# Patient Record
Sex: MALE | Race: WHITE | NOT HISPANIC OR LATINO | Employment: OTHER | URBAN - METROPOLITAN AREA
[De-identification: names, ages, dates, MRNs, and addresses within clinical notes are randomized per-mention and may not be internally consistent; named-entity substitution may affect disease eponyms.]

---

## 2017-01-01 ENCOUNTER — PATIENT OUTREACH (OUTPATIENT)
Dept: ADMINISTRATIVE | Facility: CLINIC | Age: 47
End: 2017-01-01

## 2017-01-01 ENCOUNTER — HOSPITAL ENCOUNTER (INPATIENT)
Facility: HOSPITAL | Age: 47
LOS: 7 days | Discharge: HOME | DRG: 253 | End: 2017-10-03
Attending: EMERGENCY MEDICINE | Admitting: INTERNAL MEDICINE
Payer: MEDICARE

## 2017-01-01 VITALS
BODY MASS INDEX: 22.78 KG/M2 | SYSTOLIC BLOOD PRESSURE: 104 MMHG | OXYGEN SATURATION: 98 % | HEART RATE: 77 BPM | TEMPERATURE: 98 F | DIASTOLIC BLOOD PRESSURE: 65 MMHG | HEIGHT: 66 IN | WEIGHT: 141.75 LBS | RESPIRATION RATE: 14 BRPM

## 2017-01-01 DIAGNOSIS — I50.22 CHRONIC SYSTOLIC HEART FAILURE: ICD-10-CM

## 2017-01-01 DIAGNOSIS — I73.9 PAD (PERIPHERAL ARTERY DISEASE): ICD-10-CM

## 2017-01-01 DIAGNOSIS — I74.3 EMBOLISM AND THROMBOSIS OF ARTERY OF LOWER EXTREMITY: ICD-10-CM

## 2017-01-01 DIAGNOSIS — F10.10 ALCOHOL ABUSE: ICD-10-CM

## 2017-01-01 DIAGNOSIS — Z91.148 NON COMPLIANCE W MEDICATION REGIMEN: ICD-10-CM

## 2017-01-01 DIAGNOSIS — I70.209 ARTERIAL OCCLUSION, LOWER EXTREMITY: Primary | ICD-10-CM

## 2017-01-01 DIAGNOSIS — M79.672 ACUTE PAIN OF LEFT FOOT: ICD-10-CM

## 2017-01-01 DIAGNOSIS — Z72.0 TOBACCO USE: ICD-10-CM

## 2017-01-01 DIAGNOSIS — R07.9 CHEST PAIN: ICD-10-CM

## 2017-01-01 DIAGNOSIS — Z72.0 TOBACCO ABUSE: ICD-10-CM

## 2017-01-01 LAB
ABO + RH BLD: NORMAL
ALBUMIN SERPL BCP-MCNC: 2.7 G/DL
ALBUMIN SERPL BCP-MCNC: 2.8 G/DL
ALBUMIN SERPL BCP-MCNC: 2.9 G/DL
ALBUMIN SERPL BCP-MCNC: 3 G/DL
ALBUMIN SERPL BCP-MCNC: 3.1 G/DL
ALP SERPL-CCNC: 70 U/L
ALP SERPL-CCNC: 72 U/L
ALP SERPL-CCNC: 73 U/L
ALP SERPL-CCNC: 74 U/L
ALP SERPL-CCNC: 83 U/L
ALT SERPL W/O P-5'-P-CCNC: 15 U/L
ALT SERPL W/O P-5'-P-CCNC: 15 U/L
ALT SERPL W/O P-5'-P-CCNC: 16 U/L
ALT SERPL W/O P-5'-P-CCNC: 21 U/L
ALT SERPL W/O P-5'-P-CCNC: 23 U/L
ANION GAP SERPL CALC-SCNC: 10 MMOL/L
ANION GAP SERPL CALC-SCNC: 6 MMOL/L
ANION GAP SERPL CALC-SCNC: 7 MMOL/L
ANION GAP SERPL CALC-SCNC: 8 MMOL/L
ANION GAP SERPL CALC-SCNC: 9 MMOL/L
ANION GAP SERPL CALC-SCNC: 9 MMOL/L
ANISOCYTOSIS BLD QL SMEAR: SLIGHT
APC INTERPRETATION: NORMAL
APCR PPP: 2.8 {RATIO}
APTT BLDCRRT: 22.7 SEC
APTT PROTEIN C ACTIVATOR+FV DP/APTT PPP: 114 %
AST SERPL-CCNC: 16 U/L
AST SERPL-CCNC: 18 U/L
AST SERPL-CCNC: 18 U/L
AST SERPL-CCNC: 21 U/L
AST SERPL-CCNC: 25 U/L
AT III ACT/NOR PPP CHRO: 128 %
B2 GLYCOPROT1 IGA SER QL: <9 SAU
B2 GLYCOPROT1 IGG SER QL: <9 SGU
B2 GLYCOPROT1 IGM SER QL: <9 SMU
BASOPHILS # BLD AUTO: 0.01 K/UL
BASOPHILS # BLD AUTO: 0.02 K/UL
BASOPHILS # BLD AUTO: 0.04 K/UL
BASOPHILS # BLD AUTO: 0.05 K/UL
BASOPHILS # BLD AUTO: ABNORMAL K/UL
BASOPHILS NFR BLD: 0 %
BASOPHILS NFR BLD: 0.1 %
BASOPHILS NFR BLD: 0.2 %
BASOPHILS NFR BLD: 0.3 %
BASOPHILS NFR BLD: 0.4 %
BILIRUB SERPL-MCNC: 0.6 MG/DL
BILIRUB SERPL-MCNC: 0.6 MG/DL
BILIRUB SERPL-MCNC: 0.8 MG/DL
BILIRUB SERPL-MCNC: 0.9 MG/DL
BILIRUB SERPL-MCNC: 1 MG/DL
BLD GP AB SCN CELLS X3 SERPL QL: NORMAL
BUN SERPL-MCNC: 10 MG/DL
BUN SERPL-MCNC: 11 MG/DL
BUN SERPL-MCNC: 12 MG/DL
BUN SERPL-MCNC: 15 MG/DL
BUN SERPL-MCNC: 17 MG/DL
BUN SERPL-MCNC: 19 MG/DL
BUN SERPL-MCNC: 8 MG/DL
BUN SERPL-MCNC: 9 MG/DL
CALCIUM SERPL-MCNC: 8.5 MG/DL
CALCIUM SERPL-MCNC: 8.6 MG/DL
CALCIUM SERPL-MCNC: 8.9 MG/DL
CALCIUM SERPL-MCNC: 9.2 MG/DL
CALCIUM SERPL-MCNC: 9.3 MG/DL
CALCIUM SERPL-MCNC: 9.3 MG/DL
CALCIUM SERPL-MCNC: 9.5 MG/DL
CALCIUM SERPL-MCNC: 9.6 MG/DL
CARDIOLIPIN IGG SER IA-ACNC: <9.4 GPL
CARDIOLIPIN IGM SER IA-ACNC: <9.4 MPL
CHLORIDE SERPL-SCNC: 100 MMOL/L
CHLORIDE SERPL-SCNC: 101 MMOL/L
CHLORIDE SERPL-SCNC: 103 MMOL/L
CHLORIDE SERPL-SCNC: 103 MMOL/L
CHLORIDE SERPL-SCNC: 104 MMOL/L
CHLORIDE SERPL-SCNC: 105 MMOL/L
CHLORIDE SERPL-SCNC: 105 MMOL/L
CHLORIDE SERPL-SCNC: 98 MMOL/L
CHOLEST SERPL-MCNC: 189 MG/DL
CHOLEST/HDLC SERPL: 3 {RATIO}
CO2 SERPL-SCNC: 24 MMOL/L
CO2 SERPL-SCNC: 25 MMOL/L
CO2 SERPL-SCNC: 27 MMOL/L
CO2 SERPL-SCNC: 28 MMOL/L
CO2 SERPL-SCNC: 28 MMOL/L
CO2 SERPL-SCNC: 29 MMOL/L
CO2 SERPL-SCNC: 29 MMOL/L
CO2 SERPL-SCNC: 30 MMOL/L
CREAT SERPL-MCNC: 0.7 MG/DL
CREAT SERPL-MCNC: 0.7 MG/DL
CREAT SERPL-MCNC: 0.8 MG/DL
CRP SERPL-MCNC: 17.2 MG/L
DIFFERENTIAL METHOD: ABNORMAL
EOSINOPHIL # BLD AUTO: 0.2 K/UL
EOSINOPHIL # BLD AUTO: 0.3 K/UL
EOSINOPHIL # BLD AUTO: 0.3 K/UL
EOSINOPHIL # BLD AUTO: 0.4 K/UL
EOSINOPHIL # BLD AUTO: ABNORMAL K/UL
EOSINOPHIL NFR BLD: 1.4 %
EOSINOPHIL NFR BLD: 1.6 %
EOSINOPHIL NFR BLD: 1.6 %
EOSINOPHIL NFR BLD: 1.9 %
EOSINOPHIL NFR BLD: 3.3 %
EOSINOPHIL NFR BLD: 4.5 %
EOSINOPHIL NFR BLD: 5 %
ERYTHROCYTE [DISTWIDTH] IN BLOOD BY AUTOMATED COUNT: 16.5 %
ERYTHROCYTE [DISTWIDTH] IN BLOOD BY AUTOMATED COUNT: 16.9 %
ERYTHROCYTE [DISTWIDTH] IN BLOOD BY AUTOMATED COUNT: 16.9 %
ERYTHROCYTE [DISTWIDTH] IN BLOOD BY AUTOMATED COUNT: 17.1 %
ERYTHROCYTE [DISTWIDTH] IN BLOOD BY AUTOMATED COUNT: 17.1 %
ERYTHROCYTE [DISTWIDTH] IN BLOOD BY AUTOMATED COUNT: 17.2 %
ERYTHROCYTE [DISTWIDTH] IN BLOOD BY AUTOMATED COUNT: 17.3 %
ERYTHROCYTE [SEDIMENTATION RATE] IN BLOOD BY WESTERGREN METHOD: 11 MM/HR
EST. GFR  (AFRICAN AMERICAN): >60 ML/MIN/1.73 M^2
EST. GFR  (NON AFRICAN AMERICAN): >60 ML/MIN/1.73 M^2
ESTIMATED AVG GLUCOSE: 108 MG/DL
F2 GENE MUT ANL BLD/T: NORMAL
F2 GENE MUT ANL BLD/T: NORMAL
F5 P.R506Q BLD/T QL: NORMAL
FACT X PPP CHRO-ACNC: 0.16 IU/ML
FACT X PPP CHRO-ACNC: 0.23 IU/ML
FACT X PPP CHRO-ACNC: 0.97 IU/ML
FACT X PPP CHRO-ACNC: <0.1 IU/ML
FIBRINOGEN PPP-MCNC: 248 MG/DL
FIBRINOGEN PPP-MCNC: 303 MG/DL
FIBRINOGEN PPP-MCNC: 347 MG/DL
FIBRINOGEN PPP-MCNC: 353 MG/DL
FIBRINOGEN PPP-MCNC: 382 MG/DL
FIBRINOGEN PPP-MCNC: 401 MG/DL
GLUCOSE SERPL-MCNC: 103 MG/DL
GLUCOSE SERPL-MCNC: 104 MG/DL
GLUCOSE SERPL-MCNC: 120 MG/DL
GLUCOSE SERPL-MCNC: 128 MG/DL
GLUCOSE SERPL-MCNC: 137 MG/DL
GLUCOSE SERPL-MCNC: 138 MG/DL
GLUCOSE SERPL-MCNC: 86 MG/DL
GLUCOSE SERPL-MCNC: 91 MG/DL
HBA1C MFR BLD HPLC: 5.4 %
HCT VFR BLD AUTO: 33.2 %
HCT VFR BLD AUTO: 34.2 %
HCT VFR BLD AUTO: 34.5 %
HCT VFR BLD AUTO: 34.9 %
HCT VFR BLD AUTO: 37.2 %
HCT VFR BLD AUTO: 39.8 %
HCT VFR BLD AUTO: 40.3 %
HDLC SERPL-MCNC: 62 MG/DL
HDLC SERPL: 32.8 %
HGB BLD-MCNC: 11.2 G/DL
HGB BLD-MCNC: 11.7 G/DL
HGB BLD-MCNC: 11.8 G/DL
HGB BLD-MCNC: 12.2 G/DL
HGB BLD-MCNC: 12.6 G/DL
HGB BLD-MCNC: 13.8 G/DL
HGB BLD-MCNC: 14 G/DL
INR PPP: 0.9
LA PPP-IMP: NEGATIVE
LDLC SERPL CALC-MCNC: 102 MG/DL
LYMPHOCYTES # BLD AUTO: 2.1 K/UL
LYMPHOCYTES # BLD AUTO: 2.2 K/UL
LYMPHOCYTES # BLD AUTO: 2.3 K/UL
LYMPHOCYTES # BLD AUTO: 2.6 K/UL
LYMPHOCYTES # BLD AUTO: 3.2 K/UL
LYMPHOCYTES # BLD AUTO: 3.4 K/UL
LYMPHOCYTES # BLD AUTO: ABNORMAL K/UL
LYMPHOCYTES NFR BLD: 17.7 %
LYMPHOCYTES NFR BLD: 17.7 %
LYMPHOCYTES NFR BLD: 20.2 %
LYMPHOCYTES NFR BLD: 23.2 %
LYMPHOCYTES NFR BLD: 26.8 %
LYMPHOCYTES NFR BLD: 27.7 %
LYMPHOCYTES NFR BLD: 31 %
MAGNESIUM SERPL-MCNC: 1.8 MG/DL
MAGNESIUM SERPL-MCNC: 1.8 MG/DL
MAGNESIUM SERPL-MCNC: 1.9 MG/DL
MAGNESIUM SERPL-MCNC: 2 MG/DL
MAGNESIUM SERPL-MCNC: 2.1 MG/DL
MCH RBC QN AUTO: 32.9 PG
MCH RBC QN AUTO: 33 PG
MCH RBC QN AUTO: 33 PG
MCH RBC QN AUTO: 33.2 PG
MCH RBC QN AUTO: 33.2 PG
MCH RBC QN AUTO: 33.5 PG
MCH RBC QN AUTO: 33.5 PG
MCHC RBC AUTO-ENTMCNC: 33.5 G/DL
MCHC RBC AUTO-ENTMCNC: 33.7 G/DL
MCHC RBC AUTO-ENTMCNC: 33.9 G/DL
MCHC RBC AUTO-ENTMCNC: 34.5 G/DL
MCHC RBC AUTO-ENTMCNC: 34.7 G/DL
MCHC RBC AUTO-ENTMCNC: 34.7 G/DL
MCHC RBC AUTO-ENTMCNC: 35.4 G/DL
MCV RBC AUTO: 94 FL
MCV RBC AUTO: 96 FL
MCV RBC AUTO: 96 FL
MCV RBC AUTO: 97 FL
MCV RBC AUTO: 97 FL
MCV RBC AUTO: 98 FL
MCV RBC AUTO: 98 FL
MONOCYTES # BLD AUTO: 0.8 K/UL
MONOCYTES # BLD AUTO: 1 K/UL
MONOCYTES # BLD AUTO: 1.1 K/UL
MONOCYTES # BLD AUTO: 1.3 K/UL
MONOCYTES # BLD AUTO: ABNORMAL K/UL
MONOCYTES NFR BLD: 10.2 %
MONOCYTES NFR BLD: 10.7 %
MONOCYTES NFR BLD: 7 %
MONOCYTES NFR BLD: 7 %
MONOCYTES NFR BLD: 7.9 %
MONOCYTES NFR BLD: 8.4 %
MONOCYTES NFR BLD: 8.8 %
NEUTROPHILS # BLD AUTO: 5.9 K/UL
NEUTROPHILS # BLD AUTO: 6.8 K/UL
NEUTROPHILS # BLD AUTO: 7 K/UL
NEUTROPHILS # BLD AUTO: 9 K/UL
NEUTROPHILS # BLD AUTO: 9 K/UL
NEUTROPHILS # BLD AUTO: 9.1 K/UL
NEUTROPHILS NFR BLD: 55 %
NEUTROPHILS NFR BLD: 57.6 %
NEUTROPHILS NFR BLD: 59.7 %
NEUTROPHILS NFR BLD: 65.2 %
NEUTROPHILS NFR BLD: 66 %
NEUTROPHILS NFR BLD: 71 %
NEUTROPHILS NFR BLD: 71.3 %
NEUTS BAND NFR BLD MANUAL: 2 %
NONHDLC SERPL-MCNC: 127 MG/DL
PERIPHERAL STENOSIS: ABNORMAL
PERIPHERAL STENOSIS: ABNORMAL
PERIPHERAL STENT: YES
PLATELET # BLD AUTO: 293 K/UL
PLATELET # BLD AUTO: 312 K/UL
PLATELET # BLD AUTO: 320 K/UL
PLATELET # BLD AUTO: 336 K/UL
PLATELET # BLD AUTO: 346 K/UL
PLATELET # BLD AUTO: 375 K/UL
PLATELET # BLD AUTO: 398 K/UL
PLATELET BLD QL SMEAR: ABNORMAL
PMV BLD AUTO: 10 FL
PMV BLD AUTO: 10.2 FL
PMV BLD AUTO: 10.3 FL
PMV BLD AUTO: 10.3 FL
PMV BLD AUTO: 10.4 FL
PMV BLD AUTO: 10.7 FL
PMV BLD AUTO: 10.8 FL
POC ACTIVATED CLOTTING TIME K: 114 SEC (ref 74–137)
POC ACTIVATED CLOTTING TIME K: 246 SEC (ref 74–137)
POTASSIUM SERPL-SCNC: 3.6 MMOL/L
POTASSIUM SERPL-SCNC: 3.6 MMOL/L
POTASSIUM SERPL-SCNC: 3.8 MMOL/L
POTASSIUM SERPL-SCNC: 4 MMOL/L
POTASSIUM SERPL-SCNC: 4.1 MMOL/L
POTASSIUM SERPL-SCNC: 4.2 MMOL/L
POTASSIUM SERPL-SCNC: 4.6 MMOL/L
POTASSIUM SERPL-SCNC: 4.6 MMOL/L
PROT S ACT/NOR PPP: 105 %
PROT SERPL-MCNC: 6 G/DL
PROT SERPL-MCNC: 6.1 G/DL
PROT SERPL-MCNC: 6.2 G/DL
PROT SERPL-MCNC: 6.3 G/DL
PROT SERPL-MCNC: 6.9 G/DL
PROTHROMBIN TIME: 9.8 SEC
RBC # BLD AUTO: 3.4 M/UL
RBC # BLD AUTO: 3.52 M/UL
RBC # BLD AUTO: 3.55 M/UL
RBC # BLD AUTO: 3.67 M/UL
RBC # BLD AUTO: 3.82 M/UL
RBC # BLD AUTO: 4.16 M/UL
RBC # BLD AUTO: 4.18 M/UL
SAMPLE: ABNORMAL
SAMPLE: NORMAL
SODIUM SERPL-SCNC: 133 MMOL/L
SODIUM SERPL-SCNC: 137 MMOL/L
SODIUM SERPL-SCNC: 139 MMOL/L
SODIUM SERPL-SCNC: 140 MMOL/L
SODIUM SERPL-SCNC: 140 MMOL/L
TRIGL SERPL-MCNC: 125 MG/DL
WBC # BLD AUTO: 10.35 K/UL
WBC # BLD AUTO: 12.16 K/UL
WBC # BLD AUTO: 12.59 K/UL
WBC # BLD AUTO: 12.79 K/UL
WBC # BLD AUTO: 13.64 K/UL
WBC # BLD AUTO: 9.16 K/UL
WBC # BLD AUTO: 9.85 K/UL

## 2017-01-01 PROCEDURE — 37226 CATH LAB PROCEDURE: CPT | Mod: LT,,, | Performed by: INTERNAL MEDICINE

## 2017-01-01 PROCEDURE — 86850 RBC ANTIBODY SCREEN: CPT

## 2017-01-01 PROCEDURE — 85384 FIBRINOGEN ACTIVITY: CPT | Mod: 91

## 2017-01-01 PROCEDURE — 85520 HEPARIN ASSAY: CPT

## 2017-01-01 PROCEDURE — 80053 COMPREHEN METABOLIC PANEL: CPT

## 2017-01-01 PROCEDURE — 63600175 PHARM REV CODE 636 W HCPCS: Performed by: INTERNAL MEDICINE

## 2017-01-01 PROCEDURE — 86147 CARDIOLIPIN ANTIBODY EA IG: CPT | Mod: 59

## 2017-01-01 PROCEDURE — 25000003 PHARM REV CODE 250: Performed by: INTERNAL MEDICINE

## 2017-01-01 PROCEDURE — 63600175 PHARM REV CODE 636 W HCPCS

## 2017-01-01 PROCEDURE — 25000003 PHARM REV CODE 250: Performed by: NURSE PRACTITIONER

## 2017-01-01 PROCEDURE — 83735 ASSAY OF MAGNESIUM: CPT

## 2017-01-01 PROCEDURE — 20600001 HC STEP DOWN PRIVATE ROOM

## 2017-01-01 PROCEDURE — 85384 FIBRINOGEN ACTIVITY: CPT

## 2017-01-01 PROCEDURE — 37211 THROMBOLYTIC ART THERAPY: CPT | Mod: ,,, | Performed by: INTERNAL MEDICINE

## 2017-01-01 PROCEDURE — 25000003 PHARM REV CODE 250: Performed by: STUDENT IN AN ORGANIZED HEALTH CARE EDUCATION/TRAINING PROGRAM

## 2017-01-01 PROCEDURE — 63600175 PHARM REV CODE 636 W HCPCS: Performed by: NURSE PRACTITIONER

## 2017-01-01 PROCEDURE — 20000000 HC ICU ROOM

## 2017-01-01 PROCEDURE — 25000003 PHARM REV CODE 250: Performed by: HOSPITALIST

## 2017-01-01 PROCEDURE — 85027 COMPLETE CBC AUTOMATED: CPT

## 2017-01-01 PROCEDURE — 99232 SBSQ HOSP IP/OBS MODERATE 35: CPT | Mod: ,,, | Performed by: INTERNAL MEDICINE

## 2017-01-01 PROCEDURE — 99291 CRITICAL CARE FIRST HOUR: CPT | Mod: 25

## 2017-01-01 PROCEDURE — 86140 C-REACTIVE PROTEIN: CPT

## 2017-01-01 PROCEDURE — 36140 INTRO NDL ICATH UPR/LXTR ART: CPT | Mod: ,,, | Performed by: INTERNAL MEDICINE

## 2017-01-01 PROCEDURE — 85007 BL SMEAR W/DIFF WBC COUNT: CPT

## 2017-01-01 PROCEDURE — 047N3ZZ DILATION OF LEFT POPLITEAL ARTERY, PERCUTANEOUS APPROACH: ICD-10-PCS | Performed by: INTERNAL MEDICINE

## 2017-01-01 PROCEDURE — 99239 HOSP IP/OBS DSCHRG MGMT >30: CPT | Mod: ,,, | Performed by: NURSE PRACTITIONER

## 2017-01-01 PROCEDURE — 12000002 HC ACUTE/MED SURGE SEMI-PRIVATE ROOM

## 2017-01-01 PROCEDURE — 99232 SBSQ HOSP IP/OBS MODERATE 35: CPT | Mod: GC,,, | Performed by: INTERNAL MEDICINE

## 2017-01-01 PROCEDURE — 36415 COLL VENOUS BLD VENIPUNCTURE: CPT

## 2017-01-01 PROCEDURE — G8988 SELF CARE GOAL STATUS: HCPCS | Mod: CI

## 2017-01-01 PROCEDURE — 75710 ARTERY X-RAYS ARM/LEG: CPT | Mod: 26,59,, | Performed by: INTERNAL MEDICINE

## 2017-01-01 PROCEDURE — 99233 SBSQ HOSP IP/OBS HIGH 50: CPT | Mod: GC,,, | Performed by: INTERNAL MEDICINE

## 2017-01-01 PROCEDURE — 85025 COMPLETE CBC W/AUTO DIFF WBC: CPT

## 2017-01-01 PROCEDURE — 99152 MOD SED SAME PHYS/QHP 5/>YRS: CPT | Mod: ,,, | Performed by: INTERNAL MEDICINE

## 2017-01-01 PROCEDURE — A4216 STERILE WATER/SALINE, 10 ML: HCPCS | Performed by: NURSE PRACTITIONER

## 2017-01-01 PROCEDURE — 99233 SBSQ HOSP IP/OBS HIGH 50: CPT | Mod: ,,, | Performed by: NURSE PRACTITIONER

## 2017-01-01 PROCEDURE — 97161 PT EVAL LOW COMPLEX 20 MIN: CPT

## 2017-01-01 PROCEDURE — 83036 HEMOGLOBIN GLYCOSYLATED A1C: CPT

## 2017-01-01 PROCEDURE — 85300 ANTITHROMBIN III ACTIVITY: CPT

## 2017-01-01 PROCEDURE — 85610 PROTHROMBIN TIME: CPT

## 2017-01-01 PROCEDURE — 99291 CRITICAL CARE FIRST HOUR: CPT | Mod: ,,, | Performed by: EMERGENCY MEDICINE

## 2017-01-01 PROCEDURE — 99223 1ST HOSP IP/OBS HIGH 75: CPT | Mod: ,,, | Performed by: INTERNAL MEDICINE

## 2017-01-01 PROCEDURE — 047U3ZZ DILATION OF LEFT PERONEAL ARTERY, PERCUTANEOUS APPROACH: ICD-10-PCS | Performed by: INTERNAL MEDICINE

## 2017-01-01 PROCEDURE — S4991 NICOTINE PATCH NONLEGEND: HCPCS | Performed by: NURSE PRACTITIONER

## 2017-01-01 PROCEDURE — 85305 CLOT INHIBIT PROT S TOTAL: CPT

## 2017-01-01 PROCEDURE — 25000003 PHARM REV CODE 250

## 2017-01-01 PROCEDURE — 85730 THROMBOPLASTIN TIME PARTIAL: CPT

## 2017-01-01 PROCEDURE — 99222 1ST HOSP IP/OBS MODERATE 55: CPT | Mod: GC,,, | Performed by: INTERNAL MEDICINE

## 2017-01-01 PROCEDURE — 99231 SBSQ HOSP IP/OBS SF/LOW 25: CPT | Mod: ,,, | Performed by: INTERNAL MEDICINE

## 2017-01-01 PROCEDURE — 80048 BASIC METABOLIC PNL TOTAL CA: CPT

## 2017-01-01 PROCEDURE — 99223 1ST HOSP IP/OBS HIGH 75: CPT | Mod: ,,, | Performed by: NURSE PRACTITIONER

## 2017-01-01 PROCEDURE — 81241 F5 GENE: CPT

## 2017-01-01 PROCEDURE — 85307 ASSAY ACTIVATED PROTEIN C: CPT

## 2017-01-01 PROCEDURE — G8989 SELF CARE D/C STATUS: HCPCS | Mod: CI

## 2017-01-01 PROCEDURE — 85651 RBC SED RATE NONAUTOMATED: CPT

## 2017-01-01 PROCEDURE — 96365 THER/PROPH/DIAG IV INF INIT: CPT

## 2017-01-01 PROCEDURE — 85613 RUSSELL VIPER VENOM DILUTED: CPT

## 2017-01-01 PROCEDURE — 047L3DZ DILATION OF LEFT FEMORAL ARTERY WITH INTRALUMINAL DEVICE, PERCUTANEOUS APPROACH: ICD-10-PCS | Performed by: INTERNAL MEDICINE

## 2017-01-01 PROCEDURE — 04HK33Z INSERTION OF INFUSION DEVICE INTO RIGHT FEMORAL ARTERY, PERCUTANEOUS APPROACH: ICD-10-PCS | Performed by: INTERNAL MEDICINE

## 2017-01-01 PROCEDURE — 97165 OT EVAL LOW COMPLEX 30 MIN: CPT

## 2017-01-01 PROCEDURE — 81240 F2 GENE: CPT

## 2017-01-01 PROCEDURE — 80061 LIPID PANEL: CPT

## 2017-01-01 PROCEDURE — 96366 THER/PROPH/DIAG IV INF ADDON: CPT

## 2017-01-01 PROCEDURE — 85303 CLOT INHIBIT PROT C ACTIVITY: CPT

## 2017-01-01 PROCEDURE — 86900 BLOOD TYPING SEROLOGIC ABO: CPT

## 2017-01-01 PROCEDURE — G8987 SELF CARE CURRENT STATUS: HCPCS | Mod: CI

## 2017-01-01 PROCEDURE — 3E05317 INTRODUCTION OF OTHER THROMBOLYTIC INTO PERIPHERAL ARTERY, PERCUTANEOUS APPROACH: ICD-10-PCS | Performed by: INTERNAL MEDICINE

## 2017-01-01 PROCEDURE — 86146 BETA-2 GLYCOPROTEIN ANTIBODY: CPT

## 2017-01-01 PROCEDURE — 85520 HEPARIN ASSAY: CPT | Mod: 91

## 2017-01-01 PROCEDURE — 99223 1ST HOSP IP/OBS HIGH 75: CPT | Mod: ,,, | Performed by: PSYCHIATRY & NEUROLOGY

## 2017-01-01 RX ORDER — ATORVASTATIN CALCIUM 20 MG/1
40 TABLET, FILM COATED ORAL NIGHTLY
Status: DISCONTINUED | OUTPATIENT
Start: 2017-01-01 | End: 2017-01-01 | Stop reason: HOSPADM

## 2017-01-01 RX ORDER — DIPHENHYDRAMINE HCL 50 MG
CAPSULE ORAL
Status: DISPENSED
Start: 2017-01-01 | End: 2017-01-01

## 2017-01-01 RX ORDER — MUPIROCIN 20 MG/G
OINTMENT TOPICAL DAILY
Status: DISCONTINUED | OUTPATIENT
Start: 2017-01-01 | End: 2017-01-01 | Stop reason: HOSPADM

## 2017-01-01 RX ORDER — POTASSIUM CHLORIDE 20 MEQ/1
40 TABLET, EXTENDED RELEASE ORAL ONCE
Status: DISCONTINUED | OUTPATIENT
Start: 2017-01-01 | End: 2017-01-01

## 2017-01-01 RX ORDER — POLYETHYLENE GLYCOL 3350 17 G/17G
17 POWDER, FOR SOLUTION ORAL DAILY
Status: DISCONTINUED | OUTPATIENT
Start: 2017-01-01 | End: 2017-01-01 | Stop reason: HOSPADM

## 2017-01-01 RX ORDER — POLYETHYLENE GLYCOL 3350 17 G/17G
17 POWDER, FOR SOLUTION ORAL 2 TIMES DAILY PRN
Status: DISCONTINUED | OUTPATIENT
Start: 2017-01-01 | End: 2017-01-01 | Stop reason: HOSPADM

## 2017-01-01 RX ORDER — POTASSIUM CHLORIDE 750 MG/1
20 CAPSULE, EXTENDED RELEASE ORAL ONCE
Status: COMPLETED | OUTPATIENT
Start: 2017-01-01 | End: 2017-01-01

## 2017-01-01 RX ORDER — LISINOPRIL 10 MG/1
10 TABLET ORAL DAILY
Status: DISCONTINUED | OUTPATIENT
Start: 2017-01-01 | End: 2017-01-01

## 2017-01-01 RX ORDER — MAGNESIUM SULFATE HEPTAHYDRATE 40 MG/ML
2 INJECTION, SOLUTION INTRAVENOUS ONCE
Status: COMPLETED | OUTPATIENT
Start: 2017-01-01 | End: 2017-01-01

## 2017-01-01 RX ORDER — RANOLAZINE 1000 MG/1
1000 TABLET, EXTENDED RELEASE ORAL 2 TIMES DAILY
Status: DISCONTINUED | OUTPATIENT
Start: 2017-01-01 | End: 2017-01-01

## 2017-01-01 RX ORDER — LISINOPRIL 5 MG/1
5 TABLET ORAL DAILY
Qty: 90 TABLET | Refills: 3 | Status: ON HOLD | OUTPATIENT
Start: 2017-01-01 | End: 2018-01-01 | Stop reason: HOSPADM

## 2017-01-01 RX ORDER — LORAZEPAM 2 MG/ML
2 INJECTION INTRAMUSCULAR EVERY 4 HOURS PRN
Status: DISCONTINUED | OUTPATIENT
Start: 2017-01-01 | End: 2017-01-01

## 2017-01-01 RX ORDER — HYDROMORPHONE HYDROCHLORIDE 1 MG/ML
0.5 INJECTION, SOLUTION INTRAMUSCULAR; INTRAVENOUS; SUBCUTANEOUS EVERY 4 HOURS PRN
Status: DISCONTINUED | OUTPATIENT
Start: 2017-01-01 | End: 2017-01-01

## 2017-01-01 RX ORDER — OXYCODONE HYDROCHLORIDE 5 MG/1
10 TABLET ORAL EVERY 4 HOURS PRN
Status: DISCONTINUED | OUTPATIENT
Start: 2017-01-01 | End: 2017-01-01 | Stop reason: HOSPADM

## 2017-01-01 RX ORDER — SODIUM CHLORIDE 0.9 % (FLUSH) 0.9 %
3 SYRINGE (ML) INJECTION EVERY 8 HOURS
Status: DISCONTINUED | OUTPATIENT
Start: 2017-01-01 | End: 2017-01-01 | Stop reason: HOSPADM

## 2017-01-01 RX ORDER — NITROGLYCERIN 0.4 MG/1
0.4 TABLET SUBLINGUAL EVERY 5 MIN PRN
Status: DISCONTINUED | OUTPATIENT
Start: 2017-01-01 | End: 2017-01-01 | Stop reason: HOSPADM

## 2017-01-01 RX ORDER — PREGABALIN 100 MG/1
100 CAPSULE ORAL 3 TIMES DAILY
Status: DISCONTINUED | OUTPATIENT
Start: 2017-01-01 | End: 2017-01-01 | Stop reason: HOSPADM

## 2017-01-01 RX ORDER — OXYCODONE HYDROCHLORIDE 5 MG/1
5 TABLET ORAL EVERY 4 HOURS PRN
Status: DISCONTINUED | OUTPATIENT
Start: 2017-01-01 | End: 2017-01-01 | Stop reason: HOSPADM

## 2017-01-01 RX ORDER — METOPROLOL TARTRATE 25 MG/1
12.5 TABLET ORAL 2 TIMES DAILY
Status: DISCONTINUED | OUTPATIENT
Start: 2017-01-01 | End: 2017-01-01 | Stop reason: HOSPADM

## 2017-01-01 RX ORDER — AMITRIPTYLINE HYDROCHLORIDE 25 MG/1
25 TABLET, FILM COATED ORAL NIGHTLY
Qty: 30 TABLET | Refills: 11 | Status: ON HOLD | OUTPATIENT
Start: 2017-01-01 | End: 2018-01-01

## 2017-01-01 RX ORDER — ONDANSETRON 2 MG/ML
4 INJECTION INTRAMUSCULAR; INTRAVENOUS EVERY 12 HOURS PRN
Status: DISCONTINUED | OUTPATIENT
Start: 2017-01-01 | End: 2017-01-01 | Stop reason: HOSPADM

## 2017-01-01 RX ORDER — HYDROMORPHONE HYDROCHLORIDE 2 MG/ML
INJECTION, SOLUTION INTRAMUSCULAR; INTRAVENOUS; SUBCUTANEOUS
Status: COMPLETED
Start: 2017-01-01 | End: 2017-01-01

## 2017-01-01 RX ORDER — ACETAMINOPHEN 325 MG/1
650 TABLET ORAL EVERY 4 HOURS PRN
Status: DISCONTINUED | OUTPATIENT
Start: 2017-01-01 | End: 2017-01-01 | Stop reason: HOSPADM

## 2017-01-01 RX ORDER — RAMELTEON 8 MG/1
8 TABLET ORAL NIGHTLY PRN
Status: DISCONTINUED | OUTPATIENT
Start: 2017-01-01 | End: 2017-01-01 | Stop reason: HOSPADM

## 2017-01-01 RX ORDER — ASPIRIN 81 MG/1
81 TABLET ORAL DAILY
Status: DISCONTINUED | OUTPATIENT
Start: 2017-01-01 | End: 2017-01-01 | Stop reason: HOSPADM

## 2017-01-01 RX ORDER — BISACODYL 10 MG
10 SUPPOSITORY, RECTAL RECTAL DAILY PRN
Status: DISCONTINUED | OUTPATIENT
Start: 2017-01-01 | End: 2017-01-01 | Stop reason: HOSPADM

## 2017-01-01 RX ORDER — SODIUM CHLORIDE 9 MG/ML
125 INJECTION, SOLUTION INTRAVENOUS CONTINUOUS
Status: ACTIVE | OUTPATIENT
Start: 2017-01-01 | End: 2017-01-01

## 2017-01-01 RX ORDER — HEPARIN SODIUM,PORCINE/D5W 25000/250
17 INTRAVENOUS SOLUTION INTRAVENOUS CONTINUOUS
Status: DISCONTINUED | OUTPATIENT
Start: 2017-01-01 | End: 2017-01-01 | Stop reason: SDUPTHER

## 2017-01-01 RX ORDER — LISINOPRIL 5 MG/1
5 TABLET ORAL DAILY
Status: DISCONTINUED | OUTPATIENT
Start: 2017-01-01 | End: 2017-01-01 | Stop reason: HOSPADM

## 2017-01-01 RX ORDER — ONDANSETRON 8 MG/1
8 TABLET, ORALLY DISINTEGRATING ORAL EVERY 8 HOURS PRN
Status: DISCONTINUED | OUTPATIENT
Start: 2017-01-01 | End: 2017-01-01 | Stop reason: HOSPADM

## 2017-01-01 RX ORDER — SODIUM CHLORIDE 9 MG/ML
3 INJECTION, SOLUTION INTRAVENOUS CONTINUOUS
Status: DISCONTINUED | OUTPATIENT
Start: 2017-01-01 | End: 2017-01-01

## 2017-01-01 RX ORDER — CILOSTAZOL 100 MG/1
100 TABLET ORAL 2 TIMES DAILY
Status: DISCONTINUED | OUTPATIENT
Start: 2017-01-01 | End: 2017-01-01

## 2017-01-01 RX ORDER — MUPIROCIN 20 MG/G
OINTMENT TOPICAL DAILY
Qty: 22 G | Refills: 0 | Status: SHIPPED | OUTPATIENT
Start: 2017-01-01 | End: 2018-01-01

## 2017-01-01 RX ORDER — IBUPROFEN 200 MG
1 TABLET ORAL DAILY
Status: DISCONTINUED | OUTPATIENT
Start: 2017-01-01 | End: 2017-01-01 | Stop reason: HOSPADM

## 2017-01-01 RX ORDER — RANOLAZINE 500 MG/1
1000 TABLET, EXTENDED RELEASE ORAL 2 TIMES DAILY
Status: DISCONTINUED | OUTPATIENT
Start: 2017-01-01 | End: 2017-01-01 | Stop reason: HOSPADM

## 2017-01-01 RX ORDER — POTASSIUM CHLORIDE 20 MEQ/1
40 TABLET, EXTENDED RELEASE ORAL ONCE
Status: COMPLETED | OUTPATIENT
Start: 2017-01-01 | End: 2017-01-01

## 2017-01-01 RX ORDER — HEPARIN SODIUM 10000 [USP'U]/100ML
500 INJECTION, SOLUTION INTRAVENOUS CONTINUOUS
Status: DISCONTINUED | OUTPATIENT
Start: 2017-01-01 | End: 2017-01-01

## 2017-01-01 RX ORDER — DIPHENHYDRAMINE HCL 50 MG
50 CAPSULE ORAL ONCE
Status: COMPLETED | OUTPATIENT
Start: 2017-01-01 | End: 2017-01-01

## 2017-01-01 RX ORDER — SODIUM CHLORIDE 9 MG/ML
1.5 INJECTION, SOLUTION INTRAVENOUS CONTINUOUS
Status: ACTIVE | OUTPATIENT
Start: 2017-01-01 | End: 2017-01-01

## 2017-01-01 RX ORDER — SODIUM CHLORIDE 9 MG/ML
INJECTION, SOLUTION INTRAVENOUS CONTINUOUS
Status: DISCONTINUED | OUTPATIENT
Start: 2017-01-01 | End: 2017-01-01

## 2017-01-01 RX ORDER — HYDROMORPHONE HYDROCHLORIDE 1 MG/ML
1 INJECTION, SOLUTION INTRAMUSCULAR; INTRAVENOUS; SUBCUTANEOUS EVERY 4 HOURS PRN
Status: DISCONTINUED | OUTPATIENT
Start: 2017-01-01 | End: 2017-01-01

## 2017-01-01 RX ORDER — AMITRIPTYLINE HYDROCHLORIDE 25 MG/1
25 TABLET, FILM COATED ORAL NIGHTLY
Status: DISCONTINUED | OUTPATIENT
Start: 2017-01-01 | End: 2017-01-01 | Stop reason: HOSPADM

## 2017-01-01 RX ADMIN — OXYCODONE HYDROCHLORIDE 10 MG: 5 TABLET ORAL at 05:10

## 2017-01-01 RX ADMIN — PREGABALIN 100 MG: 100 CAPSULE ORAL at 06:09

## 2017-01-01 RX ADMIN — CILOSTAZOL 100 MG: 100 TABLET ORAL at 08:09

## 2017-01-01 RX ADMIN — Medication 3 ML: at 01:09

## 2017-01-01 RX ADMIN — NICOTINE 1 PATCH: 21 PATCH, EXTENDED RELEASE TRANSDERMAL at 08:09

## 2017-01-01 RX ADMIN — POLYETHYLENE GLYCOL 3350 17 G: 17 POWDER, FOR SOLUTION ORAL at 08:10

## 2017-01-01 RX ADMIN — OXYCODONE HYDROCHLORIDE 10 MG: 5 TABLET ORAL at 07:09

## 2017-01-01 RX ADMIN — ASPIRIN 81 MG: 81 TABLET, COATED ORAL at 08:10

## 2017-01-01 RX ADMIN — TICAGRELOR 90 MG: 90 TABLET ORAL at 09:09

## 2017-01-01 RX ADMIN — NICOTINE 1 PATCH: 21 PATCH, EXTENDED RELEASE TRANSDERMAL at 09:10

## 2017-01-01 RX ADMIN — NICOTINE 1 PATCH: 21 PATCH, EXTENDED RELEASE TRANSDERMAL at 08:10

## 2017-01-01 RX ADMIN — Medication 3 ML: at 09:10

## 2017-01-01 RX ADMIN — OXYCODONE HYDROCHLORIDE 10 MG: 5 TABLET ORAL at 08:10

## 2017-01-01 RX ADMIN — TICAGRELOR 90 MG: 90 TABLET ORAL at 09:10

## 2017-01-01 RX ADMIN — HYDROMORPHONE HYDROCHLORIDE 0.5 MG: 1 INJECTION, SOLUTION INTRAMUSCULAR; INTRAVENOUS; SUBCUTANEOUS at 05:10

## 2017-01-01 RX ADMIN — METOPROLOL TARTRATE 12.5 MG: 25 TABLET ORAL at 08:09

## 2017-01-01 RX ADMIN — HEPARIN SODIUM AND DEXTROSE 17 UNITS/KG/HR: 10000; 5 INJECTION INTRAVENOUS at 12:09

## 2017-01-01 RX ADMIN — HYDROMORPHONE HYDROCHLORIDE 0.5 MG: 1 INJECTION, SOLUTION INTRAMUSCULAR; INTRAVENOUS; SUBCUTANEOUS at 08:09

## 2017-01-01 RX ADMIN — TICAGRELOR 90 MG: 90 TABLET ORAL at 10:09

## 2017-01-01 RX ADMIN — Medication 3 ML: at 01:10

## 2017-01-01 RX ADMIN — LISINOPRIL 10 MG: 10 TABLET ORAL at 08:10

## 2017-01-01 RX ADMIN — Medication 3 ML: at 02:10

## 2017-01-01 RX ADMIN — RANOLAZINE 1000 MG: 500 TABLET, FILM COATED, EXTENDED RELEASE ORAL at 08:09

## 2017-01-01 RX ADMIN — ASPIRIN 81 MG: 81 TABLET, COATED ORAL at 01:09

## 2017-01-01 RX ADMIN — TICAGRELOR 90 MG: 90 TABLET ORAL at 08:10

## 2017-01-01 RX ADMIN — MAGNESIUM SULFATE IN WATER 2 G: 40 INJECTION, SOLUTION INTRAVENOUS at 11:09

## 2017-01-01 RX ADMIN — TICAGRELOR 90 MG: 90 TABLET ORAL at 08:09

## 2017-01-01 RX ADMIN — PREGABALIN 100 MG: 100 CAPSULE ORAL at 10:09

## 2017-01-01 RX ADMIN — OXYCODONE HYDROCHLORIDE 5 MG: 5 TABLET ORAL at 11:09

## 2017-01-01 RX ADMIN — RANOLAZINE 1000 MG: 500 TABLET, FILM COATED, EXTENDED RELEASE ORAL at 09:10

## 2017-01-01 RX ADMIN — LISINOPRIL 10 MG: 10 TABLET ORAL at 08:09

## 2017-01-01 RX ADMIN — RANOLAZINE 1000 MG: 500 TABLET, FILM COATED, EXTENDED RELEASE ORAL at 09:09

## 2017-01-01 RX ADMIN — SODIUM CHLORIDE 125 ML/HR: 0.9 INJECTION, SOLUTION INTRAVENOUS at 11:09

## 2017-01-01 RX ADMIN — OXYCODONE HYDROCHLORIDE 10 MG: 5 TABLET ORAL at 09:10

## 2017-01-01 RX ADMIN — DOCUSATE SODIUM 50 MG: 50 CAPSULE, LIQUID FILLED ORAL at 08:10

## 2017-01-01 RX ADMIN — HYDROMORPHONE HYDROCHLORIDE 0.5 MG: 1 INJECTION, SOLUTION INTRAMUSCULAR; INTRAVENOUS; SUBCUTANEOUS at 04:10

## 2017-01-01 RX ADMIN — PREGABALIN 100 MG: 100 CAPSULE ORAL at 02:09

## 2017-01-01 RX ADMIN — RANOLAZINE 1000 MG: 500 TABLET, FILM COATED, EXTENDED RELEASE ORAL at 03:09

## 2017-01-01 RX ADMIN — BIVALIRUDIN 0.25 MG/KG/HR: 250 INJECTION, POWDER, LYOPHILIZED, FOR SOLUTION INTRAVENOUS at 01:09

## 2017-01-01 RX ADMIN — OXYCODONE HYDROCHLORIDE 10 MG: 5 TABLET ORAL at 09:09

## 2017-01-01 RX ADMIN — ATORVASTATIN CALCIUM 40 MG: 20 TABLET, FILM COATED ORAL at 10:09

## 2017-01-01 RX ADMIN — PREGABALIN 100 MG: 100 CAPSULE ORAL at 09:09

## 2017-01-01 RX ADMIN — Medication 3 ML: at 05:10

## 2017-01-01 RX ADMIN — METOPROLOL TARTRATE 12.5 MG: 25 TABLET ORAL at 09:10

## 2017-01-01 RX ADMIN — OXYCODONE HYDROCHLORIDE 10 MG: 5 TABLET ORAL at 04:10

## 2017-01-01 RX ADMIN — HEPARIN SODIUM AND DEXTROSE 17 UNITS/KG/HR: 10000; 5 INJECTION INTRAVENOUS at 04:09

## 2017-01-01 RX ADMIN — POLYETHYLENE GLYCOL 3350 17 G: 17 POWDER, FOR SOLUTION ORAL at 09:10

## 2017-01-01 RX ADMIN — PREGABALIN 100 MG: 100 CAPSULE ORAL at 01:09

## 2017-01-01 RX ADMIN — OXYCODONE HYDROCHLORIDE 10 MG: 5 TABLET ORAL at 11:09

## 2017-01-01 RX ADMIN — OXYCODONE HYDROCHLORIDE 10 MG: 5 TABLET ORAL at 02:09

## 2017-01-01 RX ADMIN — HYDROMORPHONE HYDROCHLORIDE 0.5 MG: 1 INJECTION, SOLUTION INTRAMUSCULAR; INTRAVENOUS; SUBCUTANEOUS at 12:10

## 2017-01-01 RX ADMIN — ALTEPLASE 0.75 MG/HR: KIT at 01:09

## 2017-01-01 RX ADMIN — Medication 3 ML: at 09:09

## 2017-01-01 RX ADMIN — PREGABALIN 100 MG: 100 CAPSULE ORAL at 09:10

## 2017-01-01 RX ADMIN — SODIUM CHLORIDE 125 ML/HR: 0.9 INJECTION, SOLUTION INTRAVENOUS at 01:09

## 2017-01-01 RX ADMIN — DIPHENHYDRAMINE HYDROCHLORIDE 50 MG: 50 CAPSULE ORAL at 08:09

## 2017-01-01 RX ADMIN — OXYCODONE HYDROCHLORIDE 10 MG: 5 TABLET ORAL at 12:09

## 2017-01-01 RX ADMIN — HYDROMORPHONE HYDROCHLORIDE 0.5 MG: 1 INJECTION, SOLUTION INTRAMUSCULAR; INTRAVENOUS; SUBCUTANEOUS at 05:09

## 2017-01-01 RX ADMIN — PREGABALIN 100 MG: 100 CAPSULE ORAL at 02:10

## 2017-01-01 RX ADMIN — HYDROMORPHONE HYDROCHLORIDE 0.5 MG: 1 INJECTION, SOLUTION INTRAMUSCULAR; INTRAVENOUS; SUBCUTANEOUS at 04:09

## 2017-01-01 RX ADMIN — METOPROLOL TARTRATE 12.5 MG: 25 TABLET ORAL at 08:10

## 2017-01-01 RX ADMIN — BIVALIRUDIN 0.25 MG/KG/HR: 250 INJECTION, POWDER, LYOPHILIZED, FOR SOLUTION INTRAVENOUS at 05:09

## 2017-01-01 RX ADMIN — HYDROMORPHONE HYDROCHLORIDE 0.5 MG: 1 INJECTION, SOLUTION INTRAMUSCULAR; INTRAVENOUS; SUBCUTANEOUS at 11:09

## 2017-01-01 RX ADMIN — METOPROLOL TARTRATE 12.5 MG: 25 TABLET ORAL at 09:09

## 2017-01-01 RX ADMIN — HYDROMORPHONE HYDROCHLORIDE 0.5 MG: 1 INJECTION, SOLUTION INTRAMUSCULAR; INTRAVENOUS; SUBCUTANEOUS at 07:09

## 2017-01-01 RX ADMIN — RANOLAZINE 1000 MG: 500 TABLET, FILM COATED, EXTENDED RELEASE ORAL at 08:10

## 2017-01-01 RX ADMIN — DOCUSATE SODIUM 50 MG: 50 CAPSULE, LIQUID FILLED ORAL at 09:10

## 2017-01-01 RX ADMIN — HEPARIN SODIUM AND DEXTROSE 24 UNITS/KG/HR: 10000; 5 INJECTION INTRAVENOUS at 01:09

## 2017-01-01 RX ADMIN — HYDROMORPHONE HYDROCHLORIDE 1 MG: 1 INJECTION, SOLUTION INTRAMUSCULAR; INTRAVENOUS; SUBCUTANEOUS at 08:09

## 2017-01-01 RX ADMIN — PREGABALIN 100 MG: 100 CAPSULE ORAL at 05:10

## 2017-01-01 RX ADMIN — PREGABALIN 100 MG: 100 CAPSULE ORAL at 05:09

## 2017-01-01 RX ADMIN — POTASSIUM CHLORIDE 40 MEQ: 1500 TABLET, EXTENDED RELEASE ORAL at 01:09

## 2017-01-01 RX ADMIN — HYDROMORPHONE HYDROCHLORIDE 0.5 MG: 1 INJECTION, SOLUTION INTRAMUSCULAR; INTRAVENOUS; SUBCUTANEOUS at 10:09

## 2017-01-01 RX ADMIN — OXYCODONE HYDROCHLORIDE 10 MG: 5 TABLET ORAL at 06:10

## 2017-01-01 RX ADMIN — Medication 3 ML: at 10:09

## 2017-01-01 RX ADMIN — HYDROMORPHONE HYDROCHLORIDE 0.5 MG: 1 INJECTION, SOLUTION INTRAMUSCULAR; INTRAVENOUS; SUBCUTANEOUS at 01:09

## 2017-01-01 RX ADMIN — Medication 3 ML: at 02:09

## 2017-01-01 RX ADMIN — CILOSTAZOL 100 MG: 100 TABLET ORAL at 02:09

## 2017-01-01 RX ADMIN — HYDROMORPHONE HYDROCHLORIDE 0.5 MG: 1 INJECTION, SOLUTION INTRAMUSCULAR; INTRAVENOUS; SUBCUTANEOUS at 02:10

## 2017-01-01 RX ADMIN — RAMELTEON 8 MG: 8 TABLET, FILM COATED ORAL at 01:09

## 2017-01-01 RX ADMIN — HYDROMORPHONE HYDROCHLORIDE 0.5 MG: 1 INJECTION, SOLUTION INTRAMUSCULAR; INTRAVENOUS; SUBCUTANEOUS at 01:10

## 2017-01-01 RX ADMIN — LISINOPRIL 5 MG: 5 TABLET ORAL at 09:10

## 2017-01-01 RX ADMIN — Medication 3 ML: at 06:09

## 2017-01-01 RX ADMIN — PREGABALIN 100 MG: 100 CAPSULE ORAL at 01:10

## 2017-01-01 RX ADMIN — HYDROMORPHONE HYDROCHLORIDE 2 MG: 2 INJECTION INTRAMUSCULAR; INTRAVENOUS; SUBCUTANEOUS at 02:09

## 2017-01-01 RX ADMIN — HYDROMORPHONE HYDROCHLORIDE 0.5 MG: 1 INJECTION, SOLUTION INTRAMUSCULAR; INTRAVENOUS; SUBCUTANEOUS at 12:09

## 2017-01-01 RX ADMIN — ATORVASTATIN CALCIUM 40 MG: 20 TABLET, FILM COATED ORAL at 09:10

## 2017-01-01 RX ADMIN — POTASSIUM CHLORIDE 20 MEQ: 750 CAPSULE, EXTENDED RELEASE ORAL at 06:09

## 2017-01-01 RX ADMIN — OXYCODONE HYDROCHLORIDE 10 MG: 5 TABLET ORAL at 08:09

## 2017-01-01 RX ADMIN — HYDROMORPHONE HYDROCHLORIDE 1 MG: 1 INJECTION, SOLUTION INTRAMUSCULAR; INTRAVENOUS; SUBCUTANEOUS at 03:09

## 2017-01-01 RX ADMIN — OXYCODONE HYDROCHLORIDE 10 MG: 5 TABLET ORAL at 03:09

## 2017-01-01 RX ADMIN — AMITRIPTYLINE HYDROCHLORIDE 25 MG: 25 TABLET, FILM COATED ORAL at 09:10

## 2017-01-01 RX ADMIN — ASPIRIN 81 MG: 81 TABLET, COATED ORAL at 08:09

## 2017-01-01 RX ADMIN — ACETAMINOPHEN 650 MG: 325 TABLET ORAL at 10:09

## 2017-01-01 RX ADMIN — ATORVASTATIN CALCIUM 40 MG: 20 TABLET, FILM COATED ORAL at 08:09

## 2017-01-01 RX ADMIN — SODIUM CHLORIDE 1.5 ML/KG/HR: 0.9 INJECTION, SOLUTION INTRAVENOUS at 06:09

## 2017-01-01 RX ADMIN — SODIUM CHLORIDE 3 ML/KG/HR: 0.9 INJECTION, SOLUTION INTRAVENOUS at 08:09

## 2017-01-01 RX ADMIN — NICOTINE 1 PATCH: 21 PATCH, EXTENDED RELEASE TRANSDERMAL at 01:09

## 2017-01-01 RX ADMIN — OXYCODONE HYDROCHLORIDE 5 MG: 5 TABLET ORAL at 03:09

## 2017-01-01 RX ADMIN — ALTEPLASE 0.75 MG/HR: KIT at 07:09

## 2017-01-01 RX ADMIN — HYDROMORPHONE HYDROCHLORIDE 0.5 MG: 1 INJECTION, SOLUTION INTRAMUSCULAR; INTRAVENOUS; SUBCUTANEOUS at 08:10

## 2017-01-01 RX ADMIN — HYDROMORPHONE HYDROCHLORIDE 0.5 MG: 1 INJECTION, SOLUTION INTRAMUSCULAR; INTRAVENOUS; SUBCUTANEOUS at 09:09

## 2017-01-01 RX ADMIN — LISINOPRIL 10 MG: 10 TABLET ORAL at 01:09

## 2017-01-01 RX ADMIN — OXYCODONE HYDROCHLORIDE 10 MG: 5 TABLET ORAL at 01:10

## 2017-01-01 RX ADMIN — ASPIRIN 81 MG: 81 TABLET, COATED ORAL at 09:09

## 2017-01-01 RX ADMIN — RANOLAZINE 1000 MG: 500 TABLET, FILM COATED, EXTENDED RELEASE ORAL at 10:09

## 2017-01-01 RX ADMIN — METOPROLOL TARTRATE 12.5 MG: 25 TABLET ORAL at 10:09

## 2017-01-01 RX ADMIN — CILOSTAZOL 100 MG: 100 TABLET ORAL at 11:09

## 2017-01-01 RX ADMIN — HYDROMORPHONE HYDROCHLORIDE 0.5 MG: 1 INJECTION, SOLUTION INTRAMUSCULAR; INTRAVENOUS; SUBCUTANEOUS at 09:10

## 2017-01-01 RX ADMIN — MAGNESIUM SULFATE HEPTAHYDRATE 1 G: 500 INJECTION, SOLUTION INTRAMUSCULAR; INTRAVENOUS at 09:09

## 2017-01-01 RX ADMIN — Medication 3 ML: at 06:10

## 2017-01-01 RX ADMIN — OXYCODONE HYDROCHLORIDE 5 MG: 5 TABLET ORAL at 04:09

## 2017-01-01 RX ADMIN — ACETAMINOPHEN 650 MG: 325 TABLET ORAL at 02:09

## 2017-01-01 RX ADMIN — OXYCODONE HYDROCHLORIDE 10 MG: 5 TABLET ORAL at 01:09

## 2017-01-01 RX ADMIN — OXYCODONE HYDROCHLORIDE 10 MG: 5 TABLET ORAL at 10:09

## 2017-01-01 RX ADMIN — ATORVASTATIN CALCIUM 40 MG: 20 TABLET, FILM COATED ORAL at 09:09

## 2017-01-01 RX ADMIN — ASPIRIN 81 MG: 81 TABLET, COATED ORAL at 09:10

## 2017-01-01 RX ADMIN — HYDROMORPHONE HYDROCHLORIDE 0.5 MG: 1 INJECTION, SOLUTION INTRAMUSCULAR; INTRAVENOUS; SUBCUTANEOUS at 10:10

## 2017-01-01 RX ADMIN — LISINOPRIL 10 MG: 10 TABLET ORAL at 09:09

## 2017-01-01 RX ADMIN — OXYCODONE HYDROCHLORIDE 10 MG: 5 TABLET ORAL at 06:09

## 2017-01-01 RX ADMIN — METOPROLOL TARTRATE 12.5 MG: 25 TABLET ORAL at 01:09

## 2017-01-01 RX ADMIN — OXYCODONE HYDROCHLORIDE 10 MG: 5 TABLET ORAL at 11:10

## 2017-01-01 RX ADMIN — MAGNESIUM SULFATE HEPTAHYDRATE 1 G: 500 INJECTION, SOLUTION INTRAMUSCULAR; INTRAVENOUS at 05:10

## 2017-01-01 RX ADMIN — TICAGRELOR 90 MG: 90 TABLET ORAL at 02:09

## 2017-01-01 RX ADMIN — HYDROMORPHONE HYDROCHLORIDE 0.5 MG: 1 INJECTION, SOLUTION INTRAMUSCULAR; INTRAVENOUS; SUBCUTANEOUS at 03:09

## 2017-01-01 RX ADMIN — MAGNESIUM SULFATE HEPTAHYDRATE 1 G: 500 INJECTION, SOLUTION INTRAMUSCULAR; INTRAVENOUS at 09:10

## 2017-01-01 RX ADMIN — PREGABALIN 100 MG: 100 CAPSULE ORAL at 08:09

## 2017-01-01 RX ADMIN — Medication 3 ML: at 05:09

## 2017-01-01 RX ADMIN — NICOTINE 1 PATCH: 21 PATCH, EXTENDED RELEASE TRANSDERMAL at 09:09

## 2017-02-27 PROBLEM — I70.90 ARTERIAL OCCLUSION: Status: ACTIVE | Noted: 2017-02-27

## 2017-02-27 PROBLEM — Z91.148 NON COMPLIANCE W MEDICATION REGIMEN: Status: ACTIVE | Noted: 2017-02-27

## 2017-02-27 PROBLEM — Z72.0 TOBACCO ABUSE: Status: ACTIVE | Noted: 2017-02-27

## 2017-03-01 PROBLEM — I70.209 ARTERIAL OCCLUSION, LOWER EXTREMITY: Status: ACTIVE | Noted: 2017-02-27

## 2017-08-02 PROBLEM — I73.9 PAD (PERIPHERAL ARTERY DISEASE): Status: ACTIVE | Noted: 2017-01-01

## 2017-08-04 PROBLEM — E87.6 HYPOKALEMIA: Status: ACTIVE | Noted: 2017-01-01

## 2017-09-19 PROBLEM — I50.22 CHRONIC SYSTOLIC HEART FAILURE: Status: ACTIVE | Noted: 2017-01-01

## 2017-09-19 PROBLEM — D62 ACUTE BLOOD LOSS ANEMIA: Status: ACTIVE | Noted: 2017-01-01

## 2017-09-26 NOTE — ED PROVIDER NOTES
Encounter Date: 9/25/2017    SCRIBE #1 NOTE: I, Guerline Funes, am scribing for, and in the presence of,  Amelie Rosa PA-C. I have scribed the entire note.   SCRIBE #2 NOTE: I, Palmira Shore, am scribing for, and in the presence of,  Dr. Byrd. I have scribed the following portions of the note - the APC attestation. Other sections scribed: Attending note.     History     Chief Complaint   Patient presents with    Leg Pain     Pt reports chronic bilateral leg pain.     Time patient was seen by the provider: 12:15 AM      The patient is a 46 y.o. male with hx of: coronary artery disease, CHF, HTN and surgical history of right great toe amputation, coronary stents, and vascular surgery that presents to the ED with a complaint of left foot and left leg pain, which started two weeks ago.  Pt was recently released from [facility] six days ago with no improvement to pain.  He returns to the ED today for no improvement of pain.  Pt notes toes will change color based on whether he is ambulatory or uses crutches.        The history is provided by the patient and medical records.     Review of patient's allergies indicates:  No Known Allergies  Past Medical History:   Diagnosis Date    CHF (congestive heart failure)     Coronary artery disease     Hypertension      Past Surgical History:   Procedure Laterality Date    AMPUTATION Right     great toe    coronary stents      KNEE ARTHROPLASTY Left     VASCULAR SURGERY      fem-pop bypass     No family history on file.  Social History   Substance Use Topics    Smoking status: Current Every Day Smoker     Packs/day: 0.50    Smokeless tobacco: Former User     Types: Chew     Quit date: 8/2/1980    Alcohol use Yes      Comment: on weekends     Review of Systems   Constitutional: Negative for fever.   HENT: Negative for sore throat.    Respiratory: Negative for shortness of breath.    Cardiovascular: Negative for chest pain.   Gastrointestinal: Negative  for nausea.   Genitourinary: Negative for dysuria.   Musculoskeletal: Negative for back pain.        + left leg pain  + left foot pain   Skin: Negative for rash.   Neurological: Negative for weakness.   Hematological: Does not bruise/bleed easily.       Physical Exam     Initial Vitals [09/25/17 2050]   BP Pulse Resp Temp SpO2   (!) 147/74 (!) 113 18 97.8 °F (36.6 °C) 99 %      MAP       98.33         Physical Exam    Nursing note and vitals reviewed.  Constitutional: He appears well-developed and well-nourished.  Non-toxic appearance. He does not appear ill. No distress.   HENT:   Head: Normocephalic and atraumatic.   Neck: Normal range of motion. Neck supple.   Cardiovascular: Normal rate and regular rhythm. Exam reveals no gallop, no distant heart sounds and no friction rub.    No murmur heard.  No palpable distal pulses  No doppler signal present to DP and PT  Normal doppler signal present in left popliteal artery   Pulmonary/Chest: Effort normal and breath sounds normal. No accessory muscle usage. No tachypnea. No respiratory distress. He has no decreased breath sounds. He has no wheezes. He has no rhonchi. He has no rales.   Abdominal: Normal appearance. He exhibits no distension.   Musculoskeletal: Normal range of motion.   Left foot and lower leg are cool to touch and there is rubor noted to the foot and duskiness to the distal great toe   Neurological: He is alert and oriented to person, place, and time.   Skin: Skin is dry. No rash noted. No pallor.   Psychiatric: He has a normal mood and affect. His behavior is normal. Judgment and thought content normal.         ED Course   Procedures  Labs Reviewed   CBC W/ AUTO DIFFERENTIAL - Abnormal; Notable for the following:        Result Value    RBC 4.16 (*)     Hemoglobin 13.8 (*)     Hematocrit 39.8 (*)     MCH 33.2 (*)     RDW 16.5 (*)     Platelets 398 (*)     Mono # 1.3 (*)     All other components within normal limits   BASIC METABOLIC PANEL   PROTIME-INR    ANTI-XA HEPARIN MONITORING             Medical Decision Making:   History:   Old Medical Records: I decided to obtain old medical records.  Differential Diagnosis:   My differential diagnosis includes but is not limited to: arterial occlusion, ischemic limb, infarct, DVT, and cellulitis.  Clinical Tests:   Lab Tests: Ordered and Reviewed  Radiological Study: Ordered and Reviewed  Medical Tests: Ordered and Reviewed  Other:   I have discussed this case with another health care provider.       <> Summary of the Discussion: Cardiology  Internal medicine  Radiology       APC / Resident Notes:   46 y.o. male with severe PAD s/p multiple interventions who presents with left leg pain for the past two weeks.  Pt's previous care was at [facility].  Pt wants a second opinion as he was told he may require amputation.    On exam, there were no palpable distal pulses, no doppler signal present to DP and PT.  There was normal doppler signal present in left popliteal artery.  Left foot and lower leg are cool to touch and there is rubor noted to the foot and duskiness to the distal great toe.  Arterial US ordered.  Cardiology consulted.       Scribe Attestation:   Scribe #1: I performed the above scribed service and the documentation accurately describes the services I performed. I attest to the accuracy of the note.  Scribe #2: I performed the above scribed service and the documentation accurately describes the services I performed. I attest to the accuracy of the note.    Attending Attestation:     Physician Attestation Statement for NP/PA:   I have conducted a face to face encounter with this patient in addition to the NP/PA, due to Medical Complexity    Other NP/PA Attestation Additions:    History of Present Illness: Pt presents with leg pain.    Physical Exam: He has a cool left leg.    Medical Decision Making: Able to doppler dorsalis pedis pulses. Discussed with cardiology. Ultrasound ordered.      Attending  Critical Care:   Critical Care Times:   Direct Patient Care (initial evaluation, reassessments, and time considering the case)................................................................14 minutes.   Additional History from reviewing old medical records or taking additional history from the family, EMS, PCP, etc.......................4 minutes.   Ordering, Reviewing, and Interpreting Diagnostic Studies...............................................................................................................4 minutes.   Documentation..................................................................................................................................................................................4 minutes.   Consultation with other Physicians. .................................................................................................................................................6 minutes.   ==============================================================  · Total Critical Care Time - exclusive of procedural time: 32 minutes.  ==============================================================    Physician Attestation for Scribe:  Physician Attestation Statement for Scribe #1: I, Amelie Rosa PA-C, reviewed documentation, as scribed by Guerline Funes in my presence, and it is both accurate and complete.   Physician Attestation Statement for Scribe #2: I, Dr. Byrd, reviewed documentation, as scribed by Palmira Shore in my presence, and it is both accurate and complete. I also acknowledge and confirm the content of the note done by Saadibe #1.      Attending ED Notes:   3:09 AM   Discussed findings with radiology, he has minimal flow to left leg below the knee. Discussed case with cardiology, they will place on heparin drip and asks me to admit the pt to internal medicine. Will discuss with internal medicine for admission.           ED Course      Clinical Impression:   The primary encounter  diagnosis was Arterial occlusion, lower extremity. A diagnosis of PAD (peripheral artery disease) was also pertinent to this visit.    Disposition:   Disposition: Admitted  Condition: Stable                        Yousif Byrd MD  09/26/17 0351

## 2017-09-26 NOTE — CONSULTS
Interventional Cardiology Consult Note  Attending Physician: Sussy Chavez MD  Chief Complaint: Leg Pain (Pt reports chronic bilateral leg pain.)     HPI:   46 y.o. gentleman with PMH of ICM (LVEF 30-35%) s/p ICD, CAD s/p PCI, HTN, HLD, current smoker, PAD (s/p aortobifemoral bypass) who presents for critical limb ischemia. He has been having LE pain that has progressively gotten worse and he has been unable to walk as well because of it. That has progressed to the point where he has pain in the top and bottom of the foot at rest. He is vague about the time sequence of events. He eventually presented to [facility] where they found occlusion of the left mid/distal SFA, popliteal, posterior tibial and dorsalis pedis arteries. He then underwent a LE angiogram with thrombolytics and angiojet with the development of a left thigh hematoma. No perforation was noted on repeat angiogram. His symptoms have not changed since the procedure. He was told that he would need another intervention with possible amputation and thus presented for second opinion. He denies chest pains and SOB.    ROS:    Constitution: Negative for fever, chills, weight loss or gain.   HENT: Negative for sore throat, rhinorrhea, or headache.  Eyes: Negative for blurred or double vision.   Cardiovascular: See above  Pulmonary: Negative for SOB   Gastrointestinal: Negative for abdominal pain, nausea, vomiting, or diarrhea.   : Negative for dysuria.   Neurological: Negative for focal weakness or sensory changes.  PMH:       (Not in a hospital admission)   Review of patient's allergies indicates:  No Known Allergies    Past Medical History:   Diagnosis Date    CHF (congestive heart failure)     Coronary artery disease     Hypertension        Past Surgical History:   Procedure Laterality Date    AMPUTATION Right     great toe    coronary stents      KNEE ARTHROPLASTY Left     VASCULAR SURGERY      fem-pop bypass      No family history  on file.    Social History   Substance Use Topics    Smoking status: Current Every Day Smoker     Packs/day: 0.50    Smokeless tobacco: Former User     Types: Chew     Quit date: 8/2/1980    Alcohol use Yes      Comment: on weekends        Allergies:   Review of patient's allergies indicates:  No Known Allergies  Medications:     No current facility-administered medications on file prior to encounter.      Current Outpatient Prescriptions on File Prior to Encounter   Medication Sig Dispense Refill    aspirin (ECOTRIN) 81 MG EC tablet Take 81 mg by mouth once daily.      atorvastatin (LIPITOR) 40 MG tablet Take 40 mg by mouth every evening.      cilostazol (PLETAL) 50 MG Tab Take 2 tablets (100 mg total) by mouth 2 (two) times daily.      lisinopril 10 MG tablet Take 10 mg by mouth once daily.      metoprolol tartrate (LOPRESSOR) 25 MG tablet Take 12.5 mg by mouth 2 (two) times daily.      oxycodone-acetaminophen (LYNOX) 7.5-300 mg per tablet Take 1 tablet by mouth every 4 (four) hours as needed for Pain.      pregabalin (LYRICA) 100 MG capsule Take 1 capsule (100 mg total) by mouth 3 (three) times daily. 90 capsule 6    ranolazine (RANEXA) 500 MG Tb12 Take 1,000 mg by mouth 2 (two) times daily.      ticagrelor (BRILINTA) 90 mg tablet Take 90 mg by mouth 2 (two) times daily.      vorapaxar 2.08 mg Tab Take 2.08 mg by mouth once daily.      nicotine (NICODERM CQ) 21 mg/24 hr Place 1 patch onto the skin once daily. Down titrating as directed  0    nitroGLYCERIN (NITROSTAT) 0.4 MG SL tablet Place 0.4 mg under the tongue every 5 (five) minutes as needed for Chest pain.         Inpatient Medications   Continuous Infusions:   heparin (porcine) in D5W 17 Units/kg/hr (09/26/17 9700)     Scheduled Meds:   PRN Meds:     Social History:     Social History   Substance Use Topics    Smoking status: Current Every Day Smoker     Packs/day: 0.50    Smokeless tobacco: Former User     Types: Chew     Quit date:  8/2/1980    Alcohol use Yes      Comment: on weekends     Family History:   No family history on file.  Physical Exam:     Vitals:  Temp:  [97.3 °F (36.3 °C)-97.8 °F (36.6 °C)]   Pulse:  []   Resp:  [18-20]   BP: (130-147)/(60-74)   SpO2:  [95 %-100 %]  on RA I/O's:  No intake or output data in the 24 hours ending 09/26/17 0907     Constitutional: NAD, conversant  HEENT: Sclera anicteric, PERRLA, EOMI  Neck: No JVD, no carotid bruits  CV: RRR, no murmur, normal S1/S2  Pulm: CTAB, no wheezes, rales, or ronchi  GI: Abdomen soft, NTND, +BS  Extremities: No LE edema, cool  Skin: No ecchymosis, erythema, or ulcers  Vascular: left medial thigh echymoses. Small 0oob2zs sores on the left anterolateral lower leg. Hyperesthesia of dorsal left foot. Mottling of left 1st metatarsal with cap refill of 2 seconds. Motor function intact. Unable to doppler pulses in LLE.  Pulses 2+ radial, femoral bilaterally. Allens test normal. 1+DP/IA in RLE.    Labs:     CBC: CMP:      Recent Labs  Lab 09/26/17 0129   WBC 12.16   HGB 13.8*   HCT 39.8*   *   MCV 96   RDW 16.5*      Recent Labs  Lab 09/26/17 0129      K 3.6      CO2 25   BUN 12   CREATININE 0.7   CALCIUM 9.5        Cholesterol: Coagulation   No results found for: CHOL, HDL, LDLCALC, TRIG   Recent Labs  Lab 09/26/17 0129   INR 0.9        Misc:   No results for input(s): CPK, CPKMB, TROPONINI, MB in the last 168 hours.   No results found for: HGBA1C     Micro:   Microbiology Results (last 7 days)     ** No results found for the last 168 hours. **           Imaging:     EKG:   NSR with lateral TWI. Possible anterior infarct.     Peripiheral Angiogram (09/14/2017):   Superficial Femoral, Left:       Lesion on Superficial Femoral, Left:    The lesion was previously treated on 08/04/2017 with the following techniques: a balloon, drug eluting stent and rotablator. The previously  treated and stented lesion is being treated for in-stent  restenosis.       Treatment results:Interventional treatment was successful.       Comments:A ASAHI GLADIUS STRAIGHT 300CM wire was used to cross the LSFA    lesion. MECH ATHERECTOMY WAS PERFORMED. PRE POSSIS, DIFFUSE FILLING DEFECTS. POST POSSIS, WIDELY PATENT.       Devices used       - CATH ANGIOJET SOLENT OMNI 6FR X 120CM. Number of passes: 1.Total    duration: 89 sec.       - BALLOON DORADO 6 X 200 135CM. to a max pressure of: 16 gustavo.Total    duration: 406 sec.     Posterior Tibial, Left:       Lesion on Distal: 100% stenosis reduced to 70%.The guidewire cross was    successful.The lesion was diffuse.       Treatment results:Interventional treatment was successful.       Comments:A ASAHI GLADIUS STRAIGHT 300CM wire was used to cross the LSFA    lesion.    MECH ATHERECTOMY WAS PERFORMED. PRE POSSIS, DIFFUSE FILLING DEFECTS. POST POSSIS, WIDELY PATENT.     Assessment:     46 y.o. gentleman with PMH of ICM (LVEF 30-35%) s/p ICD, CAD s/p PCI, HTN, HLD, current smoker, PAD (s/p aortobifemoral bypass) who presents for critical limb ischemia.    Plan:     #CLI  --pain control  --prepare for peripheral angiogram  --patient is a KARMEN candidate  --advised to quit smoking  --continue medical management  --obtain films  --continue DAPT with anticoagulation    Staff addendum to follow    Signed:  Rachel Jacinto MD  Cardiology Fellow, PGY5  9/26/2017 9:07 AM

## 2017-09-26 NOTE — MEDICAL/APP STUDENT
Subjective:    Leonardo Byrd is a 47 y/o male with a PMHx of PAD, s/p aortobifemoral bypass, s/p multiple interventions, ICM EF 30-35%, s/p ICD, CAD s/p PCI, HTN, HLD, smoker. He reports this morning that his left foot is in significant pain localized to the top and bottom of his foot. He states this pain has been present for a long time and did not improve with his recent vascular surgeries. He denies SOB, Chest pain, Cough. He endorses ulcers present on his lower leg that began around the time he had his previous vascular surgery. He also endorses a decrease in ability to walk. He states that the pain in his foot has increased to such a point that ambulation is no longer possible. He also reports that his left foot has been significant colder than his right.    Objective:    Vs:   Temp: 93.7-97.8  Pulse   Pulse ox: 95-99%  Resp: 18-20  Systolic BP: 130-147  Diastolic BP: 60-74    Physical Exam:  HEENT: PEERL, external nose no signs of trauma, oropharynx clear of exudate.  Neck: supple, no anterior lymphadenopathy  Respiratory: Clear to Auscultation bilaterally. No use of accessory muscles.  Cardiovascular: Regular rate and rhythm. No murmurs. Distal pulses present in both upper extremities. Posterior tibial and dorsalis pedis pulses 2+ in right leg. Dorsalis pedis pulse absent in left leg. Posterior Tibial and popliteal pulses +1 in left leg.  Abdomen: Soft, non-tender, bowel sounds present in all 4 quadrants.   Neurological: Alert and oriented to person time and place. Cranial nerves gross intact.   Skin: Large hematoma to left thigh. Two ulcers on left lateral border of lower leg.   Musculoskeletal: Left foot and left lower leg cool to the touch up to knee. Right great toe amputation. Sensation grossly intact. Able to move toes.  Psychiatric: He has a normal mood and affect. Judgement and thought content appropriate for situation.     CBC:  12.16 WBC  4.16 RBC  13.8 Hgb  39.8 Hematocrit  398  Platelets    CMP  139 Na  3.6 K  105 Chloride  25 Co2  12 Bun  0.7 Creatinine  104 glucose    9.8 PT  0.9 INR    Us: occlusion in left superficial femoral/popliteal. Occluded anterior tibial artery.    A/P: 45 y/o M PMHx PAD, s/p aortobifemoral bypass, s/p multiple interventions, ICM EF 30-35%, s/p ICD, CAD s/p PCI, HTN, HLD, smoker.    Arterial occlusion of left lower limb.   - NPO  - Consult vascular surgery  - Peripheral angiogram to determine level of occlusion.  - Continue Aspirin and Brilinta    - Smoking cessation-provide nicotine patch    HLD:  - Continue Atorvastatin    HTN:   - Continue Metprolol

## 2017-09-26 NOTE — PROGRESS NOTES
Pt admitted to floor from ED on RA with heparin gtt infusing to 20 g SL IV to LFA. Pt states he is in 9/10 pain to Bilat feet; gave 1mg IVP dilaudid. Pt states he only has lyrica at the bedside; RN counted with pt. 38 lyrica (100mg capsules) noted. Pt states he has a family member coming tomorrow to take them home, but verbalizes understanding that RN must lock up until someone can do so. Pt tolerating plan of care WCTM.

## 2017-09-26 NOTE — CONSULTS
Cardiology Initial Consult Note    9/26/2017    Reason for Consult: Left leg pain    SUBJECTIVE  Leonardo Byrd is a 46 y.o. male with a history significant for PAD, s/p aortobifemoral bypass, s/p multiple interventions, ICM EF 30-35%, s/p ICD, CAD s/p PCI, HTN, HLD, smoker.  He was recently admitted to [facility] for ischemic rest pain of his left lower limb.  Arterial ultrasound showed occlusion of left mid/distal SFA, popliteal, posterior tibial, and dorsalis pedis arteries. He subsequently underwent angiogram by Dr. Rosas, with thrombolytics and angiojet to L SFA, popliteal, and PT arteries.  Post-procedure he developed a left thigh hematoma, for which a repeat angiogram was performed and no perforation was noted.  He reports that he still has pain on his left foot, particularly over the dorsum of his foot, with hyperesthesia.  This pain is ongoing for several weeks now, even after intervention he says that he immediately had recurrence as soon as he woke up.  He spoke to his interventionalist, and was told they could repeat an angiogram but he may need amputation.  He decided to present to [facility] to get a 2nd opinion.    ?  Review of Systems   As per HPI.     OBJECTIVE  No current facility-administered medications for this encounter.      Current Outpatient Prescriptions   Medication Sig Dispense Refill    aspirin (ECOTRIN) 81 MG EC tablet Take 81 mg by mouth once daily.      atorvastatin (LIPITOR) 40 MG tablet Take 40 mg by mouth every evening.      cilostazol (PLETAL) 50 MG Tab Take 2 tablets (100 mg total) by mouth 2 (two) times daily.      lisinopril 10 MG tablet Take 10 mg by mouth once daily.      metoprolol tartrate (LOPRESSOR) 25 MG tablet Take 12.5 mg by mouth 2 (two) times daily.      oxycodone-acetaminophen (LYNOX) 7.5-300 mg per tablet Take 1 tablet by mouth every 4 (four) hours as needed for Pain.      pregabalin (LYRICA) 100 MG capsule Take 1 capsule (100 mg total) by mouth 3 (three)  times daily. 90 capsule 6    ranolazine (RANEXA) 500 MG Tb12 Take 1,000 mg by mouth 2 (two) times daily.      ticagrelor (BRILINTA) 90 mg tablet Take 90 mg by mouth 2 (two) times daily.      vorapaxar 2.08 mg Tab Take 2.08 mg by mouth once daily.      nicotine (NICODERM CQ) 21 mg/24 hr Place 1 patch onto the skin once daily. Down titrating as directed  0    nitroGLYCERIN (NITROSTAT) 0.4 MG SL tablet Place 0.4 mg under the tongue every 5 (five) minutes as needed for Chest pain.         Past Medical History:   Diagnosis Date    CHF (congestive heart failure)     Coronary artery disease     Hypertension        Past Surgical History:   Procedure Laterality Date    AMPUTATION Right     great toe    coronary stents      KNEE ARTHROPLASTY Left     VASCULAR SURGERY      fem-pop bypass       Review of patient's allergies indicates:  No Known Allergies    No family history on file.    Social History     Social History    Marital status: Single     Spouse name: N/A    Number of children: N/A    Years of education: N/A     Social History Main Topics    Smoking status: Current Every Day Smoker     Packs/day: 0.50    Smokeless tobacco: Former User     Types: Chew     Quit date: 8/2/1980    Alcohol use Yes      Comment: on weekends    Drug use: No    Sexual activity: Yes     Partners: Female     Other Topics Concern    None     Social History Narrative    None       Physical Exam  Vitals: BP (!) 147/74 (BP Location: Right arm, Patient Position: Sitting)   Pulse (!) 113   Temp 97.8 °F (36.6 °C) (Oral)   Resp 18   Wt 65.3 kg (144 lb)   SpO2 99%   BMI 22.55 kg/m²    Gen: NAD, appears older than stated age  Head/Eyes/Ears/Nose: NCAT, MMM   Neck: No JVD  Lung: CTAB, eupneic  Heart: Regular rate and rhythm  Abdomen: Soft, NT/ND, NABS  Extremities: s/p rijght great toe amputation. Left left thigh ecchymosis.  Unable to doppler DP/PT pulses on the left.  Small non-healing sores on anterior and lateral lower  leg. Motor function and sensation to left foot intact, hyperesthesia over dorsum of left foot. Slight discoloration to left great toe, cap refill <3 seconds.    Neuro: AAOx3      Recent Labs  Lab 09/19/17  0618      K 4.1   CO2 26      BUN 12   CREATININE 0.70*   GLU 89   CALCIUM 9.4   ALT 38   AST 33   ALKPHOS 72   BILITOT 1.6*   PROT 7.2   ALBUMIN 4.0         Recent Labs  Lab 09/19/17  0758 09/26/17  0129   WBC 10.70 12.16   HGB 12.2* 13.8*   HCT 35.6* 39.8*    398*   MCV 97 96       Recent Labs  Lab 09/26/17  0129   INR 0.9     No results for input(s): CPK, CPKMB, TROPONINI, MB in the last 168 hours.   ?  Arterial Ultrasound  Occlusion of the left superficial femoral-popliteal stent with minimal arterial flow to the distal left lower extremity and occlusion of the anterior tibial artery.      Partially visualized aorta bifemoral bypass with patency of the left common femoral component.    ASSESSMENT AND PLAN  history significant for PAD, s/p multiple interventions, ICM EF 30-35%, s/p ICD, CAD s/p PCI, HTN, HLD, smoker.  He was recently admitted to [facility] for ischemic rest pain of his left lower limb.  Arterial ultrasound showed occlusion of left mid/distal SFA, popliteal, posterior tibial, and dorsalis pedis arteries, complicated by left thigh hematoma post procedure.  He presents with rest ischemic pain of critical limb ischemia.  He reports that he was told that he may need an amputation and came to [facility] for a 2nd opinion.  Ultrasound shows occlusion of SFA-pop stent, minimal flow distally.  He appears to have viability of his foot, with intact sensation and motor function.     Recommendations:   - Admit to medicine, team C/J preferred  - Continue aspirin and ticagrelor  - Start heparin gtt  - Check lipids and A1c  - Smoking cessation  - Continue statin  - NPO for now  - Pain control  - Try to obtain angiogram discs if possible from [facility]  - Interventional consult in  AM    Discussed the above with Dr. Pedroza with interventional cardiology.     Kraig Ferguson MD  Cardiology Fellow

## 2017-09-26 NOTE — ED NOTES
Recollected blue top tube using 25g butterfly needle.  Collected successfully, retracted needle intact.  Dressing applied.     RN at bedside.

## 2017-09-26 NOTE — ED NOTES
Patient lying in bed, no signs of distress is noted. RR even and unlabored. IV medication infusing well at 17U/kg/hr. No signs of infiltration is noted. Patient is awake, alert and oriented. Bruising noted to the left lower extremity and right upper extremity. Patient denies any pain at this time. Call light within reach. Bed in low position, side rails up x's 2. Will continue to monitor the patient.

## 2017-09-26 NOTE — PROGRESS NOTES
Obtained films from OSH and have reviewed them. Will proceed with LE angiogram via L CFA access tomorrow.    #CLI  --NPO at MN  --access: L CFA anterograde access  --sheath: 6Fr sheath  --catheters: 4Fr MP catheter  --3cc/kg/hr prehydration  --benadryl 50 PO prior to cath     The risks, benefits, and alternatives of peripheral angiography and possible intervention were discussed with the patient. All questions were answered and informed consent was obtained. I had a detailed discussion with the patient regarding risk of stroke, MI, bleeding access site complications, renal failure, emergent need for heart surgery, acute limb complications including ischemia and loss, contrast allergy and death. Patient understands the risks and benefits of the procedure and wishes to proceed. If stents are needed and there is preference for KARMEN, patient understands that would necessitate aspirin for life with plavix or other anti-platelet agent for at least 1 year. Additionally, patient is aware that non-compliance is likely to result in stent clotting with heart attack, heart failure, and/or death.    Staff addendum to follow    Rachel Jacinto MD  Cardiology PGY5

## 2017-09-26 NOTE — PLAN OF CARE
09/26/17 1130   Discharge Assessment   Assessment Type Discharge Planning Assessment   Confirmed/corrected address and phone number on facesheet? Yes   Assessment information obtained from? Patient;Medical Record   Expected Length of Stay (days) 3   Communicated expected length of stay with patient/caregiver yes   Prior to hospitilization cognitive status: Alert/Oriented   Prior to hospitalization functional status: Independent   Current cognitive status: Alert/Oriented   Current Functional Status: Independent   Lives With sibling(s);parent(s)   Able to Return to Prior Arrangements yes   Is patient able to care for self after discharge? Unable to determine at this time (comments)   Patient's perception of discharge disposition home or selfcare   Readmission Within The Last 30 Days no previous admission in last 30 days   Patient currently being followed by outpatient case management? No   Patient currently receives any other outside agency services? No   Equipment Currently Used at Home none   Do you have any problems affording any of your prescribed medications? No   Is the patient taking medications as prescribed? yes   Does the patient have transportation home? Yes   Transportation Available family or friend will provide   Does the patient receive services at the [facility]? No   Discharge Plan A Home with family   Discharge Plan B Home with family;Home Health   Patient/Family In Agreement With Plan yes   Admitted with PAD. Was recently at [facility] and they recommended BL le amputation. Pt here for 2nd opinion. Lives with father and brother. Was previously independent in his ADLs. DC needs pending treatment and response to that treatment.   CD of last Angio requested from [facility]. Baltazar called to .

## 2017-09-26 NOTE — ED TRIAGE NOTES
Leonardo Byrd, a 46 y.o. male presents to the ED c/o bilateral leg pain. Pt reports that he was seen recently at [place] and was told due to decreased circulation he needed to have both legs amputated. Pt reports wanting a second opinion and had procedure done there to help last week but is unsure what procedure was.       Chief Complaint   Patient presents with    Leg Pain     Pt reports chronic bilateral leg pain.     Review of patient's allergies indicates:  No Known Allergies  Past Medical History:   Diagnosis Date    CHF (congestive heart failure)     Coronary artery disease     Hypertension

## 2017-09-27 PROBLEM — F10.10 ALCOHOL ABUSE: Status: ACTIVE | Noted: 2017-01-01

## 2017-09-27 NOTE — HPI
Mr. Byrd is a 45 y/o male with past medical history of PAD, s/p aortobifemoral bypass and multiple interventions, ICM EF 30-35%, s/p ICD, CAD s/p PCI, HTN, HLD, every day tobacco smoker. He was recently admitted to [facility] for ischemic rest pain of his left lower limb. Arterial ultrasound showed occlusion of left mid/distal SFA, popliteal, posterior tibial, and dorsalis pedis arteries. He subsequently underwent angiogram by Dr. Rosas, with thrombolytics and angiojet to L SFA, popliteal, and PT arteries.  Post-procedure he developed a left thigh hematoma, for which a repeat angiogram was performed and no perforation was noted. His left leg pain is ongoing now for several weeks, even after intervention he says that he immediately had recurrence as soon as he woke up.  He spoke to his interventionalist, and was told they could repeat an angiogram but he may need amputation. He decided to [facility] to [facility] to get a 2nd opinion. He reports this morning that his left foot is in significant pain localized to the top and bottom of his foot. He denies SOB, chest pain, or cough. He endorses ulcers present on his lower leg that began around the time he had his previous vascular surgery. He also endorses a decrease in ability to walk. He states that the pain in his foot has increased to such a point that ambulation is no longer possible. He also reports that his left foot has been significant colder than his right.     The patient was admitted to the Hospital Medicine Service for further evaluation and management. After admission to hospital medicine patient had a peripheral angiogram that showed 100% occlusion of the L SFA, popliteal artery, & anterior tibial artery.  The occlusions were recanalized with the catheter-assisted thrombolysis (EKOS) treatment system that will be left in overnight and he will be going for a repeat angiogram tomorrow.  Patient was transferred to the CCU in the interim prior for close  observation.

## 2017-09-27 NOTE — PROGRESS NOTES
Dr. Jacinto notified R groin dsg saturated, pressure applied, /52, HR 80s. Came to bedside to assess. Pressure dsg applied. Will continue to monitor.

## 2017-09-27 NOTE — H&P
[facility]  Cardiology  History and Physical     Patient Name: Leonardo Byrd  MRN: [id number]  Admission Date: 9/25/2017  Code Status: Full Code   Attending Provider: Antoni Yang MD   Primary Care Physician: Indio Aquino MD  Principal Problem:PAD (peripheral artery disease)    Patient information was obtained from patient and past medical records.     Subjective:     Chief Complaint:  LLE pain     HPI:  Mr. Byrd is a 47 y/o male with past medical history of PAD, s/p aortobifemoral bypass and multiple interventions, ICM EF 30-35%, s/p ICD, CAD s/p PCI, HTN, HLD, every day tobacco smoker. He was recently admitted to [facility] for ischemic rest pain of his left lower limb. Arterial ultrasound showed occlusion of left mid/distal SFA, popliteal, posterior tibial, and dorsalis pedis arteries. He subsequently underwent angiogram by Dr. Rosas, with thrombolytics and angiojet to L SFA, popliteal, and PT arteries.  Post-procedure he developed a left thigh hematoma, for which a repeat angiogram was performed and no perforation was noted. His left leg pain is ongoing now for several weeks, even after intervention he says that he immediately had recurrence as soon as he woke up.  He spoke to his interventionalist, and was told they could repeat an angiogram but he may need amputation. He decided to present to [facility] to get a 2nd opinion. He reports this morning that his left foot is in significant pain localized to the top and bottom of his foot. He denies SOB, chest pain, or cough. He endorses ulcers present on his lower leg that began around the time he had his previous vascular surgery. He also endorses a decrease in ability to walk. He states that the pain in his foot has increased to such a point that ambulation is no longer possible. He also reports that his left foot has been significant colder than his right.     The patient was admitted to the Hospital Medicine Service for further  evaluation and management. After admission to hospital medicine patient had a peripheral angiogram that showed 100% occlusion of the L SFA, popliteal artery, & anterior tibial artery.  The occlusions were recanalized with the catheter-assisted thrombolysis (EKOS) treatment system that will be left in overnight and he will be going for a repeat angiogram tomorrow.  Patient was transferred to the CCU in the interim prior for close observation.    Past Medical History:   Diagnosis Date    CHF (congestive heart failure)     Coronary artery disease     Hypertension        Past Surgical History:   Procedure Laterality Date    AMPUTATION Right     great toe    coronary stents      KNEE ARTHROPLASTY Left     VASCULAR SURGERY      fem-pop bypass       Review of patient's allergies indicates:  No Known Allergies    No current facility-administered medications on file prior to encounter.      Current Outpatient Prescriptions on File Prior to Encounter   Medication Sig    aspirin (ECOTRIN) 81 MG EC tablet Take 81 mg by mouth once daily.    atorvastatin (LIPITOR) 40 MG tablet Take 40 mg by mouth every evening.    cilostazol (PLETAL) 50 MG Tab Take 2 tablets (100 mg total) by mouth 2 (two) times daily.    lisinopril 10 MG tablet Take 10 mg by mouth once daily.    metoprolol tartrate (LOPRESSOR) 25 MG tablet Take 12.5 mg by mouth 2 (two) times daily.    oxycodone-acetaminophen (LYNOX) 7.5-300 mg per tablet Take 1 tablet by mouth every 4 (four) hours as needed for Pain.    pregabalin (LYRICA) 100 MG capsule Take 1 capsule (100 mg total) by mouth 3 (three) times daily.    ranolazine (RANEXA) 500 MG Tb12 Take 1,000 mg by mouth 2 (two) times daily.    ticagrelor (BRILINTA) 90 mg tablet Take 90 mg by mouth 2 (two) times daily.    vorapaxar 2.08 mg Tab Take 2.08 mg by mouth once daily.    nicotine (NICODERM CQ) 21 mg/24 hr Place 1 patch onto the skin once daily. Down titrating as directed    nitroGLYCERIN (NITROSTAT)  0.4 MG SL tablet Place 0.4 mg under the tongue every 5 (five) minutes as needed for Chest pain.     Family History     None        Social History Main Topics    Smoking status: Current Every Day Smoker     Packs/day: 0.50    Smokeless tobacco: Former User     Types: Chew     Quit date: 8/2/1980    Alcohol use Yes      Comment: on weekends    Drug use: No    Sexual activity: Yes     Partners: Female     Review of Systems   Constitution: Positive for weight loss. Negative for chills and fever.   HENT: Negative for congestion, ear discharge, ear pain, hearing loss and sore throat.    Eyes: Negative for blurred vision and pain.   Cardiovascular: Positive for claudication, cyanosis (LLE) and leg swelling (LLE ). Negative for chest pain, dyspnea on exertion, orthopnea, palpitations and paroxysmal nocturnal dyspnea.        LLE resting pain that has improved from 10/10 to 7/10   Respiratory: Negative for cough and shortness of breath.    Skin: Negative for flushing and rash.   Musculoskeletal: Positive for muscle weakness. Negative for joint swelling.   Gastrointestinal: Negative for abdominal pain, constipation, diarrhea, nausea and vomiting.   Genitourinary: Negative for dysuria and hematuria.   Neurological: Negative for light-headedness and vertigo.     Objective:     Vital Signs (Most Recent):  Temp: 96.1 °F (35.6 °C) (09/27/17 1215)  Pulse: 72 (09/27/17 1300)  Resp: 11 (09/27/17 1300)  BP: (!) 97/54 (09/27/17 1300)  SpO2: 98 % (09/27/17 1300) Vital Signs (24h Range):  Temp:  [95 °F (35 °C)-98.7 °F (37.1 °C)] 96.1 °F (35.6 °C)  Pulse:  [68-98] 72  Resp:  [11-26] 11  SpO2:  [97 %-100 %] 98 %  BP: ()/(46-76) 97/54     Weight: 64.1 kg (141 lb 5 oz)  Body mass index is 22.81 kg/m².    SpO2: 98 %  O2 Device (Oxygen Therapy): room air      Intake/Output Summary (Last 24 hours) at 09/27/17 1412  Last data filed at 09/27/17 0400   Gross per 24 hour   Intake           966.88 ml   Output              625 ml   Net            341.88 ml       Lines/Drains/Airways     Peripheral Intravenous Line                 Peripheral IV - Single Lumen 09/26/17 0129 Left Forearm 1 day                Physical Exam   Constitutional: He is oriented to person, place, and time. He appears well-developed and well-nourished. He has a sickly appearance. He appears distressed.   Middle aged WM laying on hospital bed in mild distress   HENT:   Head: Normocephalic and atraumatic.   Eyes: EOM are normal. Pupils are equal, round, and reactive to light.   Neck: Normal range of motion. Neck supple. No JVD present.   Cardiovascular: Normal rate and regular rhythm.   Occasional extrasystoles are present. Exam reveals gallop, S4 and decreased pulses (0 - 1+ palpable distal LLE pulses). Exam reveals no friction rub.    No murmur heard.  Pulses:       Radial pulses are 2+ on the right side, and 2+ on the left side.        Dorsalis pedis pulses are 1+ on the right side, and 0 on the left side.   Pulmonary/Chest: No tachypnea. No respiratory distress. He has no decreased breath sounds. He has no wheezes. He has no rhonchi. He has no rales.   Abdominal: Soft. Bowel sounds are normal. He exhibits no distension. There is no tenderness.   Musculoskeletal:        Legs:  Distal LLE TTP & cool to the touch w/ very faint palpable pulses   Neurological: He is alert and oriented to person, place, and time.       Significant Labs:   ABG: No results for input(s): PH, PCO2, HCO3, POCSATURATED, BE in the last 48 hours., CMP   Recent Labs  Lab 09/26/17  0129 09/27/17  0649    137   K 3.6 3.6    103   CO2 25 28    128*   BUN 12 17   CREATININE 0.7 0.8   CALCIUM 9.5 8.9   PROT  --  6.2   ALBUMIN  --  3.0*   BILITOT  --  1.0   ALKPHOS  --  73   AST  --  18   ALT  --  21   ANIONGAP 9 6*   ESTGFRAFRICA >60.0 >60.0   EGFRNONAA >60.0 >60.0   , CBC   Recent Labs  Lab 09/26/17  0129 09/27/17  0649   WBC 12.16 13.64*   HGB 13.8* 12.2*   HCT 39.8* 34.5*   * 375*   ,  INR   Recent Labs  Lab 09/26/17  0129   INR 0.9   , Lipid Panel   Recent Labs  Lab 09/26/17  0129   CHOL 189   HDL 62   LDLCALC 102.0   TRIG 125   CHOLHDL 32.8     Significant Imaging: LLE angio:        - Left Common Iliac Artery:             The left COLLIN has luminal irregularities. Aorta Bifem.     - Right Common Iliac Artery:             The right COLLIN has luminal irregularities. Aorta Bifem.     - Left Superficial Femoral Artery:             The left SFA is occluded (100). There is PRIYANKA 0 flow.     - Left Profunda Femoral Artery:             The left PFA has luminal irregularities.     - Left Popliteal Artery:             The left POP is occluded (100). There is PRIYANKA 0 flow.     - Left Tibioperoneal Trunk:             The left TIBTRUNK is normal.     - Left Anterior Tibial Artery:             The left AT is occluded (100). There is PRIYANKA 0 flow.     - Left Peroneal Artery:             The left peroneal has luminal irregularities.     - Left Posterior Tibial Artery:             The left PT has luminal irregularities.    Assessment and Plan:     * PAD (peripheral artery disease)    S/p catheter-assisted thrombolytic therapy in cath lab.  Patient will have repeat angio tomorrow to reassess patency  -Vorapaxar 2.08 mg po qd  -Cilostazol 100 mg po BID  -Pregabalin 100 mg po TID  -Ticagrelor 90 mg po BID  -Aspirin 81 mg   -Atorvastatin 40 mg po qhs        Arterial occlusion, lower extremity    As above        Chronic systolic heart failure    Class II, stage C  Currently on lopressor 12.5 mg po BID  -Continue lisinopril 10 mg po qd  -Change lopressor to Toprol prior to discharge        Tobacco abuse    -Nicotine 21 mg/24 h patch        Alcohol abuse    Patient drinks anywhere from 6-12 beers qd  -CIWA score q4 PRN; call MD for CIWA > 8  -Ativan 2 mg PRN for CIWA > 8, SBP > 160, HR > 110            VTE Risk Mitigation         Ordered     Medium Risk of VTE  Once      09/26/17 1030          Keenan Sinclair MD  Cardiology    [facility]

## 2017-09-27 NOTE — HOSPITAL COURSE
After admission to hospital medicine patient had a peripheral angiogram that showed 100% occlusion of the L SFA, popliteal artery, & anterior tibial artery.  The occlusions were recanalized with the catheter-assisted thrombolysis (EKOS) treatment system that will be left in overnight and he went for a repeat angiogram the following day during which he had a L SFA stent placed with improved perfusion noted on POD# 1.  On step down to the floor, there was concern for complex regional pain syndrome vs infectious etiology causing a persistent dorsal aspect L foot pain that he described as a burning pain separate from his resolved claudication pain.

## 2017-09-27 NOTE — NURSING
Pt's BP 86/64 manual in L arm. Pt asymptomatic, laying in bed. Pt scheduled to receive PO 12.5 mg Lopressor. Dr. Santamaria notified, stated to hold Lopressor tonight. Stated to hold future narcotics, including IV dilaudid and PO oxycodone, until SBP > 100. Will implement. Will continue to monitor.

## 2017-09-27 NOTE — SUBJECTIVE & OBJECTIVE
Past Medical History:   Diagnosis Date    CHF (congestive heart failure)     Coronary artery disease     Hypertension        Past Surgical History:   Procedure Laterality Date    AMPUTATION Right     great toe    coronary stents      KNEE ARTHROPLASTY Left     VASCULAR SURGERY      fem-pop bypass       Review of patient's allergies indicates:  No Known Allergies    No current facility-administered medications on file prior to encounter.      Current Outpatient Prescriptions on File Prior to Encounter   Medication Sig    aspirin (ECOTRIN) 81 MG EC tablet Take 81 mg by mouth once daily.    atorvastatin (LIPITOR) 40 MG tablet Take 40 mg by mouth every evening.    cilostazol (PLETAL) 50 MG Tab Take 2 tablets (100 mg total) by mouth 2 (two) times daily.    lisinopril 10 MG tablet Take 10 mg by mouth once daily.    metoprolol tartrate (LOPRESSOR) 25 MG tablet Take 12.5 mg by mouth 2 (two) times daily.    oxycodone-acetaminophen (LYNOX) 7.5-300 mg per tablet Take 1 tablet by mouth every 4 (four) hours as needed for Pain.    pregabalin (LYRICA) 100 MG capsule Take 1 capsule (100 mg total) by mouth 3 (three) times daily.    ranolazine (RANEXA) 500 MG Tb12 Take 1,000 mg by mouth 2 (two) times daily.    ticagrelor (BRILINTA) 90 mg tablet Take 90 mg by mouth 2 (two) times daily.    vorapaxar 2.08 mg Tab Take 2.08 mg by mouth once daily.    nicotine (NICODERM CQ) 21 mg/24 hr Place 1 patch onto the skin once daily. Down titrating as directed    nitroGLYCERIN (NITROSTAT) 0.4 MG SL tablet Place 0.4 mg under the tongue every 5 (five) minutes as needed for Chest pain.     Family History     None        Social History Main Topics    Smoking status: Current Every Day Smoker     Packs/day: 0.50    Smokeless tobacco: Former User     Types: Chew     Quit date: 8/2/1980    Alcohol use Yes      Comment: on weekends    Drug use: No    Sexual activity: Yes     Partners: Female     Review of Systems   Constitution:  Positive for weight loss. Negative for chills and fever.   HENT: Negative for congestion, ear discharge, ear pain, hearing loss and sore throat.    Eyes: Negative for blurred vision and pain.   Cardiovascular: Positive for claudication, cyanosis (LLE) and leg swelling (LLE ). Negative for chest pain, dyspnea on exertion, orthopnea, palpitations and paroxysmal nocturnal dyspnea.        LLE resting pain that has improved from 10/10 to 7/10   Respiratory: Negative for cough and shortness of breath.    Skin: Negative for flushing and rash.   Musculoskeletal: Positive for muscle weakness. Negative for joint swelling.   Gastrointestinal: Negative for abdominal pain, constipation, diarrhea, nausea and vomiting.   Genitourinary: Negative for dysuria and hematuria.   Neurological: Negative for light-headedness and vertigo.     Objective:     Vital Signs (Most Recent):  Temp: 96.1 °F (35.6 °C) (09/27/17 1215)  Pulse: 72 (09/27/17 1300)  Resp: 11 (09/27/17 1300)  BP: (!) 97/54 (09/27/17 1300)  SpO2: 98 % (09/27/17 1300) Vital Signs (24h Range):  Temp:  [95 °F (35 °C)-98.7 °F (37.1 °C)] 96.1 °F (35.6 °C)  Pulse:  [68-98] 72  Resp:  [11-26] 11  SpO2:  [97 %-100 %] 98 %  BP: ()/(46-76) 97/54     Weight: 64.1 kg (141 lb 5 oz)  Body mass index is 22.81 kg/m².    SpO2: 98 %  O2 Device (Oxygen Therapy): room air      Intake/Output Summary (Last 24 hours) at 09/27/17 1412  Last data filed at 09/27/17 0400   Gross per 24 hour   Intake           966.88 ml   Output              625 ml   Net           341.88 ml       Lines/Drains/Airways     Peripheral Intravenous Line                 Peripheral IV - Single Lumen 09/26/17 0129 Left Forearm 1 day                Physical Exam   Constitutional: He is oriented to person, place, and time. He appears well-developed and well-nourished. He has a sickly appearance. He appears distressed.   Middle aged WM laying on hospital bed in mild distress   HENT:   Head: Normocephalic and atraumatic.    Eyes: EOM are normal. Pupils are equal, round, and reactive to light.   Neck: Normal range of motion. Neck supple. No JVD present.   Cardiovascular: Normal rate and regular rhythm.   Occasional extrasystoles are present. Exam reveals gallop, S4 and decreased pulses (0 - 1+ palpable distal LLE pulses). Exam reveals no friction rub.    No murmur heard.  Pulses:       Radial pulses are 2+ on the right side, and 2+ on the left side.        Dorsalis pedis pulses are 1+ on the right side, and 0 on the left side.   Pulmonary/Chest: No tachypnea. No respiratory distress. He has no decreased breath sounds. He has no wheezes. He has no rhonchi. He has no rales.   Abdominal: Soft. Bowel sounds are normal. He exhibits no distension. There is no tenderness.   Musculoskeletal:        Legs:  Distal LLE TTP & cool to the touch w/ very faint palpable pulses   Neurological: He is alert and oriented to person, place, and time.       Significant Labs:   ABG: No results for input(s): PH, PCO2, HCO3, POCSATURATED, BE in the last 48 hours., CMP   Recent Labs  Lab 09/26/17  0129 09/27/17  0649    137   K 3.6 3.6    103   CO2 25 28    128*   BUN 12 17   CREATININE 0.7 0.8   CALCIUM 9.5 8.9   PROT  --  6.2   ALBUMIN  --  3.0*   BILITOT  --  1.0   ALKPHOS  --  73   AST  --  18   ALT  --  21   ANIONGAP 9 6*   ESTGFRAFRICA >60.0 >60.0   EGFRNONAA >60.0 >60.0   , CBC   Recent Labs  Lab 09/26/17  0129 09/27/17  0649   WBC 12.16 13.64*   HGB 13.8* 12.2*   HCT 39.8* 34.5*   * 375*   , INR   Recent Labs  Lab 09/26/17  0129   INR 0.9   , Lipid Panel   Recent Labs  Lab 09/26/17  0129   CHOL 189   HDL 62   LDLCALC 102.0   TRIG 125   CHOLHDL 32.8     Significant Imaging: LLE angio:        - Left Common Iliac Artery:             The left COLLIN has luminal irregularities. Aorta Bifem.     - Right Common Iliac Artery:             The right COLLIN has luminal irregularities. Aorta Bifem.     - Left Superficial Femoral Artery:              The left SFA is occluded (100). There is PRIYANKA 0 flow.     - Left Profunda Femoral Artery:             The left PFA has luminal irregularities.     - Left Popliteal Artery:             The left POP is occluded (100). There is PRIYANKA 0 flow.     - Left Tibioperoneal Trunk:             The left TIBTRUNK is normal.     - Left Anterior Tibial Artery:             The left AT is occluded (100). There is PRIYANKA 0 flow.     - Left Peroneal Artery:             The left peroneal has luminal irregularities.     - Left Posterior Tibial Artery:             The left PT has luminal irregularities.

## 2017-09-27 NOTE — H&P
[facility] Hospital Medicine  History & Physical    Patient Name: Leonardo Byrd  MRN: [id number]  Admission Date: 9/25/2017  Attending Physician: Sussy Chavez MD   Primary Care Provider: Indio Aquino MD    [place] [facility] Medicine Team: [facility] ADRIANNA MED MARKO Sebastian NP     Patient information was obtained from patient, past medical records and ER records.     Subjective:     Principal Problem:PAD (peripheral artery disease)    Chief Complaint:   Chief Complaint   Patient presents with    Leg Pain     Pt reports chronic bilateral leg pain.        HPI: Mr. Byrd is a 47 y/o male with past medical history of PAD, s/p aortobifemoral bypass and multiple interventions, ICM EF 30-35%, s/p ICD, CAD s/p PCI, HTN, HLD, every day tobacco smoker. He was recently admitted to [facility] for ischemic rest pain of his left lower limb. Arterial ultrasound showed occlusion of left mid/distal SFA, popliteal, posterior tibial, and dorsalis pedis arteries. He subsequently underwent angiogram by Dr. Rosas, with thrombolytics and angiojet to L SFA, popliteal, and PT arteries.  Post-procedure he developed a left thigh hematoma, for which a repeat angiogram was performed and no perforation was noted. His left leg pain is ongoing now for several weeks, even after intervention he says that he immediately had recurrence as soon as he woke up.  He spoke to his interventionalist, and was told they could repeat an angiogram but he may need amputation. He decided to present to [facility] to get a 2nd opinion. He reports this morning that his left foot is in significant pain localized to the top and bottom of his foot. He denies SOB, chest pain, or cough. He endorses ulcers present on his lower leg that began around the time he had his previous vascular surgery. He also endorses a decrease in ability to walk. He states that the pain in his foot has increased to such a point that ambulation is no longer possible. He also  reports that his left foot has been significant colder than his right.    The patient was admitted to the Hospital Medicine Service for further evaluation and management.       Review of Systems   Constitutional: Positive for activity change. Negative for appetite change, chills, diaphoresis, fatigue, fever and unexpected weight change.   HENT: Negative for congestion, rhinorrhea, sinus pain, sinus pressure, sneezing and sore throat.    Eyes: Negative.    Respiratory: Negative for cough, chest tightness and wheezing.    Cardiovascular: Negative for chest pain, palpitations and leg swelling.   Gastrointestinal: Negative for abdominal distention, abdominal pain, constipation, diarrhea, nausea and vomiting.   Endocrine: Negative.    Genitourinary: Negative for decreased urine volume, difficulty urinating, flank pain, frequency, hematuria and urgency.   Musculoskeletal: Positive for myalgias (left leg, primarly foot). Negative for arthralgias.   Skin: Positive for wound (left leg ulcers).   Allergic/Immunologic: Negative.    Neurological: Negative for dizziness, syncope, light-headedness and headaches.   Psychiatric/Behavioral: Negative for agitation and confusion. The patient is not nervous/anxious.      Objective:     Vital Signs (Most Recent):  Temp: 97.7 °F (36.5 °C) (09/26/17 1520)  Pulse: 78 (09/26/17 2011)  Resp: 14 (09/26/17 2011)  BP: (!) 96/54 (09/26/17 2011)  SpO2: 97 % (09/26/17 2011) Vital Signs (24h Range):  Temp:  [97.3 °F (36.3 °C)-97.7 °F (36.5 °C)] 97.7 °F (36.5 °C)  Pulse:  [68-98] 78  Resp:  [14-20] 14  SpO2:  [95 %-100 %] 97 %  BP: ()/(54-81) 96/54     Weight: 64.1 kg (141 lb 5 oz)  Body mass index is 22.81 kg/m².    Intake/Output Summary (Last 24 hours) at 09/26/17 2137  Last data filed at 09/26/17 1520   Gross per 24 hour   Intake           126.88 ml   Output                0 ml   Net           126.88 ml      Physical Exam   Constitutional: He is oriented to person, place, and time. He  appears well-developed and well-nourished. No distress.   HENT:   Head: Normocephalic and atraumatic.   Mouth/Throat: Mucous membranes are normal. Normal dentition.   Eyes: Conjunctivae and lids are normal. Pupils are equal, round, and reactive to light.   Neck: Normal range of motion. Neck supple. No JVD present.   Cardiovascular: Normal rate, regular rhythm and normal heart sounds.  Exam reveals no gallop and no friction rub.    No murmur heard.  Pulses:       Radial pulses are 3+ on the right side, and 3+ on the left side.        Femoral pulses are 3+ on the right side, and 3+ on the left side.       Popliteal pulses are 1+ on the right side, and 0 on the left side.        Dorsalis pedis pulses are 2+ on the right side, and 1+ on the left side.        Posterior tibial pulses are 2+ on the right side, and 0 on the left side.   Pulmonary/Chest: Effort normal and breath sounds normal. He exhibits no tenderness.   Abdominal: Soft. Bowel sounds are normal. He exhibits no distension. There is no tenderness.   Musculoskeletal: Normal range of motion. He exhibits tenderness. He exhibits no edema or deformity.        Left ankle: He exhibits ecchymosis.   s/p rijght great toe amputation. Left thigh ecchymosis. Small non-healing sores on anterior and lateral lower leg. Motor function and sensation to left foot intact, hyperesthesia over dorsum of left foot. Slight discoloration to left great toe, cap refill <3 seconds.     Neurological: He is alert and oriented to person, place, and time. No cranial nerve deficit.   Skin: Skin is warm and dry. Capillary refill takes more than 3 seconds. Lesion noted. No rash noted. He is not diaphoretic.   Left leg, multiple small ulcers in various stages   Psychiatric: He has a normal mood and affect. Judgment and thought content normal.   Nursing note and vitals reviewed.    Significant Labs:   A1C:   Recent Labs  Lab 09/26/17 0129   HGBA1C 5.4     CBC:   Recent Labs  Lab 09/26/17 0129    WBC 12.16   HGB 13.8*   HCT 39.8*   *     CMP:   Recent Labs  Lab 09/26/17  0129      K 3.6      CO2 25      BUN 12   CREATININE 0.7   CALCIUM 9.5   ANIONGAP 9   EGFRNONAA >60.0     Lipid Panel:   Recent Labs  Lab 09/26/17  0129   CHOL 189   HDL 62   LDLCALC 102.0   TRIG 125   CHOLHDL 32.8     Magnesium:   Recent Labs  Lab 09/26/17  0129   MG 2.0     All pertinent labs within the past 24 hours have been reviewed.    Significant Imaging: I have reviewed all pertinent imaging results/findings within the past 24 hours.    Assessment/Plan:     * PAD (peripheral artery disease)      -s/p aortobifemoral bypass, s/p multiple interventions, recently admitted to [facility] for ischemic rest pain of his left lower limb, Arterial US w/ occlusion of left mid/distal SFA, popliteal, posterior tibial, and dorsalis pedis arteries, subsequently underwent angiogram with thrombolytics and angiojet to L SFA, popliteal, and PT arteries, post-procedure he developed a left thigh hematoma, for which a repeat angiogram was performed and no perforation was noted  -now presents with increased pain   -LE us 09/26 with occlusion of the left superficial femoral-popliteal stent with minimal arterial flow to the distal left lower extremity and occlusion of the anterior tibial artery  -CBC, chemistry, lipid panel, and HgA1c unremarkable  -Interventional Cardiology consulted, plans for angiogram in AM  -continue heparin gtt  -continue home ASA, statin, pletal, ranexa, vorapazar, and brilinita  -continue home lyrica and PRN medications for pain         Arterial occlusion, lower extremity    -see above        Chronic systolic heart failure    -no echo noted in [facility] system   -appears euvolemic on exam   -continue home metoprolol and lisinopril   -monitor of hospital course        Tobacco abuse    -tobacco cessation provided  -nicotine patch in house          VTE Risk Mitigation         Ordered     Medium Risk of VTE   Once      09/26/17 1030        Teena Sebastian NP  Department of [facility] Medicine   [facility]  Pager [phone]  [facility] [id number]

## 2017-09-27 NOTE — ASSESSMENT & PLAN NOTE
S/p catheter-assisted thrombolytic therapy in cath lab.  Patient will have repeat angio tomorrow to reassess patency  -Vorapaxar 2.08 mg po qd  -Cilostazol 100 mg po BID  -Pregabalin 100 mg po TID  -Ticagrelor 90 mg po BID  -Aspirin 81 mg   -Atorvastatin 40 mg po qhs

## 2017-09-27 NOTE — PROGRESS NOTES
Pt not coming back to room and being transferred to [id number] straight from cath lab. Spoke with JORGE Quevedo and she received report from cath lab. Will bring pt belongings to cath lab to transfer with him.

## 2017-09-27 NOTE — PROGRESS NOTES
Pt arrived to [id number] per cath lab RN, on RA, connected to bedside tele monitor. All VSS. Minimal bleeding noted to R groin dsg, area marked, no hematoma noted. Pt aware he needs to remain flat. All new orders received and carried out. See flowsheet for full assessment. Will continue to monitor.

## 2017-09-27 NOTE — ASSESSMENT & PLAN NOTE
-LE us 09/26 with occlusion of the left superficial femoral-popliteal stent with minimal arterial flow to the distal left lower extremity and occlusion of the anterior tibial artery  -09/27 the left SFA and Poplitea recanalized w/ catheter-assisted thrombolysis (EKOS) treatment system  -repeat angiogram 09/28 the revealed successful therapy  -concern now for complex regional pain syndrome vs infectious etiology as pain continues and is described differently than claudication pain    -Neurology following  -Cardiology following  -bone scan pending to r/o infective process  -CBC, chemistry, lipid panel, and HgA1c unremarkable  -continue home ASA, statin, pletal, ranexa, and brilinita  -continue home lyrica and PRN medications for pain   -prior to admission s/p aortobifemoral bypass and multiple interventions, recently admitted to [facility] for ischemic rest pain of his left lower limb, Arterial US w/ occlusion of left mid/distal SFA, popliteal, posterior tibial, and dorsalis pedis arteries, subsequently underwent angiogram with thrombolytics and angiojet to L SFA, popliteal, and PT arteries, post-procedure he developed a left thigh hematoma, for which a repeat angiogram was performed and no perforation was noted

## 2017-09-27 NOTE — ASSESSMENT & PLAN NOTE
-no echo noted in [facility] system   -appears euvolemic on exam   -continue home metoprolol and lisinopril   -monitor of hospital course

## 2017-09-27 NOTE — PROGRESS NOTES
Attempted to consult patient for smoking cessation program but patient was away for procedure and will got to SICU following.

## 2017-09-27 NOTE — HOSPITAL COURSE
Mr. Byrd was admitted 09/25 due to PAD s/p recent angiogram with intervention with increased pain. CBC, chemistry, lipid panel, and HgA1C unremarkable. LE US finds occlusion of the left superficial femoral-popliteal stent with minimal arterial flow to the distal left lower extremity and occlusion of the anterior tibial artery. Interventional Cardiology consulted, films from [place] obtained, 09/27 the left SFA and Popliteal were recanalized with the catheter-assisted thrombolysis (EKOS) treatment system overnight while in the CCU, he then underwent repeat angiogram 09/28 the revealed successful therapy. Once limb stabilized he was deemed medically ready for step down to telemetry, care assumed again by Hospital Medicine team 10/01. He is still with signficant pain on dorsum of left foot, Neurology consulted and felt there was concern for complex regional pain syndrome vs infectious etiology causing this persistent pain that he describes as a burning pain/ numbness separate from his resolved claudication pain, bone scan completed and indicated CRPS more likely than cellulitis or osteomyelitis, Cardiology discussed findings with Neurology and initiated amitriptyline 25mg qhs.  Interventional cardiology only needs him to be on an antiplatelet (plavix or brilinta) for a month, the vorapaxar is a nonformulary antiplatelet which needs to be taken with another antiplatelet (he was on brilinta at home as well) since he was on both of these from home, we will resume these medications and he can f/u with his primary cardiologist if this needs to be continued past the one month recommendation.  His hyper-coaguable work up is still underway.     Disposition plans: his pain control is going to take some time and will need specialized assistance with pain medicine (appt. Made with Dr. Garces for Monday), I ordered him a walking boot to help him get around with his peripheral neuropathy that podiatry curbsided recommended  and he has refused home health.

## 2017-09-27 NOTE — NURSING TRANSFER
Nursing Transfer Note      9/27/2017     Transfer To: cath lab    Transfer via stretcher    Transfer with cardiac monitoring    Transported by [facility] sent: NS    Chart send with patient: Yes    Will monitor for pt return.

## 2017-09-27 NOTE — SUBJECTIVE & OBJECTIVE
Review of Systems   Constitutional: Positive for activity change. Negative for appetite change, chills, diaphoresis, fatigue, fever and unexpected weight change.   HENT: Negative for congestion, rhinorrhea, sinus pain, sinus pressure, sneezing and sore throat.    Eyes: Negative.    Respiratory: Negative for cough, chest tightness and wheezing.    Cardiovascular: Negative for chest pain, palpitations and leg swelling.   Gastrointestinal: Negative for abdominal distention, abdominal pain, constipation, diarrhea, nausea and vomiting.   Endocrine: Negative.    Genitourinary: Negative for decreased urine volume, difficulty urinating, flank pain, frequency, hematuria and urgency.   Musculoskeletal: Positive for myalgias (left leg, primarly foot). Negative for arthralgias.   Skin: Positive for wound (left leg ulcers).   Allergic/Immunologic: Negative.    Neurological: Negative for dizziness, syncope, light-headedness and headaches.   Psychiatric/Behavioral: Negative for agitation and confusion. The patient is not nervous/anxious.      Objective:     Vital Signs (Most Recent):  Temp: 97.7 °F (36.5 °C) (09/26/17 1520)  Pulse: 78 (09/26/17 2011)  Resp: 14 (09/26/17 2011)  BP: (!) 96/54 (09/26/17 2011)  SpO2: 97 % (09/26/17 2011) Vital Signs (24h Range):  Temp:  [97.3 °F (36.3 °C)-97.7 °F (36.5 °C)] 97.7 °F (36.5 °C)  Pulse:  [68-98] 78  Resp:  [14-20] 14  SpO2:  [95 %-100 %] 97 %  BP: ()/(54-81) 96/54     Weight: 64.1 kg (141 lb 5 oz)  Body mass index is 22.81 kg/m².    Intake/Output Summary (Last 24 hours) at 09/26/17 2137  Last data filed at 09/26/17 1520   Gross per 24 hour   Intake           126.88 ml   Output                0 ml   Net           126.88 ml      Physical Exam   Constitutional: He is oriented to person, place, and time. He appears well-developed and well-nourished. No distress.   HENT:   Head: Normocephalic and atraumatic.   Mouth/Throat: Mucous membranes are normal. Normal dentition.   Eyes:  Conjunctivae and lids are normal. Pupils are equal, round, and reactive to light.   Neck: Normal range of motion. Neck supple. No JVD present.   Cardiovascular: Normal rate, regular rhythm and normal heart sounds.  Exam reveals no gallop and no friction rub.    No murmur heard.  Pulses:       Radial pulses are 3+ on the right side, and 3+ on the left side.        Femoral pulses are 3+ on the right side, and 3+ on the left side.       Popliteal pulses are 1+ on the right side, and 0 on the left side.        Dorsalis pedis pulses are 2+ on the right side, and 1+ on the left side.        Posterior tibial pulses are 2+ on the right side, and 0 on the left side.   Pulmonary/Chest: Effort normal and breath sounds normal. He exhibits no tenderness.   Abdominal: Soft. Bowel sounds are normal. He exhibits no distension. There is no tenderness.   Musculoskeletal: Normal range of motion. He exhibits tenderness. He exhibits no edema or deformity.        Left ankle: He exhibits ecchymosis.   s/p rijght great toe amputation. Left thigh ecchymosis. Small non-healing sores on anterior and lateral lower leg. Motor function and sensation to left foot intact, hyperesthesia over dorsum of left foot. Slight discoloration to left great toe, cap refill <3 seconds.     Neurological: He is alert and oriented to person, place, and time. No cranial nerve deficit.   Skin: Skin is warm and dry. Capillary refill takes more than 3 seconds. Lesion noted. No rash noted. He is not diaphoretic.   Left leg, multiple small ulcers in various stages   Psychiatric: He has a normal mood and affect. Judgment and thought content normal.   Nursing note and vitals reviewed.    Significant Labs:   A1C:   Recent Labs  Lab 09/26/17 0129   HGBA1C 5.4     CBC:   Recent Labs  Lab 09/26/17 0129   WBC 12.16   HGB 13.8*   HCT 39.8*   *     CMP:   Recent Labs  Lab 09/26/17 0129      K 3.6      CO2 25      BUN 12   CREATININE 0.7   CALCIUM  9.5   ANIONGAP 9   EGFRNONAA >60.0     Lipid Panel:   Recent Labs  Lab 09/26/17  0129   CHOL 189   HDL 62   LDLCALC 102.0   TRIG 125   CHOLHDL 32.8     Magnesium:   Recent Labs  Lab 09/26/17  0129   MG 2.0     All pertinent labs within the past 24 hours have been reviewed.    Significant Imaging: I have reviewed all pertinent imaging results/findings within the past 24 hours.

## 2017-09-27 NOTE — PLAN OF CARE
Problem: Patient Care Overview  Goal: Plan of Care Review  Outcome: Ongoing (interventions implemented as appropriate)  Pt free of falls, injury this shift. POC reviewed with pt at bedside, verbalized understanding. NPO for lower extremity angio today. Holding narcotics and BP medications due to low blood pressures, MD aware. Heparin infusing per MD order, see MAR; following nomogram, titrating based on Xa labs. BPs low throughout shift, MD aware, continuing to monitor. All other vitals stable, NAD noted. Will continue to monitor.

## 2017-09-27 NOTE — ASSESSMENT & PLAN NOTE
Class II, stage C  Currently on lopressor 12.5 mg po BID  -Continue lisinopril 10 mg po qd  -Change lopressor to Toprol prior to discharge

## 2017-09-27 NOTE — ASSESSMENT & PLAN NOTE
Patient drinks anywhere from 6-12 beers qd  -CIWA score q4 PRN; call MD for CIWA > 8  -Ativan 2 mg PRN for CIWA > 8, SBP > 160, HR > 110

## 2017-09-27 NOTE — PLAN OF CARE
09/27/17 0813   Readmission Questionnaire   At the time of your discharge, did someone talk to you about what your health problems were? Yes   At the time of discharge, did someone talk to you about what to watch out for regarding worsening of your health problem? Yes   At the time of discharge, did someone talk to you about what to do if you experienced worsening of your health problem? Yes   At the time of discharge, did someone talk to you about which medication to take when you left the hospital and which ones to stop taking? Yes   At the time of discharge, did someone talk to you about when and where to follow up with a doctor after you left the hospital? Yes   How often do you need to have someone help you when you read instructions, pamphlets, or other written material from your doctor or pharmacy? Sometimes   Do you have problems taking your medications as prescribed? No   Do you have any problems affording any of  your prescribed medications? No   Do you have problems obtaining/receiving your medications? No   Does the patient have transportation to healthcare appointments? Yes   Lives With sibling(s);parent(s)   Living Arrangements house   Does the patient have family/friends to help with healtcare needs after discharge? yes   Does your caregiver provide all the help you need? Yes   Are you currently feeling confused? No   Are you currently having problems thinking? No   Are you currently having memory problems? No   In the last 7 days, my sleep quality was: poor

## 2017-09-27 NOTE — PLAN OF CARE
Problem: Patient Care Overview  Goal: Plan of Care Review  Outcome: Ongoing (interventions implemented as appropriate)  Pt free of falls and injury during the shift. Pain controlled with PO and IV analgesics. NPO @ 0001 for BLE angiogram in am. Pt tolerating plan of care.

## 2017-09-27 NOTE — ASSESSMENT & PLAN NOTE
-s/p aortobifemoral bypass, s/p multiple interventions, recently admitted to [facility] for ischemic rest pain of his left lower limb, Arterial US w/ occlusion of left mid/distal SFA, popliteal, posterior tibial, and dorsalis pedis arteries, subsequently underwent angiogram with thrombolytics and angiojet to L SFA, popliteal, and PT arteries, post-procedure he developed a left thigh hematoma, for which a repeat angiogram was performed and no perforation was noted  -now presents with increased pain   -LE us 09/26 with occlusion of the left superficial femoral-popliteal stent with minimal arterial flow to the distal left lower extremity and occlusion of the anterior tibial artery  -CBC, chemistry, lipid panel, and HgA1c unremarkable  -Interventional Cardiology consulted, plans for angiogram in AM  -continue home ASA, statin, pletal, ranexa, vorapazar, and brilinita  -continue home lyrica and PRN medications for pain

## 2017-09-28 NOTE — PROGRESS NOTES
"            Interventional Cardiology   Post Cath Note      Referring Physician: Antoni Yang MD  Procedure: L PTAS  Indication: CLI    SUBJECTIVE:     Patient tolerated the procedure well, no complications    Cath Results:     Access:  L Antegrade 6F Sheath    See full cath report for further details    Intervention:     Prox L SFA: [id number] Lifestent Stent    Prox and Mid L Pop: Cath Ultraverse 035 [id number]    Closure device used: No    Post Cath Exam:   BP (!) 89/58 (BP Location: Right arm, Patient Position: Lying)   Pulse 69   Temp 98 °F (36.7 °C) (Oral)   Resp 14   Ht 5' 6" (1.676 m)   Wt 69.4 kg (153 lb)   SpO2 97%   BMI 24.69 kg/m²     No unusual pain, hematoma, thrill or bruit at vascular access site.  Distal pulse present without signs of ischemia.    Assessment / Plan:     45 y/o M w/ PMH of ICM (LVEF 30-35%) s/p ICD, CAD s/p PCI, HTN, HLD, current smoker, PAD (s/p aortobifemoral bypass) who p/w critical limb ischemia; s/p L SFA tPA infusion via EKOS catheter. Intervention performed today: Prox L SFA [id number] Lifestent Stent and Prox and Mid L Pop PTA: Cath Ultraverse 035 [id number].    - Routine post cath protocol  - IVFs for ANAT prevention  - EC ASA 81mg po qday  - Cont Ticagrelor 90 mg BID at least 1 month   - D/c tPA and Bivalirudin infusions  - Maximize medical secondary prevention of PAD  - Further care per primary team    Please page/call if any questions or concerns.    Lionel Alcaraz M.D.  Interventional Cardiology Fellow  Pager: [phone]      "

## 2017-09-28 NOTE — PROGRESS NOTES
[facility]  Cardiology  Progress Note    Patient Name: Leonardo Byrd  MRN: [id number]  Admission Date: 9/25/2017  Hospital Length of Stay: 2 days  Code Status: Full Code   Attending Physician: Antoni Yang MD   Primary Care Physician: Indio Aquino MD  Expected Discharge Date: 9/29/2017  Principal Problem:PAD (peripheral artery disease)    Subjective:     Hospital Course:   After admission to hospital medicine patient had a peripheral angiogram that showed 100% occlusion of the L SFA, popliteal artery, & anterior tibial artery.  The occlusions were recanalized with the catheter-assisted thrombolysis (EKOS) treatment system that will be left in overnight and he will be going for a repeat angiogram tomorrow.  Patient was transferred to the CCU in the interim prior for close observation.    Interval History:  No acute events overnight.  LLE looks better than yesterday, not quite so blue.  Patient reports his pain has improved.  Patient going back to cath lab today.    Review of Systems   Constitution: Negative for chills and fever.   Cardiovascular: Positive for claudication, cyanosis (LLE) and leg swelling (LLE ). Negative for chest pain, dyspnea on exertion, irregular heartbeat, orthopnea, palpitations and paroxysmal nocturnal dyspnea.        LLE resting pain improved again today   Respiratory: Negative for cough and shortness of breath.    Musculoskeletal: Positive for muscle weakness.   Gastrointestinal: Negative for nausea and vomiting.     Objective:     Vital Signs (Most Recent):  Temp: 97.7 °F (36.5 °C) (09/28/17 1245)  Pulse: 78 (09/28/17 1300)  Resp: (!) 22 (09/28/17 1300)  BP: 127/70 (09/28/17 1300)  SpO2: (!) 94 % (09/28/17 1300) Vital Signs (24h Range):  Temp:  [97.7 °F (36.5 °C)-98 °F (36.7 °C)] 97.7 °F (36.5 °C)  Pulse:  [] 78  Resp:  [6-34] 22  SpO2:  [93 %-99 %] 94 %  BP: ()/(50-82) 127/70     Weight: 69.4 kg (153 lb)  Body mass index is 24.69 kg/m².     SpO2:  (!) 94 %  O2 Device (Oxygen Therapy): room air      Intake/Output Summary (Last 24 hours) at 09/28/17 1423  Last data filed at 09/28/17 1400   Gross per 24 hour   Intake             2863 ml   Output             4325 ml   Net            -1462 ml       Lines/Drains/Airways     Peripheral Intravenous Line                 Peripheral IV - Single Lumen 09/26/17 0129 Left Forearm 2 days                Physical Exam   Constitutional: He is oriented to person, place, and time. He appears well-developed and well-nourished. No distress.   Middle aged WM laying on hospital bed in mild distress   Neck: No JVD present.   Cardiovascular: Normal rate and regular rhythm.   Occasional extrasystoles are present. Exam reveals gallop, S4 and decreased pulses (0 - 1+ palpable distal LLE pulses). Exam reveals no friction rub.    No murmur heard.  Pulses:       Radial pulses are 2+ on the right side, and 2+ on the left side.        Dorsalis pedis pulses are 1+ on the right side, and 0 on the left side.   Pulmonary/Chest: No tachypnea. No respiratory distress. He has no decreased breath sounds. He has no wheezes. He has no rhonchi. He has no rales.   Abdominal: Soft. Bowel sounds are normal. He exhibits no distension. There is no tenderness.   Musculoskeletal:        Legs:  Distal LLE TTP & cool to the touch but improved from yesteday   Neurological: He is alert and oriented to person, place, and time.       Significant Labs:   ABG: No results for input(s): PH, PCO2, HCO3, POCSATURATED, BE in the last 48 hours., Blood Culture: No results for input(s): LABBLOO in the last 48 hours., CMP   Recent Labs  Lab 09/27/17  0649      K 3.6      CO2 28   *   BUN 17   CREATININE 0.8   CALCIUM 8.9   PROT 6.2   ALBUMIN 3.0*   BILITOT 1.0   ALKPHOS 73   AST 18   ALT 21   ANIONGAP 6*   ESTGFRAFRICA >60.0   EGFRNONAA >60.0   , CBC   Recent Labs  Lab 09/27/17  0649 09/28/17  0303   WBC 13.64* 12.59   HGB 12.2* 11.2*   HCT 34.5* 33.2*   PLT  375* 346     Assessment and Plan:     Brief HPI: 45 yo M w/ crhonic limb ischemia    * PAD (peripheral artery disease)    Currently in cath lab now  -Vorapaxar 2.08 mg po qd  -DC Cilostazol 100 mg po BID 2/2 to depressed EF  -Pregabalin 100 mg po TID  -Ticagrelor 90 mg po BID  -Aspirin 81 mg po qd  -Atorvastatin 40 mg po qhs        Arterial occlusion, lower extremity    As above        Chronic systolic heart failure    Class II, stage C  -Continue Lopressor 12.5 mg po BID  -Continue lisinopril 10 mg po qd  -Change lopressor to Toprol prior to discharge        Tobacco abuse    -Nicotine 21 mg/24 h patch        Alcohol abuse    Patient drinks anywhere from 6-12 beers qd  -CIWA score q4 PRN; call MD for CIWA > 8  -Ativan 2 mg PRN for CIWA > 8, SBP > 160, HR > 110            VTE Risk Mitigation         Ordered     Medium Risk of VTE  Once      09/26/17 1030          Keenan Sinclair MD  Cardiology  [facility]

## 2017-09-28 NOTE — PROGRESS NOTES
Interventional Cardiology Progress Note  Attending Physician: Antoni Yang MD  Hospital Day: 4    Subjective:   Interval History: patient did well overnight. Still has pain in the left leg, but he feels better. Right groin began oozing intermittently throughout the night, but stopped after replacing the pressure bandage. Left groin initially was oozing, but did stop during the night.    Medications:   Continuous Infusions:   sodium chloride 0.9% 125 mL/hr (09/28/17 0600)    sodium chloride 0.9%      alteplase 12.5 mg in 0.9% NaCl 250 mL (CAR/VAS use only) 0.75 mg/hr (09/28/17 0600)    bivulirudin (ANGIOMAX) infusion 0.25 mg/kg/hr (09/28/17 0600)       Scheduled Meds:   aspirin  81 mg Oral Daily    atorvastatin  40 mg Oral QHS    cilostazol  100 mg Oral BID    lisinopril  10 mg Oral Daily    metoprolol tartrate  12.5 mg Oral BID    nicotine  1 patch Transdermal Daily    potassium chloride  40 mEq Oral Once    pregabalin  100 mg Oral TID    ranolazine  1,000 mg Oral BID    sodium chloride 0.9%  3 mL Intravenous Q8H    ticagrelor  90 mg Oral BID    vorapaxar   Oral Daily     PRN Meds:acetaminophen, bisacodyl, heparin (PORCINE), heparin (PORCINE), HYDROmorphone, lorazepam, nitroGLYCERIN, ondansetron, ondansetron, oxycodone, oxycodone, polyethylene glycol, ramelteon  Objective:     Vitals:  Temp:  [95 °F (35 °C)-98 °F (36.7 °C)]   Pulse:  []   Resp:  [6-34]   BP: ()/(50-82)   SpO2:  [93 %-100 %]  on RA I/O's:    Intake/Output Summary (Last 24 hours) at 09/28/17 0742  Last data filed at 09/28/17 0653   Gross per 24 hour   Intake             3063 ml   Output             3325 ml   Net             -262 ml        Constitutional: NAD, conversant  HEENT: Sclera anicteric, PERRLA, EOMI  Neck: No JVD, no carotid bruits  CV: RRR, no murmur, normal S1/S2  Pulm: CTAB, no wheezes, rales, or ronchi  GI: Abdomen soft, NTND, +BS  Extremities: No LE edema, cool  Skin: No ecchymosis, erythema, or  ulcers  Vascular: left medial thigh echymoses. Small [id number] sores on the left anterolateral lower leg. Hyperesthesia of dorsal left foot. Mottling of left 1st metatarsal with cap refill of 2 seconds. Motor function intact. Palpable pulses in DP.  Pulses 2+ radial, femoral bilaterally. Allens test normal.    Labs:       CBC: CMP:      Recent Labs  Lab 09/26/17 0129 09/27/17 0649 09/28/17  0303   WBC 12.16 13.64* 12.59   HGB 13.8* 12.2* 11.2*   HCT 39.8* 34.5* 33.2*   * 375* 346   MCV 96 94 98   RDW 16.5* 17.1* 17.3*      Recent Labs  Lab 09/26/17 0129 09/27/17 0649 09/28/17  0303    137  --    K 3.6 3.6  --     103  --    CO2 25 28  --    BUN 12 17  --    CREATININE 0.7 0.8  --    CALCIUM 9.5 8.9  --    PROT  --  6.2  --    BILITOT  --  1.0  --    ALKPHOS  --  73  --    ALT  --  21  --    AST  --  18  --    MG 2.0 1.8 2.5        Cholesterol: Coagulation   Lab Results   Component Value Date    CHOL 189 09/26/2017    HDL 62 09/26/2017    LDLCALC 102.0 09/26/2017    TRIG 125 09/26/2017      Recent Labs  Lab 09/26/17 0129   INR 0.9        Misc:   No results for input(s): CPK, CPKMB, TROPONINI, MB in the last 168 hours.   Lab Results   Component Value Date    HGBA1C 5.4 09/26/2017        Microbiology   Microbiology Results (last 7 days)     ** No results found for the last 168 hours. **           Imaging:     Cath (09/27/2017):   Diagnostic:        - Left Common Iliac Artery:             The left COLLIN has luminal irregularities. Aorta Bifem.     - Right Common Iliac Artery:             The right COLLIN has luminal irregularities. Aorta Bifem.     - Left Superficial Femoral Artery:             The left SFA is occluded (100). There is PRIYANKA 0 flow.     - Left Profunda Femoral Artery:             The left PFA has luminal irregularities.     - Left Popliteal Artery:             The left POP is occluded (100). There is PRIYANKA 0 flow.     - Left Tibioperoneal Trunk:             The left TIBTRUNK is normal.      - Left Anterior Tibial Artery:             The left AT is occluded (100). There is PRIYANKA 0 flow.     - Left Peroneal Artery:             The left peroneal has luminal irregularities.     - Left Posterior Tibial Artery:             The left PT has luminal irregularities.      Intervention          Left SFA:              The following items were used: Cath Ekos 40cm X 135cm.       Left POP:              The following items were used: Cath Ekos 40cm X 135cm.       Left TIBTRUNK:              The following items were used: Cath Ekos 40cm X 135cm.     Assessment:      46 y.o. gentleman with PMH of ICM (LVEF 30-35%) s/p ICD, CAD s/p PCI, HTN, HLD, current smoker, PAD (s/p aortobifemoral bypass) who presents for critical limb ischemia.    Plan:     #CLI  --s/p EKOS catheter with improving pulses  --please call if any further bleeding issues.  --NPO today for possible relook    Staff addendum to follow    Signed:  Rachel Jacinto MD  Cardiology Fellow, PGY-5  9/28/2017 7:42 AM

## 2017-09-28 NOTE — MEDICAL/APP STUDENT
Progress Note  Cardiology - Team A    Time: 0859 HRS  Date: 9/29/2017  Attending: Dr. Yang    Patient Name: Leonardo Byrd  YOB: 1970    Admit Date: 9/25/2017                     LOS: 2    Post-operative Day:      SUBJECTIVE:     Reason for Admission:  PAD (peripheral artery disease)  Mr. Byrd is a 45 y/o male with past medical history of PAD, s/p aortobifemoral bypass and multiple interventions, ICM EF 30-35%, s/p ICD, CAD s/p PCI, HTN, HLD, every day tobacco smoker. He was recently admitted to [facility] for ischemic rest pain of his left lower limb. Arterial ultrasound showed occlusion of left mid/distal SFA, popliteal, posterior tibial, and dorsalis pedis arteries. He subsequently underwent angiogram by Dr. Rosas, with thrombolytics and angiojet to L SFA, popliteal, and PT arteries.  Post-procedure he developed a left thigh hematoma, for which a repeat angiogram was performed and no perforation was noted. His left leg pain is ongoing now for several weeks, even after intervention he says that he immediately had recurrence as soon as he woke up.  He spoke to his interventionalist, and was told they could repeat an angiogram but he may need amputation. He decided to present to [facility] to get a 2nd opinion. He reports this morning that his left foot is in significant pain localized to the top and bottom of his foot. He denies SOB, chest pain, or cough. He endorses ulcers present on his lower leg that began around the time he had his previous vascular surgery. He also endorses a decrease in ability to walk. He states that the pain in his foot has increased to such a point that ambulation is no longer possible. He also reports that his left foot has been significant colder than his right.     The patient was admitted to the Hospital Medicine Service for further evaluation and management. After admission to hospital medicine patient had a peripheral angiogram that showed 100% occlusion of the  L SFA, popliteal artery, & anterior tibial artery.  The occlusions were recanalized with the catheter-assisted thrombolysis (EKOS) treatment system that will be left in overnight and he will be going for a repeat angiogram tomorrow.  Patient was transferred to the CCU in the interim prior for close observation.    Interval history: Upon examination this morning, patient endorsed hyperesthesia of dorsum of L-foot. Patient states that it was hurting even with the blanket touching it. RN reports that pulses were checked Q1H o/n, and that the seepage from the R-groin had ceased after pressure bandage was applied. On doppler this morning, RN was unable to obtain pulses in DP & PT. Medical student and PGY 3 SICU surgery resident unable to obtain pulses on prolonged doppler examination of L-foot. Otherwise, patient reported no issues and endorsed hunger, stating that he would like to eat ASAP. Patient denied CP, palpitations, SOB, tremors, hallucinations.       OBJECTIVE:     Vital Signs Range (Last 24H):  Temp:  [95 °F (35 °C)-98 °F (36.7 °C)]   Pulse:  []   Resp:  [6-34]   BP: ()/(50-82)   SpO2:  [93 %-100 %]   Body mass index is 24.69 kg/m².  Wt Readings from Last 1 Encounters:   09/28/17 0400 69.4 kg (153 lb)   09/26/17 1520 64.1 kg (141 lb 5 oz)   09/25/17 2050 65.3 kg (144 lb)         I & O (Last 24H):    Intake/Output Summary (Last 24 hours) at 09/28/17 0859  Last data filed at 09/28/17 0800   Gross per 24 hour   Intake             3063 ml   Output             3800 ml   Net             -737 ml         Lines/Drains:       Peripheral IV - Single Lumen 09/26/17 0129 Left Forearm (Active)   Site Assessment Clean;Dry;Intact;No redness;No swelling 9/28/2017  3:09 AM   Line Status Infusing 9/28/2017  3:09 AM   Dressing Status Clean;Dry;Intact 9/28/2017  3:09 AM   Dressing Intervention Dressing reinforced 9/26/2017  8:11 PM   Dressing Change Due 09/30/17 9/27/2017  8:30 AM   Site Change Due 09/30/17 9/27/2017   8:30 AM   Reason Not Rotated Not due 9/28/2017  3:09 AM   Number of days: 2           Physical Exam:  Physical Exam   Constitutional: He appears well-developed and well-nourished. No distress.   HENT:   Head: Normocephalic and atraumatic.   Eyes: EOM are normal. Pupils are equal, round, and reactive to light.   Neck: Neck supple. No JVD present. No tracheal deviation present.   Cardiovascular: Normal rate, regular rhythm and normal heart sounds.  PMI is not displaced.  Exam reveals decreased pulses (attempted doppler or R-DP&PT, unattained). Exam reveals no gallop and no friction rub.    No murmur heard.  Pulses:       Carotid pulses are 1+ on the right side, and 1+ on the left side.       Radial pulses are 1+ on the right side, and 1+ on the left side.        Femoral pulses are 1+ on the right side, and 1+ on the left side.       Popliteal pulses are 0 on the right side, and 0 on the left side.        Dorsalis pedis pulses are 1+ on the right side, and 0 on the left side.        Posterior tibial pulses are 1+ on the right side, and 0 on the left side.   Pulmonary/Chest: Effort normal and breath sounds normal. No stridor. No respiratory distress. He has no wheezes. He has no rales. He exhibits no tenderness.   Abdominal: Soft. Bowel sounds are normal. He exhibits no distension and no mass. There is no tenderness. There is no guarding.   Musculoskeletal:        Legs:  Skin: Skin is warm and dry. Capillary refill takes 2 to 3 seconds. He is not diaphoretic.   Psychiatric: He has a normal mood and affect. His behavior is normal.       Diagnostic Results:  Lab Results   Component Value Date    WBC 12.59 09/28/2017    HGB 11.2 (L) 09/28/2017    HCT 33.2 (L) 09/28/2017    MCV 98 09/28/2017     09/28/2017       Recent Labs  Lab 09/28/17  0303   MG 2.5     Lab Results   Component Value Date    INR 0.9 09/26/2017    INR 0.9 09/14/2017    INR 0.9 09/14/2017     Lab Results   Component Value Date    HGBA1C 5.4  09/26/2017     No results for input(s): POCTGLUCOSE in the last 72 hours.  No results for input(s): CPK, CPKMB, MB, TROPONINI in the last 168 hours.      Component Value Date/Time    MCV 98 09/28/2017 0303    RDW 17.3 (H) 09/28/2017 0303     No results for input(s): TSH, FREET4 in the last 168 hours.    Vent Settings:        No results for input(s): PH, PO2, PCO2, HCO3, BE in the last 168 hours.  Resp  Min: 6  Max: 34      SpO2  Min: 93 %  Max: 100 %      Scheduled Meds:   aspirin  81 mg Oral Daily    atorvastatin  40 mg Oral QHS    cilostazol  100 mg Oral BID    lisinopril  10 mg Oral Daily    metoprolol tartrate  12.5 mg Oral BID    nicotine  1 patch Transdermal Daily    potassium chloride  40 mEq Oral Once    pregabalin  100 mg Oral TID    ranolazine  1,000 mg Oral BID    sodium chloride 0.9%  3 mL Intravenous Q8H    ticagrelor  90 mg Oral BID    vorapaxar   Oral Daily     Continuous Infusions:   sodium chloride 0.9% 125 mL/hr (09/28/17 0800)    sodium chloride 0.9%      alteplase 12.5 mg in 0.9% NaCl 250 mL (CAR/VAS use only) 0.75 mg/hr (09/28/17 0800)    bivulirudin (ANGIOMAX) infusion 0.25 mg/kg/hr (09/28/17 0800)     PRN Meds:acetaminophen, bisacodyl, heparin (PORCINE), heparin (PORCINE), HYDROmorphone, lorazepam, nitroGLYCERIN, ondansetron, ondansetron, oxycodone, oxycodone, polyethylene glycol, ramelteon      ASSESSMENT     46 y.o M w/ PAD & 100% stenosis of L-SFa, L-Pa, L-Bre s/p recannalization and EKOS thrombolysis w/ Angiomax & tPA POD#1    PLAN:     1) PAD   - s/p cat directed thrombolytic Tx. Repeat angio today to assess patency   - decreased pulsation in LLE   -Pregabalin 100 mg po TID for pain   -Ticagrelor 90 mg po BID   -Aspirin 81 mg    -Atorvastatin 40 mg po qhs    2) Acute occlusion of LLE   - Plan per PAD above    3) CHF w/ dEF (30-35%)    - Lopresor 12.5mg PO BID   - Lisinopril 10mg PO    4) Tobacco Abuse   - Nicotine patch    5) Alcoholism   - Outside of window for DTs   - No  indication for DT PPx    Signing Medical Student:  Angel Trotter, MS4

## 2017-09-28 NOTE — ASSESSMENT & PLAN NOTE
Class II, stage C  -Continue Lopressor 12.5 mg po BID  -Continue lisinopril 10 mg po qd  -Change lopressor to Toprol prior to discharge

## 2017-09-28 NOTE — NURSING
Spoke with MAGDIEL Jacinto MD regarding gauze dressing to right groin with new small amount of saturation after pt bent knee rapidly when waking up. Site remains soft with pressure dressing intact; no new orders, will continue to monitor and call if drainage increases or dressing becomes displaced.

## 2017-09-28 NOTE — NURSING
"Unable to locate DP or PT pulses on LLE.  Dr. Deras at bedside, also unable to locate pulses via doppler. Cardiology fellow Dr. Cardona paged to bedside, states he is able to palpate "thready" Left PT. Instructed to monitor closely. Patient currently awaiting interventional procedure to LLE this am.  No further interventions at this time.    "

## 2017-09-28 NOTE — SUBJECTIVE & OBJECTIVE
Interval History:  No acute events overnight.  LLE looks better than yesterday, not quite so blue.  Patient reports his pain has improved.  Patient going back to cath lab today.    Review of Systems   Constitution: Negative for chills and fever.   Cardiovascular: Positive for claudication, cyanosis (LLE) and leg swelling (LLE ). Negative for chest pain, dyspnea on exertion, irregular heartbeat, orthopnea, palpitations and paroxysmal nocturnal dyspnea.        LLE resting pain improved again today   Respiratory: Negative for cough and shortness of breath.    Musculoskeletal: Positive for muscle weakness.   Gastrointestinal: Negative for nausea and vomiting.     Objective:     Vital Signs (Most Recent):  Temp: 97.7 °F (36.5 °C) (09/28/17 1245)  Pulse: 78 (09/28/17 1300)  Resp: (!) 22 (09/28/17 1300)  BP: 127/70 (09/28/17 1300)  SpO2: (!) 94 % (09/28/17 1300) Vital Signs (24h Range):  Temp:  [97.7 °F (36.5 °C)-98 °F (36.7 °C)] 97.7 °F (36.5 °C)  Pulse:  [] 78  Resp:  [6-34] 22  SpO2:  [93 %-99 %] 94 %  BP: ()/(50-82) 127/70     Weight: 69.4 kg (153 lb)  Body mass index is 24.69 kg/m².     SpO2: (!) 94 %  O2 Device (Oxygen Therapy): room air      Intake/Output Summary (Last 24 hours) at 09/28/17 1423  Last data filed at 09/28/17 1400   Gross per 24 hour   Intake             2863 ml   Output             4325 ml   Net            -1462 ml       Lines/Drains/Airways     Peripheral Intravenous Line                 Peripheral IV - Single Lumen 09/26/17 0129 Left Forearm 2 days                Physical Exam   Constitutional: He is oriented to person, place, and time. He appears well-developed and well-nourished. No distress.   Middle aged WM laying on hospital bed in mild distress   Neck: No JVD present.   Cardiovascular: Normal rate and regular rhythm.   Occasional extrasystoles are present. Exam reveals gallop, S4 and decreased pulses (0 - 1+ palpable distal LLE pulses). Exam reveals no friction rub.    No murmur  heard.  Pulses:       Radial pulses are 2+ on the right side, and 2+ on the left side.        Dorsalis pedis pulses are 1+ on the right side, and 0 on the left side.   Pulmonary/Chest: No tachypnea. No respiratory distress. He has no decreased breath sounds. He has no wheezes. He has no rhonchi. He has no rales.   Abdominal: Soft. Bowel sounds are normal. He exhibits no distension. There is no tenderness.   Musculoskeletal:        Legs:  Distal LLE TTP & cool to the touch but improved from yesteday   Neurological: He is alert and oriented to person, place, and time.       Significant Labs:   ABG: No results for input(s): PH, PCO2, HCO3, POCSATURATED, BE in the last 48 hours., Blood Culture: No results for input(s): LABBLOO in the last 48 hours., CMP   Recent Labs  Lab 09/27/17  0649      K 3.6      CO2 28   *   BUN 17   CREATININE 0.8   CALCIUM 8.9   PROT 6.2   ALBUMIN 3.0*   BILITOT 1.0   ALKPHOS 73   AST 18   ALT 21   ANIONGAP 6*   ESTGFRAFRICA >60.0   EGFRNONAA >60.0   , CBC   Recent Labs  Lab 09/27/17  0649 09/28/17  0303   WBC 13.64* 12.59   HGB 12.2* 11.2*   HCT 34.5* 33.2*   * 346

## 2017-09-28 NOTE — INTERVAL H&P NOTE
The patient has been examined and the H&P has been reviewed:    I concur with the findings and no changes have occurred since H&P was written.    Anesthesia/Surgery risks, benefits and alternative options discussed and understood by patient/family.          Active Hospital Problems    Diagnosis  POA    *PAD (peripheral artery disease) [I73.9]  Yes    Alcohol abuse [F10.10]  Yes    Chronic systolic heart failure [I50.22]  Yes    Tobacco abuse [Z72.0]  Yes    Arterial occlusion, lower extremity [I74.3]  Yes      Resolved Hospital Problems    Diagnosis Date Resolved POA   No resolved problems to display.

## 2017-09-28 NOTE — ASSESSMENT & PLAN NOTE
Currently in cath lab now  -Vorapaxar 2.08 mg po qd  -DC Cilostazol 100 mg po BID 2/2 to depressed EF  -Pregabalin 100 mg po TID  -Ticagrelor 90 mg po BID  -Aspirin 81 mg po qd  -Atorvastatin 40 mg po qhs

## 2017-09-28 NOTE — H&P (VIEW-ONLY)
Interventional Cardiology Consult Note  Attending Physician: Sussy Chavez MD  Chief Complaint: Leg Pain (Pt reports chronic bilateral leg pain.)     HPI:   46 y.o. gentleman with PMH of ICM (LVEF 30-35%) s/p ICD, CAD s/p PCI, HTN, HLD, current smoker, PAD (s/p aortobifemoral bypass) who presents for critical limb ischemia. He has been having LE pain that has progressively gotten worse and he has been unable to walk as well because of it. That has progressed to the point where he has pain in the top and bottom of the foot at rest. He is vague about the time sequence of events. He eventually presented to [facility] where they found occlusion of the left mid/distal SFA, popliteal, posterior tibial and dorsalis pedis arteries. He then underwent a LE angiogram with thrombolytics and angiojet with the development of a left thigh hematoma. No perforation was noted on repeat angiogram. His symptoms have not changed since the procedure. He was told that he would need another intervention with possible amputation and thus presented for second opinion. He denies chest pains and SOB.    ROS:    Constitution: Negative for fever, chills, weight loss or gain.   HENT: Negative for sore throat, rhinorrhea, or headache.  Eyes: Negative for blurred or double vision.   Cardiovascular: See above  Pulmonary: Negative for SOB   Gastrointestinal: Negative for abdominal pain, nausea, vomiting, or diarrhea.   : Negative for dysuria.   Neurological: Negative for focal weakness or sensory changes.  PMH:       (Not in a hospital admission)   Review of patient's allergies indicates:  No Known Allergies    Past Medical History:   Diagnosis Date    CHF (congestive heart failure)     Coronary artery disease     Hypertension        Past Surgical History:   Procedure Laterality Date    AMPUTATION Right     great toe    coronary stents      KNEE ARTHROPLASTY Left     VASCULAR SURGERY      fem-pop bypass      No family history  on file.    Social History   Substance Use Topics    Smoking status: Current Every Day Smoker     Packs/day: 0.50    Smokeless tobacco: Former User     Types: Chew     Quit date: 8/2/1980    Alcohol use Yes      Comment: on weekends        Allergies:   Review of patient's allergies indicates:  No Known Allergies  Medications:     No current facility-administered medications on file prior to encounter.      Current Outpatient Prescriptions on File Prior to Encounter   Medication Sig Dispense Refill    aspirin (ECOTRIN) 81 MG EC tablet Take 81 mg by mouth once daily.      atorvastatin (LIPITOR) 40 MG tablet Take 40 mg by mouth every evening.      cilostazol (PLETAL) 50 MG Tab Take 2 tablets (100 mg total) by mouth 2 (two) times daily.      lisinopril 10 MG tablet Take 10 mg by mouth once daily.      metoprolol tartrate (LOPRESSOR) 25 MG tablet Take 12.5 mg by mouth 2 (two) times daily.      oxycodone-acetaminophen (LYNOX) 7.5-300 mg per tablet Take 1 tablet by mouth every 4 (four) hours as needed for Pain.      pregabalin (LYRICA) 100 MG capsule Take 1 capsule (100 mg total) by mouth 3 (three) times daily. 90 capsule 6    ranolazine (RANEXA) 500 MG Tb12 Take 1,000 mg by mouth 2 (two) times daily.      ticagrelor (BRILINTA) 90 mg tablet Take 90 mg by mouth 2 (two) times daily.      vorapaxar 2.08 mg Tab Take 2.08 mg by mouth once daily.      nicotine (NICODERM CQ) 21 mg/24 hr Place 1 patch onto the skin once daily. Down titrating as directed  0    nitroGLYCERIN (NITROSTAT) 0.4 MG SL tablet Place 0.4 mg under the tongue every 5 (five) minutes as needed for Chest pain.         Inpatient Medications   Continuous Infusions:   heparin (porcine) in D5W 17 Units/kg/hr (09/26/17 7922)     Scheduled Meds:   PRN Meds:     Social History:     Social History   Substance Use Topics    Smoking status: Current Every Day Smoker     Packs/day: 0.50    Smokeless tobacco: Former User     Types: Chew     Quit date:  8/2/1980    Alcohol use Yes      Comment: on weekends     Family History:   No family history on file.  Physical Exam:     Vitals:  Temp:  [97.3 °F (36.3 °C)-97.8 °F (36.6 °C)]   Pulse:  []   Resp:  [18-20]   BP: (130-147)/(60-74)   SpO2:  [95 %-100 %]  on RA I/O's:  No intake or output data in the 24 hours ending 09/26/17 0907     Constitutional: NAD, conversant  HEENT: Sclera anicteric, PERRLA, EOMI  Neck: No JVD, no carotid bruits  CV: RRR, no murmur, normal S1/S2  Pulm: CTAB, no wheezes, rales, or ronchi  GI: Abdomen soft, NTND, +BS  Extremities: No LE edema, cool  Skin: No ecchymosis, erythema, or ulcers  Vascular: left medial thigh echymoses. Small [id number] sores on the left anterolateral lower leg. Hyperesthesia of dorsal left foot. Mottling of left 1st metatarsal with cap refill of 2 seconds. Motor function intact. Unable to doppler pulses in LLE.  Pulses 2+ radial, femoral bilaterally. Allens test normal. 1+DP/AK in RLE.    Labs:     CBC: CMP:      Recent Labs  Lab 09/26/17 0129   WBC 12.16   HGB 13.8*   HCT 39.8*   *   MCV 96   RDW 16.5*      Recent Labs  Lab 09/26/17 0129      K 3.6      CO2 25   BUN 12   CREATININE 0.7   CALCIUM 9.5        Cholesterol: Coagulation   No results found for: CHOL, HDL, LDLCALC, TRIG   Recent Labs  Lab 09/26/17 0129   INR 0.9        Misc:   No results for input(s): CPK, CPKMB, TROPONINI, MB in the last 168 hours.   No results found for: HGBA1C     Micro:   Microbiology Results (last 7 days)     ** No results found for the last 168 hours. **           Imaging:     EKG:   NSR with lateral TWI. Possible anterior infarct.     Peripiheral Angiogram (09/14/2017):   Superficial Femoral, Left:       Lesion on Superficial Femoral, Left:    The lesion was previously treated on 08/04/2017 with the following techniques: a balloon, drug eluting stent and rotablator. The previously  treated and stented lesion is being treated for in-stent  restenosis.       Treatment results:Interventional treatment was successful.       Comments:A ASAHI GLADIUS STRAIGHT 300CM wire was used to cross the LSFA    lesion. MECH ATHERECTOMY WAS PERFORMED. PRE POSSIS, DIFFUSE FILLING DEFECTS. POST POSSIS, WIDELY PATENT.       Devices used       - CATH ANGIOJET SOLENT OMNI 6FR X 120CM. Number of passes: 1.Total    duration: 89 sec.       - BALLOON DORADO 6 X 200 135CM. to a max pressure of: 16 gustavo.Total    duration: 406 sec.     Posterior Tibial, Left:       Lesion on Distal: 100% stenosis reduced to 70%.The guidewire cross was    successful.The lesion was diffuse.       Treatment results:Interventional treatment was successful.       Comments:A ASAHI GLADIUS STRAIGHT 300CM wire was used to cross the LSFA    lesion.    MECH ATHERECTOMY WAS PERFORMED. PRE POSSIS, DIFFUSE FILLING DEFECTS. POST POSSIS, WIDELY PATENT.     Assessment:     46 y.o. gentleman with PMH of ICM (LVEF 30-35%) s/p ICD, CAD s/p PCI, HTN, HLD, current smoker, PAD (s/p aortobifemoral bypass) who presents for critical limb ischemia.    Plan:     #CLI  --pain control  --prepare for peripheral angiogram  --patient is a KARMEN candidate  --advised to quit smoking  --continue medical management  --obtain films  --continue DAPT with anticoagulation    Staff addendum to follow    Signed:  Rachel Jacinto MD  Cardiology Fellow, PGY5  9/26/2017 9:07 AM

## 2017-09-29 PROBLEM — G90.522 COMPLEX REGIONAL PAIN SYNDROME OF LEFT LOWER EXTREMITY: Status: ACTIVE | Noted: 2017-01-01

## 2017-09-29 NOTE — MEDICAL/APP STUDENT
Progress Note  Cardiology - Team A    Time: 0730 HRS  Date: 9/28/2017  Attending: Dr. Mejia    Patient Name: Leonardo Byrd  YOB: 1970    Admit Date: 9/25/2017                     LOS: 3    Post-operative Day:      SUBJECTIVE:     Reason for Admission:  PAD (peripheral artery disease)  PAD (peripheral artery disease)  Mr. Byrd is a 45 y/o male with past medical history of PAD, s/p aortobifemoral bypass and multiple interventions, ICM EF 30-35%, s/p ICD, CAD s/p PCI, HTN, HLD, every day tobacco smoker. He was recently admitted to [facility] for ischemic rest pain of his left lower limb. Arterial ultrasound showed occlusion of left mid/distal SFA, popliteal, posterior tibial, and dorsalis pedis arteries. He subsequently underwent angiogram by Dr. Rosas, with thrombolytics and angiojet to L SFA, popliteal, and PT arteries.  Post-procedure he developed a left thigh hematoma, for which a repeat angiogram was performed and no perforation was noted. His left leg pain is ongoing now for several weeks, even after intervention he says that he immediately had recurrence as soon as he woke up.  He spoke to his interventionalist, and was told they could repeat an angiogram but he may need amputation. He decided to present to [facility] to get a 2nd opinion. He reports this morning that his left foot is in significant pain localized to the top and bottom of his foot. He denies SOB, chest pain, or cough. He endorses ulcers present on his lower leg that began around the time he had his previous vascular surgery. He also endorses a decrease in ability to walk. He states that the pain in his foot has increased to such a point that ambulation is no longer possible. He also reports that his left foot has been significant colder than his right.     The patient was admitted to the Hospital Medicine Service for further evaluation and management. After admission to hospital medicine patient had a peripheral angiogram that  "showed 100% occlusion of the L SFA, popliteal artery, & anterior tibial artery.  The occlusions were recanalized with the catheter-assisted thrombolysis (EKOS) treatment system that will be left in overnight and he will be going for a repeat angiogram tomorrow.  Patient was transferred to the CCU in the interim prior for close observation.     Interval history: NAEON. Patient underwent angio yesterday for evaluation of LLE vascular patency, and a [id number] Lifestent Stent was deployed into the proximal left SFA. Patient endorses significant pain on the dorsum of his L-foot, with radiation into the first two digits. Patient endorses numbness along the medial plantar aspect of his foot, and in digits 3,4,&5. Patient states that he would like a CT scan of his foot done as he believes "something is wrong, like an infection or something. I don't know, but I can't leave here with so much pain in my foot because I know I can't walk on it". Patient states that the pain is refractory to his Lyrica TID, stating that he has been on Lyrica for 3 years and it does not alleviate his pains. Per RN, no acute issues.       OBJECTIVE:     Vital Signs Range (Last 24H):  Temp:  [97.7 °F (36.5 °C)-97.9 °F (36.6 °C)]   Pulse:  [65-97]   Resp:  [10-43]   BP: ()/(44-70)   SpO2:  [91 %-100 %]   Body mass index is 25.87 kg/m².  Wt Readings from Last 1 Encounters:   09/29/17 0532 72.7 kg (160 lb 4.4 oz)   09/28/17 0400 69.4 kg (153 lb)   09/26/17 1520 64.1 kg (141 lb 5 oz)   09/25/17 2050 65.3 kg (144 lb)         I & O (Last 24H):    Intake/Output Summary (Last 24 hours) at 09/29/17 0738  Last data filed at 09/29/17 0600   Gross per 24 hour   Intake             1163 ml   Output             3250 ml   Net            -2087 ml         Lines/Drains:       Peripheral IV - Single Lumen 09/26/17 0129 Left Forearm (Active)   Site Assessment Clean;Dry;Intact;No redness;No swelling 9/29/2017  3:00 AM   Line Status Infusing 9/29/2017  3:00 AM "   Dressing Status Clean;Dry;Intact 9/29/2017  3:00 AM   Dressing Intervention Dressing reinforced 9/26/2017  8:11 PM   Dressing Change Due 09/30/17 9/27/2017  8:30 AM   Site Change Due 09/30/17 9/27/2017  8:30 AM   Reason Not Rotated Not due 9/29/2017  3:00 AM   Number of days: 3            Peripheral IV - Single Lumen 09/28/17 1545 Right Upper Arm (Active)   Site Assessment Clean;Intact;Dry;No redness;No swelling 9/29/2017  3:00 AM   Line Status Flushed;Saline locked 9/29/2017  3:00 AM   Dressing Status Clean;Dry;Intact 9/29/2017  3:00 AM   Number of days: 0         Physical Exam:  Physical Exam   Constitutional: He is oriented to person, place, and time. He appears well-developed and well-nourished. No distress.   HENT:   Head: Normocephalic and atraumatic.   Eyes: EOM are normal. Pupils are equal, round, and reactive to light.   Neck: Neck supple. No JVD present. No tracheal deviation present.   Cardiovascular: Normal rate, regular rhythm and normal heart sounds.  Exam reveals decreased pulses (LLE pulses detected by doppler US. Nonpalpable. ). Exam reveals no gallop and no friction rub.    No murmur heard.  Pulses:       Carotid pulses are 1+ on the right side, and 1+ on the left side.       Radial pulses are 1+ on the right side, and 1+ on the left side.        Femoral pulses are 1+ on the right side, and 1+ on the left side.       Dorsalis pedis pulses are 1+ on the right side, and 0 on the left side.        Posterior tibial pulses are 1+ on the right side, and 1+ on the left side.   Pulmonary/Chest: Effort normal and breath sounds normal. No stridor. No respiratory distress. He has no wheezes. He has no rales. He exhibits no tenderness.   Abdominal: Soft. Bowel sounds are normal. He exhibits no distension. There is no tenderness. There is no guarding.   Neurological: He is alert and oriented to person, place, and time.   Skin: He is not diaphoretic.       Diagnostic Results:  Lab Results   Component Value  Date    WBC 12.79 (H) 09/29/2017    HGB 11.7 (L) 09/29/2017    HCT 34.9 (L) 09/29/2017    MCV 98 09/29/2017     09/29/2017       Recent Labs  Lab 09/29/17  0356   *      K 4.2      CO2 27   BUN 10   CREATININE 0.7   CALCIUM 8.6*   MG 1.9     Lab Results   Component Value Date    INR 0.9 09/26/2017    INR 0.9 09/14/2017    INR 0.9 09/14/2017     Lab Results   Component Value Date    HGBA1C 5.4 09/26/2017     No results for input(s): POCTGLUCOSE in the last 72 hours.  No results for input(s): CPK, CPKMB, MB, TROPONINI in the last 168 hours.      Component Value Date/Time    MCV 98 09/29/2017 0356    RDW 17.2 (H) 09/29/2017 0356     No results for input(s): TSH, FREET4 in the last 168 hours.    Vent Settings:        No results for input(s): PH, PO2, PCO2, HCO3, BE in the last 168 hours.  Resp  Min: 6  Max: 43      SpO2  Min: 91 %  Max: 100 %      Scheduled Meds:   aspirin  81 mg Oral Daily    atorvastatin  40 mg Oral QHS    lisinopril  10 mg Oral Daily    metoprolol tartrate  12.5 mg Oral BID    nicotine  1 patch Transdermal Daily    pregabalin  100 mg Oral TID    ranolazine  1,000 mg Oral BID    sodium chloride 0.9%  3 mL Intravenous Q8H    ticagrelor  90 mg Oral BID     Continuous Infusions:   sodium chloride 0.9%      alteplase 12.5 mg in 0.9% NaCl 250 mL (CAR/VAS use only) Stopped (09/28/17 1300)    bivulirudin (ANGIOMAX) infusion Stopped (09/28/17 1300)     PRN Meds:acetaminophen, bisacodyl, heparin (PORCINE), heparin (PORCINE), HYDROmorphone, lorazepam, nitroGLYCERIN, ondansetron, ondansetron, oxycodone, oxycodone, polyethylene glycol, ramelteon      Cath (09/27/2017):   Diagnostic:        - Left Common Iliac Artery:             The left COLLIN has luminal irregularities. Aorta Bifem.     - Right Common Iliac Artery:             The right COLLIN has luminal irregularities. Aorta Bifem.     - Left Superficial Femoral Artery:             The left SFA is occluded (100). There is PRIYANKA  0 flow.     - Left Profunda Femoral Artery:             The left PFA has luminal irregularities.     - Left Popliteal Artery:             The left POP is occluded (100). There is PRIYANKA 0 flow.     - Left Tibioperoneal Trunk:             The left TIBTRUNK is normal.     - Left Anterior Tibial Artery:             The left AT is occluded (100). There is PRIYANKA 0 flow.     - Left Peroneal Artery:             The left peroneal has luminal irregularities.     - Left Posterior Tibial Artery:             The left PT has luminal irregularities.      Intervention          Left SFA:              The following items were used: Cath Ekos 40cm X 135cm.       Left POP:              The following items were used: Cath Ekos 40cm X 135cm.       Left TIBTRUNK:              The following items were used: Cath Ekos 40cm X 135cm.     Angiographic Results     Diagnostic:        - Left Superficial Femoral Artery:             The proximal left SFA has a 70% stenosis.                     Lesion Details:   The length is 4 cm. The minimum luminal diameter is 1 millimeters. The reference vessel diameter is 7 millimeters.             The mid left SFA has luminal irregularities is patent within the stent.             The distal left SFA is patent within the stent.     - Left Popliteal Artery:             The proximal left POP has a 70% stenosis.             The mid left POP has a 60% stenosis.                     Lesion Details:   The length is 2 cm. The minimum luminal diameter is 2 millimeters. The reference vessel diameter is 5 millimeters.     - Left Tibioperoneal Trunk:             The left TIBTRUNK has luminal irregularities.     - Left Anterior Tibial Artery:             The proximal left AT is occluded (100). There is PRIYANKA 0 flow.     - Left Peroneal Artery:             The left peroneal has luminal irregularities.     - Left Posterior Tibial Artery:             The left PT has luminal irregularities.      Intervention          Proximal Left  SFA:              The lesion was successfully intervened. Post-stenosis of 0%, post-PRIYANKA 3 flow and TMP grade 3. The vessel was accessed natively.  The following items were used: Cath Ultraverse 035 [id number] and [id number] Lifestent Stent.       Proximal Left POP:              The lesion was successfully intervened. Post-stenosis of 0% and post-PRIYANKA 3 flow. The vessel was accessed natively.  The following items were used: Cath Ultraverse 035 [id number].       Mid Left POP:              The lesion was successfully intervened. Post-stenosis of 0%, post-PRIYANKA 3 flow and TMP grade 3. The vessel was accessed natively.  The following items were used: Cath Ultraverse 035 [id number].    F. Details of Procedure     PROCEDURES PERFORMED: Lower Ext Uni Angio, Fluoroscopy and SFA Stent    ANESTHESIA: Conscious sedation was achieved with 125 mcg of FENTANYL and 2 mg of MIDAZOLAM (VERSED) 1MG/ML. Local anesthesia was achieved with 20 ml of LIDOCAINE 2% 20ML. Moderate conscious sedation was performed and cardiorespiratory functions were   monitored the entire procedure by Paul Rubi RN. Sedation began at 11:12 AM and concluded at 12:12 PM, totalling 60.6 minutes.     PRIMARY SURGEON: Chris Farah MD  FELLOW: Lionel Alcaraz MD    Left  CFA    A Guidewire [id number] Stiff Angled Laureate was inserted into the left CFA. The Guidewire 035x180 Stiff Angled Laureate was removed. Angiography performed through 6 fr. sheath of left leg.     Left  PERONEAL    A Guidewire [id number] Stiff Angled Laureate was inserted into the distal left peroneal.     Left  POP    A Cath Ultraverse 035 [id number] was inserted into the left POP. The Guidewire 035x180 Stiff Angled Laureate was removed.     Left  PERONEAL    A 145cm Amplatz Short Taper Guidewire was inserted into the distal left peroneal. Cath Ultraverse 035 [id number] was inflated with indeflator at 12.0 gustavo. for 6.0 secs. and was inflated with indeflator at 18.0 gustavo. for 6.0 secs. The Cath Ultraverse 035   [id number]  was removed.     Left  SFA\ POP    A Cath Ultraverse 035 [id number] was inserted into the distal left SFA and was inflated with indeflator at 12.0 gustavo. for 8.0 secs. and was inflated with indeflator at 12.0 gustavo. for 8.0 secs. The Cath Ultraverse 035 [id number] was removed. The Cath Ultraverse   035 [id number] was reinserted into the proximal left SFA and was inflated with indeflator at 8.0 gustavo. for 8.0 secs. The Cath Ultraverse 035 [id number] was removed. Post angiography performed of left leg through 6 fr. sheath. A 4.1FR Multi-Purpose Cath was   inserted into the left POP. The 145cm Amplatz Short Taper Guidewire was removed. A .[id number] Stiff Guidewire was inserted into the left POP. The 4.1FR Multi-Purpose Cath was removed. A [id number] Lifestent Stent was inserted into the proximal left SFA. A   [id number] Lifestent Stent was deployed into the proximal left SFA. Delivery system removed. A Cath Ultraverse 035 [id number] was inserted into the proximal left SFA and was inflated with indeflator at 12.0 gustavo. for 8.0 secs. The Cath Ultraverse 035 [id number]   was removed. A Cath Ultraverse 035 [id number] was inserted into the proximal left SFA and was inflated with indeflator at 12.0 gustavo. for 8.0 secs. The Cath Ultraverse 035 [id number] was removed. A Cath Ultraverse 035 [id number] was inserted into the left POP and   was inflated with indeflator at 12.0 gustavo. for 8.0 secs. The Cath Ultraverse 035 [id number] was removed.     The .035X260 Stiff Guidewire was removed. Post angiography of the left leg performed through the 6 fr. sheath. Prexisting 6 fr. sheath was removed. Total Visipaque injected was 75.0 ml. Total Visipaque used was 200.0 ml.     ASSESSMENT     ***    PLAN:        1) PAD              - s/p cat directed thrombolytic Tx & s/p stent placement in L-SFA              - decreased pulsation in LLE              -Pregabalin 100 mg po TID for pain              -Ticagrelor 90 mg po BID              -Aspirin 81 mg               -Atorvastatin 40 mg po  qhs     2) Acute occlusion of LLE              - Plan per PAD above     3) CHF w/ dEF (30-35%)                - Lopressor 12.5mg PO BID              - Lisinopril 10mg PO     4) Tobacco Abuse              - Nicotine patch     5) Alcoholism   - Does not believe he has issue    Signing Medical Student:  Angel Trotter, MS4

## 2017-09-29 NOTE — SUBJECTIVE & OBJECTIVE
Interval History:   Patient had stent placed in SFA yesterday.  On exam today LLE shows evidence of reperfusion.  On top part of foot, burning pain with erythema concerning for complex regional pain syndrome.    Review of Systems   Constitution: Negative for chills and fever.   Cardiovascular: Positive for claudication (signifigantly improved), cyanosis (LLE) and leg swelling (LLE ). Negative for chest pain, dyspnea on exertion, irregular heartbeat, orthopnea, palpitations and paroxysmal nocturnal dyspnea.        LLE resting pain   Respiratory: Negative for cough and shortness of breath.    Gastrointestinal: Negative for nausea and vomiting.     Objective:     Vital Signs (Most Recent):  Temp: 98.3 °F (36.8 °C) (09/29/17 1500)  Pulse: 90 (09/29/17 1800)  Resp: (!) 36 (09/29/17 1800)  BP: (!) 104/56 (09/29/17 1800)  SpO2: 100 % (09/29/17 1800) Vital Signs (24h Range):  Temp:  [97.8 °F (36.6 °C)-98.7 °F (37.1 °C)] 98.3 °F (36.8 °C)  Pulse:  [73-97] 90  Resp:  [10-43] 36  SpO2:  [92 %-100 %] 100 %  BP: ()/(44-80) 104/56     Weight: 72.7 kg (160 lb 4.4 oz)  Body mass index is 25.87 kg/m².     SpO2: 100 %  O2 Device (Oxygen Therapy): room air      Intake/Output Summary (Last 24 hours) at 09/29/17 1811  Last data filed at 09/29/17 1700   Gross per 24 hour   Intake              480 ml   Output             2500 ml   Net            -2020 ml       Lines/Drains/Airways     Peripheral Intravenous Line                 Peripheral IV - Single Lumen 09/26/17 0129 Left Forearm 3 days         Peripheral IV - Single Lumen 09/28/17 1545 Right Upper Arm 1 day                Physical Exam   Constitutional: He is oriented to person, place, and time. He appears well-developed and well-nourished. No distress.   Middle aged WM laying on hospital bed in mild distress   Neck: No JVD present.   Cardiovascular: Normal rate and regular rhythm.   Occasional extrasystoles are present. Exam reveals gallop, S4 and decreased pulses. Exam reveals  no friction rub.    No murmur heard.  Pulses:       Radial pulses are 2+ on the right side, and 2+ on the left side.        Dorsalis pedis pulses are 1+ on the right side, and 1+ on the left side.   Pulmonary/Chest: No tachypnea. No respiratory distress. He has no decreased breath sounds. He has no wheezes. He has no rhonchi. He has no rales.   Abdominal: Soft. Bowel sounds are normal. He exhibits no distension. There is no tenderness.   Musculoskeletal:   Distal LLE TTP and erythematous on dorsal aspect of L foot   Neurological: He is alert and oriented to person, place, and time.       Significant Labs:   ABG: No results for input(s): PH, PCO2, HCO3, POCSATURATED, BE in the last 48 hours., CMP   Recent Labs  Lab 09/28/17  1534 09/29/17  0356    137   K 3.8 4.2    103   CO2 29 27   * 137*   BUN 8 10   CREATININE 0.8 0.7   CALCIUM 8.5* 8.6*   PROT 6.0 6.1   ALBUMIN 2.8* 2.7*   BILITOT 0.8 0.6   ALKPHOS 70 72   AST 16 21   ALT 15 15   ANIONGAP 6* 7*   ESTGFRAFRICA >60.0 >60.0   EGFRNONAA >60.0 >60.0    and CBC   Recent Labs  Lab 09/28/17  0303 09/29/17  0356   WBC 12.59 12.79*   HGB 11.2* 11.7*   HCT 33.2* 34.9*    336       Significant Imaging:   Cath:  -Left Superficial Femoral Artery:  The proximal left SFA has a 70% stenosis.  Lesion Details:   The length is 4 cm. The minimum luminal diameter is 1 millimeters. The reference vessel diameter is 7 millimeters.  The mid left SFA has luminal irregularities is patent within the stent.  The distal left SFA is patent within the stent.    -Left Popliteal Artery:  The proximal left POP has a 70% stenosis.  The mid left POP has a 60% stenosis.  Lesion Details:   The length is 2 cm. The minimum luminal diameter is 2 millimeters. The reference vessel diameter is 5 millimeters.       -Left Tibioperoneal Trunk:  The left TIBTRUNK has luminal irregularities.    -Left Anterior Tibial Artery:  The proximal left AT is occluded (100). There is PRIYANKA 0  flow.    -Left Peroneal Artery:  The left peroneal has luminal irregularities.    -Left Posterior Tibial Artery:  The left PT has luminal irregularities.

## 2017-09-29 NOTE — ASSESSMENT & PLAN NOTE
Patient meets the [place] criteria for CRPS  -Neurology consulted for additional evaluation and recommendations

## 2017-09-29 NOTE — PLAN OF CARE
Problem: Patient Care Overview  Goal: Plan of Care Review  Outcome: Ongoing (interventions implemented as appropriate)  Patient updated on current plan of care and current condition. Remains stable on room air, all vital signs within normal limits, dopplerable pulses in DPs, pain controlled w po oxy and iv dilaudid, tolerates cardiac diet.

## 2017-09-29 NOTE — PLAN OF CARE
09/29/17 1322   Discharge Assessment   Assessment Type Discharge Planning Reassessment   Discharge Plan A Home with family   Discharge Plan B Home with family;Home Health   Patient/Family In Agreement With Plan yes     Pt LLE cynosis improvement noted per MD progress notes. Patient reports his pain has improved.  Patient going back to cath lab today. Will continue to monitor for discharge needs.

## 2017-09-29 NOTE — ASSESSMENT & PLAN NOTE
S/p L CFA stenting w/ successful reperfusion  -DC Vorapaxar 2.08 mg po qd  -DC Cilostazol 100 mg po BID 2/2 to depressed EF  -Continue Pregabalin 100 mg po TID  -Continue ranolazine 1000 mg po BID  -Continue Ticagrelor 90 mg po BID  -Continue Aspirin 81 mg po qd  -Continue Atorvastatin 40 mg po qhs

## 2017-09-29 NOTE — PROGRESS NOTES
[facility]  Cardiology  Progress Note    Patient Name: Leonardo Byrd  MRN: [id number]  Admission Date: 9/25/2017  Hospital Length of Stay: 3 days  Code Status: Full Code   Attending Physician: Jeison Aguilar MD  Primary Care Physician: Indio Aquino MD  Expected Discharge Date: 9/29/2017  Principal Problem:PAD (peripheral artery disease)    Subjective:     Interval History:   Patient had stent placed in SFA yesterday.  On exam today LLE shows evidence of reperfusion.  On top part of foot, burning pain with erythema concerning for complex regional pain syndrome.    Review of Systems   Constitution: Negative for chills and fever.   Cardiovascular: Positive for claudication (signifigantly improved), cyanosis (LLE) and leg swelling (LLE ). Negative for chest pain, dyspnea on exertion, irregular heartbeat, orthopnea, palpitations and paroxysmal nocturnal dyspnea.        LLE resting pain   Respiratory: Negative for cough and shortness of breath.    Gastrointestinal: Negative for nausea and vomiting.     Objective:     Vital Signs (Most Recent):  Temp: 98.3 °F (36.8 °C) (09/29/17 1500)  Pulse: 90 (09/29/17 1800)  Resp: (!) 36 (09/29/17 1800)  BP: (!) 104/56 (09/29/17 1800)  SpO2: 100 % (09/29/17 1800) Vital Signs (24h Range):  Temp:  [97.8 °F (36.6 °C)-98.7 °F (37.1 °C)] 98.3 °F (36.8 °C)  Pulse:  [73-97] 90  Resp:  [10-43] 36  SpO2:  [92 %-100 %] 100 %  BP: ()/(44-80) 104/56     Weight: 72.7 kg (160 lb 4.4 oz)  Body mass index is 25.87 kg/m².     SpO2: 100 %  O2 Device (Oxygen Therapy): room air      Intake/Output Summary (Last 24 hours) at 09/29/17 1811  Last data filed at 09/29/17 1700   Gross per 24 hour   Intake              480 ml   Output             2500 ml   Net            -2020 ml       Lines/Drains/Airways     Peripheral Intravenous Line                 Peripheral IV - Single Lumen 09/26/17 0129 Left Forearm 3 days         Peripheral IV - Single Lumen 09/28/17 1545 Right Upper Arm 1  day                Physical Exam   Constitutional: He is oriented to person, place, and time. He appears well-developed and well-nourished. No distress.   Middle aged WM laying on hospital bed in mild distress   Neck: No JVD present.   Cardiovascular: Normal rate and regular rhythm.   Occasional extrasystoles are present. Exam reveals gallop, S4 and decreased pulses. Exam reveals no friction rub.    No murmur heard.  Pulses:       Radial pulses are 2+ on the right side, and 2+ on the left side.        Dorsalis pedis pulses are 1+ on the right side, and 1+ on the left side.   Pulmonary/Chest: No tachypnea. No respiratory distress. He has no decreased breath sounds. He has no wheezes. He has no rhonchi. He has no rales.   Abdominal: Soft. Bowel sounds are normal. He exhibits no distension. There is no tenderness.   Musculoskeletal:   Distal LLE TTP and erythematous on dorsal aspect of L foot   Neurological: He is alert and oriented to person, place, and time.       Significant Labs:   ABG: No results for input(s): PH, PCO2, HCO3, POCSATURATED, BE in the last 48 hours., CMP   Recent Labs  Lab 09/28/17  1534 09/29/17  0356    137   K 3.8 4.2    103   CO2 29 27   * 137*   BUN 8 10   CREATININE 0.8 0.7   CALCIUM 8.5* 8.6*   PROT 6.0 6.1   ALBUMIN 2.8* 2.7*   BILITOT 0.8 0.6   ALKPHOS 70 72   AST 16 21   ALT 15 15   ANIONGAP 6* 7*   ESTGFRAFRICA >60.0 >60.0   EGFRNONAA >60.0 >60.0    and CBC   Recent Labs  Lab 09/28/17  0303 09/29/17  0356   WBC 12.59 12.79*   HGB 11.2* 11.7*   HCT 33.2* 34.9*    336       Significant Imaging:   Cath:  -Left Superficial Femoral Artery:  The proximal left SFA has a 70% stenosis.  Lesion Details:   The length is 4 cm. The minimum luminal diameter is 1 millimeters. The reference vessel diameter is 7 millimeters.  The mid left SFA has luminal irregularities is patent within the stent.  The distal left SFA is patent within the stent.    -Left Popliteal Artery:  The  proximal left POP has a 70% stenosis.  The mid left POP has a 60% stenosis.  Lesion Details:   The length is 2 cm. The minimum luminal diameter is 2 millimeters. The reference vessel diameter is 5 millimeters.       -Left Tibioperoneal Trunk:  The left TIBTRUNK has luminal irregularities.    -Left Anterior Tibial Artery:  The proximal left AT is occluded (100). There is PRIYANKA 0 flow.    -Left Peroneal Artery:  The left peroneal has luminal irregularities.    -Left Posterior Tibial Artery:  The left PT has luminal irregularities.    Assessment and Plan:     Brief HPI: 47 yo M w/ severe PAD s/o CFA stenting    * PAD (peripheral artery disease)    S/p L CFA stenting w/ successful reperfusion  -DC Vorapaxar 2.08 mg po qd  -DC Cilostazol 100 mg po BID 2/2 to depressed EF  -Continue Pregabalin 100 mg po TID  -Continue ranolazine 1000 mg po BID  -Continue Ticagrelor 90 mg po BID  -Continue Aspirin 81 mg po qd  -Continue Atorvastatin 40 mg po qhs        Complex regional pain syndrome of left lower extremity    Patient meets the [place] criteria for CRPS  -Neurology consulted for additional evaluation and recommendations        Arterial occlusion, lower extremity    As above        Chronic systolic heart failure    Class II, stage C  -Continue Lopressor 12.5 mg po BID  -Continue Lisinopril 10 mg po qd  -Change lopressor to Toprol prior to discharge        Tobacco abuse    -Nicotine 21 mg/24 h patch        Alcohol abuse    Patient drinks anywhere from 6-12 beers qd  -CIWA score q4 PRN; call MD for CIWA > 8  -Ativan 2 mg PRN for CIWA > 8, SBP > 160, HR > 110            VTE Risk Mitigation         Ordered     Medium Risk of VTE  Once      09/26/17 1030          Keenan Sinclair MD  Cardiology  [facility]

## 2017-09-30 PROBLEM — M79.672 ACUTE PAIN OF LEFT FOOT: Status: ACTIVE | Noted: 2017-01-01

## 2017-09-30 NOTE — PROGRESS NOTES
[facility]  Cardiology  Progress Note    Patient Name: Leonardo Byrd  MRN: [id number]  Admission Date: 9/25/2017  Hospital Length of Stay: 4 days  Code Status: Full Code   Attending Physician: Jeison Aguilar MD  Primary Care Physician: Indio Aquino MD  Expected Discharge Date: 9/29/2017  Principal Problem:PAD (peripheral artery disease)    Subjective:     Interval History:  No acute events overnight.  LLE with improved perfusion today but continuing pain.  Neuro consulted w/ rec's appreciated; bone scan pending.  Stepping down patient today    Review of Systems   Constitution: Negative for chills and fever.   Cardiovascular: Negative for chest pain, claudication (reports absent), cyanosis, dyspnea on exertion, irregular heartbeat, leg swelling, orthopnea, palpitations and paroxysmal nocturnal dyspnea.   Respiratory: Negative for cough and shortness of breath.    Gastrointestinal: Negative for nausea and vomiting.     Objective:     Vital Signs (Most Recent):  Temp: 97.8 °F (36.6 °C) (09/30/17 1100)  Pulse: 79 (09/30/17 1400)  Resp: (!) 9 (09/30/17 1400)  BP: 108/65 (09/30/17 1300)  SpO2: 100 % (09/30/17 1400) Vital Signs (24h Range):  Temp:  [97.7 °F (36.5 °C)-98.3 °F (36.8 °C)] 97.8 °F (36.6 °C)  Pulse:  [71-91] 79  Resp:  [9-55] 9  SpO2:  [94 %-100 %] 100 %  BP: ()/(51-76) 108/65     Weight: 72.7 kg (160 lb 4.4 oz)  Body mass index is 25.87 kg/m².     SpO2: 100 %  O2 Device (Oxygen Therapy): room air      Intake/Output Summary (Last 24 hours) at 09/30/17 1550  Last data filed at 09/30/17 1300   Gross per 24 hour   Intake              720 ml   Output             4550 ml   Net            -3830 ml       Lines/Drains/Airways     Peripheral Intravenous Line                 Peripheral IV - Single Lumen 09/29/17 2300 Left Forearm less than 1 day         Peripheral IV - Single Lumen 09/29/17 2300 Right Forearm less than 1 day                Physical Exam   Constitutional: He is oriented to  person, place, and time. He appears well-developed and well-nourished. No distress.   Middle aged WM laying on hospital bed in mild distress   Neck: No JVD present.   Cardiovascular: Normal rate and regular rhythm.   No extrasystoles (Not noticed today) are present. Exam reveals gallop, S4 and decreased pulses (Improved today). Exam reveals no friction rub.    No murmur heard.  Pulmonary/Chest: No tachypnea. No respiratory distress. He has no decreased breath sounds. He has no wheezes. He has no rhonchi. He has no rales.   Abdominal: Soft. Bowel sounds are normal. He exhibits no distension. There is no tenderness.   Musculoskeletal:   Distal LLE TTP and erythematous on dorsal aspect of L foot.  Decreased cyanosis as compared to yesterday but with persistent allodynia noted on exam   Neurological: He is alert and oriented to person, place, and time.       Significant Labs:   CMP   Recent Labs  Lab 09/29/17  0356 09/30/17  0300    140   K 4.2 4.1    104   CO2 27 30*   * 103   BUN 10 9   CREATININE 0.7 0.8   CALCIUM 8.6* 9.2   PROT 6.1 6.3   ALBUMIN 2.7* 2.9*   BILITOT 0.6 0.6   ALKPHOS 72 74   AST 21 18   ALT 15 16   ANIONGAP 7* 6*   ESTGFRAFRICA >60.0 >60.0   EGFRNONAA >60.0 >60.0    and CBC   Recent Labs  Lab 09/29/17  0356 09/30/17  0300   WBC 12.79* 10.35   HGB 11.7* 11.8*   HCT 34.9* 34.2*    320        9/30/2017 11:08   CRP 17.2 (H)       Significant Imaging: None    Assessment and Plan:     Brief HPI: 47 yo M w/ severe PAD s/p CFA stenting    * PAD (peripheral artery disease)    S/p L CFA stenting w/ successful reperfusion  -Holding home Vorapaxar 2.08 mg po qd  -Continue Pregabalin 100 mg po TID  -Continue ranolazine 1000 mg po BID  -Continue Ticagrelor 90 mg po BID  -Continue Aspirin 81 mg po qd  -Continue Atorvastatin 40 mg po qhs  -Aggressive PT/OT        Acute pain of left foot    Neuro consulted; assistance appreciated.  Per their rec's r/o infective process w/ bone scan which is  pending.  Further evaluation pending results from bone scan, in mean time control pain with lyrica  -Bone scan pending        Arterial occlusion, lower extremity    As above        Chronic systolic heart failure    Class II, Stage C  -Continue Lopressor 12.5 mg po BID  -Continue Lisinopril 10 mg po qd  -Change lopressor to Toprol prior to discharge  -Continue Atorvastatin 40 mg po qhs        Tobacco abuse    -Nicotine 21 mg/24 h patch        Alcohol abuse    Well outside the window for DT; will DC PRN CIWA & Ativan            VTE Risk Mitigation         Ordered     Medium Risk of VTE  Once      09/26/17 1030          Keenan Sinclair MD  Cardiology  [facility]

## 2017-09-30 NOTE — ASSESSMENT & PLAN NOTE
Class II, Stage C  -Continue Lopressor 12.5 mg po BID  -Continue Lisinopril 10 mg po qd  -Change lopressor to Toprol prior to discharge  -Continue Atorvastatin 40 mg po qhs

## 2017-09-30 NOTE — PLAN OF CARE
Problem: Patient Care Overview  Goal: Plan of Care Review  Outcome: Ongoing (interventions implemented as appropriate)  Remains stable. Transfer orders received today. Patient awaiting bed on stepdown unit and bone scan.

## 2017-09-30 NOTE — PLAN OF CARE
No acute events overnight. VSS. Pt remains free from falls and injury. Pain relieved by PRN pain medications. Labs trended. Neurovascular checks performed. All pulses able to be dopplered. UO excellent overnight. Plan of care reviewed with patient. All questions and concerns addressed. Will continue to monitor.

## 2017-09-30 NOTE — SUBJECTIVE & OBJECTIVE
Interval History:  No acute events overnight.  LLE with improved perfusion today but continuing pain.  Neuro consulted w/ rec's appreciated; bone scan pending.  Stepping down patient today    Review of Systems   Constitution: Negative for chills and fever.   Cardiovascular: Negative for chest pain, claudication (reports absent), cyanosis, dyspnea on exertion, irregular heartbeat, leg swelling, orthopnea, palpitations and paroxysmal nocturnal dyspnea.   Respiratory: Negative for cough and shortness of breath.    Gastrointestinal: Negative for nausea and vomiting.     Objective:     Vital Signs (Most Recent):  Temp: 97.8 °F (36.6 °C) (09/30/17 1100)  Pulse: 79 (09/30/17 1400)  Resp: (!) 9 (09/30/17 1400)  BP: 108/65 (09/30/17 1300)  SpO2: 100 % (09/30/17 1400) Vital Signs (24h Range):  Temp:  [97.7 °F (36.5 °C)-98.3 °F (36.8 °C)] 97.8 °F (36.6 °C)  Pulse:  [71-91] 79  Resp:  [9-55] 9  SpO2:  [94 %-100 %] 100 %  BP: ()/(51-76) 108/65     Weight: 72.7 kg (160 lb 4.4 oz)  Body mass index is 25.87 kg/m².     SpO2: 100 %  O2 Device (Oxygen Therapy): room air      Intake/Output Summary (Last 24 hours) at 09/30/17 1550  Last data filed at 09/30/17 1300   Gross per 24 hour   Intake              720 ml   Output             4550 ml   Net            -3830 ml       Lines/Drains/Airways     Peripheral Intravenous Line                 Peripheral IV - Single Lumen 09/29/17 2300 Left Forearm less than 1 day         Peripheral IV - Single Lumen 09/29/17 2300 Right Forearm less than 1 day                Physical Exam   Constitutional: He is oriented to person, place, and time. He appears well-developed and well-nourished. No distress.   Middle aged WM laying on hospital bed in mild distress   Neck: No JVD present.   Cardiovascular: Normal rate and regular rhythm.   No extrasystoles (Not noticed today) are present. Exam reveals gallop, S4 and decreased pulses (Improved today). Exam reveals no friction rub.    No murmur  heard.  Pulmonary/Chest: No tachypnea. No respiratory distress. He has no decreased breath sounds. He has no wheezes. He has no rhonchi. He has no rales.   Abdominal: Soft. Bowel sounds are normal. He exhibits no distension. There is no tenderness.   Musculoskeletal:   Distal LLE TTP and erythematous on dorsal aspect of L foot.  Decreased cyanosis as compared to yesterday but with persistent allodynia noted on exam   Neurological: He is alert and oriented to person, place, and time.       Significant Labs:   CMP   Recent Labs  Lab 09/29/17  0356 09/30/17  0300    140   K 4.2 4.1    104   CO2 27 30*   * 103   BUN 10 9   CREATININE 0.7 0.8   CALCIUM 8.6* 9.2   PROT 6.1 6.3   ALBUMIN 2.7* 2.9*   BILITOT 0.6 0.6   ALKPHOS 72 74   AST 21 18   ALT 15 16   ANIONGAP 7* 6*   ESTGFRAFRICA >60.0 >60.0   EGFRNONAA >60.0 >60.0    and CBC   Recent Labs  Lab 09/29/17  0356 09/30/17  0300   WBC 12.79* 10.35   HGB 11.7* 11.8*   HCT 34.9* 34.2*    320        9/30/2017 11:08   CRP 17.2 (H)       Significant Imaging: None

## 2017-09-30 NOTE — RESIDENT HANDOFF
Handoff     Primary Team: Networked reference to record PCT  Room Number: 6075/6075 A     Patient Name: Leonardo Byrd MRN: [id number]     Date of Birth: [id number] Allergies: Review of patient's allergies indicates no known allergies.     Age: 46 y.o. Admit Date: 9/25/2017     Sex: male  BMI: Body mass index is 25.87 kg/m².     Code Status: Full Code        Illness Level (current clinical status): Watcher - No    Reason for Admission: PAD (peripheral artery disease)    Brief HPI (pertinent PMH and diagnosis or differential diagnosis):   47 yo WM admitted w/ critical limb ischemia now POD# 1 s/p L SFA stenting with improved blood flow noted    Procedure Date:   9/27/2017 & 9/28/2017    Hospital Course (updated, brief assessment by system or problem, significant events):   After admission to hospital medicine patient had a peripheral angiogram that showed 100% occlusion of the L SFA, popliteal artery, & anterior tibial artery.  The occlusions were recanalized with the catheter-assisted thrombolysis (EKOS) treatment system that will be left in overnight and he went for a repeat angiogram the following day during which he had a L SFA stent placed with improved perfusion noted on POD# 1.  On step down to the floor, there was concern for complex regional pain syndrome vs infectious etiology causing a persistent dorsal aspect L foot pain that he described as a burning pain separate from his resolved claudication pain.    Tasks (specific, using if-then statements):   1. Persistent LLE foot pain  His persistant foot pain and its current w/u is the primary thing keeping patient here now w/ his PAD improving s/p stenting  -Bone scan pending  -Neuro following along    Discharge Disposition: Home-Health Care [facility]

## 2017-09-30 NOTE — CONSULTS
"[facility]  Neurology  Consult Note    Patient Name: Leonardo Byrd  MRN: [id number]  Admission Date: 9/25/2017  Hospital Length of Stay: 4 days  Code Status: Full Code   Attending Provider: Antoni Yang MD   Consulting Provider: Nancy Rose MD  Primary Care Physician: Indio Aquino MD  Principal Problem:PAD (peripheral artery disease)    Consults   Subjective:     Chief Complaint:  "CRPS"     HPI:   46 y.o. M seen in consultation for "complex regional pain syndrome".  Patient gives history.  He has severe PAD and has undergone 13 vascular surgeries on left leg, 9 on right leg.  About 2 months ago, he was having his typical claudication in left leg, so went in for procedure.  Immediately coming out of procedure, he complained of burning pain in foot, so was taken back for angiogram.  This was at outside hospital.    He says since the procedure, his foot remains very painful and not at all like his claudication symptoms.  He says it feels swollen, that the distal half of his foot is "numb," but also painful to touch.  He has a patch of severe hypersensitivity on dorsum of foot that is red and warm.  If he puts any pressure on the foot, he experiences "really bad pain" inside the foot.    From primary team's HPI 9/26/17:  Mr. Byrd is a 47 y/o male with past medical history of PAD, s/p aortobifemoral bypass and multiple interventions, ICM EF 30-35%, s/p ICD, CAD s/p PCI, HTN, HLD, every day tobacco smoker. He was recently admitted to [facility] for ischemic rest pain of his left lower limb. Arterial ultrasound showed occlusion of left mid/distal SFA, popliteal, posterior tibial, and dorsalis pedis arteries. He subsequently underwent angiogram by Dr. Rosas, with thrombolytics and angiojet to L SFA, popliteal, and PT arteries.  Post-procedure he developed a left thigh hematoma, for which a repeat angiogram was performed and no perforation was noted. His left leg pain is ongoing " now for several weeks, even after intervention he says that he immediately had recurrence as soon as he woke up.  He spoke to his interventionalist, and was told they could repeat an angiogram but he may need amputation. He decided to present to [facility] to get a 2nd opinion. He reports this morning that his left foot is in significant pain localized to the top and bottom of his foot. He denies SOB, chest pain, or cough. He endorses ulcers present on his lower leg that began around the time he had his previous vascular surgery. He also endorses a decrease in ability to walk. He states that the pain in his foot has increased to such a point that ambulation is no longer possible. He also reports that his left foot has been significant colder than his right.     Past Medical History:   Diagnosis Date    CHF (congestive heart failure)     Coronary artery disease     Hypertension        Past Surgical History:   Procedure Laterality Date    AMPUTATION Right     great toe    coronary stents      KNEE ARTHROPLASTY Left     VASCULAR SURGERY      fem-pop bypass       Review of patient's allergies indicates:  No Known Allergies      No current facility-administered medications on file prior to encounter.      Current Outpatient Prescriptions on File Prior to Encounter   Medication Sig    aspirin (ECOTRIN) 81 MG EC tablet Take 81 mg by mouth once daily.    atorvastatin (LIPITOR) 40 MG tablet Take 40 mg by mouth every evening.    cilostazol (PLETAL) 50 MG Tab Take 2 tablets (100 mg total) by mouth 2 (two) times daily.    lisinopril 10 MG tablet Take 10 mg by mouth once daily.    metoprolol tartrate (LOPRESSOR) 25 MG tablet Take 12.5 mg by mouth 2 (two) times daily.    oxycodone-acetaminophen (LYNOX) 7.5-300 mg per tablet Take 1 tablet by mouth every 4 (four) hours as needed for Pain.    pregabalin (LYRICA) 100 MG capsule Take 1 capsule (100 mg total) by mouth 3 (three) times daily.    ranolazine (RANEXA) 500 MG Tb12  Take 1,000 mg by mouth 2 (two) times daily.    ticagrelor (BRILINTA) 90 mg tablet Take 90 mg by mouth 2 (two) times daily.    vorapaxar 2.08 mg Tab Take 2.08 mg by mouth once daily.    nicotine (NICODERM CQ) 21 mg/24 hr Place 1 patch onto the skin once daily. Down titrating as directed    nitroGLYCERIN (NITROSTAT) 0.4 MG SL tablet Place 0.4 mg under the tongue every 5 (five) minutes as needed for Chest pain.     Family History     None        Social History Main Topics    Smoking status: Current Every Day Smoker     Packs/day: 0.50    Smokeless tobacco: Former User     Types: Chew     Quit date: 8/2/1980    Alcohol use Yes      Comment: on weekends    Drug use: No    Sexual activity: Yes     Partners: Female     Review of Systems  Objective:     Vital Signs (Most Recent):  Temp: 97.7 °F (36.5 °C) (09/30/17 0700)  Pulse: 86 (09/30/17 0800)  Resp: 14 (09/30/17 0700)  BP: 130/75 (09/30/17 0800)  SpO2: 97 % (09/30/17 0800) Vital Signs (24h Range):  Temp:  [97.7 °F (36.5 °C)-98.3 °F (36.8 °C)] 97.7 °F (36.5 °C)  Pulse:  [71-91] 86  Resp:  [13-55] 14  SpO2:  [92 %-100 %] 97 %  BP: ()/(51-80) 130/75     Weight: 72.7 kg (160 lb 4.4 oz)  Body mass index is 25.87 kg/m².    Physical Exam      General appearance: Well nourished, well developed, no acute distress.         Cardiovascular:  heart regular rate and rhythym, no carotid bruits.   +edema in left foot, mild; toes and foot are definitively warmer and redder than right foot (despite right being under the covers and with a sock.        -------------------------------------------------------------  Facial Expression: normal       Affect: full       Orientation to time & place:  Oriented to time, place, person and situation       Attention & concentration:  Normal attention span and concentration       Memory:  Recent and remote memory intact  Language: Spontaneous, fluent; able to repeat and name objects        Fund of knowledge:  Aware of current events         Speech:  normal (not dysarthric)  -------------------------------------------------------  Cranial nerves: normal visual acuity, visual fields full, optic discs not well visualized due small pupils, pupils equal round and reactive, extraocular movements intact,       facial sensation intact, face symmetrical, hearing intact to whisper, palate raises midline, shoulder shrug strength normal, tongue protrudes midline.        -------------------------------------------------------  Musculoskeletal  Muscle tone: all 4 extremities normal        Muscle Bulk: all 4 extremities normal        Muscle strength:  5/5 in all 4 extremities        No pronator drift  Sensation: reduced sensation to touch in the distal, medial left foot        Deep tendon Reflexes: 1 bilateral biceps, triceps, patella        --------------------------------------------------------------  Cerebellar and Coordination  Gait:  Antalgic, very cautious, favors left       Finger-nose: no dysmetria       Rapid Alternating Movements (pronation/supination):  R normal; L normal  --------------------------------------------------------------  MOVEMENT DISORDERS FOCUSED EXAM  Abnormality of movement (bradykinesia, hyperkinesia) present? No    Tremor present?   No   Posture:  normal  Postural stability:  no Rhomberg        Significant Labs:   BMP:   Recent Labs  Lab 09/28/17  1534 09/29/17  0356 09/30/17  0300   * 137* 103    137 140   K 3.8 4.2 4.1    103 104   CO2 29 27 30*   BUN 8 10 9   CREATININE 0.8 0.7 0.8   CALCIUM 8.5* 8.6* 9.2   MG  --  1.9 1.9     CBC:   Recent Labs  Lab 09/29/17  0356 09/30/17  0300   WBC 12.79* 10.35   HGB 11.7* 11.8*   HCT 34.9* 34.2*    320       Significant Imaging: I have reviewed and interpreted all pertinent imaging results/findings within the past 24 hours.     CT left leg 9/14/17:  Impression      Diffuse subcutaneous, interfascial and muscular edema and/or hemorrhage within the anterior medial left  thigh         Assessment and Plan:     Acute pain of left foot    Acute onset of burning pain in left foot after angiogram mid September.      On my exam, the foot is swollen, hot and his sensory loss is not in a distribution of a peripheral nerve.  While this could conceivably be CRPS, that is a diagnosis of exclusion and local cellulitis should be aggressively/maximally treated before concluding this.   -> recommend treating for cellulitis   -> consider bone scan                 Thank you for your consult. I will sign off. Please contact us if you have any additional questions.    Nancy Rose MD  Neurology  [facility]

## 2017-09-30 NOTE — ASSESSMENT & PLAN NOTE
Neuro consulted; assistance appreciated.  Per their rec's r/o infective process w/ bone scan which is pending.  Further evaluation pending results from bone scan, in mean time control pain with lyrica  -Bone scan pending

## 2017-09-30 NOTE — SUBJECTIVE & OBJECTIVE
Past Medical History:   Diagnosis Date    CHF (congestive heart failure)     Coronary artery disease     Hypertension        Past Surgical History:   Procedure Laterality Date    AMPUTATION Right     great toe    coronary stents      KNEE ARTHROPLASTY Left     VASCULAR SURGERY      fem-pop bypass       Review of patient's allergies indicates:  No Known Allergies      No current facility-administered medications on file prior to encounter.      Current Outpatient Prescriptions on File Prior to Encounter   Medication Sig    aspirin (ECOTRIN) 81 MG EC tablet Take 81 mg by mouth once daily.    atorvastatin (LIPITOR) 40 MG tablet Take 40 mg by mouth every evening.    cilostazol (PLETAL) 50 MG Tab Take 2 tablets (100 mg total) by mouth 2 (two) times daily.    lisinopril 10 MG tablet Take 10 mg by mouth once daily.    metoprolol tartrate (LOPRESSOR) 25 MG tablet Take 12.5 mg by mouth 2 (two) times daily.    oxycodone-acetaminophen (LYNOX) 7.5-300 mg per tablet Take 1 tablet by mouth every 4 (four) hours as needed for Pain.    pregabalin (LYRICA) 100 MG capsule Take 1 capsule (100 mg total) by mouth 3 (three) times daily.    ranolazine (RANEXA) 500 MG Tb12 Take 1,000 mg by mouth 2 (two) times daily.    ticagrelor (BRILINTA) 90 mg tablet Take 90 mg by mouth 2 (two) times daily.    vorapaxar 2.08 mg Tab Take 2.08 mg by mouth once daily.    nicotine (NICODERM CQ) 21 mg/24 hr Place 1 patch onto the skin once daily. Down titrating as directed    nitroGLYCERIN (NITROSTAT) 0.4 MG SL tablet Place 0.4 mg under the tongue every 5 (five) minutes as needed for Chest pain.     Family History     None        Social History Main Topics    Smoking status: Current Every Day Smoker     Packs/day: 0.50    Smokeless tobacco: Former User     Types: Chew     Quit date: 8/2/1980    Alcohol use Yes      Comment: on weekends    Drug use: No    Sexual activity: Yes     Partners: Female     Review of Systems  Objective:      Vital Signs (Most Recent):  Temp: 97.7 °F (36.5 °C) (09/30/17 0700)  Pulse: 86 (09/30/17 0800)  Resp: 14 (09/30/17 0700)  BP: 130/75 (09/30/17 0800)  SpO2: 97 % (09/30/17 0800) Vital Signs (24h Range):  Temp:  [97.7 °F (36.5 °C)-98.3 °F (36.8 °C)] 97.7 °F (36.5 °C)  Pulse:  [71-91] 86  Resp:  [13-55] 14  SpO2:  [92 %-100 %] 97 %  BP: ()/(51-80) 130/75     Weight: 72.7 kg (160 lb 4.4 oz)  Body mass index is 25.87 kg/m².    Physical Exam      General appearance: Well nourished, well developed, no acute distress.         Cardiovascular:  heart regular rate and rhythym, no carotid bruits.   +edema in left foot, mild; toes and foot are definitively warmer and redder than right foot (despite right being under the covers and with a sock.        -------------------------------------------------------------  Facial Expression: normal       Affect: full       Orientation to time & place:  Oriented to time, place, person and situation       Attention & concentration:  Normal attention span and concentration       Memory:  Recent and remote memory intact  Language: Spontaneous, fluent; able to repeat and name objects        Fund of knowledge:  Aware of current events        Speech:  normal (not dysarthric)  -------------------------------------------------------  Cranial nerves: normal visual acuity, visual fields full, optic discs not well visualized due small pupils, pupils equal round and reactive, extraocular movements intact,       facial sensation intact, face symmetrical, hearing intact to whisper, palate raises midline, shoulder shrug strength normal, tongue protrudes midline.        -------------------------------------------------------  Musculoskeletal  Muscle tone: all 4 extremities normal        Muscle Bulk: all 4 extremities normal        Muscle strength:  5/5 in all 4 extremities        No pronator drift  Sensation: reduced sensation to touch in the distal, medial left foot        Deep tendon Reflexes:  1 bilateral biceps, triceps, patella        --------------------------------------------------------------  Cerebellar and Coordination  Gait:  Antalgic, very cautious, favors left       Finger-nose: no dysmetria       Rapid Alternating Movements (pronation/supination):  R normal; L normal  --------------------------------------------------------------  MOVEMENT DISORDERS FOCUSED EXAM  Abnormality of movement (bradykinesia, hyperkinesia) present? No    Tremor present?   No   Posture:  normal  Postural stability:  no Rhomberg        Significant Labs:   BMP:   Recent Labs  Lab 09/28/17  1534 09/29/17  0356 09/30/17  0300   * 137* 103    137 140   K 3.8 4.2 4.1    103 104   CO2 29 27 30*   BUN 8 10 9   CREATININE 0.8 0.7 0.8   CALCIUM 8.5* 8.6* 9.2   MG  --  1.9 1.9     CBC:   Recent Labs  Lab 09/29/17  0356 09/30/17  0300   WBC 12.79* 10.35   HGB 11.7* 11.8*   HCT 34.9* 34.2*    320       Significant Imaging: I have reviewed and interpreted all pertinent imaging results/findings within the past 24 hours.     CT left leg 9/14/17:  Impression      Diffuse subcutaneous, interfascial and muscular edema and/or hemorrhage within the anterior medial left thigh

## 2017-09-30 NOTE — ASSESSMENT & PLAN NOTE
On my exam, the foot is swollen, hot and his sensory loss is not in a distribution of a peripheral nerve.  While this could conceivably be CRPS, that is a diagnosis of exclusion and local cellulitis should be aggressively/maximally treated before concluding this.   -> recommend treating for cellulitis   -> consider bone scan

## 2017-09-30 NOTE — HPI
"46 y.o. M seen in consultation for "complex regional pain syndrome".  Patient gives history.  He has severe PAD and has undergone 13 vascular surgeries on left leg, 9 on right leg.  About 2 months ago, he was having his typical claudication in left leg, so went in for procedure.  Immediately coming out of procedure, he complained of burning pain in foot, so was taken back for angiogram.  This was at outside hospital.    He says since the procedure, his foot remains very painful and not at all like his claudication symptoms.  He says it feels swollen, that the distal half of his foot is "numb," but also painful to touch.  He has a patch of severe hypersensitivity on dorsum of foot that is red and warm.  If he puts any pressure on the foot, he experiences "really bad pain" inside the foot.    From primary team's HPI 9/26/17:  Mr. Byrd is a 47 y/o male with past medical history of PAD, s/p aortobifemoral bypass and multiple interventions, ICM EF 30-35%, s/p ICD, CAD s/p PCI, HTN, HLD, every day tobacco smoker. He was recently admitted to [facility] for ischemic rest pain of his left lower limb. Arterial ultrasound showed occlusion of left mid/distal SFA, popliteal, posterior tibial, and dorsalis pedis arteries. He subsequently underwent angiogram by Dr. Rosas, with thrombolytics and angiojet to L SFA, popliteal, and PT arteries.  Post-procedure he developed a left thigh hematoma, for which a repeat angiogram was performed and no perforation was noted. His left leg pain is ongoing now for several weeks, even after intervention he says that he immediately had recurrence as soon as he woke up.  He spoke to his interventionalist, and was told they could repeat an angiogram but he may need amputation. He decided to [place] [facility] [place] 2nd opinion. He reports this morning that his left foot is in significant pain localized to the top and bottom of his foot. He denies SOB, chest pain, or cough. He endorses " ulcers present on his lower leg that began around the time he had his previous vascular surgery. He also endorses a decrease in ability to walk. He states that the pain in his foot has increased to such a point that ambulation is no longer possible. He also reports that his left foot has been significant colder than his right.

## 2017-09-30 NOTE — ASSESSMENT & PLAN NOTE
S/p L CFA stenting w/ successful reperfusion  -Holding home Vorapaxar 2.08 mg po qd  -Continue Pregabalin 100 mg po TID  -Continue ranolazine 1000 mg po BID  -Continue Ticagrelor 90 mg po BID  -Continue Aspirin 81 mg po qd  -Continue Atorvastatin 40 mg po qhs  -Aggressive PT/OT

## 2017-10-01 NOTE — PLAN OF CARE
Problem: Patient Care Overview  Goal: Plan of Care Review  Outcome: Ongoing (interventions implemented as appropriate)   Patient is free of fall/trauma/injury. Denies CP, SOB. Pain medication administered for complaints of pain to lower legs. Pt verbalizes satisfaction with pain relief measures. Plan of care discussed with pt. Pt verbalizes understanding. All questions addressed. Will continue to monitor

## 2017-10-01 NOTE — PROGRESS NOTES
Patient alert and oriented to room from [facility]. Cardiac monitoring maintained throughout transfer. Patient connected to telemetry and VISI, assessed, denies any pain, oriented to room, and instructed to call for assistance or any needs. Call bell in reach. Reviewed goals for today. Will continue to monitor

## 2017-10-01 NOTE — SUBJECTIVE & OBJECTIVE
Interval History: PENDING    Review of Systems   Constitutional: Positive for activity change (pain related). Negative for appetite change, chills, diaphoresis, fatigue, fever and unexpected weight change.   HENT: Negative for congestion, rhinorrhea, sinus pain, sinus pressure, sneezing and sore throat.    Respiratory: Negative for cough, chest tightness and wheezing.    Cardiovascular: Negative for chest pain, palpitations and leg swelling.   Gastrointestinal: Negative for abdominal distention, abdominal pain, constipation, diarrhea, nausea and vomiting.   Genitourinary: Negative for decreased urine volume, difficulty urinating, flank pain, frequency, hematuria and urgency.   Musculoskeletal: Positive for myalgias (dorsum left foot). Negative for arthralgias.   Skin: Positive for wound (left leg ulcers).   Neurological: Negative for dizziness, syncope, light-headedness and headaches.   Psychiatric/Behavioral: Negative for agitation and confusion. The patient is not nervous/anxious.      Objective:     Vital Signs (Most Recent):  Temp: 98 °F (36.7 °C) (10/01/17 1143)  Pulse: 72 (10/01/17 1143)  Resp: 12 (10/01/17 1143)  BP: 113/73 (10/01/17 1143)  SpO2: 99 % (10/01/17 1143) Vital Signs (24h Range):  Temp:  [97.6 °F (36.4 °C)-98.1 °F (36.7 °C)] 98 °F (36.7 °C)  Pulse:  [64-86] 72  Resp:  [7-34] 12  SpO2:  [91 %-100 %] 99 %  BP: ()/(55-79) 113/73     Weight: 70.8 kg (156 lb 1.4 oz)  Body mass index is 25.19 kg/m².    Intake/Output Summary (Last 24 hours) at 10/01/17 1357  Last data filed at 10/01/17 0600   Gross per 24 hour   Intake             1200 ml   Output             2500 ml   Net            -1300 ml      Physical Exam   Constitutional: He is oriented to person, place, and time. He appears well-developed and well-nourished. No distress.   HENT:   Head: Normocephalic and atraumatic.   Mouth/Throat: Mucous membranes are normal. Normal dentition.   Eyes: Conjunctivae and lids are normal. Pupils are equal,  round, and reactive to light.   Neck: Normal range of motion. Neck supple. No JVD present.   Cardiovascular: Normal rate, regular rhythm and normal heart sounds.  Exam reveals no gallop and no friction rub.    No murmur heard.  Pulses:       Radial pulses are 3+ on the right side, and 3+ on the left side.        Femoral pulses are 3+ on the right side, and 3+ on the left side.       Popliteal pulses are 1+ on the right side, and 0 on the left side.        Dorsalis pedis pulses are 2+ on the right side, and 1+ on the left side.        Posterior tibial pulses are 2+ on the right side, and 0 on the left side.   Pulmonary/Chest: Effort normal and breath sounds normal. He exhibits no tenderness.   Abdominal: Soft. Bowel sounds are normal. He exhibits no distension. There is no tenderness.   Musculoskeletal: Normal range of motion. He exhibits tenderness. He exhibits no edema or deformity.        Left ankle: He exhibits ecchymosis.   s/p rijght great toe amputation. Left thigh ecchymosis. Small non-healing sores on anterior and lateral lower leg. Motor function and sensation to left foot intact, hyperesthesia over dorsum of left foot. Slight discoloration to left great toe, cap refill <3 seconds.     Neurological: He is alert and oriented to person, place, and time. No cranial nerve deficit.   Skin: Skin is warm and dry. Capillary refill takes more than 3 seconds. Lesion noted. No rash noted. He is not diaphoretic.   Left leg, multiple small ulcers in various stages   Psychiatric: He has a normal mood and affect. Judgment and thought content normal.   Nursing note and vitals reviewed.    Significant Labs:   CBC:   Recent Labs  Lab 09/30/17  0300 10/01/17  0300   WBC 10.35 9.85   HGB 11.8* 12.6*   HCT 34.2* 37.2*    312     CMP:   Recent Labs  Lab 09/30/17  0300 10/01/17  0300    137   K 4.1 4.0    101   CO2 30* 28    120*   BUN 9 11   CREATININE 0.8 0.8   CALCIUM 9.2 9.3   PROT 6.3 6.9   ALBUMIN  2.9* 3.1*   BILITOT 0.6 0.9   ALKPHOS 74 83   AST 18 25   ALT 16 23   ANIONGAP 6* 8   EGFRNONAA >60.0 >60.0     Pathology Results  (Last 10 years)    None        All pertinent labs within the past 24 hours have been reviewed.    Significant Imaging: I have reviewed all pertinent imaging results/findings within the past 24 hours.

## 2017-10-01 NOTE — PROGRESS NOTES
[facility]  Cardiology  Progress Note    Patient Name: Leonardo Byrd  MRN: [id number]  Admission Date: 9/25/2017  Hospital Length of Stay: 5 days  Code Status: Full Code   Attending Physician: Antoni Yang MD   Primary Care Physician: Indio Aquino MD  Expected Discharge Date: 9/29/2017  Principal Problem:PAD (peripheral artery disease)    Subjective:     Hospital Course:   After admission to hospital medicine patient had a peripheral angiogram that showed 100% occlusion of the L SFA, popliteal artery, & anterior tibial artery.  The occlusions were recanalized with the catheter-assisted thrombolysis (EKOS) treatment system that will be left in overnight and he went for a repeat angiogram the following day during which he had a L SFA stent placed with improved perfusion noted on POD# 1.  On step down to the floor, there was concern for complex regional pain syndrome vs infectious etiology causing a persistent dorsal aspect L foot pain that he described as a burning pain separate from his resolved claudication pain.    Interval History: NAEON. Reports pain in his left foot is stable from previous days.    Review of Systems   Constitution: Negative for chills and fever.   Cardiovascular: Negative for chest pain, claudication (reports absent), cyanosis, dyspnea on exertion, irregular heartbeat, leg swelling, orthopnea, palpitations and paroxysmal nocturnal dyspnea.   Respiratory: Negative for cough and shortness of breath.    Gastrointestinal: Negative for nausea and vomiting.     Objective:     Vital Signs (Most Recent):  Temp: 98 °F (36.7 °C) (10/01/17 1143)  Pulse: 72 (10/01/17 1143)  Resp: 12 (10/01/17 1143)  BP: 113/73 (10/01/17 1143)  SpO2: 99 % (10/01/17 1143) Vital Signs (24h Range):  Temp:  [97.6 °F (36.4 °C)-98.1 °F (36.7 °C)] 98 °F (36.7 °C)  Pulse:  [64-86] 72  Resp:  [7-34] 12  SpO2:  [91 %-100 %] 99 %  BP: ()/(55-79) 113/73     Weight: 70.8 kg (156 lb 1.4 oz)  Body mass  index is 25.19 kg/m².     SpO2: 99 %  O2 Device (Oxygen Therapy): room air      Intake/Output Summary (Last 24 hours) at 10/01/17 1235  Last data filed at 10/01/17 0600   Gross per 24 hour   Intake             1680 ml   Output             3300 ml   Net            -1620 ml       Lines/Drains/Airways     Peripheral Intravenous Line                 Peripheral IV - Single Lumen 09/29/17 2300 Left Forearm 1 day         Peripheral IV - Single Lumen 09/29/17 2300 Right Forearm 1 day                Physical Exam   Constitutional: He is oriented to person, place, and time. He appears well-developed and well-nourished. No distress.   Middle aged WM laying on hospital bed in mild distress   Neck: No JVD present.   Cardiovascular: Normal rate and regular rhythm.   No extrasystoles (Not noticed today) are present. Exam reveals decreased pulses (Improved today).    No murmur heard.  Pulmonary/Chest: No tachypnea. No respiratory distress. He has no decreased breath sounds. He has no wheezes. He has no rhonchi. He has no rales.   Abdominal: Soft. Bowel sounds are normal. He exhibits no distension. There is no tenderness.   Musculoskeletal:   Distal LLE continues to have TTP and erythematous on dorsal aspect of L foot.       Persistent allodynia noted on exam.     Decreased sensation throughout the palmar aspect of left foot.    Neurological: He is alert and oriented to person, place, and time.       Significant Labs:   BMP:   Recent Labs  Lab 09/30/17  0300 10/01/17  0300    120*    137   K 4.1 4.0    101   CO2 30* 28   BUN 9 11   CREATININE 0.8 0.8   CALCIUM 9.2 9.3   MG 1.9 1.8   , CBC   Recent Labs  Lab 09/30/17  0300 10/01/17  0300   WBC 10.35 9.85   HGB 11.8* 12.6*   HCT 34.2* 37.2*    312    and INR No results for input(s): INR, PROTIME in the last 48 hours.    Significant Imaging: x    Assessment and Plan:     Brief HPI: Mr. Byrd is a 47 yo male with severe PAD but improved perfusion of lower  extremity after CFA stenting, and now has persistent left foot pain that is neurogenic vs. Infectious, but otherwise hemodynamically stable.     * PAD (peripheral artery disease)    S/p L CFA stenting w/ successful reperfusion  -Holding home Vorapaxar 2.08 mg po qd  -Continue Pregabalin 100 mg po TID  -Continue ranolazine 1000 mg po BID  -Continue Ticagrelor 90 mg po BID  -Continue Aspirin 81 mg po qd  -Continue Atorvastatin 40 mg po qhs  -Aggressive PT/OT        Acute pain of left foot    Neuro consulted; assistance appreciated.  Per their rec's r/o infective process w/ bone scan which is pending.  Further evaluation pending results from bone scan, in mean time control pain with lyrica  -Bone scan pending        Alcohol abuse    Well outside the window for DT; will DC PRN CIWA & Ativan        Chronic systolic heart failure    Class II, Stage C  -Continue Lopressor 12.5 mg po BID  -Continue Lisinopril 10 mg po qd  -Change lopressor to Toprol prior to discharge  -Continue Atorvastatin 40 mg po qhs        Tobacco abuse    -Nicotine 21 mg/24 h patch        Arterial occlusion, lower extremity    As above            VTE Risk Mitigation         Ordered     Medium Risk of VTE  Once      09/26/17 1030          Neo Gore MD   PGY1 [phone]  Cardiology  [facility]

## 2017-10-01 NOTE — ASSESSMENT & PLAN NOTE
-LE us 09/26 with occlusion of the left superficial femoral-popliteal stent with minimal arterial flow to the distal left lower extremity and occlusion of the anterior tibial artery  -09/27 the left SFA and Poplitea recanalized w/ catheter-assisted thrombolysis (EKOS) treatment system  -repeat angiogram 09/28 the revealed successful therapy  -concern now for complex regional pain syndrome vs infectious etiology as pain continues and is described differently than claudication pain    -bone scan completed and felt more likely CRPS vs infectious etiology, cardiology discused with Neurology, initiated on amitriptyline 25 mg qHS and will need outpt psych follow up   -Neurology evaluated  -Cardiology following  -labs pending to evaluate for hypercoagulable states  -CBC, chemistry, lipid panel, and HgA1c unremarkable  -continue home ASA, statin, pletal, ranexa, and brilinita  -continue home lyrica and PRN medications for pain   -prior to admission s/p aortobifemoral bypass and multiple interventions, recently admitted to [facility] for ischemic rest pain of his left lower limb, Arterial US w/ occlusion of left mid/distal SFA, popliteal, posterior tibial, and dorsalis pedis arteries, subsequently underwent angiogram with thrombolytics and angiojet to L SFA, popliteal, and PT arteries, post-procedure he developed a left thigh hematoma, for which a repeat angiogram was performed and no perforation was noted

## 2017-10-01 NOTE — PT/OT/SLP EVAL
"Occupational Therapy  Evaluation/Discharge    Leonardo Byrd   MRN: [id number]   Admitting Diagnosis: PAD (peripheral artery disease)    OT Date of Treatment: 10/01/17   OT Start Time: 1043  OT Stop Time: 1101  OT Total Time (min): 18 min    Billable Minutes:  Evaluation 18 minutes    Diagnosis: PAD (peripheral artery disease)   S/p L LE stent    Past Medical History:   Diagnosis Date    CHF (congestive heart failure)     Coronary artery disease     Hypertension       Past Surgical History:   Procedure Laterality Date    AMPUTATION Right     great toe    coronary stents      KNEE ARTHROPLASTY Left     VASCULAR SURGERY      fem-pop bypass     Referring physician: Dr. Sinclair  Date referred to OT: 9/30/17    General Precautions: Standard, fall  Orthopedic Precautions: N/A  Braces: N/A    Do you have any cultural, spiritual, Restorationism conflicts, given your current situation?: None     Patient History:  Living Environment  Lives With: parent(s), sibling(s)  Living Arrangements: house  Home Accessibility: stairs to enter home  Number of Stairs to Enter Home: 3  Stair Railings at Home: outside, present on left side  Living Environment Comment: Pt lives with father and brother (who is in and out) in 1-story house with 3 MANI. Pt has been using crutches ~2 months due to L LE pain but otherwise is (I). Does not have assist at home as father is not in good physical condition per pt.  Equipment Currently Used at Home: crutches, axillary    Prior level of function:   Bed Mobility/Transfers: needs device  Grooming: independent  Bathing: independent  Upper Body Dressing: independent  Lower Body Dressing: independent  Toileting: independent  Home Management Skills: independent  Occupation: On disability     Subjective:  Communicated with RN prior to session.  "I'm getting an x-ray tomorrow so they can figure out what's going on."  Chief Complaint: L foot pain  Patient/Family stated goals: Medical treatment for foot/go " home    Pain/Comfort  Pain Rating 1: 10/10  Location - Side 1: Left  Location 1: foot  Pain Addressed 1: Pre-medicate for activity, Reposition, Distraction  Pain Rating Post-Intervention 1: 10/10    Objective:  Patient found with: pulse ox (continuous), telemetry    Cognitive Exam:  Oriented to: Person, Place, Time and Situation  Follows Commands/attention: Easily distracted and Follows multistep commands  Communication: clear/fluent  Memory:  No Deficits noted  Safety awareness/insight to disability: impaired  Coping skills/emotional control: Appropriate to situation    Visual/perceptual:  Intact    Physical Exam:  Postural examination/scapula alignment: No postural abnormalities identified  Skin integrity: Bruising of L inner thigh, foot  Edema: None noted     Sensation:   Impaired L LE    Upper Extremity Range of Motion:  Right Upper Extremity: WNL  Left Upper Extremity: WNL    Upper Extremity Strength:  Right Upper Extremity: WNL  Left Upper Extremity: WNL   Strength: WNL    Fine motor coordination:   Intact    Functional Mobility:  Bed Mobility:  Supine to Sit: Independent    Transfers:  Sit <> Stand Assistance: Supervision from EOB  Sit <> Stand Assistive Device: No Assistive Device; used crutches once standing    Functional Ambulation: Within room household distance with supervision using crutches    Activities of Daily Living:  UE Dressing Level of Assistance: Independent to don gown around back  LE Dressing Level of Assistance: Independent to doff L foot bandage and don socks    Balance:   Static Sit: NORMAL: No deviations seen in posture held statically  Dynamic Sit: NORMAL: No deviations seen in posture held dynamically  Static Stand: GOOD: Takes MODERATE challenges from all directions  Dynamic stand: GOOD-: Needs SUPERVISION only during gait and able to self right with moderate     Therapeutic Activities and Exercises:  OT/PT eval; educated on OT role and POC including no further need for acute OT;  "performed functional mobility in room with crutches and supervision for safety as pt is impulsive and disconnecting lines himself    AM-PAC 6 CLICK ADL  How much help from another person does this patient currently need?  1 = Unable, Total/Dependent Assistance  2 = A lot, Maximum/Moderate Assistance  3 = A little, Minimum/Contact Guard/Supervision  4 = None, Modified [place]/Independent    Putting on and taking off regular lower body clothing? : 4  Bathing (including washing, rinsing, drying)?: 3  Toileting, which includes using toilet, bedpan, or urinal? : 4  Putting on and taking off regular upper body clothing?: 4  Taking care of personal grooming such as brushing teeth?: 4  Eating meals?: 4  Total Score: 23    AM-PAC Raw Score CMS "G-Code Modifier Level of Impairment Assistance   6 % Total / Unable   7 - 9 CM 80 - 100% Maximal Assist   10-14 CL 60 - 80% Moderate Assist   15 - 19 CK 40 - 60% Moderate Assist   20 - 22 CJ 20 - 40% Minimal Assist   23 CI 1-20% SBA / CGA   24 CH 0% Independent/ Mod I       Patient left up in W/C (awaiting transport from ICU to stepdown) with all lines intact, call button in reach and RN present    Assessment:  Leonardo Byrd is a 46 y.o. male with a medical diagnosis of PAD (peripheral artery disease). Pt currently limited by L LE pain only but able to perform ADLs and functional mobility with use of crutches and no physical assistance. Pt does not have acute OT goals at this time and will be discharged.    Pt evaluation falls under low complexity for evaluation coding due to performance deficits noted in 1-3 areas as stated above and 0 co-morbities affecting current functional status. Data obtained from problem focused assessments. No modifications or assistance was required for completion of evaluation. Only brief occupational profile and history review completed.     Rehab identified problem list/impairments: Rehab identified problem list/impairments: decreased lower " extremity function, pain    Activity tolerance: Good    Discharge recommendations: Discharge Facility/Level Of Care Needs: home     Barriers to discharge: Barriers to Discharge: Inaccessible home environment, Decreased caregiver support (3 MANI; no assist at home)    Equipment recommendations: none     GOALS:    Occupational Therapy Goals     Not on file          Multidisciplinary Problems (Resolved)        Problem: Occupational Therapy Goal    Goal Priority Disciplines Outcome Interventions   Occupational Therapy Goal   (Resolved)     OT, PT/OT Outcome(s) achieved                    PLAN: D/C OT   Plan of Care reviewed with: patient    OT G-codes  Functional Assessment Tool Used: AMPAC  Score: 23  Functional Limitation: Self care  Self Care Current Status (): CI  Self Care Goal Status (): CI  Self Care Discharge Status ():     ALINA Chambers  10/01/2017

## 2017-10-01 NOTE — SUBJECTIVE & OBJECTIVE
Interval History: NAEON. Reports pain in his left foot is stable from previous days.    Review of Systems   Constitution: Negative for chills and fever.   Cardiovascular: Negative for chest pain, claudication (reports absent), cyanosis, dyspnea on exertion, irregular heartbeat, leg swelling, orthopnea, palpitations and paroxysmal nocturnal dyspnea.   Respiratory: Negative for cough and shortness of breath.    Gastrointestinal: Negative for nausea and vomiting.     Objective:     Vital Signs (Most Recent):  Temp: 98 °F (36.7 °C) (10/01/17 1143)  Pulse: 72 (10/01/17 1143)  Resp: 12 (10/01/17 1143)  BP: 113/73 (10/01/17 1143)  SpO2: 99 % (10/01/17 1143) Vital Signs (24h Range):  Temp:  [97.6 °F (36.4 °C)-98.1 °F (36.7 °C)] 98 °F (36.7 °C)  Pulse:  [64-86] 72  Resp:  [7-34] 12  SpO2:  [91 %-100 %] 99 %  BP: ()/(55-79) 113/73     Weight: 70.8 kg (156 lb 1.4 oz)  Body mass index is 25.19 kg/m².     SpO2: 99 %  O2 Device (Oxygen Therapy): room air      Intake/Output Summary (Last 24 hours) at 10/01/17 1235  Last data filed at 10/01/17 0600   Gross per 24 hour   Intake             1680 ml   Output             3300 ml   Net            -1620 ml       Lines/Drains/Airways     Peripheral Intravenous Line                 Peripheral IV - Single Lumen 09/29/17 2300 Left Forearm 1 day         Peripheral IV - Single Lumen 09/29/17 2300 Right Forearm 1 day                Physical Exam   Constitutional: He is oriented to person, place, and time. He appears well-developed and well-nourished. No distress.   Middle aged WM laying on hospital bed in mild distress   Neck: No JVD present.   Cardiovascular: Normal rate and regular rhythm.   No extrasystoles (Not noticed today) are present. Exam reveals decreased pulses (Improved today).    No murmur heard.  Pulmonary/Chest: No tachypnea. No respiratory distress. He has no decreased breath sounds. He has no wheezes. He has no rhonchi. He has no rales.   Abdominal: Soft. Bowel sounds are  normal. He exhibits no distension. There is no tenderness.   Musculoskeletal:   Distal LLE continues to have TTP and erythematous on dorsal aspect of L foot.       Persistent allodynia noted on exam.     Decreased sensation throughout the palmar aspect of left foot.    Neurological: He is alert and oriented to person, place, and time.       Significant Labs:   BMP:   Recent Labs  Lab 09/30/17  0300 10/01/17  0300    120*    137   K 4.1 4.0    101   CO2 30* 28   BUN 9 11   CREATININE 0.8 0.8   CALCIUM 9.2 9.3   MG 1.9 1.8   , CBC   Recent Labs  Lab 09/30/17  0300 10/01/17  0300   WBC 10.35 9.85   HGB 11.8* 12.6*   HCT 34.2* 37.2*    312    and INR No results for input(s): INR, PROTIME in the last 48 hours.    Significant Imaging: x

## 2017-10-01 NOTE — PROGRESS NOTES
[facility] Hospital Medicine  Progress Note    Patient Name: Leonardo Byrd  MRN: [id number]  Patient Class: IP- Inpatient   Admission Date: 9/25/2017  Length of Stay: 5 days  Attending Physician: Antoni Yang MD  Primary Care Provider: Indio Aquino MD    Hospital Medicine Team: Networked reference to record PCT  Teena Sebastian NP    Subjective:     Principal Problem:PAD (peripheral artery disease)    HPI:  Mr. Byrd is a 45 y/o male with past medical history of PAD, s/p aortobifemoral bypass and multiple interventions, ICM EF 30-35%, s/p ICD, CAD s/p PCI, HTN, HLD, every day tobacco smoker. He was recently admitted to [facility] for ischemic rest pain of his left lower limb. Arterial ultrasound showed occlusion of left mid/distal SFA, popliteal, posterior tibial, and dorsalis pedis arteries. He subsequently underwent angiogram by Dr. Rosas, with thrombolytics and angiojet to L SFA, popliteal, and PT arteries.  Post-procedure he developed a left thigh hematoma, for which a repeat angiogram was performed and no perforation was noted. His left leg pain is ongoing now for several weeks, even after intervention he says that he immediately had recurrence as soon as he woke up.  He spoke to his interventionalist, and was told they could repeat an angiogram but he may need amputation. He decided to present to [facility] to get a 2nd opinion. He reports this morning that his left foot is in significant pain localized to the top and bottom of his foot. He denies SOB, chest pain, or cough. He endorses ulcers present on his lower leg that began around the time he had his previous vascular surgery. He also endorses a decrease in ability to walk. He states that the pain in his foot has increased to such a point that ambulation is no longer possible. He also reports that his left foot has been significant colder than his right.    The patient was admitted to the Hospital Medicine Service for  further evaluation and management.     Hospital Course:  Mr. Byrd was admitted 09/25 due to PAD s/p recent angiogram with intervention with increased pain. CBC, chemistry, lipid panel, and HgA1C unremarkable. LE US finds occlusion of the left superficial femoral-popliteal stent with minimal arterial flow to the distal left lower extremity and occlusion of the anterior tibial artery. Interventional Cardiology consulted, films from [place] obtained, 09/27 the left SFA and Popliteal were recanalized with the catheter-assisted thrombolysis (EKOS) treatment system overnight while in the CCU, he then underwent repeat angiogram 09/28 the revealed successful therapy. Once limb stabilized he was deemed medically ready for step down to telemetry, care assumed again by Hospital Medicine team 10/01. He is still with signficant pain on dorsum of left foot, Neurology following as there is concern for complex regional pain syndrome vs infectious etiology causing this persistent pain that he describes as a burning pain separate from his resolved claudication pain, bone scan pending to r/o infective process.     Interval History: Resting in bed, deep sleep and snoring, woke to tactile stimulation and then had complaints of pain. Otherwise denied any complaints or need, denies chest pain, palpitations, or shortness of breath. Denies any acute events or distress overnight.     Review of Systems   Constitutional: Positive for activity change (pain related). Negative for appetite change, chills, diaphoresis, fatigue, fever and unexpected weight change.   HENT: Negative for congestion, rhinorrhea, sinus pain, sinus pressure, sneezing and sore throat.    Respiratory: Negative for cough, chest tightness and wheezing.    Cardiovascular: Negative for chest pain, palpitations and leg swelling.   Gastrointestinal: Negative for abdominal distention, abdominal pain, constipation, diarrhea, nausea and vomiting.   Genitourinary: Negative for  decreased urine volume, difficulty urinating, flank pain, frequency, hematuria and urgency.   Musculoskeletal: Positive for myalgias (dorsum left foot). Negative for arthralgias.   Skin: Positive for wound (left leg ulcers).   Neurological: Negative for dizziness, syncope, light-headedness and headaches.   Psychiatric/Behavioral: Negative for agitation and confusion. The patient is not nervous/anxious.      Objective:     Vital Signs (Most Recent):  Temp: 98 °F (36.7 °C) (10/01/17 1143)  Pulse: 72 (10/01/17 1143)  Resp: 12 (10/01/17 1143)  BP: 113/73 (10/01/17 1143)  SpO2: 99 % (10/01/17 1143) Vital Signs (24h Range):  Temp:  [97.6 °F (36.4 °C)-98.1 °F (36.7 °C)] 98 °F (36.7 °C)  Pulse:  [64-86] 72  Resp:  [7-34] 12  SpO2:  [91 %-100 %] 99 %  BP: ()/(55-79) 113/73     Weight: 70.8 kg (156 lb 1.4 oz)  Body mass index is 25.19 kg/m².    Intake/Output Summary (Last 24 hours) at 10/01/17 1357  Last data filed at 10/01/17 0600   Gross per 24 hour   Intake             1200 ml   Output             2500 ml   Net            -1300 ml      Physical Exam   Constitutional: He is oriented to person, place, and time. He appears well-developed and well-nourished. No distress.   HENT:   Head: Normocephalic and atraumatic.   Mouth/Throat: Mucous membranes are normal. Normal dentition.   Eyes: Conjunctivae and lids are normal. Pupils are equal, round, and reactive to light.   Neck: Normal range of motion. Neck supple. No JVD present.   Cardiovascular: Normal rate, regular rhythm and normal heart sounds.  Exam reveals no gallop and no friction rub.    No murmur heard.  Pulses:       Radial pulses are 3+ on the right side, and 3+ on the left side.        Femoral pulses are 3+ on the right side, and 3+ on the left side.       Popliteal pulses are 1+ on the right side, faintly and intermittently palpable on the left side.        Dorsalis pedis pulses are 2+ on the right side, and 1+ on the left side.        Posterior tibial pulses  are 2+ on the right side, unable to assess on left due to pain  Pulmonary/Chest: Effort normal and breath sounds normal. He exhibits no tenderness.   Abdominal: Soft. Bowel sounds are normal. He exhibits no distension. There is no tenderness.   Musculoskeletal: Normal range of motion. He exhibits tenderness. He exhibits no edema.  s/p right great toe amputation. Left thigh ecchymosis from procedure prior to admission. Small non-healing sores on anterior and lateral lower leg. Motor function and sensation to left foot intact, hyperesthesia and errythemia over dorsum of left foot. Slight discoloration to left great toe, cap refill <3 seconds.     Neurological: He is alert and oriented to person, place, and time. No cranial nerve deficit.   Skin: Skin is warm and dry. Capillary refill takes more than 3 seconds. Lesion noted. No rash noted. He is not diaphoretic.   Left leg, multiple small ulcers in various stages   Psychiatric: He has a normal mood and affect. Judgment and thought content normal.   Nursing note and vitals reviewed.    Significant Labs:   CBC:   Recent Labs  Lab 09/30/17  0300 10/01/17  0300   WBC 10.35 9.85   HGB 11.8* 12.6*   HCT 34.2* 37.2*    312     CMP:   Recent Labs  Lab 09/30/17  0300 10/01/17  0300    137   K 4.1 4.0    101   CO2 30* 28    120*   BUN 9 11   CREATININE 0.8 0.8   CALCIUM 9.2 9.3   PROT 6.3 6.9   ALBUMIN 2.9* 3.1*   BILITOT 0.6 0.9   ALKPHOS 74 83   AST 18 25   ALT 16 23   ANIONGAP 6* 8   EGFRNONAA >60.0 >60.0     Pathology Results  (Last 10 years)    None        All pertinent labs within the past 24 hours have been reviewed.    Significant Imaging: I have reviewed all pertinent imaging results/findings within the past 24 hours.    Assessment/Plan:      * PAD (peripheral artery disease)    -[facility] 09/26 with occlusion of the left superficial femoral-popliteal stent with minimal arterial flow to the distal left lower extremity and occlusion of the anterior  tibial artery  -09/27 the left SFA and Poplitea recanalized w/ catheter-assisted thrombolysis (EKOS) treatment system  -repeat angiogram 09/28 the revealed successful therapy  -concern now for complex regional pain syndrome vs infectious etiology as pain continues and is described differently than claudication pain    -Neurology following  -Cardiology following  -bone scan pending to r/o infective process  -will also send labs to evaluate for hypercoagulable states  -CBC, chemistry, lipid panel, and HgA1c unremarkable  -continue home ASA, statin, pletal, ranexa, and brilinita  -continue home lyrica and PRN medications for pain   -prior to admission s/p aortobifemoral bypass and multiple interventions, recently admitted to [facility] for ischemic rest pain of his left lower limb, Arterial US w/ occlusion of left mid/distal SFA, popliteal, posterior tibial, and dorsalis pedis arteries, subsequently underwent angiogram with thrombolytics and angiojet to L SFA, popliteal, and PT arteries, post-procedure he developed a left thigh hematoma, for which a repeat angiogram was performed and no perforation was noted        Acute pain of left foot    -see above        Arterial occlusion, lower extremity    -see above        Chronic systolic heart failure    -no echo noted in [facility] system   -appears euvolemic on exam   -continue home metoprolol and lisinopril   -monitor of hospital course        Tobacco abuse    -tobacco cessation provided  -nicotine patch in house        Alcohol abuse    -no s/s of DTs, did not require treatment in house          VTE Risk Mitigation         Ordered     Medium Risk of VTE  Once      09/26/17 1030        Teena Sebastian NP  Department of Hospital Medicine   [facility]  Pager [phone]  [place] [id number]

## 2017-10-01 NOTE — PLAN OF CARE
Problem: Occupational Therapy Goal  Goal: Occupational Therapy Goal  Outcome: Outcome(s) achieved Date Met: 10/01/17  Aubree and D/C OT 10/1/17

## 2017-10-02 PROBLEM — I70.209 ARTERIAL OCCLUSION, LOWER EXTREMITY: Status: RESOLVED | Noted: 2017-02-27 | Resolved: 2017-01-01

## 2017-10-02 NOTE — PLAN OF CARE
Problem: Patient Care Overview  Goal: Plan of Care Review  Outcome: Ongoing (interventions implemented as appropriate)  VSS. O2 sats stable on RA. Pt denies CP, SOB, palpitations, lightheadedness and dizziness. Fall precautions maintained this shift, pt remains free from falls, trauma and injury. Pt c/o pain to L leg & foot; Pt's pain being managed via PRN Dilaudid and Oxycodone. Plan for Bone Scan in AM. POC reviewed with pt, verbalized understanding. NADN. Will continue to monitor.

## 2017-10-02 NOTE — SUBJECTIVE & OBJECTIVE
Interval History: Resting in bed, knees bent and both feet planted on mattress, has complaints of continued pain in dorsum of left foot. He denies chest pain, palpitations, or shortness of breath. Denies any acute events or distress overnight.     Review of Systems   Constitutional: Positive for activity change (pain related). Negative for appetite change, chills, diaphoresis, fatigue, fever and unexpected weight change.   HENT: Negative for congestion, rhinorrhea, sinus pain, sinus pressure, sneezing and sore throat.    Respiratory: Negative for cough, chest tightness and wheezing.    Cardiovascular: Negative for chest pain, palpitations and leg swelling.   Gastrointestinal: Negative for abdominal distention, abdominal pain, constipation, diarrhea, nausea and vomiting.   Genitourinary: Negative for decreased urine volume, difficulty urinating, flank pain, frequency, hematuria and urgency.   Musculoskeletal: Positive for myalgias (dorsum left foot). Negative for arthralgias.   Skin: Positive for wound (left leg ulcers).   Neurological: Negative for dizziness, syncope, light-headedness and headaches.   Psychiatric/Behavioral: Negative for agitation and confusion. The patient is not nervous/anxious.      Objective:     Vital Signs (Most Recent):  Temp: 97.6 °F (36.4 °C) (10/02/17 0900)  Pulse: 78 (10/02/17 1500)  Resp: 10 (10/02/17 0900)  BP: (!) 98/56 (10/02/17 0900)  SpO2: 99 % (10/02/17 0900) Vital Signs (24h Range):  Temp:  [97.6 °F (36.4 °C)-97.8 °F (36.6 °C)] 97.6 °F (36.4 °C)  Pulse:  [60-82] 78  Resp:  [9-18] 10  SpO2:  [96 %-99 %] 99 %  BP: ()/(56-84) 98/56     Weight: 65.3 kg (143 lb 15.4 oz)  Body mass index is 23.24 kg/m².    Intake/Output Summary (Last 24 hours) at 10/02/17 1609  Last data filed at 10/02/17 1400   Gross per 24 hour   Intake             1410 ml   Output             1100 ml   Net              310 ml      Physical Exam   Constitutional: He is oriented to person, place, and time. He  appears well-developed and well-nourished. No distress.   HENT:   Head: Normocephalic and atraumatic.   Mouth/Throat: Mucous membranes are normal. Normal dentition.   Eyes: Conjunctivae and lids are normal. Pupils are equal, round, and reactive to light.   Neck: Normal range of motion. Neck supple. No JVD present.   Cardiovascular: Normal rate, regular rhythm and normal heart sounds.  Exam reveals no gallop and no friction rub.    No murmur heard.  Pulses:       Radial pulses are 3+ on the right side, and 3+ on the left side.        Femoral pulses are 3+ on the right side, and 3+ on the left side.       Popliteal pulses are 1+ on the right side, and 1+ on the left side.        Dorsalis pedis pulses are 2+ on the right side, and 1+ on the left side.        Posterior tibial pulses are 2+ on the right side. Left posterior tibial pulse not accessible.   Unable to assess left PT pulse due to pain and wound   Pulmonary/Chest: Effort normal and breath sounds normal. He exhibits no tenderness.   Abdominal: Soft. Bowel sounds are normal. He exhibits no distension. There is no tenderness.   Musculoskeletal: Normal range of motion. He exhibits tenderness. He exhibits no edema or deformity.        Left ankle: He exhibits ecchymosis.   s/p right great toe amputation. Left thigh ecchymosis from procedure prior to admission. Small non-healing sores on anterior and lateral lower leg. Motor function and sensation to left foot intact, hyperesthesia and errythemia over dorsum of left foot. Slight discoloration to left great toe, cap refill <3 seconds.      Neurological: He is alert and oriented to person, place, and time. No cranial nerve deficit.   Skin: Skin is warm and dry. Capillary refill takes more than 3 seconds. Lesion noted. No rash noted. He is not diaphoretic.   Left leg, multiple small ulcers in various stages   Psychiatric: He has a normal mood and affect. Judgment and thought content normal.   Nursing note and vitals  reviewed.    Significant Labs:   CBC:   Recent Labs  Lab 10/01/17  0300   WBC 9.85   HGB 12.6*   HCT 37.2*        CMP:   Recent Labs  Lab 10/01/17  0300 10/02/17  0652    137   K 4.0 4.6    98   CO2 28 29   * 86   BUN 11 15   CREATININE 0.8 0.8   CALCIUM 9.3 9.6   PROT 6.9  --    ALBUMIN 3.1*  --    BILITOT 0.9  --    ALKPHOS 83  --    AST 25  --    ALT 23  --    ANIONGAP 8 10   EGFRNONAA >60.0 >60.0     Magnesium:   Recent Labs  Lab 10/01/17  0300 10/02/17  0652   MG 1.8 2.1     All pertinent labs within the past 24 hours have been reviewed.    Significant Imaging: I have reviewed all pertinent imaging results/findings within the past 24 hours.

## 2017-10-02 NOTE — PT/OT/SLP EVAL
"Physical Therapy  Evaluation    Leonardo Byrd   MRN: [id number]   Admitting Diagnosis: PAD (peripheral artery disease)    PT Received On: 10/01/17  PT Start Time: 1049     PT Stop Time: 1101    PT Total Time (min): 12 min       Billable Minutes:  Evaluation 12 mins    Diagnosis: PAD (peripheral artery disease)  S/p LLE stent    Past Medical History:   Diagnosis Date    CHF (congestive heart failure)     Coronary artery disease     Hypertension       Past Surgical History:   Procedure Laterality Date    AMPUTATION Right     great toe    coronary stents      KNEE ARTHROPLASTY Left     VASCULAR SURGERY      fem-pop bypass     General Precautions: Standard, fall  Orthopedic Precautions: N/A   Braces: N/A       Do you have any cultural, spiritual, Jain conflicts, given your current situation?: none noted     Patient History:  Lives With: parent(s), sibling(s)  Living Arrangements: house  Living Environment Comment: Pt lives with father and brother (who is in and out) in 1-story house with 3 MANI. Pt has been using crutches ~2 months due to L LE pain but otherwise is (I). Does not have assist at home as father is not in good physical condition per pt.  Equipment Currently Used at Home: crutches, axillary    Previous Level of Function:  Ambulation Skills: needs device (crutches)  Transfer Skills: independent  ADL Skills: independent  Work/Leisure Activity: independent    Subjective:  Communicated with RN prior to session.  "I can walk but it hurts after"  Chief Complaint: L foot pain  Patient goals: to go home    Pain/Comfort  Pain Rating 1: 10/10  Location - Side 1: Left  Location 1: foot  Pain Addressed 1: Pre-medicate for activity, Reposition, Distraction  Pain Rating Post-Intervention 1: 10/10      Objective:   Patient found with: pulse ox (continuous), telemetry     Cognitive Exam:  Oriented to: Person, Place, Time and Situation    Follows Commands/attention: Easily distracted and Follows multistep  " commands  Communication: clear/fluent  Safety awareness/insight to disability: impaired    Physical Exam:  Postural examination/scapula alignment: No postural abnormalities identified    Skin integrity: Bruising of L inner thigh and foot  Edema: None noted     Sensation:   Pt reports plantar aspect L foot is numb and dorsal aspect of L foot is painful    Upper Extremity Range of Motion:  Right Upper Extremity: WNL  Left Upper Extremity: WNL    Upper Extremity Strength:  Right Upper Extremity: WNL  Left Upper Extremity: WNL    Lower Extremity Range of Motion:  Right Lower Extremity: WNL  Left Lower Extremity: WNL    Lower Extremity Strength:  Right Lower Extremity: WFL  Left Lower Extremity: difficult to assess, grossly 3/5 through observation      Fine motor coordination:  Intact    Gross motor coordination: WFL    Functional Mobility:  Bed Mobility:  Supine to Sit: Independent    Transfers:  Sit <> Stand Assistance: Supervision  Sit <> Stand Assistive Device: No Assistive Device    Gait:   Gait Distance: ~15 feet  Assistance 1: Supervision  Gait Assistive Device: Axillary crutches  Gait Deviation(s): decreased henri, increased time in double stance, decreased velocity of limb motion, decreased step length, decreased weight-shifting ability    Balance:   Static Sit: NORMAL: No deviations seen in posture held statically  Dynamic Sit: NORMAL: No deviations seen in posture held dynamically  Static Stand: GOOD: Takes MODERATE challenges from all directions  Dynamic stand: GOOD-: Needs SUPERVISION only during gait and able to self right with moderate     Therapeutic Activities and Exercises:  Performed functional mobility/ambulation with crutches and supervision for safety as pt is impulsive and disconnecting lines himself. Pt educated on role of PT, POC, and safety with mobility.    AM-PAC 6 CLICK MOBILITY  How much help from another person does this patient currently need?   1 = Unable, Total/Dependent Assistance  2  = A lot, Maximum/Moderate Assistance  3 = A little, Minimum/Contact Guard/Supervision  4 = None, Modified [place]/Independent    Turning over in bed (including adjusting bedclothes, sheets and blankets)?: 4  Sitting down on and standing up from a chair with arms (e.g., wheelchair, bedside commode, etc.): 3  Moving from lying on back to sitting on the side of the bed?: 4  Moving to and from a bed to a chair (including a wheelchair)?: 3  Need to walk in hospital room?: 3  Climbing 3-5 steps with a railing?: 3  Total Score: 20     AM-PAC Raw Score CMS G-Code Modifier Level of Impairment Assistance   6 % Total / Unable   7 - 9 CM 80 - 100% Maximal Assist   10 - 14 CL 60 - 80% Moderate Assist   15 - 19 CK 40 - 60% Moderate Assist   20 - 22 CJ 20 - 40% Minimal Assist   23 CI 1-20% SBA / CGA   24 CH 0% Independent/ Mod I     Patient left up in W/C (awaiting transport from ICU to stepdown) with all lines intact, call button in reach and RN present    Assessment:   Leonardo Byrd is a 46 y.o. male with a medical diagnosis of PAD (peripheral artery disease) and presents with deficits listed below. Pt will need skilled PT to address deficits and increase functional mobility as able. Anticipate pt will only need 1-2 additional sessions to ensure safety with mobility.    Level of Complexity:   Low Complexity  · No personal factors or comorbidities that impact the plan of care  · Examination addressing 1-2 body structures and functions, activity limitations, and/or participation restrictions  · Clinical presentation with stable or uncomplicated characteristics    Rehab identified problem list/impairments: Rehab identified problem list/impairments: gait instability, impaired balance, decreased lower extremity function, pain, decreased safety awareness    Rehab potential is good.    Activity tolerance: Good    Discharge recommendations: Discharge Facility/Level Of Care Needs: home health PT     Barriers to discharge:  Barriers to Discharge: Inaccessible home environment, Decreased caregiver support (3 MANI, no assist at home)    Equipment recommendations: Equipment Needed After Discharge: none     GOALS:    Physical Therapy Goals        Problem: Physical Therapy Goal    Goal Priority Disciplines Outcome Goal Variances Interventions   Physical Therapy Goal     PT/OT, PT Ongoing (interventions implemented as appropriate)     Description:  Goals to be met by: 10/7/17     Patient will increase functional independence with mobility by performin. Gait  x 200 feet with Modified [place] using Crutches.   2. Ascend/descend 3 stair with left Handrails Supervision using Crutches.                         PLAN:    Patient to be seen 2 x/week to address the above listed problems via gait training, therapeutic activities, therapeutic exercises  Plan of Care expires: 10/31/17  Plan of Care reviewed with: patient          Marta Shaver, PT  10/01/2017

## 2017-10-02 NOTE — ASSESSMENT & PLAN NOTE
Class II, Stage C  - Continue Lopressor 12.5 mg po BID  - Continue lisinopril 10 mg po qd  - Change lopressor to Toprol prior to discharge  - Continue atorvastatin 40 mg po qhs

## 2017-10-02 NOTE — SUBJECTIVE & OBJECTIVE
Interval History: NAEON. Reports pain in his left foot is stable from previous days.    Review of Systems   Constitution: Negative for chills and fever.   Cardiovascular: Negative for chest pain, claudication, cyanosis, dyspnea on exertion, irregular heartbeat, leg swelling, orthopnea, palpitations and paroxysmal nocturnal dyspnea.   Respiratory: Negative for cough and shortness of breath.    Skin: Positive for color change and rash.   Gastrointestinal: Negative for nausea and vomiting.   Neurological: Negative for dizziness and headaches.     Objective:     Vital Signs (Most Recent):  Temp: 97.6 °F (36.4 °C) (10/02/17 0900)  Pulse: 71 (10/02/17 1100)  Resp: 10 (10/02/17 0900)  BP: (!) 98/56 (10/02/17 0900)  SpO2: 99 % (10/02/17 0900) Vital Signs (24h Range):  Temp:  [97.6 °F (36.4 °C)-97.8 °F (36.6 °C)] 97.6 °F (36.4 °C)  Pulse:  [60-82] 71  Resp:  [9-18] 10  SpO2:  [96 %-99 %] 99 %  BP: ()/(56-84) 98/56     Weight: 65.3 kg (143 lb 15.4 oz)  Body mass index is 23.24 kg/m².     SpO2: 99 %  O2 Device (Oxygen Therapy): room air      Intake/Output Summary (Last 24 hours) at 10/02/17 1327  Last data filed at 10/02/17 1000   Gross per 24 hour   Intake             1410 ml   Output             1100 ml   Net              310 ml       Lines/Drains/Airways     Peripheral Intravenous Line                 Peripheral IV - Single Lumen 09/29/17 2300 Left Forearm 2 days         Peripheral IV - Single Lumen 09/29/17 2300 Right Forearm 2 days                Physical Exam   Constitutional: He is oriented to person, place, and time. He appears well-developed and well-nourished. No distress.   Middle aged WM laying on hospital bed in mild distress   Neck: No JVD present.   Cardiovascular: Normal rate and regular rhythm.   No extrasystoles (Not noticed today) are present. Exam reveals decreased pulses (Improved today).    No murmur heard.  Pulmonary/Chest: No tachypnea. No respiratory distress. He has no decreased breath sounds.  He has no wheezes. He has no rhonchi. He has no rales.   Abdominal: Soft. Bowel sounds are normal. He exhibits no distension. There is no tenderness.   Musculoskeletal:   Distal LLE continues to have TTP and erythematous on dorsal aspect of L foot.       Persistent allodynia noted on exam.     Decreased sensation throughout the palmar aspect of left foot.    Neurological: He is alert and oriented to person, place, and time.       Significant Labs:     BMP  Recent Labs  Lab 10/01/17  0300 10/02/17  0652   * 86    137   K 4.0 4.6    98   CO2 28 29   BUN 11 15   CREATININE 0.8 0.8   CALCIUM 9.3 9.6   MG 1.8 2.1     CBC   Recent Labs  Lab 10/01/17  0300   WBC 9.85   HGB 12.6*   HCT 37.2*        Significant Imaging: Awaiting NM Bone scan today.

## 2017-10-02 NOTE — PLAN OF CARE
Problem: Patient Care Overview  Goal: Plan of Care Review  Outcome: Ongoing (interventions implemented as appropriate)  Patient is free of fall/trauma/injury. Denies CP, SOB. Pain medication administered for complaints of pain to L lower extremity. Pt verbalizes moderate satisfaction with pain relief measures. Plan of care discussed with pt. Pt verbalizes understanding. All questions addressed. Will continue to monitor

## 2017-10-02 NOTE — PLAN OF CARE
10/02/17 0848   Discharge Reassessment   Assessment Type Discharge Planning Reassessment   Do you have any problems affording any of your prescribed medications? No   Discharge Plan A Home with family   Discharge Plan B Home with family;Home Health   Can the patient answer the patient profile reliably? Yes, cognitively intact   How does the patient rate their overall health at the present time? Fair   Describe the patient's ability to walk at the present time. Minor restrictions or changes   How often would a person be available to care for the patient? Often   Number of comorbid conditions (as recorded on the chart) Two   During the past month, has the patient often been bothered by feeling down, depressed or hopeless? No   During the past month, has the patient often been bothered by little interest or pleasure in doing things? No

## 2017-10-02 NOTE — PLAN OF CARE
Problem: Physical Therapy Goal  Goal: Physical Therapy Goal  Goals to be met by: 10/7/17     Patient will increase functional independence with mobility by performin. Gait  x 200 feet with Modified [place] using Crutches.   2. Ascend/descend 3 stair with left Handrails Supervision using Crutches.       Outcome: Ongoing (interventions implemented as appropriate)  Goals established this date

## 2017-10-02 NOTE — PLAN OF CARE
[facility]    HOME HEALTH ORDERS  FACE TO FACE ENCOUNTER    Patient Name: Leonardo Byrd  YOB: 1970    PCP: Indio Aquino MD   PCP Address: [address] [facility] [address] / [place] [address]  PCP Phone Number: [phone]  PCP Fax: [phone]    Encounter Date: 10/02/2017    Admit to Home Health    Diagnoses:  Active Hospital Problems    Diagnosis  POA    *PAD (peripheral artery disease) [I73.9]  Yes     Priority: 1 - High    Acute pain of left foot [M79.672]  Yes     Priority: 2      Acute onset of burning pain in left foot after angiogram mid September.      Arterial occlusion, lower extremity [I74.3]  Yes     Priority: 2     Chronic systolic heart failure [I50.22]  Yes     Priority: 3     Tobacco abuse [Z72.0]  Yes     Priority: 4     Alcohol abuse [F10.10]  Yes      Resolved Hospital Problems    Diagnosis Date Resolved POA   No resolved problems to display.       No future appointments.  Follow-up Information     [facility] [place], [place] INTERVENTIONAL CARDIOLOGY. Schedule an appointment as soon as possible for a visit in 1 week.    Specialty:  Interventional Cardiology  Why:  post hospital follow up   Contact information:  [address] [facility] [address] [phone]           [facility] - Psychiatry. Schedule an appointment as soon as possible for a visit in 1 week.    Specialty:  Psychiatry  Why:  post hospital follow up for complex angelica pain syndrome   Contact information:  [address] [facility] [address] [phone]  Additional information:  [facility] - 4th Floor               I have seen and examined this patient face to face today. My clinical findings that support the need for the home health skilled services and home bound status are the following:  Weakness/numbness causing balance and gait disturbance due to Weakness/Debility making it taxing to leave home.  Patient with medication mismanagement issues requiring home bound status as  evidenced by  Poor understanding of medication regimen/dosage and Poor adherence to medication regimen/dosage.  Medical restrictions requiring assistance of another human to leave home due to  Unstable ambulation.    Allergies:Review of patient's allergies indicates:  No Known Allergies    Diet: cardiac diet and 2 gram sodium diet    Activities: activity as tolerated    Nursing:   SN to complete comprehensive assessment including routine vital signs. Instruct on disease process and s/s of complications to report to MD. Review/verify medication list sent home with the patient at time of discharge  and instruct patient/caregiver as needed. Frequency may be adjusted depending on start of care date.    Notify MD if SBP > 160 or < 90; DBP > 90 or < 50; HR > 120 or < 50; Temp > 101    CONSULTS:    Physical Therapy to evaluate and treat. Evaluate for home safety and equipment needs; Establish/upgrade home exercise program. Perform / instruct on therapeutic exercises, gait training, transfer training, and Range of Motion.    MISCELLANEOUS CARE:  N/A    WOUND CARE ORDERS  n/a    Medications: Review discharge medications with patient and family and provide education.      Current Discharge Medication List      START taking these medications    Details   amitriptyline (ELAVIL) 25 MG tablet Take 1 tablet (25 mg total) by mouth every evening.  Qty: 30 tablet, Refills: 11         CONTINUE these medications which have NOT CHANGED    Details   aspirin (ECOTRIN) 81 MG EC tablet Take 81 mg by mouth once daily.      atorvastatin (LIPITOR) 40 MG tablet Take 40 mg by mouth every evening.      cilostazol (PLETAL) 50 MG Tab Take 2 tablets (100 mg total) by mouth 2 (two) times daily.      lisinopril 10 MG tablet Take 10 mg by mouth once daily.      metoprolol tartrate (LOPRESSOR) 25 MG tablet Take 12.5 mg by mouth 2 (two) times daily.      oxycodone-acetaminophen (LYNOX) 7.5-300 mg per tablet Take 1 tablet by mouth every 4 (four) hours as  needed for Pain.      pregabalin (LYRICA) 100 MG capsule Take 1 capsule (100 mg total) by mouth 3 (three) times daily.  Qty: 90 capsule, Refills: 6      ranolazine (RANEXA) 500 MG Tb12 Take 1,000 mg by mouth 2 (two) times daily.      ticagrelor (BRILINTA) 90 mg tablet Take 90 mg by mouth 2 (two) times daily.      nicotine (NICODERM CQ) 21 mg/24 hr Place 1 patch onto the skin once daily. Down titrating as directed  Refills: 0      nitroGLYCERIN (NITROSTAT) 0.4 MG SL tablet Place 0.4 mg under the tongue every 5 (five) minutes as needed for Chest pain.         STOP taking these medications       vorapaxar 2.08 mg Tab Comments:   Reason for Stopping:             I certify that this patient is confined to his home and needs intermittent skilled nursing care and physical therapy.      Teena Sebastian NP  Department of Hospital Medicine   [facility]

## 2017-10-02 NOTE — ASSESSMENT & PLAN NOTE
S/p L CFA stenting w/ successful reperfusion  - Holding home Vorapaxar 2.08 mg po qd  - Continue pregabalin 100 mg po TID  - Continue ranolazine 1000 mg po BID  - Continue ticagrelor 90 mg po BID  - Continue aspirin 81 mg po qd  - Continue atorvastatin 40 mg po qhs  - Aggressive PT/OT

## 2017-10-02 NOTE — PROGRESS NOTES
[facility]  Cardiology  Progress Note    Patient Name: Leonardo Byrd  MRN: [id number]  Admission Date: 9/25/2017  Hospital Length of Stay: 6 days  Code Status: Full Code   Attending Physician: Sussy Chavez MD   Primary Care Physician: Indio Aquino MD  Expected Discharge Date: 10/4/2017  Principal Problem:PAD (peripheral artery disease)    Subjective:     Hospital Course:   After admission to hospital medicine patient had a peripheral angiogram that showed 100% occlusion of the L SFA, popliteal artery, & anterior tibial artery.  The occlusions were recanalized with the catheter-assisted thrombolysis (EKOS) treatment system that will be left in overnight and he went for a repeat angiogram the following day during which he had a L SFA stent placed with improved perfusion noted on POD# 1.  On step down to the floor, there was concern for complex regional pain syndrome vs infectious etiology causing a persistent dorsal aspect L foot pain that he described as a burning pain separate from his resolved claudication pain.    Interval History: NAEON. Reports pain in his left foot is stable from previous days.    Review of Systems   Constitution: Negative for chills and fever.   Cardiovascular: Negative for chest pain, claudication, cyanosis, dyspnea on exertion, irregular heartbeat, leg swelling, orthopnea, palpitations and paroxysmal nocturnal dyspnea.   Respiratory: Negative for cough and shortness of breath.    Skin: Positive for color change and rash.   Gastrointestinal: Negative for nausea and vomiting.   Neurological: Negative for dizziness and headaches.     Objective:     Vital Signs (Most Recent):  Temp: 97.6 °F (36.4 °C) (10/02/17 0900)  Pulse: 71 (10/02/17 1100)  Resp: 10 (10/02/17 0900)  BP: (!) 98/56 (10/02/17 0900)  SpO2: 99 % (10/02/17 0900) Vital Signs (24h Range):  Temp:  [97.6 °F (36.4 °C)-97.8 °F (36.6 °C)] 97.6 °F (36.4 °C)  Pulse:  [60-82] 71  Resp:  [9-18] 10  SpO2:  [96 %-99  %] 99 %  BP: ()/(56-84) 98/56     Weight: 65.3 kg (143 lb 15.4 oz)  Body mass index is 23.24 kg/m².     SpO2: 99 %  O2 Device (Oxygen Therapy): room air      Intake/Output Summary (Last 24 hours) at 10/02/17 1327  Last data filed at 10/02/17 1000   Gross per 24 hour   Intake             1410 ml   Output             1100 ml   Net              310 ml       Lines/Drains/Airways     Peripheral Intravenous Line                 Peripheral IV - Single Lumen 09/29/17 2300 Left Forearm 2 days         Peripheral IV - Single Lumen 09/29/17 2300 Right Forearm 2 days                Physical Exam   Constitutional: He is oriented to person, place, and time. He appears well-developed and well-nourished. No distress.   Middle aged WM laying on hospital bed in mild distress   Neck: No JVD present.   Cardiovascular: Normal rate and regular rhythm.   No extrasystoles (Not noticed today) are present. Exam reveals decreased pulses (Improved today).    No murmur heard.  Pulmonary/Chest: No tachypnea. No respiratory distress. He has no decreased breath sounds. He has no wheezes. He has no rhonchi. He has no rales.   Abdominal: Soft. Bowel sounds are normal. He exhibits no distension. There is no tenderness.   Musculoskeletal:   Distal LLE continues to have TTP and erythematous on dorsal aspect of L foot.       Persistent allodynia noted on exam.     Decreased sensation throughout the palmar aspect of left foot.    Neurological: He is alert and oriented to person, place, and time.       Significant Labs:     BMP  Recent Labs  Lab 10/01/17  0300 10/02/17  0652   * 86    137   K 4.0 4.6    98   CO2 28 29   BUN 11 15   CREATININE 0.8 0.8   CALCIUM 9.3 9.6   MG 1.8 2.1     CBC   Recent Labs  Lab 10/01/17  0300   WBC 9.85   HGB 12.6*   HCT 37.2*        Significant Imaging: Awaiting NM Bone scan today.    Assessment and Plan:     * PAD (peripheral artery disease)    S/p L CFA stenting w/ successful reperfusion  -  Holding home Vorapaxar 2.08 mg po qd  - Continue pregabalin 100 mg po TID  - Continue ranolazine 1000 mg po BID  - Continue ticagrelor 90 mg po BID  - Continue aspirin 81 mg po qd  - Continue atorvastatin 40 mg po qhs  - Aggressive PT/OT        Acute pain of left foot    Neuro consulted; assistance appreciated.  Per their rec's r/o infective process w/ bone scan which is pending.  Further evaluation pending results from bone scan, in mean time control pain with pregabalin.  - Bone scan pending        Alcohol abuse    Well outside the window for DT; will DC PRN CIWA & Ativan        Chronic systolic heart failure    Class II, Stage C  - Continue Lopressor 12.5 mg po BID  - Continue lisinopril 10 mg po qd  - Change lopressor to Toprol prior to discharge  - Continue atorvastatin 40 mg po qhs        Tobacco abuse    - Nicotine 21 mg/24 h patch  - Cessation encouraged        Arterial occlusion, lower extremity    As above            VTE Risk Mitigation         Ordered     Medium Risk of VTE  Once      09/26/17 1030          Neha May MD  Cardiology  [facility]

## 2017-10-02 NOTE — PROGRESS NOTES
[facility] Hospital Medicine  Progress Note    Patient Name: Leonardo Byrd  MRN: [id number]  Patient Class: IP- Inpatient   Admission Date: 9/25/2017  Length of Stay: 6 days  Attending Physician: Sussy Chavez MD  Primary Care Provider: Indio Aquino MD    Hospital Medicine Team: [facility] ADRIANNA MED MARKO Sebastian NP    Subjective:     Principal Problem:PAD (peripheral artery disease)    HPI:  Mr. Byrd is a 45 y/o male with past medical history of PAD, s/p aortobifemoral bypass and multiple interventions, ICM EF 30-35%, s/p ICD, CAD s/p PCI, HTN, HLD, every day tobacco smoker. He was recently admitted to [facility] for ischemic rest pain of his left lower limb. Arterial ultrasound showed occlusion of left mid/distal SFA, popliteal, posterior tibial, and dorsalis pedis arteries. He subsequently underwent angiogram by Dr. Rosas, with thrombolytics and angiojet to L SFA, popliteal, and PT arteries.  Post-procedure he developed a left thigh hematoma, for which a repeat angiogram was performed and no perforation was noted. His left leg pain is ongoing now for several weeks, even after intervention he says that he immediately had recurrence as soon as he woke up.  He spoke to his interventionalist, and was told they could repeat an angiogram but he may need amputation. He decided to present to [facility] to get a 2nd opinion. He reports this morning that his left foot is in significant pain localized to the top and bottom of his foot. He denies SOB, chest pain, or cough. He endorses ulcers present on his lower leg that began around the time he had his previous vascular surgery. He also endorses a decrease in ability to walk. He states that the pain in his foot has increased to such a point that ambulation is no longer possible. He also reports that his left foot has been significant colder than his right.    The patient was admitted to the Hospital Medicine Service for further evaluation and  management.     Hospital Course:  Mr. Byrd was admitted 09/25 due to PAD s/p recent angiogram with intervention with increased pain. CBC, chemistry, lipid panel, and HgA1C unremarkable. LE US finds occlusion of the left superficial femoral-popliteal stent with minimal arterial flow to the distal left lower extremity and occlusion of the anterior tibial artery. Interventional Cardiology consulted, films from [place] obtained, 09/27 the left SFA and Popliteal were recanalized with the catheter-assisted thrombolysis (EKOS) treatment system overnight while in the CCU, he then underwent repeat angiogram 09/28 the revealed successful therapy. Once limb stabilized he was deemed medically ready for step down to telemetry, care assumed again by Hospital Medicine team 10/01. He is still with signficant pain on dorsum of left foot, Neurology consulted and felt there wass concern for complex regional pain syndrome vs infectious etiology causing this persistent pain that he describes as a burning pain separate from his resolved claudication pain, bone scan completed and indicated CRPS more likely than cellulitis or osteomyelitis, Cardiology discussed findings with Neurology and initiated amitriptyline 25mg qhs.    Disposition plans: likely home in the AM if pain controlled with PO meds and addition of amitriptyline 25mg qhs, will need home health, orders completed.     Interval History: Resting in bed, knees bent and both feet planted on mattress, has complaints of continued pain in dorsum of left foot. He denies chest pain, palpitations, or shortness of breath. Denies any acute events or distress overnight.     Review of Systems   Constitutional: Positive for activity change (pain related). Negative for appetite change, chills, diaphoresis, fatigue, fever and unexpected weight change.   HENT: Negative for congestion, rhinorrhea, sinus pain, sinus pressure, sneezing and sore throat.    Respiratory: Negative for cough,  chest tightness and wheezing.    Cardiovascular: Negative for chest pain, palpitations and leg swelling.   Gastrointestinal: Negative for abdominal distention, abdominal pain, constipation, diarrhea, nausea and vomiting.   Genitourinary: Negative for decreased urine volume, difficulty urinating, flank pain, frequency, hematuria and urgency.   Musculoskeletal: Positive for myalgias (dorsum left foot). Negative for arthralgias.   Skin: Positive for wound (left leg ulcers).   Neurological: Negative for dizziness, syncope, light-headedness and headaches.   Psychiatric/Behavioral: Negative for agitation and confusion. The patient is not nervous/anxious.      Objective:     Vital Signs (Most Recent):  Temp: 97.6 °F (36.4 °C) (10/02/17 0900)  Pulse: 78 (10/02/17 1500)  Resp: 10 (10/02/17 0900)  BP: (!) 98/56 (10/02/17 0900)  SpO2: 99 % (10/02/17 0900) Vital Signs (24h Range):  Temp:  [97.6 °F (36.4 °C)-97.8 °F (36.6 °C)] 97.6 °F (36.4 °C)  Pulse:  [60-82] 78  Resp:  [9-18] 10  SpO2:  [96 %-99 %] 99 %  BP: ()/(56-84) 98/56     Weight: 65.3 kg (143 lb 15.4 oz)  Body mass index is 23.24 kg/m².    Intake/Output Summary (Last 24 hours) at 10/02/17 1609  Last data filed at 10/02/17 1400   Gross per 24 hour   Intake             1410 ml   Output             1100 ml   Net              310 ml      Physical Exam   Constitutional: He is oriented to person, place, and time. He appears well-developed and well-nourished. No distress.   HENT:   Head: Normocephalic and atraumatic.   Mouth/Throat: Mucous membranes are normal. Normal dentition.   Eyes: Conjunctivae and lids are normal. Pupils are equal, round, and reactive to light.   Neck: Normal range of motion. Neck supple. No JVD present.   Cardiovascular: Normal rate, regular rhythm and normal heart sounds.  Exam reveals no gallop and no friction rub.    No murmur heard.  Pulses:       Radial pulses are 3+ on the right side, and 3+ on the left side.        Femoral pulses are 3+ on  the right side, and 3+ on the left side.       Popliteal pulses are 1+ on the right side, and 1+ on the left side.        Dorsalis pedis pulses are 2+ on the right side, and 1+ on the left side.        Posterior tibial pulses are 2+ on the right side. Left posterior tibial pulse not accessible.   Unable to assess left PT pulse due to pain and wound   Pulmonary/Chest: Effort normal and breath sounds normal. He exhibits no tenderness.   Abdominal: Soft. Bowel sounds are normal. He exhibits no distension. There is no tenderness.   Musculoskeletal: Normal range of motion. He exhibits tenderness. He exhibits no edema or deformity.        Left ankle: He exhibits ecchymosis.   s/p right great toe amputation. Left thigh ecchymosis from procedure prior to admission. Small non-healing sores on anterior and lateral lower leg. Motor function and sensation to left foot intact, hyperesthesia and errythemia over dorsum of left foot. Slight discoloration to left great toe, cap refill <3 seconds.      Neurological: He is alert and oriented to person, place, and time. No cranial nerve deficit.   Skin: Skin is warm and dry. Capillary refill takes more than 3 seconds. Lesion noted. No rash noted. He is not diaphoretic.   Left leg, multiple small ulcers in various stages   Psychiatric: He has a normal mood and affect. Judgment and thought content normal.   Nursing note and vitals reviewed.    Significant Labs:   CBC:   Recent Labs  Lab 10/01/17  0300   WBC 9.85   HGB 12.6*   HCT 37.2*        CMP:   Recent Labs  Lab 10/01/17  0300 10/02/17  0652    137   K 4.0 4.6    98   CO2 28 29   * 86   BUN 11 15   CREATININE 0.8 0.8   CALCIUM 9.3 9.6   PROT 6.9  --    ALBUMIN 3.1*  --    BILITOT 0.9  --    ALKPHOS 83  --    AST 25  --    ALT 23  --    ANIONGAP 8 10   EGFRNONAA >60.0 >60.0     Magnesium:   Recent Labs  Lab 10/01/17  0300 10/02/17  0652   MG 1.8 2.1     All pertinent labs within the past 24 hours have been  reviewed.    Significant Imaging: I have reviewed all pertinent imaging results/findings within the past 24 hours.    Assessment/Plan:      * PAD (peripheral artery disease)    -LE us 09/26 with occlusion of the left superficial femoral-popliteal stent with minimal arterial flow to the distal left lower extremity and occlusion of the anterior tibial artery  -09/27 the left SFA and Poplitea recanalized w/ catheter-assisted thrombolysis (EKOS) treatment system  -repeat angiogram 09/28 the revealed successful therapy  -concern now for complex regional pain syndrome vs infectious etiology as pain continues and is described differently than claudication pain    -bone scan completed and felt more likely CRPS vs infectious etiology, cardiology discused with Neurology, initiated on amitriptyline 25 mg qHS and will need outpt psych follow up   -Neurology evaluated  -Cardiology following  -labs pending to evaluate for hypercoagulable states  -CBC, chemistry, lipid panel, and HgA1c unremarkable  -continue home ASA, statin, pletal, ranexa, and brilinita  -continue home lyrica and PRN medications for pain   -prior to admission s/p aortobifemoral bypass and multiple interventions, recently admitted to [facility] for ischemic rest pain of his left lower limb, Arterial US w/ occlusion of left mid/distal SFA, popliteal, posterior tibial, and dorsalis pedis arteries, subsequently underwent angiogram with thrombolytics and angiojet to L SFA, popliteal, and PT arteries, post-procedure he developed a left thigh hematoma, for which a repeat angiogram was performed and no perforation was noted        Acute pain of left foot    -see above        Arterial occlusion, lower extremity    -see above        Chronic systolic heart failure    -no echo noted in [facility] system   -appears euvolemic on exam   -continue home metoprolol and lisinopril   -monitor of hospital course        Tobacco abuse    -tobacco cessation provided  -nicotine patch  in house        Alcohol abuse    -no s/s of DTs, did not require treatment in house          VTE Risk Mitigation         Ordered     Medium Risk of VTE  Once      09/26/17 1030        Teena Sebastian NP  Department of Hospital Medicine   [facility]  Pager [phone]  [facility] [id number]

## 2017-10-02 NOTE — ASSESSMENT & PLAN NOTE
Neuro consulted; assistance appreciated.  Per their rec's r/o infective process w/ bone scan which is pending.  Further evaluation pending results from bone scan, in mean time control pain with pregabalin.  - Bone scan pending

## 2017-10-02 NOTE — PROGRESS NOTES
NM Bone Scan from today suggestive of CRPS >> cellulitis/ osteomyelitis. Discussed results of bone scan by phone with neurology- recommended starting amitriptyline at 25mg qhs and refer to psychiatry as outpatient (evidence suggests CRPS patients benefit from coping mechanisms and psychotherapy).     P: start amitriptyline 25mg qhs (orders placed)

## 2017-10-03 PROBLEM — G63 NEUROPATHY DUE TO PERIPHERAL VASCULAR DISEASE: Status: ACTIVE | Noted: 2017-01-01

## 2017-10-03 PROBLEM — I73.9 NEUROPATHY DUE TO PERIPHERAL VASCULAR DISEASE: Status: ACTIVE | Noted: 2017-01-01

## 2017-10-03 NOTE — DISCHARGE SUMMARY
[facility] Hospital Medicine  Discharge Summary      Patient Name: Leonardo Byrd  MRN: [id number]  Admission Date: 9/25/2017  Hospital Length of Stay: 7 days  Discharge Date and Time:  10/03/2017 3:31 PM  Attending Physician: Antoni Trujillo MD   Discharging Provider: Trixie Martins NP  Primary Care Provider: Indio Aquino MD  [facility] Medicine Team: [facility]  Trixie Martins NP    HPI:   Mr. Byrd is a 47 y/o male with past medical history of PAD, s/p aortobifemoral bypass and multiple interventions, ICM EF 30-35%, s/p ICD, CAD s/p PCI, HTN, HLD, every day tobacco smoker (stopped 3 weeks ago). He was recently admitted to [facility] for ischemic rest pain of his left lower limb. Arterial ultrasound showed occlusion of left mid/distal SFA, popliteal, posterior tibial, and dorsalis pedis arteries. He subsequently underwent angiogram by Dr. Rosas, with thrombolytics and angiojet to L SFA, popliteal, and PT arteries.  Post-procedure he developed a left thigh hematoma, for which a repeat angiogram was performed and no perforation was noted. His left leg pain is ongoing now for several weeks, even after intervention he says that he immediately had recurrence as soon as he woke up.  He spoke to his interventionalist, and was told they could repeat an angiogram but he may need amputation. He decided to present to [facility] to get a 2nd opinion. He reports this morning that his left foot is in significant pain localized to the top and bottom of his foot. He denies SOB, chest pain, or cough. He endorses ulcers present on his lower leg that began around the time he had his previous vascular surgery. He also endorses a decrease in ability to walk. He states that the pain in his foot has increased to such a point that ambulation is no longer possible. He also reports that his left foot has been significant colder than his right.    The patient was admitted to the [facility] Medicine  Service for further evaluation and management.     Procedure(s) (LRB):  PTA-PERIPHERAL (Left)      Indwelling Lines/Drains at time of discharge:   Lines/Drains/Airways          No matching active lines, drains, or airways        Hospital Course:   Mr. Byrd was admitted 09/25 due to PAD s/p recent angiogram with intervention with increased pain. CBC, chemistry, lipid panel, and HgA1C unremarkable. LE US finds occlusion of the left superficial femoral-popliteal stent with minimal arterial flow to the distal left lower extremity and occlusion of the anterior tibial artery. Interventional Cardiology consulted, films from [place] obtained, 09/27 the left SFA and Popliteal were recanalized with the catheter-assisted thrombolysis (EKOS) treatment system overnight while in the CCU, he then underwent repeat angiogram 09/28 the revealed successful therapy. Once limb stabilized he was deemed medically ready for step down to telemetry, care assumed again by Hospital Medicine team 10/01. He is still with signficant pain on dorsum of left foot, Neurology consulted and felt there was concern for complex regional pain syndrome vs infectious etiology causing this persistent pain that he describes as a burning pain/ numbness separate from his resolved claudication pain, bone scan completed and indicated CRPS more likely than cellulitis or osteomyelitis, Cardiology discussed findings with Neurology and initiated amitriptyline 25mg qhs.  Interventional cardiology only needs him to be on an antiplatelet (plavix or brilinta) for a month, the vorapaxar is a nonformulary antiplatelet which needs to be taken with another antiplatelet (he was on brilinta at home as well) since he was on both of these from home, we will resume these medications and he can f/u with his primary cardiologist if this needs to be continued past the one month recommendation.  His hyper-coaguable work up is still underway.     Disposition plans: his pain  control is going to take some time and will need specialized assistance with pain medicine (appt. Made with Dr. Garces for Monday), I ordered him a walking boot to help him get around with his peripheral neuropathy that podiatry curbsided recommended and he has refused home health.      Consults:   Consults         Status Ordering Provider     Inpatient consult to Cardiology  Once     Provider:  (Not yet assigned)    Completed QUENTIN WARD     Inpatient consult to Neurology  Once     Provider:  (Not yet assigned)    Completed CRIS THRASHER         Review of Systems   Constitutional: Positive for activity change (pain related). Negative for appetite change, chills, diaphoresis, fatigue, fever and unexpected weight change.   HENT: Negative for congestion, rhinorrhea, sinus pain, sinus pressure, sneezing and sore throat.    Respiratory: Negative for cough, chest tightness and wheezing.    Cardiovascular: Negative for chest pain, palpitations and leg swelling.   Gastrointestinal: Negative for abdominal distention, abdominal pain, constipation, diarrhea, nausea and vomiting.   Genitourinary: Negative for decreased urine volume, difficulty urinating, flank pain, frequency, hematuria and urgency.   Musculoskeletal: Positive for myalgias (dorsum left foot). Negative for arthralgias.   Skin: Positive for wound (left leg ulcers).   Neurological: L foot numb and painful (chronic per patient unchanged from prior to admit)    - Negative for dizziness, syncope, light-headedness and headaches.   Psychiatric/Behavioral: Negative for agitation and confusion. The patient is not nervous/anxious.      Physical Exam   Constitutional: He is oriented to person, place, and time. He appears well-developed and well-nourished. No distress.   HENT:   Head: Normocephalic and atraumatic.   Mouth/Throat: Mucous membranes are normal. Normal dentition.   Eyes: Conjunctivae and lids are normal. Pupils are equal, round, and reactive to  light.   Neck: Normal range of motion. Neck supple. No JVD present.   Cardiovascular: Normal rate, regular rhythm and normal heart sounds.  Exam reveals no gallop and no friction rub.    No murmur heard.  Pulses:       Radial pulses are 3+ on the right side, and 3+ on the left side.        Femoral pulses are 3+ on the right side, and 3+ on the left side.       Popliteal pulses are 1+ on the right side, and 1+ on the left side.        Dorsalis pedis pulses are 2+ on the right side, and 1+ on the left side.        Posterior tibial pulses are 2+ on the right side. Left posterior tibial pulse not accessible.  Unable to assess left PT pulse due to pain and wound   Pulmonary/Chest: Effort normal and breath sounds normal. He exhibits no tenderness.   Abdominal: Soft. Bowel sounds are normal. He exhibits no distension. There is no tenderness.   Musculoskeletal: Normal range of motion. He exhibits tenderness. He exhibits no edema or deformity.        Left ankle: He exhibits ecchymosis.   s/p right great toe amputation. Left thigh ecchymosis from procedure prior to admission. Small non-healing sores on anterior and lateral lower leg. Motor function and sensation to left foot intact, hyperesthesia and erythemia over dorsum of left foot. Slight discoloration to left great toe, cap refill <3 seconds.      Neurological: He is alert and oriented to person, place, and time. No cranial nerve deficit.   Skin: Skin is warm and dry. Capillary refill takes more than 3 seconds. Lesion noted. No rash noted. He is not diaphoretic.   Left leg, multiple small ulcers in various stages   Psychiatric: He has a normal mood and affect. Judgment and thought content normal.   Nursing note and vitals reviewed.       Significant Diagnostic Studies: Labs:   BMP:   Recent Labs  Lab 10/02/17  0652 10/03/17  0650   GLU 86 91    133*   K 4.6 4.6   CL 98 100   CO2 29 24   BUN 15 19   CREATININE 0.8 0.8   CALCIUM 9.6 9.3   MG 2.1 1.9   , CMP   Recent  Labs  Lab 10/02/17  0652 10/03/17  0650    133*   K 4.6 4.6   CL 98 100   CO2 29 24   GLU 86 91   BUN 15 19   CREATININE 0.8 0.8   CALCIUM 9.6 9.3   ANIONGAP 10 9   ESTGFRAFRICA >60.0 >60.0   EGFRNONAA >60.0 >60.0   , CBC   Recent Labs  Lab 10/03/17  0650   WBC 9.16   HGB 14.0   HCT 40.3      , INR   Lab Results   Component Value Date    INR 0.9 09/26/2017    INR 0.9 09/14/2017    INR 0.9 09/14/2017   , Lipid Panel   Lab Results   Component Value Date    CHOL 189 09/26/2017    HDL 62 09/26/2017    LDLCALC 102.0 09/26/2017    TRIG 125 09/26/2017    CHOLHDL 32.8 09/26/2017    and Troponin No results for input(s): TROPONINI in the last 168 hours.    Angiography:   LLE PTA:      - Left Superficial Femoral Artery:             The proximal left SFA has a 70% stenosis.                     Lesion Details:   The length is 4 cm. The minimum luminal diameter is 1 millimeters. The reference vessel diameter is 7 millimeters.             The mid left SFA has luminal irregularities is patent within the stent.             The distal left SFA is patent within the stent.     - Left Popliteal Artery:             The proximal left POP has a 70% stenosis.             The mid left POP has a 60% stenosis.                     Lesion Details:   The length is 2 cm. The minimum luminal diameter is 2 millimeters. The reference vessel diameter is 5 millimeters.     - Left Tibioperoneal Trunk:             The left TIBTRUNK has luminal irregularities.     - Left Anterior Tibial Artery:             The proximal left AT is occluded (100). There is PRIYANKA 0 flow.     - Left Peroneal Artery:             The left peroneal has luminal irregularities.     - Left Posterior Tibial Artery:             The left PT has luminal irregularities.    1. Routine post PCI care.  2. Maximize medical management.  3. ASA 81mg.  4. Tobacco cessation counseling.  5. Statin therapy.  6. Plavix for one month minimum.  Pending Diagnostic Studies:     Procedure  Component Value Units Date/Time    APTT [[id number]] Collected:  09/27/17 1029    Order Status:  Sent Lab Status:  In process Updated:  09/27/17 1030    Specimen:  Blood from Blood     APTT [[id number]] Collected:  09/27/17 1023    Order Status:  Sent Lab Status:  In process Updated:  09/27/17 1023    Specimen:  Blood from Blood     CBC auto differential [[id number]] Collected:  09/27/17 1029    Order Status:  Sent Lab Status:  In process Updated:  09/27/17 1030    Specimen:  Blood from Blood     Cardiolipin antibody [[id number]] Collected:  10/02/17 0652    Order Status:  Sent Lab Status:  In process Updated:  10/02/17 0702    Specimen:  Blood from Blood     Factor 5 leiden [[id number]] Collected:  10/02/17 0652    Order Status:  Sent Lab Status:  In process Updated:  10/02/17 0702    Specimen:  Blood from Blood     Fibrinogen [[id number]] Collected:  09/29/17 2044    Order Status:  Sent Lab Status:  In process Updated:  09/29/17 2045    Specimen:  Blood from Blood     Fibrinogen [[id number]] Collected:  09/27/17 1029    Order Status:  Sent Lab Status:  In process Updated:  09/27/17 1030    Specimen:  Blood from Blood     Protein C activity [[id number]] Collected:  10/02/17 0652    Order Status:  Sent Lab Status:  In process Updated:  10/02/17 0702    Specimen:  Blood from Blood     Protein S activity [[id number]] Collected:  10/02/17 0652    Order Status:  Sent Lab Status:  In process Updated:  10/02/17 0702    Specimen:  Blood from Blood     Prothrombin gene mutation [[id number]] Collected:  10/02/17 0652    Order Status:  Sent Lab Status:  In process Updated:  10/02/17 0702    Specimen:  Blood from Blood     Prothrombin gene mutation [[id number]] Collected:  10/02/17 0652    Order Status:  Sent Lab Status:  In process Updated:  10/02/17 0702    Specimen:  Blood from Blood     Protime-INR [[id number]] Collected:  09/27/17 1029    Order Status:  Sent Lab Status:  In process Updated:  09/27/17 1030    Specimen:  Blood from  Blood         Final Active Diagnoses:    Diagnosis Date Noted POA    PRINCIPAL PROBLEM:  PAD (peripheral artery disease) [I73.9] 08/02/2017 Yes    Arterial occlusion, lower extremity [I74.3] 02/27/2017 Yes    Acute pain of left foot [M79.672] 09/29/2017 Yes    Chronic systolic heart failure [I50.22] 09/19/2017 Yes    Tobacco abuse [Z72.0] 02/27/2017 Yes    Alcohol abuse [F10.10] 09/27/2017 Yes    Neuropathy due to peripheral vascular disease [I73.9, G63] 10/03/2017 Yes      Problems Resolved During this Admission:    Diagnosis Date Noted Date Resolved POA      * PAD (peripheral artery disease)    - LLE us 09/26 with occlusion of the left superficial femoral-popliteal stent with minimal arterial flow to the distal left lower extremity and occlusion of the anterior tibial artery  -09/27 the left SFA and Poplitea recanalized w/ catheter-assisted thrombolysis (EKOS) treatment system  -repeat angiogram 09/28 the revealed successful therapy  -now with complex regional pain syndrome    - infectious etiology ruled out for cause of pain    - no longer c/o claudication pain, + numbness and hot burning pain. S/p bone scan completed and felt more likely CRPS.  Does not have appearance of an infectious etiology  - cardiology discused with Neurology, initiated on amitriptyline 25 mg qHS and will need outpt psych follow up   -Neurology evaluated  -Cardiology following  -labs pending to evaluate for hypercoagulable states  -CBC, chemistry, lipid panel, and HgA1c unremarkable  -continue home ASA, statin, pletal, ranexa, and brilinita, resume his vorapaxar   -continue home lyrica and PRN medications for pain   -prior to admission s/p aortobifemoral bypass and multiple interventions, recently admitted to [facility] for ischemic rest pain of his left lower limb, Arterial US w/ occlusion of left mid/distal SFA, popliteal, posterior tibial, and dorsalis pedis arteries, subsequently underwent angiogram with thrombolytics  and angiojet to L SFA, popliteal, and PT arteries, post-procedure he developed a left thigh hematoma, for which a repeat angiogram was performed and no perforation was noted        Arterial occlusion, lower extremity    -see above        Acute pain of left foot    -see above        Chronic systolic heart failure    -no echo noted in [facility] system   -appears euvolemic on exam   -continue home metoprolol and lisinopril   -monitor of hospital course        Tobacco abuse    -tobacco cessation provided  -nicotine patch in house        Alcohol abuse    -no s/s of DTs, did not require treatment in house        Neuropathy due to peripheral vascular disease    - lyrica  - f/u with pain specialist Dr. Garces Monday coming up.   - podiatry curbsided recommended a walker boot to assist with walking, refusing home health   - added elavil to regimen for chronic regional pain syndrome   - patient aware these meds will take awhile to kick in and patient asked about medical marijuana, explained he needed to speak with his pain specialist regarding that             Discharged Condition: good    Disposition: Home or Self Care    Follow Up:  Follow-up Information     [facility] INTERVENTIONAL CARDIOLOGY. Schedule an appointment as soon as possible for a visit in 1 week.    Specialty:  Interventional Cardiology  Why:  post hospital follow up   Contact information:  [address]  [facility] [address] [phone]           [facility] - Psychiatry. Schedule an appointment as soon as possible for a visit in 1 week.    Specialty:  Psychiatry  Why:  post hospital follow up for complex angelica pain syndrome   Contact information:  [address]  [facility] [address] [phone]  Additional information:  [facility] - 4th Floor           Alvin Rosas MD On 10/6/2017.    Specialty:  Cardiology  Why:  1:00 appointment  Contact information:  [address]  [facility]  [place] [address]  [phone]             Adan COON Jr  MD [place] On 10/9/2017.    Specialty:  Pain Medicine  Why:  9:45 appointment  Contact information:  [facility]  [place] [address]  [phone]                 Patient Instructions:     Ambulatory Referral to Interventional Cardiology   Referral Priority: Routine Referral Type: Consultation   Referral Reason: Specialty Services Required    Requested Specialty: INTERVENTIONAL CARDIOLOGY    Number of Visits Requested: 1      Ambulatory consult to Psychiatry   Referral Priority: Routine Referral Type: Psychiatric   Referral Reason: Specialty Services Required    Requested Specialty: Psychiatry    Number of Visits Requested: 1      Diet general     Diet general   Order Specific Question Answer Comments   Na restriction, if any: 2gNa    Additional restrictions: Cardiac (Low Na/Chol)      Activity as tolerated     Call MD for:  temperature >100.4     Call MD for:  persistent nausea and vomiting or diarrhea     Call MD for:  severe uncontrolled pain     Call MD for:  redness, tenderness, or signs of infection (pain, swelling, redness, odor or green/yellow discharge around incision site)     Call MD for:  difficulty breathing or increased cough     Call MD for:  severe persistent headache     Call MD for:  worsening rash     Call MD for:  persistent dizziness, light-headedness, or visual disturbances     Call MD for:  increased confusion or weakness       Medications:  Reconciled Home Medications:   Current Discharge Medication List      START taking these medications    Details   amitriptyline (ELAVIL) 25 MG tablet Take 1 tablet (25 mg total) by mouth every evening.  Qty: 30 tablet, Refills: 11      mupirocin (BACTROBAN) 2 % ointment Apply topically once daily.  Qty: 22 g, Refills: 0         CONTINUE these medications which have CHANGED    Details   lisinopril (PRINIVIL,ZESTRIL) 5 MG tablet Take 1 tablet (5 mg total) by mouth once daily.  Qty: 90 tablet, Refills: 3      vorapaxar 2.08 mg Tab Take 1 tablet (2.08  mg total) by mouth once daily.  Qty: 30 tablet, Refills: 1         CONTINUE these medications which have NOT CHANGED    Details   aspirin (ECOTRIN) 81 MG EC tablet Take 81 mg by mouth once daily.      atorvastatin (LIPITOR) 40 MG tablet Take 40 mg by mouth every evening.      cilostazol (PLETAL) 50 MG Tab Take 2 tablets (100 mg total) by mouth 2 (two) times daily.      metoprolol tartrate (LOPRESSOR) 25 MG tablet Take 12.5 mg by mouth 2 (two) times daily.      oxycodone-acetaminophen (LYNOX) 7.5-300 mg per tablet Take 1 tablet by mouth every 4 (four) hours as needed for Pain.      pregabalin (LYRICA) 100 MG capsule Take 1 capsule (100 mg total) by mouth 3 (three) times daily.  Qty: 90 capsule, Refills: 6      ranolazine (RANEXA) 500 MG Tb12 Take 1,000 mg by mouth 2 (two) times daily.      ticagrelor (BRILINTA) 90 mg tablet Take 90 mg by mouth 2 (two) times daily.      nicotine (NICODERM CQ) 21 mg/24 hr Place 1 patch onto the skin once daily. Down titrating as directed  Refills: 0      nitroGLYCERIN (NITROSTAT) 0.4 MG SL tablet Place 0.4 mg under the tongue every 5 (five) minutes as needed for Chest pain.           Time spent on the discharge of patient: 55 minutes      Trixie Martins NP  Department of Hospital Medicine  [facility]  Spectra:  [phone]  Pager: [phone]

## 2017-10-03 NOTE — ASSESSMENT & PLAN NOTE
- LLE us 09/26 with occlusion of the left superficial femoral-popliteal stent with minimal arterial flow to the distal left lower extremity and occlusion of the anterior tibial artery  -09/27 the left SFA and Poplitea recanalized w/ catheter-assisted thrombolysis (EKOS) treatment system  -repeat angiogram 09/28 the revealed successful therapy  -now with complex regional pain syndrome    - infectious etiology ruled out for cause of pain    - no longer c/o claudication pain, + numbness and hot burning pain. S/p bone scan completed and felt more likely CRPS.  Does not have appearance of an infectious etiology  - cardiology discused with Neurology, initiated on amitriptyline 25 mg qHS and will need outpt psych follow up   -Neurology evaluated  -Cardiology following  -labs pending to evaluate for hypercoagulable states  -CBC, chemistry, lipid panel, and HgA1c unremarkable  -continue home ASA, statin, pletal, ranexa, and brilinita, resume his vorapaxar   -continue home lyrica and PRN medications for pain   -prior to admission s/p aortobifemoral bypass and multiple interventions, recently admitted to [facility] for ischemic rest pain of his left lower limb, Arterial US w/ occlusion of left mid/distal SFA, popliteal, posterior tibial, and dorsalis pedis arteries, subsequently underwent angiogram with thrombolytics and angiojet to L SFA, popliteal, and PT arteries, post-procedure he developed a left thigh hematoma, for which a repeat angiogram was performed and no perforation was noted

## 2017-10-03 NOTE — PLAN OF CARE
Problem: Patient Care Overview  Goal: Plan of Care Review  Outcome: Ongoing (interventions implemented as appropriate)  VSS. O2 sats stable on RA. Pt denies CP, SOB, palpitations, lightheadedness and dizziness. Fall precautions maintained this shift, pt remains free from falls, trauma and injury. Pt c/o pain to L leg & foot; Pt's pain being managed via PRN Oxycodone. POC reviewed with pt, verbalized understanding. NADN. Will continue to monitor.

## 2017-10-03 NOTE — PLAN OF CARE
received home health orders today.  Met with pt at the bedside to provide him with a list of hh agencies.  Pt refuse home health.  He will return home with family.

## 2017-10-03 NOTE — ASSESSMENT & PLAN NOTE
- lyrica  - f/u with pain specialist Dr. Garces Monday coming up.   - podiatry curbsided recommended a walker boot to assist with walking, refusing home health   - added elavil to regimen for chronic regional pain syndrome   - patient aware these meds will take awhile to kick in and patient asked about medical marijuana, explained he needed to speak with his pain specialist regarding that

## 2017-10-04 NOTE — PLAN OF CARE
10/04/17 0710   Final Note   Assessment Type Final Discharge Note   Discharge Disposition Home  (refused [facility])   Hospital Follow Up  Appt(s) scheduled? Yes

## 2017-10-04 NOTE — PROGRESS NOTES
C3 nurse attempted to contact patient. No answer. The following message was left for the patient to return the call:  Good Afternoon, I am a nurse calling on behalf of [facility] from the Care Coordination Center.  This is a Transitional Care Call for Leonardo Byrd . When you have a moment please contact us at [phone] or [phone] Monday through Friday, between the hours of 8 am to 4 pm. We look forward to speaking with you. On behalf of [facility] have a nice day.    The patient does not have a scheduled HOSFU appointment within 7-14 days post hospital discharge date 10/2/17. Message not sent to Physician staff to assist with HOSFU appointment scheduling.

## 2017-10-04 NOTE — PATIENT INSTRUCTIONS
Peripheral Angioplasty  Peripheral angioplasty is a procedure that helps open blockages in peripheral arteries. These vessels carry blood to your lower body, legs, and arms.  Talk with your healthcare provider about the risks and complications of angioplasty.   Before the procedure  Recommendations of what to do include:   · Tell your healthcare provider about all medicines you take including over-the-counter medicines, herbal supplements, and any allergies you may have, especially to iodine.   · Follow any directions youre given for not eating or drinking before the procedure.  · Arrange for a family member or friend to drive you home.  During the procedure    · You may get medicine through an IV (intravenous) line to relax you. An injection will numb the site your healthcare provider will use to perform the procedure. The site used is generally an artery in the groin although the wrist or arm may be used. The healthcare provider makes a tiny skin cut (incision) near an artery in your groin.  · Your healthcare provider puts a thin, flexible tube (catheter) through the incision. He or she then threads the catheter into the affected artery while using X-ray monitoring.  · Contrast dye is injected into the catheter. X-rays (angiography) are taken.  · A tiny balloon is pushed through the catheter to the blockage. Your healthcare provider inflates and deflates the balloon a few times. This compresses the plaque. A small metal or mesh tube (stent) may be put in the artery to help keep it open. The balloon and catheter are then taken out.  After the procedure  Youll be taken to a recovery area. Pressure is put on the insertion site for about 30 to 45 minutes. Your healthcare provider will tell you how long to lie down and keep the insertion site still. You will go home that day or spend the night in the facility. You will be given aftercare instructions for when you go home.   Call your healthcare provider  Call  your healthcare provider right away or get immediate medical attention if:  · You notice a lump or bleeding at the site where the catheter was inserted  · You feel increasing pain at the insertion site  · You become lightheaded or dizzy  · You have leg pain or numbness  · You have a leg that turns blue or feels cold  · You develop a fever greater than 101.5°F (38.6°C).  · You develop a skin rash  · You cannot urinate after the procedure  · You have chest pain or shortness of breath   Date Last Reviewed: 5/1/2016  © 6663-4205 [employer]. [address] [place] [id number]. All rights reserved. This information is not intended as a substitute for professional medical care. Always follow your healthcare professional's instructions.

## 2018-01-01 ENCOUNTER — SURGERY (OUTPATIENT)
Age: 48
End: 2018-01-01

## 2018-01-01 ENCOUNTER — HOSPITAL ENCOUNTER (INPATIENT)
Facility: HOSPITAL | Age: 48
LOS: 3 days | Discharge: ANOTHER HOSPITAL | DRG: 291 | End: 2018-06-16
Attending: SURGERY | Admitting: INTERNAL MEDICINE
Payer: MEDICARE

## 2018-01-01 ENCOUNTER — TELEPHONE (OUTPATIENT)
Dept: PODIATRY | Facility: CLINIC | Age: 48
End: 2018-01-01

## 2018-01-01 ENCOUNTER — ANESTHESIA (OUTPATIENT)
Dept: MEDSURG UNIT | Facility: HOSPITAL | Age: 48
End: 2018-01-01

## 2018-01-01 ENCOUNTER — ANESTHESIA EVENT (OUTPATIENT)
Dept: INTENSIVE CARE | Facility: HOSPITAL | Age: 48
DRG: 291 | End: 2018-01-01
Payer: MEDICARE

## 2018-01-01 ENCOUNTER — ANESTHESIA (OUTPATIENT)
Dept: SURGERY | Facility: HOSPITAL | Age: 48
DRG: 239 | End: 2018-01-01
Payer: MEDICARE

## 2018-01-01 ENCOUNTER — TELEPHONE (OUTPATIENT)
Dept: ADMINISTRATIVE | Facility: CLINIC | Age: 48
End: 2018-01-01

## 2018-01-01 ENCOUNTER — ANESTHESIA (OUTPATIENT)
Dept: INTENSIVE CARE | Facility: HOSPITAL | Age: 48
DRG: 271 | End: 2018-01-01
Payer: MEDICARE

## 2018-01-01 ENCOUNTER — HOSPITAL ENCOUNTER (EMERGENCY)
Facility: HOSPITAL | Age: 48
Discharge: HOME | End: 2018-07-12
Attending: SURGERY
Payer: MEDICARE

## 2018-01-01 ENCOUNTER — PATIENT OUTREACH (OUTPATIENT)
Dept: ADMINISTRATIVE | Facility: CLINIC | Age: 48
End: 2018-01-01

## 2018-01-01 ENCOUNTER — HOSPITAL ENCOUNTER (OUTPATIENT)
Dept: VASCULAR SURGERY | Facility: CLINIC | Age: 48
Discharge: HOME | End: 2018-05-03
Attending: STUDENT IN AN ORGANIZED HEALTH CARE EDUCATION/TRAINING PROGRAM
Payer: MEDICARE

## 2018-01-01 ENCOUNTER — ANESTHESIA EVENT (OUTPATIENT)
Dept: MEDSURG UNIT | Facility: HOSPITAL | Age: 48
End: 2018-01-01

## 2018-01-01 ENCOUNTER — ANESTHESIA (OUTPATIENT)
Dept: MEDSURG UNIT | Facility: HOSPITAL | Age: 48
DRG: 271 | End: 2018-01-01
Payer: MEDICARE

## 2018-01-01 ENCOUNTER — OFFICE VISIT (OUTPATIENT)
Dept: VASCULAR SURGERY | Facility: CLINIC | Age: 48
End: 2018-01-01
Payer: MEDICARE

## 2018-01-01 ENCOUNTER — ANESTHESIA EVENT (OUTPATIENT)
Dept: MEDSURG UNIT | Facility: HOSPITAL | Age: 48
DRG: 271 | End: 2018-01-01
Payer: MEDICARE

## 2018-01-01 ENCOUNTER — ANESTHESIA (OUTPATIENT)
Dept: INTENSIVE CARE | Facility: HOSPITAL | Age: 48
DRG: 291 | End: 2018-01-01
Payer: MEDICARE

## 2018-01-01 ENCOUNTER — HOSPITAL ENCOUNTER (OUTPATIENT)
Dept: WOUND CARE | Facility: HOSPITAL | Age: 48
Discharge: HOME | End: 2018-05-18
Attending: EMERGENCY MEDICINE
Payer: MEDICARE

## 2018-01-01 ENCOUNTER — HOSPITAL ENCOUNTER (OUTPATIENT)
Dept: WOUND CARE | Facility: HOSPITAL | Age: 48
Discharge: HOME | End: 2018-05-11
Attending: SURGERY
Payer: MEDICARE

## 2018-01-01 ENCOUNTER — ANESTHESIA EVENT (OUTPATIENT)
Dept: SURGERY | Facility: HOSPITAL | Age: 48
DRG: 271 | End: 2018-01-01
Payer: MEDICARE

## 2018-01-01 ENCOUNTER — HOSPITAL ENCOUNTER (OUTPATIENT)
Dept: WOUND CARE | Facility: HOSPITAL | Age: 48
Discharge: HOME | End: 2018-05-28
Attending: SURGERY
Payer: MEDICARE

## 2018-01-01 ENCOUNTER — HOSPITAL ENCOUNTER (OUTPATIENT)
Dept: WOUND CARE | Facility: HOSPITAL | Age: 48
Discharge: HOME | End: 2018-05-21
Attending: EMERGENCY MEDICINE
Payer: MEDICARE

## 2018-01-01 ENCOUNTER — ANESTHESIA EVENT (OUTPATIENT)
Dept: SURGERY | Facility: HOSPITAL | Age: 48
DRG: 239 | End: 2018-01-01
Payer: MEDICARE

## 2018-01-01 ENCOUNTER — HOSPITAL ENCOUNTER (INPATIENT)
Facility: HOSPITAL | Age: 48
LOS: 17 days | Discharge: HOME HEALTH | DRG: 239 | End: 2018-07-03
Attending: INTERNAL MEDICINE | Admitting: INTERNAL MEDICINE
Payer: MEDICARE

## 2018-01-01 ENCOUNTER — ANESTHESIA (OUTPATIENT)
Dept: SURGERY | Facility: HOSPITAL | Age: 48
DRG: 271 | End: 2018-01-01
Payer: MEDICARE

## 2018-01-01 ENCOUNTER — HOSPITAL ENCOUNTER (OUTPATIENT)
Dept: WOUND CARE | Facility: HOSPITAL | Age: 48
Discharge: HOME | End: 2018-06-08
Attending: EMERGENCY MEDICINE
Payer: MEDICARE

## 2018-01-01 ENCOUNTER — HOSPITAL ENCOUNTER (INPATIENT)
Facility: HOSPITAL | Age: 48
LOS: 8 days | DRG: 291 | End: 2018-07-22
Attending: EMERGENCY MEDICINE | Admitting: HOSPITALIST
Payer: MEDICARE

## 2018-01-01 ENCOUNTER — ANESTHESIA EVENT (OUTPATIENT)
Dept: INTENSIVE CARE | Facility: HOSPITAL | Age: 48
DRG: 271 | End: 2018-01-01
Payer: MEDICARE

## 2018-01-01 ENCOUNTER — TELEPHONE (OUTPATIENT)
Dept: CARDIOTHORACIC SURGERY | Facility: CLINIC | Age: 48
End: 2018-01-01

## 2018-01-01 ENCOUNTER — HOSPITAL ENCOUNTER (INPATIENT)
Facility: HOSPITAL | Age: 48
LOS: 24 days | Discharge: NURSING, REHAB OR LONG-TERM CARE | DRG: 271 | End: 2018-04-11
Attending: FAMILY MEDICINE | Admitting: INTERNAL MEDICINE
Payer: MEDICARE

## 2018-01-01 VITALS — TEMPERATURE: 98 F | SYSTOLIC BLOOD PRESSURE: 104 MMHG | DIASTOLIC BLOOD PRESSURE: 70 MMHG | HEART RATE: 124 BPM

## 2018-01-01 VITALS
HEART RATE: 115 BPM | DIASTOLIC BLOOD PRESSURE: 65 MMHG | BODY MASS INDEX: 22.76 KG/M2 | WEIGHT: 145 LBS | TEMPERATURE: 99 F | SYSTOLIC BLOOD PRESSURE: 108 MMHG | HEIGHT: 67 IN

## 2018-01-01 VITALS
HEART RATE: 122 BPM | DIASTOLIC BLOOD PRESSURE: 51 MMHG | SYSTOLIC BLOOD PRESSURE: 98 MMHG | TEMPERATURE: 98 F | WEIGHT: 145 LBS | SYSTOLIC BLOOD PRESSURE: 113 MMHG | HEART RATE: 108 BPM | TEMPERATURE: 98 F | HEIGHT: 67 IN | BODY MASS INDEX: 22.76 KG/M2 | DIASTOLIC BLOOD PRESSURE: 70 MMHG

## 2018-01-01 VITALS
WEIGHT: 125 LBS | HEIGHT: 67 IN | SYSTOLIC BLOOD PRESSURE: 100 MMHG | HEIGHT: 66 IN | BODY MASS INDEX: 20.09 KG/M2 | WEIGHT: 116.88 LBS | RESPIRATION RATE: 26 BRPM | RESPIRATION RATE: 20 BRPM | SYSTOLIC BLOOD PRESSURE: 119 MMHG | OXYGEN SATURATION: 100 % | HEART RATE: 96 BPM | TEMPERATURE: 97 F | HEART RATE: 93 BPM | DIASTOLIC BLOOD PRESSURE: 65 MMHG | OXYGEN SATURATION: 78 % | BODY MASS INDEX: 18.35 KG/M2 | TEMPERATURE: 102 F | DIASTOLIC BLOOD PRESSURE: 83 MMHG

## 2018-01-01 VITALS
OXYGEN SATURATION: 100 % | TEMPERATURE: 97 F | HEART RATE: 75 BPM | WEIGHT: 132.94 LBS | RESPIRATION RATE: 21 BRPM | SYSTOLIC BLOOD PRESSURE: 75 MMHG | BODY MASS INDEX: 20.87 KG/M2 | HEIGHT: 67 IN | DIASTOLIC BLOOD PRESSURE: 49 MMHG

## 2018-01-01 VITALS
HEART RATE: 94 BPM | OXYGEN SATURATION: 97 % | TEMPERATURE: 97 F | RESPIRATION RATE: 18 BRPM | SYSTOLIC BLOOD PRESSURE: 100 MMHG | DIASTOLIC BLOOD PRESSURE: 80 MMHG

## 2018-01-01 VITALS
WEIGHT: 145 LBS | DIASTOLIC BLOOD PRESSURE: 58 MMHG | HEIGHT: 67 IN | SYSTOLIC BLOOD PRESSURE: 128 MMHG | HEART RATE: 124 BPM | BODY MASS INDEX: 22.76 KG/M2 | TEMPERATURE: 98 F

## 2018-01-01 VITALS
HEIGHT: 67 IN | DIASTOLIC BLOOD PRESSURE: 55 MMHG | BODY MASS INDEX: 22.91 KG/M2 | TEMPERATURE: 98 F | RESPIRATION RATE: 16 BRPM | OXYGEN SATURATION: 95 % | HEART RATE: 107 BPM | SYSTOLIC BLOOD PRESSURE: 90 MMHG | WEIGHT: 145.94 LBS

## 2018-01-01 DIAGNOSIS — R06.02 SHORTNESS OF BREATH: ICD-10-CM

## 2018-01-01 DIAGNOSIS — G63 NEUROPATHY DUE TO PERIPHERAL VASCULAR DISEASE: ICD-10-CM

## 2018-01-01 DIAGNOSIS — I73.9 PERIPHERAL VASCULAR DISEASE OF LOWER EXTREMITY: Primary | ICD-10-CM

## 2018-01-01 DIAGNOSIS — I73.9 PAD (PERIPHERAL ARTERY DISEASE): Primary | ICD-10-CM

## 2018-01-01 DIAGNOSIS — I50.9 ACUTE ON CHRONIC HEART FAILURE: ICD-10-CM

## 2018-01-01 DIAGNOSIS — J96.01 ACUTE RESPIRATORY FAILURE WITH HYPOXIA: ICD-10-CM

## 2018-01-01 DIAGNOSIS — I50.9 CONGESTIVE HEART FAILURE, UNSPECIFIED HF CHRONICITY, UNSPECIFIED HEART FAILURE TYPE: ICD-10-CM

## 2018-01-01 DIAGNOSIS — E87.20 LACTIC ACID ACIDOSIS: ICD-10-CM

## 2018-01-01 DIAGNOSIS — K72.00 SHOCK LIVER: ICD-10-CM

## 2018-01-01 DIAGNOSIS — I73.9 PERIPHERAL VASCULAR DISEASE OF LOWER EXTREMITY: ICD-10-CM

## 2018-01-01 DIAGNOSIS — I50.9 HEART FAILURE: ICD-10-CM

## 2018-01-01 DIAGNOSIS — I25.10 CAD (CORONARY ARTERY DISEASE): ICD-10-CM

## 2018-01-01 DIAGNOSIS — R57.0 CARDIOGENIC SHOCK: ICD-10-CM

## 2018-01-01 DIAGNOSIS — J44.0 COPD WITH ACUTE BRONCHITIS: ICD-10-CM

## 2018-01-01 DIAGNOSIS — I50.43 ACUTE ON CHRONIC COMBINED SYSTOLIC AND DIASTOLIC HEART FAILURE: ICD-10-CM

## 2018-01-01 DIAGNOSIS — F10.10 ALCOHOL ABUSE: ICD-10-CM

## 2018-01-01 DIAGNOSIS — R94.31 PROLONGED QT INTERVAL: ICD-10-CM

## 2018-01-01 DIAGNOSIS — I50.22 CHRONIC SYSTOLIC HEART FAILURE: ICD-10-CM

## 2018-01-01 DIAGNOSIS — N17.9 AKI (ACUTE KIDNEY INJURY): ICD-10-CM

## 2018-01-01 DIAGNOSIS — A41.9 SEPSIS, DUE TO UNSPECIFIED ORGANISM: Primary | ICD-10-CM

## 2018-01-01 DIAGNOSIS — R52 PAIN: ICD-10-CM

## 2018-01-01 DIAGNOSIS — I73.9 NEUROPATHY DUE TO PERIPHERAL VASCULAR DISEASE: ICD-10-CM

## 2018-01-01 DIAGNOSIS — I99.8 LIMB ISCHEMIA: ICD-10-CM

## 2018-01-01 DIAGNOSIS — Z72.0 TOBACCO ABUSE: ICD-10-CM

## 2018-01-01 DIAGNOSIS — I73.9 PVD (PERIPHERAL VASCULAR DISEASE): ICD-10-CM

## 2018-01-01 DIAGNOSIS — E87.5 HYPERKALEMIA: ICD-10-CM

## 2018-01-01 DIAGNOSIS — R00.0 TACHYCARDIA: ICD-10-CM

## 2018-01-01 DIAGNOSIS — R41.0 DELIRIUM: ICD-10-CM

## 2018-01-01 DIAGNOSIS — M86.472 CHRONIC OSTEOMYELITIS OF LEFT FOOT WITH DRAINING SINUS: ICD-10-CM

## 2018-01-01 DIAGNOSIS — F05 DELIRIUM DUE TO MULTIPLE ETIOLOGIES, ACUTE, HYPERACTIVE: ICD-10-CM

## 2018-01-01 DIAGNOSIS — J20.9 COPD WITH ACUTE BRONCHITIS: ICD-10-CM

## 2018-01-01 DIAGNOSIS — N17.0 ACUTE RENAL FAILURE WITH TUBULAR NECROSIS: ICD-10-CM

## 2018-01-01 DIAGNOSIS — I73.9 PAD (PERIPHERAL ARTERY DISEASE): ICD-10-CM

## 2018-01-01 DIAGNOSIS — R57.0 CARDIOGENIC SHOCK: Primary | ICD-10-CM

## 2018-01-01 DIAGNOSIS — I25.5 ISCHEMIC CARDIOMYOPATHY: ICD-10-CM

## 2018-01-01 DIAGNOSIS — R94.31 QT PROLONGATION: ICD-10-CM

## 2018-01-01 DIAGNOSIS — R01.1 NEWLY RECOGNIZED HEART MURMUR: ICD-10-CM

## 2018-01-01 DIAGNOSIS — D72.829 LEUKOCYTOSIS, UNSPECIFIED TYPE: ICD-10-CM

## 2018-01-01 DIAGNOSIS — L03.116 CELLULITIS OF LEFT LOWER EXTREMITY: ICD-10-CM

## 2018-01-01 DIAGNOSIS — R07.9 CHEST PAIN: ICD-10-CM

## 2018-01-01 DIAGNOSIS — I96 GANGRENE: ICD-10-CM

## 2018-01-01 DIAGNOSIS — I70.209 ATHEROSCLEROSIS OF NATIVE ARTERY OF EXTREMITY: ICD-10-CM

## 2018-01-01 DIAGNOSIS — I25.10 CARDIOVASCULAR DISEASE: ICD-10-CM

## 2018-01-01 DIAGNOSIS — M86.372 CHRONIC MULTIFOCAL OSTEOMYELITIS OF LEFT FOOT: ICD-10-CM

## 2018-01-01 DIAGNOSIS — M79.672 ACUTE PAIN OF LEFT FOOT: ICD-10-CM

## 2018-01-01 DIAGNOSIS — R00.0 SINUS TACHYCARDIA: ICD-10-CM

## 2018-01-01 DIAGNOSIS — I50.43 ACUTE ON CHRONIC COMBINED SYSTOLIC AND DIASTOLIC CONGESTIVE HEART FAILURE: Primary | ICD-10-CM

## 2018-01-01 DIAGNOSIS — I70.229 CRITICAL LOWER LIMB ISCHEMIA: Chronic | ICD-10-CM

## 2018-01-01 DIAGNOSIS — I48.3 TYPICAL ATRIAL FLUTTER: ICD-10-CM

## 2018-01-01 DIAGNOSIS — G89.29 CHRONIC PAIN: ICD-10-CM

## 2018-01-01 DIAGNOSIS — E87.20 LACTIC ACIDOSIS: ICD-10-CM

## 2018-01-01 DIAGNOSIS — Z99.11 DEPENDENCE ON VENTILATOR, STATUS: ICD-10-CM

## 2018-01-01 DIAGNOSIS — R06.02 SOB (SHORTNESS OF BREATH): ICD-10-CM

## 2018-01-01 DIAGNOSIS — D72.823 LEUKEMOID REACTION: ICD-10-CM

## 2018-01-01 DIAGNOSIS — R79.89 ELEVATED TROPONIN: ICD-10-CM

## 2018-01-01 DIAGNOSIS — M86.272 SUBACUTE OSTEOMYELITIS OF LEFT FOOT: ICD-10-CM

## 2018-01-01 LAB
ABO + RH BLD: NORMAL
ACID FAST MOD KINY STN SPEC: NORMAL
ALBUMIN SERPL BCP-MCNC: 1.7 G/DL
ALBUMIN SERPL BCP-MCNC: 1.7 G/DL
ALBUMIN SERPL BCP-MCNC: 1.8 G/DL
ALBUMIN SERPL BCP-MCNC: 1.9 G/DL
ALBUMIN SERPL BCP-MCNC: 2 G/DL
ALBUMIN SERPL BCP-MCNC: 2.1 G/DL
ALBUMIN SERPL BCP-MCNC: 2.2 G/DL
ALBUMIN SERPL BCP-MCNC: 2.3 G/DL
ALBUMIN SERPL BCP-MCNC: 2.4 G/DL
ALBUMIN SERPL BCP-MCNC: 2.5 G/DL
ALBUMIN SERPL BCP-MCNC: 2.6 G/DL
ALBUMIN SERPL BCP-MCNC: 2.7 G/DL
ALBUMIN SERPL BCP-MCNC: 2.8 G/DL
ALBUMIN SERPL BCP-MCNC: 2.9 G/DL
ALBUMIN SERPL BCP-MCNC: 3 G/DL
ALBUMIN SERPL BCP-MCNC: 3.3 G/DL
ALBUMIN SERPL BCP-MCNC: 3.4 G/DL
ALLENS TEST: ABNORMAL
ALP SERPL-CCNC: 108 U/L
ALP SERPL-CCNC: 110 U/L
ALP SERPL-CCNC: 111 U/L
ALP SERPL-CCNC: 114 U/L
ALP SERPL-CCNC: 118 U/L
ALP SERPL-CCNC: 118 U/L
ALP SERPL-CCNC: 124 U/L
ALP SERPL-CCNC: 136 U/L
ALP SERPL-CCNC: 137 U/L
ALP SERPL-CCNC: 143 U/L
ALP SERPL-CCNC: 147 U/L
ALP SERPL-CCNC: 148 U/L
ALP SERPL-CCNC: 150 U/L
ALP SERPL-CCNC: 150 U/L
ALP SERPL-CCNC: 151 U/L
ALP SERPL-CCNC: 152 U/L
ALP SERPL-CCNC: 154 U/L
ALP SERPL-CCNC: 155 U/L
ALP SERPL-CCNC: 158 U/L
ALP SERPL-CCNC: 161 U/L
ALP SERPL-CCNC: 164 U/L
ALP SERPL-CCNC: 172 U/L
ALP SERPL-CCNC: 174 U/L
ALP SERPL-CCNC: 175 U/L
ALP SERPL-CCNC: 181 U/L
ALP SERPL-CCNC: 186 U/L
ALP SERPL-CCNC: 189 U/L
ALP SERPL-CCNC: 193 U/L
ALP SERPL-CCNC: 197 U/L
ALP SERPL-CCNC: 198 U/L
ALP SERPL-CCNC: 201 U/L
ALP SERPL-CCNC: 203 U/L
ALP SERPL-CCNC: 212 U/L
ALP SERPL-CCNC: 216 U/L
ALP SERPL-CCNC: 217 U/L
ALP SERPL-CCNC: 217 U/L
ALP SERPL-CCNC: 218 U/L
ALP SERPL-CCNC: 223 U/L
ALP SERPL-CCNC: 229 U/L
ALP SERPL-CCNC: 239 U/L
ALP SERPL-CCNC: 243 U/L
ALP SERPL-CCNC: 252 U/L
ALP SERPL-CCNC: 266 U/L
ALP SERPL-CCNC: 270 U/L
ALP SERPL-CCNC: 274 U/L
ALP SERPL-CCNC: 275 U/L
ALP SERPL-CCNC: 275 U/L
ALP SERPL-CCNC: 278 U/L
ALP SERPL-CCNC: 283 U/L
ALP SERPL-CCNC: 286 U/L
ALP SERPL-CCNC: 288 U/L
ALP SERPL-CCNC: 87 U/L
ALP SERPL-CCNC: 90 U/L
ALT SERPL W/O P-5'-P-CCNC: 100 U/L
ALT SERPL W/O P-5'-P-CCNC: 102 U/L
ALT SERPL W/O P-5'-P-CCNC: 1067 U/L
ALT SERPL W/O P-5'-P-CCNC: 109 U/L
ALT SERPL W/O P-5'-P-CCNC: 115 U/L
ALT SERPL W/O P-5'-P-CCNC: 12 U/L
ALT SERPL W/O P-5'-P-CCNC: 122 U/L
ALT SERPL W/O P-5'-P-CCNC: 123 U/L
ALT SERPL W/O P-5'-P-CCNC: 133 U/L
ALT SERPL W/O P-5'-P-CCNC: 1461 U/L
ALT SERPL W/O P-5'-P-CCNC: 16 U/L
ALT SERPL W/O P-5'-P-CCNC: 18 U/L
ALT SERPL W/O P-5'-P-CCNC: 1847 U/L
ALT SERPL W/O P-5'-P-CCNC: 234 U/L
ALT SERPL W/O P-5'-P-CCNC: 24 U/L
ALT SERPL W/O P-5'-P-CCNC: 2438 U/L
ALT SERPL W/O P-5'-P-CCNC: 29 U/L
ALT SERPL W/O P-5'-P-CCNC: 3033 U/L
ALT SERPL W/O P-5'-P-CCNC: 307 U/L
ALT SERPL W/O P-5'-P-CCNC: 3207 U/L
ALT SERPL W/O P-5'-P-CCNC: 35 U/L
ALT SERPL W/O P-5'-P-CCNC: 355 U/L
ALT SERPL W/O P-5'-P-CCNC: 373 U/L
ALT SERPL W/O P-5'-P-CCNC: 3882 U/L
ALT SERPL W/O P-5'-P-CCNC: 39 U/L
ALT SERPL W/O P-5'-P-CCNC: 42 U/L
ALT SERPL W/O P-5'-P-CCNC: 45 U/L
ALT SERPL W/O P-5'-P-CCNC: 461 U/L
ALT SERPL W/O P-5'-P-CCNC: 49 U/L
ALT SERPL W/O P-5'-P-CCNC: 51 U/L
ALT SERPL W/O P-5'-P-CCNC: 5180 U/L
ALT SERPL W/O P-5'-P-CCNC: 52 U/L
ALT SERPL W/O P-5'-P-CCNC: 53 U/L
ALT SERPL W/O P-5'-P-CCNC: 538 U/L
ALT SERPL W/O P-5'-P-CCNC: 56 U/L
ALT SERPL W/O P-5'-P-CCNC: 57 U/L
ALT SERPL W/O P-5'-P-CCNC: 59 U/L
ALT SERPL W/O P-5'-P-CCNC: 595 U/L
ALT SERPL W/O P-5'-P-CCNC: 65 U/L
ALT SERPL W/O P-5'-P-CCNC: 70 U/L
ALT SERPL W/O P-5'-P-CCNC: 72 U/L
ALT SERPL W/O P-5'-P-CCNC: 731 U/L
ALT SERPL W/O P-5'-P-CCNC: 78 U/L
ALT SERPL W/O P-5'-P-CCNC: 81 U/L
ALT SERPL W/O P-5'-P-CCNC: 83 U/L
ALT SERPL W/O P-5'-P-CCNC: 88 U/L
ALT SERPL W/O P-5'-P-CCNC: 92 U/L
ALT SERPL W/O P-5'-P-CCNC: 97 U/L
ALT SERPL W/O P-5'-P-CCNC: 99 U/L
AMMONIA PLAS-SCNC: 51 UMOL/L
AMMONIA PLAS-SCNC: 73 UMOL/L
AMORPH CRY UR QL COMP ASSIST: ABNORMAL
AMORPH CRY UR QL COMP ASSIST: ABNORMAL
AMORPH CRY UR QL COMP ASSIST: NORMAL
AMPHET+METHAMPHET UR QL: NEGATIVE
AMYLASE SERPL-CCNC: 64 U/L
ANION GAP SERPL CALC-SCNC: 10 MMOL/L
ANION GAP SERPL CALC-SCNC: 11 MMOL/L
ANION GAP SERPL CALC-SCNC: 12 MMOL/L
ANION GAP SERPL CALC-SCNC: 13 MMOL/L
ANION GAP SERPL CALC-SCNC: 14 MMOL/L
ANION GAP SERPL CALC-SCNC: 15 MMOL/L
ANION GAP SERPL CALC-SCNC: 17 MMOL/L
ANION GAP SERPL CALC-SCNC: 17 MMOL/L
ANION GAP SERPL CALC-SCNC: 19 MMOL/L
ANION GAP SERPL CALC-SCNC: 20 MMOL/L
ANION GAP SERPL CALC-SCNC: 22 MMOL/L
ANION GAP SERPL CALC-SCNC: 22 MMOL/L
ANION GAP SERPL CALC-SCNC: 23 MMOL/L
ANION GAP SERPL CALC-SCNC: 24 MMOL/L
ANION GAP SERPL CALC-SCNC: 28 MMOL/L
ANION GAP SERPL CALC-SCNC: 4 MMOL/L
ANION GAP SERPL CALC-SCNC: 6 MMOL/L
ANION GAP SERPL CALC-SCNC: 7 MMOL/L
ANION GAP SERPL CALC-SCNC: 7 MMOL/L
ANION GAP SERPL CALC-SCNC: 8 MMOL/L
ANION GAP SERPL CALC-SCNC: 9 MMOL/L
ANISOCYTOSIS BLD QL SMEAR: ABNORMAL
ANISOCYTOSIS BLD QL SMEAR: ABNORMAL
ANISOCYTOSIS BLD QL SMEAR: SLIGHT
APTT BLDCRRT: 28.8 SEC
APTT BLDCRRT: 29.7 SEC
APTT BLDCRRT: 31.5 SEC
APTT BLDCRRT: 34.6 SEC
APTT BLDCRRT: 63.1 SEC
APTT BLDCRRT: >150 SEC
AST SERPL-CCNC: 101 U/L
AST SERPL-CCNC: 106 U/L
AST SERPL-CCNC: 115 U/L
AST SERPL-CCNC: 117 U/L
AST SERPL-CCNC: 118 U/L
AST SERPL-CCNC: 128 U/L
AST SERPL-CCNC: 128 U/L
AST SERPL-CCNC: 129 U/L
AST SERPL-CCNC: 1349 U/L
AST SERPL-CCNC: 137 U/L
AST SERPL-CCNC: 160 U/L
AST SERPL-CCNC: 170 U/L
AST SERPL-CCNC: 177 U/L
AST SERPL-CCNC: 181 U/L
AST SERPL-CCNC: 206 U/L
AST SERPL-CCNC: 206 U/L
AST SERPL-CCNC: 217 U/L
AST SERPL-CCNC: 231 U/L
AST SERPL-CCNC: 2367 U/L
AST SERPL-CCNC: 241 U/L
AST SERPL-CCNC: 245 U/L
AST SERPL-CCNC: 252 U/L
AST SERPL-CCNC: 257 U/L
AST SERPL-CCNC: 260 U/L
AST SERPL-CCNC: 269 U/L
AST SERPL-CCNC: 299 U/L
AST SERPL-CCNC: 31 U/L
AST SERPL-CCNC: 312 U/L
AST SERPL-CCNC: 34 U/L
AST SERPL-CCNC: 35 U/L
AST SERPL-CCNC: 3638 U/L
AST SERPL-CCNC: 37 U/L
AST SERPL-CCNC: 372 U/L
AST SERPL-CCNC: 42 U/L
AST SERPL-CCNC: 458 U/L
AST SERPL-CCNC: 461 U/L
AST SERPL-CCNC: 510 U/L
AST SERPL-CCNC: 53 U/L
AST SERPL-CCNC: 55 U/L
AST SERPL-CCNC: 60 U/L
AST SERPL-CCNC: 60 U/L
AST SERPL-CCNC: 61 U/L
AST SERPL-CCNC: 6263 U/L
AST SERPL-CCNC: 63 U/L
AST SERPL-CCNC: 6374 U/L
AST SERPL-CCNC: 65 U/L
AST SERPL-CCNC: 66 U/L
AST SERPL-CCNC: 70 U/L
AST SERPL-CCNC: 70 U/L
AST SERPL-CCNC: 78 U/L
AST SERPL-CCNC: 78 U/L
AST SERPL-CCNC: 797 U/L
AST SERPL-CCNC: 88 U/L
AST SERPL-CCNC: 97 U/L
AST SERPL-CCNC: ABNORMAL U/L
B-OH-BUTYR BLD STRIP-SCNC: 0.3 MMOL/L
BACTERIA #/AREA URNS AUTO: ABNORMAL /HPF
BACTERIA #/AREA URNS AUTO: NORMAL /HPF
BACTERIA #/AREA URNS AUTO: NORMAL /HPF
BACTERIA BLD CULT: NORMAL
BACTERIA SPEC AEROBE CULT: NO GROWTH
BACTERIA SPEC AEROBE CULT: NORMAL
BACTERIA SPEC ANAEROBE CULT: NORMAL
BACTERIA UR CULT: NO GROWTH
BARBITURATES UR QL SCN>200 NG/ML: NEGATIVE
BASOPHILS # BLD AUTO: 0.01 K/UL
BASOPHILS # BLD AUTO: 0.02 K/UL
BASOPHILS # BLD AUTO: 0.03 K/UL
BASOPHILS # BLD AUTO: 0.04 K/UL
BASOPHILS # BLD AUTO: 0.05 K/UL
BASOPHILS # BLD AUTO: 0.06 K/UL
BASOPHILS # BLD AUTO: 0.07 K/UL
BASOPHILS # BLD AUTO: 0.08 K/UL
BASOPHILS # BLD AUTO: 0.09 K/UL
BASOPHILS # BLD AUTO: ABNORMAL K/UL
BASOPHILS NFR BLD: 0 %
BASOPHILS NFR BLD: 0.1 %
BASOPHILS NFR BLD: 0.2 %
BASOPHILS NFR BLD: 0.3 %
BASOPHILS NFR BLD: 0.4 %
BASOPHILS NFR BLD: 0.5 %
BASOPHILS NFR BLD: 0.6 %
BASOPHILS NFR BLD: 0.7 %
BASOPHILS NFR BLD: 0.7 %
BASOPHILS NFR BLD: 0.8 %
BASOPHILS NFR BLD: 1 %
BASOPHILS NFR BLD: 1 %
BASOPHILS NFR BLD: 1.1 %
BENZODIAZ UR QL SCN>200 NG/ML: NEGATIVE
BILIRUB DIRECT SERPL-MCNC: 1.2 MG/DL
BILIRUB SERPL-MCNC: 0.4 MG/DL
BILIRUB SERPL-MCNC: 0.5 MG/DL
BILIRUB SERPL-MCNC: 0.6 MG/DL
BILIRUB SERPL-MCNC: 0.7 MG/DL
BILIRUB SERPL-MCNC: 0.8 MG/DL
BILIRUB SERPL-MCNC: 0.9 MG/DL
BILIRUB SERPL-MCNC: 1.2 MG/DL
BILIRUB SERPL-MCNC: 1.2 MG/DL
BILIRUB SERPL-MCNC: 1.3 MG/DL
BILIRUB SERPL-MCNC: 1.6 MG/DL
BILIRUB SERPL-MCNC: 1.6 MG/DL
BILIRUB SERPL-MCNC: 1.7 MG/DL
BILIRUB SERPL-MCNC: 1.7 MG/DL
BILIRUB SERPL-MCNC: 1.8 MG/DL
BILIRUB SERPL-MCNC: 2 MG/DL
BILIRUB SERPL-MCNC: 2 MG/DL
BILIRUB SERPL-MCNC: 2.1 MG/DL
BILIRUB SERPL-MCNC: 2.2 MG/DL
BILIRUB SERPL-MCNC: 2.3 MG/DL
BILIRUB SERPL-MCNC: 2.4 MG/DL
BILIRUB SERPL-MCNC: 2.4 MG/DL
BILIRUB SERPL-MCNC: 2.6 MG/DL
BILIRUB SERPL-MCNC: 2.7 MG/DL
BILIRUB SERPL-MCNC: 2.7 MG/DL
BILIRUB SERPL-MCNC: 3.1 MG/DL
BILIRUB SERPL-MCNC: 3.1 MG/DL
BILIRUB SERPL-MCNC: 3.2 MG/DL
BILIRUB SERPL-MCNC: 3.5 MG/DL
BILIRUB SERPL-MCNC: 3.9 MG/DL
BILIRUB UR QL STRIP: ABNORMAL
BILIRUB UR QL STRIP: NEGATIVE
BLD GP AB SCN CELLS X3 SERPL QL: NORMAL
BLD PROD TYP BPU: NORMAL
BLOOD UNIT EXPIRATION DATE: NORMAL
BLOOD UNIT TYPE CODE: 600
BLOOD UNIT TYPE: NORMAL
BNP SERPL-MCNC: 1811 PG/ML
BNP SERPL-MCNC: 2452 PG/ML
BNP SERPL-MCNC: 2495 PG/ML
BNP SERPL-MCNC: 2621 PG/ML
BNP SERPL-MCNC: 3926 PG/ML
BNP SERPL-MCNC: 421 PG/ML
BNP SERPL-MCNC: 4587 PG/ML
BUN SERPL-MCNC: 10 MG/DL
BUN SERPL-MCNC: 11 MG/DL
BUN SERPL-MCNC: 12 MG/DL
BUN SERPL-MCNC: 13 MG/DL
BUN SERPL-MCNC: 13 MG/DL
BUN SERPL-MCNC: 14 MG/DL
BUN SERPL-MCNC: 14 MG/DL
BUN SERPL-MCNC: 15 MG/DL
BUN SERPL-MCNC: 16 MG/DL
BUN SERPL-MCNC: 17 MG/DL
BUN SERPL-MCNC: 18 MG/DL
BUN SERPL-MCNC: 20 MG/DL
BUN SERPL-MCNC: 20 MG/DL
BUN SERPL-MCNC: 22 MG/DL
BUN SERPL-MCNC: 23 MG/DL
BUN SERPL-MCNC: 23 MG/DL
BUN SERPL-MCNC: 24 MG/DL
BUN SERPL-MCNC: 24 MG/DL
BUN SERPL-MCNC: 25 MG/DL
BUN SERPL-MCNC: 25 MG/DL
BUN SERPL-MCNC: 29 MG/DL
BUN SERPL-MCNC: 3 MG/DL
BUN SERPL-MCNC: 30 MG/DL
BUN SERPL-MCNC: 32 MG/DL
BUN SERPL-MCNC: 34 MG/DL
BUN SERPL-MCNC: 34 MG/DL
BUN SERPL-MCNC: 36 MG/DL
BUN SERPL-MCNC: 36 MG/DL
BUN SERPL-MCNC: 39 MG/DL
BUN SERPL-MCNC: 39 MG/DL
BUN SERPL-MCNC: 4 MG/DL
BUN SERPL-MCNC: 4 MG/DL
BUN SERPL-MCNC: 5 MG/DL
BUN SERPL-MCNC: 6 MG/DL
BUN SERPL-MCNC: 7 MG/DL
BUN SERPL-MCNC: 7 MG/DL (ref 6–30)
BUN SERPL-MCNC: 8 MG/DL
BUN SERPL-MCNC: 9 MG/DL
BURR CELLS BLD QL SMEAR: ABNORMAL
BZE UR QL SCN: NEGATIVE
CALCIUM SERPL-MCNC: 10 MG/DL
CALCIUM SERPL-MCNC: 7.5 MG/DL
CALCIUM SERPL-MCNC: 7.6 MG/DL
CALCIUM SERPL-MCNC: 7.7 MG/DL
CALCIUM SERPL-MCNC: 7.8 MG/DL
CALCIUM SERPL-MCNC: 7.8 MG/DL
CALCIUM SERPL-MCNC: 7.9 MG/DL
CALCIUM SERPL-MCNC: 8 MG/DL
CALCIUM SERPL-MCNC: 8.1 MG/DL
CALCIUM SERPL-MCNC: 8.2 MG/DL
CALCIUM SERPL-MCNC: 8.2 MG/DL
CALCIUM SERPL-MCNC: 8.3 MG/DL
CALCIUM SERPL-MCNC: 8.4 MG/DL
CALCIUM SERPL-MCNC: 8.5 MG/DL
CALCIUM SERPL-MCNC: 8.6 MG/DL
CALCIUM SERPL-MCNC: 8.6 MG/DL
CALCIUM SERPL-MCNC: 8.7 MG/DL
CALCIUM SERPL-MCNC: 8.8 MG/DL
CALCIUM SERPL-MCNC: 8.9 MG/DL
CALCIUM SERPL-MCNC: 8.9 MG/DL
CALCIUM SERPL-MCNC: 9 MG/DL
CALCIUM SERPL-MCNC: 9.1 MG/DL
CALCIUM SERPL-MCNC: 9.2 MG/DL
CALCIUM SERPL-MCNC: 9.2 MG/DL
CALCIUM SERPL-MCNC: 9.3 MG/DL
CALCIUM SERPL-MCNC: 9.4 MG/DL
CALCIUM SERPL-MCNC: 9.6 MG/DL
CANNABINOIDS UR QL SCN: NEGATIVE
CHLORIDE SERPL-SCNC: 100 MMOL/L
CHLORIDE SERPL-SCNC: 101 MMOL/L
CHLORIDE SERPL-SCNC: 102 MMOL/L
CHLORIDE SERPL-SCNC: 103 MMOL/L
CHLORIDE SERPL-SCNC: 104 MMOL/L
CHLORIDE SERPL-SCNC: 105 MMOL/L
CHLORIDE SERPL-SCNC: 106 MMOL/L
CHLORIDE SERPL-SCNC: 107 MMOL/L
CHLORIDE SERPL-SCNC: 107 MMOL/L
CHLORIDE SERPL-SCNC: 110 MMOL/L
CHLORIDE SERPL-SCNC: 94 MMOL/L
CHLORIDE SERPL-SCNC: 94 MMOL/L (ref 95–110)
CHLORIDE SERPL-SCNC: 95 MMOL/L
CHLORIDE SERPL-SCNC: 96 MMOL/L
CHLORIDE SERPL-SCNC: 97 MMOL/L
CHLORIDE SERPL-SCNC: 98 MMOL/L
CHLORIDE SERPL-SCNC: 99 MMOL/L
CK MB SERPL-MCNC: 1.6 NG/ML
CK MB SERPL-MCNC: 2 NG/ML
CK MB SERPL-MCNC: 2.2 NG/ML
CK MB SERPL-RTO: 2.1 %
CK MB SERPL-RTO: 2.1 %
CK MB SERPL-RTO: 2.3 %
CK SERPL-CCNC: 105 U/L
CK SERPL-CCNC: 106 U/L
CK SERPL-CCNC: 106 U/L
CK SERPL-CCNC: 2572 U/L
CK SERPL-CCNC: 3779 U/L
CK SERPL-CCNC: 4926 U/L
CK SERPL-CCNC: 69 U/L
CK SERPL-CCNC: 69 U/L
CK SERPL-CCNC: 96 U/L
CK SERPL-CCNC: 96 U/L
CLARITY UR REFRACT.AUTO: ABNORMAL
CLARITY UR REFRACT.AUTO: CLEAR
CLARITY UR: CLEAR
CO2 SERPL-SCNC: 11 MMOL/L
CO2 SERPL-SCNC: 12 MMOL/L
CO2 SERPL-SCNC: 12 MMOL/L
CO2 SERPL-SCNC: 13 MMOL/L
CO2 SERPL-SCNC: 14 MMOL/L
CO2 SERPL-SCNC: 15 MMOL/L
CO2 SERPL-SCNC: 15 MMOL/L
CO2 SERPL-SCNC: 16 MMOL/L
CO2 SERPL-SCNC: 18 MMOL/L
CO2 SERPL-SCNC: 19 MMOL/L
CO2 SERPL-SCNC: 20 MMOL/L
CO2 SERPL-SCNC: 21 MMOL/L
CO2 SERPL-SCNC: 22 MMOL/L
CO2 SERPL-SCNC: 23 MMOL/L
CO2 SERPL-SCNC: 24 MMOL/L
CO2 SERPL-SCNC: 25 MMOL/L
CO2 SERPL-SCNC: 26 MMOL/L
CO2 SERPL-SCNC: 27 MMOL/L
CO2 SERPL-SCNC: 28 MMOL/L
CO2 SERPL-SCNC: 29 MMOL/L
CO2 SERPL-SCNC: 32 MMOL/L
CODING SYSTEM: NORMAL
COLOR UR AUTO: ABNORMAL
COLOR UR AUTO: YELLOW
COLOR UR: YELLOW
CREAT SERPL-MCNC: 0.6 MG/DL
CREAT SERPL-MCNC: 0.7 MG/DL
CREAT SERPL-MCNC: 0.8 MG/DL
CREAT SERPL-MCNC: 0.8 MG/DL (ref 0.5–1.4)
CREAT SERPL-MCNC: 0.9 MG/DL
CREAT SERPL-MCNC: 1 MG/DL
CREAT SERPL-MCNC: 1.1 MG/DL
CREAT SERPL-MCNC: 1.2 MG/DL
CREAT SERPL-MCNC: 1.2 MG/DL
CREAT SERPL-MCNC: 1.3 MG/DL
CREAT SERPL-MCNC: 1.3 MG/DL
CREAT SERPL-MCNC: 1.4 MG/DL
CREAT SERPL-MCNC: 1.5 MG/DL
CREAT SERPL-MCNC: 1.6 MG/DL
CREAT SERPL-MCNC: 1.7 MG/DL
CREAT SERPL-MCNC: 1.8 MG/DL
CREAT SERPL-MCNC: 1.9 MG/DL
CREAT SERPL-MCNC: 2 MG/DL
CREAT SERPL-MCNC: 2 MG/DL
CREAT SERPL-MCNC: 2.1 MG/DL
CREAT SERPL-MCNC: 2.2 MG/DL
CREAT SERPL-MCNC: 2.2 MG/DL
CREAT UR-MCNC: 20.6 MG/DL
CREAT UR-MCNC: 48 MG/DL
CREAT UR-MCNC: 66 MG/DL
CREAT UR-MCNC: 74 MG/DL
CREAT UR-MCNC: 81 MG/DL
CREAT UR-MCNC: 97 MG/DL
CREAT UR-MCNC: 97 MG/DL
D DIMER PPP IA.FEU-MCNC: 10.78 MG/L FEU
DACRYOCYTES BLD QL SMEAR: ABNORMAL
DACRYOCYTES BLD QL SMEAR: ABNORMAL
DELSYS: ABNORMAL
DIASTOLIC DYSFUNCTION: YES
DIFFERENTIAL METHOD: ABNORMAL
DISPENSE STATUS: NORMAL
EOSINOPHIL # BLD AUTO: 0 K/UL
EOSINOPHIL # BLD AUTO: 0.1 K/UL
EOSINOPHIL # BLD AUTO: 0.2 K/UL
EOSINOPHIL # BLD AUTO: 0.3 K/UL
EOSINOPHIL # BLD AUTO: 0.4 K/UL
EOSINOPHIL # BLD AUTO: 0.5 K/UL
EOSINOPHIL # BLD AUTO: 0.6 K/UL
EOSINOPHIL # BLD AUTO: 0.7 K/UL
EOSINOPHIL # BLD AUTO: 0.8 K/UL
EOSINOPHIL # BLD AUTO: 0.9 K/UL
EOSINOPHIL # BLD AUTO: 0.9 K/UL
EOSINOPHIL # BLD AUTO: ABNORMAL K/UL
EOSINOPHIL NFR BLD: 0 %
EOSINOPHIL NFR BLD: 0.1 %
EOSINOPHIL NFR BLD: 0.1 %
EOSINOPHIL NFR BLD: 0.2 %
EOSINOPHIL NFR BLD: 0.6 %
EOSINOPHIL NFR BLD: 0.6 %
EOSINOPHIL NFR BLD: 0.8 %
EOSINOPHIL NFR BLD: 0.8 %
EOSINOPHIL NFR BLD: 1 %
EOSINOPHIL NFR BLD: 1.1 %
EOSINOPHIL NFR BLD: 1.2 %
EOSINOPHIL NFR BLD: 1.3 %
EOSINOPHIL NFR BLD: 1.3 %
EOSINOPHIL NFR BLD: 1.5 %
EOSINOPHIL NFR BLD: 1.5 %
EOSINOPHIL NFR BLD: 1.6 %
EOSINOPHIL NFR BLD: 1.6 %
EOSINOPHIL NFR BLD: 1.7 %
EOSINOPHIL NFR BLD: 1.9 %
EOSINOPHIL NFR BLD: 2 %
EOSINOPHIL NFR BLD: 2.1 %
EOSINOPHIL NFR BLD: 2.2 %
EOSINOPHIL NFR BLD: 2.2 %
EOSINOPHIL NFR BLD: 2.3 %
EOSINOPHIL NFR BLD: 2.4 %
EOSINOPHIL NFR BLD: 2.4 %
EOSINOPHIL NFR BLD: 2.5 %
EOSINOPHIL NFR BLD: 2.6 %
EOSINOPHIL NFR BLD: 2.7 %
EOSINOPHIL NFR BLD: 2.8 %
EOSINOPHIL NFR BLD: 3 %
EOSINOPHIL NFR BLD: 3.1 %
EOSINOPHIL NFR BLD: 3.3 %
EOSINOPHIL NFR BLD: 3.3 %
EOSINOPHIL NFR BLD: 3.4 %
EOSINOPHIL NFR BLD: 3.4 %
EOSINOPHIL NFR BLD: 3.5 %
EOSINOPHIL NFR BLD: 3.5 %
EOSINOPHIL NFR BLD: 3.7 %
EOSINOPHIL NFR BLD: 3.8 %
EOSINOPHIL NFR BLD: 3.9 %
EOSINOPHIL NFR BLD: 3.9 %
EOSINOPHIL NFR BLD: 4 %
EOSINOPHIL NFR BLD: 4.1 %
EOSINOPHIL NFR BLD: 4.2 %
EOSINOPHIL NFR BLD: 4.3 %
EOSINOPHIL NFR BLD: 4.4 %
EOSINOPHIL NFR BLD: 4.5 %
EOSINOPHIL NFR BLD: 4.5 %
EOSINOPHIL NFR BLD: 4.7 %
EOSINOPHIL NFR BLD: 4.7 %
EOSINOPHIL NFR BLD: 5 %
EOSINOPHIL NFR BLD: 5.1 %
EOSINOPHIL NFR BLD: 6.1 %
EOSINOPHIL NFR BLD: 6.3 %
EOSINOPHIL NFR BLD: 6.8 %
EOSINOPHIL NFR BLD: 6.9 %
EOSINOPHIL NFR BLD: 8.2 %
EP: 6
ERYTHROCYTE [DISTWIDTH] IN BLOOD BY AUTOMATED COUNT: 13.3 %
ERYTHROCYTE [DISTWIDTH] IN BLOOD BY AUTOMATED COUNT: 13.5 %
ERYTHROCYTE [DISTWIDTH] IN BLOOD BY AUTOMATED COUNT: 13.7 %
ERYTHROCYTE [DISTWIDTH] IN BLOOD BY AUTOMATED COUNT: 13.8 %
ERYTHROCYTE [DISTWIDTH] IN BLOOD BY AUTOMATED COUNT: 13.9 %
ERYTHROCYTE [DISTWIDTH] IN BLOOD BY AUTOMATED COUNT: 14 %
ERYTHROCYTE [DISTWIDTH] IN BLOOD BY AUTOMATED COUNT: 14.1 %
ERYTHROCYTE [DISTWIDTH] IN BLOOD BY AUTOMATED COUNT: 14.3 %
ERYTHROCYTE [DISTWIDTH] IN BLOOD BY AUTOMATED COUNT: 14.4 %
ERYTHROCYTE [DISTWIDTH] IN BLOOD BY AUTOMATED COUNT: 14.5 %
ERYTHROCYTE [DISTWIDTH] IN BLOOD BY AUTOMATED COUNT: 14.6 %
ERYTHROCYTE [DISTWIDTH] IN BLOOD BY AUTOMATED COUNT: 14.6 %
ERYTHROCYTE [DISTWIDTH] IN BLOOD BY AUTOMATED COUNT: 14.7 %
ERYTHROCYTE [DISTWIDTH] IN BLOOD BY AUTOMATED COUNT: 15 %
ERYTHROCYTE [DISTWIDTH] IN BLOOD BY AUTOMATED COUNT: 15.3 %
ERYTHROCYTE [DISTWIDTH] IN BLOOD BY AUTOMATED COUNT: 15.6 %
ERYTHROCYTE [DISTWIDTH] IN BLOOD BY AUTOMATED COUNT: 15.6 %
ERYTHROCYTE [DISTWIDTH] IN BLOOD BY AUTOMATED COUNT: 15.7 %
ERYTHROCYTE [DISTWIDTH] IN BLOOD BY AUTOMATED COUNT: 15.8 %
ERYTHROCYTE [DISTWIDTH] IN BLOOD BY AUTOMATED COUNT: 15.9 %
ERYTHROCYTE [DISTWIDTH] IN BLOOD BY AUTOMATED COUNT: 16 %
ERYTHROCYTE [DISTWIDTH] IN BLOOD BY AUTOMATED COUNT: 16.1 %
ERYTHROCYTE [DISTWIDTH] IN BLOOD BY AUTOMATED COUNT: 16.2 %
ERYTHROCYTE [DISTWIDTH] IN BLOOD BY AUTOMATED COUNT: 16.3 %
ERYTHROCYTE [DISTWIDTH] IN BLOOD BY AUTOMATED COUNT: 16.5 %
ERYTHROCYTE [DISTWIDTH] IN BLOOD BY AUTOMATED COUNT: 16.7 %
ERYTHROCYTE [DISTWIDTH] IN BLOOD BY AUTOMATED COUNT: 18.9 %
ERYTHROCYTE [DISTWIDTH] IN BLOOD BY AUTOMATED COUNT: 19.3 %
ERYTHROCYTE [DISTWIDTH] IN BLOOD BY AUTOMATED COUNT: 19.4 %
ERYTHROCYTE [DISTWIDTH] IN BLOOD BY AUTOMATED COUNT: 19.6 %
ERYTHROCYTE [DISTWIDTH] IN BLOOD BY AUTOMATED COUNT: 19.7 %
ERYTHROCYTE [DISTWIDTH] IN BLOOD BY AUTOMATED COUNT: 19.8 %
ERYTHROCYTE [DISTWIDTH] IN BLOOD BY AUTOMATED COUNT: 19.9 %
ERYTHROCYTE [DISTWIDTH] IN BLOOD BY AUTOMATED COUNT: 20.1 %
ERYTHROCYTE [DISTWIDTH] IN BLOOD BY AUTOMATED COUNT: 20.2 %
ERYTHROCYTE [DISTWIDTH] IN BLOOD BY AUTOMATED COUNT: 20.2 %
ERYTHROCYTE [DISTWIDTH] IN BLOOD BY AUTOMATED COUNT: 20.6 %
ERYTHROCYTE [DISTWIDTH] IN BLOOD BY AUTOMATED COUNT: 20.9 %
ERYTHROCYTE [DISTWIDTH] IN BLOOD BY AUTOMATED COUNT: 21 %
ERYTHROCYTE [DISTWIDTH] IN BLOOD BY AUTOMATED COUNT: 21.2 %
ERYTHROCYTE [DISTWIDTH] IN BLOOD BY AUTOMATED COUNT: 21.3 %
ERYTHROCYTE [DISTWIDTH] IN BLOOD BY AUTOMATED COUNT: 21.4 %
ERYTHROCYTE [DISTWIDTH] IN BLOOD BY AUTOMATED COUNT: 21.5 %
ERYTHROCYTE [DISTWIDTH] IN BLOOD BY AUTOMATED COUNT: 21.7 %
ERYTHROCYTE [DISTWIDTH] IN BLOOD BY AUTOMATED COUNT: 21.8 %
ERYTHROCYTE [SEDIMENTATION RATE] IN BLOOD BY WESTERGREN METHOD: 14 MM/H
ERYTHROCYTE [SEDIMENTATION RATE] IN BLOOD BY WESTERGREN METHOD: 16 MM/H
ERYTHROCYTE [SEDIMENTATION RATE] IN BLOOD BY WESTERGREN METHOD: 25 MM/H
ERYTHROCYTE [SEDIMENTATION RATE] IN BLOOD BY WESTERGREN METHOD: 25 MM/H
ERYTHROCYTE [SEDIMENTATION RATE] IN BLOOD BY WESTERGREN METHOD: 26 MM/H
EST. GFR  (AFRICAN AMERICAN): 39.2 ML/MIN/1.73 M^2
EST. GFR  (AFRICAN AMERICAN): 39.2 ML/MIN/1.73 M^2
EST. GFR  (AFRICAN AMERICAN): 41.5 ML/MIN/1.73 M^2
EST. GFR  (AFRICAN AMERICAN): 44 ML/MIN/1.73 M^2
EST. GFR  (AFRICAN AMERICAN): 44 ML/MIN/1.73 M^2
EST. GFR  (AFRICAN AMERICAN): 46.8 ML/MIN/1.73 M^2
EST. GFR  (AFRICAN AMERICAN): 50 ML/MIN/1.73 M^2
EST. GFR  (AFRICAN AMERICAN): 53.6 ML/MIN/1.73 M^2
EST. GFR  (AFRICAN AMERICAN): 57.6 ML/MIN/1.73 M^2
EST. GFR  (AFRICAN AMERICAN): 58 ML/MIN/1.73 M^2
EST. GFR  (AFRICAN AMERICAN): >60 ML/MIN/1.73 M^2
EST. GFR  (NON AFRICAN AMERICAN): 33.9 ML/MIN/1.73 M^2
EST. GFR  (NON AFRICAN AMERICAN): 33.9 ML/MIN/1.73 M^2
EST. GFR  (NON AFRICAN AMERICAN): 35.9 ML/MIN/1.73 M^2
EST. GFR  (NON AFRICAN AMERICAN): 38.1 ML/MIN/1.73 M^2
EST. GFR  (NON AFRICAN AMERICAN): 38.1 ML/MIN/1.73 M^2
EST. GFR  (NON AFRICAN AMERICAN): 40.5 ML/MIN/1.73 M^2
EST. GFR  (NON AFRICAN AMERICAN): 43.2 ML/MIN/1.73 M^2
EST. GFR  (NON AFRICAN AMERICAN): 46.3 ML/MIN/1.73 M^2
EST. GFR  (NON AFRICAN AMERICAN): 49.8 ML/MIN/1.73 M^2
EST. GFR  (NON AFRICAN AMERICAN): 50 ML/MIN/1.73 M^2
EST. GFR  (NON AFRICAN AMERICAN): 53.9 ML/MIN/1.73 M^2
EST. GFR  (NON AFRICAN AMERICAN): 58.6 ML/MIN/1.73 M^2
EST. GFR  (NON AFRICAN AMERICAN): >60 ML/MIN/1.73 M^2
ESTIMATED PA SYSTOLIC PRESSURE: 45.25
ESTIMATED PA SYSTOLIC PRESSURE: 57.51
FACT X PPP CHRO-ACNC: 0.1 IU/ML
FACT X PPP CHRO-ACNC: 0.15 IU/ML
FACT X PPP CHRO-ACNC: 0.22 IU/ML
FACT X PPP CHRO-ACNC: 0.23 IU/ML
FACT X PPP CHRO-ACNC: 0.25 IU/ML
FACT X PPP CHRO-ACNC: 0.27 IU/ML
FACT X PPP CHRO-ACNC: 0.3 IU/ML
FACT X PPP CHRO-ACNC: 0.33 IU/ML
FACT X PPP CHRO-ACNC: 0.34 IU/ML
FACT X PPP CHRO-ACNC: 0.35 IU/ML
FACT X PPP CHRO-ACNC: 0.37 IU/ML
FACT X PPP CHRO-ACNC: 0.39 IU/ML
FACT X PPP CHRO-ACNC: 0.41 IU/ML
FACT X PPP CHRO-ACNC: 0.43 IU/ML
FACT X PPP CHRO-ACNC: 0.46 IU/ML
FACT X PPP CHRO-ACNC: 0.58 IU/ML
FACT X PPP CHRO-ACNC: 0.63 IU/ML
FACT X PPP CHRO-ACNC: 0.8 IU/ML
FACT X PPP CHRO-ACNC: 0.9 IU/ML
FACT X PPP CHRO-ACNC: <0.1 IU/ML
FIBRINOGEN PPP-MCNC: 113 MG/DL
FIBRINOGEN PPP-MCNC: 158 MG/DL
FIBRINOGEN PPP-MCNC: 230 MG/DL
FIBRINOGEN PPP-MCNC: 252 MG/DL
FIBRINOGEN PPP-MCNC: 277 MG/DL
FIBRINOGEN PPP-MCNC: 303 MG/DL
FIBRINOGEN PPP-MCNC: 336 MG/DL
FIBRINOGEN PPP-MCNC: 376 MG/DL
FIBRINOGEN PPP-MCNC: 394 MG/DL
FIBRINOGEN PPP-MCNC: 399 MG/DL
FIBRINOGEN PPP-MCNC: 473 MG/DL
FIBRINOGEN PPP-MCNC: 484 MG/DL
FIBRINOGEN PPP-MCNC: 547 MG/DL
FIBRINOGEN PPP-MCNC: 580 MG/DL
FIBRINOGEN PPP-MCNC: 582 MG/DL
FIBRINOGEN PPP-MCNC: 604 MG/DL
FIBRINOGEN PPP-MCNC: 629 MG/DL
FIO2: 100
FIO2: 30
FIO2: 32
FIO2: 40
FIO2: 50
FIO2: 60
FLOW: 3
FLOW: 4
FLOW: 4
FUNGUS SPEC CULT: NORMAL
GGT SERPL-CCNC: 167 U/L
GIANT PLATELETS BLD QL SMEAR: PRESENT
GIANT PLATELETS BLD QL SMEAR: PRESENT
GLUCOSE SERPL-MCNC: 100 MG/DL
GLUCOSE SERPL-MCNC: 100 MG/DL
GLUCOSE SERPL-MCNC: 102 MG/DL
GLUCOSE SERPL-MCNC: 102 MG/DL
GLUCOSE SERPL-MCNC: 103 MG/DL
GLUCOSE SERPL-MCNC: 104 MG/DL
GLUCOSE SERPL-MCNC: 105 MG/DL
GLUCOSE SERPL-MCNC: 107 MG/DL
GLUCOSE SERPL-MCNC: 107 MG/DL
GLUCOSE SERPL-MCNC: 109 MG/DL
GLUCOSE SERPL-MCNC: 110 MG/DL
GLUCOSE SERPL-MCNC: 111 MG/DL (ref 70–110)
GLUCOSE SERPL-MCNC: 112 MG/DL
GLUCOSE SERPL-MCNC: 114 MG/DL
GLUCOSE SERPL-MCNC: 115 MG/DL
GLUCOSE SERPL-MCNC: 117 MG/DL
GLUCOSE SERPL-MCNC: 117 MG/DL
GLUCOSE SERPL-MCNC: 118 MG/DL
GLUCOSE SERPL-MCNC: 121 MG/DL
GLUCOSE SERPL-MCNC: 121 MG/DL
GLUCOSE SERPL-MCNC: 124 MG/DL
GLUCOSE SERPL-MCNC: 127 MG/DL
GLUCOSE SERPL-MCNC: 129 MG/DL
GLUCOSE SERPL-MCNC: 133 MG/DL
GLUCOSE SERPL-MCNC: 136 MG/DL
GLUCOSE SERPL-MCNC: 137 MG/DL
GLUCOSE SERPL-MCNC: 141 MG/DL
GLUCOSE SERPL-MCNC: 144 MG/DL
GLUCOSE SERPL-MCNC: 160 MG/DL
GLUCOSE SERPL-MCNC: 166 MG/DL
GLUCOSE SERPL-MCNC: 226 MG/DL
GLUCOSE SERPL-MCNC: 42 MG/DL
GLUCOSE SERPL-MCNC: 69 MG/DL
GLUCOSE SERPL-MCNC: 70 MG/DL
GLUCOSE SERPL-MCNC: 74 MG/DL
GLUCOSE SERPL-MCNC: 74 MG/DL
GLUCOSE SERPL-MCNC: 77 MG/DL
GLUCOSE SERPL-MCNC: 79 MG/DL
GLUCOSE SERPL-MCNC: 81 MG/DL
GLUCOSE SERPL-MCNC: 81 MG/DL
GLUCOSE SERPL-MCNC: 83 MG/DL
GLUCOSE SERPL-MCNC: 84 MG/DL
GLUCOSE SERPL-MCNC: 84 MG/DL
GLUCOSE SERPL-MCNC: 85 MG/DL
GLUCOSE SERPL-MCNC: 86 MG/DL
GLUCOSE SERPL-MCNC: 87 MG/DL
GLUCOSE SERPL-MCNC: 88 MG/DL
GLUCOSE SERPL-MCNC: 89 MG/DL
GLUCOSE SERPL-MCNC: 90 MG/DL
GLUCOSE SERPL-MCNC: 90 MG/DL
GLUCOSE SERPL-MCNC: 93 MG/DL
GLUCOSE SERPL-MCNC: 94 MG/DL
GLUCOSE SERPL-MCNC: 94 MG/DL
GLUCOSE SERPL-MCNC: 95 MG/DL
GLUCOSE SERPL-MCNC: 95 MG/DL
GLUCOSE SERPL-MCNC: 97 MG/DL
GLUCOSE SERPL-MCNC: 97 MG/DL
GLUCOSE SERPL-MCNC: 99 MG/DL
GLUCOSE SERPL-MCNC: 99 MG/DL
GLUCOSE UR QL STRIP: NEGATIVE
GRAM STN SPEC: NORMAL
GRAN CASTS UR QL COMP ASSIST: 9 /LPF
HCO3 UR-SCNC: 12.4 MMOL/L (ref 22–26)
HCO3 UR-SCNC: 13.3 MMOL/L (ref 24–28)
HCO3 UR-SCNC: 13.3 MMOL/L (ref 24–28)
HCO3 UR-SCNC: 14.8 MMOL/L (ref 24–28)
HCO3 UR-SCNC: 15.2 MMOL/L (ref 24–28)
HCO3 UR-SCNC: 15.7 MMOL/L (ref 24–28)
HCO3 UR-SCNC: 16.2 MMOL/L (ref 24–28)
HCO3 UR-SCNC: 16.7 MMOL/L (ref 24–28)
HCO3 UR-SCNC: 17.5 MMOL/L (ref 24–28)
HCO3 UR-SCNC: 19.4 MMOL/L (ref 24–28)
HCO3 UR-SCNC: 20.1 MMOL/L (ref 22–26)
HCO3 UR-SCNC: 21 MMOL/L (ref 24–28)
HCO3 UR-SCNC: 21.3 MMOL/L (ref 24–28)
HCO3 UR-SCNC: 21.4 MMOL/L (ref 24–28)
HCO3 UR-SCNC: 22.5 MMOL/L (ref 24–28)
HCO3 UR-SCNC: 23.8 MMOL/L (ref 24–28)
HCO3 UR-SCNC: 24.5 MMOL/L (ref 24–28)
HCO3 UR-SCNC: 24.7 MMOL/L (ref 24–28)
HCO3 UR-SCNC: 24.9 MMOL/L (ref 24–28)
HCO3 UR-SCNC: 25.5 MMOL/L (ref 24–28)
HCO3 UR-SCNC: 25.9 MMOL/L (ref 24–28)
HCO3 UR-SCNC: 25.9 MMOL/L (ref 24–28)
HCO3 UR-SCNC: 26.1 MMOL/L (ref 24–28)
HCO3 UR-SCNC: 26.5 MMOL/L (ref 24–28)
HCO3 UR-SCNC: 28.1 MMOL/L (ref 24–28)
HCO3 UR-SCNC: 28.2 MMOL/L (ref 24–28)
HCO3 UR-SCNC: 28.5 MMOL/L (ref 24–28)
HCO3 UR-SCNC: 29 MMOL/L (ref 24–28)
HCO3 UR-SCNC: 30.5 MMOL/L (ref 24–28)
HCO3 UR-SCNC: 31.2 MMOL/L (ref 24–28)
HCO3 UR-SCNC: 31.9 MMOL/L (ref 24–28)
HCO3 UR-SCNC: 31.9 MMOL/L (ref 24–28)
HCO3 UR-SCNC: 33.2 MMOL/L (ref 24–28)
HCO3 UR-SCNC: 33.8 MMOL/L (ref 24–28)
HCO3 UR-SCNC: 35.5 MMOL/L (ref 24–28)
HCO3 UR-SCNC: 7.6 MMOL/L (ref 24–28)
HCO3 UR-SCNC: 8.3 MMOL/L (ref 24–28)
HCO3 UR-SCNC: 9 MMOL/L (ref 24–28)
HCO3 UR-SCNC: ABNORMAL MMOL/L (ref 22–26)
HCT VFR BLD AUTO: 19.9 %
HCT VFR BLD AUTO: 20.6 %
HCT VFR BLD AUTO: 20.8 %
HCT VFR BLD AUTO: 20.9 %
HCT VFR BLD AUTO: 21.1 %
HCT VFR BLD AUTO: 22.1 %
HCT VFR BLD AUTO: 22.2 %
HCT VFR BLD AUTO: 22.3 %
HCT VFR BLD AUTO: 22.4 %
HCT VFR BLD AUTO: 23.3 %
HCT VFR BLD AUTO: 23.4 %
HCT VFR BLD AUTO: 23.5 %
HCT VFR BLD AUTO: 23.6 %
HCT VFR BLD AUTO: 23.9 %
HCT VFR BLD AUTO: 24.1 %
HCT VFR BLD AUTO: 24.3 %
HCT VFR BLD AUTO: 24.4 %
HCT VFR BLD AUTO: 24.6 %
HCT VFR BLD AUTO: 24.7 %
HCT VFR BLD AUTO: 24.7 %
HCT VFR BLD AUTO: 24.8 %
HCT VFR BLD AUTO: 24.9 %
HCT VFR BLD AUTO: 25.1 %
HCT VFR BLD AUTO: 25.1 %
HCT VFR BLD AUTO: 25.2 %
HCT VFR BLD AUTO: 25.3 %
HCT VFR BLD AUTO: 25.4 %
HCT VFR BLD AUTO: 25.5 %
HCT VFR BLD AUTO: 25.6 %
HCT VFR BLD AUTO: 25.7 %
HCT VFR BLD AUTO: 25.8 %
HCT VFR BLD AUTO: 26 %
HCT VFR BLD AUTO: 26.3 %
HCT VFR BLD AUTO: 26.4 %
HCT VFR BLD AUTO: 26.8 %
HCT VFR BLD AUTO: 27.1 %
HCT VFR BLD AUTO: 27.4 %
HCT VFR BLD AUTO: 27.5 %
HCT VFR BLD AUTO: 27.5 %
HCT VFR BLD AUTO: 28 %
HCT VFR BLD AUTO: 28.1 %
HCT VFR BLD AUTO: 28.1 %
HCT VFR BLD AUTO: 28.2 %
HCT VFR BLD AUTO: 28.2 %
HCT VFR BLD AUTO: 28.3 %
HCT VFR BLD AUTO: 28.8 %
HCT VFR BLD AUTO: 29.4 %
HCT VFR BLD AUTO: 29.6 %
HCT VFR BLD AUTO: 29.7 %
HCT VFR BLD AUTO: 29.8 %
HCT VFR BLD AUTO: 29.9 %
HCT VFR BLD AUTO: 30 %
HCT VFR BLD AUTO: 30.1 %
HCT VFR BLD AUTO: 30.4 %
HCT VFR BLD AUTO: 30.5 %
HCT VFR BLD AUTO: 30.6 %
HCT VFR BLD AUTO: 31.2 %
HCT VFR BLD AUTO: 31.5 %
HCT VFR BLD AUTO: 31.6 %
HCT VFR BLD AUTO: 31.7 %
HCT VFR BLD AUTO: 31.8 %
HCT VFR BLD AUTO: 32.1 %
HCT VFR BLD AUTO: 32.2 %
HCT VFR BLD AUTO: 32.2 %
HCT VFR BLD AUTO: 32.8 %
HCT VFR BLD AUTO: 32.9 %
HCT VFR BLD AUTO: 32.9 %
HCT VFR BLD AUTO: 33 %
HCT VFR BLD AUTO: 33.2 %
HCT VFR BLD AUTO: 33.8 %
HCT VFR BLD AUTO: 33.8 %
HCT VFR BLD AUTO: 33.9 %
HCT VFR BLD AUTO: 34.3 %
HCT VFR BLD AUTO: 34.3 %
HCT VFR BLD AUTO: 34.9 %
HCT VFR BLD AUTO: 35.1 %
HCT VFR BLD AUTO: 35.4 %
HCT VFR BLD AUTO: 36 %
HCT VFR BLD AUTO: 36.3 %
HCT VFR BLD AUTO: 36.7 %
HCT VFR BLD AUTO: 37.2 %
HCT VFR BLD AUTO: 37.9 %
HCT VFR BLD CALC: 35 %PCV (ref 36–54)
HCT VFR BLD CALC: 42 %PCV (ref 36–54)
HGB BLD-MCNC: 10 G/DL
HGB BLD-MCNC: 10 G/DL
HGB BLD-MCNC: 10.2 G/DL
HGB BLD-MCNC: 10.3 G/DL
HGB BLD-MCNC: 10.4 G/DL
HGB BLD-MCNC: 10.4 G/DL
HGB BLD-MCNC: 10.5 G/DL
HGB BLD-MCNC: 10.5 G/DL
HGB BLD-MCNC: 10.7 G/DL
HGB BLD-MCNC: 10.7 G/DL
HGB BLD-MCNC: 10.8 G/DL
HGB BLD-MCNC: 10.9 G/DL
HGB BLD-MCNC: 10.9 G/DL
HGB BLD-MCNC: 11 G/DL
HGB BLD-MCNC: 11.1 G/DL
HGB BLD-MCNC: 11.2 G/DL
HGB BLD-MCNC: 11.2 G/DL
HGB BLD-MCNC: 11.4 G/DL
HGB BLD-MCNC: 11.4 G/DL
HGB BLD-MCNC: 11.6 G/DL
HGB BLD-MCNC: 11.7 G/DL
HGB BLD-MCNC: 11.7 G/DL
HGB BLD-MCNC: 12 G/DL
HGB BLD-MCNC: 12 G/DL
HGB BLD-MCNC: 12.1 G/DL
HGB BLD-MCNC: 12.1 G/DL
HGB BLD-MCNC: 12.4 G/DL
HGB BLD-MCNC: 12.8 G/DL
HGB BLD-MCNC: 13 G/DL
HGB BLD-MCNC: 6.8 G/DL
HGB BLD-MCNC: 6.9 G/DL
HGB BLD-MCNC: 7 G/DL
HGB BLD-MCNC: 7 G/DL
HGB BLD-MCNC: 7.1 G/DL
HGB BLD-MCNC: 7.4 G/DL
HGB BLD-MCNC: 7.4 G/DL
HGB BLD-MCNC: 7.5 G/DL
HGB BLD-MCNC: 7.6 G/DL
HGB BLD-MCNC: 7.8 G/DL
HGB BLD-MCNC: 7.9 G/DL
HGB BLD-MCNC: 7.9 G/DL
HGB BLD-MCNC: 8 G/DL
HGB BLD-MCNC: 8.1 G/DL
HGB BLD-MCNC: 8.2 G/DL
HGB BLD-MCNC: 8.2 G/DL
HGB BLD-MCNC: 8.3 G/DL
HGB BLD-MCNC: 8.4 G/DL
HGB BLD-MCNC: 8.5 G/DL
HGB BLD-MCNC: 8.6 G/DL
HGB BLD-MCNC: 8.7 G/DL
HGB BLD-MCNC: 8.8 G/DL
HGB BLD-MCNC: 8.8 G/DL
HGB BLD-MCNC: 8.9 G/DL
HGB BLD-MCNC: 8.9 G/DL
HGB BLD-MCNC: 9 G/DL
HGB BLD-MCNC: 9 G/DL
HGB BLD-MCNC: 9.1 G/DL
HGB BLD-MCNC: 9.1 G/DL
HGB BLD-MCNC: 9.3 G/DL
HGB BLD-MCNC: 9.4 G/DL
HGB BLD-MCNC: 9.4 G/DL
HGB BLD-MCNC: 9.5 G/DL
HGB BLD-MCNC: 9.6 G/DL
HGB BLD-MCNC: 9.7 G/DL
HGB BLD-MCNC: 9.9 G/DL
HGB BLD-MCNC: 9.9 G/DL
HGB UR QL STRIP: ABNORMAL
HGB UR QL STRIP: NEGATIVE
HYALINE CASTS UR QL AUTO: 0 /LPF
HYALINE CASTS UR QL AUTO: 2 /LPF
HYALINE CASTS UR QL AUTO: 3 /LPF
HYALINE CASTS UR QL AUTO: 41 /LPF
HYALINE CASTS UR QL AUTO: 5 /LPF
HYPOCHROMIA BLD QL SMEAR: ABNORMAL
IMM GRANULOCYTES # BLD AUTO: 0.03 K/UL
IMM GRANULOCYTES # BLD AUTO: 0.03 K/UL
IMM GRANULOCYTES # BLD AUTO: 0.04 K/UL
IMM GRANULOCYTES # BLD AUTO: 0.05 K/UL
IMM GRANULOCYTES # BLD AUTO: 0.06 K/UL
IMM GRANULOCYTES # BLD AUTO: 0.07 K/UL
IMM GRANULOCYTES # BLD AUTO: 0.08 K/UL
IMM GRANULOCYTES # BLD AUTO: 0.09 K/UL
IMM GRANULOCYTES # BLD AUTO: 0.1 K/UL
IMM GRANULOCYTES # BLD AUTO: 0.1 K/UL
IMM GRANULOCYTES # BLD AUTO: 0.11 K/UL
IMM GRANULOCYTES # BLD AUTO: 0.12 K/UL
IMM GRANULOCYTES # BLD AUTO: 0.13 K/UL
IMM GRANULOCYTES # BLD AUTO: 0.13 K/UL
IMM GRANULOCYTES # BLD AUTO: 0.14 K/UL
IMM GRANULOCYTES # BLD AUTO: 0.15 K/UL
IMM GRANULOCYTES # BLD AUTO: 0.16 K/UL
IMM GRANULOCYTES # BLD AUTO: 0.17 K/UL
IMM GRANULOCYTES # BLD AUTO: 0.17 K/UL
IMM GRANULOCYTES # BLD AUTO: 0.18 K/UL
IMM GRANULOCYTES # BLD AUTO: 0.19 K/UL
IMM GRANULOCYTES # BLD AUTO: 0.2 K/UL
IMM GRANULOCYTES # BLD AUTO: 0.21 K/UL
IMM GRANULOCYTES # BLD AUTO: 0.22 K/UL
IMM GRANULOCYTES # BLD AUTO: 0.23 K/UL
IMM GRANULOCYTES # BLD AUTO: 0.23 K/UL
IMM GRANULOCYTES # BLD AUTO: 0.24 K/UL
IMM GRANULOCYTES # BLD AUTO: 0.25 K/UL
IMM GRANULOCYTES # BLD AUTO: 0.26 K/UL
IMM GRANULOCYTES # BLD AUTO: 0.26 K/UL
IMM GRANULOCYTES # BLD AUTO: 0.27 K/UL
IMM GRANULOCYTES # BLD AUTO: 0.28 K/UL
IMM GRANULOCYTES # BLD AUTO: 0.28 K/UL
IMM GRANULOCYTES # BLD AUTO: 0.3 K/UL
IMM GRANULOCYTES # BLD AUTO: 0.31 K/UL
IMM GRANULOCYTES # BLD AUTO: 0.32 K/UL
IMM GRANULOCYTES # BLD AUTO: 0.35 K/UL
IMM GRANULOCYTES # BLD AUTO: 0.43 K/UL
IMM GRANULOCYTES # BLD AUTO: ABNORMAL K/UL
IMM GRANULOCYTES NFR BLD AUTO: 0.3 %
IMM GRANULOCYTES NFR BLD AUTO: 0.3 %
IMM GRANULOCYTES NFR BLD AUTO: 0.4 %
IMM GRANULOCYTES NFR BLD AUTO: 0.5 %
IMM GRANULOCYTES NFR BLD AUTO: 0.6 %
IMM GRANULOCYTES NFR BLD AUTO: 0.7 %
IMM GRANULOCYTES NFR BLD AUTO: 0.8 %
IMM GRANULOCYTES NFR BLD AUTO: 0.9 %
IMM GRANULOCYTES NFR BLD AUTO: 1 %
IMM GRANULOCYTES NFR BLD AUTO: 1.1 %
IMM GRANULOCYTES NFR BLD AUTO: 1.2 %
IMM GRANULOCYTES NFR BLD AUTO: 1.3 %
IMM GRANULOCYTES NFR BLD AUTO: 1.4 %
IMM GRANULOCYTES NFR BLD AUTO: 1.5 %
IMM GRANULOCYTES NFR BLD AUTO: 1.6 %
IMM GRANULOCYTES NFR BLD AUTO: 1.6 %
IMM GRANULOCYTES NFR BLD AUTO: 1.7 %
IMM GRANULOCYTES NFR BLD AUTO: 1.8 %
IMM GRANULOCYTES NFR BLD AUTO: 1.9 %
IMM GRANULOCYTES NFR BLD AUTO: 2.2 %
IMM GRANULOCYTES NFR BLD AUTO: ABNORMAL %
INR PPP: 1
INR PPP: 1.1
INR PPP: 1.2
INR PPP: 1.2
INR PPP: 1.3
INR PPP: 2.2
INR PPP: 2.3
IP: 15
KETONES UR QL STRIP: ABNORMAL
KETONES UR QL STRIP: NEGATIVE
LACTATE SERPL-SCNC: 0.7 MMOL/L
LACTATE SERPL-SCNC: 0.7 MMOL/L
LACTATE SERPL-SCNC: 1.1 MMOL/L
LACTATE SERPL-SCNC: 1.2 MMOL/L
LACTATE SERPL-SCNC: 1.3 MMOL/L
LACTATE SERPL-SCNC: 1.5 MMOL/L
LACTATE SERPL-SCNC: 1.5 MMOL/L
LACTATE SERPL-SCNC: 10 MMOL/L
LACTATE SERPL-SCNC: 10 MMOL/L
LACTATE SERPL-SCNC: 11.2 MMOL/L
LACTATE SERPL-SCNC: 11.6 MMOL/L
LACTATE SERPL-SCNC: 2.1 MMOL/L
LACTATE SERPL-SCNC: 2.3 MMOL/L
LACTATE SERPL-SCNC: 2.8 MMOL/L
LACTATE SERPL-SCNC: 3.2 MMOL/L
LACTATE SERPL-SCNC: 3.3 MMOL/L
LACTATE SERPL-SCNC: 3.3 MMOL/L
LACTATE SERPL-SCNC: 3.8 MMOL/L
LACTATE SERPL-SCNC: 3.8 MMOL/L
LACTATE SERPL-SCNC: 4.1 MMOL/L
LACTATE SERPL-SCNC: 4.9 MMOL/L
LACTATE SERPL-SCNC: 5.9 MMOL/L
LACTATE SERPL-SCNC: 9.9 MMOL/L
LACTATE SERPL-SCNC: >12 MMOL/L
LACTATE SERPL-SCNC: >12 MMOL/L
LDH SERPL L TO P-CCNC: 15.33 MMOL/L (ref 0.36–1.25)
LDH SERPL L TO P-CCNC: 17.72 MMOL/L (ref 0.36–1.25)
LDH SERPL L TO P-CCNC: 19.17 MMOL/L (ref 0.5–2.2)
LDH SERPL L TO P-CCNC: 2.57 MMOL/L (ref 0.5–2.2)
LEUKOCYTE ESTERASE UR QL STRIP: ABNORMAL
LEUKOCYTE ESTERASE UR QL STRIP: NEGATIVE
LIPASE SERPL-CCNC: 112 U/L
LIPASE SERPL-CCNC: 74 U/L
LYMPHOCYTES # BLD AUTO: 1.1 K/UL
LYMPHOCYTES # BLD AUTO: 1.2 K/UL
LYMPHOCYTES # BLD AUTO: 1.3 K/UL
LYMPHOCYTES # BLD AUTO: 1.3 K/UL
LYMPHOCYTES # BLD AUTO: 1.4 K/UL
LYMPHOCYTES # BLD AUTO: 1.4 K/UL
LYMPHOCYTES # BLD AUTO: 1.5 K/UL
LYMPHOCYTES # BLD AUTO: 1.6 K/UL
LYMPHOCYTES # BLD AUTO: 1.7 K/UL
LYMPHOCYTES # BLD AUTO: 1.7 K/UL
LYMPHOCYTES # BLD AUTO: 1.8 K/UL
LYMPHOCYTES # BLD AUTO: 1.8 K/UL
LYMPHOCYTES # BLD AUTO: 1.9 K/UL
LYMPHOCYTES # BLD AUTO: 2 K/UL
LYMPHOCYTES # BLD AUTO: 2.1 K/UL
LYMPHOCYTES # BLD AUTO: 2.2 K/UL
LYMPHOCYTES # BLD AUTO: 2.3 K/UL
LYMPHOCYTES # BLD AUTO: 2.4 K/UL
LYMPHOCYTES # BLD AUTO: 2.5 K/UL
LYMPHOCYTES # BLD AUTO: 2.6 K/UL
LYMPHOCYTES # BLD AUTO: 2.7 K/UL
LYMPHOCYTES # BLD AUTO: 2.8 K/UL
LYMPHOCYTES # BLD AUTO: 2.9 K/UL
LYMPHOCYTES # BLD AUTO: 3 K/UL
LYMPHOCYTES # BLD AUTO: 3 K/UL
LYMPHOCYTES # BLD AUTO: 3.1 K/UL
LYMPHOCYTES # BLD AUTO: 3.2 K/UL
LYMPHOCYTES # BLD AUTO: 3.3 K/UL
LYMPHOCYTES # BLD AUTO: 3.3 K/UL
LYMPHOCYTES # BLD AUTO: 3.4 K/UL
LYMPHOCYTES # BLD AUTO: 3.4 K/UL
LYMPHOCYTES # BLD AUTO: 3.5 K/UL
LYMPHOCYTES # BLD AUTO: 3.5 K/UL
LYMPHOCYTES # BLD AUTO: 3.6 K/UL
LYMPHOCYTES # BLD AUTO: 3.6 K/UL
LYMPHOCYTES # BLD AUTO: 3.7 K/UL
LYMPHOCYTES # BLD AUTO: 3.7 K/UL
LYMPHOCYTES # BLD AUTO: 3.9 K/UL
LYMPHOCYTES # BLD AUTO: ABNORMAL K/UL
LYMPHOCYTES NFR BLD: 10.3 %
LYMPHOCYTES NFR BLD: 10.6 %
LYMPHOCYTES NFR BLD: 10.8 %
LYMPHOCYTES NFR BLD: 11 %
LYMPHOCYTES NFR BLD: 11 %
LYMPHOCYTES NFR BLD: 11.1 %
LYMPHOCYTES NFR BLD: 11.3 %
LYMPHOCYTES NFR BLD: 12.1 %
LYMPHOCYTES NFR BLD: 12.2 %
LYMPHOCYTES NFR BLD: 12.4 %
LYMPHOCYTES NFR BLD: 12.5 %
LYMPHOCYTES NFR BLD: 12.6 %
LYMPHOCYTES NFR BLD: 12.8 %
LYMPHOCYTES NFR BLD: 13 %
LYMPHOCYTES NFR BLD: 13.2 %
LYMPHOCYTES NFR BLD: 13.4 %
LYMPHOCYTES NFR BLD: 13.4 %
LYMPHOCYTES NFR BLD: 13.7 %
LYMPHOCYTES NFR BLD: 13.8 %
LYMPHOCYTES NFR BLD: 14.1 %
LYMPHOCYTES NFR BLD: 14.1 %
LYMPHOCYTES NFR BLD: 14.2 %
LYMPHOCYTES NFR BLD: 14.5 %
LYMPHOCYTES NFR BLD: 14.6 %
LYMPHOCYTES NFR BLD: 14.7 %
LYMPHOCYTES NFR BLD: 14.7 %
LYMPHOCYTES NFR BLD: 14.9 %
LYMPHOCYTES NFR BLD: 15 %
LYMPHOCYTES NFR BLD: 15.2 %
LYMPHOCYTES NFR BLD: 15.3 %
LYMPHOCYTES NFR BLD: 15.3 %
LYMPHOCYTES NFR BLD: 15.5 %
LYMPHOCYTES NFR BLD: 15.6 %
LYMPHOCYTES NFR BLD: 15.8 %
LYMPHOCYTES NFR BLD: 15.9 %
LYMPHOCYTES NFR BLD: 16 %
LYMPHOCYTES NFR BLD: 16.1 %
LYMPHOCYTES NFR BLD: 16.2 %
LYMPHOCYTES NFR BLD: 16.9 %
LYMPHOCYTES NFR BLD: 16.9 %
LYMPHOCYTES NFR BLD: 17.2 %
LYMPHOCYTES NFR BLD: 17.3 %
LYMPHOCYTES NFR BLD: 17.4 %
LYMPHOCYTES NFR BLD: 17.7 %
LYMPHOCYTES NFR BLD: 18 %
LYMPHOCYTES NFR BLD: 18.1 %
LYMPHOCYTES NFR BLD: 18.3 %
LYMPHOCYTES NFR BLD: 18.6 %
LYMPHOCYTES NFR BLD: 19.1 %
LYMPHOCYTES NFR BLD: 19.2 %
LYMPHOCYTES NFR BLD: 19.4 %
LYMPHOCYTES NFR BLD: 19.5 %
LYMPHOCYTES NFR BLD: 19.5 %
LYMPHOCYTES NFR BLD: 19.8 %
LYMPHOCYTES NFR BLD: 19.8 %
LYMPHOCYTES NFR BLD: 20 %
LYMPHOCYTES NFR BLD: 20.7 %
LYMPHOCYTES NFR BLD: 20.8 %
LYMPHOCYTES NFR BLD: 21.3 %
LYMPHOCYTES NFR BLD: 21.3 %
LYMPHOCYTES NFR BLD: 21.4 %
LYMPHOCYTES NFR BLD: 21.4 %
LYMPHOCYTES NFR BLD: 21.8 %
LYMPHOCYTES NFR BLD: 22.1 %
LYMPHOCYTES NFR BLD: 22.1 %
LYMPHOCYTES NFR BLD: 22.6 %
LYMPHOCYTES NFR BLD: 22.8 %
LYMPHOCYTES NFR BLD: 22.8 %
LYMPHOCYTES NFR BLD: 23.3 %
LYMPHOCYTES NFR BLD: 23.6 %
LYMPHOCYTES NFR BLD: 24.9 %
LYMPHOCYTES NFR BLD: 25.4 %
LYMPHOCYTES NFR BLD: 25.4 %
LYMPHOCYTES NFR BLD: 26.3 %
LYMPHOCYTES NFR BLD: 27.3 %
LYMPHOCYTES NFR BLD: 28.8 %
LYMPHOCYTES NFR BLD: 29.5 %
LYMPHOCYTES NFR BLD: 30 %
LYMPHOCYTES NFR BLD: 30 %
LYMPHOCYTES NFR BLD: 30.1 %
LYMPHOCYTES NFR BLD: 30.1 %
LYMPHOCYTES NFR BLD: 30.5 %
LYMPHOCYTES NFR BLD: 31.2 %
LYMPHOCYTES NFR BLD: 31.6 %
LYMPHOCYTES NFR BLD: 32 %
LYMPHOCYTES NFR BLD: 34 %
LYMPHOCYTES NFR BLD: 9 %
LYMPHOCYTES NFR BLD: 9.3 %
MAGNESIUM SERPL-MCNC: 1.2 MG/DL
MAGNESIUM SERPL-MCNC: 1.5 MG/DL
MAGNESIUM SERPL-MCNC: 1.6 MG/DL
MAGNESIUM SERPL-MCNC: 1.7 MG/DL
MAGNESIUM SERPL-MCNC: 1.8 MG/DL
MAGNESIUM SERPL-MCNC: 1.9 MG/DL
MAGNESIUM SERPL-MCNC: 2 MG/DL
MAGNESIUM SERPL-MCNC: 2.1 MG/DL
MAGNESIUM SERPL-MCNC: 2.2 MG/DL
MAGNESIUM SERPL-MCNC: 2.3 MG/DL
MAGNESIUM SERPL-MCNC: 2.4 MG/DL
MAGNESIUM SERPL-MCNC: 2.6 MG/DL
MAGNESIUM SERPL-MCNC: 2.7 MG/DL
MCH RBC QN AUTO: 29.7 PG
MCH RBC QN AUTO: 29.9 PG
MCH RBC QN AUTO: 29.9 PG
MCH RBC QN AUTO: 30 PG
MCH RBC QN AUTO: 30.2 PG
MCH RBC QN AUTO: 30.2 PG
MCH RBC QN AUTO: 30.3 PG
MCH RBC QN AUTO: 30.4 PG
MCH RBC QN AUTO: 30.5 PG
MCH RBC QN AUTO: 30.6 PG
MCH RBC QN AUTO: 30.6 PG
MCH RBC QN AUTO: 30.7 PG
MCH RBC QN AUTO: 30.8 PG
MCH RBC QN AUTO: 30.9 PG
MCH RBC QN AUTO: 31 PG
MCH RBC QN AUTO: 31.1 PG
MCH RBC QN AUTO: 31.2 PG
MCH RBC QN AUTO: 31.2 PG
MCH RBC QN AUTO: 31.3 PG
MCH RBC QN AUTO: 31.4 PG
MCH RBC QN AUTO: 31.4 PG
MCH RBC QN AUTO: 31.5 PG
MCH RBC QN AUTO: 31.7 PG
MCH RBC QN AUTO: 31.8 PG
MCH RBC QN AUTO: 31.9 PG
MCH RBC QN AUTO: 32 PG
MCH RBC QN AUTO: 32.1 PG
MCH RBC QN AUTO: 32.4 PG
MCH RBC QN AUTO: 32.5 PG
MCH RBC QN AUTO: 32.5 PG
MCH RBC QN AUTO: 32.6 PG
MCH RBC QN AUTO: 32.7 PG
MCH RBC QN AUTO: 32.8 PG
MCH RBC QN AUTO: 32.9 PG
MCH RBC QN AUTO: 33 PG
MCH RBC QN AUTO: 33.1 PG
MCH RBC QN AUTO: 33.1 PG
MCH RBC QN AUTO: 33.2 PG
MCH RBC QN AUTO: 33.3 PG
MCH RBC QN AUTO: 33.4 PG
MCH RBC QN AUTO: 33.6 PG
MCH RBC QN AUTO: 33.6 PG
MCH RBC QN AUTO: 33.8 PG
MCH RBC QN AUTO: 33.8 PG
MCHC RBC AUTO-ENTMCNC: 31 G/DL
MCHC RBC AUTO-ENTMCNC: 31.4 G/DL
MCHC RBC AUTO-ENTMCNC: 31.4 G/DL
MCHC RBC AUTO-ENTMCNC: 31.7 G/DL
MCHC RBC AUTO-ENTMCNC: 31.8 G/DL
MCHC RBC AUTO-ENTMCNC: 31.8 G/DL
MCHC RBC AUTO-ENTMCNC: 31.9 G/DL
MCHC RBC AUTO-ENTMCNC: 31.9 G/DL
MCHC RBC AUTO-ENTMCNC: 32 G/DL
MCHC RBC AUTO-ENTMCNC: 32.1 G/DL
MCHC RBC AUTO-ENTMCNC: 32.1 G/DL
MCHC RBC AUTO-ENTMCNC: 32.3 G/DL
MCHC RBC AUTO-ENTMCNC: 32.3 G/DL
MCHC RBC AUTO-ENTMCNC: 32.4 G/DL
MCHC RBC AUTO-ENTMCNC: 32.5 G/DL
MCHC RBC AUTO-ENTMCNC: 32.5 G/DL
MCHC RBC AUTO-ENTMCNC: 32.9 G/DL
MCHC RBC AUTO-ENTMCNC: 33 G/DL
MCHC RBC AUTO-ENTMCNC: 33 G/DL
MCHC RBC AUTO-ENTMCNC: 33.1 G/DL
MCHC RBC AUTO-ENTMCNC: 33.1 G/DL
MCHC RBC AUTO-ENTMCNC: 33.2 G/DL
MCHC RBC AUTO-ENTMCNC: 33.3 G/DL
MCHC RBC AUTO-ENTMCNC: 33.5 G/DL
MCHC RBC AUTO-ENTMCNC: 33.6 G/DL
MCHC RBC AUTO-ENTMCNC: 33.7 G/DL
MCHC RBC AUTO-ENTMCNC: 33.8 G/DL
MCHC RBC AUTO-ENTMCNC: 33.9 G/DL
MCHC RBC AUTO-ENTMCNC: 34 G/DL
MCHC RBC AUTO-ENTMCNC: 34.1 G/DL
MCHC RBC AUTO-ENTMCNC: 34.2 G/DL
MCHC RBC AUTO-ENTMCNC: 34.3 G/DL
MCHC RBC AUTO-ENTMCNC: 34.4 G/DL
MCHC RBC AUTO-ENTMCNC: 34.5 G/DL
MCHC RBC AUTO-ENTMCNC: 34.6 G/DL
MCHC RBC AUTO-ENTMCNC: 34.6 G/DL
MCHC RBC AUTO-ENTMCNC: 34.7 G/DL
MCHC RBC AUTO-ENTMCNC: 34.7 G/DL
MCHC RBC AUTO-ENTMCNC: 34.9 G/DL
MCHC RBC AUTO-ENTMCNC: 34.9 G/DL
MCHC RBC AUTO-ENTMCNC: 35 G/DL
MCHC RBC AUTO-ENTMCNC: 35.1 G/DL
MCHC RBC AUTO-ENTMCNC: 35.1 G/DL
MCHC RBC AUTO-ENTMCNC: 35.3 G/DL
MCHC RBC AUTO-ENTMCNC: 35.4 G/DL
MCHC RBC AUTO-ENTMCNC: 35.5 G/DL
MCHC RBC AUTO-ENTMCNC: 35.6 G/DL
MCHC RBC AUTO-ENTMCNC: 35.9 G/DL
MCV RBC AUTO: 100 FL
MCV RBC AUTO: 100 FL
MCV RBC AUTO: 101 FL
MCV RBC AUTO: 88 FL
MCV RBC AUTO: 89 FL
MCV RBC AUTO: 90 FL
MCV RBC AUTO: 91 FL
MCV RBC AUTO: 91 FL
MCV RBC AUTO: 92 FL
MCV RBC AUTO: 93 FL
MCV RBC AUTO: 94 FL
MCV RBC AUTO: 95 FL
MCV RBC AUTO: 96 FL
MCV RBC AUTO: 97 FL
MCV RBC AUTO: 98 FL
MCV RBC AUTO: 99 FL
MCV RBC AUTO: 99 FL
METHADONE UR QL SCN>300 NG/ML: NEGATIVE
MICROSCOPIC COMMENT: ABNORMAL
MICROSCOPIC COMMENT: NORMAL
MICROSCOPIC COMMENT: NORMAL
MIN VOL: 10.8
MIN VOL: 11.4
MIN VOL: 11.8
MIN VOL: 12.7
MIN VOL: 6.31
MIN VOL: 6.47
MIN VOL: 6.5
MIN VOL: 7.07
MIN VOL: 7.52
MIN VOL: 7.8
MIN VOL: 8.96
MITRAL VALVE MOBILITY: NORMAL
MITRAL VALVE MOBILITY: NORMAL
MITRAL VALVE REGURGITATION: ABNORMAL
MODE: ABNORMAL
MONOCYTES # BLD AUTO: 0.3 K/UL
MONOCYTES # BLD AUTO: 0.4 K/UL
MONOCYTES # BLD AUTO: 0.5 K/UL
MONOCYTES # BLD AUTO: 0.6 K/UL
MONOCYTES # BLD AUTO: 0.7 K/UL
MONOCYTES # BLD AUTO: 0.8 K/UL
MONOCYTES # BLD AUTO: 0.9 K/UL
MONOCYTES # BLD AUTO: 1 K/UL
MONOCYTES # BLD AUTO: 1.1 K/UL
MONOCYTES # BLD AUTO: 1.2 K/UL
MONOCYTES # BLD AUTO: 1.3 K/UL
MONOCYTES # BLD AUTO: 1.4 K/UL
MONOCYTES # BLD AUTO: 1.5 K/UL
MONOCYTES # BLD AUTO: 1.7 K/UL
MONOCYTES # BLD AUTO: 1.8 K/UL
MONOCYTES # BLD AUTO: 1.9 K/UL
MONOCYTES # BLD AUTO: 1.9 K/UL
MONOCYTES # BLD AUTO: 2 K/UL
MONOCYTES # BLD AUTO: 2 K/UL
MONOCYTES # BLD AUTO: 2.1 K/UL
MONOCYTES # BLD AUTO: 2.3 K/UL
MONOCYTES # BLD AUTO: 2.4 K/UL
MONOCYTES # BLD AUTO: ABNORMAL K/UL
MONOCYTES NFR BLD: 10 %
MONOCYTES NFR BLD: 10.1 %
MONOCYTES NFR BLD: 10.2 %
MONOCYTES NFR BLD: 10.3 %
MONOCYTES NFR BLD: 10.4 %
MONOCYTES NFR BLD: 10.4 %
MONOCYTES NFR BLD: 10.6 %
MONOCYTES NFR BLD: 10.9 %
MONOCYTES NFR BLD: 11.5 %
MONOCYTES NFR BLD: 11.5 %
MONOCYTES NFR BLD: 11.7 %
MONOCYTES NFR BLD: 12.5 %
MONOCYTES NFR BLD: 12.8 %
MONOCYTES NFR BLD: 13 %
MONOCYTES NFR BLD: 13 %
MONOCYTES NFR BLD: 13.5 %
MONOCYTES NFR BLD: 13.5 %
MONOCYTES NFR BLD: 13.6 %
MONOCYTES NFR BLD: 13.7 %
MONOCYTES NFR BLD: 14.4 %
MONOCYTES NFR BLD: 14.5 %
MONOCYTES NFR BLD: 14.8 %
MONOCYTES NFR BLD: 14.9 %
MONOCYTES NFR BLD: 15.1 %
MONOCYTES NFR BLD: 15.3 %
MONOCYTES NFR BLD: 15.7 %
MONOCYTES NFR BLD: 15.7 %
MONOCYTES NFR BLD: 16.4 %
MONOCYTES NFR BLD: 3 %
MONOCYTES NFR BLD: 3.6 %
MONOCYTES NFR BLD: 4.3 %
MONOCYTES NFR BLD: 4.3 %
MONOCYTES NFR BLD: 5 %
MONOCYTES NFR BLD: 5 %
MONOCYTES NFR BLD: 5.3 %
MONOCYTES NFR BLD: 5.7 %
MONOCYTES NFR BLD: 5.7 %
MONOCYTES NFR BLD: 5.8 %
MONOCYTES NFR BLD: 5.9 %
MONOCYTES NFR BLD: 6 %
MONOCYTES NFR BLD: 6 %
MONOCYTES NFR BLD: 6.1 %
MONOCYTES NFR BLD: 6.2 %
MONOCYTES NFR BLD: 6.3 %
MONOCYTES NFR BLD: 6.3 %
MONOCYTES NFR BLD: 6.4 %
MONOCYTES NFR BLD: 6.5 %
MONOCYTES NFR BLD: 6.6 %
MONOCYTES NFR BLD: 6.7 %
MONOCYTES NFR BLD: 6.8 %
MONOCYTES NFR BLD: 7 %
MONOCYTES NFR BLD: 7.1 %
MONOCYTES NFR BLD: 7.2 %
MONOCYTES NFR BLD: 7.3 %
MONOCYTES NFR BLD: 7.5 %
MONOCYTES NFR BLD: 7.7 %
MONOCYTES NFR BLD: 7.8 %
MONOCYTES NFR BLD: 7.8 %
MONOCYTES NFR BLD: 7.9 %
MONOCYTES NFR BLD: 8 %
MONOCYTES NFR BLD: 8.1 %
MONOCYTES NFR BLD: 8.1 %
MONOCYTES NFR BLD: 8.3 %
MONOCYTES NFR BLD: 8.4 %
MONOCYTES NFR BLD: 8.4 %
MONOCYTES NFR BLD: 8.6 %
MONOCYTES NFR BLD: 8.6 %
MONOCYTES NFR BLD: 8.7 %
MONOCYTES NFR BLD: 8.8 %
MONOCYTES NFR BLD: 8.9 %
MONOCYTES NFR BLD: 8.9 %
MONOCYTES NFR BLD: 9 %
MONOCYTES NFR BLD: 9.1 %
MONOCYTES NFR BLD: 9.2 %
MONOCYTES NFR BLD: 9.3 %
MONOCYTES NFR BLD: 9.3 %
MONOCYTES NFR BLD: 9.4 %
MONOCYTES NFR BLD: 9.5 %
MYCOBACTERIUM SPEC QL CULT: NORMAL
NEUTROPHILS # BLD AUTO: 10.1 K/UL
NEUTROPHILS # BLD AUTO: 10.2 K/UL
NEUTROPHILS # BLD AUTO: 10.3 K/UL
NEUTROPHILS # BLD AUTO: 10.3 K/UL
NEUTROPHILS # BLD AUTO: 10.5 K/UL
NEUTROPHILS # BLD AUTO: 10.6 K/UL
NEUTROPHILS # BLD AUTO: 10.8 K/UL
NEUTROPHILS # BLD AUTO: 11.2 K/UL
NEUTROPHILS # BLD AUTO: 11.4 K/UL
NEUTROPHILS # BLD AUTO: 11.6 K/UL
NEUTROPHILS # BLD AUTO: 11.6 K/UL
NEUTROPHILS # BLD AUTO: 11.8 K/UL
NEUTROPHILS # BLD AUTO: 12 K/UL
NEUTROPHILS # BLD AUTO: 12.2 K/UL
NEUTROPHILS # BLD AUTO: 12.3 K/UL
NEUTROPHILS # BLD AUTO: 12.3 K/UL
NEUTROPHILS # BLD AUTO: 12.6 K/UL
NEUTROPHILS # BLD AUTO: 12.6 K/UL
NEUTROPHILS # BLD AUTO: 13.4 K/UL
NEUTROPHILS # BLD AUTO: 13.6 K/UL
NEUTROPHILS # BLD AUTO: 13.7 K/UL
NEUTROPHILS # BLD AUTO: 14 K/UL
NEUTROPHILS # BLD AUTO: 14 K/UL
NEUTROPHILS # BLD AUTO: 14.1 K/UL
NEUTROPHILS # BLD AUTO: 14.5 K/UL
NEUTROPHILS # BLD AUTO: 14.6 K/UL
NEUTROPHILS # BLD AUTO: 14.6 K/UL
NEUTROPHILS # BLD AUTO: 15.6 K/UL
NEUTROPHILS # BLD AUTO: 16 K/UL
NEUTROPHILS # BLD AUTO: 16.1 K/UL
NEUTROPHILS # BLD AUTO: 16.2 K/UL
NEUTROPHILS # BLD AUTO: 16.9 K/UL
NEUTROPHILS # BLD AUTO: 17.2 K/UL
NEUTROPHILS # BLD AUTO: 18.7 K/UL
NEUTROPHILS # BLD AUTO: 3.8 K/UL
NEUTROPHILS # BLD AUTO: 4 K/UL
NEUTROPHILS # BLD AUTO: 4.1 K/UL
NEUTROPHILS # BLD AUTO: 4.2 K/UL
NEUTROPHILS # BLD AUTO: 4.4 K/UL
NEUTROPHILS # BLD AUTO: 4.6 K/UL
NEUTROPHILS # BLD AUTO: 5 K/UL
NEUTROPHILS # BLD AUTO: 5.1 K/UL
NEUTROPHILS # BLD AUTO: 5.2 K/UL
NEUTROPHILS # BLD AUTO: 5.2 K/UL
NEUTROPHILS # BLD AUTO: 5.3 K/UL
NEUTROPHILS # BLD AUTO: 5.3 K/UL
NEUTROPHILS # BLD AUTO: 5.5 K/UL
NEUTROPHILS # BLD AUTO: 5.6 K/UL
NEUTROPHILS # BLD AUTO: 6 K/UL
NEUTROPHILS # BLD AUTO: 6.1 K/UL
NEUTROPHILS # BLD AUTO: 6.1 K/UL
NEUTROPHILS # BLD AUTO: 6.4 K/UL
NEUTROPHILS # BLD AUTO: 6.4 K/UL
NEUTROPHILS # BLD AUTO: 6.5 K/UL
NEUTROPHILS # BLD AUTO: 6.6 K/UL
NEUTROPHILS # BLD AUTO: 6.9 K/UL
NEUTROPHILS # BLD AUTO: 7 K/UL
NEUTROPHILS # BLD AUTO: 7.1 K/UL
NEUTROPHILS # BLD AUTO: 7.3 K/UL
NEUTROPHILS # BLD AUTO: 7.4 K/UL
NEUTROPHILS # BLD AUTO: 7.4 K/UL
NEUTROPHILS # BLD AUTO: 7.5 K/UL
NEUTROPHILS # BLD AUTO: 7.6 K/UL
NEUTROPHILS # BLD AUTO: 7.9 K/UL
NEUTROPHILS # BLD AUTO: 8 K/UL
NEUTROPHILS # BLD AUTO: 8 K/UL
NEUTROPHILS # BLD AUTO: 8.2 K/UL
NEUTROPHILS # BLD AUTO: 8.4 K/UL
NEUTROPHILS # BLD AUTO: 9.2 K/UL
NEUTROPHILS # BLD AUTO: 9.3 K/UL
NEUTROPHILS # BLD AUTO: 9.4 K/UL
NEUTROPHILS # BLD AUTO: 9.4 K/UL
NEUTROPHILS # BLD AUTO: 9.5 K/UL
NEUTROPHILS # BLD AUTO: 9.8 K/UL
NEUTROPHILS # BLD AUTO: 9.9 K/UL
NEUTROPHILS # BLD AUTO: ABNORMAL K/UL
NEUTROPHILS NFR BLD: 45 %
NEUTROPHILS NFR BLD: 47.5 %
NEUTROPHILS NFR BLD: 48 %
NEUTROPHILS NFR BLD: 50.1 %
NEUTROPHILS NFR BLD: 51.2 %
NEUTROPHILS NFR BLD: 52 %
NEUTROPHILS NFR BLD: 53 %
NEUTROPHILS NFR BLD: 54.9 %
NEUTROPHILS NFR BLD: 55.9 %
NEUTROPHILS NFR BLD: 56 %
NEUTROPHILS NFR BLD: 56.4 %
NEUTROPHILS NFR BLD: 56.9 %
NEUTROPHILS NFR BLD: 57.6 %
NEUTROPHILS NFR BLD: 58.6 %
NEUTROPHILS NFR BLD: 58.7 %
NEUTROPHILS NFR BLD: 59 %
NEUTROPHILS NFR BLD: 60.1 %
NEUTROPHILS NFR BLD: 60.5 %
NEUTROPHILS NFR BLD: 61.4 %
NEUTROPHILS NFR BLD: 61.5 %
NEUTROPHILS NFR BLD: 62.4 %
NEUTROPHILS NFR BLD: 62.4 %
NEUTROPHILS NFR BLD: 62.6 %
NEUTROPHILS NFR BLD: 62.9 %
NEUTROPHILS NFR BLD: 63 %
NEUTROPHILS NFR BLD: 63.3 %
NEUTROPHILS NFR BLD: 63.7 %
NEUTROPHILS NFR BLD: 63.8 %
NEUTROPHILS NFR BLD: 63.9 %
NEUTROPHILS NFR BLD: 63.9 %
NEUTROPHILS NFR BLD: 64 %
NEUTROPHILS NFR BLD: 64.1 %
NEUTROPHILS NFR BLD: 64.5 %
NEUTROPHILS NFR BLD: 64.7 %
NEUTROPHILS NFR BLD: 65.2 %
NEUTROPHILS NFR BLD: 65.5 %
NEUTROPHILS NFR BLD: 65.7 %
NEUTROPHILS NFR BLD: 65.8 %
NEUTROPHILS NFR BLD: 65.9 %
NEUTROPHILS NFR BLD: 66.4 %
NEUTROPHILS NFR BLD: 66.4 %
NEUTROPHILS NFR BLD: 67.5 %
NEUTROPHILS NFR BLD: 68 %
NEUTROPHILS NFR BLD: 68 %
NEUTROPHILS NFR BLD: 68.1 %
NEUTROPHILS NFR BLD: 68.5 %
NEUTROPHILS NFR BLD: 68.6 %
NEUTROPHILS NFR BLD: 68.6 %
NEUTROPHILS NFR BLD: 69 %
NEUTROPHILS NFR BLD: 69.6 %
NEUTROPHILS NFR BLD: 69.7 %
NEUTROPHILS NFR BLD: 70.3 %
NEUTROPHILS NFR BLD: 70.5 %
NEUTROPHILS NFR BLD: 71 %
NEUTROPHILS NFR BLD: 71.9 %
NEUTROPHILS NFR BLD: 71.9 %
NEUTROPHILS NFR BLD: 72.2 %
NEUTROPHILS NFR BLD: 72.4 %
NEUTROPHILS NFR BLD: 72.8 %
NEUTROPHILS NFR BLD: 72.9 %
NEUTROPHILS NFR BLD: 73 %
NEUTROPHILS NFR BLD: 73.6 %
NEUTROPHILS NFR BLD: 73.7 %
NEUTROPHILS NFR BLD: 74 %
NEUTROPHILS NFR BLD: 74.2 %
NEUTROPHILS NFR BLD: 74.4 %
NEUTROPHILS NFR BLD: 74.6 %
NEUTROPHILS NFR BLD: 74.8 %
NEUTROPHILS NFR BLD: 75 %
NEUTROPHILS NFR BLD: 75.1 %
NEUTROPHILS NFR BLD: 76.1 %
NEUTROPHILS NFR BLD: 76.1 %
NEUTROPHILS NFR BLD: 76.3 %
NEUTROPHILS NFR BLD: 76.8 %
NEUTROPHILS NFR BLD: 76.8 %
NEUTROPHILS NFR BLD: 76.9 %
NEUTROPHILS NFR BLD: 76.9 %
NEUTROPHILS NFR BLD: 77 %
NEUTROPHILS NFR BLD: 77.3 %
NEUTROPHILS NFR BLD: 77.3 %
NEUTROPHILS NFR BLD: 77.4 %
NEUTROPHILS NFR BLD: 77.7 %
NEUTROPHILS NFR BLD: 78.1 %
NEUTROPHILS NFR BLD: 78.1 %
NEUTROPHILS NFR BLD: 78.2 %
NEUTROPHILS NFR BLD: 78.4 %
NEUTROPHILS NFR BLD: 78.5 %
NEUTROPHILS NFR BLD: 79.3 %
NEUTROPHILS NFR BLD: 79.5 %
NEUTROPHILS NFR BLD: 81 %
NEUTROPHILS NFR BLD: 81.5 %
NEUTROPHILS NFR BLD: 82.1 %
NEUTROPHILS NFR BLD: 82.9 %
NEUTROPHILS NFR BLD: 83 %
NEUTS BAND NFR BLD MANUAL: 2 %
NEUTS BAND NFR BLD MANUAL: 2 %
NITRITE UR QL STRIP: NEGATIVE
NRBC BLD-RTO: 0 /100 WBC
NRBC BLD-RTO: 1 /100 WBC
NRBC BLD-RTO: 2 /100 WBC
NRBC BLD-RTO: 4 /100 WBC
NRBC BLD-RTO: 4 /100 WBC
NRBC BLD-RTO: 5 /100 WBC
NRBC BLD-RTO: 5 /100 WBC
NRBC BLD-RTO: ABNORMAL /100 WBC
NRBC BLD-RTO: ABNORMAL /100 WBC
OPIATES UR QL SCN: NEGATIVE
OVALOCYTES BLD QL SMEAR: ABNORMAL
PATH REV BLD -IMP: NORMAL
PATH REV BLD -IMP: NORMAL
PCO2 BLDA: 18.6 MMHG (ref 35–45)
PCO2 BLDA: 23 MMHG (ref 35–45)
PCO2 BLDA: 24 MMHG (ref 35–45)
PCO2 BLDA: 25.9 MMHG (ref 35–45)
PCO2 BLDA: 26.2 MMHG (ref 35–45)
PCO2 BLDA: 26.7 MMHG (ref 35–45)
PCO2 BLDA: 27.5 MMHG (ref 35–45)
PCO2 BLDA: 27.9 MMHG (ref 35–45)
PCO2 BLDA: 28.4 MMHG (ref 35–45)
PCO2 BLDA: 29.5 MMHG (ref 35–45)
PCO2 BLDA: 30.4 MMHG (ref 35–45)
PCO2 BLDA: 30.6 MMHG (ref 35–45)
PCO2 BLDA: 30.7 MMHG (ref 35–45)
PCO2 BLDA: 31.3 MMHG (ref 35–45)
PCO2 BLDA: 31.6 MMHG (ref 35–45)
PCO2 BLDA: 32.4 MMHG (ref 35–45)
PCO2 BLDA: 32.7 MMHG (ref 35–45)
PCO2 BLDA: 32.9 MMHG (ref 35–45)
PCO2 BLDA: 34.1 MMHG (ref 35–45)
PCO2 BLDA: 34.7 MMHG (ref 35–45)
PCO2 BLDA: 34.8 MMHG (ref 35–45)
PCO2 BLDA: 34.8 MMHG (ref 35–45)
PCO2 BLDA: 35.1 MMHG (ref 35–45)
PCO2 BLDA: 36.2 MMHG (ref 35–45)
PCO2 BLDA: 37.5 MMHG (ref 35–45)
PCO2 BLDA: 37.5 MMHG (ref 35–45)
PCO2 BLDA: 37.7 MMHG (ref 35–45)
PCO2 BLDA: 39.3 MMHG (ref 35–45)
PCO2 BLDA: 40 MMHG (ref 35–45)
PCO2 BLDA: 40 MMHG (ref 35–45)
PCO2 BLDA: 42.2 MMHG (ref 35–45)
PCO2 BLDA: 42.9 MMHG (ref 35–45)
PCO2 BLDA: 43.6 MMHG (ref 35–45)
PCO2 BLDA: 45.2 MMHG (ref 35–45)
PCO2 BLDA: 46 MMHG (ref 35–45)
PCO2 BLDA: 46.5 MMHG (ref 35–45)
PCO2 BLDA: 47.7 MMHG (ref 35–45)
PCO2 BLDA: 53.8 MMHG (ref 35–45)
PCO2 BLDA: ABNORMAL MMHG (ref 35–45)
PCP UR QL SCN>25 NG/ML: NEGATIVE
PEEP: 5
PEEP: 8
PEEP: 8
PERIPHERAL PTA: YES
PERIPHERAL STENOSIS: ABNORMAL
PH SMN: 7.06 [PH] (ref 7.35–7.45)
PH SMN: 7.07 [PH] (ref 7.35–7.45)
PH SMN: 7.1 [PH] (ref 7.35–7.45)
PH SMN: 7.19 [PH] (ref 7.35–7.45)
PH SMN: 7.22 [PH] (ref 7.35–7.45)
PH SMN: 7.25 [PH] (ref 7.35–7.45)
PH SMN: 7.26 [PH] (ref 7.35–7.45)
PH SMN: 7.26 [PH] (ref 7.35–7.45)
PH SMN: 7.27 [PH] (ref 7.35–7.45)
PH SMN: 7.29 [PH] (ref 7.35–7.45)
PH SMN: 7.34 [PH] (ref 7.35–7.45)
PH SMN: 7.34 [PH] (ref 7.35–7.45)
PH SMN: 7.36 [PH] (ref 7.35–7.45)
PH SMN: 7.38 [PH] (ref 7.35–7.45)
PH SMN: 7.38 [PH] (ref 7.35–7.45)
PH SMN: 7.39 [PH] (ref 7.35–7.45)
PH SMN: 7.4 [PH] (ref 7.35–7.45)
PH SMN: 7.42 [PH] (ref 7.35–7.45)
PH SMN: 7.43 [PH] (ref 7.35–7.45)
PH SMN: 7.43 [PH] (ref 7.35–7.45)
PH SMN: 7.44 [PH] (ref 7.35–7.45)
PH SMN: 7.46 [PH] (ref 7.35–7.45)
PH SMN: 7.48 [PH] (ref 7.35–7.45)
PH SMN: 7.48 [PH] (ref 7.35–7.45)
PH SMN: 7.49 [PH] (ref 7.35–7.45)
PH SMN: 7.5 [PH] (ref 7.35–7.45)
PH SMN: 7.5 [PH] (ref 7.35–7.45)
PH SMN: 7.51 [PH] (ref 7.35–7.45)
PH SMN: 7.51 [PH] (ref 7.35–7.45)
PH SMN: 7.52 [PH] (ref 7.35–7.45)
PH SMN: 7.52 [PH] (ref 7.35–7.45)
PH SMN: 7.53 [PH] (ref 7.35–7.45)
PH SMN: 7.54 [PH] (ref 7.35–7.45)
PH SMN: 7.54 [PH] (ref 7.35–7.45)
PH SMN: 7.55 [PH] (ref 7.35–7.45)
PH SMN: 7.58 [PH] (ref 7.35–7.45)
PH SMN: 7.6 [PH] (ref 7.35–7.45)
PH UR STRIP: 5 [PH] (ref 5–8)
PH UR STRIP: 6 [PH] (ref 5–8)
PH UR STRIP: 7 [PH] (ref 5–8)
PH UR STRIP: 8 [PH] (ref 5–8)
PHOSPHATE SERPL-MCNC: 1.4 MG/DL
PHOSPHATE SERPL-MCNC: 2.6 MG/DL
PHOSPHATE SERPL-MCNC: 2.7 MG/DL
PHOSPHATE SERPL-MCNC: 2.9 MG/DL
PHOSPHATE SERPL-MCNC: 3 MG/DL
PHOSPHATE SERPL-MCNC: 3.2 MG/DL
PHOSPHATE SERPL-MCNC: 3.3 MG/DL
PHOSPHATE SERPL-MCNC: 3.4 MG/DL
PHOSPHATE SERPL-MCNC: 3.6 MG/DL
PHOSPHATE SERPL-MCNC: 3.7 MG/DL
PHOSPHATE SERPL-MCNC: 3.8 MG/DL
PHOSPHATE SERPL-MCNC: 3.8 MG/DL
PHOSPHATE SERPL-MCNC: 3.9 MG/DL
PHOSPHATE SERPL-MCNC: 4 MG/DL
PHOSPHATE SERPL-MCNC: 4.1 MG/DL
PHOSPHATE SERPL-MCNC: 4.3 MG/DL
PHOSPHATE SERPL-MCNC: 4.5 MG/DL
PHOSPHATE SERPL-MCNC: 4.6 MG/DL
PHOSPHATE SERPL-MCNC: 4.7 MG/DL
PHOSPHATE SERPL-MCNC: 4.9 MG/DL
PHOSPHATE SERPL-MCNC: 6.4 MG/DL
PHOSPHATE SERPL-MCNC: 7 MG/DL
PIP: 19
PIP: 19
PIP: 20
PIP: 21
PIP: 22
PIP: 22
PIP: 24
PIP: 25
PIP: 26
PIP: 33
PLATELET # BLD AUTO: 135 K/UL
PLATELET # BLD AUTO: 141 K/UL
PLATELET # BLD AUTO: 149 K/UL
PLATELET # BLD AUTO: 156 K/UL
PLATELET # BLD AUTO: 162 K/UL
PLATELET # BLD AUTO: 164 K/UL
PLATELET # BLD AUTO: 168 K/UL
PLATELET # BLD AUTO: 180 K/UL
PLATELET # BLD AUTO: 184 K/UL
PLATELET # BLD AUTO: 186 K/UL
PLATELET # BLD AUTO: 189 K/UL
PLATELET # BLD AUTO: 203 K/UL
PLATELET # BLD AUTO: 210 K/UL
PLATELET # BLD AUTO: 223 K/UL
PLATELET # BLD AUTO: 226 K/UL
PLATELET # BLD AUTO: 245 K/UL
PLATELET # BLD AUTO: 246 K/UL
PLATELET # BLD AUTO: 252 K/UL
PLATELET # BLD AUTO: 259 K/UL
PLATELET # BLD AUTO: 264 K/UL
PLATELET # BLD AUTO: 279 K/UL
PLATELET # BLD AUTO: 279 K/UL
PLATELET # BLD AUTO: 283 K/UL
PLATELET # BLD AUTO: 292 K/UL
PLATELET # BLD AUTO: 296 K/UL
PLATELET # BLD AUTO: 305 K/UL
PLATELET # BLD AUTO: 316 K/UL
PLATELET # BLD AUTO: 320 K/UL
PLATELET # BLD AUTO: 323 K/UL
PLATELET # BLD AUTO: 323 K/UL
PLATELET # BLD AUTO: 326 K/UL
PLATELET # BLD AUTO: 330 K/UL
PLATELET # BLD AUTO: 330 K/UL
PLATELET # BLD AUTO: 337 K/UL
PLATELET # BLD AUTO: 338 K/UL
PLATELET # BLD AUTO: 340 K/UL
PLATELET # BLD AUTO: 341 K/UL
PLATELET # BLD AUTO: 341 K/UL
PLATELET # BLD AUTO: 343 K/UL
PLATELET # BLD AUTO: 345 K/UL
PLATELET # BLD AUTO: 347 K/UL
PLATELET # BLD AUTO: 349 K/UL
PLATELET # BLD AUTO: 364 K/UL
PLATELET # BLD AUTO: 365 K/UL
PLATELET # BLD AUTO: 370 K/UL
PLATELET # BLD AUTO: 374 K/UL
PLATELET # BLD AUTO: 386 K/UL
PLATELET # BLD AUTO: 405 K/UL
PLATELET # BLD AUTO: 413 K/UL
PLATELET # BLD AUTO: 414 K/UL
PLATELET # BLD AUTO: 438 K/UL
PLATELET # BLD AUTO: 438 K/UL
PLATELET # BLD AUTO: 446 K/UL
PLATELET # BLD AUTO: 459 K/UL
PLATELET # BLD AUTO: 460 K/UL
PLATELET # BLD AUTO: 466 K/UL
PLATELET # BLD AUTO: 467 K/UL
PLATELET # BLD AUTO: 476 K/UL
PLATELET # BLD AUTO: 480 K/UL
PLATELET # BLD AUTO: 480 K/UL
PLATELET # BLD AUTO: 485 K/UL
PLATELET # BLD AUTO: 489 K/UL
PLATELET # BLD AUTO: 491 K/UL
PLATELET # BLD AUTO: 491 K/UL
PLATELET # BLD AUTO: 498 K/UL
PLATELET # BLD AUTO: 500 K/UL
PLATELET # BLD AUTO: 501 K/UL
PLATELET # BLD AUTO: 504 K/UL
PLATELET # BLD AUTO: 517 K/UL
PLATELET # BLD AUTO: 528 K/UL
PLATELET # BLD AUTO: 530 K/UL
PLATELET # BLD AUTO: 545 K/UL
PLATELET # BLD AUTO: 547 K/UL
PLATELET # BLD AUTO: 556 K/UL
PLATELET # BLD AUTO: 558 K/UL
PLATELET # BLD AUTO: 572 K/UL
PLATELET # BLD AUTO: 597 K/UL
PLATELET # BLD AUTO: 601 K/UL
PLATELET # BLD AUTO: 614 K/UL
PLATELET # BLD AUTO: 659 K/UL
PLATELET # BLD AUTO: 690 K/UL
PLATELET # BLD AUTO: 698 K/UL
PLATELET # BLD AUTO: 703 K/UL
PLATELET # BLD AUTO: 709 K/UL
PLATELET # BLD AUTO: 721 K/UL
PLATELET # BLD AUTO: 729 K/UL
PLATELET # BLD AUTO: 731 K/UL
PLATELET # BLD AUTO: 733 K/UL
PLATELET # BLD AUTO: 746 K/UL
PLATELET # BLD AUTO: 747 K/UL
PLATELET # BLD AUTO: 754 K/UL
PLATELET # BLD AUTO: 793 K/UL
PLATELET # BLD AUTO: 796 K/UL
PLATELET # BLD AUTO: 797 K/UL
PLATELET BLD QL SMEAR: ABNORMAL
PMV BLD AUTO: 10 FL
PMV BLD AUTO: 10.1 FL
PMV BLD AUTO: 10.2 FL
PMV BLD AUTO: 10.3 FL
PMV BLD AUTO: 10.4 FL
PMV BLD AUTO: 10.5 FL
PMV BLD AUTO: 10.6 FL
PMV BLD AUTO: 10.7 FL
PMV BLD AUTO: 10.8 FL
PMV BLD AUTO: 10.9 FL
PMV BLD AUTO: 11 FL
PMV BLD AUTO: 11 FL
PMV BLD AUTO: 11.1 FL
PMV BLD AUTO: 11.1 FL
PMV BLD AUTO: 11.2 FL
PMV BLD AUTO: 11.3 FL
PMV BLD AUTO: 11.4 FL
PMV BLD AUTO: 11.5 FL
PMV BLD AUTO: 11.5 FL
PMV BLD AUTO: 11.6 FL
PMV BLD AUTO: 11.7 FL
PMV BLD AUTO: 11.7 FL
PMV BLD AUTO: 11.9 FL
PMV BLD AUTO: 12.1 FL
PMV BLD AUTO: 12.3 FL
PMV BLD AUTO: 12.4 FL
PMV BLD AUTO: 12.4 FL
PMV BLD AUTO: 12.6 FL
PMV BLD AUTO: 12.7 FL
PMV BLD AUTO: 12.8 FL
PO2 BLDA: 11 MMHG (ref 40–60)
PO2 BLDA: 112 MMHG (ref 80–100)
PO2 BLDA: 114 MMHG (ref 80–100)
PO2 BLDA: 122 MMHG (ref 80–100)
PO2 BLDA: 122 MMHG (ref 80–100)
PO2 BLDA: 137 MMHG (ref 80–100)
PO2 BLDA: 137 MMHG (ref 80–100)
PO2 BLDA: 143 MMHG (ref 80–100)
PO2 BLDA: 156 MMHG (ref 75–100)
PO2 BLDA: 174 MMHG (ref 75–100)
PO2 BLDA: 186 MMHG (ref 80–100)
PO2 BLDA: 21 MMHG (ref 40–60)
PO2 BLDA: 218 MMHG (ref 80–100)
PO2 BLDA: 22 MMHG (ref 40–60)
PO2 BLDA: 227 MMHG (ref 80–100)
PO2 BLDA: 23 MMHG (ref 40–60)
PO2 BLDA: 27 MMHG (ref 40–60)
PO2 BLDA: 30 MMHG (ref 40–60)
PO2 BLDA: 30 MMHG (ref 40–60)
PO2 BLDA: 31 MMHG (ref 40–60)
PO2 BLDA: 31 MMHG (ref 40–60)
PO2 BLDA: 33 MMHG (ref 40–60)
PO2 BLDA: 33 MMHG (ref 40–60)
PO2 BLDA: 35 MMHG (ref 40–60)
PO2 BLDA: 398 MMHG (ref 80–100)
PO2 BLDA: 40 MMHG (ref 40–60)
PO2 BLDA: 42 MMHG (ref 40–60)
PO2 BLDA: 44 MMHG (ref 40–60)
PO2 BLDA: 44 MMHG (ref 40–60)
PO2 BLDA: 59 MMHG (ref 75–100)
PO2 BLDA: 73 MMHG (ref 80–100)
PO2 BLDA: 76 MMHG (ref 80–100)
PO2 BLDA: 83 MMHG (ref 40–60)
PO2 BLDA: 84 MMHG (ref 80–100)
PO2 BLDA: 89 MMHG (ref 80–100)
PO2 BLDA: 93 MMHG (ref 80–100)
PO2 BLDA: 96 MMHG (ref 80–100)
POC ACTIVATED CLOTTING TIME K: 131 SEC (ref 74–137)
POC ACTIVATED CLOTTING TIME K: 87 SEC (ref 74–137)
POC BE: -1.4 MMOL/L (ref -2–2)
POC BE: -10 MMOL/L
POC BE: -11 MMOL/L
POC BE: -11.5 MMOL/L (ref -2–2)
POC BE: -13 MMOL/L
POC BE: -14 MMOL/L
POC BE: -15 MMOL/L
POC BE: -15 MMOL/L
POC BE: -18 MMOL/L
POC BE: -2 MMOL/L
POC BE: -2 MMOL/L
POC BE: -21 MMOL/L
POC BE: -22 MMOL/L
POC BE: -3 MMOL/L
POC BE: -4 MMOL/L
POC BE: -7 MMOL/L
POC BE: -9 MMOL/L
POC BE: -9 MMOL/L
POC BE: 1 MMOL/L
POC BE: 1 MMOL/L
POC BE: 10 MMOL/L
POC BE: 11 MMOL/L
POC BE: 14 MMOL/L
POC BE: 2 MMOL/L
POC BE: 3 MMOL/L
POC BE: 4 MMOL/L
POC BE: 6 MMOL/L
POC BE: 6 MMOL/L
POC BE: 7 MMOL/L
POC BE: 9 MMOL/L
POC BE: 9 MMOL/L
POC BE: ABNORMAL MMOL/L (ref -2–2)
POC COHB: 1.6 % (ref 0–3)
POC COHB: 1.9 % (ref 0–3)
POC COHB: 3.7 % (ref 0–3)
POC IONIZED CALCIUM: 1.02 MMOL/L (ref 1.06–1.42)
POC IONIZED CALCIUM: 1.09 MMOL/L (ref 1.06–1.42)
POC METHB: 0.6 % (ref 0–1.5)
POC METHB: 1 % (ref 0–1.5)
POC METHB: 1 % (ref 0–1.5)
POC O2HB ARTERIAL: 91.6 % (ref 94–100)
POC O2HB ARTERIAL: 96.6 % (ref 94–100)
POC O2HB ARTERIAL: 96.8 % (ref 94–100)
POC PCO2 TEMP: 31.8 MMHG
POC PH TEMP: 7.49
POC PO2 TEMP: 80 MMHG
POC SATURATED O2: 100 % (ref 95–100)
POC SATURATED O2: 30 % (ref 95–100)
POC SATURATED O2: 33 % (ref 95–100)
POC SATURATED O2: 40 % (ref 95–100)
POC SATURATED O2: 56 % (ref 95–100)
POC SATURATED O2: 57 % (ref 95–100)
POC SATURATED O2: 59 % (ref 95–100)
POC SATURATED O2: 61 % (ref 95–100)
POC SATURATED O2: 62 % (ref 95–100)
POC SATURATED O2: 63 % (ref 95–100)
POC SATURATED O2: 64 % (ref 95–100)
POC SATURATED O2: 67 % (ref 95–100)
POC SATURATED O2: 69 % (ref 95–100)
POC SATURATED O2: 70 % (ref 95–100)
POC SATURATED O2: 73 % (ref 95–100)
POC SATURATED O2: 80 % (ref 95–100)
POC SATURATED O2: 84 % (ref 95–100)
POC SATURATED O2: 85 % (ref 95–100)
POC SATURATED O2: 9 % (ref 95–100)
POC SATURATED O2: 91 % (ref 95–100)
POC SATURATED O2: 95.6 % (ref 90–100)
POC SATURATED O2: 96 % (ref 95–100)
POC SATURATED O2: 96 % (ref 95–100)
POC SATURATED O2: 97 % (ref 95–100)
POC SATURATED O2: 98 % (ref 95–100)
POC SATURATED O2: 99 % (ref 95–100)
POC SATURATED O2: 99.5 % (ref 90–100)
POC SATURATED O2: 99.5 % (ref 90–100)
POC TCO2 (MEASURED): 23 MMOL/L (ref 23–29)
POC TCO2: 10 MMOL/L (ref 23–27)
POC TCO2: 13.1 MMOL/L
POC TCO2: 14 MMOL/L (ref 23–27)
POC TCO2: 14 MMOL/L (ref 24–29)
POC TCO2: 16 MMOL/L (ref 24–29)
POC TCO2: 17 MMOL/L (ref 23–27)
POC TCO2: 18 MMOL/L (ref 24–29)
POC TCO2: 19 MMOL/L (ref 24–29)
POC TCO2: 20.8 MMOL/L
POC TCO2: 21 MMOL/L (ref 24–29)
POC TCO2: 22 MMOL/L (ref 23–27)
POC TCO2: 24 MMOL/L (ref 24–29)
POC TCO2: 25 MMOL/L (ref 23–27)
POC TCO2: 25 MMOL/L (ref 23–27)
POC TCO2: 26 MMOL/L (ref 23–27)
POC TCO2: 26 MMOL/L (ref 23–27)
POC TCO2: 26 MMOL/L (ref 24–29)
POC TCO2: 27 MMOL/L (ref 23–27)
POC TCO2: 27 MMOL/L (ref 23–27)
POC TCO2: 27 MMOL/L (ref 24–29)
POC TCO2: 28 MMOL/L (ref 24–29)
POC TCO2: 29 MMOL/L (ref 24–29)
POC TCO2: 30 MMOL/L (ref 24–29)
POC TCO2: 32 MMOL/L (ref 24–29)
POC TCO2: 33 MMOL/L (ref 23–27)
POC TCO2: 33 MMOL/L (ref 24–29)
POC TCO2: 33 MMOL/L (ref 24–29)
POC TCO2: 35 MMOL/L (ref 24–29)
POC TCO2: 35 MMOL/L (ref 24–29)
POC TCO2: 37 MMOL/L (ref 23–27)
POC TCO2: 8 MMOL/L (ref 24–29)
POC TCO2: 9 MMOL/L (ref 23–27)
POC TCO2: ABNORMAL MMOL/L
POC TEMPERATURE: ABNORMAL
POC THB: 10.9 G/DL (ref 12–18)
POC THB: 11.1 G/DL (ref 12–18)
POC THB: 12.4 G/DL (ref 12–18)
POCT GLUCOSE: 102 MG/DL (ref 70–110)
POCT GLUCOSE: 104 MG/DL (ref 70–110)
POCT GLUCOSE: 109 MG/DL (ref 70–110)
POCT GLUCOSE: 113 MG/DL (ref 70–110)
POCT GLUCOSE: 129 MG/DL (ref 70–110)
POCT GLUCOSE: 130 MG/DL (ref 70–110)
POCT GLUCOSE: 152 MG/DL (ref 70–110)
POCT GLUCOSE: 24 MG/DL (ref 70–110)
POCT GLUCOSE: 85 MG/DL (ref 70–110)
POCT GLUCOSE: 89 MG/DL (ref 70–110)
POCT GLUCOSE: 98 MG/DL (ref 70–110)
POIKILOCYTOSIS BLD QL SMEAR: ABNORMAL
POIKILOCYTOSIS BLD QL SMEAR: SLIGHT
POLYCHROMASIA BLD QL SMEAR: ABNORMAL
POTASSIUM BLD-SCNC: 3.6 MMOL/L (ref 3.5–5.1)
POTASSIUM BLD-SCNC: 5.2 MMOL/L (ref 3.5–5.1)
POTASSIUM SERPL-SCNC: 3 MMOL/L
POTASSIUM SERPL-SCNC: 3.1 MMOL/L
POTASSIUM SERPL-SCNC: 3.2 MMOL/L
POTASSIUM SERPL-SCNC: 3.2 MMOL/L
POTASSIUM SERPL-SCNC: 3.3 MMOL/L
POTASSIUM SERPL-SCNC: 3.4 MMOL/L
POTASSIUM SERPL-SCNC: 3.5 MMOL/L
POTASSIUM SERPL-SCNC: 3.6 MMOL/L
POTASSIUM SERPL-SCNC: 3.7 MMOL/L
POTASSIUM SERPL-SCNC: 3.8 MMOL/L
POTASSIUM SERPL-SCNC: 3.9 MMOL/L
POTASSIUM SERPL-SCNC: 4 MMOL/L
POTASSIUM SERPL-SCNC: 4 MMOL/L
POTASSIUM SERPL-SCNC: 4.1 MMOL/L
POTASSIUM SERPL-SCNC: 4.2 MMOL/L
POTASSIUM SERPL-SCNC: 4.3 MMOL/L
POTASSIUM SERPL-SCNC: 4.5 MMOL/L
POTASSIUM SERPL-SCNC: 4.5 MMOL/L
POTASSIUM SERPL-SCNC: 4.8 MMOL/L
POTASSIUM SERPL-SCNC: 4.9 MMOL/L
POTASSIUM SERPL-SCNC: 5.1 MMOL/L
POTASSIUM SERPL-SCNC: 5.3 MMOL/L
POTASSIUM SERPL-SCNC: 5.7 MMOL/L
POTASSIUM SERPL-SCNC: 7.1 MMOL/L
PROCALCITONIN SERPL IA-MCNC: 0.43 NG/ML
PROCALCITONIN SERPL IA-MCNC: 0.61 NG/ML
PROT SERPL-MCNC: 5.2 G/DL
PROT SERPL-MCNC: 5.7 G/DL
PROT SERPL-MCNC: 5.7 G/DL
PROT SERPL-MCNC: 5.8 G/DL
PROT SERPL-MCNC: 5.9 G/DL
PROT SERPL-MCNC: 6 G/DL
PROT SERPL-MCNC: 6.1 G/DL
PROT SERPL-MCNC: 6.2 G/DL
PROT SERPL-MCNC: 6.2 G/DL
PROT SERPL-MCNC: 6.3 G/DL
PROT SERPL-MCNC: 6.4 G/DL
PROT SERPL-MCNC: 6.4 G/DL
PROT SERPL-MCNC: 6.5 G/DL
PROT SERPL-MCNC: 6.5 G/DL
PROT SERPL-MCNC: 6.6 G/DL
PROT SERPL-MCNC: 6.7 G/DL
PROT SERPL-MCNC: 6.8 G/DL
PROT SERPL-MCNC: 6.9 G/DL
PROT SERPL-MCNC: 7 G/DL
PROT SERPL-MCNC: 7.1 G/DL
PROT SERPL-MCNC: 7.2 G/DL
PROT SERPL-MCNC: 7.2 G/DL
PROT SERPL-MCNC: 7.3 G/DL
PROT SERPL-MCNC: 7.3 G/DL
PROT SERPL-MCNC: 7.4 G/DL
PROT SERPL-MCNC: 7.4 G/DL
PROT SERPL-MCNC: 7.5 G/DL
PROT SERPL-MCNC: 7.8 G/DL
PROT UR QL STRIP: ABNORMAL
PROT UR QL STRIP: NEGATIVE
PROT UR-MCNC: 172 MG/DL
PROT/CREAT RATIO, UR: 1.77
PROTHROMBIN TIME: 10.3 SEC
PROTHROMBIN TIME: 11.3 SEC
PROTHROMBIN TIME: 11.4 SEC
PROTHROMBIN TIME: 11.4 SEC
PROTHROMBIN TIME: 11.6 SEC
PROTHROMBIN TIME: 12.3 SEC
PROTHROMBIN TIME: 12.6 SEC
PROTHROMBIN TIME: 13 SEC
PROTHROMBIN TIME: 21 SEC
PROTHROMBIN TIME: 22.4 SEC
PROVIDER CREDENTIALS: ABNORMAL
PROVIDER NOTIFIED: ABNORMAL
PS: 5
RBC # BLD AUTO: 2.1 M/UL
RBC # BLD AUTO: 2.18 M/UL
RBC # BLD AUTO: 2.23 M/UL
RBC # BLD AUTO: 2.23 M/UL
RBC # BLD AUTO: 2.27 M/UL
RBC # BLD AUTO: 2.38 M/UL
RBC # BLD AUTO: 2.39 M/UL
RBC # BLD AUTO: 2.4 M/UL
RBC # BLD AUTO: 2.41 M/UL
RBC # BLD AUTO: 2.41 M/UL
RBC # BLD AUTO: 2.54 M/UL
RBC # BLD AUTO: 2.54 M/UL
RBC # BLD AUTO: 2.55 M/UL
RBC # BLD AUTO: 2.58 M/UL
RBC # BLD AUTO: 2.59 M/UL
RBC # BLD AUTO: 2.59 M/UL
RBC # BLD AUTO: 2.6 M/UL
RBC # BLD AUTO: 2.6 M/UL
RBC # BLD AUTO: 2.61 M/UL
RBC # BLD AUTO: 2.61 M/UL
RBC # BLD AUTO: 2.64 M/UL
RBC # BLD AUTO: 2.66 M/UL
RBC # BLD AUTO: 2.67 M/UL
RBC # BLD AUTO: 2.68 M/UL
RBC # BLD AUTO: 2.68 M/UL
RBC # BLD AUTO: 2.69 M/UL
RBC # BLD AUTO: 2.7 M/UL
RBC # BLD AUTO: 2.7 M/UL
RBC # BLD AUTO: 2.73 M/UL
RBC # BLD AUTO: 2.74 M/UL
RBC # BLD AUTO: 2.75 M/UL
RBC # BLD AUTO: 2.76 M/UL
RBC # BLD AUTO: 2.77 M/UL
RBC # BLD AUTO: 2.78 M/UL
RBC # BLD AUTO: 2.78 M/UL
RBC # BLD AUTO: 2.8 M/UL
RBC # BLD AUTO: 2.81 M/UL
RBC # BLD AUTO: 2.81 M/UL
RBC # BLD AUTO: 2.82 M/UL
RBC # BLD AUTO: 2.86 M/UL
RBC # BLD AUTO: 2.9 M/UL
RBC # BLD AUTO: 2.91 M/UL
RBC # BLD AUTO: 2.91 M/UL
RBC # BLD AUTO: 2.94 M/UL
RBC # BLD AUTO: 2.94 M/UL
RBC # BLD AUTO: 2.95 M/UL
RBC # BLD AUTO: 3 M/UL
RBC # BLD AUTO: 3.02 M/UL
RBC # BLD AUTO: 3.05 M/UL
RBC # BLD AUTO: 3.08 M/UL
RBC # BLD AUTO: 3.08 M/UL
RBC # BLD AUTO: 3.14 M/UL
RBC # BLD AUTO: 3.15 M/UL
RBC # BLD AUTO: 3.15 M/UL
RBC # BLD AUTO: 3.17 M/UL
RBC # BLD AUTO: 3.19 M/UL
RBC # BLD AUTO: 3.19 M/UL
RBC # BLD AUTO: 3.21 M/UL
RBC # BLD AUTO: 3.22 M/UL
RBC # BLD AUTO: 3.28 M/UL
RBC # BLD AUTO: 3.33 M/UL
RBC # BLD AUTO: 3.35 M/UL
RBC # BLD AUTO: 3.35 M/UL
RBC # BLD AUTO: 3.36 M/UL
RBC # BLD AUTO: 3.38 M/UL
RBC # BLD AUTO: 3.38 M/UL
RBC # BLD AUTO: 3.39 M/UL
RBC # BLD AUTO: 3.39 M/UL
RBC # BLD AUTO: 3.42 M/UL
RBC # BLD AUTO: 3.42 M/UL
RBC # BLD AUTO: 3.49 M/UL
RBC # BLD AUTO: 3.51 M/UL
RBC # BLD AUTO: 3.53 M/UL
RBC # BLD AUTO: 3.58 M/UL
RBC # BLD AUTO: 3.59 M/UL
RBC # BLD AUTO: 3.68 M/UL
RBC # BLD AUTO: 3.71 M/UL
RBC # BLD AUTO: 3.76 M/UL
RBC # BLD AUTO: 3.78 M/UL
RBC # BLD AUTO: 3.8 M/UL
RBC # BLD AUTO: 3.81 M/UL
RBC # BLD AUTO: 3.84 M/UL
RBC # BLD AUTO: 3.91 M/UL
RBC # BLD AUTO: 3.96 M/UL
RBC # BLD AUTO: 3.98 M/UL
RBC #/AREA URNS AUTO: 0 /HPF (ref 0–4)
RBC #/AREA URNS AUTO: 1 /HPF (ref 0–4)
RBC #/AREA URNS AUTO: 2 /HPF (ref 0–4)
RBC #/AREA URNS AUTO: 2 /HPF (ref 0–4)
RBC #/AREA URNS AUTO: 22 /HPF (ref 0–4)
RBC #/AREA URNS AUTO: 3 /HPF (ref 0–4)
RETIRED EF AND QEF - SEE NOTES: 10 (ref 55–65)
RETIRED EF AND QEF - SEE NOTES: 10 (ref 55–65)
RETIRED EF AND QEF - SEE NOTES: 13 (ref 55–65)
RETIRED EF AND QEF - SEE NOTES: 15 (ref 55–65)
SAMPLE: ABNORMAL
SAMPLE: NORMAL
SAMPLE: NORMAL
SCHISTOCYTES BLD QL SMEAR: PRESENT
SCHISTOCYTES BLD QL SMEAR: PRESENT
SITE: ABNORMAL
SODIUM BLD-SCNC: 127 MMOL/L (ref 136–145)
SODIUM BLD-SCNC: 130 MMOL/L (ref 136–145)
SODIUM SERPL-SCNC: 128 MMOL/L
SODIUM SERPL-SCNC: 128 MMOL/L
SODIUM SERPL-SCNC: 130 MMOL/L
SODIUM SERPL-SCNC: 131 MMOL/L
SODIUM SERPL-SCNC: 131 MMOL/L
SODIUM SERPL-SCNC: 132 MMOL/L
SODIUM SERPL-SCNC: 133 MMOL/L
SODIUM SERPL-SCNC: 134 MMOL/L
SODIUM SERPL-SCNC: 135 MMOL/L
SODIUM SERPL-SCNC: 136 MMOL/L
SODIUM SERPL-SCNC: 137 MMOL/L
SODIUM SERPL-SCNC: 138 MMOL/L
SODIUM SERPL-SCNC: 139 MMOL/L
SODIUM SERPL-SCNC: 140 MMOL/L
SODIUM SERPL-SCNC: 141 MMOL/L
SODIUM SERPL-SCNC: 142 MMOL/L
SODIUM SERPL-SCNC: 142 MMOL/L
SODIUM SERPL-SCNC: 143 MMOL/L
SODIUM SERPL-SCNC: 145 MMOL/L
SODIUM UR-SCNC: 54 MMOL/L
SODIUM UR-SCNC: 97 MMOL/L
SP GR UR STRIP: 1.01 (ref 1–1.03)
SP GR UR STRIP: 1.02 (ref 1–1.03)
SP GR UR STRIP: <=1.005 (ref 1–1.03)
SP02: 100
SP02: 96
SP02: 96
SP02: 99
SPHEROCYTES BLD QL SMEAR: ABNORMAL
SPHEROCYTES BLD QL SMEAR: ABNORMAL
SQUAMOUS #/AREA URNS AUTO: 1 /HPF
SQUAMOUS #/AREA URNS AUTO: 2 /HPF
STOMATOCYTES BLD QL SMEAR: ABNORMAL
T4 FREE SERPL-MCNC: 1.01 NG/DL
TARGETS BLD QL SMEAR: ABNORMAL
TIME NOTIFIED: 1328
TIME NOTIFIED: 1551
TIME NOTIFIED: 803
TOXICOLOGY INFORMATION: ABNORMAL
TRANS ERYTHROCYTES VOL PATIENT: NORMAL ML
TRICUSPID VALVE REGURGITATION: ABNORMAL
TRICUSPID VALVE REGURGITATION: ABNORMAL
TROPONIN I SERPL DL<=0.01 NG/ML-MCNC: 0.02 NG/ML
TROPONIN I SERPL DL<=0.01 NG/ML-MCNC: 0.03 NG/ML
TROPONIN I SERPL DL<=0.01 NG/ML-MCNC: 0.04 NG/ML
TROPONIN I SERPL DL<=0.01 NG/ML-MCNC: 0.05 NG/ML
TROPONIN I SERPL DL<=0.01 NG/ML-MCNC: 0.05 NG/ML
TROPONIN I SERPL DL<=0.01 NG/ML-MCNC: 2.94 NG/ML
TSH SERPL DL<=0.005 MIU/L-ACNC: 4.2 UIU/ML
URN SPEC COLLECT METH UR: ABNORMAL
UROBILINOGEN UR STRIP-ACNC: 2 EU/DL
UROBILINOGEN UR STRIP-ACNC: 4 EU/DL
UROBILINOGEN UR STRIP-ACNC: ABNORMAL EU/DL
UROBILINOGEN UR STRIP-ACNC: NEGATIVE EU/DL
UUN UR-MCNC: 159 MG/DL
UUN UR-MCNC: 214 MG/DL
UUN UR-MCNC: 283 MG/DL
UUN UR-MCNC: 311 MG/DL
VALPROATE SERPL-MCNC: 47.4 UG/ML
VANCOMYCIN SERPL-MCNC: 10.5 UG/ML
VANCOMYCIN SERPL-MCNC: 18.1 UG/ML
VANCOMYCIN SERPL-MCNC: 7.6 UG/ML
VANCOMYCIN TROUGH SERPL-MCNC: 1.6 UG/ML
VANCOMYCIN TROUGH SERPL-MCNC: 10 UG/ML
VANCOMYCIN TROUGH SERPL-MCNC: 10.6 UG/ML
VANCOMYCIN TROUGH SERPL-MCNC: 12.2 UG/ML
VANCOMYCIN TROUGH SERPL-MCNC: 12.8 UG/ML
VANCOMYCIN TROUGH SERPL-MCNC: 17.2 UG/ML
VANCOMYCIN TROUGH SERPL-MCNC: 17.2 UG/ML
VANCOMYCIN TROUGH SERPL-MCNC: 17.7 UG/ML
VANCOMYCIN TROUGH SERPL-MCNC: 19.7 UG/ML
VANCOMYCIN TROUGH SERPL-MCNC: 23 UG/ML
VANCOMYCIN TROUGH SERPL-MCNC: 24.8 UG/ML
VANCOMYCIN TROUGH SERPL-MCNC: 31.4 UG/ML
VANCOMYCIN TROUGH SERPL-MCNC: 8.2 UG/ML
VANCOMYCIN TROUGH SERPL-MCNC: 9.4 UG/ML
VERBAL RESULT READBACK PERFORMED: YES
VT: 450
VT: 550
WBC # BLD AUTO: 10.37 K/UL
WBC # BLD AUTO: 10.49 K/UL
WBC # BLD AUTO: 10.63 K/UL
WBC # BLD AUTO: 10.63 K/UL
WBC # BLD AUTO: 10.86 K/UL
WBC # BLD AUTO: 10.98 K/UL
WBC # BLD AUTO: 11.11 K/UL
WBC # BLD AUTO: 11.17 K/UL
WBC # BLD AUTO: 11.2 K/UL
WBC # BLD AUTO: 11.28 K/UL
WBC # BLD AUTO: 11.4 K/UL
WBC # BLD AUTO: 11.4 K/UL
WBC # BLD AUTO: 11.65 K/UL
WBC # BLD AUTO: 12.22 K/UL
WBC # BLD AUTO: 12.44 K/UL
WBC # BLD AUTO: 12.79 K/UL
WBC # BLD AUTO: 12.8 K/UL
WBC # BLD AUTO: 12.84 K/UL
WBC # BLD AUTO: 12.91 K/UL
WBC # BLD AUTO: 13.23 K/UL
WBC # BLD AUTO: 13.27 K/UL
WBC # BLD AUTO: 13.39 K/UL
WBC # BLD AUTO: 13.75 K/UL
WBC # BLD AUTO: 13.89 K/UL
WBC # BLD AUTO: 14.15 K/UL
WBC # BLD AUTO: 14.28 K/UL
WBC # BLD AUTO: 14.62 K/UL
WBC # BLD AUTO: 14.63 K/UL
WBC # BLD AUTO: 14.68 K/UL
WBC # BLD AUTO: 14.69 K/UL
WBC # BLD AUTO: 14.77 K/UL
WBC # BLD AUTO: 14.78 K/UL
WBC # BLD AUTO: 14.84 K/UL
WBC # BLD AUTO: 14.84 K/UL
WBC # BLD AUTO: 14.93 K/UL
WBC # BLD AUTO: 15.19 K/UL
WBC # BLD AUTO: 15.3 K/UL
WBC # BLD AUTO: 15.32 K/UL
WBC # BLD AUTO: 15.54 K/UL
WBC # BLD AUTO: 15.64 K/UL
WBC # BLD AUTO: 15.76 K/UL
WBC # BLD AUTO: 15.82 K/UL
WBC # BLD AUTO: 16.12 K/UL
WBC # BLD AUTO: 16.35 K/UL
WBC # BLD AUTO: 16.57 K/UL
WBC # BLD AUTO: 16.76 K/UL
WBC # BLD AUTO: 16.86 K/UL
WBC # BLD AUTO: 17.19 K/UL
WBC # BLD AUTO: 17.53 K/UL
WBC # BLD AUTO: 17.86 K/UL
WBC # BLD AUTO: 18.27 K/UL
WBC # BLD AUTO: 18.43 K/UL
WBC # BLD AUTO: 18.61 K/UL
WBC # BLD AUTO: 18.65 K/UL
WBC # BLD AUTO: 18.84 K/UL
WBC # BLD AUTO: 18.86 K/UL
WBC # BLD AUTO: 18.89 K/UL
WBC # BLD AUTO: 18.97 K/UL
WBC # BLD AUTO: 19.53 K/UL
WBC # BLD AUTO: 19.64 K/UL
WBC # BLD AUTO: 19.82 K/UL
WBC # BLD AUTO: 20.21 K/UL
WBC # BLD AUTO: 20.53 K/UL
WBC # BLD AUTO: 20.76 K/UL
WBC # BLD AUTO: 21.01 K/UL
WBC # BLD AUTO: 21.05 K/UL
WBC # BLD AUTO: 21.15 K/UL
WBC # BLD AUTO: 21.87 K/UL
WBC # BLD AUTO: 21.91 K/UL
WBC # BLD AUTO: 23.76 K/UL
WBC # BLD AUTO: 23.86 K/UL
WBC # BLD AUTO: 6.55 K/UL
WBC # BLD AUTO: 7.5 K/UL
WBC # BLD AUTO: 7.79 K/UL
WBC # BLD AUTO: 7.92 K/UL
WBC # BLD AUTO: 8.03 K/UL
WBC # BLD AUTO: 8.13 K/UL
WBC # BLD AUTO: 8.34 K/UL
WBC # BLD AUTO: 8.37 K/UL
WBC # BLD AUTO: 8.37 K/UL
WBC # BLD AUTO: 8.47 K/UL
WBC # BLD AUTO: 8.56 K/UL
WBC # BLD AUTO: 8.6 K/UL
WBC # BLD AUTO: 8.64 K/UL
WBC # BLD AUTO: 8.87 K/UL
WBC # BLD AUTO: 8.89 K/UL
WBC # BLD AUTO: 9.01 K/UL
WBC # BLD AUTO: 9.11 K/UL
WBC # BLD AUTO: 9.16 K/UL
WBC # BLD AUTO: 9.2 K/UL
WBC # BLD AUTO: 9.29 K/UL
WBC # BLD AUTO: 9.5 K/UL
WBC # BLD AUTO: 9.59 K/UL
WBC # BLD AUTO: 9.65 K/UL
WBC # BLD AUTO: 9.91 K/UL
WBC # BLD AUTO: 9.94 K/UL
WBC #/AREA URNS AUTO: 1 /HPF (ref 0–5)
WBC #/AREA URNS AUTO: 1 /HPF (ref 0–5)
WBC #/AREA URNS AUTO: 2 /HPF (ref 0–5)
WBC #/AREA URNS AUTO: 3 /HPF (ref 0–5)
WBC #/AREA URNS AUTO: 4 /HPF (ref 0–5)
WBC #/AREA URNS AUTO: 4 /HPF (ref 0–5)
WBC #/AREA URNS AUTO: 7 /HPF (ref 0–5)
WBC #/AREA URNS AUTO: 7 /HPF (ref 0–5)
WBC OTHER NFR BLD MANUAL: 1 %

## 2018-01-01 PROCEDURE — 25000003 PHARM REV CODE 250

## 2018-01-01 PROCEDURE — 63600175 PHARM REV CODE 636 W HCPCS: Performed by: PHYSICIAN ASSISTANT

## 2018-01-01 PROCEDURE — 85025 COMPLETE CBC W/AUTO DIFF WBC: CPT

## 2018-01-01 PROCEDURE — 99233 SBSQ HOSP IP/OBS HIGH 50: CPT | Mod: ,,, | Performed by: HOSPITALIST

## 2018-01-01 PROCEDURE — 87040 BLOOD CULTURE FOR BACTERIA: CPT | Mod: 59

## 2018-01-01 PROCEDURE — 83735 ASSAY OF MAGNESIUM: CPT

## 2018-01-01 PROCEDURE — 63600175 PHARM REV CODE 636 W HCPCS: Performed by: STUDENT IN AN ORGANIZED HEALTH CARE EDUCATION/TRAINING PROGRAM

## 2018-01-01 PROCEDURE — 93306 TTE W/DOPPLER COMPLETE: CPT | Mod: 26,,, | Performed by: INTERNAL MEDICINE

## 2018-01-01 PROCEDURE — 84100 ASSAY OF PHOSPHORUS: CPT

## 2018-01-01 PROCEDURE — 25000003 PHARM REV CODE 250: Performed by: INTERNAL MEDICINE

## 2018-01-01 PROCEDURE — 99285 EMERGENCY DEPT VISIT HI MDM: CPT | Mod: 25

## 2018-01-01 PROCEDURE — 87077 CULTURE AEROBIC IDENTIFY: CPT

## 2018-01-01 PROCEDURE — 94003 VENT MGMT INPAT SUBQ DAY: CPT

## 2018-01-01 PROCEDURE — 20000000 HC ICU ROOM

## 2018-01-01 PROCEDURE — 25000003 PHARM REV CODE 250: Performed by: HOSPITALIST

## 2018-01-01 PROCEDURE — 94761 N-INVAS EAR/PLS OXIMETRY MLT: CPT

## 2018-01-01 PROCEDURE — 63600175 PHARM REV CODE 636 W HCPCS: Performed by: INTERNAL MEDICINE

## 2018-01-01 PROCEDURE — 88307 TISSUE EXAM BY PATHOLOGIST: CPT | Performed by: PATHOLOGY

## 2018-01-01 PROCEDURE — 80053 COMPREHEN METABOLIC PANEL: CPT

## 2018-01-01 PROCEDURE — 99900035 HC TECH TIME PER 15 MIN (STAT)

## 2018-01-01 PROCEDURE — 27000221 HC OXYGEN, UP TO 24 HOURS

## 2018-01-01 PROCEDURE — 20600001 HC STEP DOWN PRIVATE ROOM

## 2018-01-01 PROCEDURE — 82553 CREATINE MB FRACTION: CPT

## 2018-01-01 PROCEDURE — 11000001 HC ACUTE MED/SURG PRIVATE ROOM

## 2018-01-01 PROCEDURE — 3E0T3BZ INTRODUCTION OF ANESTHETIC AGENT INTO PERIPHERAL NERVES AND PLEXI, PERCUTANEOUS APPROACH: ICD-10-PCS | Performed by: ANESTHESIOLOGY

## 2018-01-01 PROCEDURE — 86850 RBC ANTIBODY SCREEN: CPT

## 2018-01-01 PROCEDURE — 99233 SBSQ HOSP IP/OBS HIGH 50: CPT | Mod: ,,, | Performed by: INTERNAL MEDICINE

## 2018-01-01 PROCEDURE — 63600175 PHARM REV CODE 636 W HCPCS: Performed by: SURGERY

## 2018-01-01 PROCEDURE — 85610 PROTHROMBIN TIME: CPT

## 2018-01-01 PROCEDURE — A4216 STERILE WATER/SALINE, 10 ML: HCPCS | Performed by: HOSPITALIST

## 2018-01-01 PROCEDURE — P9021 RED BLOOD CELLS UNIT: HCPCS

## 2018-01-01 PROCEDURE — 25000003 PHARM REV CODE 250: Performed by: PHYSICIAN ASSISTANT

## 2018-01-01 PROCEDURE — 83605 ASSAY OF LACTIC ACID: CPT

## 2018-01-01 PROCEDURE — 82140 ASSAY OF AMMONIA: CPT

## 2018-01-01 PROCEDURE — 93005 ELECTROCARDIOGRAM TRACING: CPT

## 2018-01-01 PROCEDURE — 31500 INSERT EMERGENCY AIRWAY: CPT

## 2018-01-01 PROCEDURE — 31500 INSERT EMERGENCY AIRWAY: CPT | Performed by: NURSE ANESTHETIST, CERTIFIED REGISTERED

## 2018-01-01 PROCEDURE — 63600175 PHARM REV CODE 636 W HCPCS: Mod: JG

## 2018-01-01 PROCEDURE — 25000003 PHARM REV CODE 250: Performed by: STUDENT IN AN ORGANIZED HEALTH CARE EDUCATION/TRAINING PROGRAM

## 2018-01-01 PROCEDURE — 99233 SBSQ HOSP IP/OBS HIGH 50: CPT | Mod: GC,,, | Performed by: INTERNAL MEDICINE

## 2018-01-01 PROCEDURE — S4991 NICOTINE PATCH NONLEGEND: HCPCS | Performed by: INTERNAL MEDICINE

## 2018-01-01 PROCEDURE — 37799 UNLISTED PX VASCULAR SURGERY: CPT

## 2018-01-01 PROCEDURE — 63600175 PHARM REV CODE 636 W HCPCS: Performed by: NURSE ANESTHETIST, CERTIFIED REGISTERED

## 2018-01-01 PROCEDURE — 80202 ASSAY OF VANCOMYCIN: CPT

## 2018-01-01 PROCEDURE — G8996 SWALLOW CURRENT STATUS: HCPCS | Mod: CK

## 2018-01-01 PROCEDURE — 25000003 PHARM REV CODE 250: Performed by: PSYCHIATRY & NEUROLOGY

## 2018-01-01 PROCEDURE — C9113 INJ PANTOPRAZOLE SODIUM, VIA: HCPCS | Performed by: NURSE PRACTITIONER

## 2018-01-01 PROCEDURE — 82150 ASSAY OF AMYLASE: CPT

## 2018-01-01 PROCEDURE — 99231 SBSQ HOSP IP/OBS SF/LOW 25: CPT | Mod: GC,,, | Performed by: HOSPITALIST

## 2018-01-01 PROCEDURE — 37000008 HC ANESTHESIA 1ST 15 MINUTES: Performed by: SURGERY

## 2018-01-01 PROCEDURE — 99223 1ST HOSP IP/OBS HIGH 75: CPT | Mod: ,,, | Performed by: FAMILY MEDICINE

## 2018-01-01 PROCEDURE — 63600175 PHARM REV CODE 636 W HCPCS: Performed by: HOSPITALIST

## 2018-01-01 PROCEDURE — 25000003 PHARM REV CODE 250: Performed by: SURGERY

## 2018-01-01 PROCEDURE — G8982 BODY POS GOAL STATUS: HCPCS | Mod: CK

## 2018-01-01 PROCEDURE — 25000003 PHARM REV CODE 250: Performed by: NURSE PRACTITIONER

## 2018-01-01 PROCEDURE — 63600175 PHARM REV CODE 636 W HCPCS: Performed by: ANESTHESIOLOGY

## 2018-01-01 PROCEDURE — 25000242 PHARM REV CODE 250 ALT 637 W/ HCPCS: Performed by: SURGERY

## 2018-01-01 PROCEDURE — 87040 BLOOD CULTURE FOR BACTERIA: CPT

## 2018-01-01 PROCEDURE — 25000003 PHARM REV CODE 250: Performed by: FAMILY MEDICINE

## 2018-01-01 PROCEDURE — 047L3ZZ DILATION OF LEFT FEMORAL ARTERY, PERCUTANEOUS APPROACH: ICD-10-PCS | Performed by: INTERNAL MEDICINE

## 2018-01-01 PROCEDURE — C1729 CATH, DRAINAGE: HCPCS | Performed by: SURGERY

## 2018-01-01 PROCEDURE — 85520 HEPARIN ASSAY: CPT

## 2018-01-01 PROCEDURE — 82803 BLOOD GASES ANY COMBINATION: CPT

## 2018-01-01 PROCEDURE — 63600175 PHARM REV CODE 636 W HCPCS: Mod: JG | Performed by: STUDENT IN AN ORGANIZED HEALTH CARE EDUCATION/TRAINING PROGRAM

## 2018-01-01 PROCEDURE — 85025 COMPLETE CBC W/AUTO DIFF WBC: CPT | Mod: 91

## 2018-01-01 PROCEDURE — 94150 VITAL CAPACITY TEST: CPT

## 2018-01-01 PROCEDURE — 63600175 PHARM REV CODE 636 W HCPCS: Performed by: PSYCHIATRY & NEUROLOGY

## 2018-01-01 PROCEDURE — 93010 ELECTROCARDIOGRAM REPORT: CPT | Mod: ,,, | Performed by: INTERNAL MEDICINE

## 2018-01-01 PROCEDURE — 85014 HEMATOCRIT: CPT

## 2018-01-01 PROCEDURE — 0Y6N0ZC DETACHMENT AT LEFT FOOT, PARTIAL 3RD RAY, OPEN APPROACH: ICD-10-PCS | Performed by: PODIATRIST

## 2018-01-01 PROCEDURE — 37211 THROMBOLYTIC ART THERAPY: CPT | Mod: LT

## 2018-01-01 PROCEDURE — 36000710: Performed by: SURGERY

## 2018-01-01 PROCEDURE — 36600 WITHDRAWAL OF ARTERIAL BLOOD: CPT

## 2018-01-01 PROCEDURE — 87070 CULTURE OTHR SPECIMN AEROBIC: CPT

## 2018-01-01 PROCEDURE — 36415 COLL VENOUS BLD VENIPUNCTURE: CPT

## 2018-01-01 PROCEDURE — 85520 HEPARIN ASSAY: CPT | Mod: 91

## 2018-01-01 PROCEDURE — 96366 THER/PROPH/DIAG IV INF ADDON: CPT

## 2018-01-01 PROCEDURE — G8983 BODY POS D/C STATUS: HCPCS | Mod: CK

## 2018-01-01 PROCEDURE — 99900026 HC AIRWAY MAINTENANCE (STAT)

## 2018-01-01 PROCEDURE — 63600175 PHARM REV CODE 636 W HCPCS

## 2018-01-01 PROCEDURE — 99222 1ST HOSP IP/OBS MODERATE 55: CPT | Mod: GC,,, | Performed by: INTERNAL MEDICINE

## 2018-01-01 PROCEDURE — 81003 URINALYSIS AUTO W/O SCOPE: CPT

## 2018-01-01 PROCEDURE — 31500 INSERT EMERGENCY AIRWAY: CPT | Mod: ,,, | Performed by: ANESTHESIOLOGY

## 2018-01-01 PROCEDURE — 43752 NASAL/OROGASTRIC W/TUBE PLMT: CPT

## 2018-01-01 PROCEDURE — 84300 ASSAY OF URINE SODIUM: CPT

## 2018-01-01 PROCEDURE — 63600175 PHARM REV CODE 636 W HCPCS: Performed by: NURSE PRACTITIONER

## 2018-01-01 PROCEDURE — G8988 SELF CARE GOAL STATUS: HCPCS | Mod: CJ

## 2018-01-01 PROCEDURE — 84439 ASSAY OF FREE THYROXINE: CPT

## 2018-01-01 PROCEDURE — 99232 SBSQ HOSP IP/OBS MODERATE 35: CPT | Mod: GC,,, | Performed by: HOSPITALIST

## 2018-01-01 PROCEDURE — 27200966 HC CLOSED SUCTION SYSTEM

## 2018-01-01 PROCEDURE — 84484 ASSAY OF TROPONIN QUANT: CPT

## 2018-01-01 PROCEDURE — 81001 URINALYSIS AUTO W/SCOPE: CPT

## 2018-01-01 PROCEDURE — 80048 BASIC METABOLIC PNL TOTAL CA: CPT

## 2018-01-01 PROCEDURE — 85730 THROMBOPLASTIN TIME PARTIAL: CPT

## 2018-01-01 PROCEDURE — 99152 MOD SED SAME PHYS/QHP 5/>YRS: CPT | Mod: ,,, | Performed by: INTERNAL MEDICINE

## 2018-01-01 PROCEDURE — 99214 OFFICE O/P EST MOD 30 MIN: CPT

## 2018-01-01 PROCEDURE — 85027 COMPLETE CBC AUTOMATED: CPT

## 2018-01-01 PROCEDURE — 83735 ASSAY OF MAGNESIUM: CPT | Mod: 91

## 2018-01-01 PROCEDURE — 99232 SBSQ HOSP IP/OBS MODERATE 35: CPT | Mod: ,,, | Performed by: PHYSICIAN ASSISTANT

## 2018-01-01 PROCEDURE — A4216 STERILE WATER/SALINE, 10 ML: HCPCS | Performed by: INTERNAL MEDICINE

## 2018-01-01 PROCEDURE — 25000003 PHARM REV CODE 250: Performed by: ANESTHESIOLOGY

## 2018-01-01 PROCEDURE — 85384 FIBRINOGEN ACTIVITY: CPT

## 2018-01-01 PROCEDURE — 90792 PSYCH DIAG EVAL W/MED SRVCS: CPT | Mod: ,,, | Performed by: PSYCHIATRY & NEUROLOGY

## 2018-01-01 PROCEDURE — 87102 FUNGUS ISOLATION CULTURE: CPT

## 2018-01-01 PROCEDURE — 94640 AIRWAY INHALATION TREATMENT: CPT

## 2018-01-01 PROCEDURE — 84540 ASSAY OF URINE/UREA-N: CPT

## 2018-01-01 PROCEDURE — 97162 PT EVAL MOD COMPLEX 30 MIN: CPT

## 2018-01-01 PROCEDURE — 99152 MOD SED SAME PHYS/QHP 5/>YRS: CPT

## 2018-01-01 PROCEDURE — 99223 1ST HOSP IP/OBS HIGH 75: CPT | Mod: ,,, | Performed by: INTERNAL MEDICINE

## 2018-01-01 PROCEDURE — S0077 INJECTION, CLINDAMYCIN PHOSP: HCPCS | Performed by: INTERNAL MEDICINE

## 2018-01-01 PROCEDURE — 5A1935Z RESPIRATORY VENTILATION, LESS THAN 24 CONSECUTIVE HOURS: ICD-10-PCS | Performed by: FAMILY MEDICINE

## 2018-01-01 PROCEDURE — 04CN3ZZ EXTIRPATION OF MATTER FROM LEFT POPLITEAL ARTERY, PERCUTANEOUS APPROACH: ICD-10-PCS | Performed by: INTERNAL MEDICINE

## 2018-01-01 PROCEDURE — 87205 SMEAR GRAM STAIN: CPT

## 2018-01-01 PROCEDURE — 92950 HEART/LUNG RESUSCITATION CPR: CPT

## 2018-01-01 PROCEDURE — 87186 SC STD MICRODIL/AGAR DIL: CPT

## 2018-01-01 PROCEDURE — 28805 AMPUTATION THRU METATARSAL: CPT | Mod: LT,,, | Performed by: PODIATRIST

## 2018-01-01 PROCEDURE — 27201912 HC WOUND CARE DEBRIDEMENT SUPPLIES

## 2018-01-01 PROCEDURE — 99223 1ST HOSP IP/OBS HIGH 75: CPT | Mod: ,,, | Performed by: HOSPITALIST

## 2018-01-01 PROCEDURE — 88311 DECALCIFY TISSUE: CPT | Mod: 26,,, | Performed by: PATHOLOGY

## 2018-01-01 PROCEDURE — 99232 SBSQ HOSP IP/OBS MODERATE 35: CPT | Mod: GC,,, | Performed by: INTERNAL MEDICINE

## 2018-01-01 PROCEDURE — 93306 TTE W/DOPPLER COMPLETE: CPT

## 2018-01-01 PROCEDURE — 84484 ASSAY OF TROPONIN QUANT: CPT | Mod: 91

## 2018-01-01 PROCEDURE — S5010 5% DEXTROSE AND 0.45% SALINE: HCPCS | Performed by: INTERNAL MEDICINE

## 2018-01-01 PROCEDURE — 27100171 HC OXYGEN HIGH FLOW UP TO 24 HOURS

## 2018-01-01 PROCEDURE — 99024 POSTOP FOLLOW-UP VISIT: CPT | Mod: POP,,, | Performed by: PODIATRIST

## 2018-01-01 PROCEDURE — 82570 ASSAY OF URINE CREATININE: CPT

## 2018-01-01 PROCEDURE — 88305 TISSUE EXAM BY PATHOLOGIST: CPT | Performed by: PATHOLOGY

## 2018-01-01 PROCEDURE — 047U3ZZ DILATION OF LEFT PERONEAL ARTERY, PERCUTANEOUS APPROACH: ICD-10-PCS | Performed by: INTERNAL MEDICINE

## 2018-01-01 PROCEDURE — 87106 FUNGI IDENTIFICATION YEAST: CPT

## 2018-01-01 PROCEDURE — 64445 NJX AA&/STRD SCIATIC NRV IMG: CPT | Mod: 59,LT,, | Performed by: ANESTHESIOLOGY

## 2018-01-01 PROCEDURE — G8978 MOBILITY CURRENT STATUS: HCPCS | Mod: CH

## 2018-01-01 PROCEDURE — 5A1945Z RESPIRATORY VENTILATION, 24-96 CONSECUTIVE HOURS: ICD-10-PCS | Performed by: INTERNAL MEDICINE

## 2018-01-01 PROCEDURE — 97166 OT EVAL MOD COMPLEX 45 MIN: CPT

## 2018-01-01 PROCEDURE — 25000003 PHARM REV CODE 250: Performed by: EMERGENCY MEDICINE

## 2018-01-01 PROCEDURE — 83605 ASSAY OF LACTIC ACID: CPT | Mod: 91

## 2018-01-01 PROCEDURE — 90792 PSYCH DIAG EVAL W/MED SRVCS: CPT | Mod: GC,,, | Performed by: PSYCHIATRY & NEUROLOGY

## 2018-01-01 PROCEDURE — 27000190 HC CPAP FULL FACE MASK W/VALVE

## 2018-01-01 PROCEDURE — C1751 CATH, INF, PER/CENT/MIDLINE: HCPCS

## 2018-01-01 PROCEDURE — 0Y6N0ZD DETACHMENT AT LEFT FOOT, PARTIAL 4TH RAY, OPEN APPROACH: ICD-10-PCS | Performed by: PODIATRIST

## 2018-01-01 PROCEDURE — 36000708 HC OR TIME LEV III 1ST 15 MIN: Performed by: PODIATRIST

## 2018-01-01 PROCEDURE — 94002 VENT MGMT INPAT INIT DAY: CPT

## 2018-01-01 PROCEDURE — 85384 FIBRINOGEN ACTIVITY: CPT | Mod: 91

## 2018-01-01 PROCEDURE — 36430 TRANSFUSION BLD/BLD COMPNT: CPT

## 2018-01-01 PROCEDURE — 99231 SBSQ HOSP IP/OBS SF/LOW 25: CPT | Mod: ,,, | Performed by: ANESTHESIOLOGY

## 2018-01-01 PROCEDURE — 36569 INSJ PICC 5 YR+ W/O IMAGING: CPT

## 2018-01-01 PROCEDURE — 97542 WHEELCHAIR MNGMENT TRAINING: CPT

## 2018-01-01 PROCEDURE — 85060 BLOOD SMEAR INTERPRETATION: CPT | Mod: ,,, | Performed by: PATHOLOGY

## 2018-01-01 PROCEDURE — 80076 HEPATIC FUNCTION PANEL: CPT

## 2018-01-01 PROCEDURE — 96375 TX/PRO/DX INJ NEW DRUG ADDON: CPT

## 2018-01-01 PROCEDURE — C1752 CATH,HEMODIALYSIS,SHORT-TERM: HCPCS

## 2018-01-01 PROCEDURE — 27880 AMPUTATION OF LOWER LEG: CPT | Mod: LT,GC,, | Performed by: SURGERY

## 2018-01-01 PROCEDURE — 75710 ARTERY X-RAYS ARM/LEG: CPT | Mod: 26,59,, | Performed by: INTERNAL MEDICINE

## 2018-01-01 PROCEDURE — 83880 ASSAY OF NATRIURETIC PEPTIDE: CPT

## 2018-01-01 PROCEDURE — 82330 ASSAY OF CALCIUM: CPT

## 2018-01-01 PROCEDURE — 99215 OFFICE O/P EST HI 40 MIN: CPT | Mod: 25

## 2018-01-01 PROCEDURE — 86580 TB INTRADERMAL TEST: CPT | Performed by: HOSPITALIST

## 2018-01-01 PROCEDURE — 99999 PR PBB SHADOW E&M-EST. PATIENT-LVL III: CPT | Mod: PBBFAC,,, | Performed by: SURGERY

## 2018-01-01 PROCEDURE — 97530 THERAPEUTIC ACTIVITIES: CPT

## 2018-01-01 PROCEDURE — 80069 RENAL FUNCTION PANEL: CPT

## 2018-01-01 PROCEDURE — 84132 ASSAY OF SERUM POTASSIUM: CPT

## 2018-01-01 PROCEDURE — 93010 ELECTROCARDIOGRAM REPORT: CPT | Mod: 76,,, | Performed by: INTERNAL MEDICINE

## 2018-01-01 PROCEDURE — 99232 SBSQ HOSP IP/OBS MODERATE 35: CPT | Mod: ,,, | Performed by: INTERNAL MEDICINE

## 2018-01-01 PROCEDURE — 85007 BL SMEAR W/DIFF WBC COUNT: CPT

## 2018-01-01 PROCEDURE — 71000033 HC RECOVERY, INTIAL HOUR: Performed by: PODIATRIST

## 2018-01-01 PROCEDURE — 11042 DBRDMT SUBQ TIS 1ST 20SQCM/<: CPT

## 2018-01-01 PROCEDURE — 02HV33Z INSERTION OF INFUSION DEVICE INTO SUPERIOR VENA CAVA, PERCUTANEOUS APPROACH: ICD-10-PCS | Performed by: INTERNAL MEDICINE

## 2018-01-01 PROCEDURE — B41GYZZ FLUOROSCOPY OF LEFT LOWER EXTREMITY ARTERIES USING OTHER CONTRAST: ICD-10-PCS | Performed by: INTERNAL MEDICINE

## 2018-01-01 PROCEDURE — 25500020 PHARM REV CODE 255: Performed by: INTERNAL MEDICINE

## 2018-01-01 PROCEDURE — 87086 URINE CULTURE/COLONY COUNT: CPT

## 2018-01-01 PROCEDURE — S0166 INJ OLANZAPINE 2.5MG: HCPCS | Performed by: PSYCHIATRY & NEUROLOGY

## 2018-01-01 PROCEDURE — 11043 DBRDMT MUSC&/FSCA 1ST 20/<: CPT

## 2018-01-01 PROCEDURE — 80053 COMPREHEN METABOLIC PANEL: CPT | Mod: 91

## 2018-01-01 PROCEDURE — 99232 SBSQ HOSP IP/OBS MODERATE 35: CPT | Mod: ,,, | Performed by: NURSE PRACTITIONER

## 2018-01-01 PROCEDURE — 63600175 PHARM REV CODE 636 W HCPCS: Performed by: EMERGENCY MEDICINE

## 2018-01-01 PROCEDURE — 63600175 PHARM REV CODE 636 W HCPCS: Performed by: FAMILY MEDICINE

## 2018-01-01 PROCEDURE — 99233 SBSQ HOSP IP/OBS HIGH 50: CPT | Mod: ,,, | Performed by: PHYSICIAN ASSISTANT

## 2018-01-01 PROCEDURE — G8997 SWALLOW GOAL STATUS: HCPCS | Mod: CI

## 2018-01-01 PROCEDURE — 87075 CULTR BACTERIA EXCEPT BLOOD: CPT

## 2018-01-01 PROCEDURE — 86920 COMPATIBILITY TEST SPIN: CPT

## 2018-01-01 PROCEDURE — G8987 SELF CARE CURRENT STATUS: HCPCS | Mod: CK

## 2018-01-01 PROCEDURE — 96367 TX/PROPH/DG ADDL SEQ IV INF: CPT

## 2018-01-01 PROCEDURE — 99232 SBSQ HOSP IP/OBS MODERATE 35: CPT | Mod: 57,,, | Performed by: SURGERY

## 2018-01-01 PROCEDURE — 37000009 HC ANESTHESIA EA ADD 15 MINS: Performed by: SURGERY

## 2018-01-01 PROCEDURE — 27201037 HC PRESSURE MONITORING SET UP

## 2018-01-01 PROCEDURE — 92526 ORAL FUNCTION THERAPY: CPT

## 2018-01-01 PROCEDURE — 25000242 PHARM REV CODE 250 ALT 637 W/ HCPCS: Performed by: HOSPITALIST

## 2018-01-01 PROCEDURE — 37000009 HC ANESTHESIA EA ADD 15 MINS: Performed by: PODIATRIST

## 2018-01-01 PROCEDURE — 25000242 PHARM REV CODE 250 ALT 637 W/ HCPCS: Performed by: FAMILY MEDICINE

## 2018-01-01 PROCEDURE — 76942 ECHO GUIDE FOR BIOPSY: CPT | Mod: 26,,, | Performed by: ANESTHESIOLOGY

## 2018-01-01 PROCEDURE — 99024 POSTOP FOLLOW-UP VISIT: CPT | Mod: ,,, | Performed by: PODIATRIST

## 2018-01-01 PROCEDURE — 94760 N-INVAS EAR/PLS OXIMETRY 1: CPT

## 2018-01-01 PROCEDURE — D9220A PRA ANESTHESIA: Mod: CRNA,,, | Performed by: NURSE ANESTHETIST, CERTIFIED REGISTERED

## 2018-01-01 PROCEDURE — 25000003 PHARM REV CODE 250: Performed by: NURSE ANESTHETIST, CERTIFIED REGISTERED

## 2018-01-01 PROCEDURE — 94010 BREATHING CAPACITY TEST: CPT

## 2018-01-01 PROCEDURE — 94660 CPAP INITIATION&MGMT: CPT

## 2018-01-01 PROCEDURE — 97802 MEDICAL NUTRITION INDIV IN: CPT

## 2018-01-01 PROCEDURE — 80164 ASSAY DIPROPYLACETIC ACD TOT: CPT

## 2018-01-01 PROCEDURE — 3E0T3GC INTRODUCTION OF OTHER THERAPEUTIC SUBSTANCE INTO PERIPHERAL NERVES AND PLEXI, PERCUTANEOUS APPROACH: ICD-10-PCS | Performed by: ANESTHESIOLOGY

## 2018-01-01 PROCEDURE — 71000015 HC POSTOP RECOV 1ST HR: Performed by: PODIATRIST

## 2018-01-01 PROCEDURE — 0BH18EZ INSERTION OF ENDOTRACHEAL AIRWAY INTO TRACHEA, VIA NATURAL OR ARTIFICIAL OPENING ENDOSCOPIC: ICD-10-PCS | Performed by: INTERNAL MEDICINE

## 2018-01-01 PROCEDURE — 64446 NJX AA&/STRD SC NRV NFS IMG: CPT | Performed by: ANESTHESIOLOGY

## 2018-01-01 PROCEDURE — 36620 INSERTION CATHETER ARTERY: CPT

## 2018-01-01 PROCEDURE — 82550 ASSAY OF CK (CPK): CPT

## 2018-01-01 PROCEDURE — 99232 SBSQ HOSP IP/OBS MODERATE 35: CPT | Mod: ,,, | Performed by: HOSPITALIST

## 2018-01-01 PROCEDURE — B51NYZZ FLUOROSCOPY OF LEFT UPPER EXTREMITY VEINS USING OTHER CONTRAST: ICD-10-PCS | Performed by: INTERNAL MEDICINE

## 2018-01-01 PROCEDURE — 82803 BLOOD GASES ANY COMBINATION: CPT | Performed by: SURGERY

## 2018-01-01 PROCEDURE — 37224 CATH LAB PROCEDURE: CPT | Mod: 51,LT,, | Performed by: INTERNAL MEDICINE

## 2018-01-01 PROCEDURE — 36000711: Performed by: SURGERY

## 2018-01-01 PROCEDURE — 0BH17EZ INSERTION OF ENDOTRACHEAL AIRWAY INTO TRACHEA, VIA NATURAL OR ARTIFICIAL OPENING: ICD-10-PCS | Performed by: ANESTHESIOLOGY

## 2018-01-01 PROCEDURE — 99499 UNLISTED E&M SERVICE: CPT | Mod: ,,, | Performed by: PHYSICIAN ASSISTANT

## 2018-01-01 PROCEDURE — 99238 HOSP IP/OBS DSCHRG MGMT 30/<: CPT | Mod: GC,,, | Performed by: HOSPITALIST

## 2018-01-01 PROCEDURE — 96376 TX/PRO/DX INJ SAME DRUG ADON: CPT

## 2018-01-01 PROCEDURE — 99232 SBSQ HOSP IP/OBS MODERATE 35: CPT | Mod: GC,,, | Performed by: PSYCHIATRY & NEUROLOGY

## 2018-01-01 PROCEDURE — 0Y6C0Z3 DETACHMENT AT RIGHT UPPER LEG, LOW, OPEN APPROACH: ICD-10-PCS | Performed by: SURGERY

## 2018-01-01 PROCEDURE — G0378 HOSPITAL OBSERVATION PER HR: HCPCS

## 2018-01-01 PROCEDURE — 99223 1ST HOSP IP/OBS HIGH 75: CPT | Mod: ,,, | Performed by: SURGERY

## 2018-01-01 PROCEDURE — 97161 PT EVAL LOW COMPLEX 20 MIN: CPT

## 2018-01-01 PROCEDURE — C9113 INJ PANTOPRAZOLE SODIUM, VIA: HCPCS | Performed by: INTERNAL MEDICINE

## 2018-01-01 PROCEDURE — D9220A PRA ANESTHESIA: Mod: ,,, | Performed by: ANESTHESIOLOGY

## 2018-01-01 PROCEDURE — 51702 INSERT TEMP BLADDER CATH: CPT

## 2018-01-01 PROCEDURE — 82010 KETONE BODYS QUAN: CPT

## 2018-01-01 PROCEDURE — G8979 MOBILITY GOAL STATUS: HCPCS | Mod: CH

## 2018-01-01 PROCEDURE — 83690 ASSAY OF LIPASE: CPT

## 2018-01-01 PROCEDURE — 25500020 PHARM REV CODE 255: Performed by: SURGERY

## 2018-01-01 PROCEDURE — 99285 EMERGENCY DEPT VISIT HI MDM: CPT | Mod: ,,, | Performed by: PHYSICIAN ASSISTANT

## 2018-01-01 PROCEDURE — 36556 INSERT NON-TUNNEL CV CATH: CPT

## 2018-01-01 PROCEDURE — G8981 BODY POS CURRENT STATUS: HCPCS | Mod: CK

## 2018-01-01 PROCEDURE — 11045 DBRDMT SUBQ TISS EACH ADDL: CPT

## 2018-01-01 PROCEDURE — 75710 ARTERY X-RAYS ARM/LEG: CPT | Mod: 26,,, | Performed by: INTERNAL MEDICINE

## 2018-01-01 PROCEDURE — 90945 DIALYSIS ONE EVALUATION: CPT

## 2018-01-01 PROCEDURE — 99223 1ST HOSP IP/OBS HIGH 75: CPT | Mod: GC,,, | Performed by: PODIATRIST

## 2018-01-01 PROCEDURE — 99284 EMERGENCY DEPT VISIT MOD MDM: CPT | Mod: 25 | Performed by: SURGERY

## 2018-01-01 PROCEDURE — 85379 FIBRIN DEGRADATION QUANT: CPT

## 2018-01-01 PROCEDURE — 71000033 HC RECOVERY, INTIAL HOUR: Performed by: SURGERY

## 2018-01-01 PROCEDURE — S0166 INJ OLANZAPINE 2.5MG: HCPCS | Performed by: INTERNAL MEDICINE

## 2018-01-01 PROCEDURE — 93922 UPR/L XTREMITY ART 2 LEVELS: CPT

## 2018-01-01 PROCEDURE — S0030 INJECTION, METRONIDAZOLE: HCPCS | Performed by: STUDENT IN AN ORGANIZED HEALTH CARE EDUCATION/TRAINING PROGRAM

## 2018-01-01 PROCEDURE — 11046 DBRDMT MUSC&/FSCA EA ADDL: CPT

## 2018-01-01 PROCEDURE — 88305 TISSUE EXAM BY PATHOLOGIST: CPT | Mod: 26,,, | Performed by: PATHOLOGY

## 2018-01-01 PROCEDURE — 88307 TISSUE EXAM BY PATHOLOGIST: CPT | Mod: 26,,, | Performed by: PATHOLOGY

## 2018-01-01 PROCEDURE — 93926 LOWER EXTREMITY STUDY: CPT | Mod: PBBFAC | Performed by: SURGERY

## 2018-01-01 PROCEDURE — 27590 AMPUTATE LEG AT THIGH: CPT | Mod: RT,GC,, | Performed by: SURGERY

## 2018-01-01 PROCEDURE — 97597 DBRDMT OPN WND 1ST 20 CM/<: CPT

## 2018-01-01 PROCEDURE — 96365 THER/PROPH/DIAG IV INF INIT: CPT

## 2018-01-01 PROCEDURE — 87176 TISSUE HOMOGENIZATION CULTR: CPT

## 2018-01-01 PROCEDURE — 84145 PROCALCITONIN (PCT): CPT

## 2018-01-01 PROCEDURE — 37000008 HC ANESTHESIA 1ST 15 MINUTES: Performed by: INTERNAL MEDICINE

## 2018-01-01 PROCEDURE — 0BH17EZ INSERTION OF ENDOTRACHEAL AIRWAY INTO TRACHEA, VIA NATURAL OR ARTIFICIAL OPENING: ICD-10-PCS | Performed by: FAMILY MEDICINE

## 2018-01-01 PROCEDURE — 82977 ASSAY OF GGT: CPT

## 2018-01-01 PROCEDURE — 02HV33Z INSERTION OF INFUSION DEVICE INTO SUPERIOR VENA CAVA, PERCUTANEOUS APPROACH: ICD-10-PCS | Performed by: FAMILY MEDICINE

## 2018-01-01 PROCEDURE — 25000242 PHARM REV CODE 250 ALT 637 W/ HCPCS: Performed by: STUDENT IN AN ORGANIZED HEALTH CARE EDUCATION/TRAINING PROGRAM

## 2018-01-01 PROCEDURE — C1729 CATH, DRAINAGE: HCPCS | Performed by: PODIATRIST

## 2018-01-01 PROCEDURE — B41DYZZ FLUOROSCOPY OF AORTA AND BILATERAL LOWER EXTREMITY ARTERIES USING OTHER CONTRAST: ICD-10-PCS | Performed by: INTERNAL MEDICINE

## 2018-01-01 PROCEDURE — 99231 SBSQ HOSP IP/OBS SF/LOW 25: CPT | Mod: GC,,, | Performed by: ANESTHESIOLOGY

## 2018-01-01 PROCEDURE — 82550 ASSAY OF CK (CPK): CPT | Mod: 91

## 2018-01-01 PROCEDURE — 37000009 HC ANESTHESIA EA ADD 15 MINS: Performed by: INTERNAL MEDICINE

## 2018-01-01 PROCEDURE — 80307 DRUG TEST PRSMV CHEM ANLYZR: CPT

## 2018-01-01 PROCEDURE — 25000003 PHARM REV CODE 250: Performed by: PODIATRIST

## 2018-01-01 PROCEDURE — 99231 SBSQ HOSP IP/OBS SF/LOW 25: CPT | Mod: GC,,, | Performed by: INTERNAL MEDICINE

## 2018-01-01 PROCEDURE — D9220A PRA ANESTHESIA: Mod: ANES,,, | Performed by: ANESTHESIOLOGY

## 2018-01-01 PROCEDURE — 71000039 HC RECOVERY, EACH ADD'L HOUR: Performed by: SURGERY

## 2018-01-01 PROCEDURE — 11042 DBRDMT SUBQ TIS 1ST 20SQCM/<: CPT | Mod: 59,,, | Performed by: PODIATRIST

## 2018-01-01 PROCEDURE — 37211 THROMBOLYTIC ART THERAPY: CPT | Mod: LT,,, | Performed by: INTERNAL MEDICINE

## 2018-01-01 PROCEDURE — 37214 CESSJ THERAPY CATH REMOVAL: CPT | Mod: GC,,, | Performed by: INTERNAL MEDICINE

## 2018-01-01 PROCEDURE — 97164 PT RE-EVAL EST PLAN CARE: CPT

## 2018-01-01 PROCEDURE — 99239 HOSP IP/OBS DSCHRG MGMT >30: CPT | Mod: ,,, | Performed by: HOSPITALIST

## 2018-01-01 PROCEDURE — 93926 LOWER EXTREMITY STUDY: CPT

## 2018-01-01 PROCEDURE — C1885 CATH, TRANSLUMIN ANGIO LASER: HCPCS

## 2018-01-01 PROCEDURE — 87116 MYCOBACTERIA CULTURE: CPT

## 2018-01-01 PROCEDURE — G8980 MOBILITY D/C STATUS: HCPCS | Mod: CH

## 2018-01-01 PROCEDURE — 93923 UPR/LXTR ART STDY 3+ LVLS: CPT | Mod: CCAT

## 2018-01-01 PROCEDURE — 64447 NJX AA&/STRD FEMORAL NRV IMG: CPT | Performed by: ANESTHESIOLOGY

## 2018-01-01 PROCEDURE — 81003 URINALYSIS AUTO W/O SCOPE: CPT | Mod: 59

## 2018-01-01 PROCEDURE — 99285 EMERGENCY DEPT VISIT HI MDM: CPT | Mod: ,,, | Performed by: NURSE PRACTITIONER

## 2018-01-01 PROCEDURE — 82803 BLOOD GASES ANY COMBINATION: CPT | Performed by: FAMILY MEDICINE

## 2018-01-01 PROCEDURE — 99233 SBSQ HOSP IP/OBS HIGH 50: CPT | Mod: ,,, | Performed by: FAMILY MEDICINE

## 2018-01-01 PROCEDURE — 84295 ASSAY OF SERUM SODIUM: CPT

## 2018-01-01 PROCEDURE — 99223 1ST HOSP IP/OBS HIGH 75: CPT | Mod: AI,GC,, | Performed by: INTERNAL MEDICINE

## 2018-01-01 PROCEDURE — 93926 LOWER EXTREMITY STUDY: CPT | Mod: 26,S$PBB,, | Performed by: SURGERY

## 2018-01-01 PROCEDURE — 87077 CULTURE AEROBIC IDENTIFY: CPT | Mod: 59

## 2018-01-01 PROCEDURE — 37213 THROMBLYTIC ART/VEN THERAPY: CPT | Mod: ,,, | Performed by: INTERNAL MEDICINE

## 2018-01-01 PROCEDURE — 92610 EVALUATE SWALLOWING FUNCTION: CPT

## 2018-01-01 PROCEDURE — 97803 MED NUTRITION INDIV SUBSEQ: CPT

## 2018-01-01 PROCEDURE — 97110 THERAPEUTIC EXERCISES: CPT

## 2018-01-01 PROCEDURE — 80048 BASIC METABOLIC PNL TOTAL CA: CPT | Mod: 91

## 2018-01-01 PROCEDURE — 76937 US GUIDE VASCULAR ACCESS: CPT

## 2018-01-01 PROCEDURE — 82803 BLOOD GASES ANY COMBINATION: CPT | Performed by: INTERNAL MEDICINE

## 2018-01-01 PROCEDURE — 99222 1ST HOSP IP/OBS MODERATE 55: CPT | Mod: ,,, | Performed by: NURSE PRACTITIONER

## 2018-01-01 PROCEDURE — 86901 BLOOD TYPING SEROLOGIC RH(D): CPT

## 2018-01-01 PROCEDURE — 87206 SMEAR FLUORESCENT/ACID STAI: CPT

## 2018-01-01 PROCEDURE — 0Y6N0ZF DETACHMENT AT LEFT FOOT, PARTIAL 5TH RAY, OPEN APPROACH: ICD-10-PCS | Performed by: PODIATRIST

## 2018-01-01 PROCEDURE — 97165 OT EVAL LOW COMPLEX 30 MIN: CPT

## 2018-01-01 PROCEDURE — 80100008 HC CRRT DAILY MAINTENANCE

## 2018-01-01 PROCEDURE — 36000709 HC OR TIME LEV III EA ADD 15 MIN: Performed by: PODIATRIST

## 2018-01-01 PROCEDURE — 64447 NJX AA&/STRD FEMORAL NRV IMG: CPT | Mod: 59,RT,, | Performed by: ANESTHESIOLOGY

## 2018-01-01 PROCEDURE — 3E05317 INTRODUCTION OF OTHER THROMBOLYTIC INTO PERIPHERAL ARTERY, PERCUTANEOUS APPROACH: ICD-10-PCS | Performed by: INTERNAL MEDICINE

## 2018-01-01 PROCEDURE — 5A1955Z RESPIRATORY VENTILATION, GREATER THAN 96 CONSECUTIVE HOURS: ICD-10-PCS | Performed by: INTERNAL MEDICINE

## 2018-01-01 PROCEDURE — 27200188 HC TRANSDUCER, ART ADULT/PEDS

## 2018-01-01 PROCEDURE — 99213 OFFICE O/P EST LOW 20 MIN: CPT | Mod: PBBFAC | Performed by: SURGERY

## 2018-01-01 PROCEDURE — 84443 ASSAY THYROID STIM HORMONE: CPT

## 2018-01-01 PROCEDURE — 97597 DBRDMT OPN WND 1ST 20 CM/<: CPT | Mod: 59

## 2018-01-01 PROCEDURE — 99024 POSTOP FOLLOW-UP VISIT: CPT | Mod: POP,,, | Performed by: SURGERY

## 2018-01-01 PROCEDURE — 96372 THER/PROPH/DIAG INJ SC/IM: CPT | Performed by: SURGERY

## 2018-01-01 PROCEDURE — 96374 THER/PROPH/DIAG INJ IV PUSH: CPT

## 2018-01-01 PROCEDURE — 0Y6N0Z9 DETACHMENT AT LEFT FOOT, PARTIAL 1ST RAY, OPEN APPROACH: ICD-10-PCS | Performed by: PODIATRIST

## 2018-01-01 PROCEDURE — 37000008 HC ANESTHESIA 1ST 15 MINUTES: Performed by: PODIATRIST

## 2018-01-01 PROCEDURE — 97537 COMMUNITY/WORK REINTEGRATION: CPT

## 2018-01-01 PROCEDURE — 99233 SBSQ HOSP IP/OBS HIGH 50: CPT | Mod: ,,, | Performed by: PODIATRIST

## 2018-01-01 PROCEDURE — 37228 CATH LAB PROCEDURE: CPT | Mod: LT,,, | Performed by: INTERNAL MEDICINE

## 2018-01-01 PROCEDURE — 96361 HYDRATE IV INFUSION ADD-ON: CPT

## 2018-01-01 PROCEDURE — 85610 PROTHROMBIN TIME: CPT | Mod: 91

## 2018-01-01 PROCEDURE — 36246 INS CATH ABD/L-EXT ART 2ND: CPT | Mod: LT,,, | Performed by: INTERNAL MEDICINE

## 2018-01-01 PROCEDURE — 0Y6N0ZB DETACHMENT AT LEFT FOOT, PARTIAL 2ND RAY, OPEN APPROACH: ICD-10-PCS | Performed by: PODIATRIST

## 2018-01-01 PROCEDURE — 87076 CULTURE ANAEROBE IDENT EACH: CPT

## 2018-01-01 PROCEDURE — 99900017 HC EXTUBATION W/PARAMETERS (STAT)

## 2018-01-01 PROCEDURE — 93010 ELECTROCARDIOGRAM REPORT: CPT | Mod: 77,,, | Performed by: INTERNAL MEDICINE

## 2018-01-01 PROCEDURE — 0Y6J0Z1 DETACHMENT AT LEFT LOWER LEG, HIGH, OPEN APPROACH: ICD-10-PCS | Performed by: SURGERY

## 2018-01-01 RX ORDER — LIDOCAINE HYDROCHLORIDE 20 MG/ML
JELLY TOPICAL
Status: DISCONTINUED | OUTPATIENT
Start: 2018-01-01 | End: 2018-01-01 | Stop reason: HOSPADM

## 2018-01-01 RX ORDER — POTASSIUM CHLORIDE 29.8 MG/ML
40 INJECTION INTRAVENOUS ONCE
Status: COMPLETED | OUTPATIENT
Start: 2018-01-01 | End: 2018-01-01

## 2018-01-01 RX ORDER — LORAZEPAM 2 MG/ML
1 INJECTION INTRAMUSCULAR ONCE
Status: COMPLETED | OUTPATIENT
Start: 2018-01-01 | End: 2018-01-01

## 2018-01-01 RX ORDER — HYDROCODONE BITARTRATE AND ACETAMINOPHEN 5; 325 MG/1; MG/1
1 TABLET ORAL EVERY 4 HOURS PRN
Status: DISCONTINUED | OUTPATIENT
Start: 2018-01-01 | End: 2018-01-01 | Stop reason: HOSPADM

## 2018-01-01 RX ORDER — PHENYLEPHRINE HYDROCHLORIDE 10 MG/ML
INJECTION INTRAVENOUS
Status: DISCONTINUED | OUTPATIENT
Start: 2018-01-01 | End: 2018-01-01

## 2018-01-01 RX ORDER — PREGABALIN 75 MG/1
75 CAPSULE ORAL NIGHTLY
Status: DISCONTINUED | OUTPATIENT
Start: 2018-01-01 | End: 2018-01-01 | Stop reason: HOSPADM

## 2018-01-01 RX ORDER — SODIUM CHLORIDE 0.9 % (FLUSH) 0.9 %
3 SYRINGE (ML) INJECTION EVERY 8 HOURS
Status: DISCONTINUED | OUTPATIENT
Start: 2018-01-01 | End: 2018-01-01 | Stop reason: HOSPADM

## 2018-01-01 RX ORDER — METOPROLOL SUCCINATE 25 MG/1
25 TABLET, EXTENDED RELEASE ORAL 2 TIMES DAILY
Status: ON HOLD | COMMUNITY
End: 2018-01-01

## 2018-01-01 RX ORDER — MORPHINE SULFATE 4 MG/ML
4 INJECTION, SOLUTION INTRAMUSCULAR; INTRAVENOUS ONCE
Status: DISCONTINUED | OUTPATIENT
Start: 2018-01-01 | End: 2018-01-01

## 2018-01-01 RX ORDER — MIDAZOLAM HYDROCHLORIDE 1 MG/ML
INJECTION, SOLUTION INTRAMUSCULAR; INTRAVENOUS
Status: DISCONTINUED | OUTPATIENT
Start: 2018-01-01 | End: 2018-01-01

## 2018-01-01 RX ORDER — LORAZEPAM 2 MG/ML
0.5 INJECTION INTRAMUSCULAR
Status: COMPLETED | OUTPATIENT
Start: 2018-01-01 | End: 2018-01-01

## 2018-01-01 RX ORDER — FUROSEMIDE 10 MG/ML
40 INJECTION INTRAMUSCULAR; INTRAVENOUS ONCE
Status: DISCONTINUED | OUTPATIENT
Start: 2018-01-01 | End: 2018-01-01

## 2018-01-01 RX ORDER — DIAZEPAM 5 MG/1
5 TABLET ORAL EVERY 8 HOURS
Status: DISCONTINUED | OUTPATIENT
Start: 2018-01-01 | End: 2018-01-01

## 2018-01-01 RX ORDER — LORAZEPAM 2 MG/ML
2 INJECTION INTRAMUSCULAR EVERY 4 HOURS PRN
Status: DISCONTINUED | OUTPATIENT
Start: 2018-01-01 | End: 2018-01-01

## 2018-01-01 RX ORDER — LANOLIN ALCOHOL/MO/W.PET/CERES
800 CREAM (GRAM) TOPICAL
Status: DISCONTINUED | OUTPATIENT
Start: 2018-01-01 | End: 2018-01-01 | Stop reason: HOSPADM

## 2018-01-01 RX ORDER — POTASSIUM CHLORIDE 20 MEQ/1
40 TABLET, EXTENDED RELEASE ORAL ONCE
Status: COMPLETED | OUTPATIENT
Start: 2018-01-01 | End: 2018-01-01

## 2018-01-01 RX ORDER — DOBUTAMINE HYDROCHLORIDE 200 MG/100ML
INJECTION INTRAVENOUS
Status: DISPENSED
Start: 2018-01-01 | End: 2018-01-01

## 2018-01-01 RX ORDER — ZOLPIDEM TARTRATE 5 MG/1
5 TABLET ORAL NIGHTLY
Status: DISCONTINUED | OUTPATIENT
Start: 2018-01-01 | End: 2018-01-01 | Stop reason: HOSPADM

## 2018-01-01 RX ORDER — FUROSEMIDE 10 MG/ML
20 INJECTION INTRAMUSCULAR; INTRAVENOUS ONCE
Status: DISCONTINUED | OUTPATIENT
Start: 2018-01-01 | End: 2018-01-01

## 2018-01-01 RX ORDER — OLANZAPINE 10 MG/2ML
10 INJECTION, POWDER, FOR SOLUTION INTRAMUSCULAR EVERY 12 HOURS
Status: DISCONTINUED | OUTPATIENT
Start: 2018-01-01 | End: 2018-01-01

## 2018-01-01 RX ORDER — PREGABALIN 100 MG/1
100 CAPSULE ORAL 3 TIMES DAILY
Qty: 90 CAPSULE | Refills: 6 | Status: ON HOLD | OUTPATIENT
Start: 2018-01-01 | End: 2018-01-01 | Stop reason: HOSPADM

## 2018-01-01 RX ORDER — PREGABALIN 75 MG/1
75 CAPSULE ORAL 2 TIMES DAILY
Status: DISCONTINUED | OUTPATIENT
Start: 2018-01-01 | End: 2018-01-01

## 2018-01-01 RX ORDER — POTASSIUM CHLORIDE 14.9 MG/ML
20 INJECTION INTRAVENOUS ONCE
Status: COMPLETED | OUTPATIENT
Start: 2018-01-01 | End: 2018-01-01

## 2018-01-01 RX ORDER — BACITRACIN 50000 [IU]/1
INJECTION, POWDER, FOR SOLUTION INTRAMUSCULAR
Status: DISCONTINUED | OUTPATIENT
Start: 2018-01-01 | End: 2018-01-01 | Stop reason: HOSPADM

## 2018-01-01 RX ORDER — PREGABALIN 50 MG/1
100 CAPSULE ORAL 3 TIMES DAILY
Status: DISCONTINUED | OUTPATIENT
Start: 2018-01-01 | End: 2018-01-01 | Stop reason: HOSPADM

## 2018-01-01 RX ORDER — POTASSIUM CHLORIDE 20 MEQ/15ML
40 SOLUTION ORAL
Status: DISCONTINUED | OUTPATIENT
Start: 2018-01-01 | End: 2018-01-01 | Stop reason: HOSPADM

## 2018-01-01 RX ORDER — FENTANYL CITRATE 50 UG/ML
INJECTION, SOLUTION INTRAMUSCULAR; INTRAVENOUS
Status: DISCONTINUED | OUTPATIENT
Start: 2018-01-01 | End: 2018-01-01

## 2018-01-01 RX ORDER — CHLORHEXIDINE GLUCONATE ORAL RINSE 1.2 MG/ML
15 SOLUTION DENTAL 2 TIMES DAILY
Status: DISCONTINUED | OUTPATIENT
Start: 2018-01-01 | End: 2018-01-01

## 2018-01-01 RX ORDER — SODIUM CHLORIDE 9 MG/ML
INJECTION, SOLUTION INTRAVENOUS CONTINUOUS
Status: DISCONTINUED | OUTPATIENT
Start: 2018-01-01 | End: 2018-01-01

## 2018-01-01 RX ORDER — CHLORDIAZEPOXIDE HYDROCHLORIDE 5 MG/1
10 CAPSULE, GELATIN COATED ORAL EVERY 6 HOURS
Status: DISCONTINUED | OUTPATIENT
Start: 2018-01-01 | End: 2018-01-01

## 2018-01-01 RX ORDER — OXYCODONE AND ACETAMINOPHEN 10; 325 MG/1; MG/1
1 TABLET ORAL EVERY 4 HOURS PRN
Status: DISCONTINUED | OUTPATIENT
Start: 2018-01-01 | End: 2018-01-01

## 2018-01-01 RX ORDER — GABAPENTIN 400 MG/1
800 CAPSULE ORAL NIGHTLY
Status: DISCONTINUED | OUTPATIENT
Start: 2018-01-01 | End: 2018-01-01 | Stop reason: HOSPADM

## 2018-01-01 RX ORDER — CHLORDIAZEPOXIDE HYDROCHLORIDE 25 MG/1
25 CAPSULE, GELATIN COATED ORAL EVERY 8 HOURS
Status: DISCONTINUED | OUTPATIENT
Start: 2018-01-01 | End: 2018-01-01

## 2018-01-01 RX ORDER — SODIUM BICARBONATE 1 MEQ/ML
SYRINGE (ML) INTRAVENOUS CODE/TRAUMA/SEDATION MEDICATION
Status: COMPLETED | OUTPATIENT
Start: 2018-01-01 | End: 2018-01-01

## 2018-01-01 RX ORDER — CEFEPIME HYDROCHLORIDE 1 G/1
1 INJECTION, POWDER, FOR SOLUTION INTRAMUSCULAR; INTRAVENOUS
Status: DISCONTINUED | OUTPATIENT
Start: 2018-01-01 | End: 2018-01-01

## 2018-01-01 RX ORDER — ONDANSETRON 2 MG/ML
4 INJECTION INTRAMUSCULAR; INTRAVENOUS
Status: COMPLETED | OUTPATIENT
Start: 2018-01-01 | End: 2018-01-01

## 2018-01-01 RX ORDER — FUROSEMIDE 10 MG/ML
40 INJECTION INTRAMUSCULAR; INTRAVENOUS DAILY
Status: DISCONTINUED | OUTPATIENT
Start: 2018-01-01 | End: 2018-01-01

## 2018-01-01 RX ORDER — AMIODARONE HYDROCHLORIDE 150 MG/3ML
INJECTION, SOLUTION INTRAVENOUS CODE/TRAUMA/SEDATION MEDICATION
Status: COMPLETED | OUTPATIENT
Start: 2018-01-01 | End: 2018-01-01

## 2018-01-01 RX ORDER — PANTOPRAZOLE SODIUM 40 MG/10ML
40 INJECTION, POWDER, LYOPHILIZED, FOR SOLUTION INTRAVENOUS EVERY 24 HOURS
Status: DISCONTINUED | OUTPATIENT
Start: 2018-01-01 | End: 2018-01-01

## 2018-01-01 RX ORDER — FUROSEMIDE 10 MG/ML
80 INJECTION INTRAMUSCULAR; INTRAVENOUS ONCE
Status: COMPLETED | OUTPATIENT
Start: 2018-01-01 | End: 2018-01-01

## 2018-01-01 RX ORDER — SODIUM,POTASSIUM PHOSPHATES 280-250MG
2 POWDER IN PACKET (EA) ORAL
Status: DISCONTINUED | OUTPATIENT
Start: 2018-01-01 | End: 2018-01-01 | Stop reason: HOSPADM

## 2018-01-01 RX ORDER — SODIUM CHLORIDE 9 MG/ML
3 INJECTION, SOLUTION INTRAVENOUS CONTINUOUS
Status: DISCONTINUED | OUTPATIENT
Start: 2018-01-01 | End: 2018-01-01

## 2018-01-01 RX ORDER — SODIUM CHLORIDE 0.9 % (FLUSH) 0.9 %
10 SYRINGE (ML) INJECTION EVERY 8 HOURS
Status: DISCONTINUED | OUTPATIENT
Start: 2018-01-01 | End: 2018-01-01 | Stop reason: HOSPADM

## 2018-01-01 RX ORDER — FENTANYL CITRATE-0.9 % NACL/PF 10 MCG/ML
PLASTIC BAG, INJECTION (ML) INTRAVENOUS CONTINUOUS
Status: DISCONTINUED | OUTPATIENT
Start: 2018-01-01 | End: 2018-01-01

## 2018-01-01 RX ORDER — LANOLIN ALCOHOL/MO/W.PET/CERES
800 CREAM (GRAM) TOPICAL
Status: DISCONTINUED | OUTPATIENT
Start: 2018-01-01 | End: 2018-01-01

## 2018-01-01 RX ORDER — CHLORDIAZEPOXIDE HYDROCHLORIDE 25 MG/1
25 CAPSULE, GELATIN COATED ORAL EVERY 6 HOURS
Status: DISCONTINUED | OUTPATIENT
Start: 2018-01-01 | End: 2018-01-01

## 2018-01-01 RX ORDER — MAGNESIUM SULFATE HEPTAHYDRATE 40 MG/ML
2 INJECTION, SOLUTION INTRAVENOUS ONCE
Status: COMPLETED | OUTPATIENT
Start: 2018-01-01 | End: 2018-01-01

## 2018-01-01 RX ORDER — DOBUTAMINE HYDROCHLORIDE 400 MG/100ML
2.5 INJECTION, SOLUTION INTRAVENOUS CONTINUOUS
Status: DISCONTINUED | OUTPATIENT
Start: 2018-01-01 | End: 2018-01-01

## 2018-01-01 RX ORDER — MEPERIDINE HYDROCHLORIDE 50 MG/ML
12.5 INJECTION INTRAMUSCULAR; INTRAVENOUS; SUBCUTANEOUS ONCE AS NEEDED
Status: DISCONTINUED | OUTPATIENT
Start: 2018-01-01 | End: 2018-01-01 | Stop reason: HOSPADM

## 2018-01-01 RX ORDER — POLYETHYLENE GLYCOL 3350 17 G/17G
17 POWDER, FOR SOLUTION ORAL 2 TIMES DAILY PRN
Status: DISCONTINUED | OUTPATIENT
Start: 2018-01-01 | End: 2018-01-01

## 2018-01-01 RX ORDER — THIAMINE HCL 100 MG
100 TABLET ORAL DAILY
Status: DISCONTINUED | OUTPATIENT
Start: 2018-01-01 | End: 2018-01-01

## 2018-01-01 RX ORDER — ATORVASTATIN CALCIUM 20 MG/1
80 TABLET, FILM COATED ORAL DAILY
Status: DISCONTINUED | OUTPATIENT
Start: 2018-01-01 | End: 2018-01-01 | Stop reason: HOSPADM

## 2018-01-01 RX ORDER — DIPHENHYDRAMINE HCL 50 MG
50 CAPSULE ORAL ONCE
Status: COMPLETED | OUTPATIENT
Start: 2018-01-01 | End: 2018-01-01

## 2018-01-01 RX ORDER — ACETAMINOPHEN 500 MG
1000 TABLET ORAL EVERY 8 HOURS
Status: DISCONTINUED | OUTPATIENT
Start: 2018-01-01 | End: 2018-01-01

## 2018-01-01 RX ORDER — HALOPERIDOL 5 MG/ML
INJECTION INTRAMUSCULAR
Status: DISPENSED
Start: 2018-01-01 | End: 2018-01-01

## 2018-01-01 RX ORDER — SUCCINYLCHOLINE CHLORIDE 20 MG/ML
INJECTION INTRAMUSCULAR; INTRAVENOUS
Status: COMPLETED
Start: 2018-01-01 | End: 2018-01-01

## 2018-01-01 RX ORDER — METOPROLOL TARTRATE 1 MG/ML
5 INJECTION, SOLUTION INTRAVENOUS EVERY 4 HOURS PRN
Status: DISCONTINUED | OUTPATIENT
Start: 2018-01-01 | End: 2018-01-01 | Stop reason: HOSPADM

## 2018-01-01 RX ORDER — DEXMEDETOMIDINE HYDROCHLORIDE 4 UG/ML
0.2 INJECTION, SOLUTION INTRAVENOUS CONTINUOUS
Status: DISCONTINUED | OUTPATIENT
Start: 2018-01-01 | End: 2018-01-01 | Stop reason: ALTCHOICE

## 2018-01-01 RX ORDER — FENTANYL CITRATE 50 UG/ML
50 INJECTION, SOLUTION INTRAMUSCULAR; INTRAVENOUS
Status: COMPLETED | OUTPATIENT
Start: 2018-01-01 | End: 2018-01-01

## 2018-01-01 RX ORDER — SODIUM CHLORIDE 9 MG/ML
3 INJECTION, SOLUTION INTRAVENOUS CONTINUOUS
Status: ACTIVE | OUTPATIENT
Start: 2018-01-01 | End: 2018-01-01

## 2018-01-01 RX ORDER — PROPOFOL 10 MG/ML
INJECTION, EMULSION INTRAVENOUS
Status: DISCONTINUED
Start: 2018-01-01 | End: 2018-07-23 | Stop reason: HOSPADM

## 2018-01-01 RX ORDER — METOPROLOL SUCCINATE 25 MG/1
25 TABLET, EXTENDED RELEASE ORAL DAILY
Status: DISCONTINUED | OUTPATIENT
Start: 2018-01-01 | End: 2018-01-01

## 2018-01-01 RX ORDER — MAGNESIUM SULFATE/D5W 2 G/50 ML
2 INTRAVENOUS SOLUTION, PIGGYBACK (ML) INTRAVENOUS ONCE
Status: COMPLETED | OUTPATIENT
Start: 2018-01-01 | End: 2018-01-01

## 2018-01-01 RX ORDER — POTASSIUM CHLORIDE 20 MEQ/15ML
60 SOLUTION ORAL ONCE
Status: COMPLETED | OUTPATIENT
Start: 2018-01-01 | End: 2018-01-01

## 2018-01-01 RX ORDER — LIDOCAINE 50 MG/G
OINTMENT TOPICAL 4 TIMES DAILY
Status: DISCONTINUED | OUTPATIENT
Start: 2018-01-01 | End: 2018-01-01 | Stop reason: HOSPADM

## 2018-01-01 RX ORDER — VANCOMYCIN/0.9 % SOD CHLORIDE 1 G/100 ML
1000 PLASTIC BAG, INJECTION (ML) INTRAVENOUS
Status: DISCONTINUED | OUTPATIENT
Start: 2018-01-01 | End: 2018-01-01

## 2018-01-01 RX ORDER — FENTANYL CITRATE 50 UG/ML
INJECTION, SOLUTION INTRAMUSCULAR; INTRAVENOUS
Status: COMPLETED
Start: 2018-01-01 | End: 2018-01-01

## 2018-01-01 RX ORDER — CHLORPROMAZINE HYDROCHLORIDE 25 MG/ML
50 INJECTION INTRAMUSCULAR ONCE
Status: DISCONTINUED | OUTPATIENT
Start: 2018-01-01 | End: 2018-01-01

## 2018-01-01 RX ORDER — LIDOCAINE HYDROCHLORIDE 10 MG/ML
INJECTION INFILTRATION; PERINEURAL
Status: DISCONTINUED
Start: 2018-01-01 | End: 2018-01-01 | Stop reason: WASHOUT

## 2018-01-01 RX ORDER — POTASSIUM CHLORIDE 20 MEQ/15ML
30 SOLUTION ORAL ONCE
Status: COMPLETED | OUTPATIENT
Start: 2018-01-01 | End: 2018-01-01

## 2018-01-01 RX ORDER — HALOPERIDOL 5 MG/ML
2 INJECTION INTRAMUSCULAR EVERY 6 HOURS PRN
Status: DISCONTINUED | OUTPATIENT
Start: 2018-01-01 | End: 2018-01-01

## 2018-01-01 RX ORDER — METOPROLOL SUCCINATE 25 MG/1
25 TABLET, EXTENDED RELEASE ORAL 2 TIMES DAILY
Status: DISCONTINUED | OUTPATIENT
Start: 2018-01-01 | End: 2018-01-01

## 2018-01-01 RX ORDER — VANCOMYCIN HCL IN 5 % DEXTROSE 1G/250ML
15 PLASTIC BAG, INJECTION (ML) INTRAVENOUS
Status: DISCONTINUED | OUTPATIENT
Start: 2018-01-01 | End: 2018-01-01

## 2018-01-01 RX ORDER — AMOXICILLIN 250 MG
1 CAPSULE ORAL 2 TIMES DAILY
Status: DISCONTINUED | OUTPATIENT
Start: 2018-01-01 | End: 2018-07-23 | Stop reason: HOSPADM

## 2018-01-01 RX ORDER — DEXMEDETOMIDINE HYDROCHLORIDE 4 UG/ML
0.2 INJECTION, SOLUTION INTRAVENOUS CONTINUOUS
Status: DISCONTINUED | OUTPATIENT
Start: 2018-01-01 | End: 2018-01-01

## 2018-01-01 RX ORDER — PROPOFOL 10 MG/ML
INJECTION, EMULSION INTRAVENOUS CONTINUOUS PRN
Status: DISCONTINUED | OUTPATIENT
Start: 2018-01-01 | End: 2018-01-01

## 2018-01-01 RX ORDER — ACETAMINOPHEN 325 MG/1
650 TABLET ORAL EVERY 8 HOURS PRN
Status: DISCONTINUED | OUTPATIENT
Start: 2018-01-01 | End: 2018-01-01

## 2018-01-01 RX ORDER — ATORVASTATIN CALCIUM 80 MG/1
80 TABLET, FILM COATED ORAL NIGHTLY
Qty: 90 TABLET | Refills: 3 | Status: SHIPPED | OUTPATIENT
Start: 2018-01-01 | End: 2019-04-10

## 2018-01-01 RX ORDER — ONDANSETRON 2 MG/ML
4 INJECTION INTRAMUSCULAR; INTRAVENOUS EVERY 8 HOURS PRN
Status: DISCONTINUED | OUTPATIENT
Start: 2018-01-01 | End: 2018-01-01 | Stop reason: HOSPADM

## 2018-01-01 RX ORDER — SODIUM CHLORIDE 0.9 % (FLUSH) 0.9 %
3 SYRINGE (ML) INJECTION
Status: DISCONTINUED | OUTPATIENT
Start: 2018-01-01 | End: 2018-01-01 | Stop reason: HOSPADM

## 2018-01-01 RX ORDER — ATORVASTATIN CALCIUM 10 MG/1
40 TABLET, FILM COATED ORAL NIGHTLY
Status: DISCONTINUED | OUTPATIENT
Start: 2018-01-01 | End: 2018-01-01

## 2018-01-01 RX ORDER — KETAMINE HCL IN 0.9 % NACL 50 MG/5 ML
SYRINGE (ML) INTRAVENOUS
Status: DISCONTINUED | OUTPATIENT
Start: 2018-01-01 | End: 2018-01-01

## 2018-01-01 RX ORDER — HYDROCODONE BITARTRATE AND ACETAMINOPHEN 5; 325 MG/1; MG/1
1 TABLET ORAL EVERY 4 HOURS PRN
Status: DISCONTINUED | OUTPATIENT
Start: 2018-01-01 | End: 2018-01-01

## 2018-01-01 RX ORDER — HYDROMORPHONE HYDROCHLORIDE 1 MG/ML
1 INJECTION, SOLUTION INTRAMUSCULAR; INTRAVENOUS; SUBCUTANEOUS ONCE
Status: COMPLETED | OUTPATIENT
Start: 2018-01-01 | End: 2018-01-01

## 2018-01-01 RX ORDER — POTASSIUM CHLORIDE 750 MG/1
10 CAPSULE, EXTENDED RELEASE ORAL ONCE
Status: DISCONTINUED | OUTPATIENT
Start: 2018-01-01 | End: 2018-01-01

## 2018-01-01 RX ORDER — LORAZEPAM 2 MG/ML
2 INJECTION INTRAMUSCULAR
Status: DISCONTINUED | OUTPATIENT
Start: 2018-01-01 | End: 2018-01-01

## 2018-01-01 RX ORDER — HYDROCODONE BITARTRATE AND ACETAMINOPHEN 5; 325 MG/1; MG/1
1 TABLET ORAL EVERY 4 HOURS PRN
Qty: 10 TABLET | Refills: 0 | Status: SHIPPED | OUTPATIENT
Start: 2018-01-01 | End: 2018-01-01

## 2018-01-01 RX ORDER — CHLORDIAZEPOXIDE HYDROCHLORIDE 25 MG/1
50 CAPSULE, GELATIN COATED ORAL EVERY 6 HOURS
Status: DISCONTINUED | OUTPATIENT
Start: 2018-01-01 | End: 2018-01-01

## 2018-01-01 RX ORDER — CHLORDIAZEPOXIDE HYDROCHLORIDE 25 MG/1
25 CAPSULE, GELATIN COATED ORAL 2 TIMES DAILY
Status: COMPLETED | OUTPATIENT
Start: 2018-01-01 | End: 2018-01-01

## 2018-01-01 RX ORDER — LEVALBUTEROL 1.25 MG/.5ML
1.25 SOLUTION, CONCENTRATE RESPIRATORY (INHALATION) EVERY 6 HOURS PRN
Status: DISCONTINUED | OUTPATIENT
Start: 2018-01-01 | End: 2018-01-01 | Stop reason: HOSPADM

## 2018-01-01 RX ORDER — VANCOMYCIN HCL IN 5 % DEXTROSE 1G/250ML
1000 PLASTIC BAG, INJECTION (ML) INTRAVENOUS ONCE
Status: COMPLETED | OUTPATIENT
Start: 2018-01-01 | End: 2018-01-01

## 2018-01-01 RX ORDER — RANOLAZINE 500 MG/1
500 TABLET, EXTENDED RELEASE ORAL 2 TIMES DAILY
Status: DISCONTINUED | OUTPATIENT
Start: 2018-01-01 | End: 2018-01-01 | Stop reason: HOSPADM

## 2018-01-01 RX ORDER — POLYETHYLENE GLYCOL 3350 17 G/17G
17 POWDER, FOR SOLUTION ORAL 2 TIMES DAILY
Status: DISCONTINUED | OUTPATIENT
Start: 2018-01-01 | End: 2018-01-01

## 2018-01-01 RX ORDER — FENTANYL CITRATE 50 UG/ML
25 INJECTION, SOLUTION INTRAMUSCULAR; INTRAVENOUS EVERY 5 MIN PRN
Status: DISCONTINUED | OUTPATIENT
Start: 2018-01-01 | End: 2018-01-01

## 2018-01-01 RX ORDER — SODIUM CHLORIDE 0.9 % (FLUSH) 0.9 %
3 SYRINGE (ML) INJECTION EVERY 8 HOURS
Status: DISCONTINUED | OUTPATIENT
Start: 2018-01-01 | End: 2018-07-23 | Stop reason: HOSPADM

## 2018-01-01 RX ORDER — CHLORDIAZEPOXIDE HYDROCHLORIDE 5 MG/1
5 CAPSULE, GELATIN COATED ORAL EVERY 8 HOURS
Status: DISCONTINUED | OUTPATIENT
Start: 2018-01-01 | End: 2018-01-01

## 2018-01-01 RX ORDER — LORAZEPAM 1 MG/1
3 TABLET ORAL ONCE
Status: DISCONTINUED | OUTPATIENT
Start: 2018-01-01 | End: 2018-01-01

## 2018-01-01 RX ORDER — PHENYLEPHRINE HCL IN 0.9% NACL 1 MG/10 ML
SYRINGE (ML) INTRAVENOUS
Status: DISPENSED
Start: 2018-01-01 | End: 2018-01-01

## 2018-01-01 RX ORDER — ETOMIDATE 2 MG/ML
INJECTION INTRAVENOUS
Status: DISPENSED
Start: 2018-01-01 | End: 2018-01-01

## 2018-01-01 RX ORDER — ROPIVACAINE HYDROCHLORIDE 2 MG/ML
8 INJECTION, SOLUTION EPIDURAL; INFILTRATION; PERINEURAL CONTINUOUS
Status: DISCONTINUED | OUTPATIENT
Start: 2018-01-01 | End: 2018-01-01

## 2018-01-01 RX ORDER — CHLORDIAZEPOXIDE HYDROCHLORIDE 25 MG/1
25 CAPSULE, GELATIN COATED ORAL ONCE
Status: COMPLETED | OUTPATIENT
Start: 2018-01-01 | End: 2018-01-01

## 2018-01-01 RX ORDER — CEFTRIAXONE 1 G/1
1 INJECTION, POWDER, FOR SOLUTION INTRAMUSCULAR; INTRAVENOUS
Status: DISCONTINUED | OUTPATIENT
Start: 2018-01-01 | End: 2018-01-01

## 2018-01-01 RX ORDER — HEPARIN SODIUM,PORCINE/D5W 25000/250
17 INTRAVENOUS SOLUTION INTRAVENOUS CONTINUOUS
Status: DISCONTINUED | OUTPATIENT
Start: 2018-01-01 | End: 2018-07-23 | Stop reason: HOSPADM

## 2018-01-01 RX ORDER — NALOXONE HCL 0.4 MG/ML
0.4 VIAL (ML) INJECTION ONCE
Status: COMPLETED | OUTPATIENT
Start: 2018-01-01 | End: 2018-01-01

## 2018-01-01 RX ORDER — SODIUM CHLORIDE 9 MG/ML
INJECTION, SOLUTION INTRAVENOUS CONTINUOUS PRN
Status: DISCONTINUED | OUTPATIENT
Start: 2018-01-01 | End: 2018-01-01

## 2018-01-01 RX ORDER — NOREPINEPHRINE BITARTRATE/D5W 4MG/250ML
PLASTIC BAG, INJECTION (ML) INTRAVENOUS
Status: DISPENSED
Start: 2018-01-01 | End: 2018-01-01

## 2018-01-01 RX ORDER — THIAMINE HCL 100 MG
100 TABLET ORAL DAILY
Status: DISCONTINUED | OUTPATIENT
Start: 2018-01-01 | End: 2018-01-01 | Stop reason: HOSPADM

## 2018-01-01 RX ORDER — THIAMINE HCL 100 MG
100 TABLET ORAL DAILY
Status: DISCONTINUED | OUTPATIENT
Start: 2018-01-01 | End: 2018-07-23 | Stop reason: HOSPADM

## 2018-01-01 RX ORDER — MORPHINE SULFATE 2 MG/ML
2 INJECTION, SOLUTION INTRAMUSCULAR; INTRAVENOUS ONCE
Status: COMPLETED | OUTPATIENT
Start: 2018-01-01 | End: 2018-01-01

## 2018-01-01 RX ORDER — CHLORPROMAZINE HYDROCHLORIDE 25 MG/ML
25 INJECTION INTRAMUSCULAR EVERY 12 HOURS PRN
Status: DISCONTINUED | OUTPATIENT
Start: 2018-01-01 | End: 2018-01-01 | Stop reason: HOSPADM

## 2018-01-01 RX ORDER — MORPHINE SULFATE 2 MG/ML
4 INJECTION, SOLUTION INTRAMUSCULAR; INTRAVENOUS EVERY 4 HOURS PRN
Status: DISCONTINUED | OUTPATIENT
Start: 2018-01-01 | End: 2018-01-01

## 2018-01-01 RX ORDER — FUROSEMIDE 10 MG/ML
40 INJECTION INTRAMUSCULAR; INTRAVENOUS ONCE
Status: COMPLETED | OUTPATIENT
Start: 2018-01-01 | End: 2018-01-01

## 2018-01-01 RX ORDER — POTASSIUM CHLORIDE 20 MEQ/15ML
40 SOLUTION ORAL ONCE
Status: COMPLETED | OUTPATIENT
Start: 2018-01-01 | End: 2018-01-01

## 2018-01-01 RX ORDER — MEPERIDINE HYDROCHLORIDE 50 MG/ML
12.5 INJECTION INTRAMUSCULAR; INTRAVENOUS; SUBCUTANEOUS ONCE AS NEEDED
Status: ACTIVE | OUTPATIENT
Start: 2018-01-01 | End: 2018-01-01

## 2018-01-01 RX ORDER — DIPHENHYDRAMINE HCL 25 MG
50 CAPSULE ORAL ONCE
Status: DISCONTINUED | OUTPATIENT
Start: 2018-01-01 | End: 2018-01-01 | Stop reason: HOSPADM

## 2018-01-01 RX ORDER — MORPHINE SULFATE 2 MG/ML
2 INJECTION, SOLUTION INTRAMUSCULAR; INTRAVENOUS EVERY 4 HOURS PRN
Status: DISCONTINUED | OUTPATIENT
Start: 2018-01-01 | End: 2018-01-01

## 2018-01-01 RX ORDER — SODIUM CHLORIDE 9 MG/ML
INJECTION, SOLUTION INTRAVENOUS
Status: ACTIVE | OUTPATIENT
Start: 2018-01-01 | End: 2018-01-01

## 2018-01-01 RX ORDER — PROPOFOL 10 MG/ML
INJECTION, EMULSION INTRAVENOUS
Status: DISPENSED
Start: 2018-01-01 | End: 2018-01-01

## 2018-01-01 RX ORDER — FUROSEMIDE 10 MG/ML
40 INJECTION INTRAMUSCULAR; INTRAVENOUS 2 TIMES DAILY
Status: DISCONTINUED | OUTPATIENT
Start: 2018-01-01 | End: 2018-01-01 | Stop reason: HOSPADM

## 2018-01-01 RX ORDER — METOPROLOL TARTRATE 25 MG/1
12.5 TABLET ORAL 2 TIMES DAILY
Status: DISCONTINUED | OUTPATIENT
Start: 2018-01-01 | End: 2018-01-01 | Stop reason: HOSPADM

## 2018-01-01 RX ORDER — SODIUM BICARBONATE 1 MEQ/ML
50 SYRINGE (ML) INTRAVENOUS ONCE
Status: COMPLETED | OUTPATIENT
Start: 2018-01-01 | End: 2018-01-01

## 2018-01-01 RX ORDER — ONDANSETRON 2 MG/ML
4 INJECTION INTRAMUSCULAR; INTRAVENOUS EVERY 12 HOURS PRN
Status: DISCONTINUED | OUTPATIENT
Start: 2018-01-01 | End: 2018-01-01 | Stop reason: HOSPADM

## 2018-01-01 RX ORDER — CHLORDIAZEPOXIDE HYDROCHLORIDE 25 MG/1
25 CAPSULE, GELATIN COATED ORAL 3 TIMES DAILY
Status: DISCONTINUED | OUTPATIENT
Start: 2018-01-01 | End: 2018-01-01

## 2018-01-01 RX ORDER — ESMOLOL HYDROCHLORIDE 10 MG/ML
INJECTION INTRAVENOUS
Status: DISCONTINUED | OUTPATIENT
Start: 2018-01-01 | End: 2018-01-01

## 2018-01-01 RX ORDER — TRAMADOL HYDROCHLORIDE 50 MG/1
50-100 TABLET ORAL EVERY 12 HOURS PRN
Status: ON HOLD | COMMUNITY
End: 2018-01-01 | Stop reason: HOSPADM

## 2018-01-01 RX ORDER — MORPHINE SULFATE 2 MG/ML
2 INJECTION, SOLUTION INTRAMUSCULAR; INTRAVENOUS
Status: DISCONTINUED | OUTPATIENT
Start: 2018-01-01 | End: 2018-01-01 | Stop reason: HOSPADM

## 2018-01-01 RX ORDER — ETOMIDATE 2 MG/ML
INJECTION INTRAVENOUS
Status: COMPLETED
Start: 2018-01-01 | End: 2018-01-01

## 2018-01-01 RX ORDER — CHLORDIAZEPOXIDE HYDROCHLORIDE 25 MG/1
50 CAPSULE, GELATIN COATED ORAL EVERY 8 HOURS
Status: DISCONTINUED | OUTPATIENT
Start: 2018-01-01 | End: 2018-01-01

## 2018-01-01 RX ORDER — ENOXAPARIN SODIUM 100 MG/ML
40 INJECTION SUBCUTANEOUS EVERY 24 HOURS
Status: DISCONTINUED | OUTPATIENT
Start: 2018-01-01 | End: 2018-01-01 | Stop reason: HOSPADM

## 2018-01-01 RX ORDER — HALOPERIDOL 5 MG/1
5 TABLET ORAL 3 TIMES DAILY
Status: DISCONTINUED | OUTPATIENT
Start: 2018-01-01 | End: 2018-01-01 | Stop reason: HOSPADM

## 2018-01-01 RX ORDER — OXYCODONE AND ACETAMINOPHEN 10; 325 MG/1; MG/1
2 TABLET ORAL EVERY 6 HOURS PRN
Status: DISCONTINUED | OUTPATIENT
Start: 2018-01-01 | End: 2018-01-01

## 2018-01-01 RX ORDER — NITROGLYCERIN 0.4 MG/1
0.4 TABLET SUBLINGUAL EVERY 5 MIN PRN
Status: DISCONTINUED | OUTPATIENT
Start: 2018-01-01 | End: 2018-07-23 | Stop reason: HOSPADM

## 2018-01-01 RX ORDER — LISINOPRIL 2.5 MG/1
2.5 TABLET ORAL DAILY
Status: DISCONTINUED | OUTPATIENT
Start: 2018-01-01 | End: 2018-01-01

## 2018-01-01 RX ORDER — ACETAMINOPHEN 10 MG/ML
1000 INJECTION, SOLUTION INTRAVENOUS ONCE
Status: COMPLETED | OUTPATIENT
Start: 2018-01-01 | End: 2018-01-01

## 2018-01-01 RX ORDER — OXYCODONE HYDROCHLORIDE 5 MG/1
20 TABLET ORAL
Status: DISCONTINUED | OUTPATIENT
Start: 2018-01-01 | End: 2018-01-01

## 2018-01-01 RX ORDER — FUROSEMIDE 10 MG/ML
40 INJECTION INTRAMUSCULAR; INTRAVENOUS
Status: COMPLETED | OUTPATIENT
Start: 2018-01-01 | End: 2018-01-01

## 2018-01-01 RX ORDER — PROPOFOL 10 MG/ML
5 INJECTION, EMULSION INTRAVENOUS CONTINUOUS
Status: DISCONTINUED | OUTPATIENT
Start: 2018-01-01 | End: 2018-01-01

## 2018-01-01 RX ORDER — CILOSTAZOL 50 MG/1
100 TABLET ORAL 2 TIMES DAILY
Status: DISCONTINUED | OUTPATIENT
Start: 2018-01-01 | End: 2018-01-01

## 2018-01-01 RX ORDER — DIVALPROEX SODIUM 250 MG/1
TABLET, DELAYED RELEASE ORAL
Qty: 120 TABLET | Refills: 1 | Status: SHIPPED | OUTPATIENT
Start: 2018-01-01

## 2018-01-01 RX ORDER — CEFAZOLIN SODIUM 1 G/3ML
INJECTION, POWDER, FOR SOLUTION INTRAMUSCULAR; INTRAVENOUS
Status: DISCONTINUED | OUTPATIENT
Start: 2018-01-01 | End: 2018-01-01

## 2018-01-01 RX ORDER — ACETAMINOPHEN 500 MG
1000 TABLET ORAL EVERY 12 HOURS
Status: DISCONTINUED | OUTPATIENT
Start: 2018-01-01 | End: 2018-01-01 | Stop reason: HOSPADM

## 2018-01-01 RX ORDER — METOPROLOL TARTRATE 25 MG/1
12.5 TABLET, FILM COATED ORAL 2 TIMES DAILY
Qty: 30 TABLET | Refills: 1 | Status: SHIPPED | OUTPATIENT
Start: 2018-01-01 | End: 2019-07-03

## 2018-01-01 RX ORDER — PROPOFOL 10 MG/ML
INJECTION, EMULSION INTRAVENOUS
Status: DISCONTINUED
Start: 2018-01-01 | End: 2018-01-01 | Stop reason: HOSPADM

## 2018-01-01 RX ORDER — LANOLIN ALCOHOL/MO/W.PET/CERES
400 CREAM (GRAM) TOPICAL ONCE
Status: COMPLETED | OUTPATIENT
Start: 2018-01-01 | End: 2018-01-01

## 2018-01-01 RX ORDER — POTASSIUM CHLORIDE 7.45 MG/ML
10 INJECTION INTRAVENOUS
Status: COMPLETED | OUTPATIENT
Start: 2018-01-01 | End: 2018-01-01

## 2018-01-01 RX ORDER — POTASSIUM CHLORIDE 14.9 MG/ML
20 INJECTION INTRAVENOUS
Status: COMPLETED | OUTPATIENT
Start: 2018-01-01 | End: 2018-01-01

## 2018-01-01 RX ORDER — SUCCINYLCHOLINE CHLORIDE 20 MG/ML
100 INJECTION INTRAMUSCULAR; INTRAVENOUS ONCE
Status: COMPLETED | OUTPATIENT
Start: 2018-01-01 | End: 2018-01-01

## 2018-01-01 RX ORDER — HEPARIN SODIUM 5000 [USP'U]/ML
5000 INJECTION, SOLUTION INTRAVENOUS; SUBCUTANEOUS EVERY 12 HOURS
Status: DISCONTINUED | OUTPATIENT
Start: 2018-01-01 | End: 2018-01-01 | Stop reason: HOSPADM

## 2018-01-01 RX ORDER — MIDAZOLAM HYDROCHLORIDE 1 MG/ML
INJECTION INTRAMUSCULAR; INTRAVENOUS
Status: COMPLETED
Start: 2018-01-01 | End: 2018-01-01

## 2018-01-01 RX ORDER — ALBUTEROL SULFATE 2.5 MG/.5ML
10 SOLUTION RESPIRATORY (INHALATION) ONCE
Status: COMPLETED | OUTPATIENT
Start: 2018-01-01 | End: 2018-01-01

## 2018-01-01 RX ORDER — DEXMEDETOMIDINE HYDROCHLORIDE 4 UG/ML
0.2 INJECTION, SOLUTION INTRAVENOUS CONTINUOUS
Status: DISCONTINUED | OUTPATIENT
Start: 2018-01-01 | End: 2018-07-23 | Stop reason: HOSPADM

## 2018-01-01 RX ORDER — OXYCODONE HYDROCHLORIDE 10 MG/1
10 TABLET ORAL EVERY 4 HOURS PRN
Qty: 25 TABLET | Refills: 0 | Status: SHIPPED | OUTPATIENT
Start: 2018-01-01 | End: 2018-01-01

## 2018-01-01 RX ORDER — FUROSEMIDE 10 MG/ML
160 INJECTION INTRAMUSCULAR; INTRAVENOUS ONCE
Status: COMPLETED | OUTPATIENT
Start: 2018-01-01 | End: 2018-01-01

## 2018-01-01 RX ORDER — ZOLPIDEM TARTRATE 5 MG/1
5 TABLET ORAL NIGHTLY PRN
Qty: 30 TABLET | Refills: 3 | Status: ON HOLD | OUTPATIENT
Start: 2018-01-01 | End: 2018-01-01 | Stop reason: HOSPADM

## 2018-01-01 RX ORDER — PREGABALIN 75 MG/1
75 CAPSULE ORAL 2 TIMES DAILY
Status: DISCONTINUED | OUTPATIENT
Start: 2018-01-01 | End: 2018-01-01 | Stop reason: HOSPADM

## 2018-01-01 RX ORDER — ASPIRIN 81 MG/1
81 TABLET ORAL DAILY
Status: DISCONTINUED | OUTPATIENT
Start: 2018-01-01 | End: 2018-01-01 | Stop reason: HOSPADM

## 2018-01-01 RX ORDER — FENTANYL CITRATE 50 UG/ML
100 INJECTION, SOLUTION INTRAMUSCULAR; INTRAVENOUS ONCE
Status: COMPLETED | OUTPATIENT
Start: 2018-01-01 | End: 2018-01-01

## 2018-01-01 RX ORDER — OXYCODONE HYDROCHLORIDE 5 MG/1
5 TABLET ORAL
Status: DISCONTINUED | OUTPATIENT
Start: 2018-01-01 | End: 2018-01-01 | Stop reason: HOSPADM

## 2018-01-01 RX ORDER — FENTANYL CITRATE-0.9 % NACL/PF 10 MCG/ML
25 PLASTIC BAG, INJECTION (ML) INTRAVENOUS CONTINUOUS
Status: DISCONTINUED | OUTPATIENT
Start: 2018-01-01 | End: 2018-07-23 | Stop reason: HOSPADM

## 2018-01-01 RX ORDER — EPINEPHRINE 0.1 MG/ML
INJECTION INTRAVENOUS CODE/TRAUMA/SEDATION MEDICATION
Status: COMPLETED | OUTPATIENT
Start: 2018-01-01 | End: 2018-01-01

## 2018-01-01 RX ORDER — PHENYLEPHRINE HCL IN 0.9% NACL 1 MG/10 ML
SYRINGE (ML) INTRAVENOUS
Status: COMPLETED
Start: 2018-01-01 | End: 2018-01-01

## 2018-01-01 RX ORDER — CEFEPIME HYDROCHLORIDE 2 G/1
2 INJECTION, POWDER, FOR SOLUTION INTRAVENOUS
Status: DISCONTINUED | OUTPATIENT
Start: 2018-01-01 | End: 2018-01-01

## 2018-01-01 RX ORDER — DOBUTAMINE HYDROCHLORIDE 400 MG/100ML
5 INJECTION, SOLUTION INTRAVENOUS CONTINUOUS
Status: DISCONTINUED | OUTPATIENT
Start: 2018-01-01 | End: 2018-01-01

## 2018-01-01 RX ORDER — LORAZEPAM 2 MG/ML
1 INJECTION INTRAMUSCULAR ONCE AS NEEDED
Status: ACTIVE | OUTPATIENT
Start: 2018-01-01 | End: 2018-01-01

## 2018-01-01 RX ORDER — BUPIVACAINE HYDROCHLORIDE 2.5 MG/ML
INJECTION, SOLUTION EPIDURAL; INFILTRATION; INTRACAUDAL
Status: DISCONTINUED | OUTPATIENT
Start: 2018-01-01 | End: 2018-01-01 | Stop reason: HOSPADM

## 2018-01-01 RX ORDER — HEPARIN SODIUM,PORCINE/D5W 25000/250
17 INTRAVENOUS SOLUTION INTRAVENOUS CONTINUOUS
Status: DISCONTINUED | OUTPATIENT
Start: 2018-01-01 | End: 2018-01-01

## 2018-01-01 RX ORDER — SODIUM CHLORIDE 0.9 % (FLUSH) 0.9 %
10 SYRINGE (ML) INJECTION
Status: DISCONTINUED | OUTPATIENT
Start: 2018-01-01 | End: 2018-07-23 | Stop reason: HOSPADM

## 2018-01-01 RX ORDER — DOBUTAMINE HYDROCHLORIDE 400 MG/100ML
5 INJECTION, SOLUTION INTRAVENOUS CONTINUOUS
Status: DISCONTINUED | OUTPATIENT
Start: 2018-01-01 | End: 2018-07-23 | Stop reason: HOSPADM

## 2018-01-01 RX ORDER — MORPHINE SULFATE 4 MG/ML
4 INJECTION, SOLUTION INTRAMUSCULAR; INTRAVENOUS
Status: COMPLETED | OUTPATIENT
Start: 2018-01-01 | End: 2018-01-01

## 2018-01-01 RX ORDER — ETOMIDATE 2 MG/ML
INJECTION INTRAVENOUS
Status: DISCONTINUED | OUTPATIENT
Start: 2018-01-01 | End: 2018-01-01 | Stop reason: HOSPADM

## 2018-01-01 RX ORDER — SODIUM BICARBONATE 1 MEQ/ML
50 SYRINGE (ML) INTRAVENOUS ONCE
Status: DISCONTINUED | OUTPATIENT
Start: 2018-01-01 | End: 2018-01-01

## 2018-01-01 RX ORDER — NAPROXEN SODIUM 220 MG/1
81 TABLET, FILM COATED ORAL DAILY
Status: DISCONTINUED | OUTPATIENT
Start: 2018-01-01 | End: 2018-01-01

## 2018-01-01 RX ORDER — OXYCODONE HYDROCHLORIDE 5 MG/1
10 TABLET ORAL ONCE
Status: COMPLETED | OUTPATIENT
Start: 2018-01-01 | End: 2018-01-01

## 2018-01-01 RX ORDER — ATORVASTATIN CALCIUM 20 MG/1
80 TABLET, FILM COATED ORAL NIGHTLY
Status: DISCONTINUED | OUTPATIENT
Start: 2018-01-01 | End: 2018-07-23 | Stop reason: HOSPADM

## 2018-01-01 RX ORDER — CALCIUM CHLORIDE INJECTION 100 MG/ML
INJECTION, SOLUTION INTRAVENOUS CODE/TRAUMA/SEDATION MEDICATION
Status: COMPLETED | OUTPATIENT
Start: 2018-01-01 | End: 2018-01-01

## 2018-01-01 RX ORDER — PHENYLEPHRINE HCL IN 0.9% NACL 1 MG/10 ML
SYRINGE (ML) INTRAVENOUS
Status: DISCONTINUED
Start: 2018-01-01 | End: 2018-07-23 | Stop reason: HOSPADM

## 2018-01-01 RX ORDER — IBUPROFEN 200 MG
1 TABLET ORAL DAILY
Refills: 0 | COMMUNITY
Start: 2018-01-01

## 2018-01-01 RX ORDER — ETOMIDATE 2 MG/ML
8 INJECTION INTRAVENOUS ONCE
Status: COMPLETED | OUTPATIENT
Start: 2018-01-01 | End: 2018-01-01

## 2018-01-01 RX ORDER — HYDROCODONE BITARTRATE AND ACETAMINOPHEN 10; 325 MG/1; MG/1
1 TABLET ORAL
Status: COMPLETED | OUTPATIENT
Start: 2018-01-01 | End: 2018-01-01

## 2018-01-01 RX ORDER — EPINEPHRINE 0.1 MG/ML
INJECTION INTRAVENOUS
Status: COMPLETED
Start: 2018-01-01 | End: 2018-01-01

## 2018-01-01 RX ORDER — DIGOXIN 0.25 MG/ML
250 INJECTION INTRAMUSCULAR; INTRAVENOUS ONCE
Status: COMPLETED | OUTPATIENT
Start: 2018-01-01 | End: 2018-01-01

## 2018-01-01 RX ORDER — ONDANSETRON 2 MG/ML
4 INJECTION INTRAMUSCULAR; INTRAVENOUS EVERY 8 HOURS PRN
Status: DISCONTINUED | OUTPATIENT
Start: 2018-01-01 | End: 2018-01-01

## 2018-01-01 RX ORDER — FUROSEMIDE 10 MG/ML
INJECTION INTRAMUSCULAR; INTRAVENOUS
Status: COMPLETED
Start: 2018-01-01 | End: 2018-01-01

## 2018-01-01 RX ORDER — FUROSEMIDE 10 MG/ML
80 INJECTION INTRAMUSCULAR; INTRAVENOUS 2 TIMES DAILY
Status: DISCONTINUED | OUTPATIENT
Start: 2018-01-01 | End: 2018-01-01

## 2018-01-01 RX ORDER — TAMSULOSIN HYDROCHLORIDE 0.4 MG/1
0.4 CAPSULE ORAL DAILY
Status: DISCONTINUED | OUTPATIENT
Start: 2018-01-01 | End: 2018-01-01 | Stop reason: HOSPADM

## 2018-01-01 RX ORDER — ONDANSETRON 2 MG/ML
4 INJECTION INTRAMUSCULAR; INTRAVENOUS EVERY 12 HOURS PRN
Status: DISCONTINUED | OUTPATIENT
Start: 2018-01-01 | End: 2018-01-01

## 2018-01-01 RX ORDER — OXYCODONE AND ACETAMINOPHEN 5; 325 MG/1; MG/1
1 TABLET ORAL EVERY 4 HOURS PRN
Status: DISCONTINUED | OUTPATIENT
Start: 2018-01-01 | End: 2018-01-01 | Stop reason: HOSPADM

## 2018-01-01 RX ORDER — RANOLAZINE 500 MG/1
TABLET, EXTENDED RELEASE ORAL
Status: DISPENSED
Start: 2018-01-01 | End: 2018-01-01

## 2018-01-01 RX ORDER — LORAZEPAM 2 MG/ML
2 INJECTION INTRAMUSCULAR ONCE
Status: COMPLETED | OUTPATIENT
Start: 2018-01-01 | End: 2018-01-01

## 2018-01-01 RX ORDER — AMOXICILLIN 250 MG
1 CAPSULE ORAL 2 TIMES DAILY
Status: DISCONTINUED | OUTPATIENT
Start: 2018-01-01 | End: 2018-01-01

## 2018-01-01 RX ORDER — NOREPINEPHRINE BITARTRATE/D5W 4MG/250ML
0.02 PLASTIC BAG, INJECTION (ML) INTRAVENOUS CONTINUOUS
Status: DISCONTINUED | OUTPATIENT
Start: 2018-01-01 | End: 2018-01-01

## 2018-01-01 RX ORDER — KETAMINE HYDROCHLORIDE 10 MG/ML
INJECTION, SOLUTION INTRAMUSCULAR; INTRAVENOUS
Status: DISCONTINUED | OUTPATIENT
Start: 2018-01-01 | End: 2018-01-01

## 2018-01-01 RX ORDER — RANOLAZINE 500 MG/1
500 TABLET, EXTENDED RELEASE ORAL 2 TIMES DAILY
Status: DISCONTINUED | OUTPATIENT
Start: 2018-01-01 | End: 2018-01-01

## 2018-01-01 RX ORDER — OXYCODONE HYDROCHLORIDE 5 MG/1
10 TABLET ORAL
Status: DISCONTINUED | OUTPATIENT
Start: 2018-01-01 | End: 2018-01-01 | Stop reason: HOSPADM

## 2018-01-01 RX ORDER — POTASSIUM CHLORIDE 750 MG/1
10 TABLET, EXTENDED RELEASE ORAL DAILY
Status: DISCONTINUED | OUTPATIENT
Start: 2018-01-01 | End: 2018-01-01

## 2018-01-01 RX ORDER — RANOLAZINE 1000 MG/1
1000 TABLET, EXTENDED RELEASE ORAL 2 TIMES DAILY
Status: ON HOLD | COMMUNITY
End: 2018-01-01 | Stop reason: HOSPADM

## 2018-01-01 RX ORDER — CLINDAMYCIN HYDROCHLORIDE 150 MG/1
450 CAPSULE ORAL EVERY 8 HOURS
Status: COMPLETED | OUTPATIENT
Start: 2018-01-01 | End: 2018-01-01

## 2018-01-01 RX ORDER — POTASSIUM CHLORIDE 14.9 MG/ML
20 INJECTION INTRAVENOUS
Status: DISPENSED | OUTPATIENT
Start: 2018-01-01 | End: 2018-01-01

## 2018-01-01 RX ORDER — INDOMETHACIN 25 MG/1
50 CAPSULE ORAL ONCE
Status: COMPLETED | OUTPATIENT
Start: 2018-01-01 | End: 2018-01-01

## 2018-01-01 RX ORDER — DIPHENHYDRAMINE HCL 50 MG
50 CAPSULE ORAL EVERY 6 HOURS
Status: CANCELLED | OUTPATIENT
Start: 2018-01-01 | End: 2018-01-01

## 2018-01-01 RX ORDER — MORPHINE SULFATE 2 MG/ML
2 INJECTION, SOLUTION INTRAMUSCULAR; INTRAVENOUS
Status: COMPLETED | OUTPATIENT
Start: 2018-01-01 | End: 2018-01-01

## 2018-01-01 RX ORDER — ATORVASTATIN CALCIUM 20 MG/1
80 TABLET, FILM COATED ORAL DAILY
Status: DISCONTINUED | OUTPATIENT
Start: 2018-01-01 | End: 2018-01-01

## 2018-01-01 RX ORDER — PROPOFOL 10 MG/ML
10 INJECTION, EMULSION INTRAVENOUS CONTINUOUS
Status: DISCONTINUED | OUTPATIENT
Start: 2018-01-01 | End: 2018-01-01

## 2018-01-01 RX ORDER — PANTOPRAZOLE SODIUM 40 MG/1
40 TABLET, DELAYED RELEASE ORAL DAILY
Status: DISCONTINUED | OUTPATIENT
Start: 2018-01-01 | End: 2018-01-01

## 2018-01-01 RX ORDER — IBUPROFEN 200 MG
1 TABLET ORAL DAILY
Status: DISCONTINUED | OUTPATIENT
Start: 2018-01-01 | End: 2018-01-01 | Stop reason: HOSPADM

## 2018-01-01 RX ORDER — NOREPINEPHRINE BITARTRATE/D5W 4MG/250ML
PLASTIC BAG, INJECTION (ML) INTRAVENOUS
Status: COMPLETED
Start: 2018-01-01 | End: 2018-01-01

## 2018-01-01 RX ORDER — CHLORPROMAZINE HYDROCHLORIDE 25 MG/1
25 TABLET, FILM COATED ORAL NIGHTLY
Status: DISCONTINUED | OUTPATIENT
Start: 2018-01-01 | End: 2018-01-01 | Stop reason: HOSPADM

## 2018-01-01 RX ORDER — HEPARIN SODIUM,PORCINE/D5W 25000/250
17 INTRAVENOUS SOLUTION INTRAVENOUS CONTINUOUS
Status: DISPENSED | OUTPATIENT
Start: 2018-01-01 | End: 2018-01-01

## 2018-01-01 RX ORDER — ASPIRIN 81 MG/1
81 TABLET ORAL DAILY
Status: DISCONTINUED | OUTPATIENT
Start: 2018-01-01 | End: 2018-01-01

## 2018-01-01 RX ORDER — DIPHENHYDRAMINE HCL 25 MG
25 CAPSULE ORAL
Status: DISCONTINUED | OUTPATIENT
Start: 2018-01-01 | End: 2018-01-01 | Stop reason: HOSPADM

## 2018-01-01 RX ORDER — CHLORDIAZEPOXIDE HYDROCHLORIDE 5 MG/1
20 CAPSULE, GELATIN COATED ORAL ONCE
Status: COMPLETED | OUTPATIENT
Start: 2018-01-01 | End: 2018-01-01

## 2018-01-01 RX ORDER — NALOXONE HCL 0.4 MG/ML
VIAL (ML) INJECTION
Status: COMPLETED
Start: 2018-01-01 | End: 2018-01-01

## 2018-01-01 RX ORDER — DOBUTAMINE HYDROCHLORIDE 400 MG/100ML
2.5 INJECTION, SOLUTION INTRAVENOUS CONTINUOUS
Status: DISCONTINUED | OUTPATIENT
Start: 2018-01-01 | End: 2018-01-01 | Stop reason: HOSPADM

## 2018-01-01 RX ORDER — IPRATROPIUM BROMIDE AND ALBUTEROL SULFATE 2.5; .5 MG/3ML; MG/3ML
3 SOLUTION RESPIRATORY (INHALATION) EVERY 4 HOURS PRN
Status: DISCONTINUED | OUTPATIENT
Start: 2018-01-01 | End: 2018-01-01 | Stop reason: HOSPADM

## 2018-01-01 RX ORDER — LANOLIN ALCOHOL/MO/W.PET/CERES
100 CREAM (GRAM) TOPICAL DAILY
COMMUNITY
Start: 2018-01-01

## 2018-01-01 RX ORDER — SODIUM CHLORIDE 9 MG/ML
INJECTION, SOLUTION INTRAVENOUS CONTINUOUS
Status: ACTIVE | OUTPATIENT
Start: 2018-01-01 | End: 2018-01-01

## 2018-01-01 RX ORDER — METOPROLOL TARTRATE 1 MG/ML
INJECTION, SOLUTION INTRAVENOUS
Status: DISCONTINUED | OUTPATIENT
Start: 2018-01-01 | End: 2018-01-01

## 2018-01-01 RX ORDER — ETOMIDATE 2 MG/ML
INJECTION INTRAVENOUS
Status: DISCONTINUED | OUTPATIENT
Start: 2018-01-01 | End: 2018-01-01

## 2018-01-01 RX ORDER — POTASSIUM CHLORIDE 20 MEQ/15ML
60 SOLUTION ORAL
Status: DISCONTINUED | OUTPATIENT
Start: 2018-01-01 | End: 2018-01-01 | Stop reason: HOSPADM

## 2018-01-01 RX ORDER — FENTANYL CITRATE 50 UG/ML
50 INJECTION, SOLUTION INTRAMUSCULAR; INTRAVENOUS ONCE
Status: COMPLETED | OUTPATIENT
Start: 2018-01-01 | End: 2018-01-01

## 2018-01-01 RX ORDER — PROPOFOL 10 MG/ML
INJECTION, EMULSION INTRAVENOUS
Status: DISCONTINUED | OUTPATIENT
Start: 2018-01-01 | End: 2018-01-01

## 2018-01-01 RX ORDER — ETOMIDATE 2 MG/ML
12 INJECTION INTRAVENOUS ONCE
Status: COMPLETED | OUTPATIENT
Start: 2018-01-01 | End: 2018-01-01

## 2018-01-01 RX ORDER — CEFEPIME HYDROCHLORIDE 2 G/1
2 INJECTION, POWDER, FOR SOLUTION INTRAVENOUS ONCE
Status: COMPLETED | OUTPATIENT
Start: 2018-01-01 | End: 2018-01-01

## 2018-01-01 RX ORDER — BUPIVACAINE HYDROCHLORIDE 2.5 MG/ML
INJECTION, SOLUTION EPIDURAL; INFILTRATION; INTRACAUDAL
Status: DISPENSED
Start: 2018-01-01 | End: 2018-01-01

## 2018-01-01 RX ORDER — LORAZEPAM 2 MG/ML
1 INJECTION INTRAMUSCULAR
Status: DISCONTINUED | OUTPATIENT
Start: 2018-01-01 | End: 2018-01-01

## 2018-01-01 RX ORDER — SODIUM,POTASSIUM PHOSPHATES 280-250MG
2 POWDER IN PACKET (EA) ORAL
Status: DISCONTINUED | OUTPATIENT
Start: 2018-01-01 | End: 2018-01-01

## 2018-01-01 RX ORDER — MAGNESIUM SULFATE HEPTAHYDRATE 40 MG/ML
2 INJECTION, SOLUTION INTRAVENOUS
Status: DISCONTINUED | OUTPATIENT
Start: 2018-01-01 | End: 2018-01-01

## 2018-01-01 RX ORDER — DIPHENHYDRAMINE HCL 25 MG
50 CAPSULE ORAL ONCE
Status: COMPLETED | OUTPATIENT
Start: 2018-01-01 | End: 2018-01-01

## 2018-01-01 RX ORDER — CHLORPROMAZINE HYDROCHLORIDE 50 MG/1
50 TABLET, FILM COATED ORAL NIGHTLY
Status: DISCONTINUED | OUTPATIENT
Start: 2018-01-01 | End: 2018-01-01

## 2018-01-01 RX ORDER — NOREPINEPHRINE BITARTRATE/D5W 4MG/250ML
0.02 PLASTIC BAG, INJECTION (ML) INTRAVENOUS CONTINUOUS
Status: DISCONTINUED | OUTPATIENT
Start: 2018-01-01 | End: 2018-07-23 | Stop reason: HOSPADM

## 2018-01-01 RX ORDER — AMOXICILLIN 250 MG
1 CAPSULE ORAL 2 TIMES DAILY
COMMUNITY
Start: 2018-01-01

## 2018-01-01 RX ORDER — ZOLPIDEM TARTRATE 5 MG/1
5 TABLET ORAL NIGHTLY PRN
Status: DISCONTINUED | OUTPATIENT
Start: 2018-01-01 | End: 2018-01-01 | Stop reason: HOSPADM

## 2018-01-01 RX ORDER — VANCOMYCIN/0.9 % SOD CHLORIDE 1 G/100 ML
15 PLASTIC BAG, INJECTION (ML) INTRAVENOUS
Status: DISCONTINUED | OUTPATIENT
Start: 2018-01-01 | End: 2018-01-01

## 2018-01-01 RX ORDER — ASPIRIN 325 MG
325 TABLET ORAL
Status: COMPLETED | OUTPATIENT
Start: 2018-01-01 | End: 2018-01-01

## 2018-01-01 RX ORDER — OXYCODONE HYDROCHLORIDE 5 MG/1
5 TABLET ORAL ONCE
Status: COMPLETED | OUTPATIENT
Start: 2018-01-01 | End: 2018-01-01

## 2018-01-01 RX ORDER — POTASSIUM CHLORIDE 14.9 MG/ML
20 INJECTION INTRAVENOUS
Status: DISCONTINUED | OUTPATIENT
Start: 2018-01-01 | End: 2018-01-01

## 2018-01-01 RX ORDER — LISINOPRIL 10 MG/1
10 TABLET ORAL DAILY
Status: DISCONTINUED | OUTPATIENT
Start: 2018-01-01 | End: 2018-01-01

## 2018-01-01 RX ORDER — METOPROLOL TARTRATE 1 MG/ML
2.5 INJECTION, SOLUTION INTRAVENOUS EVERY 6 HOURS
Status: DISCONTINUED | OUTPATIENT
Start: 2018-01-01 | End: 2018-01-01

## 2018-01-01 RX ORDER — LISINOPRIL 5 MG/1
5 TABLET ORAL DAILY
Status: DISCONTINUED | OUTPATIENT
Start: 2018-01-01 | End: 2018-01-01

## 2018-01-01 RX ORDER — POTASSIUM CHLORIDE 20 MEQ/15ML
60 SOLUTION ORAL
Status: DISCONTINUED | OUTPATIENT
Start: 2018-01-01 | End: 2018-01-01

## 2018-01-01 RX ORDER — MIDAZOLAM HYDROCHLORIDE 1 MG/ML
0.5 INJECTION INTRAMUSCULAR; INTRAVENOUS
Status: DISCONTINUED | OUTPATIENT
Start: 2018-01-01 | End: 2018-01-01

## 2018-01-01 RX ORDER — ACETAMINOPHEN 500 MG
1000 TABLET ORAL EVERY 6 HOURS
Status: DISCONTINUED | OUTPATIENT
Start: 2018-01-01 | End: 2018-01-01

## 2018-01-01 RX ORDER — CEFEPIME HYDROCHLORIDE 2 G/50ML
2 INJECTION, SOLUTION INTRAVENOUS
Status: DISCONTINUED | OUTPATIENT
Start: 2018-01-01 | End: 2018-01-01

## 2018-01-01 RX ORDER — POLYETHYLENE GLYCOL 3350 17 G/17G
17 POWDER, FOR SOLUTION ORAL 2 TIMES DAILY PRN
Status: DISCONTINUED | OUTPATIENT
Start: 2018-01-01 | End: 2018-07-23 | Stop reason: HOSPADM

## 2018-01-01 RX ORDER — MIDAZOLAM HYDROCHLORIDE 2 MG/2ML
2 INJECTION, SOLUTION INTRAMUSCULAR; INTRAVENOUS ONCE
Status: COMPLETED | OUTPATIENT
Start: 2018-01-01 | End: 2018-01-01

## 2018-01-01 RX ORDER — CHLORHEXIDINE GLUCONATE ORAL RINSE 1.2 MG/ML
15 SOLUTION DENTAL 2 TIMES DAILY
Status: DISCONTINUED | OUTPATIENT
Start: 2018-01-01 | End: 2018-07-23 | Stop reason: HOSPADM

## 2018-01-01 RX ORDER — METOPROLOL TARTRATE 25 MG/1
25 TABLET, FILM COATED ORAL 2 TIMES DAILY
Status: DISCONTINUED | OUTPATIENT
Start: 2018-01-01 | End: 2018-01-01

## 2018-01-01 RX ORDER — GENTAMICIN SULFATE 1 MG/G
OINTMENT TOPICAL DAILY
Status: DISCONTINUED | OUTPATIENT
Start: 2018-01-01 | End: 2018-01-01 | Stop reason: HOSPADM

## 2018-01-01 RX ORDER — CHLORPROMAZINE HYDROCHLORIDE 25 MG/ML
25 INJECTION INTRAMUSCULAR ONCE
Status: DISCONTINUED | OUTPATIENT
Start: 2018-01-01 | End: 2018-07-23 | Stop reason: HOSPADM

## 2018-01-01 RX ORDER — OXYCODONE HYDROCHLORIDE 5 MG/1
15 TABLET ORAL EVERY 4 HOURS PRN
Status: DISCONTINUED | OUTPATIENT
Start: 2018-01-01 | End: 2018-01-01

## 2018-01-01 RX ORDER — PROMETHAZINE HYDROCHLORIDE 12.5 MG/1
25 TABLET ORAL EVERY 6 HOURS PRN
Status: DISCONTINUED | OUTPATIENT
Start: 2018-01-01 | End: 2018-07-23 | Stop reason: HOSPADM

## 2018-01-01 RX ORDER — ETOMIDATE 2 MG/ML
10 INJECTION INTRAVENOUS ONCE
Status: COMPLETED | OUTPATIENT
Start: 2018-01-01 | End: 2018-01-01

## 2018-01-01 RX ORDER — SUCCINYLCHOLINE CHLORIDE 20 MG/ML
120 INJECTION INTRAMUSCULAR; INTRAVENOUS ONCE
Status: DISCONTINUED | OUTPATIENT
Start: 2018-01-01 | End: 2018-01-01

## 2018-01-01 RX ORDER — FENTANYL CITRATE 50 UG/ML
25 INJECTION, SOLUTION INTRAMUSCULAR; INTRAVENOUS EVERY 5 MIN PRN
Status: DISCONTINUED | OUTPATIENT
Start: 2018-01-01 | End: 2018-01-01 | Stop reason: HOSPADM

## 2018-01-01 RX ORDER — FENTANYL CITRATE 50 UG/ML
100 INJECTION, SOLUTION INTRAMUSCULAR; INTRAVENOUS EVERY 5 MIN PRN
Status: DISCONTINUED | OUTPATIENT
Start: 2018-01-01 | End: 2018-01-01

## 2018-01-01 RX ORDER — HEPARIN SODIUM 10000 [USP'U]/100ML
1000 INJECTION, SOLUTION INTRAVENOUS CONTINUOUS
Status: DISCONTINUED | OUTPATIENT
Start: 2018-01-01 | End: 2018-01-01

## 2018-01-01 RX ORDER — NITROGLYCERIN 0.4 MG/1
0.4 TABLET SUBLINGUAL EVERY 5 MIN PRN
Status: DISCONTINUED | OUTPATIENT
Start: 2018-01-01 | End: 2018-01-01 | Stop reason: HOSPADM

## 2018-01-01 RX ORDER — METOPROLOL TARTRATE 25 MG/1
12.5 TABLET ORAL 2 TIMES DAILY
Status: DISCONTINUED | OUTPATIENT
Start: 2018-01-01 | End: 2018-01-01

## 2018-01-01 RX ORDER — ONDANSETRON 8 MG/1
8 TABLET, ORALLY DISINTEGRATING ORAL EVERY 8 HOURS PRN
Status: DISCONTINUED | OUTPATIENT
Start: 2018-01-01 | End: 2018-07-23 | Stop reason: HOSPADM

## 2018-01-01 RX ORDER — HYDROMORPHONE HYDROCHLORIDE 1 MG/ML
0.5 INJECTION, SOLUTION INTRAMUSCULAR; INTRAVENOUS; SUBCUTANEOUS EVERY 6 HOURS PRN
Status: DISCONTINUED | OUTPATIENT
Start: 2018-01-01 | End: 2018-07-23 | Stop reason: HOSPADM

## 2018-01-01 RX ORDER — POLYETHYLENE GLYCOL 3350 17 G/17G
17 POWDER, FOR SOLUTION ORAL 2 TIMES DAILY PRN
Refills: 0
Start: 2018-01-01

## 2018-01-01 RX ORDER — CLINDAMYCIN PHOSPHATE 600 MG/50ML
600 INJECTION, SOLUTION INTRAVENOUS
Status: DISCONTINUED | OUTPATIENT
Start: 2018-01-01 | End: 2018-01-01

## 2018-01-01 RX ORDER — METOPROLOL SUCCINATE 25 MG/1
12.5 TABLET, EXTENDED RELEASE ORAL DAILY
Status: ON HOLD
Start: 2018-01-01 | End: 2018-01-01 | Stop reason: HOSPADM

## 2018-01-01 RX ORDER — CHLORDIAZEPOXIDE HYDROCHLORIDE 5 MG/1
10 CAPSULE, GELATIN COATED ORAL 2 TIMES DAILY
Status: DISCONTINUED | OUTPATIENT
Start: 2018-01-01 | End: 2018-01-01

## 2018-01-01 RX ORDER — SODIUM BICARBONATE 1 MEQ/ML
SYRINGE (ML) INTRAVENOUS
Status: DISCONTINUED
Start: 2018-01-01 | End: 2018-07-23 | Stop reason: HOSPADM

## 2018-01-01 RX ORDER — PREGABALIN 75 MG/1
75 CAPSULE ORAL NIGHTLY
Qty: 30 CAPSULE | Refills: 1 | Status: SHIPPED | OUTPATIENT
Start: 2018-01-01 | End: 2019-01-01

## 2018-01-01 RX ORDER — CLONAZEPAM 0.5 MG/1
0.5 TABLET ORAL NIGHTLY
Status: DISCONTINUED | OUTPATIENT
Start: 2018-01-01 | End: 2018-01-01 | Stop reason: HOSPADM

## 2018-01-01 RX ORDER — OXYCODONE AND ACETAMINOPHEN 10; 325 MG/1; MG/1
2 TABLET ORAL EVERY 4 HOURS PRN
Status: DISCONTINUED | OUTPATIENT
Start: 2018-01-01 | End: 2018-01-01

## 2018-01-01 RX ORDER — HEPARIN SODIUM,PORCINE/D5W 25000/250
18 INTRAVENOUS SOLUTION INTRAVENOUS CONTINUOUS
Status: DISCONTINUED | OUTPATIENT
Start: 2018-01-01 | End: 2018-01-01

## 2018-01-01 RX ORDER — EPINEPHRINE 1 MG/ML
INJECTION, SOLUTION INTRACARDIAC; INTRAMUSCULAR; INTRAVENOUS; SUBCUTANEOUS
Status: COMPLETED
Start: 2018-01-01 | End: 2018-01-01

## 2018-01-01 RX ORDER — CEFEPIME HYDROCHLORIDE 1 G/50ML
2 INJECTION, SOLUTION INTRAVENOUS
Status: DISCONTINUED | OUTPATIENT
Start: 2018-01-01 | End: 2018-01-01

## 2018-01-01 RX ORDER — CEFEPIME HYDROCHLORIDE 1 G/1
1 INJECTION, POWDER, FOR SOLUTION INTRAMUSCULAR; INTRAVENOUS
Status: DISCONTINUED | OUTPATIENT
Start: 2018-01-01 | End: 2018-07-23 | Stop reason: HOSPADM

## 2018-01-01 RX ORDER — SODIUM CHLORIDE 0.9 % (FLUSH) 0.9 %
10 SYRINGE (ML) INJECTION EVERY 6 HOURS
Status: DISCONTINUED | OUTPATIENT
Start: 2018-01-01 | End: 2018-07-23 | Stop reason: HOSPADM

## 2018-01-01 RX ORDER — LIDOCAINE 50 MG/G
OINTMENT TOPICAL 4 TIMES DAILY
Start: 2018-01-01

## 2018-01-01 RX ORDER — POTASSIUM CHLORIDE 20 MEQ/15ML
40 SOLUTION ORAL
Status: DISCONTINUED | OUTPATIENT
Start: 2018-01-01 | End: 2018-01-01

## 2018-01-01 RX ORDER — AMITRIPTYLINE HYDROCHLORIDE 50 MG/1
50 TABLET, FILM COATED ORAL NIGHTLY
Status: DISCONTINUED | OUTPATIENT
Start: 2018-01-01 | End: 2018-01-01 | Stop reason: HOSPADM

## 2018-01-01 RX ORDER — HYDROMORPHONE HYDROCHLORIDE 2 MG/1
2 TABLET ORAL EVERY 4 HOURS PRN
Status: DISCONTINUED | OUTPATIENT
Start: 2018-01-01 | End: 2018-01-01

## 2018-01-01 RX ORDER — POTASSIUM CHLORIDE 750 MG/1
10 CAPSULE, EXTENDED RELEASE ORAL ONCE
Status: COMPLETED | OUTPATIENT
Start: 2018-01-01 | End: 2018-01-01

## 2018-01-01 RX ORDER — SODIUM BICARBONATE 42 MG/ML
50 INJECTION, SOLUTION INTRAVENOUS ONCE
Status: DISCONTINUED | OUTPATIENT
Start: 2018-01-01 | End: 2018-01-01

## 2018-01-01 RX ORDER — GENTAMICIN SULFATE 1 MG/G
OINTMENT TOPICAL DAILY
Status: DISCONTINUED | OUTPATIENT
Start: 2018-01-01 | End: 2018-01-01

## 2018-01-01 RX ORDER — DIAZEPAM 2 MG/1
2 TABLET ORAL EVERY 8 HOURS
Status: DISCONTINUED | OUTPATIENT
Start: 2018-01-01 | End: 2018-01-01

## 2018-01-01 RX ORDER — DIGOXIN 0.25 MG/ML
500 INJECTION INTRAMUSCULAR; INTRAVENOUS ONCE
Status: COMPLETED | OUTPATIENT
Start: 2018-01-01 | End: 2018-01-01

## 2018-01-01 RX ORDER — MORPHINE SULFATE 2 MG/ML
6 INJECTION, SOLUTION INTRAMUSCULAR; INTRAVENOUS EVERY 4 HOURS PRN
Status: DISCONTINUED | OUTPATIENT
Start: 2018-01-01 | End: 2018-01-01

## 2018-01-01 RX ORDER — HYDROCODONE BITARTRATE AND ACETAMINOPHEN 10; 325 MG/1; MG/1
1 TABLET ORAL EVERY 6 HOURS PRN
Status: DISCONTINUED | OUTPATIENT
Start: 2018-01-01 | End: 2018-07-23 | Stop reason: HOSPADM

## 2018-01-01 RX ORDER — KETAMINE HCL IN 0.9 % NACL 50 MG/5 ML
SYRINGE (ML) INTRAVENOUS
Status: DISPENSED
Start: 2018-01-01 | End: 2018-01-01

## 2018-01-01 RX ORDER — MAGNESIUM SULFATE HEPTAHYDRATE 40 MG/ML
2 INJECTION, SOLUTION INTRAVENOUS
Status: COMPLETED | OUTPATIENT
Start: 2018-01-01 | End: 2018-01-01

## 2018-01-01 RX ORDER — LACTULOSE 10 G/15ML
10 SOLUTION ORAL EVERY 6 HOURS
Status: COMPLETED | OUTPATIENT
Start: 2018-01-01 | End: 2018-01-01

## 2018-01-01 RX ORDER — SUCCINYLCHOLINE CHLORIDE 20 MG/ML
INJECTION INTRAMUSCULAR; INTRAVENOUS
Status: DISCONTINUED | OUTPATIENT
Start: 2018-01-01 | End: 2018-01-01 | Stop reason: HOSPADM

## 2018-01-01 RX ORDER — EPINEPHRINE 1 MG/ML
INJECTION INTRAMUSCULAR; INTRAVENOUS; SUBCUTANEOUS
Status: COMPLETED
Start: 2018-01-01 | End: 2018-01-01

## 2018-01-01 RX ORDER — LIDOCAINE HYDROCHLORIDE 20 MG/ML
JELLY TOPICAL
Status: DISCONTINUED | OUTPATIENT
Start: 2018-01-01 | End: 2018-07-23 | Stop reason: HOSPADM

## 2018-01-01 RX ORDER — OLANZAPINE 10 MG/2ML
5 INJECTION, POWDER, FOR SOLUTION INTRAMUSCULAR 2 TIMES DAILY PRN
Status: DISCONTINUED | OUTPATIENT
Start: 2018-01-01 | End: 2018-01-01

## 2018-01-01 RX ORDER — NITROGLYCERIN 0.4 MG/1
TABLET SUBLINGUAL
Status: COMPLETED
Start: 2018-01-01 | End: 2018-01-01

## 2018-01-01 RX ORDER — LORAZEPAM 2 MG/ML
1 INJECTION INTRAMUSCULAR
Status: DISPENSED | OUTPATIENT
Start: 2018-01-01 | End: 2018-01-01

## 2018-01-01 RX ORDER — OXYCODONE AND ACETAMINOPHEN 5; 325 MG/1; MG/1
2 TABLET ORAL EVERY 4 HOURS PRN
Status: DISCONTINUED | OUTPATIENT
Start: 2018-01-01 | End: 2018-01-01

## 2018-01-01 RX ORDER — ROCURONIUM BROMIDE 10 MG/ML
INJECTION, SOLUTION INTRAVENOUS
Status: COMPLETED
Start: 2018-01-01 | End: 2018-01-01

## 2018-01-01 RX ORDER — LORAZEPAM 2 MG/ML
INJECTION INTRAMUSCULAR
Status: COMPLETED
Start: 2018-01-01 | End: 2018-01-01

## 2018-01-01 RX ORDER — GENTAMICIN SULFATE 1 MG/G
OINTMENT TOPICAL 3 TIMES DAILY
Status: DISCONTINUED | OUTPATIENT
Start: 2018-01-01 | End: 2018-01-01

## 2018-01-01 RX ORDER — LIDOCAINE HYDROCHLORIDE ANHYDROUS AND DEXTROSE MONOHYDRATE .8; 5 G/100ML; G/100ML
1 INJECTION, SOLUTION INTRAVENOUS CONTINUOUS
Status: DISCONTINUED | OUTPATIENT
Start: 2018-01-01 | End: 2018-01-01

## 2018-01-01 RX ORDER — FUROSEMIDE 10 MG/ML
100 INJECTION INTRAMUSCULAR; INTRAVENOUS 2 TIMES DAILY
Status: DISCONTINUED | OUTPATIENT
Start: 2018-01-01 | End: 2018-01-01

## 2018-01-01 RX ORDER — ACETAMINOPHEN 10 MG/ML
1000 INJECTION, SOLUTION INTRAVENOUS EVERY 8 HOURS
Status: DISCONTINUED | OUTPATIENT
Start: 2018-01-01 | End: 2018-01-01

## 2018-01-01 RX ORDER — ASPIRIN 81 MG/1
81 TABLET ORAL DAILY
Status: DISCONTINUED | OUTPATIENT
Start: 2018-01-01 | End: 2018-07-23 | Stop reason: HOSPADM

## 2018-01-01 RX ORDER — OXYCODONE HYDROCHLORIDE 5 MG/1
15 TABLET ORAL
Status: DISCONTINUED | OUTPATIENT
Start: 2018-01-01 | End: 2018-01-01

## 2018-01-01 RX ORDER — HYDROCODONE BITARTRATE AND ACETAMINOPHEN 10; 325 MG/1; MG/1
1 TABLET ORAL EVERY 6 HOURS PRN
Status: ON HOLD | COMMUNITY
End: 2018-01-01 | Stop reason: HOSPADM

## 2018-01-01 RX ORDER — TAMSULOSIN HYDROCHLORIDE 0.4 MG/1
0.4 CAPSULE ORAL DAILY
Status: DISCONTINUED | OUTPATIENT
Start: 2018-01-01 | End: 2018-01-01

## 2018-01-01 RX ORDER — LIDOCAINE HCL/PF 100 MG/5ML
SYRINGE (ML) INTRAVENOUS
Status: DISCONTINUED | OUTPATIENT
Start: 2018-01-01 | End: 2018-01-01

## 2018-01-01 RX ORDER — IPRATROPIUM BROMIDE AND ALBUTEROL SULFATE 2.5; .5 MG/3ML; MG/3ML
3 SOLUTION RESPIRATORY (INHALATION)
Status: COMPLETED | OUTPATIENT
Start: 2018-01-01 | End: 2018-01-01

## 2018-01-01 RX ORDER — ACETAMINOPHEN 325 MG/1
650 TABLET ORAL EVERY 6 HOURS PRN
Status: DISCONTINUED | OUTPATIENT
Start: 2018-01-01 | End: 2018-07-23 | Stop reason: HOSPADM

## 2018-01-01 RX ORDER — ROCURONIUM BROMIDE 10 MG/ML
INJECTION, SOLUTION INTRAVENOUS
Status: DISCONTINUED
Start: 2018-01-01 | End: 2018-07-23 | Stop reason: HOSPADM

## 2018-01-01 RX ORDER — HYDROCODONE BITARTRATE AND ACETAMINOPHEN 500; 5 MG/1; MG/1
TABLET ORAL CONTINUOUS
Status: DISCONTINUED | OUTPATIENT
Start: 2018-01-01 | End: 2018-01-01

## 2018-01-01 RX ORDER — DIGOXIN 125 MCG
0.12 TABLET ORAL DAILY
Status: DISCONTINUED | OUTPATIENT
Start: 2018-01-01 | End: 2018-01-01

## 2018-01-01 RX ORDER — CYCLOBENZAPRINE HCL 10 MG
10 TABLET ORAL 3 TIMES DAILY PRN
Status: DISCONTINUED | OUTPATIENT
Start: 2018-01-01 | End: 2018-01-01 | Stop reason: HOSPADM

## 2018-01-01 RX ORDER — ATORVASTATIN CALCIUM 40 MG/1
80 TABLET, FILM COATED ORAL NIGHTLY
Start: 2018-01-01 | End: 2018-01-01 | Stop reason: HOSPADM

## 2018-01-01 RX ORDER — CHLORDIAZEPOXIDE HYDROCHLORIDE 5 MG/1
25 CAPSULE, GELATIN COATED ORAL EVERY 12 HOURS
Status: DISCONTINUED | OUTPATIENT
Start: 2018-01-01 | End: 2018-01-01

## 2018-01-01 RX ORDER — OXYCODONE HYDROCHLORIDE 10 MG/1
10 TABLET ORAL EVERY 4 HOURS PRN
Qty: 25 TABLET | Refills: 0 | Status: SHIPPED | OUTPATIENT
Start: 2018-01-01

## 2018-01-01 RX ORDER — LIDOCAINE HCL/PF 100 MG/5ML
100 SYRINGE (ML) INTRAVENOUS ONCE
Status: DISCONTINUED | OUTPATIENT
Start: 2018-01-01 | End: 2018-01-01

## 2018-01-01 RX ORDER — OXYCODONE HYDROCHLORIDE 5 MG/1
10 TABLET ORAL EVERY 4 HOURS PRN
Status: DISCONTINUED | OUTPATIENT
Start: 2018-01-01 | End: 2018-01-01 | Stop reason: HOSPADM

## 2018-01-01 RX ORDER — HYDROCODONE BITARTRATE AND ACETAMINOPHEN 500; 5 MG/1; MG/1
TABLET ORAL
Status: DISCONTINUED | OUTPATIENT
Start: 2018-01-01 | End: 2018-01-01

## 2018-01-01 RX ORDER — METRONIDAZOLE 500 MG/100ML
500 INJECTION, SOLUTION INTRAVENOUS
Status: DISCONTINUED | OUTPATIENT
Start: 2018-01-01 | End: 2018-07-23 | Stop reason: HOSPADM

## 2018-01-01 RX ORDER — AMOXICILLIN 250 MG
1 CAPSULE ORAL 2 TIMES DAILY
Status: DISCONTINUED | OUTPATIENT
Start: 2018-01-01 | End: 2018-01-01 | Stop reason: HOSPADM

## 2018-01-01 RX ORDER — POTASSIUM CHLORIDE 20 MEQ/15ML
40 SOLUTION ORAL EVERY 4 HOURS
Status: COMPLETED | OUTPATIENT
Start: 2018-01-01 | End: 2018-01-01

## 2018-01-01 RX ORDER — CARVEDILOL 3.12 MG/1
3.12 TABLET ORAL 2 TIMES DAILY
Status: DISCONTINUED | OUTPATIENT
Start: 2018-01-01 | End: 2018-01-01

## 2018-01-01 RX ORDER — CHLORPROMAZINE HYDROCHLORIDE 25 MG/1
25 TABLET, FILM COATED ORAL DAILY
Status: DISCONTINUED | OUTPATIENT
Start: 2018-01-01 | End: 2018-01-01

## 2018-01-01 RX ORDER — FUROSEMIDE 10 MG/ML
80 INJECTION INTRAMUSCULAR; INTRAVENOUS
Status: COMPLETED | OUTPATIENT
Start: 2018-01-01 | End: 2018-01-01

## 2018-01-01 RX ORDER — SODIUM CHLORIDE 450 MG/100ML
INJECTION, SOLUTION INTRAVENOUS CONTINUOUS
Status: ACTIVE | OUTPATIENT
Start: 2018-01-01 | End: 2018-01-01

## 2018-01-01 RX ORDER — LIDOCAINE HYDROCHLORIDE 10 MG/ML
2.1 INJECTION INFILTRATION; PERINEURAL ONCE
Status: COMPLETED | OUTPATIENT
Start: 2018-01-01 | End: 2018-01-01

## 2018-01-01 RX ORDER — MORPHINE SULFATE 4 MG/ML
2 INJECTION, SOLUTION INTRAMUSCULAR; INTRAVENOUS EVERY 4 HOURS PRN
Status: DISCONTINUED | OUTPATIENT
Start: 2018-01-01 | End: 2018-01-01 | Stop reason: HOSPADM

## 2018-01-01 RX ORDER — ATORVASTATIN CALCIUM 20 MG/1
80 TABLET, FILM COATED ORAL NIGHTLY
Status: DISCONTINUED | OUTPATIENT
Start: 2018-01-01 | End: 2018-01-01 | Stop reason: HOSPADM

## 2018-01-01 RX ORDER — AMITRIPTYLINE HYDROCHLORIDE 25 MG/1
25 TABLET, FILM COATED ORAL NIGHTLY
Status: DISCONTINUED | OUTPATIENT
Start: 2018-01-01 | End: 2018-01-01

## 2018-01-01 RX ORDER — CHLORPROMAZINE HYDROCHLORIDE 25 MG/ML
50 INJECTION INTRAMUSCULAR ONCE
Status: COMPLETED | OUTPATIENT
Start: 2018-01-01 | End: 2018-01-01

## 2018-01-01 RX ORDER — IPRATROPIUM BROMIDE AND ALBUTEROL SULFATE 2.5; .5 MG/3ML; MG/3ML
3 SOLUTION RESPIRATORY (INHALATION) EVERY 4 HOURS PRN
Status: DISCONTINUED | OUTPATIENT
Start: 2018-01-01 | End: 2018-07-23 | Stop reason: HOSPADM

## 2018-01-01 RX ORDER — LORAZEPAM 2 MG/ML
INJECTION INTRAMUSCULAR
Status: DISPENSED
Start: 2018-01-01 | End: 2018-01-01

## 2018-01-01 RX ORDER — PROPOFOL 10 MG/ML
10 INJECTION, EMULSION INTRAVENOUS CONTINUOUS
Status: DISCONTINUED | OUTPATIENT
Start: 2018-01-01 | End: 2018-01-01 | Stop reason: HOSPADM

## 2018-01-01 RX ORDER — DEXTROSE MONOHYDRATE AND SODIUM CHLORIDE 5; .45 G/100ML; G/100ML
INJECTION, SOLUTION INTRAVENOUS CONTINUOUS
Status: ACTIVE | OUTPATIENT
Start: 2018-01-01 | End: 2018-01-01

## 2018-01-01 RX ORDER — LEVALBUTEROL 1.25 MG/.5ML
1.25 SOLUTION, CONCENTRATE RESPIRATORY (INHALATION)
Status: COMPLETED | OUTPATIENT
Start: 2018-01-01 | End: 2018-01-01

## 2018-01-01 RX ORDER — FUROSEMIDE 10 MG/ML
40 INJECTION INTRAMUSCULAR; INTRAVENOUS
Status: DISCONTINUED | OUTPATIENT
Start: 2018-01-01 | End: 2018-01-01

## 2018-01-01 RX ORDER — VASOPRESSIN 20 [USP'U]/ML
INJECTION, SOLUTION INTRAMUSCULAR; SUBCUTANEOUS
Status: COMPLETED
Start: 2018-01-01 | End: 2018-01-01

## 2018-01-01 RX ORDER — AMIODARONE HYDROCHLORIDE 200 MG/1
400 TABLET ORAL 2 TIMES DAILY
Status: DISCONTINUED | OUTPATIENT
Start: 2018-01-01 | End: 2018-01-01

## 2018-01-01 RX ORDER — LORAZEPAM 2 MG/ML
2 INJECTION INTRAMUSCULAR ONCE AS NEEDED
Status: COMPLETED | OUTPATIENT
Start: 2018-01-01 | End: 2018-01-01

## 2018-01-01 RX ORDER — PROPOFOL 10 MG/ML
INJECTION, EMULSION INTRAVENOUS
Status: COMPLETED
Start: 2018-01-01 | End: 2018-01-01

## 2018-01-01 RX ORDER — POLYETHYLENE GLYCOL 3350 17 G/17G
17 POWDER, FOR SOLUTION ORAL 2 TIMES DAILY PRN
Status: DISCONTINUED | OUTPATIENT
Start: 2018-01-01 | End: 2018-01-01 | Stop reason: HOSPADM

## 2018-01-01 RX ORDER — FENTANYL CITRATE 50 UG/ML
25 INJECTION, SOLUTION INTRAMUSCULAR; INTRAVENOUS EVERY 5 MIN PRN
Status: COMPLETED | OUTPATIENT
Start: 2018-01-01 | End: 2018-01-01

## 2018-01-01 RX ORDER — HEPARIN SODIUM 5000 [USP'U]/ML
5000 INJECTION, SOLUTION INTRAVENOUS; SUBCUTANEOUS EVERY 8 HOURS
Status: DISCONTINUED | OUTPATIENT
Start: 2018-01-01 | End: 2018-01-01

## 2018-01-01 RX ORDER — SODIUM BICARBONATE 1 MEQ/ML
SYRINGE (ML) INTRAVENOUS
Status: COMPLETED
Start: 2018-01-01 | End: 2018-01-01

## 2018-01-01 RX ORDER — FUROSEMIDE 40 MG/1
40 TABLET ORAL DAILY
Status: DISCONTINUED | OUTPATIENT
Start: 2018-01-01 | End: 2018-01-01

## 2018-01-01 RX ORDER — HYDROMORPHONE HYDROCHLORIDE 1 MG/ML
1 INJECTION, SOLUTION INTRAMUSCULAR; INTRAVENOUS; SUBCUTANEOUS
Status: DISCONTINUED | OUTPATIENT
Start: 2018-01-01 | End: 2018-01-01

## 2018-01-01 RX ORDER — CALCIUM GLUCONATE 98 MG/ML
INJECTION, SOLUTION INTRAVENOUS
Status: COMPLETED
Start: 2018-01-01 | End: 2018-01-01

## 2018-01-01 RX ORDER — OXYCODONE HYDROCHLORIDE 30 MG/1
30 TABLET ORAL
Status: DISCONTINUED | OUTPATIENT
Start: 2018-01-01 | End: 2018-01-01

## 2018-01-01 RX ORDER — DEXTROSE 50 % IN WATER (D50W) INTRAVENOUS SYRINGE
Status: COMPLETED
Start: 2018-01-01 | End: 2018-01-01

## 2018-01-01 RX ORDER — PANTOPRAZOLE SODIUM 40 MG/10ML
40 INJECTION, POWDER, LYOPHILIZED, FOR SOLUTION INTRAVENOUS DAILY
Status: DISCONTINUED | OUTPATIENT
Start: 2018-01-01 | End: 2018-01-01 | Stop reason: HOSPADM

## 2018-01-01 RX ORDER — AMITRIPTYLINE HYDROCHLORIDE 50 MG/1
50 TABLET, FILM COATED ORAL NIGHTLY
Qty: 30 TABLET | Refills: 11 | Status: ON HOLD
Start: 2018-01-01 | End: 2018-01-01 | Stop reason: HOSPADM

## 2018-01-01 RX ORDER — POTASSIUM CHLORIDE 20 MEQ/1
20 TABLET, EXTENDED RELEASE ORAL ONCE
Status: COMPLETED | OUTPATIENT
Start: 2018-01-01 | End: 2018-01-01

## 2018-01-01 RX ORDER — MAGNESIUM SULFATE HEPTAHYDRATE 500 MG/ML
INJECTION, SOLUTION INTRAMUSCULAR; INTRAVENOUS CODE/TRAUMA/SEDATION MEDICATION
Status: COMPLETED | OUTPATIENT
Start: 2018-01-01 | End: 2018-01-01

## 2018-01-01 RX ORDER — BUPIVACAINE HYDROCHLORIDE 5 MG/ML
INJECTION, SOLUTION EPIDURAL; INTRACAUDAL
Status: DISPENSED
Start: 2018-01-01 | End: 2018-01-01

## 2018-01-01 RX ORDER — DOBUTAMINE HYDROCHLORIDE 200 MG/100ML
2.5 INJECTION INTRAVENOUS CONTINUOUS
Status: DISCONTINUED | OUTPATIENT
Start: 2018-01-01 | End: 2018-01-01

## 2018-01-01 RX ORDER — FENTANYL CITRATE 50 UG/ML
INJECTION, SOLUTION INTRAMUSCULAR; INTRAVENOUS
Status: DISCONTINUED
Start: 2018-01-01 | End: 2018-01-01 | Stop reason: WASHOUT

## 2018-01-01 RX ORDER — ISOSORBIDE DINITRATE AND HYDRALAZINE HYDROCHLORIDE 37.5; 2 MG/1; MG/1
1 TABLET ORAL 3 TIMES DAILY
Status: DISCONTINUED | OUTPATIENT
Start: 2018-01-01 | End: 2018-01-01

## 2018-01-01 RX ORDER — ACETAMINOPHEN 325 MG/1
650 TABLET ORAL EVERY 8 HOURS PRN
Status: DISCONTINUED | OUTPATIENT
Start: 2018-01-01 | End: 2018-01-01 | Stop reason: HOSPADM

## 2018-01-01 RX ORDER — FUROSEMIDE 10 MG/ML
40 INJECTION INTRAMUSCULAR; INTRAVENOUS 2 TIMES DAILY
Status: DISCONTINUED | OUTPATIENT
Start: 2018-01-01 | End: 2018-01-01

## 2018-01-01 RX ORDER — ROCURONIUM BROMIDE 10 MG/ML
INJECTION, SOLUTION INTRAVENOUS
Status: DISPENSED
Start: 2018-01-01 | End: 2018-01-01

## 2018-01-01 RX ORDER — HYDROMORPHONE HYDROCHLORIDE 1 MG/ML
1 INJECTION, SOLUTION INTRAMUSCULAR; INTRAVENOUS; SUBCUTANEOUS EVERY 4 HOURS PRN
Status: DISCONTINUED | OUTPATIENT
Start: 2018-01-01 | End: 2018-01-01

## 2018-01-01 RX ORDER — METHYLPREDNISOLONE SOD SUCC 125 MG
125 VIAL (EA) INJECTION
Status: COMPLETED | OUTPATIENT
Start: 2018-01-01 | End: 2018-01-01

## 2018-01-01 RX ORDER — METRONIDAZOLE 500 MG/1
500 TABLET ORAL
Status: DISCONTINUED | OUTPATIENT
Start: 2018-01-01 | End: 2018-01-01

## 2018-01-01 RX ORDER — NAPROXEN SODIUM 220 MG/1
81 TABLET, FILM COATED ORAL DAILY
Status: DISCONTINUED | OUTPATIENT
Start: 2018-01-01 | End: 2018-01-01 | Stop reason: HOSPADM

## 2018-01-01 RX ORDER — OXYCODONE HYDROCHLORIDE 5 MG/1
20 TABLET ORAL EVERY 4 HOURS PRN
Status: DISCONTINUED | OUTPATIENT
Start: 2018-01-01 | End: 2018-01-01 | Stop reason: HOSPADM

## 2018-01-01 RX ADMIN — LIDOCAINE: 50 OINTMENT TOPICAL at 09:04

## 2018-01-01 RX ADMIN — PIPERACILLIN AND TAZOBACTAM 4.5 G: 4; .5 INJECTION, POWDER, LYOPHILIZED, FOR SOLUTION INTRAVENOUS; PARENTERAL at 09:04

## 2018-01-01 RX ADMIN — DIGOXIN 500 MCG: 0.25 INJECTION INTRAMUSCULAR; INTRAVENOUS at 01:06

## 2018-01-01 RX ADMIN — SODIUM CHLORIDE 1000 ML: 0.9 INJECTION, SOLUTION INTRAVENOUS at 07:06

## 2018-01-01 RX ADMIN — CLINDAMYCIN HYDROCHLORIDE 450 MG: 150 CAPSULE ORAL at 02:03

## 2018-01-01 RX ADMIN — PREGABALIN 75 MG: 75 CAPSULE ORAL at 09:07

## 2018-01-01 RX ADMIN — DEXMEDETOMIDINE HYDROCHLORIDE 1 MCG/KG/HR: 100 INJECTION, SOLUTION INTRAVENOUS at 07:03

## 2018-01-01 RX ADMIN — POTASSIUM CHLORIDE 40 MEQ: 1500 TABLET, EXTENDED RELEASE ORAL at 08:03

## 2018-01-01 RX ADMIN — AMPICILLIN AND SULBACTAM 3 G: 2; 1 INJECTION, POWDER, FOR SOLUTION INTRAVENOUS at 09:03

## 2018-01-01 RX ADMIN — POTASSIUM CHLORIDE 10 MEQ: 149 INJECTION, SOLUTION, CONCENTRATE INTRAVENOUS at 06:03

## 2018-01-01 RX ADMIN — HEPARIN SODIUM 5000 UNITS: 5000 INJECTION, SOLUTION INTRAVENOUS; SUBCUTANEOUS at 02:06

## 2018-01-01 RX ADMIN — FUROSEMIDE 40 MG: 40 TABLET ORAL at 08:06

## 2018-01-01 RX ADMIN — HEPARIN SODIUM 5000 UNITS: 5000 INJECTION, SOLUTION INTRAVENOUS; SUBCUTANEOUS at 08:07

## 2018-01-01 RX ADMIN — OXYCODONE AND ACETAMINOPHEN 2 TABLET: 10; 325 TABLET ORAL at 07:03

## 2018-01-01 RX ADMIN — ATORVASTATIN CALCIUM 40 MG: 20 TABLET, FILM COATED ORAL at 09:03

## 2018-01-01 RX ADMIN — LORAZEPAM 1 MG: 2 INJECTION, SOLUTION INTRAMUSCULAR; INTRAVENOUS at 05:03

## 2018-01-01 RX ADMIN — POTASSIUM CHLORIDE 40 MEQ: 20 SOLUTION ORAL at 10:06

## 2018-01-01 RX ADMIN — CEFEPIME 1 G: 1 INJECTION, POWDER, FOR SOLUTION INTRAMUSCULAR; INTRAVENOUS at 06:06

## 2018-01-01 RX ADMIN — PREGABALIN 100 MG: 50 CAPSULE ORAL at 08:03

## 2018-01-01 RX ADMIN — MAGNESIUM OXIDE TAB 400 MG (241.3 MG ELEMENTAL MG) 400 MG: 400 (241.3 MG) TAB at 09:03

## 2018-01-01 RX ADMIN — SODIUM POLYSTYRENE SULFONATE 30 G: 15 SUSPENSION ORAL; RECTAL at 02:06

## 2018-01-01 RX ADMIN — ASPIRIN 81 MG: 81 TABLET, COATED ORAL at 09:06

## 2018-01-01 RX ADMIN — TICAGRELOR 90 MG: 90 TABLET ORAL at 08:03

## 2018-01-01 RX ADMIN — OXYCODONE HYDROCHLORIDE 5 MG: 5 TABLET ORAL at 10:06

## 2018-01-01 RX ADMIN — ENOXAPARIN SODIUM 40 MG: 100 INJECTION SUBCUTANEOUS at 09:04

## 2018-01-01 RX ADMIN — RANOLAZINE 500 MG: 500 TABLET, FILM COATED, EXTENDED RELEASE ORAL at 08:06

## 2018-01-01 RX ADMIN — HYDRALAZINE HYDROCHLORIDE AND ISOSORBIDE DINITRATE 1 TABLET: 37.5; 2 TABLET, FILM COATED ORAL at 09:06

## 2018-01-01 RX ADMIN — VALPROATE SODIUM 250 MG: 100 INJECTION, SOLUTION INTRAVENOUS at 01:06

## 2018-01-01 RX ADMIN — STANDARDIZED SENNA CONCENTRATE AND DOCUSATE SODIUM 1 TABLET: 8.6; 5 TABLET, FILM COATED ORAL at 09:04

## 2018-01-01 RX ADMIN — HYDRALAZINE HYDROCHLORIDE AND ISOSORBIDE DINITRATE 1 TABLET: 37.5; 2 TABLET, FILM COATED ORAL at 10:06

## 2018-01-01 RX ADMIN — MORPHINE SULFATE 2 MG: 4 INJECTION INTRAVENOUS at 08:06

## 2018-01-01 RX ADMIN — HALOPERIDOL 5 MG: 5 TABLET ORAL at 08:06

## 2018-01-01 RX ADMIN — ACETAMINOPHEN 1000 MG: 500 TABLET ORAL at 09:07

## 2018-01-01 RX ADMIN — LIDOCAINE: 50 OINTMENT TOPICAL at 04:03

## 2018-01-01 RX ADMIN — Medication 1 TABLET: at 09:04

## 2018-01-01 RX ADMIN — PROPOFOL 10 MCG/KG/MIN: 10 INJECTION, EMULSION INTRAVENOUS at 08:06

## 2018-01-01 RX ADMIN — Medication 1 MG: at 10:03

## 2018-01-01 RX ADMIN — AZITHROMYCIN MONOHYDRATE 500 MG: 500 INJECTION, POWDER, LYOPHILIZED, FOR SOLUTION INTRAVENOUS at 07:06

## 2018-01-01 RX ADMIN — OXYCODONE HYDROCHLORIDE AND ACETAMINOPHEN 1 TABLET: 10; 325 TABLET ORAL at 04:03

## 2018-01-01 RX ADMIN — FUROSEMIDE 40 MG: 10 INJECTION, SOLUTION INTRAMUSCULAR; INTRAVENOUS at 05:07

## 2018-01-01 RX ADMIN — POTASSIUM CHLORIDE 10 MEQ: 10 INJECTION, SOLUTION INTRAVENOUS at 04:04

## 2018-01-01 RX ADMIN — OXYCODONE HYDROCHLORIDE 20 MG: 5 TABLET ORAL at 08:04

## 2018-01-01 RX ADMIN — ASPIRIN 81 MG CHEWABLE TABLET 81 MG: 81 TABLET CHEWABLE at 09:07

## 2018-01-01 RX ADMIN — ACETAMINOPHEN 1000 MG: 500 TABLET ORAL at 05:03

## 2018-01-01 RX ADMIN — GABAPENTIN 800 MG: 400 CAPSULE ORAL at 08:04

## 2018-01-01 RX ADMIN — COLLAGENASE SANTYL: 250 OINTMENT TOPICAL at 09:06

## 2018-01-01 RX ADMIN — PIPERACILLIN AND TAZOBACTAM 4.5 G: 4; .5 INJECTION, POWDER, LYOPHILIZED, FOR SOLUTION INTRAVENOUS; PARENTERAL at 01:07

## 2018-01-01 RX ADMIN — SENNOSIDES AND DOCUSATE SODIUM 1 TABLET: 8.6; 5 TABLET ORAL at 09:06

## 2018-01-01 RX ADMIN — NICOTINE 1 PATCH: 14 PATCH, EXTENDED RELEASE TRANSDERMAL at 09:06

## 2018-01-01 RX ADMIN — VALPROATE SODIUM 250 MG: 100 INJECTION, SOLUTION INTRAVENOUS at 05:06

## 2018-01-01 RX ADMIN — VALPROATE SODIUM 250 MG: 100 INJECTION, SOLUTION INTRAVENOUS at 02:06

## 2018-01-01 RX ADMIN — NICOTINE 1 PATCH: 14 PATCH, EXTENDED RELEASE TRANSDERMAL at 08:06

## 2018-01-01 RX ADMIN — TICAGRELOR 90 MG: 90 TABLET ORAL at 09:04

## 2018-01-01 RX ADMIN — SENNOSIDES AND DOCUSATE SODIUM 1 TABLET: 8.6; 5 TABLET ORAL at 10:06

## 2018-01-01 RX ADMIN — LORAZEPAM 1 MG: 2 INJECTION INTRAMUSCULAR; INTRAVENOUS at 02:03

## 2018-01-01 RX ADMIN — STANDARDIZED SENNA CONCENTRATE AND DOCUSATE SODIUM 1 TABLET: 8.6; 5 TABLET, FILM COATED ORAL at 08:07

## 2018-01-01 RX ADMIN — HEPARIN SODIUM 5000 UNITS: 5000 INJECTION, SOLUTION INTRAVENOUS; SUBCUTANEOUS at 09:06

## 2018-01-01 RX ADMIN — Medication 6 MG: at 04:03

## 2018-01-01 RX ADMIN — HEPARIN SODIUM 1000 UNITS/HR: 10000 INJECTION, SOLUTION INTRAVENOUS at 06:03

## 2018-01-01 RX ADMIN — ASPIRIN 81 MG: 81 TABLET, COATED ORAL at 08:03

## 2018-01-01 RX ADMIN — ZOLPIDEM TARTRATE 5 MG: 5 TABLET ORAL at 09:06

## 2018-01-01 RX ADMIN — PANTOPRAZOLE SODIUM 40 MG: 40 TABLET, DELAYED RELEASE ORAL at 10:07

## 2018-01-01 RX ADMIN — DEXTROSE MONOHYDRATE 25 G: 25 INJECTION, SOLUTION INTRAVENOUS at 06:07

## 2018-01-01 RX ADMIN — SODIUM CHLORIDE: 0.9 INJECTION, SOLUTION INTRAVENOUS at 11:06

## 2018-01-01 RX ADMIN — FOLIC ACID: 5 INJECTION, SOLUTION INTRAMUSCULAR; INTRAVENOUS; SUBCUTANEOUS at 09:03

## 2018-01-01 RX ADMIN — METOPROLOL SUCCINATE 25 MG: 25 TABLET, EXTENDED RELEASE ORAL at 08:03

## 2018-01-01 RX ADMIN — PREGABALIN 100 MG: 50 CAPSULE ORAL at 04:03

## 2018-01-01 RX ADMIN — AMIODARONE HYDROCHLORIDE 400 MG: 200 TABLET ORAL at 08:07

## 2018-01-01 RX ADMIN — Medication 3 ML: at 09:06

## 2018-01-01 RX ADMIN — HYDROCODONE BITARTRATE AND ACETAMINOPHEN 1 TABLET: 10; 325 TABLET ORAL at 06:07

## 2018-01-01 RX ADMIN — NOREPINEPHRINE BITARTRATE 0.27 MCG/KG/MIN: 1 INJECTION, SOLUTION, CONCENTRATE INTRAVENOUS at 01:06

## 2018-01-01 RX ADMIN — HEPARIN SODIUM 5000 UNITS: 5000 INJECTION, SOLUTION INTRAVENOUS; SUBCUTANEOUS at 10:06

## 2018-01-01 RX ADMIN — DEXTROSE 40 MG: 50 INJECTION, SOLUTION INTRAVENOUS at 09:06

## 2018-01-01 RX ADMIN — SODIUM CHLORIDE 500 ML: 0.9 INJECTION, SOLUTION INTRAVENOUS at 04:03

## 2018-01-01 RX ADMIN — FUROSEMIDE 40 MG: 10 INJECTION, SOLUTION INTRAMUSCULAR; INTRAVENOUS at 12:03

## 2018-01-01 RX ADMIN — OXYCODONE HYDROCHLORIDE 20 MG: 5 TABLET ORAL at 09:04

## 2018-01-01 RX ADMIN — EPINEPHRINE 1 MG: 1 INJECTION, SOLUTION, CONCENTRATE INTRAVENOUS at 09:07

## 2018-01-01 RX ADMIN — HEPARIN SODIUM 5000 UNITS: 5000 INJECTION, SOLUTION INTRAVENOUS; SUBCUTANEOUS at 01:07

## 2018-01-01 RX ADMIN — VANCOMYCIN HYDROCHLORIDE 1 G: 10 INJECTION, POWDER, LYOPHILIZED, FOR SOLUTION INTRAVENOUS at 08:03

## 2018-01-01 RX ADMIN — GABAPENTIN 800 MG: 400 CAPSULE ORAL at 08:03

## 2018-01-01 RX ADMIN — ACETAMINOPHEN 650 MG: 325 TABLET, FILM COATED ORAL at 10:06

## 2018-01-01 RX ADMIN — Medication 3 ML: at 01:07

## 2018-01-01 RX ADMIN — Medication 100 MG: at 09:04

## 2018-01-01 RX ADMIN — METOPROLOL TARTRATE 1 MG: 5 INJECTION, SOLUTION INTRAVENOUS at 01:03

## 2018-01-01 RX ADMIN — ASPIRIN 81 MG CHEWABLE TABLET 81 MG: 81 TABLET CHEWABLE at 08:07

## 2018-01-01 RX ADMIN — HYDROCODONE BITARTRATE AND ACETAMINOPHEN 1 TABLET: 5; 325 TABLET ORAL at 12:07

## 2018-01-01 RX ADMIN — LIDOCAINE: 50 OINTMENT TOPICAL at 08:03

## 2018-01-01 RX ADMIN — LISINOPRIL 5 MG: 5 TABLET ORAL at 08:03

## 2018-01-01 RX ADMIN — LIDOCAINE: 50 OINTMENT TOPICAL at 08:04

## 2018-01-01 RX ADMIN — HYDRALAZINE HYDROCHLORIDE AND ISOSORBIDE DINITRATE 1 TABLET: 37.5; 2 TABLET, FILM COATED ORAL at 08:06

## 2018-01-01 RX ADMIN — CHLORHEXIDINE GLUCONATE 0.12% ORAL RINSE 15 ML: 1.2 LIQUID ORAL at 08:07

## 2018-01-01 RX ADMIN — Medication 1 TABLET: at 08:03

## 2018-01-01 RX ADMIN — OXYCODONE HYDROCHLORIDE 10 MG: 5 TABLET ORAL at 08:06

## 2018-01-01 RX ADMIN — Medication 6 MG: at 02:03

## 2018-01-01 RX ADMIN — MAGNESIUM SULFATE IN WATER 2 G: 40 INJECTION, SOLUTION INTRAVENOUS at 10:07

## 2018-01-01 RX ADMIN — POTASSIUM CHLORIDE 10 MEQ: 10 INJECTION, SOLUTION INTRAVENOUS at 01:04

## 2018-01-01 RX ADMIN — PREGABALIN 75 MG: 75 CAPSULE ORAL at 08:06

## 2018-01-01 RX ADMIN — METOPROLOL SUCCINATE 25 MG: 25 TABLET, EXTENDED RELEASE ORAL at 12:06

## 2018-01-01 RX ADMIN — HEPARIN SODIUM 5000 UNITS: 5000 INJECTION, SOLUTION INTRAVENOUS; SUBCUTANEOUS at 05:06

## 2018-01-01 RX ADMIN — VALPROATE SODIUM 250 MG: 100 INJECTION, SOLUTION INTRAVENOUS at 12:06

## 2018-01-01 RX ADMIN — LIDOCAINE: 50 OINTMENT TOPICAL at 06:03

## 2018-01-01 RX ADMIN — METRONIDAZOLE 500 MG: 500 TABLET ORAL at 02:06

## 2018-01-01 RX ADMIN — LORAZEPAM 2 MG: 2 INJECTION, SOLUTION INTRAMUSCULAR; INTRAVENOUS at 09:03

## 2018-01-01 RX ADMIN — EPINEPHRINE 1 MG: 0.1 INJECTION, SOLUTION ENDOTRACHEAL; INTRACARDIAC; INTRAVENOUS at 08:07

## 2018-01-01 RX ADMIN — Medication 1 TABLET: at 08:04

## 2018-01-01 RX ADMIN — SODIUM CHLORIDE: 0.9 INJECTION, SOLUTION INTRAVENOUS at 07:07

## 2018-01-01 RX ADMIN — OXYCODONE HYDROCHLORIDE 20 MG: 5 TABLET ORAL at 08:03

## 2018-01-01 RX ADMIN — NICOTINE 1 PATCH: 14 PATCH, EXTENDED RELEASE TRANSDERMAL at 08:07

## 2018-01-01 RX ADMIN — Medication 1 MG: at 12:03

## 2018-01-01 RX ADMIN — Medication 5 MG: at 12:04

## 2018-01-01 RX ADMIN — GABAPENTIN 800 MG: 400 CAPSULE ORAL at 09:03

## 2018-01-01 RX ADMIN — OXYCODONE HYDROCHLORIDE 20 MG: 5 TABLET ORAL at 04:04

## 2018-01-01 RX ADMIN — LIDOCAINE: 50 OINTMENT TOPICAL at 05:03

## 2018-01-01 RX ADMIN — PREGABALIN 100 MG: 50 CAPSULE ORAL at 10:04

## 2018-01-01 RX ADMIN — LIDOCAINE: 50 OINTMENT TOPICAL at 09:03

## 2018-01-01 RX ADMIN — FENTANYL CITRATE 50 MCG: 50 INJECTION, SOLUTION INTRAMUSCULAR; INTRAVENOUS at 01:03

## 2018-01-01 RX ADMIN — DOBUTAMINE IN DEXTROSE 5 MCG/KG/MIN: 200 INJECTION, SOLUTION INTRAVENOUS at 01:07

## 2018-01-01 RX ADMIN — TICAGRELOR 90 MG: 90 TABLET ORAL at 10:03

## 2018-01-01 RX ADMIN — Medication 1 MG: at 05:03

## 2018-01-01 RX ADMIN — Medication 2500 MCG: at 06:07

## 2018-01-01 RX ADMIN — CHLORDIAZEPOXIDE HYDROCHLORIDE 25 MG: 25 CAPSULE ORAL at 05:03

## 2018-01-01 RX ADMIN — HALOPERIDOL 5 MG: 5 TABLET ORAL at 02:06

## 2018-01-01 RX ADMIN — ROPIVACAINE HYDROCHLORIDE 8 ML/HR: 2 INJECTION, SOLUTION EPIDURAL; INFILTRATION at 11:06

## 2018-01-01 RX ADMIN — AMITRIPTYLINE HYDROCHLORIDE 50 MG: 50 TABLET, FILM COATED ORAL at 08:03

## 2018-01-01 RX ADMIN — Medication 3 ML: at 10:06

## 2018-01-01 RX ADMIN — Medication 3 ML: at 06:06

## 2018-01-01 RX ADMIN — Medication 3 ML: at 03:06

## 2018-01-01 RX ADMIN — PREGABALIN 100 MG: 50 CAPSULE ORAL at 09:04

## 2018-01-01 RX ADMIN — COLLAGENASE SANTYL: 250 OINTMENT TOPICAL at 08:07

## 2018-01-01 RX ADMIN — AMITRIPTYLINE HYDROCHLORIDE 50 MG: 50 TABLET, FILM COATED ORAL at 09:03

## 2018-01-01 RX ADMIN — CEFEPIME HYDROCHLORIDE 2 G: 2 INJECTION, POWDER, FOR SOLUTION INTRAVENOUS at 12:06

## 2018-01-01 RX ADMIN — TICAGRELOR 90 MG: 90 TABLET ORAL at 09:06

## 2018-01-01 RX ADMIN — LORAZEPAM 1 MG: 2 INJECTION INTRAMUSCULAR; INTRAVENOUS at 06:03

## 2018-01-01 RX ADMIN — THIAMINE HCL TAB 100 MG 100 MG: 100 TAB at 09:06

## 2018-01-01 RX ADMIN — POTASSIUM CHLORIDE 40 MEQ: 400 INJECTION, SOLUTION INTRAVENOUS at 05:04

## 2018-01-01 RX ADMIN — CHLORPROMAZINE HYDROCHLORIDE 50 MG: 50 TABLET, SUGAR COATED ORAL at 09:06

## 2018-01-01 RX ADMIN — DEXMEDETOMIDINE HYDROCHLORIDE 1.2 MCG/KG/HR: 100 INJECTION, SOLUTION, CONCENTRATE INTRAVENOUS at 08:07

## 2018-01-01 RX ADMIN — COLLAGENASE SANTYL: 250 OINTMENT TOPICAL at 09:07

## 2018-01-01 RX ADMIN — OXYCODONE AND ACETAMINOPHEN 2 TABLET: 10; 325 TABLET ORAL at 03:03

## 2018-01-01 RX ADMIN — HEPARIN SODIUM 17 UNITS/KG/HR: 10000 INJECTION, SOLUTION INTRAVENOUS at 11:03

## 2018-01-01 RX ADMIN — AMITRIPTYLINE HYDROCHLORIDE 50 MG: 50 TABLET, FILM COATED ORAL at 10:03

## 2018-01-01 RX ADMIN — HEPARIN SODIUM 21 UNITS/KG/HR: 10000 INJECTION, SOLUTION INTRAVENOUS at 03:04

## 2018-01-01 RX ADMIN — PIPERACILLIN AND TAZOBACTAM 4.5 G: 4; .5 INJECTION, POWDER, LYOPHILIZED, FOR SOLUTION INTRAVENOUS; PARENTERAL at 01:04

## 2018-01-01 RX ADMIN — Medication 1250 MG: at 09:03

## 2018-01-01 RX ADMIN — PREGABALIN 100 MG: 50 CAPSULE ORAL at 08:04

## 2018-01-01 RX ADMIN — METOPROLOL TARTRATE 2.5 MG: 5 INJECTION, SOLUTION INTRAVENOUS at 12:03

## 2018-01-01 RX ADMIN — Medication 3 ML: at 02:07

## 2018-01-01 RX ADMIN — POTASSIUM CHLORIDE 40 MEQ: 1500 TABLET, EXTENDED RELEASE ORAL at 04:07

## 2018-01-01 RX ADMIN — Medication 2 G: at 11:03

## 2018-01-01 RX ADMIN — Medication 50 MCG: at 08:07

## 2018-01-01 RX ADMIN — NICOTINE 1 PATCH: 21 PATCH, EXTENDED RELEASE TRANSDERMAL at 10:04

## 2018-01-01 RX ADMIN — DEXTROSE 250 MG: 50 INJECTION, SOLUTION INTRAVENOUS at 03:06

## 2018-01-01 RX ADMIN — VANCOMYCIN HYDROCHLORIDE 1000 MG: 1 INJECTION, POWDER, LYOPHILIZED, FOR SOLUTION INTRAVENOUS at 09:06

## 2018-01-01 RX ADMIN — METRONIDAZOLE 500 MG: 500 INJECTION, SOLUTION INTRAVENOUS at 12:07

## 2018-01-01 RX ADMIN — METOPROLOL TARTRATE 2.5 MG: 5 INJECTION, SOLUTION INTRAVENOUS at 05:03

## 2018-01-01 RX ADMIN — RANOLAZINE 500 MG: 500 TABLET, FILM COATED, EXTENDED RELEASE ORAL at 09:06

## 2018-01-01 RX ADMIN — LIDOCAINE HYDROCHLORIDE 50 MG: 20 INJECTION, SOLUTION INTRAVENOUS at 11:03

## 2018-01-01 RX ADMIN — TICAGRELOR 90 MG: 90 TABLET ORAL at 10:06

## 2018-01-01 RX ADMIN — HYDROCODONE BITARTRATE AND ACETAMINOPHEN 1 TABLET: 5; 325 TABLET ORAL at 02:06

## 2018-01-01 RX ADMIN — VALPROATE SODIUM 250 MG: 100 INJECTION, SOLUTION INTRAVENOUS at 06:07

## 2018-01-01 RX ADMIN — Medication 10 ML: at 05:06

## 2018-01-01 RX ADMIN — Medication 3 ML: at 09:07

## 2018-01-01 RX ADMIN — STANDARDIZED SENNA CONCENTRATE AND DOCUSATE SODIUM 1 TABLET: 8.6; 5 TABLET, FILM COATED ORAL at 10:04

## 2018-01-01 RX ADMIN — PREGABALIN 100 MG: 50 CAPSULE ORAL at 03:04

## 2018-01-01 RX ADMIN — CYCLOBENZAPRINE HYDROCHLORIDE 10 MG: 10 TABLET, FILM COATED ORAL at 03:03

## 2018-01-01 RX ADMIN — AMITRIPTYLINE HYDROCHLORIDE 50 MG: 50 TABLET, FILM COATED ORAL at 09:04

## 2018-01-01 RX ADMIN — TICAGRELOR 90 MG: 90 TABLET ORAL at 08:06

## 2018-01-01 RX ADMIN — CHLORPROMAZINE HYDROCHLORIDE 50 MG: 25 INJECTION INTRAMUSCULAR at 05:06

## 2018-01-01 RX ADMIN — VALPROATE SODIUM 250 MG: 100 INJECTION, SOLUTION INTRAVENOUS at 06:06

## 2018-01-01 RX ADMIN — FUROSEMIDE 40 MG: 10 INJECTION, SOLUTION INTRAMUSCULAR; INTRAVENOUS at 09:06

## 2018-01-01 RX ADMIN — OXYCODONE HYDROCHLORIDE 10 MG: 5 TABLET ORAL at 04:04

## 2018-01-01 RX ADMIN — METOPROLOL TARTRATE 12.5 MG: 25 TABLET, FILM COATED ORAL at 09:07

## 2018-01-01 RX ADMIN — PREGABALIN 100 MG: 50 CAPSULE ORAL at 10:03

## 2018-01-01 RX ADMIN — Medication 10 ML: at 09:06

## 2018-01-01 RX ADMIN — ASPIRIN 81 MG: 81 TABLET, COATED ORAL at 03:03

## 2018-01-01 RX ADMIN — PREGABALIN 100 MG: 50 CAPSULE ORAL at 04:04

## 2018-01-01 RX ADMIN — HEPARIN SODIUM 19 UNITS/KG/HR: 10000 INJECTION, SOLUTION INTRAVENOUS at 10:03

## 2018-01-01 RX ADMIN — Medication 1 G: at 06:04

## 2018-01-01 RX ADMIN — FOLIC ACID: 5 INJECTION, SOLUTION INTRAMUSCULAR; INTRAVENOUS; SUBCUTANEOUS at 10:03

## 2018-01-01 RX ADMIN — CLINDAMYCIN HYDROCHLORIDE 450 MG: 150 CAPSULE ORAL at 05:03

## 2018-01-01 RX ADMIN — FUROSEMIDE 100 MG: 10 INJECTION, SOLUTION INTRAVENOUS at 09:06

## 2018-01-01 RX ADMIN — MORPHINE SULFATE 2 MG: 4 INJECTION INTRAVENOUS at 04:06

## 2018-01-01 RX ADMIN — PANTOPRAZOLE SODIUM 40 MG: 40 TABLET, DELAYED RELEASE ORAL at 09:07

## 2018-01-01 RX ADMIN — Medication 1 TABLET: at 09:03

## 2018-01-01 RX ADMIN — FUROSEMIDE 40 MG: 10 INJECTION, SOLUTION INTRAMUSCULAR; INTRAVENOUS at 10:03

## 2018-01-01 RX ADMIN — GENTAMICIN SULFATE: 1 OINTMENT TOPICAL at 09:06

## 2018-01-01 RX ADMIN — SODIUM BICARBONATE 50 MEQ: 84 INJECTION INTRAVENOUS at 07:07

## 2018-01-01 RX ADMIN — OXYCODONE HYDROCHLORIDE 20 MG: 5 TABLET ORAL at 10:04

## 2018-01-01 RX ADMIN — AMPICILLIN AND SULBACTAM 3 G: 2; 1 INJECTION, POWDER, FOR SOLUTION INTRAVENOUS at 03:03

## 2018-01-01 RX ADMIN — HEPARIN SODIUM 18 UNITS/KG/HR: 10000 INJECTION, SOLUTION INTRAVENOUS at 10:03

## 2018-01-01 RX ADMIN — SENNOSIDES AND DOCUSATE SODIUM 1 TABLET: 8.6; 5 TABLET ORAL at 08:06

## 2018-01-01 RX ADMIN — GABAPENTIN 800 MG: 400 CAPSULE ORAL at 09:04

## 2018-01-01 RX ADMIN — ROPIVACAINE HYDROCHLORIDE 8 ML/HR: 2 INJECTION, SOLUTION EPIDURAL; INFILTRATION at 12:06

## 2018-01-01 RX ADMIN — PHENYLEPHRINE HYDROCHLORIDE 200 MCG: 10 INJECTION INTRAVENOUS at 12:03

## 2018-01-01 RX ADMIN — Medication 6 MG: at 08:03

## 2018-01-01 RX ADMIN — CEFEPIME 1 G: 1 INJECTION, POWDER, FOR SOLUTION INTRAMUSCULAR; INTRAVENOUS at 10:07

## 2018-01-01 RX ADMIN — Medication 6 MG: at 12:03

## 2018-01-01 RX ADMIN — CEFEPIME HYDROCHLORIDE 2 G: 2 INJECTION, POWDER, FOR SOLUTION INTRAVENOUS at 06:06

## 2018-01-01 RX ADMIN — HEPARIN SODIUM 18 UNITS/KG/HR: 10000 INJECTION, SOLUTION INTRAVENOUS at 06:03

## 2018-01-01 RX ADMIN — PREGABALIN 100 MG: 50 CAPSULE ORAL at 09:03

## 2018-01-01 RX ADMIN — FUROSEMIDE 80 MG: 10 INJECTION, SOLUTION INTRAMUSCULAR; INTRAVENOUS at 08:07

## 2018-01-01 RX ADMIN — Medication 3 ML: at 06:07

## 2018-01-01 RX ADMIN — POTASSIUM CHLORIDE 10 MEQ: 750 TABLET, FILM COATED, EXTENDED RELEASE ORAL at 09:06

## 2018-01-01 RX ADMIN — LIDOCAINE: 50 OINTMENT TOPICAL at 05:04

## 2018-01-01 RX ADMIN — POTASSIUM CHLORIDE 40 MEQ: 1500 TABLET, EXTENDED RELEASE ORAL at 03:06

## 2018-01-01 RX ADMIN — HYDROCODONE BITARTRATE AND ACETAMINOPHEN 1 TABLET: 5; 325 TABLET ORAL at 03:06

## 2018-01-01 RX ADMIN — FENTANYL CITRATE 25 MCG: 50 INJECTION, SOLUTION INTRAMUSCULAR; INTRAVENOUS at 01:03

## 2018-01-01 RX ADMIN — ATORVASTATIN CALCIUM 80 MG: 20 TABLET, FILM COATED ORAL at 08:06

## 2018-01-01 RX ADMIN — VANCOMYCIN HYDROCHLORIDE 1500 MG: 1 INJECTION, POWDER, LYOPHILIZED, FOR SOLUTION INTRAVENOUS at 09:03

## 2018-01-01 RX ADMIN — PIPERACILLIN AND TAZOBACTAM 4.5 G: 4; .5 INJECTION, POWDER, LYOPHILIZED, FOR SOLUTION INTRAVENOUS; PARENTERAL at 10:04

## 2018-01-01 RX ADMIN — DEXMEDETOMIDINE HYDROCHLORIDE 1 MCG/KG/HR: 100 INJECTION, SOLUTION, CONCENTRATE INTRAVENOUS at 07:03

## 2018-01-01 RX ADMIN — SODIUM CHLORIDE, PRESERVATIVE FREE 10 ML: 5 INJECTION INTRAVENOUS at 06:07

## 2018-01-01 RX ADMIN — Medication 4 MG: at 06:03

## 2018-01-01 RX ADMIN — POTASSIUM CHLORIDE 40 MEQ: 1500 TABLET, EXTENDED RELEASE ORAL at 09:04

## 2018-01-01 RX ADMIN — STANDARDIZED SENNA CONCENTRATE AND DOCUSATE SODIUM 1 TABLET: 8.6; 5 TABLET, FILM COATED ORAL at 10:03

## 2018-01-01 RX ADMIN — Medication 1 MG: at 08:03

## 2018-01-01 RX ADMIN — PIPERACILLIN AND TAZOBACTAM 4.5 G: 4; .5 INJECTION, POWDER, LYOPHILIZED, FOR SOLUTION INTRAVENOUS; PARENTERAL at 05:04

## 2018-01-01 RX ADMIN — COLLAGENASE SANTYL: 250 OINTMENT TOPICAL at 01:06

## 2018-01-01 RX ADMIN — MIDAZOLAM 2 MG: 1 INJECTION INTRAMUSCULAR; INTRAVENOUS at 12:03

## 2018-01-01 RX ADMIN — GENTAMICIN SULFATE: 1 OINTMENT TOPICAL at 08:06

## 2018-01-01 RX ADMIN — CLINDAMYCIN IN 5 PERCENT DEXTROSE 600 MG: 12 INJECTION, SOLUTION INTRAVENOUS at 06:03

## 2018-01-01 RX ADMIN — Medication 1 G: at 01:04

## 2018-01-01 RX ADMIN — Medication 1 TABLET: at 10:04

## 2018-01-01 RX ADMIN — Medication 1250 MG: at 12:03

## 2018-01-01 RX ADMIN — HEPARIN SODIUM 16 UNITS/KG/HR: 10000 INJECTION, SOLUTION INTRAVENOUS at 04:03

## 2018-01-01 RX ADMIN — SUCCINYLCHOLINE CHLORIDE 20 MG: 20 INJECTION, SOLUTION INTRAMUSCULAR; INTRAVENOUS at 09:03

## 2018-01-01 RX ADMIN — TICAGRELOR 90 MG: 90 TABLET ORAL at 09:03

## 2018-01-01 RX ADMIN — ATORVASTATIN CALCIUM 40 MG: 20 TABLET, FILM COATED ORAL at 10:04

## 2018-01-01 RX ADMIN — OLANZAPINE 10 MG: 10 INJECTION, POWDER, FOR SOLUTION INTRAMUSCULAR at 09:06

## 2018-01-01 RX ADMIN — OXYCODONE HYDROCHLORIDE 20 MG: 5 TABLET ORAL at 07:04

## 2018-01-01 RX ADMIN — ACETAMINOPHEN 650 MG: 325 TABLET, FILM COATED ORAL at 11:07

## 2018-01-01 RX ADMIN — ASPIRIN 81 MG CHEWABLE TABLET 81 MG: 81 TABLET CHEWABLE at 09:06

## 2018-01-01 RX ADMIN — Medication 6 MG: at 09:03

## 2018-01-01 RX ADMIN — POTASSIUM CHLORIDE 20 MEQ: 200 INJECTION, SOLUTION INTRAVENOUS at 02:07

## 2018-01-01 RX ADMIN — STANDARDIZED SENNA CONCENTRATE AND DOCUSATE SODIUM 1 TABLET: 8.6; 5 TABLET, FILM COATED ORAL at 08:03

## 2018-01-01 RX ADMIN — AMIODARONE HYDROCHLORIDE 0.5 MG/MIN: 1.8 INJECTION, SOLUTION INTRAVENOUS at 09:07

## 2018-01-01 RX ADMIN — ACETAMINOPHEN 1000 MG: 500 TABLET ORAL at 10:06

## 2018-01-01 RX ADMIN — TICAGRELOR 90 MG: 90 TABLET ORAL at 10:04

## 2018-01-01 RX ADMIN — LORAZEPAM 2 MG: 2 INJECTION, SOLUTION INTRAMUSCULAR; INTRAVENOUS at 11:03

## 2018-01-01 RX ADMIN — Medication 10 MG: at 12:04

## 2018-01-01 RX ADMIN — PREGABALIN 100 MG: 50 CAPSULE ORAL at 05:03

## 2018-01-01 RX ADMIN — Medication 10 ML: at 10:06

## 2018-01-01 RX ADMIN — METRONIDAZOLE 500 MG: 500 INJECTION, SOLUTION INTRAVENOUS at 05:07

## 2018-01-01 RX ADMIN — SODIUM CHLORIDE 250 ML: 900 INJECTION, SOLUTION INTRAVENOUS at 04:04

## 2018-01-01 RX ADMIN — ASPIRIN 81 MG: 81 TABLET, COATED ORAL at 08:04

## 2018-01-01 RX ADMIN — FUROSEMIDE 80 MG: 10 INJECTION, SOLUTION INTRAMUSCULAR; INTRAVENOUS at 11:06

## 2018-01-01 RX ADMIN — DEXMEDETOMIDINE HYDROCHLORIDE 1 MCG/KG/HR: 100 INJECTION, SOLUTION, CONCENTRATE INTRAVENOUS at 01:03

## 2018-01-01 RX ADMIN — DEXTROSE 250 MG: 50 INJECTION, SOLUTION INTRAVENOUS at 12:06

## 2018-01-01 RX ADMIN — METOPROLOL TARTRATE 12.5 MG: 25 TABLET, FILM COATED ORAL at 08:07

## 2018-01-01 RX ADMIN — FENTANYL CITRATE 25 MCG: 50 INJECTION, SOLUTION INTRAMUSCULAR; INTRAVENOUS at 12:03

## 2018-01-01 RX ADMIN — LORAZEPAM 2 MG: 2 INJECTION, SOLUTION INTRAMUSCULAR; INTRAVENOUS at 04:03

## 2018-01-01 RX ADMIN — MIDAZOLAM HYDROCHLORIDE 2 MG: 1 INJECTION, SOLUTION INTRAMUSCULAR; INTRAVENOUS at 11:03

## 2018-01-01 RX ADMIN — SODIUM BICARBONATE 50 MEQ: 84 INJECTION, SOLUTION INTRAVENOUS at 08:07

## 2018-01-01 RX ADMIN — SODIUM CHLORIDE 500 ML: 0.9 INJECTION, SOLUTION INTRAVENOUS at 12:03

## 2018-01-01 RX ADMIN — OXYCODONE HYDROCHLORIDE AND ACETAMINOPHEN 1 TABLET: 10; 325 TABLET ORAL at 05:04

## 2018-01-01 RX ADMIN — FENTANYL CITRATE 50 MCG: 50 INJECTION, SOLUTION INTRAMUSCULAR; INTRAVENOUS at 11:04

## 2018-01-01 RX ADMIN — LORAZEPAM 1 MG: 2 INJECTION, SOLUTION INTRAMUSCULAR; INTRAVENOUS at 03:03

## 2018-01-01 RX ADMIN — CHLORHEXIDINE GLUCONATE 0.12% ORAL RINSE 15 ML: 1.2 LIQUID ORAL at 08:06

## 2018-01-01 RX ADMIN — METOPROLOL SUCCINATE 25 MG: 25 TABLET, EXTENDED RELEASE ORAL at 10:04

## 2018-01-01 RX ADMIN — DEXMEDETOMIDINE HYDROCHLORIDE 1.3 MCG/KG/HR: 100 INJECTION, SOLUTION INTRAVENOUS at 04:03

## 2018-01-01 RX ADMIN — IPRATROPIUM BROMIDE AND ALBUTEROL SULFATE 3 ML: .5; 3 SOLUTION RESPIRATORY (INHALATION) at 05:06

## 2018-01-01 RX ADMIN — LEVALBUTEROL 1.25 MG: 1.25 SOLUTION, CONCENTRATE RESPIRATORY (INHALATION) at 02:06

## 2018-01-01 RX ADMIN — ETOMIDATE 40 MG: 2 INJECTION, SOLUTION INTRAVENOUS at 07:07

## 2018-01-01 RX ADMIN — CEFEPIME 1 G: 1 INJECTION, POWDER, FOR SOLUTION INTRAMUSCULAR; INTRAVENOUS at 08:07

## 2018-01-01 RX ADMIN — DIPHENHYDRAMINE HYDROCHLORIDE 25 MG: 25 CAPSULE ORAL at 01:06

## 2018-01-01 RX ADMIN — Medication 2 G: at 08:03

## 2018-01-01 RX ADMIN — FENTANYL CITRATE: 50 INJECTION, SOLUTION INTRAMUSCULAR; INTRAVENOUS at 05:07

## 2018-01-01 RX ADMIN — CHLORDIAZEPOXIDE HYDROCHLORIDE 20 MG: 5 CAPSULE ORAL at 12:03

## 2018-01-01 RX ADMIN — SODIUM CHLORIDE, PRESERVATIVE FREE 10 ML: 5 INJECTION INTRAVENOUS at 05:07

## 2018-01-01 RX ADMIN — Medication 1250 MG: at 01:03

## 2018-01-01 RX ADMIN — LACTULOSE 10 G: 20 SOLUTION ORAL at 11:06

## 2018-01-01 RX ADMIN — POTASSIUM CHLORIDE 20 MEQ: 200 INJECTION, SOLUTION INTRAVENOUS at 09:06

## 2018-01-01 RX ADMIN — METRONIDAZOLE 500 MG: 500 TABLET ORAL at 12:06

## 2018-01-01 RX ADMIN — VANCOMYCIN HYDROCHLORIDE 750 MG: 750 INJECTION, POWDER, LYOPHILIZED, FOR SOLUTION INTRAVENOUS at 01:06

## 2018-01-01 RX ADMIN — ALTEPLASE 1 MG/HR: 2.2 INJECTION, POWDER, LYOPHILIZED, FOR SOLUTION INTRAVENOUS at 06:03

## 2018-01-01 RX ADMIN — HYDROCODONE BITARTRATE AND ACETAMINOPHEN 1 TABLET: 5; 325 TABLET ORAL at 01:06

## 2018-01-01 RX ADMIN — PIPERACILLIN AND TAZOBACTAM 4.5 G: 4; .5 INJECTION, POWDER, LYOPHILIZED, FOR SOLUTION INTRAVENOUS; PARENTERAL at 01:03

## 2018-01-01 RX ADMIN — Medication 1 G: at 11:03

## 2018-01-01 RX ADMIN — Medication 100 MG: at 08:03

## 2018-01-01 RX ADMIN — VASOPRESSIN 100 UNITS: 20 INJECTION INTRAVENOUS at 07:07

## 2018-01-01 RX ADMIN — ATORVASTATIN CALCIUM 80 MG: 20 TABLET, FILM COATED ORAL at 10:06

## 2018-01-01 RX ADMIN — PREGABALIN 100 MG: 50 CAPSULE ORAL at 03:03

## 2018-01-01 RX ADMIN — HYDROMORPHONE HYDROCHLORIDE 2 MG: 2 TABLET ORAL at 08:03

## 2018-01-01 RX ADMIN — DOBUTAMINE IN DEXTROSE 2.5 MCG/KG/MIN: 200 INJECTION, SOLUTION INTRAVENOUS at 04:06

## 2018-01-01 RX ADMIN — MAGNESIUM SULFATE IN WATER 2 G: 40 INJECTION, SOLUTION INTRAVENOUS at 06:06

## 2018-01-01 RX ADMIN — BACITRACIN 50000 UNITS: 50000 INJECTION, POWDER, LYOPHILIZED, FOR SOLUTION INTRAMUSCULAR at 10:06

## 2018-01-01 RX ADMIN — DEXMEDETOMIDINE HYDROCHLORIDE 0.8 MCG/KG/HR: 4 INJECTION, SOLUTION INTRAVENOUS at 02:07

## 2018-01-01 RX ADMIN — DOBUTAMINE IN DEXTROSE 5 MCG/KG/MIN: 200 INJECTION, SOLUTION INTRAVENOUS at 09:07

## 2018-01-01 RX ADMIN — ONDANSETRON 8 MG: 8 TABLET, ORALLY DISINTEGRATING ORAL at 09:07

## 2018-01-01 RX ADMIN — MORPHINE SULFATE 2 MG: 2 INJECTION, SOLUTION INTRAMUSCULAR; INTRAVENOUS at 08:06

## 2018-01-01 RX ADMIN — MIDAZOLAM 1 MG/HR: 5 INJECTION INTRAMUSCULAR; INTRAVENOUS at 06:03

## 2018-01-01 RX ADMIN — SODIUM CHLORIDE: 0.9 INJECTION, SOLUTION INTRAVENOUS at 12:03

## 2018-01-01 RX ADMIN — AMITRIPTYLINE HYDROCHLORIDE 50 MG: 50 TABLET, FILM COATED ORAL at 08:04

## 2018-01-01 RX ADMIN — CEFAZOLIN 2 G: 330 INJECTION, POWDER, FOR SOLUTION INTRAMUSCULAR; INTRAVENOUS at 10:06

## 2018-01-01 RX ADMIN — PANTOPRAZOLE SODIUM 40 MG: 40 TABLET, DELAYED RELEASE ORAL at 08:07

## 2018-01-01 RX ADMIN — ATORVASTATIN CALCIUM 40 MG: 20 TABLET, FILM COATED ORAL at 08:04

## 2018-01-01 RX ADMIN — HEPARIN SODIUM 21 UNITS/KG/HR: 10000 INJECTION, SOLUTION INTRAVENOUS at 04:04

## 2018-01-01 RX ADMIN — PREGABALIN 100 MG: 50 CAPSULE ORAL at 02:03

## 2018-01-01 RX ADMIN — DOBUTAMINE IN DEXTROSE 5 MCG/KG/MIN: 200 INJECTION, SOLUTION INTRAVENOUS at 07:07

## 2018-01-01 RX ADMIN — HEPARIN SODIUM 5000 UNITS: 5000 INJECTION, SOLUTION INTRAVENOUS; SUBCUTANEOUS at 05:07

## 2018-01-01 RX ADMIN — POTASSIUM CHLORIDE 40 MEQ: 400 INJECTION, SOLUTION INTRAVENOUS at 07:06

## 2018-01-01 RX ADMIN — PREGABALIN 75 MG: 75 CAPSULE ORAL at 11:07

## 2018-01-01 RX ADMIN — OXYCODONE HYDROCHLORIDE AND ACETAMINOPHEN 1 TABLET: 5; 325 TABLET ORAL at 04:04

## 2018-01-01 RX ADMIN — Medication 1 G: at 03:04

## 2018-01-01 RX ADMIN — VALPROATE SODIUM 250 MG: 100 INJECTION, SOLUTION INTRAVENOUS at 11:06

## 2018-01-01 RX ADMIN — POTASSIUM & SODIUM PHOSPHATES POWDER PACK 280-160-250 MG 2 PACKET: 280-160-250 PACK at 05:06

## 2018-01-01 RX ADMIN — METOPROLOL TARTRATE 12.5 MG: 25 TABLET, FILM COATED ORAL at 01:07

## 2018-01-01 RX ADMIN — HYDROCODONE BITARTRATE AND ACETAMINOPHEN 1 TABLET: 5; 325 TABLET ORAL at 11:06

## 2018-01-01 RX ADMIN — COLLAGENASE SANTYL: 250 OINTMENT TOPICAL at 10:07

## 2018-01-01 RX ADMIN — LORAZEPAM 1 MG: 2 INJECTION INTRAMUSCULAR; INTRAVENOUS at 09:07

## 2018-01-01 RX ADMIN — Medication 4 MG: at 07:03

## 2018-01-01 RX ADMIN — METOPROLOL SUCCINATE 12.5 MG: 25 TABLET, EXTENDED RELEASE ORAL at 12:04

## 2018-01-01 RX ADMIN — Medication 6 MG: at 05:03

## 2018-01-01 RX ADMIN — MIDAZOLAM HYDROCHLORIDE 1 MG: 1 INJECTION, SOLUTION INTRAMUSCULAR; INTRAVENOUS at 11:06

## 2018-01-01 RX ADMIN — METHYLPREDNISOLONE SODIUM SUCCINATE 125 MG: 125 INJECTION, POWDER, FOR SOLUTION INTRAMUSCULAR; INTRAVENOUS at 07:07

## 2018-01-01 RX ADMIN — ALTEPLASE 1 MG/HR: 2.2 INJECTION, POWDER, LYOPHILIZED, FOR SOLUTION INTRAVENOUS at 02:03

## 2018-01-01 RX ADMIN — ASPIRIN 81 MG: 81 TABLET, COATED ORAL at 09:03

## 2018-01-01 RX ADMIN — PIPERACILLIN AND TAZOBACTAM 4.5 G: 4; .5 INJECTION, POWDER, LYOPHILIZED, FOR SOLUTION INTRAVENOUS; PARENTERAL at 04:04

## 2018-01-01 RX ADMIN — THIAMINE HCL TAB 100 MG 100 MG: 100 TAB at 09:07

## 2018-01-01 RX ADMIN — ATORVASTATIN CALCIUM 40 MG: 20 TABLET, FILM COATED ORAL at 09:04

## 2018-01-01 RX ADMIN — ASPIRIN 325 MG ORAL TABLET 325 MG: 325 PILL ORAL at 07:07

## 2018-01-01 RX ADMIN — SODIUM CHLORIDE, SODIUM LACTATE, POTASSIUM CHLORIDE, AND CALCIUM CHLORIDE 1000 ML: .6; .31; .03; .02 INJECTION, SOLUTION INTRAVENOUS at 11:07

## 2018-01-01 RX ADMIN — ALTEPLASE 1 MG/HR: 2.2 INJECTION, POWDER, LYOPHILIZED, FOR SOLUTION INTRAVENOUS at 04:03

## 2018-01-01 RX ADMIN — ALTEPLASE 1 MG/HR: 2.2 INJECTION, POWDER, LYOPHILIZED, FOR SOLUTION INTRAVENOUS at 01:03

## 2018-01-01 RX ADMIN — PIPERACILLIN AND TAZOBACTAM 4.5 G: 4; .5 INJECTION, POWDER, LYOPHILIZED, FOR SOLUTION INTRAVENOUS; PARENTERAL at 11:04

## 2018-01-01 RX ADMIN — COLLAGENASE SANTYL: 250 OINTMENT TOPICAL at 08:06

## 2018-01-01 RX ADMIN — PANTOPRAZOLE SODIUM 40 MG: 40 TABLET, DELAYED RELEASE ORAL at 09:06

## 2018-01-01 RX ADMIN — PREGABALIN 75 MG: 75 CAPSULE ORAL at 10:06

## 2018-01-01 RX ADMIN — LIDOCAINE: 50 OINTMENT TOPICAL at 12:03

## 2018-01-01 RX ADMIN — LIDOCAINE: 50 OINTMENT TOPICAL at 03:03

## 2018-01-01 RX ADMIN — Medication 100 MG: at 09:03

## 2018-01-01 RX ADMIN — IPRATROPIUM BROMIDE AND ALBUTEROL SULFATE 3 ML: .5; 3 SOLUTION RESPIRATORY (INHALATION) at 05:07

## 2018-01-01 RX ADMIN — DEXTROSE AND SODIUM CHLORIDE: 5; .45 INJECTION, SOLUTION INTRAVENOUS at 12:06

## 2018-01-01 RX ADMIN — PIPERACILLIN AND TAZOBACTAM 4.5 G: 4; .5 INJECTION, POWDER, LYOPHILIZED, FOR SOLUTION INTRAVENOUS; PARENTERAL at 12:04

## 2018-01-01 RX ADMIN — PROPOFOL 5 MCG/KG/MIN: 10 INJECTION, EMULSION INTRAVENOUS at 09:03

## 2018-01-01 RX ADMIN — SODIUM CHLORIDE 3 ML/KG/HR: 0.9 INJECTION, SOLUTION INTRAVENOUS at 09:03

## 2018-01-01 RX ADMIN — AMIODARONE HYDROCHLORIDE 1 MG/MIN: 1.8 INJECTION, SOLUTION INTRAVENOUS at 11:07

## 2018-01-01 RX ADMIN — ATORVASTATIN CALCIUM 80 MG: 20 TABLET, FILM COATED ORAL at 09:07

## 2018-01-01 RX ADMIN — NICOTINE 1 PATCH: 21 PATCH, EXTENDED RELEASE TRANSDERMAL at 08:04

## 2018-01-01 RX ADMIN — SODIUM CHLORIDE, PRESERVATIVE FREE 10 ML: 5 INJECTION INTRAVENOUS at 07:06

## 2018-01-01 RX ADMIN — LORAZEPAM 1 MG: 2 INJECTION INTRAMUSCULAR; INTRAVENOUS at 04:03

## 2018-01-01 RX ADMIN — NICOTINE 1 PATCH: 21 PATCH, EXTENDED RELEASE TRANSDERMAL at 09:03

## 2018-01-01 RX ADMIN — DEXMEDETOMIDINE HYDROCHLORIDE 1 MCG/KG/HR: 100 INJECTION, SOLUTION, CONCENTRATE INTRAVENOUS at 02:03

## 2018-01-01 RX ADMIN — MAGNESIUM SULFATE IN WATER 2 G: 40 INJECTION, SOLUTION INTRAVENOUS at 01:07

## 2018-01-01 RX ADMIN — OXYCODONE AND ACETAMINOPHEN 2 TABLET: 10; 325 TABLET ORAL at 10:03

## 2018-01-01 RX ADMIN — SODIUM CHLORIDE 5 ML/KG/HR: 0.9 INJECTION, SOLUTION INTRAVENOUS at 03:03

## 2018-01-01 RX ADMIN — Medication 0.5 MG: at 10:07

## 2018-01-01 RX ADMIN — ATORVASTATIN CALCIUM 40 MG: 20 TABLET, FILM COATED ORAL at 08:03

## 2018-01-01 RX ADMIN — THIAMINE HCL TAB 100 MG 100 MG: 100 TAB at 08:07

## 2018-01-01 RX ADMIN — MAGNESIUM OXIDE TAB 400 MG (241.3 MG ELEMENTAL MG) 800 MG: 400 (241.3 MG) TAB at 04:03

## 2018-01-01 RX ADMIN — ACETAMINOPHEN 1000 MG: 500 TABLET, COATED ORAL at 12:06

## 2018-01-01 RX ADMIN — SODIUM CHLORIDE: 0.9 INJECTION, SOLUTION INTRAVENOUS at 10:03

## 2018-01-01 RX ADMIN — OXYCODONE HYDROCHLORIDE AND ACETAMINOPHEN 2 TABLET: 5; 325 TABLET ORAL at 01:06

## 2018-01-01 RX ADMIN — TAMSULOSIN HYDROCHLORIDE 0.4 MG: 0.4 CAPSULE ORAL at 09:06

## 2018-01-01 RX ADMIN — SODIUM CHLORIDE 75 ML/HR: 0.45 INJECTION, SOLUTION INTRAVENOUS at 12:06

## 2018-01-01 RX ADMIN — VANCOMYCIN HYDROCHLORIDE 1000 MG: 1 INJECTION, POWDER, LYOPHILIZED, FOR SOLUTION INTRAVENOUS at 02:06

## 2018-01-01 RX ADMIN — CILOSTAZOL 100 MG: 100 TABLET ORAL at 08:03

## 2018-01-01 RX ADMIN — Medication 3 ML: at 02:06

## 2018-01-01 RX ADMIN — SODIUM CHLORIDE, PRESERVATIVE FREE 10 ML: 5 INJECTION INTRAVENOUS at 02:07

## 2018-01-01 RX ADMIN — SENNOSIDES AND DOCUSATE SODIUM 1 TABLET: 8.6; 5 TABLET ORAL at 08:07

## 2018-01-01 RX ADMIN — ATORVASTATIN CALCIUM 80 MG: 20 TABLET, FILM COATED ORAL at 08:07

## 2018-01-01 RX ADMIN — Medication 100 MG: at 08:07

## 2018-01-01 RX ADMIN — LIDOCAINE: 50 OINTMENT TOPICAL at 04:04

## 2018-01-01 RX ADMIN — Medication 3 ML: at 05:07

## 2018-01-01 RX ADMIN — MIDAZOLAM 5 MG/HR: 5 INJECTION INTRAMUSCULAR; INTRAVENOUS at 05:03

## 2018-01-01 RX ADMIN — Medication 3 MCG/KG/MIN: at 07:07

## 2018-01-01 RX ADMIN — OXYCODONE HYDROCHLORIDE 10 MG: 5 TABLET ORAL at 12:04

## 2018-01-01 RX ADMIN — OXYCODONE HYDROCHLORIDE 15 MG: 5 TABLET ORAL at 08:03

## 2018-01-01 RX ADMIN — HEPARIN SODIUM AND DEXTROSE 17 UNITS/KG/HR: 10000; 5 INJECTION INTRAVENOUS at 02:07

## 2018-01-01 RX ADMIN — LORAZEPAM 2 MG: 2 INJECTION, SOLUTION INTRAMUSCULAR; INTRAVENOUS at 08:03

## 2018-01-01 RX ADMIN — NOREPINEPHRINE BITARTRATE 0.02 MCG/KG/MIN: 1 INJECTION, SOLUTION, CONCENTRATE INTRAVENOUS at 09:06

## 2018-01-01 RX ADMIN — FUROSEMIDE 40 MG: 10 INJECTION INTRAMUSCULAR; INTRAVENOUS at 10:07

## 2018-01-01 RX ADMIN — CEFEPIME 1 G: 1 INJECTION, POWDER, FOR SOLUTION INTRAMUSCULAR; INTRAVENOUS at 10:06

## 2018-01-01 RX ADMIN — ONDANSETRON 4 MG: 2 INJECTION INTRAMUSCULAR; INTRAVENOUS at 12:07

## 2018-01-01 RX ADMIN — METOPROLOL SUCCINATE 12.5 MG: 25 TABLET, EXTENDED RELEASE ORAL at 10:04

## 2018-01-01 RX ADMIN — OXYCODONE AND ACETAMINOPHEN 2 TABLET: 10; 325 TABLET ORAL at 09:03

## 2018-01-01 RX ADMIN — DIGOXIN 0.12 MG: 125 TABLET ORAL at 09:06

## 2018-01-01 RX ADMIN — LIDOCAINE: 50 OINTMENT TOPICAL at 06:04

## 2018-01-01 RX ADMIN — Medication 0.2 MCG/KG/MIN: at 02:07

## 2018-01-01 RX ADMIN — ZOLPIDEM TARTRATE 5 MG: 5 TABLET, FILM COATED ORAL at 11:04

## 2018-01-01 RX ADMIN — POTASSIUM CHLORIDE 40 MEQ: 20 SOLUTION ORAL at 10:07

## 2018-01-01 RX ADMIN — LORAZEPAM 2 MG: 2 INJECTION, SOLUTION INTRAMUSCULAR; INTRAVENOUS at 01:03

## 2018-01-01 RX ADMIN — OXYCODONE HYDROCHLORIDE 15 MG: 5 TABLET ORAL at 01:03

## 2018-01-01 RX ADMIN — THIAMINE HCL TAB 100 MG 100 MG: 100 TAB at 10:06

## 2018-01-01 RX ADMIN — POTASSIUM CHLORIDE 20 MEQ: 200 INJECTION, SOLUTION INTRAVENOUS at 05:06

## 2018-01-01 RX ADMIN — SODIUM CHLORIDE, PRESERVATIVE FREE 10 ML: 5 INJECTION INTRAVENOUS at 07:07

## 2018-01-01 RX ADMIN — VANCOMYCIN HYDROCHLORIDE 1 G: 10 INJECTION, POWDER, LYOPHILIZED, FOR SOLUTION INTRAVENOUS at 02:04

## 2018-01-01 RX ADMIN — OXYCODONE HYDROCHLORIDE 20 MG: 5 TABLET ORAL at 12:04

## 2018-01-01 RX ADMIN — ACETAMINOPHEN 1000 MG: 500 TABLET ORAL at 12:03

## 2018-01-01 RX ADMIN — CILOSTAZOL 100 MG: 100 TABLET ORAL at 09:03

## 2018-01-01 RX ADMIN — VANCOMYCIN HYDROCHLORIDE 1 G: 10 INJECTION, POWDER, LYOPHILIZED, FOR SOLUTION INTRAVENOUS at 03:04

## 2018-01-01 RX ADMIN — NITROGLYCERIN 0.4 MG: 0.4 TABLET SUBLINGUAL at 11:07

## 2018-01-01 RX ADMIN — METOPROLOL SUCCINATE 12.5 MG: 25 TABLET, EXTENDED RELEASE ORAL at 08:04

## 2018-01-01 RX ADMIN — ATORVASTATIN CALCIUM 80 MG: 20 TABLET, FILM COATED ORAL at 09:06

## 2018-01-01 RX ADMIN — NALOXONE HYDROCHLORIDE 0.4 MG: 0.4 INJECTION, SOLUTION INTRAMUSCULAR; INTRAVENOUS; SUBCUTANEOUS at 09:07

## 2018-01-01 RX ADMIN — Medication 10 ML: at 09:07

## 2018-01-01 RX ADMIN — CLINDAMYCIN HYDROCHLORIDE 450 MG: 150 CAPSULE ORAL at 03:03

## 2018-01-01 RX ADMIN — CLONAZEPAM 0.5 MG: 0.5 TABLET ORAL at 07:06

## 2018-01-01 RX ADMIN — AMPICILLIN AND SULBACTAM 3 G: 2; 1 INJECTION, POWDER, FOR SOLUTION INTRAVENOUS at 08:03

## 2018-01-01 RX ADMIN — Medication 3 ML: at 10:07

## 2018-01-01 RX ADMIN — CYCLOBENZAPRINE HYDROCHLORIDE 10 MG: 10 TABLET, FILM COATED ORAL at 05:03

## 2018-01-01 RX ADMIN — TICAGRELOR 90 MG: 90 TABLET ORAL at 12:06

## 2018-01-01 RX ADMIN — OXYCODONE HYDROCHLORIDE 20 MG: 5 TABLET ORAL at 05:03

## 2018-01-01 RX ADMIN — VALPROATE SODIUM 250 MG: 100 INJECTION, SOLUTION INTRAVENOUS at 08:06

## 2018-01-01 RX ADMIN — EPINEPHRINE: 0.1 INJECTION INTRACARDIAC; INTRAVENOUS at 07:07

## 2018-01-01 RX ADMIN — CLINDAMYCIN HYDROCHLORIDE 450 MG: 150 CAPSULE ORAL at 09:03

## 2018-01-01 RX ADMIN — KETAMINE HYDROCHLORIDE 20 MG: 10 INJECTION, SOLUTION INTRAMUSCULAR; INTRAVENOUS at 10:06

## 2018-01-01 RX ADMIN — POTASSIUM CHLORIDE 20 MEQ: 200 INJECTION, SOLUTION INTRAVENOUS at 12:06

## 2018-01-01 RX ADMIN — PREGABALIN 100 MG: 50 CAPSULE ORAL at 02:04

## 2018-01-01 RX ADMIN — OXYCODONE AND ACETAMINOPHEN 2 TABLET: 10; 325 TABLET ORAL at 02:03

## 2018-01-01 RX ADMIN — DOBUTAMINE IN DEXTROSE 2.5 MCG/KG/MIN: 200 INJECTION, SOLUTION INTRAVENOUS at 12:06

## 2018-01-01 RX ADMIN — VANCOMYCIN HYDROCHLORIDE 1 G: 10 INJECTION, POWDER, LYOPHILIZED, FOR SOLUTION INTRAVENOUS at 10:03

## 2018-01-01 RX ADMIN — POTASSIUM CHLORIDE 40 MEQ: 20 SOLUTION ORAL at 11:06

## 2018-01-01 RX ADMIN — LISINOPRIL 2.5 MG: 2.5 TABLET ORAL at 09:06

## 2018-01-01 RX ADMIN — COLLAGENASE SANTYL: 250 OINTMENT TOPICAL at 03:06

## 2018-01-01 RX ADMIN — FENTANYL CITRATE 25 MCG: 50 INJECTION, SOLUTION INTRAMUSCULAR; INTRAVENOUS at 02:03

## 2018-01-01 RX ADMIN — LIDOCAINE: 50 OINTMENT TOPICAL at 10:04

## 2018-01-01 RX ADMIN — OXYCODONE HYDROCHLORIDE 20 MG: 5 TABLET ORAL at 05:04

## 2018-01-01 RX ADMIN — PANTOPRAZOLE SODIUM 40 MG: 40 TABLET, DELAYED RELEASE ORAL at 08:06

## 2018-01-01 RX ADMIN — SODIUM CHLORIDE: 0.9 INJECTION, SOLUTION INTRAVENOUS at 10:06

## 2018-01-01 RX ADMIN — LIDOCAINE: 50 OINTMENT TOPICAL at 01:04

## 2018-01-01 RX ADMIN — IPRATROPIUM BROMIDE AND ALBUTEROL SULFATE 3 ML: .5; 3 SOLUTION RESPIRATORY (INHALATION) at 07:07

## 2018-01-01 RX ADMIN — VANCOMYCIN HYDROCHLORIDE 1 G: 10 INJECTION, POWDER, LYOPHILIZED, FOR SOLUTION INTRAVENOUS at 01:04

## 2018-01-01 RX ADMIN — PREGABALIN 75 MG: 75 CAPSULE ORAL at 09:06

## 2018-01-01 RX ADMIN — POTASSIUM CHLORIDE 10 MEQ: 149 INJECTION, SOLUTION, CONCENTRATE INTRAVENOUS at 11:03

## 2018-01-01 RX ADMIN — HEPARIN SODIUM 5000 UNITS: 5000 INJECTION, SOLUTION INTRAVENOUS; SUBCUTANEOUS at 06:06

## 2018-01-01 RX ADMIN — HEPARIN SODIUM 5000 UNITS: 5000 INJECTION, SOLUTION INTRAVENOUS; SUBCUTANEOUS at 03:07

## 2018-01-01 RX ADMIN — HYDROMORPHONE HYDROCHLORIDE 2 MG: 2 TABLET ORAL at 06:03

## 2018-01-01 RX ADMIN — POTASSIUM CHLORIDE 40 MEQ: 400 INJECTION, SOLUTION INTRAVENOUS at 11:07

## 2018-01-01 RX ADMIN — DEXMEDETOMIDINE HYDROCHLORIDE 1.3 MCG/KG/HR: 100 INJECTION, SOLUTION INTRAVENOUS at 12:03

## 2018-01-01 RX ADMIN — Medication 4 MG: at 12:04

## 2018-01-01 RX ADMIN — CEFEPIME HYDROCHLORIDE 2 G: 2 INJECTION, POWDER, FOR SOLUTION INTRAVENOUS at 11:06

## 2018-01-01 RX ADMIN — CALCIUM CHLORIDE 1 G: 100 INJECTION, SOLUTION INTRAVENOUS at 08:07

## 2018-01-01 RX ADMIN — OLANZAPINE 10 MG: 10 INJECTION, POWDER, FOR SOLUTION INTRAMUSCULAR at 12:06

## 2018-01-01 RX ADMIN — OXYCODONE HYDROCHLORIDE 5 MG: 5 TABLET ORAL at 03:06

## 2018-01-01 RX ADMIN — LORAZEPAM 0.5 MG: 2 INJECTION, SOLUTION INTRAMUSCULAR; INTRAVENOUS at 07:06

## 2018-01-01 RX ADMIN — ETOMIDATE 8 MG: 2 INJECTION INTRAVENOUS at 09:03

## 2018-01-01 RX ADMIN — ESMOLOL HYDROCHLORIDE 20 MG: 10 INJECTION INTRAVENOUS at 01:03

## 2018-01-01 RX ADMIN — EPINEPHRINE 0.02 MCG/KG/MIN: 1 INJECTION INTRAMUSCULAR; INTRAVENOUS; SUBCUTANEOUS at 06:07

## 2018-01-01 RX ADMIN — POTASSIUM CHLORIDE 10 MEQ: 10 INJECTION, SOLUTION INTRAVENOUS at 12:03

## 2018-01-01 RX ADMIN — ASPIRIN 81 MG CHEWABLE TABLET 81 MG: 81 TABLET CHEWABLE at 08:06

## 2018-01-01 RX ADMIN — LORAZEPAM 2 MG: 2 INJECTION, SOLUTION INTRAMUSCULAR; INTRAVENOUS at 05:03

## 2018-01-01 RX ADMIN — AMPICILLIN AND SULBACTAM 3 G: 2; 1 INJECTION, POWDER, FOR SOLUTION INTRAVENOUS at 05:03

## 2018-01-01 RX ADMIN — HALOPERIDOL 5 MG: 5 TABLET ORAL at 09:06

## 2018-01-01 RX ADMIN — SODIUM CHLORIDE, PRESERVATIVE FREE 10 ML: 5 INJECTION INTRAVENOUS at 12:07

## 2018-01-01 RX ADMIN — ZOLPIDEM TARTRATE 5 MG: 5 TABLET, FILM COATED ORAL at 08:03

## 2018-01-01 RX ADMIN — ASPIRIN 81 MG: 81 TABLET, COATED ORAL at 08:07

## 2018-01-01 RX ADMIN — VANCOMYCIN HYDROCHLORIDE 1 G: 10 INJECTION, POWDER, LYOPHILIZED, FOR SOLUTION INTRAVENOUS at 12:04

## 2018-01-01 RX ADMIN — LORAZEPAM 2 MG: 2 INJECTION, SOLUTION INTRAMUSCULAR; INTRAVENOUS at 03:03

## 2018-01-01 RX ADMIN — Medication 100 MG: at 09:07

## 2018-01-01 RX ADMIN — SENNOSIDES AND DOCUSATE SODIUM 1 TABLET: 8.6; 5 TABLET ORAL at 11:07

## 2018-01-01 RX ADMIN — HYDRALAZINE HYDROCHLORIDE AND ISOSORBIDE DINITRATE 1 TABLET: 37.5; 2 TABLET, FILM COATED ORAL at 02:06

## 2018-01-01 RX ADMIN — LIDOCAINE: 50 OINTMENT TOPICAL at 01:03

## 2018-01-01 RX ADMIN — PIPERACILLIN AND TAZOBACTAM 4.5 G: 4; .5 INJECTION, POWDER, LYOPHILIZED, FOR SOLUTION INTRAVENOUS; PARENTERAL at 10:07

## 2018-01-01 RX ADMIN — THIAMINE HCL TAB 100 MG 100 MG: 100 TAB at 08:06

## 2018-01-01 RX ADMIN — POTASSIUM CHLORIDE 40 MEQ: 20 SOLUTION ORAL at 05:07

## 2018-01-01 RX ADMIN — DEXMEDETOMIDINE HYDROCHLORIDE 1.2 MCG/KG/HR: 100 INJECTION, SOLUTION, CONCENTRATE INTRAVENOUS at 01:07

## 2018-01-01 RX ADMIN — HEPARIN SODIUM 17 UNITS/KG/HR: 10000 INJECTION, SOLUTION INTRAVENOUS at 01:03

## 2018-01-01 RX ADMIN — FUROSEMIDE 10 MG/HR: 10 INJECTION, SOLUTION INTRAMUSCULAR; INTRAVENOUS at 08:07

## 2018-01-01 RX ADMIN — ACETAMINOPHEN 1000 MG: 500 TABLET ORAL at 01:03

## 2018-01-01 RX ADMIN — DOBUTAMINE IN DEXTROSE 5 MCG/KG/MIN: 200 INJECTION, SOLUTION INTRAVENOUS at 09:06

## 2018-01-01 RX ADMIN — Medication 50 MEQ: at 07:07

## 2018-01-01 RX ADMIN — ASPIRIN 81 MG: 81 TABLET, COATED ORAL at 09:04

## 2018-01-01 RX ADMIN — CLINDAMYCIN IN 5 PERCENT DEXTROSE 600 MG: 12 INJECTION, SOLUTION INTRAVENOUS at 09:03

## 2018-01-01 RX ADMIN — POTASSIUM CHLORIDE 10 MEQ: 10 INJECTION, SOLUTION INTRAVENOUS at 01:03

## 2018-01-01 RX ADMIN — ATORVASTATIN CALCIUM 80 MG: 20 TABLET, FILM COATED ORAL at 10:04

## 2018-01-01 RX ADMIN — OXYCODONE HYDROCHLORIDE 20 MG: 5 TABLET ORAL at 11:04

## 2018-01-01 RX ADMIN — MAGNESIUM SULFATE IN WATER 2 G: 40 INJECTION, SOLUTION INTRAVENOUS at 09:07

## 2018-01-01 RX ADMIN — ETOMIDATE 10 MG: 2 INJECTION, SOLUTION INTRAVENOUS at 01:07

## 2018-01-01 RX ADMIN — METRONIDAZOLE 500 MG: 500 TABLET ORAL at 05:06

## 2018-01-01 RX ADMIN — FENTANYL CITRATE 50 MCG: 50 INJECTION, SOLUTION INTRAMUSCULAR; INTRAVENOUS at 12:03

## 2018-01-01 RX ADMIN — PIPERACILLIN AND TAZOBACTAM 4.5 G: 4; .5 INJECTION, POWDER, LYOPHILIZED, FOR SOLUTION INTRAVENOUS; PARENTERAL at 09:07

## 2018-01-01 RX ADMIN — VANCOMYCIN HYDROCHLORIDE 750 MG: 750 INJECTION, POWDER, LYOPHILIZED, FOR SOLUTION INTRAVENOUS at 09:07

## 2018-01-01 RX ADMIN — CHLORPROMAZINE HYDROCHLORIDE 25 MG: 25 TABLET, SUGAR COATED ORAL at 09:07

## 2018-01-01 RX ADMIN — MORPHINE SULFATE 2 MG: 4 INJECTION INTRAVENOUS at 10:06

## 2018-01-01 RX ADMIN — ATORVASTATIN CALCIUM 40 MG: 20 TABLET, FILM COATED ORAL at 10:03

## 2018-01-01 RX ADMIN — DEXMEDETOMIDINE HYDROCHLORIDE 0.8 MCG/KG/HR: 100 INJECTION, SOLUTION, CONCENTRATE INTRAVENOUS at 07:07

## 2018-01-01 RX ADMIN — OXYCODONE HYDROCHLORIDE 5 MG: 5 TABLET ORAL at 06:06

## 2018-01-01 RX ADMIN — ASPIRIN 81 MG CHEWABLE TABLET 81 MG: 81 TABLET CHEWABLE at 10:06

## 2018-01-01 RX ADMIN — ETOMIDATE 12 MG: 2 INJECTION INTRAVENOUS at 09:03

## 2018-01-01 RX ADMIN — HYDROCODONE BITARTRATE AND ACETAMINOPHEN 1 TABLET: 5; 325 TABLET ORAL at 09:06

## 2018-01-01 RX ADMIN — FENTANYL CITRATE 50 MCG: 50 INJECTION INTRAMUSCULAR; INTRAVENOUS at 07:06

## 2018-01-01 RX ADMIN — Medication 10 ML: at 08:07

## 2018-01-01 RX ADMIN — HYDRALAZINE HYDROCHLORIDE AND ISOSORBIDE DINITRATE 1 TABLET: 37.5; 2 TABLET, FILM COATED ORAL at 03:06

## 2018-01-01 RX ADMIN — OXYCODONE HYDROCHLORIDE 5 MG: 5 TABLET ORAL at 09:04

## 2018-01-01 RX ADMIN — GENTAMICIN SULFATE: 1 OINTMENT TOPICAL at 01:06

## 2018-01-01 RX ADMIN — SODIUM CHLORIDE, PRESERVATIVE FREE 10 ML: 5 INJECTION INTRAVENOUS at 03:06

## 2018-01-01 RX ADMIN — HALOPERIDOL LACTATE 2 MG: 5 INJECTION, SOLUTION INTRAMUSCULAR at 05:06

## 2018-01-01 RX ADMIN — NICOTINE 1 PATCH: 14 PATCH, EXTENDED RELEASE TRANSDERMAL at 01:06

## 2018-01-01 RX ADMIN — Medication 2500 MCG: at 05:07

## 2018-01-01 RX ADMIN — PROPOFOL 20 MCG/KG/MIN: 10 INJECTION, EMULSION INTRAVENOUS at 05:06

## 2018-01-01 RX ADMIN — LACTULOSE 10 G: 20 SOLUTION ORAL at 12:06

## 2018-01-01 RX ADMIN — FUROSEMIDE 80 MG: 10 INJECTION, SOLUTION INTRAMUSCULAR; INTRAVENOUS at 01:07

## 2018-01-01 RX ADMIN — METRONIDAZOLE 500 MG: 500 TABLET ORAL at 06:06

## 2018-01-01 RX ADMIN — MAGNESIUM SULFATE IN WATER 2 G: 40 INJECTION, SOLUTION INTRAVENOUS at 07:07

## 2018-01-01 RX ADMIN — LIDOCAINE: 50 OINTMENT TOPICAL at 03:04

## 2018-01-01 RX ADMIN — METOPROLOL TARTRATE 12.5 MG: 25 TABLET, FILM COATED ORAL at 11:07

## 2018-01-01 RX ADMIN — PROPOFOL 30 MG: 10 INJECTION, EMULSION INTRAVENOUS at 12:03

## 2018-01-01 RX ADMIN — DEXMEDETOMIDINE HYDROCHLORIDE 0.4 MCG/KG/HR: 4 INJECTION, SOLUTION INTRAVENOUS at 10:06

## 2018-01-01 RX ADMIN — OXYCODONE AND ACETAMINOPHEN 1 TABLET: 10; 325 TABLET ORAL at 05:03

## 2018-01-01 RX ADMIN — VALPROATE SODIUM 250 MG: 100 INJECTION, SOLUTION INTRAVENOUS at 12:07

## 2018-01-01 RX ADMIN — SODIUM CHLORIDE 250 ML: 0.9 INJECTION, SOLUTION INTRAVENOUS at 06:06

## 2018-01-01 RX ADMIN — CYCLOBENZAPRINE HYDROCHLORIDE 10 MG: 10 TABLET, FILM COATED ORAL at 07:03

## 2018-01-01 RX ADMIN — DEXMEDETOMIDINE HYDROCHLORIDE 0.2 MCG/KG/HR: 4 INJECTION, SOLUTION INTRAVENOUS at 06:06

## 2018-01-01 RX ADMIN — STANDARDIZED SENNA CONCENTRATE AND DOCUSATE SODIUM 1 TABLET: 8.6; 5 TABLET, FILM COATED ORAL at 08:04

## 2018-01-01 RX ADMIN — NICOTINE 1 PATCH: 21 PATCH, EXTENDED RELEASE TRANSDERMAL at 08:03

## 2018-01-01 RX ADMIN — METOPROLOL TARTRATE 12.5 MG: 25 TABLET, FILM COATED ORAL at 10:06

## 2018-01-01 RX ADMIN — VALPROATE SODIUM 250 MG: 100 INJECTION, SOLUTION INTRAVENOUS at 01:07

## 2018-01-01 RX ADMIN — PHENYLEPHRINE HYDROCHLORIDE 100 MCG: 10 INJECTION INTRAVENOUS at 12:03

## 2018-01-01 RX ADMIN — DEXMEDETOMIDINE HYDROCHLORIDE 0.4 MCG/KG/HR: 100 INJECTION, SOLUTION, CONCENTRATE INTRAVENOUS at 09:07

## 2018-01-01 RX ADMIN — HEPARIN SODIUM 17 UNITS/KG/HR: 10000 INJECTION, SOLUTION INTRAVENOUS at 02:03

## 2018-01-01 RX ADMIN — NOREPINEPHRINE BITARTRATE 3 MCG/KG/MIN: 1 INJECTION, SOLUTION, CONCENTRATE INTRAVENOUS at 07:07

## 2018-01-01 RX ADMIN — LISINOPRIL 5 MG: 5 TABLET ORAL at 10:04

## 2018-01-01 RX ADMIN — LORAZEPAM 2 MG: 2 INJECTION, SOLUTION INTRAMUSCULAR; INTRAVENOUS at 10:03

## 2018-01-01 RX ADMIN — DEXMEDETOMIDINE HYDROCHLORIDE 1.4 MCG/KG/HR: 100 INJECTION, SOLUTION, CONCENTRATE INTRAVENOUS at 12:07

## 2018-01-01 RX ADMIN — Medication 225 MCG/HR: at 04:07

## 2018-01-01 RX ADMIN — METOPROLOL TARTRATE 2.5 MG: 5 INJECTION, SOLUTION INTRAVENOUS at 06:03

## 2018-01-01 RX ADMIN — PIPERACILLIN AND TAZOBACTAM 4.5 G: 4; .5 INJECTION, POWDER, LYOPHILIZED, FOR SOLUTION INTRAVENOUS; PARENTERAL at 03:07

## 2018-01-01 RX ADMIN — TICAGRELOR 90 MG: 90 TABLET ORAL at 08:04

## 2018-01-01 RX ADMIN — Medication 1 G: at 07:04

## 2018-01-01 RX ADMIN — CYCLOBENZAPRINE HYDROCHLORIDE 10 MG: 10 TABLET, FILM COATED ORAL at 08:03

## 2018-01-01 RX ADMIN — AMITRIPTYLINE HYDROCHLORIDE 50 MG: 50 TABLET, FILM COATED ORAL at 10:04

## 2018-01-01 RX ADMIN — LIDOCAINE: 50 OINTMENT TOPICAL at 12:04

## 2018-01-01 RX ADMIN — DIPHENHYDRAMINE HYDROCHLORIDE 50 MG: 25 CAPSULE ORAL at 08:03

## 2018-01-01 RX ADMIN — NICOTINE 1 PATCH: 21 PATCH, EXTENDED RELEASE TRANSDERMAL at 05:03

## 2018-01-01 RX ADMIN — LIDOCAINE HYDROCHLORIDE: 20 JELLY TOPICAL at 07:07

## 2018-01-01 RX ADMIN — LISINOPRIL 10 MG: 10 TABLET ORAL at 08:03

## 2018-01-01 RX ADMIN — NOREPINEPHRINE BITARTRATE 0.4 MCG/KG/MIN: 1 INJECTION, SOLUTION, CONCENTRATE INTRAVENOUS at 08:06

## 2018-01-01 RX ADMIN — POTASSIUM & SODIUM PHOSPHATES POWDER PACK 280-160-250 MG 2 PACKET: 280-160-250 PACK at 06:06

## 2018-01-01 RX ADMIN — ACETAMINOPHEN 1000 MG: 500 TABLET ORAL at 09:03

## 2018-01-01 RX ADMIN — POTASSIUM CHLORIDE 40 MEQ: 20 SOLUTION ORAL at 01:06

## 2018-01-01 RX ADMIN — SODIUM CHLORIDE, PRESERVATIVE FREE 10 ML: 5 INJECTION INTRAVENOUS at 09:06

## 2018-01-01 RX ADMIN — OXYCODONE HYDROCHLORIDE 20 MG: 5 TABLET ORAL at 01:04

## 2018-01-01 RX ADMIN — Medication 4 MG: at 04:03

## 2018-01-01 RX ADMIN — Medication 4 MG: at 12:03

## 2018-01-01 RX ADMIN — METOPROLOL TARTRATE 25 MG: 25 TABLET ORAL at 08:03

## 2018-01-01 RX ADMIN — Medication 4 MG: at 08:03

## 2018-01-01 RX ADMIN — HYDROMORPHONE HYDROCHLORIDE 2 MG: 2 TABLET ORAL at 11:03

## 2018-01-01 RX ADMIN — POTASSIUM CHLORIDE 40 MEQ: 29.8 INJECTION, SOLUTION INTRAVENOUS at 05:07

## 2018-01-01 RX ADMIN — NICOTINE 1 PATCH: 21 PATCH, EXTENDED RELEASE TRANSDERMAL at 09:04

## 2018-01-01 RX ADMIN — POTASSIUM CHLORIDE 40 MEQ: 20 SOLUTION ORAL at 06:06

## 2018-01-01 RX ADMIN — METOPROLOL SUCCINATE 12.5 MG: 25 TABLET, EXTENDED RELEASE ORAL at 09:04

## 2018-01-01 RX ADMIN — OXYCODONE HYDROCHLORIDE 20 MG: 5 TABLET ORAL at 11:03

## 2018-01-01 RX ADMIN — OXYCODONE AND ACETAMINOPHEN 2 TABLET: 10; 325 TABLET ORAL at 08:03

## 2018-01-01 RX ADMIN — MORPHINE SULFATE 2 MG: 4 INJECTION INTRAVENOUS at 12:06

## 2018-01-01 RX ADMIN — Medication 1 G: at 10:04

## 2018-01-01 RX ADMIN — DEXMEDETOMIDINE HYDROCHLORIDE 0.2 MCG/KG/HR: 4 INJECTION, SOLUTION INTRAVENOUS at 07:06

## 2018-01-01 RX ADMIN — POTASSIUM CHLORIDE 40 MEQ: 29.8 INJECTION, SOLUTION INTRAVENOUS at 09:07

## 2018-01-01 RX ADMIN — SODIUM BICARBONATE 50 MEQ: 84 INJECTION, SOLUTION INTRAVENOUS at 12:06

## 2018-01-01 RX ADMIN — AMPICILLIN AND SULBACTAM 3 G: 2; 1 INJECTION, POWDER, FOR SOLUTION INTRAVENOUS at 10:03

## 2018-01-01 RX ADMIN — NICOTINE 1 PATCH: 14 PATCH, EXTENDED RELEASE TRANSDERMAL at 09:07

## 2018-01-01 RX ADMIN — HYDROMORPHONE HYDROCHLORIDE 2 MG: 2 TABLET ORAL at 04:03

## 2018-01-01 RX ADMIN — Medication 6 MG: at 11:03

## 2018-01-01 RX ADMIN — POTASSIUM CHLORIDE 10 MEQ: 149 INJECTION, SOLUTION, CONCENTRATE INTRAVENOUS at 08:03

## 2018-01-01 RX ADMIN — CEFEPIME 2 G: 2 INJECTION, POWDER, FOR SOLUTION INTRAVENOUS at 07:06

## 2018-01-01 RX ADMIN — LORAZEPAM 1 MG: 2 INJECTION INTRAMUSCULAR; INTRAVENOUS at 12:03

## 2018-01-01 RX ADMIN — POTASSIUM CHLORIDE: 200 INJECTION, SOLUTION INTRAVENOUS at 12:06

## 2018-01-01 RX ADMIN — DEXTROSE 250 MG: 50 INJECTION, SOLUTION INTRAVENOUS at 05:06

## 2018-01-01 RX ADMIN — ACETAMINOPHEN 1000 MG: 500 TABLET, COATED ORAL at 06:06

## 2018-01-01 RX ADMIN — SODIUM CHLORIDE 250 ML: 0.9 INJECTION, SOLUTION INTRAVENOUS at 08:04

## 2018-01-01 RX ADMIN — MAGNESIUM SULFATE HEPTAHYDRATE 3 G: 500 INJECTION, SOLUTION INTRAMUSCULAR; INTRAVENOUS at 05:06

## 2018-01-01 RX ADMIN — Medication 3 ML: at 05:06

## 2018-01-01 RX ADMIN — VANCOMYCIN HYDROCHLORIDE 750 MG: 1 INJECTION, POWDER, LYOPHILIZED, FOR SOLUTION INTRAVENOUS at 02:06

## 2018-01-01 RX ADMIN — ZOLPIDEM TARTRATE 5 MG: 5 TABLET, FILM COATED ORAL at 09:04

## 2018-01-01 RX ADMIN — AMIODARONE HYDROCHLORIDE 0.5 MG/MIN: 1.8 INJECTION, SOLUTION INTRAVENOUS at 08:07

## 2018-01-01 RX ADMIN — EPINEPHRINE: 1 INJECTION INTRAMUSCULAR; INTRAVENOUS; SUBCUTANEOUS at 09:07

## 2018-01-01 RX ADMIN — MAGNESIUM OXIDE TAB 400 MG (241.3 MG ELEMENTAL MG) 400 MG: 400 (241.3 MG) TAB at 08:03

## 2018-01-01 RX ADMIN — HEPARIN SODIUM 21 UNITS/KG/HR: 10000 INJECTION, SOLUTION INTRAVENOUS at 05:04

## 2018-01-01 RX ADMIN — DEXMEDETOMIDINE HYDROCHLORIDE 0.2 MCG/KG/HR: 100 INJECTION, SOLUTION, CONCENTRATE INTRAVENOUS at 06:03

## 2018-01-01 RX ADMIN — FUROSEMIDE 40 MG: 10 INJECTION, SOLUTION INTRAMUSCULAR; INTRAVENOUS at 01:03

## 2018-01-01 RX ADMIN — POTASSIUM CHLORIDE 10 MEQ: 149 INJECTION, SOLUTION, CONCENTRATE INTRAVENOUS at 07:03

## 2018-01-01 RX ADMIN — CHLORPROMAZINE HYDROCHLORIDE 25 MG: 25 TABLET, SUGAR COATED ORAL at 08:07

## 2018-01-01 RX ADMIN — CLONAZEPAM 0.5 MG: 0.5 TABLET ORAL at 09:06

## 2018-01-01 RX ADMIN — OXYCODONE HYDROCHLORIDE 5 MG: 5 TABLET ORAL at 06:07

## 2018-01-01 RX ADMIN — Medication 5 UNITS: at 10:04

## 2018-01-01 RX ADMIN — HEPARIN SODIUM 800 UNITS/HR: 10000 INJECTION, SOLUTION INTRAVENOUS at 11:03

## 2018-01-01 RX ADMIN — DEXMEDETOMIDINE HYDROCHLORIDE 1.4 MCG/KG/HR: 4 INJECTION, SOLUTION INTRAVENOUS at 04:06

## 2018-01-01 RX ADMIN — FUROSEMIDE 40 MG: 10 INJECTION, SOLUTION INTRAMUSCULAR; INTRAVENOUS at 10:07

## 2018-01-01 RX ADMIN — Medication 3 ML: at 08:07

## 2018-01-01 RX ADMIN — LORAZEPAM 2 MG: 2 INJECTION, SOLUTION INTRAMUSCULAR; INTRAVENOUS at 07:03

## 2018-01-01 RX ADMIN — HEPARIN SODIUM 5000 UNITS: 5000 INJECTION, SOLUTION INTRAVENOUS; SUBCUTANEOUS at 08:06

## 2018-01-01 RX ADMIN — FUROSEMIDE 80 MG: 10 INJECTION, SOLUTION INTRAMUSCULAR; INTRAVENOUS at 07:07

## 2018-01-01 RX ADMIN — PHENYLEPHRINE HYDROCHLORIDE 45 MCG: 10 INJECTION INTRAVENOUS at 12:03

## 2018-01-01 RX ADMIN — AMIODARONE HYDROCHLORIDE 300 MG: 1.5 INJECTION, SOLUTION INTRAVENOUS at 06:07

## 2018-01-01 RX ADMIN — HYDROCODONE BITARTRATE AND ACETAMINOPHEN 1 TABLET: 5; 325 TABLET ORAL at 07:07

## 2018-01-01 RX ADMIN — GABAPENTIN 800 MG: 400 CAPSULE ORAL at 10:03

## 2018-01-01 RX ADMIN — OXYCODONE HYDROCHLORIDE 5 MG: 5 TABLET ORAL at 05:07

## 2018-01-01 RX ADMIN — DEXMEDETOMIDINE HYDROCHLORIDE 1.4 MCG/KG/HR: 100 INJECTION, SOLUTION, CONCENTRATE INTRAVENOUS at 03:03

## 2018-01-01 RX ADMIN — CYCLOBENZAPRINE HYDROCHLORIDE 10 MG: 10 TABLET, FILM COATED ORAL at 10:04

## 2018-01-01 RX ADMIN — Medication 2500 MCG: at 12:07

## 2018-01-01 RX ADMIN — PROPOFOL 5 MCG/KG/MIN: 10 INJECTION, EMULSION INTRAVENOUS at 06:03

## 2018-01-01 RX ADMIN — DIPHENHYDRAMINE HYDROCHLORIDE 50 MG: 50 CAPSULE ORAL at 10:03

## 2018-01-01 RX ADMIN — POTASSIUM CHLORIDE 60 MEQ: 20 SOLUTION ORAL at 08:03

## 2018-01-01 RX ADMIN — OXYCODONE HYDROCHLORIDE 20 MG: 5 TABLET ORAL at 06:04

## 2018-01-01 RX ADMIN — Medication 100 MG: at 08:04

## 2018-01-01 RX ADMIN — OXYCODONE HYDROCHLORIDE AND ACETAMINOPHEN 1 TABLET: 5; 325 TABLET ORAL at 02:04

## 2018-01-01 RX ADMIN — SUCCINYLCHOLINE CHLORIDE 100 MG: 20 INJECTION INTRAMUSCULAR; INTRAVENOUS at 01:07

## 2018-01-01 RX ADMIN — CHLORHEXIDINE GLUCONATE 0.12% ORAL RINSE 15 ML: 1.2 LIQUID ORAL at 09:06

## 2018-01-01 RX ADMIN — Medication 1 MG: at 03:03

## 2018-01-01 RX ADMIN — Medication 100 MG: at 10:04

## 2018-01-01 RX ADMIN — POTASSIUM CHLORIDE 40 MEQ: 20 SOLUTION ORAL at 10:04

## 2018-01-01 RX ADMIN — SUCCINYLCHOLINE CHLORIDE 100 MG: 20 INJECTION, SOLUTION INTRAMUSCULAR; INTRAVENOUS at 01:07

## 2018-01-01 RX ADMIN — HEPARIN SODIUM 5000 UNITS: 5000 INJECTION, SOLUTION INTRAVENOUS; SUBCUTANEOUS at 09:07

## 2018-01-01 RX ADMIN — FUROSEMIDE 40 MG: 10 INJECTION, SOLUTION INTRAMUSCULAR; INTRAVENOUS at 06:06

## 2018-01-01 RX ADMIN — METOPROLOL SUCCINATE 25 MG: 25 TABLET, EXTENDED RELEASE ORAL at 09:03

## 2018-01-01 RX ADMIN — MAGNESIUM SULFATE HEPTAHYDRATE 1 G: 500 INJECTION, SOLUTION INTRAMUSCULAR; INTRAVENOUS at 08:07

## 2018-01-01 RX ADMIN — SODIUM CHLORIDE 500 ML: 0.9 INJECTION, SOLUTION INTRAVENOUS at 09:03

## 2018-01-01 RX ADMIN — Medication 0.4 MG: at 09:07

## 2018-01-01 RX ADMIN — CHLORDIAZEPOXIDE HYDROCHLORIDE 25 MG: 25 CAPSULE ORAL at 11:03

## 2018-01-01 RX ADMIN — PROPOFOL 50 MCG/KG/MIN: 10 INJECTION, EMULSION INTRAVENOUS at 01:03

## 2018-01-01 RX ADMIN — MORPHINE SULFATE 4 MG: 4 INJECTION INTRAVENOUS at 12:07

## 2018-01-01 RX ADMIN — AMPICILLIN AND SULBACTAM 3 G: 2; 1 INJECTION, POWDER, FOR SOLUTION INTRAVENOUS at 04:03

## 2018-01-01 RX ADMIN — OXYCODONE HYDROCHLORIDE AND ACETAMINOPHEN 1 TABLET: 5; 325 TABLET ORAL at 05:04

## 2018-01-01 RX ADMIN — COLLAGENASE SANTYL: 250 OINTMENT TOPICAL at 10:06

## 2018-01-01 RX ADMIN — Medication 1 G: at 12:03

## 2018-01-01 RX ADMIN — DEXMEDETOMIDINE HYDROCHLORIDE 1.4 MCG/KG/HR: 100 INJECTION, SOLUTION, CONCENTRATE INTRAVENOUS at 07:03

## 2018-01-01 RX ADMIN — FENTANYL CITRATE 25 MCG: 50 INJECTION, SOLUTION INTRAMUSCULAR; INTRAVENOUS at 11:06

## 2018-01-01 RX ADMIN — ACETAMINOPHEN 650 MG: 325 TABLET, FILM COATED ORAL at 06:06

## 2018-01-01 RX ADMIN — TICAGRELOR 90 MG: 90 TABLET ORAL at 12:03

## 2018-01-01 RX ADMIN — PROPOFOL 30 MCG/KG/MIN: 10 INJECTION, EMULSION INTRAVENOUS at 07:03

## 2018-01-01 RX ADMIN — ASPIRIN 81 MG: 81 TABLET, COATED ORAL at 09:07

## 2018-01-01 RX ADMIN — SENNOSIDES AND DOCUSATE SODIUM 1 TABLET: 8.6; 5 TABLET ORAL at 09:07

## 2018-01-01 RX ADMIN — Medication 4 MG: at 01:03

## 2018-01-01 RX ADMIN — FENTANYL CITRATE 25 MCG: 50 INJECTION, SOLUTION INTRAMUSCULAR; INTRAVENOUS at 03:03

## 2018-01-01 RX ADMIN — HEPARIN SODIUM 19 UNITS/KG/HR: 10000 INJECTION, SOLUTION INTRAVENOUS at 05:03

## 2018-01-01 RX ADMIN — LACTULOSE 10 G: 20 SOLUTION ORAL at 05:06

## 2018-01-01 RX ADMIN — DEXMEDETOMIDINE HYDROCHLORIDE 0.8 MCG/KG/HR: 100 INJECTION, SOLUTION, CONCENTRATE INTRAVENOUS at 05:07

## 2018-01-01 RX ADMIN — FENTANYL CITRATE 50 MCG: 50 INJECTION, SOLUTION INTRAMUSCULAR; INTRAVENOUS at 07:06

## 2018-01-01 RX ADMIN — MORPHINE SULFATE 2 MG: 4 INJECTION INTRAVENOUS at 07:06

## 2018-01-01 RX ADMIN — CHLORHEXIDINE GLUCONATE 0.12% ORAL RINSE 15 ML: 1.2 LIQUID ORAL at 12:07

## 2018-01-01 RX ADMIN — CYCLOBENZAPRINE HYDROCHLORIDE 10 MG: 10 TABLET, FILM COATED ORAL at 11:03

## 2018-01-01 RX ADMIN — METRONIDAZOLE 500 MG: 500 TABLET ORAL at 10:06

## 2018-01-01 RX ADMIN — PROPOFOL 5 MCG/KG/MIN: 10 INJECTION, EMULSION INTRAVENOUS at 05:06

## 2018-01-01 RX ADMIN — FUROSEMIDE 80 MG: 10 INJECTION, SOLUTION INTRAMUSCULAR; INTRAVENOUS at 10:06

## 2018-01-01 RX ADMIN — POTASSIUM CHLORIDE 40 MEQ: 1500 TABLET, EXTENDED RELEASE ORAL at 01:07

## 2018-01-01 RX ADMIN — CALCIUM GLUCONATE 500 MG: 98 INJECTION, SOLUTION INTRAVENOUS at 04:06

## 2018-01-01 RX ADMIN — Medication 1250 MG: at 04:03

## 2018-01-01 RX ADMIN — CEFEPIME HYDROCHLORIDE 2 G: 2 INJECTION, POWDER, FOR SOLUTION INTRAVENOUS at 09:06

## 2018-01-01 RX ADMIN — PROPOFOL 50 MCG/KG/MIN: 10 INJECTION, EMULSION INTRAVENOUS at 12:03

## 2018-01-01 RX ADMIN — VASOPRESSIN 0.04 UNITS/MIN: 20 INJECTION INTRAVENOUS at 07:07

## 2018-01-01 RX ADMIN — SPIRONOLACTONE 12.5 MG: 25 TABLET, FILM COATED ORAL at 09:06

## 2018-01-01 RX ADMIN — Medication 1 MG: at 09:03

## 2018-01-01 RX ADMIN — ACETAMINOPHEN 1000 MG: 500 TABLET ORAL at 11:07

## 2018-01-01 RX ADMIN — LORAZEPAM 2 MG: 2 INJECTION INTRAMUSCULAR; INTRAVENOUS at 05:07

## 2018-01-01 RX ADMIN — POTASSIUM CHLORIDE 10 MEQ: 10 INJECTION, SOLUTION INTRAVENOUS at 10:03

## 2018-01-01 RX ADMIN — Medication 1 G: at 10:03

## 2018-01-01 RX ADMIN — SODIUM CHLORIDE, PRESERVATIVE FREE 3 ML: 5 INJECTION INTRAVENOUS at 09:03

## 2018-01-01 RX ADMIN — MIDAZOLAM HYDROCHLORIDE 1 MG: 1 INJECTION, SOLUTION INTRAMUSCULAR; INTRAVENOUS at 09:06

## 2018-01-01 RX ADMIN — HYDROMORPHONE HYDROCHLORIDE 2 MG: 2 TABLET ORAL at 07:03

## 2018-01-01 RX ADMIN — GABAPENTIN 800 MG: 400 CAPSULE ORAL at 10:04

## 2018-01-01 RX ADMIN — OXYCODONE AND ACETAMINOPHEN 1 TABLET: 10; 325 TABLET ORAL at 10:03

## 2018-01-01 RX ADMIN — ENOXAPARIN SODIUM 40 MG: 100 INJECTION SUBCUTANEOUS at 08:04

## 2018-01-01 RX ADMIN — IPRATROPIUM BROMIDE AND ALBUTEROL SULFATE 3 ML: .5; 3 SOLUTION RESPIRATORY (INHALATION) at 06:07

## 2018-01-01 RX ADMIN — POTASSIUM CHLORIDE 40 MEQ: 29.8 INJECTION, SOLUTION INTRAVENOUS at 06:07

## 2018-01-01 RX ADMIN — LIDOCAINE: 50 OINTMENT TOPICAL at 10:03

## 2018-01-01 RX ADMIN — NICOTINE 1 PATCH: 21 PATCH, EXTENDED RELEASE TRANSDERMAL at 10:03

## 2018-01-01 RX ADMIN — POTASSIUM CHLORIDE 10 MEQ: 149 INJECTION, SOLUTION, CONCENTRATE INTRAVENOUS at 10:03

## 2018-01-01 RX ADMIN — VANCOMYCIN HYDROCHLORIDE 750 MG: 750 INJECTION, POWDER, LYOPHILIZED, FOR SOLUTION INTRAVENOUS at 12:07

## 2018-01-01 RX ADMIN — ROCURONIUM BROMIDE 50 MG: 10 INJECTION, SOLUTION INTRAVENOUS at 07:07

## 2018-01-01 RX ADMIN — MORPHINE SULFATE 2 MG: 2 INJECTION, SOLUTION INTRAMUSCULAR; INTRAVENOUS at 11:04

## 2018-01-01 RX ADMIN — DEXMEDETOMIDINE HYDROCHLORIDE 1.4 MCG/KG/HR: 100 INJECTION, SOLUTION, CONCENTRATE INTRAVENOUS at 03:07

## 2018-01-01 RX ADMIN — FUROSEMIDE 40 MG: 10 INJECTION, SOLUTION INTRAMUSCULAR; INTRAVENOUS at 08:06

## 2018-01-01 RX ADMIN — LEVALBUTEROL 1.25 MG: 1.25 SOLUTION, CONCENTRATE RESPIRATORY (INHALATION) at 06:06

## 2018-01-01 RX ADMIN — FUROSEMIDE 40 MG: 40 TABLET ORAL at 01:06

## 2018-01-01 RX ADMIN — METRONIDAZOLE 500 MG: 500 INJECTION, SOLUTION INTRAVENOUS at 08:07

## 2018-01-01 RX ADMIN — CEFEPIME HYDROCHLORIDE 2 G: 2 INJECTION, POWDER, FOR SOLUTION INTRAVENOUS at 10:06

## 2018-01-01 RX ADMIN — DEXMEDETOMIDINE HYDROCHLORIDE 0.9 MCG/KG/HR: 100 INJECTION, SOLUTION INTRAVENOUS at 09:03

## 2018-01-01 RX ADMIN — DEXMEDETOMIDINE HYDROCHLORIDE 1.4 MCG/KG/HR: 4 INJECTION, SOLUTION INTRAVENOUS at 06:06

## 2018-01-01 RX ADMIN — FUROSEMIDE 160 MG: 10 INJECTION, SOLUTION INTRAMUSCULAR; INTRAVENOUS at 05:06

## 2018-01-01 RX ADMIN — CHLORDIAZEPOXIDE HYDROCHLORIDE 25 MG: 25 CAPSULE ORAL at 08:03

## 2018-01-01 RX ADMIN — ZOLPIDEM TARTRATE 5 MG: 5 TABLET, FILM COATED ORAL at 09:03

## 2018-01-01 RX ADMIN — PIPERACILLIN AND TAZOBACTAM 4.5 G: 4; .5 INJECTION, POWDER, LYOPHILIZED, FOR SOLUTION INTRAVENOUS; PARENTERAL at 03:04

## 2018-01-01 RX ADMIN — STANDARDIZED SENNA CONCENTRATE AND DOCUSATE SODIUM 1 TABLET: 8.6; 5 TABLET, FILM COATED ORAL at 09:07

## 2018-01-01 RX ADMIN — HYDROCODONE BITARTRATE AND ACETAMINOPHEN 1 TABLET: 10; 325 TABLET ORAL at 12:07

## 2018-01-01 RX ADMIN — NOREPINEPHRINE BITARTRATE 0.27 MCG/KG/MIN: 1 INJECTION, SOLUTION, CONCENTRATE INTRAVENOUS at 05:06

## 2018-01-01 RX ADMIN — STANDARDIZED SENNA CONCENTRATE AND DOCUSATE SODIUM 1 TABLET: 8.6; 5 TABLET, FILM COATED ORAL at 09:06

## 2018-01-01 RX ADMIN — SODIUM CHLORIDE: 0.9 INJECTION, SOLUTION INTRAVENOUS at 12:04

## 2018-01-01 RX ADMIN — AMIODARONE HYDROCHLORIDE 1 MG/MIN: 1.8 INJECTION, SOLUTION INTRAVENOUS at 07:07

## 2018-01-01 RX ADMIN — POTASSIUM CHLORIDE 10 MEQ: 149 INJECTION, SOLUTION, CONCENTRATE INTRAVENOUS at 12:03

## 2018-01-01 RX ADMIN — SODIUM PHOSPHATE, MONOBASIC, MONOHYDRATE 15 MMOL: 276; 142 INJECTION, SOLUTION INTRAVENOUS at 09:03

## 2018-01-01 RX ADMIN — MAGNESIUM SULFATE IN WATER 2 G: 40 INJECTION, SOLUTION INTRAVENOUS at 03:07

## 2018-01-01 RX ADMIN — Medication 225 MCG/HR: at 02:07

## 2018-01-01 RX ADMIN — HEPARIN SODIUM 18 UNITS/KG/HR: 10000 INJECTION, SOLUTION INTRAVENOUS at 08:03

## 2018-01-01 RX ADMIN — Medication 100 MG: at 10:07

## 2018-01-01 RX ADMIN — FUROSEMIDE 80 MG: 10 INJECTION, SOLUTION INTRAMUSCULAR; INTRAVENOUS at 05:07

## 2018-01-01 RX ADMIN — AMIODARONE HYDROCHLORIDE 300 MG: 50 INJECTION, SOLUTION INTRAVENOUS at 08:07

## 2018-01-01 RX ADMIN — VANCOMYCIN HYDROCHLORIDE 750 MG: 750 INJECTION, POWDER, LYOPHILIZED, FOR SOLUTION INTRAVENOUS at 02:07

## 2018-01-01 RX ADMIN — SODIUM CHLORIDE, PRESERVATIVE FREE 10 ML: 5 INJECTION INTRAVENOUS at 01:07

## 2018-01-01 RX ADMIN — POTASSIUM CHLORIDE 40 MEQ: 1500 TABLET, EXTENDED RELEASE ORAL at 08:07

## 2018-01-01 RX ADMIN — DIPHENHYDRAMINE HYDROCHLORIDE 25 MG: 25 CAPSULE ORAL at 02:06

## 2018-01-01 RX ADMIN — OXYCODONE HYDROCHLORIDE AND ACETAMINOPHEN 1 TABLET: 5; 325 TABLET ORAL at 07:04

## 2018-01-01 RX ADMIN — ACETAMINOPHEN 1000 MG: 10 INJECTION, SOLUTION INTRAVENOUS at 08:07

## 2018-01-01 RX ADMIN — LORAZEPAM 1 MG: 2 INJECTION INTRAMUSCULAR; INTRAVENOUS at 10:03

## 2018-01-01 RX ADMIN — LORAZEPAM 2 MG: 2 INJECTION INTRAMUSCULAR; INTRAVENOUS at 03:06

## 2018-01-01 RX ADMIN — POTASSIUM CHLORIDE 10 MEQ: 10 INJECTION, SOLUTION INTRAVENOUS at 09:03

## 2018-01-01 RX ADMIN — Medication 4 MG: at 05:04

## 2018-01-01 RX ADMIN — Medication 0.5 MG: at 07:07

## 2018-01-01 RX ADMIN — OXYCODONE HYDROCHLORIDE 10 MG: 5 TABLET ORAL at 11:04

## 2018-01-01 RX ADMIN — POTASSIUM & SODIUM PHOSPHATES POWDER PACK 280-160-250 MG 2 PACKET: 280-160-250 PACK at 01:06

## 2018-01-01 RX ADMIN — POTASSIUM CHLORIDE 40 MEQ: 1500 TABLET, EXTENDED RELEASE ORAL at 12:06

## 2018-01-01 RX ADMIN — BUPIVACAINE HYDROCHLORIDE 10 ML: 2.5 INJECTION, SOLUTION EPIDURAL; INFILTRATION; INTRACAUDAL; PERINEURAL at 01:04

## 2018-01-01 RX ADMIN — DEXMEDETOMIDINE HYDROCHLORIDE 1.2 MCG/KG/HR: 100 INJECTION, SOLUTION, CONCENTRATE INTRAVENOUS at 06:07

## 2018-01-01 RX ADMIN — VALPROATE SODIUM 250 MG: 100 INJECTION, SOLUTION INTRAVENOUS at 05:07

## 2018-01-01 RX ADMIN — MIDAZOLAM HYDROCHLORIDE 2 MG: 2 INJECTION, SOLUTION INTRAMUSCULAR; INTRAVENOUS at 11:04

## 2018-01-01 RX ADMIN — Medication 0.5 MCG/KG/MIN: at 05:07

## 2018-01-01 RX ADMIN — HEPARIN SODIUM 5000 UNITS: 5000 INJECTION, SOLUTION INTRAVENOUS; SUBCUTANEOUS at 01:06

## 2018-01-01 RX ADMIN — POTASSIUM CHLORIDE 10 MEQ: 10 INJECTION, SOLUTION INTRAVENOUS at 03:04

## 2018-01-01 RX ADMIN — LORAZEPAM 1 MG: 2 INJECTION, SOLUTION INTRAMUSCULAR; INTRAVENOUS at 06:03

## 2018-01-01 RX ADMIN — Medication 225 MCG/HR: at 03:07

## 2018-01-01 RX ADMIN — DEXTROSE 40 MG: 50 INJECTION, SOLUTION INTRAVENOUS at 02:07

## 2018-01-01 RX ADMIN — Medication 0.5 MG: at 01:07

## 2018-01-01 RX ADMIN — PIPERACILLIN AND TAZOBACTAM 4.5 G: 4; .5 INJECTION, POWDER, LYOPHILIZED, FOR SOLUTION INTRAVENOUS; PARENTERAL at 09:03

## 2018-01-01 RX ADMIN — PROPOFOL 30 MCG/KG/MIN: 10 INJECTION, EMULSION INTRAVENOUS at 02:03

## 2018-01-01 RX ADMIN — VANCOMYCIN HYDROCHLORIDE 750 MG: 750 INJECTION, POWDER, LYOPHILIZED, FOR SOLUTION INTRAVENOUS at 12:06

## 2018-01-01 RX ADMIN — ONDANSETRON 4 MG: 2 SOLUTION INTRAMUSCULAR; INTRAVENOUS at 08:06

## 2018-01-01 RX ADMIN — HYDROCODONE BITARTRATE AND ACETAMINOPHEN 1 TABLET: 5; 325 TABLET ORAL at 08:06

## 2018-01-01 RX ADMIN — Medication 4 MG: at 08:04

## 2018-01-01 RX ADMIN — OXYCODONE AND ACETAMINOPHEN 2 TABLET: 10; 325 TABLET ORAL at 06:03

## 2018-01-01 RX ADMIN — IOHEXOL 75 ML: 350 INJECTION, SOLUTION INTRAVENOUS at 05:03

## 2018-01-01 RX ADMIN — METOPROLOL TARTRATE 12.5 MG: 25 TABLET, FILM COATED ORAL at 09:06

## 2018-01-01 RX ADMIN — Medication 6 MG: at 07:03

## 2018-01-01 RX ADMIN — METOPROLOL SUCCINATE 25 MG: 25 TABLET, EXTENDED RELEASE ORAL at 08:06

## 2018-01-01 RX ADMIN — LIDOCAINE HYDROCHLORIDE 2.1 ML: 10 INJECTION, SOLUTION INFILTRATION; PERINEURAL at 12:03

## 2018-01-01 RX ADMIN — Medication 1250 MG: at 03:03

## 2018-01-01 RX ADMIN — ACETAMINOPHEN 1000 MG: 500 TABLET ORAL at 10:03

## 2018-01-01 RX ADMIN — HYDROCODONE BITARTRATE AND ACETAMINOPHEN 1 TABLET: 5; 325 TABLET ORAL at 05:06

## 2018-01-01 RX ADMIN — HEPARIN SODIUM 800 UNITS/HR: 10000 INJECTION, SOLUTION INTRAVENOUS at 03:03

## 2018-01-01 RX ADMIN — PIPERACILLIN AND TAZOBACTAM 4.5 G: 4; .5 INJECTION, POWDER, LYOPHILIZED, FOR SOLUTION INTRAVENOUS; PARENTERAL at 06:04

## 2018-01-01 RX ADMIN — Medication 10 ML: at 08:06

## 2018-01-01 RX ADMIN — OXYCODONE HYDROCHLORIDE 10 MG: 5 TABLET ORAL at 12:07

## 2018-01-01 RX ADMIN — METRONIDAZOLE 500 MG: 500 TABLET ORAL at 09:06

## 2018-01-01 RX ADMIN — IPRATROPIUM BROMIDE AND ALBUTEROL SULFATE 3 ML: .5; 3 SOLUTION RESPIRATORY (INHALATION) at 12:07

## 2018-01-01 RX ADMIN — SODIUM CHLORIDE 500 ML: 0.9 INJECTION, SOLUTION INTRAVENOUS at 08:07

## 2018-01-01 RX ADMIN — POLYETHYLENE GLYCOL 3350 17 G: 17 POWDER, FOR SOLUTION ORAL at 06:03

## 2018-01-01 RX ADMIN — CHLORHEXIDINE GLUCONATE 0.12% ORAL RINSE 15 ML: 1.2 LIQUID ORAL at 09:07

## 2018-01-01 RX ADMIN — HEPARIN SODIUM 17 UNITS/KG/HR: 10000 INJECTION, SOLUTION INTRAVENOUS at 08:03

## 2018-01-01 RX ADMIN — Medication 1 MG: at 02:03

## 2018-01-01 RX ADMIN — POTASSIUM CHLORIDE 40 MEQ: 29.8 INJECTION, SOLUTION INTRAVENOUS at 09:06

## 2018-01-01 RX ADMIN — ESMOLOL HYDROCHLORIDE 30 MG: 10 INJECTION INTRAVENOUS at 01:03

## 2018-01-01 RX ADMIN — SODIUM BICARBONATE 50 MEQ: 84 INJECTION, SOLUTION INTRAVENOUS at 09:07

## 2018-01-01 RX ADMIN — THIAMINE HYDROCHLORIDE 100 MG: 100 INJECTION, SOLUTION INTRAMUSCULAR; INTRAVENOUS at 12:03

## 2018-01-01 RX ADMIN — HEPARIN SODIUM 5000 UNITS: 5000 INJECTION, SOLUTION INTRAVENOUS; SUBCUTANEOUS at 11:07

## 2018-01-01 RX ADMIN — ASPIRIN 81 MG: 81 TABLET, COATED ORAL at 10:04

## 2018-01-01 RX ADMIN — SODIUM CHLORIDE, PRESERVATIVE FREE 10 ML: 5 INJECTION INTRAVENOUS at 05:06

## 2018-01-01 RX ADMIN — CHLORDIAZEPOXIDE HYDROCHLORIDE 25 MG: 25 CAPSULE ORAL at 09:03

## 2018-01-01 RX ADMIN — DOBUTAMINE IN DEXTROSE 2.5 MCG/KG/MIN: 200 INJECTION, SOLUTION INTRAVENOUS at 08:06

## 2018-01-01 RX ADMIN — CHLORDIAZEPOXIDE HYDROCHLORIDE 25 MG: 25 CAPSULE ORAL at 12:03

## 2018-01-01 RX ADMIN — PIPERACILLIN SODIUM AND TAZOBACTAM SODIUM 4.5 G: 4; .5 INJECTION, POWDER, FOR SOLUTION INTRAVENOUS at 04:06

## 2018-01-01 RX ADMIN — AMPICILLIN AND SULBACTAM 3 G: 2; 1 INJECTION, POWDER, FOR SOLUTION INTRAVENOUS at 12:03

## 2018-01-01 RX ADMIN — POTASSIUM CHLORIDE 20 MEQ: 1500 TABLET, EXTENDED RELEASE ORAL at 05:03

## 2018-01-01 RX ADMIN — METOPROLOL TARTRATE 2 MG: 5 INJECTION, SOLUTION INTRAVENOUS at 01:03

## 2018-01-01 RX ADMIN — HEPARIN SODIUM 21 UNITS/KG/HR: 10000 INJECTION, SOLUTION INTRAVENOUS at 11:04

## 2018-01-01 RX ADMIN — MAGNESIUM SULFATE IN WATER 2 G: 40 INJECTION, SOLUTION INTRAVENOUS at 10:06

## 2018-01-01 RX ADMIN — AMIODARONE HYDROCHLORIDE 0.5 MG/MIN: 1.8 INJECTION, SOLUTION INTRAVENOUS at 05:07

## 2018-01-01 RX ADMIN — POTASSIUM BICARBONATE 50 MEQ: 25 TABLET, EFFERVESCENT ORAL at 01:04

## 2018-01-01 RX ADMIN — HYDROCODONE BITARTRATE AND ACETAMINOPHEN 1 TABLET: 10; 325 TABLET ORAL at 07:07

## 2018-01-01 RX ADMIN — CEFEPIME 2 G: 2 INJECTION, POWDER, FOR SOLUTION INTRAMUSCULAR; INTRAVENOUS at 02:06

## 2018-01-01 RX ADMIN — IOHEXOL 75 ML: 350 INJECTION, SOLUTION INTRAVENOUS at 06:06

## 2018-01-01 RX ADMIN — FUROSEMIDE 20 MG/HR: 10 INJECTION, SOLUTION INTRAMUSCULAR; INTRAVENOUS at 09:07

## 2018-01-01 RX ADMIN — LORAZEPAM 1 MG: 2 INJECTION INTRAMUSCULAR at 09:07

## 2018-01-01 RX ADMIN — LORAZEPAM 1 MG: 2 INJECTION INTRAMUSCULAR; INTRAVENOUS at 08:03

## 2018-01-01 RX ADMIN — OXYCODONE AND ACETAMINOPHEN 1 TABLET: 10; 325 TABLET ORAL at 07:03

## 2018-01-01 RX ADMIN — CHLORDIAZEPOXIDE HYDROCHLORIDE 5 MG: 5 CAPSULE ORAL at 11:03

## 2018-01-01 RX ADMIN — METOPROLOL TARTRATE 25 MG: 25 TABLET ORAL at 12:03

## 2018-01-01 RX ADMIN — OXYCODONE HYDROCHLORIDE 20 MG: 5 TABLET ORAL at 10:03

## 2018-01-01 RX ADMIN — FENTANYL CITRATE 100 MCG: 50 INJECTION, SOLUTION INTRAMUSCULAR; INTRAVENOUS at 11:03

## 2018-01-01 RX ADMIN — ZOLPIDEM TARTRATE 5 MG: 5 TABLET ORAL at 08:06

## 2018-01-01 RX ADMIN — METOPROLOL SUCCINATE 25 MG: 25 TABLET, EXTENDED RELEASE ORAL at 11:03

## 2018-01-01 RX ADMIN — OXYCODONE AND ACETAMINOPHEN 2 TABLET: 10; 325 TABLET ORAL at 04:03

## 2018-01-01 RX ADMIN — LORAZEPAM 2 MG: 2 INJECTION INTRAMUSCULAR; INTRAVENOUS at 01:06

## 2018-01-01 RX ADMIN — HEPARIN SODIUM 19 UNITS/KG/HR: 10000 INJECTION, SOLUTION INTRAVENOUS at 09:03

## 2018-01-01 RX ADMIN — Medication 1 MG: at 07:07

## 2018-01-01 RX ADMIN — Medication 4 MG: at 05:03

## 2018-01-01 RX ADMIN — ETOMIDATE 20 MG: 2 INJECTION, SOLUTION INTRAVENOUS at 11:03

## 2018-01-01 RX ADMIN — PANTOPRAZOLE SODIUM 40 MG: 40 INJECTION, POWDER, LYOPHILIZED, FOR SOLUTION INTRAVENOUS at 12:06

## 2018-01-01 RX ADMIN — SUCCINYLCHOLINE CHLORIDE: 20 INJECTION, SOLUTION INTRAMUSCULAR; INTRAVENOUS at 08:06

## 2018-01-01 RX ADMIN — OXYCODONE AND ACETAMINOPHEN 2 TABLET: 10; 325 TABLET ORAL at 01:03

## 2018-01-01 RX ADMIN — Medication 1250 MG: at 05:03

## 2018-01-01 RX ADMIN — DEXMEDETOMIDINE HYDROCHLORIDE 1.3 MCG/KG/HR: 100 INJECTION, SOLUTION INTRAVENOUS at 11:03

## 2018-01-01 RX ADMIN — DOBUTAMINE IN DEXTROSE 5 MCG/KG/MIN: 200 INJECTION, SOLUTION INTRAVENOUS at 04:06

## 2018-01-01 RX ADMIN — ETOMIDATE 20 MG: 2 INJECTION INTRAVENOUS at 07:06

## 2018-01-01 RX ADMIN — ASPIRIN 81 MG: 81 TABLET, COATED ORAL at 10:07

## 2018-01-01 RX ADMIN — HEPARIN SODIUM 5000 UNITS: 5000 INJECTION, SOLUTION INTRAVENOUS; SUBCUTANEOUS at 03:06

## 2018-01-01 RX ADMIN — Medication 10 ML: at 12:06

## 2018-01-01 RX ADMIN — DEXTROSE 250 MG: 50 INJECTION, SOLUTION INTRAVENOUS at 06:07

## 2018-01-01 RX ADMIN — VANCOMYCIN HYDROCHLORIDE 750 MG: 1 INJECTION, POWDER, LYOPHILIZED, FOR SOLUTION INTRAVENOUS at 07:06

## 2018-01-01 RX ADMIN — Medication 6 MG: at 03:03

## 2018-01-01 RX ADMIN — HYDROMORPHONE HYDROCHLORIDE 1 MG: 1 INJECTION, SOLUTION INTRAMUSCULAR; INTRAVENOUS; SUBCUTANEOUS at 09:07

## 2018-01-01 RX ADMIN — VANCOMYCIN HYDROCHLORIDE 1250 MG: 10 INJECTION, POWDER, LYOPHILIZED, FOR SOLUTION INTRAVENOUS at 11:07

## 2018-01-01 RX ADMIN — Medication 0.5 MG: at 04:07

## 2018-01-01 RX ADMIN — Medication 1 MG: at 07:03

## 2018-01-01 RX ADMIN — OXYCODONE HYDROCHLORIDE 20 MG: 5 TABLET ORAL at 03:03

## 2018-01-01 RX ADMIN — Medication 1 G: at 03:03

## 2018-01-01 RX ADMIN — ZOLPIDEM TARTRATE 5 MG: 5 TABLET, FILM COATED ORAL at 10:04

## 2018-01-01 RX ADMIN — LORAZEPAM 1 MG: 2 INJECTION, SOLUTION INTRAMUSCULAR; INTRAVENOUS at 08:03

## 2018-01-01 RX ADMIN — CLINDAMYCIN HYDROCHLORIDE 450 MG: 150 CAPSULE ORAL at 10:03

## 2018-01-01 RX ADMIN — POTASSIUM CHLORIDE 30 MEQ: 20 SOLUTION ORAL at 05:06

## 2018-01-01 RX ADMIN — CYCLOBENZAPRINE HYDROCHLORIDE 10 MG: 10 TABLET, FILM COATED ORAL at 11:04

## 2018-01-01 RX ADMIN — ACETAMINOPHEN 1000 MG: 10 INJECTION, SOLUTION INTRAVENOUS at 10:06

## 2018-01-01 RX ADMIN — HEPARIN SODIUM 21 UNITS/KG/HR: 10000 INJECTION, SOLUTION INTRAVENOUS at 09:04

## 2018-01-01 RX ADMIN — SODIUM CHLORIDE 250 ML: 900 INJECTION, SOLUTION INTRAVENOUS at 07:07

## 2018-01-01 RX ADMIN — ALTEPLASE 1 MG/HR: 2.2 INJECTION, POWDER, LYOPHILIZED, FOR SOLUTION INTRAVENOUS at 12:03

## 2018-01-01 RX ADMIN — DEXTROSE AND SODIUM CHLORIDE: 5; .45 INJECTION, SOLUTION INTRAVENOUS at 10:06

## 2018-01-01 RX ADMIN — Medication 0.02 MCG/KG/MIN: at 01:07

## 2018-01-01 RX ADMIN — ACETAMINOPHEN 1000 MG: 500 TABLET ORAL at 08:07

## 2018-01-01 RX ADMIN — POTASSIUM CHLORIDE 10 MEQ: 750 CAPSULE, EXTENDED RELEASE ORAL at 10:07

## 2018-01-01 RX ADMIN — SUCCINYLCHOLINE CHLORIDE 100 MG: 20 INJECTION, SOLUTION INTRAMUSCULAR; INTRAVENOUS at 07:06

## 2018-01-01 RX ADMIN — OXYCODONE HYDROCHLORIDE 20 MG: 5 TABLET ORAL at 03:04

## 2018-01-01 RX ADMIN — POTASSIUM CHLORIDE 20 MEQ: 200 INJECTION, SOLUTION INTRAVENOUS at 01:07

## 2018-01-01 RX ADMIN — METRONIDAZOLE 500 MG: 500 INJECTION, SOLUTION INTRAVENOUS at 09:07

## 2018-01-01 RX ADMIN — POTASSIUM CHLORIDE 10 MEQ: 10 INJECTION, SOLUTION INTRAVENOUS at 02:04

## 2018-01-01 RX ADMIN — LORAZEPAM 2 MG: 2 INJECTION INTRAMUSCULAR; INTRAVENOUS at 07:06

## 2018-01-01 RX ADMIN — STANDARDIZED SENNA CONCENTRATE AND DOCUSATE SODIUM 1 TABLET: 8.6; 5 TABLET, FILM COATED ORAL at 09:03

## 2018-01-01 RX ADMIN — OXYCODONE HYDROCHLORIDE 20 MG: 5 TABLET ORAL at 09:03

## 2018-01-01 RX ADMIN — OXYCODONE HYDROCHLORIDE 10 MG: 5 TABLET ORAL at 10:07

## 2018-01-01 RX ADMIN — Medication 3 ML: at 07:07

## 2018-01-01 RX ADMIN — ALBUTEROL SULFATE 10 MG: 2.5 SOLUTION RESPIRATORY (INHALATION) at 02:06

## 2018-01-01 RX ADMIN — POTASSIUM CHLORIDE 60 MEQ: 20 SOLUTION ORAL at 04:03

## 2018-01-01 RX ADMIN — CLINDAMYCIN IN 5 PERCENT DEXTROSE 600 MG: 12 INJECTION, SOLUTION INTRAVENOUS at 01:03

## 2018-01-01 RX ADMIN — VANCOMYCIN HYDROCHLORIDE 750 MG: 750 INJECTION, POWDER, LYOPHILIZED, FOR SOLUTION INTRAVENOUS at 10:06

## 2018-01-01 RX ADMIN — ETOMIDATE 4 MG: 2 INJECTION, SOLUTION INTRAVENOUS at 11:06

## 2018-01-01 RX ADMIN — OLANZAPINE 5 MG: 10 INJECTION, POWDER, FOR SOLUTION INTRAMUSCULAR at 06:06

## 2018-01-01 RX ADMIN — CYCLOBENZAPRINE HYDROCHLORIDE 10 MG: 10 TABLET, FILM COATED ORAL at 10:03

## 2018-01-01 RX ADMIN — DEXMEDETOMIDINE HYDROCHLORIDE 1.4 MCG/KG/HR: 4 INJECTION, SOLUTION INTRAVENOUS at 11:06

## 2018-01-01 RX ADMIN — DEXTROSE 40 MG: 50 INJECTION, SOLUTION INTRAVENOUS at 08:06

## 2018-01-01 RX ADMIN — OLANZAPINE 10 MG: 10 INJECTION, POWDER, FOR SOLUTION INTRAMUSCULAR at 08:06

## 2018-01-01 RX ADMIN — DEXMEDETOMIDINE HYDROCHLORIDE 0.2 MCG/KG/HR: 100 INJECTION, SOLUTION, CONCENTRATE INTRAVENOUS at 09:03

## 2018-01-01 RX ADMIN — FENTANYL CITRATE 100 MCG: 50 INJECTION, SOLUTION INTRAMUSCULAR; INTRAVENOUS at 11:04

## 2018-01-01 RX ADMIN — OXYCODONE HYDROCHLORIDE 10 MG: 5 TABLET ORAL at 05:04

## 2018-01-01 RX ADMIN — Medication 1 G: at 11:04

## 2018-01-01 RX ADMIN — Medication 1250 MG: at 10:03

## 2018-01-01 RX ADMIN — PHENYLEPHRINE HYDROCHLORIDE 45 MCG/KG/MIN: 10 INJECTION INTRAVENOUS at 12:03

## 2018-01-01 RX ADMIN — CEFTRIAXONE SODIUM 1 G: 1 INJECTION, POWDER, FOR SOLUTION INTRAMUSCULAR; INTRAVENOUS at 12:03

## 2018-01-01 RX ADMIN — PIPERACILLIN AND TAZOBACTAM 4.5 G: 4; .5 INJECTION, POWDER, LYOPHILIZED, FOR SOLUTION INTRAVENOUS; PARENTERAL at 05:07

## 2018-01-01 RX ADMIN — MORPHINE SULFATE 2 MG: 2 INJECTION, SOLUTION INTRAMUSCULAR; INTRAVENOUS at 09:04

## 2018-01-01 RX ADMIN — OXYCODONE HYDROCHLORIDE 30 MG: 5 TABLET ORAL at 10:04

## 2018-01-01 RX ADMIN — DIGOXIN 250 MCG: 0.25 INJECTION INTRAMUSCULAR; INTRAVENOUS at 08:06

## 2018-01-01 RX ADMIN — DEXMEDETOMIDINE HYDROCHLORIDE 1.2 MCG/KG/HR: 100 INJECTION, SOLUTION, CONCENTRATE INTRAVENOUS at 12:03

## 2018-01-01 RX ADMIN — CLINDAMYCIN IN 5 PERCENT DEXTROSE 600 MG: 12 INJECTION, SOLUTION INTRAVENOUS at 10:03

## 2018-01-01 RX ADMIN — SODIUM CHLORIDE: 0.9 INJECTION, SOLUTION INTRAVENOUS at 02:03

## 2018-01-01 RX ADMIN — SODIUM CHLORIDE 30 ML: 0.9 INJECTION, SOLUTION INTRAVENOUS at 06:03

## 2018-01-01 RX ADMIN — MIDAZOLAM HYDROCHLORIDE 2 MG: 1 INJECTION, SOLUTION INTRAMUSCULAR; INTRAVENOUS at 11:04

## 2018-01-01 RX ADMIN — HYDRALAZINE HYDROCHLORIDE AND ISOSORBIDE DINITRATE 1 TABLET: 37.5; 2 TABLET, FILM COATED ORAL at 04:06

## 2018-01-01 RX ADMIN — FUROSEMIDE 80 MG: 10 INJECTION, SOLUTION INTRAMUSCULAR; INTRAVENOUS at 09:07

## 2018-01-01 RX ADMIN — OXYCODONE HYDROCHLORIDE 20 MG: 5 TABLET ORAL at 01:03

## 2018-01-01 RX ADMIN — FUROSEMIDE 100 MG: 10 INJECTION, SOLUTION INTRAVENOUS at 08:06

## 2018-01-01 RX ADMIN — ENOXAPARIN SODIUM 40 MG: 100 INJECTION SUBCUTANEOUS at 10:04

## 2018-01-01 RX ADMIN — OXYCODONE AND ACETAMINOPHEN 2 TABLET: 10; 325 TABLET ORAL at 05:03

## 2018-01-01 RX ADMIN — Medication 3 ML: at 01:06

## 2018-01-01 RX ADMIN — LORAZEPAM 1 MG: 2 INJECTION INTRAMUSCULAR; INTRAVENOUS at 07:03

## 2018-01-01 RX ADMIN — HEPARIN SODIUM 16 UNITS/KG/HR: 10000 INJECTION, SOLUTION INTRAVENOUS at 06:03

## 2018-01-01 RX ADMIN — TAMSULOSIN HYDROCHLORIDE 0.4 MG: 0.4 CAPSULE ORAL at 12:06

## 2018-03-08 PROBLEM — I73.9 PVD (PERIPHERAL VASCULAR DISEASE) WITH CLAUDICATION: Status: ACTIVE | Noted: 2018-01-01

## 2018-03-18 PROBLEM — I70.212 ATHEROSCLEROSIS OF NATIVE ARTERY OF LEFT LOWER EXTREMITY WITH INTERMITTENT CLAUDICATION: Status: ACTIVE | Noted: 2018-01-01

## 2018-03-18 PROBLEM — I70.229 CRITICAL LOWER LIMB ISCHEMIA: Chronic | Status: ACTIVE | Noted: 2018-01-01

## 2018-03-18 PROBLEM — L03.116 CELLULITIS OF LEFT LOWER EXTREMITY: Status: ACTIVE | Noted: 2018-01-01

## 2018-03-18 PROBLEM — I25.5 ISCHEMIC CARDIOMYOPATHY: Status: ACTIVE | Noted: 2018-01-01

## 2018-03-18 PROBLEM — I73.9 PVD (PERIPHERAL VASCULAR DISEASE): Status: ACTIVE | Noted: 2018-01-01

## 2018-03-18 NOTE — ED PROVIDER NOTES
Encounter Date: 3/18/2018       History     Chief Complaint   Patient presents with    Foot Pain     states he has a blockage in his left leg and his left foot is changing colors and painful. Onset months ago. Had stents placed in rt leg  3/8/2018 .States his left foot has been discolored  but it hurts more now.    Leg Pain     Patient is a 47-year-old male with past medical history of CHF, CAD, PVD, hypertension, CAD, neuropathy presenting to the ED with left leg pain and numbness.  Pt states that he had stents placed in his right leg 10 days ago but has been having worsening pain since last night.  Pt has a long history of vascular disease is had multiple stents and bypass surgery. Pt states he has not been able to ambulate due to burning shooting pain in the left foot and leg.  Unable to palpate pulse in left foot.  Pt foot cooler than left leg.  Pt states decreased ROM since last night. Pt states Dr. Bell advised pt to come to the ED if he needed to be seen prior to next scheduled appointment.            Review of patient's allergies indicates:  No Known Allergies  Past Medical History:   Diagnosis Date    AICD (automatic cardioverter/defibrillator) present     CHF (congestive heart failure)     Coronary artery disease     Encounter for blood transfusion     Hyperlipemia     Hypertension     MI (myocardial infarction)     Neuropathy     PAD (peripheral artery disease)     PVD (peripheral vascular disease)      Past Surgical History:   Procedure Laterality Date    AMPUTATION Right     great toe    BYBPASS GRAFT AORTOBIUFEMORAL      CARDIAC DEFIBRILLATOR PLACEMENT      coronary stents      EXPLORATION AND EVaCUATION OF HEMATOMA OF UPPER EXTREMITY Left     KNEE ARTHROPLASTY Left     VASCULAR SURGERY      fem-pop bypass     History reviewed. No pertinent family history.  Social History   Substance Use Topics    Smoking status: Former Smoker     Packs/day: 0.50     Quit date: 02/2018     Smokeless tobacco: Former User     Types: Chew     Quit date: 8/2/1980      Comment: Uses nicotine gum and nicotine patches    Alcohol use Yes      Comment: rarely     Review of Systems   Constitutional: Negative for activity change, appetite change, chills and fever.   HENT: Negative for congestion, ear discharge, sinus pain, sinus pressure, sneezing and trouble swallowing.    Eyes: Negative for photophobia and discharge.   Respiratory: Negative for apnea, cough, chest tightness, shortness of breath and wheezing.    Cardiovascular: Negative for chest pain, palpitations and leg swelling.   Gastrointestinal: Negative for abdominal distention, abdominal pain, constipation, diarrhea, nausea and vomiting.   Genitourinary: Negative for difficulty urinating, flank pain and urgency.   Musculoskeletal: Positive for gait problem. Negative for arthralgias, back pain, joint swelling, myalgias, neck pain and neck stiffness.        Left foot cool and painful since last night.       Skin: Negative for pallor and rash.   Allergic/Immunologic: Negative.    Neurological: Negative for dizziness, syncope, weakness, numbness and headaches.   Psychiatric/Behavioral: Negative.        Physical Exam     Initial Vitals [03/18/18 1100]   BP Pulse Resp Temp SpO2   107/67 (!) 127 18 98.4 °F (36.9 °C) 97 %      MAP       80.33         Physical Exam    Nursing note and vitals reviewed.  Constitutional: Vital signs are normal. He appears well-developed and well-nourished. He is not diaphoretic. He is cooperative. He does not have a sickly appearance. He does not appear ill. He appears distressed.   Distressed due to pain     HENT:   Head: Normocephalic and atraumatic.   Eyes: Pupils are equal, round, and reactive to light.   Neck: Normal range of motion. Neck supple.   Cardiovascular: Regular rhythm and normal heart sounds. Tachycardia present.    Pulses:       Femoral pulses are 2+ on the right side, and 2+ on the left side.       Popliteal  pulses are 2+ on the right side, and 1+ on the left side.        Dorsalis pedis pulses are 2+ on the right side, and 0 on the left side.        Posterior tibial pulses are 2+ on the right side, and 0 on the left side.   Pulmonary/Chest: Effort normal and breath sounds normal. He has no decreased breath sounds.   Abdominal: Soft. Normal appearance and bowel sounds are normal. There is no tenderness. There is no rigidity, no rebound and no guarding.   Musculoskeletal:        Left ankle: He exhibits decreased range of motion and abnormal pulse. He exhibits no swelling, no ecchymosis, no deformity and no laceration.        Left lower leg: He exhibits tenderness. He exhibits no bony tenderness, no swelling, no edema, no deformity and no laceration.        Left foot: There is decreased range of motion, tenderness, swelling and decreased capillary refill. There is no bony tenderness, no crepitus, no deformity and no laceration.   Neurological: He is alert and oriented to person, place, and time. He has normal strength. No cranial nerve deficit or sensory deficit. GCS eye subscore is 4. GCS verbal subscore is 5. GCS motor subscore is 6.   Skin: Skin is intact. Capillary refill takes more than 3 seconds. No bruising, no ecchymosis, no laceration and no rash noted. No cyanosis. Nails show no clubbing.        Psychiatric: He has a normal mood and affect. His speech is normal and behavior is normal. Judgment and thought content normal. Cognition and memory are normal.         ED Course   Procedures  Labs Reviewed - No data to display                APC / Resident Notes:   Emergent evaluation of a 48 yo male patient presenting to the ER with chief complaint of left foot pain, decreased sensation, pallor and inability to walk. Pt states he has had this foot pain x 7 months, stents placed in right leg for same issue 10 days ago.  Pt states that last night pain got increasingly worse and unable to feel pulse in left foot.  On exam,  pt left foot cool to touch, left foot blanching. No DP or PT noted with doppler.  I will consult cardiology, medicate and reassess.  Differential diagnoses include but are not limited to PVD with occlusion, Peripheral artery disease, Acute arterial ischemia. I discussed the care of this patient with my Supervising Physician.    1235- Cardiology consulted.  Will be down to see pt.     1430- Pt to be admitted to Medicine team for Heparin infusion and possible surgery.                   Clinical Impression:   The primary encounter diagnosis was PVD (peripheral vascular disease). A diagnosis of Peripheral vascular disease of lower extremity was also pertinent to this visit.    Disposition:   Disposition: Admitted  Condition: Stable                        Mariah Quintanilla NP  03/18/18 2713

## 2018-03-18 NOTE — ASSESSMENT & PLAN NOTE
Mr. Byrd is a 47 year old man with CAD s/p PCI, LVEF 30% s/p ICD, complicated and extensive PAD s/p aortofem bypass and multiple stents on DAPT who presents with what appears to be CLI of his left foot of unclear duration. This is not an acute presentation as these symtpoms have been ongoing for months and have worsened in the past 2 weeks. He has dopplerable L popliteral pulse but nothing distally, consistent with prior exams. He does have an indurated area on his forefoot that could be consistent with underlying cellulitis. We discussed options with patient, including keeping his appointment with Dr. Bell 4/10 vs. Being admitted and discussing elective angiography of his LLE. He has opted to be admitted. Would start on heparin, continue DAPT, give IV antibiotocs and make NPO after MN for possible angiogram    --admit to comanagement service  --heparin gtt  --give IV vancomycin for possible underlying cellulitis  --NPO after MN for possible angriogrpahy  --interventional cards consult (placed)  --continue DAPT + home meds    Patient seen and discussed with Dr. Hilario Chavez

## 2018-03-18 NOTE — CONSULTS
[facility] [address]  Cardiology  Consult Note    Patient Name: Leonardo Byrd  MRN: [id number]  Admission Date: 3/18/2018  Hospital Length of Stay: 0 days  Code Status: Prior   Attending Provider: Po Lutz MD   Consulting Provider: Luz Elena Feliciano MD  Primary Care Physician: Indio Aquino MD  Principal Problem:<principal problem not specified>    Patient information was obtained from patient.     Inpatient consult to Cardiology  Consult performed by: LUZ ELENA FELICIANO  Consult ordered by: DARNELL RICO        Subjective:     Chief Complaint:  L foot pain     HPI:   Mr. Byrd is a 47 year old with PMH of ICM (LVEF 30-35%) s/p ICD, CAD s/p PCI, HTN, HLD, current smoker, PAD (s/p aortobifemoral bypass, L SFA and pop PTAS in September and recent R SFA and R pop 3/8 by Dr. Carl Bell) who presents with critical limb ischemia. He has been having LLE pain that has progressively gotten worse and he has been unable to walk as well because of it. He is vague about the time sequence of events. From as best I can tell, he presented with LLE pain and numbness in September 2017 and had tPA started, was transferred to [facility] for further evaluation where he had PTAS to L SFA and L popliteal artery with good results. Since that time he has continued to have L foot pain and occasional discoloration which occasionally requires walking with crutches. In the interim, he developed R calf eileen nand claudication and Dr. Carl Bell at [place] performed PTAS to R SFA and R popliteal with great results. He is now pain free in his RLE. However, he presents today with 2 weeks of worsening rest pain in his left foot associated with sensory deficits and color change, all of which are not new per him. Only worsening. Says that he takes all of his meds.    Past Medical History:   Diagnosis Date    AICD (automatic cardioverter/defibrillator) present     CHF (congestive heart failure)     Coronary artery disease      Encounter for blood transfusion     Hyperlipemia     Hypertension     MI (myocardial infarction)     Neuropathy     PAD (peripheral artery disease)     PVD (peripheral vascular disease)        Past Surgical History:   Procedure Laterality Date    AMPUTATION Right     great toe    BYBPASS GRAFT AORTOBIUFEMORAL      CARDIAC DEFIBRILLATOR PLACEMENT      coronary stents      EXPLORATION AND EVaCUATION OF HEMATOMA OF UPPER EXTREMITY Left     KNEE ARTHROPLASTY Left     VASCULAR SURGERY      fem-pop bypass       Review of patient's allergies indicates:  No Known Allergies    No current facility-administered medications on file prior to encounter.      Current Outpatient Prescriptions on File Prior to Encounter   Medication Sig    amitriptyline (ELAVIL) 25 MG tablet Take 1 tablet (25 mg total) by mouth every evening.    aspirin (ECOTRIN) 81 MG EC tablet Take 81 mg by mouth once daily.    atorvastatin (LIPITOR) 40 MG tablet Take 40 mg by mouth every evening.    cilostazol (PLETAL) 50 MG Tab Take 2 tablets (100 mg total) by mouth 2 (two) times daily.    cyclobenzaprine (FLEXERIL) 10 MG tablet Take 10 mg by mouth 3 (three) times daily as needed for Muscle spasms.    gabapentin (NEURONTIN) 800 MG tablet Take 800 mg by mouth every evening.    hydrocodone-acetaminophen 7.5-325mg (NORCO) 7.5-325 mg per tablet Take 1 tablet by mouth every 6 (six) hours as needed.    lisinopril (PRINIVIL,ZESTRIL) 5 MG tablet Take 1 tablet (5 mg total) by mouth once daily.    metoprolol tartrate (LOPRESSOR) 25 MG tablet Take 12.5 mg by mouth 2 (two) times daily.    nicotine (NICODERM CQ) 21 mg/24 hr Place 1 patch onto the skin once daily. Down titrating as directed    pregabalin (LYRICA) 100 MG capsule Take 1 capsule (100 mg total) by mouth 3 (three) times daily.    ticagrelor (BRILINTA) 90 mg tablet Take 90 mg by mouth 2 (two) times daily.    zolpidem (AMBIEN) 5 MG Tab Take 5 mg by mouth nightly as needed for Insomnia.     nitroGLYCERIN (NITROSTAT) 0.4 MG SL tablet Place 0.4 mg under the tongue every 5 (five) minutes as needed for Chest pain.     Family History     None        Social History Main Topics    Smoking status: Former Smoker     Packs/day: 0.50     Quit date: 02/2018    Smokeless tobacco: Former User     Types: Chew     Quit date: 8/2/1980      Comment: Uses nicotine gum and nicotine patches    Alcohol use Yes      Comment: rarely    Drug use: No    Sexual activity: Yes     Partners: Female     Review of Systems   Constitution: Negative for chills, fever and malaise/fatigue.   Cardiovascular: Positive for claudication. Negative for chest pain, dyspnea on exertion and orthopnea.   Respiratory: Negative for cough, shortness of breath and wheezing.    Gastrointestinal: Negative for constipation, diarrhea, melena and nausea.   Genitourinary: Negative for dysuria and hematuria.     Objective:     Vital Signs (Most Recent):  Temp: 98.4 °F (36.9 °C) (03/18/18 1100)  Pulse: (!) 111 (03/18/18 1513)  Resp: 18 (03/18/18 1100)  BP: 102/66 (03/18/18 1513)  SpO2: 100 % (03/18/18 1513) Vital Signs (24h Range):  Temp:  [98.4 °F (36.9 °C)] 98.4 °F (36.9 °C)  Pulse:  [111-127] 111  Resp:  [18] 18  SpO2:  [97 %-100 %] 100 %  BP: (102-107)/(66-67) 102/66        There is no height or weight on file to calculate BMI.    SpO2: 100 %       No intake or output data in the 24 hours ending 03/18/18 1526    Lines/Drains/Airways     Peripheral Intravenous Line                 Peripheral IV - Single Lumen 03/18/18 1257 Left Hand less than 1 day                Physical Exam   Gen: no acute distress, alert & oriented x 3  Neck: no JVD, no carotid bruits  CV: regular rate and rhythm, normal S1/2, no murmurs, rubs, gallops; RLE with palpable CFA, popliteral arteries, dopplerable DP and AT; L CFA palpable, L pop dopplerable, not dopplerable in L AT/PT; does have sensory deficits in L digits but not in forefoot, able to move foot but minimally  Resp:  clear to auscultation bilaterally, normal effort  GI: soft, nontender nondistended, normal bowel sounds  Ext: warm well perfused, no edema, no clubbing or cyanosis      Significant Labs:   All pertinent lab results from the last 24 hours have been reviewed. and   Recent Lab Results       03/18/18  1325      POC BUN 7     POC Chloride 94(L)     POC Creatinine 0.8     POC Glucose 111(H)     POC Hematocrit 42     POC Ionized Calcium 1.09     POC Potassium 3.6     POC Sodium 130(L)     POC TCO2 (MEASURED) 23     Sample KAREN             Assessment and Plan:     Critical lower limb ischemia    Mr. Byrd is a 47 year old man with CAD s/p PCI, LVEF 30% s/p ICD, complicated and extensive PAD s/p aortofem bypass and multiple stents on DAPT who presents with what appears to be CLI of his left foot of unclear duration. This is not an acute presentation as these symtpoms have been ongoing for months and have worsened in the past 2 weeks. He has dopplerable L popliteral pulse but nothing distally, consistent with prior exams. He does have an indurated area on his forefoot that could be consistent with underlying cellulitis. We discussed options with patient, including keeping his appointment with Dr. Bell 4/10 vs. Being admitted and discussing elective angiography of his LLE. He has opted to be admitted. Would start on heparin, continue DAPT, give IV antibiotocs and make NPO after MN for possible angiogram    --admit to comanagement service  --heparin gtt  --give IV vancomycin for possible underlying cellulitis  --NPO after MN for possible angriogrpahy  --interventional cards consult (placed)  --continue DAPT + home meds    Patient seen and discussed with Dr. Hilario Chavez            VTE Risk Mitigation     None          Thank you for your consult.    Jakob Feliciano MD  Cardiology   [facility]

## 2018-03-18 NOTE — PLAN OF CARE
Problem: Patient Care Overview  Goal: Plan of Care Review  Outcome: Ongoing (interventions implemented as appropriate)  Pt is free from injury and falls during shift. Pt shows no signs of acute distress. Pt c/o left lower leg pain with mild relief from PRN meds. AAO. Vitals stable. Bed is in low position with breaks locked. Side rails are up x 2. Environment is clutter free. Call light is within reach of the patient. Will continue to monitor.

## 2018-03-18 NOTE — ED TRIAGE NOTES
Patient arrives to ED with CC of chronic constant burning left foot and left leg pain, onset 7 months ago. Patient reports he is unable to sleep due to pain. No other complaints at this time.

## 2018-03-18 NOTE — SUBJECTIVE & OBJECTIVE
Past Medical History:   Diagnosis Date    AICD (automatic cardioverter/defibrillator) present     CHF (congestive heart failure)     Coronary artery disease     Encounter for blood transfusion     Hyperlipemia     Hypertension     MI (myocardial infarction)     Neuropathy     PAD (peripheral artery disease)     PVD (peripheral vascular disease)        Past Surgical History:   Procedure Laterality Date    AMPUTATION Right     great toe    BYBPASS GRAFT AORTOBIUFEMORAL      CARDIAC DEFIBRILLATOR PLACEMENT      coronary stents      EXPLORATION AND EVaCUATION OF HEMATOMA OF UPPER EXTREMITY Left     KNEE ARTHROPLASTY Left     VASCULAR SURGERY      fem-pop bypass       Review of patient's allergies indicates:  No Known Allergies    No current facility-administered medications on file prior to encounter.      Current Outpatient Prescriptions on File Prior to Encounter   Medication Sig    amitriptyline (ELAVIL) 25 MG tablet Take 1 tablet (25 mg total) by mouth every evening.    aspirin (ECOTRIN) 81 MG EC tablet Take 81 mg by mouth once daily.    atorvastatin (LIPITOR) 40 MG tablet Take 40 mg by mouth every evening.    cilostazol (PLETAL) 50 MG Tab Take 2 tablets (100 mg total) by mouth 2 (two) times daily.    cyclobenzaprine (FLEXERIL) 10 MG tablet Take 10 mg by mouth 3 (three) times daily as needed for Muscle spasms.    gabapentin (NEURONTIN) 800 MG tablet Take 800 mg by mouth every evening.    hydrocodone-acetaminophen 7.5-325mg (NORCO) 7.5-325 mg per tablet Take 1 tablet by mouth every 6 (six) hours as needed.    lisinopril (PRINIVIL,ZESTRIL) 5 MG tablet Take 1 tablet (5 mg total) by mouth once daily.    metoprolol tartrate (LOPRESSOR) 25 MG tablet Take 12.5 mg by mouth 2 (two) times daily.    nicotine (NICODERM CQ) 21 mg/24 hr Place 1 patch onto the skin once daily. Down titrating as directed    pregabalin (LYRICA) 100 MG capsule Take 1 capsule (100 mg total) by mouth 3 (three) times daily.     ticagrelor (BRILINTA) 90 mg tablet Take 90 mg by mouth 2 (two) times daily.    zolpidem (AMBIEN) 5 MG Tab Take 5 mg by mouth nightly as needed for Insomnia.    nitroGLYCERIN (NITROSTAT) 0.4 MG SL tablet Place 0.4 mg under the tongue every 5 (five) minutes as needed for Chest pain.     Family History     None        Social History Main Topics    Smoking status: Former Smoker     Packs/day: 0.50     Quit date: 02/2018    Smokeless tobacco: Former User     Types: Chew     Quit date: 8/2/1980      Comment: Uses nicotine gum and nicotine patches    Alcohol use Yes      Comment: rarely    Drug use: No    Sexual activity: Yes     Partners: Female     Review of Systems   Constitution: Negative for chills, fever and malaise/fatigue.   Cardiovascular: Positive for claudication. Negative for chest pain, dyspnea on exertion and orthopnea.   Respiratory: Negative for cough, shortness of breath and wheezing.    Gastrointestinal: Negative for constipation, diarrhea, melena and nausea.   Genitourinary: Negative for dysuria and hematuria.     Objective:     Vital Signs (Most Recent):  Temp: 98.4 °F (36.9 °C) (03/18/18 1100)  Pulse: (!) 111 (03/18/18 1513)  Resp: 18 (03/18/18 1100)  BP: 102/66 (03/18/18 1513)  SpO2: 100 % (03/18/18 1513) Vital Signs (24h Range):  Temp:  [98.4 °F (36.9 °C)] 98.4 °F (36.9 °C)  Pulse:  [111-127] 111  Resp:  [18] 18  SpO2:  [97 %-100 %] 100 %  BP: (102-107)/(66-67) 102/66        There is no height or weight on file to calculate BMI.    SpO2: 100 %       No intake or output data in the 24 hours ending 03/18/18 1526    Lines/Drains/Airways     Peripheral Intravenous Line                 Peripheral IV - Single Lumen 03/18/18 1257 Left Hand less than 1 day                Physical Exam   Gen: no acute distress, alert & oriented x 3  Neck: no JVD, no carotid bruits  CV: regular rate and rhythm, normal S1/2, no murmurs, rubs, gallops; RLE with palpable CFA, popliteral arteries, dopplerable DP and AT;  L CFA palpable, L pop dopplerable, not dopplerable in L AT/PT; does have sensory deficits in L digits but not in forefoot, able to move foot but minimally  Resp: clear to auscultation bilaterally, normal effort  GI: soft, nontender nondistended, normal bowel sounds  Ext: warm well perfused, no edema, no clubbing or cyanosis      Significant Labs:   All pertinent lab results from the last 24 hours have been reviewed. and   Recent Lab Results       03/18/18  1325      POC BUN 7     POC Chloride 94(L)     POC Creatinine 0.8     POC Glucose 111(H)     POC Hematocrit 42     POC Ionized Calcium 1.09     POC Potassium 3.6     POC Sodium 130(L)     POC TCO2 (MEASURED) 23     Sample KAREN

## 2018-03-18 NOTE — ED NOTES
Attempt made to call report; receiving nurse unable to take report at this time; states her charge nurse has to look at report.

## 2018-03-18 NOTE — HPI
Mr. Byrd is a 47 year old with PMH of ICM (LVEF 30-35%) s/p ICD, CAD s/p PCI, HTN, HLD, current smoker, PAD (s/p aortobifemoral bypass, L SFA and pop PTAS in September and recent R SFA and R pop 3/8 by Dr. Carl Bell) who presented to [facility] with critical limb ischemia on 3/18. Evaluated by cards who rec abx for possible foot cellulitis and interventional cards consult. Underwent catheter directed tPA on 3/20 performed by interventional cardiology and was admitted to CCU for close monitoring.    Patient still sedated during evaluation, so history obtained from chart. Left foot pain  started in September 2017 and had PTAs to left LFA and left popliteal artery here. Since then he continued to have pain in his left foot and to a less degree, leg. The pain has been progressively worse since. Pain is a burning sharp pain that is elicited by slight touch. Pain is worsened with bearing weight and is improved with vibrating massager to the plantar aspect of the foot. There is associated numbness and coldness to his foot. He denies erythema or swelling of his left foot.

## 2018-03-18 NOTE — H&P
Hospital Medicine  History and Physical Exam    [place] [facility] Kayla Toth MD  Admit Date: 3/18/2018  [facility]   Principal Problem:  Left foot pain   Patient information was obtained from patient and ER records.   Primary care Physician: Indio Aquino MD  Code status: Full Code    HPI: 46 yo former smoker with history of ICM (LVEF 30-35%) s/p ICD, CAD s/p PCI, HTN, HLD, PAD (s/p aortobifemoral bypass, L SFA and pop PTAS in September and recent R SFA and R pop 3/8 by Dr. Carl Bell). Patient states left foot pain has started in September 2017 and had PTAs to left LFA and left popliteal artery here. Since then he continued to have pain in his left foot and to a less degree, leg. The pain has been progressively worse since. Pain is a burning sharp pain that is elicited by slight touch. Pain is worsened with bearing weight and is improved with vibrating massager to the plantar aspect of the foot. There is associated numbness and coldness to his foot. He denies erythema or swelling of his left foot.    Past Medical History: Patient has a past medical history of AICD (automatic cardioverter/defibrillator) present; CHF (congestive heart failure); Coronary artery disease; Encounter for blood transfusion; Hyperlipemia; Hypertension; MI (myocardial infarction); Neuropathy; PAD (peripheral artery disease); and PVD (peripheral vascular disease).    Past Surgical History: Patient has a past surgical history that includes Amputation (Right); Knee Arthroplasty (Left); Vascular surgery; coronary stents; Cardiac defibrillator placement; EXPLORATION AND EVaCUATION OF HEMATOMA OF UPPER EXTREMITY (Left); and BYBPASS GRAFT AORTOBIUFEMORAL.    Social History: Patient reports that he quit smoking about 6 weeks ago. He smoked 0.50 packs per day. He quit smokeless tobacco use about 37 years ago. His smokeless tobacco use included Chew. He reports that he drinks alcohol. He reports that he does not use drugs.    Family History:  family history is not on file.    Medications: ,  No current facility-administered medications on file prior to encounter.      Current Outpatient Prescriptions on File Prior to Encounter   Medication Sig    amitriptyline (ELAVIL) 25 MG tablet Take 1 tablet (25 mg total) by mouth every evening.    aspirin (ECOTRIN) 81 MG EC tablet Take 81 mg by mouth once daily.    atorvastatin (LIPITOR) 40 MG tablet Take 40 mg by mouth every evening.    cilostazol (PLETAL) 50 MG Tab Take 2 tablets (100 mg total) by mouth 2 (two) times daily.    cyclobenzaprine (FLEXERIL) 10 MG tablet Take 10 mg by mouth 3 (three) times daily as needed for Muscle spasms.    gabapentin (NEURONTIN) 800 MG tablet Take 800 mg by mouth every evening.    hydrocodone-acetaminophen 7.5-325mg (NORCO) 7.5-325 mg per tablet Take 1 tablet by mouth every 6 (six) hours as needed.    lisinopril (PRINIVIL,ZESTRIL) 5 MG tablet Take 1 tablet (5 mg total) by mouth once daily.    metoprolol tartrate (LOPRESSOR) 25 MG tablet Take 12.5 mg by mouth 2 (two) times daily.    nicotine (NICODERM CQ) 21 mg/24 hr Place 1 patch onto the skin once daily. Down titrating as directed    pregabalin (LYRICA) 100 MG capsule Take 1 capsule (100 mg total) by mouth 3 (three) times daily.    ticagrelor (BRILINTA) 90 mg tablet Take 90 mg by mouth 2 (two) times daily.    zolpidem (AMBIEN) 5 MG Tab Take 5 mg by mouth nightly as needed for Insomnia.    nitroGLYCERIN (NITROSTAT) 0.4 MG SL tablet Place 0.4 mg under the tongue every 5 (five) minutes as needed for Chest pain.     Allergies: Patient has No Known Allergies.    ROS  Constitutional: no fever or chills  Respiratory: no cough or shortness of breath  Cardiovascular: no chest pain or palpitations  Gastrointestinal: no nausea or vomiting, no abdominal pain or change in bowel habits  Genitourinary: no hematuria or dysuria  Integument/Breast: no rash or pruritis  Hematologic/Lymphatic: no easy bruising or  lymphadenopathy  Musculoskeletal: no arthralgias, see HPI  Neurological: no seizures or tremors  Behavioral/Psych: no depression or anxiety    PEx  Temp:  [97.7 °F (36.5 °C)-98.4 °F (36.9 °C)]   Pulse:  [111-127]   Resp:  [18-20]   BP: (102-120)/(66-75)   SpO2:  [97 %-100 %]   Body mass index is 23.41 kg/m².     General appearance: no distress,   Mental status: Alert and oriented x 3  HEENT:  conjunctivae/corneas clear, PERRL  Neck: supple, thyroid not enlarged  Pulm:   normal respiratory effort, CTA B, no c/w/r  Card: RRR, S1, S2 normal, no murmur, click, rub or gallop  Abd: soft, NT, ND, BS present; no masses, no organomegaly  Ext: no c/c/e, some hyperemia of forefoot  Pulses: Right pedal pulse palpable, no pulses felt on entire right leg except in femoral area,  Skin: color, texture, turgor normal. No rashes or lesions  Neuro: CN II-XII grossly intact, no focal numbness or weakness, normal strength and tone     Recent Labs  Lab 03/18/18  1325   HCT 42   Results for PRESTON GUTIERREZ (MRN [id number]) as of 3/18/2018 18:11   Ref. Range 3/18/2018 13:25   POC Glucose Latest Ref Range: 70 - 110 mg/dL 111 (H)   POC Creatinine Latest Ref Range: 0.5 - 1.4 mg/dL 0.8   POC BUN Latest Ref Range: 6 - 30 mg/dL 7   POC Chloride Latest Ref Range: 95 - 110 mmol/L 94 (L)   POC Sodium Latest Ref Range: 136 - 145 mmol/L 130 (L)   POC Potassium Latest Ref Range: 3.5 - 5.1 mmol/L 3.6   POC Ionized Calcium Latest Ref Range: 1.06 - 1.42 mmol/L 1.09   POC Hematocrit Latest Ref Range: 36 - 54 %PCV 42   POC TCO2 (MEASURE) Latest Ref Range: 23 - 29 mmol/L 23       Active Hospital Problems    Diagnosis  POA    *Cellulitis of left lower extremity [L03.116]  Yes     Priority: 1 - High    Critical lower limb ischemia [I99.8]  Yes     Priority: 2      Chronic    Atherosclerosis of native artery of left lower extremity with intermittent claudication [I70.212]  Yes     Priority: 2     PAD (peripheral artery disease) [I73.9]  Yes     Priority: 2      Neuropathy due to peripheral vascular disease [I73.9, G63]  Yes     Priority: 3     Ischemic cardiomyopathy [I25.5]  Yes     Priority: 4     Chronic systolic heart failure [I50.22]  Yes     Priority: 4     Tobacco abuse [Z72.0]  Yes     Priority: 5       Resolved Hospital Problems    Diagnosis Date Resolved POA   No resolved problems to display.     Overview 46 yo here for left foot pain that is likely ischemic rest pain.       Assessment and Plan for Problems addressed today:  Left foot cellulitis - vancomycin 15 mg/kg BID     Critical left leg ischemia  Peripheral artery disease  cardiology consulted in ER. Recommended urgent angiogram tomorrow. Continue ASA 81 mg daily, atorvastatin 40 mg daily, cilostazol 100 mg BID, ticagrelor 90 mg BID. Start heparin drip    Chronic left leg ischemic pain  Neuropathy due to peripheral vascular disease   increase amitriptyline to 50 mg qhs, start lidocaine gel to bilateral feet. Continue gabapentin 800 mg qhs and pregabalin 300 mg TID. Percocet 10/325 ii po q6h prn and hydromorphone 2 mg q4h prn pain    Ischemic cardiomayopathy s/p ICD, s/p PCI  Chronic systolic heart failure LVEF 30-35%  Continue atorvastatin 40 mg qhs, lisinopril 5 mg daily, metoprolol 25 mg BID, ASA 81 mg daily and ticagrelor 90 mg BID    Tobacco abuse - Has not smoked for few months. Continue nicotine patch 21 mg once daily    DVT PPx: heparin drip

## 2018-03-19 NOTE — ASSESSMENT & PLAN NOTE
LLE Angio +/- PTA via L CFA antegrade access with 6F sheath    The risks (including death, heart attack, limb loss, paralysis, emergency surgery, bleeding, renal failure, and stroke), the potential benefits of the procedure, and the alternatives to the procedure, were discussed in detail and accepted by the patient. All questions were answered. Patient agrees to proceed.

## 2018-03-19 NOTE — HPI
Mr. Byrd is a 47 year old with PMH of ICM (LVEF 30-35%) s/p ICD, CAD s/p PCI 2009, HTN, HLD, current smoker, PAD (s/p aortobifemoral bypass, L SFA and pop occlusion s/p TAP, angiojet, Ekos catheters and PTAS in September by Ab and recent R SFA and R pop 3/8 by Dr. Carl Bell) who presents with critical limb ischemia of LLE. He has been having LLE pain that has progressively gotten worse and he has been unable to walk as well because of it over last 6 months. However, he presents today with 2 weeks of worsening rest pain in his left foot associated with sensory deficits and color change, all of which are not new per him. Only worsening. Says that he takes all of his meds and stopped smoking 2 weeks ago

## 2018-03-19 NOTE — PLAN OF CARE
Problem: Fall Risk (Adult)  Goal: Absence of Falls  Patient will demonstrate the desired outcomes by discharge/transition of care.   Outcome: Ongoing (interventions implemented as appropriate)  Patient free from injury, no falls this shift. Pain controlled with Prn medications. Will continue to monitor patient.

## 2018-03-19 NOTE — CONSULTS
[facility]  Interventional Cardiology  Consult Note    Patient Name: Leonardo Byrd  MRN: [id number]  Admission Date: 3/18/2018  Hospital Length of Stay: 1 days  Code Status: Full Code   Attending Provider: Tawanna Pacheco MD  Consulting Provider: oSurav Mata MD  Primary Care Physician: Indio Aquino MD  Principal Problem:Critical lower limb ischemia    Patient information was obtained from patient and ER records.     Inpatient consult to Interventional Cardiology  Consult performed by: SOURAV MATA  Consult ordered by: TAWANNA PACHECO        Subjective:     Chief Complaint:  CLI     HPI:  Mr. Byrd is a 47 year old with PMH of ICM (LVEF 30-35%) s/p ICD, CAD s/p PCI 2009, HTN, HLD, current smoker, PAD (s/p aortobifemoral bypass, L SFA and pop occlusion s/p TAP, angiojet, Ekos catheters and PTAS in September by Ab and recent R SFA and R pop 3/8 by Dr. Carl Bell) who presents with critical limb ischemia of LLE. He has been having LLE pain that has progressively gotten worse and he has been unable to walk as well because of it over last 3 months. However, he presents today with 2 weeks of worsening rest pain in his left foot associated with sensory deficits and color change, all of which are not new per him. Only worsening. Says that he takes all of his meds and stopped smoking 2 weeks ago    Past Medical History:   Diagnosis Date    AICD (automatic cardioverter/defibrillator) present     CHF (congestive heart failure)     Coronary artery disease     Encounter for blood transfusion     Hyperlipemia     Hypertension     MI (myocardial infarction)     Neuropathy     PAD (peripheral artery disease)     PVD (peripheral vascular disease)        Past Surgical History:   Procedure Laterality Date    AMPUTATION Right     great toe    BYBPASS GRAFT AORTOBIUFEMORAL      CARDIAC DEFIBRILLATOR PLACEMENT      coronary stents      EXPLORATION AND EVaCUATION OF HEMATOMA  OF UPPER EXTREMITY Left     KNEE ARTHROPLASTY Left     VASCULAR SURGERY      fem-pop bypass       Review of patient's allergies indicates:  No Known Allergies    PTA Medications   Medication Sig    amitriptyline (ELAVIL) 25 MG tablet Take 1 tablet (25 mg total) by mouth every evening.    aspirin (ECOTRIN) 81 MG EC tablet Take 81 mg by mouth once daily.    atorvastatin (LIPITOR) 40 MG tablet Take 40 mg by mouth every evening.    cilostazol (PLETAL) 50 MG Tab Take 2 tablets (100 mg total) by mouth 2 (two) times daily.    cyclobenzaprine (FLEXERIL) 10 MG tablet Take 10 mg by mouth 3 (three) times daily as needed for Muscle spasms.    gabapentin (NEURONTIN) 800 MG tablet Take 800 mg by mouth every evening.    hydrocodone-acetaminophen 7.5-325mg (NORCO) 7.5-325 mg per tablet Take 1 tablet by mouth every 6 (six) hours as needed.    lisinopril (PRINIVIL,ZESTRIL) 5 MG tablet Take 1 tablet (5 mg total) by mouth once daily.    metoprolol tartrate (LOPRESSOR) 25 MG tablet Take 12.5 mg by mouth 2 (two) times daily.    nicotine (NICODERM CQ) 21 mg/24 hr Place 1 patch onto the skin once daily. Down titrating as directed    pregabalin (LYRICA) 100 MG capsule Take 1 capsule (100 mg total) by mouth 3 (three) times daily.    ticagrelor (BRILINTA) 90 mg tablet Take 90 mg by mouth 2 (two) times daily.    zolpidem (AMBIEN) 5 MG Tab Take 5 mg by mouth nightly as needed for Insomnia.    nitroGLYCERIN (NITROSTAT) 0.4 MG SL tablet Place 0.4 mg under the tongue every 5 (five) minutes as needed for Chest pain.     Family History     None        Social History Main Topics    Smoking status: Former Smoker     Packs/day: 0.50     Quit date: 02/2018    Smokeless tobacco: Former User     Types: Chew     Quit date: 8/2/1980      Comment: Uses nicotine gum and nicotine patches    Alcohol use Yes      Comment: rarely    Drug use: No    Sexual activity: Yes     Partners: Female     Review of Systems   Constitution: Negative for  chills and fever.   HENT: Negative for hoarse voice and sore throat.    Eyes: Negative for pain and redness.   Cardiovascular: Negative for chest pain, dyspnea on exertion, leg swelling and orthopnea.   Respiratory: Negative for cough and shortness of breath.    Endocrine: Negative for polydipsia and polyuria.   Hematologic/Lymphatic: Negative for bleeding problem. Does not bruise/bleed easily.   Skin: Negative for flushing and itching.   Musculoskeletal: Positive for myalgias. Negative for joint pain and joint swelling.   Gastrointestinal: Negative for hematemesis, hematochezia and melena.   Genitourinary: Negative for dysuria and hematuria.   Neurological: Positive for paresthesias. Negative for light-headedness and loss of balance.     Objective:     Vital Signs (Most Recent):  Temp: 98.1 °F (36.7 °C) (03/19/18 0321)  Pulse: 107 (03/19/18 0321)  Resp: 18 (03/19/18 0321)  BP: 94/65 (03/19/18 0321)  SpO2: (!) 94 % (03/19/18 0321) Vital Signs (24h Range):  Temp:  [97.7 °F (36.5 °C)-98.6 °F (37 °C)] 98.1 °F (36.7 °C)  Pulse:  [] 107  Resp:  [16-20] 18  SpO2:  [94 %-100 %] 94 %  BP: ()/(55-75) 94/65     Weight: 67.8 kg (149 lb 7.6 oz)  Body mass index is 23.41 kg/m².    SpO2: (!) 94 %  O2 Device (Oxygen Therapy): room air      Intake/Output Summary (Last 24 hours) at 03/19/18 0729  Last data filed at 03/19/18 0359   Gross per 24 hour   Intake              450 ml   Output              300 ml   Net              150 ml       Lines/Drains/Airways     Peripheral Intravenous Line                 Peripheral IV - Single Lumen 03/18/18 1257 Left Hand less than 1 day                Physical Exam   Constitutional: He is oriented to person, place, and time. He appears well-developed and well-nourished.   HENT:   Head: Normocephalic and atraumatic.   Mouth/Throat: No oropharyngeal exudate.   Eyes: EOM are normal. Pupils are equal, round, and reactive to light.   Neck: Normal range of motion. Neck supple. No JVD  present.   Cardiovascular: Normal rate and regular rhythm.  Exam reveals no friction rub.    No murmur heard.  Pulses:       Femoral pulses are 2+ on the right side, and 2+ on the left side.  Weakly biphasic Pop on R with monophasic PT on R. Monophasic Pop on L with monophasic Pt on L. No DP pulse by doppler in BL LE   Pulmonary/Chest: Effort normal and breath sounds normal. No respiratory distress.   Abdominal: Soft. Bowel sounds are normal. He exhibits no distension.   Musculoskeletal: Normal range of motion. He exhibits no edema.   Neurological: He is alert and oriented to person, place, and time.   Skin: No rash noted.       Significant Labs: All pertinent lab results from the last 24 hours have been reviewed.    Significant Imaging: EKG: none    Assessment and Plan:     * Critical lower limb ischemia     LLE Angio +/- PTA via R CFA with 5F sheath    The risks (including death, heart attack, limb loss, paralysis, emergency surgery, bleeding, renal failure, and stroke), the potential benefits of the procedure, and the alternatives to the procedure, were discussed in detail and accepted by the patient. All questions were answered. Patient agrees to proceed.              VTE Risk Mitigation         Ordered     heparin 25,000 units in dextrose 5% 250 mL (100 units/mL) infusion  Continuous     Route:  Intravenous        03/18/18 1852     IP VTE LOW RISK PATIENT  Once      03/18/18 1711          Thank you for your consult. I will follow-up with patient. Please contact us if you have any additional questions.    Sourav Mata MD  Interventional Cardiology   [facility]

## 2018-03-19 NOTE — ANESTHESIA PREPROCEDURE EVALUATION
[facility]  Anesthesia Pre-Operative Evaluation         Patient Name: Leonardo Byrd  YOB: 1970  MRN: [id number]    SUBJECTIVE:     Pre-operative evaluation for Procedure(s) (LRB):  Pta-Peripheral (Left)     03/19/2018    Leonardo Byrd is a 47 y.o. male w/ a significant PMHx of ICM (LVEF 30-35%) s/p ICD, CAD s/p PCI 2009, HTN, HLD, current smoker, PAD (s/p aortobifemoral bypass, L SFA and pop occlusion s/precent R SFA and R pop) presenting with critical limb ischemia of LLE. Procedure attempted today however patient was moving to much to continue safely.    Patient now presents for the above procedure(s).      LDA:   18 G PIV Lt hand    Prev airway: None documented.    Drips:    heparin (porcine) in D5W 18 Units/kg/hr (03/18/18 2250)       Patient Active Problem List   Diagnosis    Arterial occlusion, lower extremity    Tobacco abuse    Non compliance w medication regimen    PAD (peripheral artery disease)    Hypokalemia    Chronic systolic heart failure    Acute blood loss anemia    Alcohol abuse    Acute pain of left foot    Neuropathy due to peripheral vascular disease    PVD (peripheral vascular disease)    Critical lower limb ischemia    Cellulitis of left lower extremity    Atherosclerosis of native artery of left lower extremity with intermittent claudication    Ischemic cardiomyopathy       Review of patient's allergies indicates:  No Known Allergies    Current Inpatient Medications:   amitriptyline  50 mg Oral QHS    aspirin  81 mg Oral Daily    atorvastatin  40 mg Oral QHS    cilostazol  100 mg Oral BID    gabapentin  800 mg Oral QHS    lidocaine   Topical (Top) QID    lisinopril  5 mg Oral Daily    metoprolol tartrate  25 mg Oral BID    pregabalin  100 mg Oral TID    sodium chloride 0.9%  5 mL/kg/hr Intravenous Once    ticagrelor  90 mg Oral BID    vancomycin (VANCOCIN) IVPB  1,000 mg Intravenous Q12H       No current facility-administered  medications on file prior to encounter.      Current Outpatient Prescriptions on File Prior to Encounter   Medication Sig Dispense Refill    amitriptyline (ELAVIL) 25 MG tablet Take 1 tablet (25 mg total) by mouth every evening. 30 tablet 11    aspirin (ECOTRIN) 81 MG EC tablet Take 81 mg by mouth once daily.      atorvastatin (LIPITOR) 40 MG tablet Take 40 mg by mouth every evening.      cilostazol (PLETAL) 50 MG Tab Take 2 tablets (100 mg total) by mouth 2 (two) times daily.      cyclobenzaprine (FLEXERIL) 10 MG tablet Take 10 mg by mouth 3 (three) times daily as needed for Muscle spasms.      gabapentin (NEURONTIN) 800 MG tablet Take 800 mg by mouth every evening.      hydrocodone-acetaminophen 7.5-325mg (NORCO) 7.5-325 mg per tablet Take 1 tablet by mouth every 6 (six) hours as needed. 21 tablet 0    lisinopril (PRINIVIL,ZESTRIL) 5 MG tablet Take 1 tablet (5 mg total) by mouth once daily. 90 tablet 3    metoprolol tartrate (LOPRESSOR) 25 MG tablet Take 12.5 mg by mouth 2 (two) times daily.      nicotine (NICODERM CQ) 21 mg/24 hr Place 1 patch onto the skin once daily. Down titrating as directed  0    pregabalin (LYRICA) 100 MG capsule Take 1 capsule (100 mg total) by mouth 3 (three) times daily. 90 capsule 6    ticagrelor (BRILINTA) 90 mg tablet Take 90 mg by mouth 2 (two) times daily.      zolpidem (AMBIEN) 5 MG Tab Take 5 mg by mouth nightly as needed for Insomnia.      nitroGLYCERIN (NITROSTAT) 0.4 MG SL tablet Place 0.4 mg under the tongue every 5 (five) minutes as needed for Chest pain.         Past Surgical History:   Procedure Laterality Date    AMPUTATION Right     great toe    BYBPASS GRAFT AORTOBIUFEMORAL      CARDIAC DEFIBRILLATOR PLACEMENT      coronary stents      EXPLORATION AND EVaCUATION OF HEMATOMA OF UPPER EXTREMITY Left     KNEE ARTHROPLASTY Left     VASCULAR SURGERY      fem-pop bypass       Social History     Social History    Marital status: Single     Spouse name: N/A     Number of children: N/A    Years of education: N/A     Occupational History    Not on file.     Social History Main Topics    Smoking status: Former Smoker     Packs/day: 0.50     Quit date: 2018    Smokeless tobacco: Former User     Types: Chew     Quit date: 1980      Comment: Uses nicotine gum and nicotine patches    Alcohol use Yes      Comment: rarely    Drug use: No    Sexual activity: Yes     Partners: Female     Other Topics Concern    Not on file     Social History Narrative    No narrative on file       OBJECTIVE:     Vital Signs Range (Last 24H):  Temp:  [36.5 °C (97.7 °F)-37 °C (98.6 °F)]   Pulse:  []   Resp:  [16-20]   BP: ()/(55-75)   SpO2:  [94 %-100 %]       CBC:   Recent Labs      18   1325  18   0453   WBC   --   8.89   RBC   --   3.68*   HGB   --   12.1*   HCT  42  34.9*   PLT   --   374*   MCV   --   95   MCH   --   32.9*   MCHC   --   34.7       CMP:   Recent Labs      18   0453   NA  131*   K  4.3   CL  95   CO2  25   BUN  16   CREATININE  1.2   GLU  115*   MG  1.8   PHOS  4.5   CALCIUM  9.6   ALBUMIN  3.0*       INR:  No results for input(s): PT, INR, PROTIME, APTT in the last 72 hours.    Diagnostic Studies: No relevant studies.    EK17  Vent. Rate : 076 BPM     Atrial Rate : 076 BPM     P-R Int : 154 ms          QRS Dur : 080 ms      QT Int : 418 ms       P-R-T Axes : 021 -10 088 degrees     QTc Int : 470 ms    Normal sinus rhythm  Possible Left atrial enlargement  LVH  T wave abnormality, consider lateral ischemia  Abnormal ECG    2D ECHO:  No results found for this or any previous visit.      ASSESSMENT/PLAN:         Anesthesia Evaluation         Review of Systems  Anesthesia Hx:  History of prior surgery of interest to airway management or planning:   Social:  Smoker   Cardiovascular:   Hypertension Past MI CAD   CHF        Physical Exam  General:  Well nourished    Airway/Jaw/Neck:  Airway Findings: Mouth Opening: Normal Tongue:  Normal  Mallampati: II  Improves to II with phonation.        Eyes/Ears/Nose:  EYES/EARS/NOSE FINDINGS: Normal   Dental:  Dental Findings:    Chest/Lungs:  Chest/Lungs Findings: Clear to auscultation, Normal Respiratory Rate     Heart/Vascular:  Heart Findings: Rate: Normal  Rhythm: Regular Rhythm  Sounds: Normal     Abdomen:  Abdomen Findings: Normal    Musculoskeletal:  Musculoskeletal Findings: Normal   Skin:  Skin Findings: Normal    Mental Status:  Mental Status Findings: Normal        Anesthesia Plan  Type of Anesthesia, risks & benefits discussed:  Anesthesia Type:  general, MAC  Patient's Preference:   Intra-op Monitoring Plan: standard ASA monitors  Intra-op Monitoring Plan Comments:   Post Op Pain Control Plan: per primary service following discharge from PACU  Post Op Pain Control Plan Comments:   Induction:   IV  Beta Blocker:  Patient is on a Beta-Blocker and has received one dose within the past 24 hours (No further documentation required).       Informed Consent: Patient understands risks and agrees with Anesthesia plan.  Questions answered. Anesthesia consent signed with patient.  ASA Score: 4     Day of Surgery Review of History & Physical:    H&P update referred to the provider.         Ready For Surgery From Anesthesia Perspective.

## 2018-03-19 NOTE — PLAN OF CARE
CM attempted to see pt for assessment,t off floor.  Will return.    Rufina Holliday, RN  Case Management  [id number]

## 2018-03-19 NOTE — SIGNIFICANT EVENT
POST CATH NOTE    Procedure:  LLE  angio via R CFA with 5F sheath      Findings:  Occluded L aorta-fem bypass at distal anaostomosis    Intervention:  None    Patient tolerated the procedure well, no complications    Post Cath Exam:  Vitals:    03/19/18 0753   BP: 112/67   Pulse: 109   Resp: 18   Temp: 98.6 °F (37 °C)     No unusual pain, hematoma or thrill at vascular access site.  Distal pulse present without signs of ischemia.    Recommendation:  -Unable to cross occlusion from contralateral side and the pt was moving to much to continue safely and obtain adequate pictures  - Will try again kimberlee via antegrade approach while pt under general anesthesia  - Keep on hep gtt and NPO at midnight

## 2018-03-19 NOTE — SUBJECTIVE & OBJECTIVE
Past Medical History:   Diagnosis Date    AICD (automatic cardioverter/defibrillator) present     CHF (congestive heart failure)     Coronary artery disease     Encounter for blood transfusion     Hyperlipemia     Hypertension     MI (myocardial infarction)     Neuropathy     PAD (peripheral artery disease)     PVD (peripheral vascular disease)        Past Surgical History:   Procedure Laterality Date    AMPUTATION Right     great toe    BYBPASS GRAFT AORTOBIUFEMORAL      CARDIAC DEFIBRILLATOR PLACEMENT      coronary stents      EXPLORATION AND EVaCUATION OF HEMATOMA OF UPPER EXTREMITY Left     KNEE ARTHROPLASTY Left     VASCULAR SURGERY      fem-pop bypass       Review of patient's allergies indicates:  No Known Allergies    PTA Medications   Medication Sig    amitriptyline (ELAVIL) 25 MG tablet Take 1 tablet (25 mg total) by mouth every evening.    aspirin (ECOTRIN) 81 MG EC tablet Take 81 mg by mouth once daily.    atorvastatin (LIPITOR) 40 MG tablet Take 40 mg by mouth every evening.    cilostazol (PLETAL) 50 MG Tab Take 2 tablets (100 mg total) by mouth 2 (two) times daily.    cyclobenzaprine (FLEXERIL) 10 MG tablet Take 10 mg by mouth 3 (three) times daily as needed for Muscle spasms.    gabapentin (NEURONTIN) 800 MG tablet Take 800 mg by mouth every evening.    hydrocodone-acetaminophen 7.5-325mg (NORCO) 7.5-325 mg per tablet Take 1 tablet by mouth every 6 (six) hours as needed.    lisinopril (PRINIVIL,ZESTRIL) 5 MG tablet Take 1 tablet (5 mg total) by mouth once daily.    metoprolol tartrate (LOPRESSOR) 25 MG tablet Take 12.5 mg by mouth 2 (two) times daily.    nicotine (NICODERM CQ) 21 mg/24 hr Place 1 patch onto the skin once daily. Down titrating as directed    pregabalin (LYRICA) 100 MG capsule Take 1 capsule (100 mg total) by mouth 3 (three) times daily.    ticagrelor (BRILINTA) 90 mg tablet Take 90 mg by mouth 2 (two) times daily.    zolpidem (AMBIEN) 5 MG Tab Take 5 mg by  mouth nightly as needed for Insomnia.    nitroGLYCERIN (NITROSTAT) 0.4 MG SL tablet Place 0.4 mg under the tongue every 5 (five) minutes as needed for Chest pain.     Family History     None        Social History Main Topics    Smoking status: Former Smoker     Packs/day: 0.50     Quit date: 02/2018    Smokeless tobacco: Former User     Types: Chew     Quit date: 8/2/1980      Comment: Uses nicotine gum and nicotine patches    Alcohol use Yes      Comment: rarely    Drug use: No    Sexual activity: Yes     Partners: Female     Review of Systems   Constitution: Negative for chills and fever.   HENT: Negative for hoarse voice and sore throat.    Eyes: Negative for pain and redness.   Cardiovascular: Negative for chest pain, dyspnea on exertion, leg swelling and orthopnea.   Respiratory: Negative for cough and shortness of breath.    Endocrine: Negative for polydipsia and polyuria.   Hematologic/Lymphatic: Negative for bleeding problem. Does not bruise/bleed easily.   Skin: Negative for flushing and itching.   Musculoskeletal: Positive for myalgias. Negative for joint pain and joint swelling.   Gastrointestinal: Negative for hematemesis, hematochezia and melena.   Genitourinary: Negative for dysuria and hematuria.   Neurological: Positive for paresthesias. Negative for light-headedness and loss of balance.     Objective:     Vital Signs (Most Recent):  Temp: 98.1 °F (36.7 °C) (03/19/18 0321)  Pulse: 107 (03/19/18 0321)  Resp: 18 (03/19/18 0321)  BP: 94/65 (03/19/18 0321)  SpO2: (!) 94 % (03/19/18 0321) Vital Signs (24h Range):  Temp:  [97.7 °F (36.5 °C)-98.6 °F (37 °C)] 98.1 °F (36.7 °C)  Pulse:  [] 107  Resp:  [16-20] 18  SpO2:  [94 %-100 %] 94 %  BP: ()/(55-75) 94/65     Weight: 67.8 kg (149 lb 7.6 oz)  Body mass index is 23.41 kg/m².    SpO2: (!) 94 %  O2 Device (Oxygen Therapy): room air      Intake/Output Summary (Last 24 hours) at 03/19/18 0734  Last data filed at 03/19/18 0359   Gross per 24  hour   Intake              450 ml   Output              300 ml   Net              150 ml       Lines/Drains/Airways     Peripheral Intravenous Line                 Peripheral IV - Single Lumen 03/18/18 1257 Left Hand less than 1 day                Physical Exam   Constitutional: He is oriented to person, place, and time. He appears well-developed and well-nourished.   HENT:   Head: Normocephalic and atraumatic.   Mouth/Throat: No oropharyngeal exudate.   Eyes: EOM are normal. Pupils are equal, round, and reactive to light.   Neck: Normal range of motion. Neck supple. No JVD present.   Cardiovascular: Normal rate and regular rhythm.  Exam reveals no friction rub.    No murmur heard.  Pulses:       Femoral pulses are 2+ on the right side, and 2+ on the left side.  Weakly biphasic Pop on R with monophasic PT on R. Monophasic Pop on L with monophasic Pt on L. No DP pulse by doppler in BL LE   Pulmonary/Chest: Effort normal and breath sounds normal. No respiratory distress.   Abdominal: Soft. Bowel sounds are normal. He exhibits no distension.   Musculoskeletal: Normal range of motion. He exhibits no edema.   Neurological: He is alert and oriented to person, place, and time.   Skin: No rash noted.       Significant Labs: All pertinent lab results from the last 24 hours have been reviewed.    Significant Imaging: EKG: none

## 2018-03-20 NOTE — H&P
[facility]  Cardiology  History and Physical     Patient Name: Leonardo Byrd  MRN: [id number]  Admission Date: 3/18/2018  Code Status: Full Code   Attending Provider: Guillermo Archer MD   Primary Care Physician: Indio Aquino MD  Principal Problem:Critical lower limb ischemia    Patient information was obtained from past medical records and ER records.     Subjective:     Chief Complaint: limb ischemia     HPI:  Mr. Byrd is a 47 year old with PMH of ICM (LVEF 30-35%) s/p ICD, CAD s/p PCI, HTN, HLD, current smoker, PAD (s/p aortobifemoral bypass, L SFA and pop PTAS in September and recent R SFA and R pop 3/8 by Dr. Carl Bell) who presented to [facility] with critical limb ischemia on 3/18. Evaluated by cards who rec abx for possible foot cellulitis and interventional cards consult. Underwent catheter directed tPA on 3/20 performed by interventional cardiology and was admitted to CCU for close monitoring.    Patient still sedated during evaluation, so history obtained from chart. Left foot pain  started in September 2017 and had PTAs to left LFA and left popliteal artery here. Since then he continued to have pain in his left foot and to a less degree, leg. The pain has been progressively worse since. Pain is a burning sharp pain that is elicited by slight touch. Pain is worsened with bearing weight and is improved with vibrating massager to the plantar aspect of the foot. There is associated numbness and coldness to his foot. He denies erythema or swelling of his left foot.        Past Medical History:   Diagnosis Date    AICD (automatic cardioverter/defibrillator) present     CHF (congestive heart failure)     Coronary artery disease     Encounter for blood transfusion     Hyperlipemia     Hypertension     MI (myocardial infarction)     Neuropathy     PAD (peripheral artery disease)     PVD (peripheral vascular disease)        Past Surgical History:   Procedure Laterality Date     AMPUTATION Right     great toe    BYBPASS GRAFT AORTOBIUFEMORAL      CARDIAC DEFIBRILLATOR PLACEMENT      coronary stents      EXPLORATION AND EVaCUATION OF HEMATOMA OF UPPER EXTREMITY Left     KNEE ARTHROPLASTY Left     VASCULAR SURGERY      fem-pop bypass       Review of patient's allergies indicates:  No Known Allergies    No current facility-administered medications on file prior to encounter.      Current Outpatient Prescriptions on File Prior to Encounter   Medication Sig    amitriptyline (ELAVIL) 25 MG tablet Take 1 tablet (25 mg total) by mouth every evening.    aspirin (ECOTRIN) 81 MG EC tablet Take 81 mg by mouth once daily.    atorvastatin (LIPITOR) 40 MG tablet Take 40 mg by mouth every evening.    cilostazol (PLETAL) 50 MG Tab Take 2 tablets (100 mg total) by mouth 2 (two) times daily.    cyclobenzaprine (FLEXERIL) 10 MG tablet Take 10 mg by mouth 3 (three) times daily as needed for Muscle spasms.    gabapentin (NEURONTIN) 800 MG tablet Take 800 mg by mouth every evening.    hydrocodone-acetaminophen 7.5-325mg (NORCO) 7.5-325 mg per tablet Take 1 tablet by mouth every 6 (six) hours as needed.    lisinopril (PRINIVIL,ZESTRIL) 5 MG tablet Take 1 tablet (5 mg total) by mouth once daily.    metoprolol tartrate (LOPRESSOR) 25 MG tablet Take 12.5 mg by mouth 2 (two) times daily.    nicotine (NICODERM CQ) 21 mg/24 hr Place 1 patch onto the skin once daily. Down titrating as directed    pregabalin (LYRICA) 100 MG capsule Take 1 capsule (100 mg total) by mouth 3 (three) times daily.    ticagrelor (BRILINTA) 90 mg tablet Take 90 mg by mouth 2 (two) times daily.    zolpidem (AMBIEN) 5 MG Tab Take 5 mg by mouth nightly as needed for Insomnia.    nitroGLYCERIN (NITROSTAT) 0.4 MG SL tablet Place 0.4 mg under the tongue every 5 (five) minutes as needed for Chest pain.     Family History     None        Social History Main Topics    Smoking status: Former Smoker     Packs/day: 0.50     Quit date:  02/2018    Smokeless tobacco: Former User     Types: Chew     Quit date: 8/2/1980      Comment: Uses nicotine gum and nicotine patches    Alcohol use Yes      Comment: rarely    Drug use: No    Sexual activity: Yes     Partners: Female     Review of Systems   Unable to perform ROS: acuity of condition     Objective:     Vital Signs (Most Recent):  Temp: 98.1 °F (36.7 °C) (03/20/18 1700)  Pulse: 106 (03/20/18 1730)  Resp: 12 (03/20/18 1730)  BP: 122/60 (03/20/18 1730)  SpO2: 96 % (03/20/18 1730) Vital Signs (24h Range):  Temp:  [97.9 °F (36.6 °C)-98.7 °F (37.1 °C)] 98.1 °F (36.7 °C)  Pulse:  [] 106  Resp:  [11-18] 12  SpO2:  [93 %-98 %] 96 %  BP: ()/(56-89) 122/60     Weight: 67.8 kg (149 lb 7.6 oz)  Body mass index is 23.41 kg/m².    SpO2: 96 %  O2 Device (Oxygen Therapy): room air      Intake/Output Summary (Last 24 hours) at 03/20/18 1805  Last data filed at 03/19/18 2125   Gross per 24 hour   Intake                0 ml   Output              400 ml   Net             -400 ml       Lines/Drains/Airways     Peripheral Intravenous Line                 Peripheral IV - Single Lumen 03/20/18 1439 less than 1 day         Peripheral IV - Single Lumen 03/20/18 1622 Right Antecubital less than 1 day                Physical Exam   Constitutional: He appears well-developed and well-nourished. No distress.   HENT:   Head: Normocephalic and atraumatic.   Eyes: Conjunctivae are normal. Pupils are equal, round, and reactive to light.   Neck: Normal range of motion. Neck supple.   Cardiovascular: Normal rate and regular rhythm.    Pulmonary/Chest: Effort normal and breath sounds normal. No respiratory distress. He has no wheezes.   Abdominal: Soft. Bowel sounds are normal. He exhibits no distension.   Musculoskeletal:   Catheter site at left groin without hematoma or bleeding. Leg in brace. Lt foot dusky in color.   Neurological:   Drowsy, but arousable. Not coherent.   Skin: He is not diaphoretic.       Significant  Labs: All pertinent lab results from the last 24 hours have been reviewed.    Significant Imaging: reviewed    Assessment and Plan:     * Critical lower limb ischemia    Mr. Byrd is a 47 year old man with CAD s/p PCI, LVEF 30% s/p ICD, complicated and extensive PAD s/p aortofem bypass and multiple stents on DAPT who presents with what appears to be CLI of his left foot of unclear duration. This is not an acute presentation as these symtpoms have been ongoing for months and have worsened in the past 2 weeks. He has dopplerable L popliteral pulse but nothing distally, consistent with prior exams. He does have an indurated area on his forefoot that could be consistent with underlying cellulitis.     --Underwent angiogram 3/19 that showed occlusion of aorta-fem bypass at distal anastamosis. Unable to treat due to patient movement.   --s/p cath directed tPA to occluded Lt SFA under gen anesthesia  --continuing IV vancomycin for possible underlying cellulitis  --blood cx ordered  --checking INR, fibrinogen, CBC q 6h while thrombolysis is infusing  --continue DAPT , hep gtt  --f/up interventional cards recs            Cellulitis of left lower extremity    --on vanc  --f/up cx        Alcohol abuse    -unclear date of last drink  -will investiagate further when pt more awake  -CIWA -AR protocol for symptom targeted therapy          Tobacco abuse    --nicotine patch          Ischemic cardiomayopathy s/p ICD, s/p PCI  Chronic systolic heart failure LVEF 30-35%  --Continue atorvastatin 40 mg qhs, lisinopril 5 mg daily, metoprolol 25 mg BID, ASA 81 mg daily and ticagrelor 90 mg BID    VTE Risk Mitigation         Ordered     heparin 25,000 units in dextrose 5% 250 mL (100 units/mL) infusion (heparin infusion)  Continuous     Route:  Intravenous        03/20/18 1420     IP VTE LOW RISK PATIENT  Once      03/18/18 1711          Patricia Domingo MD  Cardiology   [facility]

## 2018-03-20 NOTE — PROGRESS NOTES
Hospital Medicine  Progress note    Team: [facility] D Kayla Toth MD  Admit Date: 3/18/2018  HAIDER 3/20/2018  Code status: Full Code    Principal Problem:  Critical lower limb ischemia    Interval hx:  Pain better to where he go to sleep at night    ROS   Respiratory: no cough or shortness of breath  Cardiovascular: no chest pain or palpitations  Gastrointestinal: no nausea or vomiting, no abdominal pain or change in bowel habits  Behavioral/Psych: no depression or anxiety    PEx  Temp:  [97.9 °F (36.6 °C)-98.6 °F (37 °C)]   Pulse:  []   Resp:  [16-18]   BP: ()/(56-73)   SpO2:  [94 %-98 %]     Intake/Output Summary (Last 24 hours) at 03/19/18 2331  Last data filed at 03/19/18 2125   Gross per 24 hour   Intake           1925.5 ml   Output             1400 ml   Net            525.5 ml     General Appearance: no acute distress   Heart: regular rate and rhythm,  Barely palpable right DP, Non-palpable pulse on LLE below femoral artery.  Respiratory: Normal respiratory effort, no crackles   Abdomen: Soft, non-tender; bowel sounds active  Skin: intact. IV sites ok  Neurologic:  No focal numbness or weakness  Mental status: Alert, oriented x 4, affect appropriate   Ext: Bilateral toes cool touch L>R. Left forefoot erythema resolved.      Recent Labs  Lab 03/18/18  1325 03/19/18  0453   WBC  --  8.89   HGB  --  12.1*   HCT 42 34.9*   PLT  --  374*       Recent Labs  Lab 03/19/18  0453   *   K 4.3   CL 95   CO2 25   BUN 16   CREATININE 1.2   *   CALCIUM 9.6   MG 1.8   PHOS 4.5       Recent Labs  Lab 03/19/18  0453   ALBUMIN 3.0*      No results for input(s): POCTGLUCOSE in the last 168 hours.  No results for input(s): CPK, CPKMB, MB, TROPONINI in the last 72 hours.    Scheduled Meds:   amitriptyline  50 mg Oral QHS    aspirin  81 mg Oral Daily    atorvastatin  40 mg Oral QHS    cilostazol  100 mg Oral BID    gabapentin  800 mg Oral QHS    lidocaine   Topical (Top) QID    lisinopril  5 mg  Oral Daily    metoprolol tartrate  25 mg Oral BID    pregabalin  100 mg Oral TID    ticagrelor  90 mg Oral BID    vancomycin (VANCOCIN) IVPB  1,000 mg Intravenous Q12H     Continuous Infusions:   heparin (porcine) in D5W 18 Units/kg/hr (03/19/18 2028)     Active Hospital Problems    Diagnosis  POA    *Critical lower limb ischemia [I99.8]  Yes     Priority: 2      Chronic    Cellulitis of left lower extremity [L03.116]  Yes     Priority: 1 - High    Atherosclerosis of native artery of left lower extremity with intermittent claudication [I70.212]  Yes     Priority: 2     PAD (peripheral artery disease) [I73.9]  Yes     Priority: 2     Neuropathy due to peripheral vascular disease [I73.9, G63]  Yes     Priority: 3     Ischemic cardiomyopathy [I25.5]  Yes     Priority: 4     Chronic systolic heart failure [I50.22]  Yes     Priority: 4     Tobacco abuse [Z72.0]  Yes     Priority: 5       Resolved Hospital Problems    Diagnosis Date Resolved POA   No resolved problems to display.       Overview 48 yo here for left leg critical limb ischmeia    Left foot cellulitis - vancomycin 15 mg/kg BID      Critical left leg ischemia  Peripheral artery disease  cardiology consulted in ER. Recommended urgent angiogram tomorrow. Continue ASA 81 mg daily, atorvastatin 40 mg daily, cilostazol 100 mg BID, ticagrelor 90 mg BID. Started heparin drip. Underwent angiogram 3/19 that showed occlusion of aorta-fem bypass at distal anastamosis. Unable to treat due to patient movement. Will undergo antegrade approach tomorrow under general anestehsia.     Chronic left leg ischemic pain  Neuropathy due to peripheral vascular disease   increase amitriptyline to 50 mg qhs, start lidocaine gel to bilateral feet. Continue gabapentin 800 mg qhs and pregabalin 300 mg TID. Percocet 10/325 ii po q6h prn and hydromorphone 2 mg q4h prn pain     Ischemic cardiomayopathy s/p ICD, s/p PCI  Chronic systolic heart failure LVEF 30-35%  Continue  atorvastatin 40 mg qhs, lisinopril 5 mg daily, metoprolol 25 mg BID, ASA 81 mg daily and ticagrelor 90 mg BID     Tobacco abuse - Has not smoked for few months. Continue nicotine patch 21 mg once daily     DVT PPx: heparin drip    Discharge plan and follow up  Home with no needs

## 2018-03-20 NOTE — PLAN OF CARE
03/20/18 0930   Discharge Assessment   Assessment Type Discharge Planning Assessment   Confirmed/corrected address and phone number on facesheet? Yes   Assessment information obtained from? Patient   Communicated expected length of stay with patient/caregiver yes   Prior to hospitilization cognitive status: Alert/Oriented   Prior to hospitalization functional status: Independent;Assistive Equipment   Current cognitive status: Alert/Oriented   Current Functional Status: Assistive Equipment;Independent   Lives With alone   Able to Return to Prior Arrangements yes   Is patient able to care for self after discharge? Yes   Readmission Within The Last 30 Days no previous admission in last 30 days   Patient currently being followed by outpatient case management? No   Patient currently receives any other outside agency services? No   Equipment Currently Used at Home crutches   Do you have any problems affording any of your prescribed medications? No   Is the patient taking medications as prescribed? yes   Does the patient have transportation home? Yes   Does the patient receive services at the [facility]? No   Discharge Plan A Home with family   Discharge Plan B Home;Home Health   Patient/Family In Agreement With Plan yes

## 2018-03-20 NOTE — PROGRESS NOTES
Hospital Medicine  Progress note    Team: [facility] MED D Bree Banegas MD  Admit Date: 3/18/2018  HAIDER 3/22/2018  Code status: Full Code    Principal Problem:  Critical lower limb ischemia    Interval hx:  Patient with no events overnight, no new complaints. Still having significant rest pain. Awaiting repeat angiogram.    ROS   Respiratory: no cough or shortness of breath  Cardiovascular: no chest pain or palpitations  Gastrointestinal: no nausea or vomiting, no abdominal pain  Behavioral/Psych: no anxiety    PEx  Temp:  [97.9 °F (36.6 °C)-98.7 °F (37.1 °C)]   Pulse:  []   Resp:  [11-18]   BP: ()/(56-89)   SpO2:  [93 %-98 %]     Intake/Output Summary (Last 24 hours) at 03/20/18 1654  Last data filed at 03/19/18 2125   Gross per 24 hour   Intake           1925.5 ml   Output             1100 ml   Net            825.5 ml     General Appearance: no acute distress   Heart: regular rate and rhythm,  Barely palpable right DP, Non-palpable pulse on LLE below femoral artery.  Respiratory: Normal respiratory effort, no crackles   Abdomen: Soft, non-tender; bowel sounds active  Skin: intact. Cyanosis of toes, left foot  Neurologic:  No focal weakness  Mental status: Alert, oriented x 4, affect appropriate   Ext: Bilateral toes cool touch L>R.       Recent Labs  Lab 03/19/18  0453 03/20/18  0311 03/20/18  1453   WBC 8.89 9.65 11.20   HGB 12.1* 11.7* 13.0*   HCT 34.9* 32.9* 37.9*   * 337 343       Recent Labs  Lab 03/19/18  0453 03/20/18  0311   * 133*   K 4.3 4.1   CL 95 100   CO2 25 26   BUN 16 11   CREATININE 1.2 0.7   * 102   CALCIUM 9.6 10.0   MG 1.8 1.7   PHOS 4.5 3.3       Recent Labs  Lab 03/19/18  0453 03/20/18  0311   ALBUMIN 3.0* 2.8*      Scheduled Meds:   amitriptyline  50 mg Oral QHS    aspirin  81 mg Oral Daily    atorvastatin  40 mg Oral QHS    cilostazol  100 mg Oral BID    gabapentin  800 mg Oral QHS    lidocaine   Topical (Top) QID    lisinopril  5 mg Oral Daily     metoprolol tartrate  25 mg Oral BID    nicotine  1 patch Transdermal Daily    pregabalin  100 mg Oral TID    Banana bag   Intravenous Once    sodium chloride 0.9%  250 mL Intravenous Once    ticagrelor  90 mg Oral BID    vancomycin (VANCOCIN) IVPB  1,000 mg Intravenous Q12H       Active Hospital Problems    Diagnosis  POA    *Critical lower limb ischemia [I99.8]  Yes     Chronic    Cellulitis of left lower extremity [L03.116]  Yes    Atherosclerosis of native artery of left lower extremity with intermittent claudication [I70.212]  Yes    Ischemic cardiomyopathy [I25.5]  Yes    Neuropathy due to peripheral vascular disease [I73.9, G63]  Yes    Chronic systolic heart failure [I50.22]  Yes    PAD (peripheral artery disease) [I73.9]  Yes    Tobacco abuse [Z72.0]  Yes      Resolved Hospital Problems    Diagnosis Date Resolved POA   No resolved problems to display.       Overview 48 yo male admitted for left leg critical limb ischemia.    Left foot cellulitis  Treated with vancomycin 15 mg/kg BID      Critical left leg ischemia  Peripheral artery disease  Cardiology consulted in ER. Recommended urgent angiogram. Continue ASA 81 mg daily, atorvastatin 40 mg daily, cilostazol 100 mg BID, ticagrelor 90 mg BID. Started heparin drip. Underwent angiogram 3/19 that showed occlusion of aorta-fem bypass at distal anastamosis. Unable to treat due to patient movement. Plan for antegrade approach 3/20/2018 under general anesthesia.     Chronic left leg ischemic pain  Neuropathy due to peripheral vascular disease   Increased amitriptyline to 50 mg qhs, started lidocaine gel to bilateral feet but patient did not tolerate. Continue gabapentin 800 mg qhs and pregabalin 300 mg TID. Percocet 10/325 ii po q6h prn and hydromorphone 2 mg q4h prn pain. Added IV Dilaudid.     Ischemic cardiomayopathy s/p ICD, s/p PCI  Chronic systolic heart failure LVEF 30-35%  Continue atorvastatin 40 mg qhs, lisinopril 5 mg daily, metoprolol 25  mg BID, ASA 81 mg daily and ticagrelor 90 mg BID     Tobacco abuse   Has not smoked for few months. Continue nicotine patch 21 mg once daily     DVT PPx: heparin drip    Discharge plan and follow up  Home with no needs      Bree Banegas MD

## 2018-03-20 NOTE — ASSESSMENT & PLAN NOTE
Mr. Byrd is a 47 year old man with CAD s/p PCI, LVEF 30% s/p ICD, complicated and extensive PAD s/p aortofem bypass and multiple stents on DAPT who presents with what appears to be CLI of his left foot of unclear duration. This is not an acute presentation as these symtpoms have been ongoing for months and have worsened in the past 2 weeks. He has dopplerable L popliteral pulse but nothing distally, consistent with prior exams. He does have an indurated area on his forefoot that could be consistent with underlying cellulitis.     --Underwent angiogram 3/19 that showed occlusion of aorta-fem bypass at distal anastamosis. Unable to treat due to patient movement.   --s/p cath directed tPA to occluded Lt SFA under gen anesthesia  --continuing IV vancomycin for possible underlying cellulitis  --blood cx ordered  --checking INR, fibrinogen, CBC q 6h while thrombolysis is infusing  --continue DAPT + home meds

## 2018-03-20 NOTE — ASSESSMENT & PLAN NOTE
-unclear date of last drink  -will investiagate further when pt more awake  -CIWA -AR protocol for symptom targeted therapy

## 2018-03-20 NOTE — NURSING TRANSFER
Nursing Transfer Note      3/20/2018     Transfer To: [id number]    Transfer via stretcher    Transfer with cardiac monitoring    Transported by RN    Medicines sent: IV fluids    Chart send with patient: Yes    Notified: father    Patient reassessed at: 3/20/18 see flowsheets

## 2018-03-20 NOTE — PROCEDURES
"    Post Cath Note  Referring Physician: Bree Banegas MD  Procedure: Pta-Peripheral (Left)       Access: Left CFA    See full report for further details    Intervention:   Left antegrade placement of Jose catheter 30cm into left SFA/Pop      Post Cath Exam:   /89   Pulse 105   Temp 97.9 °F (36.6 °C) (Temporal)   Resp 15   Ht 5' 7" (1.702 m)   Wt 67.8 kg (149 lb 7.6 oz)   SpO2 (!) 93%   BMI 23.41 kg/m²   No unusual pain, hematoma, thrill or bruit at vascular access site.      Recommendations:     Critical lower limb ischemia     Mr. Byrd is a 47 year old man with CAD s/p PCI, LVEF 30% s/p ICD, complicated and extensive PAD s/p aortofem bypass and multiple stents on DAPT who presents with what appears to be CLI of his left foot of unclear duration       - Routine post-cath care  - Continue tPA @ 1 mg/hr  - continue Hep gtt with protocol following  - In case of LLExt pain - assess for compartment syndrome -> vascular surgery consult  - Will reassess once distal pulses appear in the LLExt regarding repeat angio    Signed:  Maged Andrews MD  Cardiology Fellow, PGY-7  Pager: [phone]  3/20/2018 4:02 PM  "

## 2018-03-20 NOTE — SUBJECTIVE & OBJECTIVE
Past Medical History:   Diagnosis Date    AICD (automatic cardioverter/defibrillator) present     CHF (congestive heart failure)     Coronary artery disease     Encounter for blood transfusion     Hyperlipemia     Hypertension     MI (myocardial infarction)     Neuropathy     PAD (peripheral artery disease)     PVD (peripheral vascular disease)        Past Surgical History:   Procedure Laterality Date    AMPUTATION Right     great toe    BYBPASS GRAFT AORTOBIUFEMORAL      CARDIAC DEFIBRILLATOR PLACEMENT      coronary stents      EXPLORATION AND EVaCUATION OF HEMATOMA OF UPPER EXTREMITY Left     KNEE ARTHROPLASTY Left     VASCULAR SURGERY      fem-pop bypass       Review of patient's allergies indicates:  No Known Allergies    No current facility-administered medications on file prior to encounter.      Current Outpatient Prescriptions on File Prior to Encounter   Medication Sig    amitriptyline (ELAVIL) 25 MG tablet Take 1 tablet (25 mg total) by mouth every evening.    aspirin (ECOTRIN) 81 MG EC tablet Take 81 mg by mouth once daily.    atorvastatin (LIPITOR) 40 MG tablet Take 40 mg by mouth every evening.    cilostazol (PLETAL) 50 MG Tab Take 2 tablets (100 mg total) by mouth 2 (two) times daily.    cyclobenzaprine (FLEXERIL) 10 MG tablet Take 10 mg by mouth 3 (three) times daily as needed for Muscle spasms.    gabapentin (NEURONTIN) 800 MG tablet Take 800 mg by mouth every evening.    hydrocodone-acetaminophen 7.5-325mg (NORCO) 7.5-325 mg per tablet Take 1 tablet by mouth every 6 (six) hours as needed.    lisinopril (PRINIVIL,ZESTRIL) 5 MG tablet Take 1 tablet (5 mg total) by mouth once daily.    metoprolol tartrate (LOPRESSOR) 25 MG tablet Take 12.5 mg by mouth 2 (two) times daily.    nicotine (NICODERM CQ) 21 mg/24 hr Place 1 patch onto the skin once daily. Down titrating as directed    pregabalin (LYRICA) 100 MG capsule Take 1 capsule (100 mg total) by mouth 3 (three) times daily.     ticagrelor (BRILINTA) 90 mg tablet Take 90 mg by mouth 2 (two) times daily.    zolpidem (AMBIEN) 5 MG Tab Take 5 mg by mouth nightly as needed for Insomnia.    nitroGLYCERIN (NITROSTAT) 0.4 MG SL tablet Place 0.4 mg under the tongue every 5 (five) minutes as needed for Chest pain.     Family History     None        Social History Main Topics    Smoking status: Former Smoker     Packs/day: 0.50     Quit date: 02/2018    Smokeless tobacco: Former User     Types: Chew     Quit date: 8/2/1980      Comment: Uses nicotine gum and nicotine patches    Alcohol use Yes      Comment: rarely    Drug use: No    Sexual activity: Yes     Partners: Female     Review of Systems   Unable to perform ROS: acuity of condition     Objective:     Vital Signs (Most Recent):  Temp: 98.1 °F (36.7 °C) (03/20/18 1700)  Pulse: 106 (03/20/18 1730)  Resp: 12 (03/20/18 1730)  BP: 122/60 (03/20/18 1730)  SpO2: 96 % (03/20/18 1730) Vital Signs (24h Range):  Temp:  [97.9 °F (36.6 °C)-98.7 °F (37.1 °C)] 98.1 °F (36.7 °C)  Pulse:  [] 106  Resp:  [11-18] 12  SpO2:  [93 %-98 %] 96 %  BP: ()/(56-89) 122/60     Weight: 67.8 kg (149 lb 7.6 oz)  Body mass index is 23.41 kg/m².    SpO2: 96 %  O2 Device (Oxygen Therapy): room air      Intake/Output Summary (Last 24 hours) at 03/20/18 1805  Last data filed at 03/19/18 2125   Gross per 24 hour   Intake                0 ml   Output              400 ml   Net             -400 ml       Lines/Drains/Airways     Peripheral Intravenous Line                 Peripheral IV - Single Lumen 03/20/18 1439 less than 1 day         Peripheral IV - Single Lumen 03/20/18 1622 Right Antecubital less than 1 day                Physical Exam   Constitutional: He appears well-developed and well-nourished. No distress.   HENT:   Head: Normocephalic and atraumatic.   Eyes: Conjunctivae are normal. Pupils are equal, round, and reactive to light.   Neck: Normal range of motion. Neck supple.   Cardiovascular: Normal  rate and regular rhythm.    Pulmonary/Chest: Effort normal and breath sounds normal. No respiratory distress. He has no wheezes.   Abdominal: Soft. Bowel sounds are normal. He exhibits no distension.   Musculoskeletal:   Catheter site at left groin without hematoma or bleeding. Leg in brace. Lt foot dusky in color.   Neurological:   Drowsy, but arousable. Not coherent.   Skin: He is not diaphoretic.       Significant Labs: All pertinent lab results from the last 24 hours have been reviewed.    Significant Imaging: reviewed

## 2018-03-21 PROBLEM — F10.931 ALCOHOL WITHDRAWAL SYNDROME, WITH DELIRIUM: Status: ACTIVE | Noted: 2018-01-01

## 2018-03-21 NOTE — NURSING
Pt admitted from PACU post cath lab procedure. Pt noted to be admitted with a blue left foot, no pulse. Pt with sheath to left femoral with TPA infusing as ordered. Heparin infusing through PIV left FA. Pt noted disoriented with altered speech, otherwise no other neuro changes. Dr. Nance notified and came to bedside. MD stated that the patient is going through withdraws. CIWA scores as ordered. Care reported off.

## 2018-03-21 NOTE — ASSESSMENT & PLAN NOTE
-actively withdrawing, possible DT- tachycardic, AMS/ agitation  -s/p 1 banana bag will give another banana bag today, IV thiamine  -unclear date of last drink  -will investiagate further when pt more responsive  -[facility] -[facility] protocol for symptom targeted therapy for PRN lorazepam  -started on scheduled librium 25mg PO q8h, uptitrate as needed  -concern pt may pull at lines  -soft restraints as needed

## 2018-03-21 NOTE — NURSING
Cards fellow notified of pt's urinary retention. Bladder scan read >200. Pt says he needs to pee but can't. 1L banana bag almost done infusing. In and out cath ordered.

## 2018-03-21 NOTE — CARE UPDATE
Called patient's father in an attempt to get son's telephone number and update him in his father's care, however, he did not .

## 2018-03-21 NOTE — ASSESSMENT & PLAN NOTE
Mr. Byrd is a 47 year old man with CAD s/p PCI, LVEF 30% s/p ICD, complicated and extensive PAD s/p aortofem bypass and multiple stents on DAPT who presents with what appears to be CLI of his left foot of unclear duration. This is not an acute presentation as these symtpoms have been ongoing for months and have worsened in the past 2 weeks.     --Underwent angiogram 3/19 that showed occlusion of aorta-fem bypass at distal anastamosis. Unable to treat due to patient movement.   --s/p cath directed tPA to occluded Lt SFA under gen anesthesia  --continue thrombolysis infusion while pt remains w pulseless lt ext; f/up int cards recs  --continuing IV vancomycin for possible underlying cellulitis  --blood cx ordered  --checking INR, fibrinogen, CBC q 6h while thrombolysis is infusing  --continue DAPT, hep gtt   --stop cilostazol as is contraindicated in HFpEF

## 2018-03-21 NOTE — ASSESSMENT & PLAN NOTE
Critical lower limb ischemia     Mr. Byrd is a 47 year old man with CAD s/p PCI, LVEF 30% s/p ICD, complicated and extensive PAD s/p aortofem bypass and multiple stents on DAPT who presents with what appears to be CLI of his left foot of unclear duration         - Continue tPA @ 1 mg/hr. He has intermittent LLExt PT monophasic signal but absent DP. Will recommend for 24 hrs of tPA to see if his pulses can be more persistent.   - continue Hep gtt @ 700 units/hr  - In case of LLExt pain - assess for compartment syndrome -> vascular surgery consult  - Will schedule him for LLExt angiogram for tomorrow. Consents for procedure obtained from pt`s father considering his ETOH withdrawal and wax/wan mental status           LLE Angio +/- PTA via L CFA antegrade access    The risks (including death, heart attack, limb loss, paralysis, emergency surgery, bleeding, renal failure, and stroke), the potential benefits of the procedure, and the alternatives to the procedure, were discussed in detail and accepted by the patient. All questions were answered. Patient agrees to proceed.

## 2018-03-21 NOTE — SUBJECTIVE & OBJECTIVE
Interval History: Patient with altered mentation attributed to his ETOH withdrawal. Being managed with IV lorazepam q 2 hrs.     Objective:     Vital Signs (Most Recent):  Temp: 99.1 °F (37.3 °C) (03/21/18 1100)  Pulse: 96 (03/21/18 0930)  Resp: 16 (03/21/18 0930)  BP: 108/60 (03/21/18 0900)  SpO2: 98 % (03/21/18 0930) Vital Signs (24h Range):  Temp:  [97.9 °F (36.6 °C)-99.8 °F (37.7 °C)] 99.1 °F (37.3 °C)  Pulse:  [] 96  Resp:  [11-30] 16  SpO2:  [92 %-100 %] 98 %  BP: ()/(51-89) 108/60     Weight: 67.8 kg (149 lb 7.6 oz)  Body mass index is 23.41 kg/m².    SpO2: 98 %  O2 Device (Oxygen Therapy): room air      Intake/Output Summary (Last 24 hours) at 03/21/18 1140  Last data filed at 03/21/18 1129   Gross per 24 hour   Intake          1892.87 ml   Output             2350 ml   Net          -457.13 ml       Lines/Drains/Airways     Drain                 Urethral Catheter 03/21/18 1128 less than 1 day          Peripheral Intravenous Line                 Peripheral IV - Single Lumen 03/20/18 1439 less than 1 day         Peripheral IV - Single Lumen 03/20/18 1622 Right Antecubital less than 1 day                Physical Exam   Constitutional: He appears distressed.   Cardiovascular: Tachycardia present.    Neurological:   Drowsy but arousable

## 2018-03-21 NOTE — ANESTHESIA POSTPROCEDURE EVALUATION
"Anesthesia Post Evaluation    Patient: Leonardo Byrd    Procedure(s) Performed: Procedure(s) (LRB):  Pta-Peripheral (Left)    Final Anesthesia Type: MAC  Patient location during evaluation: PACU  Patient participation: Yes- Able to Participate  Level of consciousness: awake and alert  Post-procedure vital signs: reviewed and stable  Pain management: adequate  Airway patency: patent  PONV status at discharge: No PONV  Anesthetic complications: no      Cardiovascular status: blood pressure returned to baseline  Respiratory status: spontaneous ventilation and room air  Hydration status: euvolemic  Follow-up not needed.        Visit Vitals  BP (!) 94/58 (BP Location: Left arm, Patient Position: Lying)   Pulse 97   Temp 36.9 °C (98.5 °F) (Oral)   Resp 15   Ht 5' 7" (1.702 m)   Wt 67.8 kg (149 lb 7.6 oz)   SpO2 96%   BMI 23.41 kg/m²       Pain/Desiree Score: Pain Assessment Performed: Yes (3/21/2018  5:00 AM)  Presence of Pain: complains of pain/discomfort (3/21/2018  5:00 AM)  Pain Rating Prior to Med Admin: 9 (3/21/2018  4:13 AM)  Pain Rating Post Med Admin: 5 (3/20/2018  5:00 PM)  Desiree Score: 10 (3/20/2018  2:15 PM)      "

## 2018-03-21 NOTE — PROGRESS NOTES
[facility]  Cardiology  Progress Note    Patient Name: Leonardo Byrd  MRN: [id number]  Admission Date: 3/18/2018  Hospital Length of Stay: 3 days  Code Status: Full Code   Attending Physician: Guillermo Archer MD   Primary Care Physician: Indio Aquino MD  Expected Discharge Date: 3/22/2018  Principal Problem:Critical lower limb ischemia    Subjective:     Hospital Course:   Admitted to CCU 3/20 after cath directed tPA was performed by int cards under general anesthesia. Also with DTs requiring scheduled benzos. On day 2 Lt lower extremity remained pulseless, so thrombolysis still infusing.On day 4 of vanc for possible lt foot cellulitis. Transitioned to clinda IV (unable to take PO safely).    Review of Systems   Unable to perform ROS: acuity of condition     Objective:     Vital Signs (Most Recent):  Temp: 99.1 °F (37.3 °C) (03/21/18 1100)  Pulse: 96 (03/21/18 0930)  Resp: 16 (03/21/18 0930)  BP: 108/60 (03/21/18 0900)  SpO2: 98 % (03/21/18 0930) Vital Signs (24h Range):  Temp:  [97.9 °F (36.6 °C)-99.8 °F (37.7 °C)] 99.1 °F (37.3 °C)  Pulse:  [] 96  Resp:  [11-30] 16  SpO2:  [92 %-100 %] 98 %  BP: ()/(51-89) 108/60     Weight: 67.8 kg (149 lb 7.6 oz)  Body mass index is 23.41 kg/m².    SpO2: 98 %  O2 Device (Oxygen Therapy): room air      Intake/Output Summary (Last 24 hours) at 03/21/18 1132  Last data filed at 03/21/18 1129   Gross per 24 hour   Intake          1892.87 ml   Output             2350 ml   Net          -457.13 ml       Lines/Drains/Airways     Drain                 Urethral Catheter 03/21/18 1128 less than 1 day          Peripheral Intravenous Line                 Peripheral IV - Single Lumen 03/20/18 1439 less than 1 day         Peripheral IV - Single Lumen 03/20/18 1622 Right Antecubital less than 1 day                Physical Exam   Constitutional: He appears well-developed and well-nourished. No distress.   HENT:   Head: Normocephalic and atraumatic.    Eyes: Conjunctivae are normal. Pupils are equal, round, and reactive to light.   Neck: Normal range of motion. Neck supple.   Cardiovascular: Normal rate and regular rhythm.    Pulmonary/Chest: Effort normal and breath sounds normal. No respiratory distress. He has no wheezes.   Abdominal: Soft. Bowel sounds are normal. He exhibits no distension.   Musculoskeletal:   Catheter site at left groin without hematoma or bleeding. Lt leg eg in brace. Lt foot dusky in color and cool to touch.   Neurological:   Drowsy, but arousable. Not coherent. Intermittently agitated.    Skin: He is not diaphoretic.   Small lesion on dorsum of lt foot without erythema or secretions.   Psychiatric:   Labile affect, currently with active withdrawal.       Significant Labs: All pertinent lab results from the last 24 hours have been reviewed.    Significant Imaging: reviewed    Assessment and Plan:         * Critical lower limb ischemia  PAD (peripheral artery disease)    Mr. Byrd is a 47 year old man with CAD s/p PCI, LVEF 30% s/p ICD, complicated and extensive PAD s/p aortofem bypass and multiple stents on DAPT who presents with what appears to be CLI of his left foot of unclear duration. This is not an acute presentation as these symtpoms have been ongoing for months and have worsened in the past 2 weeks.     --Underwent angiogram 3/19 that showed occlusion of aorta-fem bypass at distal anastamosis. Unable to treat due to patient movement.   --s/p cath directed tPA to occluded Lt SFA under gen anesthesia  --continue thrombolysis infusion while pt remains w pulseless lt ext; f/up int cards recs  --continuing IV vancomycin for possible underlying cellulitis  --blood cx ordered  --checking INR, fibrinogen, CBC q 6h while thrombolysis is infusing  --continue DAPT, hep gtt   --stop cilostazol as is contraindicated in HFpEF            Cellulitis of left lower extremity    --on vanc, will switch to clinda IV (PO when able to tolerate  PO)  --f/up cx        Alcohol abuse  Alcohol withdrawal syndrome, with delirium    -actively withdrawing, possible DT- tachycardic, AMS/ agitation  -s/p 1 banana bag will give another banana bag today, IV thiamine  -unclear date of last drink  -will investiagate further when pt more responsive  -CIWA -AR protocol for symptom targeted therapy for PRN lorazepam  -started on scheduled librium 25mg PO q8h, uptitrate as needed  -concern pt may pull at lines  -soft restraints as needed          Chronic systolic heart failure  Ischemic cardiomyopathy    CAD s/p PCI  LVEF 30% s/p ICD  DAPT, statin, ACE, BB             Tobacco abuse    --nicotine patch            VTE Risk Mitigation         Ordered     heparin 25,000 units in dextrose 5% 250 mL (100 units/mL) infusion (heparin infusion)  Continuous     Route:  Intravenous        03/20/18 1420     IP VTE LOW RISK PATIENT  Once      03/18/18 1711          Patricia Domingo MD  Cardiology  [facility]

## 2018-03-21 NOTE — PROGRESS NOTES
[facility]  Interventional Cardiology  Progress Note    Patient Name: Leonardo Byrd  MRN: [id number]  Admission Date: 3/18/2018  Hospital Length of Stay: 3 days  Code Status: Full Code   Primary Care Physician: Indio Aquino MD  Principal Problem:Critical lower limb ischemia    Subjective:     Interval History: Patient with altered mentation attributed to his ETOH withdrawal. Being managed with IV lorazepam q 2 hrs.     Objective:     Vital Signs (Most Recent):  Temp: 99.1 °F (37.3 °C) (03/21/18 1100)  Pulse: 96 (03/21/18 0930)  Resp: 16 (03/21/18 0930)  BP: 108/60 (03/21/18 0900)  SpO2: 98 % (03/21/18 0930) Vital Signs (24h Range):  Temp:  [97.9 °F (36.6 °C)-99.8 °F (37.7 °C)] 99.1 °F (37.3 °C)  Pulse:  [] 96  Resp:  [11-30] 16  SpO2:  [92 %-100 %] 98 %  BP: ()/(51-89) 108/60     Weight: 67.8 kg (149 lb 7.6 oz)  Body mass index is 23.41 kg/m².    SpO2: 98 %  O2 Device (Oxygen Therapy): room air      Intake/Output Summary (Last 24 hours) at 03/21/18 1140  Last data filed at 03/21/18 1129   Gross per 24 hour   Intake          1892.87 ml   Output             2350 ml   Net          -457.13 ml       Lines/Drains/Airways     Drain                 Urethral Catheter 03/21/18 1128 less than 1 day          Peripheral Intravenous Line                 Peripheral IV - Single Lumen 03/20/18 1439 less than 1 day         Peripheral IV - Single Lumen 03/20/18 1622 Right Antecubital less than 1 day                Physical Exam   Constitutional: He appears distressed.   Cardiovascular: Tachycardia present.    Neurological:   Drowsy but arousable         Assessment and Plan:     Patient is a 47 y.o. male presenting with:  Patient Active Problem List   Diagnosis    Arterial occlusion, lower extremity    Tobacco abuse    Non compliance w medication regimen    PAD (peripheral artery disease)    Hypokalemia    Chronic systolic heart failure    Acute blood loss anemia    Alcohol abuse    Acute  pain of left foot    Neuropathy due to peripheral vascular disease    PVD (peripheral vascular disease)    Critical lower limb ischemia    Cellulitis of left lower extremity    Atherosclerosis of native artery of left lower extremity with intermittent claudication    Ischemic cardiomyopathy       * Critical lower limb ischemia       Critical lower limb ischemia     Mr. Byrd is a 47 year old man with CAD s/p PCI, LVEF 30% s/p ICD, complicated and extensive PAD s/p aortofem bypass and multiple stents on DAPT who presents with what appears to be CLI of his left foot of unclear duration         - Continue tPA @ 1 mg/hr. He has intermittent LLExt PT monophasic signal but absent DP. Will recommend for 24 hrs of tPA to see if his pulses can be more persistent.   - continue Hep gtt @ 700 units/hr  - In case of LLExt pain - assess for compartment syndrome -> vascular surgery consult  - Will schedule him for LLExt angiogram for tomorrow. Consents for procedure obtained from pt`s father considering his ETOH withdrawal and wax/wan mental status           LLE Angio +/- PTA via L CFA antegrade access    The risks (including death, heart attack, limb loss, paralysis, emergency surgery, bleeding, renal failure, and stroke), the potential benefits of the procedure, and the alternatives to the procedure, were discussed in detail and accepted by the patient. All questions were answered. Patient agrees to proceed.              VTE Risk Mitigation         Ordered     heparin 25,000 units in dextrose 5% 250 mL (100 units/mL) infusion (heparin infusion)  Continuous     Route:  Intravenous        03/20/18 1420     IP VTE LOW RISK PATIENT  Once      03/18/18 1711          Maged Andrews MD  Interventional Cardiology  [facility]

## 2018-03-21 NOTE — SUBJECTIVE & OBJECTIVE
Review of Systems   Unable to perform ROS: acuity of condition     Objective:     Vital Signs (Most Recent):  Temp: 99.1 °F (37.3 °C) (03/21/18 1100)  Pulse: 96 (03/21/18 0930)  Resp: 16 (03/21/18 0930)  BP: 108/60 (03/21/18 0900)  SpO2: 98 % (03/21/18 0930) Vital Signs (24h Range):  Temp:  [97.9 °F (36.6 °C)-99.8 °F (37.7 °C)] 99.1 °F (37.3 °C)  Pulse:  [] 96  Resp:  [11-30] 16  SpO2:  [92 %-100 %] 98 %  BP: ()/(51-89) 108/60     Weight: 67.8 kg (149 lb 7.6 oz)  Body mass index is 23.41 kg/m².    SpO2: 98 %  O2 Device (Oxygen Therapy): room air      Intake/Output Summary (Last 24 hours) at 03/21/18 1132  Last data filed at 03/21/18 1129   Gross per 24 hour   Intake          1892.87 ml   Output             2350 ml   Net          -457.13 ml       Lines/Drains/Airways     Drain                 Urethral Catheter 03/21/18 1128 less than 1 day          Peripheral Intravenous Line                 Peripheral IV - Single Lumen 03/20/18 1439 less than 1 day         Peripheral IV - Single Lumen 03/20/18 1622 Right Antecubital less than 1 day                Physical Exam   Constitutional: He appears well-developed and well-nourished. No distress.   HENT:   Head: Normocephalic and atraumatic.   Eyes: Conjunctivae are normal. Pupils are equal, round, and reactive to light.   Neck: Normal range of motion. Neck supple.   Cardiovascular: Normal rate and regular rhythm.    Pulmonary/Chest: Effort normal and breath sounds normal. No respiratory distress. He has no wheezes.   Abdominal: Soft. Bowel sounds are normal. He exhibits no distension.   Musculoskeletal:   Catheter site at left groin without hematoma or bleeding. Lt leg eg in brace. Lt foot dusky in color and cool to touch.   Neurological:   Drowsy, but arousable. Not coherent. Intermittently agitated.    Skin: He is not diaphoretic.   Small lesion on dorsum of lt foot without erythema or secretions.   Psychiatric:   Labile affect, currently with active withdrawal.        Significant Labs: All pertinent lab results from the last 24 hours have been reviewed.    Significant Imaging: reviewed

## 2018-03-21 NOTE — HOSPITAL COURSE
"Admitted to CCU 3/20 after cath directed tPA was performed by int cards under general anesthesia. Also with DTs requiring scheduled benzos. On day 2 Lt lower extremity remained pulseless, so thrombolysis still infusing.On day 4 of vanc for possible lt foot cellulitis. Transitioned to clinda IV (unable to take PO safely). Intubated overnight for airway protection in setting of increasing benzo requirements and worsening agitation and danger of pt pulling line. CXR this AM (3/22) with patchy bilateral infiltrates. Taken to cath lab for possible cath removal. Lt leg then reportedly duskier and with absent pulses. On 3/23 taken back to cath lab. Int cards Recc Podiatry consult for possible L trans-metatarsal amputation. On heparin gtt. 3/24 Wbc trending up, foot color getting darker,paitent extubated, continue heparin. 3/25: US lower ext showing occlusion of distal right superficial femoral, popliteal, anterior tibial, and posterior tibial arteries. No "dopplerable" pulses on rt foot. Interventional cards holding off on on acute intervention currently in setting of RYAN. Will continue to monitor. Cr 1.7 (baseline 0.6). Given 500cc Iv fluids. Started on scheduled librium for alcohol withdrawal, with plans for taper.    -3/26/18 NAEON. Pt. With elevated WBC. On vanc and ceftriaxone. ID consulted. Pain management for BLE ischemia   -3/27: NAEON. BLE pain improved after pain management adjustment. Vascular surgery evaluated pt and planning for Right AKA today.   - 3/28 febrile last night. Blood culture sent. WBC elevated then trending down. Complains of BLE pain controlled with pain meds. S/p Right AKA on 3/27/18  -3-29: NAEON. Pain controlled with medications. Vascular and ID on board. Pt. Is high risk of left amputation. May ultimately require amputation on the left as well. Continue on vanc and Unasyn for now   - 3/30: NAEON. Pt afebrile. BLE pain with controlled with pain meds. Today abdomen slightly distended. CT " abdomen ordered stat. If pt decompensate, will broaden abx from Unasyn to zosyn   - 4/1: NAEON. Pt afebrile. BLE pain controlled with pain meds. WBC trending down after switching Unasyn to Zosyn. Left toes gangrene continue to demarcates. Vascular following, likely will need amputation. received 1 PRBC for Hg 6.9

## 2018-03-21 NOTE — TRANSFER OF CARE
"Anesthesia Transfer of Care Note    Patient: Leonardo Byrd    Procedure(s) Performed: Procedure(s) (LRB):  Pta-Peripheral (Left)    Patient location: PACU    Anesthesia Type: MAC    Transport from OR: Transported from OR on room air with adequate spontaneous ventilation    Post pain: adequate analgesia    Post assessment: no apparent anesthetic complications    Post vital signs: stable    Level of consciousness: awake and alert    Nausea/Vomiting: no nausea/vomiting    Complications: none    Transfer of care protocol was followed      Last vitals:   Visit Vitals  BP (!) 108/58 (BP Location: Left arm, Patient Position: Lying)   Pulse 89   Temp 37 °C (98.6 °F) (Oral)   Resp 19   Ht 5' 7" (1.702 m)   Wt 67.8 kg (149 lb 7.6 oz)   SpO2 98%   BMI 23.41 kg/m²     "

## 2018-03-21 NOTE — ASSESSMENT & PLAN NOTE
"Problem: Discharge Planning  Goal: Discharge Planning (Adult, OB, Behavioral, Peds)  Outcome: No Change  PRIMARY DIAGNOSIS: Hyponatremia, Abd pain  OUTPATIENT/OBSERVATION GOALS TO BE MET BEFORE DISCHARGE:  1. Pain Status: Improved but still requiring IV narcotics.     2. Return to near baseline physical activity: No     3. Cleared for discharge by consultants (if involved): No     Pt A&Ox 4, BP elevated otherwise VSS. Pt states \"medium\" pain, received dilaudid w/ good relief, pt did have sm emesis x1 after receiving dilaudid declined antiemetics and denied nausea after emesis. Pt does not speak english, daughter and son and law in room and is able to translate,  requested for morning. Pt states numbness in extremities, CMS intact. Need urine and stool sample. Pt SBA d/t generalized weakness and pain. Pt NPO for GI consult in AM. Pt sodium was 121, provider aware,pt is receiving NS will recheck in AM. Will continue to monitor.     Discharge Planner Nurse   Safe discharge environment identified: Yes  Barriers to discharge: Yes, GI consult, labs       Entered by: Bryan Guzman 07/22/2017 2:20 AM     Please review provider order for any additional goals.   Nurse to notify provider when observation goals have been met and patient is ready for discharge.      " -

## 2018-03-21 NOTE — PLAN OF CARE
Problem: Patient Care Overview  Goal: Plan of Care Review  Outcome: Ongoing (interventions implemented as appropriate)  POC reviewed with patient. Pt not showing signs of understanding. Questions and concerns addressed.   Pt not cooperating/following directions. Hard to make patient lay flat.  Pt had slurred speech and disorientation all during the night. Mild hallucinations observed.  Mild bleeding at sheath site noted; no hematoma. Pulses still absent on L lower extremity.  No acute events overnight. Pt progressing toward goals. Will continue to monitor. See flow sheets for full assessment and VS info

## 2018-03-22 PROBLEM — J96.01 ACUTE RESPIRATORY FAILURE WITH HYPOXIA: Status: ACTIVE | Noted: 2018-01-01

## 2018-03-22 NOTE — PROGRESS NOTES
Interventional cardiology paged per  due to pt not having PT or DP pulses in left foot. popliteal pulse present.

## 2018-03-22 NOTE — PLAN OF CARE
Problem: Patient Care Overview  Goal: Plan of Care Review  Outcome: Ongoing (interventions implemented as appropriate)  Pt noted with increased CIWA scores. Pt medications and plan has been changed today in relation to patient increased agitation. Pt sheath this am noted with some bloody drainage at the site. Intervention and the Cards team are aware. There has been no increase in drainage during the shift. Pt noted to have better coloring in the left foot. It is not as blue as it was yesterday. Pt now has a doppler pulse in the post tib, but still no pulse in the DP. Otherwise, vitals as charted. Someone called today stating that they were his son. Pt emergency contact is listed as his father. Dr. Shay was made aware and asked to call his father for his sons number to update him. MD stated that she would call. Otherwise, no family seen nor password set up for information. Pt cont to be redirected and continues to be confused. Reported care off.

## 2018-03-22 NOTE — PROGRESS NOTES
[id number]  Connected to bedside monitor - cardiac monitoring and continuous pulse oximetry applied. Vent and all continuous medications infusing.  Call bell within reach, side rails raised x 2, bed locked and in lowest position. Primary service notified of patient arrival. Will continue to monitor.

## 2018-03-22 NOTE — ANESTHESIA PREPROCEDURE EVALUATION
[facility]  Anesthesia Pre-Operative Evaluation         Patient Name: Leonardo Byrd  YOB: 1970  MRN: [id number]    SUBJECTIVE:     Pre-operative evaluation for Procedure(s) (LRB):  Angiogram Extremity Unilateral (N/A Groin)     03/22/2018    Leonardo Byrd is a 47 y.o. male w/ a significant PMHx of ICM (LVEF 30-35%) s/p ICD, CAD s/p PCI 2009, HTN, HLD, current smoker, PAD (s/p aortobifemoral bypass, L SFA and pop occlusion s/precent R SFA and R pop) presenting with critical limb ischemia of LLE. S/P PTA and now is scheduled for the above procedure.    Patient developed respiratory failure and was intubated overnight. Vital signs stable    LDA:   18 G PIV Lt arm  20 G PIV R AC  ETT 8.0  OG tube    Prev airway:     Drips:   precedex  Heparin  TPA    Patient Active Problem List   Diagnosis    Arterial occlusion, lower extremity    Tobacco abuse    Non compliance w medication regimen    PAD (peripheral artery disease)    Hypokalemia    Chronic systolic heart failure    Acute blood loss anemia    Alcohol abuse    Acute pain of left foot    Neuropathy due to peripheral vascular disease    PVD (peripheral vascular disease)    Critical lower limb ischemia    Cellulitis of left lower extremity    Atherosclerosis of native artery of left lower extremity with intermittent claudication    Ischemic cardiomyopathy    Alcohol withdrawal syndrome, with delirium       Review of patient's allergies indicates:  No Known Allergies    Current Inpatient Medications:      Current Facility-Administered Medications on File Prior to Visit   Medication Dose Route Frequency Provider Last Rate Last Dose    0.9%  NaCl infusion   Intra-Catheter Continuous Neo Dhaliwal MD 30 mL/hr at 03/22/18 0800      0.9%  NaCl infusion   Intravenous Continuous Darin Vieira Jr., MD        alteplase (CATHFLO ACTIVASE) 12.5 mg in sodium chloride 0.9% 250 mL infusion  1 mg/hr Intra-Catheter Continuous eNo  MICHELLE Dhaliwal MD 20 mL/hr at 03/22/18 0800 1 mg/hr at 03/22/18 0800    amitriptyline tablet 50 mg  50 mg Oral QHS Kayla Toth MD   50 mg at 03/21/18 2253    aspirin EC tablet 81 mg  81 mg Oral Daily Kayla Toth MD   81 mg at 03/21/18 0856    atorvastatin tablet 40 mg  40 mg Oral QHS Kayla Toth MD   40 mg at 03/21/18 2252    clindamycin 600 MG/50 ML D5W 600 mg/50 mL IVPB 600 mg  600 mg Intravenous Q8H Patricia Domingo  mL/hr at 03/22/18 0143 600 mg at 03/22/18 0143    cyclobenzaprine tablet 10 mg  10 mg Oral TID PRN Kayla Toth MD   10 mg at 03/20/18 0728    dexmedetomidine (PRECEDEX) 400mcg/100mL 0.9% NaCL infusion  0.2 mcg/kg/hr (Adjusted) Intravenous Continuous Brian Oswald MD 23.4 mL/hr at 03/22/18 0800 1.401 mcg/kg/hr at 03/22/18 0800    etomidate (AMIDATE) 2 mg/mL injection             fentaNYL 2500 mcg in 0.9% sodium chloride 250 mL infusion premix (titrating)   Intravenous Continuous Brian Oswald MD        folic acid-vit B6-vit B12 2.5-25-2 mg tablet 1 tablet  1 tablet Per OG tube Daily Patricia Domingo MD        gabapentin capsule 800 mg  800 mg Oral QHS Kayla Toth MD   800 mg at 03/21/18 2252    heparin 25,000 units in dextrose 5% 250 mL (100 units/mL) infusion (heparin infusion)  800 Units/hr Intravenous Continuous Neo Dhaliwal MD 8 mL/hr at 03/22/18 0800 800 Units/hr at 03/22/18 0800    HYDROmorphone injection 1 mg  1 mg Intravenous Q4H PRN Jakob Banegas MD   1 mg at 03/20/18 1453    lidocaine 5 % ointment   Topical (Top) QID Kayla Toth MD        lisinopril tablet 5 mg  5 mg Oral Daily Kayla Toth MD   5 mg at 03/20/18 0801    lorazepam injection 1 mg  1 mg Intravenous Q15 Min PRN Guillermo Archer MD   1 mg at 03/21/18 2058    lorazepam injection 2 mg  2 mg Intravenous Q1H PRN Guillermo Archer MD   2 mg at 03/21/18 2121    magnesium oxide tablet 400 mg  400 mg Per OG tube Once Patricia Domingo MD         metoprolol injection 2.5 mg  2.5 mg Intravenous Q6H Patricia Domingo MD   2.5 mg at 03/21/18 1800    nicotine 21 mg/24 hr 1 patch  1 patch Transdermal Daily Patricia Domingo MD   1 patch at 03/21/18 0915    nitroGLYCERIN SL tablet 0.4 mg  0.4 mg Sublingual Q5 Min PRN Kayla Toth MD        oxyCODONE-acetaminophen  mg per tablet 1 tablet  1 tablet Oral Q4H PRN Patricia Domingo MD        oxyCODONE-acetaminophen  mg per tablet 2 tablet  2 tablet Oral Q4H PRN Patricia Domingo MD        phenylephrine HCl in 0.9% NaCl 1 mg/10 mL (100 mcg/mL) syringe             potassium chloride 10 mEq in 100 mL IVPB  10 mEq Intravenous Q1H Patricia Domingo MD        pregabalin capsule 100 mg  100 mg Oral TID Kayla Toth MD   100 mg at 03/21/18 2252    propofol (DIPRIVAN) 10 mg/mL infusion             propofol (DIPRIVAN) 10 mg/mL infusion             propofol (DIPRIVAN) 10 mg/mL infusion  5 mcg/kg/min Intravenous Continuous Brian Oswald MD 12.2 mL/hr at 03/22/18 0800 29.99 mcg/kg/min at 03/22/18 0800    rocuronium (ZEMURON) 10 mg/mL injection             sodium chloride 0.9% flush 3 mL  3 mL Intravenous PRN Patricia Domingo MD        succinylcholine injection 120 mg  120 mg Intravenous Once Nayla Newton MD        thiamine tablet 100 mg  100 mg Per OG tube Daily Patricia Domingo MD        ticagrelor tablet 90 mg  90 mg Oral BID Kayla Toth MD   90 mg at 03/21/18 2252    zolpidem tablet 5 mg  5 mg Oral Nightly PRN Kayla Toth MD   5 mg at 03/19/18 2127     Current Outpatient Prescriptions on File Prior to Visit   Medication Sig Dispense Refill    amitriptyline (ELAVIL) 25 MG tablet Take 1 tablet (25 mg total) by mouth every evening. 30 tablet 11    aspirin (ECOTRIN) 81 MG EC tablet Take 81 mg by mouth once daily.      atorvastatin (LIPITOR) 40 MG tablet Take 40 mg by mouth every evening.      cilostazol (PLETAL) 50 MG Tab Take 2  tablets (100 mg total) by mouth 2 (two) times daily.      cyclobenzaprine (FLEXERIL) 10 MG tablet Take 10 mg by mouth 3 (three) times daily as needed for Muscle spasms.      gabapentin (NEURONTIN) 800 MG tablet Take 800 mg by mouth every evening.      hydrocodone-acetaminophen 7.5-325mg (NORCO) 7.5-325 mg per tablet Take 1 tablet by mouth every 6 (six) hours as needed. 21 tablet 0    lisinopril (PRINIVIL,ZESTRIL) 5 MG tablet Take 1 tablet (5 mg total) by mouth once daily. 90 tablet 3    metoprolol tartrate (LOPRESSOR) 25 MG tablet Take 12.5 mg by mouth 2 (two) times daily.      nicotine (NICODERM CQ) 21 mg/24 hr Place 1 patch onto the skin once daily. Down titrating as directed  0    nitroGLYCERIN (NITROSTAT) 0.4 MG SL tablet Place 0.4 mg under the tongue every 5 (five) minutes as needed for Chest pain.      pregabalin (LYRICA) 100 MG capsule Take 1 capsule (100 mg total) by mouth 3 (three) times daily. 90 capsule 6    ticagrelor (BRILINTA) 90 mg tablet Take 90 mg by mouth 2 (two) times daily.      zolpidem (AMBIEN) 5 MG Tab Take 5 mg by mouth nightly as needed for Insomnia.         Past Surgical History:   Procedure Laterality Date    AMPUTATION Right     great toe    BYBPASS GRAFT AORTOBIUFEMORAL      CARDIAC DEFIBRILLATOR PLACEMENT      coronary stents      EXPLORATION AND EVaCUATION OF HEMATOMA OF UPPER EXTREMITY Left     KNEE ARTHROPLASTY Left     VASCULAR SURGERY      fem-pop bypass       Social History     Social History    Marital status: Single     Spouse name: N/A    Number of children: N/A    Years of education: N/A     Occupational History    Not on file.     Social History Main Topics    Smoking status: Former Smoker     Packs/day: 0.50     Quit date: 02/2018    Smokeless tobacco: Former User     Types: Chew     Quit date: 8/2/1980      Comment: Uses nicotine gum and nicotine patches    Alcohol use Yes      Comment: rarely    Drug use: No    Sexual activity: Yes     Partners:  Female     Other Topics Concern    Not on file     Social History Narrative    No narrative on file       OBJECTIVE:     Vital Signs Range (Last 24H):  Temp:  [36.9 °C (98.5 °F)-37.8 °C (100 °F)]   Pulse:  []   Resp:  [10-37]   BP: ()/()   SpO2:  [92 %-100 %]       CBC:   Recent Labs      18   0001  18   0609   WBC  14.15*  10.86   RBC  3.17*  3.28*   HGB  10.3*  10.8*   HCT  29.9*  30.1*   PLT  330  323   MCV  94  92   MCH  32.5*  32.9*   MCHC  34.4  35.9       CMP:   Recent Labs      18   0544  18   0609   NA  132*  135*   K  4.1  3.1*   CL  102  103   CO2  20*  22*   BUN  9  7   CREATININE  0.6  0.6   GLU  84  93   MG  1.5*  1.6   PHOS  3.0  3.4   CALCIUM  8.4*  8.0*   ALBUMIN  2.7*  2.4*   PROT  6.2  5.7*   ALKPHOS  87  90   ALT  16  12   AST  34  31   BILITOT  0.7  0.6       INR:  Recent Labs      18   1745  18   0015  18   0545  18   1238   INR  1.2  1.1  1.1  1.1   APTT  >150.0*  63.1*   --    --        Diagnostic Studies: No relevant studies.    EK17  Vent. Rate : 076 BPM     Atrial Rate : 076 BPM     P-R Int : 154 ms          QRS Dur : 080 ms      QT Int : 418 ms       P-R-T Axes : 021 -10 088 degrees     QTc Int : 470 ms    Normal sinus rhythm  Possible Left atrial enlargement  LVH  T wave abnormality, consider lateral ischemia  Abnormal ECG    2D ECHO:  No results found for this or any previous visit.      ASSESSMENT/PLAN:         Pre-op Assessment         Review of Systems  Anesthesia Hx:  History of prior surgery of interest to airway management or planning:   Social:  Smoker    Cardiovascular:   Hypertension Past MI CAD   CHF        Physical Exam  General:  Well nourished    Airway/Jaw/Neck:  Airway Findings: Mouth Opening: Normal Tongue: Normal  Pre-Existing Airway Tube(s): Oral Endotracheal tube  Size: 8.0  Mallampati: II  Improves to II with phonation.        Eyes/Ears/Nose:  EYES/EARS/NOSE FINDINGS: Normal   Dental:  Dental  Findings:    Chest/Lungs:  Chest/Lungs Findings: Clear to auscultation, Normal Respiratory Rate     Heart/Vascular:  Heart Findings: Rate: Normal  Rhythm: Regular Rhythm  Sounds: Normal     Abdomen:  Abdomen Findings: Normal    Musculoskeletal:  Musculoskeletal Findings: Normal   Skin:  Skin Findings: Normal    Mental Status:  Mental Status Findings: Normal        Anesthesia Plan  Type of Anesthesia, risks & benefits discussed:  Anesthesia Type:  general, MAC  Patient's Preference:   Intra-op Monitoring Plan: standard ASA monitors  Intra-op Monitoring Plan Comments:   Post Op Pain Control Plan: per primary service following discharge from PACU  Post Op Pain Control Plan Comments:   Induction:   IV  Beta Blocker:  Patient is on a Beta-Blocker and has received one dose within the past 24 hours (No further documentation required).       Informed Consent: Patient representative understands risks and agrees with Anesthesia plan.  Questions answered. Anesthesia consent signed with patient representative.  ASA Score: 4     Day of Surgery Review of History & Physical:    H&P update referred to the provider.         Ready For Surgery From Anesthesia Perspective.

## 2018-03-22 NOTE — ADDENDUM NOTE
Addendum  created 03/21/18 2201 by Nayla Newton MD    Anesthesia Intra Blocks edited, Sign clinical note

## 2018-03-22 NOTE — SUBJECTIVE & OBJECTIVE
Interval History: Intubated overnight. Stable on vent. VS now within normal limits. Thrombolysis still infusing at groin site.     Review of Systems   Unable to perform ROS: intubated     Objective:     Vital Signs (Most Recent):  Temp: 98.5 °F (36.9 °C) (03/22/18 0701)  Pulse: 96 (03/22/18 0918)  Resp: (!) 22 (03/22/18 0900)  BP: 99/64 (03/22/18 0900)  SpO2: 100 % (03/22/18 0918) Vital Signs (24h Range):  Temp:  [98.5 °F (36.9 °C)-100 °F (37.8 °C)] 98.5 °F (36.9 °C)  Pulse:  [] 96  Resp:  [10-37] 22  SpO2:  [92 %-100 %] 100 %  BP: ()/() 99/64     Weight: 67.8 kg (149 lb 7.6 oz)  Body mass index is 23.41 kg/m².     SpO2: 100 %  O2 Device (Oxygen Therapy): ventilator      Intake/Output Summary (Last 24 hours) at 03/22/18 1132  Last data filed at 03/22/18 0943   Gross per 24 hour   Intake          3739.56 ml   Output             2900 ml   Net           839.56 ml       Lines/Drains/Airways     Drain                 Urethral Catheter 03/21/18 1128 1 day         NG/OG Tube 03/21/18 2148 Left mouth less than 1 day          Airway                 Airway - Non-Surgical 03/21/18 2146 Endotracheal Tube less than 1 day          Peripheral Intravenous Line                 Peripheral IV - Single Lumen 03/20/18 1622 Right Antecubital 1 day         Peripheral IV - Single Lumen 03/21/18 2050 Left Upper Arm less than 1 day                Physical Exam   Constitutional: He appears well-developed and well-nourished. No distress.   HENT:   Head: Normocephalic and atraumatic.   Eyes: Conjunctivae are normal. Pupils are equal, round, and reactive to light.   Neck: Normal range of motion. Neck supple.   Cardiovascular: Normal rate, regular rhythm and normal heart sounds.    Pulmonary/Chest: Effort normal and breath sounds normal. No respiratory distress. He has no wheezes.   On mechanical vent   Abdominal: Soft. Bowel sounds are normal. He exhibits no distension.   Musculoskeletal: Normal range of motion. He exhibits no  edema.   Left foot warm and with improvement in dusky color. Pulses present.  Left groin with cath at [facility]- no hematoma or bleeding.   Neurological:   sedated   Skin: Skin is warm. He is not diaphoretic.       Significant Labs: All pertinent lab results from the last 24 hours have been reviewed.    Significant Imaging: reviewed

## 2018-03-22 NOTE — PROGRESS NOTES
[facility]  Cardiology  Progress Note    Patient Name: Leonardo Byrd  MRN: [id number]  Admission Date: 3/18/2018  Hospital Length of Stay: 4 days  Code Status: Full Code   Attending Physician: Guillermo Archer MD   Primary Care Physician: Indio Aquino MD  Expected Discharge Date: 3/26/2018  Principal Problem:Critical lower limb ischemia    Subjective:     Hospital Course:   Admitted to CCU 3/20 after cath directed tPA was performed by int cards under general anesthesia. Also with DTs requiring scheduled benzos. On day 2 Lt lower extremity remained pulseless, so thrombolysis still infusing.On day 4 of vanc for possible lt foot cellulitis. Transitioned to clinda IV (unable to take PO safely). Intubated overnight for airway protection in setting of increasing benzo requirements and worsening agitation and danger of pt pulling line. CXR this AM (3/22) with patchy bilateral infiltrates. Taken to cath lab for possible cath removal.     Interval History: Intubated overnight. Stable on vent. VS now within normal limits. Thrombolysis still infusing at groin site.     Review of Systems   Unable to perform ROS: intubated     Objective:     Vital Signs (Most Recent):  Temp: 98.5 °F (36.9 °C) (03/22/18 0701)  Pulse: 96 (03/22/18 0918)  Resp: (!) 22 (03/22/18 0900)  BP: 99/64 (03/22/18 0900)  SpO2: 100 % (03/22/18 0918) Vital Signs (24h Range):  Temp:  [98.5 °F (36.9 °C)-100 °F (37.8 °C)] 98.5 °F (36.9 °C)  Pulse:  [] 96  Resp:  [10-37] 22  SpO2:  [92 %-100 %] 100 %  BP: ()/() 99/64     Weight: 67.8 kg (149 lb 7.6 oz)  Body mass index is 23.41 kg/m².     SpO2: 100 %  O2 Device (Oxygen Therapy): ventilator      Intake/Output Summary (Last 24 hours) at 03/22/18 1132  Last data filed at 03/22/18 0943   Gross per 24 hour   Intake          3739.56 ml   Output             2900 ml   Net           839.56 ml       Lines/Drains/Airways     Drain                 Urethral Catheter 03/21/18 1128 1  day         NG/OG Tube 03/21/18 2148 Left mouth less than 1 day          Airway                 Airway - Non-Surgical 03/21/18 2146 Endotracheal Tube less than 1 day          Peripheral Intravenous Line                 Peripheral IV - Single Lumen 03/20/18 1622 Right Antecubital 1 day         Peripheral IV - Single Lumen 03/21/18 2050 Left Upper Arm less than 1 day                Physical Exam   Constitutional: He appears well-developed and well-nourished. No distress.   HENT:   Head: Normocephalic and atraumatic.   Eyes: Conjunctivae are normal. Pupils are equal, round, and reactive to light.   Neck: Normal range of motion. Neck supple.   Cardiovascular: Normal rate, regular rhythm and normal heart sounds.    Pulmonary/Chest: Effort normal and breath sounds normal. No respiratory distress. He has no wheezes.   On mechanical vent   Abdominal: Soft. Bowel sounds are normal. He exhibits no distension.   Musculoskeletal: Normal range of motion. He exhibits no edema.   Left foot warm and with improvement in dusky color. Pulses present.  Left groin with cath at [facility]- no hematoma or bleeding.   Neurological:   sedated   Skin: Skin is warm. He is not diaphoretic.       Significant Labs: All pertinent lab results from the last 24 hours have been reviewed.    Significant Imaging: reviewed    Assessment and Plan:         * Critical lower limb ischemia  PAD (peripheral artery disease)          Mr. Byrd is a 47 year old man with CAD s/p PCI, LVEF 30% s/p ICD, complicated and extensive PAD s/p aortofem bypass and multiple stents on DAPT who presents with what appears to be CLI of his left foot of unclear duration. This is not an acute presentation as these symtpoms have been ongoing for months and have worsened in the past 2 weeks.     --Underwent angiogram 3/19 that showed occlusion of aorta-fem bypass at distal anastamosis. Unable to treat due to patient movement.   --s/p cath directed tPA to occluded Lt SFA under gen  anesthesia  --continue thrombolysis infusion while pt remains w pulseless lt ext; f/up int cards recs- likely remove cath today  --day 5abx (on IV clinda) possible underlying cellulitis  --blood cx ordered- ngtd  --checking INR, fibrinogen, CBC q 6h while thrombolysis is infusing  --continue DAPT, hep gtt   --stop cilostazol as is contraindicated in HFpEF               Cellulitis of left lower extremity    --day 5 /7 abx -clinda IV (PO when able to tolerate PO)  --f/up cx        Alcohol abuse  Alcohol withdrawal syndrome, with delirium          -actively withdrawing, possible DT- tachycardic, AMS/ agitation  -s/p 1 banana bag will give another banana bag today, IV thiamine  -unclear date of last drink  -now intubated and sedated with normal vital signs on precedex and propofol  -continue to monitor  -will likely need benzo taper        Chronic systolic heart failure  Ischemic cardiomyopathy    LVEF 30% s/p ICD  Continue ACE, BB        Acute respiratory failure with hypoxia  -intubated mainly for airway protection  -now with bilateral infiltrates on CXR-possible pulm edema vs pneumonitis vs PNA ( less likely given  No leukocytosis, fever and is HD stable) vs ARDS  -will give lasix 80IV  And monitor output  -f/up BNP    -        Tobacco abuse    --nicotine patch            VTE Risk Mitigation         Ordered     heparin 25,000 units in dextrose 5% 250 mL (100 units/mL) infusion (heparin infusion)  Continuous     Route:  Intravenous        03/20/18 1420     IP VTE LOW RISK PATIENT  Once      03/18/18 1711          Patricia Domingo MD  Cardiology  [facility]

## 2018-03-22 NOTE — PLAN OF CARE
Problem: Patient Care Overview  Goal: Plan of Care Review  Outcome: Ongoing (interventions implemented as appropriate)  See vital signs and assessments in flowsheets. See below for updates on today's progress.     Pulmonary: vent and intubated rate 16/ 450/fio2 35    Cardiovascular: NSR. BP 's systolic    Neurological: withdraws to pain. PERRLA. Afebrile. Left foot does not have DP or Pt, MD aware.     Gastrointestinal: OG in place. NPO. Smear BM    Genitourinary: [id number]/ hr    Endocrine: normoglycemic     Integumentary/Other: sheath to left groin. K and mag replaced this AM    Infusions: precedex, prop, hep, TPA,     Patient progressing towards goals as tolerated, plan of care communicated and reviewed with patient and family. All concerns addressed. Will continue to monitor.

## 2018-03-22 NOTE — PROGRESS NOTES
Spoke with MD Cordell Feliciano. Ordered to wean down propofol instead of precedex. Will continue to monitor.

## 2018-03-22 NOTE — PROGRESS NOTES
interventional cardiology MD made aware the patient left foot is looking more blue and purple ; DP and PT pulses are still not present in the left foot. Ordered to continue to monitor.

## 2018-03-22 NOTE — PROGRESS NOTES
Report given to Manjula Borges. Patient being taken to cath lab.     Transported by MANJULA Borges and Rt, June.

## 2018-03-22 NOTE — EICU
Called into room for emergent intubation. Dr. Newton and Dr. Moses at bedside with RT and RN team.     2138 12 mg etomidate administered IV  2138 120 mg succinylcholine administered IV  2141 8 mg etomidate administered IV    Easy bag/mask ventilation. 8.0 ETT inserted by Dr. Newton,  23 cm at lip. +BBS, + color change etco2 device. Stat cxr ordered. Pt tolerated procedure well.

## 2018-03-22 NOTE — PLAN OF CARE
Problem: Patient Care Overview  Goal: Plan of Care Review  Outcome: Ongoing (interventions implemented as appropriate)  Upon assessment at 1900, patient was very agitated and pulled out an I.V. Currently going to Dts with a high CIWA. Precedex was maxed out and PRN ativan given per order, cardiology fellow notified of high RASS at 2000. Patient was trying to sit up and order was received for 4 point restraints. There was a concern about patient maintaining airway with altered mental status and was intubated without any complications besides brief tachycardia in the 160s and subsequent bradycardia in the high 40s. Vital signs stable. Propofol added and RASS slowly decreased over the next 2 hours to goal. No new bleeding noted at sheath site. Will continue to monitor.

## 2018-03-22 NOTE — PROCEDURES
"            Interventional Cardiology   Post Cath Note      Referring Physician: Guillermo Archer MD  Procedure: LLE Laser Atherectomy, PTA via L CFA antegrade access  Indication: CLI    SUBJECTIVE:     Patient tolerated the procedure well, no complications    Cath Results:   Access:  L CFA 6F Antegrade Sheath    See full cath report for further details    Intervention:     Laser Atherectomy    PTA 3 x100 mm balloon      Post Cath Exam:   BP 99/64 (BP Location: Right arm, Patient Position: Lying)   Pulse 96   Temp 98.5 °F (36.9 °C) (Axillary)   Resp (!) 22   Ht 5' 7" (1.702 m)   Wt 67.8 kg (149 lb 7.6 oz)   SpO2 100%   BMI 23.41 kg/m²     No unusual pain, hematoma, thrill or bruit at vascular access site.     Assessment / Plan:     - Routine post cath protocol  - IVFs for ANAT prevention  - Continue tPA @ 1 mg/hr  - Continue Hep gtt @ 800 units/hr  - In case of LLExt pain - assess for compartment syndrome -> vascular surgery consult  - Further care per primary team    Please page/call if any questions or concerns.    Lionel Alcaraz MD  Interventional Cardiology Fellow  Pager: [phone]      "

## 2018-03-22 NOTE — ADDENDUM NOTE
Addendum  created 03/21/18 2221 by Kiko Moses MD    Anesthesia Attestations filed, Anesthesia Intra Blocks edited, Sign clinical note

## 2018-03-22 NOTE — ASSESSMENT & PLAN NOTE
Mr. Byrd is a 47 year old man with CAD s/p PCI, LVEF 30% s/p ICD, complicated and extensive PAD s/p aortofem bypass and multiple stents on DAPT who presents with what appears to be CLI of his left foot of unclear duration. This is not an acute presentation as these symtpoms have been ongoing for months and have worsened in the past 2 weeks.     --Underwent angiogram 3/19 that showed occlusion of aorta-fem bypass at distal anastamosis. Unable to treat due to patient movement.   --s/p cath directed tPA to occluded Lt SFA under gen anesthesia  --continue thrombolysis infusion while pt remains w pulseless lt ext; f/up int cards recs- likely remove cath today  --day 5abx (on IV clinda) possible underlying cellulitis  --blood cx ordered- ngtd  --checking INR, fibrinogen, CBC q 6h while thrombolysis is infusing  --continue DAPT, hep gtt   --stop cilostazol as is contraindicated in HFpEF

## 2018-03-22 NOTE — ASSESSMENT & PLAN NOTE
-actively withdrawing, possible DT- tachycardic, AMS/ agitation  -s/p 1 banana bag will give another banana bag today, IV thiamine  -unclear date of last drink  -now intubated and sedated with normal vital signs on precedex and propofol  -continue to monitor  -will likely need benzo taper

## 2018-03-23 PROBLEM — I96 GANGRENE: Status: ACTIVE | Noted: 2018-01-01

## 2018-03-23 NOTE — ASSESSMENT & PLAN NOTE
-CM possibly with multifactorial etiology (hx of alcohol use, ischemia)  CAD s/p PCI  DAPT, statin, ACE, BB

## 2018-03-23 NOTE — ASSESSMENT & PLAN NOTE
Mr. Byrd is a 47 year old man with CAD s/p PCI, LVEF 30% s/p ICD, complicated and extensive PAD s/p aortofem bypass and multiple stents on DAPT who presents with what appears to be CLI of his left foot of unclear duration. This is not an acute presentation as these symtpoms have been ongoing for months and have worsened in the past 2 weeks.     --Underwent angiogram 3/19 that showed occlusion of aorta-fem bypass at distal anastamosis. Unable to treat due to patient movement.   --s/p cath directed tPA to occluded Lt SFA under gen anesthesia  --continue thrombolysis infusion while pt remains w pulseless lt ext; f/up int cards recs- likely remove cath today  --day 6/7abx (on IV clinda) possible underlying cellulitis  --blood cx ordered- ngtd  --thrombolysis gtt stooped  --continue DAPT, hep gtt   --stop cilostazol as is contraindicated in HFpEF  --Podiatry consulted for possible L trans-metatarsal amputation per interventional cards recs

## 2018-03-23 NOTE — SIGNIFICANT EVENT
Interventional Cardiology         Patient seen and examined at bedside. Intubated and sedated. Left foot toes discoloration with rubor noted. Sluggish cap refill but exam not significantly different than this evening. No DP/PT doppler signal still.    Jose catheter infusing without issues. On aspiration, BRB noted which is encouraging that its not clogged.    Recommend to continue tPA infusion and increase Hep gtt to 1000 units/Hr  Patient scheduled for relook in am  Calf soft - no concern of compartment syndrome now  Checking Lactate/cpk      Maged Andrews MD  PGY-7  Interventional Cardiolgy

## 2018-03-23 NOTE — SUBJECTIVE & OBJECTIVE
Interval History: HD stable overnight. Remains intubated sedated with propofol and precedex. On hep gtt for limb ischemia.     Review of Systems   Unable to perform ROS: intubated     Objective:     Vital Signs (Most Recent):  Temp: 97.6 °F (36.4 °C) (03/23/18 1100)  Pulse: 93 (03/23/18 1400)  Resp: 19 (03/23/18 1400)  BP: 112/64 (03/23/18 1400)  SpO2: 99 % (03/23/18 1400) Vital Signs (24h Range):  Temp:  [97.6 °F (36.4 °C)-98 °F (36.7 °C)] 97.6 °F (36.4 °C)  Pulse:  [66-93] 93  Resp:  [12-22] 19  SpO2:  [96 %-100 %] 99 %  BP: (100-139)/(63-86) 112/64     Weight: 67.8 kg (149 lb 7.6 oz)  Body mass index is 23.41 kg/m².     SpO2: 99 %  O2 Device (Oxygen Therapy): ventilator      Intake/Output Summary (Last 24 hours) at 03/23/18 1450  Last data filed at 03/23/18 1400   Gross per 24 hour   Intake          2933.86 ml   Output             2785 ml   Net           148.86 ml       Lines/Drains/Airways     Drain                 Urethral Catheter 03/21/18 1128 2 days         NG/OG Tube 03/21/18 2148 Left mouth 1 day          Airway                 Airway - Non-Surgical 03/21/18 2146 Endotracheal Tube 1 day          Peripheral Intravenous Line                 Peripheral IV - Single Lumen 03/20/18 1622 Right Antecubital 2 days                Physical Exam   Constitutional: He appears well-developed and well-nourished. No distress.   HENT:   Head: Normocephalic and atraumatic.   Eyes: Conjunctivae are normal. Pupils are equal, round, and reactive to light.   Neck: Normal range of motion. Neck supple.   Cardiovascular: Normal rate, regular rhythm and normal heart sounds.    Pulmonary/Chest: Effort normal and breath sounds normal.   Musculoskeletal: Normal range of motion. He exhibits no edema.   Neurological:   sedated   Skin: No rash noted.   Cool left extremity with dusky color. No pulses detected at foot        Significant Labs: All pertinent lab results from the last 24 hours have been reviewed.    Significant Imaging:  reviewed

## 2018-03-23 NOTE — PROCEDURES
"            Interventional Cardiology   Post Cath Note      Referring Physician: Guillermo Archer MD  Procedure: Peripheral Angiogram  Indication: CLI    SUBJECTIVE:     Patient tolerated the procedure well, no complications    Cath Results:   Access: L CFA 6F    PT and Peroneal w/ sluggish flow    AT no flow    Incomplete foot arch    See full cath report for further details      Post Cath Exam:   /65 (BP Location: Right arm, Patient Position: Lying)   Pulse 72   Temp 98 °F (36.7 °C) (Axillary)   Resp 16   Ht 5' 7" (1.702 m)   Wt 67.8 kg (149 lb 7.6 oz)   SpO2 98%   BMI 23.41 kg/m²     No unusual pain, hematoma, thrill or bruit at vascular access site.        Assessment / Plan:     - Routine post cath protocol  - IVFs for ANAT prevention  - Supportive care  - Restart Hep gtt in1 hour. D/c tPA gtt  - Vascular boot  - Recc Podiatry consult for possible L trans-metatarsal amputation  - Further care per primary team    Please page/call if any questions or concerns.    Lionel Alcaraz MD  Interventional Cardiology Fellow  Pager: [phone]      "

## 2018-03-23 NOTE — NURSING
Pt returned to 3076 from cath lab; L groin dressing without bleeding or hematoma, PT pulse per doppler; per interventional, resume heparin in 1 hr by primary team, if pt looses pulse again then need vascular sx

## 2018-03-23 NOTE — NURSING
Dr. Rubalcava made aware of decreasing UO 5-15 ml/hr. 500 ml NS bolus given as ordered. Will continue to momitor.

## 2018-03-23 NOTE — NURSING
Dr. Rubalcava made aware of worsening color purple on  left foot. Absent pulse on DP and PD, dopplerable on Left popliteal. MD came to bedside. Heparin drip increased to 1000 units/hr. Will continue to monitor.

## 2018-03-23 NOTE — PLAN OF CARE
Problem: Patient Care Overview  Goal: Plan of Care Review  Outcome: Ongoing (interventions implemented as appropriate)  No acute events during shift; L groin sheath removed in cath lab this AM; bedrest complete-L groin dressing without bleeding or hematoma, doppler left PT present, foot color increased purple; remains intubated with minimal settings, still with bloody ET secretions; propofol weaned off, sedation now with precedex and versed; heparin drip per nomogram; pt following commands and restless at times-bilateral soft wrist restraints for saftey; updated madie Carballo over phone

## 2018-03-23 NOTE — PROGRESS NOTES
[facility]  Cardiology  Progress Note    Patient Name: Leonardo Byrd  MRN: [id number]  Admission Date: 3/18/2018  Hospital Length of Stay: 5 days  Code Status: Full Code   Attending Physician: Sae Carr MD   Primary Care Physician: Indio Aquino MD  Expected Discharge Date: 3/26/2018  Principal Problem:Critical lower limb ischemia    Subjective:     Hospital Course:   Admitted to CCU 3/20 after cath directed tPA was performed by int cards under general anesthesia. Also with DTs requiring scheduled benzos. On day 2 Lt lower extremity remained pulseless, so thrombolysis still infusing.On day 4 of vanc for possible lt foot cellulitis. Transitioned to clinda IV (unable to take PO safely). Intubated overnight for airway protection in setting of increasing benzo requirements and worsening agitation and danger of pt pulling line. CXR this AM (3/22) with patchy bilateral infiltrates. Taken to cath lab for possible cath removal. Lt leg then reportedly duskier and with absent pulses. On 3/23 taken bacl to cath lab. Int cards Recc Podiatry consult for possible L trans-metatarsal amputation. On heparin gtt.    Interval History: HD stable overnight. Remains intubated sedated with propofol and precedex. On hep gtt for limb ischemia.     Review of Systems   Unable to perform ROS: intubated     Objective:     Vital Signs (Most Recent):  Temp: 97.6 °F (36.4 °C) (03/23/18 1100)  Pulse: 93 (03/23/18 1400)  Resp: 19 (03/23/18 1400)  BP: 112/64 (03/23/18 1400)  SpO2: 99 % (03/23/18 1400) Vital Signs (24h Range):  Temp:  [97.6 °F (36.4 °C)-98 °F (36.7 °C)] 97.6 °F (36.4 °C)  Pulse:  [66-93] 93  Resp:  [12-22] 19  SpO2:  [96 %-100 %] 99 %  BP: (100-139)/(63-86) 112/64     Weight: 67.8 kg (149 lb 7.6 oz)  Body mass index is 23.41 kg/m².     SpO2: 99 %  O2 Device (Oxygen Therapy): ventilator      Intake/Output Summary (Last 24 hours) at 03/23/18 1450  Last data filed at 03/23/18 1400   Gross per 24 hour    Intake          2933.86 ml   Output             2785 ml   Net           148.86 ml       Lines/Drains/Airways     Drain                 Urethral Catheter 03/21/18 1128 2 days         NG/OG Tube 03/21/18 2148 Left mouth 1 day          Airway                 Airway - Non-Surgical 03/21/18 2146 Endotracheal Tube 1 day          Peripheral Intravenous Line                 Peripheral IV - Single Lumen 03/20/18 1622 Right Antecubital 2 days                Physical Exam   Constitutional: He appears well-developed and well-nourished. No distress.   HENT:   Head: Normocephalic and atraumatic.   Eyes: Conjunctivae are normal. Pupils are equal, round, and reactive to light.   Neck: Normal range of motion. Neck supple.   Cardiovascular: Normal rate, regular rhythm and normal heart sounds.    Pulmonary/Chest: Effort normal and breath sounds normal.   Musculoskeletal: Normal range of motion. He exhibits no edema.   Neurological:   sedated   Skin: No rash noted.   Cool left extremity with dusky color. No pulses detected at foot        Significant Labs: All pertinent lab results from the last 24 hours have been reviewed.    Significant Imaging: reviewed    Assessment and Plan:       PAD (peripheral artery disease)  * Critical lower limb ischemia    Mr. Byrd is a 47 year old man with CAD s/p PCI, LVEF 30% s/p ICD, complicated and extensive PAD s/p aortofem bypass and multiple stents on DAPT who presents with what appears to be CLI of his left foot of unclear duration. This is not an acute presentation as these symtpoms have been ongoing for months and have worsened in the past 2 weeks.     --Underwent angiogram 3/19 that showed occlusion of aorta-fem bypass at distal anastamosis. Unable to treat due to patient movement.   --s/p cath directed tPA to occluded Lt SFA under gen anesthesia  --continue thrombolysis infusion while pt remains w pulseless lt ext; f/up int cards recs- likely remove cath today  --day 6/7abx (on IV clinda)  possible underlying cellulitis  --blood cx ordered- ngtd  --thrombolysis gtt stooped  --continue DAPT, hep gtt   --stop cilostazol as is contraindicated in HFpEF  --Podiatry consult ed for possible L trans-metatarsal amputation per interventional cards recs            Acute respiratory failure with hypoxia    On vent, mainly for pt safety and airway protection while going through etoh withdrawal                  Ischemic cardiomyopathy    -CM possibly with multifactorial etiology (hx of alcohol use, ischemia)  CAD s/p PCI  DAPT, statin, ACE, BB        Cellulitis of left lower extremity    --day 6/7 abx -clinda IV (PO when able to tolerate PO)  --blood cx NGTD  --podiatry consulted for eval for metatarsal amp        Alcohol abuse  Alcohol withdrawal syndrome, with delirium    -actively withdrawing, possible DT- tachycardic, AMS/ agitation  -no seizure like activity  -continue thiamine   -now intubated and sedated with normal vital signs on precedex and propofol   -psych unable to assist with DTs at this time, rec re-consulting when pt off propofol/precedex and extubated.  -continue to monitor  -will likely need benzo taper        Chronic systolic heart failure    LVEF 30% s/p ICD  Continue ACE, BB        Tobacco abuse    --nicotine patch            VTE Risk Mitigation         Ordered     heparin 25,000 units in dextrose 5% 250 mL (100 units/mL) infusion  Continuous     Route:  Intravenous        03/23/18 1129     IP VTE LOW RISK PATIENT  Once      03/18/18 1711          Patricia Domingo MD  Cardiology  [facility]

## 2018-03-23 NOTE — ASSESSMENT & PLAN NOTE
-actively withdrawing, possible DT- tachycardic, AMS/ agitation  -no seizure like activity  -continue thiamine   -now intubated and sedated with normal vital signs on precedex and propofol   -psych unable to assist with DTs at this time, rec re-consulting when pt off propofol/precedex and extubated.  -continue to monitor  -will likely need benzo taper

## 2018-03-23 NOTE — ASSESSMENT & PLAN NOTE
--day 6/7 abx -clinda IV (PO when able to tolerate PO)  --blood cx NGTD  --podiatry consulted for eval for metatarsal amp

## 2018-03-24 NOTE — ASSESSMENT & PLAN NOTE
Leonardo Byrd is a 47 y.o. male with ischemic changes to left forefoot.  S/p unsuccessful angio.  -Patient will likely need amputation.  Patient's foot is stable, no emergent amputation is indicated at this time.  Will wait for necrosis to demarcate before determining what level of amputation will be necessary.    -ordered x-ray  -Podiatry will follow patient at a distance.

## 2018-03-24 NOTE — PROGRESS NOTES
All sedation weaned off, SBT started with Sara RT at patients side.     1245: pt has been on SBT. Pt now following commands. Performing breathing exercises with Sara at bedside.     1315: Nurse informed MD Yi that PT pulse on L leg is difficult to get a doppler pulse on. Pt reporting pain in right foot and foot looks dusky. MD Yi assessed pt and found a monophasic pulse sound. MD Carr came to pt bedside to assess and consult to vascular was made.     1324: Pt now extubated and tolerating well on 2L. After extubated, pt wanted to write. He wrote Gun. When asked why he wrote Momma. Follow up questions revealed his mom recently , he does not have a plan and would not hurt himself. Said he was in pain and sad about his mom. Team aware.

## 2018-03-24 NOTE — CONSULTS
[facility]  Podiatry  Consult Note    Patient Name: Leonardo Byrd  MRN: [id number]  Admission Date: 3/18/2018  Hospital Length of Stay: 5 days  Attending Physician: Sae Carr MD  Primary Care Provider: Indio Aquino MD     Inpatient consult to Podiatry  Consult performed by: UZMA GEE  Consult ordered by: MIRNA GARCIA        Subjective:     History of Present Illness:  Leonardo Byrd is a 47 y.o. male who  has a past medical history of AICD (automatic cardioverter/defibrillator) present; CHF (congestive heart failure); Coronary artery disease; Encounter for blood transfusion; Hyperlipemia; Hypertension; MI (myocardial infarction); Neuropathy; PAD (peripheral artery disease); and PVD (peripheral vascular disease).    Patient Admited for Critical limb ischemia.  Consulted to podiatry by interventional cardiology for Left foot ischemic discoloration for surgical work up for possible need of TMA .  Patient complains s/p unsuccessful Angio today per Interventional cardiology.  Patient intubated, no family at bedside.      Scheduled Meds:   amitriptyline  50 mg Oral QHS    aspirin  81 mg Oral Daily    atorvastatin  40 mg Oral QHS    clindamycin  450 mg Oral Q8H    folic acid-vit B6-vit B12 2.5-25-2 mg  1 tablet Per OG tube Daily    gabapentin  800 mg Oral QHS    lidocaine   Topical (Top) QID    [START ON 3/24/2018] lisinopril  10 mg Oral Daily    metoprolol succinate  25 mg Oral Daily    nicotine  1 patch Transdermal Daily    pregabalin  100 mg Oral TID    succinylcholine  120 mg Intravenous Once    thiamine  100 mg Per OG tube Daily    ticagrelor  90 mg Oral BID     Continuous Infusions:   sodium chloride 0.9% 30 mL/hr at 03/23/18 0800    sodium chloride 0.9%      alteplase 12.5 mg in 0.9% NaCl 250 mL (CAR/VAS use only) Stopped (03/23/18 1017)    dexmedetomidine (PRECEDEX) infusion 1 mcg/kg/hr (03/23/18 1800)    heparin (porcine) in D5W 19 Units/kg/hr (03/23/18  1903)    midazolam (VERSED) infusion (titrating) 2 mg/hr (03/23/18 1830)    propofol Stopped (03/23/18 1800)     PRN Meds:cyclobenzaprine, HYDROmorphone, lorazepam, nitroGLYCERIN, oxyCODONE-acetaminophen, oxyCODONE-acetaminophen, sodium chloride 0.9%, zolpidem    Review of patient's allergies indicates:  No Known Allergies     Past Medical History:   Diagnosis Date    AICD (automatic cardioverter/defibrillator) present     CHF (congestive heart failure)     Coronary artery disease     Encounter for blood transfusion     Hyperlipemia     Hypertension     MI (myocardial infarction)     Neuropathy     PAD (peripheral artery disease)     PVD (peripheral vascular disease)      Past Surgical History:   Procedure Laterality Date    AMPUTATION Right     great toe    BYBPASS GRAFT AORTOBIUFEMORAL      CARDIAC DEFIBRILLATOR PLACEMENT      coronary stents      EXPLORATION AND EVaCUATION OF HEMATOMA OF UPPER EXTREMITY Left     KNEE ARTHROPLASTY Left     VASCULAR SURGERY      fem-pop bypass       Family History     None        Social History Main Topics    Smoking status: Former Smoker     Packs/day: 0.50     Quit date: 02/2018    Smokeless tobacco: Former User     Types: Chew     Quit date: 8/2/1980      Comment: Uses nicotine gum and nicotine patches    Alcohol use Yes      Comment: rarely    Drug use: No    Sexual activity: Yes     Partners: Female     Review of Systems   Unable to perform ROS: Intubated     Objective:     Vital Signs (Most Recent):  Temp: 98.7 °F (37.1 °C) (03/23/18 1500)  Pulse: 83 (03/23/18 1800)  Resp: 16 (03/23/18 1800)  BP: 110/64 (03/23/18 1800)  SpO2: 100 % (03/23/18 1800) Vital Signs (24h Range):  Temp:  [97.6 °F (36.4 °C)-98.7 °F (37.1 °C)] 98.7 °F (37.1 °C)  Pulse:  [66-98] 83  Resp:  [12-22] 16  SpO2:  [96 %-100 %] 100 %  BP: ()/(52-83) 110/64     Weight: 67.8 kg (149 lb 7.6 oz)  Body mass index is 23.41 kg/m².    Foot Exam    General  Orientation: alert and oriented  to person, place, and time       Right Foot/Ankle     Inspection and Palpation  Ecchymosis: none  Tenderness: none   Swelling: none   Arch: normal  Hammertoes: absent  Claw Toes: absent  Hallux valgus: no  Hallux limitus: no  Skin Exam: skin intact;     Neurovascular  Dorsalis pedis: absent  Posterior tibial: absent      Left Foot/Ankle      Inspection and Palpation  Ecchymosis: none  Tenderness: none   Swelling: none   Arch: normal  Hammertoes: absent  Claw toes: absent  Hallux valgus: no  Hallux limitus: no  Skin Exam: abnormal color;     Neurovascular  Dorsalis pedis: absent  Posterior tibial: absent            Laboratory:  A1C:   Recent Labs  Lab 09/26/17  0129   HGBA1C 5.4     CBC:   Recent Labs  Lab 03/23/18  1801   WBC 15.76*   RBC 3.21*   HGB 10.8*   HCT 31.2*   *   MCV 97   MCH 33.6*   MCHC 34.6     CMP:   Recent Labs  Lab 03/23/18  0540   GLU 94   CALCIUM 7.7*   ALBUMIN 2.0*   PROT 5.2*      K 3.3*   CO2 22*      BUN 6   CREATININE 0.6   ALKPHOS 108   ALT 18   AST 35   BILITOT 0.5     CRP: No results for input(s): CRP in the last 168 hours.  ESR: No results for input(s): SEDRATE in the last 168 hours.    Diagnostic Results:  X-ray: ordered    Clinical Findings:  Blue/purple/black discoloration of left forefoot.  Foot cold to touch with no palpable pulses.  No purulence, edema, or maloder                  Assessment/Plan:     * Critical lower limb ischemia    Interventional cardiology on board, appreciate recs        Gangrene    Leonardo Byrd is a 47 y.o. male with ischemic changes to left forefoot.  S/p angio per interventional cardiology  -Patient will likely need amputation.  Patient's foot is stable, no emergent amputation is indicated at this time.  Will wait for necrosis to demarcate before determining what level of amputation will be necessary.    -ordered x-ray  -Podiatry will follow patient at a distance.            PAD (peripheral artery disease)    Cards on board             Thank you for your consult. I will follow-up with patient. Please contact us if you have any additional questions.    Virgil Wright MD  Podiatry  [facility]

## 2018-03-24 NOTE — HPI
Leonardo Byrd is a 47 y.o. male who  has a past medical history of AICD (automatic cardioverter/defibrillator) present; CHF (congestive heart failure); Coronary artery disease; Encounter for blood transfusion; Hyperlipemia; Hypertension; MI (myocardial infarction); Neuropathy; PAD (peripheral artery disease); and PVD (peripheral vascular disease).    Patient Admited for Critical limb ischemia.  Consulted to podiatry by interventional cardiology for Left foot ischemic discoloration for surgical work up for possible need of TMA .  Patient complains s/p unsuccessful Angio today per Interventional cardiology.  Patient intubated, no family at bedside.

## 2018-03-24 NOTE — PROGRESS NOTES
[facility]  Cardiology  Progress Note    Patient Name: Leonardo Byrd  MRN: [id number]  Admission Date: 3/18/2018  Hospital Length of Stay: 6 days  Code Status: Full Code   Attending Physician: Sae Carr MD   Primary Care Physician: Indio Aquino MD  Expected Discharge Date: 3/26/2018  Principal Problem:Critical lower limb ischemia    Subjective:     Hospital Course:   Admitted to CCU 3/20 after cath directed tPA was performed by int mansi under general anesthesia. Also with DTs requiring scheduled benzos. On day 2 Lt lower extremity remained pulseless, so thrombolysis still infusing.On day 4 of vanc for possible lt foot cellulitis. Transitioned to clinda IV (unable to take PO safely). Intubated overnight for airway protection in setting of increasing benzo requirements and worsening agitation and danger of pt pulling line. CXR this AM (3/22) with patchy bilateral infiltrates. Taken to cath lab for possible cath removal. Lt leg then reportedly duskier and with absent pulses. On 3/23 taken bacl to cath lab. Int cards Recc Podiatry consult for possible L trans-metatarsal amputation. On heparin gtt.    Interval History:  In the morning, patient successfully extubated.     Review of Systems   Unable to perform ROS: intubated     Objective:     Vital Signs (Most Recent):  Temp: 97.6 °F (36.4 °C) (03/24/18 1100)  Pulse: 98 (03/24/18 1303)  Resp: (!) 28 (03/24/18 1303)  BP: 114/71 (03/24/18 1100)  SpO2: 100 % (03/24/18 1303) Vital Signs (24h Range):  Temp:  [96.9 °F (36.1 °C)-98.7 °F (37.1 °C)] 97.6 °F (36.4 °C)  Pulse:  [] 98  Resp:  [2-32] 28  SpO2:  [98 %-100 %] 100 %  BP: ()/(52-77) 114/71     Weight: 67.8 kg (149 lb 7.6 oz)  Body mass index is 23.41 kg/m².     SpO2: 100 %  O2 Device (Oxygen Therapy): nasal cannula      Intake/Output Summary (Last 24 hours) at 03/24/18 5412  Last data filed at 03/24/18 1200   Gross per 24 hour   Intake          1115.82 ml   Output              1155 ml   Net           -39.18 ml       Lines/Drains/Airways     Drain                 Urethral Catheter 03/21/18 1128 3 days         NG/OG Tube 03/21/18 2148 Left mouth 2 days          Peripheral Intravenous Line                 Peripheral IV - Single Lumen 03/20/18 1622 Right Antecubital 3 days         Peripheral IV - Single Lumen 03/23/18 1500 Left Wrist less than 1 day                Physical Exam   Constitutional: He appears well-developed and well-nourished. No distress.   HENT:   Head: Normocephalic and atraumatic.   Eyes: Conjunctivae are normal. Pupils are equal, round, and reactive to light.   Neck: Normal range of motion. Neck supple.   Cardiovascular: Normal rate, regular rhythm and normal heart sounds.  Exam reveals decreased pulses.    Pulses:       Dorsalis pedis pulses are 1+ on the right side, and 0 on the left side.        Posterior tibial pulses are 0 on the right side, and 0 on the left side.   Pulmonary/Chest: Effort normal and breath sounds normal.   Musculoskeletal: Normal range of motion. He exhibits no edema.   Neurological:   sedated   Skin: No rash noted.   Mottled L toes and right second toe       Assessment and Plan:       * Critical lower limb ischemia    Mr. Byrd is a 47 year old man with CAD s/p PCI, LVEF 30% s/p ICD, complicated and extensive PAD s/p aortofem bypass and multiple stents on DAPT who presents with what appears to be CLI of his left foot of unclear duration. have been ongoing for months and have worsened in the past 2 weeks.     --Underwent angiogram 3/19 that showed occlusion of aorta-fem bypass at distal anastamosis. Unable to treat due to patient movement.   --s/p cath directed tPA to occluded Lt SFA under gen anesthesia  --continue thrombolysis infusion while pt remains w pulseless lt ext; f/up int cards recs- likely remove cath today  --day 6/7abx (on IV clinda) possible underlying cellulitis  --blood cx ordered- ngtd  --thrombolysis gtt stooped  --continue  DAPT, hep gtt   --Podiatry consult ed for possible L trans-metatarsal amputation after demarcation of amputation.  --will likely discharge on NOAC will keep on heparin for now          Acute respiratory failure with hypoxia    On vent, mainly for pt safety and airway protection while going through etoh withdrawal        Alcohol withdrawal syndrome, with delirium    -continue with ativan prn        Ischemic cardiomyopathy    -CM possibly with multifactorial etiology (hx of alcohol use, ischemia)  CAD s/p PCI  DAPT, statin, ACE, BB        Cellulitis of left lower extremity    --day 6/7 abx -clinda IV (PO when able to tolerate PO)  --blood cx NGTD  --podiatry consulted for eval for metatarsal amp        Alcohol abuse    -actively withdrawing, possible DT- tachycardic, AMS/ agitation  -no seizure like activity  -continue thiamine   -now intubated and sedated with normal vital signs on precedex and propofol   -psych unable to assist with DTs at this time, rec re-consulting when pt off propofol/precedex and extubated.  -continue to monitor  -will likely need benzo taper        Chronic systolic heart failure    LVEF 30% s/p ICD  Continue ACE, BB        PAD (peripheral artery disease)    - see CLI  - loss of pulse in R post tibial, will get Arterial US, discussed with interventional cardiology.      Tobacco abuse    --nicotine patch            VTE Risk Mitigation         Ordered     heparin 25,000 units in dextrose 5% 250 mL (100 units/mL) infusion  Continuous     Route:  Intravenous        03/23/18 1129     IP VTE LOW RISK PATIENT  Once      03/18/18 1711          Kassidy Giraldo MD  Cardiology  [facility]

## 2018-03-24 NOTE — PLAN OF CARE
Problem: Patient Care Overview  Goal: Plan of Care Review  Outcome: Ongoing (interventions implemented as appropriate)  Pt remains afebrile and VSS. Pt tachycardic 1teens to 120s (high of 160 came down after a couple minutes), normotensive, sats upper 90s on 2L RA. Pt weaned off sedation and intubated on shift. Passed swallow test, cardiac diet ordered. Vascular consulted for R foot perfusion. Palpable pedal pulse on R foot, intermittent monophasic doppler pulse on PT pulse right foot. Dopplerable PT on right foot, no pulse found on PP on right foot. Hot left foot, cold right foot. Team discussed with patient and family possibility of amputation.  See flow sheet for full assessment. Pt remains on continuous tele and pulse ox monitor. 10/10 pain reported in feet, hydromorphone and percoset given PRN Q4hr for pain.  Pt remains on Heparin gtt. Evans catheter removed, urinalysis sent off. Has not voided since catheter removed, bladder scan showed 39mL. US of lower extremity arteries Right ordered.   POC reviewed w/Pt who verbalized understanding.

## 2018-03-24 NOTE — SUBJECTIVE & OBJECTIVE
Scheduled Meds:   amitriptyline  50 mg Oral QHS    aspirin  81 mg Oral Daily    atorvastatin  40 mg Oral QHS    clindamycin  450 mg Oral Q8H    folic acid-vit B6-vit B12 2.5-25-2 mg  1 tablet Per OG tube Daily    gabapentin  800 mg Oral QHS    lidocaine   Topical (Top) QID    [START ON 3/24/2018] lisinopril  10 mg Oral Daily    metoprolol succinate  25 mg Oral Daily    nicotine  1 patch Transdermal Daily    pregabalin  100 mg Oral TID    succinylcholine  120 mg Intravenous Once    thiamine  100 mg Per OG tube Daily    ticagrelor  90 mg Oral BID     Continuous Infusions:   sodium chloride 0.9% 30 mL/hr at 03/23/18 0800    sodium chloride 0.9%      alteplase 12.5 mg in 0.9% NaCl 250 mL (CAR/VAS use only) Stopped (03/23/18 1017)    dexmedetomidine (PRECEDEX) infusion 1 mcg/kg/hr (03/23/18 1800)    heparin (porcine) in D5W 19 Units/kg/hr (03/23/18 1903)    midazolam (VERSED) infusion (titrating) 2 mg/hr (03/23/18 1830)    propofol Stopped (03/23/18 1800)     PRN Meds:cyclobenzaprine, HYDROmorphone, lorazepam, nitroGLYCERIN, oxyCODONE-acetaminophen, oxyCODONE-acetaminophen, sodium chloride 0.9%, zolpidem    Review of patient's allergies indicates:  No Known Allergies     Past Medical History:   Diagnosis Date    AICD (automatic cardioverter/defibrillator) present     CHF (congestive heart failure)     Coronary artery disease     Encounter for blood transfusion     Hyperlipemia     Hypertension     MI (myocardial infarction)     Neuropathy     PAD (peripheral artery disease)     PVD (peripheral vascular disease)      Past Surgical History:   Procedure Laterality Date    AMPUTATION Right     great toe    BYBPASS GRAFT AORTOBIUFEMORAL      CARDIAC DEFIBRILLATOR PLACEMENT      coronary stents      EXPLORATION AND EVaCUATION OF HEMATOMA OF UPPER EXTREMITY Left     KNEE ARTHROPLASTY Left     VASCULAR SURGERY      fem-pop bypass       Family History     None        Social History Main  Topics    Smoking status: Former Smoker     Packs/day: 0.50     Quit date: 02/2018    Smokeless tobacco: Former User     Types: Chew     Quit date: 8/2/1980      Comment: Uses nicotine gum and nicotine patches    Alcohol use Yes      Comment: rarely    Drug use: No    Sexual activity: Yes     Partners: Female     Review of Systems   Unable to perform ROS: Intubated     Objective:     Vital Signs (Most Recent):  Temp: 98.7 °F (37.1 °C) (03/23/18 1500)  Pulse: 83 (03/23/18 1800)  Resp: 16 (03/23/18 1800)  BP: 110/64 (03/23/18 1800)  SpO2: 100 % (03/23/18 1800) Vital Signs (24h Range):  Temp:  [97.6 °F (36.4 °C)-98.7 °F (37.1 °C)] 98.7 °F (37.1 °C)  Pulse:  [66-98] 83  Resp:  [12-22] 16  SpO2:  [96 %-100 %] 100 %  BP: ()/(52-83) 110/64     Weight: 67.8 kg (149 lb 7.6 oz)  Body mass index is 23.41 kg/m².    Foot Exam    General  Orientation: alert and oriented to person, place, and time       Right Foot/Ankle     Inspection and Palpation  Ecchymosis: none  Tenderness: none   Swelling: none   Arch: normal  Hammertoes: absent  Claw Toes: absent  Hallux valgus: no  Hallux limitus: no  Skin Exam: skin intact;     Neurovascular  Dorsalis pedis: absent  Posterior tibial: absent      Left Foot/Ankle      Inspection and Palpation  Ecchymosis: none  Tenderness: none   Swelling: none   Arch: normal  Hammertoes: absent  Claw toes: absent  Hallux valgus: no  Hallux limitus: no  Skin Exam: abnormal color;     Neurovascular  Dorsalis pedis: absent  Posterior tibial: absent            Laboratory:  A1C:   Recent Labs  Lab 09/26/17  0129   HGBA1C 5.4     CBC:   Recent Labs  Lab 03/23/18  1801   WBC 15.76*   RBC 3.21*   HGB 10.8*   HCT 31.2*   *   MCV 97   MCH 33.6*   MCHC 34.6     CMP:   Recent Labs  Lab 03/23/18  0540   GLU 94   CALCIUM 7.7*   ALBUMIN 2.0*   PROT 5.2*      K 3.3*   CO2 22*      BUN 6   CREATININE 0.6   ALKPHOS 108   ALT 18   AST 35   BILITOT 0.5     CRP: No results for input(s): CRP in the  last 168 hours.  ESR: No results for input(s): SEDRATE in the last 168 hours.    Diagnostic Results:  X-ray: ordered    Clinical Findings:  Blue/purple/black discoloration of left forefoot.  Foot cold to touch with no palpable pulses.  No purulence, edema, or maloder

## 2018-03-24 NOTE — PROGRESS NOTES
Pt extubated per MD order.  Pt tolerated extubation well and placed on 2L NC.  Will continue to monitor.

## 2018-03-24 NOTE — PLAN OF CARE
Problem: Patient Care Overview  Goal: Plan of Care Review  Outcome: Ongoing (interventions implemented as appropriate)  No acute events noted within the shift.Pt remains sedated and intubated, on restraints.  ET at 24 lip line to [facility] vent on A/C -40%+5, PF-60. With OGT at 55 cm lipline, NPO except meds. UO 10-35/ml hr, Dr. Lin aware. Afebrile. Left groin site, no bruised and no hematoma. Left PT pulse dopplerable. POC reviewed with patient. Will continue to monitor.

## 2018-03-24 NOTE — PROGRESS NOTES
MD Giraldo rounded on pt. Update given that left PT pulse dopplerable but not DP pulse. DP pulse dopplerable in right foot but not right PT. Sedation (Precedex) being weaned for possible SBT. Family given update, and aware that pt may have an amputation in the future, no additional questions.  Will continue to monitor.

## 2018-03-24 NOTE — PLAN OF CARE
Interventional Cardiology Brief Note:    Patient seen and examined at bedside. Now extubated on pain control       PE  NAD, Verbal  CTA B/L no wrr  S1+S2 nl.    Vasc:  Bilateral Fem 2+  Pop bilateral 1+  Left PT 1+/DP monophasic doppler signal  Right PT biphasic persistent signal    Left 5 toes blue/dry  with concern of developing gangrene  Right foot warm but dusky. Intact sensation      A/P  LLExt Critical Limb ischemia with ETOH withdrawal  Blood supply to LLext restored through PT. He has single vessel runoff on left. Also Aortofem bypass patient on left with successful thrombolysis of the left SFA  Recommend to continue anticoagulation + DaPT + high intensity statin therapy  Appreciate podiatry consult and agree that he may need amputation if doesn't improve or develop.    Plan communicated with Primary Team and podiatry resident      Maged Andrews MD  PGY-[id number]  Interventional Cardiology Fellow

## 2018-03-24 NOTE — SUBJECTIVE & OBJECTIVE
Interval History:  In the morning, patient successfully extubated.     Review of Systems   Unable to perform ROS: intubated     Objective:     Vital Signs (Most Recent):  Temp: 97.6 °F (36.4 °C) (03/24/18 1100)  Pulse: 98 (03/24/18 1303)  Resp: (!) 28 (03/24/18 1303)  BP: 114/71 (03/24/18 1100)  SpO2: 100 % (03/24/18 1303) Vital Signs (24h Range):  Temp:  [96.9 °F (36.1 °C)-98.7 °F (37.1 °C)] 97.6 °F (36.4 °C)  Pulse:  [] 98  Resp:  [2-32] 28  SpO2:  [98 %-100 %] 100 %  BP: ()/(52-77) 114/71     Weight: 67.8 kg (149 lb 7.6 oz)  Body mass index is 23.41 kg/m².     SpO2: 100 %  O2 Device (Oxygen Therapy): nasal cannula      Intake/Output Summary (Last 24 hours) at 03/24/18 1451  Last data filed at 03/24/18 1200   Gross per 24 hour   Intake          1115.82 ml   Output             1155 ml   Net           -39.18 ml       Lines/Drains/Airways     Drain                 Urethral Catheter 03/21/18 1128 3 days         NG/OG Tube 03/21/18 2148 Left mouth 2 days          Peripheral Intravenous Line                 Peripheral IV - Single Lumen 03/20/18 1622 Right Antecubital 3 days         Peripheral IV - Single Lumen 03/23/18 1500 Left Wrist less than 1 day                Physical Exam   Constitutional: He appears well-developed and well-nourished. No distress.   HENT:   Head: Normocephalic and atraumatic.   Eyes: Conjunctivae are normal. Pupils are equal, round, and reactive to light.   Neck: Normal range of motion. Neck supple.   Cardiovascular: Normal rate, regular rhythm and normal heart sounds.  Exam reveals decreased pulses.    Pulses:       Dorsalis pedis pulses are 1+ on the right side, and 0 on the left side.        Posterior tibial pulses are 0 on the right side, and 0 on the left side.   Pulmonary/Chest: Effort normal and breath sounds normal.   Musculoskeletal: Normal range of motion. He exhibits no edema.   Neurological:   sedated   Skin: No rash noted.   Mottled L toes and right second toe

## 2018-03-25 PROBLEM — N17.0 ACUTE RENAL FAILURE WITH TUBULAR NECROSIS: Status: ACTIVE | Noted: 2018-01-01

## 2018-03-25 PROBLEM — D72.829 LEUKOCYTOSIS: Status: ACTIVE | Noted: 2018-01-01

## 2018-03-25 PROBLEM — N17.9 AKI (ACUTE KIDNEY INJURY): Status: ACTIVE | Noted: 2018-01-01

## 2018-03-25 NOTE — PLAN OF CARE
Problem: Patient Care Overview  Goal: Plan of Care Review  Outcome: Ongoing (interventions implemented as appropriate)  Pt remains afebrile and VSS. Pt tachycardic 1teens to 120s, MAPs running low 60s for a couple hours but since fluid bolus MAPS greater than 65, sats upper 90s on 2L RA. No pulse found on RLE. Dopplerable PT on LE, no pulse DP pulse on LE. Hot left foot, cold right foot. INT CARDS reviewed US, kidney function worsening so unable to go for angiogram today. See flow sheet for full assessment. Pt remains on continuous tele and pulse ox monitor. 10/10 pain reported in bilateral feet, hydromorphone and percoset given PRN Q4hr for pain.  Pt remains on Heparin gtt. Bladder scan performed x2, in and out cath of 100mL.  Has not voided since. Goal for kidney lab improvement so possible angiogram tomorrow.   POC reviewed w/Pt and family who verbalized understanding.

## 2018-03-25 NOTE — PLAN OF CARE
Problem: Patient Care Overview  Goal: Plan of Care Review  Outcome: Ongoing (interventions implemented as appropriate)  No acute events noted within the shift. Pt oriented x4, GCS 15. HR on -130's. VSS. Pain medication prn given.  Remains on NC 2L , no desat. Remains on Heparin drip. Team aware that left LE pulse dopplerable on PT, absent pulse on DP and foot dusky, all toes cyanotic but hot to touch, and right foot dusky and absent pulse on PT and DP, angelia reveals no blood flow and feet cold, will continue Heparin for now. Straight cath  Done as ordered by Dr. Pruitt as pt has no UO since FC removal, pt tried peeing but unable to urinate, bladder scan showed no volume(done by 2 RN).  UO per straight cath -300 ml concentrated yellow. Afebrile. POC reviewed with pt. All concerns and questions addressed. Will continue to monitor.

## 2018-03-25 NOTE — NURSING
Dr. Pruitt made aware of pt HR ST on 120-140.Pt had constant pain on both LE.  No new orders received. Will continue to monitor.

## 2018-03-25 NOTE — ASSESSMENT & PLAN NOTE
-CM possibly with multifactorial etiology (hx of alcohol use, ischemia)  CAD s/p PCI  DAPT, statin,  BB  Hold ACE in setting of RYAN

## 2018-03-25 NOTE — NURSING
Pt no  UO yet since removal of FC at 2pm, bladder scan done, but no volume noted (verified by 2 RN),  Informed Dr Pruitt via phone and in addition informed of  MAP 58-60 and SBP 70-80 form the past 30 mins. No new orders received.  Also asked for plan for pt right foot, feet are cold, dusky no pulse on DP and PT(awaiting plan).Will continue to monitor.

## 2018-03-25 NOTE — ASSESSMENT & PLAN NOTE
Mr. Byrd is a 47 year old man with CAD s/p PCI, LVEF 30% s/p ICD, complicated and extensive PAD s/p aortofem bypass and multiple stents on DAPT who presents with what appears to be CLI of his left foot of unclear duration. This is not an acute presentation as these symtpoms have been ongoing for months and have worsened in the past 2 weeks.     --Underwent angiogram 3/19 that showed occlusion of aorta-fem bypass at distal anastamosis. Unable to treat due to patient movement.   --s/p cath directed tPA to occluded Lt SFA under gen anesthesia  --continue thrombolysis infusion while pt remains w pulseless lt ext; f/up int cards recs- likely remove cath today  --s/p 7 day course abx vanc then clindamycin   --blood cx ordered- ngtd  --thrombolysis gtt stopped  --now with Rt lower extremity pain, pallor and poikilothermia. US revealed Occlusion of distal right superficial femoral, popliteal, anterior tibial, and posterior tibial arteries. Interventional cards made aware- hold off on cath in setting of RYAN  --continue DAPT and hep gtt   --stop cilostazol as is contraindicated in HFpEF  --Podiatry consulted for possible L trans-metatarsal amputation per interventional cards recs; f/up recs  --neuro check 1 4h  --pain control

## 2018-03-25 NOTE — ASSESSMENT & PLAN NOTE
-infection vs inflammatory response to recent cath procedures  -s/p 7 day course abx for possible lt lower extremity cellulitis  -blood cx ngtd  -afebrile and HD stable  -continue to monitor

## 2018-03-25 NOTE — NURSING
Inforrmed Dr. Pruitt no UO yet. Came to bedside and assessed pt.   Ordered do straight cath. Will continue to monitor.

## 2018-03-25 NOTE — PROGRESS NOTES
No pulses found either by palpation or doppler on R Lower extremity.   US report processed, Impression: Occlusion of distal right superficial femoral, popliteal, anterior tibial, and posterior tibial arteries.  CARDS notified and  INT CARDS & Vascular MD Pierce paged.   Pt reports pain and color discoloration noted in right foot. Rianna, Charge nurse aware.    1030: Page returned by INT CARDS. Team has been aware since last night, reviewing film and exploring options. Update will be given to pt soon.

## 2018-03-25 NOTE — ASSESSMENT & PLAN NOTE
On vent, mainly for pt safety and airway protection while going through etoh withdrawal.  -now extubated and stable

## 2018-03-25 NOTE — SUBJECTIVE & OBJECTIVE
Interval History: HD stable overnight. Pt tachycardic, but awake and alert. No chest pain , sob, lightheadedness. Slow to respond to some questions. Complaining of bilat lower extremity pain.    Review of Systems   Constitution: Negative for chills, decreased appetite and diaphoresis.   HENT: Negative for congestion and sore throat.    Eyes: Negative for blurred vision.   Cardiovascular: Negative for chest pain, leg swelling, orthopnea and palpitations.   Respiratory: Negative for cough and shortness of breath.    Endocrine: Negative for polyuria.   Musculoskeletal: Negative for arthritis and back pain.        Bilateral foot pain   Gastrointestinal: Negative for abdominal pain and diarrhea.   Genitourinary: Negative for dysuria and hematuria.   Neurological: Negative for headaches and light-headedness.   Psychiatric/Behavioral: Negative for altered mental status. The patient is not nervous/anxious.      Objective:     Vital Signs (Most Recent):  Temp: 98.2 °F (36.8 °C) (03/25/18 1500)  Pulse: 110 (03/25/18 1500)  Resp: 14 (03/25/18 1500)  BP: (!) 92/52 (03/25/18 1500)  SpO2: 99 % (03/25/18 1500) Vital Signs (24h Range):  Temp:  [97.6 °F (36.4 °C)-98.9 °F (37.2 °C)] 98.2 °F (36.8 °C)  Pulse:  [] 110  Resp:  [8-45] 14  SpO2:  [84 %-100 %] 99 %  BP: ()/(50-78) 92/52     Weight: 67.8 kg (149 lb 7.6 oz)  Body mass index is 23.41 kg/m².     SpO2: 99 %  O2 Device (Oxygen Therapy): nasal cannula      Intake/Output Summary (Last 24 hours) at 03/25/18 1522  Last data filed at 03/25/18 1500   Gross per 24 hour   Intake           1596.9 ml   Output              400 ml   Net           1196.9 ml       Lines/Drains/Airways     Peripheral Intravenous Line                 Peripheral IV - Single Lumen 03/20/18 1622 Right Antecubital 4 days         Peripheral IV - Single Lumen 03/23/18 1500 Left Wrist 2 days                Physical Exam   Constitutional: He appears well-developed and well-nourished. No distress.   HENT:    Head: Normocephalic and atraumatic.   Eyes: Conjunctivae are normal. Pupils are equal, round, and reactive to light.   Neck: Normal range of motion. Neck supple.   Cardiovascular: Normal rate, regular rhythm and normal heart sounds.    Pulmonary/Chest: Effort normal and breath sounds normal.   Musculoskeletal: Normal range of motion. He exhibits no edema.   Neurological:   sedated   Skin: No rash noted.   Cool left extremity with dusky color. No pulses detected at foot        Significant Labs: All pertinent lab results from the last 24 hours have been reviewed.    Significant Imaging: reviewed

## 2018-03-25 NOTE — PROGRESS NOTES
"[facility]  Cardiology  Progress Note    Patient Name: Leonardo Byrd  MRN: [id number]  Admission Date: 3/18/2018  Hospital Length of Stay: 7 days  Code Status: Full Code   Attending Physician: Sae Carr MD   Primary Care Physician: Indio Aquino MD  Expected Discharge Date: 3/26/2018  Principal Problem:Critical lower limb ischemia    Subjective:     Hospital Course:   Admitted to CCU 3/20 after cath directed tPA was performed by int cards under general anesthesia. Also with DTs requiring scheduled benzos. On day 2 Lt lower extremity remained pulseless, so thrombolysis still infusing.On day 4 of vanc for possible lt foot cellulitis. Transitioned to clinda IV (unable to take PO safely). Intubated overnight for airway protection in setting of increasing benzo requirements and worsening agitation and danger of pt pulling line. CXR this AM (3/22) with patchy bilateral infiltrates. Taken to cath lab for possible cath removal. Lt leg then reportedly duskier and with absent pulses. On 3/23 taken back to cath lab. Int cards Recc Podiatry consult for possible L trans-metatarsal amputation. On heparin gtt. 3/24 Wbc trending up, foot color getting darker,paitent extubated, continue heparin. 3/25: US lower ext showing occlusion of distal right superficial femoral, popliteal, anterior tibial, and posterior tibial arteries. No "dopplerable" pulses on rt foot. Interventional cards holding off on on acute intervention currently in setting of RYAN. Will continue to monitor. Cr 1.7 (baseline 0.6). Given 500cc Iv fluids. Started on scheduled librium for alcohol withdrawal, with plans for taper.    Interval History: HD stable overnight. Pt tachycardic, but awake and alert. No chest pain , sob, lightheadedness. Slow to respond to some questions. Complaining of bilat lower extremity pain.    Review of Systems   Constitution: Negative for chills, decreased appetite and diaphoresis.   HENT: Negative for " congestion and sore throat.    Eyes: Negative for blurred vision.   Cardiovascular: Negative for chest pain, leg swelling, orthopnea and palpitations.   Respiratory: Negative for cough and shortness of breath.    Endocrine: Negative for polyuria.   Musculoskeletal: Negative for arthritis and back pain.        Bilateral foot pain   Gastrointestinal: Negative for abdominal pain and diarrhea.   Genitourinary: Negative for dysuria and hematuria.   Neurological: Negative for headaches and light-headedness.   Psychiatric/Behavioral: Negative for altered mental status. The patient is not nervous/anxious.      Objective:     Vital Signs (Most Recent):  Temp: 98.2 °F (36.8 °C) (03/25/18 1500)  Pulse: 110 (03/25/18 1500)  Resp: 14 (03/25/18 1500)  BP: (!) 92/52 (03/25/18 1500)  SpO2: 99 % (03/25/18 1500) Vital Signs (24h Range):  Temp:  [97.6 °F (36.4 °C)-98.9 °F (37.2 °C)] 98.2 °F (36.8 °C)  Pulse:  [] 110  Resp:  [8-45] 14  SpO2:  [84 %-100 %] 99 %  BP: ()/(50-78) 92/52     Weight: 67.8 kg (149 lb 7.6 oz)  Body mass index is 23.41 kg/m².     SpO2: 99 %  O2 Device (Oxygen Therapy): nasal cannula      Intake/Output Summary (Last 24 hours) at 03/25/18 1522  Last data filed at 03/25/18 1500   Gross per 24 hour   Intake           1596.9 ml   Output              400 ml   Net           1196.9 ml       Lines/Drains/Airways     Peripheral Intravenous Line                 Peripheral IV - Single Lumen 03/20/18 1622 Right Antecubital 4 days         Peripheral IV - Single Lumen 03/23/18 1500 Left Wrist 2 days                Physical Exam   Constitutional: He appears well-developed and well-nourished. No distress.   HENT:   Head: Normocephalic and atraumatic.   Eyes: Conjunctivae are normal. Pupils are equal, round, and reactive to light.   Neck: Normal range of motion. Neck supple.   Cardiovascular: Normal rate, regular rhythm and normal heart sounds.    Pulmonary/Chest: Effort normal and breath sounds normal.    Musculoskeletal: Normal range of motion. He exhibits no edema.   Neurological:   sedated   Skin: No rash noted.   Cool left extremity with dusky color. No pulses detected at foot        Significant Labs: All pertinent lab results from the last 24 hours have been reviewed.    Significant Imaging: reviewed    Assessment and Plan:         * Critical lower limb ischemia  PAD (peripheral artery disease)    Mr. Byrd is a 47 year old man with CAD s/p PCI, LVEF 30% s/p ICD, complicated and extensive PAD s/p aortofem bypass and multiple stents on DAPT who presents with what appears to be CLI of his left foot of unclear duration. This is not an acute presentation as these symtpoms have been ongoing for months and have worsened in the past 2 weeks.     --Underwent angiogram 3/19 that showed occlusion of aorta-fem bypass at distal anastamosis. Unable to treat due to patient movement.   --s/p cath directed tPA to occluded Lt SFA under gen anesthesia  --continue thrombolysis infusion while pt remains w pulseless lt ext; f/up int cards recs- likely remove cath today  --s/p 7 day course abx vanc then clindamycin   --blood cx ordered- ngtd  --thrombolysis gtt stopped  --now with Rt lower extremity pain, pallor and poikilothermia. US revealed Occlusion of distal right superficial femoral, popliteal, anterior tibial, and posterior tibial arteries. Interventional cards made aware- hold off on cath in setting of RYAN  --continue DAPT and hep gtt   --stop cilostazol as is contraindicated in HFpEF  --Podiatry consulted for possible L trans-metatarsal amputation per interventional cards recs; f/up recs  --pain control            Leukocytosis    -infection vs inflammatory response to recent cath procedures  -s/p 7 day course abx for possible lt lower extremity cellulitis  -blood cx ngtd  -afebrile and HD stable  -continue to monitor        RYAN (acute kidney injury)    -suspecting pre-renal etiology in setting of decreased PO intake    -other differentials include postrenal (urinary retention) will do bladder scan and straight cath with possible flores placement vs intrinsic etiology  -will give 500cc IVFs now and recheck BMP  -hold ACE and diuresis  -avoid other nephrotoxic agents  -urine studies in AM        Acute respiratory failure with hypoxia    On vent, mainly for pt safety and airway protection while going through etoh withdrawal.  -now extubated and stable        Alcohol withdrawal syndrome, with delirium    -see etoh abuse above        Ischemic cardiomyopathy    -CM possibly with multifactorial etiology (hx of alcohol use, ischemia)  CAD s/p PCI  DAPT, statin,  BB  Hold ACE, lasix in setting of RYAN        Cellulitis of left lower extremity    --s/p 7 day course abx  --blood cx NGTD  --podiatry consulted for eval for metatarsal amp        Alcohol abuse    -actively withdrawing, possible DT- tachycardic, AMS/ agitation  -no seizure like activity  -continue thiamine   -extubated 3/24  -started scheduled librium + PRN lorazepam for CIWA AR =>8  -will need benzo taper  -continue to monitor          Chronic systolic heart failure    LVEF 30% s/p ICD  Continue  BB  Hold ACE in setting of RYAN                    Tobacco abuse    --nicotine patch            VTE Risk Mitigation         Ordered     heparin 25,000 units in dextrose 5% 250 mL (100 units/mL) infusion  Continuous     Route:  Intravenous        03/23/18 1129     IP VTE LOW RISK PATIENT  Once      03/18/18 1711          Patricia Domingo MD  Cardiology  [facility]

## 2018-03-25 NOTE — ASSESSMENT & PLAN NOTE
-actively withdrawing, possible DT- tachycardic, AMS/ agitation  -no seizure like activity  -continue thiamine   -extubated 3/24  -started scheduled librium + PRN lorazepam for CIWA AR =>8  -will need benzo taper  -continue to monitor

## 2018-03-25 NOTE — ASSESSMENT & PLAN NOTE
-suspecting pre-renal etiology in setting of decreased PO intake  -will give 500cc IVFs now and recheck BMP  -hold ACE and diuresis  -avoid other nephrotoxic agents

## 2018-03-26 NOTE — CONSULTS
[facility]  Infectious Disease  Consult Note    Patient Name: Leonardo Byrd  MRN: [id number]  Admission Date: 3/18/2018  Hospital Length of Stay: 8 days  Attending Physician: Sae Carr MD  Primary Care Provider: Indio Aquino MD     Isolation Status: No active isolations    Inpatient consult to Infectious Diseases  Consult performed by: JOHN BARAJAS  Consult ordered by: VJ GUZMAN  Reason for consult: lower extremity gangren. elevated WBC. culture sent and started on van and ceftriaxon. appreciate your assisstance            ID consult received. Chart being reviewed. Full note with recommendations to follow.    Thank you,  John Barajas PA-C  Pgr [phone]

## 2018-03-26 NOTE — HPI
"Mr. Byrd is a 47 year old with PMH of ICM s/p ICD, CAD s/p PCI, HTN, HLD, PAD (s/p aortobifemoral bypass, L SFA and pop PTAS in September and recent R SFA and R pop 3/8 by Dr. Carl Bell) who presented to [facility] with critical limb ischemia on 3/18. Evaluated by interventional cardiology and underwent catheter directed tPA on 3/20, admitted to CCU for close monitoring. Also with DTs requiring scheduled benzos. Pt was given vancomycin for left foot cellulitis. Pt was intubated for airway protection in setting of increasing benzo requirements and worsening agitation.  Pt now extubated.  Pt was noted to have more duskiness in appearance of left leg and absent pulses.US lower ext showing occlusion of distal right superficial femoral, popliteal, anterior tibial, and posterior tibial arteries. No "dopplerable" pulses on rt foot. Interventional cards holding off on on acute intervention currently in setting of RYAN. Pt afebrile, WBC 17K today. Complains of severe pain of lower extremities. Denies having any cough, n/v/d,dysuria.   "

## 2018-03-26 NOTE — CONSULTS
[facility]  Infectious Disease  Consult Note    Patient Name: Leonardo Byrd  MRN: [id number]  Admission Date: 3/18/2018  Hospital Length of Stay: 8 days  Attending Physician: Sae Carr MD  Primary Care Provider: Indio Aquino MD     Isolation Status: No active isolations    Consults  Assessment/Plan:     * Critical lower limb ischemia    Mr. Byrd is a 47 year old with PMH of ICM s/p ICD, CAD s/p PCI, HTN, HLD, PAD (s/p aortobifemoral bypass, L SFA and pop PTAS in September and recent R SFA and R pop 3/8 by Dr. Carl Bell) who presented to [facility] with critical limb ischemia on 3/18. Evaluated by interventional cardiology and underwent catheter directed tPA on 3/20. On vancomycin and ceftriaxone for LLE cellulitis. Pt now with RLE occlusion of distal right superficial femoral, popliteal, anterior tibial, and posterior tibial arteries. Podiatry following, possible L TMA. Blood cultures today NGTD.      Plan  1.Continue IV vancomycin. vanc trough goal 15-20. Discontinued ceftriaxone and started unasyn for anaerobic coverage.   2.Consider emoblic source? as now with occlusion of right SFA, popliteal and tibial arteries   3.Pain control   4.ID will follow             Thank you for your consult. I will follow-up with patient. Please contact us if you have any additional questions.    Hussein Mason PA-C  Infectious Disease  [facility] Pgr [phone]    Subjective:     Principal Problem: Critical lower limb ischemia    HPI: Mr. Byrd is a 47 year old with PMH of ICM s/p ICD, CAD s/p PCI, HTN, HLD, PAD (s/p aortobifemoral bypass, L SFA and pop PTAS in September and recent R SFA and R pop 3/8 by Dr. Carl Bell) who presented to [facility] with critical limb ischemia on 3/18. Evaluated by interventional cardiology and underwent catheter directed tPA on 3/20, admitted to CCU for close monitoring. Also with DTs requiring scheduled benzos. Pt was given vancomycin for left foot  "cellulitis. Pt was intubated for airway protection in setting of increasing benzo requirements and worsening agitation.  Pt now extubated.  Pt was noted to have more duskiness in appearance of left leg and absent pulses.US lower ext showing occlusion of distal right superficial femoral, popliteal, anterior tibial, and posterior tibial arteries. No "dopplerable" pulses on rt foot. Interventional cards holding off on on acute intervention currently in setting of RYAN. Pt afebrile, WBC 17K today. Complains of severe pain of lower extremities. Denies having any cough, n/v/d,dysuria.     Past Medical History:   Diagnosis Date    AICD (automatic cardioverter/defibrillator) present     RYAN (acute kidney injury) 3/25/2018    CHF (congestive heart failure)     Coronary artery disease     Encounter for blood transfusion     Hyperlipemia     Hypertension     MI (myocardial infarction)     Neuropathy     PAD (peripheral artery disease)     PVD (peripheral vascular disease)        Past Surgical History:   Procedure Laterality Date    AMPUTATION Right     great toe    BYBPASS GRAFT AORTOBIUFEMORAL      CARDIAC DEFIBRILLATOR PLACEMENT      coronary stents      EXPLORATION AND EVaCUATION OF HEMATOMA OF UPPER EXTREMITY Left     KNEE ARTHROPLASTY Left     VASCULAR SURGERY      fem-pop bypass       Review of patient's allergies indicates:  No Known Allergies    Medications:  Prescriptions Prior to Admission   Medication Sig    amitriptyline (ELAVIL) 25 MG tablet Take 1 tablet (25 mg total) by mouth every evening.    aspirin (ECOTRIN) 81 MG EC tablet Take 81 mg by mouth once daily.    atorvastatin (LIPITOR) 40 MG tablet Take 40 mg by mouth every evening.    cilostazol (PLETAL) 50 MG Tab Take 2 tablets (100 mg total) by mouth 2 (two) times daily.    cyclobenzaprine (FLEXERIL) 10 MG tablet Take 10 mg by mouth 3 (three) times daily as needed for Muscle spasms.    gabapentin (NEURONTIN) 800 MG tablet Take 800 mg by " mouth every evening.    hydrocodone-acetaminophen 7.5-325mg (NORCO) 7.5-325 mg per tablet Take 1 tablet by mouth every 6 (six) hours as needed.    lisinopril (PRINIVIL,ZESTRIL) 5 MG tablet Take 1 tablet (5 mg total) by mouth once daily.    metoprolol tartrate (LOPRESSOR) 25 MG tablet Take 12.5 mg by mouth 2 (two) times daily.    nicotine (NICODERM CQ) 21 mg/24 hr Place 1 patch onto the skin once daily. Down titrating as directed    pregabalin (LYRICA) 100 MG capsule Take 1 capsule (100 mg total) by mouth 3 (three) times daily.    ticagrelor (BRILINTA) 90 mg tablet Take 90 mg by mouth 2 (two) times daily.    zolpidem (AMBIEN) 5 MG Tab Take 5 mg by mouth nightly as needed for Insomnia.    nitroGLYCERIN (NITROSTAT) 0.4 MG SL tablet Place 0.4 mg under the tongue every 5 (five) minutes as needed for Chest pain.     Antibiotics     Start     Stop Route Frequency Ordered    03/26/18 1530  ampicillin-sulbactam 3 g in sodium chloride 0.9 % 100 mL IVPB (ready to mix system)      -- IV Every 6 hours (non-standard times) 03/26/18 1418    03/26/18 1154  vancomycin 1 gram/250 mL in sodium chloride 0.9% IVPB 1 g  (Vancomycin IVPB with levels panel)      -- IV Every 12 hours (non-standard times) 03/26/18 0938        Antifungals     None        Antivirals     None             There is no immunization history on file for this patient.    Family History     None        Social History     Social History    Marital status: Single     Spouse name: N/A    Number of children: N/A    Years of education: N/A     Social History Main Topics    Smoking status: Former Smoker     Packs/day: 0.50     Quit date: 02/2018    Smokeless tobacco: Former User     Types: Chew     Quit date: 8/2/1980      Comment: Uses nicotine gum and nicotine patches    Alcohol use Yes      Comment: rarely    Drug use: No    Sexual activity: Yes     Partners: Female     Other Topics Concern    None     Social History Narrative    None     Review of  Systems   Constitutional: Positive for activity change. Negative for chills, diaphoresis and fever.   Respiratory: Negative for cough and shortness of breath.    Gastrointestinal: Negative for abdominal pain, diarrhea, nausea and vomiting.   Musculoskeletal:        BLE pain   Skin: Positive for color change, pallor and wound.     Objective:     Vital Signs (Most Recent):  Temp: 99.4 °F (37.4 °C) (03/26/18 1500)  Pulse: (!) 113 (03/26/18 1500)  Resp: (!) 22 (03/26/18 1500)  BP: (!) 119/91 (03/26/18 1500)  SpO2: (!) 92 % (03/26/18 1500) Vital Signs (24h Range):  Temp:  [98.1 °F (36.7 °C)-99.6 °F (37.6 °C)] 99.4 °F (37.4 °C)  Pulse:  [102-120] 113  Resp:  [7-25] 22  SpO2:  [90 %-100 %] 92 %  BP: ()/(50-91) 119/91     Weight: 67.8 kg (149 lb 7.6 oz)  Body mass index is 23.41 kg/m².    Estimated Creatinine Clearance: 83.5 mL/min (based on SCr of 1 mg/dL).    Physical Exam   Constitutional: He appears well-developed and well-nourished. He appears distressed (2/2 pain).   Cardiovascular: Regular rhythm and normal heart sounds.  Exam reveals no friction rub.    No murmur heard.  tachycardic   Pulmonary/Chest: Effort normal. No respiratory distress. He has no wheezes. He has no rales.   Abdominal: Soft. He exhibits no distension. There is no tenderness. There is no guarding.   Musculoskeletal: He exhibits tenderness.   Neurological: He is alert.   Skin: Skin is warm. No rash noted. He is not diaphoretic. There is pallor.   Refer to media for LLE   RLE cool to touch. Absent pulses    Psychiatric: He has a normal mood and affect.   Nursing note and vitals reviewed.                  Significant Labs:   Blood Culture:   Recent Labs  Lab 03/20/18  2219 03/20/18  2233 03/26/18  0148   LABBLOO No growth after 5 days. No growth after 5 days. No Growth to date     CBC:   Recent Labs  Lab 03/26/18  0040 03/26/18  0748 03/26/18  1404   WBC 19.64* 18.65* 20.21*   HGB 8.3* 8.5* 8.8*   HCT 24.6* 24.9* 25.6*   * 504* 530*      CMP:   Recent Labs  Lab 03/25/18  0401 03/25/18  1658 03/26/18  0454    135* 136   K 3.7 4.5 4.0   CL 97 100 98   CO2 25 22* 25   * 117* 93   BUN 12 16 15   CREATININE 1.7* 1.5* 1.0   CALCIUM 8.8 8.4* 8.7   PROT 6.9  --  6.5   ALBUMIN 2.6*  --  2.4*   BILITOT 0.6  --  0.4   ALKPHOS 147*  --  154*   AST 55*  --  128*   ALT 29  --  42   ANIONGAP 14 13 13   EGFRNONAA 46.3* 53.9* >60.0     All pertinent labs within the past 24 hours have been reviewed.    Significant Imaging: I have reviewed all pertinent imaging results/findings within the past 24 hours.

## 2018-03-26 NOTE — CONSULTS
Inpatient consult to Pain Management  Consult performed by: CORA CORDERO  Consult ordered by: VJ GUZMAN  Reason for consult: Lower limb ischemia w/ poorly controlled pain      Please see the following note for full recommendations.

## 2018-03-26 NOTE — PROGRESS NOTES
notifed CARDS team that pt is still reporting 10/10 pain to bilateral legs even with PRN pain meds Q4hrs. Awaiting change in orders. New Dilaudid due for 5980

## 2018-03-26 NOTE — PROGRESS NOTES
MD Carr rounded on pt with ID. Ordered to give dilaudid now (before 4 hour time interval). MD Carr messaged Intervention CARDS to see if need to get CTA. Nurse told fentanyl PCA pump ordered.

## 2018-03-26 NOTE — SUBJECTIVE & OBJECTIVE
Interval History: -3/26/18 NAEON. Pt. With elevated WBC. On vanc and ceftriaxone. ID consulted. Pain management for BLE ischemia  No chest pain , sob, lightheadedness    Review of Systems   Constitution: Negative for chills, decreased appetite and diaphoresis.   HENT: Negative for congestion and sore throat.    Eyes: Negative for blurred vision.   Cardiovascular: Negative for chest pain, leg swelling, orthopnea and palpitations.   Respiratory: Negative for cough and shortness of breath.    Endocrine: Negative for polyuria.   Musculoskeletal: Negative for arthritis and back pain.        Bilateral foot pain   Gastrointestinal: Negative for abdominal pain and diarrhea.   Genitourinary: Negative for dysuria and hematuria.   Neurological: Negative for headaches and light-headedness.   Psychiatric/Behavioral: Negative for altered mental status. The patient is not nervous/anxious.      Objective:     Vital Signs (Most Recent):  Temp: 98.7 °F (37.1 °C) (03/26/18 1100)  Pulse: 104 (03/26/18 1300)  Resp: 12 (03/26/18 1300)  BP: (!) 109/55 (03/26/18 1300)  SpO2: (!) 94 % (03/26/18 1300) Vital Signs (24h Range):  Temp:  [98.1 °F (36.7 °C)-99.6 °F (37.6 °C)] 98.7 °F (37.1 °C)  Pulse:  [102-120] 104  Resp:  [7-25] 12  SpO2:  [90 %-100 %] 94 %  BP: ()/(50-84) 109/55     Weight: 67.8 kg (149 lb 7.6 oz)  Body mass index is 23.41 kg/m².     SpO2: (!) 94 %  O2 Device (Oxygen Therapy): room air      Intake/Output Summary (Last 24 hours) at 03/26/18 1408  Last data filed at 03/26/18 1300   Gross per 24 hour   Intake           1597.6 ml   Output             1280 ml   Net            317.6 ml       Lines/Drains/Airways     Peripheral Intravenous Line                 Peripheral IV - Single Lumen 03/20/18 1622 Right Antecubital 5 days         Peripheral IV - Single Lumen 03/26/18 0620 Left Forearm less than 1 day                Physical Exam   Constitutional: He is oriented to person, place, and time. He appears well-developed and  well-nourished. No distress.   HENT:   Head: Normocephalic and atraumatic.   Eyes: Conjunctivae are normal. Pupils are equal, round, and reactive to light.   Neck: Normal range of motion. Neck supple.   Cardiovascular: Normal rate, regular rhythm and normal heart sounds.    Pulmonary/Chest: Effort normal and breath sounds normal.   Musculoskeletal: Normal range of motion. He exhibits no edema.   Neurological: He is alert and oriented to person, place, and time.   Skin: No rash noted.   Cool left extremity with dusky color. No pulses detected at foot        Significant Labs: All pertinent lab results from the last 24 hours have been reviewed.    Significant Imaging: reviewed

## 2018-03-26 NOTE — PROGRESS NOTES
"[facility]  Cardiology  Progress Note    Patient Name: Leonardo Byrd  MRN: [id number]  Admission Date: 3/18/2018  Hospital Length of Stay: 8 days  Code Status: Full Code   Attending Physician: Sae Carr MD   Primary Care Physician: Indio Aquino MD  Expected Discharge Date: 4/2/2018  Principal Problem:Critical lower limb ischemia    Subjective:     Hospital Course:   Admitted to CCU 3/20 after cath directed tPA was performed by int cards under general anesthesia. Also with DTs requiring scheduled benzos. On day 2 Lt lower extremity remained pulseless, so thrombolysis still infusing.On day 4 of vanc for possible lt foot cellulitis. Transitioned to clinda IV (unable to take PO safely). Intubated overnight for airway protection in setting of increasing benzo requirements and worsening agitation and danger of pt pulling line. CXR this AM (3/22) with patchy bilateral infiltrates. Taken to cath lab for possible cath removal. Lt leg then reportedly duskier and with absent pulses. On 3/23 taken back to cath lab. Int cards Recc Podiatry consult for possible L trans-metatarsal amputation. On heparin gtt. 3/24 Wbc trending up, foot color getting darker,paitent extubated, continue heparin. 3/25: US lower ext showing occlusion of distal right superficial femoral, popliteal, anterior tibial, and posterior tibial arteries. No "dopplerable" pulses on rt foot. Interventional cards holding off on on acute intervention currently in setting of RYAN. Will continue to monitor. Cr 1.7 (baseline 0.6). Given 500cc Iv fluids. Started on scheduled librium for alcohol withdrawal, with plans for taper.    -3/26/18 NAEON. Pt. With elevated WBC. On vanc and ceftriaxone. ID consulted. Pain management for BLE ischemia     Interval History: -3/26/18 NAEON. Pt. With elevated WBC. On vanc and ceftriaxone. ID consulted. Pain management for BLE ischemia  No chest pain , sob, lightheadedness    Review of Systems "   Constitution: Negative for chills, decreased appetite and diaphoresis.   HENT: Negative for congestion and sore throat.    Eyes: Negative for blurred vision.   Cardiovascular: Negative for chest pain, leg swelling, orthopnea and palpitations.   Respiratory: Negative for cough and shortness of breath.    Endocrine: Negative for polyuria.   Musculoskeletal: Negative for arthritis and back pain.        Bilateral foot pain   Gastrointestinal: Negative for abdominal pain and diarrhea.   Genitourinary: Negative for dysuria and hematuria.   Neurological: Negative for headaches and light-headedness.   Psychiatric/Behavioral: Negative for altered mental status. The patient is not nervous/anxious.      Objective:     Vital Signs (Most Recent):  Temp: 98.7 °F (37.1 °C) (03/26/18 1100)  Pulse: 104 (03/26/18 1300)  Resp: 12 (03/26/18 1300)  BP: (!) 109/55 (03/26/18 1300)  SpO2: (!) 94 % (03/26/18 1300) Vital Signs (24h Range):  Temp:  [98.1 °F (36.7 °C)-99.6 °F (37.6 °C)] 98.7 °F (37.1 °C)  Pulse:  [102-120] 104  Resp:  [7-25] 12  SpO2:  [90 %-100 %] 94 %  BP: ()/(50-84) 109/55     Weight: 67.8 kg (149 lb 7.6 oz)  Body mass index is 23.41 kg/m².     SpO2: (!) 94 %  O2 Device (Oxygen Therapy): room air      Intake/Output Summary (Last 24 hours) at 03/26/18 1408  Last data filed at 03/26/18 1300   Gross per 24 hour   Intake           1597.6 ml   Output             1280 ml   Net            317.6 ml       Lines/Drains/Airways     Peripheral Intravenous Line                 Peripheral IV - Single Lumen 03/20/18 1622 Right Antecubital 5 days         Peripheral IV - Single Lumen 03/26/18 0620 Left Forearm less than 1 day                Physical Exam   Constitutional: He is oriented to person, place, and time. He appears well-developed and well-nourished. No distress.   HENT:   Head: Normocephalic and atraumatic.   Eyes: Conjunctivae are normal. Pupils are equal, round, and reactive to light.   Neck: Normal range of motion. Neck  supple.   Cardiovascular: Normal rate, regular rhythm and normal heart sounds.    Pulmonary/Chest: Effort normal and breath sounds normal.   Musculoskeletal: Normal range of motion. He exhibits no edema.   Neurological: He is alert and oriented to person, place, and time.   Skin: No rash noted.   Cool left extremity with dusky color. No pulses detected at foot        Significant Labs: All pertinent lab results from the last 24 hours have been reviewed.    Significant Imaging: reviewed    Assessment and Plan:       * Critical lower limb ischemia    Mr. Byrd is a 47 year old man with CAD s/p PCI, LVEF 30% s/p ICD, complicated and extensive PAD s/p aortofem bypass and multiple stents on DAPT who presents with what appears to be CLI of his left foot of unclear duration. This is not an acute presentation as these symtpoms have been ongoing for months and have worsened in the past 2 weeks.     --Underwent angiogram 3/19 that showed occlusion of aorta-fem bypass at distal anastamosis. Unable to treat due to patient movement.   --s/p cath directed tPA to occluded Lt SFA under gen anesthesia  --continue thrombolysis infusion while pt remains w pulseless lt ext; f/up int cards recs- likely remove cath today  --s/p 7 day course abx vanc then clindamycin   -- restarted on van and ceftriaxone (started 3/25/18) for elevated wbc   --blood cx ordered- ngtd  --thrombolysis gtt stopped  --now with Rt lower extremity pain, pallor and poikilothermia. US revealed Occlusion of distal right superficial femoral, popliteal, anterior tibial, and posterior tibial arteries. Interventional cards made aware- hold off on cath in setting of RYAN  --continue DAPT and hep gtt   --stop cilostazol as is contraindicated in HFpEF  --Podiatry consulted for possible L trans-metatarsal amputation (after gangrene demarcation)  --pain control            Leukocytosis    -infection vs inflammatory response to recent cath procedures  -s/p 7 day course abx for  possible lt lower extremity cellulitis  -blood cx ngtd  -afebrile and HD stable  - restarted on van and ceftriaxone (started 3/25/18) for elevated wbc   - ID consulted. Appreciate their help  - -continue to monitor        RYAN (acute kidney injury)    - resolved   - suspecting pre-renal etiology in setting of decreased PO intake  - s/p 500cc IVFs . Cr improved. Pt is urinating   -hold ACE and diuresis  -avoid other nephrotoxic agents        Acute respiratory failure with hypoxia    - stable.       Alcohol withdrawal syndrome, with delirium    -see etoh abuse above        Ischemic cardiomyopathy    -CM possibly with multifactorial etiology (hx of alcohol use, ischemia)  CAD s/p PCI  DAPT, statin,  BB  Hold ACE in setting of RYAN        Cellulitis of left lower extremity    --s/p 7 day course abx  --blood cx NGTD  --podiatry consulted for eval for metatarsal amp after gangrene demarcation   --restarted on van and ceftriaxone (started 3/25/18) for elevated wbc   - ID consulted. Appreciate their help        Alcohol abuse    -actively withdrawing, possible DT- tachycardic, AMS/ agitation (improving)  -no seizure like activity  -continue thiamine   -extubated 3/24  -started scheduled librium + PRN lorazepam for CIWA AR =>8  -will need benzo taper  -continue to monitor          Chronic systolic heart failure    LVEF 30% s/p ICD  Continue  BB  Hold ACE in setting of RYAN            VTE Risk Mitigation         Ordered     heparin 25,000 units in dextrose 5% 250 mL (100 units/mL) infusion  Continuous     Route:  Intravenous        03/23/18 1129     IP VTE LOW RISK PATIENT  Once      03/18/18 1711          Cm Giles MD  Cardiology  [facility]

## 2018-03-26 NOTE — ANESTHESIA POST-OP PAIN MANAGEMENT
Acute Pain Service Progress Note      Surgery:  * No procedures listed *    Leonardo Byrd is a 47 y.o. male w/ a significant PMHx of ICM (LVEF 30-35%) s/p ICD, CAD s/p PCI, HTN, HLD, current smoker, PAD (s/p aortobifemoral bypass) who presented to [facility] with critical limb ischemia on 3/18. Evaluated by Cardiology who recommended Abx for possible foot cellulitis and Interventional Cardiology consult. Underwent catheter directed tPA on 3/20 performed by interventional cardiology and was admitted to CCU for close monitoring. Acute Pain Service consulted for poorly controlled pain in the setting of lower limb ischemia.       Problem List:    Active Hospital Problems    Diagnosis  POA    *Critical lower limb ischemia [I99.8]  Yes     Chronic    RYAN (acute kidney injury) [N17.9]  No    Leukocytosis [D72.829]  Yes    Acute renal failure with tubular necrosis [N17.0]  No    Gangrene [I96]  Yes    Acute respiratory failure with hypoxia [J96.01]  No    Alcohol withdrawal syndrome, with delirium [F10.231]  Yes    Cellulitis of left lower extremity [L03.116]  Yes    Atherosclerosis of native artery of left lower extremity with intermittent claudication [I70.212]  Yes    Ischemic cardiomyopathy [I25.5]  Yes    Neuropathy due to peripheral vascular disease [I73.9, G63]  Yes    Alcohol abuse [F10.10]  Yes    Chronic systolic heart failure [I50.22]  Yes    PAD (peripheral artery disease) [I73.9]  Yes    Tobacco abuse [Z72.0]  Yes      Resolved Hospital Problems    Diagnosis Date Resolved POA   No resolved problems to display.       Subjective:  General appearance of alert, oriented, no complaints  Pain with rest: 8    Numbers  Pain with movement: 8    Numbers  Side Effects:   1. Pruritis: No   2. Nausea: No   3. Motor Blockade: No, 0=Ability to raise lower extremities off bed   4. Sedation: No, 1=awake and alert    Schedule Medications:    amitriptyline  50 mg Oral QHS    ampicillin-sulbactim (UNASYN) IVPB  3 g  Intravenous Q6H    aspirin  81 mg Oral Daily    atorvastatin  40 mg Oral QHS    chlordiazepoxide  25 mg Oral Q6H    folic acid-vit B6-vit B12 2.5-25-2 mg  1 tablet Per OG tube Daily    gabapentin  800 mg Oral QHS    lidocaine   Topical (Top) QID    metoprolol succinate  25 mg Oral Daily    nicotine  1 patch Transdermal Daily    pregabalin  100 mg Oral TID    succinylcholine  120 mg Intravenous Once    thiamine  100 mg Oral Daily    ticagrelor  90 mg Oral BID    vancomycin (VANCOCIN) IVPB  1,000 mg Intravenous Q12H        Continuous Infusions:   sodium chloride 0.9% 30 mL/hr at 03/23/18 0800    sodium chloride 0.9%      heparin (porcine) in D5W 15.929 Units/kg/hr (03/26/18 1502)        PRN Medications:  cyclobenzaprine, HYDROmorphone, lorazepam, nitroGLYCERIN, oxyCODONE-acetaminophen, oxyCODONE-acetaminophen, sodium chloride 0.9%, zolpidem       Antibiotics:  Antibiotics     Start     Stop Route Frequency Ordered    03/26/18 1530  ampicillin-sulbactam 3 g in sodium chloride 0.9 % 100 mL IVPB (ready to mix system)      -- IV Every 6 hours (non-standard times) 03/26/18 1418    03/26/18 1154  vancomycin 1 gram/250 mL in sodium chloride 0.9% IVPB 1 g  (Vancomycin IVPB with levels panel)      -- IV Every 12 hours (non-standard times) 03/26/18 0938           Objective:    Vital Signs (Most Recent):  Temp: 37.4 °C (99.4 °F) (03/26/18 1500)  Pulse: (!) 113 (03/26/18 1500)  Resp: (!) 22 (03/26/18 1500)  BP: (!) 119/91 (03/26/18 1500)  SpO2: (!) 92 % (03/26/18 1500) Vital Signs Range (Last 24H):  Temp:  [36.7 °C (98.1 °F)-37.6 °C (99.6 °F)]   Pulse:  [102-120]   Resp:  [7-25]   BP: ()/(50-91)   SpO2:  [90 %-100 %]      I & O (Last 24H):  Intake/Output Summary (Last 24 hours) at 03/26/18 1602  Last data filed at 03/26/18 1502   Gross per 24 hour   Intake           1697.6 ml   Output             1180 ml   Net            517.6 ml       Physical Exam:  GA: Alert, comfortable, no acute distress.   Pulmonary:  Clear to auscultation A/P/L. No wheezing, crackles, or rhonchi.  Cardiac: RRR S1 & S2 w/o rubs/murmurs/gallops.   Abdominal:Bowel sounds present. No tenderness to palpation or distension. No appreciable hepatosplenomegaly.   Skin: No jaundice, rashes, or visible lesions.    Laboratory:  CBC: Recent Labs      03/26/18   0748  03/26/18   1404   WBC  18.65*  20.21*   RBC  2.59*  2.67*   HGB  8.5*  8.8*   HCT  24.9*  25.6*   PLT  504*  530*   MCV  96  96   MCH  32.8*  33.0*   MCHC  34.1  34.4       BMP: Recent Labs      03/25/18   0401  03/25/18   1658  03/26/18   0454   NA  136  135*  136   K  3.7  4.5  4.0   CO2  25  22*  25   CL  97  100  98   BUN  12  16  15   CREATININE  1.7*  1.5*  1.0   GLU  127*  117*  93   MG  2.0   --   1.6   PHOS  7.0*   --   3.6   CALCIUM  8.8  8.4*  8.7       No results for input(s): PT, INR, PROTIME, APTT in the last 72 hours.      Assessment:    Pain control: adequate    Plan:  Modify present therapy to improve control of pain.    - Recommend switching from Norco to Oxycodone IR  - Recommend increasing Oxycodone IR to 15, 20, 30 mg PO Q3H PRN for mild, moderate, severe pain  - Can switch from Dilaudid to morphine 2, 4, and 6 mg IV for mild, moderate, severe pain if pain not relieved by PO regimen  - Recommend scheduling Tylenol 1,000 mg PO Q6H  - Patient w/ recent Hx of RYAN, now recovered, could consider NSAIDs  - Recommend picking either Lyrica or Neurontin for neuropathy type pain  - If pain continues to persist uncontrolled could consider PCA tomorrow        Basim Ulloa MD  CA-2 [phone]    I have reviewed and concur with the resident's history, physical, assessment, and plan.  I have personally interviewed and examined the patient at bedside.  See below addendum for my evaluation and additional findings.    See above for recommendations - Can titrate opioids depending on morphine requirement overnight - would recommend optimizing gabapentin first.

## 2018-03-26 NOTE — SUBJECTIVE & OBJECTIVE
Past Medical History:   Diagnosis Date    AICD (automatic cardioverter/defibrillator) present     RYAN (acute kidney injury) 3/25/2018    CHF (congestive heart failure)     Coronary artery disease     Encounter for blood transfusion     Hyperlipemia     Hypertension     MI (myocardial infarction)     Neuropathy     PAD (peripheral artery disease)     PVD (peripheral vascular disease)        Past Surgical History:   Procedure Laterality Date    AMPUTATION Right     great toe    BYBPASS GRAFT AORTOBIUFEMORAL      CARDIAC DEFIBRILLATOR PLACEMENT      coronary stents      EXPLORATION AND EVaCUATION OF HEMATOMA OF UPPER EXTREMITY Left     KNEE ARTHROPLASTY Left     VASCULAR SURGERY      fem-pop bypass       Review of patient's allergies indicates:  No Known Allergies    Medications:  Prescriptions Prior to Admission   Medication Sig    amitriptyline (ELAVIL) 25 MG tablet Take 1 tablet (25 mg total) by mouth every evening.    aspirin (ECOTRIN) 81 MG EC tablet Take 81 mg by mouth once daily.    atorvastatin (LIPITOR) 40 MG tablet Take 40 mg by mouth every evening.    cilostazol (PLETAL) 50 MG Tab Take 2 tablets (100 mg total) by mouth 2 (two) times daily.    cyclobenzaprine (FLEXERIL) 10 MG tablet Take 10 mg by mouth 3 (three) times daily as needed for Muscle spasms.    gabapentin (NEURONTIN) 800 MG tablet Take 800 mg by mouth every evening.    hydrocodone-acetaminophen 7.5-325mg (NORCO) 7.5-325 mg per tablet Take 1 tablet by mouth every 6 (six) hours as needed.    lisinopril (PRINIVIL,ZESTRIL) 5 MG tablet Take 1 tablet (5 mg total) by mouth once daily.    metoprolol tartrate (LOPRESSOR) 25 MG tablet Take 12.5 mg by mouth 2 (two) times daily.    nicotine (NICODERM CQ) 21 mg/24 hr Place 1 patch onto the skin once daily. Down titrating as directed    pregabalin (LYRICA) 100 MG capsule Take 1 capsule (100 mg total) by mouth 3 (three) times daily.    ticagrelor (BRILINTA) 90 mg tablet Take 90 mg by  mouth 2 (two) times daily.    zolpidem (AMBIEN) 5 MG Tab Take 5 mg by mouth nightly as needed for Insomnia.    nitroGLYCERIN (NITROSTAT) 0.4 MG SL tablet Place 0.4 mg under the tongue every 5 (five) minutes as needed for Chest pain.     Antibiotics     Start     Stop Route Frequency Ordered    03/26/18 1530  ampicillin-sulbactam 3 g in sodium chloride 0.9 % 100 mL IVPB (ready to mix system)      -- IV Every 6 hours (non-standard times) 03/26/18 1418    03/26/18 1154  vancomycin 1 gram/250 mL in sodium chloride 0.9% IVPB 1 g  (Vancomycin IVPB with levels panel)      -- IV Every 12 hours (non-standard times) 03/26/18 0938        Antifungals     None        Antivirals     None             There is no immunization history on file for this patient.    Family History     None        Social History     Social History    Marital status: Single     Spouse name: N/A    Number of children: N/A    Years of education: N/A     Social History Main Topics    Smoking status: Former Smoker     Packs/day: 0.50     Quit date: 02/2018    Smokeless tobacco: Former User     Types: Chew     Quit date: 8/2/1980      Comment: Uses nicotine gum and nicotine patches    Alcohol use Yes      Comment: rarely    Drug use: No    Sexual activity: Yes     Partners: Female     Other Topics Concern    None     Social History Narrative    None     Review of Systems   Constitutional: Positive for activity change. Negative for chills, diaphoresis and fever.   Respiratory: Negative for cough and shortness of breath.    Gastrointestinal: Negative for abdominal pain, diarrhea, nausea and vomiting.   Musculoskeletal:        BLE pain   Skin: Positive for color change, pallor and wound.     Objective:     Vital Signs (Most Recent):  Temp: 99.4 °F (37.4 °C) (03/26/18 1500)  Pulse: (!) 113 (03/26/18 1500)  Resp: (!) 22 (03/26/18 1500)  BP: (!) 119/91 (03/26/18 1500)  SpO2: (!) 92 % (03/26/18 1500) Vital Signs (24h Range):  Temp:  [98.1 °F (36.7  °C)-99.6 °F (37.6 °C)] 99.4 °F (37.4 °C)  Pulse:  [102-120] 113  Resp:  [7-25] 22  SpO2:  [90 %-100 %] 92 %  BP: ()/(50-91) 119/91     Weight: 67.8 kg (149 lb 7.6 oz)  Body mass index is 23.41 kg/m².    Estimated Creatinine Clearance: 83.5 mL/min (based on SCr of 1 mg/dL).    Physical Exam   Constitutional: He appears well-developed and well-nourished. He appears distressed (2/2 pain).   Cardiovascular: Regular rhythm and normal heart sounds.  Exam reveals no friction rub.    No murmur heard.  tachycardic   Pulmonary/Chest: Effort normal. No respiratory distress. He has no wheezes. He has no rales.   Abdominal: Soft. He exhibits no distension. There is no tenderness. There is no guarding.   Musculoskeletal: He exhibits tenderness.   Neurological: He is alert.   Skin: Skin is warm. No rash noted. He is not diaphoretic. There is pallor.   Refer to media for LLE   RLE cool to touch. Absent pulses    Psychiatric: He has a normal mood and affect.   Nursing note and vitals reviewed.                  Significant Labs:   Blood Culture:   Recent Labs  Lab 03/20/18  2219 03/20/18  2233 03/26/18  0148   LABBLOO No growth after 5 days. No growth after 5 days. No Growth to date     CBC:   Recent Labs  Lab 03/26/18  0040 03/26/18  0748 03/26/18  1404   WBC 19.64* 18.65* 20.21*   HGB 8.3* 8.5* 8.8*   HCT 24.6* 24.9* 25.6*   * 504* 530*     CMP:   Recent Labs  Lab 03/25/18  0401 03/25/18  1658 03/26/18  0454    135* 136   K 3.7 4.5 4.0   CL 97 100 98   CO2 25 22* 25   * 117* 93   BUN 12 16 15   CREATININE 1.7* 1.5* 1.0   CALCIUM 8.8 8.4* 8.7   PROT 6.9  --  6.5   ALBUMIN 2.6*  --  2.4*   BILITOT 0.6  --  0.4   ALKPHOS 147*  --  154*   AST 55*  --  128*   ALT 29  --  42   ANIONGAP 14 13 13   EGFRNONAA 46.3* 53.9* >60.0     All pertinent labs within the past 24 hours have been reviewed.    Significant Imaging: I have reviewed all pertinent imaging results/findings within the past 24 hours.

## 2018-03-26 NOTE — PLAN OF CARE
Problem: Patient Care Overview  Goal: Plan of Care Review  Outcome: Ongoing (interventions implemented as appropriate)  No acute events noted within the shift. Pt GCS 15, pain controlled with medication timing on prn meds. On RA, no desat. Still no pulse on DP and PT of right foot, no pulse on DP of left foot, and dopplerable pulse on left PT. Both feet remains purplish in color, and more worsed in left foot. Had episdoe of MAP below 65, and no UO since last straight done in dayshift. Bolus of 50 ml NSS given, followed by 40 mg of Lasix. Pt had 4x occurrence of urine with 920 total. WBC high.  Blood culture sent peripheral x1, Vanc and Ceftriaxone given. Lactate WNL. Urine crea, Na sent . Remains on Heparin drip at 16. Anti-Xa therapeutic. POC reviewed with pt. All concerns and questions addressed. Will continue to monitor.

## 2018-03-26 NOTE — ASSESSMENT & PLAN NOTE
Mr. Byrd is a 47 year old with PMH of ICM s/p ICD, CAD s/p PCI, HTN, HLD, PAD (s/p aortobifemoral bypass, L SFA and pop PTAS in September and recent R SFA and R pop 3/8 by Dr. Carl Bell) who presented to [facility] with critical limb ischemia on 3/18. Evaluated by interventional cardiology and underwent catheter directed tPA on 3/20. On vancomycin and ceftriaxone for LLE cellulitis. Pt now with RLE occlusion of distal right superficial femoral, popliteal, anterior tibial, and posterior tibial arteries. Podiatry following, possible L TMA. Blood cultures today NGTD.      Plan  1.Continue IV vancomycin. vanc trough goal 15-20. Discontinued ceftriaxone and started unasyn for anaerobic coverage.   2.Consider emoblic source? as now with occlusion of right SFA, popliteal and tibial arteries   3.Pain control   4.ID will follow

## 2018-03-27 PROBLEM — I99.8 LIMB ISCHEMIA: Status: ACTIVE | Noted: 2018-01-01

## 2018-03-27 NOTE — SUBJECTIVE & OBJECTIVE
Prescriptions Prior to Admission   Medication Sig Dispense Refill Last Dose    amitriptyline (ELAVIL) 25 MG tablet Take 1 tablet (25 mg total) by mouth every evening. 30 tablet 11 3/17/2018    aspirin (ECOTRIN) 81 MG EC tablet Take 81 mg by mouth once daily.   3/18/2018    atorvastatin (LIPITOR) 40 MG tablet Take 40 mg by mouth every evening.   3/17/2018    cilostazol (PLETAL) 50 MG Tab Take 2 tablets (100 mg total) by mouth 2 (two) times daily.   3/18/2018    cyclobenzaprine (FLEXERIL) 10 MG tablet Take 10 mg by mouth 3 (three) times daily as needed for Muscle spasms.   3/18/2018    gabapentin (NEURONTIN) 800 MG tablet Take 800 mg by mouth every evening.   3/17/2018    hydrocodone-acetaminophen 7.5-325mg (NORCO) 7.5-325 mg per tablet Take 1 tablet by mouth every 6 (six) hours as needed. 21 tablet 0 3/17/2018    lisinopril (PRINIVIL,ZESTRIL) 5 MG tablet Take 1 tablet (5 mg total) by mouth once daily. 90 tablet 3 3/18/2018    metoprolol tartrate (LOPRESSOR) 25 MG tablet Take 12.5 mg by mouth 2 (two) times daily.   3/18/2018    nicotine (NICODERM CQ) 21 mg/24 hr Place 1 patch onto the skin once daily. Down titrating as directed  0 3/17/2018    pregabalin (LYRICA) 100 MG capsule Take 1 capsule (100 mg total) by mouth 3 (three) times daily. 90 capsule 6 3/18/2018    ticagrelor (BRILINTA) 90 mg tablet Take 90 mg by mouth 2 (two) times daily.   3/18/2018    zolpidem (AMBIEN) 5 MG Tab Take 5 mg by mouth nightly as needed for Insomnia.   3/17/2018    nitroGLYCERIN (NITROSTAT) 0.4 MG SL tablet Place 0.4 mg under the tongue every 5 (five) minutes as needed for Chest pain.   More than a month       Review of patient's allergies indicates:  No Known Allergies    Past Medical History:   Diagnosis Date    AICD (automatic cardioverter/defibrillator) present     RYAN (acute kidney injury) 3/25/2018    CHF (congestive heart failure)     Coronary artery disease     Encounter for blood transfusion     Hyperlipemia      Hypertension     MI (myocardial infarction)     Neuropathy     PAD (peripheral artery disease)     PVD (peripheral vascular disease)      Past Surgical History:   Procedure Laterality Date    AMPUTATION Right     great toe    BYBPASS GRAFT AORTOBIUFEMORAL      CARDIAC DEFIBRILLATOR PLACEMENT      coronary stents      EXPLORATION AND EVaCUATION OF HEMATOMA OF UPPER EXTREMITY Left     KNEE ARTHROPLASTY Left     VASCULAR SURGERY      fem-pop bypass     Family History     None        Social History Main Topics    Smoking status: Former Smoker     Packs/day: 0.50     Quit date: 02/2018    Smokeless tobacco: Former User     Types: Chew     Quit date: 8/2/1980      Comment: Uses nicotine gum and nicotine patches    Alcohol use Yes      Comment: rarely    Drug use: No    Sexual activity: Yes     Partners: Female     Review of Systems   Constitutional: Positive for activity change.   HENT: Negative for congestion and dental problem.    Eyes: Negative for discharge.   Respiratory: Negative for apnea.    Cardiovascular: Negative for chest pain and leg swelling.   Gastrointestinal: Negative for abdominal distention.   Genitourinary: Negative for difficulty urinating.   Musculoskeletal: Positive for myalgias.   Skin: Positive for wound.   Neurological: Negative for dizziness and facial asymmetry.   Psychiatric/Behavioral: Negative for agitation and behavioral problems.   Patient difficult to ellicit complete   Objective:     Vital Signs (Most Recent):  Temp: 99.4 °F (37.4 °C) (03/26/18 2300)  Pulse: 106 (03/26/18 2300)  Resp: 16 (03/26/18 2300)  BP: 130/74 (03/26/18 2300)  SpO2: 96 % (03/26/18 2300) Vital Signs (24h Range):  Temp:  [98.2 °F (36.8 °C)-99.6 °F (37.6 °C)] 99.4 °F (37.4 °C)  Pulse:  [] 106  Resp:  [7-23] 16  SpO2:  [90 %-99 %] 96 %  BP: ()/(54-91) 130/74     Weight: 67.8 kg (149 lb 7.6 oz)  Body mass index is 23.41 kg/m².    Physical Exam   Constitutional: He appears well-developed  and well-nourished. He appears lethargic.   HENT:   Head: Normocephalic and atraumatic.   Eyes: Pupils are equal, round, and reactive to light.   Cardiovascular: Normal rate and regular rhythm.    Pulses:       Femoral pulses are 2+ on the right side, and 2+ on the left side.  Right: no popliteal signal, no signals distally  Left: strong biphasic PT   Pulmonary/Chest: Effort normal.   Abdominal: Soft. He exhibits no distension. There is no tenderness.   Musculoskeletal:   Right lower extremity: temperature cool starting at distal calf; ischemic changes to right forefoot; prior distal great toe amputation  Left lower extremity: dorsal ulcer, entire forefoot dusky edematous ischemia to mid forefoot   Neurological: He appears lethargic.   Right motor: no sensation to right foot; no motor right foot/ankle; no dorsi/plantarflexion, able to flex/extend knee  Left motor: minimal dorsi/plantar flexion left foot       Significant Labs:  Anti-XA:   Recent Labs  Lab 03/26/18  0454   LABHEPA 0.43     CBC:   Recent Labs  Lab 03/26/18  1404   WBC 20.21*   RBC 2.67*   HGB 8.8*   HCT 25.6*   *   MCV 96   MCH 33.0*   MCHC 34.4     CMP:   Recent Labs  Lab 03/26/18  0454   GLU 93   CALCIUM 8.7   ALBUMIN 2.4*   PROT 6.5      K 4.0   CO2 25   CL 98   BUN 15   CREATININE 1.0   ALKPHOS 154*   ALT 42   *   BILITOT 0.4     Coagulation: No results for input(s): LABPROT, INR, APTT in the last 48 hours.  Lactic Acid:   Recent Labs  Lab 03/25/18  2317   LACTATE 1.3       Significant Diagnostics:  EXAMINATION:  US LOWER EXTREMITY ARTERIES RIGHT    CLINICAL HISTORY:  lost pulse pt;   history of aorto bi fem bypass.    TECHNIQUE:  Grayscale, color flow and spectral analysis of the arteries of right leg were obtained    COMPARISON:  None.    FINDINGS:  The right common femoral and proximal superficial femoral arteries are occluded.  The graft is patent in the right groin and at the anastomosis to proximal right SFA.  The mid  right SFA is patent.  The distal right superficial femoral and popliteal arteries are occluded.  The right anterior and posterior tibial arteries are also occluded.    The short-segment portion of bypass graft visualized in the left groin is patent.   Impression       Occlusion of distal right superficial femoral, popliteal, anterior tibial, and posterior tibial arteries.    This report was flagged in Epic as abnormal.

## 2018-03-27 NOTE — OP NOTE
[facility]  Vascular Surgery  Operative Note    SUMMARY     Date of Procedure: 3/27/2018     Procedure: Right above knee amputation    Surgeon(s) and Role:     * Norris Trejo MD - Primary     * Yodit Pierce MD - Fellow     * Mathieu Shelton MD - Resident - Assisting    Pre-Operative Diagnosis: Critical lower limb ischemia [I99.8]    Post-Operative Diagnosis: Post-Op Diagnosis Codes:     * Critical lower limb ischemia [I99.8]    Anesthesia: General/MAC    Indication for operation:48 yo M with h/o HTN, HLD, HFrEF (10-15% 3/23/18) s/p ICD, CAD s/p PCI, EtOH abuse, PAD s/p ABF 1999 with multiple endovascular interventions presented to hospital on 3/18 with complaints of left foot rest pain s/p lysis now with biphasic left PT signal and L forefoot ischemia, had right lower extremity ischemia with no dopplerable signals and epifanio ischemia of right lower extremity, insensate with no motor function of the foot as well as tight and painful right calf.  Secondary to long standing ischemia presenting for R AKA.    Description of the Findings of the Procedure: healthy muscle flap, edematous leg      Complications: No    Estimated Blood Loss (EBL): 350 mL           Implants: * No implants in log *    Specimens:   Specimen (12h ago through future)    Start     Ordered    03/27/18 1456  Specimen to Pathology - Surgery  Once     Comments:  1. Right leg above the knee amputation, permanent, sent to pathology      03/27/18 1455    03/27/18 1444  Specimen to Pathology - Surgery  Once     Comments:  1. Right leg above the knee amputation; permanent      03/27/18 1444         Condition: Good    Disposition: PACU - hemodynamically stable.     Operation in detail: After appropriate informed consent obtained, the patient was taken to the operating room and placed in the supine position.The right lower extremity was prepped and draped in usual sterile fashion.  The anterior and posterior skin flaps were  outlined using a marking pen. A fish-mouth incision was made at the level of distal femur, just proximal to the patella, and a longer posterior flap was created. It was deepened through the subcutaneous tissues and anterior muscle groups were divided at the skin level using sharp dissection. Multiple large venous branches were identified and controlled with silk ties.  The periosteum of the femur was elevated proximally and circumferentially along with the femoral shaft.  The femur was divided with a bone saw.  The SFA, femoral vein were isolated controlled.  The posterior muscle compartment was divided sharply. The leg was then passed off of the field.  The vessels were suture ligated with 2-0 prolene suture. Prior to ligating the SFA, a SFA stent was removed.  The sciatic nerve was identified and divided with silk sutures. Hemostasis was obtained with electrocautery and suture ligating venous branches.  The muscle flaps were noted to be healthy with fasciculating muscle. The deep investing fascia of the anterior and posterior muscle flaps were approximated interrupted 2-0 Vicryl sutures.  The subcutaneous tissue was then approximated using interrupted 3-0 vicryl suture. The skin was approximated with 3-0 nylon horizontal mattress sutures.  At conclusion, regional anesthesia performed perineural block. The patient was transferred to the recovery room in stable condition.  Dr Trejo was present for ng portions of the case.

## 2018-03-27 NOTE — HPI
48 yo M with h/o HTN, HLD, HFrEF (10-15% 3/23/18) s/p ICD, CAD s/p PCI, EtOH abuse, PAD s/p ABF 1999 with multiple endovascular interventions presented to hospital on 3/18 with complaints of left foot rest pain. Reports that pain initially started in 9/2017; reports short distance claudication of both legs 20 feet.  Reports pain had gotten progressively worse to point of rest pain. Of note, history is difficult to ellicit secondary to patient dozing off during the conversation.  He was taken to cath lab on 3/19 with interventional cardiology via R CFA access with lysis performed.  Was noted to have L SFA occlusion, underwent lysis with tPA.  Catheter removed on 3/23/17 after PTA/laser arthrectomy with flow to the PT. Patient did have to be intubated during this time secondary to delirium tremens, was subsequently extubated. Was complaining of bilateral foot pain, per discussion with primary care team had palpable DP prior to procedure then on Saturday had loss of palpable pulses; US right lower extremity performed on 3/24 demonstrating no flow in SFA, popliteal, AT or PT arteries.  Per notes, decision made not to intervene initially secondary to RYAN, Creatinine 1.5.  Cr 1.0 today.  Patient with increasing leukocytosis 20 with ID consulted. Decision made to consult vascular surgery.   Patient quit smoking 6 weeks prior 0.5-1 ppd x37 years.  No h/o stroke. H/o MI.

## 2018-03-27 NOTE — PLAN OF CARE
Problem: Patient Care Overview  Goal: Plan of Care Review  Pt c/o leg pain throughout the day, vascular surgery to bedside in am, talked to pt and father, agreed to go to surgery, pt taken after 11am, right AKA performed, pt tolerated well, returned to unit @1500 on facemask sleeping, right leg site wrapped and intact, vss, pt woke up c/o pain, morphine prn given with relief, orders carried out, will continue to monitor

## 2018-03-27 NOTE — ASSESSMENT & PLAN NOTE
Mr. Byrd is a 47 year old man with CAD s/p PCI, LVEF 30% s/p ICD, complicated and extensive PAD s/p aortofem bypass and multiple stents on DAPT who presents with what appears to be CLI of his left foot of unclear duration. This is not an acute presentation as these symtpoms have been ongoing for months and have worsened in the past 2 weeks.     --Underwent angiogram 3/19 that showed occlusion of aorta-fem bypass at distal anastamosis. Unable to treat due to patient movement.   --s/p cath directed tPA to occluded Lt SFA under gen anesthesia  --continue thrombolysis infusion while pt remains w pulseless lt ext; f/up int cards recs- likely remove cath today  --s/p 7 day course abx vanc then clindamycin   --blood cx ordered- ngtd  --thrombolysis gtt stopped  --now with Rt lower extremity pain, pallor and poikilothermia. US revealed Occlusion of distal right superficial femoral, popliteal, anterior tibial, and posterior tibial arteries. Interventional cards made aware- hold off on cath in setting of RYAN  --continue DAPT and hep gtt   --stop cilostazol as is contraindicated in HFpEF  --Podiatry consulted for possible L trans-metatarsal amputation per interventional cards recs; f/up recs  --neuro check 1 4h  --pain control  -- Vascular surgery consulted. The are doing Right AKA 3/27/18

## 2018-03-27 NOTE — ANESTHESIA POST-OP PAIN MANAGEMENT
Acute Pain Service Progress Note      Surgery:  * No procedures listed *    Leonardo Byrd is a 47 y.o. male w/ a significant PMHx of  ICM (LVEF 30-35%) s/p ICD, CAD s/p PCI, HTN, HLD, current smoker, PAD (s/p aortobifemoral bypass) who presented to [facility] with critical limb ischemia on 3/18. Evaluated by Cardiology who recommended Abx for possible foot cellulitis and Interventional Cardiology consult. Underwent catheter directed tPA on 3/20 performed by interventional cardiology and was admitted to CCU for close monitoring. Acute Pain Service consulted for poorly controlled pain in the setting of lower limb ischemia.     Interval Hx: NAEON. Patient continues to endorse 10/10 pain this morning despite appearing to rest comfortably in bed counting money on assessment this AM. Patient has not been receiving maximal doses of oxycodone or morphine that have been written for.     Problem List:    Active Hospital Problems    Diagnosis  POA    *Critical lower limb ischemia [I99.8]  Yes     Chronic    Limb ischemia [I99.8]  Unknown    RYAN (acute kidney injury) [N17.9]  No    Leukocytosis [D72.829]  Yes    Acute renal failure with tubular necrosis [N17.0]  No    Gangrene [I96]  Yes    Acute respiratory failure with hypoxia [J96.01]  No    Alcohol withdrawal syndrome, with delirium [F10.231]  Yes    Cellulitis of left lower extremity [L03.116]  Yes    Atherosclerosis of native artery of left lower extremity with intermittent claudication [I70.212]  Yes    Ischemic cardiomyopathy [I25.5]  Yes    Neuropathy due to peripheral vascular disease [I73.9, G63]  Yes    Alcohol abuse [F10.10]  Yes    Chronic systolic heart failure [I50.22]  Yes    PAD (peripheral artery disease) [I73.9]  Yes    Tobacco abuse [Z72.0]  Yes      Resolved Hospital Problems    Diagnosis Date Resolved POA   No resolved problems to display.       Subjective:  General appearance of alert, oriented, no complaints  Pain with rest: 10    Numbers  Pain  with movement: 10    Numbers  Side Effects:   1. Pruritis: No   2. Nausea: No   3. Motor Blockade: No, 0=Ability to raise lower extremities off bed   4. Sedation: No, 1=awake and alert    Schedule Medications:    acetaminophen  1,000 mg Oral Q6H    amitriptyline  50 mg Oral QHS    ampicillin-sulbactim (UNASYN) IVPB  3 g Intravenous Q6H    aspirin  81 mg Oral Daily    atorvastatin  40 mg Oral QHS    chlordiazepoxide  25 mg Oral Q6H    folic acid-vit B6-vit B12 2.5-25-2 mg  1 tablet Per OG tube Daily    gabapentin  800 mg Oral QHS    lidocaine   Topical (Top) QID    metoprolol succinate  25 mg Oral Daily    nicotine  1 patch Transdermal Daily    pregabalin  100 mg Oral TID    sodium phosphate IVPB  15 mmol Intravenous Once    succinylcholine  120 mg Intravenous Once    thiamine  100 mg Oral Daily    ticagrelor  90 mg Oral BID    vancomycin (VANCOCIN) IVPB  1,000 mg Intravenous Q12H        Continuous Infusions:   sodium chloride 0.9% 30 mL/hr at 03/23/18 0800    sodium chloride 0.9%      heparin (porcine) in D5W 18 Units/kg/hr (03/27/18 0609)        PRN Medications:  cyclobenzaprine, lorazepam, morphine, morphine, morphine, nitroGLYCERIN, oxyCODONE, oxyCODONE, oxyCODONE, sodium chloride 0.9%, zolpidem       Antibiotics:  Antibiotics     Start     Stop Route Frequency Ordered    03/26/18 1530  ampicillin-sulbactam 3 g in sodium chloride 0.9 % 100 mL IVPB (ready to mix system)      -- IV Every 6 hours (non-standard times) 03/26/18 1418    03/26/18 1154  vancomycin 1 gram/250 mL in sodium chloride 0.9% IVPB 1 g  (Vancomycin IVPB with levels panel)      -- IV Every 12 hours (non-standard times) 03/26/18 0938           Objective:    Vital Signs (Most Recent):  Temp: 36.8 °C (98.3 °F) (03/27/18 0700)  Pulse: (!) 116 (03/27/18 0742)  Resp: 19 (03/27/18 0742)  BP: 108/68 (03/27/18 0700)  SpO2: (!) 94 % (03/27/18 0742) Vital Signs Range (Last 24H):  Temp:  [36.8 °C (98.3 °F)-37.4 °C (99.4 °F)]   Pulse:   []   Resp:  [7-25]   BP: (103-140)/(55-91)   SpO2:  [90 %-100 %]      I & O (Last 24H):  Intake/Output Summary (Last 24 hours) at 03/27/18 0751  Last data filed at 03/27/18 0600   Gross per 24 hour   Intake           1506.8 ml   Output             1910 ml   Net           -403.2 ml       Physical Exam:  GA: Alert, comfortable, no acute distress.   Pulmonary: Clear to auscultation A/P/L. No wheezing, crackles, or rhonchi.  Cardiac: RRR S1 & S2 w/o rubs/murmurs/gallops.   Abdominal:Bowel sounds present. No tenderness to palpation or distension. No appreciable hepatosplenomegaly.   Skin: No jaundice, bruising and lesions on left lower extremity    Laboratory:  CBC: Recent Labs      03/26/18   1404  03/27/18   0022   WBC  20.21*  18.84*   RBC  2.67*  2.66*   HGB  8.8*  9.0*   HCT  25.6*  26.8*   PLT  530*  545*   MCV  96  101*   MCH  33.0*  33.8*   MCHC  34.4  33.6       BMP: Recent Labs      03/26/18   0454  03/27/18   0535   NA  136  133*   K  4.0  3.7   CO2  25  26   CL  98  98   BUN  15  9   CREATININE  1.0  0.6   GLU  93  110   MG  1.6  1.8   PHOS  3.6  2.6*   CALCIUM  8.7  8.7       Recent Labs      03/27/18   0535   INR  1.0       Assessment:    Pain control: adequate    Plan:  Patient doing well, continue present treatment. Instructed nurse to provide patient w/ higher doses of morphine and oxycodone is patient continues to endorse 10/10 pain.    - Patient going for left AKA today 2/2 critical limb ischemia w/ absent distal pulses  - Thank you for consult, will sign off for now, will likely be following patient again if PNC placed      Basim Ulloa MD  [phone]    I have seen the patient, reviewed the Resident's assessment and plan. I have personally interviewed and examined the patient at bedside and: agree with the findings.

## 2018-03-27 NOTE — OR NURSING
After moving patient to OR bed, multiple pills in patient ICU bed on mart pad Dr. Trejo and anesthesia staff informed.  Notified ICU nurse Cliff as well.  Pills found: 2 small white oval tablets, 1 small green Grindstone tablet, 1 medium yellow Grindstone tablet and 1 white capsule with black writing (5 pills total). All pills disposed of in biohazard waste with soiled mart pad.

## 2018-03-27 NOTE — ANESTHESIA PREPROCEDURE EVALUATION
03/27/2018  Leonardo Byrd is a 47 y.o., male.    Pre-operative evaluation for Procedure(s) (LRB):  AMPUTATION-ABOVE KNEE (Right)    Leonardo Byrd is a 47 y.o. male w/ a significant PMHx of ICM (LVEF 10-15%) s/p ICD, CAD s/p PCI 2009, HTN, HLD, smoker (quit smoking 6 weeks prior 0.5-1 ppd x37 years), EtOH abuse, PAD (s/p aortobifemoral bypass, L SFA and pop occlusion s/precent R SFA and R pop) with critical limb ischemia of LLE S/P PTA and intervention w/ absent blood flow to the RLE going for the above procedure as a CLASS B.      Pt prev intubated for airway protection 2/2 DTs and EtOH W/D. Currently oxygenating well on RA. Tachycardic. Afebrile w/ elevated WBCs. LFTs increasing, unknown etiology.    At bedside, pt falling in and out of sleep. Consented w/ dad. Pt poorly cooperative.    LDA:   20g L forearm  20g R forearm    Prev airway: Diagnosis: Delirium tremens  Patient location during procedure: ICU  Procedure start time: 3/21/2018 9:41 AM  Timeout: 3/21/2018 9:40 AM  Procedure end time: 3/21/2018 9:45 AM  Intubation  Indication: airway protection  Pre-oxygenation. Induction: intravenous (12mg etomidate and 120mg succinylcholine given for induction, followed by 8mg etomidate for further sedation), mask ventilation: easy mask.  Intubation: postinduction (6mg etomidate and 120mg succinylcholine given for induction, followed by 4mg etomidate for further sedation), laryngoscopy glidescope, Daniela 3.  Endotracheal Tube: oral, 8.0 mm ID, cuffed (inflated to minimal occlusive pressure)  Attempts: 2, Grade I - full view of cords  Complicating Factors: none  Tube secured at 23 cm at the lips.  Findings post-intubation: bilateral breath sounds, positive ETCO2, atraumatic / condition of teeth unchanged  Position Confirmation: auscultation  Additional Notes  Initial direct laryngoscopic view obstructed by dried  blood and copious secretions, 2nd attempt with glidescope with grade 1 view    Drips:   Hep ggt    Patient Active Problem List   Diagnosis    Arterial occlusion, lower extremity    Tobacco abuse    Non compliance w medication regimen    PAD (peripheral artery disease)    Hypokalemia    Chronic systolic heart failure    Acute blood loss anemia    Alcohol abuse    Acute pain of left foot    Neuropathy due to peripheral vascular disease    PVD (peripheral vascular disease)    Critical lower limb ischemia    Cellulitis of left lower extremity    Atherosclerosis of native artery of left lower extremity with intermittent claudication    Ischemic cardiomyopathy    Alcohol withdrawal syndrome, with delirium    Acute respiratory failure with hypoxia    Gangrene    RYAN (acute kidney injury)    Leukocytosis    Acute renal failure with tubular necrosis    Limb ischemia       Review of patient's allergies indicates:  No Known Allergies     No current facility-administered medications on file prior to encounter.      Current Outpatient Prescriptions on File Prior to Encounter   Medication Sig Dispense Refill    amitriptyline (ELAVIL) 25 MG tablet Take 1 tablet (25 mg total) by mouth every evening. 30 tablet 11    aspirin (ECOTRIN) 81 MG EC tablet Take 81 mg by mouth once daily.      atorvastatin (LIPITOR) 40 MG tablet Take 40 mg by mouth every evening.      cilostazol (PLETAL) 50 MG Tab Take 2 tablets (100 mg total) by mouth 2 (two) times daily.      cyclobenzaprine (FLEXERIL) 10 MG tablet Take 10 mg by mouth 3 (three) times daily as needed for Muscle spasms.      gabapentin (NEURONTIN) 800 MG tablet Take 800 mg by mouth every evening.      hydrocodone-acetaminophen 7.5-325mg (NORCO) 7.5-325 mg per tablet Take 1 tablet by mouth every 6 (six) hours as needed. 21 tablet 0    lisinopril (PRINIVIL,ZESTRIL) 5 MG tablet Take 1 tablet (5 mg total) by mouth once daily. 90 tablet 3    metoprolol tartrate  (LOPRESSOR) 25 MG tablet Take 12.5 mg by mouth 2 (two) times daily.      nicotine (NICODERM CQ) 21 mg/24 hr Place 1 patch onto the skin once daily. Down titrating as directed  0    pregabalin (LYRICA) 100 MG capsule Take 1 capsule (100 mg total) by mouth 3 (three) times daily. 90 capsule 6    ticagrelor (BRILINTA) 90 mg tablet Take 90 mg by mouth 2 (two) times daily.      zolpidem (AMBIEN) 5 MG Tab Take 5 mg by mouth nightly as needed for Insomnia.      nitroGLYCERIN (NITROSTAT) 0.4 MG SL tablet Place 0.4 mg under the tongue every 5 (five) minutes as needed for Chest pain.         Past Surgical History:   Procedure Laterality Date    AMPUTATION Right     great toe    BYBPASS GRAFT AORTOBIUFEMORAL      CARDIAC DEFIBRILLATOR PLACEMENT      coronary stents      EXPLORATION AND EVaCUATION OF HEMATOMA OF UPPER EXTREMITY Left     KNEE ARTHROPLASTY Left     VASCULAR SURGERY      fem-pop bypass       Social History     Social History    Marital status: Single     Spouse name: N/A    Number of children: N/A    Years of education: N/A     Occupational History    Not on file.     Social History Main Topics    Smoking status: Former Smoker     Packs/day: 0.50     Quit date: 02/2018    Smokeless tobacco: Former User     Types: Chew     Quit date: 8/2/1980      Comment: Uses nicotine gum and nicotine patches    Alcohol use Yes      Comment: rarely    Drug use: No    Sexual activity: Yes     Partners: Female     Other Topics Concern    Not on file     Social History Narrative    No narrative on file         Vital Signs Range (Last 24H):  Temp:  [36.8 °C (98.3 °F)-37.4 °C (99.4 °F)]   Pulse:  []   Resp:  [8-25]   BP: (103-140)/(55-91)   SpO2:  [90 %-100 %]       CBC:   Recent Labs      03/26/18   1404  03/27/18   0022   WBC  20.21*  18.84*   RBC  2.67*  2.66*   HGB  8.8*  9.0*   HCT  25.6*  26.8*   PLT  530*  545*   MCV  96  101*   MCH  33.0*  33.8*   MCHC  34.4  33.6       CMP:   Recent Labs       03/26/18   0454  03/27/18   0535   NA  136  133*   K  4.0  3.7   CL  98  98   CO2  25  26   BUN  15  9   CREATININE  1.0  0.6   GLU  93  110   MG  1.6  1.8   PHOS  3.6  2.6*   CALCIUM  8.7  8.7   ALBUMIN  2.4*  2.2*   PROT  6.5  6.3   ALKPHOS  154*  150*   ALT  42  52*   AST  128*  206*   BILITOT  0.4  0.6       INR  Recent Labs      03/27/18   0535   INR  1.0           Diagnostic Studies:      EKG:  Vent. Rate : 115 BPM     Atrial Rate : 115 BPM     P-R Int : 144 ms          QRS Dur : 094 ms      QT Int : 358 ms       P-R-T Axes : 067 075 076 degrees     QTc Int : 495 ms    Sinus tachycardia  Possible Septal infarct (cited on or before 21-MAR-2018)  Nonspecific ST and/or T wave abnormalities  When compared with ECG of 02-AUG-2017 19:06,  Vent. rate has increased BY  39 BPM  The axis is not as leftward  ST-t wave changes Less prominent than previously Lateral leads  Confirmed by FLOWER NULL MD ([id number]) on 3/21/2018 4:03:24 PM    2D Echo:  CONCLUSIONS     1 - Moderate left ventricular enlargement with severe global hypokinesis and posterior akinesis.     2 - Severely depressed left ventricular systolic function (EF 10-15%).     3 - Normal right ventricular systolic function .     4 - Mild left atrial enlargement.     5 - Small, collapsing IVC in the setting of mechanical ventilation is suggestive of central hypovolemia.     This document has been electronically    SIGNED BY: Nghia Mejia MD On: 03/23/2018 12:00                Anesthesia Evaluation    I have reviewed the Patient Summary Reports.    I have reviewed the Nursing Notes.   I have reviewed the Medications.     Review of Systems  Anesthesia Hx:  History of prior surgery of interest to airway management or planning:  Denies Personal Hx of Anesthesia complications.   Social:  Smoker, Alcohol Use    Hematology/Oncology:     Oncology Normal    -- Anemia:   EENT/Dental:EENT/Dental Normal   Cardiovascular:   Exercise tolerance: poor Hypertension Past MI CAD   CHF PVD     Renal/:   Chronic Renal Disease, ARF    Hepatic/GI:  Hepatic/GI Normal    Neurological:   Peripheral Neuropathy    Endocrine:  Endocrine Normal    Psych:   Psychiatric History          Physical Exam  General:  Well nourished    Airway/Jaw/Neck:  Airway Findings: Mouth Opening: Normal Tongue: Normal  Pre-Existing Airway Tube(s): Oral Endotracheal tube  Size: 8.0  Mallampati: II  Improves to II with phonation.        Eyes/Ears/Nose:  EYES/EARS/NOSE FINDINGS: Normal   Dental:  Dental Findings:    Chest/Lungs:  Chest/Lungs Findings: Clear to auscultation, Normal Respiratory Rate     Heart/Vascular:  Heart Findings: Rate: Normal  Rhythm: Regular Rhythm  Sounds: Normal     Abdomen:  Abdomen Findings: Normal    Musculoskeletal:  Musculoskeletal Findings: Normal   Skin:  Skin Findings:     Mental Status:  Mental Status Findings:  Somnolent         Anesthesia Plan  Type of Anesthesia, risks & benefits discussed:  Anesthesia Type:  general  Patient's Preference:   Intra-op Monitoring Plan: standard ASA monitors  Intra-op Monitoring Plan Comments:   Post Op Pain Control Plan: multimodal analgesia and per primary service following discharge from PACU  Post Op Pain Control Plan Comments:   Induction:   IV  Beta Blocker:  Patient is on a Beta-Blocker and has received one dose within the past 24 hours (No further documentation required).       Informed Consent: Patient representative understands risks and agrees with Anesthesia plan.  Questions answered. Anesthesia consent signed with patient representative.  ASA Score: 4     Day of Surgery Review of History & Physical:    H&P update referred to the surgeon.         Ready For Surgery From Anesthesia Perspective.

## 2018-03-27 NOTE — ANESTHESIA POSTPROCEDURE EVALUATION
"Anesthesia Post Evaluation    Patient: Leonardo Byrd    Procedure(s) Performed: Procedure(s) (LRB):  AMPUTATION-ABOVE KNEE (Right)    Final Anesthesia Type: general  Patient location during evaluation: ICU  Patient participation: No - Unable to Participate, Sedation  Level of consciousness: sedated  Post-procedure vital signs: reviewed and stable  Pain management: adequate  Airway patency: patent  PONV status at discharge: No PONV  Anesthetic complications: no      Cardiovascular status: blood pressure returned to baseline  Respiratory status: spontaneous ventilation and face mask  Hydration status: euvolemic  Follow-up not needed.        Visit Vitals  /63   Pulse 98   Temp 36.2 °C (97.2 °F) (Axillary)   Resp 16   Ht 5' 7" (1.702 m)   Wt 67.8 kg (149 lb 7.6 oz)   SpO2 100%   BMI 23.41 kg/m²       Pain/Desiree Score: Pain Assessment Performed: Yes (3/27/2018  9:00 AM)  Presence of Pain: complains of pain/discomfort (3/27/2018  9:00 AM)  Pain Rating Prior to Med Admin: 10 (3/27/2018  8:43 AM)  Pain Rating Post Med Admin: 0 (3/27/2018  9:43 AM)      "

## 2018-03-27 NOTE — ASSESSMENT & PLAN NOTE
infection vs inflammatory response to recent cath procedures  -s/p 7 day course abx for possible lt lower extremity cellulitis  -blood cx ngtd  -afebrile and HD stable  - restarted on van and ceftriaxone (started 3/25/18) for elevated wbc   - ID consulted, switched Ceftriaxone to Unasyn

## 2018-03-27 NOTE — ASSESSMENT & PLAN NOTE
48 yo M with h/o HTN, HLD, HFrEF (10-15% 3/23/18) s/p ICD, CAD s/p PCI, EtOH abuse, PAD s/p ABF 1999 with multiple endovascular interventions presented to hospital on 3/18 with complaints of left foot rest pain s/p lysis now with biphasic left PT signal and L forefoot ischemia as well as RLE ischemia.    Patient with perfusion to his left foot; had biphasic PT signal, does have edematous ischemia to left forefoot; no purulence, no erythema  Right lower extremity with shaggy III acute limb ischemia; US on 3/24 demonstrating R SFA, pop, AT, PT occlusion, last known normal Friday with palpable pulses now with no signals.  Patient with no motor function to right foot; open revascularization at this time would not reverse his motor loss, would not offer at this time.   Patient will likely need R AKA; possible need L BKA in future  Vascular surgery to follow

## 2018-03-27 NOTE — NURSING TRANSFER
Nursing Transfer Note      3/27/2018     Transfer {TRANSFER TO/FROM:19852}: ***    Transfer via {TRANSFER VIA:05088}    Transfer with {TRANSFER WITH:75274}    Transported by ***    Medicines sent: ***    Chart send with patient: {YES (DEF)/NO:28452}    Notified: {NOTIFIED:13673}    Patient reassessed at: *** (date, time)    Upon arrival to floor: {IP NSG TRANSFER ARRIVAL OHS:30681}

## 2018-03-27 NOTE — PLAN OF CARE
Problem: Patient Care Overview  Goal: Plan of Care Review  Outcome: Ongoing (interventions implemented as appropriate)  Pt remains VSS, high temp of 99.6. Pt tachycardic 1teens to 120s, MAPs greater than 65, sats upper 90s on RA. No pulses found on RLE. Dopplerable PT on LE, no left DP. Hot left foot, cold right foot. INT CARDS, vascular, ID, and pain management on patients team. See flow sheet for full assessment. Pt remains on continuous tele and pulse ox monitor. 10/10 pain reported in bilateral feet, hydromorphone and percoset given PRN Q4hr for pain. Flexeril given also. Orders changed for morphine Q4hrs. Pt remains on Heparin gtt, normogram therapeutic. Voids by urinal. Kidney lab improvement, WBC up and antibx changed. Pt told of possibility of amputation, pt wants second opinion with Dr. Bell.  POC reviewed w/Pt and family who verbalized understanding.

## 2018-03-27 NOTE — ANESTHESIA PROCEDURE NOTES
Femoral Nerve Single Injection Block    Patient location during procedure: pre-op   Block not for primary anesthetic.  Reason for block: at surgeon's request and post-op pain management   Post-op Pain Location: right lower extremity pain  Start time: 3/27/2018 2:15 PM  Timeout: 3/27/2018 2:14 PM   End time: 3/27/2018 2:23 PM  Staffing  Anesthesiologist: KATHARINE OWEN  Other anesthesia staff: TRENTON BOWSER  Performed: other anesthesia staff   Preanesthetic Checklist  Completed: patient identified, site marked, surgical consent, pre-op evaluation, timeout performed, IV checked, risks and benefits discussed and monitors and equipment checked  Peripheral Block  Patient position: supine  Prep: ChloraPrep  Patient monitoring: heart rate, cardiac monitor, continuous pulse ox, continuous capnometry and frequent blood pressure checks  Block type: femoral  Laterality: right  Injection technique: single shot  Needle  Needle type: Stimuplex   Needle gauge: 22 G  Needle length: 2 in  Needle localization: anatomical landmarks and ultrasound guidance   -ultrasound image captured on disc.  Assessment  Injection assessment: negative aspiration, negative parasthesia and local visualized surrounding nerve  Paresthesia pain: none  Heart rate change: no  Slow fractionated injection: yes  Medications:  Bolus administered: 20 mL of 0.5 ropivacaine  Epinephrine added: 3.75 mcg/mL (1/300,000)  Additional Notes  VSS.  [facility] RN monitoring vitals throughout procedure.  Patient tolerated procedure well.

## 2018-03-27 NOTE — PLAN OF CARE
Problem: Patient Care Overview  Goal: Plan of Care Review  No acute events throughout night, remains on Heparin infusion. CVS evaluated pt and discussed amputation with pt. Pain controlled with Morphine. VS and assessment per flow sheet, patient progressing towards goals as tolerated, plan of care reviewed with Leonardo Credeur and family, all concerns addressed, will continue to monitor.

## 2018-03-27 NOTE — PLAN OF CARE
Patient planned for R AKA today with Vascular Surgery.  ID and Interventional Cardiology following.  APS consulted for pain control.  Patient disposition TBD.  CM will continue to follow.       03/27/18 1010   Right Care Assessment   Can the patient answer the patient profile reliably? Yes, cognitively intact   How often would a person be available to care for the patient? Whenever needed   Describe the patient's ability to walk at the present time. Major restrictions/daily assistance from another person   How does the patient rate their overall health at the present time? Good   Number of comorbid conditions (as recorded on the chart) Five or more   During the past month, has the patient often been bothered by feeling down, depressed or hopeless? No   During the past month, has the patient often been bothered by little interest or pleasure in doing things? No   Anahi Alex RN, BSN  Case Management  [facility]  Ext. [phone]

## 2018-03-27 NOTE — PROGRESS NOTES
[facility]  Infectious Disease  Progress Note    Patient Name: Leonardo Byrd  MRN: [id number]  Admission Date: 3/18/2018  Length of Stay: 9 days  Attending Physician: Sae Carr MD  Primary Care Provider: Indio Aquino MD    Isolation Status: No active isolations  Assessment/Plan:      * Critical lower limb ischemia    Mr. Byrd is a 47 year old with PMH of ICM s/p ICD, CAD s/p PCI, HTN, HLD, PAD (s/p aortobifemoral bypass, L SFA and pop PTAS in September and recent R SFA and R pop 3/8 by Dr. Carl Bell) who presented to [facility] with critical limb ischemia on 3/18. Evaluated by interventional cardiology and underwent catheter directed tPA on 3/20. On vancomycin and unasyn for LLE cellulitis. Pt now with RLE occlusion of distal right superficial femoral, popliteal, anterior tibial, and posterior tibial arteries. vascular surgery following, plans for right AKA today.  Podiatry following LLE, monitoring closely.  Blood cultures NGTD.      Plan  1.Continue IV vancomycin. vanc trough goal 15-20. Continue unasyn.   2.Consider emoblic source? as now with occlusion of right SFA, popliteal and tibial arteries   3.Plans for right AKA today.   4.LLE without any significant changes. Vascular and podiatry following closely. May need surgical intervention if without improvement  5.ID will follow.           Thank you for your consult. I will follow-up with patient. Please contact us if you have any additional questions.    Hussein Mason PA-C  Infectious Disease  [facility] Pgr [phone]    Subjective:     Principal Problem:Critical lower limb ischemia    HPI: Mr. Byrd is a 47 year old with PMH of ICM s/p ICD, CAD s/p PCI, HTN, HLD, PAD (s/p aortobifemoral bypass, L SFA and pop PTAS in September and recent R SFA and R pop 3/8 by Dr. Carl Bell) who presented to [facility] with critical limb ischemia on 3/18. Evaluated by interventional cardiology and underwent catheter directed  "tPA on 3/20, admitted to CCU for close monitoring. Also with DTs requiring scheduled benzos. Pt was given vancomycin for left foot cellulitis. Pt was intubated for airway protection in setting of increasing benzo requirements and worsening agitation.  Pt now extubated.  Pt was noted to have more duskiness in appearance of left leg and absent pulses.US lower ext showing occlusion of distal right superficial femoral, popliteal, anterior tibial, and posterior tibial arteries. No "dopplerable" pulses on rt foot. Interventional cards holding off on on acute intervention currently in setting of RYAN. Pt afebrile, WBC 17K today. Complains of severe pain of lower extremities. Denies having any cough, n/v/d,dysuria.   Interval History:   Pain more controlled today family at bedside. WBC 18K. Vascular surgery following, plans for right AKA today. Afebrile. On vancomycin and unasyn.     Review of Systems   Constitutional: Positive for activity change. Negative for chills, diaphoresis and fever.   Respiratory: Negative for cough and shortness of breath.    Gastrointestinal: Negative for abdominal pain, diarrhea, nausea and vomiting.   Musculoskeletal:        BLE pain   Skin: Positive for color change, pallor and wound.     Objective:     Vital Signs (Most Recent):  Temp: 98.3 °F (36.8 °C) (03/27/18 0700)  Pulse: 110 (03/27/18 1000)  Resp: 15 (03/27/18 1000)  BP: (!) 108/56 (03/27/18 1000)  SpO2: (!) 94 % (03/27/18 1000) Vital Signs (24h Range):  Temp:  [98.3 °F (36.8 °C)-99.4 °F (37.4 °C)] 98.3 °F (36.8 °C)  Pulse:  [] 110  Resp:  [9-25] 15  SpO2:  [92 %-100 %] 94 %  BP: ()/(55-91) 108/56     Weight: 67.8 kg (149 lb 7.6 oz)  Body mass index is 23.41 kg/m².    Estimated Creatinine Clearance: 139.2 mL/min (based on SCr of 0.6 mg/dL).    Physical Exam   Constitutional: He appears well-developed and well-nourished. No distress.   Cardiovascular: Regular rhythm and normal heart sounds.  Exam reveals no friction rub.    No " murmur heard.  tachycardic   Pulmonary/Chest: Effort normal. No respiratory distress. He has no wheezes. He has no rales.   Abdominal: Soft. He exhibits no distension. There is no tenderness. There is no guarding.   Musculoskeletal: He exhibits tenderness.   Neurological: He is alert.   Skin: Skin is warm. No rash noted. He is not diaphoretic. There is erythema. No pallor.   Refer to media for LLE   RLE cool to touch. Absent pulses    Psychiatric: He has a normal mood and affect.   Nursing note and vitals reviewed.                  Significant Labs:   Blood Culture:   Recent Labs  Lab 03/20/18  2219 03/20/18  2233 03/26/18  0148   LABBLOO No growth after 5 days. No growth after 5 days. No Growth to date  No Growth to date     CBC:   Recent Labs  Lab 03/26/18  1404 03/27/18  0022 03/27/18  0905   WBC 20.21* 18.84* 16.86*   HGB 8.8* 9.0* 8.5*   HCT 25.6* 26.8* 24.1*   * 545* 517*     CMP:   Recent Labs  Lab 03/25/18  1658 03/26/18  0454 03/27/18  0535   * 136 133*   K 4.5 4.0 3.7    98 98   CO2 22* 25 26   * 93 110   BUN 16 15 9   CREATININE 1.5* 1.0 0.6   CALCIUM 8.4* 8.7 8.7   PROT  --  6.5 6.3   ALBUMIN  --  2.4* 2.2*   BILITOT  --  0.4 0.6   ALKPHOS  --  154* 150*   AST  --  128* 206*   ALT  --  42 52*   ANIONGAP 13 13 9   EGFRNONAA 53.9* >60.0 >60.0     Wound Culture: No results for input(s): LABAERO in the last 4320 hours.  All pertinent labs within the past 24 hours have been reviewed.    Significant Imaging: I have reviewed all pertinent imaging results/findings within the past 24 hours.

## 2018-03-27 NOTE — PROGRESS NOTES
"[facility]  Cardiology  Progress Note    Patient Name: Leonardo Byrd  MRN: [id number]  Admission Date: 3/18/2018  Hospital Length of Stay: 9 days  Code Status: Full Code   Attending Physician: Sae Carr MD   Primary Care Physician: Indio Aquino MD  Expected Discharge Date: 4/2/2018  Principal Problem:Critical lower limb ischemia    Subjective:     Hospital Course:   Admitted to CCU 3/20 after cath directed tPA was performed by int cards under general anesthesia. Also with DTs requiring scheduled benzos. On day 2 Lt lower extremity remained pulseless, so thrombolysis still infusing.On day 4 of vanc for possible lt foot cellulitis. Transitioned to clinda IV (unable to take PO safely). Intubated overnight for airway protection in setting of increasing benzo requirements and worsening agitation and danger of pt pulling line. CXR this AM (3/22) with patchy bilateral infiltrates. Taken to cath lab for possible cath removal. Lt leg then reportedly duskier and with absent pulses. On 3/23 taken back to cath lab. Int cards Recc Podiatry consult for possible L trans-metatarsal amputation. On heparin gtt. 3/24 Wbc trending up, foot color getting darker,paitent extubated, continue heparin. 3/25: US lower ext showing occlusion of distal right superficial femoral, popliteal, anterior tibial, and posterior tibial arteries. No "dopplerable" pulses on rt foot. Interventional cards holding off on on acute intervention currently in setting of RYAN. Will continue to monitor. Cr 1.7 (baseline 0.6). Given 500cc Iv fluids. Started on scheduled librium for alcohol withdrawal, with plans for taper.    -3/26/18 NAEON. Pt. With elevated WBC. On vanc and ceftriaxone. ID consulted. Pain management for BLE ischemia   -3/27: NAEON. BLE pain improved after pain management adjustment. Vascular surgery evaluated pt and planning for Right AKA today.     Interval History: NAEON. BLE pain improved after pain management " adjustment. Vascular surgery evaluated pt and planning for Right AKA today. ID on board, continue on vanc and Unasyn     Review of Systems   Constitution: Negative for chills and fever.   Cardiovascular: Negative for chest pain, leg swelling, orthopnea and palpitations.   Respiratory: Negative for cough and shortness of breath.    Endocrine: Negative for polyuria.   Musculoskeletal:        Bilateral foot pain   Gastrointestinal: Negative for abdominal pain and diarrhea.   Genitourinary: Negative for dysuria and hematuria.   Neurological: Negative for headaches and light-headedness.   Psychiatric/Behavioral: Negative for altered mental status. The patient is not nervous/anxious.      Objective:     Vital Signs (Most Recent):  Temp: 98.3 °F (36.8 °C) (03/27/18 0700)  Pulse: 110 (03/27/18 1000)  Resp: 15 (03/27/18 1000)  BP: (!) 108/56 (03/27/18 1000)  SpO2: (!) 94 % (03/27/18 1000) Vital Signs (24h Range):  Temp:  [98.3 °F (36.8 °C)-99.4 °F (37.4 °C)] 98.3 °F (36.8 °C)  Pulse:  [] 110  Resp:  [9-25] 15  SpO2:  [92 %-100 %] 94 %  BP: ()/(55-91) 108/56     Weight: 67.8 kg (149 lb 7.6 oz)  Body mass index is 23.41 kg/m².     SpO2: (!) 94 %  O2 Device (Oxygen Therapy): room air      Intake/Output Summary (Last 24 hours) at 03/27/18 1140  Last data filed at 03/27/18 0955   Gross per 24 hour   Intake          1617.79 ml   Output             2300 ml   Net          -682.21 ml       Lines/Drains/Airways     Peripheral Intravenous Line                 Peripheral IV - Single Lumen 03/26/18 0620 Left Forearm 1 day         Peripheral IV - Single Lumen 03/27/18 1047 Right Upper Arm less than 1 day                Physical Exam   Constitutional: He is oriented to person, place, and time. He appears well-developed and well-nourished. No distress.   HENT:   Head: Normocephalic and atraumatic.   Eyes: Conjunctivae are normal. Pupils are equal, round, and reactive to light.   Neck: Normal range of motion. Neck supple.    Cardiovascular: Normal rate, regular rhythm and normal heart sounds.    Pulmonary/Chest: Effort normal and breath sounds normal.   Musculoskeletal: Normal range of motion. He exhibits no edema.   Neurological: He is alert and oriented to person, place, and time.   Skin: No rash noted.   Cool left extremity with dusky color. No pulses detected at foot        Significant Labs: All pertinent lab results from the last 24 hours have been reviewed.    Significant Imaging: reviewed    Assessment and Plan:       * Critical lower limb ischemia    Mr. Byrd is a 47 year old man with CAD s/p PCI, LVEF 30% s/p ICD, complicated and extensive PAD s/p aortofem bypass and multiple stents on DAPT who presents with what appears to be CLI of his left foot of unclear duration. This is not an acute presentation as these symtpoms have been ongoing for months and have worsened in the past 2 weeks.     --Underwent angiogram 3/19 that showed occlusion of aorta-fem bypass at distal anastamosis. Unable to treat due to patient movement.   --s/p cath directed tPA to occluded Lt SFA under gen anesthesia  --continue thrombolysis infusion while pt remains w pulseless lt ext; f/up int cards recs- likely remove cath today  --s/p 7 day course abx vanc then clindamycin   --blood cx ordered- ngtd  --thrombolysis gtt stopped  --now with Rt lower extremity pain, pallor and poikilothermia. US revealed Occlusion of distal right superficial femoral, popliteal, anterior tibial, and posterior tibial arteries. Interventional cards made aware- hold off on cath in setting of RYAN  --continue DAPT and hep gtt   --stop cilostazol as is contraindicated in HFpEF  --Podiatry consulted for possible L trans-metatarsal amputation per interventional cards recs; f/up recs  --neuro check 1 4h  --pain control  -- Vascular surgery consulted. The are doing Right AKA 3/27/18             Leukocytosis    infection vs inflammatory response to recent cath procedures  -s/p 7 day  course abx for possible lt lower extremity cellulitis  -blood cx ngtd  -afebrile and HD stable  - restarted on van and ceftriaxone (started 3/25/18) for elevated wbc   - ID consulted, switched Ceftriaxone to Unasyn         RYAN (acute kidney injury)    - resolved   - suspecting pre-renal etiology in setting of decreased PO intake  - s/p 500cc IVFs . Cr improved. Pt is urinating   -hold ACE and diuresis  -avoid other nephrotoxic agents        Acute respiratory failure with hypoxia    On vent, mainly for pt safety and airway protection while going through etoh withdrawal.  -now extubated and stable        Alcohol withdrawal syndrome, with delirium    -see etoh abuse above        Ischemic cardiomyopathy    -CM possibly with multifactorial etiology (hx of alcohol use, ischemia)  CAD s/p PCI  DAPT, statin,  BB  Hold ACE in setting of RYAN        Cellulitis of left lower extremity    s/p 7 day course abx  --blood cx NGTD  --restarted on van and ceftriaxone (started 3/25/18) for elevated wbc   - ID consulted. switched ceftriaxone to Unasy 3/26/18   - WBC improving         Alcohol abuse    -actively withdrawing, possible DT- tachycardic, AMS/ agitation  -no seizure like activity  -continue thiamine   -extubated 3/24  -started scheduled librium + PRN lorazepam for CIWA AR =>8  -will need benzo taper  -continue to monitor          Chronic systolic heart failure    LVEF 30% s/p ICD  Continue  BB  Hold ACE in setting of RYAN         VTE Risk Mitigation         Ordered     heparin 25,000 units in dextrose 5% 250 mL (100 units/mL) infusion  Continuous     Route:  Intravenous        03/23/18 1129     IP VTE LOW RISK PATIENT  Once      03/18/18 1711          Cm Giles MD  Cardiology  [facility]

## 2018-03-27 NOTE — ASSESSMENT & PLAN NOTE
Mr. Byrd is a 47 year old with PMH of ICM s/p ICD, CAD s/p PCI, HTN, HLD, PAD (s/p aortobifemoral bypass, L SFA and pop PTAS in September and recent R SFA and R pop 3/8 by Dr. Carl Bell) who [place] [facility] [place] ischemia on 3/18. Evaluated by interventional cardiology and underwent catheter directed tPA on 3/20. On vancomycin and unasyn for LLE cellulitis. Pt now with RLE occlusion of distal right superficial femoral, popliteal, anterior tibial, and posterior tibial arteries. vascular surgery following, plans for right AKA today.  Podiatry following LLE, monitoring closely.  Blood cultures NGTD.      Plan  1.Continue IV vancomycin. vanc trough goal 15-20. Continue unasyn.   2.Consider emoblic source? as now with occlusion of right SFA, popliteal and tibial arteries   3.Plans for right AKA today.   4.LLE without any significant changes. Vascular and podiatry following closely. May need surgical intervention if without improvement  5.ID will follow.

## 2018-03-27 NOTE — ASSESSMENT & PLAN NOTE
s/p 7 day course abx  --blood cx NGTD  --restarted on van and ceftriaxone (started 3/25/18) for elevated wbc   - ID consulted. switched ceftriaxone to Unasy 3/26/18   - WBC improving

## 2018-03-27 NOTE — ASSESSMENT & PLAN NOTE
- resolved   - suspecting pre-renal etiology in setting of decreased PO intake  - s/p 500cc IVFs . Cr improved. Pt is urinating   -hold ACE and diuresis  -avoid other nephrotoxic agents

## 2018-03-27 NOTE — SUBJECTIVE & OBJECTIVE
Interval History: NAEON. BLE pain improved after pain management adjustment. Vascular surgery evaluated pt and planning for Right AKA today. ID on board, continue on vanc and Unasyn     Review of Systems   Constitution: Negative for chills and fever.   Cardiovascular: Negative for chest pain, leg swelling, orthopnea and palpitations.   Respiratory: Negative for cough and shortness of breath.    Endocrine: Negative for polyuria.   Musculoskeletal:        Bilateral foot pain   Gastrointestinal: Negative for abdominal pain and diarrhea.   Genitourinary: Negative for dysuria and hematuria.   Neurological: Negative for headaches and light-headedness.   Psychiatric/Behavioral: Negative for altered mental status. The patient is not nervous/anxious.      Objective:     Vital Signs (Most Recent):  Temp: 98.3 °F (36.8 °C) (03/27/18 0700)  Pulse: 110 (03/27/18 1000)  Resp: 15 (03/27/18 1000)  BP: (!) 108/56 (03/27/18 1000)  SpO2: (!) 94 % (03/27/18 1000) Vital Signs (24h Range):  Temp:  [98.3 °F (36.8 °C)-99.4 °F (37.4 °C)] 98.3 °F (36.8 °C)  Pulse:  [] 110  Resp:  [9-25] 15  SpO2:  [92 %-100 %] 94 %  BP: ()/(55-91) 108/56     Weight: 67.8 kg (149 lb 7.6 oz)  Body mass index is 23.41 kg/m².     SpO2: (!) 94 %  O2 Device (Oxygen Therapy): room air      Intake/Output Summary (Last 24 hours) at 03/27/18 1140  Last data filed at 03/27/18 0955   Gross per 24 hour   Intake          1617.79 ml   Output             2300 ml   Net          -682.21 ml       Lines/Drains/Airways     Peripheral Intravenous Line                 Peripheral IV - Single Lumen 03/26/18 0620 Left Forearm 1 day         Peripheral IV - Single Lumen 03/27/18 1047 Right Upper Arm less than 1 day                Physical Exam   Constitutional: He is oriented to person, place, and time. He appears well-developed and well-nourished. No distress.   HENT:   Head: Normocephalic and atraumatic.   Eyes: Conjunctivae are normal. Pupils are equal, round, and  reactive to light.   Neck: Normal range of motion. Neck supple.   Cardiovascular: Normal rate, regular rhythm and normal heart sounds.    Pulmonary/Chest: Effort normal and breath sounds normal.   Musculoskeletal: Normal range of motion. He exhibits no edema.   Neurological: He is alert and oriented to person, place, and time.   Skin: No rash noted.   Cool left extremity with dusky color. No pulses detected at foot        Significant Labs: All pertinent lab results from the last 24 hours have been reviewed.    Significant Imaging: reviewed

## 2018-03-27 NOTE — CONSULTS
[facility]  Vascular Surgery  Consult Note    Consults  Subjective:     Chief Complaint/Reason for Admission: LLE rest pain    History of Present Illness: 48 yo M with h/o HTN, HLD, HFrEF (10-15% 3/23/18) s/p ICD, CAD s/p PCI, EtOH abuse, PAD s/p ABF 1999 with multiple endovascular interventions presented to hospital on 3/18 with complaints of left foot rest pain. Reports that pain initially started in 9/2017; reports short distance claudication of both legs 20 feet.  Reports pain had gotten progressively worse to point of rest pain. Of note, history is difficult to ellicit secondary to patient dozing off during the conversation.  He was taken to cath lab on 3/19 with interventional cardiology via R CFA access with lysis performed.  Was noted to have L SFA occlusion, underwent lysis with tPA.  Catheter removed on 3/23/17 after PTA/laser arthrectomy with flow to the PT. Patient did have to be intubated during this time secondary to delirium tremens, was subsequently extubated. Was complaining of bilateral foot pain, per discussion with primary care team had palpable DP prior to procedure then on Saturday had loss of palpable pulses; US right lower extremity performed on 3/24 demonstrating no flow in SFA, popliteal, AT or PT arteries.  Per notes, decision made not to intervene initially secondary to RYAN, Creatinine 1.5.  Cr 1.0 today.  Patient with increasing leukocytosis 20 with ID consulted. Decision made to consult vascular surgery.   Patient quit smoking 6 weeks prior 0.5-1 ppd x37 years.  No h/o stroke. H/o MI.      Prescriptions Prior to Admission   Medication Sig Dispense Refill Last Dose    amitriptyline (ELAVIL) 25 MG tablet Take 1 tablet (25 mg total) by mouth every evening. 30 tablet 11 3/17/2018    aspirin (ECOTRIN) 81 MG EC tablet Take 81 mg by mouth once daily.   3/18/2018    atorvastatin (LIPITOR) 40 MG tablet Take 40 mg by mouth every evening.   3/17/2018    cilostazol (PLETAL)  50 MG Tab Take 2 tablets (100 mg total) by mouth 2 (two) times daily.   3/18/2018    cyclobenzaprine (FLEXERIL) 10 MG tablet Take 10 mg by mouth 3 (three) times daily as needed for Muscle spasms.   3/18/2018    gabapentin (NEURONTIN) 800 MG tablet Take 800 mg by mouth every evening.   3/17/2018    hydrocodone-acetaminophen 7.5-325mg (NORCO) 7.5-325 mg per tablet Take 1 tablet by mouth every 6 (six) hours as needed. 21 tablet 0 3/17/2018    lisinopril (PRINIVIL,ZESTRIL) 5 MG tablet Take 1 tablet (5 mg total) by mouth once daily. 90 tablet 3 3/18/2018    metoprolol tartrate (LOPRESSOR) 25 MG tablet Take 12.5 mg by mouth 2 (two) times daily.   3/18/2018    nicotine (NICODERM CQ) 21 mg/24 hr Place 1 patch onto the skin once daily. Down titrating as directed  0 3/17/2018    pregabalin (LYRICA) 100 MG capsule Take 1 capsule (100 mg total) by mouth 3 (three) times daily. 90 capsule 6 3/18/2018    ticagrelor (BRILINTA) 90 mg tablet Take 90 mg by mouth 2 (two) times daily.   3/18/2018    zolpidem (AMBIEN) 5 MG Tab Take 5 mg by mouth nightly as needed for Insomnia.   3/17/2018    nitroGLYCERIN (NITROSTAT) 0.4 MG SL tablet Place 0.4 mg under the tongue every 5 (five) minutes as needed for Chest pain.   More than a month       Review of patient's allergies indicates:  No Known Allergies    Past Medical History:   Diagnosis Date    AICD (automatic cardioverter/defibrillator) present     RYAN (acute kidney injury) 3/25/2018    CHF (congestive heart failure)     Coronary artery disease     Encounter for blood transfusion     Hyperlipemia     Hypertension     MI (myocardial infarction)     Neuropathy     PAD (peripheral artery disease)     PVD (peripheral vascular disease)      Past Surgical History:   Procedure Laterality Date    AMPUTATION Right     great toe    BYBPASS GRAFT AORTOBIUFEMORAL      CARDIAC DEFIBRILLATOR PLACEMENT      coronary stents      EXPLORATION AND EVaCUATION OF HEMATOMA OF UPPER  EXTREMITY Left     KNEE ARTHROPLASTY Left     VASCULAR SURGERY      fem-pop bypass     Family History     None        Social History Main Topics    Smoking status: Former Smoker     Packs/day: 0.50     Quit date: 02/2018    Smokeless tobacco: Former User     Types: Chew     Quit date: 8/2/1980      Comment: Uses nicotine gum and nicotine patches    Alcohol use Yes      Comment: rarely    Drug use: No    Sexual activity: Yes     Partners: Female     Review of Systems   Constitutional: Positive for activity change.   HENT: Negative for congestion and dental problem.    Eyes: Negative for discharge.   Respiratory: Negative for apnea.    Cardiovascular: Negative for chest pain and leg swelling.   Gastrointestinal: Negative for abdominal distention.   Genitourinary: Negative for difficulty urinating.   Musculoskeletal: Positive for myalgias.   Skin: Positive for wound.   Neurological: Negative for dizziness and facial asymmetry.   Psychiatric/Behavioral: Negative for agitation and behavioral problems.   Patient difficult to ellicit complete   Objective:     Vital Signs (Most Recent):  Temp: 99.4 °F (37.4 °C) (03/26/18 2300)  Pulse: 106 (03/26/18 2300)  Resp: 16 (03/26/18 2300)  BP: 130/74 (03/26/18 2300)  SpO2: 96 % (03/26/18 2300) Vital Signs (24h Range):  Temp:  [98.2 °F (36.8 °C)-99.6 °F (37.6 °C)] 99.4 °F (37.4 °C)  Pulse:  [] 106  Resp:  [7-23] 16  SpO2:  [90 %-99 %] 96 %  BP: ()/(54-91) 130/74     Weight: 67.8 kg (149 lb 7.6 oz)  Body mass index is 23.41 kg/m².    Physical Exam   Constitutional: He appears well-developed and well-nourished. He appears lethargic.   HENT:   Head: Normocephalic and atraumatic.   Eyes: Pupils are equal, round, and reactive to light.   Cardiovascular: Normal rate and regular rhythm.    Pulses:       Femoral pulses are 2+ on the right side, and 2+ on the left side.  Right: no popliteal signal, no signals distally  Left: strong biphasic PT   Pulmonary/Chest: Effort  normal.   Abdominal: Soft. He exhibits no distension. There is no tenderness.   Musculoskeletal:   Right lower extremity: temperature cool starting at distal calf; ischemic changes to right forefoot; prior distal great toe amputation  Left lower extremity: dorsal ulcer, entire forefoot dusky edematous ischemia to mid forefoot   Neurological: He appears lethargic.   Right motor: no sensation to right foot; no motor right foot/ankle; no dorsi/plantarflexion, able to flex/extend knee  Left motor: minimal dorsi/plantar flexion left foot       Significant Labs:  Anti-XA:   Recent Labs  Lab 03/26/18  0454   LABHEPA 0.43     CBC:   Recent Labs  Lab 03/26/18  1404   WBC 20.21*   RBC 2.67*   HGB 8.8*   HCT 25.6*   *   MCV 96   MCH 33.0*   MCHC 34.4     CMP:   Recent Labs  Lab 03/26/18  0454   GLU 93   CALCIUM 8.7   ALBUMIN 2.4*   PROT 6.5      K 4.0   CO2 25   CL 98   BUN 15   CREATININE 1.0   ALKPHOS 154*   ALT 42   *   BILITOT 0.4     Coagulation: No results for input(s): LABPROT, INR, APTT in the last 48 hours.  Lactic Acid:   Recent Labs  Lab 03/25/18  2317   LACTATE 1.3       Significant Diagnostics:  EXAMINATION:  US LOWER EXTREMITY ARTERIES RIGHT    CLINICAL HISTORY:  lost pulse pt;   history of aorto bi fem bypass.    TECHNIQUE:  Grayscale, color flow and spectral analysis of the arteries of right leg were obtained    COMPARISON:  None.    FINDINGS:  The right common femoral and proximal superficial femoral arteries are occluded.  The graft is patent in the right groin and at the anastomosis to proximal right SFA.  The mid right SFA is patent.  The distal right superficial femoral and popliteal arteries are occluded.  The right anterior and posterior tibial arteries are also occluded.    The short-segment portion of bypass graft visualized in the left groin is patent.   Impression       Occlusion of distal right superficial femoral, popliteal, anterior tibial, and posterior tibial  arteries.    This report was flagged in Epic as abnormal.         Assessment/Plan:     Limb ischemia    48 yo M with h/o HTN, HLD, HFrEF (10-15% 3/23/18) s/p ICD, CAD s/p PCI, EtOH abuse, PAD s/p ABF 1999 with multiple endovascular interventions presented to hospital on 3/18 with complaints of left foot rest pain s/p lysis now with biphasic left PT signal and L forefoot ischemia as well as RLE ischemia.    Patient with perfusion to his left foot; had biphasic PT signal, does have edematous ischemia to left forefoot; no purulence, no erythema  Right lower extremity with shaggy III acute limb ischemia; US on 3/24 demonstrating R SFA, pop, AT, PT occlusion, last known normal Friday with palpable pulses now with no signals.  Patient with no motor function to right foot for unknown time, known occlusion 3 days prior, open revascularization at this time would not reverse his motor loss, would not offer at this time. He is also at high risk for right limb loss for secondary to no profunda on right and no SFA for outflow.  Patient will likely need R AKA; need to observe left forefoot closely  Vascular surgery to follow          Thank you for your consult.    Yodit Pierce MD  Vascular Surgery  [facility]    Vascular Attending  Agree with above  48 yo M with h/o systolic CHF (EF 10-15%), AICD, CAD s/p PCI, EtOH abuse, PAD s/p ABF 1999 with multiple endovascular interventions presented to hospital on 3/18 with complaints of left foot rest pain s/p lysis now with biphasic left PT signal and L forefoot ischemia as well as RLE ischemia.  Developed transient RYAN during respiratory failure.    Patient seen by our team 3/26/18 at ~10 PM and again early this AM ~ 7AM.  I reviewed the films from 3/19/18 thru 3/23/18.    On 3/19/18, access was attempted thru R groin thru R distal aorto-femoral limb. On angio, flow is noted in proximal R SFA but proximal R PFA is occluded and fills late.  No runoff of R leg  done as plan by Int. Cardiology was to intervene acutely on ischemic L leg, but this was begun on 3/20/18, likely due to difficulty in getting up/over thru aorto-femoral bypass graft.  He subsequently had successful thrombolysis of L leg via L antegrade approach.  Now with no motor function and ice cold R foot, that is insensate.  He has tense R calf anterior/lateral compartments that are tender to touch.  An u/s on 3/24/18 showed no flow in R SFA. He was intubated for several days during this time.  I suspect the R leg ischemia may have occurred after R groin access/manipulation.  Even if it occurred on Saturday as u/s was being done, by now his R foot and lower leg are frankly ischemic.    Only option is a R AKA now and if the R aorto-femoral limb occludes, as he has no intact R PFA, he may not heal the R AKA and may need future R hip disarticulation, which would be quite morbid in this young man with EF ~10-15%.  His L foot is also quite ischemic, partly thru reperfusion injury. May need attempt at L TMA or possible future L BKA. Will plan for R AKA today.    Norris Trejo MD FACS

## 2018-03-27 NOTE — SUBJECTIVE & OBJECTIVE
Interval History:   Pain more controlled today family at bedside. WBC 18K. Vascular surgery following, plans for right AKA today. Afebrile. On vancomycin and unasyn.     Review of Systems   Constitutional: Positive for activity change. Negative for chills, diaphoresis and fever.   Respiratory: Negative for cough and shortness of breath.    Gastrointestinal: Negative for abdominal pain, diarrhea, nausea and vomiting.   Musculoskeletal:        BLE pain   Skin: Positive for color change, pallor and wound.     Objective:     Vital Signs (Most Recent):  Temp: 98.3 °F (36.8 °C) (03/27/18 0700)  Pulse: 110 (03/27/18 1000)  Resp: 15 (03/27/18 1000)  BP: (!) 108/56 (03/27/18 1000)  SpO2: (!) 94 % (03/27/18 1000) Vital Signs (24h Range):  Temp:  [98.3 °F (36.8 °C)-99.4 °F (37.4 °C)] 98.3 °F (36.8 °C)  Pulse:  [] 110  Resp:  [9-25] 15  SpO2:  [92 %-100 %] 94 %  BP: ()/(55-91) 108/56     Weight: 67.8 kg (149 lb 7.6 oz)  Body mass index is 23.41 kg/m².    Estimated Creatinine Clearance: 139.2 mL/min (based on SCr of 0.6 mg/dL).    Physical Exam   Constitutional: He appears well-developed and well-nourished. No distress.   Cardiovascular: Regular rhythm and normal heart sounds.  Exam reveals no friction rub.    No murmur heard.  tachycardic   Pulmonary/Chest: Effort normal. No respiratory distress. He has no wheezes. He has no rales.   Abdominal: Soft. He exhibits no distension. There is no tenderness. There is no guarding.   Musculoskeletal: He exhibits tenderness.   Neurological: He is alert.   Skin: Skin is warm. No rash noted. He is not diaphoretic. There is erythema. No pallor.   Refer to media for LLE   RLE cool to touch. Absent pulses    Psychiatric: He has a normal mood and affect.   Nursing note and vitals reviewed.                  Significant Labs:   Blood Culture:   Recent Labs  Lab 03/20/18  2219 03/20/18  2233 03/26/18  0148   [facility] No growth after 5 days. No growth after 5 days. No Growth to date  No  Growth to date     CBC:   Recent Labs  Lab 03/26/18  1404 03/27/18  0022 03/27/18  0905   WBC 20.21* 18.84* 16.86*   HGB 8.8* 9.0* 8.5*   HCT 25.6* 26.8* 24.1*   * 545* 517*     CMP:   Recent Labs  Lab 03/25/18  1658 03/26/18  0454 03/27/18  0535   * 136 133*   K 4.5 4.0 3.7    98 98   CO2 22* 25 26   * 93 110   BUN 16 15 9   CREATININE 1.5* 1.0 0.6   CALCIUM 8.4* 8.7 8.7   PROT  --  6.5 6.3   ALBUMIN  --  2.4* 2.2*   BILITOT  --  0.4 0.6   ALKPHOS  --  154* 150*   AST  --  128* 206*   ALT  --  42 52*   ANIONGAP 13 13 9   EGFRNONAA 53.9* >60.0 >60.0     Wound Culture: No results for input(s): LABAERO in the last 4320 hours.  All pertinent labs within the past 24 hours have been reviewed.    Significant Imaging: I have reviewed all pertinent imaging results/findings within the past 24 hours.

## 2018-03-28 PROBLEM — N17.9 AKI (ACUTE KIDNEY INJURY): Status: RESOLVED | Noted: 2018-01-01 | Resolved: 2018-01-01

## 2018-03-28 PROBLEM — J96.01 ACUTE RESPIRATORY FAILURE WITH HYPOXIA: Status: RESOLVED | Noted: 2018-01-01 | Resolved: 2018-01-01

## 2018-03-28 NOTE — ASSESSMENT & PLAN NOTE
infection vs inflammatory response to recent amputation procedures  -s/p 7 day course abx for possible lt lower extremity cellulitis  -blood cx ngtd  -febrile last night, a new blood culture sent   - restarted on van and ceftriaxone (started 3/25/18) for elevated wbc   - ID consulted, switched Ceftriaxone to Unasyn  (started 3/25/18)

## 2018-03-28 NOTE — PLAN OF CARE
Problem: Patient Care Overview  Goal: Plan of Care Review  No acute events throughout night, Heparin infusion restarted. Difficult to control pain. Max temp 101.6 - Blood Cultures send. 1unit RBC infused for H/H 6.8/19.9. VS and assessment per flow sheet, patient progressing towards goals as tolerated, plan of care reviewed with Leonardo Credeur and family, all concerns addressed, will continue to monitor.

## 2018-03-28 NOTE — PROGRESS NOTES
"[facility]  Cardiology  Progress Note    Patient Name: Leonardo Byrd  MRN: [id number]  Admission Date: 3/18/2018  Hospital Length of Stay: 10 days  Code Status: Full Code   Attending Physician: Sae Carr MD   Primary Care Physician: Indio Aquino MD  Expected Discharge Date: 4/2/2018  Principal Problem:Critical lower limb ischemia    Subjective:     Hospital Course:   Admitted to CCU 3/20 after cath directed tPA was performed by int cards under general anesthesia. Also with DTs requiring scheduled benzos. On day 2 Lt lower extremity remained pulseless, so thrombolysis still infusing.On day 4 of vanc for possible lt foot cellulitis. Transitioned to clinda IV (unable to take PO safely). Intubated overnight for airway protection in setting of increasing benzo requirements and worsening agitation and danger of pt pulling line. CXR this AM (3/22) with patchy bilateral infiltrates. Taken to cath lab for possible cath removal. Lt leg then reportedly duskier and with absent pulses. On 3/23 taken back to cath lab. Int cards Recc Podiatry consult for possible L trans-metatarsal amputation. On heparin gtt. 3/24 Wbc trending up, foot color getting darker,paitent extubated, continue heparin. 3/25: US lower ext showing occlusion of distal right superficial femoral, popliteal, anterior tibial, and posterior tibial arteries. No "dopplerable" pulses on rt foot. Interventional cards holding off on on acute intervention currently in setting of RYAN. Will continue to monitor. Cr 1.7 (baseline 0.6). Given 500cc Iv fluids. Started on scheduled librium for alcohol withdrawal, with plans for taper.    -3/26/18 NAEON. Pt. With elevated WBC. On vanc and ceftriaxone. ID consulted. Pain management for BLE ischemia   -3/27: NAEON. BLE pain improved after pain management adjustment. Vascular surgery evaluated pt and planning for Right AKA today.   - 3/28 febrile last night. Blood culture sent. WBC elevated then " trending down. Complains of BLE pain controlled with pain meds. S/p Right AKA on 3/27/18    Interval History: - 3/28 febrile last night. Blood culture sent. WBC elevated then trending down. Complains of BLE pain controlled with pain meds. S/p Right AKA on 3/27/18. ID on board, continue on vanc and Unasyn     Review of Systems   Constitution: Positive for fever. Negative for chills.   Cardiovascular: Negative for chest pain, leg swelling, orthopnea and palpitations.   Respiratory: Negative for cough and shortness of breath.    Endocrine: Negative for polyuria.   Musculoskeletal:        BLE pain   Gastrointestinal: Negative for abdominal pain and diarrhea.   Genitourinary: Negative for dysuria and hematuria.   Neurological: Negative for headaches and light-headedness.   Psychiatric/Behavioral: Negative for altered mental status. The patient is not nervous/anxious.      Objective:     Vital Signs (Most Recent):  Temp: 98.2 °F (36.8 °C) (03/28/18 0700)  Pulse: (!) 124 (03/28/18 1000)  Resp: (!) 24 (03/28/18 1000)  BP: 135/80 (03/28/18 1000)  SpO2: 98 % (03/28/18 1000) Vital Signs (24h Range):  Temp:  [97.2 °F (36.2 °C)-101.5 °F (38.6 °C)] 98.2 °F (36.8 °C)  Pulse:  [] 124  Resp:  [12-32] 24  SpO2:  [92 %-100 %] 98 %  BP: ()/(49-86) 135/80     Weight: 67.8 kg (149 lb 7.6 oz)  Body mass index is 23.41 kg/m².     SpO2: 98 %  O2 Device (Oxygen Therapy): room air      Intake/Output Summary (Last 24 hours) at 03/28/18 1040  Last data filed at 03/28/18 1000   Gross per 24 hour   Intake          6865.89 ml   Output             1510 ml   Net          5355.89 ml       Lines/Drains/Airways     Epidural Line                 Perineural Analgesia/Anesthesia Assessment (using dermatomes) Epidural 03/27/18 1415 less than 1 day          Peripheral Intravenous Line                 Peripheral IV - Single Lumen 03/26/18 0620 Left Forearm 2 days         Peripheral IV - Single Lumen 03/27/18 1047 Right Upper Arm less than 1 day                 Physical Exam   Constitutional: He is oriented to person, place, and time. He appears well-developed and well-nourished. No distress.   HENT:   Head: Normocephalic and atraumatic.   Eyes: Conjunctivae are normal. Pupils are equal, round, and reactive to light.   Neck: Normal range of motion. Neck supple.   Cardiovascular: Normal rate, regular rhythm and normal heart sounds.    Pulmonary/Chest: Effort normal and breath sounds normal.   Musculoskeletal: Normal range of motion. He exhibits no edema.   Neurological: He is alert and oriented to person, place, and time.   Skin: No rash noted.   Cool left extremity with dusky color. No pulses detected at foot        Significant Labs: All pertinent lab results from the last 24 hours have been reviewed.    Significant Imaging: reviewed    Assessment and Plan:       * Critical lower limb ischemia    Mr. Byrd is a 47 year old man with CAD s/p PCI, LVEF 30% s/p ICD, complicated and extensive PAD s/p aortofem bypass and multiple stents on DAPT who presents with what appears to be CLI of his left foot of unclear duration. This is not an acute presentation as these symtpoms have been ongoing for months and have worsened in the past 2 weeks.     --Underwent angiogram 3/19 that showed occlusion of aorta-fem bypass at distal anastamosis. Unable to treat due to patient movement.   --s/p cath directed tPA to occluded Lt SFA under gen anesthesia  --continue thrombolysis infusion while pt remains w pulseless lt ext; f/up int cards recs- likely remove cath today  --s/p 7 day course abx vanc then clindamycin   --blood cx ordered- ngtd  --thrombolysis gtt stopped  --now with Rt lower extremity pain, pallor and poikilothermia. US revealed Occlusion of distal right superficial femoral, popliteal, anterior tibial, and posterior tibial arteries. Interventional cards made aware- hold off on cath in setting of RYAN  --continue DAPT and hep gtt   --stop cilostazol as is  contraindicated in HFpEF  --Podiatry consulted for possible L trans-metatarsal amputation per interventional cards recs; f/up recs  --neuro check 1 4h  --pain control  -- Vascular surgery consulted. The are doing Right AKA 3/27/18   - s/p Right AKA 3/27  - left foot ischemia and no pulse. There is a concern about he may need left BKA. Vascular following             Leukocytosis    infection vs inflammatory response to recent amputation procedures  -s/p 7 day course abx for possible lt lower extremity cellulitis  -blood cx ngtd  -febrile last night, a new blood culture sent   - restarted on van and ceftriaxone (started 3/25/18) for elevated wbc   - ID consulted, switched Ceftriaxone to Unasyn  (started 3/25/18)        Alcohol withdrawal syndrome, with delirium    -see etoh abuse above        Ischemic cardiomyopathy    -CM possibly with multifactorial etiology (hx of alcohol use, ischemia)  CAD s/p PCI  DAPT, statin,  BB  Hold ACE in setting of RYAN        Cellulitis of left lower extremity    s/p 7 day course abx  --blood cx NGTD  --restarted on van and ceftriaxone (started 3/25/18) for elevated wbc   - ID consulted. switched ceftriaxone to Unasy 3/26/18   - WBC elevated        Alcohol abuse    -actively withdrawing, possible DT- tachycardic, AMS/ agitation  -no seizure like activity  -continue thiamine   -extubated 3/24  -started scheduled librium + PRN lorazepam for CIWA AR =>8  -will need benzo taper  -continue to monitor          Chronic systolic heart failure    LVEF 30% s/p ICD  Continue  BB  Hold ACE in setting of RYAN        PAD (peripheral artery disease)    -        Tobacco abuse    --nicotine patch            VTE Risk Mitigation         Ordered     heparin 25,000 units in dextrose 5% 250 mL (100 units/mL) infusion  Continuous     Route:  Intravenous        03/27/18 1645     heparin 25,000 units in dextrose 5% 250 mL (100 units/mL) bolus from bag; ADDITIONAL PRN BOLUS  As needed (PRN)     Route:  Intravenous         03/27/18 1645     heparin 25,000 units in dextrose 5% 250 mL (100 units/mL) bolus from bag; ADDITIONAL PRN BOLUS  As needed (PRN)     Route:  Intravenous        03/27/18 1645     IP VTE LOW RISK PATIENT  Once      03/18/18 1711          Cm Giles MD  Cardiology  [facility]

## 2018-03-28 NOTE — PLAN OF CARE
Problem: Patient Care Overview  Goal: Plan of Care Review  Outcome: Ongoing (interventions implemented as appropriate)  Pt had a high temp of 99.2, sats high 90s on RA, tachycardic 1teens to 130s. No acute events, VSS. Neuro -1RAS still sedated and disoriented to place and time, CARDs team aware. See flow sheet for full assessment. Pt remains on continuous tele and pulse ox monitor. No falls. 6mg Morphine given Q4 and oxycodone 20mg given inbetween intervals as frequent as Q3hr. Pt remains on hep gtt 17 units/kg/min. Broad spectrum antibiotics, WBC trending down. 20meq given for K 3.8. Vascular rounded on pt to check on amputated right AKA , supplies ordered for vascular to rewrap site. Conversation had with family and patient of possibility of left foot amputation. POC reviewed w/Pt who was unable to verbalize understanding and with brother who verbalized understanding.

## 2018-03-28 NOTE — PROGRESS NOTES
[facility]  Infectious Disease  Progress Note    Patient Name: Leonardo Byrd  MRN: [id number]  Admission Date: 3/18/2018  Length of Stay: 10 days  Attending Physician: Sae Carr MD  Primary Care Provider: Indio Aquino MD    Isolation Status: No active isolations  Assessment/Plan:      * Critical lower limb ischemia    Mr. Byrd is a 47 year old with PMH of ICM s/p ICD, CAD s/p PCI, HTN, HLD, PAD (s/p aortobifemoral bypass, L SFA and pop PTAS in September and recent R SFA and R pop 3/8 by Dr. Carl Bell) who presented to [facility] with critical limb ischemia on 3/18. Evaluated by interventional cardiology and underwent catheter directed tPA on 3/20. On vancomycin and unasyn for LLE cellulitis. Pt now with RLE occlusion of distal right superficial femoral, popliteal, anterior tibial, and posterior tibial arteries. vascular surgery following, plans for right AKA today.  Podiatry following LLE, monitoring closely.  Blood cultures NGTD.    Stable, febrile 101 overnight.       Plan  1.Continue IV vancomycin. vanc trough goal 15-20. vanc trough subtherapeutic. Dose incrased [id number]. Continue unasyn.   3.S/p right AKA  4.LLE without any significant changes. Vascular and podiatry following closely. May need left TMA if without improvement  5.ID will follow.             Thank you for your consult. I will follow-up with patient. Please contact us if you have any additional questions.    Hussein Mason PA-C  Infectious Disease  [facility] Pgr [phone]    Subjective:     Principal Problem:Critical lower limb ischemia    HPI: Mr. Byrd is a 47 year old with PMH of ICM s/p ICD, CAD s/p PCI, HTN, HLD, PAD (s/p aortobifemoral bypass, L SFA and pop PTAS in September and recent R SFA and R pop 3/8 by Dr. Carl Bell) who presented to [facility] with critical limb ischemia on 3/18. Evaluated by interventional cardiology and underwent catheter directed tPA on 3/20, admitted to CCU for  "close monitoring. Also with DTs requiring scheduled benzos. Pt was given vancomycin for left foot cellulitis. Pt was intubated for airway protection in setting of increasing benzo requirements and worsening agitation.  Pt now extubated.  Pt was noted to have more duskiness in appearance of left leg and absent pulses.US lower ext showing occlusion of distal right superficial femoral, popliteal, anterior tibial, and posterior tibial arteries. No "dopplerable" pulses on rt foot. Interventional cards holding off on on acute intervention currently in setting of RYAN. Pt afebrile, WBC 17K today. Complains of severe pain of lower extremities. Denies having any cough, n/v/d,dysuria.   Interval History:   Sleeping on examination. Pain more controlled. Right AKA yesterday. Febrile overnight. No fevers today. On vancomycin and unasyn.     Review of Systems   Constitutional: Positive for fever. Negative for chills and diaphoresis.   Respiratory: Negative for cough.    Gastrointestinal: Negative for abdominal pain, diarrhea, nausea and vomiting.   Genitourinary: Negative for dysuria.   Musculoskeletal:        Right AKA     Skin:        Ischemic changes of left foot     Objective:     Vital Signs (Most Recent):  Temp: 99.2 °F (37.3 °C) (03/28/18 1100)  Pulse: (!) 128 (03/28/18 1300)  Resp: (!) 26 (03/28/18 1300)  BP: 115/64 (03/28/18 1300)  SpO2: (!) 86 % (03/28/18 1300) Vital Signs (24h Range):  Temp:  [97.2 °F (36.2 °C)-101.5 °F (38.6 °C)] 99.2 °F (37.3 °C)  Pulse:  [] 128  Resp:  [12-32] 26  SpO2:  [86 %-100 %] 86 %  BP: ()/(49-86) 115/64     Weight: 67.8 kg (149 lb 7.6 oz)  Body mass index is 23.41 kg/m².    Estimated Creatinine Clearance: 119.3 mL/min (based on SCr of 0.7 mg/dL).    Physical Exam   Constitutional: He appears well-developed and well-nourished. No distress.   Cardiovascular: Regular rhythm and normal heart sounds.  Exam reveals no friction rub.    No murmur heard.  tachycardic   Pulmonary/Chest: " Effort normal. No respiratory distress. He has no wheezes. He has no rales.   Abdominal: Soft. He exhibits no distension. There is no tenderness. There is no guarding.   Musculoskeletal: He exhibits tenderness.   Sleeping on examination.    Neurological: He is alert.   Skin: Skin is warm. No rash noted. He is not diaphoretic. There is erythema. No pallor.   Refer to media for LLE   RLE cool to touch. Absent pulses    Psychiatric: He has a normal mood and affect.   Nursing note and vitals reviewed.      Significant Labs:   Blood Culture:   Recent Labs  Lab 03/20/18  2219 03/20/18  2233 03/26/18  0148 03/27/18  2148 03/27/18  2212   [facility] No growth after 5 days. No growth after 5 days. No Growth to date  No Growth to date  No Growth to date No Growth to date No Growth to date     CBC:   Recent Labs  Lab 03/27/18  2359 03/28/18  0350 03/28/18  0753   WBC 14.93* 23.76* 18.89*   HGB 6.8* 9.9* 8.9*   HCT 19.9* 29.8* 26.0*   * 547* 459*     CMP:   Recent Labs  Lab 03/27/18  0535 03/28/18  0350   * 135*   K 3.7 3.6   CL 98 101   CO2 26 23    97   BUN 9 8   CREATININE 0.6 0.7   CALCIUM 8.7 8.5*   PROT 6.3 6.9   ALBUMIN 2.2* 2.5*   BILITOT 0.6 1.2*   ALKPHOS 150* 189*   * 181*   ALT 52* 56*   ANIONGAP 9 11   EGFRNONAA >60.0 >60.0     Wound Culture: No results for input(s): LABAERO in the last 4320 hours.  All pertinent labs within the past 24 hours have been reviewed.    Significant Imaging: I have reviewed all pertinent imaging results/findings within the past 24 hours.

## 2018-03-28 NOTE — ANESTHESIA POSTPROCEDURE EVALUATION
"Anesthesia Post Evaluation    Patient: Leonardo Byrd    Procedure(s) Performed: Procedure(s) (LRB):  AMPUTATION-ABOVE KNEE (Right)    Final Anesthesia Type: general  Patient location during evaluation: PACU  Patient participation: Yes- Able to Participate  Level of consciousness: awake and alert  Post-procedure vital signs: reviewed and stable  Pain management: adequate  Airway patency: patent  PONV status at discharge: No PONV  Anesthetic complications: no      Cardiovascular status: blood pressure returned to baseline  Respiratory status: spontaneous ventilation and room air  Hydration status: euvolemic  Follow-up not needed.        Visit Vitals  /69   Pulse (!) 126   Temp 36.8 °C (98.2 °F) (Oral)   Resp (!) 23   Ht 5' 7" (1.702 m)   Wt 67.8 kg (149 lb 7.6 oz)   SpO2 98%   BMI 23.41 kg/m²       Pain/Desiree Score: Pain Assessment Performed: Yes (3/28/2018  7:00 AM)  Presence of Pain: complains of pain/discomfort (3/28/2018  7:00 AM)  Pain Rating Prior to Med Admin: 10 (3/28/2018  7:49 AM)  Pain Rating Post Med Admin: 2 (3/28/2018  8:19 AM)  Desiree Score: 9 (3/27/2018  7:00 PM)      "

## 2018-03-28 NOTE — ASSESSMENT & PLAN NOTE
Mr. Byrd is a 47 year old man with CAD s/p PCI, LVEF 30% s/p ICD, complicated and extensive PAD s/p aortofem bypass and multiple stents on DAPT who presents with what appears to be CLI of his left foot of unclear duration. This is not an acute presentation as these symtpoms have been ongoing for months and have worsened in the past 2 weeks.     --Underwent angiogram 3/19 that showed occlusion of aorta-fem bypass at distal anastamosis. Unable to treat due to patient movement.   --s/p cath directed tPA to occluded Lt SFA under gen anesthesia  --continue thrombolysis infusion while pt remains w pulseless lt ext; f/up int cards recs- likely remove cath today  --s/p 7 day course abx vanc then clindamycin   --blood cx ordered- ngtd  --thrombolysis gtt stopped  --now with Rt lower extremity pain, pallor and poikilothermia. US revealed Occlusion of distal right superficial femoral, popliteal, anterior tibial, and posterior tibial arteries. Interventional cards made aware- hold off on cath in setting of RYAN  --continue DAPT and hep gtt   --stop cilostazol as is contraindicated in HFpEF  --Podiatry consulted for possible L trans-metatarsal amputation per interventional cards recs; f/up recs  --neuro check 1 4h  --pain control  -- Vascular surgery consulted. The are doing Right AKA 3/27/18   - s/p Right AKA 3/27  - left foot ischemia and no pulse. There is a concern about he may need left BKA. Vascular following

## 2018-03-28 NOTE — ASSESSMENT & PLAN NOTE
Nutrition Problem  Increased protein needs    Related to (etiology):   Wound healing    Signs and Symptoms (as evidenced by):   S/p R AKA with plans for L TMA     Interventions/Recommendations (treatment strategy):  See recs.    Nutrition Diagnosis Status:   New

## 2018-03-28 NOTE — SUBJECTIVE & OBJECTIVE
Interval History:   Sleeping on examination. Pain more controlled. Right AKA yesterday. Febrile overnight. No fevers today. On vancomycin and unasyn.     Review of Systems   Constitutional: Positive for fever. Negative for chills and diaphoresis.   Respiratory: Negative for cough.    Gastrointestinal: Negative for abdominal pain, diarrhea, nausea and vomiting.   Genitourinary: Negative for dysuria.   Musculoskeletal:        Right AKA     Skin:        Ischemic changes of left foot     Objective:     Vital Signs (Most Recent):  Temp: 99.2 °F (37.3 °C) (03/28/18 1100)  Pulse: (!) 128 (03/28/18 1300)  Resp: (!) 26 (03/28/18 1300)  BP: 115/64 (03/28/18 1300)  SpO2: (!) 86 % (03/28/18 1300) Vital Signs (24h Range):  Temp:  [97.2 °F (36.2 °C)-101.5 °F (38.6 °C)] 99.2 °F (37.3 °C)  Pulse:  [] 128  Resp:  [12-32] 26  SpO2:  [86 %-100 %] 86 %  BP: ()/(49-86) 115/64     Weight: 67.8 kg (149 lb 7.6 oz)  Body mass index is 23.41 kg/m².    Estimated Creatinine Clearance: 119.3 mL/min (based on SCr of 0.7 mg/dL).    Physical Exam   Constitutional: He appears well-developed and well-nourished. No distress.   Cardiovascular: Regular rhythm and normal heart sounds.  Exam reveals no friction rub.    No murmur heard.  tachycardic   Pulmonary/Chest: Effort normal. No respiratory distress. He has no wheezes. He has no rales.   Abdominal: Soft. He exhibits no distension. There is no tenderness. There is no guarding.   Musculoskeletal: He exhibits tenderness.   Sleeping on examination.    Neurological: He is alert.   Skin: Skin is warm. No rash noted. He is not diaphoretic. There is erythema. No pallor.   Refer to media for LLE   RLE cool to touch. Absent pulses    Psychiatric: He has a normal mood and affect.   Nursing note and vitals reviewed.      Significant Labs:   Blood Culture:   Recent Labs  Lab 03/20/18  2219 03/20/18  2233 03/26/18  0148 03/27/18  2148 03/27/18  2212   [facility] No growth after 5 days. No growth after 5  days. No Growth to date  No Growth to date  No Growth to date No Growth to date No Growth to date     CBC:   Recent Labs  Lab 03/27/18  2359 03/28/18  0350 03/28/18  0753   WBC 14.93* 23.76* 18.89*   HGB 6.8* 9.9* 8.9*   HCT 19.9* 29.8* 26.0*   * 547* 459*     CMP:   Recent Labs  Lab 03/27/18  0535 03/28/18  0350   * 135*   K 3.7 3.6   CL 98 101   CO2 26 23    97   BUN 9 8   CREATININE 0.6 0.7   CALCIUM 8.7 8.5*   PROT 6.3 6.9   ALBUMIN 2.2* 2.5*   BILITOT 0.6 1.2*   ALKPHOS 150* 189*   * 181*   ALT 52* 56*   ANIONGAP 9 11   EGFRNONAA >60.0 >60.0     Wound Culture: No results for input(s): LABAERO in the last 4320 hours.  All pertinent labs within the past 24 hours have been reviewed.    Significant Imaging: I have reviewed all pertinent imaging results/findings within the past 24 hours.

## 2018-03-28 NOTE — SUBJECTIVE & OBJECTIVE
Interval History: - 3/28 febrile last night. Blood culture sent. WBC elevated then trending down. Complains of BLE pain controlled with pain meds. S/p Right AKA on 3/27/18. ID on board, continue on vanc and Unasyn     Review of Systems   Constitution: Positive for fever. Negative for chills.   Cardiovascular: Negative for chest pain, leg swelling, orthopnea and palpitations.   Respiratory: Negative for cough and shortness of breath.    Endocrine: Negative for polyuria.   Musculoskeletal:        BLE pain   Gastrointestinal: Negative for abdominal pain and diarrhea.   Genitourinary: Negative for dysuria and hematuria.   Neurological: Negative for headaches and light-headedness.   Psychiatric/Behavioral: Negative for altered mental status. The patient is not nervous/anxious.      Objective:     Vital Signs (Most Recent):  Temp: 98.2 °F (36.8 °C) (03/28/18 0700)  Pulse: (!) 124 (03/28/18 1000)  Resp: (!) 24 (03/28/18 1000)  BP: 135/80 (03/28/18 1000)  SpO2: 98 % (03/28/18 1000) Vital Signs (24h Range):  Temp:  [97.2 °F (36.2 °C)-101.5 °F (38.6 °C)] 98.2 °F (36.8 °C)  Pulse:  [] 124  Resp:  [12-32] 24  SpO2:  [92 %-100 %] 98 %  BP: ()/(49-86) 135/80     Weight: 67.8 kg (149 lb 7.6 oz)  Body mass index is 23.41 kg/m².     SpO2: 98 %  O2 Device (Oxygen Therapy): room air      Intake/Output Summary (Last 24 hours) at 03/28/18 1040  Last data filed at 03/28/18 1000   Gross per 24 hour   Intake          6865.89 ml   Output             1510 ml   Net          5355.89 ml       Lines/Drains/Airways     Epidural Line                 Perineural Analgesia/Anesthesia Assessment (using dermatomes) Epidural 03/27/18 1415 less than 1 day          Peripheral Intravenous Line                 Peripheral IV - Single Lumen 03/26/18 0620 Left Forearm 2 days         Peripheral IV - Single Lumen 03/27/18 1047 Right Upper Arm less than 1 day                Physical Exam   Constitutional: He is oriented to person, place, and time. He  appears well-developed and well-nourished. No distress.   HENT:   Head: Normocephalic and atraumatic.   Eyes: Conjunctivae are normal. Pupils are equal, round, and reactive to light.   Neck: Normal range of motion. Neck supple.   Cardiovascular: Normal rate, regular rhythm and normal heart sounds.    Pulmonary/Chest: Effort normal and breath sounds normal.   Musculoskeletal: Normal range of motion. He exhibits no edema.   Neurological: He is alert and oriented to person, place, and time.   Skin: No rash noted.   Cool left extremity with dusky color. No pulses detected at foot        Significant Labs: All pertinent lab results from the last 24 hours have been reviewed.    Significant Imaging: reviewed

## 2018-03-28 NOTE — PROGRESS NOTES
"  [facility]  Adult Nutrition  Consult Note    SUMMARY     Recommendations    1. When medically feasible, resume cardiac diet.   2. Add Boost Plus ONS if PO intake consistently <50%.   3. RD to monitor & follow-up.    Goals: Meet % EEN, EPN  Nutrition Goal Status: new  Communication of RD Recs: reviewed with RN    Reason for Assessment    Reason for Assessment: length of stay  Diagnosis: other (see comments) (R AKA (3/27))  Relevant Medical History: CHF, HTN, HLD  Interdisciplinary Rounds: did not attend    General Information Comments: Pt currently NPO 2/2 surgery (R. AKA) last night. Prior to NPO status, pt with good appetite.  Nutrition Discharge Planning: Adequate PO intake    Nutrition/Diet History    Patient Reported Diet/Restrictions/Preferences: general  Do you have any cultural, spiritual, Hoahaoism conflicts, given your current situation?: none  Factors Affecting Nutritional Intake: NPO    Anthropometrics    Temp: 98.2 °F (36.8 °C)  Height: 5' 7" (170.2 cm)  Height (inches): 67 in  Weight Method: Bed Scale  Weight: 67.8 kg (149 lb 7.6 oz)  Weight (lb): 149.47 lb  Ideal Body Weight (IBW), Male: 148 lb  % Ideal Body Weight, Male (lb): 100.99 lb  BMI (Calculated): 23.5  BMI Grade: 18.5-24.9 - normal  Amputation %: 16  Total Amputation %: 16  Amputation Ideal Body Weight (IBW), Male (lb): 132 lb  Amputee BMI (kg/m2): 27.95 kg/m2    Lab/Procedures/Meds    Pertinent Labs Reviewed: reviewed  Pertinent Labs Comments: Stable  Pertinent Medications Reviewed: reviewed  Pertinent Medications Comments: Heparin    Physical Findings/Assessment    Overall Physical Appearance: amputee, nourished  Oral/Mouth Cavity: WDL  Skin: incision(s)    Estimated/Assessed Needs    Weight Used For Calorie Calculations: 67.8 kg (149 lb 7.6 oz)  Height: 5' 7" (170.2 cm)    Energy Calorie Requirements (kcal): 1877 kcal/d  Energy Need Method: [place]-St Jeor (1.25 PAL)    Protein Requirements: 82 g/d (1.2 " g/kg)  Weight Used For Protein Calculations: 67.8 kg (149 lb 7.6 oz)    Fluid Need Method: other (see comments) (Per MD or 1 mL/kcal)    Nutrition Prescription Ordered    Current Diet Order: NPO  Nutrition Order Comments: Was on cardiac w/ adequate PO intake.    Nutrition Risk    Level of Risk/Frequency of Follow-up: moderate     Assessment and Plan    * Critical lower limb ischemia      Nutrition Problem  Inadequate energy intake    Related to (etiology):   Inability to consume sufficient energy    Signs and Symptoms (as evidenced by):   NPO status, 2/2 surgery    Nutrition Diagnosis Status:   New         Monitor and Evaluation    Food and Nutrient Intake: energy intake, food and beverage intake  Food and Nutrient Adminstration: diet order  Physical Activity and Function: nutrition-related ADLs and IADLs  Anthropometric Measurements: weight, weight change  Biochemical Data, Medical Tests and Procedures: lipid profile, glucose/endocrine profile, inflammatory profile, gastrointestinal profile, electrolyte and renal panel  Nutrition-Focused Physical Findings: overall appearance     Nutrition Follow-Up    RD Follow-up?: Yes

## 2018-03-28 NOTE — CONSULTS
Please see consult note from earlier 3/27/18.     Yodit Pierce DO  PGY-7, Vascular fellow   [phone]

## 2018-03-28 NOTE — ASSESSMENT & PLAN NOTE
s/p 7 day course abx  --blood cx NGTD  --restarted on van and ceftriaxone (started 3/25/18) for elevated wbc   - ID consulted. switched ceftriaxone to Unasy 3/26/18   - WBC elevated

## 2018-03-28 NOTE — ASSESSMENT & PLAN NOTE
Mr. Byrd is a 47 year old with PMH of ICM s/p ICD, CAD s/p PCI, HTN, HLD, PAD (s/p aortobifemoral bypass, L SFA and pop PTAS in September and recent R SFA and R pop 3/8 by Dr. Carl Bell) who presented to [facility] with critical limb ischemia on 3/18. Evaluated by interventional cardiology and underwent catheter directed tPA on 3/20. On vancomycin and unasyn for LLE cellulitis. Pt now with RLE occlusion of distal right superficial femoral, popliteal, anterior tibial, and posterior tibial arteries. vascular surgery following, plans for right AKA today.  Podiatry following LLE, monitoring closely.  Blood cultures NGTD.    Stable, febrile 101 overnight.       Plan  1.Continue IV vancomycin. vanc trough goal 15-20. vanc trough subtherapeutic. Dose incrased [id number]. Continue unasyn.   3.S/p right AKA  4.LLE without any significant changes. Vascular and podiatry following closely. May need left TMA if without improvement  5.ID will follow.

## 2018-03-29 NOTE — SUBJECTIVE & OBJECTIVE
Interval History: -3-29: NAEON. Pain controlled with medications. Vascular and ID on board. Pt. Is high risk of left amputation. May ultimately require amputation on the left as well. Continue on vanc and Unasyn for now. S/p Right AKA on 3/27/18.     Review of Systems   Constitution: Negative for chills.   Cardiovascular: Negative for chest pain, leg swelling, orthopnea and palpitations.   Respiratory: Negative for cough and shortness of breath.    Endocrine: Negative for polyuria.   Musculoskeletal:        BLE pain   Gastrointestinal: Negative for abdominal pain and diarrhea.   Genitourinary: Negative for dysuria and hematuria.   Neurological: Negative for headaches and light-headedness.   Psychiatric/Behavioral: Negative for altered mental status. The patient is not nervous/anxious.      Objective:     Vital Signs (Most Recent):  Temp: 99.7 °F (37.6 °C) (03/29/18 1100)  Pulse: (!) 122 (03/29/18 1200)  Resp: (!) 27 (03/29/18 1200)  BP: 118/71 (03/29/18 1200)  SpO2: 99 % (03/29/18 1200) Vital Signs (24h Range):  Temp:  [98.5 °F (36.9 °C)-99.7 °F (37.6 °C)] 99.7 °F (37.6 °C)  Pulse:  [101-124] 122  Resp:  [8-30] 27  SpO2:  [80 %-100 %] 99 %  BP: ()/(52-81) 118/71     Weight: 67.8 kg (149 lb 7.6 oz)  Body mass index is 23.41 kg/m².     SpO2: 99 %  O2 Device (Oxygen Therapy): room air      Intake/Output Summary (Last 24 hours) at 03/29/18 1336  Last data filed at 03/29/18 1200   Gross per 24 hour   Intake             1183 ml   Output              720 ml   Net              463 ml       Lines/Drains/Airways     Epidural Line                 Perineural Analgesia/Anesthesia Assessment (using dermatomes) Epidural 03/27/18 1415 1 day          Peripheral Intravenous Line                 Peripheral IV - Single Lumen 03/26/18 0620 Left Forearm 3 days         Peripheral IV - Single Lumen 03/28/18 1800 Right Hand less than 1 day                Physical Exam   Constitutional: He is oriented to person, place, and time. He  appears well-developed and well-nourished. No distress.   Cardiovascular: Normal rate, regular rhythm and normal heart sounds.    Pulmonary/Chest: Effort normal and breath sounds normal.   Musculoskeletal: Normal range of motion. He exhibits no edema.   Neurological: He is alert and oriented to person, place, and time.   Skin: No rash noted.   Cool left extremity with dusky color. No pulses detected at foot        Significant Labs: All pertinent lab results from the last 24 hours have been reviewed.    Significant Imaging: reviewed

## 2018-03-29 NOTE — SUBJECTIVE & OBJECTIVE
Interval Hx: S/p R AKA per vascular surgery. Pt. Lethargic today, nurse present at bedside who states patient was recently given pain meds.     Scheduled Meds:   acetaminophen  1,000 mg Oral Q8H    amitriptyline  50 mg Oral QHS    ampicillin-sulbactim (UNASYN) IVPB  3 g Intravenous Q6H    aspirin  81 mg Oral Daily    atorvastatin  40 mg Oral QHS    folic acid-vit B6-vit B12 2.5-25-2 mg  1 tablet Per OG tube Daily    gabapentin  800 mg Oral QHS    lidocaine   Topical (Top) QID    metoprolol succinate  25 mg Oral Daily    nicotine  1 patch Transdermal Daily    pregabalin  100 mg Oral TID    senna-docusate 8.6-50 mg  1 tablet Oral BID    sodium chloride 0.9%  250 mL Intravenous Once    thiamine  100 mg Oral Daily    ticagrelor  90 mg Oral BID    vancomycin (VANCOCIN) IVPB  1,250 mg Intravenous Q8H     Continuous Infusions:   heparin (porcine) in D5W 17 Units/kg/hr (03/29/18 0500)     PRN Meds:sodium chloride, cyclobenzaprine, fentaNYL, heparin (PORCINE), heparin (PORCINE), lorazepam, morphine, morphine, morphine, nitroGLYCERIN, oxyCODONE, oxyCODONE, oxyCODONE, polyethylene glycol, sodium chloride 0.9%, zolpidem    Review of patient's allergies indicates:  No Known Allergies     Past Medical History:   Diagnosis Date    AICD (automatic cardioverter/defibrillator) present     RYAN (acute kidney injury) 3/25/2018    CHF (congestive heart failure)     Coronary artery disease     Encounter for blood transfusion     Hyperlipemia     Hypertension     MI (myocardial infarction)     Neuropathy     PAD (peripheral artery disease)     PVD (peripheral vascular disease)      Past Surgical History:   Procedure Laterality Date    AMPUTATION Right     great toe    BYBPASS GRAFT AORTOBIUFEMORAL      CARDIAC DEFIBRILLATOR PLACEMENT      coronary stents      EXPLORATION AND EVaCUATION OF HEMATOMA OF UPPER EXTREMITY Left     KNEE ARTHROPLASTY Left     VASCULAR SURGERY      fem-pop bypass       Family  History     None        Social History Main Topics    Smoking status: Former Smoker     Packs/day: 0.50     Quit date: 02/2018    Smokeless tobacco: Former User     Types: Chew     Quit date: 8/2/1980      Comment: Uses nicotine gum and nicotine patches    Alcohol use Yes      Comment: rarely    Drug use: No    Sexual activity: Yes     Partners: Female     Review of Systems   Unable to perform ROS: Acuity of condition     Objective:     Vital Signs (Most Recent):  Temp: 98.6 °F (37 °C) (03/29/18 0300)  Pulse: 101 (03/29/18 0953)  Resp: 17 (03/29/18 0953)  BP: 124/76 (03/29/18 0500)  SpO2: 97 % (03/29/18 0953) Vital Signs (24h Range):  Temp:  [98.6 °F (37 °C)-99.2 °F (37.3 °C)] 98.6 °F (37 °C)  Pulse:  [101-128] 101  Resp:  [11-30] 17  SpO2:  [80 %-100 %] 97 %  BP: ()/(52-81) 124/76     Weight: 67.8 kg (149 lb 7.6 oz)  Body mass index is 23.41 kg/m².    Foot Exam    General  Orientation: alert and oriented to person, place, and time       Right Foot/Ankle     Inspection and Palpation  Ecchymosis: none  Tenderness: none     Comments  R AKA    Left Foot/Ankle      Inspection and Palpation  Ecchymosis: none  Tenderness: none   Swelling: none   Arch: normal  Hammertoes: absent  Claw toes: absent  Hallux valgus: no  Hallux limitus: no  Skin Exam: abnormal color;     Neurovascular  Dorsalis pedis: absent  Posterior tibial: absent          Ischemic appearance to toes 1-5 left foot. Bullae formation noted to left hallux. Foot cold to touch with no palpable pulses.  No purulence, edema, or malodor.     3/29/18                    Laboratory:  A1C: No results for input(s): HGBA1C in the last 4320 hours.  CBC:     Recent Labs  Lab 03/29/18 0219   WBC 20.53*   RBC 2.60*   HGB 8.2*   HCT 24.3*   *   MCV 94   MCH 31.5*   MCHC 33.7     CMP:     Recent Labs  Lab 03/29/18 0219   GLU 97   CALCIUM 8.9   ALBUMIN 2.2*   PROT 6.8      K 4.1   CO2 27      BUN 12   CREATININE 0.8   ALKPHOS 172*   ALT 83*   AST  312*   BILITOT 0.8     CRP: No results for input(s): CRP in the last 168 hours.  ESR: No results for input(s): SEDRATE in the last 168 hours.    Diagnostic Results:  X-ray left foot: pending.     Clinical Findings:

## 2018-03-29 NOTE — ASSESSMENT & PLAN NOTE
48 yo M with h/o HTN, HLD, HFrEF (10-15% 3/23/18) s/p ICD, CAD s/p PCI, EtOH abuse, PAD s/p ABF 1999 with multiple endovascular interventions presented to hospital on 3/18 with complaints of left foot rest pain s/p lysis now with biphasic left PT signal and L forefoot ischemia as well as RLE ischemia s/p R AKA 1 Days Post-Op    - PT/OT  - Dressing to remain in place until tomorrow  - May ultimately require amputation on the left as well  - Continue neurovascular checks  - Remainder of care per primary team  - Will continue to follow

## 2018-03-29 NOTE — PROGRESS NOTES
[facility]  Podiatry  Progress Note    Patient Name: Leonardo Byrd  MRN: [id number]  Admission Date: 3/18/2018  Hospital Length of Stay: 11 days  Attending Physician: Sae Carr MD  Primary Care Provider: Indio Aquino MD   Interval Hx: S/p R AKA per vascular surgery. Pt. Lethargic today, nurse present at bedside who states patient was recently given pain meds.     Scheduled Meds:   acetaminophen  1,000 mg Oral Q8H    amitriptyline  50 mg Oral QHS    ampicillin-sulbactim (UNASYN) IVPB  3 g Intravenous Q6H    aspirin  81 mg Oral Daily    atorvastatin  40 mg Oral QHS    folic acid-vit B6-vit B12 2.5-25-2 mg  1 tablet Per OG tube Daily    gabapentin  800 mg Oral QHS    lidocaine   Topical (Top) QID    metoprolol succinate  25 mg Oral Daily    nicotine  1 patch Transdermal Daily    pregabalin  100 mg Oral TID    senna-docusate 8.6-50 mg  1 tablet Oral BID    sodium chloride 0.9%  250 mL Intravenous Once    thiamine  100 mg Oral Daily    ticagrelor  90 mg Oral BID    vancomycin (VANCOCIN) IVPB  1,250 mg Intravenous Q8H     Continuous Infusions:   heparin (porcine) in D5W 17 Units/kg/hr (03/29/18 0500)     PRN Meds:sodium chloride, cyclobenzaprine, fentaNYL, heparin (PORCINE), heparin (PORCINE), lorazepam, morphine, morphine, morphine, nitroGLYCERIN, oxyCODONE, oxyCODONE, oxyCODONE, polyethylene glycol, sodium chloride 0.9%, zolpidem    Review of patient's allergies indicates:  No Known Allergies     Past Medical History:   Diagnosis Date    AICD (automatic cardioverter/defibrillator) present     RYAN (acute kidney injury) 3/25/2018    CHF (congestive heart failure)     Coronary artery disease     Encounter for blood transfusion     Hyperlipemia     Hypertension     MI (myocardial infarction)     Neuropathy     PAD (peripheral artery disease)     PVD (peripheral vascular disease)      Past Surgical History:   Procedure Laterality Date    AMPUTATION Right      great toe    BYBPASS GRAFT AORTOBIUFEMORAL      CARDIAC DEFIBRILLATOR PLACEMENT      coronary stents      EXPLORATION AND EVaCUATION OF HEMATOMA OF UPPER EXTREMITY Left     KNEE ARTHROPLASTY Left     VASCULAR SURGERY      fem-pop bypass       Family History     None        Social History Main Topics    Smoking status: Former Smoker     Packs/day: 0.50     Quit date: 02/2018    Smokeless tobacco: Former User     Types: Chew     Quit date: 8/2/1980      Comment: Uses nicotine gum and nicotine patches    Alcohol use Yes      Comment: rarely    Drug use: No    Sexual activity: Yes     Partners: Female     Review of Systems   Unable to perform ROS: Acuity of condition     Objective:     Vital Signs (Most Recent):  Temp: 98.6 °F (37 °C) (03/29/18 0300)  Pulse: 101 (03/29/18 0953)  Resp: 17 (03/29/18 0953)  BP: 124/76 (03/29/18 0500)  SpO2: 97 % (03/29/18 0953) Vital Signs (24h Range):  Temp:  [98.6 °F (37 °C)-99.2 °F (37.3 °C)] 98.6 °F (37 °C)  Pulse:  [101-128] 101  Resp:  [11-30] 17  SpO2:  [80 %-100 %] 97 %  BP: ()/(52-81) 124/76     Weight: 67.8 kg (149 lb 7.6 oz)  Body mass index is 23.41 kg/m².    Foot Exam    General  Orientation: alert and oriented to person, place, and time       Right Foot/Ankle     Inspection and Palpation  Ecchymosis: none  Tenderness: none     Comments  R AKA    Left Foot/Ankle      Inspection and Palpation  Ecchymosis: none  Tenderness: none   Swelling: none   Arch: normal  Hammertoes: absent  Claw toes: absent  Hallux valgus: no  Hallux limitus: no  Skin Exam: abnormal color;     Neurovascular  Dorsalis pedis: absent  Posterior tibial: absent          Ischemic appearance to toes 1-5 left foot. Bullae formation noted to left hallux. Foot cold to touch with no palpable pulses.  No purulence, edema, or malodor.     3/29/18                    Laboratory:  A1C: No results for input(s): HGBA1C in the last 4320 hours.  CBC:     Recent Labs  Lab 03/29/18  0219   WBC 20.53*   RBC  2.60*   HGB 8.2*   HCT 24.3*   *   MCV 94   MCH 31.5*   MCHC 33.7     CMP:     Recent Labs  Lab 03/29/18  0219   GLU 97   CALCIUM 8.9   ALBUMIN 2.2*   PROT 6.8      K 4.1   CO2 27      BUN 12   CREATININE 0.8   ALKPHOS 172*   ALT 83*   *   BILITOT 0.8     CRP: No results for input(s): CRP in the last 168 hours.  ESR: No results for input(s): SEDRATE in the last 168 hours.    Diagnostic Results:  X-ray left foot: pending.     Clinical Findings:                    Assessment/Plan:     * Critical lower limb ischemia    Interventional cardiology on board, appreciate recs  S/p R AKA per vascular surgery.         Gangrene    Leonardo Byrd is a 47 y.o. male with ischemic changes to left forefoot.  S/p unsuccessful angio.  -Patient will likely need amputation.  Patient's foot is stable, no emergent amputation is indicated at this time.  Will wait for necrosis to demarcate before determining what level of amputation will be necessary.    - Serous drainage noted from bullae formation left hallux.   -ordered x-ray left foot   - Painted with betadine nursing orders in for daily betadine application.     Weightbearing Status: partial heel WB left  Offloading Device: DARCO shoe     Shalini Julian DPM PGY-3  Pager: [phone]              PAD (peripheral artery disease)    Cards on board            Shalini Julian MD  Podiatry  [facility]

## 2018-03-29 NOTE — PLAN OF CARE
Problem: Patient Care Overview  Goal: Plan of Care Review  Outcome: Ongoing (interventions implemented as appropriate)  The patient does not give an indication that he is learning or that he has an understanding of the plan of care.  He has had pain meds around the clock.. Bed bath and linens changed.  Will continue to assess.

## 2018-03-29 NOTE — SUBJECTIVE & OBJECTIVE
Medications:  Continuous Infusions:   heparin (porcine) in D5W 17 Units/kg/hr (03/29/18 0400)     Scheduled Meds:   acetaminophen  1,000 mg Oral Q8H    amitriptyline  50 mg Oral QHS    ampicillin-sulbactim (UNASYN) IVPB  3 g Intravenous Q6H    aspirin  81 mg Oral Daily    atorvastatin  40 mg Oral QHS    folic acid-vit B6-vit B12 2.5-25-2 mg  1 tablet Per OG tube Daily    gabapentin  800 mg Oral QHS    lidocaine   Topical (Top) QID    metoprolol succinate  25 mg Oral Daily    nicotine  1 patch Transdermal Daily    pregabalin  100 mg Oral TID    senna-docusate 8.6-50 mg  1 tablet Oral BID    sodium chloride 0.9%  250 mL Intravenous Once    thiamine  100 mg Oral Daily    ticagrelor  90 mg Oral BID    vancomycin (VANCOCIN) IVPB  1,250 mg Intravenous Q8H     PRN Meds:sodium chloride, cyclobenzaprine, fentaNYL, heparin (PORCINE), heparin (PORCINE), lorazepam, morphine, morphine, morphine, nitroGLYCERIN, oxyCODONE, oxyCODONE, oxyCODONE, polyethylene glycol, sodium chloride 0.9%, zolpidem     Objective:     Vital Signs (Most Recent):  Temp: 98.6 °F (37 °C) (03/29/18 0300)  Pulse: (!) 120 (03/29/18 0430)  Resp: 12 (03/29/18 0430)  BP: 115/66 (03/29/18 0430)  SpO2: 96 % (03/29/18 0430) Vital Signs (24h Range):  Temp:  [98.2 °F (36.8 °C)-99.2 °F (37.3 °C)] 98.6 °F (37 °C)  Pulse:  [110-129] 120  Resp:  [11-30] 12  SpO2:  [80 %-100 %] 96 %  BP: ()/(52-86) 115/66          Physical Exam   Constitutional: He appears well-nourished. He appears lethargic. No distress.   HENT:   Head: Normocephalic and atraumatic.   Eyes: Pupils are equal, round, and reactive to light.   Cardiovascular: Normal rate and regular rhythm.    Pulses:       Femoral pulses are 2+ on the right side, and 2+ on the left side.  Left: strong biphasic PT   Pulmonary/Chest: Effort normal.   Abdominal: Soft. He exhibits no distension. There is no tenderness.   Musculoskeletal:   R AKA stump dressed  Left lower extremity: dorsal ulcer,  entire forefoot dusky edematous ischemia to mid forefoot   Neurological: He appears lethargic.   Left motor: minimal dorsi/plantar flexion left foot   Skin: He is not diaphoretic.   Nursing note and vitals reviewed.      Significant Labs:  Recent Results (from the past 336 hour(s))   CBC auto differential    Collection Time: 03/28/18  7:53 AM   Result Value Ref Range    WBC 18.89 (H) 3.90 - 12.70 K/uL    Hemoglobin 8.9 (L) 14.0 - 18.0 g/dL    Hematocrit 26.0 (L) 40.0 - 54.0 %    Platelets 459 (H) 150 - 350 K/uL       Significant Diagnostics:  None

## 2018-03-29 NOTE — ASSESSMENT & PLAN NOTE
46 yo M with h/o HTN, HLD, HFrEF (10-15% 3/23/18) s/p ICD, CAD s/p PCI, EtOH abuse, PAD s/p ABF 1999 with multiple endovascular interventions presented to hospital on 3/18 with complaints of left foot rest pain s/p lysis now with biphasic left PT signal and L forefoot ischemia as well as RLE ischemia s/p R AKA 2 Days Post-Op    - PT/OT  - Dressing removed and replaced this morning - keep R AKA stump wrapped  - May ultimately require amputation on the left as well  - Continue neurovascular checks  - Remainder of care per primary team  - Will continue to follow

## 2018-03-29 NOTE — PROGRESS NOTES
"[facility]  Cardiology  Progress Note    Patient Name: Leonardo Byrd  MRN: [id number]  Admission Date: 3/18/2018  Hospital Length of Stay: 11 days  Code Status: Full Code   Attending Physician: Sae Carr MD   Primary Care Physician: Indio Aquino MD  Expected Discharge Date: 4/2/2018  Principal Problem:Critical lower limb ischemia    Subjective:     Hospital Course:   Admitted to CCU 3/20 after cath directed tPA was performed by int cards under general anesthesia. Also with DTs requiring scheduled benzos. On day 2 Lt lower extremity remained pulseless, so thrombolysis still infusing.On day 4 of vanc for possible lt foot cellulitis. Transitioned to clinda IV (unable to take PO safely). Intubated overnight for airway protection in setting of increasing benzo requirements and worsening agitation and danger of pt pulling line. CXR this AM (3/22) with patchy bilateral infiltrates. Taken to cath lab for possible cath removal. Lt leg then reportedly duskier and with absent pulses. On 3/23 taken back to cath lab. Int cards Recc Podiatry consult for possible L trans-metatarsal amputation. On heparin gtt. 3/24 Wbc trending up, foot color getting darker,paitent extubated, continue heparin. 3/25: US lower ext showing occlusion of distal right superficial femoral, popliteal, anterior tibial, and posterior tibial arteries. No "dopplerable" pulses on rt foot. Interventional cards holding off on on acute intervention currently in setting of RYAN. Will continue to monitor. Cr 1.7 (baseline 0.6). Given 500cc Iv fluids. Started on scheduled librium for alcohol withdrawal, with plans for taper.    -3/26/18 NAEON. Pt. With elevated WBC. On vanc and ceftriaxone. ID consulted. Pain management for BLE ischemia   -3/27: NAEON. BLE pain improved after pain management adjustment. Vascular surgery evaluated pt and planning for Right AKA today.   - 3/28 febrile last night. Blood culture sent. WBC elevated then " trending down. Complains of BLE pain controlled with pain meds. S/p Right AKA on 3/27/18  -3-29: NAEON. Pain controlled with medications. Vascular and ID on board. Pt. Is high risk of left amputation. May ultimately require amputation on the left as well. Continue on vanc and Unasyn for now     Interval History: -3-29: NAEON. Pain controlled with medications. Vascular and ID on board. Pt. Is high risk of left amputation. May ultimately require amputation on the left as well. Continue on vanc and Unasyn for now. S/p Right AKA on 3/27/18.     Review of Systems   Constitution: Negative for chills.   Cardiovascular: Negative for chest pain, leg swelling, orthopnea and palpitations.   Respiratory: Negative for cough and shortness of breath.    Endocrine: Negative for polyuria.   Musculoskeletal:        BLE pain   Gastrointestinal: Negative for abdominal pain and diarrhea.   Genitourinary: Negative for dysuria and hematuria.   Neurological: Negative for headaches and light-headedness.   Psychiatric/Behavioral: Negative for altered mental status. The patient is not nervous/anxious.      Objective:     Vital Signs (Most Recent):  Temp: 99.7 °F (37.6 °C) (03/29/18 1100)  Pulse: (!) 122 (03/29/18 1200)  Resp: (!) 27 (03/29/18 1200)  BP: 118/71 (03/29/18 1200)  SpO2: 99 % (03/29/18 1200) Vital Signs (24h Range):  Temp:  [98.5 °F (36.9 °C)-99.7 °F (37.6 °C)] 99.7 °F (37.6 °C)  Pulse:  [101-124] 122  Resp:  [8-30] 27  SpO2:  [80 %-100 %] 99 %  BP: ()/(52-81) 118/71     Weight: 67.8 kg (149 lb 7.6 oz)  Body mass index is 23.41 kg/m².     SpO2: 99 %  O2 Device (Oxygen Therapy): room air      Intake/Output Summary (Last 24 hours) at 03/29/18 1336  Last data filed at 03/29/18 1200   Gross per 24 hour   Intake             1183 ml   Output              720 ml   Net              463 ml       Lines/Drains/Airways     Epidural Line                 Perineural Analgesia/Anesthesia Assessment (using dermatomes) Epidural 03/27/18 2775  1 day          Peripheral Intravenous Line                 Peripheral IV - Single Lumen 03/26/18 0620 Left Forearm 3 days         Peripheral IV - Single Lumen 03/28/18 1800 Right Hand less than 1 day                Physical Exam   Constitutional: He is oriented to person, place, and time. He appears well-developed and well-nourished. No distress.   Cardiovascular: Normal rate, regular rhythm and normal heart sounds.    Pulmonary/Chest: Effort normal and breath sounds normal.   Musculoskeletal: Normal range of motion. He exhibits no edema.   Neurological: He is alert and oriented to person, place, and time.   Skin: No rash noted.   Cool left extremity with dusky color. No pulses detected at foot        Significant Labs: All pertinent lab results from the last 24 hours have been reviewed.    Significant Imaging: reviewed    Assessment and Plan:       * Critical lower limb ischemia    Mr. Byrd is a 47 year old man with CAD s/p PCI, LVEF 30% s/p ICD, complicated and extensive PAD s/p aortofem bypass and multiple stents on DAPT who presents with what appears to be CLI of his left foot of unclear duration. This is not an acute presentation as these symtpoms have been ongoing for months and have worsened in the past 2 weeks.     --Underwent angiogram 3/19 that showed occlusion of aorta-fem bypass at distal anastamosis. Unable to treat due to patient movement.   --s/p cath directed tPA to occluded Lt SFA under gen anesthesia  --continue thrombolysis infusion while pt remains w pulseless lt ext; f/up int cards recs- likely remove cath today  --s/p 7 day course abx vanc then clindamycin   --blood cx ordered- ngtd  --thrombolysis gtt stopped  --now with Rt lower extremity pain, pallor and poikilothermia. US revealed Occlusion of distal right superficial femoral, popliteal, anterior tibial, and posterior tibial arteries. Interventional cards made aware- hold off on cath in setting of RYAN  --continue DAPT and hep gtt   --stop  cilostazol as is contraindicated in HFpEF  --Podiatry consulted for possible L trans-metatarsal amputation per interventional cards recs; f/up recs  --neuro check 1 4h  --pain control  -- Vascular surgery consulted. The are doing Right AKA 3/27/18   - s/p Right AKA 3/27  - left foot ischemia and no pulse. There is a concern about he may need left BKA. Vascular following             Leukocytosis    infection vs inflammatory response to recent amputation procedures  -s/p 7 day course abx for possible lt lower extremity cellulitis  -blood cx ngtd  -febrile last night, a new blood culture sent   - restarted on van and ceftriaxone (started 3/25/18) for elevated wbc   - ID consulted, switched Ceftriaxone to Unasyn  (started 3/25/18)        Alcohol withdrawal syndrome, with delirium    -see etoh abuse above        Ischemic cardiomyopathy    -CM possibly with multifactorial etiology (hx of alcohol use, ischemia)  CAD s/p PCI  DAPT, statin,  BB          Cellulitis of left lower extremity    s/p 7 day course abx  --blood cx NGTD  --restarted on van and ceftriaxone (started 3/25/18) for elevated wbc   - ID consulted. switched ceftriaxone to Unasy 3/26/18   - WBC continues to rise      Chronic systolic heart failure    LVEF 30% s/p ICD  Continue  BB                 VTE Risk Mitigation         Ordered     heparin 25,000 units in dextrose 5% 250 mL (100 units/mL) infusion  Continuous     Route:  Intravenous        03/27/18 1645     heparin 25,000 units in dextrose 5% 250 mL (100 units/mL) bolus from bag; ADDITIONAL PRN BOLUS  As needed (PRN)     Route:  Intravenous        03/27/18 1645     heparin 25,000 units in dextrose 5% 250 mL (100 units/mL) bolus from bag; ADDITIONAL PRN BOLUS  As needed (PRN)     Route:  Intravenous        03/27/18 1645     IP VTE LOW RISK PATIENT  Once      03/18/18 3041          Cm Giles MD  Cardiology  [facility]

## 2018-03-29 NOTE — PROGRESS NOTES
[facility]  Vascular Surgery  Progress Note    Patient Name: Leonardo Byrd  MRN: [id number]  Admission Date: 3/18/2018  Primary Care Provider: Indio Aquino MD    Subjective:     Interval History:   No acute events overnight. Tachycardia slightly improved. In some discomfort this morning.      Post-Op Info:  Procedure(s) (LRB):  AMPUTATION-ABOVE KNEE (Right)   2 Days Post-Op       Medications:  Continuous Infusions:   heparin (porcine) in D5W 17 Units/kg/hr (03/29/18 0500)     Scheduled Meds:   acetaminophen  1,000 mg Oral Q8H    amitriptyline  50 mg Oral QHS    ampicillin-sulbactim (UNASYN) IVPB  3 g Intravenous Q6H    aspirin  81 mg Oral Daily    atorvastatin  40 mg Oral QHS    folic acid-vit B6-vit B12 2.5-25-2 mg  1 tablet Per OG tube Daily    gabapentin  800 mg Oral QHS    lidocaine   Topical (Top) QID    metoprolol succinate  25 mg Oral Daily    nicotine  1 patch Transdermal Daily    pregabalin  100 mg Oral TID    senna-docusate 8.6-50 mg  1 tablet Oral BID    sodium chloride 0.9%  250 mL Intravenous Once    thiamine  100 mg Oral Daily    ticagrelor  90 mg Oral BID    vancomycin (VANCOCIN) IVPB  1,250 mg Intravenous Q8H     PRN Meds:sodium chloride, cyclobenzaprine, fentaNYL, heparin (PORCINE), heparin (PORCINE), lorazepam, morphine, morphine, morphine, nitroGLYCERIN, oxyCODONE, oxyCODONE, oxyCODONE, polyethylene glycol, sodium chloride 0.9%, zolpidem     Objective:     Vital Signs (Most Recent):  Temp: 98.6 °F (37 °C) (03/29/18 0300)  Pulse: 110 (03/29/18 0645)  Resp: 20 (03/29/18 0645)  BP: 124/76 (03/29/18 0500)  SpO2: 97 % (03/29/18 0645) Vital Signs (24h Range):  Temp:  [98.6 °F (37 °C)-99.2 °F (37.3 °C)] 98.6 °F (37 °C)  Pulse:  [105-128] 110  Resp:  [11-30] 20  SpO2:  [80 %-100 %] 97 %  BP: ()/(52-81) 124/76       Date 03/29/18 0700 - 03/30/18 0659   Shift [id number] 0198-8238 5330-0659 24 Hour Total   I  N  T  A  K  E   Shift Total  (mL/kg)        O  U  T  P  U  T   Urine  (mL/kg/hr) 150   150    Shift Total  (mL/kg) 150  (2.2)   150  (2.2)   Weight (kg) 67.8 67.8 67.8 67.8       Physical Exam   Constitutional: He appears well-nourished. He appears lethargic. No distress.   HENT:   Head: Normocephalic and atraumatic.   Eyes: Pupils are equal, round, and reactive to light.   Cardiovascular: Normal rate and regular rhythm.    Pulses:       Femoral pulses are 2+ on the right side, and 2+ on the left side.  Left: strong biphasic PT   Pulmonary/Chest: Effort normal.   Abdominal: Soft. He exhibits no distension. There is no tenderness.   Musculoskeletal:   R AKA stump dressed - incisions well approximated with Nylon sutures in place  Left lower extremity: dorsal ulcer, entire forefoot dusky edematous ischemia to mid forefoot   Neurological: He appears lethargic.   Left motor: minimal dorsi/plantar flexion left foot   Skin: He is not diaphoretic.   Nursing note and vitals reviewed.      Significant Labs:  BMP:   Recent Labs  Lab 03/29/18  0219   GLU 97      K 4.1      CO2 27   BUN 12   CREATININE 0.8   CALCIUM 8.9   MG 2.4     CBC:   Recent Labs  Lab 03/29/18  0219   WBC 20.53*   RBC 2.60*   HGB 8.2*   HCT 24.3*   *   MCV 94   MCH 31.5*   MCHC 33.7       Significant Diagnostics:  None    Assessment/Plan:     Limb ischemia    46 yo M with h/o HTN, HLD, HFrEF (10-15% 3/23/18) s/p ICD, CAD s/p PCI, EtOH abuse, PAD s/p ABF 1999 with multiple endovascular interventions presented to hospital on 3/18 with complaints of left foot rest pain s/p lysis now with biphasic left PT signal and L forefoot ischemia as well as RLE ischemia s/p R AKA 2 Days Post-Op    - PT/OT  - Dressing removed and replaced this morning - keep R AKA stump wrapped  - May ultimately require amputation on the left as well  - Continue neurovascular checks  - Remainder of care per primary team  - Will continue to follow        Marin Ryan MD  Surgery Resident, PGY-III  Pager:  [phone]  3/29/2018 9:53 AM    Vascular Attending  Agree with above    Norris Trejo MD FACS

## 2018-03-29 NOTE — ASSESSMENT & PLAN NOTE
Leonardo Byrd is a 47 y.o. male with ischemic changes to left forefoot.  S/p unsuccessful angio.  -Patient will likely need amputation.  Patient's foot is stable, no emergent amputation is indicated at this time.  Will wait for necrosis to demarcate before determining what level of amputation will be necessary.    -ordered x-ray left foot   - Painted with betadine nursing orders in for daily betadine application.     Weightbearing Status: partial heel WB left  Offloading Device: DARCO shoe     Shalini Julian DPM PGY-3  Pager: [phone]

## 2018-03-29 NOTE — PROGRESS NOTES
[facility]  Vascular Surgery  Progress Note    Patient Name: Leonardo Byrd  MRN: [id number]  Admission Date: 3/18/2018  Primary Care Provider: Indio Aquino MD    Subjective:     Interval History:   No acute events overnight. Taken for R AKA yesterday. Tolerated well. Stump dressed. LLE with no significant interval changes.      Post-Op Info:  Procedure(s) (LRB):  AMPUTATION-ABOVE KNEE (Right)   2 Days Post-Op       Medications:  Continuous Infusions:   heparin (porcine) in D5W 17 Units/kg/hr (03/29/18 0400)     Scheduled Meds:   acetaminophen  1,000 mg Oral Q8H    amitriptyline  50 mg Oral QHS    ampicillin-sulbactim (UNASYN) IVPB  3 g Intravenous Q6H    aspirin  81 mg Oral Daily    atorvastatin  40 mg Oral QHS    folic acid-vit B6-vit B12 2.5-25-2 mg  1 tablet Per OG tube Daily    gabapentin  800 mg Oral QHS    lidocaine   Topical (Top) QID    metoprolol succinate  25 mg Oral Daily    nicotine  1 patch Transdermal Daily    pregabalin  100 mg Oral TID    senna-docusate 8.6-50 mg  1 tablet Oral BID    sodium chloride 0.9%  250 mL Intravenous Once    thiamine  100 mg Oral Daily    ticagrelor  90 mg Oral BID    vancomycin (VANCOCIN) IVPB  1,250 mg Intravenous Q8H     PRN Meds:sodium chloride, cyclobenzaprine, fentaNYL, heparin (PORCINE), heparin (PORCINE), lorazepam, morphine, morphine, morphine, nitroGLYCERIN, oxyCODONE, oxyCODONE, oxyCODONE, polyethylene glycol, sodium chloride 0.9%, zolpidem     Objective:     Vital Signs (Most Recent):  Temp: 98.6 °F (37 °C) (03/29/18 0300)  Pulse: (!) 120 (03/29/18 0430)  Resp: 12 (03/29/18 0430)  BP: 115/66 (03/29/18 0430)  SpO2: 96 % (03/29/18 0430) Vital Signs (24h Range):  Temp:  [98.2 °F (36.8 °C)-99.2 °F (37.3 °C)] 98.6 °F (37 °C)  Pulse:  [110-129] 120  Resp:  [11-30] 12  SpO2:  [80 %-100 %] 96 %  BP: ()/(52-86) 115/66          Physical Exam   Constitutional: He appears well-nourished. He appears lethargic. No distress.   HENT:    Head: Normocephalic and atraumatic.   Eyes: Pupils are equal, round, and reactive to light.   Cardiovascular: Normal rate and regular rhythm.    Pulses:       Femoral pulses are 2+ on the right side, and 2+ on the left side.  Left: strong biphasic PT   Pulmonary/Chest: Effort normal.   Abdominal: Soft. He exhibits no distension. There is no tenderness.   Musculoskeletal:   R AKA stump dressed  Left lower extremity: dorsal ulcer, entire forefoot dusky edematous ischemia to mid forefoot   Neurological: He appears lethargic.   Left motor: minimal dorsi/plantar flexion left foot   Skin: He is not diaphoretic.   Nursing note and vitals reviewed.      Significant Labs:  Recent Results (from the past 336 hour(s))   CBC auto differential    Collection Time: 03/28/18  7:53 AM   Result Value Ref Range    WBC 18.89 (H) 3.90 - 12.70 K/uL    Hemoglobin 8.9 (L) 14.0 - 18.0 g/dL    Hematocrit 26.0 (L) 40.0 - 54.0 %    Platelets 459 (H) 150 - 350 K/uL       Significant Diagnostics:  None    Assessment/Plan:     Limb ischemia    48 yo M with h/o HTN, HLD, HFrEF (10-15% 3/23/18) s/p ICD, CAD s/p PCI, EtOH abuse, PAD s/p ABF 1999 with multiple endovascular interventions presented to hospital on 3/18 with complaints of left foot rest pain s/p lysis now with biphasic left PT signal and L forefoot ischemia as well as RLE ischemia s/p R AKA 1 Days Post-Op    - PT/OT  - Dressing to remain in place until tomorrow  - May ultimately require amputation on the left as well  - Continue neurovascular checks  - Remainder of care per primary team  - Will continue to follow        Marin Ryan MD  Surgery Resident, PGY-III  Pager: [phone]  3/29/2018 2:27 PM

## 2018-03-30 PROBLEM — F10.931 ALCOHOL WITHDRAWAL SYNDROME, WITH DELIRIUM: Status: RESOLVED | Noted: 2018-01-01 | Resolved: 2018-01-01

## 2018-03-30 PROBLEM — N17.0 ACUTE RENAL FAILURE WITH TUBULAR NECROSIS: Status: RESOLVED | Noted: 2018-01-01 | Resolved: 2018-01-01

## 2018-03-30 NOTE — ASSESSMENT & PLAN NOTE
46 yo M with h/o HTN, HLD, HFrEF (10-15% 3/23/18) s/p ICD, CAD s/p PCI, EtOH abuse, PAD s/p ABF 1999 with multiple endovascular interventions presented to hospital on 3/18 with complaints of left foot rest pain s/p lysis now with biphasic left PT signal and L forefoot ischemia as well as RLE ischemia s/p R AKA 3 Days Post-Op    - PT/OT  - Dressing removed and replaced this morning - keep R AKA stump wrapped  - May ultimately require amputation on the left as well  - Continue neurovascular checks  - Remainder of care per primary team  - Will continue to follow

## 2018-03-30 NOTE — ASSESSMENT & PLAN NOTE
Mr. Byrd is a 47 year old with PMH of ICM s/p ICD, CAD s/p PCI, HTN, HLD, PAD (s/p aortobifemoral bypass, L SFA and pop PTAS in September and recent R SFA and R pop 3/8 by Dr. Carl Bell) who presented to [facility] with critical limb ischemia on 3/18. Evaluated by interventional cardiology and underwent catheter directed tPA on 3/20. On vancomycin and unasyn for LLE cellulitis. Pt now with RLE occlusion of distal right superficial femoral, popliteal, anterior tibial, and posterior tibial arteries. vascular surgery performed right AKA 3/27.  Podiatry following LLE, monitoring closely and awaiting demarcation of necrosis of LLE as possibly will have TMA or BKA.  Blood cultures NGTD.  Persistent leukocytosis - suspect secondary to stress response for AKA and ischemia    Stable,afebrile today      Plan  1.Continue IV vancomycin. vanc trough goal 15-20. vanc trough subtherapeutic. Dose incrased 1.49kl3tr. Continue unasyn.   3. FU vasc Sx and podiatry recs  4.LLE without any significant changes. Vascular and podiatry following closely. May need left TMA /bka if without improvement  5.ID will follow.

## 2018-03-30 NOTE — ASSESSMENT & PLAN NOTE
Mr. Byrd is a 47 year old with PMH of ICM s/p ICD, CAD s/p PCI, HTN, HLD, PAD (s/p aortobifemoral bypass, L SFA and pop PTAS in September and recent R SFA and R pop 3/8 by Dr. Carl Bell) who presented to [facility] with critical limb ischemia on 3/18. Evaluated by interventional cardiology and underwent catheter directed tPA on 3/20. On vancomycin and unasyn for LLE cellulitis. Pt now with RLE occlusion of distal right superficial femoral, popliteal, anterior tibial, and posterior tibial arteries. vascular surgery performed right AKA 3/27.  Podiatry following LLE, monitoring closely and awaiting demarcation of necrosis of LLE as possibly will have TMA or BKA.  Blood cultures NGTD.  Persistent leukocytosis - suspect secondary to stress response for AKA and ischemia.  ABD exam shows significant tenderness especially in lower quadrants and superpubic area - ? Cause and underlying pathology.  Left foot stable    Afebrile today  Lethargy - ? Lack of sleep.    Plan  1.Continue IV vancomycin. 1.25g q8hr.  vanc trough goal 15-20. Vanc trough today. . Continue unasyn. If decompensates then can change to Unasyn to Zosyn  3. FU vasc Sx and podiatry recs  4.LLE without any significant changes. Vascular and podiatry following closely. May need left TMA /bka if without improvement  5. Rec further evaluation of abd given exam findings - discussed with attending Dr. Archer  6. ID will follow.

## 2018-03-30 NOTE — SUBJECTIVE & OBJECTIVE
Medications:  Continuous Infusions:   heparin (porcine) in D5W 17 Units/kg/hr (03/29/18 1427)     Scheduled Meds:   acetaminophen  1,000 mg Oral Q8H    amitriptyline  50 mg Oral QHS    ampicillin-sulbactim (UNASYN) IVPB  3 g Intravenous Q6H    aspirin  81 mg Oral Daily    atorvastatin  40 mg Oral QHS    folic acid-vit B6-vit B12 2.5-25-2 mg  1 tablet Per OG tube Daily    gabapentin  800 mg Oral QHS    lidocaine   Topical (Top) QID    metoprolol succinate  25 mg Oral Daily    nicotine  1 patch Transdermal Daily    pregabalin  100 mg Oral TID    senna-docusate 8.6-50 mg  1 tablet Oral BID    sodium chloride 0.9%  250 mL Intravenous Once    thiamine  100 mg Oral Daily    ticagrelor  90 mg Oral BID    vancomycin (VANCOCIN) IVPB  1,250 mg Intravenous Q8H     PRN Meds:sodium chloride, cyclobenzaprine, fentaNYL, heparin (PORCINE), heparin (PORCINE), lorazepam, morphine, morphine, morphine, nitroGLYCERIN, oxyCODONE, oxyCODONE, oxyCODONE, polyethylene glycol, sodium chloride 0.9%, zolpidem     Objective:     Vital Signs (Most Recent):  Temp: 98.8 °F (37.1 °C) (03/30/18 0700)  Pulse: 110 (03/30/18 0700)  Resp: 19 (03/30/18 0700)  BP: 126/69 (03/30/18 0700)  SpO2: 99 % (03/30/18 0700) Vital Signs (24h Range):  Temp:  [98.8 °F (37.1 °C)-100.3 °F (37.9 °C)] 98.8 °F (37.1 °C)  Pulse:  [101-127] 110  Resp:  [9-27] 19  SpO2:  [75 %-100 %] 99 %  BP: ()/(51-79) 126/69       Date 03/30/18 0700 - 03/31/18 0659   Shift [id number] 2127-9861 0338-2557 24 Hour Total   I  N  T  A  K  E   I.V.  (mL/kg) 16.5  (0.2)   16.5  (0.2)    Shift Total  (mL/kg) 16.5  (0.2)   16.5  (0.2)   O  U  T  P  U  T   Shift Total  (mL/kg)       Weight (kg) 67.8 67.8 67.8 67.8       Physical Exam   Constitutional: He appears well-nourished. He appears lethargic. No distress.   HENT:   Head: Normocephalic and atraumatic.   Eyes: Pupils are equal, round, and reactive to light.   Cardiovascular: Normal rate and regular rhythm.    Pulses:        Femoral pulses are 2+ on the right side, and 2+ on the left side.  Left: strong biphasic PT   Pulmonary/Chest: Effort normal.   Abdominal: Soft. He exhibits no distension. There is no tenderness.   Musculoskeletal:   R AKA well approximated with Nylon sutures in place  Left lower extremity: dorsal ulcer, entire forefoot dusky edematous ischemia to mid forefoot   Neurological: He appears lethargic.   Left motor: minimal dorsi/plantar flexion left foot   Skin: He is not diaphoretic.   Nursing note and vitals reviewed.      Significant Labs:  CBC:   Recent Labs  Lab 03/30/18 0157   WBC 21.91*   RBC 2.40*   HGB 7.4*   HCT 22.4*   *   MCV 93   MCH 30.8   MCHC 33.0     CMP:   Recent Labs  Lab 03/30/18 0157   GLU 87   CALCIUM 8.3*   ALBUMIN 1.8*   PROT 6.0      K 3.5   CO2 24      BUN 9   CREATININE 0.7   ALKPHOS 151*   ALT 92*   *   BILITOT 0.7       Significant Diagnostics:  None

## 2018-03-30 NOTE — PT/OT/SLP EVAL
Speech Language Pathology Evaluation  Bedside Swallow    Patient Name:  Leonardo Byrd   MRN:  [id number]   [id number]/[id number] A    Admitting Diagnosis: Critical lower limb ischemia    Recommendations:                 General Recommendations:  Dysphagia therapy  Diet recommendations:  Puree, Thin, Initiate diet when awake/alert; pt with impaired ADELINA from medications at this time; unable to assess solids this date 2/2 impaired ADELINA from medications  Aspiration Precautions:   · 1 bite/sip at a time,   · Assistance with meals,   · Check for pocketing/oral residue,   · Eliminate distractions,   · FEED ONLY WHEN AWAKE AND ALERT  · Frequent oral care,   · HOB to 90 degrees,   · Meds whole 1 at a time or crushed in puree pending ADELINA  · Monitor for s/s of aspiration,   · Remain upright 30 minutes post meal   · Small bites/sips   · Continue to monitor for signs and symptoms of aspiration and discontinue oral feeding should you notice any of the following: watery eyes, reddened facial area, wet vocal quality, increased work of breathing, change in respiratory status, increased congestion, coughing, fever, etc.  General Precautions: Standard, aspiration, fall  Communication strategies:  yes/no questions only, provide increased time to answer and go to room if call light pushed    History:     Past Medical History:   Diagnosis Date    AICD (automatic cardioverter/defibrillator) present     RYAN (acute kidney injury) 3/25/2018    CHF (congestive heart failure)     Coronary artery disease     Encounter for blood transfusion     Hyperlipemia     Hypertension     MI (myocardial infarction)     Neuropathy     PAD (peripheral artery disease)     PVD (peripheral vascular disease)        Past Surgical History:   Procedure Laterality Date    AMPUTATION Right     great toe    BYBPASS GRAFT AORTOBIUFEMORAL      CARDIAC DEFIBRILLATOR PLACEMENT      coronary stents      EXPLORATION AND EVaCUATION OF HEMATOMA OF UPPER EXTREMITY Left      KNEE ARTHROPLASTY Left     VASCULAR SURGERY      fem-pop bypass       HPI:  Mr. Byrd is a 47 year old with PMH of ICM (LVEF 30-35%) s/p ICD, CAD s/p PCI, HTN, HLD, current smoker, PAD (s/p aortobifemoral bypass, L SFA and pop PTAS in September and recent R SFA and R pop 3/8 by Dr. Carl Bell) who presented to [facility] with critical limb ischemia on 3/18. Evaluated by cards who rec abx for possible foot cellulitis and interventional cards consult. Underwent catheter directed tPA on 3/20 performed by interventional cardiology and was admitted to CCU for close monitoring.  Patient still sedated during evaluation, so history obtained from chart. Left foot pain  started in September 2017 and had PTAs to left LFA and left popliteal artery here. Since then he continued to have pain in his left foot and to a less degree, leg. The pain has been progressively worse since. Pain is a burning sharp pain that is elicited by slight touch. Pain is worsened with bearing weight and is improved with vibrating massager to the plantar aspect of the foot. There is associated numbness and coldness to his foot. He denies erythema or swelling of his left foot.    Prior Intubation HX:  3/20-3/24    Chest X-Rays:   Results for orders placed or performed during the hospital encounter of 03/18/18   X-Ray Chest 1 View    Narrative    EXAMINATION:  XR CHEST 1 VIEW    CLINICAL HISTORY:  pulmonary congestion;    TECHNIQUE:  Single frontal view of the chest was performed.    COMPARISON:  March 23, 2018    FINDINGS:  Cardiac silhouette and mediastinal stable.  Cardiac device present.  Lungs clear.  Osseous structures intact.      Impression    No acute cardiopulmonary process.      Electronically signed by: Humberto Davila MD  Date:    03/25/2018  Time:    10:31       Prior diet: unable to assess      Subjective     Patient sleeping upon SLP entry with RN present in room. RN reports patient given Ativan and pain medications. Mr. Byrd required  "max auditory and tactile stimulation to rouse for po trials. ADELINA waxing and waning throughout assessment.   Patient goals: "I'm magaly get up.     Pain/Comfort:  · Pain Rating 1: 0/10    Objective:     Oral Musculature Evaluation  · Oral Musculature: unable to assess due to poor participation/comprehension  · Mucosal Quality: dry  · Volitional Cough: weak  · Volitional Swallow: did not follow command to elicit volitional swallow  · Voice Prior to PO Intake: clear  · Oral Musculature Comments: patient's speech <50% intelligible likely 2/2 medications    Bedside Swallow Eval:   Consistencies Assessed:  · Thin liquids tsp sip of water x2, cup sip x1, straw sips x2  · Puree tsp bites x2     Oral Phase:   · Decreased closure around utensil  · Slow oral transit time    Pharyngeal Phase:   · no overt clinical signs/symptoms of aspiration  · no overt clinical signs/symptoms of pharyngeal dysphagia    Compensatory Strategies  · None    Treatment: Minimal trials 2/2 patient lethargic from medications (Ativan and pain meds per RN). Unable to assess solids at this time.     Assessment:     Leonardo Byrd is a 47 y.o. male with an SLP diagnosis of Dysphagia.  He presents with impaired ADELINA 2/2 medications. ST will continue to follow for an ongoing assessment to determine the least restrictive and safest po diet.     Goals:    SLP Goals        Problem: SLP Goal    Goal Priority Disciplines Outcome   SLP Goal     SLP Ongoing (interventions implemented as appropriate)   Description:  Speech Therapy Short Term Goals  Goal expected to be met by 4/7  1. Pt will tolerate puree diet and thin liquids with no overt s/s of aspiration noted.   2. Patient will tolerate dental soft and solid trials x10 with adequate oral clearance and no overt s/s of aspiration.                       Plan:     · Patient to be seen:  4 x/week   · Plan of Care expires:  04/28/18  · Plan of Care reviewed with:  patient   · SLP Follow-Up:  Yes       Discharge " recommendations:   (tbd)       Time Tracking:     SLP Treatment Date:   03/30/18  Speech Start Time:  0722  Speech Stop Time:  0735     Speech Total Time (min):  13 min    Billable Minutes: Eval Swallow and Oral Function 13    KEVIN Fung, CCC-SLP   Pager: [phone]  03/30/2018

## 2018-03-30 NOTE — SUBJECTIVE & OBJECTIVE
Interval History: - 3/30: NAEON. Pt afebrile. Looks lethargic but responds to questions and follows command.  Today abdomen slightly distended. CT abdomen ordered stat. If pt decompensate, will broaden abx from Unasyn to zosyn  S/p Right AKA on 3/27/18.     Review of Systems   Unable to perform ROS: mental status change     Objective:     Vital Signs (Most Recent):  Temp: 98.8 °F (37.1 °C) (03/30/18 0700)  Pulse: (!) 114 (03/30/18 0900)  Resp: (!) 21 (03/30/18 0900)  BP: 119/82 (03/30/18 0900)  SpO2: 100 % (03/30/18 0900) Vital Signs (24h Range):  Temp:  [98.8 °F (37.1 °C)-100.3 °F (37.9 °C)] 98.8 °F (37.1 °C)  Pulse:  [105-127] 114  Resp:  [9-26] 21  SpO2:  [75 %-100 %] 100 %  BP: ()/(51-82) 119/82     Weight: 67.8 kg (149 lb 7.6 oz)  Body mass index is 23.41 kg/m².     SpO2: 100 %  O2 Device (Oxygen Therapy): room air      Intake/Output Summary (Last 24 hours) at 03/30/18 1421  Last data filed at 03/30/18 0900   Gross per 24 hour   Intake              892 ml   Output              350 ml   Net              542 ml       Lines/Drains/Airways     Epidural Line                 Perineural Analgesia/Anesthesia Assessment (using dermatomes) Epidural 03/27/18 1415 3 days          Peripheral Intravenous Line                 Peripheral IV - Single Lumen 03/26/18 0620 Left Forearm 4 days         Peripheral IV - Single Lumen 03/28/18 1800 Right Hand 1 day         Peripheral IV - Single Lumen 03/29/18 1700 Right Antecubital less than 1 day                Physical Exam   Constitutional: He is oriented to person, place, and time. He appears well-developed and well-nourished. No distress.   Cardiovascular: Normal rate, regular rhythm and normal heart sounds.    Pulmonary/Chest: Effort normal and breath sounds normal.   Abdominal: He exhibits distension. There is no rebound and no guarding.   Musculoskeletal: Normal range of motion. He exhibits no edema.   Neurological: He is alert and oriented to person, place, and time.    Skin: No rash noted.   Cool left extremity with dusky color. No pulses detected at foot        Significant Labs:     Recent Labs  Lab 03/30/18  0905   WBC 20.76*   RBC 2.18*   HGB 7.0*   HCT 20.9*   *   MCV 96   MCH 32.1*   MCHC 33.5       Recent Labs  Lab 03/28/18  0350 03/28/18  1551 03/29/18  0219 03/30/18  0157   * 135* 138 136   K 3.6 3.8 4.1 3.5    103 104 102   CO2 23 24 27 24   BUN 8 8 12 9   CREATININE 0.7 0.7 0.8 0.7   CALCIUM 8.5* 8.4* 8.9 8.3*   MG 1.8  --  2.4 1.9         Significant Imaging: pending CT abdomen

## 2018-03-30 NOTE — PROGRESS NOTES
[facility]  Infectious Disease  Progress Note    Patient Name: Leonardo Byrd  MRN: [id number]  Admission Date: 3/18/2018  Length of Stay: 11 days  Attending Physician: Sae Carr MD  Primary Care Provider: Indio Aquino MD    Isolation Status: No active isolations  Assessment/Plan:      * Critical lower limb ischemia    Mr. Byrd is a 47 year old with PMH of ICM s/p ICD, CAD s/p PCI, HTN, HLD, PAD (s/p aortobifemoral bypass, L SFA and pop PTAS in September and recent R SFA and R pop 3/8 by Dr. Carl Bell) who presented to [facility] [place] with critical limb ischemia on 3/18. Evaluated by interventional cardiology and underwent catheter directed tPA on 3/20. On vancomycin and unasyn for LLE cellulitis. Pt now with RLE occlusion of distal right superficial femoral, popliteal, anterior tibial, and posterior tibial arteries. vascular surgery performed right AKA 3/27.  Podiatry following LLE, monitoring closely and awaiting demarcation of necrosis of LLE as possibly will have TMA or BKA.  Blood cultures NGTD.  Persistent leukocytosis - suspect secondary to stress response for AKA and ischemia    Stable,afebrile today      Plan  1.Continue IV vancomycin. vanc trough goal 15-20. vanc trough subtherapeutic. Dose incrased [id number]. Continue unasyn.   3. FU vasc Sx and podiatry recs  4.LLE without any significant changes. Vascular and podiatry following closely. May need left TMA /bka if without improvement  5.ID will follow.             Anticipated Disposition: tbd    Thank you for your consult. I will follow-up with patient. Please contact us if you have any additional questions.    RITA Terry  Infectious Disease  [facility]    Subjective:     Principal Problem:Critical lower limb ischemia    HPI: Mr. Byrd is a 47 year old with PMH of ICM s/p ICD, CAD s/p PCI, HTN, HLD, PAD (s/p aortobifemoral bypass, L SFA and pop PTAS in September and recent R SFA and R pop 3/8  "by Dr. Carl Bell) who presented to [facility] [place] with critical limb ischemia on 3/18. Evaluated by interventional cardiology and underwent catheter directed tPA on 3/20, admitted to CCU for close monitoring. Also with DTs requiring scheduled benzos. Pt was given vancomycin for left foot cellulitis. Pt was intubated for airway protection in setting of increasing benzo requirements and worsening agitation.  Pt now extubated.  Pt was noted to have more duskiness in appearance of left leg and absent pulses.US lower ext showing occlusion of distal right superficial femoral, popliteal, anterior tibial, and posterior tibial arteries. No "dopplerable" pulses on rt foot. Interventional cards holding off on on acute intervention currently in setting of RYAN. Pt afebrile, WBC 17K today. Complains of severe pain of lower extremities. Denies having any cough, n/v/d,dysuria.   Interval History: No AEON.  Afebrile and WBCs stable in 19s.  Denies right AKA eileen, left foot pain to touch.  Reports sweats but denies fever, diarrhea, chills, or sputum    Review of Systems   Constitutional: Positive for diaphoresis. Negative for chills and fever.   Respiratory: Negative for cough.    Gastrointestinal: Negative for abdominal pain, diarrhea, nausea and vomiting.   Genitourinary: Negative for dysuria.   Musculoskeletal:        Right AKA     Skin:        Ischemic changes of left foot     Objective:     Vital Signs (Most Recent):  Temp: 100.3 °F (37.9 °C) (03/29/18 1500)  Pulse: (!) 111 (03/29/18 1955)  Resp: (!) 25 (03/29/18 1955)  BP: (!) 94/53 (03/29/18 1800)  SpO2: 100 % (03/29/18 1955) Vital Signs (24h Range):  Temp:  [98.5 °F (36.9 °C)-100.3 °F (37.9 °C)] 100.3 °F (37.9 °C)  Pulse:  [101-125] 111  Resp:  [8-29] 25  SpO2:  [83 %-100 %] 100 %  BP: ()/(52-81) 94/53     Weight: 67.8 kg (149 lb 7.6 oz)  Body mass index is 23.41 kg/m².    Estimated Creatinine Clearance: 104.4 mL/min (based on SCr of 0.8 mg/dL).    Physical Exam "   Constitutional: He appears well-developed and well-nourished. No distress.   Cardiovascular: Regular rhythm and normal heart sounds.  Exam reveals no friction rub.    No murmur heard.  tachycardic   Pulmonary/Chest: Effort normal. No respiratory distress. He has no wheezes. He has no rales.   Abdominal: Soft. He exhibits no distension. There is no tenderness. There is no guarding.   Musculoskeletal: He exhibits tenderness.   Neurological: He is alert.   Skin: Skin is warm. No rash noted. He is not diaphoretic. There is erythema. No pallor.     RLE cool to touch. Absent pulses    Psychiatric: He has a normal mood and affect.   Nursing note and vitals reviewed.                  Significant Labs:   Blood Culture:   Recent Labs  Lab 03/20/18  2219 03/20/18  2233 03/26/18  0148 03/27/18  2148 03/27/18  2212   LABBLOO No growth after 5 days. No growth after 5 days. No Growth to date  No Growth to date  No Growth to date  No Growth to date No Growth to date  No Growth to date No Growth to date  No Growth to date     CBC:   Recent Labs  Lab 03/29/18  0219 03/29/18  1020 03/29/18  1847   WBC 20.53* 19.82* 19.53*   HGB 8.2* 8.3* 7.1*   HCT 24.3* 24.7* 21.1*   * 476* 438*     CMP:   Recent Labs  Lab 03/28/18  0350 03/28/18  1551 03/29/18  0219   * 135* 138   K 3.6 3.8 4.1    103 104   CO2 23 24 27   GLU 97 115* 97   BUN 8 8 12   CREATININE 0.7 0.7 0.8   CALCIUM 8.5* 8.4* 8.9   PROT 6.9 6.1 6.8   ALBUMIN 2.5* 2.0* 2.2*   BILITOT 1.2* 0.8 0.8   ALKPHOS 189* 161* 172*   * 269* 312*   ALT 56* 70* 83*   ANIONGAP 11 8 7*   EGFRNONAA >60.0 >60.0 >60.0     Wound Culture: No results for input(s): LABAERO in the last 4320 hours.  All pertinent labs within the past 24 hours have been reviewed.    Significant Imaging: I have reviewed all pertinent imaging results/findings within the past 24 hours.   X-Ray Foot Complete Left [[id number]] Resulted: 03/29/18 1325   Order Status: Completed Updated: 03/29/18  1327   Narrative:     EXAMINATION:  XR FOOT COMPLETE 3 VIEW LEFT    CLINICAL HISTORY:  Ischemic changes to left foot;    FINDINGS:  No fracture dislocation bone destruction seen.   Impression:       See above      Electronically signed by: Moe Chris MD  Date: 03/29/2018  Time: 13:25   X-Ray Foot Complete Right [[id number]] (Abnormal) Resulted: 03/26/18 0750   Order Status: Completed Updated: 03/26/18 0753   Narrative:     EXAMINATION:  XR FOOT COMPLETE 3 VIEW RIGHT    CLINICAL HISTORY:  ischemic changes to forefoot, sucpisious for soft tissue gas;.    TECHNIQUE:  AP, lateral, and oblique views of the right foot were performed.    COMPARISON:  December 20, 2016.    FINDINGS:  Patient is status post resection of the distal aspect of the proximal phalanx and distal phalanx of the great toe.  No bony destruction seen.  There is a metallic wire density measuring about 8 cm projected over the medial soft tissues at the level of the ankle.  No bony destruction.  No convincing soft tissue gas.   Impression:       Linear metallic density projected over the medial posterior soft tissues at the level of the ankle.  Correlation for foreign body suggested.    Postop changes with resection of the distal great toe.  No convincing soft tissue gas.    This report was flagged in Epic as abnormal..      Electronically signed by: Nii Dai MD  Date: 03/26/2018  Time: 07:50   X-Ray Chest 1 View [[id number]] Resulted: 03/25/18 1031   Order Status: Completed Updated: 03/25/18 1034   Narrative:     EXAMINATION:  XR CHEST 1 VIEW    CLINICAL HISTORY:  pulmonary congestion;    TECHNIQUE:  Single frontal view of the chest was performed.    COMPARISON:  March 23, 2018    FINDINGS:  Cardiac silhouette and mediastinal stable.  Cardiac device present.  Lungs clear.  Osseous structures intact.   Impression:       No acute cardiopulmonary process.      Electronically signed by: Humberto Davila MD  Date: 03/25/2018  Time: 10:31   US Lower  Extremity Arteries Right [[id number]] (Abnormal) Resulted: 03/24/18 2016   Order Status: Completed Updated: 03/24/18 2019   Narrative:     EXAMINATION:  US LOWER EXTREMITY ARTERIES RIGHT    CLINICAL HISTORY:  lost pulse pt;   history of aorto bi fem bypass.    TECHNIQUE:  Grayscale, color flow and spectral analysis of the arteries of right leg were obtained    COMPARISON:  None.    FINDINGS:  The right common femoral and proximal superficial femoral arteries are occluded.  The graft is patent in the right groin and at the anastomosis to proximal right SFA.  The mid right SFA is patent.  The distal right superficial femoral and popliteal arteries are occluded.  The right anterior and posterior tibial arteries are also occluded.    The short-segment portion of bypass graft visualized in the left groin is patent.   Impression:       Occlusion of distal right superficial femoral, popliteal, anterior tibial, and posterior tibial arteries.    This report was flagged in Epic as abnormal.    Electronically signed by resident: Catrachito Crabtree  Date: 03/24/2018  Time: 19:53    Electronically signed by: Jakob Gil MD  Date: 03/24/2018  Time: 20:16   X-Ray Chest 1 View [[id number]] Resulted: 03/23/18 0946   Order Status: Completed Updated: 03/23/18 0949   Narrative:     EXAMINATION:  XR CHEST 1 VIEW    CLINICAL HISTORY:  hypoxic respi fx;    FINDINGS:  There is a pacer.  Tubes and lines are appropriate.  Heart size is upper normal.  There is aortic plaque, mild edema, and no change.   Impression:       See above      Electronically signed by: Moe Chris MD  Date: 03/23/2018  Time: 09:46

## 2018-03-30 NOTE — PROGRESS NOTES
[facility]  Infectious Disease  Progress Note    Patient Name: Leonardo Byrd  MRN: [id number]  Admission Date: 3/18/2018  Length of Stay: 12 days  Attending Physician: Guillermo Archer MD  Primary Care Provider: Indio Aquino MD    Isolation Status: No active isolations  Assessment/Plan:      * Critical lower limb ischemia    Mr. Byrd is a 47 year old with PMH of ICM s/p ICD, CAD s/p PCI, HTN, HLD, PAD (s/p aortobifemoral bypass, L SFA and pop PTAS in September and recent R SFA and R pop 3/8 by Dr. Carl Bell) who presented to [facility] with critical limb ischemia on 3/18. Evaluated by interventional cardiology and underwent catheter directed tPA on 3/20. On vancomycin and unasyn for LLE cellulitis. Pt now with RLE occlusion of distal right superficial femoral, popliteal, anterior tibial, and posterior tibial arteries. vascular surgery performed right AKA 3/27.  Podiatry following LLE, monitoring closely and awaiting demarcation of necrosis of LLE as possibly will have TMA or BKA.  Blood cultures NGTD.  Persistent leukocytosis - suspect secondary to stress response for AKA and ischemia.  ABD exam shows significant tenderness especially in lower quadrants and superpubic area - ? Cause and underlying pathology.  Left foot stable    Afebrile today  Lethargy - ? Lack of sleep.    Plan  1.Continue IV vancomycin. 1.25g q8hr.  vanc trough goal 15-20. Vanc trough today. . Continue unasyn. If decompensates then can change to Unasyn to Zosyn  3. FU vasc Sx and podiatry recs  4.LLE without any significant changes. Vascular and podiatry following closely. May need left TMA /bka if without improvement  5. Rec further evaluation of abd given exam findings - discussed with attending Dr. Archer  6. ID will follow.             Anticipated Disposition: tbd    Thank you for your consult. I will follow-up with patient. Please contact us if you have any additional questions.    RITA Terry  Infectious  "Disease  [facility]    Subjective:     Principal Problem:Critical lower limb ischemia    HPI: Mr. Byrd is a 47 year old with PMH of ICM s/p ICD, CAD s/p PCI, HTN, HLD, PAD (s/p aortobifemoral bypass, L SFA and pop PTAS in September and recent R SFA and R pop 3/8 by Dr. Carl Bell) who [place] [facility] [place] ischemia on 3/18. Evaluated by interventional cardiology and underwent catheter directed tPA on 3/20, admitted to CCU for close monitoring. Also with DTs requiring scheduled benzos. Pt was given vancomycin for left foot cellulitis. Pt was intubated for airway protection in setting of increasing benzo requirements and worsening agitation.  Pt now extubated.  Pt was noted to have more duskiness in appearance of left leg and absent pulses.US lower ext showing occlusion of distal right superficial femoral, popliteal, anterior tibial, and posterior tibial arteries. No "dopplerable" pulses on rt foot. Interventional cards holding off on on acute intervention currently in setting of RYAN. Pt afebrile, WBC 17K today. Complains of severe pain of lower extremities. Denies having any cough, n/v/d,dysuria.   Interval History: No AEON.  Tmax 100.3, Tcurrent 98.8.  WBC 21.91.  Lethargic this am per nurse.  Was agitated overnight and had prn pain meds throughout the night.  Awakens to deny fever, chills and sweats.    Review of Systems   Unable to perform ROS: Other (see interval Hx - unable to obtain secondary to lethargy)     Objective:     Vital Signs (Most Recent):  Temp: 98.8 °F (37.1 °C) (03/30/18 0700)  Pulse: 110 (03/30/18 0700)  Resp: 19 (03/30/18 0700)  BP: 126/69 (03/30/18 0700)  SpO2: 99 % (03/30/18 0700) Vital Signs (24h Range):  Temp:  [98.8 °F (37.1 °C)-100.3 °F (37.9 °C)] 98.8 °F (37.1 °C)  Pulse:  [101-127] 110  Resp:  [8-27] 19  SpO2:  [75 %-100 %] 99 %  BP: ()/(51-79) 126/69     Weight: 67.8 kg (149 lb 7.6 oz)  Body mass index is 23.41 kg/m².    Estimated Creatinine " Clearance: 119.3 mL/min (based on SCr of 0.7 mg/dL).    Physical Exam   Constitutional: He appears well-developed and well-nourished. No distress.   Cardiovascular: Regular rhythm.  Exam reveals gallop. Exam reveals no friction rub.    No murmur heard.  tachycardic   Pulmonary/Chest: Effort normal. No respiratory distress. He has no wheezes. He has no rales.   Abdominal: Soft. He exhibits distension. There is tenderness in the suprapubic area. There is rigidity. There is no guarding.       Musculoskeletal: He exhibits tenderness (left foot).   Neurological: He is alert.   Skin: Skin is warm. No rash noted. He is not diaphoretic. There is erythema. No pallor.     RLE cool to touch. Absent pulses    Psychiatric: He has a normal mood and affect.   Nursing note and vitals reviewed.          Significant Labs:   Blood Culture:   Recent Labs  Lab 03/20/18  2219 03/20/18  2233 03/26/18  0148 03/27/18  2148 03/27/18  2212   LABBLOO No growth after 5 days. No growth after 5 days. No Growth to date  No Growth to date  No Growth to date  No Growth to date  No Growth to date No Growth to date  No Growth to date  No Growth to date No Growth to date  No Growth to date  No Growth to date     CBC:   Recent Labs  Lab 03/29/18  1020 03/29/18  1847 03/30/18  0157   WBC 19.82* 19.53* 21.91*   HGB 8.3* 7.1* 7.4*   HCT 24.7* 21.1* 22.4*   * 438* 491*     CMP:   Recent Labs  Lab 03/28/18  1551 03/29/18  0219 03/30/18  0157   * 138 136   K 3.8 4.1 3.5    104 102   CO2 24 27 24   * 97 87   BUN 8 12 9   CREATININE 0.7 0.8 0.7   CALCIUM 8.4* 8.9 8.3*   PROT 6.1 6.8 6.0   ALBUMIN 2.0* 2.2* 1.8*   BILITOT 0.8 0.8 0.7   ALKPHOS 161* 172* 151*   * 312* 372*   ALT 70* 83* 92*   ANIONGAP 8 7* 10   EGFRNONAA >60.0 >60.0 >60.0     All pertinent labs within the past 24 hours have been reviewed.    Significant Imaging: I have reviewed all pertinent imaging results/findings within the past 24 hours.   X-Ray Foot  Complete Left [[id number]] Resulted: 03/29/18 1325   Order Status: Completed Updated: 03/29/18 1327   Narrative:     EXAMINATION:  XR FOOT COMPLETE 3 VIEW LEFT    CLINICAL HISTORY:  Ischemic changes to left foot;    FINDINGS:  No fracture dislocation bone destruction seen.   Impression:       See above      Electronically signed by: Moe Chris MD  Date: 03/29/2018  Time: 13:25   X-Ray Foot Complete Right [[id number]] (Abnormal) Resulted: 03/26/18 0750   Order Status: Completed Updated: 03/26/18 0753   Narrative:     EXAMINATION:  XR FOOT COMPLETE 3 VIEW RIGHT    CLINICAL HISTORY:  ischemic changes to forefoot, sucpisious for soft tissue gas;.    TECHNIQUE:  AP, lateral, and oblique views of the right foot were performed.    COMPARISON:  December 20, 2016.    FINDINGS:  Patient is status post resection of the distal aspect of the proximal phalanx and distal phalanx of the great toe.  No bony destruction seen.  There is a metallic wire density measuring about 8 cm projected over the medial soft tissues at the level of the ankle.  No bony destruction.  No convincing soft tissue gas.   Impression:       Linear metallic density projected over the medial posterior soft tissues at the level of the ankle.  Correlation for foreign body suggested.    Postop changes with resection of the distal great toe.  No convincing soft tissue gas.    This report was flagged in Epic as abnormal..      Electronically signed by: Nii Dai MD  Date: 03/26/2018  Time: 07:50   X-Ray Chest 1 View [[id number]] Resulted: 03/25/18 1031   Order Status: Completed Updated: 03/25/18 1034   Narrative:     EXAMINATION:  XR CHEST 1 VIEW    CLINICAL HISTORY:  pulmonary congestion;    TECHNIQUE:  Single frontal view of the chest was performed.    COMPARISON:  March 23, 2018    FINDINGS:  Cardiac silhouette and mediastinal stable.  Cardiac device present.  Lungs clear.  Osseous structures intact.   Impression:       No acute cardiopulmonary  process.      Electronically signed by: Humberto Davila MD  Date: 03/25/2018  Time: 10:31   US Lower Extremity Arteries Right [[id number]] (Abnormal) Resulted: 03/24/18 2016   Order Status: Completed Updated: 03/24/18 2019   Narrative:     EXAMINATION:  US LOWER EXTREMITY ARTERIES RIGHT    CLINICAL HISTORY:  lost pulse pt;   history of aorto bi fem bypass.    TECHNIQUE:  Grayscale, color flow and spectral analysis of the arteries of right leg were obtained    COMPARISON:  None.    FINDINGS:  The right common femoral and proximal superficial femoral arteries are occluded.  The graft is patent in the right groin and at the anastomosis to proximal right SFA.  The mid right SFA is patent.  The distal right superficial femoral and popliteal arteries are occluded.  The right anterior and posterior tibial arteries are also occluded.    The short-segment portion of bypass graft visualized in the left groin is patent.   Impression:       Occlusion of distal right superficial femoral, popliteal, anterior tibial, and posterior tibial arteries.    This report was flagged in Epic as abnormal.    Electronically signed by resident: Catrachito Crabtree  Date: 03/24/2018  Time: 19:53    Electronically signed by: Jakob Gil MD  Date: 03/24/2018  Time: 20:16   X-Ray Chest 1 View [[id number]] Resulted: 03/23/18 0946   Order Status: Completed Updated: 03/23/18 0949   Narrative:     EXAMINATION:  XR CHEST 1 VIEW    CLINICAL HISTORY:  hypoxic respi fx;    FINDINGS:  There is a pacer.  Tubes and lines are appropriate.  Heart size is upper normal.  There is aortic plaque, mild edema, and no change.   Impression:       See above      Electronically signed by: Moe Chris MD  Date: 03/23/2018  Time: 09:46

## 2018-03-30 NOTE — PROGRESS NOTES
"[facility]  Cardiology  Progress Note    Patient Name: Leonardo Byrd  MRN: [id number]  Admission Date: 3/18/2018  Hospital Length of Stay: 12 days  Code Status: Full Code   Attending Physician: Guillermo Archer MD   Primary Care Physician: Indio Aquino MD  Expected Discharge Date: 4/2/2018  Principal Problem:Critical lower limb ischemia    Subjective:     Hospital Course:   Admitted to CCU 3/20 after cath directed tPA was performed by int cards under general anesthesia. Also with DTs requiring scheduled benzos. On day 2 Lt lower extremity remained pulseless, so thrombolysis still infusing.On day 4 of vanc for possible lt foot cellulitis. Transitioned to clinda IV (unable to take PO safely). Intubated overnight for airway protection in setting of increasing benzo requirements and worsening agitation and danger of pt pulling line. CXR this AM (3/22) with patchy bilateral infiltrates. Taken to cath lab for possible cath removal. Lt leg then reportedly duskier and with absent pulses. On 3/23 taken back to cath lab. Int cards Recc Podiatry consult for possible L trans-metatarsal amputation. On heparin gtt. 3/24 Wbc trending up, foot color getting darker,paitent extubated, continue heparin. 3/25: US lower ext showing occlusion of distal right superficial femoral, popliteal, anterior tibial, and posterior tibial arteries. No "dopplerable" pulses on rt foot. Interventional cards holding off on on acute intervention currently in setting of RYAN. Will continue to monitor. Cr 1.7 (baseline 0.6). Given 500cc Iv fluids. Started on scheduled librium for alcohol withdrawal, with plans for taper.    -3/26/18 NAEON. Pt. With elevated WBC. On vanc and ceftriaxone. ID consulted. Pain management for BLE ischemia   -3/27: NAEON. BLE pain improved after pain management adjustment. Vascular surgery evaluated pt and planning for Right AKA today.   - 3/28 febrile last night. Blood culture sent. WBC elevated then " trending down. Complains of BLE pain controlled with pain meds. S/p Right AKA on 3/27/18  -3-29: NAEON. Pain controlled with medications. Vascular and ID on board. Pt. Is high risk of left amputation. May ultimately require amputation on the left as well. Continue on vanc and Unasyn for now   - 3/30: NAEON. Pt afebrile. BLE pain with controlled with pain meds. Today abdomen slightly distended. CT abdomen ordered stat. If pt decompensate, will broaden abx from Unasyn to zosyn     Interval History: - 3/30: NAEON. Pt afebrile. Looks lethargic but responds to questions and follows command.  Today abdomen slightly distended. CT abdomen ordered stat. If pt decompensate, will broaden abx from Unasyn to zosyn  S/p Right AKA on 3/27/18.     Review of Systems   Unable to perform ROS: mental status change     Objective:     Vital Signs (Most Recent):  Temp: 98.8 °F (37.1 °C) (03/30/18 0700)  Pulse: (!) 114 (03/30/18 0900)  Resp: (!) 21 (03/30/18 0900)  BP: 119/82 (03/30/18 0900)  SpO2: 100 % (03/30/18 0900) Vital Signs (24h Range):  Temp:  [98.8 °F (37.1 °C)-100.3 °F (37.9 °C)] 98.8 °F (37.1 °C)  Pulse:  [105-127] 114  Resp:  [9-26] 21  SpO2:  [75 %-100 %] 100 %  BP: ()/(51-82) 119/82     Weight: 67.8 kg (149 lb 7.6 oz)  Body mass index is 23.41 kg/m².     SpO2: 100 %  O2 Device (Oxygen Therapy): room air      Intake/Output Summary (Last 24 hours) at 03/30/18 1421  Last data filed at 03/30/18 0900   Gross per 24 hour   Intake              892 ml   Output              350 ml   Net              542 ml       Lines/Drains/Airways     Epidural Line                 Perineural Analgesia/Anesthesia Assessment (using dermatomes) Epidural 03/27/18 1415 3 days          Peripheral Intravenous Line                 Peripheral IV - Single Lumen 03/26/18 0620 Left Forearm 4 days         Peripheral IV - Single Lumen 03/28/18 1800 Right Hand 1 day         Peripheral IV - Single Lumen 03/29/18 1700 Right Antecubital less than 1 day                 Physical Exam   Constitutional: He is oriented to person, place, and time. He appears well-developed and well-nourished. No distress.   Cardiovascular: Normal rate, regular rhythm and normal heart sounds.    Pulmonary/Chest: Effort normal and breath sounds normal.   Abdominal: He exhibits distension. There is no rebound and no guarding.   Musculoskeletal: Normal range of motion. He exhibits no edema.   Neurological: He is alert and oriented to person, place, and time.   Skin: No rash noted.   Cool left extremity with dusky color. No pulses detected at foot        Significant Labs:     Recent Labs  Lab 03/30/18  0905   WBC 20.76*   RBC 2.18*   HGB 7.0*   HCT 20.9*   *   MCV 96   MCH 32.1*   MCHC 33.5       Recent Labs  Lab 03/28/18  0350 03/28/18  1551 03/29/18  0219 03/30/18  0157   * 135* 138 136   K 3.6 3.8 4.1 3.5    103 104 102   CO2 23 24 27 24   BUN 8 8 12 9   CREATININE 0.7 0.7 0.8 0.7   CALCIUM 8.5* 8.4* 8.9 8.3*   MG 1.8  --  2.4 1.9         Significant Imaging: pending CT abdomen     Assessment and Plan:       * Critical lower limb ischemia    Mr. Byrd is a 47 year old man with CAD s/p PCI, LVEF 30% s/p ICD, complicated and extensive PAD s/p aortofem bypass and multiple stents on DAPT who presents with what appears to be CLI of his left foot of unclear duration. This is not an acute presentation as these symtpoms have been ongoing for months and have worsened in the past 2 weeks.     --Underwent angiogram 3/19 that showed occlusion of aorta-fem bypass at distal anastamosis. Unable to treat due to patient movement.   --s/p cath directed tPA to occluded Lt SFA under gen anesthesia  --continue thrombolysis infusion while pt remains w pulseless lt ext; f/up int cards recs- likely remove cath today  --s/p 7 day course abx vanc then clindamycin   --blood cx ordered- ngtd  --thrombolysis gtt stopped  --now with Rt lower extremity pain, pallor and poikilothermia. US revealed  Occlusion of distal right superficial femoral, popliteal, anterior tibial, and posterior tibial arteries. Interventional cards made aware- hold off on cath in setting of RYAN  --continue DAPT and hep gtt   --stop cilostazol as is contraindicated in HFpEF  --Podiatry consulted for possible L trans-metatarsal amputation per interventional cards recs; f/up recs  --neuro check 1 4h  --pain control  -- Vascular surgery consulted. The are doing Right AKA 3/27/18   - s/p Right AKA 3/27  - left foot ischemia and no pulse. There is a concern about he may need left BKA. Vascular following         Leukocytosis    Infection (abdominal source?) vs inflammatory response to recent amputation procedures  -s/p 7 day course abx for possible lt lower extremity cellulitis  -blood cx ngtd  - restarted on van and ceftriaxone (started 3/25/18) for elevated wbc   - ID consulted, switched Ceftriaxone to Unasyn  (started 3/25/18)  - abdominal exam in concerning today, abdomen is distended which could be due to underlying infectious process vs opioid induced constipation. CT abdomen and Lactate ordered stat   - if not improvement, will switch from Unasyn to Zosyn         Ischemic cardiomyopathy    -CM possibly with multifactorial etiology (hx of alcohol use, ischemia)  CAD s/p PCI  DAPT, statin,  BB        Cellulitis of left lower extremity    s/p 7 day course abx  --blood cx NGTD  --restarted on van and ceftriaxone (started 3/25/18) for elevated wbc   - ID consulted. switched ceftriaxone to Unasy 3/26/18   - WBC continue to rise.  - if worsen, may consider switching Unasyn to Zosyn           Chronic systolic heart failure    LVEF 30% s/p ICD  Continue  BB          VTE Risk Mitigation         Ordered     heparin 25,000 units in dextrose 5% 250 mL (100 units/mL) infusion  Continuous     Route:  Intravenous        03/27/18 1645     heparin 25,000 units in dextrose 5% 250 mL (100 units/mL) bolus from bag; ADDITIONAL PRN BOLUS  As needed (PRN)      Route:  Intravenous        03/27/18 1645     heparin 25,000 units in dextrose 5% 250 mL (100 units/mL) bolus from bag; ADDITIONAL PRN BOLUS  As needed (PRN)     Route:  Intravenous        03/27/18 1645     IP VTE LOW RISK PATIENT  Once      03/18/18 1711          Cm Giles MD  Cardiology  [facility]

## 2018-03-30 NOTE — SUBJECTIVE & OBJECTIVE
Interval History: No AEON.  Afebrile and WBCs stable in 19s.  Denies right AKA eileen, left foot pain to touch.  Reports sweats but denies fever, diarrhea, chills, or sputum    Review of Systems   Constitutional: Positive for diaphoresis. Negative for chills and fever.   Respiratory: Negative for cough.    Gastrointestinal: Negative for abdominal pain, diarrhea, nausea and vomiting.   Genitourinary: Negative for dysuria.   Musculoskeletal:        Right AKA     Skin:        Ischemic changes of left foot     Objective:     Vital Signs (Most Recent):  Temp: 100.3 °F (37.9 °C) (03/29/18 1500)  Pulse: (!) 111 (03/29/18 1955)  Resp: (!) 25 (03/29/18 1955)  BP: (!) 94/53 (03/29/18 1800)  SpO2: 100 % (03/29/18 1955) Vital Signs (24h Range):  Temp:  [98.5 °F (36.9 °C)-100.3 °F (37.9 °C)] 100.3 °F (37.9 °C)  Pulse:  [101-125] 111  Resp:  [8-29] 25  SpO2:  [83 %-100 %] 100 %  BP: ()/(52-81) 94/53     Weight: 67.8 kg (149 lb 7.6 oz)  Body mass index is 23.41 kg/m².    Estimated Creatinine Clearance: 104.4 mL/min (based on SCr of 0.8 mg/dL).    Physical Exam   Constitutional: He appears well-developed and well-nourished. No distress.   Cardiovascular: Regular rhythm and normal heart sounds.  Exam reveals no friction rub.    No murmur heard.  tachycardic   Pulmonary/Chest: Effort normal. No respiratory distress. He has no wheezes. He has no rales.   Abdominal: Soft. He exhibits no distension. There is no tenderness. There is no guarding.   Musculoskeletal: He exhibits tenderness.   Neurological: He is alert.   Skin: Skin is warm. No rash noted. He is not diaphoretic. There is erythema. No pallor.     RLE cool to touch. Absent pulses    Psychiatric: He has a normal mood and affect.   Nursing note and vitals reviewed.                  Significant Labs:   Blood Culture:   Recent Labs  Lab 03/20/18 2219 03/20/18 2233 03/26/18  0148 03/27/18  2148 03/27/18  2212   [facility]OO No growth after 5 days. No growth after 5 days. No Growth  to date  No Growth to date  No Growth to date  No Growth to date No Growth to date  No Growth to date No Growth to date  No Growth to date     CBC:   Recent Labs  Lab 03/29/18  0219 03/29/18  1020 03/29/18  1847   WBC 20.53* 19.82* 19.53*   HGB 8.2* 8.3* 7.1*   HCT 24.3* 24.7* 21.1*   * 476* 438*     CMP:   Recent Labs  Lab 03/28/18  0350 03/28/18  1551 03/29/18  0219   * 135* 138   K 3.6 3.8 4.1    103 104   CO2 23 24 27   GLU 97 115* 97   BUN 8 8 12   CREATININE 0.7 0.7 0.8   CALCIUM 8.5* 8.4* 8.9   PROT 6.9 6.1 6.8   ALBUMIN 2.5* 2.0* 2.2*   BILITOT 1.2* 0.8 0.8   ALKPHOS 189* 161* 172*   * 269* 312*   ALT 56* 70* 83*   ANIONGAP 11 8 7*   EGFRNONAA >60.0 >60.0 >60.0     Wound Culture: No results for input(s): LABAERO in the last 4320 hours.  All pertinent labs within the past 24 hours have been reviewed.    Significant Imaging: I have reviewed all pertinent imaging results/findings within the past 24 hours.   X-Ray Foot Complete Left [[id number]] Resulted: 03/29/18 1325   Order Status: Completed Updated: 03/29/18 1327   Narrative:     EXAMINATION:  XR FOOT COMPLETE 3 VIEW LEFT    CLINICAL HISTORY:  Ischemic changes to left foot;    FINDINGS:  No fracture dislocation bone destruction seen.   Impression:       See above      Electronically signed by: Moe Chris MD  Date: 03/29/2018  Time: 13:25   X-Ray Foot Complete Right [[id number]] (Abnormal) Resulted: 03/26/18 0750   Order Status: Completed Updated: 03/26/18 0753   Narrative:     EXAMINATION:  XR FOOT COMPLETE 3 VIEW RIGHT    CLINICAL HISTORY:  ischemic changes to forefoot, sucpisious for soft tissue gas;.    TECHNIQUE:  AP, lateral, and oblique views of the right foot were performed.    COMPARISON:  December 20, 2016.    FINDINGS:  Patient is status post resection of the distal aspect of the proximal phalanx and distal phalanx of the great toe.  No bony destruction seen.  There is a metallic wire density measuring about 8 cm  projected over the medial soft tissues at the level of the ankle.  No bony destruction.  No convincing soft tissue gas.   Impression:       Linear metallic density projected over the medial posterior soft tissues at the level of the ankle.  Correlation for foreign body suggested.    Postop changes with resection of the distal great toe.  No convincing soft tissue gas.    This report was flagged in Epic as abnormal..      Electronically signed by: iNi Dai MD  Date: 03/26/2018  Time: 07:50   X-Ray Chest 1 View [[id number]] Resulted: 03/25/18 1031   Order Status: Completed Updated: 03/25/18 1034   Narrative:     EXAMINATION:  XR CHEST 1 VIEW    CLINICAL HISTORY:  pulmonary congestion;    TECHNIQUE:  Single frontal view of the chest was performed.    COMPARISON:  March 23, 2018    FINDINGS:  Cardiac silhouette and mediastinal stable.  Cardiac device present.  Lungs clear.  Osseous structures intact.   Impression:       No acute cardiopulmonary process.      Electronically signed by: Humberto Davila MD  Date: 03/25/2018  Time: 10:31   US Lower Extremity Arteries Right [[id number]] (Abnormal) Resulted: 03/24/18 2016   Order Status: Completed Updated: 03/24/18 2019   Narrative:     EXAMINATION:  US LOWER EXTREMITY ARTERIES RIGHT    CLINICAL HISTORY:  lost pulse pt;   history of aorto bi fem bypass.    TECHNIQUE:  Grayscale, color flow and spectral analysis of the arteries of right leg were obtained    COMPARISON:  None.    FINDINGS:  The right common femoral and proximal superficial femoral arteries are occluded.  The graft is patent in the right groin and at the anastomosis to proximal right SFA.  The mid right SFA is patent.  The distal right superficial femoral and popliteal arteries are occluded.  The right anterior and posterior tibial arteries are also occluded.    The short-segment portion of bypass graft visualized in the left groin is patent.   Impression:       Occlusion of distal right superficial femoral,  popliteal, anterior tibial, and posterior tibial arteries.    This report was flagged in Epic as abnormal.    Electronically signed by resident: Catrachito Crabtree  Date: 03/24/2018  Time: 19:53    Electronically signed by: Jakob Gil MD  Date: 03/24/2018  Time: 20:16   X-Ray Chest 1 View [[id number]] Resulted: 03/23/18 0946   Order Status: Completed Updated: 03/23/18 0949   Narrative:     EXAMINATION:  XR CHEST 1 VIEW    CLINICAL HISTORY:  hypoxic respi fx;    FINDINGS:  There is a pacer.  Tubes and lines are appropriate.  Heart size is upper normal.  There is aortic plaque, mild edema, and no change.   Impression:       See above      Electronically signed by: Moe Chris MD  Date: 03/23/2018  Time: 09:46

## 2018-03-30 NOTE — PLAN OF CARE
Problem: Patient Care Overview  Goal: Plan of Care Review  Outcome: Ongoing (interventions implemented as appropriate)  The patient has been extremely agitated and anxious tonight.  His pain level has been high and has received PRN meds around the clock.  Turned Q2H.  Bed bath and linens changed.  Call bell within reach.  New PIV started on left forearm 20g.

## 2018-03-30 NOTE — SUBJECTIVE & OBJECTIVE
Interval History: No AEON.  Tmax 100.3, Tcurrent 98.8.  WBC 21.91.  Lethargic this am per nurse.  Was agitated overnight and had prn pain meds throughout the night.  Awakens to deny fever, chills and sweats.    Review of Systems   Unable to perform ROS: Other (see interval Hx - unable to obtain secondary to lethargy)     Objective:     Vital Signs (Most Recent):  Temp: 98.8 °F (37.1 °C) (03/30/18 0700)  Pulse: 110 (03/30/18 0700)  Resp: 19 (03/30/18 0700)  BP: 126/69 (03/30/18 0700)  SpO2: 99 % (03/30/18 0700) Vital Signs (24h Range):  Temp:  [98.8 °F (37.1 °C)-100.3 °F (37.9 °C)] 98.8 °F (37.1 °C)  Pulse:  [101-127] 110  Resp:  [8-27] 19  SpO2:  [75 %-100 %] 99 %  BP: ()/(51-79) 126/69     Weight: 67.8 kg (149 lb 7.6 oz)  Body mass index is 23.41 kg/m².    Estimated Creatinine Clearance: 119.3 mL/min (based on SCr of 0.7 mg/dL).    Physical Exam   Constitutional: He appears well-developed and well-nourished. No distress.   Cardiovascular: Regular rhythm.  Exam reveals gallop. Exam reveals no friction rub.    No murmur heard.  tachycardic   Pulmonary/Chest: Effort normal. No respiratory distress. He has no wheezes. He has no rales.   Abdominal: Soft. He exhibits distension. There is tenderness in the suprapubic area. There is rigidity. There is no guarding.       Musculoskeletal: He exhibits tenderness (left foot).   Neurological: He is alert.   Skin: Skin is warm. No rash noted. He is not diaphoretic. There is erythema. No pallor.     RLE cool to touch. Absent pulses    Psychiatric: He has a normal mood and affect.   Nursing note and vitals reviewed.          Significant Labs:   Blood Culture:   Recent Labs  Lab 03/20/18  2219 03/20/18  2233 03/26/18  0148 03/27/18  2148 03/27/18  2212   [facility] No growth after 5 days. No growth after 5 days. No Growth to date  No Growth to date  No Growth to date  No Growth to date  No Growth to date No Growth to date  No Growth to date  No Growth to date No Growth to  date  No Growth to date  No Growth to date     CBC:   Recent Labs  Lab 03/29/18  1020 03/29/18  1847 03/30/18  0157   WBC 19.82* 19.53* 21.91*   HGB 8.3* 7.1* 7.4*   HCT 24.7* 21.1* 22.4*   * 438* 491*     CMP:   Recent Labs  Lab 03/28/18  1551 03/29/18  0219 03/30/18  0157   * 138 136   K 3.8 4.1 3.5    104 102   CO2 24 27 24   * 97 87   BUN 8 12 9   CREATININE 0.7 0.8 0.7   CALCIUM 8.4* 8.9 8.3*   PROT 6.1 6.8 6.0   ALBUMIN 2.0* 2.2* 1.8*   BILITOT 0.8 0.8 0.7   ALKPHOS 161* 172* 151*   * 312* 372*   ALT 70* 83* 92*   ANIONGAP 8 7* 10   EGFRNONAA >60.0 >60.0 >60.0     All pertinent labs within the past 24 hours have been reviewed.    Significant Imaging: I have reviewed all pertinent imaging results/findings within the past 24 hours.   X-Ray Foot Complete Left [[id number]] Resulted: 03/29/18 1325   Order Status: Completed Updated: 03/29/18 1327   Narrative:     EXAMINATION:  XR FOOT COMPLETE 3 VIEW LEFT    CLINICAL HISTORY:  Ischemic changes to left foot;    FINDINGS:  No fracture dislocation bone destruction seen.   Impression:       See above      Electronically signed by: Moe Chris MD  Date: 03/29/2018  Time: 13:25   X-Ray Foot Complete Right [[id number]] (Abnormal) Resulted: 03/26/18 0750   Order Status: Completed Updated: 03/26/18 0753   Narrative:     EXAMINATION:  XR FOOT COMPLETE 3 VIEW RIGHT    CLINICAL HISTORY:  ischemic changes to forefoot, sucpisious for soft tissue gas;.    TECHNIQUE:  AP, lateral, and oblique views of the right foot were performed.    COMPARISON:  December 20, 2016.    FINDINGS:  Patient is status post resection of the distal aspect of the proximal phalanx and distal phalanx of the great toe.  No bony destruction seen.  There is a metallic wire density measuring about 8 cm projected over the medial soft tissues at the level of the ankle.  No bony destruction.  No convincing soft tissue gas.   Impression:       Linear metallic density projected  over the medial posterior soft tissues at the level of the ankle.  Correlation for foreign body suggested.    Postop changes with resection of the distal great toe.  No convincing soft tissue gas.    This report was flagged in Epic as abnormal..      Electronically signed by: Nii Dai MD  Date: 03/26/2018  Time: 07:50   X-Ray Chest 1 View [[id number]] Resulted: 03/25/18 1031   Order Status: Completed Updated: 03/25/18 1034   Narrative:     EXAMINATION:  XR CHEST 1 VIEW    CLINICAL HISTORY:  pulmonary congestion;    TECHNIQUE:  Single frontal view of the chest was performed.    COMPARISON:  March 23, 2018    FINDINGS:  Cardiac silhouette and mediastinal stable.  Cardiac device present.  Lungs clear.  Osseous structures intact.   Impression:       No acute cardiopulmonary process.      Electronically signed by: Humberto Davila MD  Date: 03/25/2018  Time: 10:31   US Lower Extremity Arteries Right [[id number]] (Abnormal) Resulted: 03/24/18 2016   Order Status: Completed Updated: 03/24/18 2019   Narrative:     EXAMINATION:  US LOWER EXTREMITY ARTERIES RIGHT    CLINICAL HISTORY:  lost pulse pt;   history of aorto bi fem bypass.    TECHNIQUE:  Grayscale, color flow and spectral analysis of the arteries of right leg were obtained    COMPARISON:  None.    FINDINGS:  The right common femoral and proximal superficial femoral arteries are occluded.  The graft is patent in the right groin and at the anastomosis to proximal right SFA.  The mid right SFA is patent.  The distal right superficial femoral and popliteal arteries are occluded.  The right anterior and posterior tibial arteries are also occluded.    The short-segment portion of bypass graft visualized in the left groin is patent.   Impression:       Occlusion of distal right superficial femoral, popliteal, anterior tibial, and posterior tibial arteries.    This report was flagged in Epic as abnormal.    Electronically signed by resident: Catrachito Crabtree  Date:  03/24/2018  Time: 19:53    Electronically signed by: Jakob Gil MD  Date: 03/24/2018  Time: 20:16   X-Ray Chest 1 View [[id number]] Resulted: 03/23/18 0946   Order Status: Completed Updated: 03/23/18 0949   Narrative:     EXAMINATION:  XR CHEST 1 VIEW    CLINICAL HISTORY:  hypoxic respi fx;    FINDINGS:  There is a pacer.  Tubes and lines are appropriate.  Heart size is upper normal.  There is aortic plaque, mild edema, and no change.   Impression:       See above      Electronically signed by: Moe Chris MD  Date: 03/23/2018  Time: 09:46

## 2018-03-30 NOTE — PLAN OF CARE
Problem: SLP Goal  Goal: SLP Goal  Speech Therapy Short Term Goals  Goal expected to be met by 4/7  1. Pt will tolerate puree diet and thin liquids with no overt s/s of aspiration noted.   2. Patient will tolerate dental soft and solid trials x10 with adequate oral clearance and no overt s/s of aspiration.     Outcome: Ongoing (interventions implemented as appropriate)  BSS completed. Minimal trials 2/2 patient lethargic from medications (Ativan and pain meds per RN). Unable to assess solids at this time. Recommend: puree diet, thin liquids, aspiration precautions, feed only when awake and alert to accept po, medications whole 1 at a time or crushed as needed pending ADELINA. ST will continue to follow.   JOVAN Lafleur., CCC-SLP  Pager: [phone]  03/30/2018

## 2018-03-30 NOTE — PROGRESS NOTES
[facility]  Vascular Surgery  Progress Note    Patient Name: Leonardo Byrd  MRN: [id number]  Admission Date: 3/18/2018  Primary Care Provider: Indio Aquino MD    Subjective:     Interval History:   No acute events overnight. Podiatry still following for high-risk of LLE amputation. Remains on Abx for leukocytosis.      Post-Op Info:  Procedure(s) (LRB):  AMPUTATION-ABOVE KNEE (Right)   3 Days Post-Op       Medications:  Continuous Infusions:   heparin (porcine) in D5W 17 Units/kg/hr (03/29/18 1427)     Scheduled Meds:   acetaminophen  1,000 mg Oral Q8H    amitriptyline  50 mg Oral QHS    ampicillin-sulbactim (UNASYN) IVPB  3 g Intravenous Q6H    aspirin  81 mg Oral Daily    atorvastatin  40 mg Oral QHS    folic acid-vit B6-vit B12 2.5-25-2 mg  1 tablet Per OG tube Daily    gabapentin  800 mg Oral QHS    lidocaine   Topical (Top) QID    metoprolol succinate  25 mg Oral Daily    nicotine  1 patch Transdermal Daily    pregabalin  100 mg Oral TID    senna-docusate 8.6-50 mg  1 tablet Oral BID    sodium chloride 0.9%  250 mL Intravenous Once    thiamine  100 mg Oral Daily    ticagrelor  90 mg Oral BID    vancomycin (VANCOCIN) IVPB  1,250 mg Intravenous Q8H     PRN Meds:sodium chloride, cyclobenzaprine, fentaNYL, heparin (PORCINE), heparin (PORCINE), lorazepam, morphine, morphine, morphine, nitroGLYCERIN, oxyCODONE, oxyCODONE, oxyCODONE, polyethylene glycol, sodium chloride 0.9%, zolpidem     Objective:     Vital Signs (Most Recent):  Temp: 98.8 °F (37.1 °C) (03/30/18 0700)  Pulse: 110 (03/30/18 0700)  Resp: 19 (03/30/18 0700)  BP: 126/69 (03/30/18 0700)  SpO2: 99 % (03/30/18 0700) Vital Signs (24h Range):  Temp:  [98.8 °F (37.1 °C)-100.3 °F (37.9 °C)] 98.8 °F (37.1 °C)  Pulse:  [101-127] 110  Resp:  [9-27] 19  SpO2:  [75 %-100 %] 99 %  BP: ()/(51-79) 126/69       Date 03/30/18 0700 - 03/31/18 0659   Shift 4263-1715 5806-7455 6268-0355 24 Hour Total   I  N  T  A  K  E    I.V.  (mL/kg) 16.5  (0.2)   16.5  (0.2)    Shift Total  (mL/kg) 16.5  (0.2)   16.5  (0.2)   O  U  T  P  U  T   Shift Total  (mL/kg)       Weight (kg) 67.8 67.8 67.8 67.8       Physical Exam   Constitutional: He appears well-nourished. He appears lethargic. No distress.   HENT:   Head: Normocephalic and atraumatic.   Eyes: Pupils are equal, round, and reactive to light.   Cardiovascular: Normal rate and regular rhythm.    Pulses:       Femoral pulses are 2+ on the right side, and 2+ on the left side.  Left: strong biphasic PT   Pulmonary/Chest: Effort normal.   Abdominal: Soft. He exhibits no distension. There is no tenderness.   Musculoskeletal:   R AKA well approximated with Nylon sutures in place  Left lower extremity: dorsal ulcer, entire forefoot dusky edematous ischemia to mid forefoot   Neurological: He appears lethargic.   Left motor: minimal dorsi/plantar flexion left foot   Skin: He is not diaphoretic.   Nursing note and vitals reviewed.      Significant Labs:  CBC:   Recent Labs  Lab 03/30/18  0157   WBC 21.91*   RBC 2.40*   HGB 7.4*   HCT 22.4*   *   MCV 93   MCH 30.8   MCHC 33.0     CMP:   Recent Labs  Lab 03/30/18  0157   GLU 87   CALCIUM 8.3*   ALBUMIN 1.8*   PROT 6.0      K 3.5   CO2 24      BUN 9   CREATININE 0.7   ALKPHOS 151*   ALT 92*   *   BILITOT 0.7       Significant Diagnostics:  None    Assessment/Plan:     Limb ischemia    48 yo M with h/o HTN, HLD, HFrEF (10-15% 3/23/18) s/p ICD, CAD s/p PCI, EtOH abuse, PAD s/p ABF 1999 with multiple endovascular interventions presented to hospital on 3/18 with complaints of left foot rest pain s/p lysis now with biphasic left PT signal and L forefoot ischemia as well as RLE ischemia s/p R AKA 3 Days Post-Op    - PT/OT  - Dressing removed and replaced this morning - keep R AKA stump wrapped  - May ultimately require amputation on the left as well  - Continue neurovascular checks  - Remainder of care per primary team  - Will  continue to follow        Marin Ryan MD  Surgery Resident, PGY-III  Pager: [phone]  3/30/2018 9:21 AM

## 2018-03-31 PROBLEM — I73.9 PAD (PERIPHERAL ARTERY DISEASE): Status: RESOLVED | Noted: 2017-01-01 | Resolved: 2018-01-01

## 2018-03-31 PROBLEM — L03.116 CELLULITIS OF LEFT LOWER EXTREMITY: Status: RESOLVED | Noted: 2018-01-01 | Resolved: 2018-01-01

## 2018-03-31 NOTE — ASSESSMENT & PLAN NOTE
Mr. Byrd is a 47 year old man with CAD s/p PCI, LVEF 30% s/p ICD, complicated and extensive PAD s/p aortofem bypass and multiple stents on DAPT who presents with what appears to be CLI of his left foot of unclear duration.     --Underwent angiogram 3/19 that showed occlusion of aorta-fem bypass at distal anastamosis. Unable to treat due to patient movement   --s/p cath directed tPA to occluded L SFA  --blood cx ngtd  --subsequently with acute limb ischemia in RLE resulting in R AKA on 3/27  --continue ASA/brilinta/heparin gtt  --Podiatry and vascular following for necrotic L foot, awaiting demarcation of necrosis to guide amputation

## 2018-03-31 NOTE — CONSULTS
Double lumen PICC placed to (r)basicilic vein.   37cm in length, 0cm exposed, and 32cm arm circumference.  Lot # [id number]

## 2018-03-31 NOTE — ASSESSMENT & PLAN NOTE
46 yo M with h/o HTN, HLD, HFrEF (10-15% 3/23/18) s/p ICD, CAD s/p PCI, EtOH abuse, PAD s/p ABF 1999 with multiple endovascular interventions presented to hospital on 3/18 with complaints of left foot rest pain s/p lysis now with biphasic left PT signal and L forefoot ischemia as well as RLE ischemia s/p R AKA 4 Days Post-Op     Will monitor R AKA stump; continue elevating and wrapping  PT/OT  Left toes beginning to demarcate  Vascular surgery to follow

## 2018-03-31 NOTE — ASSESSMENT & PLAN NOTE
Mr. Byrd is a 47 year old with PMH of ICM s/p ICD, CAD s/p PCI, HTN, HLD, PAD (s/p aortobifemoral bypass, L SFA and pop PTAS in September and recent R SFA and R pop 3/8 by Dr. Carl Bell) who presented to [facility] with critical limb ischemia on 3/18. Evaluated by interventional cardiology and underwent catheter directed tPA on 3/20. On vancomycin and unasyn for LLE cellulitis. Pt now with RLE occlusion of distal right superficial femoral, popliteal, anterior tibial, and posterior tibial arteries. vascular surgery performed right AKA 3/27.  Podiatry following LLE, monitoring closely and awaiting demarcation of necrosis of LLE as possibly will have TMA or BKA.  Blood cultures NGTD.  Persistent leukocytosis - suspect secondary to stress response for AKA and ischemia.  ABD exam shows significant tenderness especially in lower quadrants and superpubic area - ? Cause and underlying pathology.  Left foot appears worse and may require further vascular intervention.  Suspect CT C/A/P thrombosis findings imply poor prognosis.     Afebrile today.  Lethargy waxing and waning.  PICC placed today.  Vanc trough high yesterday.    Plan  1.Continue IV vancomycin but decrease dose to 1g q8hr.  vanc trough goal 15-20. Vanc trough today. .   2. DC unasyn and change to change to Zosyn for pseudomonas coverage  3. FU vasc Sx and podiatry recs  4. L foot appears worse. Vascular and podiatry following closely. May need left TMA /bka if without improvement  5. Seen with ID staff- wll follow.

## 2018-03-31 NOTE — NURSING
0845 - Vascular surgery team at bedside, dressing changed to rle stump. Patient appears asleep through procedure. No s/s distress.     0900 - PICC team at bedside, spoke with patient's son, Haris Byrd, via telephone, to obtain consent for PICC line placement.     Alysha Tay RN

## 2018-03-31 NOTE — PROGRESS NOTES
[facility]  Vascular Surgery  Progress Note    Patient Name: Leonardo Byrd  MRN: [id number]  Admission Date: 3/18/2018  Primary Care Provider: Indio Aquino MD    Subjective:     Interval History: no acute changes overnight, leukocytosis continuing.    Post-Op Info:  Procedure(s) (LRB):  AMPUTATION-ABOVE KNEE (Right)   4 Days Post-Op       Medications:  Continuous Infusions:   heparin (porcine) in D5W 19 Units/kg/hr (03/31/18 0900)     Scheduled Meds:   amitriptyline  50 mg Oral QHS    ampicillin-sulbactim (UNASYN) IVPB  3 g Intravenous Q6H    aspirin  81 mg Oral Daily    atorvastatin  40 mg Oral QHS    folic acid-vit B6-vit B12 2.5-25-2 mg  1 tablet Per OG tube Daily    gabapentin  800 mg Oral QHS    lidocaine   Topical (Top) QID    metoprolol succinate  25 mg Oral Daily    nicotine  1 patch Transdermal Daily    polyethylene glycol  17 g Oral BID    potassium chloride  10 mEq Oral Once    pregabalin  100 mg Oral TID    senna-docusate 8.6-50 mg  1 tablet Oral BID    sodium chloride 0.9%  250 mL Intravenous Once    thiamine  100 mg Oral Daily    ticagrelor  90 mg Oral BID    vancomycin (VANCOCIN) IVPB  1,000 mg Intravenous Q8H     PRN Meds:sodium chloride, cyclobenzaprine, fentaNYL, heparin (PORCINE), heparin (PORCINE), lorazepam, magnesium oxide, magnesium oxide, morphine, morphine, morphine, nitroGLYCERIN, oxyCODONE, oxyCODONE, oxyCODONE, potassium chloride, potassium chloride 10%, potassium chloride 10%, potassium chloride 10%, potassium, sodium phosphates, potassium, sodium phosphates, potassium, sodium phosphates, sodium chloride 0.9%, zolpidem     Objective:     Vital Signs (Most Recent):  Temp: 98 °F (36.7 °C) (03/31/18 0701)  Pulse: 94 (03/31/18 0900)  Resp: (!) 22 (03/31/18 0900)  BP: 112/66 (03/31/18 0900)  SpO2: 100 % (03/31/18 0900) Vital Signs (24h Range):  Temp:  [98 °F (36.7 °C)-100.3 °F (37.9 °C)] 98 °F (36.7 °C)  Pulse:  [] 94  Resp:  [15-29] 22  SpO2:   [96 %-100 %] 100 %  BP: ()/(55-80) 112/66       Date 03/31/18 0700 - 04/01/18 0659   Shift 1772-6686 3101-8455 3544-6591 24 Hour Total   I  N  T  A  K  E   P.O. 0   0    I.V.  (mL/kg) 38.7  (0.6)   38.7  (0.6)    IV Piggyback 200   200    Shift Total  (mL/kg) 238.7  (3.5)   238.7  (3.5)   O  U  T  P  U  T   Shift Total  (mL/kg)       Weight (kg) 68.7 68.7 68.7 68.7       Physical Exam   Constitutional: He appears well-developed and well-nourished. He appears lethargic.   Cardiovascular: Normal rate and regular rhythm.    Left biphasic PT   Musculoskeletal:   Right AKA stump with mild ruby-incisional ischemia  Left foot toes beginning to demarcate   Neurological: He appears lethargic.       Significant Labs:  CBC:   Recent Labs  Lab 03/31/18  0016   WBC 18.97*   RBC 2.23*   HGB 7.0*   HCT 20.6*   *   MCV 92   MCH 31.4*   MCHC 34.0     CMP:   Recent Labs  Lab 03/31/18  0313   GLU 83   CALCIUM 8.2*   ALBUMIN 1.7*   PROT 5.9*   *   K 3.0*   CO2 24      BUN 7   CREATININE 0.6   ALKPHOS 148*   ALT 81*   *   BILITOT 0.7     Coagulation: No results for input(s): LABPROT, INR, APTT in the last 48 hours.    Significant Diagnostics:      Assessment/Plan:     Limb ischemia    46 yo M with h/o HTN, HLD, HFrEF (10-15% 3/23/18) s/p ICD, CAD s/p PCI, EtOH abuse, PAD s/p ABF 1999 with multiple endovascular interventions presented to hospital on 3/18 with complaints of left foot rest pain s/p lysis now with biphasic left PT signal and L forefoot ischemia as well as RLE ischemia s/p R AKA 4 Days Post-Op     Will monitor R AKA stump; continue elevating and wrapping  PT/OT  Left toes beginning to demarcate  Vascular surgery to follow            Yodit Pierce MD  Vascular Surgery  [facility]

## 2018-03-31 NOTE — CONSULTS
Double lumen PICC place to right basilic vein.  37 cm in length, 32 cm arm circumference and 0 cm exposed.   Lot # [id number].

## 2018-03-31 NOTE — PROGRESS NOTES
[facility]  Infectious Disease  Progress Note    Patient Name: Leonardo Byrd  MRN: [id number]  Admission Date: 3/18/2018  Length of Stay: 13 days  Attending Physician: Guillermo Archer MD  Primary Care Provider: Indio Aquino MD    Isolation Status: No active isolations  Assessment/Plan:      * Critical lower limb ischemia    Mr. Byrd is a 47 year old with PMH of ICM s/p ICD, CAD s/p PCI, HTN, HLD, PAD (s/p aortobifemoral bypass, L SFA and pop PTAS in September and recent R SFA and R pop 3/8 by Dr. Carl Bell) who presented to [facility] with critical limb ischemia on 3/18. Evaluated by interventional cardiology and underwent catheter directed tPA on 3/20. On vancomycin and unasyn for LLE cellulitis. Pt now with RLE occlusion of distal right superficial femoral, popliteal, anterior tibial, and posterior tibial arteries. vascular surgery performed right AKA 3/27.  Podiatry following LLE, monitoring closely and awaiting demarcation of necrosis of LLE as possibly will have TMA or BKA.  Blood cultures NGTD.  Persistent leukocytosis - suspect secondary to stress response for AKA and ischemia.  ABD exam shows significant tenderness especially in lower quadrants and superpubic area - ? Cause and underlying pathology.  Left foot appears worse and may require further vascular intervention.  Suspect CT C/A/P thrombosis findings imply poor prognosis.     Afebrile today.  Lethargy waxing and waning.  PICC placed today.  Vanc trough high yesterday.    Plan  1.Continue IV vancomycin but decrease dose to 1g q8hr.  vanc trough goal 15-20. Vanc trough today. .   2. DC unasyn and change to change to Zosyn for pseudomonas coverage  3. FU vasc Sx and podiatry recs  4. L foot appears worse. Vascular and podiatry following closely. May need left TMA /bka if without improvement  5. Seen with ID staff- wll follow.             Anticipated Disposition: tbd    Thank you for your consult. I will follow-up with patient.  "Please contact us if you have any additional questions.    RITA Terry  Infectious Disease  [facility]    Subjective:     Principal Problem:Critical lower limb ischemia    HPI: Mr. Byrd is a 47 year old with PMH of ICM s/p ICD, CAD s/p PCI, HTN, HLD, PAD (s/p aortobifemoral bypass, L SFA and pop PTAS in September and recent R SFA and R pop 3/8 by Dr. Carl Bell) who presented to [facility] with critical limb ischemia on 3/18. Evaluated by interventional cardiology and underwent catheter directed tPA on 3/20, admitted to CCU for close monitoring. Also with DTs requiring scheduled benzos. Pt was given vancomycin for left foot cellulitis. Pt was intubated for airway protection in setting of increasing benzo requirements and worsening agitation.  Pt now extubated.  Pt was noted to have more duskiness in appearance of left leg and absent pulses.US lower ext showing occlusion of distal right superficial femoral, popliteal, anterior tibial, and posterior tibial arteries. No "dopplerable" pulses on rt foot. Interventional cards holding off on on acute intervention currently in setting of RYAN. Pt afebrile, WBC 17K today. Complains of severe pain of lower extremities. Denies having any cough, n/v/d,dysuria.   Interval History: No AEON. Tmax 100.3, Tcurrent 98.7.  Lethargic again today.  Blood cultures NGTD.  Per nurse, pt was screaming this am. CT C/A/P shows thrombosis in aortofemoral bypass BL. The patient denies any recent fever, chills, or sweats after much stimulation.       Review of Systems   Unable to perform ROS: Other (lethargic)     Objective:     Vital Signs (Most Recent):  Temp: 98.7 °F (37.1 °C) (03/31/18 1100)  Pulse: 100 (03/31/18 1100)  Resp: 20 (03/31/18 1100)  BP: (!) 108/58 (03/31/18 1100)  SpO2: 100 % (03/31/18 1100) Vital Signs (24h Range):  Temp:  [98 °F (36.7 °C)-99.5 °F (37.5 °C)] 98.7 °F (37.1 °C)  Pulse:  [] 100  Resp:  [15-29] 20  SpO2:  [96 %-100 %] 100 %  BP: " ()/(55-80) 108/58     Weight: 68.7 kg (151 lb 7.3 oz)  Body mass index is 23.72 kg/m².    Estimated Creatinine Clearance: 139.2 mL/min (based on SCr of 0.6 mg/dL).    Physical Exam   Constitutional: He appears well-developed and well-nourished. No distress.   Cardiovascular: Regular rhythm.  Exam reveals gallop. Exam reveals no friction rub.    No murmur heard.  tachycardic   Pulmonary/Chest: Effort normal. No respiratory distress. He has no wheezes. He has no rales.   Abdominal: Soft. He exhibits distension. There is tenderness in the suprapubic area. There is no rigidity and no guarding.       Musculoskeletal: He exhibits tenderness (left foot).   Neurological:   Lethargic to minimally arousable to answer limited questions.    Skin: Skin is warm. No rash noted. He is not diaphoretic. There is erythema. No pallor.   LLE cool to touch. Absent pulses.  Foot looks more dusky    Psychiatric: He has a normal mood and affect.   Nursing note and vitals reviewed.                Significant Labs:   Blood Culture:   Recent Labs  Lab 03/20/18  2219 03/20/18  2233 03/26/18  0148 03/27/18  2148 03/27/18  2212   LABBLOO No growth after 5 days. No growth after 5 days. No growth after 5 days. No Growth to date  No Growth to date  No Growth to date  No Growth to date No Growth to date  No Growth to date  No Growth to date  No Growth to date     CBC:   Recent Labs  Lab 03/30/18  1632 03/31/18  0016 03/31/18  1121   WBC 21.15* 18.97* 18.27*   HGB 7.6* 7.0* 7.4*   HCT 22.3* 20.6* 22.1*   * 460* 556*     CMP:   Recent Labs  Lab 03/30/18  0157 03/31/18  0313    133*   K 3.5 3.0*    100   CO2 24 24   GLU 87 83   BUN 9 7   CREATININE 0.7 0.6   CALCIUM 8.3* 8.2*   PROT 6.0 5.9*   ALBUMIN 1.8* 1.7*   BILITOT 0.7 0.7   ALKPHOS 151* 148*   * 252*   ALT 92* 81*   ANIONGAP 10 9   EGFRNONAA >60.0 >60.0     Wound Culture: No results for input(s): LABAERO in the last 4320 hours.  All pertinent labs within the  past 24 hours have been reviewed.    Significant Imaging: I have reviewed all pertinent imaging results/findings within the past 24 hours.   X-Ray Chest 1 View for PICC_Central line [[id number]] Resulted: 03/31/18 1035   Order Status: Completed Updated: 03/31/18 1038   Narrative:     EXAMINATION:  XR CHEST 1 VIEW    CLINICAL HISTORY:  Evaluate PICC line placement;    TECHNIQUE:  Single frontal view of the chest was performed.    COMPARISON:  Chest radiograph from 03/25/2018    FINDINGS:  Interval placement of right-sided PICC line catheter with its tip in the inferior SVC.    Mild cardiomegaly.  Stable AICD.  Prominence of the pulmonary vasculature with increased interstitial lung markings suggestive of pulmonary edema, slightly worsened when compared to prior examination.  No pneumothorax.  Osseous structures are unremarkable.   Impression:       As above.      Electronically signed by: Po Armstrong MD  Date: 03/31/2018  Time: 10:35   CT Abdomen Pelvis With Contrast [[id number]] (Abnormal) Resulted: 03/30/18 2001   Order Status: Completed Updated: 03/30/18 2004   Narrative:     EXAMINATION:  CT ABDOMEN PELVIS WITH CONTRAST    CLINICAL HISTORY:  Abdominal pain, unspecified;Sepsis; Progressive distension, increasing leukocytosis, extensive PAD s/p R AKA and with necrotic L foot, other abd source?;    TECHNIQUE:  Low dose axial images, sagittal and coronal reformations were obtained from the lung bases to the pubic symphysis following the IV administration of 75 mL of Omnipaque 350    COMPARISON:  No dedicated priors    FINDINGS:  ABDOMEN:    - Lung bases: There is motion artifact which obscures the underlying pulmonary parenchyma.  Mild dependent atelectasis.  Suggestion of ground-glass pulmonary nodules is noted in the lungs with the largest possible nodule in the right lower lobe measuring 1.2 x 1.4 cm, as seen on series 2, image 7.  For a ground glass nodule 6 mm or larger, Fleischner Society 2017 guidelines  recommend follow up with non-contrast chest CT at 6-12 months after discovery. If this nodule persists at that time, additional follow up with non-contrast chest CT is recommended every 2 years until 5 years of stability have been documented.    -heart: No pericardial effusion.  There is partial visualization of cardiac pacing device leads.  Moderate coronary artery atherosclerosis.  Heart is upper limits of normal in size.    - Liver: No masses.  Enhancement appears unremarkable.    - Gallbladder: Prominence without significant thickening of the gallbladder wall.  No sludge or stones noted.    - Bile Ducts: No evidence of intra or extra hepatic biliary ductal dilation.    - Spleen: Spleen and adjacent splenules noted to have an unremarkable appearance.    - Kidneys: Prominent extrarenal pelves bilaterally.  The right ureter is somewhat more prominent than left but without evidence of obstructing ureterolith or overt hydronephrosis.    - Adrenals: Unremarkable.    - Pancreas: There is a small hypodensity about the head of the pancreas measuring 0.6 cm as seen on series 2, image 41.  This is nonspecific and may represent volume averaging, a side branch IPMN, or sequela of prior pancreatitis in this patient with a reported history of alcohol use disorder.  Otherwise pancreatic parenchyma and pancreatic duct appear unremarkable.    - Retroperitoneum:  Vascular calcifications in the kidneys bilaterally.    - Vascular: Changes of aortobifemoral bypass grafting are noted with significant mural thrombus in the aortic portion of the bypass graft.  The native aorta, common iliac, internal and external iliac arteries are entirely thrombosed and small sized.  The aortofemoral bypass graft on the right shows moderate thrombus or noncalcified atherosclerosis.    Possible severe stenosis at the right aortofemoral bypass junction, poorly evaluated secondary to exam technique.  Partially imaged stent at the proximal left femoral  artery.  Correlation with other films not available in the picture archive system is recommended.    - Abdominal wall:  Mild body wall edema.    PELVIS: Urinary bladder is moderately distended without wall thickening or adjacent inflammatory change.  Prostate and seminal vesicles are within normal limits.    No evidence of small-bowel obstruction, wall thickening, or local inflammation.  Colon is unremarkable with no evidence of inflammation, wall thickening, or diverticulosis.  Rectal fat planes are preserved.  No lymphadenopathy.    BONES:  Grossly stable grade 1 anterolisthesis of L5 on S1 secondary to remote bilateral L5 pars defects.  No acute osseous abnormality and no suspicious lytic or blastic lesion.   Impression:       Bilateral ground-glass pulmonary nodules largest measuring 1.2 x 1.4 cm.  These are nonspecific and may be infectious, inflammatory, or neoplastic.  For a ground glass nodule 6 mm or larger, Fleischner Society 2017 guidelines recommend follow up with non-contrast chest CT at 6-12 months after discovery. If this nodule persists at that time, additional follow up with non-contrast chest CT is recommended every 2 years until 5 years of stability have been documented.    Changes of aortobifemoral bypass grafting noted with significant mural thrombus in the bypass graft and concern for severe stenosis at the junction of the bypass and the femoral artery on the right.  Recommend correlation with other imaging not available in Radiology picture archive system.  Native aorta and also common iliacs and branch vessels are completely thrombosed and atrophic.    Bilateral extrarenal pelves with prominence of the right greater than left ureters, without evidence of ureterolith.    Moderately distended urinary bladder without adjacent inflammatory change or associated hydronephrosis, nonspecific.  Correlate clinically.    Small hypodense focus about the head of the pancreas measuring 0.6 cm as seen on  series 2, image 41.  This is nonspecific.  May represent a side branch IPMN or a pseudocyst.    Moderate coronary artery atherosclerosis, vascular calcifications in the kidney, splenules, and other incidental findings as above.    This report was flagged in Epic as abnormal.    Electronically signed by resident: Po Wong  Date: 03/30/2018  Time: 19:08    Electronically signed by: Leonardo Beasley MD  Date: 03/30/2018  Time: 20:01   X-Ray Foot Complete Left [[id number]] Resulted: 03/29/18 1325   Order Status: Completed Updated: 03/29/18 1327   Narrative:     EXAMINATION:  XR FOOT COMPLETE 3 VIEW LEFT    CLINICAL HISTORY:  Ischemic changes to left foot;    FINDINGS:  No fracture dislocation bone destruction seen.   Impression:       See above      Electronically signed by: Moe Chris MD  Date: 03/29/2018  Time: 13:25   X-Ray Foot Complete Right [[id number]] (Abnormal) Resulted: 03/26/18 0750   Order Status: Completed Updated: 03/26/18 0753   Narrative:     EXAMINATION:  XR FOOT COMPLETE 3 VIEW RIGHT    CLINICAL HISTORY:  ischemic changes to forefoot, sucpisious for soft tissue gas;.    TECHNIQUE:  AP, lateral, and oblique views of the right foot were performed.    COMPARISON:  December 20, 2016.    FINDINGS:  Patient is status post resection of the distal aspect of the proximal phalanx and distal phalanx of the great toe.  No bony destruction seen.  There is a metallic wire density measuring about 8 cm projected over the medial soft tissues at the level of the ankle.  No bony destruction.  No convincing soft tissue gas.   Impression:       Linear metallic density projected over the medial posterior soft tissues at the level of the ankle.  Correlation for foreign body suggested.    Postop changes with resection of the distal great toe.  No convincing soft tissue gas.    This report was flagged in Epic as abnormal..      Electronically signed by: Nii Dai MD  Date: 03/26/2018  Time: 07:50   X-Ray Chest 1 View  [[id number]] Resulted: 03/25/18 1031   Order Status: Completed Updated: 03/25/18 1034   Narrative:     EXAMINATION:  XR CHEST 1 VIEW    CLINICAL HISTORY:  pulmonary congestion;    TECHNIQUE:  Single frontal view of the chest was performed.    COMPARISON:  March 23, 2018    FINDINGS:  Cardiac silhouette and mediastinal stable.  Cardiac device present.  Lungs clear.  Osseous structures intact.   Impression:       No acute cardiopulmonary process.      Electronically signed by: Humberto Davila MD  Date: 03/25/2018  Time: 10:31   US Lower Extremity Arteries Right [[id number]] (Abnormal) Resulted: 03/24/18 2016   Order Status: Completed Updated: 03/24/18 2019   Narrative:     EXAMINATION:  US LOWER EXTREMITY ARTERIES RIGHT    CLINICAL HISTORY:  lost pulse pt;   history of aorto bi fem bypass.    TECHNIQUE:  Grayscale, color flow and spectral analysis of the arteries of right leg were obtained    COMPARISON:  None.    FINDINGS:  The right common femoral and proximal superficial femoral arteries are occluded.  The graft is patent in the right groin and at the anastomosis to proximal right SFA.  The mid right SFA is patent.  The distal right superficial femoral and popliteal arteries are occluded.  The right anterior and posterior tibial arteries are also occluded.    The short-segment portion of bypass graft visualized in the left groin is patent.   Impression:       Occlusion of distal right superficial femoral, popliteal, anterior tibial, and posterior tibial arteries.    This report was flagged in Epic as abnormal.    Electronically signed by resident: Catrachito Crabtree  Date: 03/24/2018  Time: 19:53    Electronically signed by: Jakob Gil MD  Date: 03/24/2018  Time: 20:16

## 2018-03-31 NOTE — ASSESSMENT & PLAN NOTE
-Elevating leukocytosis, downtrended to 18K overnight  -s/p 7 day course of abx for possible LE cellulitis  -CT abd without source of infection  -Inflammatory response related to necrosis/recent amputation vs cellulitis vs other infxs source  -Covering with broad spectrum antibiotics, will remain broad for now  - restarted on van and ceftriaxone (started 3/25/18) for elevated wbc   - ID consulted, switched Ceftriaxone to Unasyn  (started 3/25/18), switched to Zosyn for failure to improve 3/30

## 2018-03-31 NOTE — PROGRESS NOTES
"[facility]  Cardiology  Progress Note    Patient Name: Leonardo Byrd  MRN: [id number]  Admission Date: 3/18/2018  Hospital Length of Stay: 13 days  Code Status: Full Code   Attending Physician: Guillermo Archer MD   Primary Care Physician: Indio Aquino MD  Expected Discharge Date: 4/2/2018  Principal Problem:Critical lower limb ischemia    Subjective:     Hospital Course:   Admitted to CCU 3/20 after cath directed tPA was performed by int cards under general anesthesia. Also with DTs requiring scheduled benzos. On day 2 Lt lower extremity remained pulseless, so thrombolysis still infusing.On day 4 of vanc for possible lt foot cellulitis. Transitioned to clinda IV (unable to take PO safely). Intubated overnight for airway protection in setting of increasing benzo requirements and worsening agitation and danger of pt pulling line. CXR this AM (3/22) with patchy bilateral infiltrates. Taken to cath lab for possible cath removal. Lt leg then reportedly duskier and with absent pulses. On 3/23 taken back to cath lab. Int cards Recc Podiatry consult for possible L trans-metatarsal amputation. On heparin gtt. 3/24 Wbc trending up, foot color getting darker,paitent extubated, continue heparin. 3/25: US lower ext showing occlusion of distal right superficial femoral, popliteal, anterior tibial, and posterior tibial arteries. No "dopplerable" pulses on rt foot. Interventional cards holding off on on acute intervention currently in setting of RYAN. Will continue to monitor. Cr 1.7 (baseline 0.6). Given 500cc Iv fluids. Started on scheduled librium for alcohol withdrawal, with plans for taper.    -3/26/18 NAEON. Pt. With elevated WBC. On vanc and ceftriaxone. ID consulted. Pain management for BLE ischemia   -3/27: NAEON. BLE pain improved after pain management adjustment. Vascular surgery evaluated pt and planning for Right AKA today.   - 3/28 febrile last night. Blood culture sent. WBC elevated then " trending down. Complains of BLE pain controlled with pain meds. S/p Right AKA on 3/27/18  -3-29: NAEON. Pain controlled with medications. Vascular and ID on board. Pt. Is high risk of left amputation. May ultimately require amputation on the left as well. Continue on vanc and Unasyn for now   - 3/30: NAEON. Pt afebrile. BLE pain with controlled with pain meds. Today abdomen slightly distended. CT abdomen ordered stat. If pt decompensate, will broaden abx from Unasyn to zosyn     Interval History: - NAEON. Pt afebrile. Lethargic and combative. Difficult line placement, obtaining PICC. CT abdomen without other source of infection, remarkable for thrombus at level of aorta-bifemoral graft. Broadened antibiotics to zosyn, appreciate ID input.    Review of Systems   Unable to perform ROS: mental status change     Objective:     Vital Signs (Most Recent):  Temp: 98.7 °F (37.1 °C) (03/31/18 1100)  Pulse: 109 (03/31/18 1200)  Resp: (!) 23 (03/31/18 1200)  BP: 113/63 (03/31/18 1200)  SpO2: 100 % (03/31/18 1200) Vital Signs (24h Range):  Temp:  [98 °F (36.7 °C)-99.5 °F (37.5 °C)] 98.7 °F (37.1 °C)  Pulse:  [] 109  Resp:  [15-29] 23  SpO2:  [96 %-100 %] 100 %  BP: ()/(55-80) 113/63     Weight: 68.7 kg (151 lb 7.3 oz)  Body mass index is 23.72 kg/m².     SpO2: 100 %  O2 Device (Oxygen Therapy): room air      Intake/Output Summary (Last 24 hours) at 03/31/18 1258  Last data filed at 03/31/18 1100   Gross per 24 hour   Intake          1818.22 ml   Output              600 ml   Net          1218.22 ml       Lines/Drains/Airways     Peripherally Inserted Central Catheter Line                 PICC Double Lumen 03/31/18 0919 right basilic less than 1 day          Epidural Line                 Perineural Analgesia/Anesthesia Assessment (using dermatomes) Epidural 03/27/18 1415 3 days          Peripheral Intravenous Line                 Peripheral IV - Single Lumen 03/28/18 1800 Right Hand 2 days         Peripheral IV -  Single Lumen 03/29/18 1700 Right Antecubital 1 day         Peripheral IV - Single Lumen 03/30/18 2330 Left Hand less than 1 day                Physical Exam   Constitutional: He is oriented to person, place, and time. He appears well-developed and well-nourished. No distress.   Cardiovascular: Normal rate, regular rhythm and normal heart sounds.    Pulmonary/Chest: Effort normal and breath sounds normal.   Abdominal: He exhibits distension. There is no rebound and no guarding.   Musculoskeletal: Normal range of motion. He exhibits no edema.   Neurological: He is alert and oriented to person, place, and time.   Skin: No rash noted.   Cool left extremity with dusky color. No pulses detected at foot        Significant Labs:     Recent Labs  Lab 03/31/18  1121   WBC 18.27*   RBC 2.41*   HGB 7.4*   HCT 22.1*   *   MCV 92   MCH 30.7   MCHC 33.5       Recent Labs  Lab 03/29/18  0219 03/30/18  0157 03/31/18  0313    136 133*   K 4.1 3.5 3.0*    102 100   CO2 27 24 24   BUN 12 9 7   CREATININE 0.8 0.7 0.6   CALCIUM 8.9 8.3* 8.2*   MG 2.4 1.9 1.6         Significant Imaging: pending CT abdomen     Assessment and Plan:     * Critical lower limb ischemia    Mr. Byrd is a 47 year old man with CAD s/p PCI, LVEF 30% s/p ICD, complicated and extensive PAD s/p aortofem bypass and multiple stents on DAPT who presents with what appears to be CLI of his left foot of unclear duration.     --Underwent angiogram 3/19 that showed occlusion of aorta-fem bypass at distal anastamosis. Unable to treat due to patient movement   --s/p cath directed tPA to occluded L SFA  --blood cx ngtd  --subsequently with acute limb ischemia in RLE resulting in R AKA on 3/27  --continue ASA/brilinta/heparin gtt  --Podiatry and vascular following for necrotic L foot, awaiting demarcation of necrosis to guide amputation            Leukocytosis    -Elevating leukocytosis, downtrended to 18K overnight  -s/p 7 day course of abx for possible LE  cellulitis  -CT abd without source of infection  -Inflammatory response related to necrosis/recent amputation vs cellulitis vs other infxs source  -Covering with broad spectrum antibiotics, will remain broad for now  - restarted on van and ceftriaxone (started 3/25/18) for elevated wbc   - ID consulted, switched Ceftriaxone to Unasyn  (started 3/25/18), switched to Zosyn for failure to improve 3/30        Alcohol abuse    -s/p delirium tremens  -no seizure activity  -s/p BZD taper  -continue thiamine  -remains delirious, unclear etiology, difficult to assess due to combativeness          Chronic systolic heart failure    LVEF 30% s/p ICD  Continue BB  Held ACE in setting of RYAN, can re-introduce soon        Tobacco abuse    --nicotine patch        Ischemic cardiomyopathy    -CM possibly with multifactorial etiology (hx of alcohol use, ischemia)  CAD s/p PCI  DAPT, statin,  BB  Hold ACE in setting of RYAN            VTE Risk Mitigation         Ordered     heparin 25,000 units in dextrose 5% 250 mL (100 units/mL) infusion  Continuous     Route:  Intravenous        03/27/18 1645     heparin 25,000 units in dextrose 5% 250 mL (100 units/mL) bolus from bag; ADDITIONAL PRN BOLUS  As needed (PRN)     Route:  Intravenous        03/27/18 1645     heparin 25,000 units in dextrose 5% 250 mL (100 units/mL) bolus from bag; ADDITIONAL PRN BOLUS  As needed (PRN)     Route:  Intravenous        03/27/18 1645     IP VTE LOW RISK PATIENT  Once      03/18/18 1711          Kelvin Rubalcava MD  Cardiology  [facility]

## 2018-03-31 NOTE — NURSING
0815 - Dr Rubalcava, with cardiology, at bedside rounding. Inquired re: midline vs PICC line r/t probable long term need for antibiotics, and current lack of reliable IV access r/t poor veins and patient's noncompliance with maintaining lines/tubes.    0820 - Spoke with PICC team nurse, informed that midline order upgraded to PICC order and changed from routine to STAT. Made aware of rationale. States will be to bedside as soon as possible to place PICC.     Alysha Tay, RN

## 2018-03-31 NOTE — ASSESSMENT & PLAN NOTE
-s/p delirium tremens  -no seizure activity  -s/p BZD taper  -continue thiamine  -remains delirious, unclear etiology, difficult to assess due to combativeness

## 2018-03-31 NOTE — ASSESSMENT & PLAN NOTE
s/p 7 day course abx  --blood cx NGTD  --restarted on van and ceftriaxone (started 3/25/18) for elevated wbc   - ID consulted. switched ceftriaxone to Unasy 3/26/18   - WBC elevated  --s/p 7 day course abx vanc then clindamycin

## 2018-03-31 NOTE — SUBJECTIVE & OBJECTIVE
Interval History: No AEON. Tmax 100.3, Tcurrent 98.7.  Lethargic again today.  Blood cultures NGTD.  Per nurse, pt was screaming this am. CT C/A/P shows thrombosis in aortofemoral bypass BL. The patient denies any recent fever, chills, or sweats after much stimulation.       Review of Systems   Unable to perform ROS: Other (lethargic)     Objective:     Vital Signs (Most Recent):  Temp: 98.7 °F (37.1 °C) (03/31/18 1100)  Pulse: 100 (03/31/18 1100)  Resp: 20 (03/31/18 1100)  BP: (!) 108/58 (03/31/18 1100)  SpO2: 100 % (03/31/18 1100) Vital Signs (24h Range):  Temp:  [98 °F (36.7 °C)-99.5 °F (37.5 °C)] 98.7 °F (37.1 °C)  Pulse:  [] 100  Resp:  [15-29] 20  SpO2:  [96 %-100 %] 100 %  BP: ()/(55-80) 108/58     Weight: 68.7 kg (151 lb 7.3 oz)  Body mass index is 23.72 kg/m².    Estimated Creatinine Clearance: 139.2 mL/min (based on SCr of 0.6 mg/dL).    Physical Exam   Constitutional: He appears well-developed and well-nourished. No distress.   Cardiovascular: Regular rhythm.  Exam reveals gallop. Exam reveals no friction rub.    No murmur heard.  tachycardic   Pulmonary/Chest: Effort normal. No respiratory distress. He has no wheezes. He has no rales.   Abdominal: Soft. He exhibits distension. There is tenderness in the suprapubic area. There is no rigidity and no guarding.       Musculoskeletal: He exhibits tenderness (left foot).   Neurological:   Lethargic to minimally arousable to answer limited questions.    Skin: Skin is warm. No rash noted. He is not diaphoretic. There is erythema. No pallor.   LLE cool to touch. Absent pulses.  Foot looks more dusky    Psychiatric: He has a normal mood and affect.   Nursing note and vitals reviewed.                Significant Labs:   Blood Culture:   Recent Labs  Lab 03/20/18 2219 03/20/18 2233 03/26/18  0148 03/27/18  2148 03/27/18  2212   LABOO No growth after 5 days. No growth after 5 days. No growth after 5 days. No Growth to date  No Growth to date  No  Growth to date  No Growth to date No Growth to date  No Growth to date  No Growth to date  No Growth to date     CBC:   Recent Labs  Lab 03/30/18  1632 03/31/18  0016 03/31/18  1121   WBC 21.15* 18.97* 18.27*   HGB 7.6* 7.0* 7.4*   HCT 22.3* 20.6* 22.1*   * 460* 556*     CMP:   Recent Labs  Lab 03/30/18  0157 03/31/18  0313    133*   K 3.5 3.0*    100   CO2 24 24   GLU 87 83   BUN 9 7   CREATININE 0.7 0.6   CALCIUM 8.3* 8.2*   PROT 6.0 5.9*   ALBUMIN 1.8* 1.7*   BILITOT 0.7 0.7   ALKPHOS 151* 148*   * 252*   ALT 92* 81*   ANIONGAP 10 9   EGFRNONAA >60.0 >60.0     Wound Culture: No results for input(s): LABAERO in the last 4320 hours.  All pertinent labs within the past 24 hours have been reviewed.    Significant Imaging: I have reviewed all pertinent imaging results/findings within the past 24 hours.   X-Ray Chest 1 View for PICC_Central line [[id number]] Resulted: 03/31/18 1035   Order Status: Completed Updated: 03/31/18 1038   Narrative:     EXAMINATION:  XR CHEST 1 VIEW    CLINICAL HISTORY:  Evaluate PICC line placement;    TECHNIQUE:  Single frontal view of the chest was performed.    COMPARISON:  Chest radiograph from 03/25/2018    FINDINGS:  Interval placement of right-sided PICC line catheter with its tip in the inferior SVC.    Mild cardiomegaly.  Stable AICD.  Prominence of the pulmonary vasculature with increased interstitial lung markings suggestive of pulmonary edema, slightly worsened when compared to prior examination.  No pneumothorax.  Osseous structures are unremarkable.   Impression:       As above.      Electronically signed by: Po Armstrong MD  Date: 03/31/2018  Time: 10:35   CT Abdomen Pelvis With Contrast [[id number]] (Abnormal) Resulted: 03/30/18 2001   Order Status: Completed Updated: 03/30/18 2004   Narrative:     EXAMINATION:  CT ABDOMEN PELVIS WITH CONTRAST    CLINICAL HISTORY:  Abdominal pain, unspecified;Sepsis; Progressive distension, increasing  leukocytosis, extensive PAD s/p R AKA and with necrotic L foot, other abd source?;    TECHNIQUE:  Low dose axial images, sagittal and coronal reformations were obtained from the lung bases to the pubic symphysis following the IV administration of 75 mL of Omnipaque 350    COMPARISON:  No dedicated priors    FINDINGS:  ABDOMEN:    - Lung bases: There is motion artifact which obscures the underlying pulmonary parenchyma.  Mild dependent atelectasis.  Suggestion of ground-glass pulmonary nodules is noted in the lungs with the largest possible nodule in the right lower lobe measuring 1.2 x 1.4 cm, as seen on series 2, image 7.  For a ground glass nodule 6 mm or larger, Fleischner Society 2017 guidelines recommend follow up with non-contrast chest CT at 6-12 months after discovery. If this nodule persists at that time, additional follow up with non-contrast chest CT is recommended every 2 years until 5 years of stability have been documented.    -heart: No pericardial effusion.  There is partial visualization of cardiac pacing device leads.  Moderate coronary artery atherosclerosis.  Heart is upper limits of normal in size.    - Liver: No masses.  Enhancement appears unremarkable.    - Gallbladder: Prominence without significant thickening of the gallbladder wall.  No sludge or stones noted.    - Bile Ducts: No evidence of intra or extra hepatic biliary ductal dilation.    - Spleen: Spleen and adjacent splenules noted to have an unremarkable appearance.    - Kidneys: Prominent extrarenal pelves bilaterally.  The right ureter is somewhat more prominent than left but without evidence of obstructing ureterolith or overt hydronephrosis.    - Adrenals: Unremarkable.    - Pancreas: There is a small hypodensity about the head of the pancreas measuring 0.6 cm as seen on series 2, image 41.  This is nonspecific and may represent volume averaging, a side branch IPMN, or sequela of prior pancreatitis in this patient with a reported  history of alcohol use disorder.  Otherwise pancreatic parenchyma and pancreatic duct appear unremarkable.    - Retroperitoneum:  Vascular calcifications in the kidneys bilaterally.    - Vascular: Changes of aortobifemoral bypass grafting are noted with significant mural thrombus in the aortic portion of the bypass graft.  The native aorta, common iliac, internal and external iliac arteries are entirely thrombosed and small sized.  The aortofemoral bypass graft on the right shows moderate thrombus or noncalcified atherosclerosis.    Possible severe stenosis at the right aortofemoral bypass junction, poorly evaluated secondary to exam technique.  Partially imaged stent at the proximal left femoral artery.  Correlation with other films not available in the picture archive system is recommended.    - Abdominal wall:  Mild body wall edema.    PELVIS: Urinary bladder is moderately distended without wall thickening or adjacent inflammatory change.  Prostate and seminal vesicles are within normal limits.    No evidence of small-bowel obstruction, wall thickening, or local inflammation.  Colon is unremarkable with no evidence of inflammation, wall thickening, or diverticulosis.  Rectal fat planes are preserved.  No lymphadenopathy.    BONES:  Grossly stable grade 1 anterolisthesis of L5 on S1 secondary to remote bilateral L5 pars defects.  No acute osseous abnormality and no suspicious lytic or blastic lesion.   Impression:       Bilateral ground-glass pulmonary nodules largest measuring 1.2 x 1.4 cm.  These are nonspecific and may be infectious, inflammatory, or neoplastic.  For a ground glass nodule 6 mm or larger, Fleischner Society 2017 guidelines recommend follow up with non-contrast chest CT at 6-12 months after discovery. If this nodule persists at that time, additional follow up with non-contrast chest CT is recommended every 2 years until 5 years of stability have been documented.    Changes of aortobifemoral  bypass grafting noted with significant mural thrombus in the bypass graft and concern for severe stenosis at the junction of the bypass and the femoral artery on the right.  Recommend correlation with other imaging not available in Radiology picture archive system.  Native aorta and also common iliacs and branch vessels are completely thrombosed and atrophic.    Bilateral extrarenal pelves with prominence of the right greater than left ureters, without evidence of ureterolith.    Moderately distended urinary bladder without adjacent inflammatory change or associated hydronephrosis, nonspecific.  Correlate clinically.    Small hypodense focus about the head of the pancreas measuring 0.6 cm as seen on series 2, image 41.  This is nonspecific.  May represent a side branch IPMN or a pseudocyst.    Moderate coronary artery atherosclerosis, vascular calcifications in the kidney, splenules, and other incidental findings as above.    This report was flagged in Epic as abnormal.    Electronically signed by resident: Po Wong  Date: 03/30/2018  Time: 19:08    Electronically signed by: Leonardo Beasley MD  Date: 03/30/2018  Time: 20:01   X-Ray Foot Complete Left [[id number]] Resulted: 03/29/18 1325   Order Status: Completed Updated: 03/29/18 1327   Narrative:     EXAMINATION:  XR FOOT COMPLETE 3 VIEW LEFT    CLINICAL HISTORY:  Ischemic changes to left foot;    FINDINGS:  No fracture dislocation bone destruction seen.   Impression:       See above      Electronically signed by: Moe Chris MD  Date: 03/29/2018  Time: 13:25   X-Ray Foot Complete Right [[id number]] (Abnormal) Resulted: 03/26/18 0750   Order Status: Completed Updated: 03/26/18 0753   Narrative:     EXAMINATION:  XR FOOT COMPLETE 3 VIEW RIGHT    CLINICAL HISTORY:  ischemic changes to forefoot, sucpisious for soft tissue gas;.    TECHNIQUE:  AP, lateral, and oblique views of the right foot were performed.    COMPARISON:  December 20, 2016.    FINDINGS:  Patient is  status post resection of the distal aspect of the proximal phalanx and distal phalanx of the great toe.  No bony destruction seen.  There is a metallic wire density measuring about 8 cm projected over the medial soft tissues at the level of the ankle.  No bony destruction.  No convincing soft tissue gas.   Impression:       Linear metallic density projected over the medial posterior soft tissues at the level of the ankle.  Correlation for foreign body suggested.    Postop changes with resection of the distal great toe.  No convincing soft tissue gas.    This report was flagged in Epic as abnormal..      Electronically signed by: Nii Dai MD  Date: 03/26/2018  Time: 07:50   X-Ray Chest 1 View [[id number]] Resulted: 03/25/18 1031   Order Status: Completed Updated: 03/25/18 1034   Narrative:     EXAMINATION:  XR CHEST 1 VIEW    CLINICAL HISTORY:  pulmonary congestion;    TECHNIQUE:  Single frontal view of the chest was performed.    COMPARISON:  March 23, 2018    FINDINGS:  Cardiac silhouette and mediastinal stable.  Cardiac device present.  Lungs clear.  Osseous structures intact.   Impression:       No acute cardiopulmonary process.      Electronically signed by: Humberto Davila MD  Date: 03/25/2018  Time: 10:31   US Lower Extremity Arteries Right [[id number]] (Abnormal) Resulted: 03/24/18 2016   Order Status: Completed Updated: 03/24/18 2019   Narrative:     EXAMINATION:  US LOWER EXTREMITY ARTERIES RIGHT    CLINICAL HISTORY:  lost pulse pt;   history of aorto bi fem bypass.    TECHNIQUE:  Grayscale, color flow and spectral analysis of the arteries of right leg were obtained    COMPARISON:  None.    FINDINGS:  The right common femoral and proximal superficial femoral arteries are occluded.  The graft is patent in the right groin and at the anastomosis to proximal right SFA.  The mid right SFA is patent.  The distal right superficial femoral and popliteal arteries are occluded.  The right anterior and posterior  tibial arteries are also occluded.    The short-segment portion of bypass graft visualized in the left groin is patent.   Impression:       Occlusion of distal right superficial femoral, popliteal, anterior tibial, and posterior tibial arteries.    This report was flagged in Epic as abnormal.    Electronically signed by resident: Catrachito Crabtree  Date: 03/24/2018  Time: 19:53    Electronically signed by: Jakob Gil MD  Date: 03/24/2018  Time: 20:16

## 2018-03-31 NOTE — SUBJECTIVE & OBJECTIVE
Medications:  Continuous Infusions:   heparin (porcine) in D5W 19 Units/kg/hr (03/31/18 0900)     Scheduled Meds:   amitriptyline  50 mg Oral QHS    ampicillin-sulbactim (UNASYN) IVPB  3 g Intravenous Q6H    aspirin  81 mg Oral Daily    atorvastatin  40 mg Oral QHS    folic acid-vit B6-vit B12 2.5-25-2 mg  1 tablet Per OG tube Daily    gabapentin  800 mg Oral QHS    lidocaine   Topical (Top) QID    metoprolol succinate  25 mg Oral Daily    nicotine  1 patch Transdermal Daily    polyethylene glycol  17 g Oral BID    potassium chloride  10 mEq Oral Once    pregabalin  100 mg Oral TID    senna-docusate 8.6-50 mg  1 tablet Oral BID    sodium chloride 0.9%  250 mL Intravenous Once    thiamine  100 mg Oral Daily    ticagrelor  90 mg Oral BID    vancomycin (VANCOCIN) IVPB  1,000 mg Intravenous Q8H     PRN Meds:sodium chloride, cyclobenzaprine, fentaNYL, heparin (PORCINE), heparin (PORCINE), lorazepam, magnesium oxide, magnesium oxide, morphine, morphine, morphine, nitroGLYCERIN, oxyCODONE, oxyCODONE, oxyCODONE, potassium chloride, potassium chloride 10%, potassium chloride 10%, potassium chloride 10%, potassium, sodium phosphates, potassium, sodium phosphates, potassium, sodium phosphates, sodium chloride 0.9%, zolpidem     Objective:     Vital Signs (Most Recent):  Temp: 98 °F (36.7 °C) (03/31/18 0701)  Pulse: 94 (03/31/18 0900)  Resp: (!) 22 (03/31/18 0900)  BP: 112/66 (03/31/18 0900)  SpO2: 100 % (03/31/18 0900) Vital Signs (24h Range):  Temp:  [98 °F (36.7 °C)-100.3 °F (37.9 °C)] 98 °F (36.7 °C)  Pulse:  [] 94  Resp:  [15-29] 22  SpO2:  [96 %-100 %] 100 %  BP: ()/(55-80) 112/66       Date 03/31/18 0700 - 04/01/18 0659   Shift 5543-5516 2421-2991 5480-9679 24 Hour Total   I  N  T  A  K  E   P.O. 0   0    I.V.  (mL/kg) 38.7  (0.6)   38.7  (0.6)    IV Piggyback 200   200    Shift Total  (mL/kg) 238.7  (3.5)   238.7  (3.5)   O  U  T  P  U  T   Shift Total  (mL/kg)       Weight (kg) 68.7 68.7  68.7 68.7       Physical Exam   Constitutional: He appears well-developed and well-nourished. He appears lethargic.   Cardiovascular: Normal rate and regular rhythm.    Left biphasic PT   Musculoskeletal:   Right AKA stump with mild ruby-incisional ischemia  Left foot toes beginning to demarcate   Neurological: He appears lethargic.       Significant Labs:  CBC:   Recent Labs  Lab 03/31/18  0016   WBC 18.97*   RBC 2.23*   HGB 7.0*   HCT 20.6*   *   MCV 92   MCH 31.4*   MCHC 34.0     CMP:   Recent Labs  Lab 03/31/18  0313   GLU 83   CALCIUM 8.2*   ALBUMIN 1.7*   PROT 5.9*   *   K 3.0*   CO2 24      BUN 7   CREATININE 0.6   ALKPHOS 148*   ALT 81*   *   BILITOT 0.7     Coagulation: No results for input(s): LABPROT, INR, APTT in the last 48 hours.    Significant Diagnostics:

## 2018-03-31 NOTE — PROCEDURES
"Leonardo Byrd is a 47 y.o. male patient.    Temp: 98 °F (36.7 °C) (03/31/18 0701)  Pulse: 94 (03/31/18 0900)  Resp: (!) 22 (03/31/18 0900)  BP: 112/66 (03/31/18 0900)  SpO2: 100 % (03/31/18 0900)  Weight: 68.7 kg (151 lb 7.3 oz) (03/31/18 0701)  Height: 5' 7" (170.2 cm) (03/29/18 1955)    PICC  Date/Time: 3/31/2018 9:16 AM  Performed by: CHRISTELLE MATTHEW  Consent Done: Yes  Time out: Immediately prior to procedure a time out was called to verify the correct patient, procedure, equipment, support staff and site/side marked as required  Indications: med administration and vascular access  Local anesthetic: topical anesthetic and lidocaine 1% without epinephrine  Anesthetic Total (mL): 2  Preparation: skin prepped with ChloraPrep  Skin prep agent dried: skin prep agent completely dried prior to procedure  Sterile barriers: all five maximum sterile barriers used - cap, mask, sterile gown, sterile gloves, and large sterile sheet  Hand hygiene: hand hygiene performed prior to central venous catheter insertion  Location details: right basilic  Catheter type: double lumen  Catheter size: 5 Fr  Catheter Length: 37cm    Ultrasound guidance: yes  Vessel Caliber: medium and patent, compressibility normal  Vascular Doppler: not done  Needle advanced into vessel with real time Ultrasound guidance.  Guidewire confirmed in vessel.  Image recorded and saved.  Sterile sheath used.  no esophageal manometryNumber of attempts: 1  Post-procedure: blood return through all ports, chlorhexidine patch and sterile dressing applied    Assessment: placement verified by x-ray  Complications: none        Danni Thomas  3/31/2018  "

## 2018-03-31 NOTE — PLAN OF CARE
Problem: Patient Care Overview  Goal: Plan of Care Review  Outcome: Ongoing (interventions implemented as appropriate)  No acute events overnight. Pt remains lethargic- oriented to self only, uncooperative and aggressive, demonstrates no signs that pt comprehending education regarding POC, his treatment or diagnosis. Refuses PO medications. Cardiology aware that pt with only 1 peripheral IV access- unable to place second peripheral after 5 attempts, midline consult placed for Monday. Cardiology stated will discuss with the team. Unable to administer electrolyte replacements PO or IV, abx not able to be given this morning. VSS- pt voiding spontaneously, had 1 large IC bowel movement. DTI noted to scrotum- scrotum swollen- wound SHOLA, pink, moist. PRN pain medication and ativan given for pain to LLE and R stump- posterior tibial pulse doppler +1. POC reviewed with patient, no family present, will continue to monitor.

## 2018-03-31 NOTE — SUBJECTIVE & OBJECTIVE
Interval History: - NAEON. Pt afebrile. Lethargic and combative. Difficult line placement, obtaining PICC. CT abdomen without other source of infection, remarkable for thrombus at level of aorta-bifemoral graft. Broadened antibiotics to zosyn, appreciate ID input.    Review of Systems   Unable to perform ROS: mental status change     Objective:     Vital Signs (Most Recent):  Temp: 98.7 °F (37.1 °C) (03/31/18 1100)  Pulse: 109 (03/31/18 1200)  Resp: (!) 23 (03/31/18 1200)  BP: 113/63 (03/31/18 1200)  SpO2: 100 % (03/31/18 1200) Vital Signs (24h Range):  Temp:  [98 °F (36.7 °C)-99.5 °F (37.5 °C)] 98.7 °F (37.1 °C)  Pulse:  [] 109  Resp:  [15-29] 23  SpO2:  [96 %-100 %] 100 %  BP: ()/(55-80) 113/63     Weight: 68.7 kg (151 lb 7.3 oz)  Body mass index is 23.72 kg/m².     SpO2: 100 %  O2 Device (Oxygen Therapy): room air      Intake/Output Summary (Last 24 hours) at 03/31/18 1258  Last data filed at 03/31/18 1100   Gross per 24 hour   Intake          1818.22 ml   Output              600 ml   Net          1218.22 ml       Lines/Drains/Airways     Peripherally Inserted Central Catheter Line                 PICC Double Lumen 03/31/18 0919 right basilic less than 1 day          Epidural Line                 Perineural Analgesia/Anesthesia Assessment (using dermatomes) Epidural 03/27/18 1415 3 days          Peripheral Intravenous Line                 Peripheral IV - Single Lumen 03/28/18 1800 Right Hand 2 days         Peripheral IV - Single Lumen 03/29/18 1700 Right Antecubital 1 day         Peripheral IV - Single Lumen 03/30/18 2330 Left Hand less than 1 day                Physical Exam   Constitutional: He is oriented to person, place, and time. He appears well-developed and well-nourished. No distress.   Cardiovascular: Normal rate, regular rhythm and normal heart sounds.    Pulmonary/Chest: Effort normal and breath sounds normal.   Abdominal: He exhibits distension. There is no rebound and no guarding.    Musculoskeletal: Normal range of motion. He exhibits no edema.   Neurological: He is alert and oriented to person, place, and time.   Skin: No rash noted.   Cool left extremity with dusky color. No pulses detected at foot        Significant Labs:     Recent Labs  Lab 03/31/18  1121   WBC 18.27*   RBC 2.41*   HGB 7.4*   HCT 22.1*   *   MCV 92   MCH 30.7   MCHC 33.5       Recent Labs  Lab 03/29/18  0219 03/30/18  0157 03/31/18  0313    136 133*   K 4.1 3.5 3.0*    102 100   CO2 27 24 24   BUN 12 9 7   CREATININE 0.8 0.7 0.6   CALCIUM 8.9 8.3* 8.2*   MG 2.4 1.9 1.6         Significant Imaging: pending CT abdomen

## 2018-04-01 NOTE — ASSESSMENT & PLAN NOTE
- continue trending down   -s/p 7 day course of abx for possible LE cellulitis  -CT abd without source of infection  -Inflammatory response related to necrosis/recent amputation vs cellulitis vs other infxs source  -Covering with broad spectrum antibiotics, will remain broad for now  - restarted on van and ceftriaxone (started 3/25/18) for elevated wbc   - ID consulted, switched Ceftriaxone to Unasyn  (started 3/25/18), switched to Zosyn for failure to improve 3/30

## 2018-04-01 NOTE — PROGRESS NOTES
[facility]  Vascular Surgery  Progress Note    Patient Name: Leonardo Byrd  MRN: [id number]  Admission Date: 3/18/2018  Primary Care Provider: Indio Aquino MD    Subjective:     Interval History: 1 U PRBC transfusing for Hgb 6.9 (7.5); patient remains confused    Post-Op Info:  Procedure(s) (LRB):  AMPUTATION-ABOVE KNEE (Right)   5 Days Post-Op       Medications:  Continuous Infusions:   heparin (porcine) in D5W 21 Units/kg/hr (04/01/18 1000)     Scheduled Meds:   amitriptyline  50 mg Oral QHS    aspirin  81 mg Oral Daily    atorvastatin  40 mg Oral QHS    folic acid-vit B6-vit B12 2.5-25-2 mg  1 tablet Per OG tube Daily    gabapentin  800 mg Oral QHS    lidocaine   Topical (Top) QID    metoprolol succinate  25 mg Oral Daily    nicotine  1 patch Transdermal Daily    piperacillin-tazobactam (ZOSYN) IVPB  4.5 g Intravenous Q8H    polyethylene glycol  17 g Oral BID    pregabalin  100 mg Oral TID    senna-docusate 8.6-50 mg  1 tablet Oral BID    sodium chloride 0.9%  250 mL Intravenous Once    thiamine  100 mg Oral Daily    ticagrelor  90 mg Oral BID    vancomycin (VANCOCIN) IVPB  1,000 mg Intravenous Q8H     PRN Meds:sodium chloride, sodium chloride, cyclobenzaprine, fentaNYL, heparin (PORCINE), heparin (PORCINE), lorazepam, magnesium oxide, magnesium oxide, morphine, morphine, morphine, nitroGLYCERIN, oxyCODONE, oxyCODONE, oxyCODONE, potassium chloride 10%, potassium chloride 10%, potassium chloride 10%, potassium, sodium phosphates, potassium, sodium phosphates, potassium, sodium phosphates, sodium chloride 0.9%, zolpidem     Objective:     Vital Signs (Most Recent):  Temp: 98.1 °F (36.7 °C) (04/01/18 1015)  Pulse: 103 (04/01/18 1015)  Resp: (!) 22 (04/01/18 1015)  BP: 118/66 (04/01/18 1015)  SpO2: (!) 94 % (04/01/18 1015) Vital Signs (24h Range):  Temp:  [97.3 °F (36.3 °C)-99.2 °F (37.3 °C)] 98.1 °F (36.7 °C)  Pulse:  [100-128] 103  Resp:  [17-30] 22  SpO2:  [92 %-100 %] 94  %  BP: ()/(50-79) 118/66       Date 04/01/18 0700 - 04/02/18 0659   Shift 4305-3027 3877-6827 9503-0861 24 Hour Total   I  N  T  A  K  E   P.O. 0   0    I.V.  (mL/kg) 71.8  (1)   71.8  (1)    Blood 300   300    IV Piggyback 250   250    Shift Total  (mL/kg) 621.8  (9)   621.8  (9)   O  U  T  P  U  T   Urine  (mL/kg/hr) 750   750    Shift Total  (mL/kg) 750  (10.9)   750  (10.9)   Weight (kg) 69.1 69.1 69.1 69.1       Physical Exam   Constitutional: He appears well-developed. He appears lethargic.   Cardiovascular: Normal rate and regular rhythm.    Biphasic Left PT   Pulmonary/Chest: Effort normal. No respiratory distress.   Abdominal: Soft.   Musculoskeletal:   Mild ruby-incisional ischemia of R AKA medial incision; no hematoma R AKA stump  Left distal toes with dry gangrene; dorsal ischemia not full thickness   Neurological: He appears lethargic.   Skin: Skin is warm.       Significant Labs:  CBC:   Recent Labs  Lab 04/01/18  0200   WBC 14.78*   RBC 2.23*   HGB 6.9*   HCT 20.8*   *   MCV 93   MCH 30.9   MCHC 33.2     CMP:   Recent Labs  Lab 04/01/18  0315   GLU 85   CALCIUM 8.4*   ALBUMIN 1.7*   PROT 6.0   *   K 3.2*   CO2 23      BUN 6   CREATININE 0.7   ALKPHOS 155*   ALT 99*   *   BILITOT 0.6     Coagulation: No results for input(s): LABPROT, INR, APTT in the last 48 hours.    Significant Diagnostics:      Assessment/Plan:     Limb ischemia    46 yo M with h/o HTN, HLD, HFrEF (10-15% 3/23/18) s/p ICD, CAD s/p PCI, EtOH abuse, PAD s/p ABF 1999 with multiple endovascular interventions presented to hospital on 3/18 with complaints of left foot rest pain s/p lysis now with biphasic left PT signal and L forefoot ischemia as well as RLE ischemia s/p R AKA 5 Days Post-Op     Will monitor R AKA stump; continue elevating and wrapping  PT/OT; NWB RLE; heel touch only LLE  Left toes beginning to demarcate; recommend daily betadiene paint to left toes  On heparin gtt; receiving 1 U PRBC for  Hgb 6.9 (7.5)  Vascular surgery to follow            Yodit Pierce MD  Vascular Surgery  [facility]

## 2018-04-01 NOTE — SUBJECTIVE & OBJECTIVE
Medications:  Continuous Infusions:   heparin (porcine) in D5W 21 Units/kg/hr (04/01/18 1000)     Scheduled Meds:   amitriptyline  50 mg Oral QHS    aspirin  81 mg Oral Daily    atorvastatin  40 mg Oral QHS    folic acid-vit B6-vit B12 2.5-25-2 mg  1 tablet Per OG tube Daily    gabapentin  800 mg Oral QHS    lidocaine   Topical (Top) QID    metoprolol succinate  25 mg Oral Daily    nicotine  1 patch Transdermal Daily    piperacillin-tazobactam (ZOSYN) IVPB  4.5 g Intravenous Q8H    polyethylene glycol  17 g Oral BID    pregabalin  100 mg Oral TID    senna-docusate 8.6-50 mg  1 tablet Oral BID    sodium chloride 0.9%  250 mL Intravenous Once    thiamine  100 mg Oral Daily    ticagrelor  90 mg Oral BID    vancomycin (VANCOCIN) IVPB  1,000 mg Intravenous Q8H     PRN Meds:sodium chloride, sodium chloride, cyclobenzaprine, fentaNYL, heparin (PORCINE), heparin (PORCINE), lorazepam, magnesium oxide, magnesium oxide, morphine, morphine, morphine, nitroGLYCERIN, oxyCODONE, oxyCODONE, oxyCODONE, potassium chloride 10%, potassium chloride 10%, potassium chloride 10%, potassium, sodium phosphates, potassium, sodium phosphates, potassium, sodium phosphates, sodium chloride 0.9%, zolpidem     Objective:     Vital Signs (Most Recent):  Temp: 98.1 °F (36.7 °C) (04/01/18 1015)  Pulse: 103 (04/01/18 1015)  Resp: (!) 22 (04/01/18 1015)  BP: 118/66 (04/01/18 1015)  SpO2: (!) 94 % (04/01/18 1015) Vital Signs (24h Range):  Temp:  [97.3 °F (36.3 °C)-99.2 °F (37.3 °C)] 98.1 °F (36.7 °C)  Pulse:  [100-128] 103  Resp:  [17-30] 22  SpO2:  [92 %-100 %] 94 %  BP: ()/(50-79) 118/66       Date 04/01/18 0700 - 04/02/18 0659   Shift 0438-7372 0850-2528 2730-2179 24 Hour Total   I  N  T  A  K  E   P.O. 0   0    I.V.  (mL/kg) 71.8  (1)   71.8  (1)    Blood 300   300    IV Piggyback 250   250    Shift Total  (mL/kg) 621.8  (9)   621.8  (9)   O  U  T  P  U  T   Urine  (mL/kg/hr) 750   750    Shift Total  (mL/kg) 750  (10.9)    750  (10.9)   Weight (kg) 69.1 69.1 69.1 69.1       Physical Exam   Constitutional: He appears well-developed. He appears lethargic.   Cardiovascular: Normal rate and regular rhythm.    Biphasic Left PT   Pulmonary/Chest: Effort normal. No respiratory distress.   Abdominal: Soft.   Musculoskeletal:   Mild ruby-incisional ischemia of R AKA medial incision; no hematoma R AKA stump  Left distal toes with dry gangrene; dorsal ischemia not full thickness   Neurological: He appears lethargic.   Skin: Skin is warm.       Significant Labs:  CBC:   Recent Labs  Lab 04/01/18  0200   WBC 14.78*   RBC 2.23*   HGB 6.9*   HCT 20.8*   *   MCV 93   MCH 30.9   MCHC 33.2     CMP:   Recent Labs  Lab 04/01/18  0315   GLU 85   CALCIUM 8.4*   ALBUMIN 1.7*   PROT 6.0   *   K 3.2*   CO2 23      BUN 6   CREATININE 0.7   ALKPHOS 155*   ALT 99*   *   BILITOT 0.6     Coagulation: No results for input(s): LABPROT, INR, APTT in the last 48 hours.    Significant Diagnostics:

## 2018-04-01 NOTE — ASSESSMENT & PLAN NOTE
Mr. Byrd is a 47 year old with PMH of ICM s/p ICD, CAD s/p PCI, HTN, HLD, PAD (s/p aortobifemoral bypass, L SFA and pop PTAS in September and recent R SFA and R pop 3/8 by Dr. Carl Bell) who presented to [facility] with critical limb ischemia on 3/18. Evaluated by interventional cardiology and underwent catheter directed tPA on 3/20. On vancomycin and unasyn for LLE cellulitis. Pt now with RLE occlusion of distal right superficial femoral, popliteal, anterior tibial, and posterior tibial arteries. vascular surgery performed right AKA 3/27.  Podiatry following LLE, monitoring closely and awaiting demarcation of necrosis of LLE as possibly will have TMA or BKA.  Blood cultures NGTD.  Persistent leukocytosis - suspect secondary to stress response for AKA and ischemia.  ABD exam shows significant tenderness especially in lower quadrants and superpubic area - ? Cause and underlying pathology.  Left foot appears worse and may require further vascular intervention.  Suspect CT C/A/P thrombosis findings imply poor prognosis.     Afebrile today.  Lethargy waxing and waning but improved.  PICC placed 3/31.  Vanc trough low yesterday.  WBC improved prior to blood transfusion last night - suspect increased due to transfusion.    Plan  1.Continue IV vancomycin but decrease dose to 1g q8hr.  vanc trough goal 15-20. Vanc trough today. .   2. Continue Zosyn for pseudomonas coverage  3. FU vasc Sx and podiatry recs  4. L foot appears worse. Vascular and podiatry following closely. May need left TMA /bka if without improvement  5. Seen with ID staff- wll follow.

## 2018-04-01 NOTE — PROGRESS NOTES
[facility]  Infectious Disease  Progress Note    Patient Name: Leonardo Byrd  MRN: [id number]  Admission Date: 3/18/2018  Length of Stay: 14 days  Attending Physician: Guillermo Archer MD  Primary Care Provider: Indio Aquino MD    Isolation Status: No active isolations  Assessment/Plan:      * Critical lower limb ischemia    Mr. Byrd is a 47 year old with PMH of ICM s/p ICD, CAD s/p PCI, HTN, HLD, PAD (s/p aortobifemoral bypass, L SFA and pop PTAS in September and recent R SFA and R pop 3/8 by Dr. Carl Bell) [place] [facility] [place]. Evaluated by interventional cardiology and underwent catheter directed tPA on 3/20. On vancomycin and unasyn for LLE cellulitis. Pt now with RLE occlusion of distal right superficial femoral, popliteal, anterior tibial, and posterior tibial arteries. vascular surgery performed right AKA 3/27.  Podiatry following LLE, monitoring closely and awaiting demarcation of necrosis of LLE as possibly will have TMA or BKA.  Blood cultures NGTD.  Persistent leukocytosis - suspect secondary to stress response for AKA and ischemia.  ABD exam shows significant tenderness especially in lower quadrants and superpubic area - ? Cause and underlying pathology.  Left foot appears worse and may require further vascular intervention.  Suspect CT C/A/P thrombosis findings imply poor prognosis.     Afebrile today.  Lethargy waxing and waning but improved.  PICC placed 3/31.  Vanc trough low yesterday.  WBC improved prior to blood transfusion last night - suspect increased due to transfusion.    Plan  1.Continue IV vancomycin but decrease dose to 1g q8hr.  vanc trough goal 15-20. Vanc trough today. .   2. Continue Zosyn for pseudomonas coverage  3. FU vasc Sx and podiatry recs  4. L foot appears worse. Vascular and podiatry following closely. May need left TMA /bka if without improvement  5. Seen with ID staff- wll follow.             Anticipated  "Disposition: tbd    Thank you for your consult. I will follow-up with patient. Please contact us if you have any additional questions.    RITA Terry  Infectious Disease  [facility]    Subjective:     Principal Problem:Critical lower limb ischemia    HPI: Mr. Byrd is a 47 year old with PMH of ICM s/p ICD, CAD s/p PCI, HTN, HLD, PAD (s/p aortobifemoral bypass, L SFA and pop PTAS in September and recent R SFA and R pop 3/8 by Dr. Carl Bell) who [place] [facility] [place] with [place] ischemia on 3/18. Evaluated by interventional cardiology and underwent catheter directed tPA on 3/20, admitted to CCU for close monitoring. Also with DTs requiring scheduled benzos. Pt was given vancomycin for left foot cellulitis. Pt was intubated for airway protection in setting of increasing benzo requirements and worsening agitation.  Pt now extubated.  Pt was noted to have more duskiness in appearance of left leg and absent pulses.US lower ext showing occlusion of distal right superficial femoral, popliteal, anterior tibial, and posterior tibial arteries. No "dopplerable" pulses on rt foot. Interventional cards holding off on on acute intervention currently in setting of RYAN. Pt afebrile, WBC 17K today. Complains of severe pain of lower extremities. Denies having any cough, n/v/d,dysuria.   Interval History: No AEON.  Afebrile and WBC improved to 14.78 last night now 23.86. Lethargic but awakes more and more easily this am.  The patient denies any recent fever, chills, or sweats.      Review of Systems   Constitutional: Negative for chills, diaphoresis and fever.   Respiratory: Negative for shortness of breath.    Cardiovascular: Negative for chest pain.   Gastrointestinal: Negative for nausea and rectal pain.   Genitourinary: Negative for dysuria and hematuria.     Objective:     Vital Signs (Most Recent):  Temp: 98.4 °F (36.9 °C) (04/01/18 1100)  Pulse: (!) 116 (04/01/18 1200)  Resp: 16 (04/01/18 " 1200)  BP: (!) 108/56 (04/01/18 1200)  SpO2: 96 % (04/01/18 1200) Vital Signs (24h Range):  Temp:  [97.3 °F (36.3 °C)-99.2 °F (37.3 °C)] 98.4 °F (36.9 °C)  Pulse:  [103-128] 116  Resp:  [16-30] 16  SpO2:  [92 %-100 %] 96 %  BP: ()/(50-79) 108/56     Weight: 69.1 kg (152 lb 5.4 oz)  Body mass index is 23.86 kg/m².    Estimated Creatinine Clearance: 119.3 mL/min (based on SCr of 0.7 mg/dL).    Physical Exam   Constitutional: He appears well-developed and well-nourished. No distress.   Cardiovascular: Regular rhythm.  Exam reveals gallop. Exam reveals no friction rub.    No murmur heard.  tachycardic   Pulmonary/Chest: Effort normal. No respiratory distress. He has no wheezes. He has no rales.   Abdominal: Soft. He exhibits no distension. There is no tenderness. There is no rigidity and no guarding.       Musculoskeletal: He exhibits tenderness (left foot).   Neurological:   Lethargic to much more arousable to answering questions   Skin: Skin is warm. No rash noted. He is not diaphoretic. There is erythema. No pallor.   LLE cool to touch. Absent pulses.  Foot looks more dusky    Psychiatric: He has a normal mood and affect.   Nursing note and vitals reviewed.      Significant Labs:   Blood Culture:   Recent Labs  Lab 03/20/18  2219 03/20/18  2233 03/26/18  0148 03/27/18  2148 03/27/18  2212   LABBLOO No growth after 5 days. No growth after 5 days. No growth after 5 days. No Growth to date  No Growth to date  No Growth to date  No Growth to date  No Growth to date No Growth to date  No Growth to date  No Growth to date  No Growth to date  No Growth to date     CBC:   Recent Labs  Lab 03/31/18  1850 04/01/18  0200 04/01/18  1134   WBC 16.76* 14.78* 23.86*   HGB 7.5* 6.9* 8.7*   HCT 22.2* 20.8* 25.5*   * 572* 614*     CMP:   Recent Labs  Lab 03/31/18 0313 04/01/18 0315 04/01/18  1134   * 135*  --    K 3.0* 3.2* 4.2    102  --    CO2 24 23  --    GLU 83 85  --    BUN 7 6  --    CREATININE  0.6 0.7  --    CALCIUM 8.2* 8.4*  --    PROT 5.9* 6.0  --    ALBUMIN 1.7* 1.7*  --    BILITOT 0.7 0.6  --    ALKPHOS 148* 155*  --    * 257*  --    ALT 81* 99*  --    ANIONGAP 9 10  --    EGFRNONAA >60.0 >60.0  --      Wound Culture: No results for input(s): LABAERO in the last 4320 hours.  All pertinent labs within the past 24 hours have been reviewed.    Significant Imaging: I have reviewed all pertinent imaging results/findings within the past 24 hours.   X-Ray Chest 1 View for PICC_Central line [[id number]] Resulted: 03/31/18 1035   Order Status: Completed Updated: 03/31/18 1038   Narrative:     EXAMINATION:  XR CHEST 1 VIEW    CLINICAL HISTORY:  Evaluate PICC line placement;    TECHNIQUE:  Single frontal view of the chest was performed.    COMPARISON:  Chest radiograph from 03/25/2018    FINDINGS:  Interval placement of right-sided PICC line catheter with its tip in the inferior SVC.    Mild cardiomegaly.  Stable AICD.  Prominence of the pulmonary vasculature with increased interstitial lung markings suggestive of pulmonary edema, slightly worsened when compared to prior examination.  No pneumothorax.  Osseous structures are unremarkable.   Impression:       As above.      Electronically signed by: Po Armstrong MD  Date: 03/31/2018  Time: 10:35   CT Abdomen Pelvis With Contrast [[id number]] (Abnormal) Resulted: 03/30/18 2001   Order Status: Completed Updated: 03/30/18 2004   Narrative:     EXAMINATION:  CT ABDOMEN PELVIS WITH CONTRAST    CLINICAL HISTORY:  Abdominal pain, unspecified;Sepsis; Progressive distension, increasing leukocytosis, extensive PAD s/p R AKA and with necrotic L foot, other abd source?;    TECHNIQUE:  Low dose axial images, sagittal and coronal reformations were obtained from the lung bases to the pubic symphysis following the IV administration of 75 mL of Omnipaque 350    COMPARISON:  No dedicated priors    FINDINGS:  ABDOMEN:    - Lung bases: There is motion artifact which  obscures the underlying pulmonary parenchyma.  Mild dependent atelectasis.  Suggestion of ground-glass pulmonary nodules is noted in the lungs with the largest possible nodule in the right lower lobe measuring 1.2 x 1.4 cm, as seen on series 2, image 7.  For a ground glass nodule 6 mm or larger, Fleischner Society 2017 guidelines recommend follow up with non-contrast chest CT at 6-12 months after discovery. If this nodule persists at that time, additional follow up with non-contrast chest CT is recommended every 2 years until 5 years of stability have been documented.    -heart: No pericardial effusion.  There is partial visualization of cardiac pacing device leads.  Moderate coronary artery atherosclerosis.  Heart is upper limits of normal in size.    - Liver: No masses.  Enhancement appears unremarkable.    - Gallbladder: Prominence without significant thickening of the gallbladder wall.  No sludge or stones noted.    - Bile Ducts: No evidence of intra or extra hepatic biliary ductal dilation.    - Spleen: Spleen and adjacent splenules noted to have an unremarkable appearance.    - Kidneys: Prominent extrarenal pelves bilaterally.  The right ureter is somewhat more prominent than left but without evidence of obstructing ureterolith or overt hydronephrosis.    - Adrenals: Unremarkable.    - Pancreas: There is a small hypodensity about the head of the pancreas measuring 0.6 cm as seen on series 2, image 41.  This is nonspecific and may represent volume averaging, a side branch IPMN, or sequela of prior pancreatitis in this patient with a reported history of alcohol use disorder.  Otherwise pancreatic parenchyma and pancreatic duct appear unremarkable.    - Retroperitoneum:  Vascular calcifications in the kidneys bilaterally.    - Vascular: Changes of aortobifemoral bypass grafting are noted with significant mural thrombus in the aortic portion of the bypass graft.  The native aorta, common iliac, internal and  external iliac arteries are entirely thrombosed and small sized.  The aortofemoral bypass graft on the right shows moderate thrombus or noncalcified atherosclerosis.    Possible severe stenosis at the right aortofemoral bypass junction, poorly evaluated secondary to exam technique.  Partially imaged stent at the proximal left femoral artery.  Correlation with other films not available in the picture archive system is recommended.    - Abdominal wall:  Mild body wall edema.    PELVIS: Urinary bladder is moderately distended without wall thickening or adjacent inflammatory change.  Prostate and seminal vesicles are within normal limits.    No evidence of small-bowel obstruction, wall thickening, or local inflammation.  Colon is unremarkable with no evidence of inflammation, wall thickening, or diverticulosis.  Rectal fat planes are preserved.  No lymphadenopathy.    BONES:  Grossly stable grade 1 anterolisthesis of L5 on S1 secondary to remote bilateral L5 pars defects.  No acute osseous abnormality and no suspicious lytic or blastic lesion.   Impression:       Bilateral ground-glass pulmonary nodules largest measuring 1.2 x 1.4 cm.  These are nonspecific and may be infectious, inflammatory, or neoplastic.  For a ground glass nodule 6 mm or larger, Fleischner Society 2017 guidelines recommend follow up with non-contrast chest CT at 6-12 months after discovery. If this nodule persists at that time, additional follow up with non-contrast chest CT is recommended every 2 years until 5 years of stability have been documented.    Changes of aortobifemoral bypass grafting noted with significant mural thrombus in the bypass graft and concern for severe stenosis at the junction of the bypass and the femoral artery on the right.  Recommend correlation with other imaging not available in Radiology picture archive system.  Native aorta and also common iliacs and branch vessels are completely thrombosed and atrophic.    Bilateral  extrarenal pelves with prominence of the right greater than left ureters, without evidence of ureterolith.    Moderately distended urinary bladder without adjacent inflammatory change or associated hydronephrosis, nonspecific.  Correlate clinically.    Small hypodense focus about the head of the pancreas measuring 0.6 cm as seen on series 2, image 41.  This is nonspecific.  May represent a side branch IPMN or a pseudocyst.    Moderate coronary artery atherosclerosis, vascular calcifications in the kidney, splenules, and other incidental findings as above.    This report was flagged in Epic as abnormal.    Electronically signed by resident: Po Wong  Date: 03/30/2018  Time: 19:08    Electronically signed by: Leonardo Beasley MD  Date: 03/30/2018  Time: 20:01   X-Ray Foot Complete Left [[id number]] Resulted: 03/29/18 1325   Order Status: Completed Updated: 03/29/18 1327   Narrative:     EXAMINATION:  XR FOOT COMPLETE 3 VIEW LEFT    CLINICAL HISTORY:  Ischemic changes to left foot;    FINDINGS:  No fracture dislocation bone destruction seen.   Impression:       See above      Electronically signed by: Moe Chris MD  Date: 03/29/2018  Time: 13:25   X-Ray Foot Complete Right [[id number]] (Abnormal) Resulted: 03/26/18 0750   Order Status: Completed Updated: 03/26/18 0753   Narrative:     EXAMINATION:  XR FOOT COMPLETE 3 VIEW RIGHT    CLINICAL HISTORY:  ischemic changes to forefoot, sucpisious for soft tissue gas;.    TECHNIQUE:  AP, lateral, and oblique views of the right foot were performed.    COMPARISON:  December 20, 2016.    FINDINGS:  Patient is status post resection of the distal aspect of the proximal phalanx and distal phalanx of the great toe.  No bony destruction seen.  There is a metallic wire density measuring about 8 cm projected over the medial soft tissues at the level of the ankle.  No bony destruction.  No convincing soft tissue gas.   Impression:       Linear metallic density projected over the  medial posterior soft tissues at the level of the ankle.  Correlation for foreign body suggested.    Postop changes with resection of the distal great toe.  No convincing soft tissue gas.    This report was flagged in Epic as abnormal..      Electronically signed by: Nii Dai MD  Date: 03/26/2018  Time: 07:50

## 2018-04-01 NOTE — SUBJECTIVE & OBJECTIVE
Interval History: No AEON.  Afebrile and WBC improved to 14.78 last night now 23.86. Lethargic but awakes more and more easily this am.  The patient denies any recent fever, chills, or sweats.      Review of Systems   Constitutional: Negative for chills, diaphoresis and fever.   Respiratory: Negative for shortness of breath.    Cardiovascular: Negative for chest pain.   Gastrointestinal: Negative for nausea and rectal pain.   Genitourinary: Negative for dysuria and hematuria.     Objective:     Vital Signs (Most Recent):  Temp: 98.4 °F (36.9 °C) (04/01/18 1100)  Pulse: (!) 116 (04/01/18 1200)  Resp: 16 (04/01/18 1200)  BP: (!) 108/56 (04/01/18 1200)  SpO2: 96 % (04/01/18 1200) Vital Signs (24h Range):  Temp:  [97.3 °F (36.3 °C)-99.2 °F (37.3 °C)] 98.4 °F (36.9 °C)  Pulse:  [103-128] 116  Resp:  [16-30] 16  SpO2:  [92 %-100 %] 96 %  BP: ()/(50-79) 108/56     Weight: 69.1 kg (152 lb 5.4 oz)  Body mass index is 23.86 kg/m².    Estimated Creatinine Clearance: 119.3 mL/min (based on SCr of 0.7 mg/dL).    Physical Exam   Constitutional: He appears well-developed and well-nourished. No distress.   Cardiovascular: Regular rhythm.  Exam reveals gallop. Exam reveals no friction rub.    No murmur heard.  tachycardic   Pulmonary/Chest: Effort normal. No respiratory distress. He has no wheezes. He has no rales.   Abdominal: Soft. He exhibits no distension. There is no tenderness. There is no rigidity and no guarding.       Musculoskeletal: He exhibits tenderness (left foot).   Neurological:   Lethargic to much more arousable to answering questions   Skin: Skin is warm. No rash noted. He is not diaphoretic. There is erythema. No pallor.   LLE cool to touch. Absent pulses.  Foot looks more dusky    Psychiatric: He has a normal mood and affect.   Nursing note and vitals reviewed.      Significant Labs:   Blood Culture:   Recent Labs  Lab 03/20/18  2219 03/20/18  2233 03/26/18  0148 03/27/18  2148 03/27/18  2212   LABBLOO No  growth after 5 days. No growth after 5 days. No growth after 5 days. No Growth to date  No Growth to date  No Growth to date  No Growth to date  No Growth to date No Growth to date  No Growth to date  No Growth to date  No Growth to date  No Growth to date     CBC:   Recent Labs  Lab 03/31/18  1850 04/01/18  0200 04/01/18  1134   WBC 16.76* 14.78* 23.86*   HGB 7.5* 6.9* 8.7*   HCT 22.2* 20.8* 25.5*   * 572* 614*     CMP:   Recent Labs  Lab 03/31/18  0313 04/01/18  0315 04/01/18  1134   * 135*  --    K 3.0* 3.2* 4.2    102  --    CO2 24 23  --    GLU 83 85  --    BUN 7 6  --    CREATININE 0.6 0.7  --    CALCIUM 8.2* 8.4*  --    PROT 5.9* 6.0  --    ALBUMIN 1.7* 1.7*  --    BILITOT 0.7 0.6  --    ALKPHOS 148* 155*  --    * 257*  --    ALT 81* 99*  --    ANIONGAP 9 10  --    EGFRNONAA >60.0 >60.0  --      Wound Culture: No results for input(s): LABAERO in the last 4320 hours.  All pertinent labs within the past 24 hours have been reviewed.    Significant Imaging: I have reviewed all pertinent imaging results/findings within the past 24 hours.   X-Ray Chest 1 View for PICC_Central line [[id number]] Resulted: 03/31/18 1035   Order Status: Completed Updated: 03/31/18 1038   Narrative:     EXAMINATION:  XR CHEST 1 VIEW    CLINICAL HISTORY:  Evaluate PICC line placement;    TECHNIQUE:  Single frontal view of the chest was performed.    COMPARISON:  Chest radiograph from 03/25/2018    FINDINGS:  Interval placement of right-sided PICC line catheter with its tip in the inferior SVC.    Mild cardiomegaly.  Stable AICD.  Prominence of the pulmonary vasculature with increased interstitial lung markings suggestive of pulmonary edema, slightly worsened when compared to prior examination.  No pneumothorax.  Osseous structures are unremarkable.   Impression:       As above.      Electronically signed by: Po Armstrong MD  Date: 03/31/2018  Time: 10:35   CT Abdomen Pelvis With Contrast [[id number]]  (Abnormal) Resulted: 03/30/18 2001   Order Status: Completed Updated: 03/30/18 2004   Narrative:     EXAMINATION:  CT ABDOMEN PELVIS WITH CONTRAST    CLINICAL HISTORY:  Abdominal pain, unspecified;Sepsis; Progressive distension, increasing leukocytosis, extensive PAD s/p R AKA and with necrotic L foot, other abd source?;    TECHNIQUE:  Low dose axial images, sagittal and coronal reformations were obtained from the lung bases to the pubic symphysis following the IV administration of 75 mL of Omnipaque 350    COMPARISON:  No dedicated priors    FINDINGS:  ABDOMEN:    - Lung bases: There is motion artifact which obscures the underlying pulmonary parenchyma.  Mild dependent atelectasis.  Suggestion of ground-glass pulmonary nodules is noted in the lungs with the largest possible nodule in the right lower lobe measuring 1.2 x 1.4 cm, as seen on series 2, image 7.  For a ground glass nodule 6 mm or larger, Fleischner Society 2017 guidelines recommend follow up with non-contrast chest CT at 6-12 months after discovery. If this nodule persists at that time, additional follow up with non-contrast chest CT is recommended every 2 years until 5 years of stability have been documented.    -heart: No pericardial effusion.  There is partial visualization of cardiac pacing device leads.  Moderate coronary artery atherosclerosis.  Heart is upper limits of normal in size.    - Liver: No masses.  Enhancement appears unremarkable.    - Gallbladder: Prominence without significant thickening of the gallbladder wall.  No sludge or stones noted.    - Bile Ducts: No evidence of intra or extra hepatic biliary ductal dilation.    - Spleen: Spleen and adjacent splenules noted to have an unremarkable appearance.    - Kidneys: Prominent extrarenal pelves bilaterally.  The right ureter is somewhat more prominent than left but without evidence of obstructing ureterolith or overt hydronephrosis.    - Adrenals: Unremarkable.    - Pancreas: There is  a small hypodensity about the head of the pancreas measuring 0.6 cm as seen on series 2, image 41.  This is nonspecific and may represent volume averaging, a side branch IPMN, or sequela of prior pancreatitis in this patient with a reported history of alcohol use disorder.  Otherwise pancreatic parenchyma and pancreatic duct appear unremarkable.    - Retroperitoneum:  Vascular calcifications in the kidneys bilaterally.    - Vascular: Changes of aortobifemoral bypass grafting are noted with significant mural thrombus in the aortic portion of the bypass graft.  The native aorta, common iliac, internal and external iliac arteries are entirely thrombosed and small sized.  The aortofemoral bypass graft on the right shows moderate thrombus or noncalcified atherosclerosis.    Possible severe stenosis at the right aortofemoral bypass junction, poorly evaluated secondary to exam technique.  Partially imaged stent at the proximal left femoral artery.  Correlation with other films not available in the picture archive system is recommended.    - Abdominal wall:  Mild body wall edema.    PELVIS: Urinary bladder is moderately distended without wall thickening or adjacent inflammatory change.  Prostate and seminal vesicles are within normal limits.    No evidence of small-bowel obstruction, wall thickening, or local inflammation.  Colon is unremarkable with no evidence of inflammation, wall thickening, or diverticulosis.  Rectal fat planes are preserved.  No lymphadenopathy.    BONES:  Grossly stable grade 1 anterolisthesis of L5 on S1 secondary to remote bilateral L5 pars defects.  No acute osseous abnormality and no suspicious lytic or blastic lesion.   Impression:       Bilateral ground-glass pulmonary nodules largest measuring 1.2 x 1.4 cm.  These are nonspecific and may be infectious, inflammatory, or neoplastic.  For a ground glass nodule 6 mm or larger, Fleischner Society 2017 guidelines recommend follow up with  non-contrast chest CT at 6-12 months after discovery. If this nodule persists at that time, additional follow up with non-contrast chest CT is recommended every 2 years until 5 years of stability have been documented.    Changes of aortobifemoral bypass grafting noted with significant mural thrombus in the bypass graft and concern for severe stenosis at the junction of the bypass and the femoral artery on the right.  Recommend correlation with other imaging not available in Radiology picture archive system.  Native aorta and also common iliacs and branch vessels are completely thrombosed and atrophic.    Bilateral extrarenal pelves with prominence of the right greater than left ureters, without evidence of ureterolith.    Moderately distended urinary bladder without adjacent inflammatory change or associated hydronephrosis, nonspecific.  Correlate clinically.    Small hypodense focus about the head of the pancreas measuring 0.6 cm as seen on series 2, image 41.  This is nonspecific.  May represent a side branch IPMN or a pseudocyst.    Moderate coronary artery atherosclerosis, vascular calcifications in the kidney, splenules, and other incidental findings as above.    This report was flagged in Epic as abnormal.    Electronically signed by resident: Po Wong  Date: 03/30/2018  Time: 19:08    Electronically signed by: Leonardo Beasley MD  Date: 03/30/2018  Time: 20:01   X-Ray Foot Complete Left [[id number]] Resulted: 03/29/18 1325   Order Status: Completed Updated: 03/29/18 1327   Narrative:     EXAMINATION:  XR FOOT COMPLETE 3 VIEW LEFT    CLINICAL HISTORY:  Ischemic changes to left foot;    FINDINGS:  No fracture dislocation bone destruction seen.   Impression:       See above      Electronically signed by: Moe Chris MD  Date: 03/29/2018  Time: 13:25   X-Ray Foot Complete Right [[id number]] (Abnormal) Resulted: 03/26/18 4611   Order Status: Completed Updated: 03/26/18 1939   Narrative:     EXAMINATION:  XR FOOT  COMPLETE 3 VIEW RIGHT    CLINICAL HISTORY:  ischemic changes to forefoot, sucpisious for soft tissue gas;.    TECHNIQUE:  AP, lateral, and oblique views of the right foot were performed.    COMPARISON:  December 20, 2016.    FINDINGS:  Patient is status post resection of the distal aspect of the proximal phalanx and distal phalanx of the great toe.  No bony destruction seen.  There is a metallic wire density measuring about 8 cm projected over the medial soft tissues at the level of the ankle.  No bony destruction.  No convincing soft tissue gas.   Impression:       Linear metallic density projected over the medial posterior soft tissues at the level of the ankle.  Correlation for foreign body suggested.    Postop changes with resection of the distal great toe.  No convincing soft tissue gas.    This report was flagged in Epic as abnormal..      Electronically signed by: Nii Dai MD  Date: 03/26/2018  Time: 07:50

## 2018-04-01 NOTE — ASSESSMENT & PLAN NOTE
48 yo M with h/o HTN, HLD, HFrEF (10-15% 3/23/18) s/p ICD, CAD s/p PCI, EtOH abuse, PAD s/p ABF 1999 with multiple endovascular interventions presented to hospital on 3/18 with complaints of left foot rest pain s/p lysis now with biphasic left PT signal and L forefoot ischemia as well as RLE ischemia s/p R AKA 5 Days Post-Op     Will monitor R AKA stump; continue elevating and wrapping  PT/OT; NWB RLE; heel touch only LLE  Left toes beginning to demarcate; recommend daily betadiene paint to left toes  On heparin gtt; receiving 1 U PRBC for Hgb 6.9 (7.5)  Vascular surgery to follow

## 2018-04-01 NOTE — NURSING
0815 - PRBC transfusion initiated per orders. VS as documented. Family at bedside made aware of rationale.     0830 - No s/s transfusion reaction. VS as documented. Family remains at bedside.     1015 - Blood transfusion completed, volume total as documented. Patient with no s/s transfusion reaction. PICC line flushed post transfusion. Will recheck CBC, along with Anti-xa, Potassium, Magnesium 1 hr post transfusion.    Alysha Tay RN

## 2018-04-01 NOTE — SUBJECTIVE & OBJECTIVE
Interval History:- 4/1: NAEON. Pt afebrile. BLE pain controlled with pain meds. WBC trending down after switching Unasyn to Zosyn. Left toes gangrene continue to demarcates. Vascular following, likely will need amputation. received 1 PRBC for Hg 6.9     Review of Systems   Unable to perform ROS: mental status change     Objective:     Vital Signs (Most Recent):  Temp: 98.4 °F (36.9 °C) (04/01/18 1100)  Pulse: (!) 116 (04/01/18 1200)  Resp: 16 (04/01/18 1200)  BP: (!) 108/56 (04/01/18 1200)  SpO2: 96 % (04/01/18 1200) Vital Signs (24h Range):  Temp:  [97.3 °F (36.3 °C)-99.2 °F (37.3 °C)] 98.4 °F (36.9 °C)  Pulse:  [103-128] 116  Resp:  [16-30] 16  SpO2:  [92 %-100 %] 96 %  BP: ()/(50-79) 108/56     Weight: 69.1 kg (152 lb 5.4 oz)  Body mass index is 23.86 kg/m².     SpO2: 96 %  O2 Device (Oxygen Therapy): nasal cannula      Intake/Output Summary (Last 24 hours) at 04/01/18 1230  Last data filed at 04/01/18 1200   Gross per 24 hour   Intake          2008.95 ml   Output             2250 ml   Net          -241.05 ml       Lines/Drains/Airways     Peripherally Inserted Central Catheter Line                 PICC Double Lumen 03/31/18 0919 right basilic 1 day          Peripheral Intravenous Line                 Peripheral IV - Single Lumen 03/30/18 2330 Left Hand 1 day                Physical Exam   Constitutional: He is oriented to person, place, and time. He appears well-developed and well-nourished. No distress.   Cardiovascular: Normal rate, regular rhythm and normal heart sounds.    Pulmonary/Chest: Effort normal and breath sounds normal.   Abdominal: He exhibits distension. There is no rebound and no guarding.   Musculoskeletal: Normal range of motion. He exhibits no edema.   Neurological: He is alert and oriented to person, place, and time.   Skin: No rash noted.   Cool left extremity with dusky color. No pulses detected at foot        Significant Labs:     Recent Labs  Lab 04/01/18  1134   WBC 23.86*   RBC  2.80*   HGB 8.7*   HCT 25.5*   *   MCV 91   MCH 31.1*   MCHC 34.1       Recent Labs  Lab 03/30/18  0157 03/31/18 0313 04/01/18 0315 04/01/18  1134    133* 135*  --    K 3.5 3.0* 3.2* 4.2    100 102  --    CO2 24 24 23  --    BUN 9 7 6  --    CREATININE 0.7 0.6 0.7  --    CALCIUM 8.3* 8.2* 8.4*  --    MG 1.9 1.6 1.6 1.8         Significant Imaging: none

## 2018-04-01 NOTE — NURSING
Patient with 3 episodes of brief bradycardia. Nonsustaining. Noted to return to baseline HR around 100-110 spontaneously. No noted associated s/s distress. Respirations regular/nonlabored. Pt remains on nasal cannula to ensure SPO2 > 92% while sleeping. Cardiology resident, Chan LOVE made aware. No new orders obtained. To monitor.     Alysha Tay RN

## 2018-04-01 NOTE — PROGRESS NOTES
"[facility]  Cardiology  Progress Note    Patient Name: Leonardo Byrd  MRN: [id number]  Admission Date: 3/18/2018  Hospital Length of Stay: 14 days  Code Status: Full Code   Attending Physician: Guillermo Archer MD   Primary Care Physician: Indio Aquino MD  Expected Discharge Date: 4/2/2018  Principal Problem:Critical lower limb ischemia    Subjective:     Hospital Course:   Admitted to CCU 3/20 after cath directed tPA was performed by int cards under general anesthesia. Also with DTs requiring scheduled benzos. On day 2 Lt lower extremity remained pulseless, so thrombolysis still infusing.On day 4 of vanc for possible lt foot cellulitis. Transitioned to clinda IV (unable to take PO safely). Intubated overnight for airway protection in setting of increasing benzo requirements and worsening agitation and danger of pt pulling line. CXR this AM (3/22) with patchy bilateral infiltrates. Taken to cath lab for possible cath removal. Lt leg then reportedly duskier and with absent pulses. On 3/23 taken back to cath lab. Int cards Recc Podiatry consult for possible L trans-metatarsal amputation. On heparin gtt. 3/24 Wbc trending up, foot color getting darker,paitent extubated, continue heparin. 3/25: US lower ext showing occlusion of distal right superficial femoral, popliteal, anterior tibial, and posterior tibial arteries. No "dopplerable" pulses on rt foot. Interventional cards holding off on on acute intervention currently in setting of RYAN. Will continue to monitor. Cr 1.7 (baseline 0.6). Given 500cc Iv fluids. Started on scheduled librium for alcohol withdrawal, with plans for taper.    -3/26/18 NAEON. Pt. With elevated WBC. On vanc and ceftriaxone. ID consulted. Pain management for BLE ischemia   -3/27: NAEON. BLE pain improved after pain management adjustment. Vascular surgery evaluated pt and planning for Right AKA today.   - 3/28 febrile last night. Blood culture sent. WBC elevated then " trending down. Complains of BLE pain controlled with pain meds. S/p Right AKA on 3/27/18  -3-29: NAEON. Pain controlled with medications. Vascular and ID on board. Pt. Is high risk of left amputation. May ultimately require amputation on the left as well. Continue on vanc and Unasyn for now   - 3/30: NAEON. Pt afebrile. BLE pain with controlled with pain meds. Today abdomen slightly distended. CT abdomen ordered stat. If pt decompensate, will broaden abx from Unasyn to zosyn   - 4/1: NAEON. Pt afebrile. BLE pain controlled with pain meds. WBC trending down after switching Unasyn to Zosyn. Left toes gangrene continue to demarcates. Vascular following, likely will need amputation. received 1 PRBC for Hg 6.9       Interval History:- 4/1: NAEON. Pt afebrile. BLE pain controlled with pain meds. WBC trending down after switching Unasyn to Zosyn. Left toes gangrene continue to demarcates. Vascular following, likely will need amputation. received 1 PRBC for Hg 6.9     Review of Systems   Unable to perform ROS: mental status change     Objective:     Vital Signs (Most Recent):  Temp: 98.4 °F (36.9 °C) (04/01/18 1100)  Pulse: (!) 116 (04/01/18 1200)  Resp: 16 (04/01/18 1200)  BP: (!) 108/56 (04/01/18 1200)  SpO2: 96 % (04/01/18 1200) Vital Signs (24h Range):  Temp:  [97.3 °F (36.3 °C)-99.2 °F (37.3 °C)] 98.4 °F (36.9 °C)  Pulse:  [103-128] 116  Resp:  [16-30] 16  SpO2:  [92 %-100 %] 96 %  BP: ()/(50-79) 108/56     Weight: 69.1 kg (152 lb 5.4 oz)  Body mass index is 23.86 kg/m².     SpO2: 96 %  O2 Device (Oxygen Therapy): nasal cannula      Intake/Output Summary (Last 24 hours) at 04/01/18 1230  Last data filed at 04/01/18 1200   Gross per 24 hour   Intake          2008.95 ml   Output             2250 ml   Net          -241.05 ml       Lines/Drains/Airways     Peripherally Inserted Central Catheter Line                 PICC Double Lumen 03/31/18 0919 right basilic 1 day          Peripheral Intravenous Line                  Peripheral IV - Single Lumen 03/30/18 2330 Left Hand 1 day                Physical Exam   Constitutional: He is oriented to person, place, and time. He appears well-developed and well-nourished. No distress.   Cardiovascular: Normal rate, regular rhythm and normal heart sounds.    Pulmonary/Chest: Effort normal and breath sounds normal.   Abdominal: He exhibits distension. There is no rebound and no guarding.   Musculoskeletal: Normal range of motion. He exhibits no edema.   Neurological: He is alert and oriented to person, place, and time.   Skin: No rash noted.   Cool left extremity with dusky color. No pulses detected at foot        Significant Labs:     Recent Labs  Lab 04/01/18  1134   WBC 23.86*   RBC 2.80*   HGB 8.7*   HCT 25.5*   *   MCV 91   MCH 31.1*   MCHC 34.1       Recent Labs  Lab 03/30/18  0157 03/31/18  0313 04/01/18  0315 04/01/18  1134    133* 135*  --    K 3.5 3.0* 3.2* 4.2    100 102  --    CO2 24 24 23  --    BUN 9 7 6  --    CREATININE 0.7 0.6 0.7  --    CALCIUM 8.3* 8.2* 8.4*  --    MG 1.9 1.6 1.6 1.8         Significant Imaging: none     Assessment and Plan:       * Critical lower limb ischemia    Mr. Byrd is a 47 year old man with CAD s/p PCI, LVEF 30% s/p ICD, complicated and extensive PAD s/p aortofem bypass and multiple stents on DAPT who presents with what appears to be CLI of his left foot of unclear duration.     --Underwent angiogram 3/19 that showed occlusion of aorta-fem bypass at distal anastamosis. Unable to treat due to patient movement   --s/p cath directed tPA to occluded L SFA  --blood cx ngtd  --subsequently with acute limb ischemia in RLE resulting in R AKA on 3/27  --continue ASA/brilinta/heparin gtt  --Podiatry and vascular following for necrotic L foot, awaiting demarcation of necrosis to guide amputation            Leukocytosis    - continue trending down   -s/p 7 day course of abx for possible LE cellulitis  -CT abd without source of  infection  -Inflammatory response related to necrosis/recent amputation vs cellulitis vs other infxs source  -Covering with broad spectrum antibiotics, will remain broad for now  - restarted on van and ceftriaxone (started 3/25/18) for elevated wbc   - ID consulted, switched Ceftriaxone to Unasyn  (started 3/25/18), switched to Zosyn for failure to improve 3/30        Ischemic cardiomyopathy    -CM possibly with multifactorial etiology (hx of alcohol use, ischemia)  CAD s/p PCI  DAPT, statin,  BB  Hold ACE in setting of RYAN        Alcohol abuse    -s/p delirium tremens  -no seizure activity  -s/p BZD taper  -continue thiamine  -remains delirious, unclear etiology, difficult to assess due to combativeness          Chronic systolic heart failure    LVEF 30% s/p ICD  Continue BB  Held ACE in setting of RYAN, can re-introduce soon        Tobacco abuse    --nicotine patch            VTE Risk Mitigation         Ordered     heparin 25,000 units in dextrose 5% 250 mL (100 units/mL) infusion  Continuous     Route:  Intravenous        03/27/18 1645     heparin 25,000 units in dextrose 5% 250 mL (100 units/mL) bolus from bag; ADDITIONAL PRN BOLUS  As needed (PRN)     Route:  Intravenous        03/27/18 1645     heparin 25,000 units in dextrose 5% 250 mL (100 units/mL) bolus from bag; ADDITIONAL PRN BOLUS  As needed (PRN)     Route:  Intravenous        03/27/18 1645     IP VTE LOW RISK PATIENT  Once      03/18/18 1711          Cm Giles MD  Cardiology  [facility]

## 2018-04-01 NOTE — PLAN OF CARE
Problem: Patient Care Overview  Goal: Plan of Care Review  Outcome: Ongoing (interventions implemented as appropriate)  No acute events overnight. Pt disoriented to time and situation. VSS- -120s, max temp 99.2. Sats >92% on room air. Voids spontaneously, +BS, no bowel movement. Pt unable to take PO medications safely- Cardiology aware. IV Morphine and ativan given for anxiety and pain to R stump and LLEx2. Sutures intact to stump- LLE necrotic, posterior tibial pulses doppler. Cards aware of AM electrolytes- K 3.2, Mg 1.6, Na 135- stated will placement IV replacement orders. Heparin infusing at 21 units/kg/min d/t subtherapeutic Anti xa this morning. POC and safety measures reviewed with patient, no family present, will continue to monitor.

## 2018-04-02 NOTE — PLAN OF CARE
Problem: Patient Care Overview  Goal: Plan of Care Review  Outcome: Ongoing (interventions implemented as appropriate)  Discussed Plan of Care with patient. VS stable. Patient more AAO; disoriented to time, however improving as shift goes on. Bedfast due to pain to (L) foot and new AKA. Fall precautions in place. Bleeding precautions reviewed and maintained. Pain moderately controlled by PRN pain medications. PRN Morphine IVP as well as PO Oxy decreased. Heparin infusing per order. Pressure dressing applied to (R) stump at 1400 due to scant bleeding from the outermost aspect of (R) AKA incision, per Dr. Dixon with IM1. Edges of (R) stump incision approximated. MD orders to notify MD of persistent bleeding from stump, and will likely hold Heparin gtt for short time. Speech assessed oral inatke this afternoon and cleared patient for Pureed diet with thin liquids when patient AAO. Patient remained free of falls/ injury. All questions and concerns were addressed. Will continue to monitor patient.

## 2018-04-02 NOTE — HPI
48 yo former smoker with history of ICM (LVEF 30-35%) s/p ICD, CAD s/p PCI, HTN, HLD, PAD (s/p aortobifemoral bypass, L SFA and pop PTAS in September and recent R SFA and R pop 3/8 by Dr. Carl Bell). Patient states left foot pain has started in September 2017 and had PTAs to left LFA and left popliteal artery here. Since then he continued to have pain in his left foot and to a less degree, leg. The pain has been progressively worse since. Pain is a burning sharp pain that is elicited by slight touch. Pain is worsened with bearing weight and is improved with vibrating massager to the plantar aspect of the foot. There is associated numbness and coldness to his foot. He denies erythema or swelling of his left foot.

## 2018-04-02 NOTE — ASSESSMENT & PLAN NOTE
Mr. Byrd is a 47 year old with PMH of ICM s/p ICD, CAD s/p PCI, HTN, HLD, PAD (s/p aortobifemoral bypass, L SFA and pop PTAS in September and recent R SFA and R pop 3/8 by Dr. Carl Bell) who presented to [facility] with critical limb ischemia on 3/18. Evaluated by interventional cardiology and underwent catheter directed tPA on 3/20. On vancomycin and unasyn for LLE cellulitis. Pt now with RLE occlusion of distal right superficial femoral, popliteal, anterior tibial, and posterior tibial arteries. vascular surgery performed right AKA 3/27.  Podiatry following LLE, monitoring closely and awaiting demarcation of necrosis of LLE as possibly will have TMA or BKA.  Blood cultures NGTD.  Persistent leukocytosis - suspect secondary to stress response for AKA and ischemia.  ABD exam shows significant tenderness especially in lower quadrants and superpubic area - ? Cause and underlying pathology.  Left foot appears worse and may require further vascular intervention.  Suspect CT C/A/P thrombosis findings imply poor prognosis.     Afebrile today.  Lethargy waxing and waning but improved.  PICC placed 3/31.  Vanc trough low yesterday.  WBC improved prior to blood transfusion last night - suspect increased due to transfusion.    Plan  1. Continue IV vancomycin 1g q8hr.  vanc trough goal 15-20.   2. Continue Zosyn for pseudomonas coverage  3. Podiatry and vascular following for necrotic L foot.   4. Remained afebrile, WBC 14K today. ID wll follow.

## 2018-04-02 NOTE — NURSING TRANSFER
Nursing Transfer Note      4/2/2018     Transfer To: CSU room 300    Transfer via bed    Transfer with cardiac monitoring    Transported by DENISE Maldonado    Medicines sent: Lidocaine 1% ointment, IV Zosyn     Chart send with patient: Yes    Notified: pts father, unable to reach on telephone    Patient reassessed at: 04/02/18 @ 0540    Upon arrival to floor: cardiac monitor applied, patient oriented to room, call bell in reach and bed in lowest position. Pt transferred to CSU, pt alert and verbal, verbalized understanding of step down orders and POC. VSS- sats 100% on room air. Sutures intact to R stump. Tibial pulse doppler to L leg. All pts belongings sent with him. Report given to DENISE Harrington.

## 2018-04-02 NOTE — PT/OT/SLP PROGRESS
"Speech Language Pathology Treatment    Patient Name:  Leonardo Byrd   MRN:  [id number]  Admitting Diagnosis: Critical lower limb ischemia    Recommendations:                 General Recommendations:  Dysphagia therapy  Diet recommendations:  Puree, Liquid Diet Level: Thin   Aspiration Precautions: 1 bite/sip at a time, Assistance with meals, Check for pocketing/oral residue, Eliminate distractions, Feed only when awake/alert, HOB to 90 degrees, Meds crushed in puree, Small bites/sips and Strict aspiration precautions. Please continue to monitor for signs and symptoms of aspiration and discontinue oral feeding should you notice any of the following: watery eyes, reddened facial area, wet vocal quality, increased work of breathing, change in respiratory status, increased congestion, coughing, fever, and/or confusion.   General Precautions: Standard, aspiration, fall  Communication strategies:  provide increased time to answer    Subjective     SLP reviewed Pt with nurse, nurse reports Pt continues NPO due to decreased ADELINA and clears Pt for PO trials with ST  Pt presents lethargic  Pt explains, "I'll try some apple sauce "  He reports moderate L leg pain     Pain/Comfort:  · Pain Rating 1: 5/10  · Location - Side 1: Left  · Location - Orientation 1: generalized  · Location 1: leg  · Pain Addressed 1: Pre-medicate for activity, Reposition, Distraction, Cessation of Activity  · Pain Rating Post-Intervention 1: 5/10    Objective:     Has the patient been evaluated by SLP for swallowing?   Yes  Keep patient NPO? No   Current Respiratory Status: room air    CXR 3/31/18:   Mild cardiomegaly.  Stable AICD.  Prominence of the pulmonary vasculature with increased interstitial lung markings suggestive of pulmonary edema, slightly worsened when compared to prior examination.  No pneumothorax.  Osseous structures are unremarkable.    Pt seen for ongoing swallow assessment. Pt found asleep in bed, family at bedside. HOB elevated. Pt " requires moderate verbal and tactile cueing to wake and attend to SLP. Pt with clear vocal quality, low intensity. Pt with mildly delayed initiation for speech tasks. Pt with adequate throat clear and cough on command elicited. Pt seen with trials of puree  Fed by clinician (4 tsp bites) and thin liquids (straw sipx1, continuous straw sipx1.) Pt with delayed throat clear x1 following continuous straw sip. Pt with increased fatigue as session progresses and additional trials held due to waxing/waning mental status. Pt and family educated on SLP role, aspiration precautions and need for ongoing swallow assessment prior to advancement of diet. Pt with partial verbal understanding and would benefit from ongoing review. Nurse notified of findings/recommendations following session. NO further questions. Whiteboard updated.     Assessment:     Leonardo Byrd is a 47 y.o. male with an SLP diagnosis of Dysphagia.  He presents with risk of aspiration due to waxing/waning mental status.  Strict aspiration precautions advised. Please continue to monitor for signs and symptoms of aspiration and discontinue oral feeding should you notice any of the following: watery eyes, reddened facial area, wet vocal quality, increased work of breathing, change in respiratory status, increased congestion, coughing, fever, and/or confusion.       Goals:    SLP Goals        Problem: SLP Goal    Goal Priority Disciplines Outcome   SLP Goal     SLP Ongoing (interventions implemented as appropriate)   Description:  Speech Therapy Short Term Goals  Goal expected to be met by 4/7  1. Pt will tolerate puree diet and thin liquids with no overt s/s of aspiration noted.   2. Patient will tolerate dental soft and solid trials x10 with adequate oral clearance and no overt s/s of aspiration.                       Plan:     · Patient to be seen:  4 x/week   · Plan of Care expires:  04/28/18  · Plan of Care reviewed with:  patient, caregiver, daughter   · SLP  Follow-Up:  Yes       Discharge recommendations:  other (see comments) (pending progress )   Barriers to Discharge:  None    Time Tracking:     SLP Treatment Date:   04/02/18  Speech Start Time:  1120  Speech Stop Time:  1135     Speech Total Time (min):  15 min    Billable Minutes: Treatment Swallowing Dysfunction 15    RUTH Fields, [facility]-SLP  Speech-Language Pathology  Pager: [phone]      04/02/2018

## 2018-04-02 NOTE — HOSPITAL COURSE
"Admitted to CCU 3/20 after cath directed tPA was performed by int cards under general anesthesia. Also with DTs requiring scheduled benzos. On day 2 Lt lower extremity remained pulseless, so thrombolysis still infusing. On day 4 of vanc for possible lt foot cellulitis. Transitioned to clinda IV (unable to take PO safely). Intubated overnight for airway protection in setting of increasing benzo requirements and worsening agitation and danger of pt pulling line. CXR this AM (3/22) with patchy bilateral infiltrates. Taken to cath lab for possible cath removal. Lt leg then reportedly duskier and with absent pulses. On 3/23 taken back to cath lab. Int cards Recc Podiatry consult for possible L trans-metatarsal amputation. On heparin gtt. 3/24 Wbc trending up, foot color getting darker,paitent extubated, continue heparin. 3/25: US lower ext showing occlusion of distal right superficial femoral, popliteal, anterior tibial, and posterior tibial arteries. No "dopplerable" pulses on rt foot. Interventional cards holding off on on acute intervention currently in setting of RYAN. Will continue to monitor. Cr 1.7 (baseline 0.6). Given 500cc Iv fluids. Started on scheduled librium for alcohol withdrawal, with plans for taper.     -3/26/18 NAEON. Pt. With elevated WBC. On vanc and ceftriaxone. ID consulted. Pain management for BLE ischemia   -3/27: NAEON. BLE pain improved after pain management adjustment. Vascular surgery evaluated pt and planning for Right AKA today.   - 3/28 febrile last night. Blood culture sent. WBC elevated then trending down. Complains of BLE pain controlled with pain meds. S/p Right AKA on 3/27/18  -3-29: NAEON. Pain controlled with medications. Vascular and ID on board. Pt. Is high risk of left amputation. May ultimately require amputation on the left as well. Continue on vanc and Unasyn for now   - 3/30: NAEON. Pt afebrile. BLE pain with controlled with pain meds. Today abdomen slightly distended. CT " abdomen ordered stat. If pt decompensate, will broaden abx from Unasyn to zosyn   - 4/1: NAEON. Pt afebrile. BLE pain controlled with pain meds. WBC trending down after switching Unasyn to Zosyn. Left toes gangrene continue to demarcates. Vascular following, likely will need amputation. received 1 PRBC for Hg 6.9   - 4/2: Stepped down to hospital medicine. Modified pain regimen to PO with IV for breakthrough. Awaiting vascular recommendations for L foot  - 4/3: Blood pressures low, am meds held. Bolused 250cc NS for SBP ~80 and discontinued lisinopril 5 mg daily  - 4/4: Continuing IV abx, plan to OR tomorrow per podiatry and awaiting vascular recs  - 4/5: OR today for TMA  - 4/6: TTE for murmur evaluation. EF 15% with new mild-moderate mitral regurgitation without structural abnormalities  - 4/7-8: No significant events  4/9 Necrotic area near site of TMA with clear demarcation. Discussed with Interventional Cardiology who feel further intervention unlikely to achieve revascularization. Vascular Surgery and Podiatry who feel no further surgical intervention is indicated during this hospital admission.   04/10/2018 Patient accepted to [facility] in [place] per his preference. Discharged with follow up with Vascular Surgery and [facility] for work up for possible hyperbaric oxygen therapy.  04/11/2018 Patient did not leave facility due to rehab refusing to accept the patient later in the day because they would be unable to obtain his evening medications. Will attempt to arrange for transport and discharge again today.

## 2018-04-02 NOTE — ASSESSMENT & PLAN NOTE
-s/p delirium tremens  -no seizure activity  -s/p BZD taper  -continue thiamine  -remains confused, unclear etiology, difficult to assess

## 2018-04-02 NOTE — SUBJECTIVE & OBJECTIVE
Interval History:   More alert today. WBC 14K. On vancomycin and zosyn. Per father, afebrile overnight.     Review of Systems   Constitutional: Negative for chills, diaphoresis and fever.   Respiratory: Negative for shortness of breath.    Cardiovascular: Negative for chest pain.   Gastrointestinal: Negative for nausea and rectal pain.   Genitourinary: Negative for dysuria and hematuria.   Psychiatric/Behavioral: Positive for confusion.     Objective:     Vital Signs (Most Recent):  Temp: 98.6 °F (37 °C) (04/02/18 0741)  Pulse: 98 (04/02/18 1103)  Resp: 16 (04/02/18 0741)  BP: (!) 105/58 (04/02/18 0741)  SpO2: 100 % (04/02/18 0741) Vital Signs (24h Range):  Temp:  [97.9 °F (36.6 °C)-99.1 °F (37.3 °C)] 98.6 °F (37 °C)  Pulse:  [] 98  Resp:  [15-32] 16  SpO2:  [92 %-100 %] 100 %  BP: ()/(55-75) 105/58     Weight: 69.1 kg (152 lb 5.4 oz)  Body mass index is 23.86 kg/m².    Estimated Creatinine Clearance: 119.3 mL/min (based on SCr of 0.7 mg/dL).    Physical Exam   Constitutional: He appears well-developed and well-nourished. No distress.   Cardiovascular: Regular rhythm.  Exam reveals gallop. Exam reveals no friction rub.    No murmur heard.  tachycardic   Pulmonary/Chest: Effort normal. No respiratory distress. He has no wheezes. He has no rales.   Abdominal: Soft. He exhibits no distension. There is no tenderness. There is no rigidity and no guarding.       Musculoskeletal: He exhibits tenderness (left foot).   Neurological:   Lethargic to much more arousable to answering questions   Skin: Skin is warm. No rash noted. He is not diaphoretic. There is erythema. No pallor.   LLE cool to touch. Absent pulses.  Foot looks more dusky    Psychiatric: He has a normal mood and affect.   Nursing note and vitals reviewed.          Significant Labs:   Blood Culture:   Recent Labs  Lab 03/20/18 2219 03/20/18  2233 03/26/18  0148 03/27/18  2148 03/27/18  2212   [facility] No growth after 5 days. No growth after 5 days.  No growth after 5 days. No growth after 5 days. No growth after 5 days.     CBC:   Recent Labs  Lab 04/01/18  1134 04/02/18  0008 04/02/18  0959   WBC 23.86* 14.28* 14.69*   HGB 8.7* 8.1* 8.5*   HCT 25.5* 23.6* 25.4*   * 597* 698*     CMP:   Recent Labs  Lab 04/01/18  0315 04/01/18  1134 04/02/18  0305   *  --  136   K 3.2* 4.2 3.4*     --  102   CO2 23  --  24   GLU 85  --  105   BUN 6  --  3*   CREATININE 0.7  --  0.7   CALCIUM 8.4*  --  8.6*   PROT 6.0  --  6.3   ALBUMIN 1.7*  --  1.8*   BILITOT 0.6  --  0.8   ALKPHOS 155*  --  164*   *  --  241*   ALT 99*  --  115*   ANIONGAP 10  --  10   EGFRNONAA >60.0  --  >60.0     Wound Culture: No results for input(s): LABAERO in the last 4320 hours.  All pertinent labs within the past 24 hours have been reviewed.    Significant Imaging: I have reviewed all pertinent imaging results/findings within the past 24 hours.

## 2018-04-02 NOTE — PROGRESS NOTES
IM1 notified patient's BP 88/55, MAP 65, asymptomatic. MD to place orders for bolus. Will continue to monitor.

## 2018-04-02 NOTE — PLAN OF CARE
Problem: SLP Goal  Goal: SLP Goal  Speech Therapy Short Term Goals  Goal expected to be met by 4/7  1. Pt will tolerate puree diet and thin liquids with no overt s/s of aspiration noted.   2. Patient will tolerate dental soft and solid trials x10 with adequate oral clearance and no overt s/s of aspiration.      Outcome: Ongoing (interventions implemented as appropriate)  Pt ongoing with goals 2/2 waxing/waning mental status. REC: Puree diet with thin liquids, medications crushed in puree, only when awake and alert, upright at 90 degree angle, provided strict aspiration precautions. Please Continue to monitor for signs and symptoms of aspiration and discontinue oral feeding should you notice any of the following: watery eyes, reddened facial area, wet vocal quality, increased work of breathing, change in respiratory status, increased congestion, coughing, fever, and/or confusion. ST to continue to follow. Thank you.    JOVAN Fields., [facility]-SLP  Speech-Language Pathology  Pager: [phone]  4/2/2018

## 2018-04-02 NOTE — PLAN OF CARE
Needs TBD.       04/02/18 1520   Discharge Reassessment   Assessment Type Discharge Planning Reassessment   Provided patient/caregiver education on the expected discharge date and the discharge plan Yes   Do you have any problems affording any of your prescribed medications? No   Discharge Plan A Home with family;Home Health   Discharge Plan B Home with family   Can the patient answer the patient profile reliably? Yes, cognitively intact   How does the patient rate their overall health at the present time? Fair   Describe the patient's ability to walk at the present time. Major restrictions/daily assistance from another person   How often would a person be available to care for the patient? Whenever needed   During the past month, has the patient often been bothered by feeling down, depressed or hopeless? No   During the past month, has the patient often been bothered by little interest or pleasure in doing things? No

## 2018-04-02 NOTE — PROGRESS NOTES
[facility]  Infectious Disease  Progress Note    Patient Name: Leonardo Byrd  MRN: [id number]  Admission Date: 3/18/2018  Length of Stay: 15 days  Attending Physician: Alysha Cuevas MD  Primary Care Provider: Indio Aquino MD    Isolation Status: No active isolations  Assessment/Plan:      * Critical lower limb ischemia    Mr. Byrd is a 47 year old with PMH of ICM s/p ICD, CAD s/p PCI, HTN, HLD, PAD (s/p aortobifemoral bypass, L SFA and pop PTAS in September and recent R SFA and R pop 3/8 by Dr. Carl Bell) who presented to [facility] with critical limb ischemia on 3/18. Evaluated by interventional cardiology and underwent catheter directed tPA on 3/20. On vancomycin and unasyn for LLE cellulitis. Pt now with RLE occlusion of distal right superficial femoral, popliteal, anterior tibial, and posterior tibial arteries. vascular surgery performed right AKA 3/27.  Podiatry following LLE, monitoring closely and awaiting demarcation of necrosis of LLE as possibly will have TMA or BKA.  Blood cultures NGTD.  Persistent leukocytosis - suspect secondary to stress response for AKA and ischemia.  ABD exam shows significant tenderness especially in lower quadrants and superpubic area - ? Cause and underlying pathology.  Left foot appears worse and may require further vascular intervention.  Suspect CT C/A/P thrombosis findings imply poor prognosis.     Afebrile today.  Lethargy waxing and waning but improved.  PICC placed 3/31.  Vanc trough low yesterday.  WBC improved prior to blood transfusion last night - suspect increased due to transfusion.    Plan  1. Continue IV vancomycin 1g q8hr.  vanc trough goal 15-20.   2. Continue Zosyn for pseudomonas coverage  3. Podiatry and vascular following for necrotic L foot.   4. Remained afebrile, WBC 14K today. ID wll follow.               Thank you for your consult. I will follow-up with patient. Please contact us if you have any additional  "questions.    Hussein Mason PA-C  Infectious Disease  [facility]  [facility] [phone]    Subjective:     Principal Problem:Critical lower limb ischemia    HPI: Mr. Byrd is a 47 year old with PMH of ICM s/p ICD, CAD s/p PCI, HTN, HLD, PAD (s/p aortobifemoral bypass, L SFA and pop PTAS in September and recent R SFA and R pop 3/8 by Dr. Carl Bell) who presented to [facility] with critical limb ischemia on 3/18. Evaluated by interventional cardiology and underwent catheter directed tPA on 3/20, admitted to CCU for close monitoring. Also with DTs requiring scheduled benzos. Pt was given vancomycin for left foot cellulitis. Pt was intubated for airway protection in setting of increasing benzo requirements and worsening agitation.  Pt now extubated.  Pt was noted to have more duskiness in appearance of left leg and absent pulses.US lower ext showing occlusion of distal right superficial femoral, popliteal, anterior tibial, and posterior tibial arteries. No "dopplerable" pulses on rt foot. Interventional cards holding off on on acute intervention currently in setting of RYAN. Pt afebrile, WBC 17K today. Complains of severe pain of lower extremities. Denies having any cough, n/v/d,dysuria.   Interval History:   More alert today. WBC 14K. On vancomycin and zosyn. Per father, afebrile overnight.     Review of Systems   Constitutional: Negative for chills, diaphoresis and fever.   Respiratory: Negative for shortness of breath.    Cardiovascular: Negative for chest pain.   Gastrointestinal: Negative for nausea and rectal pain.   Genitourinary: Negative for dysuria and hematuria.   Psychiatric/Behavioral: Positive for confusion.     Objective:     Vital Signs (Most Recent):  Temp: 98.6 °F (37 °C) (04/02/18 0741)  Pulse: 98 (04/02/18 1103)  Resp: 16 (04/02/18 0741)  BP: (!) 105/58 (04/02/18 0741)  SpO2: 100 % (04/02/18 0741) Vital Signs (24h Range):  Temp:  [97.9 °F (36.6 °C)-99.1 °F (37.3 °C)] 98.6 °F (37 °C)  Pulse:  " [] 98  Resp:  [15-32] 16  SpO2:  [92 %-100 %] 100 %  BP: ()/(55-75) 105/58     Weight: 69.1 kg (152 lb 5.4 oz)  Body mass index is 23.86 kg/m².    Estimated Creatinine Clearance: 119.3 mL/min (based on SCr of 0.7 mg/dL).    Physical Exam   Constitutional: He appears well-developed and well-nourished. No distress.   Cardiovascular: Regular rhythm.  Exam reveals gallop. Exam reveals no friction rub.    No murmur heard.  tachycardic   Pulmonary/Chest: Effort normal. No respiratory distress. He has no wheezes. He has no rales.   Abdominal: Soft. He exhibits no distension. There is no tenderness. There is no rigidity and no guarding.       Musculoskeletal: He exhibits tenderness (left foot).   Neurological:   Lethargic to much more arousable to answering questions   Skin: Skin is warm. No rash noted. He is not diaphoretic. There is erythema. No pallor.   LLE cool to touch. Absent pulses.  Foot looks more dusky    Psychiatric: He has a normal mood and affect.   Nursing note and vitals reviewed.          Significant Labs:   Blood Culture:   Recent Labs  Lab 03/20/18  2219 03/20/18  2233 03/26/18  0148 03/27/18  2148 03/27/18  2212   [facility] No growth after 5 days. No growth after 5 days. No growth after 5 days. No growth after 5 days. No growth after 5 days.     CBC:   Recent Labs  Lab 04/01/18  1134 04/02/18  0008 04/02/18  0959   WBC 23.86* 14.28* 14.69*   HGB 8.7* 8.1* 8.5*   HCT 25.5* 23.6* 25.4*   * 597* 698*     CMP:   Recent Labs  Lab 04/01/18  0315 04/01/18  1134 04/02/18  0305   *  --  136   K 3.2* 4.2 3.4*     --  102   CO2 23  --  24   GLU 85  --  105   BUN 6  --  3*   CREATININE 0.7  --  0.7   CALCIUM 8.4*  --  8.6*   PROT 6.0  --  6.3   ALBUMIN 1.7*  --  1.8*   BILITOT 0.6  --  0.8   ALKPHOS 155*  --  164*   *  --  241*   ALT 99*  --  115*   ANIONGAP 10  --  10   EGFRNONAA >60.0  --  >60.0     Wound Culture: No results for input(s): LABAERO in the last 4320 hours.  All  pertinent labs within the past 24 hours have been reviewed.    Significant Imaging: I have reviewed all pertinent imaging results/findings within the past 24 hours.

## 2018-04-02 NOTE — SUBJECTIVE & OBJECTIVE
Interval History: No acute events overnight. Patient stepped down to hospital medicine. Complains of pain this AM. Will hopefully address source of pain with amputation as L foot appears severely necrotic.    Review of Systems   Constitutional: Negative for chills, diaphoresis and fever.   Respiratory: Negative for shortness of breath.    Cardiovascular: Negative for chest pain.   Gastrointestinal: Negative for nausea and rectal pain.   Genitourinary: Negative for dysuria and hematuria.   Psychiatric/Behavioral: Positive for confusion.     Objective:     Vital Signs (Most Recent):  Temp: 98.9 °F (37.2 °C) (04/02/18 1230)  Pulse: 102 (04/02/18 1230)  Resp: 18 (04/02/18 1230)  BP: (!) 93/54 (04/02/18 1230)  SpO2: 100 % (04/02/18 1230) Vital Signs (24h Range):  Temp:  [97.9 °F (36.6 °C)-99.1 °F (37.3 °C)] 98.9 °F (37.2 °C)  Pulse:  [] 102  Resp:  [15-32] 18  SpO2:  [92 %-100 %] 100 %  BP: ()/(54-75) 93/54     Weight: 69.1 kg (152 lb 5.4 oz)  Body mass index is 23.86 kg/m².    Intake/Output Summary (Last 24 hours) at 04/02/18 1406  Last data filed at 04/02/18 0600   Gross per 24 hour   Intake            927.2 ml   Output             1900 ml   Net           -972.8 ml      Physical Exam   Constitutional: He appears well-developed. He appears lethargic.   Cardiovascular: Normal rate, regular rhythm and normal heart sounds.    Pulmonary/Chest: Effort normal. No respiratory distress.   Abdominal: Soft. Bowel sounds are normal. There is no tenderness.   Musculoskeletal:   R BKA  Left distal toes with dry gangrene; dorsal ischemia not full thickness   Neurological: He appears lethargic.   Skin: Skin is warm.       Significant Labs:   BMP:   Recent Labs  Lab 04/02/18  0305         K 3.4*      CO2 24   BUN 3*   CREATININE 0.7   CALCIUM 8.6*   MG 1.8     CBC:   Recent Labs  Lab 04/01/18  1134 04/02/18  0008 04/02/18  0959   WBC 23.86* 14.28* 14.69*   HGB 8.7* 8.1* 8.5*   HCT 25.5* 23.6* 25.4*   PLT  614* 597* 698*       Significant Imaging: No new imaging

## 2018-04-02 NOTE — PROGRESS NOTES
[facility] Hospital Medicine  Progress Note    Patient Name: Leonardo Byrd  MRN: [id number]  Patient Class: IP- Inpatient   Admission Date: 3/18/2018  Length of Stay: 15 days  Attending Physician: Alysha Cuevas MD  Primary Care Provider: Indio Aquino MD    Hospital Medicine Team: [facility] Jimi Rowe MD    Subjective:     Principal Problem:Critical lower limb ischemia    HPI:  48 yo former smoker with history of ICM (LVEF 30-35%) s/p ICD, CAD s/p PCI, HTN, HLD, PAD (s/p aortobifemoral bypass, L SFA and pop PTAS in September and recent R SFA and R pop 3/8 by Dr. Carl Bell). Patient states left foot pain has started in September 2017 and had PTAs to left LFA and left popliteal artery here. Since then he continued to have pain in his left foot and to a less degree, leg. The pain has been progressively worse since. Pain is a burning sharp pain that is elicited by slight touch. Pain is worsened with bearing weight and is improved with vibrating massager to the plantar aspect of the foot. There is associated numbness and coldness to his foot. He denies erythema or swelling of his left foot.    Hospital Course:  Admitted to CCU 3/20 after cath directed tPA was performed by int mansi under general anesthesia. Also with DTs requiring scheduled benzos. On day 2 Lt lower extremity remained pulseless, so thrombolysis still infusing. On day 4 of vanc for possible lt foot cellulitis. Transitioned to clinda IV (unable to take PO safely). Intubated overnight for airway protection in setting of increasing benzo requirements and worsening agitation and danger of pt pulling line. CXR this AM (3/22) with patchy bilateral infiltrates. Taken to cath lab for possible cath removal. Lt leg then reportedly duskier and with absent pulses. On 3/23 taken back to cath lab. Int cards Recc Podiatry consult for possible L trans-metatarsal amputation. On heparin gtt. 3/24 Wbc trending up, foot color  "getting darker,paitent extubated, continue heparin. 3/25: US lower ext showing occlusion of distal right superficial femoral, popliteal, anterior tibial, and posterior tibial arteries. No "dopplerable" pulses on rt foot. Interventional cards holding off on on acute intervention currently in setting of RYAN. Will continue to monitor. Cr 1.7 (baseline 0.6). Given 500cc Iv fluids. Started on scheduled librium for alcohol withdrawal, with plans for taper.     -3/26/18 NAEON. Pt. With elevated WBC. On vanc and ceftriaxone. ID consulted. Pain management for BLE ischemia   -3/27: NAEON. BLE pain improved after pain management adjustment. Vascular surgery evaluated pt and planning for Right AKA today.   - 3/28 febrile last night. Blood culture sent. WBC elevated then trending down. Complains of BLE pain controlled with pain meds. S/p Right AKA on 3/27/18  -3-29: NAEON. Pain controlled with medications. Vascular and ID on board. Pt. Is high risk of left amputation. May ultimately require amputation on the left as well. Continue on vanc and Unasyn for now   - 3/30: NAEON. Pt afebrile. BLE pain with controlled with pain meds. Today abdomen slightly distended. CT abdomen ordered stat. If pt decompensate, will broaden abx from Unasyn to zosyn   - 4/1: NAEON. Pt afebrile. BLE pain controlled with pain meds. WBC trending down after switching Unasyn to Zosyn. Left toes gangrene continue to demarcates. Vascular following, likely will need amputation. received 1 PRBC for Hg 6.9   - 4/2: Stepped down to hospital medicine. Modified pain regimen to PO. Awaiting vascular recommendations for L foot    Interval History: No acute events overnight. Patient stepped down to hospital medicine. Complains of pain this AM. Will hopefully address source of pain with amputation as L foot appears severely necrotic.    Review of Systems   Constitutional: Negative for chills, diaphoresis and fever.   Respiratory: Negative for shortness of breath.  "   Cardiovascular: Negative for chest pain.   Gastrointestinal: Negative for nausea and rectal pain.   Genitourinary: Negative for dysuria and hematuria.   Psychiatric/Behavioral: Positive for confusion.     Objective:     Vital Signs (Most Recent):  Temp: 98.9 °F (37.2 °C) (04/02/18 1230)  Pulse: 102 (04/02/18 1230)  Resp: 18 (04/02/18 1230)  BP: (!) 93/54 (04/02/18 1230)  SpO2: 100 % (04/02/18 1230) Vital Signs (24h Range):  Temp:  [97.9 °F (36.6 °C)-99.1 °F (37.3 °C)] 98.9 °F (37.2 °C)  Pulse:  [] 102  Resp:  [15-32] 18  SpO2:  [92 %-100 %] 100 %  BP: ()/(54-75) 93/54     Weight: 69.1 kg (152 lb 5.4 oz)  Body mass index is 23.86 kg/m².    Intake/Output Summary (Last 24 hours) at 04/02/18 1406  Last data filed at 04/02/18 0600   Gross per 24 hour   Intake            927.2 ml   Output             1900 ml   Net           -972.8 ml      Physical Exam   Constitutional: He appears well-developed. He appears lethargic.   Cardiovascular: Normal rate, regular rhythm and normal heart sounds.    Pulmonary/Chest: Effort normal. No respiratory distress.   Abdominal: Soft. Bowel sounds are normal. There is no tenderness.   Musculoskeletal:   R BKA  Left distal toes with dry gangrene; dorsal ischemia not full thickness   Neurological: He appears lethargic.   Skin: Skin is warm.       Significant Labs:   BMP:   Recent Labs  Lab 04/02/18  0305         K 3.4*      CO2 24   BUN 3*   CREATININE 0.7   CALCIUM 8.6*   MG 1.8     CBC:   Recent Labs  Lab 04/01/18  1134 04/02/18  0008 04/02/18  0959   WBC 23.86* 14.28* 14.69*   HGB 8.7* 8.1* 8.5*   HCT 25.5* 23.6* 25.4*   * 597* 698*       Significant Imaging: No new imaging    Assessment/Plan:      * Critical lower limb ischemia    Mr. Bydr is a 47 year old man with CAD s/p PCI, LVEF 30% s/p ICD, complicated and extensive PAD s/p aortofem bypass and multiple stents on DAPT who presents with what appears to be CLI of his left foot of unclear  duration.      --Underwent angiogram 3/19 that showed occlusion of aorta-fem bypass at distal anastamosis. Unable to treat due to patient movement   --s/p cath directed tPA to occluded L SFA  --blood cx ngtd  --subsequently with acute limb ischemia in RLE resulting in R AKA on 3/27  --continue ASA/brilinta/heparin gtt  --Podiatry and vascular following for necrotic L foot, awaiting demarcation of necrosis to guide amputation        Leukocytosis    - continue trending down   -s/p 7 day course of abx for possible LE cellulitis  -CT abd without source of infection  -Inflammatory response related to necrosis/recent amputation vs cellulitis vs other infxs source  -Covering with broad spectrum antibiotics, will remain broad for now  - restarted on van and ceftriaxone (started 3/25/18) for elevated wbc   - ID consulted, switched Ceftriaxone to Unasyn  (started 3/25/18), switched to Zosyn for failure to improve 3/30        Ischemic cardiomyopathy    CM possibly with multifactorial etiology (hx of alcohol use, ischemia)  - CAD s/p PCI  - DAPT, statin, ACEi, B B        Alcohol abuse    -s/p delirium tremens  -no seizure activity  -s/p BZD taper  -continue thiamine  -remains confused, unclear etiology, difficult to assess        Chronic systolic heart failure    LVEF 10-15% s/p ICD  Continue BB  Restarted ACEi        Tobacco abuse    --nicotine patch            VTE Risk Mitigation         Ordered     heparin 25,000 units in dextrose 5% 250 mL (100 units/mL) infusion  Continuous     Route:  Intravenous        03/27/18 1645     heparin 25,000 units in dextrose 5% 250 mL (100 units/mL) bolus from bag; ADDITIONAL PRN BOLUS  As needed (PRN)     Route:  Intravenous        03/27/18 1645     heparin 25,000 units in dextrose 5% 250 mL (100 units/mL) bolus from bag; ADDITIONAL PRN BOLUS  As needed (PRN)     Route:  Intravenous        03/27/18 1645     IP VTE LOW RISK PATIENT  Once      03/18/18 1711              Jimi Rowe,  MD  Department of Hospital Medicine   [facility]

## 2018-04-02 NOTE — ASSESSMENT & PLAN NOTE
CM possibly with multifactorial etiology (hx of alcohol use, ischemia)  - CAD s/p PCI  - DAPT, statin, ACEi, BB

## 2018-04-03 NOTE — ASSESSMENT & PLAN NOTE
Mr. Byrd is a 47 year old with PMH of ICM s/p ICD, CAD s/p PCI, HTN, HLD, PAD (s/p aortobifemoral bypass, L SFA and pop PTAS in September and recent R SFA and R pop 3/8 by Dr. Carl Bell) who presented to [facility] with critical limb ischemia on 3/18. Evaluated by interventional cardiology and underwent catheter directed tPA on 3/20. On vancomycin and unasyn for LLE cellulitis. Pt now with RLE occlusion of distal right superficial femoral, popliteal, anterior tibial, and posterior tibial arteries. vascular surgery performed right AKA 3/27.  Podiatry following LLE, monitoring closely and awaiting demarcation of necrosis of LLE as possibly will have TMA or BKA.  Blood cultures NGTD.  Persistent leukocytosis - suspect secondary to stress response for AKA, ischemia, and blood administartion.  ABD exam shows significant tenderness especially in lower quadrants and superpubic area - ? Cause and underlying pathology.  Left foot appears worse and may require further vascular intervention.  Suspect CT C/A/P thrombosis findings imply poor prognosis.     Afebrile today.  Lethargy and MS improved.  PICC placed 3/31.  Vanc trough high today at 23.5 and primary team changed dose.  WBC essentially stable .    Plan  1. Continue IV vancomycin 1250g q12hr.  vanc trough goal 15-20.   2. Continue Zosyn for pseudomonas coverage  3. Podiatry and vascular following for necrotic L foot. Awaiting plan from podaitry  4. Remained afebrile, WBC 15sK today. ID wll follow.

## 2018-04-03 NOTE — SUBJECTIVE & OBJECTIVE
Interval History: No acute events overnight. Patient's mentation not significantly changed, per family member. Bolused 250cc NS for SBP ~80 and discontinued lisinopril 5 mg daily. Vascular and podiatory following for likely L foot aputation.    Review of Systems   Constitutional: Negative for chills, diaphoresis and fever.   Respiratory: Negative for shortness of breath.    Cardiovascular: Negative for chest pain.   Gastrointestinal: Negative for nausea and rectal pain.   Genitourinary: Negative for dysuria and hematuria.   Psychiatric/Behavioral: Positive for confusion.     Objective:     Vital Signs (Most Recent):  Temp: 98.1 °F (36.7 °C) (04/03/18 0841)  Pulse: 98 (04/03/18 0924)  Resp: 16 (04/03/18 0841)  BP: (!) 102/53 (04/03/18 0924)  SpO2: (!) 92 % (04/03/18 0841) Vital Signs (24h Range):  Temp:  [98.1 °F (36.7 °C)-99.1 °F (37.3 °C)] 98.1 °F (36.7 °C)  Pulse:  [] 98  Resp:  [16-20] 16  SpO2:  [92 %-100 %] 92 %  BP: ()/(52-59) 102/53     Weight: 70.1 kg (154 lb 8.7 oz) (bw)  Body mass index is 24.2 kg/m².    Intake/Output Summary (Last 24 hours) at 04/03/18 1013  Last data filed at 04/03/18 0501   Gross per 24 hour   Intake          2716.84 ml   Output              550 ml   Net          2166.84 ml      Physical Exam   Constitutional: He appears well-developed. He appears lethargic.   Cardiovascular: Normal rate, regular rhythm and normal heart sounds.    Pulmonary/Chest: Effort normal. No respiratory distress.   Abdominal: Soft. Bowel sounds are normal. There is no tenderness.   Musculoskeletal:   R BKA  Left distal toes with dry gangrene; dorsal ischemia not full thickness   Neurological: He appears lethargic.   Skin: Skin is warm.       Significant Labs:   BMP:     Recent Labs  Lab 04/03/18  0500   GLU 95      K 3.5      CO2 25   BUN 5*   CREATININE 0.7   CALCIUM 8.8   MG 1.8     CBC:     Recent Labs  Lab 04/02/18  0008 04/02/18  0959 04/03/18  0500   WBC 14.28* 14.69* 15.54*   HGB  8.1* 8.5* 7.9*   HCT 23.6* 25.4* 23.4*   * 698* 659*       Significant Imaging: No new imaging

## 2018-04-03 NOTE — SUBJECTIVE & OBJECTIVE
Interval History: No AEON.  Afebrile and WBC.  CO of left foot pain.  Vasc sx awaiting podiatry input.  The patient denies any recent fever, chills, or sweats.      Review of Systems   Constitutional: Negative for chills, diaphoresis and fever.   Respiratory: Negative for shortness of breath.    Cardiovascular: Negative for chest pain.   Gastrointestinal: Negative for nausea and rectal pain.   Genitourinary: Negative for dysuria and hematuria.   Musculoskeletal: Positive for myalgias (left foot pain).   Psychiatric/Behavioral: Positive for confusion.     Objective:     Vital Signs (Most Recent):  Temp: 98.5 °F (36.9 °C) (04/03/18 1522)  Pulse: 95 (04/03/18 1522)  Resp: 18 (04/03/18 1522)  BP: 108/67 (04/03/18 1522)  SpO2: 98 % (04/03/18 1150) Vital Signs (24h Range):  Temp:  [97.4 °F (36.3 °C)-98.8 °F (37.1 °C)] 98.5 °F (36.9 °C)  Pulse:  [] 95  Resp:  [14-20] 18  SpO2:  [92 %-98 %] 98 %  BP: ()/(52-67) 108/67     Weight: 70.1 kg (154 lb 8.7 oz) (bw)  Body mass index is 24.2 kg/m².    Estimated Creatinine Clearance: 119.3 mL/min (based on SCr of 0.7 mg/dL).    Physical Exam   Constitutional: He appears well-developed and well-nourished. No distress.   Cardiovascular: Regular rhythm.  Exam reveals gallop. Exam reveals no friction rub.    Murmur heard.  tachycardic   Pulmonary/Chest: Effort normal. No respiratory distress. He has no wheezes. He has no rales.   Abdominal: Soft. He exhibits no distension. There is no tenderness. There is no rigidity and no guarding.       Musculoskeletal: He exhibits tenderness (left foot).   Neurological:   Awake and answering questions much better   Skin: Skin is warm. No rash noted. He is not diaphoretic. There is erythema. No pallor.   LLE cool to touch. Absent pulses.  Foot looks more dusky    Psychiatric: He has a normal mood and affect.   Nursing note and vitals reviewed.      Significant Labs:   Blood Culture:   Recent Labs  Lab 03/20/18  2219 03/20/18  2230  03/26/18  0148 03/27/18  2148 03/27/18  2212   LABBLOO No growth after 5 days. No growth after 5 days. No growth after 5 days. No growth after 5 days. No growth after 5 days.     CBC:   Recent Labs  Lab 04/02/18  0008 04/02/18  0959 04/03/18  0500   WBC 14.28* 14.69* 15.54*   HGB 8.1* 8.5* 7.9*   HCT 23.6* 25.4* 23.4*   * 698* 659*     CMP:   Recent Labs  Lab 04/02/18  0305 04/03/18  0500    136   K 3.4* 3.5    102   CO2 24 25    95   BUN 3* 5*   CREATININE 0.7 0.7   CALCIUM 8.6* 8.8   PROT 6.3 6.4   ALBUMIN 1.8* 1.8*   BILITOT 0.8 0.7   ALKPHOS 164* 150*   * 170*   * 109*   ANIONGAP 10 9   EGFRNONAA >60.0 >60.0     Urine Culture:   Recent Labs  Lab 03/31/18  1500   LABURIN No growth     Urine Studies:   Recent Labs  Lab 03/24/18  0956 03/31/18  1500   COLORU Yellow Yellow   APPEARANCEUA Clear Clear   PHUR 6.0 6.0   SPECGRAV 1.010 1.020   PROTEINUA Negative Negative   GLUCUA Negative Negative   KETONESU 1+* Trace*   BILIRUBINUA Negative Negative   OCCULTUA Trace* Negative   NITRITE Negative Negative   UROBILINOGEN 4.0 2.0   LEUKOCYTESUR Negative Negative   RBCUA 2  --    WBCUA 1  --    BACTERIA Few*  --      Wound Culture: No results for input(s): LABAERO in the last 4320 hours.  All pertinent labs within the past 24 hours have been reviewed.    Significant Imaging: I have reviewed all pertinent imaging results/findings within the past 24 hours.   X-Ray Chest 1 View for PICC_Central line [[id number]] Resulted: 03/31/18 1035   Order Status: Completed Updated: 03/31/18 1038   Narrative:     EXAMINATION:  XR CHEST 1 VIEW    CLINICAL HISTORY:  Evaluate PICC line placement;    TECHNIQUE:  Single frontal view of the chest was performed.    COMPARISON:  Chest radiograph from 03/25/2018    FINDINGS:  Interval placement of right-sided PICC line catheter with its tip in the inferior SVC.    Mild cardiomegaly.  Stable AICD.  Prominence of the pulmonary vasculature with increased  interstitial lung markings suggestive of pulmonary edema, slightly worsened when compared to prior examination.  No pneumothorax.  Osseous structures are unremarkable.   Impression:       As above.      Electronically signed by: Po Armstrong MD  Date: 03/31/2018  Time: 10:35   CT Abdomen Pelvis With Contrast [[id number]] (Abnormal) Resulted: 03/30/18 2001   Order Status: Completed Updated: 03/30/18 2004   Narrative:     EXAMINATION:  CT ABDOMEN PELVIS WITH CONTRAST    CLINICAL HISTORY:  Abdominal pain, unspecified;Sepsis; Progressive distension, increasing leukocytosis, extensive PAD s/p R AKA and with necrotic L foot, other abd source?;    TECHNIQUE:  Low dose axial images, sagittal and coronal reformations were obtained from the lung bases to the pubic symphysis following the IV administration of 75 mL of Omnipaque 350    COMPARISON:  No dedicated priors    FINDINGS:  ABDOMEN:    - Lung bases: There is motion artifact which obscures the underlying pulmonary parenchyma.  Mild dependent atelectasis.  Suggestion of ground-glass pulmonary nodules is noted in the lungs with the largest possible nodule in the right lower lobe measuring 1.2 x 1.4 cm, as seen on series 2, image 7.  For a ground glass nodule 6 mm or larger, Fleischner Society 2017 guidelines recommend follow up with non-contrast chest CT at 6-12 months after discovery. If this nodule persists at that time, additional follow up with non-contrast chest CT is recommended every 2 years until 5 years of stability have been documented.    -heart: No pericardial effusion.  There is partial visualization of cardiac pacing device leads.  Moderate coronary artery atherosclerosis.  Heart is upper limits of normal in size.    - Liver: No masses.  Enhancement appears unremarkable.    - Gallbladder: Prominence without significant thickening of the gallbladder wall.  No sludge or stones noted.    - Bile Ducts: No evidence of intra or extra hepatic biliary ductal  dilation.    - Spleen: Spleen and adjacent splenules noted to have an unremarkable appearance.    - Kidneys: Prominent extrarenal pelves bilaterally.  The right ureter is somewhat more prominent than left but without evidence of obstructing ureterolith or overt hydronephrosis.    - Adrenals: Unremarkable.    - Pancreas: There is a small hypodensity about the head of the pancreas measuring 0.6 cm as seen on series 2, image 41.  This is nonspecific and may represent volume averaging, a side branch IPMN, or sequela of prior pancreatitis in this patient with a reported history of alcohol use disorder.  Otherwise pancreatic parenchyma and pancreatic duct appear unremarkable.    - Retroperitoneum:  Vascular calcifications in the kidneys bilaterally.    - Vascular: Changes of aortobifemoral bypass grafting are noted with significant mural thrombus in the aortic portion of the bypass graft.  The native aorta, common iliac, internal and external iliac arteries are entirely thrombosed and small sized.  The aortofemoral bypass graft on the right shows moderate thrombus or noncalcified atherosclerosis.    Possible severe stenosis at the right aortofemoral bypass junction, poorly evaluated secondary to exam technique.  Partially imaged stent at the proximal left femoral artery.  Correlation with other films not available in the picture archive system is recommended.    - Abdominal wall:  Mild body wall edema.    PELVIS: Urinary bladder is moderately distended without wall thickening or adjacent inflammatory change.  Prostate and seminal vesicles are within normal limits.    No evidence of small-bowel obstruction, wall thickening, or local inflammation.  Colon is unremarkable with no evidence of inflammation, wall thickening, or diverticulosis.  Rectal fat planes are preserved.  No lymphadenopathy.    BONES:  Grossly stable grade 1 anterolisthesis of L5 on S1 secondary to remote bilateral L5 pars defects.  No acute osseous  abnormality and no suspicious lytic or blastic lesion.   Impression:       Bilateral ground-glass pulmonary nodules largest measuring 1.2 x 1.4 cm.  These are nonspecific and may be infectious, inflammatory, or neoplastic.  For a ground glass nodule 6 mm or larger, Fleischner Society 2017 guidelines recommend follow up with non-contrast chest CT at 6-12 months after discovery. If this nodule persists at that time, additional follow up with non-contrast chest CT is recommended every 2 years until 5 years of stability have been documented.    Changes of aortobifemoral bypass grafting noted with significant mural thrombus in the bypass graft and concern for severe stenosis at the junction of the bypass and the femoral artery on the right.  Recommend correlation with other imaging not available in Radiology picture archive system.  Native aorta and also common iliacs and branch vessels are completely thrombosed and atrophic.    Bilateral extrarenal pelves with prominence of the right greater than left ureters, without evidence of ureterolith.    Moderately distended urinary bladder without adjacent inflammatory change or associated hydronephrosis, nonspecific.  Correlate clinically.    Small hypodense focus about the head of the pancreas measuring 0.6 cm as seen on series 2, image 41.  This is nonspecific.  May represent a side branch IPMN or a pseudocyst.    Moderate coronary artery atherosclerosis, vascular calcifications in the kidney, splenules, and other incidental findings as above.    This report was flagged in Epic as abnormal.    Electronically signed by resident: Po Wong  Date: 03/30/2018  Time: 19:08    Electronically signed by: Leonardo Beasley MD  Date: 03/30/2018  Time: 20:01   X-Ray Foot Complete Left [[id number]] Resulted: 03/29/18 1325   Order Status: Completed Updated: 03/29/18 1327   Narrative:     EXAMINATION:  XR FOOT COMPLETE 3 VIEW LEFT    CLINICAL HISTORY:  Ischemic changes to left  foot;    FINDINGS:  No fracture dislocation bone destruction seen.   Impression:       See above      Electronically signed by: Moe Chris MD  Date: 03/29/2018  Time: 13:25

## 2018-04-03 NOTE — PROGRESS NOTES
Pt complains of 10/10 left foot pain. Notified Dr. Echols. MD to place a one time order of oxy, will pass on to night shift.

## 2018-04-03 NOTE — PROGRESS NOTES
[facility]  Infectious Disease  Progress Note    Patient Name: Leonardo Byrd  MRN: [id number]  Admission Date: 3/18/2018  Length of Stay: 16 days  Attending Physician: Alysha Cuevas MD  Primary Care Provider: Indio Aquino MD    Isolation Status: No active isolations  Assessment/Plan:      * Critical lower limb ischemia    Mr. Byrd is a 47 year old with PMH of ICM s/p ICD, CAD s/p PCI, HTN, HLD, PAD (s/p aortobifemoral bypass, L SFA and pop PTAS in September and recent R SFA and R pop 3/8 by Dr. Carl Bell) [place] [facility] [place]. Evaluated by interventional cardiology and underwent catheter directed tPA on 3/20. On vancomycin and unasyn for LLE cellulitis. Pt now with RLE occlusion of distal right superficial femoral, popliteal, anterior tibial, and posterior tibial arteries. vascular surgery performed right AKA 3/27.  Podiatry following LLE, monitoring closely and awaiting demarcation of necrosis of LLE as possibly will have TMA or BKA.  Blood cultures NGTD.  Persistent leukocytosis - suspect secondary to stress response for AKA, ischemia, and blood administartion.  ABD exam shows significant tenderness especially in lower quadrants and superpubic area - ? Cause and underlying pathology.  Left foot appears worse and may require further vascular intervention.  Suspect CT C/A/P thrombosis findings imply poor prognosis.     Afebrile today.  Lethargy and MS improved.  PICC placed 3/31.  Vanc trough high today at 23.5 and primary team changed dose.  WBC essentially stable .    Plan  1. Continue IV vancomycin 1250g q12hr.  vanc trough goal 15-20.   2. Continue Zosyn for pseudomonas coverage  3. Podiatry and vascular following for necrotic L foot. Awaiting plan from podaitry  4. Remained afebrile, WBC 15sK today. ID wll follow.             Anticipated Disposition: tbd    Thank you for your consult. I will follow-up with patient. Please contact us if you  "have any additional questions.    RITA Terry  Infectious Disease  [facility]    Subjective:     Principal Problem:Critical lower limb ischemia    HPI: Mr. Byrd is a 47 year old with PMH of ICM s/p ICD, CAD s/p PCI, HTN, HLD, PAD (s/p aortobifemoral bypass, L SFA and pop PTAS in September and recent R SFA and R pop 3/8 by Dr. Carl Bell) who [place] [facility] [place] on 3/18. Evaluated by interventional cardiology and underwent catheter directed tPA on 3/20, admitted to CCU for close monitoring. Also with DTs requiring scheduled benzos. Pt was given vancomycin for left foot cellulitis. Pt was intubated for airway protection in setting of increasing benzo requirements and worsening agitation.  Pt now extubated.  Pt was noted to have more duskiness in appearance of left leg and absent pulses.US lower ext showing occlusion of distal right superficial femoral, popliteal, anterior tibial, and posterior tibial arteries. No "dopplerable" pulses on rt foot. Interventional cards holding off on on acute intervention currently in setting of RYAN. Pt afebrile, WBC 17K today. Complains of severe pain of lower extremities. Denies having any cough, n/v/d,dysuria.   Interval History: No AEON.  Afebrile and WBC.  CO of left foot pain.  Vasc sx awaiting podiatry input.  The patient denies any recent fever, chills, or sweats.      Review of Systems   Constitutional: Negative for chills, diaphoresis and fever.   Respiratory: Negative for shortness of breath.    Cardiovascular: Negative for chest pain.   Gastrointestinal: Negative for nausea and rectal pain.   Genitourinary: Negative for dysuria and hematuria.   Musculoskeletal: Positive for myalgias (left foot pain).   Psychiatric/Behavioral: Positive for confusion.     Objective:     Vital Signs (Most Recent):  Temp: 98.5 °F (36.9 °C) (04/03/18 1522)  Pulse: 95 (04/03/18 1522)  Resp: 18 (04/03/18 1522)  BP: 108/67 (04/03/18 " 1522)  SpO2: 98 % (04/03/18 1150) Vital Signs (24h Range):  Temp:  [97.4 °F (36.3 °C)-98.8 °F (37.1 °C)] 98.5 °F (36.9 °C)  Pulse:  [] 95  Resp:  [14-20] 18  SpO2:  [92 %-98 %] 98 %  BP: ()/(52-67) 108/67     Weight: 70.1 kg (154 lb 8.7 oz) (bw)  Body mass index is 24.2 kg/m².    Estimated Creatinine Clearance: 119.3 mL/min (based on SCr of 0.7 mg/dL).    Physical Exam   Constitutional: He appears well-developed and well-nourished. No distress.   Cardiovascular: Regular rhythm.  Exam reveals gallop. Exam reveals no friction rub.    Murmur heard.  tachycardic   Pulmonary/Chest: Effort normal. No respiratory distress. He has no wheezes. He has no rales.   Abdominal: Soft. He exhibits no distension. There is no tenderness. There is no rigidity and no guarding.       Musculoskeletal: He exhibits tenderness (left foot).   Neurological:   Awake and answering questions much better   Skin: Skin is warm. No rash noted. He is not diaphoretic. There is erythema. No pallor.   LLE cool to touch. Absent pulses.  Foot looks more dusky    Psychiatric: He has a normal mood and affect.   Nursing note and vitals reviewed.      Significant Labs:   Blood Culture:   Recent Labs  Lab 03/20/18  2219 03/20/18  2233 03/26/18  0148 03/27/18  2148 03/27/18  2212   LABBLOO No growth after 5 days. No growth after 5 days. No growth after 5 days. No growth after 5 days. No growth after 5 days.     CBC:   Recent Labs  Lab 04/02/18  0008 04/02/18  0959 04/03/18  0500   WBC 14.28* 14.69* 15.54*   HGB 8.1* 8.5* 7.9*   HCT 23.6* 25.4* 23.4*   * 698* 659*     CMP:   Recent Labs  Lab 04/02/18  0305 04/03/18  0500    136   K 3.4* 3.5    102   CO2 24 25    95   BUN 3* 5*   CREATININE 0.7 0.7   CALCIUM 8.6* 8.8   PROT 6.3 6.4   ALBUMIN 1.8* 1.8*   BILITOT 0.8 0.7   ALKPHOS 164* 150*   * 170*   * 109*   ANIONGAP 10 9   EGFRNONAA >60.0 >60.0     Urine Culture:   Recent Labs  Lab 03/31/18  1500   LABURIN No  growth     Urine Studies:   Recent Labs  Lab 03/24/18  0956 03/31/18  1500   COLORU Yellow Yellow   APPEARANCEUA Clear Clear   PHUR 6.0 6.0   SPECGRAV 1.010 1.020   PROTEINUA Negative Negative   GLUCUA Negative Negative   KETONESU 1+* Trace*   BILIRUBINUA Negative Negative   OCCULTUA Trace* Negative   NITRITE Negative Negative   UROBILINOGEN 4.0 2.0   LEUKOCYTESUR Negative Negative   RBCUA 2  --    WBCUA 1  --    BACTERIA Few*  --      Wound Culture: No results for input(s): LABAERO in the last 4320 hours.  All pertinent labs within the past 24 hours have been reviewed.    Significant Imaging: I have reviewed all pertinent imaging results/findings within the past 24 hours.   X-Ray Chest 1 View for PICC_Central line [[id number]] Resulted: 03/31/18 1035   Order Status: Completed Updated: 03/31/18 1038   Narrative:     EXAMINATION:  XR CHEST 1 VIEW    CLINICAL HISTORY:  Evaluate PICC line placement;    TECHNIQUE:  Single frontal view of the chest was performed.    COMPARISON:  Chest radiograph from 03/25/2018    FINDINGS:  Interval placement of right-sided PICC line catheter with its tip in the inferior SVC.    Mild cardiomegaly.  Stable AICD.  Prominence of the pulmonary vasculature with increased interstitial lung markings suggestive of pulmonary edema, slightly worsened when compared to prior examination.  No pneumothorax.  Osseous structures are unremarkable.   Impression:       As above.      Electronically signed by: Po Armstrong MD  Date: 03/31/2018  Time: 10:35   CT Abdomen Pelvis With Contrast [[id number]] (Abnormal) Resulted: 03/30/18 2001   Order Status: Completed Updated: 03/30/18 2004   Narrative:     EXAMINATION:  CT ABDOMEN PELVIS WITH CONTRAST    CLINICAL HISTORY:  Abdominal pain, unspecified;Sepsis; Progressive distension, increasing leukocytosis, extensive PAD s/p R AKA and with necrotic L foot, other abd source?;    TECHNIQUE:  Low dose axial images, sagittal and coronal reformations were obtained  from the lung bases to the pubic symphysis following the IV administration of 75 mL of Omnipaque 350    COMPARISON:  No dedicated priors    FINDINGS:  ABDOMEN:    - Lung bases: There is motion artifact which obscures the underlying pulmonary parenchyma.  Mild dependent atelectasis.  Suggestion of ground-glass pulmonary nodules is noted in the lungs with the largest possible nodule in the right lower lobe measuring 1.2 x 1.4 cm, as seen on series 2, image 7.  For a ground glass nodule 6 mm or larger, Fleischner Society 2017 guidelines recommend follow up with non-contrast chest CT at 6-12 months after discovery. If this nodule persists at that time, additional follow up with non-contrast chest CT is recommended every 2 years until 5 years of stability have been documented.    -heart: No pericardial effusion.  There is partial visualization of cardiac pacing device leads.  Moderate coronary artery atherosclerosis.  Heart is upper limits of normal in size.    - Liver: No masses.  Enhancement appears unremarkable.    - Gallbladder: Prominence without significant thickening of the gallbladder wall.  No sludge or stones noted.    - Bile Ducts: No evidence of intra or extra hepatic biliary ductal dilation.    - Spleen: Spleen and adjacent splenules noted to have an unremarkable appearance.    - Kidneys: Prominent extrarenal pelves bilaterally.  The right ureter is somewhat more prominent than left but without evidence of obstructing ureterolith or overt hydronephrosis.    - Adrenals: Unremarkable.    - Pancreas: There is a small hypodensity about the head of the pancreas measuring 0.6 cm as seen on series 2, image 41.  This is nonspecific and may represent volume averaging, a side branch IPMN, or sequela of prior pancreatitis in this patient with a reported history of alcohol use disorder.  Otherwise pancreatic parenchyma and pancreatic duct appear unremarkable.    - Retroperitoneum:  Vascular calcifications in the kidneys  bilaterally.    - Vascular: Changes of aortobifemoral bypass grafting are noted with significant mural thrombus in the aortic portion of the bypass graft.  The native aorta, common iliac, internal and external iliac arteries are entirely thrombosed and small sized.  The aortofemoral bypass graft on the right shows moderate thrombus or noncalcified atherosclerosis.    Possible severe stenosis at the right aortofemoral bypass junction, poorly evaluated secondary to exam technique.  Partially imaged stent at the proximal left femoral artery.  Correlation with other films not available in the picture archive system is recommended.    - Abdominal wall:  Mild body wall edema.    PELVIS: Urinary bladder is moderately distended without wall thickening or adjacent inflammatory change.  Prostate and seminal vesicles are within normal limits.    No evidence of small-bowel obstruction, wall thickening, or local inflammation.  Colon is unremarkable with no evidence of inflammation, wall thickening, or diverticulosis.  Rectal fat planes are preserved.  No lymphadenopathy.    BONES:  Grossly stable grade 1 anterolisthesis of L5 on S1 secondary to remote bilateral L5 pars defects.  No acute osseous abnormality and no suspicious lytic or blastic lesion.   Impression:       Bilateral ground-glass pulmonary nodules largest measuring 1.2 x 1.4 cm.  These are nonspecific and may be infectious, inflammatory, or neoplastic.  For a ground glass nodule 6 mm or larger, Fleischner Society 2017 guidelines recommend follow up with non-contrast chest CT at 6-12 months after discovery. If this nodule persists at that time, additional follow up with non-contrast chest CT is recommended every 2 years until 5 years of stability have been documented.    Changes of aortobifemoral bypass grafting noted with significant mural thrombus in the bypass graft and concern for severe stenosis at the junction of the bypass and the femoral artery on the right.   Recommend correlation with other imaging not available in Radiology picture archive system.  Native aorta and also common iliacs and branch vessels are completely thrombosed and atrophic.    Bilateral extrarenal pelves with prominence of the right greater than left ureters, without evidence of ureterolith.    Moderately distended urinary bladder without adjacent inflammatory change or associated hydronephrosis, nonspecific.  Correlate clinically.    Small hypodense focus about the head of the pancreas measuring 0.6 cm as seen on series 2, image 41.  This is nonspecific.  May represent a side branch IPMN or a pseudocyst.    Moderate coronary artery atherosclerosis, vascular calcifications in the kidney, splenules, and other incidental findings as above.    This report was flagged in Epic as abnormal.    Electronically signed by resident: Po Wong  Date: 03/30/2018  Time: 19:08    Electronically signed by: Leonardo Beasley MD  Date: 03/30/2018  Time: 20:01   X-Ray Foot Complete Left [[id number]] Resulted: 03/29/18 1325   Order Status: Completed Updated: 03/29/18 1327   Narrative:     EXAMINATION:  XR FOOT COMPLETE 3 VIEW LEFT    CLINICAL HISTORY:  Ischemic changes to left foot;    FINDINGS:  No fracture dislocation bone destruction seen.   Impression:       See above      Electronically signed by: Moe Chris MD  Date: 03/29/2018  Time: 13:25

## 2018-04-03 NOTE — PROGRESS NOTES
Notified Dr. Rowe pt's BP is 80/52 and pt is lethargic. MD ordered 250 bolus. Will continue to monitor closely.

## 2018-04-03 NOTE — ASSESSMENT & PLAN NOTE
CM possibly with multifactorial etiology (hx of alcohol use, ischemia)  - CAD s/p PCI  - DAPT, statin, BB  - Holding lisinopril for intermittent hypotension

## 2018-04-03 NOTE — SUBJECTIVE & OBJECTIVE
Interval Hx: S/p R AKA per vascular surgery. Pt. Stepped down to floor. Father present at bedside.     Scheduled Meds:   amitriptyline  50 mg Oral QHS    aspirin  81 mg Oral Daily    atorvastatin  40 mg Oral QHS    folic acid-vit B6-vit B12 2.5-25-2 mg  1 tablet Per OG tube Daily    gabapentin  800 mg Oral QHS    lidocaine   Topical (Top) QID    [START ON 4/4/2018] metoprolol succinate  12.5 mg Oral Daily    nicotine  1 patch Transdermal Daily    piperacillin-tazobactam (ZOSYN) IVPB  4.5 g Intravenous Q8H    polyethylene glycol  17 g Oral BID    pregabalin  100 mg Oral TID    senna-docusate 8.6-50 mg  1 tablet Oral BID    thiamine  100 mg Oral Daily    ticagrelor  90 mg Oral BID    [START ON 4/4/2018] vancomycin (VANCOCIN) IVPB  1,250 mg Intravenous Q12H     Continuous Infusions:   heparin (porcine) in D5W 21 Units/kg/hr (04/03/18 1612)     PRN Meds:sodium chloride, cyclobenzaprine, heparin (PORCINE), heparin (PORCINE), nitroGLYCERIN, oxyCODONE, oxyCODONE, oxyCODONE-acetaminophen, sodium chloride 0.9%, zolpidem    Review of patient's allergies indicates:  No Known Allergies     Past Medical History:   Diagnosis Date    AICD (automatic cardioverter/defibrillator) present     RYAN (acute kidney injury) 3/25/2018    CHF (congestive heart failure)     Coronary artery disease     Encounter for blood transfusion     Hyperlipemia     Hypertension     MI (myocardial infarction)     Neuropathy     PAD (peripheral artery disease)     PVD (peripheral vascular disease)      Past Surgical History:   Procedure Laterality Date    AMPUTATION Right     great toe    BYBPASS GRAFT AORTOBIUFEMORAL      CARDIAC DEFIBRILLATOR PLACEMENT      coronary stents      EXPLORATION AND EVaCUATION OF HEMATOMA OF UPPER EXTREMITY Left     KNEE ARTHROPLASTY Left     VASCULAR SURGERY      fem-pop bypass       Family History     None        Social History Main Topics    Smoking status: Former Smoker     Packs/day: 0.50      Quit date: 02/2018    Smokeless tobacco: Former User     Types: Chew     Quit date: 8/2/1980      Comment: Uses nicotine gum and nicotine patches    Alcohol use Yes      Comment: rarely    Drug use: No    Sexual activity: Yes     Partners: Female     Review of Systems   Constitutional: Negative.    Respiratory: Negative.    Cardiovascular: Negative.    Gastrointestinal: Negative.    Genitourinary: Negative.    Musculoskeletal: Positive for arthralgias.   Skin: Positive for wound.   Psychiatric/Behavioral: Negative.      Objective:     Vital Signs (Most Recent):  Temp: 98.5 °F (36.9 °C) (04/03/18 1522)  Pulse: 95 (04/03/18 1522)  Resp: 18 (04/03/18 1522)  BP: 108/67 (04/03/18 1522)  SpO2: 98 % (04/03/18 1150) Vital Signs (24h Range):  Temp:  [97.4 °F (36.3 °C)-98.8 °F (37.1 °C)] 98.5 °F (36.9 °C)  Pulse:  [] 95  Resp:  [14-20] 18  SpO2:  [92 %-98 %] 98 %  BP: ()/(52-67) 108/67     Weight: 70.1 kg (154 lb 8.7 oz) (bw)  Body mass index is 24.2 kg/m².    Foot Exam    General  Orientation: alert and oriented to person, place, and time       Right Foot/Ankle     Inspection and Palpation  Ecchymosis: none  Tenderness: none     Comments  R AKA    Left Foot/Ankle      Inspection and Palpation  Ecchymosis: none  Tenderness: none   Swelling: none   Arch: normal  Hammertoes: absent  Claw toes: absent  Hallux valgus: no  Hallux limitus: no  Skin Exam: abnormal color;     Neurovascular  Dorsalis pedis: absent  Posterior tibial: absent          Gangrene  to toes 1-5 left foot.Foot cool to touch with no palpable pulses.  Necrotic eschar noted to dorsum of left foot.     4/3/18                        Laboratory:  A1C: No results for input(s): HGBA1C in the last 4320 hours.  CBC:     Recent Labs  Lab 04/03/18  0500   WBC 15.54*   RBC 2.54*   HGB 7.9*   HCT 23.4*   *   MCV 92   MCH 31.1*   MCHC 33.8     CMP:     Recent Labs  Lab 04/03/18  0500   GLU 95   CALCIUM 8.8   ALBUMIN 1.8*   PROT 6.4      K 3.5    CO2 25      BUN 5*   CREATININE 0.7   ALKPHOS 150*   *   *   BILITOT 0.7     CRP: No results for input(s): CRP in the last 168 hours.  ESR: No results for input(s): SEDRATE in the last 168 hours.    Diagnostic Results:  Xray left foot: . No fracture dislocation bone destruction seen.    Clinical Findings:    Gangrene distal forefoot toes 1-5 left foot. Necrotic eschar noted to dorsum of left foot.

## 2018-04-03 NOTE — PROGRESS NOTES
[facility] Hospital Medicine  Progress Note    Patient Name: Leonardo Byrd  MRN: [id number]  Patient Class: IP- Inpatient   Admission Date: 3/18/2018  Length of Stay: 16 days  Attending Physician: Alysha Cuevas MD  Primary Care Provider: Indio Aquino MD    [facility] Medicine Team: [facility]OSP MED 1 Jimi Rowe MD    Subjective:     Principal Problem:Critical lower limb ischemia    HPI:  46 yo former smoker with history of ICM (LVEF 30-35%) s/p ICD, CAD s/p PCI, HTN, HLD, PAD (s/p aortobifemoral bypass, L SFA and pop PTAS in September and recent R SFA and R pop 3/8 by Dr. Carl Bell). Patient states left foot pain has started in September 2017 and had PTAs to left LFA and left popliteal artery here. Since then he continued to have pain in his left foot and to a less degree, leg. The pain has been progressively worse since. Pain is a burning sharp pain that is elicited by slight touch. Pain is worsened with bearing weight and is improved with vibrating massager to the plantar aspect of the foot. There is associated numbness and coldness to his foot. He denies erythema or swelling of his left foot.    Hospital Course:  Admitted to CCU 3/20 after cath directed tPA was performed by int cards under general anesthesia. Also with DTs requiring scheduled benzos. On day 2 Lt lower extremity remained pulseless, so thrombolysis still infusing. On day 4 of vanc for possible lt foot cellulitis. Transitioned to clinda IV (unable to take PO safely). Intubated overnight for airway protection in setting of increasing benzo requirements and worsening agitation and danger of pt pulling line. CXR this AM (3/22) with patchy bilateral infiltrates. Taken to cath lab for possible cath removal. Lt leg then reportedly duskier and with absent pulses. On 3/23 taken back to cath lab. Int cards Recc Podiatry consult for possible L trans-metatarsal amputation. On heparin gtt. 3/24 Wbc trending up, foot color  "getting darker,paitent extubated, continue heparin. 3/25: US lower ext showing occlusion of distal right superficial femoral, popliteal, anterior tibial, and posterior tibial arteries. No "dopplerable" pulses on rt foot. Interventional cards holding off on on acute intervention currently in setting of RYAN. Will continue to monitor. Cr 1.7 (baseline 0.6). Given 500cc Iv fluids. Started on scheduled librium for alcohol withdrawal, with plans for taper.     -3/26/18 NAEON. Pt. With elevated WBC. On vanc and ceftriaxone. ID consulted. Pain management for BLE ischemia   -3/27: NAEON. BLE pain improved after pain management adjustment. Vascular surgery evaluated pt and planning for Right AKA today.   - 3/28 febrile last night. Blood culture sent. WBC elevated then trending down. Complains of BLE pain controlled with pain meds. S/p Right AKA on 3/27/18  -3-29: NAEON. Pain controlled with medications. Vascular and ID on board. Pt. Is high risk of left amputation. May ultimately require amputation on the left as well. Continue on vanc and Unasyn for now   - 3/30: NAEON. Pt afebrile. BLE pain with controlled with pain meds. Today abdomen slightly distended. CT abdomen ordered stat. If pt decompensate, will broaden abx from Unasyn to zosyn   - 4/1: NAEON. Pt afebrile. BLE pain controlled with pain meds. WBC trending down after switching Unasyn to Zosyn. Left toes gangrene continue to demarcates. Vascular following, likely will need amputation. received 1 PRBC for Hg 6.9   - 4/2: Stepped down to hospital medicine. Modified pain regimen to PO with IV for breakthrough. Awaiting vascular recommendations for L foot  -4/3: Blood pressures low,     Interval History: No acute events overnight. Patient's mentation not significantly changed, per family member. Bolused 250cc NS for SBP ~80 and discontinued lisinopril 5 mg daily. Vascular and podiatory following for likely L foot aputation.    Review of Systems   Constitutional: Negative " for chills, diaphoresis and fever.   Respiratory: Negative for shortness of breath.    Cardiovascular: Negative for chest pain.   Gastrointestinal: Negative for nausea and rectal pain.   Genitourinary: Negative for dysuria and hematuria.   Psychiatric/Behavioral: Positive for confusion.     Objective:     Vital Signs (Most Recent):  Temp: 98.1 °F (36.7 °C) (04/03/18 0841)  Pulse: 98 (04/03/18 0924)  Resp: 16 (04/03/18 0841)  BP: (!) 102/53 (04/03/18 0924)  SpO2: (!) 92 % (04/03/18 0841) Vital Signs (24h Range):  Temp:  [98.1 °F (36.7 °C)-99.1 °F (37.3 °C)] 98.1 °F (36.7 °C)  Pulse:  [] 98  Resp:  [16-20] 16  SpO2:  [92 %-100 %] 92 %  BP: ()/(52-59) 102/53     Weight: 70.1 kg (154 lb 8.7 oz) (bw)  Body mass index is 24.2 kg/m².    Intake/Output Summary (Last 24 hours) at 04/03/18 1013  Last data filed at 04/03/18 0501   Gross per 24 hour   Intake          2716.84 ml   Output              550 ml   Net          2166.84 ml      Physical Exam   Constitutional: He appears well-developed. He appears lethargic.   Cardiovascular: Normal rate, regular rhythm and normal heart sounds.    Pulmonary/Chest: Effort normal. No respiratory distress.   Abdominal: Soft. Bowel sounds are normal. There is no tenderness.   Musculoskeletal:   R BKA  Left distal toes with dry gangrene; dorsal ischemia not full thickness   Neurological: He appears lethargic.   Skin: Skin is warm.       Significant Labs:   BMP:     Recent Labs  Lab 04/03/18  0500   GLU 95      K 3.5      CO2 25   BUN 5*   CREATININE 0.7   CALCIUM 8.8   MG 1.8     CBC:     Recent Labs  Lab 04/02/18  0008 04/02/18  0959 04/03/18  0500   WBC 14.28* 14.69* 15.54*   HGB 8.1* 8.5* 7.9*   HCT 23.6* 25.4* 23.4*   * 698* 659*       Significant Imaging: No new imaging    Assessment/Plan:      * Critical lower limb ischemia    Mr. Byrd is a 47 year old man with CAD s/p PCI, LVEF 30% s/p ICD, complicated and extensive PAD s/p aortofem bypass and multiple  stents on DAPT who presents with what appears to be CLI of his left foot of unclear duration.      --Underwent angiogram 3/19 that showed occlusion of aorta-fem bypass at distal anastamosis. Unable to treat due to patient movement   --s/p cath directed tPA to occluded L SFA  --blood cx ngtd  --subsequently with acute limb ischemia in RLE resulting in R AKA on 3/27  --continue ASA/brilinta/heparin gtt  --Podiatry and vascular following for necrotic L foot, awaiting demarcation of necrosis to guide amputation        Leukocytosis    - continue trending down   -s/p 7 day course of abx for possible LE cellulitis  -CT abd without source of infection  -Inflammatory response related to necrosis/recent amputation vs cellulitis vs other infxs source  -Covering with broad spectrum antibiotics, will remain broad for now  - restarted on van and ceftriaxone (started 3/25/18) for elevated wbc   - ID consulted, switched Ceftriaxone to Unasyn  (started 3/25/18), switched to Zosyn for failure to improve 3/30        Ischemic cardiomyopathy    CM possibly with multifactorial etiology (hx of alcohol use, ischemia)  - CAD s/p PCI  - DAPT, statin, BB  - Holding lisinopril for intermittent hypotension        Alcohol abuse    -s/p delirium tremens  -no seizure activity  -s/p BZD taper  -continue thiamine  -remains confused, unclear etiology, difficult to assess        Chronic systolic heart failure    LVEF 10-15% s/p ICD  Continue BB  Holding lisinopril for tenuous BPs        Tobacco abuse    --nicotine patch            VTE Risk Mitigation         Ordered     heparin 25,000 units in dextrose 5% 250 mL (100 units/mL) infusion  Continuous     Route:  Intravenous        03/27/18 1645     heparin 25,000 units in dextrose 5% 250 mL (100 units/mL) bolus from bag; ADDITIONAL PRN BOLUS  As needed (PRN)     Route:  Intravenous        03/27/18 1645     heparin 25,000 units in dextrose 5% 250 mL (100 units/mL) bolus from bag; ADDITIONAL PRN BOLUS  As  needed (PRN)     Route:  Intravenous        03/27/18 1645     IP VTE LOW RISK PATIENT  Once      03/18/18 1711              Jimi Rowe MD  Department of Hospital Medicine   [facility]

## 2018-04-03 NOTE — PROGRESS NOTES
[facility]  Vascular Surgery  Progress Note    Patient Name: Leonardo Byrd  MRN: [id number]  Admission Date: 3/18/2018  Primary Care Provider: Indio Aquino MD    Subjective:     Interval History: NAEON, pain well controlled, afebrile, HDS    Post-Op Info:  Procedure(s) (LRB):  AMPUTATION-ABOVE KNEE (Right)   7 Days Post-Op       Medications:  Continuous Infusions:   heparin (porcine) in D5W 21 Units/kg/hr (04/02/18 2151)     Scheduled Meds:   amitriptyline  50 mg Oral QHS    aspirin  81 mg Oral Daily    atorvastatin  40 mg Oral QHS    folic acid-vit B6-vit B12 2.5-25-2 mg  1 tablet Per OG tube Daily    gabapentin  800 mg Oral QHS    lidocaine   Topical (Top) QID    lisinopril  5 mg Oral Daily    metoprolol succinate  25 mg Oral Daily    nicotine  1 patch Transdermal Daily    piperacillin-tazobactam (ZOSYN) IVPB  4.5 g Intravenous Q8H    polyethylene glycol  17 g Oral BID    pregabalin  100 mg Oral TID    senna-docusate 8.6-50 mg  1 tablet Oral BID    thiamine  100 mg Oral Daily    ticagrelor  90 mg Oral BID    vancomycin (VANCOCIN) IVPB  1,000 mg Intravenous Q8H     PRN Meds:sodium chloride, cyclobenzaprine, heparin (PORCINE), heparin (PORCINE), morphine, nitroGLYCERIN, oxyCODONE, oxyCODONE, oxyCODONE-acetaminophen, sodium chloride 0.9%, zolpidem     Objective:     Vital Signs (Most Recent):  Temp: 98.3 °F (36.8 °C) (04/03/18 0540)  Pulse: 102 (04/03/18 0540)  Resp: 20 (04/03/18 0540)  BP: (!) 96/58 (04/03/18 0540)  SpO2: 97 % (04/03/18 0540) Vital Signs (24h Range):  Temp:  [98.1 °F (36.7 °C)-99.1 °F (37.3 °C)] 98.3 °F (36.8 °C)  Pulse:  [] 102  Resp:  [18-20] 20  SpO2:  [96 %-100 %] 97 %  BP: (88-96)/(52-59) 96/58          Physical Exam   Constitutional: He appears well-developed. He appears lethargic.   Cardiovascular: Normal rate and regular rhythm.    Biphasic Left PT   Pulmonary/Chest: Effort normal. No respiratory distress.   Abdominal: Soft.   Musculoskeletal:    Mild ruby-incisional ischemia of R AKA medial incision; no hematoma R AKA stump  Left distal toes with dry gangrene, beginning to demarcate clearly; dorsal ischemia not full thickness   Neurological: He appears lethargic.   Skin: Skin is warm.       Significant Labs:  CBC:   Recent Labs  Lab 04/03/18  0500   WBC 15.54*   RBC 2.54*   HGB 7.9*   HCT 23.4*   *   MCV 92   MCH 31.1*   MCHC 33.8     CMP:   Recent Labs  Lab 04/03/18  0500   GLU 95   CALCIUM 8.8   ALBUMIN 1.8*   PROT 6.4      K 3.5   CO2 25      BUN 5*   CREATININE 0.7   ALKPHOS 150*   *   *   BILITOT 0.7       Significant Diagnostics:  I have reviewed all pertinent imaging results/findings within the past 24 hours.    Assessment/Plan:     Limb ischemia    48 yo M with h/o HTN, HLD, HFrEF (10-15% 3/23/18) s/p ICD, CAD s/p PCI, EtOH abuse, PAD s/p ABF 1999 with multiple endovascular interventions presented to hospital on 3/18 with complaints of left foot rest pain s/p lysis now with biphasic left PT signal and L forefoot ischemia as well as RLE ischemia s/p R AKA 7 Days Post-Op     Will monitor R AKA stump; continue elevating and wrapping  PT/OT; NWB RLE; heel touch only LLE  Left toes beginning to demarcate; recommend daily betadiene paint to left toes  Recommend contacting podiatry to re-evaluate left foot  On heparin gtt  Vascular surgery to follow            Mathieu Shelton MD  Vascular Surgery  [facility]

## 2018-04-03 NOTE — SUBJECTIVE & OBJECTIVE
Medications:  Continuous Infusions:   heparin (porcine) in D5W 21 Units/kg/hr (04/02/18 2151)     Scheduled Meds:   amitriptyline  50 mg Oral QHS    aspirin  81 mg Oral Daily    atorvastatin  40 mg Oral QHS    folic acid-vit B6-vit B12 2.5-25-2 mg  1 tablet Per OG tube Daily    gabapentin  800 mg Oral QHS    lidocaine   Topical (Top) QID    lisinopril  5 mg Oral Daily    metoprolol succinate  25 mg Oral Daily    nicotine  1 patch Transdermal Daily    piperacillin-tazobactam (ZOSYN) IVPB  4.5 g Intravenous Q8H    polyethylene glycol  17 g Oral BID    pregabalin  100 mg Oral TID    senna-docusate 8.6-50 mg  1 tablet Oral BID    thiamine  100 mg Oral Daily    ticagrelor  90 mg Oral BID    vancomycin (VANCOCIN) IVPB  1,000 mg Intravenous Q8H     PRN Meds:sodium chloride, cyclobenzaprine, heparin (PORCINE), heparin (PORCINE), morphine, nitroGLYCERIN, oxyCODONE, oxyCODONE, oxyCODONE-acetaminophen, sodium chloride 0.9%, zolpidem     Objective:     Vital Signs (Most Recent):  Temp: 98.3 °F (36.8 °C) (04/03/18 0540)  Pulse: 102 (04/03/18 0540)  Resp: 20 (04/03/18 0540)  BP: (!) 96/58 (04/03/18 0540)  SpO2: 97 % (04/03/18 0540) Vital Signs (24h Range):  Temp:  [98.1 °F (36.7 °C)-99.1 °F (37.3 °C)] 98.3 °F (36.8 °C)  Pulse:  [] 102  Resp:  [18-20] 20  SpO2:  [96 %-100 %] 97 %  BP: (88-96)/(52-59) 96/58          Physical Exam   Constitutional: He appears well-developed. He appears lethargic.   Cardiovascular: Normal rate and regular rhythm.    Biphasic Left PT   Pulmonary/Chest: Effort normal. No respiratory distress.   Abdominal: Soft.   Musculoskeletal:   Mild ruby-incisional ischemia of R AKA medial incision; no hematoma R AKA stump  Left distal toes with dry gangrene, beginning to demarcate clearly; dorsal ischemia not full thickness   Neurological: He appears lethargic.   Skin: Skin is warm.       Significant Labs:  CBC:   Recent Labs  Lab 04/03/18  0500   WBC 15.54*   RBC 2.54*   HGB 7.9*   HCT  23.4*   *   MCV 92   MCH 31.1*   MCHC 33.8     CMP:   Recent Labs  Lab 04/03/18  0500   GLU 95   CALCIUM 8.8   ALBUMIN 1.8*   PROT 6.4      K 3.5   CO2 25      BUN 5*   CREATININE 0.7   ALKPHOS 150*   *   *   BILITOT 0.7       Significant Diagnostics:  I have reviewed all pertinent imaging results/findings within the past 24 hours.

## 2018-04-03 NOTE — PROGRESS NOTES
Critical care RN notified of pt's lethargic state and low BP this am of BP 80/52 with a map of 61. Critical RN coming to bedside asap.

## 2018-04-03 NOTE — CARE UPDATE
RN Proactive Rounding Note  Time of Visit: 1110    Admit Date: 3/18/2018  LOS: 16  Code Status: Full Code   Date of Visit: 2018  : 1970  Age: 47 y.o.  Sex: male  Bed: CTSU 300/CTSU 300 A:   MRN: [id number]  Was the patient discharged from an ICU this admission? Yes   Was the patient discharged from a PACU within last 24 hours? No  Did the patient receive conscious sedation/general anesthesia in last 24 hours? No  Was the patient in the ED within the past 24 hours? No  Was the patient started on NIPPV within the past 24 hours? No  Attending Physician: Alysha Cuevas MD  Primary Service: [facility] MED 1      ASSESSMENT:     Abnormal Vital Signs: Hypotension, Change in MS     INTERVENTIONS/ RECOMMENDATIONS:     Called to the Bs. Bedside RN concerned with hypotension this am. Pt received 250cc NS bolus earlier today and Bp now 87/58. Pt arouseable but lethargic, will answer questions appropriately with repeated stimulation. Blood glucose 130, Pain medication given this morning at 0531 per orders. Dr Gomez and Dr Rowe at the BS. Order for ABG noted and done. PH 7.482, PO2 84, PCO2 32.7, BE 1, HCO3 24.5, sO2 97% on 2L nasal cannula. . H&H with am labs 23.4/7.9. Slight decrease from yesterday. WBC 15.54 up from 14.69 from yesterday. Discussed potential order for blood cultures and lactic acid but no additional orders at this time. Will plan to follow up.    Discussed plan of care with DENISE Driver. Encouraged to call with concerns    PHYSICIAN ESCALATION:     Yes/No Yes    Orders received and case discussed with Dr Gomez and Dr Rowe    Disposition: Remain in CTSU room 300.     FOLLOW-UP/CONTINGENCY:       Call back the Rapid Response Nurse at x [phone]  for additional questions or concerns

## 2018-04-03 NOTE — PLAN OF CARE
Referrals sent to [facility] and [facility] via [facility].        Breanne Salcedo LMSW

## 2018-04-03 NOTE — ASSESSMENT & PLAN NOTE
46 yo M with h/o HTN, HLD, HFrEF (10-15% 3/23/18) s/p ICD, CAD s/p PCI, EtOH abuse, PAD s/p ABF 1999 with multiple endovascular interventions presented to hospital on 3/18 with complaints of left foot rest pain s/p lysis now with biphasic left PT signal and L forefoot ischemia as well as RLE ischemia s/p R AKA 7 Days Post-Op     Will monitor R AKA stump; continue elevating and wrapping  PT/OT; NWB RLE; heel touch only LLE  Left toes beginning to demarcate; recommend daily betadiene paint to left toes  Recommend contacting podiatry to re-evaluate left foot  On heparin gtt  Vascular surgery to follow

## 2018-04-03 NOTE — ASSESSMENT & PLAN NOTE
Gangrene noted to toes 1-5 left foot. Necrotic eschar noted to dorsum of left foot.   Discussed two options with patient: L BKA vs. TMA with 50/50 chance of healing. Necrotic eschar noted to dorsum of left foot , pt at high risk for limb loss at this time.  Pt. Undecided requests we speak to his father to assist with decision. Plan to contact father.   - Painted with betadine nursing orders in for daily betadine application.     Weightbearing Status: partial heel WB left  Offloading Device: DARCO shoe     Shalini Julian DPM PGY-3  Pager: [phone]

## 2018-04-03 NOTE — PHARMACY MED REC
"Admission Medication Reconciliation - Pharmacy Consult Note    The home medication history was taken by Ruth Choudhury, Pharmacy Technician.  Based on information gathered and subsequent review by the clinical pharmacist, the items below may need attention.     You may go to "Admission" then "Reconcile Home Medications" tabs to review and/or act upon these items.     Potentially problematic discrepancies with current MAR  o Patient IS taking the following which was not ordered upon admit  o ranolazine (RANEXA) 1,000 mg PO BID  o cilostazol (PLETAL)  100 mg PO BID    Please address this information as you see fit.  Feel free to contact us if you have any questions or require assistance.    Delisa Chacko, PharmD  [id number]     Patient's prior to admission medication regimen was as follows:  Medication Sig    amitriptyline (ELAVIL) 25 MG tablet Take 1 tablet (25 mg total) by mouth every evening.    aspirin (ECOTRIN) 81 MG EC tablet Take 81 mg by mouth once daily.    atorvastatin (LIPITOR) 40 MG tablet Take 40 mg by mouth every evening.    cilostazol (PLETAL) 50 MG Tab Take 2 tablets (100 mg total) by mouth 2 (two) times daily.    cyclobenzaprine (FLEXERIL) 10 MG tablet Take 10 mg by mouth 3 (three) times daily as needed for Muscle spasms.    gabapentin (NEURONTIN) 800 MG tablet Take 800 mg by mouth every evening.    hydrocodone-acetaminophen 10-325mg (NORCO)  mg Tab Take 1 tablet by mouth every 6 (six) hours as needed for Pain.    lisinopril (PRINIVIL,ZESTRIL) 5 MG tablet Take 1 tablet (5 mg total) by mouth once daily.    metoprolol succinate (TOPROL-XL) 25 MG 24 hr tablet Take 25 mg by mouth 2 (two) times daily.    pregabalin (LYRICA) 100 MG capsule Take 1 capsule (100 mg total) by mouth 3 (three) times daily.    ranolazine (RANEXA) 1,000 mg Tb12 Take 1,000 mg by mouth 2 (two) times daily.    ticagrelor (BRILINTA) 90 mg tablet Take 90 mg by mouth 2 (two) times daily.    traMADol (ULTRAM) 50 mg tablet " Take  mg by mouth every 12 (twelve) hours as needed for Pain.    zolpidem (AMBIEN) 5 MG Tab Take 5 mg by mouth nightly as needed for Insomnia.           .    .

## 2018-04-03 NOTE — PLAN OF CARE
Problem: Patient Care Overview  Goal: Plan of Care Review  Outcome: Ongoing (interventions implemented as appropriate)  Pt free of falls/traumas/injuries. Skin remains clean, dry, and intact.  Dr. Gomez and Dr. Rowe notified of pt's lethargic state and low BP 80/50. MDs ordered 250 bolus once and ABG, which was normal. MDs will hold off on lactic acid and blood cultures for now. Podiatry came and saw pt.  Pt re-educated on importance of measuring accurate intake and out put; pt verbalized and demonstrates understanding. Reviewed plan of care with pt; and pt verbalized understanding.  Pt VSS in no distress will continue to monitor.

## 2018-04-03 NOTE — PT/OT/SLP PROGRESS
"Speech Language Pathology      Leonardo Schwartzramiro  MRN: [id number]    SLP attempts to see Patient for ongoing swallow assessment this service day.  Patient declining SLP attempts to initiate therapy, citing pain and fatigue. He states, "I can't, I don't have an appetite and I'm in too much pain.  Patient not seen today secondary to Patient fatigue, Pain.  Nurse notified.  Will follow-up 4/4/18. Thank you.    JOVAN Fields., [facility]-SLP  Speech-Language Pathology  Pager: [phone]  4/3/2018    "

## 2018-04-03 NOTE — PROGRESS NOTES
[facility]  Podiatry  Progress Note    Patient Name: Leonardo Byrd  MRN: [id number]  Admission Date: 3/18/2018  Hospital Length of Stay: 16 days  Attending Physician: Alysha Cuevas MD  Primary Care Provider: Indio Aquino MD   Interval Hx: S/p R AKA per vascular surgery. Pt. Stepped down to floor. Father present at bedside.     Scheduled Meds:   amitriptyline  50 mg Oral QHS    aspirin  81 mg Oral Daily    atorvastatin  40 mg Oral QHS    folic acid-vit B6-vit B12 2.5-25-2 mg  1 tablet Per OG tube Daily    gabapentin  800 mg Oral QHS    lidocaine   Topical (Top) QID    [START ON 4/4/2018] metoprolol succinate  12.5 mg Oral Daily    nicotine  1 patch Transdermal Daily    piperacillin-tazobactam (ZOSYN) IVPB  4.5 g Intravenous Q8H    polyethylene glycol  17 g Oral BID    pregabalin  100 mg Oral TID    senna-docusate 8.6-50 mg  1 tablet Oral BID    thiamine  100 mg Oral Daily    ticagrelor  90 mg Oral BID    [START ON 4/4/2018] vancomycin (VANCOCIN) IVPB  1,250 mg Intravenous Q12H     Continuous Infusions:   heparin (porcine) in D5W 21 Units/kg/hr (04/03/18 1612)     PRN Meds:sodium chloride, cyclobenzaprine, heparin (PORCINE), heparin (PORCINE), nitroGLYCERIN, oxyCODONE, oxyCODONE, oxyCODONE-acetaminophen, sodium chloride 0.9%, zolpidem    Review of patient's allergies indicates:  No Known Allergies     Past Medical History:   Diagnosis Date    AICD (automatic cardioverter/defibrillator) present     RYAN (acute kidney injury) 3/25/2018    CHF (congestive heart failure)     Coronary artery disease     Encounter for blood transfusion     Hyperlipemia     Hypertension     MI (myocardial infarction)     Neuropathy     PAD (peripheral artery disease)     PVD (peripheral vascular disease)      Past Surgical History:   Procedure Laterality Date    AMPUTATION Right     great toe    BYBPASS GRAFT AORTOBIUFEMORAL      CARDIAC DEFIBRILLATOR PLACEMENT      coronary stents       EXPLORATION AND EVaCUATION OF HEMATOMA OF UPPER EXTREMITY Left     KNEE ARTHROPLASTY Left     VASCULAR SURGERY      fem-pop bypass       Family History     None        Social History Main Topics    Smoking status: Former Smoker     Packs/day: 0.50     Quit date: 02/2018    Smokeless tobacco: Former User     Types: Chew     Quit date: 8/2/1980      Comment: Uses nicotine gum and nicotine patches    Alcohol use Yes      Comment: rarely    Drug use: No    Sexual activity: Yes     Partners: Female     Review of Systems   Constitutional: Negative.    Respiratory: Negative.    Cardiovascular: Negative.    Gastrointestinal: Negative.    Genitourinary: Negative.    Musculoskeletal: Positive for arthralgias.   Skin: Positive for wound.   Psychiatric/Behavioral: Negative.      Objective:     Vital Signs (Most Recent):  Temp: 98.5 °F (36.9 °C) (04/03/18 1522)  Pulse: 95 (04/03/18 1522)  Resp: 18 (04/03/18 1522)  BP: 108/67 (04/03/18 1522)  SpO2: 98 % (04/03/18 1150) Vital Signs (24h Range):  Temp:  [97.4 °F (36.3 °C)-98.8 °F (37.1 °C)] 98.5 °F (36.9 °C)  Pulse:  [] 95  Resp:  [14-20] 18  SpO2:  [92 %-98 %] 98 %  BP: ()/(52-67) 108/67     Weight: 70.1 kg (154 lb 8.7 oz) (bw)  Body mass index is 24.2 kg/m².    Foot Exam    General  Orientation: alert and oriented to person, place, and time       Right Foot/Ankle     Inspection and Palpation  Ecchymosis: none  Tenderness: none     Comments  R AKA    Left Foot/Ankle      Inspection and Palpation  Ecchymosis: none  Tenderness: none   Swelling: none   Arch: normal  Hammertoes: absent  Claw toes: absent  Hallux valgus: no  Hallux limitus: no  Skin Exam: abnormal color;     Neurovascular  Dorsalis pedis: absent  Posterior tibial: absent          Gangrene  to toes 1-5 left foot.Foot cool to touch with no palpable pulses.  Necrotic eschar noted to dorsum of left foot.     4/3/18                        Laboratory:  A1C: No results for input(s): HGBA1C in the last  4320 hours.  CBC:     Recent Labs  Lab 04/03/18  0500   WBC 15.54*   RBC 2.54*   HGB 7.9*   HCT 23.4*   *   MCV 92   MCH 31.1*   MCHC 33.8     CMP:     Recent Labs  Lab 04/03/18  0500   GLU 95   CALCIUM 8.8   ALBUMIN 1.8*   PROT 6.4      K 3.5   CO2 25      BUN 5*   CREATININE 0.7   ALKPHOS 150*   *   *   BILITOT 0.7     CRP: No results for input(s): CRP in the last 168 hours.  ESR: No results for input(s): SEDRATE in the last 168 hours.    Diagnostic Results:  Xray left foot: . No fracture dislocation bone destruction seen.    Clinical Findings:    Gangrene distal forefoot toes 1-5 left foot. Necrotic eschar noted to dorsum of left foot.                 Assessment/Plan:     * Critical lower limb ischemia    Interventional cardiology on board, appreciate recs  S/p R AKA per vascular surgery.         Gangrene    Gangrene noted to toes 1-5 left foot. Necrotic eschar noted to dorsum of left foot.   Discussed two options with patient: L BKA vs. TMA with 50/50 chance of healing. Necrotic eschar noted to dorsum of left foot , pt at high risk for limb loss at this time.  Pt. Undecided requests we speak to his father to assist with decision. Discussed  case with patient's father would like to try and attempt L TMA. Both patient and father in agreement to proceed with L TMA. Stressed to both patient and father, that patient is at high risk for limb loss with 50/50 chance of healing L TMA.   Case request placed for 4/5/18.  Plan to obtain consent tomorrow.   - Painted with betadine nursing orders in for daily betadine application.     Weightbearing Status: partial heel WB left  Offloading Device: DARCO shoe     Shalini Julian DPM PGY-3  Pager: [phone]              PVD (peripheral vascular disease)    Followed by interventional cardiology.             Shalini Julian MD  Podiatry  [facility]

## 2018-04-04 NOTE — PROGRESS NOTES
[facility] Hospital Medicine  Progress Note    Patient Name: Leonardo Byrd  MRN: [id number]  Patient Class: IP- Inpatient   Admission Date: 3/18/2018  Length of Stay: 17 days  Attending Physician: Alysha Cuevas MD  Primary Care Provider: Indio Aquino MD    [facility] Medicine Team: [facility] MED 1 Phoenix Gomez MD    Subjective:     Principal Problem:Critical lower limb ischemia    HPI:  48 yo former smoker with history of ICM (LVEF 30-35%) s/p ICD, CAD s/p PCI, HTN, HLD, PAD (s/p aortobifemoral bypass, L SFA and pop PTAS in September and recent R SFA and R pop 3/8 by Dr. Carl Bell). Patient states left foot pain has started in September 2017 and had PTAs to left LFA and left popliteal artery here. Since then he continued to have pain in his left foot and to a less degree, leg. The pain has been progressively worse since. Pain is a burning sharp pain that is elicited by slight touch. Pain is worsened with bearing weight and is improved with vibrating massager to the plantar aspect of the foot. There is associated numbness and coldness to his foot. He denies erythema or swelling of his left foot.    Hospital Course:  Admitted to CCU 3/20 after cath directed tPA was performed by int cards under general anesthesia. Also with DTs requiring scheduled benzos. On day 2 Lt lower extremity remained pulseless, so thrombolysis still infusing. On day 4 of vanc for possible lt foot cellulitis. Transitioned to clinda IV (unable to take PO safely). Intubated overnight for airway protection in setting of increasing benzo requirements and worsening agitation and danger of pt pulling line. CXR this AM (3/22) with patchy bilateral infiltrates. Taken to cath lab for possible cath removal. Lt leg then reportedly duskier and with absent pulses. On 3/23 taken back to cath lab. Int mansi Recc Podiatry consult for possible L trans-metatarsal amputation. On heparin gtt. 3/24 Wbc trending up, foot color  "getting darker,paitent extubated, continue heparin. 3/25: US lower ext showing occlusion of distal right superficial femoral, popliteal, anterior tibial, and posterior tibial arteries. No "dopplerable" pulses on rt foot. Interventional cards holding off on on acute intervention currently in setting of RYAN. Will continue to monitor. Cr 1.7 (baseline 0.6). Given 500cc Iv fluids. Started on scheduled librium for alcohol withdrawal, with plans for taper.     -3/26/18 NAEON. Pt. With elevated WBC. On vanc and ceftriaxone. ID consulted. Pain management for BLE ischemia   -3/27: NAEON. BLE pain improved after pain management adjustment. Vascular surgery evaluated pt and planning for Right AKA today.   - 3/28 febrile last night. Blood culture sent. WBC elevated then trending down. Complains of BLE pain controlled with pain meds. S/p Right AKA on 3/27/18  -3-29: NAEON. Pain controlled with medications. Vascular and ID on board. Pt. Is high risk of left amputation. May ultimately require amputation on the left as well. Continue on vanc and Unasyn for now   - 3/30: NAEON. Pt afebrile. BLE pain with controlled with pain meds. Today abdomen slightly distended. CT abdomen ordered stat. If pt decompensate, will broaden abx from Unasyn to zosyn   - 4/1: NAEON. Pt afebrile. BLE pain controlled with pain meds. WBC trending down after switching Unasyn to Zosyn. Left toes gangrene continue to demarcates. Vascular following, likely will need amputation. received 1 PRBC for Hg 6.9   - 4/2: Stepped down to hospital medicine. Modified pain regimen to PO with IV for breakthrough. Awaiting vascular recommendations for L foot  - 4/3: Blood pressures low, am meds held. Bolused 250cc NS for SBP ~80 and discontinued lisinopril 5 mg daily  - 4/4: Continuing IV abx, plan to OR tomorrow per podiatry and awaiting vascular recs    Interval History: see hospital course    Review of Systems   Constitutional: Negative for chills, diaphoresis and fever. "   Respiratory: Negative for shortness of breath.    Cardiovascular: Negative for chest pain.   Gastrointestinal: Negative for nausea and rectal pain.   Genitourinary: Negative for dysuria and hematuria.   Musculoskeletal: Positive for myalgias (left foot pain).   Psychiatric/Behavioral: Positive for confusion.     Objective:     Vital Signs (Most Recent):  Temp: 98 °F (36.7 °C) (04/04/18 0800)  Pulse: 88 (04/04/18 1125)  Resp: 18 (04/04/18 0800)  BP: 115/64 (04/04/18 0800)  SpO2: (!) 92 % (04/04/18 0800) Vital Signs (24h Range):  Temp:  [98 °F (36.7 °C)-99.9 °F (37.7 °C)] 98 °F (36.7 °C)  Pulse:  [] 88  Resp:  [16-19] 18  SpO2:  [92 %-98 %] 92 %  BP: ()/(62-70) 115/64     Weight: 66 kg (145 lb 6.4 oz)  Body mass index is 22.77 kg/m².    Intake/Output Summary (Last 24 hours) at 04/04/18 1254  Last data filed at 04/04/18 1200   Gross per 24 hour   Intake          1504.76 ml   Output             4200 ml   Net         -2695.24 ml      Physical Exam   Constitutional: He appears well-developed. He appears lethargic.   Cardiovascular: Normal rate, regular rhythm and normal heart sounds.    Pulmonary/Chest: Effort normal. No respiratory distress.   Abdominal: Soft. Bowel sounds are normal. There is no tenderness.   Musculoskeletal:   R BKA  Left distal toes with dry gangrene; dorsal ischemia not full thickness   Neurological: He appears lethargic.   Skin: Skin is warm.       Significant Labs:   Blood Culture: No results for input(s): LABBLOO in the last 48 hours.  BMP:   Recent Labs  Lab 04/04/18  0644   *      K 3.1*      CO2 23   BUN 4*   CREATININE 0.9   CALCIUM 8.7   MG 1.7     CBC:   Recent Labs  Lab 04/03/18  0500 04/03/18  1700 04/04/18  0643   WBC 15.54* 18.86* 18.61*   HGB 7.9* 8.5* 8.2*   HCT 23.4* 25.8* 25.3*   * 754* 793*     Coagulation:   Recent Labs  Lab 04/04/18  0644   INR 1.1     POCT Glucose:   Recent Labs  Lab 04/03/18  1126   POCTGLUCOSE 130*       Significant  Imaging: I have reviewed all pertinent imaging results/findings within the past 24 hours.    Assessment/Plan:      * Critical lower limb ischemia    Mr. Byrd is a 47 year old man with CAD s/p PCI, LVEF 30% s/p ICD, complicated and extensive PAD s/p aortofem bypass and multiple stents on DAPT who presents with what appears to be CLI of his left foot of unclear duration.      --Underwent angiogram 3/19 that showed occlusion of aorta-fem bypass at distal anastamosis. Unable to treat due to patient movement   --s/p cath directed tPA to occluded L SFA  --blood cx ngtd  --subsequently with acute limb ischemia in RLE resulting in R AKA on 3/27  --continue ASA/brilinta/heparin gtt  --Podiatry and vascular following for necrotic L foot, awaiting demarcation of necrosis to guide amputation  --Per podiatry, case booked for 4/5 - will keep NPO at midnight and hold anticoagulation        Leukocytosis    - continue trending down   -s/p 7 day course of abx for possible LE cellulitis  -CT abd without source of infection  -Inflammatory response related to necrosis/recent amputation vs cellulitis vs other infxs source  -Covering with broad spectrum antibiotics, will remain broad for now  - restarted on van and ceftriaxone (started 3/25/18) for elevated wbc   - ID consulted, switched Ceftriaxone to Unasyn  (started 3/25/18), switched to Zosyn for failure to improve 3/30. Also on vancomycin        Ischemic cardiomyopathy    CM possibly with multifactorial etiology (hx of alcohol use, ischemia)  - CAD s/p PCI  - DAPT, statin, BB  - Holding lisinopril for intermittent hypotension        Alcohol abuse    -s/p delirium tremens  -no seizure activity  -s/p BZD taper  -continue thiamine  -remains confused, unclear etiology, difficult to assess        Chronic systolic heart failure    LVEF 10-15% s/p ICD  Continue BB  Holding lisinopril for tenuous BPs        Tobacco abuse    --nicotine patch            VTE Risk Mitigation         Ordered      heparin 25,000 units in dextrose 5% 250 mL (100 units/mL) infusion  Continuous     Route:  Intravenous        03/27/18 1645     heparin 25,000 units in dextrose 5% 250 mL (100 units/mL) bolus from bag; ADDITIONAL PRN BOLUS  As needed (PRN)     Route:  Intravenous        03/27/18 1645     heparin 25,000 units in dextrose 5% 250 mL (100 units/mL) bolus from bag; ADDITIONAL PRN BOLUS  As needed (PRN)     Route:  Intravenous        03/27/18 1645     IP VTE LOW RISK PATIENT  Once      03/18/18 1711              Phoenix Gomez MD  Department of Hospital Medicine   [facility]

## 2018-04-04 NOTE — SUBJECTIVE & OBJECTIVE
Interval Hx: S/p R AKA per vascular surgery. Pt. Stepped down to floor. Father present at bedside. Patient denies F/C/N/V.  Dressings clean, dry, and intact.      Scheduled Meds:   amitriptyline  50 mg Oral QHS    aspirin  81 mg Oral Daily    atorvastatin  40 mg Oral QHS    folic acid-vit B6-vit B12 2.5-25-2 mg  1 tablet Per OG tube Daily    gabapentin  800 mg Oral QHS    lidocaine   Topical (Top) QID    metoprolol succinate  12.5 mg Oral Daily    nicotine  1 patch Transdermal Daily    piperacillin-tazobactam (ZOSYN) IVPB  4.5 g Intravenous Q8H    potassium chloride  10 mEq Intravenous Q1H    pregabalin  100 mg Oral TID    senna-docusate 8.6-50 mg  1 tablet Oral BID    thiamine  100 mg Oral Daily    ticagrelor  90 mg Oral BID    vancomycin (VANCOCIN) IVPB  1,000 mg Intravenous Q12H     Continuous Infusions:   heparin (porcine) in D5W 21 Units/kg/hr (04/04/18 1103)     PRN Meds:sodium chloride, cyclobenzaprine, heparin (PORCINE), heparin (PORCINE), nitroGLYCERIN, oxyCODONE, oxyCODONE, oxyCODONE-acetaminophen, polyethylene glycol, sodium chloride 0.9%, zolpidem    Review of patient's allergies indicates:  No Known Allergies     Past Medical History:   Diagnosis Date    AICD (automatic cardioverter/defibrillator) present     RYAN (acute kidney injury) 3/25/2018    CHF (congestive heart failure)     Coronary artery disease     Encounter for blood transfusion     Hyperlipemia     Hypertension     MI (myocardial infarction)     Neuropathy     PAD (peripheral artery disease)     PVD (peripheral vascular disease)      Past Surgical History:   Procedure Laterality Date    AMPUTATION Right     great toe    BYBPASS GRAFT AORTOBIUFEMORAL      CARDIAC DEFIBRILLATOR PLACEMENT      coronary stents      EXPLORATION AND EVaCUATION OF HEMATOMA OF UPPER EXTREMITY Left     KNEE ARTHROPLASTY Left     VASCULAR SURGERY      fem-pop bypass       Family History     None        Social History Main Topics     Smoking status: Former Smoker     Packs/day: 0.50     Quit date: 02/2018    Smokeless tobacco: Former User     Types: Chew     Quit date: 8/2/1980      Comment: Uses nicotine gum and nicotine patches    Alcohol use Yes      Comment: rarely    Drug use: No    Sexual activity: Yes     Partners: Female     Review of Systems   Constitutional: Negative.    Respiratory: Negative.    Cardiovascular: Negative.    Gastrointestinal: Negative.    Genitourinary: Negative.    Musculoskeletal: Positive for arthralgias.   Skin: Positive for wound.   Psychiatric/Behavioral: Negative.      Objective:     Vital Signs (Most Recent):  Temp: 98.1 °F (36.7 °C) (04/04/18 1309)  Pulse: 94 (04/04/18 1309)  Resp: 18 (04/04/18 1309)  BP: (!) 106/59 (04/04/18 1309)  SpO2: (!) 94 % (04/04/18 1309) Vital Signs (24h Range):  Temp:  [98 °F (36.7 °C)-99.9 °F (37.7 °C)] 98.1 °F (36.7 °C)  Pulse:  [] 94  Resp:  [16-19] 18  SpO2:  [92 %-98 %] 94 %  BP: (106-121)/(59-70) 106/59     Weight: 66 kg (145 lb 6.4 oz)  Body mass index is 22.77 kg/m².    Foot Exam    General  Orientation: alert and oriented to person, place, and time       Right Foot/Ankle     Inspection and Palpation  Ecchymosis: none  Tenderness: none     Comments  R AKA    Left Foot/Ankle      Inspection and Palpation  Ecchymosis: none  Tenderness: none   Swelling: none   Arch: normal  Hammertoes: absent  Claw toes: absent  Hallux valgus: no  Hallux limitus: no  Skin Exam: abnormal color;     Neurovascular  Dorsalis pedis: absent  Posterior tibial: absent          Gangrene  to toes 1-5 left foot.Foot cool to touch with no palpable pulses.  Necrotic eschar noted to dorsum of left foot.     4/3/18                        Laboratory:  A1C: No results for input(s): HGBA1C in the last 4320 hours.  CBC:     Recent Labs  Lab 04/04/18  1332   WBC 21.01*   RBC 2.91*   HGB 9.3*   HCT 27.5*   *   MCV 95   MCH 32.0*   MCHC 33.8     CMP:     Recent Labs  Lab 04/04/18  0644   *    CALCIUM 8.7   ALBUMIN 1.8*   PROT 6.3      K 3.1*   CO2 23      BUN 4*   CREATININE 0.9   ALKPHOS 143*   ALT 88*   *   BILITOT 0.6     CRP: No results for input(s): CRP in the last 168 hours.  ESR: No results for input(s): SEDRATE in the last 168 hours.    Diagnostic Results:  Xray left foot: . No fracture dislocation bone destruction seen.    Clinical Findings:    Gangrene distal forefoot toes 1-5 left foot. Necrotic eschar noted to dorsum of left foot.

## 2018-04-04 NOTE — ASSESSMENT & PLAN NOTE
Gangrene noted to toes 1-5 left foot. Necrotic eschar noted to dorsum of left foot.   -plan for left TMA tomorrow at noon.  Patient to be NPO at midnight.  Consent signed by patient and by patient's father.  All questions were answered.  No garuntees that incision will heal, and there is a possibility he will need further amputation in the future.  Patient and family agreed.

## 2018-04-04 NOTE — SUBJECTIVE & OBJECTIVE
Interval History: see hospital course    Review of Systems   Constitutional: Negative for chills, diaphoresis and fever.   Respiratory: Negative for shortness of breath.    Cardiovascular: Negative for chest pain.   Gastrointestinal: Negative for nausea and rectal pain.   Genitourinary: Negative for dysuria and hematuria.   Musculoskeletal: Positive for myalgias (left foot pain).   Psychiatric/Behavioral: Positive for confusion.     Objective:     Vital Signs (Most Recent):  Temp: 98 °F (36.7 °C) (04/04/18 0800)  Pulse: 88 (04/04/18 1125)  Resp: 18 (04/04/18 0800)  BP: 115/64 (04/04/18 0800)  SpO2: (!) 92 % (04/04/18 0800) Vital Signs (24h Range):  Temp:  [98 °F (36.7 °C)-99.9 °F (37.7 °C)] 98 °F (36.7 °C)  Pulse:  [] 88  Resp:  [16-19] 18  SpO2:  [92 %-98 %] 92 %  BP: ()/(62-70) 115/64     Weight: 66 kg (145 lb 6.4 oz)  Body mass index is 22.77 kg/m².    Intake/Output Summary (Last 24 hours) at 04/04/18 1254  Last data filed at 04/04/18 1200   Gross per 24 hour   Intake          1504.76 ml   Output             4200 ml   Net         -2695.24 ml      Physical Exam   Constitutional: He appears well-developed. He appears lethargic.   Cardiovascular: Normal rate, regular rhythm and normal heart sounds.    Pulmonary/Chest: Effort normal. No respiratory distress.   Abdominal: Soft. Bowel sounds are normal. There is no tenderness.   Musculoskeletal:   R BKA  Left distal toes with dry gangrene; dorsal ischemia not full thickness   Neurological: He appears lethargic.   Skin: Skin is warm.       Significant Labs:   Blood Culture: No results for input(s): LABBLOO in the last 48 hours.  BMP:   Recent Labs  Lab 04/04/18  0644   *      K 3.1*      CO2 23   BUN 4*   CREATININE 0.9   CALCIUM 8.7   MG 1.7     CBC:   Recent Labs  Lab 04/03/18  0500 04/03/18  1700 04/04/18  0643   WBC 15.54* 18.86* 18.61*   HGB 7.9* 8.5* 8.2*   HCT 23.4* 25.8* 25.3*   * 754* 793*     Coagulation:   Recent  Labs  Lab 04/04/18  0644   INR 1.1     POCT Glucose:   Recent Labs  Lab 04/03/18  1126   POCTGLUCOSE 130*       Significant Imaging: I have reviewed all pertinent imaging results/findings within the past 24 hours.

## 2018-04-04 NOTE — PROGRESS NOTES
" [facility]  Adult Nutrition  Progress Note    SUMMARY       Recommendations    Recommendation/Intervention:   1. Continue current pureed diet per SLP recommendations.   2. Encourage PO intake, especially of high protein foods for wound healing.   3. If PO intake consistently <50% of meals, order Boost Plus TID.   4. RD following.    Goals: Meet % EEN, EPN  Nutrition Goal Status: progressing towards goal  Communication of RD Recs: reviewed with RN    Reason for Assessment    Reason for Assessment: RD follow-up  Diagnosis: other (see comments) (R AKA (3/27))  Relevant Medical History: CHF, HTN, HLD  Interdisciplinary Rounds: did not attend    General Information Comments: Pt s/p R AKA but now schedule for L TMA on 4/5. Pt sleeping at time of visit. Varied intake over past several days per RN documentation.    Nutrition Discharge Planning: Adequate PO intake    Nutrition Risk Screen    Nutrition Risk Screen: no indicators present    Nutrition/Diet History    Patient Reported Diet/Restrictions/Preferences: general  Do you have any cultural, spiritual, Faith conflicts, given your current situation?: none reported   Factors Affecting Nutritional Intake: difficulty/impaired swallowing, impaired cognitive status/motor control    Anthropometrics    Temp: 98.1 °F (36.7 °C)  Height Method: Stated  Height: 5' 7" (170.2 cm)  Height (inches): 67 in  Weight Method: Bed Scale  Weight: 66 kg (145 lb 6.4 oz)  Weight (lb): 145.4 lb  Ideal Body Weight (IBW), Male: 148 lb  % Ideal Body Weight, Male (lb): 98.24 lb  BMI (Calculated): 22.8  BMI Grade: 18.5-24.9 - normal  Amputation %: 16  Total Amputation %: 16  Amputation Ideal Body Weight (IBW), Male (lb): 132 lb  Amputee BMI (kg/m2): 27.18 kg/m2       Lab/Procedures/Meds    Pertinent Labs Reviewed: reviewed  Pertinent Labs Comments: K 3.1, Glu 141, Alb 1.8, Alk Phos 143, , ALT 88  Pertinent Medications Reviewed: reviewed  Pertinent Medications " Comments: statin, vit B complex, polyethylene glycol, senna docusate, thiamine, heparin    Physical Findings/Assessment    Overall Physical Appearance: amputee, nourished, lethargic  Oral/Mouth Cavity: WDL  Skin: incision(s)    Estimated/Assessed Needs    Weight Used For Calorie Calculations: 67.8 kg (149 lb 7.6 oz)  Energy Calorie Requirements (kcal): 1877 kcal/d  Energy Need Method: Humphreys-St Jeor (1.25 PAL)  Protein Requirements: 82 g/d (1.2 g/kg)  Weight Used For Protein Calculations: 67.8 kg (149 lb 7.6 oz)     Fluid Need Method: other (see comments) (Per MD or 1 mL/kcal)  RDA Method (mL): 1877       Nutrition Prescription Ordered    Current Diet Order: Pureed    Evaluation of Received Nutrient/Fluid Intake    I/O: -1.9L x 24 hrs (+10L since admit)  Comments: LBM 4/3 x 3  % Intake of Estimated Energy Needs: 50 - 75 %  % Meal Intake: 50 - 75 %    Nutrition Risk    Level of Risk/Frequency of Follow-up: moderate     Assessment and Plan    * Critical lower limb ischemia    Nutrition Problem  Increased protein needs    Related to (etiology):   Wound healing    Signs and Symptoms (as evidenced by):   S/p R AKA with plans for L TMA     Interventions/Recommendations (treatment strategy):  See recs.    Nutrition Diagnosis Status:   New             Monitor and Evaluation    Food and Nutrient Intake: energy intake, food and beverage intake  Food and Nutrient Adminstration: diet order  Physical Activity and Function: nutrition-related ADLs and IADLs  Anthropometric Measurements: weight, weight change  Biochemical Data, Medical Tests and Procedures: lipid profile, glucose/endocrine profile, inflammatory profile, gastrointestinal profile, electrolyte and renal panel  Nutrition-Focused Physical Findings: overall appearance     Nutrition Follow-Up    RD Follow-up?: Yes

## 2018-04-04 NOTE — ASSESSMENT & PLAN NOTE
- continue trending down   -s/p 7 day course of abx for possible LE cellulitis  -CT abd without source of infection  -Inflammatory response related to necrosis/recent amputation vs cellulitis vs other infxs source  -Covering with broad spectrum antibiotics, will remain broad for now  - restarted on van and ceftriaxone (started 3/25/18) for elevated wbc   - ID consulted, switched Ceftriaxone to Unasyn  (started 3/25/18), switched to Zosyn for failure to improve 3/30. Also on vancomycin

## 2018-04-04 NOTE — ANESTHESIA PREPROCEDURE EVALUATION
[facility]  Anesthesia Pre-Operative Evaluation         Patient Name: Leonardo Byrd  YOB: 1970  MRN: [id number]    SUBJECTIVE:     Pre-operative evaluation for Procedure(s) (LRB):  AMPUTATION-FOOT Transmetatarsal  Request popliteal saphenous nerve block per anesthesia (Left)     04/04/2018    Leonardo Byrd is a 47 y.o. male w/ a significant PMHx of ICM (LVEF 10-15%) s/p ICD, CAD s/p PCI 2009, HTN, HLD, smoker (quit smoking 6 weeks prior 0.5-1 ppd x37 years), EtOH abuse, PAD (s/p aortobifemoral bypass, L SFA and pop occlusion s/precent R SFA and R pop) with critical limb ischemia of LLE S/P PTA and intervention w/ absent blood flow to the RLE now s/p AKA of right leg. Patient now w/ gangrene of all digits of left foot.    Patient now presents for the above procedure(s).      LDA:        PICC Double Lumen 03/31/18 0919 right basilic (Active)   Site Assessment Clean;Dry;Intact;No redness;No swelling 4/4/2018  3:19 AM   Lumen 1 Status Infusing 4/4/2018  3:19 AM   Lumen 2 Status Infusing 4/4/2018  3:19 AM   Length celi (cm) 37 cm 3/31/2018  9:19 AM   Current Exposed Catheter (cm) 0 cm 3/31/2018  9:19 AM   Extremity Circumference (cm) 32 cm 3/31/2018  9:19 AM   Dressing Type Transparent 4/4/2018  3:19 AM   Dressing Status Biopatch in place;Clean;Dry;Intact 4/4/2018  3:19 AM   Dressing Intervention Dressing reinforced 4/1/2018  3:00 PM   Dressing Change Due 04/07/18 4/4/2018  3:19 AM   Daily Line Review Performed 4/4/2018  3:19 AM   Number of days: 4            Peripheral IV - Single Lumen 03/30/18 1640 Left Hand (Active)   Site Assessment Clean;Dry;Intact;No redness;No swelling 4/4/2018  3:19 AM   Line Status Infusing 4/4/2018  3:19 AM   Dressing Status Clean;Dry;Intact 4/4/2018  3:19 AM   Dressing Intervention Dressing reinforced 4/1/2018  3:00 PM   Dressing Change Due 04/04/18 4/4/2018  3:19 AM   Site Change Due 04/04/18 4/3/2018  8:00 PM   Reason Not Rotated Poor venous access 4/4/2018   3:19 AM   Number of days: 4       Prev airway:   ICU intubation  Endotracheal Tube: oral, 8.0 mm ID, cuffed (inflated to minimal occlusive pressure)  Attempts: 1, Grade I - full view of cords  Complicating Factors: none  Tube secured at 23 cm at the lips.  Findings post-intubation: bilateral breath sounds, positive ETCO2, atraumatic / condition of teeth unchanged  Position Confirmation: auscultation    Initial direct laryngoscopic view obstructed by dried blood and copious secretions, 2nd attempt with glidescope with grade 1 view    Drips:    heparin (porcine) in D5W 21 Units/kg/hr (04/04/18 1103)       Patient Active Problem List   Diagnosis    Arterial occlusion, lower extremity    Tobacco abuse    Non compliance w medication regimen    Hypokalemia    Chronic systolic heart failure    Acute blood loss anemia    Alcohol abuse    Acute pain of left foot    Neuropathy due to peripheral vascular disease    PVD (peripheral vascular disease)    Critical lower limb ischemia    Atherosclerosis of native artery of left lower extremity with intermittent claudication    Ischemic cardiomyopathy    Gangrene    Leukocytosis    Limb ischemia       Review of patient's allergies indicates:  No Known Allergies    Current Inpatient Medications:   amitriptyline  50 mg Oral QHS    aspirin  81 mg Oral Daily    atorvastatin  40 mg Oral QHS    folic acid-vit B6-vit B12 2.5-25-2 mg  1 tablet Per OG tube Daily    gabapentin  800 mg Oral QHS    lidocaine   Topical (Top) QID    metoprolol succinate  12.5 mg Oral Daily    nicotine  1 patch Transdermal Daily    piperacillin-tazobactam (ZOSYN) IVPB  4.5 g Intravenous Q8H    potassium bicarbonate  50 mEq Oral Once    potassium chloride  10 mEq Intravenous Q1H    pregabalin  100 mg Oral TID    senna-docusate 8.6-50 mg  1 tablet Oral BID    thiamine  100 mg Oral Daily    ticagrelor  90 mg Oral BID    vancomycin (VANCOCIN) IVPB  1,000 mg Intravenous Q12H       No  current facility-administered medications on file prior to encounter.      Current Outpatient Prescriptions on File Prior to Encounter   Medication Sig Dispense Refill    amitriptyline (ELAVIL) 25 MG tablet Take 1 tablet (25 mg total) by mouth every evening. 30 tablet 11    aspirin (ECOTRIN) 81 MG EC tablet Take 81 mg by mouth once daily.      atorvastatin (LIPITOR) 40 MG tablet Take 40 mg by mouth every evening.      cilostazol (PLETAL) 50 MG Tab Take 2 tablets (100 mg total) by mouth 2 (two) times daily.      cyclobenzaprine (FLEXERIL) 10 MG tablet Take 10 mg by mouth 3 (three) times daily as needed for Muscle spasms.      gabapentin (NEURONTIN) 800 MG tablet Take 800 mg by mouth every evening.      lisinopril (PRINIVIL,ZESTRIL) 5 MG tablet Take 1 tablet (5 mg total) by mouth once daily. 90 tablet 3    pregabalin (LYRICA) 100 MG capsule Take 1 capsule (100 mg total) by mouth 3 (three) times daily. 90 capsule 6    ticagrelor (BRILINTA) 90 mg tablet Take 90 mg by mouth 2 (two) times daily.      zolpidem (AMBIEN) 5 MG Tab Take 5 mg by mouth nightly as needed for Insomnia.         Past Surgical History:   Procedure Laterality Date    AMPUTATION Right     great toe    BYBPASS GRAFT AORTOBIUFEMORAL      CARDIAC DEFIBRILLATOR PLACEMENT      coronary stents      EXPLORATION AND EVaCUATION OF HEMATOMA OF UPPER EXTREMITY Left     KNEE ARTHROPLASTY Left     VASCULAR SURGERY      fem-pop bypass       Social History     Social History    Marital status: Single     Spouse name: N/A    Number of children: N/A    Years of education: N/A     Occupational History    Not on file.     Social History Main Topics    Smoking status: Former Smoker     Packs/day: 0.50     Quit date: 02/2018    Smokeless tobacco: Former User     Types: Chew     Quit date: 8/2/1980      Comment: Uses nicotine gum and nicotine patches    Alcohol use Yes      Comment: rarely    Drug use: No    Sexual activity: Yes     Partners:  Female     Other Topics Concern    Not on file     Social History Narrative    No narrative on file       OBJECTIVE:     Vital Signs Range (Last 24H):  Temp:  [36.7 °C (98 °F)-37.7 °C (99.9 °F)]   Pulse:  []   Resp:  [16-19]   BP: (106-121)/(64-70)   SpO2:  [92 %-98 %]       CBC:   Recent Labs      04/03/18   1700  04/04/18   0643   WBC  18.86*  18.61*   RBC  2.75*  2.68*   HGB  8.5*  8.2*   HCT  25.8*  25.3*   PLT  754*  793*   MCV  94  94   MCH  30.9  30.6   MCHC  32.9  32.4       CMP:   Recent Labs      04/03/18   0500  04/04/18   0644   NA  136  137   K  3.5  3.1*   CL  102  104   CO2  25  23   BUN  5*  4*   CREATININE  0.7  0.9   GLU  95  141*   MG  1.8  1.7   PHOS  4.0  3.4   CALCIUM  8.8  8.7   ALBUMIN  1.8*  1.8*   PROT  6.4  6.3   ALKPHOS  150*  143*   ALT  109*  88*   AST  170*  118*   BILITOT  0.7  0.6       INR:  Recent Labs      04/04/18   0644   INR  1.1       Diagnostic Studies: No relevant studies.    EKG (03/21/18):  Vent. Rate : 115 BPM     Atrial Rate : 115 BPM  P-R Int : 144 ms          QRS Dur : 094 ms  QT Int : 358 ms       P-R-T Axes : 067 075 076 degrees  QTc Int : 495 ms    Sinus tachycardia  Possible Septal infarct (cited on or before 21-MAR-2018)  Nonspecific ST and/or T wave abnormalities  When compared with ECG of 02-AUG-2017 19:06,  Vent. rate has increased BY  39 BPM  The axis is not as leftward  ST-t wave changes Less prominent than previously Lateral leads    2D ECHO:  Results for orders placed or performed during the hospital encounter of 03/18/18   2D echo with color flow doppler   Result Value Ref Range    EF 10 (A) 55 - 65    Mitral Valve Regurgitation TRIVIAL      CONCLUSIONS     1 - Moderate left ventricular enlargement with severe global hypokinesis and posterior akinesis.     2 - Severely depressed left ventricular systolic function (EF 10-15%).     3 - Normal right ventricular systolic function .     4 - Mild left atrial enlargement.     5 - Small, collapsing IVC in  the setting of mechanical ventilation is suggestive of central hypovolemia.     ASSESSMENT/PLAN:     Anesthesia Evaluation    I have reviewed the Patient Summary Reports.    I have reviewed the Nursing Notes.   I have reviewed the Medications.     Review of Systems  Anesthesia Hx:  History of prior surgery of interest to airway management or planning: Denies Family Hx of Anesthesia complications.   Denies Personal Hx of Anesthesia complications.   Social:  Smoker, Alcohol Use    Hematology/Oncology:     Oncology Normal    -- Anemia:   EENT/Dental:EENT/Dental Normal   Cardiovascular:   Exercise tolerance: poor Hypertension Past MI CAD   CHF PVD    Renal/:   Chronic Renal Disease, ARF    Hepatic/GI:  Hepatic/GI Normal    Neurological:   Peripheral Neuropathy    Endocrine:  Endocrine Normal    Psych:   Psychiatric History          Physical Exam  General:  Well nourished    Airway/Jaw/Neck:  Airway Findings: Mouth Opening: Normal Tongue: Normal  Pre-Existing Airway Tube(s): Oral Endotracheal tube  Size: 8.0  General Airway Assessment: Adult  Mallampati: II  Improves to II with phonation.  TM Distance: Normal, at least 6 cm        Eyes/Ears/Nose:  EYES/EARS/NOSE FINDINGS: Normal   Dental:  Dental Findings: In tact    Chest/Lungs:  Chest/Lungs Findings: Clear to auscultation, Normal Respiratory Rate     Heart/Vascular:  Heart Findings: Rate: Normal  Rhythm: Regular Rhythm  Sounds: Normal     Abdomen:  Abdomen Findings: Normal    Musculoskeletal:  Musculoskeletal Findings: Normal   Skin:  Skin Findings:     Mental Status:  Mental Status Findings:  Cooperative, Alert and Oriented         Anesthesia Plan  Type of Anesthesia, risks & benefits discussed:  Anesthesia Type:  general, MAC, regional  Patient's Preference:   Intra-op Monitoring Plan: standard ASA monitors and arterial line  Intra-op Monitoring Plan Comments:   Post Op Pain Control Plan: multimodal analgesia, per primary service following discharge from PACU and  IV/PO Opioids PRN  Post Op Pain Control Plan Comments:   Induction:   IV  Beta Blocker:  Patient is on a Beta-Blocker and has received one dose within the past 24 hours (No further documentation required).       Informed Consent: Patient representative understands risks and agrees with Anesthesia plan.  Questions answered. Anesthesia consent signed with patient representative.  ASA Score: 4     Day of Surgery Review of History & Physical:    H&P update referred to the surgeon.         Ready For Surgery From Anesthesia Perspective.

## 2018-04-04 NOTE — PROGRESS NOTES
[facility]  Infectious Disease  Progress Note    Patient Name: Leonardo Byrd  MRN: [id number]  Admission Date: 3/18/2018  Length of Stay: 17 days  Attending Physician: Alysha Cuevas MD  Primary Care Provider: Indio Aquino MD    Isolation Status: No active isolations  Assessment/Plan:      * Critical lower limb ischemia    Mr. Byrd is a 47 year old with PMH of ICM s/p ICD, CAD s/p PCI, HTN, HLD, PAD (s/p aortobifemoral bypass, L SFA and pop PTAS in September and recent R SFA and R pop 3/8 by Dr. Carl Bell) who presented to [facility] with critical limb ischemia on 3/18. Evaluated by interventional cardiology and underwent catheter directed tPA on 3/20. On vancomycin and unasyn for LLE cellulitis. Pt now with RLE occlusion of distal right superficial femoral, popliteal, anterior tibial, and posterior tibial arteries. vascular surgery performed right AKA 3/27.  Podiatry following LLE, monitoring closely and awaiting demarcation of necrosis of LLE as possibly will have TMA or BKA.  Blood cultures NGTD.  Persistent leukocytosis - suspect secondary to stress response for AKA, ischemia, and blood administartion.  ABD exam shows significant tenderness especially in lower quadrants and superpubic area - ? Cause and underlying pathology.  Left foot appears worse and may require further vascular intervention.  Suspect CT C/A/P thrombosis findings imply poor prognosis.     Afebrile today.  Lethargy and MS improved.  PICC placed 3/31.  Vanc trough high 4/3 at 23.5 and primary team changed dose.  WBC increasing again.  TMA planned for tomorrow.  The patient denies any recent fever, chills, or sweats.      Plan  1. Continue IV vancomycin 1250g q12hr.  vanc trough goal 15-20. Check trough before 4th dose.  2. Continue Zosyn for pseudomonas coverage  3. Podiatry and vascular following for necrotic L foot. TMA tomorrow  4. Remained afebrile, monitor leukocytosis. ID wll follow.             Anticipated  "Disposition: tbd    Thank you for your consult. I will follow-up with patient. Please contact us if you have any additional questions.    RITA Terry  Infectious Disease  [facility]    Subjective:     Principal Problem:Critical lower limb ischemia    HPI: Mr. Byrd is a 47 year old with PMH of ICM s/p ICD, CAD s/p PCI, HTN, HLD, PAD (s/p aortobifemoral bypass, L SFA and pop PTAS in September and recent R SFA and R pop 3/8 by Dr. Carl Bell) who presented to [facility] with critical limb ischemia on 3/18. Evaluated by interventional cardiology and underwent catheter directed tPA on 3/20, admitted to CCU for close monitoring. Also with DTs requiring scheduled benzos. Pt was given vancomycin for left foot cellulitis. Pt was intubated for airway protection in setting of increasing benzo requirements and worsening agitation.  Pt now extubated.  Pt was noted to have more duskiness in appearance of left leg and absent pulses.US lower ext showing occlusion of distal right superficial femoral, popliteal, anterior tibial, and posterior tibial arteries. No "dopplerable" pulses on rt foot. Interventional cards holding off on on acute intervention currently in setting of RYAN. Pt afebrile, WBC 17K today. Complains of severe pain of lower extremities. Denies having any cough, n/v/d,dysuria.   Interval History: No AEON.  Tmax 99.6.  Podiatry planning on TMA 4/5.  Complaining of left foot pain.  The patient denies any recent fever, chills, or sweats.      Review of Systems   Constitutional: Negative for chills, diaphoresis and fever.   Respiratory: Negative for shortness of breath.    Cardiovascular: Negative for chest pain.   Gastrointestinal: Negative for nausea and rectal pain.   Genitourinary: Negative for dysuria and hematuria.   Musculoskeletal: Positive for myalgias (left foot pain).   Psychiatric/Behavioral: Negative for confusion.     Objective:     Vital Signs (Most Recent):  Temp: 98.4 °F (36.9 " °C) (04/04/18 0442)  Pulse: 101 (04/04/18 0442)  Resp: 16 (04/04/18 0442)  BP: 121/70 (04/04/18 0442)  SpO2: 96 % (04/04/18 0442) Vital Signs (24h Range):  Temp:  [97.4 °F (36.3 °C)-99.9 °F (37.7 °C)] 98.4 °F (36.9 °C)  Pulse:  [] 101  Resp:  [14-19] 16  SpO2:  [92 %-98 %] 96 %  BP: ()/(52-70) 121/70     Weight: 70.1 kg (154 lb 8.7 oz) (bw)  Body mass index is 24.2 kg/m².    Estimated Creatinine Clearance: 119.3 mL/min (based on SCr of 0.7 mg/dL).    Physical Exam   Constitutional: He is oriented to person, place, and time. He appears well-developed and well-nourished. No distress.   Cardiovascular: Regular rhythm.  Exam reveals gallop. Exam reveals no friction rub.    Murmur heard.  tachycardic   Pulmonary/Chest: Effort normal. No respiratory distress. He has no wheezes. He has no rales.   Abdominal: Soft. He exhibits no distension. There is no tenderness. There is no rigidity and no guarding.       Musculoskeletal: He exhibits tenderness (left foot).   Neurological: He is oriented to person, place, and time.   Awake and answering questions much better   Skin: Skin is warm. No rash noted. He is not diaphoretic. There is erythema. No pallor.   See pic   Psychiatric: He has a normal mood and affect.   Nursing note and vitals reviewed.  4/3/18        Significant Labs:   Blood Culture:   Recent Labs  Lab 03/20/18  2219 03/20/18  2233 03/26/18  0148 03/27/18  2148 03/27/18  2212   LABBLOO No growth after 5 days. No growth after 5 days. No growth after 5 days. No growth after 5 days. No growth after 5 days.     CBC:   Recent Labs  Lab 04/02/18  0959 04/03/18  0500 04/03/18  1700   WBC 14.69* 15.54* 18.86*   HGB 8.5* 7.9* 8.5*   HCT 25.4* 23.4* 25.8*   * 659* 754*     CMP:   Recent Labs  Lab 04/03/18  0500      K 3.5      CO2 25   GLU 95   BUN 5*   CREATININE 0.7   CALCIUM 8.8   PROT 6.4   ALBUMIN 1.8*   BILITOT 0.7   ALKPHOS 150*   *   *   ANIONGAP 9   EGFRNONAA >60.0      Respiratory Culture:   Recent Labs  Lab 03/22/18  1557   GSRESP Rare WBC's  Few Gram negative rods   RESPIRATORYC ENTEROBACTER CLOACAEManyNormal respiratory kevin also present     Urine Culture:   Recent Labs  Lab 03/31/18  1500   LABURIN No growth     Urine Studies:   Recent Labs  Lab 03/24/18  0956 03/31/18  1500   COLORU Yellow Yellow   APPEARANCEUA Clear Clear   PHUR 6.0 6.0   SPECGRAV 1.010 1.020   PROTEINUA Negative Negative   GLUCUA Negative Negative   KETONESU 1+* Trace*   BILIRUBINUA Negative Negative   OCCULTUA Trace* Negative   NITRITE Negative Negative   UROBILINOGEN 4.0 2.0   LEUKOCYTESUR Negative Negative   RBCUA 2  --    WBCUA 1  --    BACTERIA Few*  --      Wound Culture: No results for input(s): LABAERO in the last 4320 hours.  All pertinent labs within the past 24 hours have been reviewed.    Significant Imaging: I have reviewed all pertinent imaging results/findings within the past 24 hours.   Procedure Component Value Units Date/Time   X-Ray Chest 1 View for PICC_Central line [[id number]] Resulted: 03/31/18 1035   Order Status: Completed Updated: 03/31/18 1038   Narrative:     EXAMINATION:  XR CHEST 1 VIEW    CLINICAL HISTORY:  Evaluate PICC line placement;    TECHNIQUE:  Single frontal view of the chest was performed.    COMPARISON:  Chest radiograph from 03/25/2018    FINDINGS:  Interval placement of right-sided PICC line catheter with its tip in the inferior SVC.    Mild cardiomegaly.  Stable AICD.  Prominence of the pulmonary vasculature with increased interstitial lung markings suggestive of pulmonary edema, slightly worsened when compared to prior examination.  No pneumothorax.  Osseous structures are unremarkable.   Impression:       As above.      Electronically signed by: Po Armstrong MD  Date: 03/31/2018  Time: 10:35   CT Abdomen Pelvis With Contrast [[id number]] (Abnormal) Resulted: 03/30/18 2001   Order Status: Completed Updated: 03/30/18 2004   Narrative:     EXAMINATION:  CT  ABDOMEN PELVIS WITH CONTRAST    CLINICAL HISTORY:  Abdominal pain, unspecified;Sepsis; Progressive distension, increasing leukocytosis, extensive PAD s/p R AKA and with necrotic L foot, other abd source?;    TECHNIQUE:  Low dose axial images, sagittal and coronal reformations were obtained from the lung bases to the pubic symphysis following the IV administration of 75 mL of Omnipaque 350    COMPARISON:  No dedicated priors    FINDINGS:  ABDOMEN:    - Lung bases: There is motion artifact which obscures the underlying pulmonary parenchyma.  Mild dependent atelectasis.  Suggestion of ground-glass pulmonary nodules is noted in the lungs with the largest possible nodule in the right lower lobe measuring 1.2 x 1.4 cm, as seen on series 2, image 7.  For a ground glass nodule 6 mm or larger, Fleischner Society 2017 guidelines recommend follow up with non-contrast chest CT at 6-12 months after discovery. If this nodule persists at that time, additional follow up with non-contrast chest CT is recommended every 2 years until 5 years of stability have been documented.    -heart: No pericardial effusion.  There is partial visualization of cardiac pacing device leads.  Moderate coronary artery atherosclerosis.  Heart is upper limits of normal in size.    - Liver: No masses.  Enhancement appears unremarkable.    - Gallbladder: Prominence without significant thickening of the gallbladder wall.  No sludge or stones noted.    - Bile Ducts: No evidence of intra or extra hepatic biliary ductal dilation.    - Spleen: Spleen and adjacent splenules noted to have an unremarkable appearance.    - Kidneys: Prominent extrarenal pelves bilaterally.  The right ureter is somewhat more prominent than left but without evidence of obstructing ureterolith or overt hydronephrosis.    - Adrenals: Unremarkable.    - Pancreas: There is a small hypodensity about the head of the pancreas measuring 0.6 cm as seen on series 2, image 41.  This is  nonspecific and may represent volume averaging, a side branch IPMN, or sequela of prior pancreatitis in this patient with a reported history of alcohol use disorder.  Otherwise pancreatic parenchyma and pancreatic duct appear unremarkable.    - Retroperitoneum:  Vascular calcifications in the kidneys bilaterally.    - Vascular: Changes of aortobifemoral bypass grafting are noted with significant mural thrombus in the aortic portion of the bypass graft.  The native aorta, common iliac, internal and external iliac arteries are entirely thrombosed and small sized.  The aortofemoral bypass graft on the right shows moderate thrombus or noncalcified atherosclerosis.    Possible severe stenosis at the right aortofemoral bypass junction, poorly evaluated secondary to exam technique.  Partially imaged stent at the proximal left femoral artery.  Correlation with other films not available in the picture archive system is recommended.    - Abdominal wall:  Mild body wall edema.    PELVIS: Urinary bladder is moderately distended without wall thickening or adjacent inflammatory change.  Prostate and seminal vesicles are within normal limits.    No evidence of small-bowel obstruction, wall thickening, or local inflammation.  Colon is unremarkable with no evidence of inflammation, wall thickening, or diverticulosis.  Rectal fat planes are preserved.  No lymphadenopathy.    BONES:  Grossly stable grade 1 anterolisthesis of L5 on S1 secondary to remote bilateral L5 pars defects.  No acute osseous abnormality and no suspicious lytic or blastic lesion.   Impression:       Bilateral ground-glass pulmonary nodules largest measuring 1.2 x 1.4 cm.  These are nonspecific and may be infectious, inflammatory, or neoplastic.  For a ground glass nodule 6 mm or larger, Fleischner Society 2017 guidelines recommend follow up with non-contrast chest CT at 6-12 months after discovery. If this nodule persists at that time, additional follow up with  non-contrast chest CT is recommended every 2 years until 5 years of stability have been documented.    Changes of aortobifemoral bypass grafting noted with significant mural thrombus in the bypass graft and concern for severe stenosis at the junction of the bypass and the femoral artery on the right.  Recommend correlation with other imaging not available in Radiology picture archive system.  Native aorta and also common iliacs and branch vessels are completely thrombosed and atrophic.    Bilateral extrarenal pelves with prominence of the right greater than left ureters, without evidence of ureterolith.    Moderately distended urinary bladder without adjacent inflammatory change or associated hydronephrosis, nonspecific.  Correlate clinically.    Small hypodense focus about the head of the pancreas measuring 0.6 cm as seen on series 2, image 41.  This is nonspecific.  May represent a side branch IPMN or a pseudocyst.    Moderate coronary artery atherosclerosis, vascular calcifications in the kidney, splenules, and other incidental findings as above.    This report was flagged in Epic as abnormal.    Electronically signed by resident: Po Wong  Date: 03/30/2018  Time: 19:08    Electronically signed by: Leonardo Beasley MD  Date: 03/30/2018  Time: 20:01   X-Ray Foot Complete Left [[id number]] Resulted: 03/29/18 1325   Order Status: Completed Updated: 03/29/18 1327   Narrative:     EXAMINATION:  XR FOOT COMPLETE 3 VIEW LEFT    CLINICAL HISTORY:  Ischemic changes to left foot;    FINDINGS:  No fracture dislocation bone destruction seen.   Impression:       See above      Electronically signed by: Moe Chris MD  Date: 03/29/2018  Time: 13:25

## 2018-04-04 NOTE — ASSESSMENT & PLAN NOTE
Mr. Byrd is a 47 year old man with CAD s/p PCI, LVEF 30% s/p ICD, complicated and extensive PAD s/p aortofem bypass and multiple stents on DAPT who presents with what appears to be CLI of his left foot of unclear duration.      --Underwent angiogram 3/19 that showed occlusion of aorta-fem bypass at distal anastamosis. Unable to treat due to patient movement   --s/p cath directed tPA to occluded L SFA  --blood cx ngtd  --subsequently with acute limb ischemia in RLE resulting in R AKA on 3/27  --continue ASA/brilinta/heparin gtt  --Podiatry and vascular following for necrotic L foot, awaiting demarcation of necrosis to guide amputation  --Per podiatry, case booked for 4/5 - will keep NPO at midnight and hold anticoagulation

## 2018-04-04 NOTE — SUBJECTIVE & OBJECTIVE
Interval History: No AEON.  Tmax 99.6.  Podiatry planning on TMA 4/5.  Complaining of left foot pain.  The patient denies any recent fever, chills, or sweats.      Review of Systems   Constitutional: Negative for chills, diaphoresis and fever.   Respiratory: Negative for shortness of breath.    Cardiovascular: Negative for chest pain.   Gastrointestinal: Negative for nausea and rectal pain.   Genitourinary: Negative for dysuria and hematuria.   Musculoskeletal: Positive for myalgias (left foot pain).   Psychiatric/Behavioral: Negative for confusion.     Objective:     Vital Signs (Most Recent):  Temp: 98.4 °F (36.9 °C) (04/04/18 0442)  Pulse: 101 (04/04/18 0442)  Resp: 16 (04/04/18 0442)  BP: 121/70 (04/04/18 0442)  SpO2: 96 % (04/04/18 0442) Vital Signs (24h Range):  Temp:  [97.4 °F (36.3 °C)-99.9 °F (37.7 °C)] 98.4 °F (36.9 °C)  Pulse:  [] 101  Resp:  [14-19] 16  SpO2:  [92 %-98 %] 96 %  BP: ()/(52-70) 121/70     Weight: 70.1 kg (154 lb 8.7 oz) (bw)  Body mass index is 24.2 kg/m².    Estimated Creatinine Clearance: 119.3 mL/min (based on SCr of 0.7 mg/dL).    Physical Exam   Constitutional: He is oriented to person, place, and time. He appears well-developed and well-nourished. No distress.   Cardiovascular: Regular rhythm.  Exam reveals gallop. Exam reveals no friction rub.    Murmur heard.  tachycardic   Pulmonary/Chest: Effort normal. No respiratory distress. He has no wheezes. He has no rales.   Abdominal: Soft. He exhibits no distension. There is no tenderness. There is no rigidity and no guarding.       Musculoskeletal: He exhibits tenderness (left foot).   Neurological: He is oriented to person, place, and time.   Awake and answering questions much better   Skin: Skin is warm. No rash noted. He is not diaphoretic. There is erythema. No pallor.   See pic   Psychiatric: He has a normal mood and affect.   Nursing note and vitals reviewed.  4/3/18        Significant Labs:   Blood Culture:   Recent  Labs  Lab 03/20/18  2219 03/20/18  2233 03/26/18  0148 03/27/18  2148 03/27/18  2212   LABBLOO No growth after 5 days. No growth after 5 days. No growth after 5 days. No growth after 5 days. No growth after 5 days.     CBC:   Recent Labs  Lab 04/02/18  0959 04/03/18  0500 04/03/18  1700   WBC 14.69* 15.54* 18.86*   HGB 8.5* 7.9* 8.5*   HCT 25.4* 23.4* 25.8*   * 659* 754*     CMP:   Recent Labs  Lab 04/03/18  0500      K 3.5      CO2 25   GLU 95   BUN 5*   CREATININE 0.7   CALCIUM 8.8   PROT 6.4   ALBUMIN 1.8*   BILITOT 0.7   ALKPHOS 150*   *   *   ANIONGAP 9   EGFRNONAA >60.0     Respiratory Culture:   Recent Labs  Lab 03/22/18  1557   GSRESP Rare WBC's  Few Gram negative rods   RESPIRATORYC ENTEROBACTER CLOACAEManyNormal respiratory kevin also present     Urine Culture:   Recent Labs  Lab 03/31/18  1500   LABURIN No growth     Urine Studies:   Recent Labs  Lab 03/24/18  0956 03/31/18  1500   COLORU Yellow Yellow   APPEARANCEUA Clear Clear   PHUR 6.0 6.0   SPECGRAV 1.010 1.020   PROTEINUA Negative Negative   GLUCUA Negative Negative   KETONESU 1+* Trace*   BILIRUBINUA Negative Negative   OCCULTUA Trace* Negative   NITRITE Negative Negative   UROBILINOGEN 4.0 2.0   LEUKOCYTESUR Negative Negative   RBCUA 2  --    WBCUA 1  --    BACTERIA Few*  --      Wound Culture: No results for input(s): LABAERO in the last 4320 hours.  All pertinent labs within the past 24 hours have been reviewed.    Significant Imaging: I have reviewed all pertinent imaging results/findings within the past 24 hours.   Procedure Component Value Units Date/Time   X-Ray Chest 1 View for PICC_Central line [[id number]] Resulted: 03/31/18 1035   Order Status: Completed Updated: 03/31/18 1038   Narrative:     EXAMINATION:  XR CHEST 1 VIEW    CLINICAL HISTORY:  Evaluate PICC line placement;    TECHNIQUE:  Single frontal view of the chest was performed.    COMPARISON:  Chest radiograph from  03/25/2018    FINDINGS:  Interval placement of right-sided PICC line catheter with its tip in the inferior SVC.    Mild cardiomegaly.  Stable AICD.  Prominence of the pulmonary vasculature with increased interstitial lung markings suggestive of pulmonary edema, slightly worsened when compared to prior examination.  No pneumothorax.  Osseous structures are unremarkable.   Impression:       As above.      Electronically signed by: Po Armstrong MD  Date: 03/31/2018  Time: 10:35   CT Abdomen Pelvis With Contrast [[id number]] (Abnormal) Resulted: 03/30/18 2001   Order Status: Completed Updated: 03/30/18 2004   Narrative:     EXAMINATION:  CT ABDOMEN PELVIS WITH CONTRAST    CLINICAL HISTORY:  Abdominal pain, unspecified;Sepsis; Progressive distension, increasing leukocytosis, extensive PAD s/p R AKA and with necrotic L foot, other abd source?;    TECHNIQUE:  Low dose axial images, sagittal and coronal reformations were obtained from the lung bases to the pubic symphysis following the IV administration of 75 mL of Omnipaque 350    COMPARISON:  No dedicated priors    FINDINGS:  ABDOMEN:    - Lung bases: There is motion artifact which obscures the underlying pulmonary parenchyma.  Mild dependent atelectasis.  Suggestion of ground-glass pulmonary nodules is noted in the lungs with the largest possible nodule in the right lower lobe measuring 1.2 x 1.4 cm, as seen on series 2, image 7.  For a ground glass nodule 6 mm or larger, Fleischner Society 2017 guidelines recommend follow up with non-contrast chest CT at 6-12 months after discovery. If this nodule persists at that time, additional follow up with non-contrast chest CT is recommended every 2 years until 5 years of stability have been documented.    -heart: No pericardial effusion.  There is partial visualization of cardiac pacing device leads.  Moderate coronary artery atherosclerosis.  Heart is upper limits of normal in size.    - Liver: No masses.  Enhancement  appears unremarkable.    - Gallbladder: Prominence without significant thickening of the gallbladder wall.  No sludge or stones noted.    - Bile Ducts: No evidence of intra or extra hepatic biliary ductal dilation.    - Spleen: Spleen and adjacent splenules noted to have an unremarkable appearance.    - Kidneys: Prominent extrarenal pelves bilaterally.  The right ureter is somewhat more prominent than left but without evidence of obstructing ureterolith or overt hydronephrosis.    - Adrenals: Unremarkable.    - Pancreas: There is a small hypodensity about the head of the pancreas measuring 0.6 cm as seen on series 2, image 41.  This is nonspecific and may represent volume averaging, a side branch IPMN, or sequela of prior pancreatitis in this patient with a reported history of alcohol use disorder.  Otherwise pancreatic parenchyma and pancreatic duct appear unremarkable.    - Retroperitoneum:  Vascular calcifications in the kidneys bilaterally.    - Vascular: Changes of aortobifemoral bypass grafting are noted with significant mural thrombus in the aortic portion of the bypass graft.  The native aorta, common iliac, internal and external iliac arteries are entirely thrombosed and small sized.  The aortofemoral bypass graft on the right shows moderate thrombus or noncalcified atherosclerosis.    Possible severe stenosis at the right aortofemoral bypass junction, poorly evaluated secondary to exam technique.  Partially imaged stent at the proximal left femoral artery.  Correlation with other films not available in the picture archive system is recommended.    - Abdominal wall:  Mild body wall edema.    PELVIS: Urinary bladder is moderately distended without wall thickening or adjacent inflammatory change.  Prostate and seminal vesicles are within normal limits.    No evidence of small-bowel obstruction, wall thickening, or local inflammation.  Colon is unremarkable with no evidence of inflammation, wall thickening, or  diverticulosis.  Rectal fat planes are preserved.  No lymphadenopathy.    BONES:  Grossly stable grade 1 anterolisthesis of L5 on S1 secondary to remote bilateral L5 pars defects.  No acute osseous abnormality and no suspicious lytic or blastic lesion.   Impression:       Bilateral ground-glass pulmonary nodules largest measuring 1.2 x 1.4 cm.  These are nonspecific and may be infectious, inflammatory, or neoplastic.  For a ground glass nodule 6 mm or larger, Fleischner Society 2017 guidelines recommend follow up with non-contrast chest CT at 6-12 months after discovery. If this nodule persists at that time, additional follow up with non-contrast chest CT is recommended every 2 years until 5 years of stability have been documented.    Changes of aortobifemoral bypass grafting noted with significant mural thrombus in the bypass graft and concern for severe stenosis at the junction of the bypass and the femoral artery on the right.  Recommend correlation with other imaging not available in Radiology picture archive system.  Native aorta and also common iliacs and branch vessels are completely thrombosed and atrophic.    Bilateral extrarenal pelves with prominence of the right greater than left ureters, without evidence of ureterolith.    Moderately distended urinary bladder without adjacent inflammatory change or associated hydronephrosis, nonspecific.  Correlate clinically.    Small hypodense focus about the head of the pancreas measuring 0.6 cm as seen on series 2, image 41.  This is nonspecific.  May represent a side branch IPMN or a pseudocyst.    Moderate coronary artery atherosclerosis, vascular calcifications in the kidney, splenules, and other incidental findings as above.    This report was flagged in Epic as abnormal.    Electronically signed by resident: Po Wong  Date: 03/30/2018  Time: 19:08    Electronically signed by: Leonardo Beasley MD  Date: 03/30/2018  Time: 20:01   X-Ray Foot Complete Left  [[id number]] Resulted: 03/29/18 1325   Order Status: Completed Updated: 03/29/18 1327   Narrative:     EXAMINATION:  XR FOOT COMPLETE 3 VIEW LEFT    CLINICAL HISTORY:  Ischemic changes to left foot;    FINDINGS:  No fracture dislocation bone destruction seen.   Impression:       See above      Electronically signed by: Moe Chris MD  Date: 03/29/2018  Time: 13:25

## 2018-04-04 NOTE — ASSESSMENT & PLAN NOTE
Mr. Byrd is a 47 year old with PMH of ICM s/p ICD, CAD s/p PCI, HTN, HLD, PAD (s/p aortobifemoral bypass, L SFA and pop PTAS in September and recent R SFA and R pop 3/8 by Dr. Carl Bell) who presented to [facility] with critical limb ischemia on 3/18. Evaluated by interventional cardiology and underwent catheter directed tPA on 3/20. On vancomycin and unasyn for LLE cellulitis. Pt now with RLE occlusion of distal right superficial femoral, popliteal, anterior tibial, and posterior tibial arteries. vascular surgery performed right AKA 3/27.  Podiatry following LLE, monitoring closely and awaiting demarcation of necrosis of LLE as possibly will have TMA or BKA.  Blood cultures NGTD.  Persistent leukocytosis - suspect secondary to stress response for AKA, ischemia, and blood administartion.  ABD exam shows significant tenderness especially in lower quadrants and superpubic area - ? Cause and underlying pathology.  Left foot appears worse and may require further vascular intervention.  Suspect CT C/A/P thrombosis findings imply poor prognosis.     Afebrile today.  Lethargy and MS improved.  PICC placed 3/31.  Vanc trough high 4/3 at 23.5 and primary team changed dose.  WBC increasing again.  TMA planned for tomorrow.  The patient denies any recent fever, chills, or sweats.      Plan  1. Continue IV vancomycin 1250g q12hr.  vanc trough goal 15-20. Check trough before 4th dose.  2. Continue Zosyn for pseudomonas coverage  3. Podiatry and vascular following for necrotic L foot. TMA tomorrow  4. Remained afebrile, monitor leukocytosis. ID wll follow.

## 2018-04-04 NOTE — PLAN OF CARE
Problem: Patient Care Overview  Goal: Plan of Care Review  Outcome: Ongoing (interventions implemented as appropriate)  Pt remains free from falls and injury. Plan of care reviewed with pt. Pt understands plan. Pt c/o LLE pain, VSS. Plans for sx on L foot tomorrow was discussed. NPO at midnight. Will continue to monitor.

## 2018-04-04 NOTE — PLAN OF CARE
Problem: SLP Goal  Goal: SLP Goal  Speech Therapy Short Term Goals  Goal expected to be met by 4/7  1. Pt will tolerate puree diet and thin liquids with no overt s/s of aspiration noted.   2. Patient will tolerate dental soft and solid trials x10 with adequate oral clearance and no overt s/s of aspiration.        Speech Therapy Short Term Goals  Goal expected to be met by 4/7  1. Pt will tolerate puree diet with thin liquids with no overt s/s of aspiration noted.  2. Pt will tolerate dental soft and solid trials x10 with adequate oral clearance and no overt s/s of aspiration.

## 2018-04-04 NOTE — PROGRESS NOTES
[facility]  Podiatry  Progress Note    Patient Name: Leonardo Byrd  MRN: [id number]  Admission Date: 3/18/2018  Hospital Length of Stay: 17 days  Attending Physician: Alysha Cuevas MD  Primary Care Provider: Indio Aquino MD   Interval Hx: S/p R AKA per vascular surgery. Pt. Stepped down to floor. Father present at bedside. Patient denies F/C/N/V.  Dressings clean, dry, and intact.      Scheduled Meds:   amitriptyline  50 mg Oral QHS    aspirin  81 mg Oral Daily    atorvastatin  40 mg Oral QHS    folic acid-vit B6-vit B12 2.5-25-2 mg  1 tablet Per OG tube Daily    gabapentin  800 mg Oral QHS    lidocaine   Topical (Top) QID    metoprolol succinate  12.5 mg Oral Daily    nicotine  1 patch Transdermal Daily    piperacillin-tazobactam (ZOSYN) IVPB  4.5 g Intravenous Q8H    potassium chloride  10 mEq Intravenous Q1H    pregabalin  100 mg Oral TID    senna-docusate 8.6-50 mg  1 tablet Oral BID    thiamine  100 mg Oral Daily    ticagrelor  90 mg Oral BID    vancomycin (VANCOCIN) IVPB  1,000 mg Intravenous Q12H     Continuous Infusions:   heparin (porcine) in D5W 21 Units/kg/hr (04/04/18 1103)     PRN Meds:sodium chloride, cyclobenzaprine, heparin (PORCINE), heparin (PORCINE), nitroGLYCERIN, oxyCODONE, oxyCODONE, oxyCODONE-acetaminophen, polyethylene glycol, sodium chloride 0.9%, zolpidem    Review of patient's allergies indicates:  No Known Allergies     Past Medical History:   Diagnosis Date    AICD (automatic cardioverter/defibrillator) present     RYAN (acute kidney injury) 3/25/2018    CHF (congestive heart failure)     Coronary artery disease     Encounter for blood transfusion     Hyperlipemia     Hypertension     MI (myocardial infarction)     Neuropathy     PAD (peripheral artery disease)     PVD (peripheral vascular disease)      Past Surgical History:   Procedure Laterality Date    AMPUTATION Right     great toe    BYBPASS GRAFT AORTOBIUFEMORAL       CARDIAC DEFIBRILLATOR PLACEMENT      coronary stents      EXPLORATION AND EVaCUATION OF HEMATOMA OF UPPER EXTREMITY Left     KNEE ARTHROPLASTY Left     VASCULAR SURGERY      fem-pop bypass       Family History     None        Social History Main Topics    Smoking status: Former Smoker     Packs/day: 0.50     Quit date: 02/2018    Smokeless tobacco: Former User     Types: Chew     Quit date: 8/2/1980      Comment: Uses nicotine gum and nicotine patches    Alcohol use Yes      Comment: rarely    Drug use: No    Sexual activity: Yes     Partners: Female     Review of Systems   Constitutional: Negative.    Respiratory: Negative.    Cardiovascular: Negative.    Gastrointestinal: Negative.    Genitourinary: Negative.    Musculoskeletal: Positive for arthralgias.   Skin: Positive for wound.   Psychiatric/Behavioral: Negative.      Objective:     Vital Signs (Most Recent):  Temp: 98.1 °F (36.7 °C) (04/04/18 1309)  Pulse: 94 (04/04/18 1309)  Resp: 18 (04/04/18 1309)  BP: (!) 106/59 (04/04/18 1309)  SpO2: (!) 94 % (04/04/18 1309) Vital Signs (24h Range):  Temp:  [98 °F (36.7 °C)-99.9 °F (37.7 °C)] 98.1 °F (36.7 °C)  Pulse:  [] 94  Resp:  [16-19] 18  SpO2:  [92 %-98 %] 94 %  BP: (106-121)/(59-70) 106/59     Weight: 66 kg (145 lb 6.4 oz)  Body mass index is 22.77 kg/m².    Foot Exam    General  Orientation: alert and oriented to person, place, and time       Right Foot/Ankle     Inspection and Palpation  Ecchymosis: none  Tenderness: none     Comments  R AKA    Left Foot/Ankle      Inspection and Palpation  Ecchymosis: none  Tenderness: none   Swelling: none   Arch: normal  Hammertoes: absent  Claw toes: absent  Hallux valgus: no  Hallux limitus: no  Skin Exam: abnormal color;     Neurovascular  Dorsalis pedis: absent  Posterior tibial: absent          Gangrene  to toes 1-5 left foot.Foot cool to touch with no palpable pulses.  Necrotic eschar noted to dorsum of left foot.      4/3/18                        Laboratory:  A1C: No results for input(s): HGBA1C in the last 4320 hours.  CBC:     Recent Labs  Lab 04/04/18  1332   WBC 21.01*   RBC 2.91*   HGB 9.3*   HCT 27.5*   *   MCV 95   MCH 32.0*   MCHC 33.8     CMP:     Recent Labs  Lab 04/04/18  0644   *   CALCIUM 8.7   ALBUMIN 1.8*   PROT 6.3      K 3.1*   CO2 23      BUN 4*   CREATININE 0.9   ALKPHOS 143*   ALT 88*   *   BILITOT 0.6     CRP: No results for input(s): CRP in the last 168 hours.  ESR: No results for input(s): SEDRATE in the last 168 hours.    Diagnostic Results:  Xray left foot: . No fracture dislocation bone destruction seen.    Clinical Findings:    Gangrene distal forefoot toes 1-5 left foot. Necrotic eschar noted to dorsum of left foot.                 Assessment/Plan:     * Critical lower limb ischemia    Interventional cardiology on board, appreciate recs  S/p R AKA per vascular surgery.         Gangrene    Gangrene noted to toes 1-5 left foot. Necrotic eschar noted to dorsum of left foot.   -plan for left TMA tomorrow at noon.  Patient to be NPO at midnight.  Consent signed by patient and by patient's father.  All questions were answered.  No garuntees that incision will heal, and there is a possibility he will need further amputation in the future.  Patient and family agreed.                  Virgil Wright MD  Podiatry  [facility]

## 2018-04-04 NOTE — PLAN OF CARE
Problem: Patient Care Overview  Goal: Plan of Care Review    Recommendations     Recommendation/Intervention:   1. Continue current pureed diet per SLP recommendations.   2. Encourage PO intake, especially of high protein foods for wound healing.   3. If PO intake consistently <50% of meals, order Boost Plus TID.   4. RD following.     Goals: Meet % EEN, EPN  Nutrition Goal Status: progressing towards goal

## 2018-04-05 NOTE — ASSESSMENT & PLAN NOTE
Mr. Byrd is a 47 year old with PMH of ICM s/p ICD, CAD s/p PCI, HTN, HLD, PAD (s/p aortobifemoral bypass, L SFA and pop PTAS in September and recent R SFA and R pop 3/8 by Dr. Carl Bell) who presented to [facility] with critical limb ischemia on 3/18. Evaluated by interventional cardiology and underwent catheter directed tPA on 3/20. On vancomycin and unasyn for LLE cellulitis. Pt now with RLE occlusion of distal right superficial femoral, popliteal, anterior tibial, and posterior tibial arteries. vascular surgery performed right AKA 3/27.  Podiatry following LLE, monitoring closely and awaiting demarcation of necrosis of LLE as possibly will have TMA or BKA.  Blood cultures NGTD.  Persistent leukocytosis - suspect secondary to ischemia and tissue necrosis..  ABD exam shows significant tenderness especially in lower quadrants and superpubic area - ? Cause and underlying pathology.  Left foot appears worse and may require further vascular intervention.  Suspect CT C/A/P thrombosis findings imply poor prognosis.     Afebrile today.  Lethargy and MS resolved.  PICC placed 3/31.  Vanc trough high 4/3 at 23.5 and primary team changed dose.  WBC improving again.  TMA planned for 4/5.  The patient denies any recent fever, chills, or sweats.      Plan  1. Continue IV vancomycin 1250g q12hr.  vanc trough goal 15-20. Check trough before 4th dose.  2. Continue Zosyn for pseudomonas coverage  3. Podiatry and vascular following for necrotic L foot. TMA 4/5 - Asked podiatry to obtain clean margin cultures.  4. Monitor leukocytosis  5. Remained afebrile. ID wll follow.

## 2018-04-05 NOTE — PROGRESS NOTES
[facility]  Infectious Disease  Progress Note    Patient Name: Leonardo Byrd  MRN: [id number]  Admission Date: 3/18/2018  Length of Stay: 18 days  Attending Physician: Alysha Cuevas MD  Primary Care Provider: Indio Aquino MD    Isolation Status: No active isolations  Assessment/Plan:      * Critical lower limb ischemia    Mr. Byrd is a 47 year old with PMH of ICM s/p ICD, CAD s/p PCI, HTN, HLD, PAD (s/p aortobifemoral bypass, L SFA and pop PTAS in September and recent R SFA and R pop 3/8 by Dr. Carl Bell) who presented to [facility] [place], [place] with critical limb ischemia on 3/18. Evaluated by interventional cardiology and underwent catheter directed tPA on 3/20. On vancomycin and unasyn for LLE cellulitis. Pt now with RLE occlusion of distal right superficial femoral, popliteal, anterior tibial, and posterior tibial arteries. vascular surgery performed right AKA 3/27.  Podiatry following LLE, monitoring closely and awaiting demarcation of necrosis of LLE as possibly will have TMA or BKA.  Blood cultures NGTD.  Persistent leukocytosis - suspect secondary to ischemia and tissue necrosis..  ABD exam shows significant tenderness especially in lower quadrants and superpubic area - ? Cause and underlying pathology.  Left foot appears worse and may require further vascular intervention.  Suspect CT C/A/P thrombosis findings imply poor prognosis.     Afebrile today.  Lethargy and MS resolved.  PICC placed 3/31.  Vanc trough high 4/3 at 23.5 and primary team changed dose.  WBC improving again.  TMA planned for 4/5.  The patient denies any recent fever, chills, or sweats.      Plan  1. Continue IV vancomycin 1250g q12hr.  vanc trough goal 15-20. Check trough before 4th dose.  2. Continue Zosyn for pseudomonas coverage  3. Podiatry and vascular following for necrotic L foot. TMA 4/5 - Asked podiatry to obtain clean margin cultures.  4. Monitor leukocytosis  5. Remained afebrile. ID wll follow.        "      Anticipated Disposition: [facility]    Thank you for your consult. I will follow-up with patient. Please contact us if you have any additional questions.    RITA Terry  Infectious Disease  [facility]    Subjective:     Principal Problem:Critical lower limb ischemia    HPI: Mr. Byrd is a 47 year old with PMH of ICM s/p ICD, CAD s/p PCI, HTN, HLD, PAD (s/p aortobifemoral bypass, L SFA and pop PTAS in September and recent R SFA and R pop 3/8 by Dr. Carl Bell) who presented to [facility] [place] with critical limb ischemia on 3/18. Evaluated by interventional cardiology and underwent catheter directed tPA on 3/20, admitted to CCU for close monitoring. Also with DTs requiring scheduled benzos. Pt was given vancomycin for left foot cellulitis. Pt was intubated for airway protection in setting of increasing benzo requirements and worsening agitation.  Pt now extubated.  Pt was noted to have more duskiness in appearance of left leg and absent pulses.US lower ext showing occlusion of distal right superficial femoral, popliteal, anterior tibial, and posterior tibial arteries. No "dopplerable" pulses on rt foot. Interventional cards holding off on on acute intervention currently in setting of RYAN. Pt afebrile, WBC 17K today. Complains of severe pain of lower extremities. Denies having any cough, n/v/d,dysuria.   Interval History: No AEON.  Tmax 99.9.  WBC 14.68.  Going to OR today for TMA.  No new complaints.  The patient denies any recent fever, chills, or sweats.      Review of Systems   Constitutional: Negative for chills, diaphoresis and fever.   Respiratory: Negative for shortness of breath.    Cardiovascular: Negative for chest pain.   Gastrointestinal: Negative for nausea and rectal pain.   Genitourinary: Negative for dysuria and hematuria.   Musculoskeletal: Positive for myalgias (left foot pain).   Psychiatric/Behavioral: Negative for confusion.     Objective:     Vital Signs (Most " Recent):  Temp: 97.8 °F (36.6 °C) (04/05/18 0426)  Pulse: (!) 111 (04/05/18 0525)  Resp: 20 (04/05/18 0426)  BP: (!) 109/57 (04/05/18 0426)  SpO2: (!) 88 % (04/05/18 0426) Vital Signs (24h Range):  Temp:  [97.8 °F (36.6 °C)-99.9 °F (37.7 °C)] 97.8 °F (36.6 °C)  Pulse:  [] 111  Resp:  [16-20] 20  SpO2:  [88 %-94 %] 88 %  BP: (100-115)/(56-64) 109/57     Weight: 66 kg (145 lb 6.4 oz)  Body mass index is 22.77 kg/m².    Estimated Creatinine Clearance: 92.7 mL/min (based on SCr of 0.9 mg/dL).    Physical Exam   Constitutional: He is oriented to person, place, and time. He appears well-developed and well-nourished. No distress.   Cardiovascular: Regular rhythm.  Exam reveals gallop. Exam reveals no friction rub.    Murmur heard.  tachycardic   Pulmonary/Chest: Effort normal. No respiratory distress. He has no wheezes. He has no rales.   Abdominal: Soft. He exhibits no distension. There is no tenderness. There is no rigidity and no guarding.       Musculoskeletal: He exhibits tenderness (left foot).   Neurological: He is oriented to person, place, and time.   Awake and answering questions apropriately   Skin: Skin is warm. No rash noted. He is not diaphoretic. There is erythema. No pallor.   See pic   Psychiatric: He has a normal mood and affect.   Nursing note and vitals reviewed.              Significant Labs:   Blood Culture:   Recent Labs  Lab 03/20/18  2219 03/20/18  2233 03/26/18  0148 03/27/18  2148 03/27/18  2212   LABBLOO No growth after 5 days. No growth after 5 days. No growth after 5 days. No growth after 5 days. No growth after 5 days.     CBC:   Recent Labs  Lab 04/04/18  0643 04/04/18  1332 04/04/18  2111   WBC 18.61* 21.01* 18.43*   HGB 8.2* 9.3* 8.7*   HCT 25.3* 27.5* 25.3*   * 690* 721*     CMP:   Recent Labs  Lab 04/04/18  0644      K 3.1*      CO2 23   *   BUN 4*   CREATININE 0.9   CALCIUM 8.7   PROT 6.3   ALBUMIN 1.8*   BILITOT 0.6   ALKPHOS 143*   *   ALT 88*    ANIONGAP 10   EGFRNONAA >60.0     Wound Culture: No results for input(s): LABAERO in the last 4320 hours.  All pertinent labs within the past 24 hours have been reviewed.    Significant Imaging: I have reviewed all pertinent imaging results/findings within the past 24 hours.

## 2018-04-05 NOTE — PROGRESS NOTES
Dr. Dixon will order a diet for pt.  Also, per Dr. Dixon, have Vanc trough collected now and wait to hang dose until it results and is reviewed. Patient was in surgery/recovery when trough should have been collected.     Vanc trough 19.7. Will inform MD of results    Per IM1, skip vanc dose that was due for 1315 and resume dose at 115 am

## 2018-04-05 NOTE — PROGRESS NOTES
[facility] Hospital Medicine  Progress Note    Patient Name: Leonardo Byrd  MRN: [id number]  Patient Class: IP- Inpatient   Admission Date: 3/18/2018  Length of Stay: 18 days  Attending Physician: Alysha Cuevas MD  Primary Care Provider: Indio Aquino MD    [facility] Medicine Team: [facility] 1 Jimi Rowe MD    Subjective:     Principal Problem:Critical lower limb ischemia    HPI:  46 yo former smoker with history of ICM (LVEF 30-35%) s/p ICD, CAD s/p PCI, HTN, HLD, PAD (s/p aortobifemoral bypass, L SFA and pop PTAS in September and recent R SFA and R pop 3/8 by Dr. Carl Bell). Patient states left foot pain has started in September 2017 and had PTAs to left LFA and left popliteal artery here. Since then he continued to have pain in his left foot and to a less degree, leg. The pain has been progressively worse since. Pain is a burning sharp pain that is elicited by slight touch. Pain is worsened with bearing weight and is improved with vibrating massager to the plantar aspect of the foot. There is associated numbness and coldness to his foot. He denies erythema or swelling of his left foot.    Hospital Course:  Admitted to CCU 3/20 after cath directed tPA was performed by int cards under general anesthesia. Also with DTs requiring scheduled benzos. On day 2 Lt lower extremity remained pulseless, so thrombolysis still infusing. On day 4 of vanc for possible lt foot cellulitis. Transitioned to clinda IV (unable to take PO safely). Intubated overnight for airway protection in setting of increasing benzo requirements and worsening agitation and danger of pt pulling line. CXR this AM (3/22) with patchy bilateral infiltrates. Taken to cath lab for possible cath removal. Lt leg then reportedly duskier and with absent pulses. On 3/23 taken back to cath lab. Int cards Recc Podiatry consult for possible L trans-metatarsal amputation. On heparin gtt. 3/24 Wbc trending up, foot color  "getting darker,paitent extubated, continue heparin. 3/25: US lower ext showing occlusion of distal right superficial femoral, popliteal, anterior tibial, and posterior tibial arteries. No "dopplerable" pulses on rt foot. Interventional cards holding off on on acute intervention currently in setting of RYAN. Will continue to monitor. Cr 1.7 (baseline 0.6). Given 500cc Iv fluids. Started on scheduled librium for alcohol withdrawal, with plans for taper.     -3/26/18 NAEON. Pt. With elevated WBC. On vanc and ceftriaxone. ID consulted. Pain management for BLE ischemia   -3/27: NAEON. BLE pain improved after pain management adjustment. Vascular surgery evaluated pt and planning for Right AKA today.   - 3/28 febrile last night. Blood culture sent. WBC elevated then trending down. Complains of BLE pain controlled with pain meds. S/p Right AKA on 3/27/18  -3-29: NAEON. Pain controlled with medications. Vascular and ID on board. Pt. Is high risk of left amputation. May ultimately require amputation on the left as well. Continue on vanc and Unasyn for now   - 3/30: NAEON. Pt afebrile. BLE pain with controlled with pain meds. Today abdomen slightly distended. CT abdomen ordered stat. If pt decompensate, will broaden abx from Unasyn to zosyn   - 4/1: NAEON. Pt afebrile. BLE pain controlled with pain meds. WBC trending down after switching Unasyn to Zosyn. Left toes gangrene continue to demarcates. Vascular following, likely will need amputation. received 1 PRBC for Hg 6.9   - 4/2: Stepped down to hospital medicine. Modified pain regimen to PO with IV for breakthrough. Awaiting vascular recommendations for L foot  - 4/3: Blood pressures low, am meds held. Bolused 250cc NS for SBP ~80 and discontinued lisinopril 5 mg daily  - 4/4: Continuing IV abx, plan to OR tomorrow per podiatry and awaiting vascular recs  - 4/5: OR today for TMA    Interval History: No acute events overnight. Patient to OR today for L TMA. No questions or " concerns this AM.    Review of Systems   Constitutional: Negative for chills, diaphoresis and fever.   Respiratory: Negative for shortness of breath.    Cardiovascular: Negative for chest pain.   Gastrointestinal: Negative for nausea and rectal pain.   Genitourinary: Negative for dysuria and hematuria.   Musculoskeletal: Positive for myalgias (left foot pain).   Psychiatric/Behavioral: Positive for confusion.     Objective:     Vital Signs (Most Recent):  Temp: 98.5 °F (36.9 °C) (04/05/18 0715)  Pulse: 105 (04/05/18 0806)  Resp: 18 (04/05/18 0715)  BP: (!) 99/58 (04/05/18 0715)  SpO2: (!) 94 % (04/05/18 0715) Vital Signs (24h Range):  Temp:  [97.8 °F (36.6 °C)-99.9 °F (37.7 °C)] 98.5 °F (36.9 °C)  Pulse:  [] 105  Resp:  [16-20] 18  SpO2:  [88 %-94 %] 94 %  BP: ()/(56-61) 99/58     Weight: 66 kg (145 lb 6.4 oz)  Body mass index is 22.77 kg/m².    Intake/Output Summary (Last 24 hours) at 04/05/18 0853  Last data filed at 04/05/18 0742   Gross per 24 hour   Intake              600 ml   Output             2100 ml   Net            -1500 ml      Physical Exam   Constitutional: He appears well-developed. He appears lethargic.   Cardiovascular: Normal rate, regular rhythm and normal heart sounds.    Pulmonary/Chest: Effort normal. No respiratory distress.   Abdominal: Soft. Bowel sounds are normal. There is no tenderness.   Musculoskeletal:   R BKA  Left distal toes with dry gangrene; dorsal ischemia not full thickness   Neurological: He appears lethargic.   Skin: Skin is warm.       Significant Labs:   BMP:   Recent Labs  Lab 04/05/18  0442   GLU 84      K 3.7      CO2 25   BUN 4*   CREATININE 0.9   CALCIUM 8.8   MG 1.7     CBC:   Recent Labs  Lab 04/04/18  1332 04/04/18  2111 04/05/18  0443   WBC 21.01* 18.43* 14.68*   HGB 9.3* 8.7* 8.5*   HCT 27.5* 25.3* 25.7*   * 721* 729*       Significant Imaging: No new imaging    Assessment/Plan:      * Critical lower limb ischemia    Mr. Byrd is a  47 year old man with CAD s/p PCI, LVEF 30% s/p ICD, complicated and extensive PAD s/p aortofem bypass and multiple stents on DAPT who presents with what appears to be CLI of his left foot of unclear duration.      --Underwent angiogram 3/19 that showed occlusion of aorta-fem bypass at distal anastamosis. Unable to treat due to patient movement   --s/p cath directed tPA to occluded L SFA  --blood cx ngtd  --subsequently with acute limb ischemia in RLE resulting in R AKA on 3/27  --continue ASA/brilinta/holding heparin gtt  --Podiatry and vascular following for necrotic L foot, well defined necrotic demarcation  --L TMA on 4/5. Restart heparin when appropriate        Leukocytosis    - continue trending down   -s/p 7 day course of abx for possible LE cellulitis  -CT abd without source of infection  -Inflammatory response related to necrosis/recent amputation vs cellulitis vs other infxs source  -Covering with broad spectrum antibiotics, will remain broad for now  - restarted on van and ceftriaxone (started 3/25/18) for elevated wbc   - ID consulted, switched Ceftriaxone to Unasyn  (started 3/25/18), switched to Zosyn for failure to improve 3/30. Also on vancomycin        Ischemic cardiomyopathy    CM possibly with multifactorial etiology (hx of alcohol use, ischemia)  - CAD s/p PCI  - DAPT, statin, BB  - Holding lisinopril for intermittent hypotension        Alcohol abuse    -s/p delirium tremens  -no seizure activity  -s/p BZD taper  -continue thiamine  -remains confused, unclear etiology, difficult to assess        Chronic systolic heart failure    LVEF 10-15% s/p ICD  Continue BB  Holding lisinopril for tenuous BPs        Tobacco abuse    --nicotine patch            VTE Risk Mitigation         Ordered     heparin 25,000 units in dextrose 5% 250 mL (100 units/mL) infusion  Continuous     Route:  Intravenous        03/27/18 7565     heparin 25,000 units in dextrose 5% 250 mL (100 units/mL) bolus from bag; ADDITIONAL  PRN BOLUS  As needed (PRN)     Route:  Intravenous        03/27/18 1645     heparin 25,000 units in dextrose 5% 250 mL (100 units/mL) bolus from bag; ADDITIONAL PRN BOLUS  As needed (PRN)     Route:  Intravenous        03/27/18 1645     IP VTE LOW RISK PATIENT  Once      03/18/18 1711              Jimi Rowe MD  Department of Hospital Medicine   [facility]

## 2018-04-05 NOTE — ANESTHESIA PROCEDURE NOTES
Adductor Canal Single Injection Block    Patient location during procedure: pre-op   Block not for primary anesthetic.  Reason for block: at surgeon's request and post-op pain management   Post-op Pain Location: L foot pain  Start time: 4/5/2018 11:22 AM  Timeout: 4/5/2018 11:15 AM   End time: 4/5/2018 11:27 AM  Staffing  Anesthesiologist: KATHARINE OWEN  Resident/CRNA: ÁNGEL CALLAHAN  Performed: resident/CRNA   Preanesthetic Checklist  Completed: patient identified, site marked, surgical consent, pre-op evaluation, timeout performed, IV checked, risks and benefits discussed and monitors and equipment checked  Peripheral Block  Patient position: supine  Prep: ChloraPrep  Patient monitoring: heart rate, cardiac monitor, continuous pulse ox, continuous capnometry and frequent blood pressure checks  Block type: adductor canal  Laterality: left  Injection technique: single shot  Needle  Needle type: Stimuplex   Needle gauge: 21 G  Needle length: 4 in  Needle localization: anatomical landmarks and ultrasound guidance   -ultrasound image captured on disc.  Assessment  Injection assessment: negative aspiration, negative parasthesia and local visualized surrounding nerve  Paresthesia pain: none  Heart rate change: no  Slow fractionated injection: yes  Medications:  Bolus administered: 10 mL of 0.5 ropivacaine  Epinephrine added: 3.75 mcg/mL (1/300,000)  Additional Notes  VSS.  [facility] RN monitoring vitals throughout procedure.  Patient tolerated procedure well.

## 2018-04-05 NOTE — PROGRESS NOTES
Report called to DENISE Rene in [facility]. Notified RN to please return tele box to CSU once connected to tele.

## 2018-04-05 NOTE — SUBJECTIVE & OBJECTIVE
Interval History: No acute events overnight. Patient to OR today for L TMA. No questions or concerns this AM.    Review of Systems   Constitutional: Negative for chills, diaphoresis and fever.   Respiratory: Negative for shortness of breath.    Cardiovascular: Negative for chest pain.   Gastrointestinal: Negative for nausea and rectal pain.   Genitourinary: Negative for dysuria and hematuria.   Musculoskeletal: Positive for myalgias (left foot pain).   Psychiatric/Behavioral: Positive for confusion.     Objective:     Vital Signs (Most Recent):  Temp: 98.5 °F (36.9 °C) (04/05/18 0715)  Pulse: 105 (04/05/18 0806)  Resp: 18 (04/05/18 0715)  BP: (!) 99/58 (04/05/18 0715)  SpO2: (!) 94 % (04/05/18 0715) Vital Signs (24h Range):  Temp:  [97.8 °F (36.6 °C)-99.9 °F (37.7 °C)] 98.5 °F (36.9 °C)  Pulse:  [] 105  Resp:  [16-20] 18  SpO2:  [88 %-94 %] 94 %  BP: ()/(56-61) 99/58     Weight: 66 kg (145 lb 6.4 oz)  Body mass index is 22.77 kg/m².    Intake/Output Summary (Last 24 hours) at 04/05/18 0853  Last data filed at 04/05/18 0742   Gross per 24 hour   Intake              600 ml   Output             2100 ml   Net            -1500 ml      Physical Exam   Constitutional: He appears well-developed. He appears lethargic.   Cardiovascular: Normal rate, regular rhythm and normal heart sounds.    Pulmonary/Chest: Effort normal. No respiratory distress.   Abdominal: Soft. Bowel sounds are normal. There is no tenderness.   Musculoskeletal:   R BKA  Left distal toes with dry gangrene; dorsal ischemia not full thickness   Neurological: He appears lethargic.   Skin: Skin is warm.       Significant Labs:   BMP:   Recent Labs  Lab 04/05/18  0442   GLU 84      K 3.7      CO2 25   BUN 4*   CREATININE 0.9   CALCIUM 8.8   MG 1.7     CBC:   Recent Labs  Lab 04/04/18  1332 04/04/18  2111 04/05/18  0443   WBC 21.01* 18.43* 14.68*   HGB 9.3* 8.7* 8.5*   HCT 27.5* 25.3* 25.7*   * 721* 729*       Significant Imaging:  No new imaging

## 2018-04-05 NOTE — ANESTHESIA PROCEDURE NOTES
Popliteal Sciatic Single Injection Block    Patient location during procedure: pre-op   Block not for primary anesthetic.  Reason for block: at surgeon's request and post-op pain management   Post-op Pain Location: L foot pain  Start time: 4/5/2018 11:15 AM  Timeout: 4/5/2018 11:15 AM   End time: 4/5/2018 11:22 AM  Staffing  Anesthesiologist: KATHARINE OWEN  Resident/CRNA: ÁNGEL CALLAHAN  Performed: resident/CRNA   Preanesthetic Checklist  Completed: patient identified, site marked, surgical consent, pre-op evaluation, timeout performed, IV checked, risks and benefits discussed and monitors and equipment checked  Peripheral Block  Patient position: supine  Prep: ChloraPrep  Patient monitoring: heart rate, cardiac monitor, continuous pulse ox, continuous capnometry and frequent blood pressure checks  Block type: popliteal  Laterality: left  Injection technique: single shot  Needle  Needle type: Stimuplex   Needle gauge: 21 G  Needle length: 4 in  Needle localization: anatomical landmarks and ultrasound guidance   -ultrasound image captured on disc.  Assessment  Injection assessment: negative aspiration, negative parasthesia and local visualized surrounding nerve  Paresthesia pain: none  Heart rate change: no  Slow fractionated injection: yes  Medications:  Bolus administered: 30 mL of 0.5 ropivacaine  Epinephrine added: 3.75 mcg/mL (1/300,000)  Additional Notes  VSS.  [facility] RN monitoring vitals throughout procedure.  Patient tolerated procedure well.

## 2018-04-05 NOTE — PT/OT/SLP PROGRESS
Speech Language Pathology      Leonardoellie Byrd  MRN: [id number]    Patient not seen today secondary to YOLY surgery (amputation of part of foot). Will follow-up 4/6.    KEVIN Holder, CCC-SLP     KEVIN Holder, CCC-SLP  Speech Language Pathologist  [phone]  4/5/2018

## 2018-04-05 NOTE — PT/OT/SLP PROGRESS
Occupational Therapy      Patient Name:  Leonardo Byrd   MRN:  [id number]    Patient not seen today secondary to pt will need new orders following surgery today. Will follow-up as appropriate.    ALINA Chambers  4/5/2018

## 2018-04-05 NOTE — NURSING TRANSFER
Nursing Transfer Note      4/5/2018     Transfer to 542B    Transfer via stretcher    Transfer with cardiac monitoring    Transported by [facility]    Medicines sent: none    Chart send with patient: Yes    Notified: father; central monitoring for telemetry monitoring    Patient reassessed at: upon arrival to floor  Upon arrival to floor: report given See WALKER    Pt left via stretcher in no signs of distress. Pt AAO. Reports pain tolerable.

## 2018-04-05 NOTE — OP NOTE
Operative Note       Surgery Date: 4/5/2018     Surgeon(s) and Role:     * Aisha Salvador DPM - Primary     * Virgil Cramer MD - Resident - Assisting    Pre-op Diagnosis:  PVD (peripheral vascular disease) [I73.9]  Peripheral vascular disease of lower extremity [I73.9]    Post-op Diagnosis: Post-Op Diagnosis Codes:     * PVD (peripheral vascular disease) [I73.9]     * Peripheral vascular disease of lower extremity [I73.9]    Procedure(s) (LRB):  AMPUTATION-FOOT Transmetatarsal  Request popliteal saphenous nerve block per anesthesia (Left)    Anesthesia: Local MAC    Procedure in Detail/Findings:  Patient received a popliteal saphenous block pre-operatively.The patient was brought to the operating room on a stretcher and placed on the operating table in a supine position. Following the successful induction of MAC anesthesia, a tourniquet was placed on the left ankle.  The  foot was then prepped, draped in a typical sterile manner.      Attention was directed to the left foot where necrotic digits were appreciated, and a necrotic dorsal eschar was appreciated.  A fish mouth incision was made around all the digits dorsal to plantar around the level of the metatarsophalangeal joints, ensuring to leave enough skin plantarly to flap dorsally and close the skin after the amputation.  10ml 0.25% Marcaine plain local block was given due to lack of anesthesia along medial incision. The incision was deepened through the subcutaneous tissue severing all neurovascular and tendon structures. The metatarsophalangeal joints were disarticulated and the entire distal foot including all five toes were removed and sent to pathology.      All soft tissues were resected away from the metatarsal bones to the level of the surgical necks and an oscillating saw was used to cut the metatarsals just proximal to the metatarsal necks, starting with the first and moving to the fifth, ensuring to keep the parabola of the forefoot intact. The flexor  and extensor tendons were grasped and cut  as proximally as possible with a hemostat and #15 blade. The metatarsal heads were sent for pathology.     At this time it was noted that there was scant bleeding medially (rays 1-2), but mild-moderate bleeding laterally.  No further necrotic tissue, with exception of the dorsal eschar, was noted.  The entire TMA site was flushed with saline thoroughly. A proximal sample of the first metatarsal was sent for clean margins for pathology and micro cultures.     A 15 blade was used to excisionally debride the dorsal eschar to the level of the subcuticular tissues..  Moderate bleeding was noted.  Necrotic eschar, fibrous slough, epidermis and dermis was excisionally debrided. Adequate bleeding was noted from this debridement site. Post debridement measurements were 5.5cm x 2.5cm x superficial.    At this time the area was profusely irrigated with sterile saline.  The plantar skin was flapped dorsally and the deep tissues were closed over the remaining metatarsal bones with 3-0 vicryl. The subcuticular tissues were reapproximated with 4-0 vicryl and the skin edges were then approximated with 4-0 nylon.       The incision sites were dressed with xeroform and covered with a sterile dressing consisting of betadine soaked xeroform, 4 X 4s and ABD, cast padding, and loosely wrapped ACE. The patient tolerated the procedure and anesthesia well. The patient was transported to recovery with vital signs stable and vascular status intact.       The patient tolerated the procedure and anesthesia well. He was transferred to the recovery room with vital signs stable, vascular status intact, and capillary refill time < 3 seconds to the distal aspect of the foot. Following a period of post op monitoring, the patient will be readmitted to the hospital with the following post op instructions:   1. Keep dressing dry and intact until Podiatry follow up.   2. Weight bearing status: Non weight  bearing to left foot.  3. All necessary prescriptions ordered and medical management will continue.   4. Contact Podiatry for all post op follow up care and if any problems arise.        Estimated Blood Loss: less than 15ml         Specimens     Start     Ordered    04/05/18 1317  Specimen to Pathology - Surgery  Once      04/05/18 1321    04/05/18 1257  Specimen to Pathology - Surgery  Once      04/05/18 1307    03/27/18 1456  Specimen to Pathology - Surgery  Once      03/27/18 1455    03/27/18 1444  Specimen to Pathology - Surgery  Once      03/27/18 1444        Implants: * No implants in log *           Disposition: PACU - hemodynamically stable.           Condition: Good    Attestation:  I was present and scrubbed for the entire procedure.

## 2018-04-05 NOTE — PLAN OF CARE
Problem: Patient Care Overview  Goal: Plan of Care Review  Outcome: Ongoing (interventions implemented as appropriate)  POC reviewed with patient and father with all questions answered. VSS and pain managed with PRN medication. Patient NPO at midnight for LLE TMA. Heparin gtt stopped at midnight per MD order. Patient continues on Zosyn and Vanc IV. Left foot cleansed with betadine and dressed with gauze wrap. Right residual limb incision cleansed with chlorhexidine and elevated. Fall bundle implemented and patient has remained free of falls and injuries. Patient in no apparent sign of distress, will continue to monitor.

## 2018-04-05 NOTE — PT/OT/SLP PROGRESS
Physical Therapy      Patient Name:  Leonardo Byrd   MRN:  [id number]    Patient not seen today secondary to off floor for (R) transmetatarsal amputation. Will need new orders following surgery  . Will follow-up once given new orders.    Jackie Trejo, PT   4/5/2018

## 2018-04-05 NOTE — SUBJECTIVE & OBJECTIVE
Interval History: No AEON.  Tmax 99.9.  WBC 14.68.  Going to OR today for TMA.  No new complaints.  The patient denies any recent fever, chills, or sweats.      Review of Systems   Constitutional: Negative for chills, diaphoresis and fever.   Respiratory: Negative for shortness of breath.    Cardiovascular: Negative for chest pain.   Gastrointestinal: Negative for nausea and rectal pain.   Genitourinary: Negative for dysuria and hematuria.   Musculoskeletal: Positive for myalgias (left foot pain).   Psychiatric/Behavioral: Negative for confusion.     Objective:     Vital Signs (Most Recent):  Temp: 97.8 °F (36.6 °C) (04/05/18 0426)  Pulse: (!) 111 (04/05/18 0525)  Resp: 20 (04/05/18 0426)  BP: (!) 109/57 (04/05/18 0426)  SpO2: (!) 88 % (04/05/18 0426) Vital Signs (24h Range):  Temp:  [97.8 °F (36.6 °C)-99.9 °F (37.7 °C)] 97.8 °F (36.6 °C)  Pulse:  [] 111  Resp:  [16-20] 20  SpO2:  [88 %-94 %] 88 %  BP: (100-115)/(56-64) 109/57     Weight: 66 kg (145 lb 6.4 oz)  Body mass index is 22.77 kg/m².    Estimated Creatinine Clearance: 92.7 mL/min (based on SCr of 0.9 mg/dL).    Physical Exam   Constitutional: He is oriented to person, place, and time. He appears well-developed and well-nourished. No distress.   Cardiovascular: Regular rhythm.  Exam reveals gallop. Exam reveals no friction rub.    Murmur heard.  tachycardic   Pulmonary/Chest: Effort normal. No respiratory distress. He has no wheezes. He has no rales.   Abdominal: Soft. He exhibits no distension. There is no tenderness. There is no rigidity and no guarding.       Musculoskeletal: He exhibits tenderness (left foot).   Neurological: He is oriented to person, place, and time.   Awake and answering questions apropriately   Skin: Skin is warm. No rash noted. He is not diaphoretic. There is erythema. No pallor.   See pic   Psychiatric: He has a normal mood and affect.   Nursing note and vitals reviewed.              Significant Labs:   Blood Culture:   Recent  Labs  Lab 03/20/18  2219 03/20/18  2233 03/26/18  0148 03/27/18  2148 03/27/18  2212   LABBLOO No growth after 5 days. No growth after 5 days. No growth after 5 days. No growth after 5 days. No growth after 5 days.     CBC:   Recent Labs  Lab 04/04/18  0643 04/04/18  1332 04/04/18  2111   WBC 18.61* 21.01* 18.43*   HGB 8.2* 9.3* 8.7*   HCT 25.3* 27.5* 25.3*   * 690* 721*     CMP:   Recent Labs  Lab 04/04/18  0644      K 3.1*      CO2 23   *   BUN 4*   CREATININE 0.9   CALCIUM 8.7   PROT 6.3   ALBUMIN 1.8*   BILITOT 0.6   ALKPHOS 143*   *   ALT 88*   ANIONGAP 10   EGFRNONAA >60.0     Wound Culture: No results for input(s): LABAERO in the last 4320 hours.  All pertinent labs within the past 24 hours have been reviewed.    Significant Imaging: I have reviewed all pertinent imaging results/findings within the past 24 hours.

## 2018-04-05 NOTE — ASSESSMENT & PLAN NOTE
Mr. Byrd is a 47 year old man with CAD s/p PCI, LVEF 30% s/p ICD, complicated and extensive PAD s/p aortofem bypass and multiple stents on DAPT who presents with what appears to be CLI of his left foot of unclear duration.      --Underwent angiogram 3/19 that showed occlusion of aorta-fem bypass at distal anastamosis. Unable to treat due to patient movement   --s/p cath directed tPA to occluded L SFA  --blood cx ngtd  --subsequently with acute limb ischemia in RLE resulting in R AKA on 3/27  --continue ASA/brilinta/holding heparin gtt  --Podiatry and vascular following for necrotic L foot, well defined necrotic demarcation  --L TMA on 4/5. Restart heparin when appropriate

## 2018-04-06 PROBLEM — I73.9 PERIPHERAL VASCULAR DISEASE OF LOWER EXTREMITY: Status: ACTIVE | Noted: 2017-02-27

## 2018-04-06 NOTE — ASSESSMENT & PLAN NOTE
Mr. Byrd is a 47 year old man with CAD s/p PCI, LVEF 30% s/p ICD, complicated and extensive PAD s/p aortofem bypass and multiple stents on DAPT who presents with what appears to be CLI of his left foot of unclear duration.      --Underwent angiogram 3/19 that showed occlusion of aorta-fem bypass at distal anastamosis. Unable to treat due to patient movement   --s/p cath directed tPA to occluded L SFA  --blood cx ngtd  --subsequently with acute limb ischemia in RLE resulting in R AKA on 3/27  --continue ASA/brilinta. No longer requires heparin gtt post-op. Started DVT ppx with lovenox.  --Podiatry and vascular following for necrotic L foot, well defined necrotic demarcation  --L TMA on 4/5.

## 2018-04-06 NOTE — PROGRESS NOTES
[facility]  Podiatry  Progress Note    Patient Name: Leonardo Byrd  MRN: [id number]  Admission Date: 3/18/2018  Hospital Length of Stay: 19 days  Attending Physician: Alysha Cuevas MD  Primary Care Provider: Indio Aquino MD   Interval Hx: s/p left TMA and wound debridement (DOS 4/5 per Dr. Salvador). S/p R AKA per vascular surgery.  Patient denies F/C/N/V.  Patient is tearful, complaining of pain in his foot.  Dressings clean, dry, and intact.    Scheduled Meds:   amitriptyline  50 mg Oral QHS    aspirin  81 mg Oral Daily    atorvastatin  40 mg Oral QHS    enoxaparin  40 mg Subcutaneous Daily    folic acid-vit B6-vit B12 2.5-25-2 mg  1 tablet Per OG tube Daily    gabapentin  800 mg Oral QHS    lidocaine   Topical (Top) QID    metoprolol succinate  12.5 mg Oral Daily    nicotine  1 patch Transdermal Daily    piperacillin-tazobactam (ZOSYN) IVPB  4.5 g Intravenous Q8H    pregabalin  100 mg Oral TID    senna-docusate 8.6-50 mg  1 tablet Oral BID    thiamine  100 mg Oral Daily    ticagrelor  90 mg Oral BID    vancomycin (VANCOCIN) IVPB  1,000 mg Intravenous Q12H     Continuous Infusions:    PRN Meds:sodium chloride, cyclobenzaprine, nitroGLYCERIN, oxyCODONE, oxyCODONE, oxyCODONE-acetaminophen, polyethylene glycol, sodium chloride 0.9%, zolpidem    Review of patient's allergies indicates:  No Known Allergies     Past Medical History:   Diagnosis Date    AICD (automatic cardioverter/defibrillator) present     RYAN (acute kidney injury) 3/25/2018    CHF (congestive heart failure)     Coronary artery disease     Encounter for blood transfusion     Hyperlipemia     Hypertension     MI (myocardial infarction)     Neuropathy     PAD (peripheral artery disease)     PVD (peripheral vascular disease)      Past Surgical History:   Procedure Laterality Date    AMPUTATION Right     great toe    BYBPASS GRAFT AORTOBIUFEMORAL      CARDIAC DEFIBRILLATOR PLACEMENT      coronary  stents      EXPLORATION AND EVaCUATION OF HEMATOMA OF UPPER EXTREMITY Left     KNEE ARTHROPLASTY Left     VASCULAR SURGERY      fem-pop bypass       Family History     None        Social History Main Topics    Smoking status: Former Smoker     Packs/day: 0.50     Quit date: 02/2018    Smokeless tobacco: Former User     Types: Chew     Quit date: 8/2/1980      Comment: Uses nicotine gum and nicotine patches    Alcohol use Yes      Comment: rarely    Drug use: No    Sexual activity: Yes     Partners: Female     Review of Systems   Constitutional: Negative.    Respiratory: Negative.    Cardiovascular: Negative.    Gastrointestinal: Negative.    Genitourinary: Negative.    Musculoskeletal: Positive for arthralgias.   Skin: Positive for wound.   Psychiatric/Behavioral: Negative.      Objective:     Vital Signs (Most Recent):  Temp: 99.8 °F (37.7 °C) (04/06/18 1153)  Pulse: (!) 115 (04/06/18 1153)  Resp: 18 (04/06/18 1153)  BP: 103/60 (04/06/18 1153)  SpO2: (!) 94 % (04/06/18 0716) Vital Signs (24h Range):  Temp:  [96.6 °F (35.9 °C)-99.8 °F (37.7 °C)] 99.8 °F (37.7 °C)  Pulse:  [112-121] 115  Resp:  [18-20] 18  SpO2:  [94 %-98 %] 94 %  BP: (103-130)/(54-80) 103/60     Weight: 65.8 kg (145 lb)  Body mass index is 22.71 kg/m².    Foot Exam    General  Orientation: alert and oriented to person, place, and time       Right Foot/Ankle     Comments  R AKA    Left Foot/Ankle      Inspection and Palpation  Ecchymosis: none  Tenderness: none   Swelling: none   Arch: normal  Hammertoes: absent  Claw toes: absent  Hallux valgus: no  Hallux limitus: no  Skin Exam: abnormal color;     Neurovascular  Dorsalis pedis: absent  Posterior tibial: absent          TMA site with sutures intact, with skin coapted, necrosis along incision site.      Wound on Dorsal foot measures 5.5 x 2.5 x superficial  With fibrous base and viable margins.  Negative purulence, erythema, edema, or  ericksonoder      4/6/18                            Laboratory:  A1C: No results for input(s): HGBA1C in the last 4320 hours.  CBC:     Recent Labs  Lab 04/06/18  1302   WBC 14.84*   RBC 2.95*   HGB 8.9*   HCT 28.3*   *   MCV 96   MCH 30.2   MCHC 31.4*     CMP:     Recent Labs  Lab 04/06/18  0532      CALCIUM 9.3   ALBUMIN 2.2*   PROT 7.4      K 3.4*   CO2 22*      BUN 6   CREATININE 1.0   ALKPHOS 137*   ALT 78*   AST 88*   BILITOT 0.6     CRP: No results for input(s): CRP in the last 168 hours.  ESR: No results for input(s): SEDRATE in the last 168 hours.    Diagnostic Results:  Xray left foot:  No fracture dislocation bone destruction seen.    Clinical Findings:    Gangrene distal forefoot toes 1-5 left foot. Necrotic eschar noted to dorsum of left foot.                 Assessment/Plan:     * Critical lower limb ischemia    Interventional cardiology on board, appreciate recs  S/p R AKA per vascular surgery.         Gangrene    Leonardo Byrd is a 47 y.o. male s/p left TMA and wound debridement (DOS 4/5 per Dr. Salvador).  Incision site with necrotic changes.  No signs of infection.  Complaining of pain.  -Dressed with lidocaine cream, xeroform, 4x4's, kerlix, cast padding, and loosely wrapped ace.  -Cultures pending, ID on board, appreciate recs.  -Interventional cardiology on board, appreciate recs,  -Podiatry will follow.    Weight bearing status: NWB.  Offloading device: heel protector boot          PVD (peripheral vascular disease)    Followed by interventional cardiology.             Virgil Wright MD  Podiatry  [facility]

## 2018-04-06 NOTE — ASSESSMENT & PLAN NOTE
LVEF 10-15% s/p ICD  Continue BB  Holding lisinopril for tenuous BPs  New holosystolic murmur likely mitral regurgitation. TTE pending

## 2018-04-06 NOTE — PROGRESS NOTES
Spoke with primary team.  Rec continue vanc and Zosyn x 48h and if no growth on clean margin cultures at that time then can DC abx.  Discussed with ID staff and primary team.  Will sign off    Jeison Haro PA-C  Pgaer [phone]

## 2018-04-06 NOTE — ANESTHESIA POSTPROCEDURE EVALUATION
"Anesthesia Post Evaluation    Patient: Leonardo Byrd    Procedure(s) Performed: Procedure(s) (LRB):  AMPUTATION-FOOT Transmetatarsal  Request popliteal saphenous nerve block per anesthesia (Left)    Final Anesthesia Type: regional  Patient location during evaluation: PACU  Patient participation: Yes- Able to Participate  Level of consciousness: awake and alert, oriented and awake  Post-procedure vital signs: reviewed and stable  Pain management: adequate  Airway patency: patent  PONV status at discharge: No PONV  Anesthetic complications: no      Cardiovascular status: blood pressure returned to baseline and hemodynamically stable  Respiratory status: unassisted, spontaneous ventilation and room air  Hydration status: euvolemic  Follow-up not needed.        Visit Vitals  /80 (BP Location: Left arm, Patient Position: Lying)   Pulse (!) 121   Temp 36.9 °C (98.4 °F) (Oral)   Resp 20   Ht 5' 7" (1.702 m)   Wt 65.8 kg (145 lb)   SpO2 (!) 94%   BMI 22.71 kg/m²       Pain/Desiree Score: Pain Assessment Performed: Yes (4/6/2018  6:00 AM)  Presence of Pain: complains of pain/discomfort (4/6/2018  6:00 AM)  Pain Rating Prior to Med Admin: 10 (4/6/2018  4:55 AM)  Desiree Score: 10 (4/5/2018  2:45 PM)      "

## 2018-04-06 NOTE — TRANSFER OF CARE
"Anesthesia Transfer of Care Note    Patient: Leonardo Byrd    Procedure(s) Performed: Procedure(s) (LRB):  AMPUTATION-FOOT Transmetatarsal  Request popliteal saphenous nerve block per anesthesia (Left)    Patient location: PACU    Anesthesia Type: MAC    Transport from OR: Transported from OR on room air with adequate spontaneous ventilation    Post pain: adequate analgesia    Post assessment: no apparent anesthetic complications and tolerated procedure well    Post vital signs: stable    Level of consciousness: awake and alert    Nausea/Vomiting: no nausea/vomiting    Complications: none    Transfer of care protocol was followed      Last vitals:   Visit Vitals  /80 (BP Location: Left arm, Patient Position: Lying)   Pulse (!) 121   Temp 36.9 °C (98.4 °F) (Oral)   Resp 20   Ht 5' 7" (1.702 m)   Wt 65.8 kg (145 lb)   SpO2 (!) 94%   BMI 22.71 kg/m²     "

## 2018-04-06 NOTE — PROGRESS NOTES
[facility] Hospital Medicine  Progress Note    Patient Name: Leonardo Byrd  MRN: [id number]  Patient Class: IP- Inpatient   Admission Date: 3/18/2018  Length of Stay: 19 days  Attending Physician: Alysha Cuevas MD  Primary Care Provider: Indio Aquino MD    [facility] Medicine Team: [facility] 1 Jimi Rowe MD    Subjective:     Principal Problem:Critical lower limb ischemia    HPI:  48 yo former smoker with history of ICM (LVEF 30-35%) s/p ICD, CAD s/p PCI, HTN, HLD, PAD (s/p aortobifemoral bypass, L SFA and pop PTAS in September and recent R SFA and R pop 3/8 by Dr. Carl Bell). Patient states left foot pain has started in September 2017 and had PTAs to left LFA and left popliteal artery here. Since then he continued to have pain in his left foot and to a less degree, leg. The pain has been progressively worse since. Pain is a burning sharp pain that is elicited by slight touch. Pain is worsened with bearing weight and is improved with vibrating massager to the plantar aspect of the foot. There is associated numbness and coldness to his foot. He denies erythema or swelling of his left foot.    Hospital Course:  Admitted to CCU 3/20 after cath directed tPA was performed by int cards under general anesthesia. Also with DTs requiring scheduled benzos. On day 2 Lt lower extremity remained pulseless, so thrombolysis still infusing. On day 4 of vanc for possible lt foot cellulitis. Transitioned to clinda IV (unable to take PO safely). Intubated overnight for airway protection in setting of increasing benzo requirements and worsening agitation and danger of pt pulling line. CXR this AM (3/22) with patchy bilateral infiltrates. Taken to cath lab for possible cath removal. Lt leg then reportedly duskier and with absent pulses. On 3/23 taken back to cath lab. Int cards Recc Podiatry consult for possible L trans-metatarsal amputation. On heparin gtt. 3/24 Wbc trending up, foot color  "getting darker,paitent extubated, continue heparin. 3/25: US lower ext showing occlusion of distal right superficial femoral, popliteal, anterior tibial, and posterior tibial arteries. No "dopplerable" pulses on rt foot. Interventional cards holding off on on acute intervention currently in setting of RYAN. Will continue to monitor. Cr 1.7 (baseline 0.6). Given 500cc Iv fluids. Started on scheduled librium for alcohol withdrawal, with plans for taper.     -3/26/18 NAEON. Pt. With elevated WBC. On vanc and ceftriaxone. ID consulted. Pain management for BLE ischemia   -3/27: NAEON. BLE pain improved after pain management adjustment. Vascular surgery evaluated pt and planning for Right AKA today.   - 3/28 febrile last night. Blood culture sent. WBC elevated then trending down. Complains of BLE pain controlled with pain meds. S/p Right AKA on 3/27/18  -3-29: NAEON. Pain controlled with medications. Vascular and ID on board. Pt. Is high risk of left amputation. May ultimately require amputation on the left as well. Continue on vanc and Unasyn for now   - 3/30: NAEON. Pt afebrile. BLE pain with controlled with pain meds. Today abdomen slightly distended. CT abdomen ordered stat. If pt decompensate, will broaden abx from Unasyn to zosyn   - 4/1: NAEON. Pt afebrile. BLE pain controlled with pain meds. WBC trending down after switching Unasyn to Zosyn. Left toes gangrene continue to demarcates. Vascular following, likely will need amputation. received 1 PRBC for Hg 6.9   - 4/2: Stepped down to hospital medicine. Modified pain regimen to PO with IV for breakthrough. Awaiting vascular recommendations for L foot  - 4/3: Blood pressures low, am meds held. Bolused 250cc NS for SBP ~80 and discontinued lisinopril 5 mg daily  - 4/4: Continuing IV abx, plan to OR tomorrow per podiatry and awaiting vascular recs  - 4/5: OR today for TMA  - 4/6: TTE for murmur evaluation    Interval History: No acute events overnight. Patient underwent " L TMA yesterday without complication. He reports increased pain this AM. No other complaints or concerns.    Review of Systems   Constitutional: Negative for chills, diaphoresis and fever.   Respiratory: Negative for shortness of breath.    Cardiovascular: Negative for chest pain.   Gastrointestinal: Negative for nausea and rectal pain.   Genitourinary: Negative for dysuria and hematuria.   Musculoskeletal: Positive for myalgias (left foot pain).   Psychiatric/Behavioral: Positive for confusion.     Objective:     Vital Signs (Most Recent):  Temp: 96.6 °F (35.9 °C) (04/06/18 0400)  Pulse: (!) 112 (04/06/18 0400)  Resp: 18 (04/06/18 0400)  BP: 116/77 (04/06/18 0400)  SpO2: (!) 94 % (04/06/18 0400) Vital Signs (24h Range):  Temp:  [96.6 °F (35.9 °C)-98.8 °F (37.1 °C)] 96.6 °F (35.9 °C)  Pulse:  [] 112  Resp:  [12-18] 18  SpO2:  [94 %-100 %] 94 %  BP: ()/(44-77) 116/77     Weight: 65.8 kg (145 lb)  Body mass index is 22.71 kg/m².    Intake/Output Summary (Last 24 hours) at 04/06/18 0647  Last data filed at 04/06/18 0457   Gross per 24 hour   Intake              550 ml   Output             2250 ml   Net            -1700 ml      Physical Exam   Constitutional: He appears well-developed. He appears lethargic.   Cardiovascular: Normal rate and regular rhythm.    Murmur heard.   Systolic (Holosystolic radiating to axilla) murmur is present   Pulmonary/Chest: Effort normal. No respiratory distress.   Abdominal: Soft. Bowel sounds are normal. There is no tenderness.   Musculoskeletal:   R BKA  L TMA, dressed.   Neurological: He appears lethargic.   Skin: Skin is warm.       Significant Labs:   BMP:   Recent Labs  Lab 04/05/18  0442   GLU 84      K 3.7      CO2 25   BUN 4*   CREATININE 0.9   CALCIUM 8.8   MG 1.7     CBC:   Recent Labs  Lab 04/05/18  0443 04/05/18  1520 04/05/18  2352   WBC 14.68* 13.39* 12.79*   HGB 8.5* 8.6* 8.3*   HCT 25.7* 26.8* 25.6*   * 733* 731*       Significant Imaging:  No new imaging    Assessment/Plan:      * Critical lower limb ischemia    Mr. Byrd is a 47 year old man with CAD s/p PCI, LVEF 30% s/p ICD, complicated and extensive PAD s/p aortofem bypass and multiple stents on DAPT who presents with what appears to be CLI of his left foot of unclear duration.      --Underwent angiogram 3/19 that showed occlusion of aorta-fem bypass at distal anastamosis. Unable to treat due to patient movement   --s/p cath directed tPA to occluded L SFA  --blood cx ngtd  --subsequently with acute limb ischemia in RLE resulting in R AKA on 3/27  --continue ASA/brilinta. No longer requires heparin gtt post-op. Started DVT ppx with lovenox.  --Podiatry and vascular following for necrotic L foot, well defined necrotic demarcation  --L TMA on 4/5.        Leukocytosis    - continue trending down   -s/p 7 day course of abx for possible LE cellulitis  -CT abd without source of infection  -Inflammatory response related to necrosis/recent amputation vs cellulitis vs other infxs source  -Covering with broad spectrum antibiotics, will remain broad for now  - restarted on van and ceftriaxone (started 3/25/18) for elevated wbc   - ID consulted, switched Ceftriaxone to Unasyn  (started 3/25/18), switched to Zosyn for failure to improve 3/30. Also on vancomycin        Ischemic cardiomyopathy    CM possibly with multifactorial etiology (hx of alcohol use, ischemia)  - CAD s/p PCI  - DAPT, statin, BB  - Holding lisinopril for intermittent hypotension        Alcohol abuse    -s/p delirium tremens  -no seizure activity  -s/p BZD taper  -continue thiamine  -remains confused, unclear etiology, difficult to assess        Chronic systolic heart failure    LVEF 10-15% s/p ICD  Continue BB  Holding lisinopril for tenuous BPs  New holosystolic murmur likely mitral regurgitation. TTE pending        Tobacco abuse    --nicotine patch            VTE Risk Mitigation         Ordered     enoxaparin injection 40 mg  Daily      Route:  Subcutaneous        04/06/18 0701     heparin 25,000 units in dextrose 5% 250 mL (100 units/mL) infusion  Continuous     Route:  Intravenous        03/27/18 1645     IP VTE LOW RISK PATIENT  Once      03/18/18 1711              Jimi Rowe MD  Department of Hospital Medicine   [facility]

## 2018-04-06 NOTE — PLAN OF CARE
Problem: Patient Care Overview  Goal: Plan of Care Review  Outcome: Ongoing (interventions implemented as appropriate)  Pt AAOx4 and VSS. Pt is progressing with plan of care. Free of skin breakdown as the pt positioned/repositioned well independently. Clean, dry, and intact dressing noted on L foot. No dressing on R knee. Incentive spirometer at bedside and pt instructed on its use. Pain controlled well with PRN meds. Frequent rounds made to assess pain and safety and no complaints at this time noted. Side rails up x 2. Bed locked. Call light within reach. No falls noted. Will continue to monitor.

## 2018-04-06 NOTE — ASSESSMENT & PLAN NOTE
Leonardo Byrd is a 47 y.o. male s/p left TMA and wound debridement (DOS 4/5 per Dr. Salvador).  Incision site with necrotic changes.  No signs of infection.  Complaining of pain.  -Dressed with lidocaine cream, xeroform, 4x4's, kerlix, cast padding, and loosely wrapped ace.  -Cultures pending, ID on board, appreciate recs.  -Interventional cardiology on board, appreciate recs,  -Podiatry will follow.    Weight bearing status: NWB.  Offloading device: heel protector boot

## 2018-04-06 NOTE — ASSESSMENT & PLAN NOTE
Mr. Byrd is a 47 year old with PMH of ICM s/p ICD, CAD s/p PCI, HTN, HLD, PAD (s/p aortobifemoral bypass, L SFA and pop PTAS in September and recent R SFA and R pop 3/8 by Dr. Carl Bell) who presented to [facility] with critical limb ischemia on 3/18. Evaluated by interventional cardiology and underwent catheter directed tPA on 3/20. On vancomycin and unasyn for LLE cellulitis. Pt now with RLE occlusion of distal right superficial femoral, popliteal, anterior tibial, and posterior tibial arteries. vascular surgery performed right AKA 3/27.  Podiatry following LLE, monitoring closely and awaiting demarcation of necrosis of LLE as possibly will have TMA or BKA.  Blood cultures NGTD.  Persistent leukocytosis - suspect secondary to ischemia and tissue necrosis..  ABD exam shows significant tenderness especially in lower quadrants and superpubic area - ? Cause and underlying pathology.  Left foot appears worse and may require further vascular intervention.  Suspect CT C/A/P thrombosis findings imply poor prognosis.     Afebrile today.  Lethargy and MS resolved.  PICC placed 3/31.  Vanc trough high 4/5 19.7.  WBC improved.  TMA done 4/5.  The patient denies any recent fever, chills, or sweats.  Poorly controlled.      Plan  1. Continue IV vancomycin 1250g q12hr.  vanc trough goal 15-20. Check trough before 4th dose.  2. Continue Zosyn for pseudomonas coverage  3. Podiatry and vascular following for necrotic L foot. TMA 4/5 - Clean margin cultures sent  4. Monitor leukocytosis  5. Rec APS consult for pain control  6. Will follow cultures

## 2018-04-06 NOTE — PROGRESS NOTES
[facility]  Infectious Disease  Progress Note    Patient Name: Leonardo Byrd  MRN: [id number]  Admission Date: 3/18/2018  Length of Stay: 19 days  Attending Physician: Alysha Cuevas MD  Primary Care Provider: Indio Aquino MD    Isolation Status: No active isolations  Assessment/Plan:      * Critical lower limb ischemia    Mr. Byrd is a 47 year old with PMH of ICM s/p ICD, CAD s/p PCI, HTN, HLD, PAD (s/p aortobifemoral bypass, L SFA and pop PTAS in September and recent R SFA and R pop 3/8 by Dr. Carl Bell) who presented to [facility] with critical limb ischemia on 3/18. Evaluated by interventional cardiology and underwent catheter directed tPA on 3/20. On vancomycin and unasyn for LLE cellulitis. Pt now with RLE occlusion of distal right superficial femoral, popliteal, anterior tibial, and posterior tibial arteries. vascular surgery performed right AKA 3/27.  Podiatry following LLE, monitoring closely and awaiting demarcation of necrosis of LLE as possibly will have TMA or BKA.  Blood cultures NGTD.  Persistent leukocytosis - suspect secondary to ischemia and tissue necrosis..  ABD exam shows significant tenderness especially in lower quadrants and superpubic area - ? Cause and underlying pathology.  Left foot appears worse and may require further vascular intervention.  Suspect CT C/A/P thrombosis findings imply poor prognosis.     Afebrile today.  Lethargy and MS resolved.  PICC placed 3/31.  Vanc trough high 4/5 19.7.  WBC improved.  TMA done 4/5.  The patient denies any recent fever, chills, or sweats.  Poorly controlled.      Plan  1. Continue IV vancomycin 1250g q12hr.  vanc trough goal 15-20. Check trough before 4th dose.  2. Continue Zosyn for pseudomonas coverage  3. Podiatry and vascular following for necrotic L foot. TMA 4/5 - Clean margin cultures sent  4. Monitor leukocytosis  5. Remained afebrile. ID wll follow.  6. Will follow cutlrues             Anticipated Disposition:  "tbd    Thank you for your consult. I will follow-up with patient. Please contact us if you have any additional questions.    RITA Terry  Infectious Disease  [facility]    Subjective:     Principal Problem:Critical lower limb ischemia    HPI: Mr. Byrd is a 47 year old with PMH of ICM s/p ICD, CAD s/p PCI, HTN, HLD, PAD (s/p aortobifemoral bypass, L SFA and pop PTAS in September and recent R SFA and R pop 3/8 by Dr. Carl Bell) who presented to [facility] with critical limb ischemia on 3/18. Evaluated by interventional cardiology and underwent catheter directed tPA on 3/20, admitted to CCU for close monitoring. Also with DTs requiring scheduled benzos. Pt was given vancomycin for left foot cellulitis. Pt was intubated for airway protection in setting of increasing benzo requirements and worsening agitation.  Pt now extubated.  Pt was noted to have more duskiness in appearance of left leg and absent pulses.US lower ext showing occlusion of distal right superficial femoral, popliteal, anterior tibial, and posterior tibial arteries. No "dopplerable" pulses on rt foot. Interventional cards holding off on on acute intervention currently in setting of RYAN. Pt afebrile, WBC 17K today. Complains of severe pain of lower extremities. Denies having any cough, n/v/d,dysuria.   Interval History: No AEON.  Afebrile and WBC 15.64.  TMA done yesterday with clean margins.  Bone sent for clean margin culture.  CO of poorly controlled L foot pain. The patient denies any recent fever, chills, or sweats.      Review of Systems   Constitutional: Negative for chills, diaphoresis and fever.   Respiratory: Negative for shortness of breath.    Cardiovascular: Negative for chest pain.   Gastrointestinal: Negative for abdominal pain, diarrhea, nausea and vomiting.   Genitourinary: Negative for dysuria and hematuria.     Objective:     Vital Signs (Most Recent):  Temp: 98.4 °F (36.9 °C) (04/06/18 0716)  Pulse: (!) " 121 (04/06/18 0716)  Resp: 20 (04/06/18 0716)  BP: 123/80 (04/06/18 0716)  SpO2: (!) 94 % (04/06/18 0716) Vital Signs (24h Range):  Temp:  [96.6 °F (35.9 °C)-98.8 °F (37.1 °C)] 98.4 °F (36.9 °C)  Pulse:  [] 121  Resp:  [12-20] 20  SpO2:  [94 %-100 %] 94 %  BP: ()/(44-80) 123/80     Weight: 65.8 kg (145 lb)  Body mass index is 22.71 kg/m².    Estimated Creatinine Clearance: 83.2 mL/min (based on SCr of 1 mg/dL).    Physical Exam   Constitutional: He is oriented to person, place, and time. He appears well-developed and well-nourished. No distress.       Cardiovascular: Regular rhythm.  Exam reveals no gallop and no friction rub.    No murmur heard.  tachycardic   Pulmonary/Chest: Effort normal. No respiratory distress. He has no wheezes. He has no rales.   Abdominal: Soft. He exhibits no distension. There is no tenderness. There is no rigidity and no guarding.       Musculoskeletal: He exhibits tenderness (left foot).   Neurological: He is oriented to person, place, and time.   Awake and answering questions apropriately   Skin: Skin is warm. No rash noted. He is not diaphoretic. No erythema. No pallor.   Psychiatric: He has a normal mood and affect.   Nursing note and vitals reviewed.      Significant Labs:   Blood Culture:   Recent Labs  Lab 03/20/18  2219 03/20/18  2233 03/26/18  0148 03/27/18  2148 03/27/18  2212   LABBLOO No growth after 5 days. No growth after 5 days. No growth after 5 days. No growth after 5 days. No growth after 5 days.     CBC:   Recent Labs  Lab 04/05/18  1520 04/05/18  2352 04/06/18  0532   WBC 13.39* 12.79* 15.64*   HGB 8.6* 8.3* 9.0*   HCT 26.8* 25.6* 28.1*   * 731* 797*     CMP:   Recent Labs  Lab 04/05/18  0442 04/06/18  0532    138   K 3.7 3.4*    102   CO2 25 22*   GLU 84 109   BUN 4* 6   CREATININE 0.9 1.0   CALCIUM 8.8 9.3   PROT 6.5 7.4   ALBUMIN 1.9* 2.2*   BILITOT 0.7 0.6   ALKPHOS 136* 137*   * 88*   ALT 78* 78*   ANIONGAP 9 14   EGFRNONAA  >60.0 >60.0     Wound Culture: No results for input(s): LABAERO in the last 4320 hours.  All pertinent labs within the past 24 hours have been reviewed.    Significant Imaging: I have reviewed all pertinent imaging results/findings within the past 24 hours.   X-Ray Foot Complete Left [[id number]] Resulted: 04/06/18 0807   Order Status: Completed Updated: 04/06/18 0810   Narrative:     EXAMINATION:  XR FOOT COMPLETE 3 VIEW LEFT    CLINICAL HISTORY:  post op TMA;    FINDINGS:  There is complete transmetatarsal neck amputations.  No fracture dislocation bone destruction or complication seen.   Impression:       See above      Electronically signed by: Moe Chris MD  Date: 04/06/2018  Time: 08:07   X-Ray Chest 1 View for PICC_Central line [[id number]] Resulted: 03/31/18 1035   Order Status: Completed Updated: 03/31/18 1038   Narrative:     EXAMINATION:  XR CHEST 1 VIEW    CLINICAL HISTORY:  Evaluate PICC line placement;    TECHNIQUE:  Single frontal view of the chest was performed.    COMPARISON:  Chest radiograph from 03/25/2018    FINDINGS:  Interval placement of right-sided PICC line catheter with its tip in the inferior SVC.    Mild cardiomegaly.  Stable AICD.  Prominence of the pulmonary vasculature with increased interstitial lung markings suggestive of pulmonary edema, slightly worsened when compared to prior examination.  No pneumothorax.  Osseous structures are unremarkable.   Impression:       As above.      Electronically signed by: Po Armstrong MD  Date: 03/31/2018  Time: 10:35   CT Abdomen Pelvis With Contrast [[id number]] (Abnormal) Resulted: 03/30/18 2001   Order Status: Completed Updated: 03/30/18 2004   Narrative:     EXAMINATION:  CT ABDOMEN PELVIS WITH CONTRAST    CLINICAL HISTORY:  Abdominal pain, unspecified;Sepsis; Progressive distension, increasing leukocytosis, extensive PAD s/p R AKA and with necrotic L foot, other abd source?;    TECHNIQUE:  Low dose axial images, sagittal and coronal  reformations were obtained from the lung bases to the pubic symphysis following the IV administration of 75 mL of Omnipaque 350    COMPARISON:  No dedicated priors    FINDINGS:  ABDOMEN:    - Lung bases: There is motion artifact which obscures the underlying pulmonary parenchyma.  Mild dependent atelectasis.  Suggestion of ground-glass pulmonary nodules is noted in the lungs with the largest possible nodule in the right lower lobe measuring 1.2 x 1.4 cm, as seen on series 2, image 7.  For a ground glass nodule 6 mm or larger, Fleischner Society 2017 guidelines recommend follow up with non-contrast chest CT at 6-12 months after discovery. If this nodule persists at that time, additional follow up with non-contrast chest CT is recommended every 2 years until 5 years of stability have been documented.    -heart: No pericardial effusion.  There is partial visualization of cardiac pacing device leads.  Moderate coronary artery atherosclerosis.  Heart is upper limits of normal in size.    - Liver: No masses.  Enhancement appears unremarkable.    - Gallbladder: Prominence without significant thickening of the gallbladder wall.  No sludge or stones noted.    - Bile Ducts: No evidence of intra or extra hepatic biliary ductal dilation.    - Spleen: Spleen and adjacent splenules noted to have an unremarkable appearance.    - Kidneys: Prominent extrarenal pelves bilaterally.  The right ureter is somewhat more prominent than left but without evidence of obstructing ureterolith or overt hydronephrosis.    - Adrenals: Unremarkable.    - Pancreas: There is a small hypodensity about the head of the pancreas measuring 0.6 cm as seen on series 2, image 41.  This is nonspecific and may represent volume averaging, a side branch IPMN, or sequela of prior pancreatitis in this patient with a reported history of alcohol use disorder.  Otherwise pancreatic parenchyma and pancreatic duct appear unremarkable.    - Retroperitoneum:  Vascular  calcifications in the kidneys bilaterally.    - Vascular: Changes of aortobifemoral bypass grafting are noted with significant mural thrombus in the aortic portion of the bypass graft.  The native aorta, common iliac, internal and external iliac arteries are entirely thrombosed and small sized.  The aortofemoral bypass graft on the right shows moderate thrombus or noncalcified atherosclerosis.    Possible severe stenosis at the right aortofemoral bypass junction, poorly evaluated secondary to exam technique.  Partially imaged stent at the proximal left femoral artery.  Correlation with other films not available in the picture archive system is recommended.    - Abdominal wall:  Mild body wall edema.    PELVIS: Urinary bladder is moderately distended without wall thickening or adjacent inflammatory change.  Prostate and seminal vesicles are within normal limits.    No evidence of small-bowel obstruction, wall thickening, or local inflammation.  Colon is unremarkable with no evidence of inflammation, wall thickening, or diverticulosis.  Rectal fat planes are preserved.  No lymphadenopathy.    BONES:  Grossly stable grade 1 anterolisthesis of L5 on S1 secondary to remote bilateral L5 pars defects.  No acute osseous abnormality and no suspicious lytic or blastic lesion.   Impression:       Bilateral ground-glass pulmonary nodules largest measuring 1.2 x 1.4 cm.  These are nonspecific and may be infectious, inflammatory, or neoplastic.  For a ground glass nodule 6 mm or larger, Fleischner Society 2017 guidelines recommend follow up with non-contrast chest CT at 6-12 months after discovery. If this nodule persists at that time, additional follow up with non-contrast chest CT is recommended every 2 years until 5 years of stability have been documented.    Changes of aortobifemoral bypass grafting noted with significant mural thrombus in the bypass graft and concern for severe stenosis at the junction of the bypass and the  femoral artery on the right.  Recommend correlation with other imaging not available in Radiology picture archive system.  Native aorta and also common iliacs and branch vessels are completely thrombosed and atrophic.    Bilateral extrarenal pelves with prominence of the right greater than left ureters, without evidence of ureterolith.    Moderately distended urinary bladder without adjacent inflammatory change or associated hydronephrosis, nonspecific.  Correlate clinically.    Small hypodense focus about the head of the pancreas measuring 0.6 cm as seen on series 2, image 41.  This is nonspecific.  May represent a side branch IPMN or a pseudocyst.    Moderate coronary artery atherosclerosis, vascular calcifications in the kidney, splenules, and other incidental findings as above.    This report was flagged in Epic as abnormal.    Electronically signed by resident: Po Wong  Date: 03/30/2018  Time: 19:08    Electronically signed by: Leonardo Beasley MD  Date: 03/30/2018  Time: 20:01

## 2018-04-06 NOTE — SUBJECTIVE & OBJECTIVE
Interval Hx: s/p left TMA and wound debridement (DOS 4/5 per Dr. Salvador). S/p R AKA per vascular surgery.  Patient denies F/C/N/V.  Patient is tearful, complaining of pain in his foot.  Dressings clean, dry, and intact.    Scheduled Meds:   amitriptyline  50 mg Oral QHS    aspirin  81 mg Oral Daily    atorvastatin  40 mg Oral QHS    enoxaparin  40 mg Subcutaneous Daily    folic acid-vit B6-vit B12 2.5-25-2 mg  1 tablet Per OG tube Daily    gabapentin  800 mg Oral QHS    lidocaine   Topical (Top) QID    metoprolol succinate  12.5 mg Oral Daily    nicotine  1 patch Transdermal Daily    piperacillin-tazobactam (ZOSYN) IVPB  4.5 g Intravenous Q8H    pregabalin  100 mg Oral TID    senna-docusate 8.6-50 mg  1 tablet Oral BID    thiamine  100 mg Oral Daily    ticagrelor  90 mg Oral BID    vancomycin (VANCOCIN) IVPB  1,000 mg Intravenous Q12H     Continuous Infusions:    PRN Meds:sodium chloride, cyclobenzaprine, nitroGLYCERIN, oxyCODONE, oxyCODONE, oxyCODONE-acetaminophen, polyethylene glycol, sodium chloride 0.9%, zolpidem    Review of patient's allergies indicates:  No Known Allergies     Past Medical History:   Diagnosis Date    AICD (automatic cardioverter/defibrillator) present     RYAN (acute kidney injury) 3/25/2018    CHF (congestive heart failure)     Coronary artery disease     Encounter for blood transfusion     Hyperlipemia     Hypertension     MI (myocardial infarction)     Neuropathy     PAD (peripheral artery disease)     PVD (peripheral vascular disease)      Past Surgical History:   Procedure Laterality Date    AMPUTATION Right     great toe    BYBPASS GRAFT AORTOBIUFEMORAL      CARDIAC DEFIBRILLATOR PLACEMENT      coronary stents      EXPLORATION AND EVaCUATION OF HEMATOMA OF UPPER EXTREMITY Left     KNEE ARTHROPLASTY Left     VASCULAR SURGERY      fem-pop bypass       Family History     None        Social History Main Topics    Smoking status: Former Smoker     Packs/day:  0.50     Quit date: 02/2018    Smokeless tobacco: Former User     Types: Chew     Quit date: 8/2/1980      Comment: Uses nicotine gum and nicotine patches    Alcohol use Yes      Comment: rarely    Drug use: No    Sexual activity: Yes     Partners: Female     Review of Systems   Constitutional: Negative.    Respiratory: Negative.    Cardiovascular: Negative.    Gastrointestinal: Negative.    Genitourinary: Negative.    Musculoskeletal: Positive for arthralgias.   Skin: Positive for wound.   Psychiatric/Behavioral: Negative.      Objective:     Vital Signs (Most Recent):  Temp: 99.8 °F (37.7 °C) (04/06/18 1153)  Pulse: (!) 115 (04/06/18 1153)  Resp: 18 (04/06/18 1153)  BP: 103/60 (04/06/18 1153)  SpO2: (!) 94 % (04/06/18 0716) Vital Signs (24h Range):  Temp:  [96.6 °F (35.9 °C)-99.8 °F (37.7 °C)] 99.8 °F (37.7 °C)  Pulse:  [112-121] 115  Resp:  [18-20] 18  SpO2:  [94 %-98 %] 94 %  BP: (103-130)/(54-80) 103/60     Weight: 65.8 kg (145 lb)  Body mass index is 22.71 kg/m².    Foot Exam    General  Orientation: alert and oriented to person, place, and time       Right Foot/Ankle     Comments  R AKA    Left Foot/Ankle      Inspection and Palpation  Ecchymosis: none  Tenderness: none   Swelling: none   Arch: normal  Hammertoes: absent  Claw toes: absent  Hallux valgus: no  Hallux limitus: no  Skin Exam: abnormal color;     Neurovascular  Dorsalis pedis: absent  Posterior tibial: absent          TMA site with sutures intact, with skin coapted, necrosis along incision site.      Wound on Dorsal foot measures 5.5 x 2.5 x superficial  With fibrous base and viable margins.  Negative purulence, erythema, edema, or maloder      4/6/18                            Laboratory:  A1C: No results for input(s): HGBA1C in the last 4320 hours.  CBC:     Recent Labs  Lab 04/06/18  1302   WBC 14.84*   RBC 2.95*   HGB 8.9*   HCT 28.3*   *   MCV 96   MCH 30.2   MCHC 31.4*     CMP:     Recent Labs  Lab 04/06/18  0532       CALCIUM 9.3   ALBUMIN 2.2*   PROT 7.4      K 3.4*   CO2 22*      BUN 6   CREATININE 1.0   ALKPHOS 137*   ALT 78*   AST 88*   BILITOT 0.6     CRP: No results for input(s): CRP in the last 168 hours.  ESR: No results for input(s): SEDRATE in the last 168 hours.    Diagnostic Results:  Xray left foot:  No fracture dislocation bone destruction seen.    Clinical Findings:    Gangrene distal forefoot toes 1-5 left foot. Necrotic eschar noted to dorsum of left foot.

## 2018-04-06 NOTE — SUBJECTIVE & OBJECTIVE
Interval History: No acute events overnight. Patient underwent L TMA yesterday without complication. He reports increased pain this AM. No other complaints or concerns.    Review of Systems   Constitutional: Negative for chills, diaphoresis and fever.   Respiratory: Negative for shortness of breath.    Cardiovascular: Negative for chest pain.   Gastrointestinal: Negative for nausea and rectal pain.   Genitourinary: Negative for dysuria and hematuria.   Musculoskeletal: Positive for myalgias (left foot pain).   Psychiatric/Behavioral: Positive for confusion.     Objective:     Vital Signs (Most Recent):  Temp: 96.6 °F (35.9 °C) (04/06/18 0400)  Pulse: (!) 112 (04/06/18 0400)  Resp: 18 (04/06/18 0400)  BP: 116/77 (04/06/18 0400)  SpO2: (!) 94 % (04/06/18 0400) Vital Signs (24h Range):  Temp:  [96.6 °F (35.9 °C)-98.8 °F (37.1 °C)] 96.6 °F (35.9 °C)  Pulse:  [] 112  Resp:  [12-18] 18  SpO2:  [94 %-100 %] 94 %  BP: ()/(44-77) 116/77     Weight: 65.8 kg (145 lb)  Body mass index is 22.71 kg/m².    Intake/Output Summary (Last 24 hours) at 04/06/18 0647  Last data filed at 04/06/18 0457   Gross per 24 hour   Intake              550 ml   Output             2250 ml   Net            -1700 ml      Physical Exam   Constitutional: He appears well-developed. He appears lethargic.   Cardiovascular: Normal rate and regular rhythm.    Murmur heard.   Systolic (Holosystolic radiating to axilla) murmur is present   Pulmonary/Chest: Effort normal. No respiratory distress.   Abdominal: Soft. Bowel sounds are normal. There is no tenderness.   Musculoskeletal:   R BKA  L TMA, dressed.   Neurological: He appears lethargic.   Skin: Skin is warm.       Significant Labs:   BMP:   Recent Labs  Lab 04/05/18  0442   GLU 84      K 3.7      CO2 25   BUN 4*   CREATININE 0.9   CALCIUM 8.8   MG 1.7     CBC:   Recent Labs  Lab 04/05/18  0443 04/05/18  1520 04/05/18  2352   WBC 14.68* 13.39* 12.79*   HGB 8.5* 8.6* 8.3*   HCT  25.7* 26.8* 25.6*   * 733* 731*       Significant Imaging: No new imaging

## 2018-04-06 NOTE — PLAN OF CARE
04/06/18 1006   Discharge Reassessment   Assessment Type Discharge Planning Reassessment   Provided patient/caregiver education on the expected discharge date and the discharge plan Yes   Do you have any problems affording any of your prescribed medications? No   Discharge Plan A Rehab   Discharge Plan B Skilled Nursing Facility   Can the patient answer the patient profile reliably? Yes, cognitively intact   How does the patient rate their overall health at the present time? Fair   Describe the patient's ability to walk at the present time. Major restrictions/daily assistance from another person   How often would a person be available to care for the patient? Whenever needed   Number of comorbid conditions (as recorded on the chart) Five or more   During the past month, has the patient often been bothered by feeling down, depressed or hopeless? No   During the past month, has the patient often been bothered by little interest or pleasure in doing things? No

## 2018-04-07 NOTE — PLAN OF CARE
Problem: Occupational Therapy Goal  Goal: Occupational Therapy Goal  Goals to be met by: 4/21/18     Patient will increase functional independence with ADLs by performing:    UE Dressing with Set-up Assistance.  LE Dressing with Set-up Assistance.  Grooming while EOB with Set-up Assistance.  Toileting from bedside commode with Supervision for hygiene and clothing management.   Stand pivot transfers with Minimal Assistance with use of RW.  Toilet transfer to bedside commode with Minimal Assistance using scoot pivot or stand pivot with RW.    Outcome: Ongoing (interventions implemented as appropriate)  Eval completed; goals established    Comments: Initiate OT MARY Hussein OT  4/7/2018

## 2018-04-07 NOTE — SUBJECTIVE & OBJECTIVE
Interval History: No acute issues overnight. Patient pulled PICC line, but will not require long-term venous access. Pain better controlled today/    Review of Systems   Constitutional: Negative for chills, diaphoresis and fever.   Respiratory: Negative for shortness of breath.    Cardiovascular: Negative for chest pain.   Gastrointestinal: Negative for nausea and rectal pain.   Genitourinary: Negative for dysuria and hematuria.   Musculoskeletal: Positive for myalgias (left foot pain).   Psychiatric/Behavioral: Positive for confusion.     Objective:     Vital Signs (Most Recent):  Temp: 98.3 °F (36.8 °C) (04/07/18 1145)  Pulse: (!) 119 (04/07/18 1145)  Resp: 16 (04/07/18 1145)  BP: (!) 96/53 (04/07/18 1145)  SpO2: 95 % (04/07/18 0757) Vital Signs (24h Range):  Temp:  [97.5 °F (36.4 °C)-98.7 °F (37.1 °C)] 98.3 °F (36.8 °C)  Pulse:  [104-119] 119  Resp:  [16-20] 16  SpO2:  [95 %] 95 %  BP: ()/(53-69) 96/53     Weight: 65.8 kg (145 lb)  Body mass index is 22.71 kg/m².    Intake/Output Summary (Last 24 hours) at 04/07/18 1217  Last data filed at 04/07/18 0300   Gross per 24 hour   Intake              300 ml   Output              350 ml   Net              -50 ml      Physical Exam   Constitutional: He appears well-developed.   Cardiovascular: Normal rate and regular rhythm.    Murmur heard.   Systolic (Holosystolic radiating to axilla) murmur is present   Pulmonary/Chest: Effort normal. No respiratory distress.   Abdominal: Soft. Bowel sounds are normal. There is no tenderness.   Musculoskeletal:   R BKA  L TMA, dressed.   Neurological: He is alert.   Speech mumbled   Skin: Skin is warm.       Significant Labs:   BMP:   Recent Labs  Lab 04/07/18  0415   GLU 95      K 3.9      CO2 21*   BUN 5*   CREATININE 0.9   CALCIUM 9.2   MG 2.3     CBC:   Recent Labs  Lab 04/06/18  0532 04/06/18  1302 04/07/18  0415   WBC 15.64* 14.84* 12.44   HGB 9.0* 8.9* 8.5*   HCT 28.1* 28.3* 27.1*   * 796* 601*        Significant Imaging: No new imaging

## 2018-04-07 NOTE — PT/OT/SLP EVAL
Occupational Therapy   Evaluation    Name: Leonardo Byrd  MRN: [id number]  Admitting Diagnosis:  Critical lower limb ischemia 2 Days Post-Op    Recommendations:     Discharge Recommendations: nursing facility, skilled (pending progress)  Discharge Equipment Recommendations:  bedside commode, bath bench  Barriers to discharge:  Inaccessible home environment    History:     Occupational Profile:  Living Environment: Pt lives with dad and brother in 1SH with 2STE and LHR. Home has tub/shower combo with shower chair  Previous level of function: PTA, pt reports independence with ADL, but pt's dad and brother completed IADL tasks. Pt does not drive. He was not using any AD for ambulation or ADLs. Pt states he spent most days inside watching TV or sitting outside to watch birds. Pt states his dad drives him to his appointments but he has a friend that drives a transportation van who he would like to contact due to his dad's worsening vision. Pt had R AKA on 3/27 and L transmetatarsal amputation 4/5. Pt states MD wants to see if condition of L foot improves, if not, pt will receive L foot amputation.  Roles and Routines: father, son, brother, enjoys travelling   Equipment Owned:  crutches  Assistance upon Discharge: Pt states brother and dad can assist as needed following d/c. Son and daughter both live in [place]    Past Medical History:   Diagnosis Date    AICD (automatic cardioverter/defibrillator) present     RYAN (acute kidney injury) 3/25/2018    CHF (congestive heart failure)     Coronary artery disease     Encounter for blood transfusion     Hyperlipemia     Hypertension     MI (myocardial infarction)     Neuropathy     PAD (peripheral artery disease)     PVD (peripheral vascular disease)        Past Surgical History:   Procedure Laterality Date    AMPUTATION Right     great toe    BYBPASS GRAFT AORTOBIUFEMORAL      CARDIAC DEFIBRILLATOR PLACEMENT      coronary stents      EXPLORATION AND EVaCUATION OF  "HEMATOMA OF UPPER EXTREMITY Left     KNEE ARTHROPLASTY Left     VASCULAR SURGERY      fem-pop bypass       Subjective     Chief Complaint: pain   Patient/Family stated goals: mobility with crutches  Communicated with: RN prior to session.  Pain/Comfort:  · Pain Rating 1: 10/10  · Location - Side 1: Left  · Location - Orientation 1: generalized  · Location 1: foot  · Pain Addressed 1: Pre-medicate for activity, Reposition, Distraction, Cessation of Activity  · Pain Rating Post-Intervention 1: 10/10    Patients cultural, spiritual, Sabianism conflicts given the current situation: none reported    Objective:     Patient found with: PICC line, PCA    General Precautions: Standard, fall, aspiration   Orthopedic Precautions: (R AKA, L transmetatarsal amputation)   Braces: N/A     Occupational Performance:    Bed Mobility:    · Patient completed Rolling/Turning to Left with  stand by assistance and with side rail  · Patient completed Scooting/Bridging with stand by assistance and with side rail  · Patient completed Supine to Sit with stand by assistance and with side rail  · Patient completed Sit to Supine with stand by assistance and with side rail    Functional Mobility/Transfers:  Pt decline 2/2 pain    Activities of Daily Living:  · Grooming: setup assistance to wash face while seated EOB  · UB Dressing: setup assistance to doff/don gown while seated EOB    Cognitive/Visual Perceptual:  Cognitive/Psychosocial Skills:     -       Oriented to: Person and Place   -       Follows Commands/attention:Easily distracted and Follows one-step commands  -       Communication: dysarthria and difficult to comprehend verbal communication  -       Memory: Pt demo ability to remember home situation PTA and information regarding family members, however states "why is my L leg hurting?"  -       Safety awareness/insight to disability: impaired   -       Mood/Affect/Coping skills/emotional control: Appropriate to " "situation  Visual/Perceptual:      -Intact    Physical Exam:  Balance:    -       good sitting balance; standing balance not assessed this date  Postural examination/scapula alignment:    -       No postural abnormalities identified  Skin integrity: Visible skin intact  Edema:  Mild BLE  Sensation:    -       Intact  Dominant hand:    -       R hand  Upper Extremity Range of Motion:     -       Right Upper Extremity: 4/5  -       Left Upper Extremity: 4/5  Upper Extremity Strength:    -       Right Upper Extremity: 4/5  -       Left Upper Extremity: 4/5   Strength:    -       Right Upper Extremity: 4/5  -       Left Upper Extremity: 4/5  Fine Motor Coordination:    -       Intact  Gross motor coordination:   UE is WFL; LE is WFL during bed mobility activities    Patient left HOB elevated with all lines intact and call button in reach    AMPA 6 Click:  [facility] Total Score: 17    Treatment & Education:  Pt educated on role of OT/POC and d/c plan  Pt oriented to date and situation  White board/communication board updated  Education:    Assessment:     Leonardo Byrd is a 47 y.o. male with a medical diagnosis of Critical lower limb ischemia.  He presents with pain and decreased cognition limiting participation in safe functional mobility and self care.  Performance deficits affecting function are weakness, impaired endurance, impaired sensation, impaired self care skills, impaired balance, gait instability, impaired functional mobilty, decreased safety awareness, impaired cognition, pain, orthopedic precautions, decreased lower extremity function.      Rehab Prognosis:  Good; patient would benefit from acute skilled OT services to address these deficits and reach maximum level of function.         Clinical Decision Makin.  OT Mod:  "Pt evaluation falls under moderate complexity for evaluation coding due to identification of 3-5 performance deficits noted as stated above. Eval required Min/Mod assistance to " "complete on this date and detailed assessment(s) were utilized. Moreover, an expanded review of history and occupational profile obtained with additional review of cognitive, physical and psychosocial hx."     Plan:     Patient to be seen 4 x/week to address the above listed problems via self-care/home management, therapeutic activities, therapeutic exercises  · Plan of Care Expires: 05/07/18  · Plan of Care Reviewed with: patient    This Plan of care has been discussed with the patient who was involved in its development and understands and is in agreement with the identified goals and treatment plan    GOALS:    Occupational Therapy Goals        Problem: Occupational Therapy Goal    Goal Priority Disciplines Outcome Interventions   Occupational Therapy Goal     OT, PT/OT Ongoing (interventions implemented as appropriate)    Description:  Goals to be met by: 4/21/18     Patient will increase functional independence with ADLs by performing:    UE Dressing with Set-up Assistance.  LE Dressing with Set-up Assistance.  Grooming while EOB with Set-up Assistance.  Toileting from bedside commode with Supervision for hygiene and clothing management.   Stand pivot transfers with Minimal Assistance with use of RW.  Toilet transfer to bedside commode with Minimal Assistance using scoot pivot or stand pivot with RW.                      Time Tracking:     OT Date of Treatment: 04/07/18  OT Start Time: 0809  OT Stop Time: 0836  OT Total Time (min): 27 min    Billable Minutes:Evaluation 17  Therapeutic Activity 10    Yas Hussein OT  4/7/2018    "

## 2018-04-07 NOTE — NURSING
Report received, care assumed. Pt lying supine in bed, respirations even and unlabored. NADN. Pt complains of left foot pain at this time. Denies any other concerns at this time. GILSON PICC line lying on the floor. No bleeding noted at insertion site. Safety precautions maintained. Siderails upx2, bed in lowest position and locked, call bell in reach. Paged on call MD to notify about PICC line. WCTM.

## 2018-04-07 NOTE — PLAN OF CARE
Problem: Patient Care Overview  Goal: Plan of Care Review  Outcome: Ongoing (interventions implemented as appropriate)  POC reviewed with pt. Pt verbalizes understanding of plan of care. Pt remained free of injury/trauma throughout the shift. Safety precautions maintained. Bedrails upx2, bed in lowest position and locked. Call bell in reach at all times. Pt evaluated by PT/OT this shift. Pt c/o pain and was medicated with PRN pain meds. Pt on bedrest, but able to reposition self in bed. Pt remains on IV abx throughout the shift. No acute events throughout the shift.

## 2018-04-07 NOTE — NURSING
Podiatry at bedside. Questioned the order of lidocaine to left foot Q4h. Stated it was ordered before surgery and no longer needed.

## 2018-04-07 NOTE — SUBJECTIVE & OBJECTIVE
Interval Hx: s/p left TMA and wound debridement (DOS 4/5 per Dr. Salvador). S/p R AKA per vascular surgery.  Patient denies F/C/N/V.  Patient is tearful, complaining of pain in his foot.  Dressings clean, dry, and intact.    Scheduled Meds:   amitriptyline  50 mg Oral QHS    aspirin  81 mg Oral Daily    atorvastatin  40 mg Oral QHS    enoxaparin  40 mg Subcutaneous Daily    folic acid-vit B6-vit B12 2.5-25-2 mg  1 tablet Per OG tube Daily    gabapentin  800 mg Oral QHS    lidocaine   Topical (Top) QID    metoprolol succinate  12.5 mg Oral Daily    nicotine  1 patch Transdermal Daily    piperacillin-tazobactam (ZOSYN) IVPB  4.5 g Intravenous Q8H    pregabalin  100 mg Oral TID    senna-docusate 8.6-50 mg  1 tablet Oral BID    thiamine  100 mg Oral Daily    ticagrelor  90 mg Oral BID    vancomycin (VANCOCIN) IVPB  1,000 mg Intravenous Q12H     Continuous Infusions:    PRN Meds:sodium chloride, cyclobenzaprine, nitroGLYCERIN, oxyCODONE, oxyCODONE, oxyCODONE-acetaminophen, polyethylene glycol, sodium chloride 0.9%, zolpidem    Review of patient's allergies indicates:  No Known Allergies     Past Medical History:   Diagnosis Date    AICD (automatic cardioverter/defibrillator) present     RYAN (acute kidney injury) 3/25/2018    CHF (congestive heart failure)     Coronary artery disease     Encounter for blood transfusion     Hyperlipemia     Hypertension     MI (myocardial infarction)     Neuropathy     PAD (peripheral artery disease)     PVD (peripheral vascular disease)      Past Surgical History:   Procedure Laterality Date    AMPUTATION Right     great toe    BYBPASS GRAFT AORTOBIUFEMORAL      CARDIAC DEFIBRILLATOR PLACEMENT      coronary stents      EXPLORATION AND EVaCUATION OF HEMATOMA OF UPPER EXTREMITY Left     KNEE ARTHROPLASTY Left     VASCULAR SURGERY      fem-pop bypass       Family History     None        Social History Main Topics    Smoking status: Former Smoker     Packs/day:  0.50     Quit date: 02/2018    Smokeless tobacco: Former User     Types: Chew     Quit date: 8/2/1980      Comment: Uses nicotine gum and nicotine patches    Alcohol use Yes      Comment: rarely    Drug use: No    Sexual activity: Yes     Partners: Female     Review of Systems   Constitutional: Negative.    Respiratory: Negative.    Cardiovascular: Negative.    Gastrointestinal: Negative.    Genitourinary: Negative.    Musculoskeletal: Positive for arthralgias.   Skin: Positive for wound.   Psychiatric/Behavioral: Negative.      Objective:     Vital Signs (Most Recent):  Temp: 98.8 °F (37.1 °C) (04/07/18 1604)  Pulse: (!) 119 (04/07/18 1604)  Resp: 15 (04/07/18 1604)  BP: (!) 98/56 (04/07/18 1604)  SpO2: 95 % (04/07/18 0757) Vital Signs (24h Range):  Temp:  [97.5 °F (36.4 °C)-98.8 °F (37.1 °C)] 98.8 °F (37.1 °C)  Pulse:  [104-119] 119  Resp:  [15-20] 15  SpO2:  [95 %] 95 %  BP: ()/(53-69) 98/56     Weight: 65.8 kg (145 lb)  Body mass index is 22.71 kg/m².    Foot Exam    General  Orientation: alert and oriented to person, place, and time       Right Foot/Ankle     Comments  R AKA    Left Foot/Ankle      Inspection and Palpation  Ecchymosis: none  Tenderness: none   Swelling: none   Arch: normal  Hammertoes: absent  Claw toes: absent  Hallux valgus: no  Hallux limitus: no  Skin Exam: abnormal color;     Neurovascular  Dorsalis pedis: absent  Posterior tibial: absent          TMA site with sutures intact, with skin coapted, necrosis along incision site.      Wound on Dorsal foot measures 5.5 x 2.5 x superficial  With fibrous base and viable margins.  Negative purulence, erythema, edema, or maloder      4/6/18 4/7:                      Laboratory:  A1C: No results for input(s): HGBA1C in the last 4320 hours.  CBC:     Recent Labs  Lab 04/07/18  0415   WBC 12.44   RBC 2.77*   HGB 8.5*   HCT 27.1*   *   MCV 98   MCH 30.7   MCHC 31.4*     CMP:     Recent Labs  Lab 04/07/18  0415   GLU 95    CALCIUM 9.2   ALBUMIN 2.0*   PROT 6.7      K 3.9   CO2 21*      BUN 5*   CREATININE 0.9   ALKPHOS 118   ALT 59*   AST 63*   BILITOT 0.7     CRP: No results for input(s): CRP in the last 168 hours.  ESR: No results for input(s): SEDRATE in the last 168 hours.    Diagnostic Results:  Xray left foot:  No fracture dislocation bone destruction seen.    Clinical Findings:    Gangrene distal forefoot toes 1-5 left foot. Necrotic eschar noted to dorsum of left foot.

## 2018-04-07 NOTE — PROGRESS NOTES
[facility] Hospital Medicine  Progress Note    Patient Name: Leonardo Byrd  MRN: [id number]  Patient Class: IP- Inpatient   Admission Date: 3/18/2018  Length of Stay: 20 days  Attending Physician: Alysha uCevas MD  Primary Care Provider: Indio Aquino MD    [facility] Medicine Team: [facility]OSP MED 1 Jimi Rowe MD    Subjective:     Principal Problem:Critical lower limb ischemia    HPI:  48 yo former smoker with history of ICM (LVEF 30-35%) s/p ICD, CAD s/p PCI, HTN, HLD, PAD (s/p aortobifemoral bypass, L SFA and pop PTAS in September and recent R SFA and R pop 3/8 by Dr. Carl Bell). Patient states left foot pain has started in September 2017 and had PTAs to left LFA and left popliteal artery here. Since then he continued to have pain in his left foot and to a less degree, leg. The pain has been progressively worse since. Pain is a burning sharp pain that is elicited by slight touch. Pain is worsened with bearing weight and is improved with vibrating massager to the plantar aspect of the foot. There is associated numbness and coldness to his foot. He denies erythema or swelling of his left foot.    Hospital Course:  Admitted to CCU 3/20 after cath directed tPA was performed by int cards under general anesthesia. Also with DTs requiring scheduled benzos. On day 2 Lt lower extremity remained pulseless, so thrombolysis still infusing. On day 4 of vanc for possible lt foot cellulitis. Transitioned to clinda IV (unable to take PO safely). Intubated overnight for airway protection in setting of increasing benzo requirements and worsening agitation and danger of pt pulling line. CXR this AM (3/22) with patchy bilateral infiltrates. Taken to cath lab for possible cath removal. Lt leg then reportedly duskier and with absent pulses. On 3/23 taken back to cath lab. Int cards Recc Podiatry consult for possible L trans-metatarsal amputation. On heparin gtt. 3/24 Wbc trending up, foot color  "getting darker,paitent extubated, continue heparin. 3/25: US lower ext showing occlusion of distal right superficial femoral, popliteal, anterior tibial, and posterior tibial arteries. No "dopplerable" pulses on rt foot. Interventional cards holding off on on acute intervention currently in setting of RYAN. Will continue to monitor. Cr 1.7 (baseline 0.6). Given 500cc Iv fluids. Started on scheduled librium for alcohol withdrawal, with plans for taper.     -3/26/18 NAEON. Pt. With elevated WBC. On vanc and ceftriaxone. ID consulted. Pain management for BLE ischemia   -3/27: NAEON. BLE pain improved after pain management adjustment. Vascular surgery evaluated pt and planning for Right AKA today.   - 3/28 febrile last night. Blood culture sent. WBC elevated then trending down. Complains of BLE pain controlled with pain meds. S/p Right AKA on 3/27/18  -3-29: NAEON. Pain controlled with medications. Vascular and ID on board. Pt. Is high risk of left amputation. May ultimately require amputation on the left as well. Continue on vanc and Unasyn for now   - 3/30: NAEON. Pt afebrile. BLE pain with controlled with pain meds. Today abdomen slightly distended. CT abdomen ordered stat. If pt decompensate, will broaden abx from Unasyn to zosyn   - 4/1: NAEON. Pt afebrile. BLE pain controlled with pain meds. WBC trending down after switching Unasyn to Zosyn. Left toes gangrene continue to demarcates. Vascular following, likely will need amputation. received 1 PRBC for Hg 6.9   - 4/2: Stepped down to hospital medicine. Modified pain regimen to PO with IV for breakthrough. Awaiting vascular recommendations for L foot  - 4/3: Blood pressures low, am meds held. Bolused 250cc NS for SBP ~80 and discontinued lisinopril 5 mg daily  - 4/4: Continuing IV abx, plan to OR tomorrow per podiatry and awaiting vascular recs  - 4/5: OR today for TMA  - 4/6: TTE for murmur evaluation. EF 15% with new mild-moderate mitral regurgitation without " structural abnormalities  - 4/7: No significant events    Interval History: No acute issues overnight. Patient pulled PICC line, but will not require long-term venous access. Pain better controlled today/    Review of Systems   Constitutional: Negative for chills, diaphoresis and fever.   Respiratory: Negative for shortness of breath.    Cardiovascular: Negative for chest pain.   Gastrointestinal: Negative for nausea and rectal pain.   Genitourinary: Negative for dysuria and hematuria.   Musculoskeletal: Positive for myalgias (left foot pain).   Psychiatric/Behavioral: Positive for confusion.     Objective:     Vital Signs (Most Recent):  Temp: 98.3 °F (36.8 °C) (04/07/18 1145)  Pulse: (!) 119 (04/07/18 1145)  Resp: 16 (04/07/18 1145)  BP: (!) 96/53 (04/07/18 1145)  SpO2: 95 % (04/07/18 0757) Vital Signs (24h Range):  Temp:  [97.5 °F (36.4 °C)-98.7 °F (37.1 °C)] 98.3 °F (36.8 °C)  Pulse:  [104-119] 119  Resp:  [16-20] 16  SpO2:  [95 %] 95 %  BP: ()/(53-69) 96/53     Weight: 65.8 kg (145 lb)  Body mass index is 22.71 kg/m².    Intake/Output Summary (Last 24 hours) at 04/07/18 1217  Last data filed at 04/07/18 0300   Gross per 24 hour   Intake              300 ml   Output              350 ml   Net              -50 ml      Physical Exam   Constitutional: He appears well-developed.   Cardiovascular: Normal rate and regular rhythm.    Murmur heard.   Systolic (Holosystolic radiating to axilla) murmur is present   Pulmonary/Chest: Effort normal. No respiratory distress.   Abdominal: Soft. Bowel sounds are normal. There is no tenderness.   Musculoskeletal:   R BKA  L TMA, dressed.   Neurological: He is alert.   Speech mumbled   Skin: Skin is warm.       Significant Labs:   BMP:   Recent Labs  Lab 04/07/18  0415   GLU 95      K 3.9      CO2 21*   BUN 5*   CREATININE 0.9   CALCIUM 9.2   MG 2.3     CBC:   Recent Labs  Lab 04/06/18  0532 04/06/18  1302 04/07/18  0415   WBC 15.64* 14.84* 12.44   HGB 9.0* 8.9*  8.5*   HCT 28.1* 28.3* 27.1*   * 796* 601*       Significant Imaging: No new imaging    Assessment/Plan:      * Critical lower limb ischemia    Mr. Byrd is a 47 year old man with CAD s/p PCI, LVEF 30% s/p ICD, complicated and extensive PAD s/p aortofem bypass and multiple stents on DAPT who presents with what appears to be CLI of his left foot of unclear duration.      --Underwent angiogram 3/19 that showed occlusion of aorta-fem bypass at distal anastamosis. Unable to treat due to patient movement   --s/p cath directed tPA to occluded L SFA  --blood cx ngtd  --subsequently with acute limb ischemia in RLE resulting in R AKA on 3/27  --continue ASA/brilinta. No longer requires heparin gtt post-op. Started DVT ppx with lovenox.  --Podiatry and vascular following for necrotic L foot, well defined necrotic demarcation  --L TMA on 4/5.        Leukocytosis    - continue trending down   -s/p 7 day course of abx for possible LE cellulitis  -CT abd without source of infection  - Inflammatory response related to necrosis/recent amputation vs cellulitis vs other infxs source  - Restarted on van and ceftriaxone (started 3/25/18) for elevated wbc   - ID consulted, switched Ceftriaxone to Unasyn  (started 3/25/18), switched to Zosyn for failure to improve 3/30. Also on vancomycin.  - Final ID recs: Vanc and Zosyn x48h from wound Cx and if no growth on clean margin cultures by that time then can DC abx. Tentative end date today.        Ischemic cardiomyopathy    CM possibly with multifactorial etiology (hx of alcohol use, ischemia)  - CAD s/p PCI  - DAPT, statin, BB  - Holding lisinopril for intermittent hypotension        Alcohol abuse    -s/p delirium tremens  -no seizure activity  -s/p BZD taper  -continue thiamine  -remains confused, unclear etiology, difficult to assess        Chronic systolic heart failure    LVEF 10-15% s/p ICD  - Continue BB  - Holding lisinopril for tenuous BPs  - TTE on 4/6 for new murmur: EF  15% and mild-moderate mitral regurgitation without vegetation or structural abnormality.        Tobacco abuse    --nicotine patch            VTE Risk Mitigation         Ordered     enoxaparin injection 40 mg  Daily     Route:  Subcutaneous        04/06/18 0701     heparin 25,000 units in dextrose 5% 250 mL (100 units/mL) infusion  Continuous     Route:  Intravenous        03/27/18 1645     IP VTE LOW RISK PATIENT  Once      03/18/18 1711              Jimi Rowe MD  Department of Hospital Medicine   [facility]

## 2018-04-07 NOTE — PROGRESS NOTES
[facility]  Podiatry  Progress Note    Patient Name: Leonardo Byrd  MRN: [id number]  Admission Date: 3/18/2018  Hospital Length of Stay: 20 days  Attending Physician: Alysha Cuevas MD  Primary Care Provider: Indio Aquino MD   Interval Hx: s/p left TMA and wound debridement (DOS 4/5 per Dr. Salvador). S/p R AKA per vascular surgery.  Patient denies F/C/N/V.  Patient is tearful, complaining of pain in his foot.  Dressings clean, dry, and intact.    Scheduled Meds:   amitriptyline  50 mg Oral QHS    aspirin  81 mg Oral Daily    atorvastatin  40 mg Oral QHS    enoxaparin  40 mg Subcutaneous Daily    folic acid-vit B6-vit B12 2.5-25-2 mg  1 tablet Per OG tube Daily    gabapentin  800 mg Oral QHS    lidocaine   Topical (Top) QID    metoprolol succinate  12.5 mg Oral Daily    nicotine  1 patch Transdermal Daily    piperacillin-tazobactam (ZOSYN) IVPB  4.5 g Intravenous Q8H    pregabalin  100 mg Oral TID    senna-docusate 8.6-50 mg  1 tablet Oral BID    thiamine  100 mg Oral Daily    ticagrelor  90 mg Oral BID    vancomycin (VANCOCIN) IVPB  1,000 mg Intravenous Q12H     Continuous Infusions:    PRN Meds:sodium chloride, cyclobenzaprine, nitroGLYCERIN, oxyCODONE, oxyCODONE, oxyCODONE-acetaminophen, polyethylene glycol, sodium chloride 0.9%, zolpidem    Review of patient's allergies indicates:  No Known Allergies     Past Medical History:   Diagnosis Date    AICD (automatic cardioverter/defibrillator) present     RYAN (acute kidney injury) 3/25/2018    CHF (congestive heart failure)     Coronary artery disease     Encounter for blood transfusion     Hyperlipemia     Hypertension     MI (myocardial infarction)     Neuropathy     PAD (peripheral artery disease)     PVD (peripheral vascular disease)      Past Surgical History:   Procedure Laterality Date    AMPUTATION Right     great toe    BYBPASS GRAFT AORTOBIUFEMORAL      CARDIAC DEFIBRILLATOR PLACEMENT      coronary  stents      EXPLORATION AND EVaCUATION OF HEMATOMA OF UPPER EXTREMITY Left     KNEE ARTHROPLASTY Left     VASCULAR SURGERY      fem-pop bypass       Family History     None        Social History Main Topics    Smoking status: Former Smoker     Packs/day: 0.50     Quit date: 02/2018    Smokeless tobacco: Former User     Types: Chew     Quit date: 8/2/1980      Comment: Uses nicotine gum and nicotine patches    Alcohol use Yes      Comment: rarely    Drug use: No    Sexual activity: Yes     Partners: Female     Review of Systems   Constitutional: Negative.    Respiratory: Negative.    Cardiovascular: Negative.    Gastrointestinal: Negative.    Genitourinary: Negative.    Musculoskeletal: Positive for arthralgias.   Skin: Positive for wound.   Psychiatric/Behavioral: Negative.      Objective:     Vital Signs (Most Recent):  Temp: 98.8 °F (37.1 °C) (04/07/18 1604)  Pulse: (!) 119 (04/07/18 1604)  Resp: 15 (04/07/18 1604)  BP: (!) 98/56 (04/07/18 1604)  SpO2: 95 % (04/07/18 0757) Vital Signs (24h Range):  Temp:  [97.5 °F (36.4 °C)-98.8 °F (37.1 °C)] 98.8 °F (37.1 °C)  Pulse:  [104-119] 119  Resp:  [15-20] 15  SpO2:  [95 %] 95 %  BP: ()/(53-69) 98/56     Weight: 65.8 kg (145 lb)  Body mass index is 22.71 kg/m².    Foot Exam    General  Orientation: alert and oriented to person, place, and time       Right Foot/Ankle     Comments  R AKA    Left Foot/Ankle      Inspection and Palpation  Ecchymosis: none  Tenderness: none   Swelling: none   Arch: normal  Hammertoes: absent  Claw toes: absent  Hallux valgus: no  Hallux limitus: no  Skin Exam: abnormal color;     Neurovascular  Dorsalis pedis: absent  Posterior tibial: absent          TMA site with sutures intact, with skin coapted, necrosis along incision site.      Wound on Dorsal foot measures 5.5 x 2.5 x superficial  With fibrous base and viable margins.  Negative purulence, erythema, edema, or  ericksonoder      4/6/18 4/7:                      Laboratory:  A1C: No results for input(s): HGBA1C in the last 4320 hours.  CBC:     Recent Labs  Lab 04/07/18  0415   WBC 12.44   RBC 2.77*   HGB 8.5*   HCT 27.1*   *   MCV 98   MCH 30.7   MCHC 31.4*     CMP:     Recent Labs  Lab 04/07/18  0415   GLU 95   CALCIUM 9.2   ALBUMIN 2.0*   PROT 6.7      K 3.9   CO2 21*      BUN 5*   CREATININE 0.9   ALKPHOS 118   ALT 59*   AST 63*   BILITOT 0.7     CRP: No results for input(s): CRP in the last 168 hours.  ESR: No results for input(s): SEDRATE in the last 168 hours.    Diagnostic Results:  Xray left foot:  No fracture dislocation bone destruction seen.    Clinical Findings:    Gangrene distal forefoot toes 1-5 left foot. Necrotic eschar noted to dorsum of left foot.                 Assessment/Plan:     * Critical lower limb ischemia     Interventional cardiology on board, appreciate recs  S/p R AKA per vascular surgery.           Gangrene     Leonardo Byrd is a 47 y.o. male s/p left TMA and wound debridement (DOS 4/5 per Dr. Salvador).  Incision site with gangrenous changes.    -Plan to contact interventional cardiology to reassess, may require more proximal amputation.   -Dressed with betadine, xeroform, 4x4's, kerlix, cast padding, and loosely wrapped ace, secured with tape.  -ID on board, appreciate recs.  -Podiatry will follow.     Weight bearing status: NWB.  Offloading device: heel protector boot             PVD (peripheral vascular disease)     Followed by interventional cardiology.               Lucia Eubanks MD  Podiatry  [facility]

## 2018-04-07 NOTE — PLAN OF CARE
Problem: Patient Care Overview  Goal: Plan of Care Review  Outcome: Ongoing (interventions implemented as appropriate)  Pt AAOx4 and VSS. Pt is progressing with plan of care. Free of skin breakdown as the pt positioned/repositioned well independently. Clean, dry, and intact dressing noted on L foot.  Incentive spirometer at bedside and pt instructed on its use. Poor pain controlled well with PRN meds. Frequent rounds made to assess pain and safety and no complaints at this time noted. Side rails up x 2. Bed locked. Call light within reach. No falls noted. Will continue to monitor.

## 2018-04-07 NOTE — ASSESSMENT & PLAN NOTE
- continue trending down   -s/p 7 day course of abx for possible LE cellulitis  -CT abd without source of infection  - Inflammatory response related to necrosis/recent amputation vs cellulitis vs other infxs source  - Restarted on van and ceftriaxone (started 3/25/18) for elevated wbc   - ID consulted, switched Ceftriaxone to Unasyn  (started 3/25/18), switched to Zosyn for failure to improve 3/30. Also on vancomycin.  - Final ID recs: Vanc and Zosyn x48h from wound Cx and if no growth on clean margin cultures by that time then can DC abx. Tentative end date today.

## 2018-04-07 NOTE — ASSESSMENT & PLAN NOTE
LVEF 10-15% s/p ICD  - Continue BB  - Holding lisinopril for tenuous BPs  - TTE on 4/6 for new murmur: EF 15% and mild-moderate mitral regurgitation without vegetation or structural abnormality.

## 2018-04-08 NOTE — ASSESSMENT & PLAN NOTE
-s/p delirium tremens  -no seizure activity  -s/p BZD taper  -continue thiamine  -mental status improved

## 2018-04-08 NOTE — PROGRESS NOTES
[facility] Hospital Medicine  Progress Note    Patient Name: Leonardo Byrd  MRN: [id number]  Patient Class: IP- Inpatient   Admission Date: 3/18/2018  Length of Stay: 21 days  Attending Physician: Alysha Cuevas MD  Primary Care Provider: Indio Aquino MD    [facility] Medicine Team: [facility]OSP MED 1 Jimi Rowe MD    Subjective:     Principal Problem:Critical lower limb ischemia    HPI:  48 yo former smoker with history of ICM (LVEF 30-35%) s/p ICD, CAD s/p PCI, HTN, HLD, PAD (s/p aortobifemoral bypass, L SFA and pop PTAS in September and recent R SFA and R pop 3/8 by Dr. Carl Bell). Patient states left foot pain has started in September 2017 and had PTAs to left LFA and left popliteal artery here. Since then he continued to have pain in his left foot and to a less degree, leg. The pain has been progressively worse since. Pain is a burning sharp pain that is elicited by slight touch. Pain is worsened with bearing weight and is improved with vibrating massager to the plantar aspect of the foot. There is associated numbness and coldness to his foot. He denies erythema or swelling of his left foot.    Hospital Course:  Admitted to CCU 3/20 after cath directed tPA was performed by int cards under general anesthesia. Also with DTs requiring scheduled benzos. On day 2 Lt lower extremity remained pulseless, so thrombolysis still infusing. On day 4 of vanc for possible lt foot cellulitis. Transitioned to clinda IV (unable to take PO safely). Intubated overnight for airway protection in setting of increasing benzo requirements and worsening agitation and danger of pt pulling line. CXR this AM (3/22) with patchy bilateral infiltrates. Taken to cath lab for possible cath removal. Lt leg then reportedly duskier and with absent pulses. On 3/23 taken back to cath lab. Int cards Recc Podiatry consult for possible L trans-metatarsal amputation. On heparin gtt. 3/24 Wbc trending up, foot color  "getting darker,paitent extubated, continue heparin. 3/25: US lower ext showing occlusion of distal right superficial femoral, popliteal, anterior tibial, and posterior tibial arteries. No "dopplerable" pulses on rt foot. Interventional cards holding off on on acute intervention currently in setting of RYAN. Will continue to monitor. Cr 1.7 (baseline 0.6). Given 500cc Iv fluids. Started on scheduled librium for alcohol withdrawal, with plans for taper.     -3/26/18 NAEON. Pt. With elevated WBC. On vanc and ceftriaxone. ID consulted. Pain management for BLE ischemia   -3/27: NAEON. BLE pain improved after pain management adjustment. Vascular surgery evaluated pt and planning for Right AKA today.   - 3/28 febrile last night. Blood culture sent. WBC elevated then trending down. Complains of BLE pain controlled with pain meds. S/p Right AKA on 3/27/18  -3-29: NAEON. Pain controlled with medications. Vascular and ID on board. Pt. Is high risk of left amputation. May ultimately require amputation on the left as well. Continue on vanc and Unasyn for now   - 3/30: NAEON. Pt afebrile. BLE pain with controlled with pain meds. Today abdomen slightly distended. CT abdomen ordered stat. If pt decompensate, will broaden abx from Unasyn to zosyn   - 4/1: NAEON. Pt afebrile. BLE pain controlled with pain meds. WBC trending down after switching Unasyn to Zosyn. Left toes gangrene continue to demarcates. Vascular following, likely will need amputation. received 1 PRBC for Hg 6.9   - 4/2: Stepped down to hospital medicine. Modified pain regimen to PO with IV for breakthrough. Awaiting vascular recommendations for L foot  - 4/3: Blood pressures low, am meds held. Bolused 250cc NS for SBP ~80 and discontinued lisinopril 5 mg daily  - 4/4: Continuing IV abx, plan to OR tomorrow per podiatry and awaiting vascular recs  - 4/5: OR today for TMA  - 4/6: TTE for murmur evaluation. EF 15% with new mild-moderate mitral regurgitation without " structural abnormalities  - 4/7-8: No significant events    Interval History: No acute events overnight. Patient still reports discomfort/pain that is managed with PO medication. L TMA appears gangrenous, podiatry recommending interventional cardiology assessment.    Review of Systems   Constitutional: Negative for chills, diaphoresis and fever.   Respiratory: Negative for shortness of breath.    Cardiovascular: Negative for chest pain.   Gastrointestinal: Negative for nausea and rectal pain.   Genitourinary: Negative for dysuria and hematuria.   Musculoskeletal: Positive for myalgias (left foot pain).   Psychiatric/Behavioral: Positive for confusion.     Objective:     Vital Signs (Most Recent):  Temp: 98.3 °F (36.8 °C) (04/08/18 1125)  Pulse: 107 (04/08/18 1125)  Resp: 18 (04/08/18 1125)  BP: (!) 101/57 (04/08/18 1125)  SpO2: 98 % (04/08/18 1125) Vital Signs (24h Range):  Temp:  [98 °F (36.7 °C)-99.7 °F (37.6 °C)] 98.3 °F (36.8 °C)  Pulse:  [102-119] 107  Resp:  [15-20] 18  SpO2:  [95 %-98 %] 98 %  BP: ()/(51-63) 101/57     Weight: 66.2 kg (146 lb)  Body mass index is 22.87 kg/m².    Intake/Output Summary (Last 24 hours) at 04/08/18 1500  Last data filed at 04/08/18 0000   Gross per 24 hour   Intake              600 ml   Output             1300 ml   Net             -700 ml      Physical Exam   Constitutional: He appears well-developed.   Cardiovascular: Normal rate and regular rhythm.    Murmur heard.   Systolic (Holosystolic radiating to axilla) murmur is present   Pulmonary/Chest: Effort normal. No respiratory distress.   Abdominal: Soft. Bowel sounds are normal. There is no tenderness.   Musculoskeletal:   R BKA  L TMA, dressed.   Neurological: He is alert.   Speech mumbled   Skin: Skin is warm.       Significant Labs:   BMP:   Recent Labs  Lab 04/08/18  0444      *   K 3.6      CO2 23   BUN 5*   CREATININE 0.8   CALCIUM 8.7   MG 1.9     CBC:   Recent Labs  Lab 04/07/18  0415  04/08/18  0444   WBC 12.44 10.49   HGB 8.5* 8.0*   HCT 27.1* 25.1*   * 747*       Significant Imaging: No acute issues    Assessment/Plan:      * Critical lower limb ischemia    Mr. Byrd is a 47 year old man with CAD s/p PCI, LVEF 30% s/p ICD, complicated and extensive PAD s/p aortofem bypass and multiple stents on DAPT who presents with what appears to be CLI of his left foot of unclear duration.      --Underwent angiogram 3/19 that showed occlusion of aorta-fem bypass at distal anastamosis. Unable to treat due to patient movement   --s/p cath directed tPA to occluded L SFA  --blood cx ngtd  --subsequently with acute limb ischemia in RLE resulting in R AKA on 3/27  --continue ASA/brilinta. No longer requires heparin gtt post-op. Started DVT ppx with lovenox.  --Podiatry and vascular following for necrotic L foot, well defined necrotic demarcation  --L TMA on 4/5. Foot still appears gangrenous. Podiatry requesting interventional cardiology consult for potential revascularization. Case discussed with IC, recs to follow        Leukocytosis    - continue trending down   -s/p 7 day course of abx for possible LE cellulitis  -CT abd without source of infection  - Inflammatory response related to necrosis/recent amputation vs cellulitis vs other infxs source  - Restarted on van and ceftriaxone (started 3/25/18) for elevated wbc   - ID consulted, switched Ceftriaxone to Unasyn  (started 3/25/18), switched to Zosyn for failure to improve 3/30. Also on vancomycin.  - Final ID recs: Vanc and Zosyn x48h from wound Cx and if no growth on clean margin cultures by that time then can DC abx. D/C'd 4/08        Ischemic cardiomyopathy    CM possibly with multifactorial etiology (hx of alcohol use, ischemia)  - CAD s/p PCI  - DAPT, statin, BB  - Holding lisinopril for intermittent hypotension        Alcohol abuse    -s/p delirium tremens  -no seizure activity  -s/p BZD taper  -continue thiamine  -mental status improved         Chronic systolic heart failure    LVEF 10-15% s/p ICD  - Continue BB  - Holding lisinopril for tenuous BPs  - TTE on 4/6 for new murmur: EF 15% and mild-moderate mitral regurgitation without vegetation or structural abnormality.        Tobacco abuse    --nicotine patch            VTE Risk Mitigation         Ordered     enoxaparin injection 40 mg  Daily     Route:  Subcutaneous        04/06/18 0701     heparin 25,000 units in dextrose 5% 250 mL (100 units/mL) infusion  Continuous     Route:  Intravenous        03/27/18 1645     IP VTE LOW RISK PATIENT  Once      03/18/18 1711              Jimi Rowe MD  Department of Hospital Medicine   [facility]

## 2018-04-08 NOTE — NURSING
IV site lost. 2 times stuck by primary nurse. 3 times stuck by ICU nurse. Failed. Paged on call. Waiting for response.

## 2018-04-08 NOTE — ASSESSMENT & PLAN NOTE
Mr. Byrd is a 47 year old man with CAD s/p PCI, LVEF 30% s/p ICD, complicated and extensive PAD s/p aortofem bypass and multiple stents on DAPT who presents with what appears to be CLI of his left foot of unclear duration.      --Underwent angiogram 3/19 that showed occlusion of aorta-fem bypass at distal anastamosis. Unable to treat due to patient movement   --s/p cath directed tPA to occluded L SFA  --blood cx ngtd  --subsequently with acute limb ischemia in RLE resulting in R AKA on 3/27  --continue ASA/brilinta. No longer requires heparin gtt post-op. Started DVT ppx with lovenox.  --Podiatry and vascular following for necrotic L foot, well defined necrotic demarcation  --L TMA on 4/5. Foot still appears gangrenous. Podiatry requesting interventional cardiology consult for potential revascularization. Case discussed with IC, recs to follow

## 2018-04-08 NOTE — PT/OT/SLP EVAL
Physical Therapy Evaluation    Patient Name:  Leonardo Byrd   MRN:  [id number]    Recommendations:     Discharge Recommendations:  nursing facility, skilled (possibly progress to rehab if patient can tolerate PT)  Discharge Equipment Recommendations: walker, rolling, bath bench, bedside commode   Barriers to discharge: Inaccessible home and Decreased caregiver support    Assessment:     Leonardo Byrd is a 47 y.o. male admitted with a medical diagnosis of Critical lower limb ischemia.  He presents with the following impairments/functional limitations:  weakness, impaired endurance, impaired sensation, impaired self care skills, impaired functional mobilty, edema, pain, impaired skin, decreased safety awareness, decreased lower extremity function, impaired balance, decreased upper extremity function, decreased ROM, gait instability. Patient demonstrated good participation despite pain and swelling. No gait training was performed during today's treatment session due to absent post-op boot for LLE residual limb. Level of assistance required SBA. Able to tolerate bed mobility, functional mobility testing, and limited therapeutic exercise education. Recommending SNF (possibly progress to rehab). Skilled physical therapy is medically necessary to address the above impairments in order to return the patient back to their previous level of function.     Rehab Prognosis:  good; patient would benefit from acute skilled PT services to address these deficits and reach maximum level of function.      Recent Surgery: Procedure(s) (LRB):  AMPUTATION-FOOT Transmetatarsal  Request popliteal saphenous nerve block per anesthesia (Left) 3 Days Post-Op    Plan:     During this hospitalization, patient to be seen 4 x/week to address the above listed problems via gait training, therapeutic activities, therapeutic exercises, neuromuscular re-education, wheelchair management/training  · Plan of Care Expires:  05/08/18   Plan of Care Reviewed  with: patient, friend    Subjective     Communicated with nurse Patterson prior to session.  Patient found with HOB elevated and friend present upon PT entry to room, agreeable to evaluation.      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 Patient does not drive. He reported that he enjoys watching TV and bird watching outside. His dad and friend drive him to his MD appointments.   Prior to admission, patients level of function was independent with ambulation.  Patient has the following equipment: crutches.  DME owned (not currently used): none.  Upon discharge, patient will have assistance from dad and brother.    Objective:     Patient found with: PICC line, PCA     General Precautions: Standard, aspiration, fall   Orthopedic Precautions:N/A   Braces:  (post-op boot for LLE)     Exams:  · Cognitive Exam:  Patient is oriented to Person, Place, Time and Situation and follows 100% of multistep commands   · Postural Exam:  Patient presented with the following abnormalities:    · -       No postural abnormalities identified  · Skin Integrity/Edema:      · -       Skin integrity: Wound BLE  · -       Edema: Severe RLE residual limb  · RUE ROM: WFL  · RUE Strength: WFL  · LUE ROM: WFL  · LUE Strength: WFL  · RLE ROM: Deficits: decreased ROM due to edema and pain  · RLE Strength: Deficits: NT due to pain  · LLE ROM: WFL  · LLE Strength: WFL  · Balance: good in sitting, standing NT due to post-op boot not present    Functional Mobility:  · Bed  Mobility:   Supine to Sit: Stand-by Assistance   Sit to Supine: Stand-by Assistance   Scooting to HOB: Stand-by Assistance   Scooting at EOB: Stand-by Assistance    · Sitting Balance at Edge of Bed:   Assistance Level Required: Supervision   Time: 10 minutes   Postural deviations noted:    -       No postural abnormalities identified    AM-PAC 6 CLICK MOBILITY  Total Score:13       Therapeutic Activities and Exercises:  PT arrived to patient's room to find patient resting quietly; agreeable to PT session. Patient performed mobility as above.  · Patient Education:   · PT POC and role in facility  · PT dc rec to SNF for further therapy  · Gait training with RW and use of post-op boot when present  · Positioning to prevent further increase in edema   · therapeutic exercise program for hip strengthening  · Infection prevention by encouraging patient to not touch incision site or area surrounding incision   · EOB activity:  · Functional mobility testing  Bedside table in front of patient and area set up for function, convenience, and safety. RN aware of patient's mobility needs and status. Questions/concerns addressed within PT scope of practice; patient and friend with no further questions.       Patient left HOB elevated with all lines intact, call button in reach, nurse Leonardo notified and friend present.    GOALS:    Physical Therapy Goals        Problem: Physical Therapy Goal    Goal Priority Disciplines Outcome Goal Variances Interventions   Physical Therapy Goal     PT/OT, PT Ongoing (interventions implemented as appropriate)     Description:  Goals to be met by: 18    Patient will increase functional independence with mobility by performin. Supine to sit with Modified [place]  2. Sit to supine with Modified [place]  3. Sit to stand transfer with Minimal Assistance  4. Bed to chair transfer with Minimal Assistance using Rolling Walker  5. Gait  x >10 feet with Minimal Assistance using Rolling  Walker and post-op boot in place on LLE residual limb.                       History:     Past Medical History:   Diagnosis Date    AICD (automatic cardioverter/defibrillator) present     RYAN (acute kidney injury) 3/25/2018    CHF (congestive heart failure)     Coronary artery disease     Encounter for blood transfusion     Hyperlipemia     Hypertension     MI (myocardial infarction)     Neuropathy     PAD (peripheral artery disease)     PVD (peripheral vascular disease)        Past Surgical History:   Procedure Laterality Date    AMPUTATION Right     great toe    BYBPASS GRAFT AORTOBIUFEMORAL      CARDIAC DEFIBRILLATOR PLACEMENT      coronary stents      EXPLORATION AND EVaCUATION OF HEMATOMA OF UPPER EXTREMITY Left     KNEE ARTHROPLASTY Left     VASCULAR SURGERY      fem-pop bypass       Clinical Decision Making:     History  Co-morbidities and personal factors that may impact the plan of care Examination  Body Structures and Functions, activity limitations and participation restrictions that may impact the plan of care Clinical Presentation   Decision Making/ Complexity Score   Co-morbidities:   [] Time since onset of injury / illness / exacerbation  [] Status of current condition  []Patient's cognitive status and safety concerns    [] Multiple Medical Problems (see med hx)  Personal Factors:   [] Patient's age  [] Prior Level of function   [] Patient's home situation (environment and family support)  [] Patient's level of motivation  [] Expected progression of patient      HISTORY:(criteria)    [] 73196 - no personal factors/history    [] 62735 - has 1-2 personal factor/comorbidity     [] 44512 - has >3 personal factor/comorbidity     Body Regions:  [] Objective examination findings  [] Head     []  Neck  [] Trunk   [] Upper Extremity  [] Lower Extremity    Body Systems:  [] For communication ability, affect, cognition, language, and learning style: the assessment of the ability to make needs  known, consciousness, orientation (person, place, and time), expected emotional /behavioral responses, and learning preferences (eg, learning barriers, education  needs)  [] For the neuromuscular system: a general assessment of gross coordinated movement (eg, balance, gait, locomotion, transfers, and transitions) and motor function  (motor control and motor learning)  [] For the musculoskeletal system: the assessment of gross symmetry, gross range of motion, gross strength, height, and weight  [] For the integumentary system: the assessment of pliability(texture), presence of scar formation, skin color, and skin integrity  [] For cardiovascular/pulmonary system: the assessment of heart rate, respiratory rate, blood pressure, and edema     Activity limitations:    [] Patient's cognitive status and saf ety concerns          [] Status of current condition      [] Weight bearing restriction  [] Cardiopulmunary Restriction    Participation Restrictions:   [] Goals and goal agreement with the patient     [] Rehab potential (prognosis) and probable outcome      Examination of Body System: (criteria)    [] 98836 - addressing 1-2 elements    [] 03822 - addressing a total of 3 or more elements     [] 23284 -  Addressing a total of 4 or more elements         Clinical Presentation: (criteria)  Choose one     On examination of body system using standardized tests and measures patient presents with (CHOOSE ONE) elements from any of the following: body structures and functions, activity limitations, and/or participation restrictions.  Leading to a clinical presentation that is considered (CHOOSE ONE)                              Clinical Decision Making  (Eval Complexity):  Choose One     Time Tracking:     PT Received On: 04/08/18  PT Start Time: 1050     PT Stop Time: 1110  PT Total Time (min): 20 min     Billable Minutes: Evaluation 20      Charity Stone, PT  04/08/2018

## 2018-04-08 NOTE — SUBJECTIVE & OBJECTIVE
Interval History: No acute events overnight. Patient still reports discomfort/pain that is managed with PO medication. L TMA appears gangrenous, podiatry recommending interventional cardiology assessment.    Review of Systems   Constitutional: Negative for chills, diaphoresis and fever.   Respiratory: Negative for shortness of breath.    Cardiovascular: Negative for chest pain.   Gastrointestinal: Negative for nausea and rectal pain.   Genitourinary: Negative for dysuria and hematuria.   Musculoskeletal: Positive for myalgias (left foot pain).   Psychiatric/Behavioral: Positive for confusion.     Objective:     Vital Signs (Most Recent):  Temp: 98.3 °F (36.8 °C) (04/08/18 1125)  Pulse: 107 (04/08/18 1125)  Resp: 18 (04/08/18 1125)  BP: (!) 101/57 (04/08/18 1125)  SpO2: 98 % (04/08/18 1125) Vital Signs (24h Range):  Temp:  [98 °F (36.7 °C)-99.7 °F (37.6 °C)] 98.3 °F (36.8 °C)  Pulse:  [102-119] 107  Resp:  [15-20] 18  SpO2:  [95 %-98 %] 98 %  BP: ()/(51-63) 101/57     Weight: 66.2 kg (146 lb)  Body mass index is 22.87 kg/m².    Intake/Output Summary (Last 24 hours) at 04/08/18 1500  Last data filed at 04/08/18 0000   Gross per 24 hour   Intake              600 ml   Output             1300 ml   Net             -700 ml      Physical Exam   Constitutional: He appears well-developed.   Cardiovascular: Normal rate and regular rhythm.    Murmur heard.   Systolic (Holosystolic radiating to axilla) murmur is present   Pulmonary/Chest: Effort normal. No respiratory distress.   Abdominal: Soft. Bowel sounds are normal. There is no tenderness.   Musculoskeletal:   R BKA  L TMA, dressed.   Neurological: He is alert.   Speech mumbled   Skin: Skin is warm.       Significant Labs:   BMP:   Recent Labs  Lab 04/08/18  0444      *   K 3.6      CO2 23   BUN 5*   CREATININE 0.8   CALCIUM 8.7   MG 1.9     CBC:   Recent Labs  Lab 04/07/18  0415 04/08/18  0444   WBC 12.44 10.49   HGB 8.5* 8.0*   HCT 27.1* 25.1*    * 747*       Significant Imaging: No acute issues

## 2018-04-08 NOTE — PLAN OF CARE
Problem: Patient Care Overview  Goal: Plan of Care Review  Outcome: Ongoing (interventions implemented as appropriate)  Pt AAOx4 and VSS. Pt is progressing with plan of care. Free of skin breakdown as the pt positioned/repositioned well independently. Clean, dry, and intact dressing noted on L foot. Incentive spirometer at bedside and pt instructed on its use. Pain controlled well with PRN meds. Frequent rounds made to assess pain and safety and no complaints at this time noted. Side rails up x 2. Bed locked. Call light within reach. No falls noted. Will continue to monitor.

## 2018-04-08 NOTE — PLAN OF CARE
Problem: Patient Care Overview  Goal: Plan of Care Review  Outcome: Ongoing (interventions implemented as appropriate)  Pt in bed resting. C/o pain relieved with PRN pain med. In no acute distress. Activity promoted.  No falls noted. Will continue to monitor. Call bell intact and in reach.

## 2018-04-08 NOTE — ASSESSMENT & PLAN NOTE
- continue trending down   -s/p 7 day course of abx for possible LE cellulitis  -CT abd without source of infection  - Inflammatory response related to necrosis/recent amputation vs cellulitis vs other infxs source  - Restarted on van and ceftriaxone (started 3/25/18) for elevated wbc   - ID consulted, switched Ceftriaxone to Unasyn  (started 3/25/18), switched to Zosyn for failure to improve 3/30. Also on vancomycin.  - Final ID recs: Vanc and Zosyn x48h from wound Cx and if no growth on clean margin cultures by that time then can DC abx. D/C'd 4/08

## 2018-04-08 NOTE — PLAN OF CARE
Problem: Physical Therapy Goal  Goal: Physical Therapy Goal  Goals to be met by: 18    Patient will increase functional independence with mobility by performin. Supine to sit with Modified [place]  2. Sit to supine with Modified [place]  3. Sit to stand transfer with Minimal Assistance  4. Bed to chair transfer with Minimal Assistance using Rolling Walker  5. Gait  x >10 feet with Minimal Assistance using Rolling Walker and post-op boot in place on LLE residual limb.     Outcome: Ongoing (interventions implemented as appropriate)  PT evaluation complete. Recommending SNF (rehab pending tolerance to PT treatment). Please see full PT evaluation note for details.     Charity Stone PT, DPT  2018  [phone]

## 2018-04-09 NOTE — CONSULTS
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  Patient presented to [facility] on 3/18 with L foot/leg pain worsened with WB with associated numbness and coldness to his foot.  He was found to have L forefoot ischemia and RLE ischemia with associated possible cellulitis.  LLE angiogram on 3/19 revealed occlusion of aorta-fem bypass at distal anastamosis. Unable to treat due to patient movement. He was started on a Heparin gtt and IV abx.  Hospital course was further complicated by acute respiratory failure, leukocytosis, RYAN, alcohol withdrawal syndrome, RLE pain (US revealed occlusion of distal right superficial femoral, popliteal, anterior tibial, and posterior tibial arteries, s/p R AKA on 3/27 with Vascular surgery), L foot gangrene (Podiatry consulted and underwent amputation L MTA on 4/5.  Per chart review, foot still appears gangrenous and Vascular service does not feel that BKA is warranted at this time.  Podiatry following wound.      Functional History: Patient lives in [place] with father and brother in a single story home with 2 steps to enter.  Father and brother drive patient to medical appointments.  Prior to admission, (I) with ambulation and ADLs.  DME: tami.    Hospital Course: 4/7-4/818: Evaluated by therapy.  Bed  mobility SBA.  Sat EOB ~10 mins SV. Declined functional mobility and transfers on 4/7 with OT.  UBD SetupA.  Grooming SetupA.     Past Medical History:   Diagnosis Date    AICD (automatic cardioverter/defibrillator) present     RYAN (acute kidney injury) 3/25/2018    CHF (congestive heart failure)     Coronary artery disease     Encounter for blood transfusion     Hyperlipemia     Hypertension     MI (myocardial infarction)     Neuropathy     PAD (peripheral artery disease)     PVD (peripheral vascular disease)      Past Surgical History:   Procedure Laterality Date    AMPUTATION Right     great toe    BYBPASS GRAFT AORTOBIUFEMORAL      CARDIAC DEFIBRILLATOR PLACEMENT      coronary stents      EXPLORATION AND EVaCUATION OF HEMATOMA OF UPPER EXTREMITY Left     KNEE ARTHROPLASTY Left     VASCULAR SURGERY      fem-pop bypass     Review of patient's allergies indicates:  No Known Allergies    Scheduled Medications:    amitriptyline  50 mg Oral QHS    aspirin  81 mg Oral Daily    atorvastatin  40 mg Oral QHS    enoxaparin  40 mg Subcutaneous Daily    folic acid-vit B6-vit B12 2.5-25-2 mg  1 tablet Per OG tube Daily    gabapentin  800 mg Oral QHS    lidocaine   Topical (Top) QID    metoprolol succinate  12.5 mg Oral Daily    nicotine  1 patch Transdermal Daily    pregabalin  100 mg Oral TID    senna-docusate 8.6-50 mg  1 tablet Oral BID    thiamine  100 mg Oral Daily    ticagrelor  90 mg Oral BID       PRN Medications: cyclobenzaprine, nitroGLYCERIN, oxyCODONE, oxyCODONE, oxyCODONE-acetaminophen, polyethylene glycol, sodium chloride 0.9%, zolpidem    Family History     None        Social History Main Topics    Smoking status: Former Smoker     Packs/day: 0.50     Quit date: 02/2018    Smokeless tobacco: Former User     Types: Chew     Quit date: 8/2/1980      Comment: Uses nicotine gum and nicotine patches    Alcohol use Yes      Comment: rarely    Drug use: No    Sexual activity: Yes      Partners: Female     Review of Systems   Constitutional: Negative for chills, fatigue and fever.   HENT: Negative for trouble swallowing and voice change.    Eyes: Negative for photophobia and visual disturbance.   Respiratory: Negative for cough, shortness of breath and wheezing.    Cardiovascular: Negative for chest pain and palpitations.   Gastrointestinal: Negative for abdominal distention, nausea and vomiting.   Genitourinary: Negative for difficulty urinating and flank pain.   Musculoskeletal: Positive for arthralgias, gait problem and myalgias.   Skin: Negative for color change and rash.   Neurological: Positive for weakness. Negative for speech difficulty, numbness and headaches.   Psychiatric/Behavioral: Negative for agitation and confusion.     Objective:     Vital Signs (Most Recent):  Temp: 98.5 °F (36.9 °C) (04/09/18 1206)  Pulse: 104 (04/09/18 1206)  Resp: 15 (04/09/18 1206)  BP: (!) 95/57 (04/09/18 1206)  SpO2: 98 % (04/09/18 0823)    Vital Signs (24h Range):  Temp:  [98.3 °F (36.8 °C)-100 °F (37.8 °C)] 98.5 °F (36.9 °C)  Pulse:  [] 104  Resp:  [15-19] 15  SpO2:  [95 %-98 %] 98 %  BP: ()/(54-69) 95/57     Body mass index is 22.85 kg/m².    Physical Exam   Constitutional: He is oriented to person, place, and time. He appears well-developed and well-nourished. He appears lethargic.   HENT:   Head: Normocephalic and atraumatic.   Eyes: EOM are normal. Pupils are equal, round, and reactive to light.   Neck: Normal range of motion.   Cardiovascular: Normal rate and regular rhythm.    Pulmonary/Chest: Effort normal. No respiratory distress.   Abdominal: Soft. There is no tenderness.   Musculoskeletal: He exhibits deformity.        Right hip: He exhibits decreased range of motion and decreased strength.        Left hip: Normal.        Right knee: Tenderness found.        Left knee: Normal.        Left ankle: Tenderness.   S/p L MTA, foot wrapped  S/P R AKA, incision dry    Neurological: He is  oriented to person, place, and time. He appears lethargic. He exhibits normal muscle tone.   RUE: 5/5.  LUE: 5/5.  RLE: 3+/5 (proximal).  LLE: 3+/5 (proximal).  Skin: Skin is warm and dry.   Psychiatric: He has a normal mood and affect. His behavior is normal.     Diagnostic Results:   Labs: Reviewed  ECG: Reviewed  X-Ray: Reviewed  US: Reviewed  CT: Reviewed    Assessment/Plan:     * Critical lower limb ischemia    -Cardiology, Podiatry, and Vascular Surgery consulted  -Underwent angiogram 3/19 that showed occlusion of aorta-fem bypass at distal anastamosis. Unable to treat due to patient movement   -s/p cath directed tPA to occluded L SFA  -subsequently with acute limb ischemia in RLE resulting in R AKA on 3/27  -L TMA on 4/5 that still appears gangrenous  -Discussed L foot necrosis with both Vascular Surgery and Podiatry. Vascular service does not feel that BKA is warranted at this time  -Podiatry following wound     Recommendations  -  Early mobility, hip AROM, and OOB in chair at least 3 hours per day  -  PT/OT evaluate and treat  -  Monitor for phantom limb pain and/or sensation  -  Pain management  -  Wound care and edema control  -  Monitor for and prevent skin breakdown and pressure ulcers  · Early mobility, repositioning/weight shifting every 20-30 minutes when sitting, turn patient every 2 hours, proper mattress/overlay and chair cushioning, pressure relief/heel protector boots  -  Anticontracture management  · Firm mattress, lying prone 15 minutes TID, and knee extension while resting  -  Reviewed discharge options (IP rehab, SNF, HH therapy, and OP therapy)    -  Follow up in PM&R clinic 2-4 weeks post-op for postamputation and preprosthetic care        Leukocytosis    -resolved        Alcohol abuse    -delirium tremors resolved  -on Thiamine        Partially participating with therapy. Declined functional mobility and transfers on 4/7 with OT 2/2 pain  Patient requests rehab in [place].  Will follow  progress and discuss with rehab team for rehab recommendation.      Thank you for your consult.     Vicki Busch NP  Department of Physical Medicine & Rehab  [facility]

## 2018-04-09 NOTE — PROGRESS NOTES
[facility]  Podiatry  Progress Note    Patient Name: Leonardo Byrd  MRN: [id number]  Admission Date: 3/18/2018  Hospital Length of Stay: 22 days  Attending Physician: Janis Santamaria MD  Primary Care Provider: Indio Aquino MD   Interval Hx: s/p left TMA and wound debridement (DOS 4/5 per Dr. Salvador). S/p R AKA per vascular surgery.  Patient denies F/C/N/V.  Bandage intact left foot.     Scheduled Meds:   amitriptyline  50 mg Oral QHS    aspirin  81 mg Oral Daily    atorvastatin  40 mg Oral QHS    enoxaparin  40 mg Subcutaneous Daily    folic acid-vit B6-vit B12 2.5-25-2 mg  1 tablet Per OG tube Daily    gabapentin  800 mg Oral QHS    lidocaine   Topical (Top) QID    metoprolol succinate  12.5 mg Oral Daily    nicotine  1 patch Transdermal Daily    pregabalin  100 mg Oral TID    senna-docusate 8.6-50 mg  1 tablet Oral BID    thiamine  100 mg Oral Daily    ticagrelor  90 mg Oral BID     Continuous Infusions:    PRN Meds:cyclobenzaprine, nitroGLYCERIN, oxyCODONE, oxyCODONE, oxyCODONE-acetaminophen, polyethylene glycol, sodium chloride 0.9%, zolpidem    Review of patient's allergies indicates:  No Known Allergies     Past Medical History:   Diagnosis Date    AICD (automatic cardioverter/defibrillator) present     RYAN (acute kidney injury) 3/25/2018    CHF (congestive heart failure)     Coronary artery disease     Encounter for blood transfusion     Hyperlipemia     Hypertension     MI (myocardial infarction)     Neuropathy     PAD (peripheral artery disease)     PVD (peripheral vascular disease)      Past Surgical History:   Procedure Laterality Date    AMPUTATION Right     great toe    BYBPASS GRAFT AORTOBIUFEMORAL      CARDIAC DEFIBRILLATOR PLACEMENT      coronary stents      EXPLORATION AND EVaCUATION OF HEMATOMA OF UPPER EXTREMITY Left     KNEE ARTHROPLASTY Left     VASCULAR SURGERY      fem-pop bypass       Family History     None        Social History Main Topics     Smoking status: Former Smoker     Packs/day: 0.50     Quit date: 02/2018    Smokeless tobacco: Former User     Types: Chew     Quit date: 8/2/1980      Comment: Uses nicotine gum and nicotine patches    Alcohol use Yes      Comment: rarely    Drug use: No    Sexual activity: Yes     Partners: Female     Review of Systems   Constitutional: Negative.    Respiratory: Negative.    Cardiovascular: Negative.    Gastrointestinal: Negative.    Genitourinary: Negative.    Musculoskeletal: Positive for arthralgias.   Skin: Positive for wound.   Psychiatric/Behavioral: Negative.      Objective:     Vital Signs (Most Recent):  Temp: 98.5 °F (36.9 °C) (04/09/18 1206)  Pulse: 104 (04/09/18 1206)  Resp: 15 (04/09/18 1206)  BP: (!) 95/57 (04/09/18 1206)  SpO2: 98 % (04/09/18 0823) Vital Signs (24h Range):  Temp:  [98.3 °F (36.8 °C)-100 °F (37.8 °C)] 98.5 °F (36.9 °C)  Pulse:  [] 104  Resp:  [15-19] 15  SpO2:  [95 %-98 %] 98 %  BP: ()/(54-69) 95/57     Weight: 66.2 kg (145 lb 15.1 oz)  Body mass index is 22.85 kg/m².    Foot Exam    General  Orientation: alert and oriented to person, place, and time       Right Foot/Ankle     Comments  R AKA    Left Foot/Ankle      Inspection and Palpation  Ecchymosis: none  Tenderness: none   Swelling: none   Arch: normal  Hammertoes: absent  Claw toes: absent  Hallux valgus: no  Hallux limitus: no  Skin Exam: abnormal color;     Neurovascular  Dorsalis pedis: absent  Posterior tibial: absent          TMA site with sutures intact, with skin coapted, necrosis along incision site.      Wound on Dorsal foot measures 5.5 x 2.5 x superficial  With fibrous base and viable margins.  Negative purulence, erythema, edema, or maloder                                        Laboratory:  A1C: No results for input(s): HGBA1C in the last 4320 hours.  CBC:     Recent Labs  Lab 04/09/18  0422   WBC 9.01   RBC 2.78*   HGB 8.3*   HCT 25.5*   *   MCV 92   MCH 29.9   MCHC 32.5     CMP:      Recent Labs  Lab 04/09/18  0422      CALCIUM 9.0   ALBUMIN 2.2*   PROT 7.1      K 3.5   CO2 24      BUN 6   CREATININE 1.0   ALKPHOS 110   ALT 51*   AST 61*   BILITOT 0.4     CRP: No results for input(s): CRP in the last 168 hours.  ESR: No results for input(s): SEDRATE in the last 168 hours.    Diagnostic Results:  Xray left foot:  No fracture dislocation bone destruction seen.    Clinical Findings:    Gangrene distal forefoot toes 1-5 left foot. Necrotic eschar noted to dorsum of left foot.                 Assessment/Plan:     * Critical lower limb ischemia    Interventional cardiology on board, appreciate recs  S/p R AKA per vascular surgery.         Gangrene    S/p left TMA and wound debridement (DOS 4/5 per Dr. Salvador).  Incision site with necrotic changes.  No signs of infection.    -Dressed with betadine, xeroform, 4x4's, kerlix, cast padding  - Discussed case with Primary team. IC states no further intervention. No further intervention per vascular surgery. Will continue to monitor surgical site.   - Recommend workup for Hyperbaric oxygen therapy HBOT. F/u at [place] wound [facility] [place] for HBOT eval and wound care.     -Podiatry will follow.    Weight bearing status: NWB.  Offloading device: heel protector boot          PVD (peripheral vascular disease)    Followed by interventional cardiology.             Shalini Julian MD  Podiatry  [facility]

## 2018-04-09 NOTE — ASSESSMENT & PLAN NOTE
S/p left TMA and wound debridement (DOS 4/5 per Dr. Salvador).  Incision site with necrotic changes.  No signs of infection.    -Dressed with betadine, xeroform, 4x4's, kerlix, cast padding  - Discussed case with Primary team. IC states no further intervention. No further intervention per vascular surgery. Will continue to monitor surgical site.   - Recommend workup for Hyperbaric oxygen therapy HBOT. F/u at [place] wound [facility] [place] for HBOT eval and wound care.     -Podiatry will follow.    Weight bearing status: NWB.  Offloading device: heel protector boot

## 2018-04-09 NOTE — ASSESSMENT & PLAN NOTE
-Cardiology, Podiatry, and Vascular Surgery consulted  -Underwent angiogram 3/19 that showed occlusion of aorta-fem bypass at distal anastamosis. Unable to treat due to patient movement   -s/p cath directed tPA to occluded L SFA  -subsequently with acute limb ischemia in RLE resulting in R AKA on 3/27  -L TMA on 4/5 that still appears gangrenous  -Discussed L foot necrosis with both Vascular Surgery and Podiatry. Vascular service does not feel that BKA is warranted at this time  -Podiatry following wound     Recommendations  -  Early mobility, hip AROM, and OOB in chair at least 3 hours per day  -  PT/OT evaluate and treat  -  Monitor for phantom limb pain and/or sensation  -  Pain management  -  Wound care and edema control  -  Monitor for and prevent skin breakdown and pressure ulcers  · Early mobility, repositioning/weight shifting every 20-30 minutes when sitting, turn patient every 2 hours, proper mattress/overlay and chair cushioning, pressure relief/heel protector boots  -  Anticontracture management  · Firm mattress, lying prone 15 minutes TID, and knee extension while resting  -  Reviewed discharge options (IP rehab, SNF, HH therapy, and OP therapy)    -  Follow up in PM&R clinic 2-4 weeks post-op for postamputation and preprosthetic care

## 2018-04-09 NOTE — ASSESSMENT & PLAN NOTE
Nutrition Problem  Increased protein needs    Related to (etiology):   Wound healing    Signs and Symptoms (as evidenced by):   S/p R AKA / L TMA     Interventions/Recommendations (treatment strategy):  See recs.    Nutrition Diagnosis Status:   Improving

## 2018-04-09 NOTE — HOSPITAL COURSE
4/7-4/818: Evaluated by therapy.  Bed mobility SBA.  Sat EOB ~10 mins SV. Declined functional mobility and transfers on 4/7 with OT 2/2 pain.  UBD SetupA.  Grooming SetupA.   4/9/18: OT Bed mobility Mod (I) for long sitting.  4/10/18; Participated with therapy.  Bed mobility Mod (i).  Bed to chair transfers with WC Mod (I).  Politely declined ADLs as he was already dressed.

## 2018-04-09 NOTE — SUBJECTIVE & OBJECTIVE
Past Medical History:   Diagnosis Date    AICD (automatic cardioverter/defibrillator) present     RYAN (acute kidney injury) 3/25/2018    CHF (congestive heart failure)     Coronary artery disease     Encounter for blood transfusion     Hyperlipemia     Hypertension     MI (myocardial infarction)     Neuropathy     PAD (peripheral artery disease)     PVD (peripheral vascular disease)      Past Surgical History:   Procedure Laterality Date    AMPUTATION Right     great toe    BYBPASS GRAFT AORTOBIUFEMORAL      CARDIAC DEFIBRILLATOR PLACEMENT      coronary stents      EXPLORATION AND EVaCUATION OF HEMATOMA OF UPPER EXTREMITY Left     KNEE ARTHROPLASTY Left     VASCULAR SURGERY      fem-pop bypass     Review of patient's allergies indicates:  No Known Allergies    Scheduled Medications:    amitriptyline  50 mg Oral QHS    aspirin  81 mg Oral Daily    atorvastatin  40 mg Oral QHS    enoxaparin  40 mg Subcutaneous Daily    folic acid-vit B6-vit B12 2.5-25-2 mg  1 tablet Per OG tube Daily    gabapentin  800 mg Oral QHS    lidocaine   Topical (Top) QID    metoprolol succinate  12.5 mg Oral Daily    nicotine  1 patch Transdermal Daily    pregabalin  100 mg Oral TID    senna-docusate 8.6-50 mg  1 tablet Oral BID    thiamine  100 mg Oral Daily    ticagrelor  90 mg Oral BID       PRN Medications: cyclobenzaprine, nitroGLYCERIN, oxyCODONE, oxyCODONE, oxyCODONE-acetaminophen, polyethylene glycol, sodium chloride 0.9%, zolpidem    Family History     None        Social History Main Topics    Smoking status: Former Smoker     Packs/day: 0.50     Quit date: 02/2018    Smokeless tobacco: Former User     Types: Chew     Quit date: 8/2/1980      Comment: Uses nicotine gum and nicotine patches    Alcohol use Yes      Comment: rarely    Drug use: No    Sexual activity: Yes     Partners: Female     Review of Systems   Constitutional: Negative for chills, fatigue and fever.   HENT: Negative for trouble  swallowing and voice change.    Eyes: Negative for photophobia and visual disturbance.   Respiratory: Negative for cough, shortness of breath and wheezing.    Cardiovascular: Negative for chest pain and palpitations.   Gastrointestinal: Negative for abdominal distention, nausea and vomiting.   Genitourinary: Negative for difficulty urinating and flank pain.   Musculoskeletal: Positive for arthralgias, gait problem and myalgias.   Skin: Negative for color change and rash.   Neurological: Positive for weakness. Negative for speech difficulty, numbness and headaches.   Psychiatric/Behavioral: Negative for agitation and confusion.     Objective:     Vital Signs (Most Recent):  Temp: 98.5 °F (36.9 °C) (04/09/18 1206)  Pulse: 104 (04/09/18 1206)  Resp: 15 (04/09/18 1206)  BP: (!) 95/57 (04/09/18 1206)  SpO2: 98 % (04/09/18 0823)    Vital Signs (24h Range):  Temp:  [98.3 °F (36.8 °C)-100 °F (37.8 °C)] 98.5 °F (36.9 °C)  Pulse:  [] 104  Resp:  [15-19] 15  SpO2:  [95 %-98 %] 98 %  BP: ()/(54-69) 95/57     Body mass index is 22.85 kg/m².    Physical Exam   Constitutional: He is oriented to person, place, and time. He appears well-developed and well-nourished. He appears lethargic.   HENT:   Head: Normocephalic and atraumatic.   Eyes: EOM are normal. Pupils are equal, round, and reactive to light.   Neck: Normal range of motion.   Cardiovascular: Normal rate and regular rhythm.    Pulmonary/Chest: Effort normal. No respiratory distress.   Abdominal: Soft. There is no tenderness.   Musculoskeletal: He exhibits deformity.        Right hip: He exhibits decreased range of motion and decreased strength.        Left hip: Normal.        Right knee: Tenderness found.        Left knee: Normal.        Left ankle: Tenderness.   S/p L MTA, foot wrapped  S/P R AKA, incision dry    Neurological: He is oriented to person, place, and time. He appears lethargic. He exhibits normal muscle tone.   RUE: 5/5.  LUE: 5/5.  RLE: 3+/5  (proximal).  LLE: 3+/5 (proximal).     Skin: Skin is warm and dry.   Psychiatric: He has a normal mood and affect. His behavior is normal.     NEUROLOGICAL EXAMINATION:     MENTAL STATUS   Oriented to person, place, and time.     CRANIAL NERVES     CN III, IV, VI   Pupils are equal, round, and reactive to light.  Extraocular motions are normal.       Diagnostic Results:   Labs: Reviewed  ECG: Reviewed  X-Ray: Reviewed  US: Reviewed  CT: Reviewed

## 2018-04-09 NOTE — ASSESSMENT & PLAN NOTE
- Per critical lower limb ischemia  - Per Interventional Cardiology, no further intervention likely to benefit patient

## 2018-04-09 NOTE — PLAN OF CARE
Problem: Patient Care Overview  Goal: Plan of Care Review  Outcome: Ongoing (interventions implemented as appropriate)  Patient resting in bed, L foot on ice.IV intact and infusing free of infection and irration,  fall precautions maintained no falls noted. Call light in reach bed locked and in lowest position. Non skid socks on while out of bed. Patient instructed to call for assistance. Skin integrity maintained as patient is independent with frequent positioning and assisted as well, C/o pain managed with PRN meds, No other complaints or concerns. Will continue to monitor and follow careplan of care.

## 2018-04-09 NOTE — PROGRESS NOTES
" [facility]  Adult Nutrition  Progress Note    SUMMARY       Recommendations    Recommendation/Intervention: Cont w/ cardiac  Goals: Meet % EEN, EPN  Nutrition Goal Status: progressing towards goal  Communication of RD Recs: reviewed with RN    Reason for Assessment    Reason for Assessment: RD follow-up  Diagnosis: other (see comments) (R AKA (3/27))  Relevant Medical History: CHF, HTN, HLD  Interdisciplinary Rounds: did not attend  General Information Comments: Pt s/p TMA on L. Foot. Po intake improving, currently  fair 25-50%. denies any NVD  Nutrition Discharge Planning: Adequate PO intake    Nutrition Risk Screen    Nutrition Risk Screen: no indicators present    Nutrition/Diet History    Patient Reported Diet/Restrictions/Preferences: general  Food Preferences: none  Do you have any cultural, spiritual, Scientologist conflicts, given your current situation?: none stated  Food Allergies: NKFA  Factors Affecting Nutritional Intake: difficulty/impaired swallowing, impaired cognitive status/motor control    Anthropometrics    Temp: 98.3 °F (36.8 °C)  Height Method: Stated  Height: 5' 7.01" (170.2 cm)  Height (inches): 67.01 in  Weight Method: Standard Scale  Weight: 66.2 kg (145 lb 15.1 oz)  Weight (lb): 145.95 lb  Ideal Body Weight (IBW), Male: 148.06 lb  % Ideal Body Weight, Male (lb): 98.57 lb  BMI (Calculated): 22.9  BMI Grade: 18.5-24.9 - normal  Usual Body Weight (UBW), k.18 kg  % Usual Body Weight: 97.3  % Weight Change From Usual Weight: -2.9 %  Weight Loss Since Admission: 5 lb  % Weight Change Since Admission: 3.43  Amputation %: 16, 1.5  Total Amputation %: 17.5  Amputation Ideal Body Weight (IBW), Male (lb): 130.56 lb  Amputee BMI (kg/m2): 27.78 kg/m2       Lab/Procedures/Meds    Pertinent Labs Reviewed: reviewed  Pertinent Labs Comments: Phos-4.7, AST-61, ALT-51, Alb-2.2  Pertinent Medications Reviewed: reviewed  Pertinent Medications Comments: statin, Folic acid, " polyethylene glycol, Thiamine    Physical Findings/Assessment    Overall Physical Appearance: amputee, nourished, lethargic  Oral/Mouth Cavity: WDL  Skin: incision(s)    Estimated/Assessed Needs    Weight Used For Calorie Calculations: 67.8 kg (149 lb 7.6 oz)  Energy Calorie Requirements (kcal): 1877 kcal/d  Energy Need Method: [place]-St Jeor (1.25 PAL)  Protein Requirements: 82 g/d (1.2 g/kg)  Weight Used For Protein Calculations: 67.8 kg (149 lb 7.6 oz)     Fluid Need Method: other (see comments) (Per MD or 1 mL/kcal)  RDA Method (mL): 1877         Nutrition Prescription Ordered    Current Diet Order: Cardiac  Nutrition Order Comments: 1500ml fl res    Evaluation of Received Nutrient/Fluid Intake    IV Fluid (mL):  (PRN)  I/O: +6.3l since admit  Energy Calories Required: not meeting needs  Protein Required: not meeting needs  Fluid Required: not meeting needs  Comments: LBM:4/8/18  % Intake of Estimated Energy Needs: 25 - 50 %  % Meal Intake: 25 - 50 %    Nutrition Risk    Level of Risk/Frequency of Follow-up: moderate     Assessment and Plan    * Critical lower limb ischemia    Nutrition Problem  Increased protein needs    Related to (etiology):   Wound healing    Signs and Symptoms (as evidenced by):   S/p R AKA / L TMA     Interventions/Recommendations (treatment strategy):  See recs.    Nutrition Diagnosis Status:   Improving             Monitor and Evaluation    Food and Nutrient Intake: energy intake, food and beverage intake  Food and Nutrient Administration: diet order  Physical Activity and Function: nutrition-related ADLs and IADLs  Anthropometric Measurements: weight, weight change  Biochemical Data, Medical Tests and Procedures: lipid profile, glucose/endocrine profile, inflammatory profile, gastrointestinal profile, electrolyte and renal panel  Nutrition-Focused Physical Findings: overall appearance     Nutrition Follow-Up    RD Follow-up?: Yes

## 2018-04-09 NOTE — ASSESSMENT & PLAN NOTE
Mr. Byrd is a 47 year old man with CAD s/p PCI, LVEF 30% s/p ICD, complicated and extensive PAD s/p aortofem bypass and multiple stents on DAPT who presents with what appears to be CLI of his left foot of unclear duration.      --Underwent angiogram 3/19 that showed occlusion of aorta-fem bypass at distal anastamosis. Unable to treat due to patient movement   --s/p cath directed tPA to occluded L SFA  --blood cx ngtd  --subsequently with acute limb ischemia in RLE resulting in R AKA on 3/27  --continue ASA/brilinta. No longer requires heparin gtt post-op. Started DVT ppx with lovenox.  --Podiatry and vascular following for necrotic L foot, well defined necrotic demarcation  --L TMA on 4/5. Foot still appears gangrenous.   - Podiatry requesting interventional cardiology consult for potential revascularization   - Per IC, intervention unlikely to achieve revascularization and therefore not warranted  - Discussed L foot necrosis with both Vascular Surgery and Podiatry. Vascular service does not feel that BKA is warranted at this time; therefore, Podiatry will also continue to observe wound.   - Podiatry and Vascular continuing to follow, appreciate recs

## 2018-04-09 NOTE — PLAN OF CARE
Problem: Occupational Therapy Goal  Goal: Occupational Therapy Goal  Goals to be met by: 4/21/18     Patient will increase functional independence with ADLs by performing:    UE Dressing with Set-up Assistance.  LE Dressing with Set-up Assistance.  Grooming while EOB with Set-up Assistance.  Toileting from bedside commode with Supervision for hygiene and clothing management.   Stand pivot transfers with Minimal Assistance with use of RW.  Toilet transfer to bedside commode with Minimal Assistance using scoot pivot or stand pivot with RW.     Outcome: Ongoing (interventions implemented as appropriate)  Progressing. ALINA Quiñones  4/9/2018

## 2018-04-09 NOTE — SUBJECTIVE & OBJECTIVE
Interval Hx: s/p left TMA and wound debridement (DOS 4/5 per Dr. Salvador). S/p R AKA per vascular surgery.  Patient denies F/C/N/V.  Bandage intact left foot.     Scheduled Meds:   amitriptyline  50 mg Oral QHS    aspirin  81 mg Oral Daily    atorvastatin  40 mg Oral QHS    enoxaparin  40 mg Subcutaneous Daily    folic acid-vit B6-vit B12 2.5-25-2 mg  1 tablet Per OG tube Daily    gabapentin  800 mg Oral QHS    lidocaine   Topical (Top) QID    metoprolol succinate  12.5 mg Oral Daily    nicotine  1 patch Transdermal Daily    pregabalin  100 mg Oral TID    senna-docusate 8.6-50 mg  1 tablet Oral BID    thiamine  100 mg Oral Daily    ticagrelor  90 mg Oral BID     Continuous Infusions:    PRN Meds:cyclobenzaprine, nitroGLYCERIN, oxyCODONE, oxyCODONE, oxyCODONE-acetaminophen, polyethylene glycol, sodium chloride 0.9%, zolpidem    Review of patient's allergies indicates:  No Known Allergies     Past Medical History:   Diagnosis Date    AICD (automatic cardioverter/defibrillator) present     RYAN (acute kidney injury) 3/25/2018    CHF (congestive heart failure)     Coronary artery disease     Encounter for blood transfusion     Hyperlipemia     Hypertension     MI (myocardial infarction)     Neuropathy     PAD (peripheral artery disease)     PVD (peripheral vascular disease)      Past Surgical History:   Procedure Laterality Date    AMPUTATION Right     great toe    BYBPASS GRAFT AORTOBIUFEMORAL      CARDIAC DEFIBRILLATOR PLACEMENT      coronary stents      EXPLORATION AND EVaCUATION OF HEMATOMA OF UPPER EXTREMITY Left     KNEE ARTHROPLASTY Left     VASCULAR SURGERY      fem-pop bypass       Family History     None        Social History Main Topics    Smoking status: Former Smoker     Packs/day: 0.50     Quit date: 02/2018    Smokeless tobacco: Former User     Types: Chew     Quit date: 8/2/1980      Comment: Uses nicotine gum and nicotine patches    Alcohol use Yes      Comment: rarely     Drug use: No    Sexual activity: Yes     Partners: Female     Review of Systems   Constitutional: Negative.    Respiratory: Negative.    Cardiovascular: Negative.    Gastrointestinal: Negative.    Genitourinary: Negative.    Musculoskeletal: Positive for arthralgias.   Skin: Positive for wound.   Psychiatric/Behavioral: Negative.      Objective:     Vital Signs (Most Recent):  Temp: 98.5 °F (36.9 °C) (04/09/18 1206)  Pulse: 104 (04/09/18 1206)  Resp: 15 (04/09/18 1206)  BP: (!) 95/57 (04/09/18 1206)  SpO2: 98 % (04/09/18 0823) Vital Signs (24h Range):  Temp:  [98.3 °F (36.8 °C)-100 °F (37.8 °C)] 98.5 °F (36.9 °C)  Pulse:  [] 104  Resp:  [15-19] 15  SpO2:  [95 %-98 %] 98 %  BP: ()/(54-69) 95/57     Weight: 66.2 kg (145 lb 15.1 oz)  Body mass index is 22.85 kg/m².    Foot Exam    General  Orientation: alert and oriented to person, place, and time       Right Foot/Ankle     Comments  R AKA    Left Foot/Ankle      Inspection and Palpation  Ecchymosis: none  Tenderness: none   Swelling: none   Arch: normal  Hammertoes: absent  Claw toes: absent  Hallux valgus: no  Hallux limitus: no  Skin Exam: abnormal color;     Neurovascular  Dorsalis pedis: absent  Posterior tibial: absent          TMA site with sutures intact, with skin coapted, necrosis along incision site.      Wound on Dorsal foot measures 5.5 x 2.5 x superficial  With fibrous base and viable margins.  Negative purulence, erythema, edema, or maloder                                        Laboratory:  A1C: No results for input(s): HGBA1C in the last 4320 hours.  CBC:     Recent Labs  Lab 04/09/18 0422   WBC 9.01   RBC 2.78*   HGB 8.3*   HCT 25.5*   *   MCV 92   MCH 29.9   MCHC 32.5     CMP:     Recent Labs  Lab 04/09/18 0422      CALCIUM 9.0   ALBUMIN 2.2*   PROT 7.1      K 3.5   CO2 24      BUN 6   CREATININE 1.0   ALKPHOS 110   ALT 51*   AST 61*   BILITOT 0.4     CRP: No results for input(s): CRP in the last 168  hours.  ESR: No results for input(s): SEDRATE in the last 168 hours.    Diagnostic Results:  Xray left foot:  No fracture dislocation bone destruction seen.    Clinical Findings:    Gangrene distal forefoot toes 1-5 left foot. Necrotic eschar noted to dorsum of left foot.

## 2018-04-09 NOTE — PLAN OF CARE
GABBY spoke with admissions at [facility] and was told that they have clinically accepted pt and would just need to verify benefits.  GABBY will call admissions on 4/10 to arrange plans for transport.  [phone].  GABBY was told that family has been contacted by facility and they are sending family member to fill out admissions paperwork.  GABBY will F/U.          Breanne Salcedo LMSW

## 2018-04-09 NOTE — PROGRESS NOTES
[facility] Hospital Medicine  Progress Note    Patient Name: Leonardo Byrd  MRN: [id number]  Patient Class: IP- Inpatient   Admission Date: 3/18/2018  Length of Stay: 22 days  Attending Physician: Janis Santamaria MD  Primary Care Provider: Indio Aquino MD    [facility] Medicine Team: [facility] HOSP MED 1 Zaid García MD    Subjective:     Principal Problem:Critical lower limb ischemia    HPI:  46 yo former smoker with history of ICM (LVEF 30-35%) s/p ICD, CAD s/p PCI, HTN, HLD, PAD (s/p aortobifemoral bypass, L SFA and pop PTAS in September and recent R SFA and R pop 3/8 by Dr. Carl Bell). Patient states left foot pain has started in September 2017 and had PTAs to left LFA and left popliteal artery here. Since then he continued to have pain in his left foot and to a less degree, leg. The pain has been progressively worse since. Pain is a burning sharp pain that is elicited by slight touch. Pain is worsened with bearing weight and is improved with vibrating massager to the plantar aspect of the foot. There is associated numbness and coldness to his foot. He denies erythema or swelling of his left foot.    Hospital Course:  Admitted to CCU 3/20 after cath directed tPA was performed by int cards under general anesthesia. Also with DTs requiring scheduled benzos. On day 2 Lt lower extremity remained pulseless, so thrombolysis still infusing. On day 4 of vanc for possible lt foot cellulitis. Transitioned to clinda IV (unable to take PO safely). Intubated overnight for airway protection in setting of increasing benzo requirements and worsening agitation and danger of pt pulling line. CXR this AM (3/22) with patchy bilateral infiltrates. Taken to cath lab for possible cath removal. Lt leg then reportedly duskier and with absent pulses. On 3/23 taken back to cath lab. Int cards Recc Podiatry consult for possible L trans-metatarsal amputation. On heparin gtt. 3/24 Wbc trending up, foot color getting  "darker,paitent extubated, continue heparin. 3/25: US lower ext showing occlusion of distal right superficial femoral, popliteal, anterior tibial, and posterior tibial arteries. No "dopplerable" pulses on rt foot. Interventional cards holding off on on acute intervention currently in setting of RYAN. Will continue to monitor. Cr 1.7 (baseline 0.6). Given 500cc Iv fluids. Started on scheduled librium for alcohol withdrawal, with plans for taper.     -3/26/18 NAEON. Pt. With elevated WBC. On vanc and ceftriaxone. ID consulted. Pain management for BLE ischemia   -3/27: NAEON. BLE pain improved after pain management adjustment. Vascular surgery evaluated pt and planning for Right AKA today.   - 3/28 febrile last night. Blood culture sent. WBC elevated then trending down. Complains of BLE pain controlled with pain meds. S/p Right AKA on 3/27/18  -3-29: NAEON. Pain controlled with medications. Vascular and ID on board. Pt. Is high risk of left amputation. May ultimately require amputation on the left as well. Continue on vanc and Unasyn for now   - 3/30: NAEON. Pt afebrile. BLE pain with controlled with pain meds. Today abdomen slightly distended. CT abdomen ordered stat. If pt decompensate, will broaden abx from Unasyn to zosyn   - 4/1: NAEON. Pt afebrile. BLE pain controlled with pain meds. WBC trending down after switching Unasyn to Zosyn. Left toes gangrene continue to demarcates. Vascular following, likely will need amputation. received 1 PRBC for Hg 6.9   - 4/2: Stepped down to hospital medicine. Modified pain regimen to PO with IV for breakthrough. Awaiting vascular recommendations for L foot  - 4/3: Blood pressures low, am meds held. Bolused 250cc NS for SBP ~80 and discontinued lisinopril 5 mg daily  - 4/4: Continuing IV abx, plan to OR tomorrow per podiatry and awaiting vascular recs  - 4/5: OR today for TMA  - 4/6: TTE for murmur evaluation. EF 15% with new mild-moderate mitral regurgitation without structural " abnormalities  - 4/7-8: No significant events    No new subjective & objective note has been filed under this hospital service since the last note was generated.    Assessment/Plan:      * Critical lower limb ischemia    Mr. Byrd is a 47 year old man with CAD s/p PCI, LVEF 30% s/p ICD, complicated and extensive PAD s/p aortofem bypass and multiple stents on DAPT who presents with what appears to be CLI of his left foot of unclear duration.      --Underwent angiogram 3/19 that showed occlusion of aorta-fem bypass at distal anastamosis. Unable to treat due to patient movement   --s/p cath directed tPA to occluded L SFA  --blood cx ngtd  --subsequently with acute limb ischemia in RLE resulting in R AKA on 3/27  --continue ASA/brilinta. No longer requires heparin gtt post-op. Started DVT ppx with lovenox.  --Podiatry and vascular following for necrotic L foot, well defined necrotic demarcation  --L TMA on 4/5. Foot still appears gangrenous.   - Podiatry requesting interventional cardiology consult for potential revascularization   - Per IC, intervention unlikely to achieve revascularization and therefore not warranted  - Discussed L foot necrosis with both Vascular Surgery and Podiatry. Vascular service does not feel that BKA is warranted at this time; therefore, Podiatry will also continue to observe wound.   - Podiatry and Vascular continuing to follow, appreciate recs        Leukocytosis    - continue trending down   -s/p 7 day course of abx for possible LE cellulitis  -CT abd without source of infection  - Inflammatory response related to necrosis/recent amputation vs cellulitis vs other infxs source  - Restarted on van and ceftriaxone (started 3/25/18) for elevated wbc   - ID consulted, switched Ceftriaxone to Unasyn  (started 3/25/18), switched to Zosyn for failure to improve 3/30. Also on vancomycin.  - Final ID recs: Vanc and Zosyn x48h from wound Cx and if no growth on clean margin cultures by that time then  can DC abx   - Cultures from wound margin with NGTD x 4 days   - Abx discontinued 4/8        Ischemic cardiomyopathy    CM possibly with multifactorial etiology (hx of alcohol use, ischemia)  - CAD s/p PCI  - DAPT, statin, BB  - Holding lisinopril for intermittent hypotension        PVD (peripheral vascular disease)    - Per critical lower limb ischemia  - Per Interventional Cardiology, no further intervention likely to benefit patient          Alcohol abuse    -s/p delirium tremens  -no seizure activity  -s/p BZD taper  -continue thiamine  -mental status improved        Chronic systolic heart failure    LVEF 10-15% s/p ICD  - Continue BB  - Holding lisinopril for tenuous BPs  - TTE on 4/6 for new murmur: EF 15% and mild-moderate mitral regurgitation without vegetation or structural abnormality.        Tobacco abuse    --nicotine patch            VTE Risk Mitigation         Ordered     enoxaparin injection 40 mg  Daily     Route:  Subcutaneous        04/06/18 0701     heparin 25,000 units in dextrose 5% 250 mL (100 units/mL) infusion  Continuous     Route:  Intravenous        03/27/18 1645     IP VTE LOW RISK PATIENT  Once      03/18/18 1711          Zaid García MD  Department of Hospital Medicine   [facility]

## 2018-04-09 NOTE — PT/OT/SLP PROGRESS
Occupational Therapy   Treatment    Name: Leonardo Byrd  MRN: [id number]  Admitting Diagnosis:  Critical lower limb ischemia  4 Days Post-Op    Recommendations:     Discharge Recommendations: nursing facility, skilled (MD note recommended hyperbarics)  Discharge Equipment Recommendations:  bath bench, wheelchair, bedside commode, slide board, walker, rolling  Barriers to discharge:  Inaccessible home environment    Subjective     Communicated with: pt prior to session.  Pain/Comfort:  · Pain Rating 1: 10/10  · Location - Side 1: Bilateral  · Location 1: leg  · Pain Addressed 1: Pre-medicate for activity, Reposition, Cessation of Activity  · Pain Rating Post-Intervention 1: 10/10    Patients cultural, spiritual, Uatsdin conflicts given the current situation: none reported    Objective:     Patient found with: PICC line (supine in bed)    General Precautions: Standard, aspiration, fall   Orthopedic Precautions:RLE non weight bearing, LLE non weight bearing   Braces:  (awaiting boot (?? WBing through heel with boot))     Occupational Performance:    Bed Mobility:    · Patient completed Supine to Sit with modified independence --long sitting in bed    Functional Mobility/Transfers:  NT     Patient left HOB elevated with all lines intact and call button in reach    [facility] 6 Click:  [facility] Total Score: 17    Treatment & Education:  Pt educated on prone position to increase hip extension and decrease flexion contracture for future prosthesis  Pt educated on WBing status and boot--need to clarify use of boot and WBing with boot  Educated re: DME needs  Education:    Assessment:     Leonardo Byrd is a 47 y.o. male with a medical diagnosis of Critical lower limb ischemia.  Pt with severe pain and WBing status limiting function. Pt is motivated and demonstrated good trunk control  Performance deficits affecting function are weakness, impaired endurance, impaired sensation, impaired self care skills, gait instability, impaired  functional mobilty, decreased lower extremity function, pain, impaired skin, edema, orthopedic precautions.      Rehab Prognosis:  Good ; patient would benefit from acute skilled OT services to address these deficits and reach maximum level of function.       Plan:     Patient to be seen 4 x/week to address the above listed problems via self-care/home management, therapeutic activities, therapeutic exercises  · Plan of Care Expires: 05/07/18  · Plan of Care Reviewed with: patient    This Plan of care has been discussed with the patient who was involved in its development and understands and is in agreement with the identified goals and treatment plan    GOALS:    Occupational Therapy Goals        Problem: Occupational Therapy Goal    Goal Priority Disciplines Outcome Interventions   Occupational Therapy Goal     OT, PT/OT Ongoing (interventions implemented as appropriate)    Description:  Goals to be met by: 4/21/18     Patient will increase functional independence with ADLs by performing:    UE Dressing with Set-up Assistance.  LE Dressing with Set-up Assistance.  Grooming while EOB with Set-up Assistance.  Toileting from bedside commode with Supervision for hygiene and clothing management.   Stand pivot transfers with Minimal Assistance with use of RW.  Toilet transfer to bedside commode with Minimal Assistance using scoot pivot or stand pivot with RW.                      Time Tracking:     OT Date of Treatment: 04/09/18  OT Start Time: 1602  OT Stop Time: 1616  OT Total Time (min): 14 min    Billable Minutes:Therapeutic Activity 14    ALINA Quiñones  4/9/2018

## 2018-04-09 NOTE — ASSESSMENT & PLAN NOTE
- continue trending down   -s/p 7 day course of abx for possible LE cellulitis  -CT abd without source of infection  - Inflammatory response related to necrosis/recent amputation vs cellulitis vs other infxs source  - Restarted on van and ceftriaxone (started 3/25/18) for elevated wbc   - ID consulted, switched Ceftriaxone to Unasyn  (started 3/25/18), switched to Zosyn for failure to improve 3/30. Also on vancomycin.  - Final ID recs: Vanc and Zosyn x48h from wound Cx and if no growth on clean margin cultures by that time then can DC abx   - Cultures from wound margin with NGTD x 4 days   - Abx discontinued 4/8

## 2018-04-09 NOTE — PT/OT/SLP PROGRESS
"Physical Therapy      Patient Name:  Leonardo Byrd   MRN:  [id number]    Patient not seen today secondary to pt reports I am might be going back to surgery today, so I don't think I can be getting up and moving."  . Will follow-up next scheduled treatment per PT POC    Chris Peterson, PTA  4/9/2018    "

## 2018-04-09 NOTE — CONSULTS
Inpatient consult to Physical Medicine Rehab  Consult performed by: REDD SHELDON  Consult ordered by: ALEJANDRO HERMOSILLO  Reason for consult: assess rehab needs        Reviewed patient history and current admission.  Rehab team following.  Full consult to follow.    FRANCK Daniel, FNP-C  Physical Medicine & Rehabilitation   04/09/2018  Spectralink: [id number]

## 2018-04-09 NOTE — HPI
"Calvin lerma is a 47-year-old male with PMHx of alcohol abuse, HTN, HLP, CAD, cardiomyopathy (EF 30-35%),  s/p AICD, PVD, PAD (s/p aortobifemoral bypass, L SFA and pop PTAS in September and recent R SFA and R pop 3/8/18), neuropathy, RYAN, & CHF.  Patient presented to [facility] on 3/18 with L foot/leg pain worsened with WB with associated numbness and coldness to his foot.  He was found to have L forefoot ischemia and RLE ischemia with associated possible cellulitis.  LLE angiogram on 3/19 revealed occlusion of aorta-fem bypass at distal anastamosis. Unable to treat due to patient movement. He was started on a Heparin gtt and IV abx.  Hospital course was further complicated by acute respiratory failure, leukocytosis, RYAN, alcohol withdrawal syndrome, RLE pain (US revealed occlusion of distal right superficial femoral, popliteal, anterior tibial, and posterior tibial arteries, s/p R AKA on 3/27 with Vascular surgery), L foot gangrene (Podiatry consulted and underwent amputation L MTA on 4/5.  Per podiatry, "Foot still appears gangrenous" and " Vascular service does not feel that BKA is warranted at this time".  Podiatry following.      Functional History: Patient lives in [place] with father and brother in a single story home with 2 steps to enter.  Father and brother drive patient to medical appointments.  Prior to admission, (I) with ambulation and ADLs.  DME: tami.    "

## 2018-04-09 NOTE — PLAN OF CARE
Problem: Patient Care Overview  Goal: Plan of Care Review  Outcome: Ongoing (interventions implemented as appropriate)  Patient is awake, alert and oriented. Patient remained free from complaints this shift. Patient is on bedrest. Remained free from injury and falls this shift. Bed is in the low and locked position with side rail up x 2. Call light is within reach. Informed to call if need assistance. Will continue to monitor.

## 2018-04-10 NOTE — PROGRESS NOTES
[facility]  Physical Medicine & Rehab  Progress Note    Patient Name: Leonardo Byrd  MRN: [id number]  Admission Date: 3/18/2018  Length of Stay: 23 days  Attending Physician: Janis Santamaria MD    Subjective:     Principal Problem:Critical lower limb ischemia    Hospital Course:   4/7-4/818: Evaluated by therapy.  Bed mobility SBA.  Sat EOB ~10 mins SV. Declined functional mobility and transfers on 4/7 with OT 2/2 pain.  UBD SetupA.  Grooming SetupA.   4/9/18: OT Bed mobility Mod (I) for long sitting.  4/10/18; Participated with therapy.  Bed mobility Mod (i).  Bed to chair transfers with WC Mod (I).  Politely declined ADLs as he was already dressed.    Interval History 4/10/2018:  Patient is seen for follow-up rehab evaluation and recommendations: Participating with therapy.     HPI, Past Medical, Family, and Social History remains the same as documented in the initial encounter.    Scheduled Medications:    amitriptyline  50 mg Oral QHS    aspirin  81 mg Oral Daily    atorvastatin  80 mg Oral QHS    enoxaparin  40 mg Subcutaneous Daily    folic acid-vit B6-vit B12 2.5-25-2 mg  1 tablet Per OG tube Daily    gabapentin  800 mg Oral QHS    lidocaine   Topical (Top) QID    metoprolol succinate  12.5 mg Oral Daily    nicotine  1 patch Transdermal Daily    pregabalin  100 mg Oral TID    senna-docusate 8.6-50 mg  1 tablet Oral BID    thiamine  100 mg Oral Daily    ticagrelor  90 mg Oral BID       Diagnostic Results: Labs: Reviewed    PRN Medications: cyclobenzaprine, nitroGLYCERIN, oxyCODONE, oxyCODONE, oxyCODONE-acetaminophen, polyethylene glycol, sodium chloride 0.9%, zolpidem    Review of Systems   Constitutional: Negative for chills, fatigue and fever.   HENT: Negative for trouble swallowing and voice change.    Eyes: Negative for photophobia and visual disturbance.   Respiratory: Negative for cough, shortness of breath and wheezing.    Cardiovascular: Negative for chest pain and  palpitations.   Gastrointestinal: Negative for abdominal distention, nausea and vomiting.   Genitourinary: Negative for difficulty urinating and flank pain.   Musculoskeletal: Positive for arthralgias, gait problem and myalgias.   Skin: Negative for color change and rash.   Neurological: Positive for weakness. Negative for speech difficulty, numbness and headaches.   Psychiatric/Behavioral: Negative for agitation and confusion.     Objective:     Vital Signs (Most Recent):  Temp: 97.4 °F (36.3 °C) (04/10/18 0758)  Pulse: 104 (04/10/18 0800)  Resp: 18 (04/10/18 0800)  BP: (!) 91/50 (04/10/18 0758)  SpO2: 96 % (04/10/18 0800)    Vital Signs (24h Range):  Temp:  [97.4 °F (36.3 °C)-99.6 °F (37.6 °C)] 97.4 °F (36.3 °C)  Pulse:  [104-122] 104  Resp:  [15-18] 18  SpO2:  [92 %-99 %] 96 %  BP: ()/(50-58) 91/50     Physical Exam   Constitutional: He is oriented to person, place, and time. He appears well-developed and well-nourished.   HENT:   Head: Normocephalic and atraumatic.   Eyes: EOM are normal. Pupils are equal, round, and reactive to light.   Neck: Normal range of motion.   Cardiovascular: Normal rate and regular rhythm.    Pulmonary/Chest: Effort normal. No respiratory distress.   Abdominal: Soft. There is no tenderness.   Musculoskeletal: He exhibits deformity.        Right hip: He exhibits decreased range of motion and decreased strength.        Left hip: Normal.        Right knee: Tenderness found.        Left knee: Normal.        Left ankle: Tenderness.   S/p L MTA, foot wrapped  S/P R AKA, incision dry    Neurological: He is alert and oriented to person, place, and time. He exhibits normal muscle tone.   RUE: 5/5.  LUE: 5/5.  RLE: 3+/5 (proximal).  LLE: 4/5 (proximal).  Skin: Skin is warm and dry.   Psychiatric: He has a normal mood and affect. His behavior is normal.     Assessment/Plan:      * Critical lower limb ischemia    -Cardiology, Podiatry, and Vascular Surgery consulted  -Underwent angiogram  3/19 that showed occlusion of aorta-fem bypass at distal anastamosis. Unable to treat due to patient movement   -s/p cath directed tPA to occluded L SFA  -subsequently with acute limb ischemia in RLE resulting in R AKA on 3/27  -L TMA on 4/5 that still appears gangrenous  -Discussed L foot necrosis with both Vascular Surgery and Podiatry. Vascular service does not feel that BKA is warranted at this time  -Podiatry following wound   -NWB RLE; heel touch only LLE    Recommendations  -  Early mobility, hip AROM, and OOB in chair at least 3 hours per day  -  PT/OT evaluate and treat  -  Monitor for phantom limb pain and/or sensation  -  Pain management  -  Wound care and edema control  -  Monitor for and prevent skin breakdown and pressure ulcers  · Early mobility, repositioning/weight shifting every 20-30 minutes when sitting, turn patient every 2 hours, proper mattress/overlay and chair cushioning, pressure relief/heel protector boots  -  Anticontracture management  · Firm mattress, lying prone 15 minutes TID, and knee extension while resting  -  Reviewed discharge options (IP rehab, SNF, HH therapy, and OP therapy)    -  Follow up in PM&R clinic 2-4 weeks post-op for postamputation and preprosthetic care        Leukocytosis    -resolved        Alcohol abuse    -delirium tremors resolved  -on Thiamine        Tobacco abuse    -risk factor for PVD/PAD        Patient with rehab goals.  Patient prefers rehab closer to [place].  Will sign off.  Please call with questions/concerns or re-consult if situation changes.      Vicki Busch NP  Department of Physical Medicine & Rehab   [facility]

## 2018-04-10 NOTE — ASSESSMENT & PLAN NOTE
Mr. Byrd is a 47 year old man with CAD s/p PCI, LVEF 30% s/p ICD, complicated and extensive PAD s/p aortofem bypass and multiple stents on DAPT who presents with what appears to be CLI of his left foot of unclear duration.      --Underwent angiogram 3/19 that showed occlusion of aorta-fem bypass at distal anastamosis. Unable to treat due to patient movement   --s/p cath directed tPA to occluded L SFA  --blood cx ngtd  --subsequently with acute limb ischemia in RLE resulting in R AKA on 3/27  --continue ASA/brilinta. No longer requires heparin gtt post-op. Started DVT ppx with lovenox.  --Podiatry and vascular following for necrotic L foot, well defined necrotic demarcation  --L TMA on 4/5. Foot still appears gangrenous.   - Podiatry requesting interventional cardiology consult for potential revascularization   - Per IC, intervention unlikely to achieve revascularization and therefore not warranted  - Discussed L foot necrosis with both Vascular Surgery and Podiatry. Vascular service does not feel that BKA is warranted at this time; therefore, Podiatry will also continue to observe wound.   - Discharged to [facility] with Vascular Surgery follow up as well as referral to Wound Care clinic for evaluation for hyperbaric oxygen therapy

## 2018-04-10 NOTE — PLAN OF CARE
Problem: Occupational Therapy Goal  Goal: Occupational Therapy Goal  Goals to be met by: 4/21/18     Patient will increase functional independence with ADLs by performing:    UE Dressing with Set-up Assistance.  LE Dressing with Set-up Assistance.  Grooming while EOB with Set-up Assistance.  Toileting from bedside commode with Supervision for hygiene and clothing management.   Stand pivot transfers with Minimal Assistance with use of RW            Update: if pt has L heel protector boot  Toilet transfer to bedside commode with Minimal Assistance using scoot pivot or stand pivot with RW  Wheelchair transfer w/ Supervision using scoot pivot -- Met (4/10)        Outcome: Ongoing (interventions implemented as appropriate)  Pt progressing well towards all functional outcomes     Comments: Cont OT POC

## 2018-04-10 NOTE — PT/OT/SLP PROGRESS
"Physical Therapy Treatment    Patient Name:  Leonardo Byrd   MRN:  [id number]    Recommendations:     Discharge Recommendations:  nursing facility, skilled   Discharge Equipment Recommendations: walker, rolling, bath bench, bedside commode   Barriers to discharge: Inaccessible home and Decreased caregiver support    Assessment:     Leonardo Byrd is a 47 y.o. male admitted with a medical diagnosis of Critical lower limb ischemia.  He presents with the following impairments/functional limitations:  decreased lower extremity function, gait instability, weakness, impaired endurance. Pt cooperative and agreeable to treatment. Pt tolerated treatment well with no c/o of pain or SOB. Pt still without unloading boot to perform WB activities. Pt performed bed mobility with supervision and scoot pivot from bed to wheelchair with SBA. Pt then performed wheelchair mobility ~250' with supervision. Pt performed scoot pivot back to bed with SBA prior to performing LE therex in bed. Recommending SNF upon discharge.    Rehab Prognosis:  Good; patient would benefit from acute skilled PT services to address these deficits and reach maximum level of function.      Recent Surgery: Procedure(s) (LRB):  AMPUTATION-FOOT Transmetatarsal  Request popliteal saphenous nerve block per anesthesia (Left) 5 Days Post-Op    Plan:     During this hospitalization, patient to be seen 4 x/week to address the above listed problems via therapeutic activities, therapeutic exercises, wheelchair management/training, gait training  · Plan of Care Expires:  05/08/18   Plan of Care Reviewed with: patient    Subjective     Communicated with nursing prior to session.  Patient found supine in bed upon PT entry to room, agreeable to treatment.      Chief Complaint: "this wheelchair is really big"  Patient comments/goals: "I can wheelchair down the howell and back" "I want to get back into the bed for my leg exercises"  Pain/Comfort:  Pain Rating 1: 0/10  Pain Rating " Post-Intervention 1: 0/10    Patients cultural, spiritual, Jew conflicts given the current situation: none    Objective:     Patient found with: peripheral IV     General Precautions: Standard, fall   Orthopedic Precautions: (LLE NWB (WBAT if boot present); RLE NWB)   Braces: N/A     Functional Mobility:  · Bed Mobility:     · Rolling Left:  supervision  · Supine to Sit: supervision  · Sit to Supine: supervision  · Transfers:     · Bed to Chair: stand by assistance with  no AD  using  Scoot Pivot  · Wheelchair Propulsion:  Pt propelled Standard wheelchair x 250 feet on Level tile with  Bilateral upper extremity with Supervision or Set-up Assistance.       AM-PAC 6 CLICK MOBILITY  Turning over in bed (including adjusting bedclothes, sheets and blankets)?: 4  Sitting down on and standing up from a chair with arms (e.g., wheelchair, bedside commode, etc.): 1  Moving from lying on back to sitting on the side of the bed?: 4  Moving to and from a bed to a chair (including a wheelchair)?: 4  Need to walk in hospital room?: 1  Climbing 3-5 steps with a railing?: 1  Total Score: 15       Therapeutic Activities and Exercises:   Pt performed bed mobility with supervision and scoot pivot from bed to wheelchair with SBA. Pt then performed wheelchair mobility ~250' with supervision. Pt performed scoot pivot back to bed with SBA prior to performing (B) LE therex in bed x 15 reps: AROM on L (heel slides, TKEs, ankle pumps, hip abd/add), AAROM on R (hip flexion/extension, hip abd/add).    Patient left supine with all lines intact and call button in reach..    GOALS:    Physical Therapy Goals        Problem: Physical Therapy Goal    Goal Priority Disciplines Outcome Goal Variances Interventions   Physical Therapy Goal     PT/OT, PT Ongoing (interventions implemented as appropriate)     Description:  Goals to be met by: 18    Patient will increase functional independence with mobility by performin. Supine to sit  with Modified [place]-not met  2. Sit to supine with Modified [place]-not met  3. Sit to stand transfer with Minimal Assistance-not met  4. Bed to chair transfer with Minimal Assistance using Rolling Walker-not met  5. Gait  x >10 feet with Minimal Assistance using Rolling Walker and post-op boot in place on LLE residual limb.-not met                        Time Tracking:     PT Received On: 04/10/18  PT Start Time: 1459     PT Stop Time: 1514  PT Total Time (min): 15 min     Billable Minutes: Train/Wheelchair Management 15 mins    Treatment Type: Treatment  PT/PTA: PT           Leslie Tracey, SPT  04/10/2018

## 2018-04-10 NOTE — SUBJECTIVE & OBJECTIVE
Medications:  Continuous Infusions:  Scheduled Meds:   amitriptyline  50 mg Oral QHS    aspirin  81 mg Oral Daily    atorvastatin  40 mg Oral QHS    enoxaparin  40 mg Subcutaneous Daily    folic acid-vit B6-vit B12 2.5-25-2 mg  1 tablet Per OG tube Daily    gabapentin  800 mg Oral QHS    lidocaine   Topical (Top) QID    metoprolol succinate  12.5 mg Oral Daily    nicotine  1 patch Transdermal Daily    pregabalin  100 mg Oral TID    senna-docusate 8.6-50 mg  1 tablet Oral BID    thiamine  100 mg Oral Daily    ticagrelor  90 mg Oral BID     PRN Meds:cyclobenzaprine, nitroGLYCERIN, oxyCODONE, oxyCODONE, oxyCODONE-acetaminophen, polyethylene glycol, sodium chloride 0.9%, zolpidem     Objective:     Vital Signs (Most Recent):  Temp: 98.5 °F (36.9 °C) (04/09/18 2330)  Pulse: (!) 115 (04/09/18 2330)  Resp: 18 (04/09/18 2330)  BP: (!) 110/58 (04/09/18 2330)  SpO2: 95 % (04/09/18 2330) Vital Signs (24h Range):  Temp:  [98.3 °F (36.8 °C)-99.6 °F (37.6 °C)] 98.5 °F (36.9 °C)  Pulse:  [] 115  Resp:  [15-18] 18  SpO2:  [92 %-99 %] 95 %  BP: ()/(52-60) 110/58          Physical Exam   Constitutional: He is oriented to person, place, and time. He appears well-developed.   Cardiovascular: Normal rate and regular rhythm.    Biphasic Left PT   Pulmonary/Chest: Effort normal. No respiratory distress.   Abdominal: Soft.   Musculoskeletal:   Mild ruby-incisional ischemia of R AKA medial incision; no hematoma R AKA stump, sutures intact  Left foot s/p TMA, some ulceration at incision   Neurological: He is alert and oriented to person, place, and time.   Skin: Skin is warm.       Significant Labs:   CBC:   Recent Labs  Lab 04/10/18  0405   WBC 11.40   RBC 2.67*   HGB 8.1*   HCT 25.5*   *   MCV 96   MCH 30.3   MCHC 31.8*     CMP:   Recent Labs  Lab 04/10/18  0405   GLU 90   CALCIUM 9.0   ALBUMIN 2.3*   PROT 7.1      K 3.9   CO2 25   CL 99   BUN 8   CREATININE 0.9   ALKPHOS 111   ALT 57*   AST 70*    BILITOT 0.4       Significant Diagnostics:  I have reviewed all pertinent imaging results/findings within the past 24 hours.

## 2018-04-10 NOTE — PLAN OF CARE
Problem: Physical Therapy Goal  Goal: Physical Therapy Goal  Goals to be met by: 18    Patient will increase functional independence with mobility by performin. Supine to sit with Modified [place]-not met  2. Sit to supine with Modified [place]-not met  3. Sit to stand transfer with Minimal Assistance-not met  4. Bed to chair transfer with Minimal Assistance using Rolling Walker-not met  5. Gait  x >10 feet with Minimal Assistance using Rolling Walker and post-op boot in place on LLE residual limb.-not met      Outcome: Ongoing (interventions implemented as appropriate)  No goals met today.

## 2018-04-10 NOTE — PLAN OF CARE
[facility]     Department of Hospital Medicine     [address] [place] [address]     [phone] after hours  [phone] fax       NURSING HOME ORDERS    04/10/2018    Admit to Nursing Home:  Skilled Bed                                                  Diagnoses:  Active Hospital Problems    Diagnosis  POA    *Critical lower limb ischemia [I99.8]  Yes     Priority: 1 - High     Chronic    Limb ischemia [I99.8]  Yes    Leukocytosis [D72.829]  Yes    Gangrene [I96]  Yes    Atherosclerosis of native artery of left lower extremity with intermittent claudication [I70.212]  Yes    Ischemic cardiomyopathy [I25.5]  Yes    PVD (peripheral vascular disease) [I73.9]  Yes    Neuropathy due to peripheral vascular disease [I73.9, G63]  Yes    Alcohol abuse [F10.10]  Yes    Chronic systolic heart failure [I50.22]  Yes    Tobacco abuse [Z72.0]  Yes      Resolved Hospital Problems    Diagnosis Date Resolved POA    RYAN (acute kidney injury) [N17.9] 03/28/2018 No    Acute renal failure with tubular necrosis [N17.0] 03/30/2018 No    Acute respiratory failure with hypoxia [J96.01] 03/28/2018 No    Alcohol withdrawal syndrome, with delirium [F10.231] 03/30/2018 Yes    Cellulitis of left lower extremity [L03.116] 03/31/2018 Yes    PAD (peripheral artery disease) [I73.9] 03/31/2018 Yes       Patient is homebound due to:  Critical lower limb ischemia    Allergies:Review of patient's allergies indicates:  No Known Allergies    Vitals:       Every shift (Skilled Nursing patients)    Diet: Cardiac, low sodium    Acitivities:     - Scheduled walks once each shift (every 8 hours)   - Weight bearing: NWB RLE; heel touch only LLE    LABS:  Per facility protocol   CMP, CBC weekly     Nursing Precautions:    - Fall precautions per nursing home protocol   - Seizure precaution per [facility] protocol   - Decubitus precautions:        -  for positioning   - Pressure  reducing foam mattress   - Turn patient every two hours. Use wedge pillows to anchor patient    CONSULTS:   Physical Therapy to evaluate and treat     Occupational Therapy to evaluate and treat     Nutrition to evaluate and recommend diet    MISCELLANEOUS CARE:       WOUND CARE:  Left TMA site dressed with betadine, xeroform, 4x4's, kerlix, cast padding                  Medications: Discontinue all previous medication orders, if any. See new list below.     Leonardo Byrd   Home Medication Instructions CAM:[id number]    Printed on:04/10/18 0903   Medication Information                      amitriptyline (ELAVIL) 50 MG tablet  Take 1 tablet (50 mg total) by mouth every evening.             aspirin (ECOTRIN) 81 MG EC tablet  Take 81 mg by mouth once daily.             atorvastatin (LIPITOR) 40 MG tablet  Take 40 mg by mouth every evening.             cilostazol (PLETAL) 50 MG Tab  Take 2 tablets (100 mg total) by mouth 2 (two) times daily.             cyclobenzaprine (FLEXERIL) 10 MG tablet  Take 10 mg by mouth 3 (three) times daily as needed for Muscle spasms.             folic acid-vit B6-vit B12 2.5-25-2 mg (FOLBIC OR EQUIV) 2.5-25-2 mg Tab  Take 1 tablet by mouth once daily.             gabapentin (NEURONTIN) 800 MG tablet  Take 800 mg by mouth every evening.             lidocaine (XYLOCAINE) 5 % Oint ointment  Apply topically 4 (four) times daily. Apply to entire left foot             metoprolol succinate (TOPROL-XL) 25 MG 24 hr tablet  Take 0.5 tablets (12.5 mg total) by mouth once daily.             nicotine (NICODERM CQ) 21 mg/24 hr  Place 1 patch onto the skin once daily.             oxyCODONE (ROXICODONE) 10 mg Tab immediate release tablet  Take 1 tablet (10 mg total) by mouth every 4 (four) hours as needed for Pain.             polyethylene glycol (GLYCOLAX) 17 gram PwPk  Take 17 g by mouth 2 (two) times daily as needed (constipation).             pregabalin (LYRICA) 100 MG capsule  Take 1 capsule (100 mg  total) by mouth 3 (three) times daily.             ranolazine (RANEXA) 1,000 mg Tb12  Take 1,000 mg by mouth 2 (two) times daily.             senna-docusate 8.6-50 mg (PERICOLACE) 8.6-50 mg per tablet  Take 1 tablet by mouth 2 (two) times daily.             thiamine 100 MG tablet  Take 1 tablet (100 mg total) by mouth once daily.             ticagrelor (BRILINTA) 90 mg tablet  Take 90 mg by mouth 2 (two) times daily.             zolpidem (AMBIEN) 5 MG Tab  Take 5 mg by mouth nightly as needed for Insomnia.                   Zaid García MD  04/10/2018

## 2018-04-10 NOTE — ASSESSMENT & PLAN NOTE
-Cardiology, Podiatry, and Vascular Surgery consulted  -Underwent angiogram 3/19 that showed occlusion of aorta-fem bypass at distal anastamosis. Unable to treat due to patient movement   -s/p cath directed tPA to occluded L SFA  -subsequently with acute limb ischemia in RLE resulting in R AKA on 3/27  -L TMA on 4/5 that still appears gangrenous  -Discussed L foot necrosis with both Vascular Surgery and Podiatry. Vascular service does not feel that BKA is warranted at this time  -Podiatry following wound   -NWB RLE; heel touch only LLE    Recommendations  -  Early mobility, hip AROM, and OOB in chair at least 3 hours per day  -  PT/OT evaluate and treat  -  Monitor for phantom limb pain and/or sensation  -  Pain management  -  Wound care and edema control  -  Monitor for and prevent skin breakdown and pressure ulcers  · Early mobility, repositioning/weight shifting every 20-30 minutes when sitting, turn patient every 2 hours, proper mattress/overlay and chair cushioning, pressure relief/heel protector boots  -  Anticontracture management  · Firm mattress, lying prone 15 minutes TID, and knee extension while resting  -  Reviewed discharge options (IP rehab, SNF, HH therapy, and OP therapy)    -  Follow up in PM&R clinic 2-4 weeks post-op for postamputation and preprosthetic care

## 2018-04-10 NOTE — ASSESSMENT & PLAN NOTE
46 yo M with h/o HTN, HLD, HFrEF (10-15% 3/23/18) s/p ICD, CAD s/p PCI, EtOH abuse, PAD s/p ABF 1999 with multiple endovascular interventions presented to hospital on 3/18 with complaints of left foot rest pain s/p lysis now with biphasic left PT signal and L forefoot ischemia as well as RLE ischemia s/p R AKA 3/27/18 and LLE TMA 4/5/18    Will monitor R AKA stump; continue elevating and wrapping  PT/OT; NWB RLE; heel touch only LLE  Keep sutures in right AKA stump, will remove in clinic    Please contact vascular surgery with any questions or concerns

## 2018-04-10 NOTE — NURSING
Report given to LILLIAM Georges in [facility], pt stable, no further complaints., pt awaiting transportation.

## 2018-04-10 NOTE — ASSESSMENT & PLAN NOTE
- continue trending down   -s/p 7 day course of abx for possible LE cellulitis  -CT abd without source of infection  - Inflammatory response related to necrosis/recent amputation vs cellulitis vs other infxs source  - Restarted on van and ceftriaxone (started 3/25/18) for elevated wbc   - ID consulted, switched Ceftriaxone to Unasyn  (started 3/25/18), switched to Zosyn for failure to improve 3/30. Also on vancomycin.  - Final ID recs: Vanc and Zosyn x48h from wound Cx and if no growth on clean margin cultures by that time then can DC abx   - Cultures from wound margin with NGTD x 5 days   - Abx discontinued 4/8

## 2018-04-10 NOTE — PLAN OF CARE
Pt accepted to [facility] as per Farzad in admissions.  SW spoke with [facility] [place] and gave her information to call report to Destini at [phone].  WC Van transport arranged through Cecy at \[facility]\"" ([phone]) for 530pm.  Transport packet printed and brought to 5th floor and Rx placed in chart.          Breanne Salcedo LMSW

## 2018-04-10 NOTE — PROGRESS NOTES
[facility]  Vascular Surgery  Progress Note    Patient Name: Leonardo Byrd  MRN: [id number]  Admission Date: 3/18/2018  Primary Care Provider: Indio Aquino MD    Subjective:     Interval History: NAEON, pain well controlled, afebrile, HDS    Post-Op Info:  Procedure(s) (LRB):  AMPUTATION-FOOT Transmetatarsal  Request popliteal saphenous nerve block per anesthesia (Left)   5 Days Post-Op       Medications:  Continuous Infusions:  Scheduled Meds:   amitriptyline  50 mg Oral QHS    aspirin  81 mg Oral Daily    atorvastatin  40 mg Oral QHS    enoxaparin  40 mg Subcutaneous Daily    folic acid-vit B6-vit B12 2.5-25-2 mg  1 tablet Per OG tube Daily    gabapentin  800 mg Oral QHS    lidocaine   Topical (Top) QID    metoprolol succinate  12.5 mg Oral Daily    nicotine  1 patch Transdermal Daily    pregabalin  100 mg Oral TID    senna-docusate 8.6-50 mg  1 tablet Oral BID    thiamine  100 mg Oral Daily    ticagrelor  90 mg Oral BID     PRN Meds:cyclobenzaprine, nitroGLYCERIN, oxyCODONE, oxyCODONE, oxyCODONE-acetaminophen, polyethylene glycol, sodium chloride 0.9%, zolpidem     Objective:     Vital Signs (Most Recent):  Temp: 98.5 °F (36.9 °C) (04/09/18 2330)  Pulse: (!) 115 (04/09/18 2330)  Resp: 18 (04/09/18 2330)  BP: (!) 110/58 (04/09/18 2330)  SpO2: 95 % (04/09/18 2330) Vital Signs (24h Range):  Temp:  [98.3 °F (36.8 °C)-99.6 °F (37.6 °C)] 98.5 °F (36.9 °C)  Pulse:  [] 115  Resp:  [15-18] 18  SpO2:  [92 %-99 %] 95 %  BP: ()/(52-60) 110/58          Physical Exam   Constitutional: He is oriented to person, place, and time. He appears well-developed.   Cardiovascular: Normal rate and regular rhythm.    Biphasic Left PT   Pulmonary/Chest: Effort normal. No respiratory distress.   Abdominal: Soft.   Musculoskeletal:   Mild ruby-incisional ischemia of R AKA medial incision; no hematoma R AKA stump, sutures intact  Left foot s/p TMA, some ulceration at incision   Neurological: He  is alert and oriented to person, place, and time.   Skin: Skin is warm.       Significant Labs:   CBC:   Recent Labs  Lab 04/10/18  0405   WBC 11.40   RBC 2.67*   HGB 8.1*   HCT 25.5*   *   MCV 96   MCH 30.3   MCHC 31.8*     CMP:   Recent Labs  Lab 04/10/18  0405   GLU 90   CALCIUM 9.0   ALBUMIN 2.3*   PROT 7.1      K 3.9   CO2 25   CL 99   BUN 8   CREATININE 0.9   ALKPHOS 111   ALT 57*   AST 70*   BILITOT 0.4       Significant Diagnostics:  I have reviewed all pertinent imaging results/findings within the past 24 hours.    Assessment/Plan:     Limb ischemia    48 yo M with h/o HTN, HLD, HFrEF (10-15% 3/23/18) s/p ICD, CAD s/p PCI, EtOH abuse, PAD s/p ABF 1999 with multiple endovascular interventions presented to hospital on 3/18 with complaints of left foot rest pain s/p lysis now with biphasic left PT signal and L forefoot ischemia as well as RLE ischemia s/p R AKA 3/27/18 and LLE TMA 4/5/18    Will monitor R AKA stump; continue elevating and wrapping  PT/OT; NWB RLE; heel touch only LLE  Keep sutures in right AKA stump, will remove in clinic    Please contact vascular surgery with any questions or concerns            Mathieu Shelton MD  Vascular Surgery  [facility]

## 2018-04-10 NOTE — DISCHARGE SUMMARY
[facility] Hospital Medicine  Discharge Summary      Patient Name: Leonardo Byrd  MRN: [id number]  Admission Date: 3/18/2018  Hospital Length of Stay: 23 days  Discharge Date and Time:  04/10/2018 10:27 AM  Attending Physician: Janis Santamaria MD   Discharging Provider: Zaid García MD  Primary Care Provider: Indio Aquino MD  [facility] Medicine Team: [facility] HOSP MED 1 Zaid García MD    HPI:   46 yo former smoker with history of ICM (LVEF 30-35%) s/p ICD, CAD s/p PCI, HTN, HLD, PAD (s/p aortobifemoral bypass, L SFA and pop PTAS in September and recent R SFA and R pop 3/8 by Dr. Carl Bell). Patient states left foot pain has started in September 2017 and had PTAs to left LFA and left popliteal artery here. Since then he continued to have pain in his left foot and to a less degree, leg. The pain has been progressively worse since. Pain is a burning sharp pain that is elicited by slight touch. Pain is worsened with bearing weight and is improved with vibrating massager to the plantar aspect of the foot. There is associated numbness and coldness to his foot. He denies erythema or swelling of his left foot.    Procedure(s) (LRB):  AMPUTATION-FOOT Transmetatarsal  Request popliteal saphenous nerve block per anesthesia (Left)      Hospital Course:   Admitted to CCU 3/20 after cath directed tPA was performed by int mansi under general anesthesia. Also with DTs requiring scheduled benzos. On day 2 Lt lower extremity remained pulseless, so thrombolysis still infusing. On day 4 of vanc for possible lt foot cellulitis. Transitioned to clinda IV (unable to take PO safely). Intubated overnight for airway protection in setting of increasing benzo requirements and worsening agitation and danger of pt pulling line. CXR this AM (3/22) with patchy bilateral infiltrates. Taken to cath lab for possible cath removal. Lt leg then reportedly duskier and with absent pulses. On 3/23 taken back to cath lab. Int  "cards Recc Podiatry consult for possible L trans-metatarsal amputation. On heparin gtt. 3/24 Wbc trending up, foot color getting darker,paitent extubated, continue heparin. 3/25: US lower ext showing occlusion of distal right superficial femoral, popliteal, anterior tibial, and posterior tibial arteries. No "dopplerable" pulses on rt foot. Interventional cards holding off on on acute intervention currently in setting of RYAN. Will continue to monitor. Cr 1.7 (baseline 0.6). Given 500cc Iv fluids. Started on scheduled librium for alcohol withdrawal, with plans for taper.     -3/26/18 NAEON. Pt. With elevated WBC. On vanc and ceftriaxone. ID consulted. Pain management for BLE ischemia   -3/27: NAEON. BLE pain improved after pain management adjustment. Vascular surgery evaluated pt and planning for Right AKA today.   - 3/28 febrile last night. Blood culture sent. WBC elevated then trending down. Complains of BLE pain controlled with pain meds. S/p Right AKA on 3/27/18  -3-29: NAEON. Pain controlled with medications. Vascular and ID on board. Pt. Is high risk of left amputation. May ultimately require amputation on the left as well. Continue on vanc and Unasyn for now   - 3/30: NAEON. Pt afebrile. BLE pain with controlled with pain meds. Today abdomen slightly distended. CT abdomen ordered stat. If pt decompensate, will broaden abx from Unasyn to zosyn   - 4/1: NAEON. Pt afebrile. BLE pain controlled with pain meds. WBC trending down after switching Unasyn to Zosyn. Left toes gangrene continue to demarcates. Vascular following, likely will need amputation. received 1 PRBC for Hg 6.9   - 4/2: Stepped down to hospital medicine. Modified pain regimen to PO with IV for breakthrough. Awaiting vascular recommendations for L foot  - 4/3: Blood pressures low, am meds held. Bolused 250cc NS for SBP ~80 and discontinued lisinopril 5 mg daily  - 4/4: Continuing IV abx, plan to OR tomorrow per podiatry and awaiting vascular recs  - " 4/5: OR today for TMA  - 4/6: TTE for murmur evaluation. EF 15% with new mild-moderate mitral regurgitation without structural abnormalities  - 4/7-8: No significant events  4/9 Necrotic area near site of TMA with clear demarcation. Discussed with Interventional Cardiology who feel further intervention unlikely to achieve revascularization. Vascular Surgery and Podiatry who feel no further surgical intervention is indicated during this hospital admission.   04/10/2018 Patient accepted to [facility] in [place] per his preference. Discharged with follow up with Vascular Surgery and [facility] for work up for possible hyperbaric oxygen therapy.     Physical Exam  Constitutional: He appears well-developed.   Cardiovascular: Normal rate and regular rhythm.    Murmur heard.   Systolic (Holosystolic radiating to axilla) murmur is present   Pulmonary/Chest: Effort normal. No respiratory distress.   Abdominal: Soft. Bowel sounds are normal. There is no tenderness.   Musculoskeletal:   R AKA  L TMA, dressing c/d/i   Neurological: Somnolent, arouses to voice.   Speech mumbled   Skin: Skin is warm.       Consults:   Consults         Status Ordering Provider     Inpatient consult to Cardiology  Once     Provider:  (Not yet assigned)    Completed DARNELL RICO     Inpatient consult to Infectious Diseases  Once     Provider:  (Not yet assigned)    Completed VJ GUZMAN     Inpatient consult to Interventional Cardiology  Once     Provider:  (Not yet assigned)    Completed LUZ ELENA WEN     Inpatient consult to Pain Management  Once     Provider:  (Not yet assigned)    Completed VJ GUZMAN     Inpatient consult to Physical Medicine Rehab  Once     Provider:  (Not yet assigned)    Completed ALEJANDRO HERMOSILLO     Inpatient consult to PICC team ([facility])  Once     Provider:  (Not yet assigned)    Completed AAKASH EDEN     Inpatient consult to Podiatry  Once     Provider:  (Not yet assigned)     Completed MIRNA GARCIA     Inpatient consult to Vascular Surgery  Once     Provider:  (Not yet assigned)    Completed CARRIE MALONEY          * Critical lower limb ischemia    Mr. Byrd is a 47 year old man with CAD s/p PCI, LVEF 30% s/p ICD, complicated and extensive PAD s/p aortofem bypass and multiple stents on DAPT who presents with what appears to be CLI of his left foot of unclear duration.      --Underwent angiogram 3/19 that showed occlusion of aorta-fem bypass at distal anastamosis. Unable to treat due to patient movement   --s/p cath directed tPA to occluded L SFA  --blood cx ngtd  --subsequently with acute limb ischemia in RLE resulting in R AKA on 3/27  --continue ASA/brilinta. No longer requires heparin gtt post-op. Started DVT ppx with lovenox.  --Podiatry and vascular following for necrotic L foot, well defined necrotic demarcation  --L TMA on 4/5. Foot still appears gangrenous.   - Podiatry requesting interventional cardiology consult for potential revascularization   - Per IC, intervention unlikely to achieve revascularization and therefore not warranted  - Discussed L foot necrosis with both Vascular Surgery and Podiatry. Vascular service does not feel that BKA is warranted at this time; therefore, Podiatry will also continue to observe wound.   - Discharged to [facility] with Vascular Surgery follow up as well as referral to Wound Care clinic for evaluation for hyperbaric oxygen therapy        Leukocytosis    - continue trending down   -s/p 7 day course of abx for possible LE cellulitis  -CT abd without source of infection  - Inflammatory response related to necrosis/recent amputation vs cellulitis vs other infxs source  - Restarted on van and ceftriaxone (started 3/25/18) for elevated wbc   - ID consulted, switched Ceftriaxone to Unasyn  (started 3/25/18), switched to Zosyn for failure to improve 3/30. Also on vancomycin.  - Final ID recs: Vanc and Zosyn x48h from wound Cx and if no  growth on clean margin cultures by that time then can DC abx   - Cultures from wound margin with NGTD x 5 days   - Abx discontinued 4/8        Ischemic cardiomyopathy    CM possibly with multifactorial etiology (hx of alcohol use, ischemia)  - CAD s/p PCI  - DAPT, statin, BB  - Holding lisinopril for intermittent hypotension, will resume on discharge        PVD (peripheral vascular disease)    - Per critical lower limb ischemia  - Per Interventional Cardiology, no further intervention likely to benefit patient          Alcohol abuse    -s/p delirium tremens  -no seizure activity  -s/p BZD taper  -continue thiamine  -mental status improved        Chronic systolic heart failure    LVEF 10-15% s/p ICD  - Continue BB  - Holding lisinopril for tenuous BPs  - TTE on 4/6 for new murmur: EF 15% and mild-moderate mitral regurgitation without vegetation or structural abnormality.        Tobacco abuse    -nicotine patch          Final Active Diagnoses:    Diagnosis Date Noted POA    PRINCIPAL PROBLEM:  Critical lower limb ischemia [I99.8] 03/18/2018 Yes     Chronic    Limb ischemia [I99.8] 03/27/2018 Yes    Leukocytosis [D72.829] 03/25/2018 Yes    Gangrene [I96] 03/23/2018 Yes    Atherosclerosis of native artery of left lower extremity with intermittent claudication [I70.212] 03/18/2018 Yes    Ischemic cardiomyopathy [I25.5] 03/18/2018 Yes    PVD (peripheral vascular disease) [I73.9] 03/18/2018 Yes    Neuropathy due to peripheral vascular disease [I73.9, G63] 10/03/2017 Yes    Alcohol abuse [F10.10] 09/27/2017 Yes    Chronic systolic heart failure [I50.22] 09/19/2017 Yes    Tobacco abuse [Z72.0] 02/27/2017 Yes      Problems Resolved During this Admission:    Diagnosis Date Noted Date Resolved POA    RYAN (acute kidney injury) [N17.9] 03/25/2018 03/28/2018 No    Acute renal failure with tubular necrosis [N17.0] 03/25/2018 03/30/2018 No    Acute respiratory failure with hypoxia [J96.01] 03/22/2018 03/28/2018 No     Alcohol withdrawal syndrome, with delirium [F10.231] 03/21/2018 03/30/2018 Yes    Cellulitis of left lower extremity [L03.116] 03/18/2018 03/31/2018 Yes    PAD (peripheral artery disease) [I73.9] 08/02/2017 03/31/2018 Yes       Discharged Condition: fair    Disposition: Skilled Nursing Facility    Follow Up:  Follow-up Information     Indio Aquino MD.    Specialty:  Cardiology  Contact information:  [address] [facility]  CATH LAB  [place] [address]  [phone]             Norris Trejo MD In 1 month.    Specialty:  Vascular Surgery  Why:  With LLE art U/S  Contact information:  [address]  [facility] [address]  [phone]                 Patient Instructions:     VAS US Arterial Leg Left   Standing Status: Future  Standing Exp. Date: 04/10/19     Ambulatory consult to Wound Clinic   Referral Priority: Routine Referral Type: Consultation   Referral Reason: Specialty Services Required    Requested Specialty: Wound Care    Number of Visits Requested: 1      Diet Cardiac     Weight bearing restrictions (specify)   Order Comments: NWB RLE; heel touch only LLE     Notify your health care provider if you experience any of the following:  temperature >100.4     Notify your health care provider if you experience any of the following:  persistent nausea and vomiting or diarrhea     Notify your health care provider if you experience any of the following:  severe uncontrolled pain     Notify your health care provider if you experience any of the following:  redness, tenderness, or signs of infection (pain, swelling, redness, odor or green/yellow discharge around incision site)     Notify your health care provider if you experience any of the following:  difficulty breathing or increased cough     Notify your health care provider if you experience any of the following:  severe persistent headache     Notify your health care provider if you experience any of the following:  persistent dizziness, light-headedness, or  visual disturbances     Notify your health care provider if you experience any of the following:  increased confusion or weakness         Significant Diagnostic Studies: Labs: All labs within the past 24 hours have been reviewed    Pending Diagnostic Studies:     Procedure Component Value Units Date/Time    Anti-Xa Heparin Monitoring [[id number]] Collected:  03/27/18 1530    Order Status:  Sent Lab Status:  In process Updated:  03/27/18 1530    Specimen:  Blood from Blood     CBC auto differential [[id number]] Collected:  03/27/18 1642    Order Status:  Sent Lab Status:  In process Updated:  03/27/18 1643    Specimen:  Blood from Blood          Medications:  Reconciled Home Medications:      Medication List      START taking these medications    folic acid-vit B6-vit B12 2.5-25-2 mg 2.5-25-2 mg Tab  Commonly known as:  FOLBIC or Equiv  Take 1 tablet by mouth once daily.     lidocaine 5 % Oint ointment  Commonly known as:  XYLOCAINE  Apply topically 4 (four) times daily. Apply to entire left foot     nicotine 21 mg/24 hr  Commonly known as:  NICODERM CQ  Place 1 patch onto the skin once daily.     oxyCODONE 10 mg Tab immediate release tablet  Commonly known as:  ROXICODONE  Take 1 tablet (10 mg total) by mouth every 4 (four) hours as needed for Pain.     polyethylene glycol 17 gram Pwpk  Commonly known as:  GLYCOLAX  Take 17 g by mouth 2 (two) times daily as needed (constipation).     senna-docusate 8.6-50 mg 8.6-50 mg per tablet  Commonly known as:  PERICOLACE  Take 1 tablet by mouth 2 (two) times daily.     thiamine 100 MG tablet  Take 1 tablet (100 mg total) by mouth once daily.        CHANGE how you take these medications    amitriptyline 50 MG tablet  Commonly known as:  ELAVIL  Take 1 tablet (50 mg total) by mouth every evening.  What changed:  · medication strength  · how much to take     * atorvastatin 80 MG tablet  Commonly known as:  LIPITOR  Take 1 tablet (80 mg total) by mouth every evening.  What  changed:  · medication strength  · how much to take     * atorvastatin 40 MG tablet  Commonly known as:  LIPITOR  Take 2 tablets (80 mg total) by mouth every evening.  What changed:  You were already taking a medication with the same name, and this prescription was added. Make sure you understand how and when to take each.     metoprolol succinate 25 MG 24 hr tablet  Commonly known as:  TOPROL-XL  Take 0.5 tablets (12.5 mg total) by mouth once daily.  What changed:  · how much to take  · when to take this        * This list has 2 medication(s) that are the same as other medications prescribed for you. Read the directions carefully, and ask your doctor or other care provider to review them with you.            CONTINUE taking these medications    aspirin 81 MG EC tablet  Commonly known as:  ECOTRIN     BRILINTA 90 mg tablet  Generic drug:  ticagrelor     cilostazol 50 MG Tab  Commonly known as:  PLETAL  Take 2 tablets (100 mg total) by mouth 2 (two) times daily.     cyclobenzaprine 10 MG tablet  Commonly known as:  FLEXERIL     gabapentin 800 MG tablet  Commonly known as:  NEURONTIN     pregabalin 100 MG capsule  Commonly known as:  LYRICA  Take 1 capsule (100 mg total) by mouth 3 (three) times daily.     RANEXA 1,000 mg Tb12  Generic drug:  ranolazine     zolpidem 5 MG Tab  Commonly known as:  AMBIEN        STOP taking these medications    hydrocodone-acetaminophen 10-325mg  mg Tab  Commonly known as:  NORCO     lisinopril 5 MG tablet  Commonly known as:  PRINIVIL,ZESTRIL     traMADol 50 mg tablet  Commonly known as:  ULTRAM           Where to Get Your Medications      These medications were sent to [facility]/pharmacy #[id number] - [place] - [address]  [address] [address]    Hours:  24-hours Phone:  [phone]   · atorvastatin 80 MG tablet     You can get these medications from any pharmacy    Bring a paper prescription for each of these medications  · oxyCODONE 10 mg Tab immediate release  tablet  You don't need a prescription for these medications  · nicotine 21 mg/24 hr  · senna-docusate 8.6-50 mg 8.6-50 mg per tablet  · thiamine 100 MG tablet     Information about where to get these medications is not yet available    Ask your nurse or doctor about these medications  · amitriptyline 50 MG tablet  · atorvastatin 40 MG tablet  · folic acid-vit B6-vit B12 2.5-25-2 mg 2.5-25-2 mg Tab  · lidocaine 5 % Oint ointment  · metoprolol succinate 25 MG 24 hr tablet  · polyethylene glycol 17 gram Pwpk         Indwelling Lines/Drains at time of discharge:   Lines/Drains/Airways     Epidural Line                 Perineural Analgesia/Anesthesia Assessment (using dermatomes) Epidural 04/05/18 1115 4 days                Time spent on the discharge of patient: 40 minutes  Patient was seen and examined on the date of discharge and determined to be suitable for discharge.         Zaid García MD  Department of Hospital Medicine  [facility]

## 2018-04-10 NOTE — SUBJECTIVE & OBJECTIVE
Interval History 4/10/2018:  Patient is seen for follow-up rehab evaluation and recommendations: Participating with therapy.     HPI, Past Medical, Family, and Social History remains the same as documented in the initial encounter.    Scheduled Medications:    amitriptyline  50 mg Oral QHS    aspirin  81 mg Oral Daily    atorvastatin  80 mg Oral QHS    enoxaparin  40 mg Subcutaneous Daily    folic acid-vit B6-vit B12 2.5-25-2 mg  1 tablet Per OG tube Daily    gabapentin  800 mg Oral QHS    lidocaine   Topical (Top) QID    metoprolol succinate  12.5 mg Oral Daily    nicotine  1 patch Transdermal Daily    pregabalin  100 mg Oral TID    senna-docusate 8.6-50 mg  1 tablet Oral BID    thiamine  100 mg Oral Daily    ticagrelor  90 mg Oral BID       Diagnostic Results: Labs: Reviewed    PRN Medications: cyclobenzaprine, nitroGLYCERIN, oxyCODONE, oxyCODONE, oxyCODONE-acetaminophen, polyethylene glycol, sodium chloride 0.9%, zolpidem    Review of Systems   Constitutional: Negative for chills, fatigue and fever.   HENT: Negative for trouble swallowing and voice change.    Eyes: Negative for photophobia and visual disturbance.   Respiratory: Negative for cough, shortness of breath and wheezing.    Cardiovascular: Negative for chest pain and palpitations.   Gastrointestinal: Negative for abdominal distention, nausea and vomiting.   Genitourinary: Negative for difficulty urinating and flank pain.   Musculoskeletal: Positive for arthralgias, gait problem and myalgias.   Skin: Negative for color change and rash.   Neurological: Positive for weakness. Negative for speech difficulty, numbness and headaches.   Psychiatric/Behavioral: Negative for agitation and confusion.     Objective:     Vital Signs (Most Recent):  Temp: 97.4 °F (36.3 °C) (04/10/18 0758)  Pulse: 104 (04/10/18 0800)  Resp: 18 (04/10/18 0800)  BP: (!) 91/50 (04/10/18 0758)  SpO2: 96 % (04/10/18 0800)    Vital Signs (24h Range):  Temp:  [97.4 °F (36.3  °C)-99.6 °F (37.6 °C)] 97.4 °F (36.3 °C)  Pulse:  [104-122] 104  Resp:  [15-18] 18  SpO2:  [92 %-99 %] 96 %  BP: ()/(50-58) 91/50     Physical Exam   Constitutional: He is oriented to person, place, and time. He appears well-developed and well-nourished.   HENT:   Head: Normocephalic and atraumatic.   Eyes: EOM are normal. Pupils are equal, round, and reactive to light.   Neck: Normal range of motion.   Cardiovascular: Normal rate and regular rhythm.    Pulmonary/Chest: Effort normal. No respiratory distress.   Abdominal: Soft. There is no tenderness.   Musculoskeletal: He exhibits deformity.        Right hip: He exhibits decreased range of motion and decreased strength.        Left hip: Normal.        Right knee: Tenderness found.        Left knee: Normal.        Left ankle: Tenderness.   S/p L MTA, foot wrapped  S/P R AKA, incision dry    Neurological: He is alert and oriented to person, place, and time. He exhibits normal muscle tone.   RUE: 5/5.  LUE: 5/5.  RLE: 3+/5 (proximal).  LLE: 4/5 (proximal).     Skin: Skin is warm and dry.   Psychiatric: He has a normal mood and affect. His behavior is normal.     NEUROLOGICAL EXAMINATION:     MENTAL STATUS   Oriented to person, place, and time.     CRANIAL NERVES     CN III, IV, VI   Pupils are equal, round, and reactive to light.  Extraocular motions are normal.

## 2018-04-10 NOTE — ASSESSMENT & PLAN NOTE
CM possibly with multifactorial etiology (hx of alcohol use, ischemia)  - CAD s/p PCI  - DAPT, statin, BB  - Holding lisinopril for intermittent hypotension, will resume on discharge

## 2018-04-10 NOTE — PLAN OF CARE
Problem: SLP Goal  Goal: SLP Goal  Speech Therapy Short Term Goals  Goal expected to be met by 4/7  1. Pt will tolerate puree diet and thin liquids with no overt s/s of aspiration noted.   2. Patient will tolerate dental soft and solid trials x10 with adequate oral clearance and no overt s/s of aspiration.      Outcome: Ongoing (interventions implemented as appropriate)  Pt tolerating thin liquids w/o overt S/S aspiration.  Pt declining trials of regular solids. See note for full details. Thank you.    JOVAN Fields., [facility]-SLP  Speech-Language Pathology  Pager: [phone]  4/10/2018

## 2018-04-10 NOTE — PT/OT/SLP PROGRESS
"Speech Language Pathology Treatment    Patient Name:  Leonardo Byrd   MRN:  [id number]  Admitting Diagnosis: Critical lower limb ischemia    Recommendations:                 General Recommendations:  Dysphagia therapy  Diet recommendations:  Puree, Liquid Diet Level: Thin   Aspiration Precautions: 1 bite/sip at a time, Alternating bites/sips, Avoid talking while eating, Eliminate distractions, Feed only when awake/alert, Meds crushed in puree, Monitor for s/s of aspiration and Strict aspiration precautions   General Precautions: Standard, aspiration, fall  Communication strategies:  none    Subjective     Pt presents awake and alert  He explains, "I'm on the phone can you come back?" upon first attempt  Upon second attempt, he explains "I am ready to get this [d/c] moving"  He denies pain  He denies difficulty with meals    Pain/Comfort:  · Pain Rating 1: 0/10  · Pain Rating Post-Intervention 1: 0/10    Objective:     Has the patient been evaluated by SLP for swallowing?   Yes  Keep patient NPO? No   Current Respiratory Status: room air      Pt seen for ongoing swallow assessment to determine safety of PO upgrade. Patient found awake in bed. Pt declining PO trials with ST. Pt with clear vocal quality, moderately reduced ntensity of speech and mild-moderate Dysarthria of speech.  Lingual/Labial strength/coordination  WFL upon review of the oral mechanism.  Patient reports fair appetite. Patient sit self up in bed and takes sips of thin liquids x3 (from bottle of dr. Posada at bedside) as session progresses. No overt S/S aspiration with self-fed single sips thin liquids. Patient declines trials of solid items. SLP educates Pt on SLP role, safe swallow precautions and S/S aspiration. Patient verbalizes understanding and explains he does not feel he has any difficulty with meals. No further questions. Whiteboard current.     Assessment:     Leonardo Byrd is a 47 y.o. male with an SLP diagnosis of Dysphagia.  He presents " with no overt S/S aspiration with thin liquids today.  Patient declining trials of solids today to assess safety/feasibiliity of PO upgrade.     Goals:    SLP Goals        Problem: SLP Goal    Goal Priority Disciplines Outcome   SLP Goal     SLP Ongoing (interventions implemented as appropriate)   Description:  Speech Therapy Short Term Goals  Goal expected to be met by 4/7  1. Pt will tolerate puree diet and thin liquids with no overt s/s of aspiration noted.   2. Patient will tolerate dental soft and solid trials x10 with adequate oral clearance and no overt s/s of aspiration.                       Plan:     · Patient to be seen:  4 x/week   · Plan of Care expires:  04/28/18  · Plan of Care reviewed with:  patient   · SLP Follow-Up:  Yes       Discharge recommendations:  nursing facility, skilled   Barriers to Discharge:  None    Time Tracking:     SLP Treatment Date:   04/10/18  Speech Start Time:  1600  Speech Stop Time:  1608     Speech Total Time (min):  8 min    Billable Minutes: Treatment Swallowing Dysfunction 8    JOVAN Fields., CCC-SLP  Speech-Language Pathology  Pager: [phone]      04/10/2018

## 2018-04-11 NOTE — ASSESSMENT & PLAN NOTE
S/p left TMA and wound debridement (DOS 4/5 per Dr. Salvador).  Incision site with necrotic changes, beginning to demarcate,  No signs of infection.    -Dressed with betadine, xeroform, 4x4's, kerlix, cast padding, and loosely wrapped ace  - Discussed case with Primary team. IC states no further intervention. No further intervention per vascular surgery. Will continue to monitor surgical site.   - Recommend workup for Hyperbaric oxygen therapy HBOT. F/u at [place] wound care center for HBOT eval and wound care.     -Podiatry will follow.    Upon Discharge patient to follow up at wound care center in [place] within one week of discharge.  Dressings to remain clean dry and intact till follow up.    Weight bearing status: NWB left foot, DARCO shoe   Offloading device: heel protector boot

## 2018-04-11 NOTE — SUBJECTIVE & OBJECTIVE
Interval History: NAEON.    Review of Systems  Objective:     Vital Signs (Most Recent):  Temp: 97.8 °F (36.6 °C) (04/11/18 0417)  Pulse: (!) 112 (04/11/18 0417)  Resp: 16 (04/11/18 0417)  BP: 96/65 (04/11/18 0417)  SpO2: (!) 92 % (04/11/18 0417) Vital Signs (24h Range):  Temp:  [97.4 °F (36.3 °C)-99.2 °F (37.3 °C)] 97.8 °F (36.6 °C)  Pulse:  [101-116] 112  Resp:  [15-18] 16  SpO2:  [92 %-99 %] 92 %  BP: ()/(50-65) 96/65     Weight: 66.2 kg (145 lb 15.1 oz)  Body mass index is 22.85 kg/m².    Intake/Output Summary (Last 24 hours) at 04/11/18 0739  Last data filed at 04/10/18 2200   Gross per 24 hour   Intake              950 ml   Output             1900 ml   Net             -950 ml      Physical Exam   Constitutional: He appears well-developed. No distress.   HENT:   Head: Normocephalic and atraumatic.   Eyes: Conjunctivae are normal. No scleral icterus.   Neck: Normal range of motion.   Cardiovascular: Normal rate and regular rhythm.    Murmur heard.   Systolic (Holosystolic radiating to axilla) murmur is present   Pulmonary/Chest: Effort normal. No respiratory distress.   Abdominal: Soft. Bowel sounds are normal. There is no tenderness.   Musculoskeletal:   R BKA  L TMA, dressing c/d/i   Neurological:   Somnolent   Skin: Skin is warm.       Significant Labs: All pertinent labs within the past 24 hours have been reviewed.    Significant Imaging: I have reviewed and interpreted all pertinent imaging results/findings within the past 24 hours.

## 2018-04-11 NOTE — PROGRESS NOTES
Destini Ogden called and said that [facility] can not except this patient because it is too late and they would not be able to get his medication.

## 2018-04-11 NOTE — PROGRESS NOTES
[facility]  Podiatry  Progress Note    Patient Name: Leonardo Byrd  MRN: [id number]  Admission Date: 3/18/2018  Hospital Length of Stay: 24 days  Attending Physician: Janis Santamaria MD  Primary Care Provider: Indio Aquino MD   Interval Hx: s/p left TMA and wound debridement (DOS 4/5 per Dr. Salvador). S/p R AKA per vascular surgery.  Patient denies F/C/N/V.  Bandage intact left foot.  Complains of pain to left foot     Scheduled Meds:   amitriptyline  50 mg Oral QHS    aspirin  81 mg Oral Daily    atorvastatin  80 mg Oral QHS    enoxaparin  40 mg Subcutaneous Daily    folic acid-vit B6-vit B12 2.5-25-2 mg  1 tablet Per OG tube Daily    gabapentin  800 mg Oral QHS    lidocaine   Topical (Top) QID    metoprolol succinate  12.5 mg Oral Daily    nicotine  1 patch Transdermal Daily    pregabalin  100 mg Oral TID    senna-docusate 8.6-50 mg  1 tablet Oral BID    thiamine  100 mg Oral Daily    ticagrelor  90 mg Oral BID     Continuous Infusions:    PRN Meds:cyclobenzaprine, nitroGLYCERIN, oxyCODONE, oxyCODONE, oxyCODONE-acetaminophen, polyethylene glycol, sodium chloride 0.9%, zolpidem    Review of patient's allergies indicates:  No Known Allergies     Past Medical History:   Diagnosis Date    AICD (automatic cardioverter/defibrillator) present     RYAN (acute kidney injury) 3/25/2018    CHF (congestive heart failure)     Coronary artery disease     Encounter for blood transfusion     Hyperlipemia     Hypertension     MI (myocardial infarction)     Neuropathy     PAD (peripheral artery disease)     PVD (peripheral vascular disease)      Past Surgical History:   Procedure Laterality Date    AMPUTATION Right     great toe    BYBPASS GRAFT AORTOBIUFEMORAL      CARDIAC DEFIBRILLATOR PLACEMENT      coronary stents      EXPLORATION AND EVaCUATION OF HEMATOMA OF UPPER EXTREMITY Left     KNEE ARTHROPLASTY Left     VASCULAR SURGERY      fem-pop bypass       Family History     None         Social History Main Topics    Smoking status: Former Smoker     Packs/day: 0.50     Quit date: 02/2018    Smokeless tobacco: Former User     Types: Chew     Quit date: 8/2/1980      Comment: Uses nicotine gum and nicotine patches    Alcohol use Yes      Comment: rarely    Drug use: No    Sexual activity: Yes     Partners: Female     Review of Systems   Constitutional: Negative.    Respiratory: Negative.    Cardiovascular: Negative.    Gastrointestinal: Negative.    Genitourinary: Negative.    Musculoskeletal: Positive for arthralgias.   Skin: Positive for wound.   Psychiatric/Behavioral: Negative.      Objective:     Vital Signs (Most Recent):  Temp: 98.1 °F (36.7 °C) (04/11/18 1205)  Pulse: 107 (04/11/18 1205)  Resp: 16 (04/11/18 1205)  BP: (!) 90/55 (04/11/18 1205)  SpO2: 95 % (04/11/18 1205) Vital Signs (24h Range):  Temp:  [97.1 °F (36.2 °C)-99.2 °F (37.3 °C)] 98.1 °F (36.7 °C)  Pulse:  [101-116] 107  Resp:  [15-18] 16  SpO2:  [92 %-99 %] 95 %  BP: ()/(53-72) 90/55     Weight: 66.2 kg (145 lb 15.1 oz)  Body mass index is 22.85 kg/m².    Foot Exam    General  Orientation: alert and oriented to person, place, and time       Right Foot/Ankle     Comments  R AKA    Left Foot/Ankle      Inspection and Palpation  Ecchymosis: none  Tenderness: none   Swelling: none   Arch: normal  Hammertoes: absent  Claw toes: absent  Hallux valgus: no  Hallux limitus: no  Skin Exam: abnormal color;     Neurovascular  Dorsalis pedis: absent  Posterior tibial: absent          TMA site with sutures intact, with skin coapted, necrosis along incision site, beginning to demarcate      Wound on Dorsal foot measures 5.5 x 2.5 x superficial  With fibrous base and viable margins.  Negative purulence, erythema, edema, or maloder  4/11/18                      Laboratory:  A1C: No results for input(s): HGBA1C in the last 4320 hours.  CBC:     Recent Labs  Lab 04/11/18  0621   WBC 10.98   RBC 2.69*   HGB 8.0*   HCT 25.2*   PLT  746*   MCV 94   MCH 29.7   MCHC 31.7*     CMP:     Recent Labs  Lab 04/11/18  0621      CALCIUM 9.2   ALBUMIN 2.3*   PROT 7.2   *   K 3.5   CO2 27   CL 99   BUN 10   CREATININE 0.9   ALKPHOS 118   ALT 49*   AST 53*   BILITOT 0.4     CRP: No results for input(s): CRP in the last 168 hours.  ESR: No results for input(s): SEDRATE in the last 168 hours.    Diagnostic Results:  Xray left foot:  No fracture dislocation bone destruction seen.    Clinical Findings:    Sutures intact, gangrene beginning to demarcate, stable.                Assessment/Plan:     * Critical lower limb ischemia    Interventional cardiology on board, appreciate recs  S/p R AKA per vascular surgery.         Gangrene    S/p left TMA and wound debridement (DOS 4/5 per Dr. Salvador).  Incision site with necrotic changes, beginning to demarcate,  No signs of infection.    -Dressed with betadine, xeroform, 4x4's, kerlix, cast padding, and loosely wrapped ace  - Discussed case with Primary team. IC states no further intervention. No further intervention per vascular surgery. Will continue to monitor surgical site.   - Recommend workup for Hyperbaric oxygen therapy HBOT. F/u at [place] wound care center for HBOT eval and wound care.     -Podiatry will follow.    Upon Discharge patient to follow up at wound care center in [place] within one week of discharge.  Dressings to remain clean dry and intact till follow up.    Weight bearing status: NWB left foot, DARCO shoe   Offloading device: heel protector boot          PVD (peripheral vascular disease)    Followed by interventional cardiology.             Shalini Julian MD  Podiatry  [facility]

## 2018-04-11 NOTE — PLAN OF CARE
GABBY notified by [facility] that [facility] will be able to  pt by 1130am.          Breanne Salcedo LMSW

## 2018-04-11 NOTE — PLAN OF CARE
GABBY spoke with Sheri C Rn, and gave her information to call report to Destini at [phone].  GABBY arranged transport through [place] [facility] the [facility] for 1030am.  Transport envelope was printed on 4/10 and brought to 5th floor nurses station desk.          Breanne Salcedo LMSW

## 2018-04-11 NOTE — ASSESSMENT & PLAN NOTE
S/p left TMA and wound debridement (DOS 4/5 per Dr. Salvador).  Incision site with necrotic changes, beginning to demarcate,  No signs of infection.    -Dressed with iodisorb, xeroform, 4x4's, kerlix, cast padding, and loosely wrapped ace  - Discussed case with Primary team. IC states no further intervention. No further intervention per vascular surgery. Will continue to monitor surgical site.   - Recommend workup for Hyperbaric oxygen therapy HBOT. F/u at [place] wound care center for HBOT eval and wound care.     -Podiatry will follow.    Upon Discharge patient to follow up at wound care center in [place] within one week of discharge.  Dressings to remain clean dry and intact till follow up.    Weight bearing status: NWB.  Offloading device: heel protector boot

## 2018-04-11 NOTE — ASSESSMENT & PLAN NOTE
Mr. Byrd is a 47 year old man with CAD s/p PCI, LVEF 30% s/p ICD, complicated and extensive PAD s/p aortofem bypass and multiple stents on DAPT who presents with what appears to be CLI of his left foot of unclear duration.      --Underwent angiogram 3/19 that showed occlusion of aorta-fem bypass at distal anastamosis. Unable to treat due to patient movement   --s/p cath directed tPA to occluded L SFA  --blood cx ngtd  --subsequently with acute limb ischemia in RLE resulting in R AKA on 3/27  --continue ASA/brilinta. No longer requires heparin gtt post-op. Started DVT ppx with lovenox.  --Podiatry and vascular following for necrotic L foot, well defined necrotic demarcation  --L TMA on 4/5. Foot still appears gangrenous.   - Podiatry requesting interventional cardiology consult for potential revascularization   - Per IC, intervention unlikely to achieve revascularization and therefore not warranted  - Discussed L foot necrosis with both Vascular Surgery and Podiatry. Vascular service does not feel that BKA is warranted at this time; therefore, Podiatry will also continue to observe wound.   - Discharged to [facility] with Vascular Surgery follow up as well as referral to Wound Care clinic for evaluation for hyperbaric oxygen therapy

## 2018-04-11 NOTE — NURSING
Spoke with supervisor at [facility], she stated that originally transportation was set up for 10:30 am this morning but that she called back and informed someone that they would be here to transport patient at 11:30. I informed her that is 1155 and transport is still not here, she stated that she is calling the  and will call back. Vicki Rayo RN

## 2018-04-11 NOTE — SUBJECTIVE & OBJECTIVE
Interval Hx: s/p left TMA and wound debridement (DOS 4/5 per Dr. Salvador). S/p R AKA per vascular surgery.  Patient denies F/C/N/V.  Bandage intact left foot.  Complains of pain    Scheduled Meds:   amitriptyline  50 mg Oral QHS    aspirin  81 mg Oral Daily    atorvastatin  80 mg Oral QHS    enoxaparin  40 mg Subcutaneous Daily    folic acid-vit B6-vit B12 2.5-25-2 mg  1 tablet Per OG tube Daily    gabapentin  800 mg Oral QHS    lidocaine   Topical (Top) QID    metoprolol succinate  12.5 mg Oral Daily    nicotine  1 patch Transdermal Daily    pregabalin  100 mg Oral TID    senna-docusate 8.6-50 mg  1 tablet Oral BID    thiamine  100 mg Oral Daily    ticagrelor  90 mg Oral BID     Continuous Infusions:    PRN Meds:cyclobenzaprine, nitroGLYCERIN, oxyCODONE, oxyCODONE, oxyCODONE-acetaminophen, polyethylene glycol, sodium chloride 0.9%, zolpidem    Review of patient's allergies indicates:  No Known Allergies     Past Medical History:   Diagnosis Date    AICD (automatic cardioverter/defibrillator) present     RYAN (acute kidney injury) 3/25/2018    CHF (congestive heart failure)     Coronary artery disease     Encounter for blood transfusion     Hyperlipemia     Hypertension     MI (myocardial infarction)     Neuropathy     PAD (peripheral artery disease)     PVD (peripheral vascular disease)      Past Surgical History:   Procedure Laterality Date    AMPUTATION Right     great toe    BYBPASS GRAFT AORTOBIUFEMORAL      CARDIAC DEFIBRILLATOR PLACEMENT      coronary stents      EXPLORATION AND EVaCUATION OF HEMATOMA OF UPPER EXTREMITY Left     KNEE ARTHROPLASTY Left     VASCULAR SURGERY      fem-pop bypass       Family History     None        Social History Main Topics    Smoking status: Former Smoker     Packs/day: 0.50     Quit date: 02/2018    Smokeless tobacco: Former User     Types: Chew     Quit date: 8/2/1980      Comment: Uses nicotine gum and nicotine patches    Alcohol use Yes       Comment: rarely    Drug use: No    Sexual activity: Yes     Partners: Female     Review of Systems   Constitutional: Negative.    Respiratory: Negative.    Cardiovascular: Negative.    Gastrointestinal: Negative.    Genitourinary: Negative.    Musculoskeletal: Positive for arthralgias.   Skin: Positive for wound.   Psychiatric/Behavioral: Negative.      Objective:     Vital Signs (Most Recent):  Temp: 97.8 °F (36.6 °C) (04/11/18 0417)  Pulse: (!) 112 (04/11/18 0417)  Resp: 16 (04/11/18 0417)  BP: 96/65 (04/11/18 0417)  SpO2: (!) 92 % (04/11/18 0417) Vital Signs (24h Range):  Temp:  [97.7 °F (36.5 °C)-99.2 °F (37.3 °C)] 97.8 °F (36.6 °C)  Pulse:  [101-116] 112  Resp:  [15-16] 16  SpO2:  [92 %-99 %] 92 %  BP: ()/(53-65) 96/65     Weight: 66.2 kg (145 lb 15.1 oz)  Body mass index is 22.85 kg/m².    Foot Exam    General  Orientation: alert and oriented to person, place, and time       Right Foot/Ankle     Comments  R AKA    Left Foot/Ankle      Inspection and Palpation  Ecchymosis: none  Tenderness: none   Swelling: none   Arch: normal  Hammertoes: absent  Claw toes: absent  Hallux valgus: no  Hallux limitus: no  Skin Exam: abnormal color;     Neurovascular  Dorsalis pedis: absent  Posterior tibial: absent          TMA site with sutures intact, with skin coapted, necrosis along incision site, beginning to demarcate      Wound on Dorsal foot measures 5.5 x 2.5 x superficial  With fibrous base and viable margins.  Negative purulence, erythema, edema, or maloder              Laboratory:  A1C: No results for input(s): HGBA1C in the last 4320 hours.  CBC:     Recent Labs  Lab 04/11/18  0621   WBC 10.98   RBC 2.69*   HGB 8.0*   HCT 25.2*   *   MCV 94   MCH 29.7   MCHC 31.7*     CMP:     Recent Labs  Lab 04/11/18  0621      CALCIUM 9.2   ALBUMIN 2.3*   PROT 7.2   *   K 3.5   CO2 27   CL 99   BUN 10   CREATININE 0.9   ALKPHOS 118   ALT 49*   AST 53*   BILITOT 0.4     CRP: No results for input(s): CRP  in the last 168 hours.  ESR: No results for input(s): SEDRATE in the last 168 hours.    Diagnostic Results:  Xray left foot:  No fracture dislocation bone destruction seen.    Clinical Findings:    Sutures intact, gangrene beginning to demarcate, stable.

## 2018-04-11 NOTE — ASSESSMENT & PLAN NOTE
Mr. Byrd is a 47 year old man with CAD s/p PCI, LVEF 30% s/p ICD, complicated and extensive PAD s/p aortofem bypass and multiple stents on DAPT who presents with what appears to be CLI of his left foot of unclear duration.      --Underwent angiogram 3/19 that showed occlusion of aorta-fem bypass at distal anastamosis. Unable to treat due to patient movement   --s/p cath directed tPA to occluded L SFA  --blood cx ngtd  --subsequently with acute limb ischemia in RLE resulting in R AKA on 3/27  --continue ASA/brilinta. No longer requires heparin gtt post-op. Started DVT ppx with lovenox.  --Podiatry and vascular following for necrotic L foot, well defined necrotic demarcation  --L TMA on 4/5. Foot still appears gangrenous.   - Podiatry requesting interventional cardiology consult for potential revascularization   - Per IC, intervention unlikely to achieve revascularization and therefore not warranted  - Discussed L foot necrosis with both Vascular Surgery and Podiatry. Vascular service does not feel that BKA is warranted at this time; therefore, Podiatry will also continue to observe wound.   - Discharged to [facility] with Vascular Surgery follow up as well as referral to Wound Care clinic for evaluation for hyperbaric oxygen therapy

## 2018-04-11 NOTE — DISCHARGE SUMMARY
[facility] Hospital Medicine  Discharge Summary      Patient Name: Leonardo Byrd  MRN: [id number]  Admission Date: 3/18/2018  Hospital Length of Stay: 24 days  Discharge Date and Time:  04/11/2018 11:08 AM  Attending Physician: Janis Santamaria MD   Discharging Provider: Zaid García MD  Primary Care Provider: Indio Aquino MD  [facility] Medicine Team: [facility] HOSP MED 1 Zaid García MD    HPI:   46 yo former smoker with history of ICM (LVEF 30-35%) s/p ICD, CAD s/p PCI, HTN, HLD, PAD (s/p aortobifemoral bypass, L SFA and pop PTAS in September and recent R SFA and R pop 3/8 by Dr. Carl Bell). Patient states left foot pain has started in September 2017 and had PTAs to left LFA and left popliteal artery here. Since then he continued to have pain in his left foot and to a less degree, leg. The pain has been progressively worse since. Pain is a burning sharp pain that is elicited by slight touch. Pain is worsened with bearing weight and is improved with vibrating massager to the plantar aspect of the foot. There is associated numbness and coldness to his foot. He denies erythema or swelling of his left foot.    Procedure(s) (LRB):  AMPUTATION-FOOT Transmetatarsal  Request popliteal saphenous nerve block per anesthesia (Left)      Hospital Course:   Admitted to CCU 3/20 after cath directed tPA was performed by int mansi under general anesthesia. Also with DTs requiring scheduled benzos. On day 2 Lt lower extremity remained pulseless, so thrombolysis still infusing. On day 4 of vanc for possible lt foot cellulitis. Transitioned to clinda IV (unable to take PO safely). Intubated overnight for airway protection in setting of increasing benzo requirements and worsening agitation and danger of pt pulling line. CXR this AM (3/22) with patchy bilateral infiltrates. Taken to cath lab for possible cath removal. Lt leg then reportedly duskier and with absent pulses. On 3/23 taken back to cath lab. Int  "cards Recc Podiatry consult for possible L trans-metatarsal amputation. On heparin gtt. 3/24 Wbc trending up, foot color getting darker,paitent extubated, continue heparin. 3/25: US lower ext showing occlusion of distal right superficial femoral, popliteal, anterior tibial, and posterior tibial arteries. No "dopplerable" pulses on rt foot. Interventional cards holding off on on acute intervention currently in setting of RYAN. Will continue to monitor. Cr 1.7 (baseline 0.6). Given 500cc Iv fluids. Started on scheduled librium for alcohol withdrawal, with plans for taper.     -3/26/18 NAEON. Pt. With elevated WBC. On vanc and ceftriaxone. ID consulted. Pain management for BLE ischemia   -3/27: NAEON. BLE pain improved after pain management adjustment. Vascular surgery evaluated pt and planning for Right AKA today.   - 3/28 febrile last night. Blood culture sent. WBC elevated then trending down. Complains of BLE pain controlled with pain meds. S/p Right AKA on 3/27/18  -3-29: NAEON. Pain controlled with medications. Vascular and ID on board. Pt. Is high risk of left amputation. May ultimately require amputation on the left as well. Continue on vanc and Unasyn for now   - 3/30: NAEON. Pt afebrile. BLE pain with controlled with pain meds. Today abdomen slightly distended. CT abdomen ordered stat. If pt decompensate, will broaden abx from Unasyn to zosyn   - 4/1: NAEON. Pt afebrile. BLE pain controlled with pain meds. WBC trending down after switching Unasyn to Zosyn. Left toes gangrene continue to demarcates. Vascular following, likely will need amputation. received 1 PRBC for Hg 6.9   - 4/2: Stepped down to hospital medicine. Modified pain regimen to PO with IV for breakthrough. Awaiting vascular recommendations for L foot  - 4/3: Blood pressures low, am meds held. Bolused 250cc NS for SBP ~80 and discontinued lisinopril 5 mg daily  - 4/4: Continuing IV abx, plan to OR tomorrow per podiatry and awaiting vascular recs  - " 4/5: OR today for TMA  - 4/6: TTE for murmur evaluation. EF 15% with new mild-moderate mitral regurgitation without structural abnormalities  - 4/7-8: No significant events  4/9 Necrotic area near site of TMA with clear demarcation. Discussed with Interventional Cardiology who feel further intervention unlikely to achieve revascularization. Vascular Surgery and Podiatry who feel no further surgical intervention is indicated during this hospital admission.   04/10/2018 Patient accepted to [facility] in [place] per his preference. Discharged with follow up with Vascular Surgery and [facility] for work up for possible hyperbaric oxygen therapy.  04/11/2018 Patient did not leave facility due to rehab refusing to accept the patient later in the day because they would be unable to obtain his evening medications. Will attempt to arrange for transport and discharge again today.     Consults:   Consults         Status Ordering Provider     Inpatient consult to Cardiology  Once     Provider:  (Not yet assigned)    Completed DARNELL RICO     Inpatient consult to Infectious Diseases  Once     Provider:  (Not yet assigned)    Completed VJ GUZMAN     Inpatient consult to Interventional Cardiology  Once     Provider:  (Not yet assigned)    Completed LUZ ELENA EWN     Inpatient consult to Pain Management  Once     Provider:  (Not yet assigned)    Completed VJ GUZMAN     Inpatient consult to Physical Medicine Rehab  Once     Provider:  (Not yet assigned)    Completed ALEJANDRO HERMOSILLO     Inpatient consult to PICC team ([facility])  Once     Provider:  (Not yet assigned)    Completed AAKASH EDEN     Inpatient consult to Podiatry  Once     Provider:  (Not yet assigned)    Completed MIRNA GARCIA     Inpatient consult to Vascular Surgery  Once     Provider:  (Not yet assigned)    Completed CARRIE MALONEY          * Critical lower limb ischemia    Mr. Byrd is a 47 year old man with  CAD s/p PCI, LVEF 30% s/p ICD, complicated and extensive PAD s/p aortofem bypass and multiple stents on DAPT who presents with what appears to be CLI of his left foot of unclear duration.      --Underwent angiogram 3/19 that showed occlusion of aorta-fem bypass at distal anastamosis. Unable to treat due to patient movement   --s/p cath directed tPA to occluded L SFA  --blood cx ngtd  --subsequently with acute limb ischemia in RLE resulting in R AKA on 3/27  --continue ASA/brilinta. No longer requires heparin gtt post-op. Started DVT ppx with lovenox.  --Podiatry and vascular following for necrotic L foot, well defined necrotic demarcation  --L TMA on 4/5. Foot still appears gangrenous.   - Podiatry requesting interventional cardiology consult for potential revascularization   - Per IC, intervention unlikely to achieve revascularization and therefore not warranted  - Discussed L foot necrosis with both Vascular Surgery and Podiatry. Vascular service does not feel that BKA is warranted at this time; therefore, Podiatry will also continue to observe wound.   - Discharged to [facility] with Vascular Surgery follow up as well as referral to Wound Care clinic for evaluation for hyperbaric oxygen therapy        Leukocytosis    - continue trending down   -s/p 7 day course of abx for possible LE cellulitis  -CT abd without source of infection  - Inflammatory response related to necrosis/recent amputation vs cellulitis vs other infxs source  - Restarted on van and ceftriaxone (started 3/25/18) for elevated wbc   - ID consulted, switched Ceftriaxone to Unasyn  (started 3/25/18), switched to Zosyn for failure to improve 3/30. Also on vancomycin.  - Final ID recs: Vanc and Zosyn x48h from wound Cx and if no growth on clean margin cultures by that time then can DC abx   - Cultures from wound margin with NGTD x 5 days   - Abx discontinued 4/8        Ischemic cardiomyopathy    CM possibly with multifactorial etiology (hx of  alcohol use, ischemia)  - CAD s/p PCI  - DAPT, statin, BB  - Holding lisinopril for intermittent hypotension, will resume on discharge        PVD (peripheral vascular disease)    - Per critical lower limb ischemia  - Per Interventional Cardiology, no further intervention likely to benefit patient          Alcohol abuse    -s/p delirium tremens  -no seizure activity  -s/p BZD taper  -continue thiamine  -mental status improved        Chronic systolic heart failure    LVEF 10-15% s/p ICD  - Continue BB  - Holding lisinopril for tenuous BPs  - TTE on 4/6 for new murmur: EF 15% and mild-moderate mitral regurgitation without vegetation or structural abnormality.        Tobacco abuse    - nicotine patch  - counseled on the importance of cessation for wound healing          Final Active Diagnoses:    Diagnosis Date Noted POA    PRINCIPAL PROBLEM:  Critical lower limb ischemia [I99.8] 03/18/2018 Yes     Chronic    Limb ischemia [I99.8] 03/27/2018 Yes    Leukocytosis [D72.829] 03/25/2018 Yes    Gangrene [I96] 03/23/2018 Yes    Atherosclerosis of native artery of left lower extremity with intermittent claudication [I70.212] 03/18/2018 Yes    Ischemic cardiomyopathy [I25.5] 03/18/2018 Yes    PVD (peripheral vascular disease) [I73.9] 03/18/2018 Yes    Neuropathy due to peripheral vascular disease [I73.9, G63] 10/03/2017 Yes    Alcohol abuse [F10.10] 09/27/2017 Yes    Chronic systolic heart failure [I50.22] 09/19/2017 Yes    Tobacco abuse [Z72.0] 02/27/2017 Yes      Problems Resolved During this Admission:    Diagnosis Date Noted Date Resolved POA    RYAN (acute kidney injury) [N17.9] 03/25/2018 03/28/2018 No    Acute renal failure with tubular necrosis [N17.0] 03/25/2018 03/30/2018 No    Acute respiratory failure with hypoxia [J96.01] 03/22/2018 03/28/2018 No    Alcohol withdrawal syndrome, with delirium [F10.231] 03/21/2018 03/30/2018 Yes    Cellulitis of left lower extremity [L03.116] 03/18/2018 03/31/2018 Yes     PAD (peripheral artery disease) [I73.9] 08/02/2017 03/31/2018 Yes       Discharged Condition: good    Disposition: Skilled Nursing Facility    Follow Up:  Follow-up Information     Indio Aquino MD.    Specialty:  Cardiology  Contact information:  [address] [facility]  CATH LAB  [place] [address]  [phone]             Norris Trejo MD In 1 month.    Specialty:  Vascular Surgery  Why:  With LLE art U/S  Contact information:  [address]  [facility] [address]  [phone]                 Patient Instructions:     VAS US Arterial Leg Left   Standing Status: Future  Standing Exp. Date: 04/10/19     Ambulatory consult to Wound Clinic   Referral Priority: Routine Referral Type: Consultation   Referral Reason: Specialty Services Required    Requested Specialty: Wound Care    Number of Visits Requested: 1      Ambulatory Referral to Podiatry   Referral Priority: Routine Referral Type: Consultation   Referral Reason: Specialty Services Required    Requested Specialty: Podiatry    Number of Visits Requested: 1      Ambulatory consult to Physical Medicine Rehab   Referral Priority: Routine Referral Type: Rehabilitation   Referral Reason: Specialty Services Required    Requested Specialty: Physical Medicine and Rehabilitation    Number of Visits Requested: 1      Diet Cardiac     Diet Cardiac     Weight bearing restrictions (specify)   Order Comments: NWB RLE; heel touch only LLE     Notify your health care provider if you experience any of the following:  temperature >100.4     Notify your health care provider if you experience any of the following:  persistent nausea and vomiting or diarrhea     Notify your health care provider if you experience any of the following:  severe uncontrolled pain     Notify your health care provider if you experience any of the following:  redness, tenderness, or signs of infection (pain, swelling, redness, odor or green/yellow discharge around incision site)     Notify your health  care provider if you experience any of the following:  difficulty breathing or increased cough     Notify your health care provider if you experience any of the following:  severe persistent headache     Notify your health care provider if you experience any of the following:  persistent dizziness, light-headedness, or visual disturbances     Notify your health care provider if you experience any of the following:  increased confusion or weakness     Activity as tolerated     Notify your health care provider if you experience any of the following:  temperature >100.4     Notify your health care provider if you experience any of the following:  persistent nausea and vomiting or diarrhea     Notify your health care provider if you experience any of the following:  severe uncontrolled pain     Notify your health care provider if you experience any of the following:  redness, tenderness, or signs of infection (pain, swelling, redness, odor or green/yellow discharge around incision site)     Notify your health care provider if you experience any of the following:  difficulty breathing or increased cough     Notify your health care provider if you experience any of the following:  severe persistent headache     Notify your health care provider if you experience any of the following:  persistent dizziness, light-headedness, or visual disturbances     Notify your health care provider if you experience any of the following:  increased confusion or weakness         Significant Diagnostic Studies: Labs: All labs within the past 24 hours have been reviewed    Pending Diagnostic Studies:     Procedure Component Value Units Date/Time    Anti-Xa Heparin Monitoring [[id number]] Collected:  03/27/18 1530    Order Status:  Sent Lab Status:  In process Updated:  03/27/18 1530    Specimen:  Blood from Blood     CBC auto differential [[id number]] Collected:  03/27/18 1642    Order Status:  Sent Lab Status:  In process Updated:  03/27/18  2889    Specimen:  Blood from Blood          Medications:  Reconciled Home Medications:      Medication List      START taking these medications    folic acid-vit B6-vit B12 2.5-25-2 mg 2.5-25-2 mg Tab  Commonly known as:  FOLBIC or Equiv  Take 1 tablet by mouth once daily.     lidocaine 5 % Oint ointment  Commonly known as:  XYLOCAINE  Apply topically 4 (four) times daily. Apply to entire left foot     nicotine 21 mg/24 hr  Commonly known as:  NICODERM CQ  Place 1 patch onto the skin once daily.     oxyCODONE 10 mg Tab immediate release tablet  Commonly known as:  ROXICODONE  Take 1 tablet (10 mg total) by mouth every 4 (four) hours as needed for Pain.     polyethylene glycol 17 gram Pwpk  Commonly known as:  GLYCOLAX  Take 17 g by mouth 2 (two) times daily as needed (constipation).     senna-docusate 8.6-50 mg 8.6-50 mg per tablet  Commonly known as:  PERICOLACE  Take 1 tablet by mouth 2 (two) times daily.     thiamine 100 MG tablet  Take 1 tablet (100 mg total) by mouth once daily.        CHANGE how you take these medications    amitriptyline 50 MG tablet  Commonly known as:  ELAVIL  Take 1 tablet (50 mg total) by mouth every evening.  What changed:  · medication strength  · how much to take     atorvastatin 80 MG tablet  Commonly known as:  LIPITOR  Take 1 tablet (80 mg total) by mouth every evening.  What changed:  · medication strength  · how much to take     metoprolol succinate 25 MG 24 hr tablet  Commonly known as:  TOPROL-XL  Take 0.5 tablets (12.5 mg total) by mouth once daily.  What changed:  · how much to take  · when to take this     zolpidem 5 MG Tab  Commonly known as:  AMBIEN  Take 1 tablet (5 mg total) by mouth nightly as needed.  What changed:  reasons to take this        CONTINUE taking these medications    aspirin 81 MG EC tablet  Commonly known as:  ECOTRIN  Take 81 mg by mouth once daily.     BRILINTA 90 mg tablet  Generic drug:  ticagrelor  Take 90 mg by mouth 2 (two) times daily.      cilostazol 50 MG Tab  Commonly known as:  PLETAL  Take 2 tablets (100 mg total) by mouth 2 (two) times daily.     cyclobenzaprine 10 MG tablet  Commonly known as:  FLEXERIL  Take 10 mg by mouth 3 (three) times daily as needed for Muscle spasms.     gabapentin 800 MG tablet  Commonly known as:  NEURONTIN  Take 800 mg by mouth every evening.     pregabalin 100 MG capsule  Commonly known as:  LYRICA  Take 1 capsule (100 mg total) by mouth 3 (three) times daily.     RANEXA 1,000 mg Tb12  Generic drug:  ranolazine  Take 1,000 mg by mouth 2 (two) times daily.        STOP taking these medications    hydrocodone-acetaminophen 10-325mg  mg Tab  Commonly known as:  NORCO     lisinopril 5 MG tablet  Commonly known as:  PRINIVIL,ZESTRIL     traMADol 50 mg tablet  Commonly known as:  ULTRAM            Indwelling Lines/Drains at time of discharge:   Lines/Drains/Airways     Epidural Line                 Perineural Analgesia/Anesthesia Assessment (using dermatomes) Epidural 04/05/18 1115 5 days                Time spent on the discharge of patient: 40 minutes  Patient was seen and examined on the date of discharge and determined to be suitable for discharge.         Zaid García MD  Department of Hospital Medicine  [facility]

## 2018-04-11 NOTE — SUBJECTIVE & OBJECTIVE
Interval Hx: s/p left TMA and wound debridement (DOS 4/5 per Dr. Salvador). S/p R AKA per vascular surgery.  Patient denies F/C/N/V.  Bandage intact left foot.  Complains of pain to left foot     Scheduled Meds:   amitriptyline  50 mg Oral QHS    aspirin  81 mg Oral Daily    atorvastatin  80 mg Oral QHS    enoxaparin  40 mg Subcutaneous Daily    folic acid-vit B6-vit B12 2.5-25-2 mg  1 tablet Per OG tube Daily    gabapentin  800 mg Oral QHS    lidocaine   Topical (Top) QID    metoprolol succinate  12.5 mg Oral Daily    nicotine  1 patch Transdermal Daily    pregabalin  100 mg Oral TID    senna-docusate 8.6-50 mg  1 tablet Oral BID    thiamine  100 mg Oral Daily    ticagrelor  90 mg Oral BID     Continuous Infusions:    PRN Meds:cyclobenzaprine, nitroGLYCERIN, oxyCODONE, oxyCODONE, oxyCODONE-acetaminophen, polyethylene glycol, sodium chloride 0.9%, zolpidem    Review of patient's allergies indicates:  No Known Allergies     Past Medical History:   Diagnosis Date    AICD (automatic cardioverter/defibrillator) present     RYAN (acute kidney injury) 3/25/2018    CHF (congestive heart failure)     Coronary artery disease     Encounter for blood transfusion     Hyperlipemia     Hypertension     MI (myocardial infarction)     Neuropathy     PAD (peripheral artery disease)     PVD (peripheral vascular disease)      Past Surgical History:   Procedure Laterality Date    AMPUTATION Right     great toe    BYBPASS GRAFT AORTOBIUFEMORAL      CARDIAC DEFIBRILLATOR PLACEMENT      coronary stents      EXPLORATION AND EVaCUATION OF HEMATOMA OF UPPER EXTREMITY Left     KNEE ARTHROPLASTY Left     VASCULAR SURGERY      fem-pop bypass       Family History     None        Social History Main Topics    Smoking status: Former Smoker     Packs/day: 0.50     Quit date: 02/2018    Smokeless tobacco: Former User     Types: Chew     Quit date: 8/2/1980      Comment: Uses nicotine gum and nicotine patches    Alcohol  use Yes      Comment: rarely    Drug use: No    Sexual activity: Yes     Partners: Female     Review of Systems   Constitutional: Negative.    Respiratory: Negative.    Cardiovascular: Negative.    Gastrointestinal: Negative.    Genitourinary: Negative.    Musculoskeletal: Positive for arthralgias.   Skin: Positive for wound.   Psychiatric/Behavioral: Negative.      Objective:     Vital Signs (Most Recent):  Temp: 98.1 °F (36.7 °C) (04/11/18 1205)  Pulse: 107 (04/11/18 1205)  Resp: 16 (04/11/18 1205)  BP: (!) 90/55 (04/11/18 1205)  SpO2: 95 % (04/11/18 1205) Vital Signs (24h Range):  Temp:  [97.1 °F (36.2 °C)-99.2 °F (37.3 °C)] 98.1 °F (36.7 °C)  Pulse:  [101-116] 107  Resp:  [15-18] 16  SpO2:  [92 %-99 %] 95 %  BP: ()/(53-72) 90/55     Weight: 66.2 kg (145 lb 15.1 oz)  Body mass index is 22.85 kg/m².    Foot Exam    General  Orientation: alert and oriented to person, place, and time       Right Foot/Ankle     Comments  R AKA    Left Foot/Ankle      Inspection and Palpation  Ecchymosis: none  Tenderness: none   Swelling: none   Arch: normal  Hammertoes: absent  Claw toes: absent  Hallux valgus: no  Hallux limitus: no  Skin Exam: abnormal color;     Neurovascular  Dorsalis pedis: absent  Posterior tibial: absent          TMA site with sutures intact, with skin coapted, necrosis along incision site, beginning to demarcate      Wound on Dorsal foot measures 5.5 x 2.5 x superficial  With fibrous base and viable margins.  Negative purulence, erythema, edema, or maloder  4/11/18                      Laboratory:  A1C: No results for input(s): HGBA1C in the last 4320 hours.  CBC:     Recent Labs  Lab 04/11/18  0621   WBC 10.98   RBC 2.69*   HGB 8.0*   HCT 25.2*   *   MCV 94   MCH 29.7   MCHC 31.7*     CMP:     Recent Labs  Lab 04/11/18  0621      CALCIUM 9.2   ALBUMIN 2.3*   PROT 7.2   *   K 3.5   CO2 27   CL 99   BUN 10   CREATININE 0.9   ALKPHOS 118   ALT 49*   AST 53*   BILITOT 0.4     CRP: No  results for input(s): CRP in the last 168 hours.  ESR: No results for input(s): SEDRATE in the last 168 hours.    Diagnostic Results:  Xray left foot:  No fracture dislocation bone destruction seen.    Clinical Findings:    Sutures intact, gangrene beginning to demarcate, stable.

## 2018-04-11 NOTE — PROGRESS NOTES
[facility] Hospital Medicine  Progress Note    Patient Name: Leonrado Byrd  MRN: [id number]  Patient Class: IP- Inpatient   Admission Date: 3/18/2018  Length of Stay: 24 days  Attending Physician: Janis Santamaria MD  Primary Care Provider: Indio Aquino MD    [facility] Medicine Team: [facility] 1 Zaid García MD    Subjective:     Principal Problem:Critical lower limb ischemia    HPI:  48 yo former smoker with history of ICM (LVEF 30-35%) s/p ICD, CAD s/p PCI, HTN, HLD, PAD (s/p aortobifemoral bypass, L SFA and pop PTAS in September and recent R SFA and R pop 3/8 by Dr. Carl Bell). Patient states left foot pain has started in September 2017 and had PTAs to left LFA and left popliteal artery here. Since then he continued to have pain in his left foot and to a less degree, leg. The pain has been progressively worse since. Pain is a burning sharp pain that is elicited by slight touch. Pain is worsened with bearing weight and is improved with vibrating massager to the plantar aspect of the foot. There is associated numbness and coldness to his foot. He denies erythema or swelling of his left foot.    Hospital Course:  Admitted to CCU 3/20 after cath directed tPA was performed by int cards under general anesthesia. Also with DTs requiring scheduled benzos. On day 2 Lt lower extremity remained pulseless, so thrombolysis still infusing. On day 4 of vanc for possible lt foot cellulitis. Transitioned to clinda IV (unable to take PO safely). Intubated overnight for airway protection in setting of increasing benzo requirements and worsening agitation and danger of pt pulling line. CXR this AM (3/22) with patchy bilateral infiltrates. Taken to cath lab for possible cath removal. Lt leg then reportedly duskier and with absent pulses. On 3/23 taken back to cath lab. Int cards Recc Podiatry consult for possible L trans-metatarsal amputation. On heparin gtt. 3/24 Wbc trending up, foot color getting  "darker,paitent extubated, continue heparin. 3/25: US lower ext showing occlusion of distal right superficial femoral, popliteal, anterior tibial, and posterior tibial arteries. No "dopplerable" pulses on rt foot. Interventional cards holding off on on acute intervention currently in setting of RYAN. Will continue to monitor. Cr 1.7 (baseline 0.6). Given 500cc Iv fluids. Started on scheduled librium for alcohol withdrawal, with plans for taper.     -3/26/18 NAEON. Pt. With elevated WBC. On vanc and ceftriaxone. ID consulted. Pain management for BLE ischemia   -3/27: NAEON. BLE pain improved after pain management adjustment. Vascular surgery evaluated pt and planning for Right AKA today.   - 3/28 febrile last night. Blood culture sent. WBC elevated then trending down. Complains of BLE pain controlled with pain meds. S/p Right AKA on 3/27/18  -3-29: NAEON. Pain controlled with medications. Vascular and ID on board. Pt. Is high risk of left amputation. May ultimately require amputation on the left as well. Continue on vanc and Unasyn for now   - 3/30: NAEON. Pt afebrile. BLE pain with controlled with pain meds. Today abdomen slightly distended. CT abdomen ordered stat. If pt decompensate, will broaden abx from Unasyn to zosyn   - 4/1: NAEON. Pt afebrile. BLE pain controlled with pain meds. WBC trending down after switching Unasyn to Zosyn. Left toes gangrene continue to demarcates. Vascular following, likely will need amputation. received 1 PRBC for Hg 6.9   - 4/2: Stepped down to hospital medicine. Modified pain regimen to PO with IV for breakthrough. Awaiting vascular recommendations for L foot  - 4/3: Blood pressures low, am meds held. Bolused 250cc NS for SBP ~80 and discontinued lisinopril 5 mg daily  - 4/4: Continuing IV abx, plan to OR tomorrow per podiatry and awaiting vascular recs  - 4/5: OR today for TMA  - 4/6: TTE for murmur evaluation. EF 15% with new mild-moderate mitral regurgitation without structural " abnormalities  - 4/7-8: No significant events  4/9 Necrotic area near site of TMA with clear demarcation. Discussed with Interventional Cardiology who feel further intervention unlikely to achieve revascularization. Vascular Surgery and Podiatry who feel no further surgical intervention is indicated during this hospital admission.   04/10/2018 Patient accepted to [facility] in [place] per his preference. Discharged with follow up with Vascular Surgery and [facility] for work up for possible hyperbaric oxygen therapy.      Interval History: NAEON.    Review of Systems  Objective:     Vital Signs (Most Recent):  Temp: 97.8 °F (36.6 °C) (04/11/18 0417)  Pulse: (!) 112 (04/11/18 0417)  Resp: 16 (04/11/18 0417)  BP: 96/65 (04/11/18 0417)  SpO2: (!) 92 % (04/11/18 0417) Vital Signs (24h Range):  Temp:  [97.4 °F (36.3 °C)-99.2 °F (37.3 °C)] 97.8 °F (36.6 °C)  Pulse:  [101-116] 112  Resp:  [15-18] 16  SpO2:  [92 %-99 %] 92 %  BP: ()/(50-65) 96/65     Weight: 66.2 kg (145 lb 15.1 oz)  Body mass index is 22.85 kg/m².    Intake/Output Summary (Last 24 hours) at 04/11/18 0739  Last data filed at 04/10/18 2200   Gross per 24 hour   Intake              950 ml   Output             1900 ml   Net             -950 ml      Physical Exam   Constitutional: He appears well-developed. No distress.   HENT:   Head: Normocephalic and atraumatic.   Eyes: Conjunctivae are normal. No scleral icterus.   Neck: Normal range of motion.   Cardiovascular: Normal rate and regular rhythm.    Murmur heard.   Systolic (Holosystolic radiating to axilla) murmur is present   Pulmonary/Chest: Effort normal. No respiratory distress.   Abdominal: Soft. Bowel sounds are normal. There is no tenderness.   Musculoskeletal:   R BKA  L TMA, dressing c/d/i   Neurological:   Somnolent   Skin: Skin is warm.       Significant Labs: All pertinent labs within the past 24 hours have been reviewed.    Significant Imaging: I have reviewed and interpreted all  pertinent imaging results/findings within the past 24 hours.    Assessment/Plan:      * Critical lower limb ischemia    Mr. Byrd is a 47 year old man with CAD s/p PCI, LVEF 30% s/p ICD, complicated and extensive PAD s/p aortofem bypass and multiple stents on DAPT who presents with what appears to be CLI of his left foot of unclear duration.      --Underwent angiogram 3/19 that showed occlusion of aorta-fem bypass at distal anastamosis. Unable to treat due to patient movement   --s/p cath directed tPA to occluded L SFA  --blood cx ngtd  --subsequently with acute limb ischemia in RLE resulting in R AKA on 3/27  --continue ASA/brilinta. No longer requires heparin gtt post-op. Started DVT ppx with lovenox.  --Podiatry and vascular following for necrotic L foot, well defined necrotic demarcation  --L TMA on 4/5. Foot still appears gangrenous.   - Podiatry requesting interventional cardiology consult for potential revascularization   - Per IC, intervention unlikely to achieve revascularization and therefore not warranted  - Discussed L foot necrosis with both Vascular Surgery and Podiatry. Vascular service does not feel that BKA is warranted at this time; therefore, Podiatry will also continue to observe wound.   - Discharged to [facility] with Vascular Surgery follow up as well as referral to Wound Care clinic for evaluation for hyperbaric oxygen therapy        Leukocytosis    - continue trending down   -s/p 7 day course of abx for possible LE cellulitis  -CT abd without source of infection  - Inflammatory response related to necrosis/recent amputation vs cellulitis vs other infxs source  - Restarted on van and ceftriaxone (started 3/25/18) for elevated wbc   - ID consulted, switched Ceftriaxone to Unasyn  (started 3/25/18), switched to Zosyn for failure to improve 3/30. Also on vancomycin.  - Final ID recs: Vanc and Zosyn x48h from wound Cx and if no growth on clean margin cultures by that time then can DC abx   -  Cultures from wound margin with NGTD x 5 days   - Abx discontinued 4/8        Ischemic cardiomyopathy    CM possibly with multifactorial etiology (hx of alcohol use, ischemia)  - CAD s/p PCI  - DAPT, statin, BB  - Holding lisinopril for intermittent hypotension, will resume on discharge        PVD (peripheral vascular disease)    - Per critical lower limb ischemia  - Per Interventional Cardiology, no further intervention likely to benefit patient          Alcohol abuse    -s/p delirium tremens  -no seizure activity  -s/p BZD taper  -continue thiamine  -mental status improved        Chronic systolic heart failure    LVEF 10-15% s/p ICD  - Continue BB  - Holding lisinopril for tenuous BPs  - TTE on 4/6 for new murmur: EF 15% and mild-moderate mitral regurgitation without vegetation or structural abnormality.        Tobacco abuse    - nicotine patch  - counseled on the importance of cessation for wound healing          VTE Risk Mitigation         Ordered     enoxaparin injection 40 mg  Daily     Route:  Subcutaneous        04/06/18 0701     heparin 25,000 units in dextrose 5% 250 mL (100 units/mL) infusion  Continuous     Route:  Intravenous        03/27/18 1645     IP VTE LOW RISK PATIENT  Once      03/18/18 1711              Zaid García MD  Department of Hospital Medicine   [facility]

## 2018-04-11 NOTE — PROGRESS NOTES
[facility]  Podiatry  Progress Note    Patient Name: Leonardo Byrd  MRN: [id number]  Admission Date: 3/18/2018  Hospital Length of Stay: 24 days  Attending Physician: Janis Santamaria MD  Primary Care Provider: Indio Aquino MD   Interval Hx: s/p left TMA and wound debridement (DOS 4/5 per Dr. Salvador). S/p R AKA per vascular surgery.  Patient denies F/C/N/V.  Bandage intact left foot.  Complains of pain    Scheduled Meds:   amitriptyline  50 mg Oral QHS    aspirin  81 mg Oral Daily    atorvastatin  80 mg Oral QHS    enoxaparin  40 mg Subcutaneous Daily    folic acid-vit B6-vit B12 2.5-25-2 mg  1 tablet Per OG tube Daily    gabapentin  800 mg Oral QHS    lidocaine   Topical (Top) QID    metoprolol succinate  12.5 mg Oral Daily    nicotine  1 patch Transdermal Daily    pregabalin  100 mg Oral TID    senna-docusate 8.6-50 mg  1 tablet Oral BID    thiamine  100 mg Oral Daily    ticagrelor  90 mg Oral BID     Continuous Infusions:    PRN Meds:cyclobenzaprine, nitroGLYCERIN, oxyCODONE, oxyCODONE, oxyCODONE-acetaminophen, polyethylene glycol, sodium chloride 0.9%, zolpidem    Review of patient's allergies indicates:  No Known Allergies     Past Medical History:   Diagnosis Date    AICD (automatic cardioverter/defibrillator) present     RYAN (acute kidney injury) 3/25/2018    CHF (congestive heart failure)     Coronary artery disease     Encounter for blood transfusion     Hyperlipemia     Hypertension     MI (myocardial infarction)     Neuropathy     PAD (peripheral artery disease)     PVD (peripheral vascular disease)      Past Surgical History:   Procedure Laterality Date    AMPUTATION Right     great toe    BYBPASS GRAFT AORTOBIUFEMORAL      CARDIAC DEFIBRILLATOR PLACEMENT      coronary stents      EXPLORATION AND EVaCUATION OF HEMATOMA OF UPPER EXTREMITY Left     KNEE ARTHROPLASTY Left     VASCULAR SURGERY      fem-pop bypass       Family History     None        Social  History Main Topics    Smoking status: Former Smoker     Packs/day: 0.50     Quit date: 02/2018    Smokeless tobacco: Former User     Types: Chew     Quit date: 8/2/1980      Comment: Uses nicotine gum and nicotine patches    Alcohol use Yes      Comment: rarely    Drug use: No    Sexual activity: Yes     Partners: Female     Review of Systems   Constitutional: Negative.    Respiratory: Negative.    Cardiovascular: Negative.    Gastrointestinal: Negative.    Genitourinary: Negative.    Musculoskeletal: Positive for arthralgias.   Skin: Positive for wound.   Psychiatric/Behavioral: Negative.      Objective:     Vital Signs (Most Recent):  Temp: 97.8 °F (36.6 °C) (04/11/18 0417)  Pulse: (!) 112 (04/11/18 0417)  Resp: 16 (04/11/18 0417)  BP: 96/65 (04/11/18 0417)  SpO2: (!) 92 % (04/11/18 0417) Vital Signs (24h Range):  Temp:  [97.7 °F (36.5 °C)-99.2 °F (37.3 °C)] 97.8 °F (36.6 °C)  Pulse:  [101-116] 112  Resp:  [15-16] 16  SpO2:  [92 %-99 %] 92 %  BP: ()/(53-65) 96/65     Weight: 66.2 kg (145 lb 15.1 oz)  Body mass index is 22.85 kg/m².    Foot Exam    General  Orientation: alert and oriented to person, place, and time       Right Foot/Ankle     Comments  R AKA    Left Foot/Ankle      Inspection and Palpation  Ecchymosis: none  Tenderness: none   Swelling: none   Arch: normal  Hammertoes: absent  Claw toes: absent  Hallux valgus: no  Hallux limitus: no  Skin Exam: abnormal color;     Neurovascular  Dorsalis pedis: absent  Posterior tibial: absent          TMA site with sutures intact, with skin coapted, necrosis along incision site, beginning to demarcate      Wound on Dorsal foot measures 5.5 x 2.5 x superficial  With fibrous base and viable margins.  Negative purulence, erythema, edema, or maloder              Laboratory:  A1C: No results for input(s): HGBA1C in the last 4320 hours.  CBC:     Recent Labs  Lab 04/11/18  0621   WBC 10.98   RBC 2.69*   HGB 8.0*   HCT 25.2*   *   MCV 94   MCH 29.7    MCHC 31.7*     CMP:     Recent Labs  Lab 04/11/18  0621      CALCIUM 9.2   ALBUMIN 2.3*   PROT 7.2   *   K 3.5   CO2 27   CL 99   BUN 10   CREATININE 0.9   ALKPHOS 118   ALT 49*   AST 53*   BILITOT 0.4     CRP: No results for input(s): CRP in the last 168 hours.  ESR: No results for input(s): SEDRATE in the last 168 hours.    Diagnostic Results:  Xray left foot:  No fracture dislocation bone destruction seen.    Clinical Findings:    Sutures intact, gangrene beginning to demarcate, stable.                Assessment/Plan:     * Critical lower limb ischemia    Interventional cardiology on board, appreciate recs  S/p R AKA per vascular surgery.         Gangrene    S/p left TMA and wound debridement (DOS 4/5 per Dr. Salvador).  Incision site with necrotic changes, beginning to demarcate,  No signs of infection.    -Dressed with iodisorb, xeroform, 4x4's, kerlix, cast padding, and loosely wrapped ace  - Discussed case with Primary team. IC states no further intervention. No further intervention per vascular surgery. Will continue to monitor surgical site.   - Recommend workup for Hyperbaric oxygen therapy HBOT. F/u at [place] wound [facility] center for HBOT eval and wound care.     -Podiatry will follow.    Upon Discharge patient to follow up at wound care center in [place] within one week of discharge.  Dressings to remain clean dry and intact till follow up.    Weight bearing status: NWB.  Offloading device: heel protector boot          PVD (peripheral vascular disease)    Followed by interventional cardiology.             Virgil Wright MD  Podiatry  [facility]

## 2018-04-11 NOTE — NURSING
Pt discharged to  via wheelchair. Pt denies pain at the present time. Condition is stable and patient follows commands. Pt given prescription and copy of discharge home care instructions. Pt verbalized understanding of activity limitation and s/s when to call the Dr. Pt also given information on follow up appointment. All questions answered. All belongings with the patient. Pt verbalized understanding of all discharge instructions.

## 2018-04-11 NOTE — PLAN OF CARE
"GABBY spoke with Farzad at [facility] regarding pt not leaving when scheduled on 4/10.  Facility expressed concern as to why this pt did not leave due to the fact that they had brought on extra staff at an overtime rate to admit pt.  GABBY called nursing staff and was told that [facility] did not  pt at all.  GABBY then called [facility] and spoke with Maria D, who became upset and angry, stating that the [facility] RN called and cancelled the transport.  Maria D first stated that she did not know "because I get off of work at 1pm, so I can't answer that."  GABBY asked if there was a supervisor that could provide an answer, as On license of [facility] supervisor would need a detailed explanation as to why DC did not occur.  Maria D looked into chart and stated that the call was dispatched and accepted at 541pm but pt was never picked up.  Maria D's records showed that the RN called to cancel at 716pm because it was too late and Luis Alfredo stated that the meds could not be adequately given because of the late hour.  GABBY asked Maria D what happened between 541 when dispatch accepted and 716 when RN cancelled, and she became very agitated but provided no answers.  GABBY will call [facility] to assist with future transports.          Breanne Salcedo LMSW   "

## 2018-04-12 NOTE — PT/OT/SLP DISCHARGE
Occupational Therapy Discharge Summary    Leonardo Byrd  MRN: [id number]   Principal Problem: Critical lower limb ischemia      Patient Discharged from acute Occupational Therapy on 4/11/2018.  Please refer to prior OT note dated 4/10/2018 for functional status.    Assessment:      Patient appropriate for care in another setting.    Objective:     GOALS:    Occupational Therapy Goals     Not on file          Multidisciplinary Problems (Resolved)        Problem: Occupational Therapy Goal    Goal Priority Disciplines Outcome Interventions   Occupational Therapy Goal   (Resolved)     OT, PT/OT Outcome(s) achieved    Description:  Goals to be met by: 4/21/18     Patient will increase functional independence with ADLs by performing:    UE Dressing with Set-up Assistance.  LE Dressing with Set-up Assistance.  Grooming while EOB with Set-up Assistance.  Toileting from bedside commode with Supervision for hygiene and clothing management.   Stand pivot transfers with Minimal Assistance with use of RW            Update: if pt has L heel protector boot  Toilet transfer to bedside commode with Minimal Assistance using scoot pivot or stand pivot with RW  Wheelchair transfer w/ Supervision using scoot pivot -- Met (4/10)                          Reasons for Discontinuation of Therapy Services  Transfer to alternate level of care.      Plan:     Patient Discharged to: Skilled Nursing Facility    Mariella Hanley, OT  4/12/2018

## 2018-04-12 NOTE — PT/OT/SLP DISCHARGE
Physical Therapy Discharge Summary    Name: Leonardo Byrd  MRN: [id number]   Principal Problem: Critical lower limb ischemia     Patient Discharged from acute Physical Therapy on 18.  Please refer to prior PT noted date on 4/10/18 for functional status.     Assessment:     Patient was discharged unexpectedly.  Information required to complete an accurate discharge summary is unknown.  Refer to therapy initial evaluation and last progress note for initial and most recent functional status and goal achievement.  Recommendations made may be found in medical record.    Objective:     GOALS:    Physical Therapy Goals     Not on file          Multidisciplinary Problems (Resolved)        Problem: Physical Therapy Goal    Goal Priority Disciplines Outcome Goal Variances Interventions   Physical Therapy Goal   (Resolved)     PT/OT, PT Outcome(s) achieved     Description:  Goals to be met by: 18    Patient will increase functional independence with mobility by performin. Supine to sit with Modified [place]-not met  2. Sit to supine with Modified [place]-not met  3. Sit to stand transfer with Minimal Assistance-not met  4. Bed to chair transfer with Minimal Assistance using Rolling Walker-not met  5. Gait  x >10 feet with Minimal Assistance using Rolling Walker and post-op boot in place on LLE residual limb.-not met                        Reasons for Discontinuation of Therapy Services  Transfer to alternate level of care.      Plan:     Patient Discharged to: Skilled Nursing Facility.    Charity Stone, PT  2018

## 2018-04-18 NOTE — TELEPHONE ENCOUNTER
Spoke to Destini and she said, due to transportation, pat. Lives in [facility], they can only bring him on Monday or Friday. They don't mind bringing patient to [place]. Checked your schedule and is booked next week and at this point I don't know what to do. Patient has surgery with Dr. Salvador and would like to see him. Need Help in that case, thank you

## 2018-04-18 NOTE — TELEPHONE ENCOUNTER
Spoke with jem  Made aware dr. quintana appt will be 4/26/2018 @ 145pm  Per jem that will be fine  She with transportation

## 2018-04-18 NOTE — TELEPHONE ENCOUNTER
----- Message from Kristal Giron sent at 4/18/2018  1:59 PM CDT -----  Contact: Destini/[facility] and [phone]  Called requesting to reschedule the pt post op apt to possibly a Monday or a Friday due to lack of transportation for the patient. Please contact Destini at number listed above immediately regarding this pt apt.

## 2018-04-23 NOTE — TELEPHONE ENCOUNTER
----- Message from Yee Mak sent at 4/23/2018 11:17 AM CDT -----  Contact: Destini cleaning/ [facility] [phone]  Destini requesting to speak with you regarding scheduling an appt for patient. Please advise.

## 2018-04-23 NOTE — TELEPHONE ENCOUNTER
Spoke with Destini from home health and she wanted to schedule an appointment for patient and the wound care clinic to have patient get Hyperbaric treatments.

## 2018-04-30 NOTE — TELEPHONE ENCOUNTER
----- Message from Letty Lowery sent at 4/30/2018  1:24 PM CDT -----  Contact: Amelie cleaning/ [facility]    [phone]  Amelie cleaning/  is requesting to speak to you regarding would care for this pt, She wants to discuss orders. Pls call Amelie SANDHU [phone].  Thanks........Eloina

## 2018-05-03 NOTE — PROGRESS NOTES
Leonardo Byrd  05/03/2018    HPI:  Patient is a 47 y.o. male with a h/o HTN, HLD, HFrEF (10-15% 3/23/18) s/p ICD, CAD s/p PCI, EtOH abuse, PAD s/p ABF 1999 with multiple endovascular interventions presented to hospital on 3/18/18 with left foot rest pain.    S/p  3/28/18 :  R BKA for non-salvagable R leg    He was taken to cath lab on 3/19 with interventional cardiology via R CFA access with lysis performed.  Was noted to have L SFA occlusion, underwent lysis with tPA.  Catheter removed on 3/23/17 after PTA/laser arthrectomy with flow to the PT. Patient did have to be intubated during this time secondary to delirium tremens, was subsequently extubated. Was complaining of bilateral foot pain, per discussion with primary care team had palpable DP prior to procedure then on Saturday had loss of palpable pulses; US right lower extremity performed on 3/24 demonstrating no flow in SFA, popliteal, AT or PT arteries.  Per notes, decision made not to intervene initially secondary to RYAN, Creatinine 1.5.  Cr 1.0 today.  Patient with increasing leukocytosis 20 with ID consulted. Decision made to consult vascular surgery.     + MI; no h/o stroke  Tobacco use: Patient quit smoking 6 weeks prior 0.5-1 ppd x37 years.    Past Medical History:   Diagnosis Date    AICD (automatic cardioverter/defibrillator) present     RYAN (acute kidney injury) 3/25/2018    CHF (congestive heart failure)     Coronary artery disease     Encounter for blood transfusion     Hyperlipemia     Hypertension     MI (myocardial infarction)     Neuropathy     PAD (peripheral artery disease)     PVD (peripheral vascular disease)      Past Surgical History:   Procedure Laterality Date    AMPUTATION Right     great toe    BYBPASS GRAFT AORTOBIUFEMORAL      CARDIAC DEFIBRILLATOR PLACEMENT      coronary stents      EXPLORATION AND EVaCUATION OF HEMATOMA OF UPPER EXTREMITY Left     KNEE ARTHROPLASTY Left     VASCULAR SURGERY      fem-pop bypass      No family history on file.  Social History     Social History    Marital status: Single     Spouse name: N/A    Number of children: N/A    Years of education: N/A     Occupational History    Not on file.     Social History Main Topics    Smoking status: Former Smoker     Packs/day: 0.50     Quit date: 02/2018    Smokeless tobacco: Former User     Types: Chew     Quit date: 8/2/1980      Comment: Uses nicotine gum and nicotine patches    Alcohol use Yes      Comment: rarely    Drug use: No    Sexual activity: Yes     Partners: Female     Other Topics Concern    Not on file     Social History Narrative    No narrative on file       Current Outpatient Prescriptions:     amitriptyline (ELAVIL) 50 MG tablet, Take 1 tablet (50 mg total) by mouth every evening., Disp: 30 tablet, Rfl: 11    aspirin (ECOTRIN) 81 MG EC tablet, Take 81 mg by mouth once daily., Disp: , Rfl:     atorvastatin (LIPITOR) 80 MG tablet, Take 1 tablet (80 mg total) by mouth every evening., Disp: 90 tablet, Rfl: 3    cilostazol (PLETAL) 50 MG Tab, Take 2 tablets (100 mg total) by mouth 2 (two) times daily., Disp: , Rfl:     cyclobenzaprine (FLEXERIL) 10 MG tablet, Take 10 mg by mouth 3 (three) times daily as needed for Muscle spasms., Disp: , Rfl:     folic acid-vit B6-vit B12 2.5-25-2 mg (FOLBIC OR EQUIV) 2.5-25-2 mg Tab, Take 1 tablet by mouth once daily., Disp: , Rfl:     gabapentin (NEURONTIN) 800 MG tablet, Take 800 mg by mouth every evening., Disp: , Rfl:     lidocaine (XYLOCAINE) 5 % Oint ointment, Apply topically 4 (four) times daily. Apply to entire left foot, Disp: , Rfl:     metoprolol succinate (TOPROL-XL) 25 MG 24 hr tablet, Take 0.5 tablets (12.5 mg total) by mouth once daily., Disp: , Rfl:     nicotine (NICODERM CQ) 21 mg/24 hr, Place 1 patch onto the skin once daily., Disp: , Rfl: 0    oxyCODONE (ROXICODONE) 10 mg Tab immediate release tablet, Take 1 tablet (10 mg total) by mouth every 4 (four) hours as needed  for Pain., Disp: 25 tablet, Rfl: 0    polyethylene glycol (GLYCOLAX) 17 gram PwPk, Take 17 g by mouth 2 (two) times daily as needed (constipation)., Disp: , Rfl: 0    pregabalin (LYRICA) 100 MG capsule, Take 1 capsule (100 mg total) by mouth 3 (three) times daily., Disp: 90 capsule, Rfl: 6    ranolazine (RANEXA) 1,000 mg Tb12, Take 1,000 mg by mouth 2 (two) times daily., Disp: , Rfl:     senna-docusate 8.6-50 mg (PERICOLACE) 8.6-50 mg per tablet, Take 1 tablet by mouth 2 (two) times daily., Disp: , Rfl:     thiamine 100 MG tablet, Take 1 tablet (100 mg total) by mouth once daily., Disp: , Rfl:     ticagrelor (BRILINTA) 90 mg tablet, Take 90 mg by mouth 2 (two) times daily., Disp: , Rfl:     zolpidem (AMBIEN) 5 MG Tab, Take 1 tablet (5 mg total) by mouth nightly as needed., Disp: 30 tablet, Rfl: 3    REVIEW OF SYSTEMS:  General: negative; ENT: negative; Allergy and Immunology: negative; Hematological and Lymphatic: Negative; Endocrine: negative; Respiratory: no cough, shortness of breath, or wheezing; Cardiovascular: no chest pain or dyspnea on exertion; Gastrointestinal: no abdominal pain/back, change in bowel habits, or bloody stools; Genito-Urinary: no dysuria, trouble voiding, or hematuria; Musculoskeletal: negative  Neurological: no TIA or stroke symptoms; Psychiatric: no nervousness, anxiety or depression.    PHYSICAL EXAM:      Pulse: (!) 115  Temp: 98.5 °F (36.9 °C)      General appearance:  Alert, well-appearing, and in no distress.  Oriented to person, place, and time   Neurological: Normal speech, no focal findings noted; CN II - XII grossly intact           Musculoskeletal: Digits/nail without cyanosis/clubbing.  Normal muscle strength/tone.                 Neck: Supple, no significant adenopathy; thyroid is not enlarged                  No carotid bruit can be auscultated                Chest:  Clear to auscultation, no wheezes, rales or rhonchi, symmetric air entry     No use of accessory  muscles             Cardiac: Normal rate and regular rhythm, S1 and S2 normal; PMI non-displaced          Abdomen: Soft, nontender, nondistended, no masses or organomegaly     No rebound tenderness noted; bowel sounds normal     Pulsatile aortic mass is not palpable.     No groin adenopathy      Extremities:   2+ femoral pulses bilaterally     R AKA site - w sutures; minimal fibrinous exudate     L TMA with necrosis    LAB RESULTS:  Lab Results   Component Value Date    K 3.5 04/11/2018    K 3.9 04/10/2018    K 3.5 04/09/2018    CREATININE 0.9 04/11/2018    CREATININE 0.9 04/10/2018    CREATININE 1.0 04/09/2018     Lab Results   Component Value Date    WBC 10.98 04/11/2018    WBC 11.40 04/10/2018    WBC 9.01 04/09/2018    HCT 25.2 (L) 04/11/2018    HCT 25.5 (L) 04/10/2018    HCT 25.5 (L) 04/09/2018     (H) 04/11/2018     (H) 04/10/2018     (H) 04/09/2018     Lab Results   Component Value Date    HGBA1C 5.4 09/26/2017     IMAGING:  Patent L leg arterial u/s    IMP/PLAN:  47 y.o. male with h/o HTN, HLD, HFrEF (10-15% 3/23/18) s/p ICD, CAD s/p PCI, EtOH abuse, PAD s/p ABF 1999 with multiple endovascular interventions presented to hospital on 3/18/18 with left foot rest pain - underwent R AKA after problems with attempts at R leg revascularization with Cardiology  Cont off tobacco  Cont ASA, statin   F/u with Podiatry/Dr Mims for L TMA  F/u in 6months    Norris Trejo MD FACS  Vascular/Endovascular Surgery

## 2018-05-11 NOTE — PATIENT INSTRUCTIONS
Use heel lift boot in bed only to keep pressure off lateral left ankle/heel area. No transfers with heel lift boot on left foot.

## 2018-05-18 NOTE — PROGRESS NOTES
Much of the documentation for this visit was completed in the [facility] system. Please see the attached documentation for further details about this patient's care.     Aylin Abreu LPN

## 2018-05-18 NOTE — PATIENT INSTRUCTIONS
1. LEAVE FOOT DRESSINGS DRY AND INTACT.    2. ELEVATE AND REST FOOT AS MUCH AS POSSIBLE.    3. WEAR SURGICAL BOOT AT ALL TIMES.     4. CALL IMMEDIATELY IF ANY PROBLEMS SUCH AS THE BANDAGE GETTING WET OR FALLING OFF.    5. CALL IMMEDIATELY IF ANY SIGNS OF INFECTION SUCH AS FEVER, CHILLS, SWEATS, INCREASED REDNESS, OR PAIN.

## 2018-05-28 NOTE — PATIENT INSTRUCTIONS
Continue to clean wounds and redress daily as instructed. Use heel lift boot left foot when in bed.

## 2018-06-12 PROBLEM — J20.9 COPD WITH ACUTE BRONCHITIS: Status: ACTIVE | Noted: 2018-01-01

## 2018-06-12 PROBLEM — J44.0 COPD WITH ACUTE BRONCHITIS: Status: ACTIVE | Noted: 2018-01-01

## 2018-06-13 PROBLEM — J44.0 COPD WITH ACUTE BRONCHITIS: Status: RESOLVED | Noted: 2018-01-01 | Resolved: 2018-01-01

## 2018-06-13 PROBLEM — J20.9 COPD WITH ACUTE BRONCHITIS: Status: RESOLVED | Noted: 2018-01-01 | Resolved: 2018-01-01

## 2018-06-13 PROBLEM — G54.6 PHANTOM PAIN AFTER AMPUTATION OF LOWER EXTREMITY: Status: ACTIVE | Noted: 2018-01-01

## 2018-06-13 PROBLEM — I50.21 ACUTE SYSTOLIC CONGESTIVE HEART FAILURE: Status: ACTIVE | Noted: 2018-01-01

## 2018-06-13 PROBLEM — J44.9 COPD, SEVERE: Status: ACTIVE | Noted: 2018-01-01

## 2018-06-13 PROBLEM — A41.9 SEPSIS: Status: ACTIVE | Noted: 2018-01-01

## 2018-06-13 NOTE — PLAN OF CARE
Called [facility] for wound care consult, voicemail obtained and message recorded for wound care consult.

## 2018-06-13 NOTE — CONSULTS
PSYCHIATRY INPATIENT Consult Note/Psychiatric Diagnostic Assessment and Evaluation    6/13/2018 2:57 PM   Leonardo Byrd   1970   [id number]           DATE OF ADMISSION: 6/12/2018  4:42 PM    SITE: [facility]    CURRENT LEGAL STATUS: PEC and/or CEC    HISTORY    CHIEF COMPLAINT   Leonardo Byrd is a 47 y.o. male with no past psychiatric history, currently admitted to the ICU with the following chief complaint: shortness of breath; Psychiatry was consulted to address depression.    HPI   (Elements: Location, Quality, Severity, Duration, Timing, Content, Modifying Factors, Associated Signs & Symptoms)    The patient was seen and examined. The chart was reviewed.    The patient presented to the ER on 6/12/18 with complaints of shortness of breathe. Per the ER and Staff Notes:  -Leonardo Byrd presents to the emergency room with shortness of breath the last 2 wks  The patient is a heavy smoker with really significant peripheral vascular disease history  At recent above-the-knee amputation 3 weeks ago on the right with residual pain since  Patient states that he went to [place] emergency room 3 days ago with SOB there  Patient was discharged home with continued shortness of breath since that ER visit  Patient has a BNP of 2400, does have a history of CHF, history of MI noted now  The patient has also been coughing but denies any chest pain, hypoxia on ABG today  -47 year old male with h/o systolic CHF(EF 15% in April 2018) presents to ED with about 2 week h/o SOB, cough, weight gain. He presented to [facility] ED on Saturday. Sent home after presumed negative work up. Presents here overnight. BNP 2400. Fluid on CXR. Has a cough, but no WBC, no fever. He says that he always gets a cough productive of clear to white sputum when he has CHF exacerbation. He has a long h/o CAD, 14 MI since age of 28 per patient. ICD. LActic acid elevated. He was admitted overnight with a dx of COPD and sepsis. He was started on zmax and  "vanc. Fluid bolus in ED    Psychiatry was consulted to assess for depression. His son had reported concern that the patient was "giving up" and not caring for himself well.     The patient reports that he has been under a lot of stress secondary to his medical issues. However, he feels that he has been "handling pretty good." He admits to having periodic moments of getting upset, but nothing that persists to a significant degree. He denied persistent symptoms of depression or anxiety. Although, he does admit to mild depressive symptoms at times. He denied any anxiety, genoveva or psychosis.     He does currently have chronic pain issues.     Denied Symptoms of Depression: no diminished mood or loss of interest/anhedonia; no irritability, diminished energy, change in sleep, change in appetite, diminished concentration or cognition or indecisiveness, PMA/R, excessive guilt or hopelessness or worthlessness, or suicidal ideations; +periodic bouts of low mood, decreased interest or irritability     Denied current changes in Sleep: no trouble with initiation, maintenance, early morning awakening with inability to return to sleep, or hypersomnolence; currently controlled with his medications.      Denied past or current Suicidal/Homicidal ideations: no active/passive ideations, organized plans, or future intentions     Denied past or current Symptoms of psychosis: no hallucinations, delusions, disorganized thinking, disorganized behavior or abnormal motor behavior, or negative symptoms      Denied past or current Symptoms of genoveva or hypomania: no elevated, expansive, or irritable mood with no increased energy or activity; with no inflated self-esteem or grandiosity, decreased need for sleep, increased rate of speech, FOI or racing thoughts, distractibility, increased goal directed activity or PMA, or risky/disinhibited behavior     Denied Symptoms of TERRY: no excessive anxiety/worry/fear, with no restlessness, fatigue, poor " concentration, irritability, muscle tension, or sleep disturbance      Denied Symptoms of Panic Disorder: no recurrent panic attacks; without agoraphobia     Denied Symptoms of PTSD: no h/o trauma; no re-experiencing/intrusive symptoms, avoidant behavior, negative alterations in cognition or mood, or hyperarousal symptoms; without dissociative symptoms      Denied Symptoms of OCD: no obsessions or compulsions      Denied Symptoms of Eating Disorders: no anorexia, bulimia or binging     Denied Substance Use: no intoxication, withdrawal, tolerance, used in larger amounts or duration than intended, unsuccessful attempts to limit or quit, increased time engaging in or seeking out, cravings or strong desire to use, failure to fulfill obligations, negative consequences in social/interpersonal/occupational,/recreational areas, use in dangerous situations, or medical or psychological consequences       PAST PSYCHIATRIC HISTORY  Previous Psychiatric Hospitalizations: denied   Previous SI/HI: denied  Previous Suicide Attempts: denied   Previous Medication Trials: denied  Psychiatric Care (current & past): denied  History of Psychotherapy: denied  History of Violence: denied      SUBSTANCE ABUSE HISTORY   Tobacco: denied  Alcohol: denied  Illicit Substances: denied  Misuse of Prescription Medications: denied  Detoxes: denied  Rehabs: denied  12 Step Meetings: denied  Periods of Sobriety: denied  Withdrawal: denied        PAST MEDICAL & SURGICAL HISTORY   Past Medical History:   Diagnosis Date    AICD (automatic cardioverter/defibrillator) present     RYAN (acute kidney injury) 3/25/2018    CHF (congestive heart failure)     COPD with acute bronchitis 6/12/2018    Coronary artery disease     Encounter for blood transfusion     Hyperlipemia     Hypertension     MI (myocardial infarction)     Neuropathy     PAD (peripheral artery disease)     PVD (peripheral vascular disease)      Past Surgical History:   Procedure  Laterality Date    AMPUTATION Right     great toe    BYBPASS GRAFT AORTOBIUFEMORAL      CARDIAC DEFIBRILLATOR PLACEMENT      coronary stents      EXPLORATION AND EVaCUATION OF HEMATOMA OF UPPER EXTREMITY Left     KNEE ARTHROPLASTY Left     VASCULAR SURGERY      fem-pop bypass         CURRENT MEDICATION REGIMEN   Home Meds:   Prior to Admission medications    Medication Sig Start Date End Date Taking? Authorizing Provider   amitriptyline (ELAVIL) 50 MG tablet Take 1 tablet (50 mg total) by mouth every evening. 4/10/18 4/10/19  Zaid García MD   aspirin (ECOTRIN) 81 MG EC tablet Take 81 mg by mouth once daily.    Historical Provider, MD   atorvastatin (LIPITOR) 80 MG tablet Take 1 tablet (80 mg total) by mouth every evening. 4/10/18 4/10/19  Zaid García MD   cilostazol (PLETAL) 50 MG Tab Take 2 tablets (100 mg total) by mouth 2 (two) times daily. 8/5/17   Peng Noble NP   cyclobenzaprine (FLEXERIL) 10 MG tablet Take 10 mg by mouth 3 (three) times daily as needed for Muscle spasms.    Historical Provider, MD   folic acid-vit B6-vit B12 2.5-25-2 mg (FOLBIC OR EQUIV) 2.5-25-2 mg Tab Take 1 tablet by mouth once daily. 4/10/18   Zaid García MD   gabapentin (NEURONTIN) 800 MG tablet Take 800 mg by mouth every evening.    Historical Provider, MD   lidocaine (XYLOCAINE) 5 % Oint ointment Apply topically 4 (four) times daily. Apply to entire left foot 4/10/18   Zaid García MD   metoprolol succinate (TOPROL-XL) 25 MG 24 hr tablet Take 0.5 tablets (12.5 mg total) by mouth once daily. 4/10/18   Zaid García MD   nicotine (NICODERM CQ) 21 mg/24 hr Place 1 patch onto the skin once daily. 4/10/18   Zaid García MD   oxyCODONE (ROXICODONE) 10 mg Tab immediate release tablet Take 1 tablet (10 mg total) by mouth every 4 (four) hours as needed for Pain. 4/10/18   Zaid García MD   polyethylene glycol (GLYCOLAX) 17 gram PwPk Take 17 g by mouth 2 (two) times daily as needed (constipation). 4/10/18   Zaid  NAHEED aGrcía MD   pregabalin (LYRICA) 100 MG capsule Take 1 capsule (100 mg total) by mouth 3 (three) times daily. 4/10/18 10/9/18  Zaid García MD   ranolazine (RANEXA) 1,000 mg Tb12 Take 1,000 mg by mouth 2 (two) times daily.    Historical Provider, MD   senna-docusate 8.6-50 mg (PERICOLACE) 8.6-50 mg per tablet Take 1 tablet by mouth 2 (two) times daily. 4/10/18   Zaid García MD   thiamine 100 MG tablet Take 1 tablet (100 mg total) by mouth once daily. 4/10/18   Zaid García MD   ticagrelor (BRILINTA) 90 mg tablet Take 90 mg by mouth 2 (two) times daily.    Historical Provider, MD   zolpidem (AMBIEN) 5 MG Tab Take 1 tablet (5 mg total) by mouth nightly as needed. 4/10/18   Zaid García MD         OTC Meds: denied    Scheduled Meds:    aspirin  81 mg Oral Daily    atorvastatin  80 mg Oral Daily    digoxin  250 mcg Intravenous Once    [START ON 6/14/2018] digoxin  0.125 mg Oral Daily    furosemide  40 mg Intravenous BID    lisinopril  2.5 mg Oral Daily    pantoprazole  40 mg Oral Daily    pregabalin  75 mg Oral BID    ranolazine  500 mg Oral BID    spironolactone  12.5 mg Oral Daily    tamsulosin  0.4 mg Oral Daily    ticagrelor  90 mg Oral BID      PRN Meds: acetaminophen, HYDROcodone-acetaminophen, levalbuterol, morphine, ondansetron, promethazine (PHENERGAN) IVPB   Psychotherapeutics     None            ALLERGIES   Review of patient's allergies indicates:  No Known Allergies      NEUROLOGIC HISTORY  Seizures: denied   Head trauma: denied       FAMILY PSYCHIATRIC HISTORY   History reviewed. No pertinent family history.           SOCIAL HISTORY  Developmental/Childhood: met milestones  History of Physical/Sexual Abuse: denied  Education: unfinished 12th grade    Employment: disabled   Financial: SSI   Relationship Status/Sexual Orientation:  form his wife   Children: 3   Housing Status: lives with family    Confucianism: none   History: denied   Recreational Activities:  Fishing/hunting  Access to Gun: yes- hunting rifles       LEGAL HISTORY   Past Charges/Incarcerations:denied   Pending Charges: denied      ROS  Reviewed note/exam by Dr. Ga from 6/13/18 at 8:48 AM        EXAMINATION      PHYSICAL EXAM  Reviewed note/exam by Dr. Ga from 6/13/18 at 8:48 AM    VITALS   Vitals:    06/13/18 1415   BP:    Pulse: 102   Resp: (!) 21   Temp:           PAIN  6/10  Subjective report of pain matches objective signs and symptoms: Yes      LABORATORY DATA   Recent Results (from the past 72 hour(s))   Comprehensive metabolic panel    Collection Time: 06/12/18  5:06 PM   Result Value Ref Range    Sodium 138 136 - 145 mmol/L    Potassium 3.5 3.5 - 5.1 mmol/L    Chloride 105 95 - 110 mmol/L    CO2 18 (L) 23 - 29 mmol/L    Glucose 117 (H) 70 - 110 mg/dL    BUN, Bld 6 6 - 20 mg/dL    Creatinine 0.8 0.5 - 1.4 mg/dL    Calcium 9.3 8.7 - 10.5 mg/dL    Total Protein 7.4 6.0 - 8.4 g/dL    Albumin 3.4 (L) 3.5 - 5.2 g/dL    Total Bilirubin 1.8 (H) 0.1 - 1.0 mg/dL    Alkaline Phosphatase 198 (H) 55 - 135 U/L     (H) 10 - 40 U/L     (H) 10 - 44 U/L    Anion Gap 15 8 - 16 mmol/L    eGFR if African American >60 >60 mL/min/1.73 m^2    eGFR if non African American >60 >60 mL/min/1.73 m^2   CBC auto differential    Collection Time: 06/12/18  5:06 PM   Result Value Ref Range    WBC 7.92 3.90 - 12.70 K/uL    RBC 3.59 (L) 4.60 - 6.20 M/uL    Hemoglobin 11.4 (L) 14.0 - 18.0 g/dL    Hematocrit 33.8 (L) 40.0 - 54.0 %    MCV 94 82 - 98 fL    MCH 31.8 (H) 27.0 - 31.0 pg    MCHC 33.7 32.0 - 36.0 g/dL    RDW 20.2 (H) 11.5 - 14.5 %    Platelets 246 150 - 350 K/uL    MPV 11.1 9.2 - 12.9 fL    nRBC 2@L=100 (A) 0 /100 WBC    Gran% 63.0 38.0 - 73.0 %    Lymph% 30.0 18.0 - 48.0 %    Mono% 3.0 (L) 4.0 - 15.0 %    Eosinophil% 0.0 0.0 - 8.0 %    Basophil% 1.0 0.0 - 1.9 %    Bands 2.0 %    Other 1 %    Aniso Moderate     Poik Moderate     Poly Moderate     Hypo Moderate     Target Cells Occasional     Tear  Drop Cells Occasional     Stomatocytes Absent     Spherocytes Occasional     Schistocytes Present     Large/Giant Platelets Present     Differential Method Manual    Blood culture    Collection Time: 06/12/18  5:06 PM   Result Value Ref Range    Blood Culture, Routine No Growth to date    Blood culture    Collection Time: 06/12/18  5:06 PM   Result Value Ref Range    Blood Culture, Routine No Growth to date    Lactic acid, plasma    Collection Time: 06/12/18  5:06 PM   Result Value Ref Range    Lactate (Lactic Acid) 2.8 (H) 0.5 - 2.2 mmol/L   Phosphorus    Collection Time: 06/12/18  5:06 PM   Result Value Ref Range    Phosphorus 3.8 2.7 - 4.5 mg/dL   Magnesium    Collection Time: 06/12/18  5:06 PM   Result Value Ref Range    Magnesium 1.8 1.6 - 2.6 mg/dL   TSH    Collection Time: 06/12/18  5:06 PM   Result Value Ref Range    TSH 4.204 (H) 0.400 - 4.000 uIU/mL   Troponin I    Collection Time: 06/12/18  5:06 PM   Result Value Ref Range    Troponin I 0.033 (H) 0.000 - 0.026 ng/mL   CK    Collection Time: 06/12/18  5:06 PM   Result Value Ref Range    CPK 69 20 - 200 U/L   CK-MB    Collection Time: 06/12/18  5:06 PM   Result Value Ref Range    CPK 69 20 - 200 U/L    CPK MB 1.6 0.1 - 6.5 ng/mL    MB% 2.3 0.0 - 5.0 %   Protime-INR    Collection Time: 06/12/18  5:06 PM   Result Value Ref Range    Prothrombin Time 13.0 (H) 9.0 - 12.5 sec    INR 1.3 (H) 0.8 - 1.2   Brain natriuretic peptide    Collection Time: 06/12/18  5:06 PM   Result Value Ref Range    BNP 2,495 (H) 0 - 99 pg/mL   APTT    Collection Time: 06/12/18  5:06 PM   Result Value Ref Range    aPTT 28.8 21.0 - 32.0 sec   D dimer, quantitative    Collection Time: 06/12/18  5:06 PM   Result Value Ref Range    D-Dimer 10.78 (H) <0.50 mg/L FEU   T4, free    Collection Time: 06/12/18  5:06 PM   Result Value Ref Range    Free T4 1.01 0.71 - 1.51 ng/dL   Pathologist Interpretation Differential    Collection Time: 06/12/18  5:06 PM   Result Value Ref Range    Pathologist  Review Review required    Pathologist Review    Collection Time: 06/12/18  5:06 PM   Result Value Ref Range    Pathologist Review Peripheral Smear REVIEWED    Arterial Blood Gas-[place] Only Once    Collection Time: 06/12/18  6:04 PM   Result Value Ref Range    POC PH 7.53 (A) 7.35 - 7.45    POC PCO2 24 (A) 35 - 45 mmHg    POC PO2 59 (A) 75 - 100 mmHg    POC tHb 10.9 (A) 12 - 18 g/dL    POC O2Hb Arterial 91.6 (A) 94 - 100 %    POC COHb 3.7 (A) 0 - 3.0 %    POC MetHb 0.6 0 - 1.5 %    POC SATURATED O2 95.6 90 - 100 %    POC TCO2 20.8 mmol/L    POC BE -1.40 -2 - 2 mmol/L    POC HCO3 20.10 (A) 22 - 26 mmol/L    Site Right Brachial     DelSys Nasal Cannula     Allens Test N/A    Urinalysis    Collection Time: 06/12/18  7:40 PM   Result Value Ref Range    Specimen UA Urine, Clean Catch     Color, UA Yellow Yellow, Straw, Destini    Appearance, UA Clear Clear    pH, UA 6.0 5.0 - 8.0    Specific Gravity, UA <=1.005 (A) 1.005 - 1.030    Protein, UA Negative Negative    Glucose, UA Negative Negative    Ketones, UA Negative Negative    Bilirubin (UA) Negative Negative    Occult Blood UA Negative Negative    Nitrite, UA Negative Negative    Urobilinogen, UA Negative <2.0 EU/dL    Leukocytes, UA Negative Negative   Drug screen panel, emergency    Collection Time: 06/12/18  7:40 PM   Result Value Ref Range    Benzodiazepines Negative     Methadone metabolites Negative     Cocaine (Metab.) Negative     Opiate Scrn, Ur Negative     Barbiturate Screen, Ur Negative     Amphetamine Screen, Ur Negative     THC Negative     Phencyclidine Negative     Creatinine, Random Ur 20.6 (L) 23.0 - 375.0 mg/dL    Toxicology Information SEE COMMENT    Lactic acid, plasma    Collection Time: 06/12/18 10:40 PM   Result Value Ref Range    Lactate (Lactic Acid) 2.3 (H) 0.5 - 2.2 mmol/L   Troponin I    Collection Time: 06/12/18 11:02 PM   Result Value Ref Range    Troponin I 0.035 (H) 0.000 - 0.026 ng/mL   CBC auto differential    Collection Time:  06/13/18  6:32 AM   Result Value Ref Range    WBC 9.50 3.90 - 12.70 K/uL    RBC 3.38 (L) 4.60 - 6.20 M/uL    Hemoglobin 10.7 (L) 14.0 - 18.0 g/dL    Hematocrit 31.6 (L) 40.0 - 54.0 %    MCV 94 82 - 98 fL    MCH 31.7 (H) 27.0 - 31.0 pg    MCHC 33.9 32.0 - 36.0 g/dL    RDW 19.8 (H) 11.5 - 14.5 %    Platelets 226 150 - 350 K/uL    MPV 11.2 9.2 - 12.9 fL    nRBC 2@L=100 (A) 0 /100 WBC    Gran% 59.0 38.0 - 73.0 %    Lymph% 32.0 18.0 - 48.0 %    Mono% 6.0 4.0 - 15.0 %    Eosinophil% 0.0 0.0 - 8.0 %    Basophil% 1.0 0.0 - 1.9 %    Bands 2.0 %    Aniso Moderate     Poik Moderate     Poly Moderate     Ovalocytes Occasional     Target Cells Occasional     Tear Drop Cells Moderate     Spherocytes Moderate     Schistocytes Present     Large/Giant Platelets Present     Differential Method Manual    Comprehensive metabolic panel    Collection Time: 06/13/18  6:32 AM   Result Value Ref Range    Sodium 134 (L) 136 - 145 mmol/L    Potassium 3.9 3.5 - 5.1 mmol/L    Chloride 102 95 - 110 mmol/L    CO2 15 (L) 23 - 29 mmol/L    Glucose 121 (H) 70 - 110 mg/dL    BUN, Bld 9 6 - 20 mg/dL    Creatinine 1.1 0.5 - 1.4 mg/dL    Calcium 8.7 8.7 - 10.5 mg/dL    Total Protein 7.0 6.0 - 8.4 g/dL    Albumin 3.3 (L) 3.5 - 5.2 g/dL    Total Bilirubin 2.0 (H) 0.1 - 1.0 mg/dL    Alkaline Phosphatase 186 (H) 55 - 135 U/L     (H) 10 - 40 U/L     (H) 10 - 44 U/L    Anion Gap 17 (H) 8 - 16 mmol/L    eGFR if African American >60 >60 mL/min/1.73 m^2    eGFR if non African American >60 >60 mL/min/1.73 m^2   Troponin I    Collection Time: 06/13/18  6:32 AM   Result Value Ref Range    Troponin I 0.048 (H) 0.000 - 0.026 ng/mL   Lactic acid, plasma    Collection Time: 06/13/18  6:32 AM   Result Value Ref Range    Lactate (Lactic Acid) 3.3 (H) 0.5 - 2.2 mmol/L   Troponin I    Collection Time: 06/13/18  1:24 PM   Result Value Ref Range    Troponin I 0.039 (H) 0.000 - 0.026 ng/mL      No results found for: PHENYTOIN, PHENOBARB, VALPROATE,  "CBMZ        CONSTITUTIONAL  General Appearance: WM, in hospital garb; NAD    MUSCULOSKELETAL  Muscle Strength and Tone:  normal  Abnormal Involuntary Movements:  none  Gait and Station:  Unable to assess- bed bound for now    PSYCHIATRIC   Level of Consciousness: awake, alert  Orientation: p/p/t/s  Grooming:  adequate to circumstances  Psychomotor Behavior: no PMA/R  Speech: nl r/t/v/s  Language:  English fluent  Mood: "ok"  Affect: full range, euthymic  Thought Process:  linear and organized  Associations:  intact; no ADELINA  Thought Content:  denied AVH/delusions; denied HI/SI  Memory:  intact to recent and remote events  Attention:  intact to conversation; not distractible   Fund of Knowledge:  age and education appropriate  Estimate if Intelligence:  average based on work/education history, vocabulary and mental status exam  Insight:  good- seeks help, recognizes problems  Judgment:   good- no bx issues, compliant and cooperative        PSYCHOSOCIAL      PSYCHOSOCIAL STRESSORS   health    FUNCTIONING RELATIONSHIPS   good support system      STRENGTHS AND LIABILITIES   Strength: Patient accepts guidance/feedback, Strength: Patient is expressive/articulate., Liability: Patient has poor health., Liability- limited mobility to participate in pleasurable recreations      Is the patient aware of the biomedical complications associated with substance abuse and mental illness? yes    Does the patient have an Advance Directive for Mental Health treatment? no  (If yes, inform patient to bring copy.)        ASSESSMENT     IMPRESSION   Adjustment Disorder with depressed features  Pain disorder with physiological factors        MEDICAL DECISION MAKING        PROBLEM LIST AND MANAGEMENT PLANS    Adjustment issues  pain      PRESCRIPTION DRUG MANAGEMENT  Compliance: yes  Side Effects: no  Regimen Adjustments:     Adjustment issues: pt counseled; pt declined pharmacotherapy pr psychotherapy; symptoms are mild and periodic and he " appears to  Be handling the stress of his illnesses well. Encourage his family to help him regain as much normalization of his activities as he is able; if symptoms worsen please re-consult or refer to psychiatry; at this time, he can f/u with his PCP/FM; if needed in the future, consider trial of an SSRI    Pain: pt counseled; he declined any medication adjustments at this time; consider optimizing Lyrica as able; consider trial of Savella in the future if needed; f/u with PCP; consider pain management referral if indicated in the future.     DIAGNOSTIC TESTING  Labs reviewed    Disposition:  -Pt is psychiatrically stable for discharge home with outpatient follow up care once medically cleared  -He does not meet criteria for inpatient or involuntary treatment under a PEC and/or CEC as he is not a danger to self, adanger to others, or grave disabled.      Nghia Hicks MD  Psychiatry

## 2018-06-13 NOTE — HPI
47 year old male with h/o systolic CHF(EF 15% in April 2018), PVD, CAD with previuos MI, PPM/AICD, dyslipidemia, HTN, smoker,  presents to ED with about 2 week h/o SOB, cough, weight gain. He presented to [facility] ED on Saturday. Sent home after presumed negative work up. Presents here overnight. BNP 2400. Fluid on CXR. Has a cough, but no WBC, no fever. He says that he always gets a cough productive of clear to white sputum when he has CHF exacerbation. LActic acid elevated. He was admitted overnight with a dx of COPD and sepsis. He was started on zmax and vanc. Fluid bolus in ED

## 2018-06-13 NOTE — CONSULTS
[facility]  Pulmonology  Consult Note    Patient Name: Leonardo Byrd  MRN: [id number]  Admission Date: 6/12/2018  Hospital Length of Stay: 0 days  Code Status: Full Code  Attending Physician: Joann Reaves MD  Primary Care Provider: Indio Aquino MD   Principal Problem: <principal problem not specified>    Consults  Subjective:     HPI:  Leonardo Byrd is a 47 y.o. year old male that's presents with a chief complaint of SOB and Wheezing for several days.Patient is a smoker and using alcohol.pt lethargic with cheyne rios breathing pattern.Only admits to marijuana  No amphetamine abuse.No diabetes but significant Peripheral and cardiac ischemic disease probable represents a hyper-reaction to nicotine such as Buerger's Disease.  Consulted to evaluate Respiratory status.    Past Medical History:   Diagnosis Date    AICD (automatic cardioverter/defibrillator) present     RYAN (acute kidney injury) 3/25/2018    CHF (congestive heart failure)     COPD with acute bronchitis 6/12/2018    Coronary artery disease     Encounter for blood transfusion     Hyperlipemia     Hypertension     MI (myocardial infarction)     Neuropathy     PAD (peripheral artery disease)     PVD (peripheral vascular disease)         Past Surgical History:   Procedure Laterality Date    AMPUTATION Right     great toe    BYBPASS GRAFT AORTOBIUFEMORAL      CARDIAC DEFIBRILLATOR PLACEMENT      coronary stents      EXPLORATION AND EVaCUATION OF HEMATOMA OF UPPER EXTREMITY Left     KNEE ARTHROPLASTY Left     VASCULAR SURGERY      fem-pop bypass       Prior to Admission medications    Medication Sig Start Date End Date Taking? Authorizing Provider   amitriptyline (ELAVIL) 50 MG tablet Take 1 tablet (50 mg total) by mouth every evening. 4/10/18 4/10/19  Zaid García MD   aspirin (ECOTRIN) 81 MG EC tablet Take 81 mg by mouth once daily.    Historical Provider, MD   atorvastatin (LIPITOR) 80 MG tablet Take 1  tablet (80 mg total) by mouth every evening. 4/10/18 4/10/19  Zaid García MD   cilostazol (PLETAL) 50 MG Tab Take 2 tablets (100 mg total) by mouth 2 (two) times daily. 8/5/17   Peng Noble NP   cyclobenzaprine (FLEXERIL) 10 MG tablet Take 10 mg by mouth 3 (three) times daily as needed for Muscle spasms.    Historical Provider, MD   folic acid-vit B6-vit B12 2.5-25-2 mg (FOLBIC OR EQUIV) 2.5-25-2 mg Tab Take 1 tablet by mouth once daily. 4/10/18   Zaid García MD   gabapentin (NEURONTIN) 800 MG tablet Take 800 mg by mouth every evening.    Historical Provider, MD   lidocaine (XYLOCAINE) 5 % Oint ointment Apply topically 4 (four) times daily. Apply to entire left foot 4/10/18   Zaid García MD   metoprolol succinate (TOPROL-XL) 25 MG 24 hr tablet Take 0.5 tablets (12.5 mg total) by mouth once daily. 4/10/18   Zaid García MD   nicotine (NICODERM CQ) 21 mg/24 hr Place 1 patch onto the skin once daily. 4/10/18   Zaid García MD   oxyCODONE (ROXICODONE) 10 mg Tab immediate release tablet Take 1 tablet (10 mg total) by mouth every 4 (four) hours as needed for Pain. 4/10/18   Zaid García MD   polyethylene glycol (GLYCOLAX) 17 gram PwPk Take 17 g by mouth 2 (two) times daily as needed (constipation). 4/10/18   Zaid García MD   pregabalin (LYRICA) 100 MG capsule Take 1 capsule (100 mg total) by mouth 3 (three) times daily. 4/10/18 10/9/18  Zaid García MD   ranolazine (RANEXA) 1,000 mg Tb12 Take 1,000 mg by mouth 2 (two) times daily.    Historical Provider, MD   senna-docusate 8.6-50 mg (PERICOLACE) 8.6-50 mg per tablet Take 1 tablet by mouth 2 (two) times daily. 4/10/18   Zaid García MD   thiamine 100 MG tablet Take 1 tablet (100 mg total) by mouth once daily. 4/10/18   Zaid García MD   ticagrelor (BRILINTA) 90 mg tablet Take 90 mg by mouth 2 (two) times daily.    Historical Provider, MD   zolpidem (AMBIEN) 5 MG Tab Take 1 tablet (5 mg total) by mouth nightly as needed. 4/10/18   Zaid  NAHEED García MD       Social History     Social History    Marital status: Single     Spouse name: N/A    Number of children: N/A    Years of education: N/A     Occupational History    Not on file.     Social History Main Topics    Smoking status: Former Smoker     Packs/day: 0.50     Quit date: 5/1/2018    Smokeless tobacco: Former User     Types: Chew     Quit date: 8/2/1980      Comment: Uses nicotine gum and nicotine patches    Alcohol use Yes      Comment: rarely    Drug use: No    Sexual activity: Yes     Partners: Female     Other Topics Concern    Not on file     Social History Narrative    No narrative on file       History reviewed. No pertinent family history.    Review of patient's allergies indicates:  No Known Allergies Allergies have been reviewed.     ROS: ROS    PE:   Vitals:    06/13/18 0710 06/13/18 0715 06/13/18 0720 06/13/18 0753   BP:   100/78    BP Location:       Patient Position:       Pulse: (!) 117 (!) 122 (!) 121    Resp: 17 (!) 29 14    Temp: (!) 95.8 °F (35.4 °C)      TempSrc: Axillary      SpO2: 100% 100% (!) 92% 100%   Weight:       Height:        Physical Exam    Alert and orientated X 3   HEENT: Head: Normocephalic no trauma                Ears : Normal Pinna No Drainage no Battles sign                Eyes: Vision Unchanged, No conjunctivitis,No drainage                Neck: Supple, No JVD,No Abnormal Carotid Pulsations                Throat: No Erythema, No pus,No Swelling,Mallampati score= 2    Chest: Course bs bilaterally with poor air entry bilaterally   Cardiac: HS very distant due to barrel chest configuration RRR S1+ S2 with a -S3: ?+M = 2/6, No R//G  Abdomen: Bowel Sounds are Normal.Soft Abdomen. No organomegaly of Liver,Spleen,or Kidneys   CNS: Non focal and intact. Cranial nerves 2, 346,8,9,10 and 12 are normal.Norrmal gait.Normal posture.  Extremities: No Clubbing, AKA on the right  And left foot toes amputationsNo Cyanosis with oxygen,Positive mild edema of lower  "extremities Bilateral  Skin: No Rash, No Ulcerative sores,and No cellulitis of the IV site.    Lab Results   Component Value Date    WBC 9.50 06/13/2018    HGB 10.7 (L) 06/13/2018    HCT 31.6 (L) 06/13/2018     06/13/2018    CHOL 189 09/26/2017    TRIG 125 09/26/2017    HDL 62 09/26/2017     (H) 06/13/2018     (H) 06/13/2018     (L) 06/13/2018    K 3.9 06/13/2018     06/13/2018    CREATININE 1.1 06/13/2018    BUN 9 06/13/2018    CO2 15 (L) 06/13/2018    TSH 4.204 (H) 06/12/2018    INR 1.3 (H) 06/12/2018    HGBA1C 5.4 09/26/2017               Assessment/Plan:     COPD, severe    Smoked a lot !!        Acute systolic congestive heart failure    Improved with o2 may need home oxygen will start niv         Ischemic cardiomyopathy    Ef 15% multifactorial alcohol and ischemic CAD  Patient is a vasculopath        Alcohol abuse    Off alchol "3 months:"        Tobacco abuse    Stopped "1Week ago"        Peripheral vascular disease of lower extremity    Decreased pulses         Buerger's disease with amputations(severePVD)      Thank you for your consult. I will follow-up with patient. Please contact us if you have any additional questions.     Chris Soto MD  Pulmonology  [facility]    "

## 2018-06-13 NOTE — HOSPITAL COURSE
6/13  He is tachypneic and a little SOB this am     6-14 Pt was assessed and felt to not be septic, so all Abx s are d'martine at this time; will monitor his clinical picture today    6/15  Wound care saw him--on santyl, but was on gentamicin oint as well in [place](h/o MRSA Cx).   BP low. held lasix, ACE, spironolactone yesterday  He did get Dig    BNP up to 2600  He says SOB is ok, but is noticeably tachypneic     6/16  Wore bipap sporadically last night   His BP tolerated his CHF meds yesterday. Neg 2000 last 24 hours. BNP 2400   Upon arriving to floor this am patient is c/o CP and is hypoxic, clammy, respiratory distress.   He is a full code. He was intubated and placed on mechanical ventilation, and Subclav line placed with the help of Dr. Dean and our nurse anesthetist, Zachary.   His face is pinking up but peripheral pulse ox reading in the low 60s.  Post intubation CXR shows ET tube couple of cm above altaf. Both lungs fully inflated  ABG is pending

## 2018-06-13 NOTE — ED PROVIDER NOTES
[facility]                                                 Chief Complaint  47 y.o. male with Shortness of Breath (c/o shortness of breath for about 2 weeks, getting worse)    History of Present Illness  Leonardo Byrd presents to the emergency room with shortness of breath the last 2 wks  The patient is a heavy smoker with really significant peripheral vascular disease history  At recent above-the-knee amputation 3 weeks ago on the right with residual pain since  Patient states that he went to [place] emergency room 3 days ago with SOB there  Patient was discharged home with continued shortness of breath since that ER visit  Patient has a BNP of 2400, does have a history of CHF, history of MI noted now  The patient has also been coughing but denies any chest pain, hypoxia on ABG today    The history is provided by the patient   device was not used during this ER visit    Past Medical History   -- AICD (automatic cardioverter/defibrillator) present    -- RYAN (acute kidney injury)    -- CHF (congestive heart failure)    -- COPD with acute bronchitis    -- Coronary artery disease    -- Encounter for blood transfusion    -- Hyperlipemia    -- Hypertension    -- MI (myocardial infarction)    -- Neuropathy    -- PAD (peripheral artery disease)    -- PVD (peripheral vascular disease)      Past Surgical History   -- AMPUTATION     -- BYBPASS GRAFT AORTOBIUFEMORAL     -- CARDIAC DEFIBRILLATOR PLACEMENT     -- coronary stents     -- EXPLORATION AND EVaCUATION OF HEMATOMA OF UPPER EXTREMITY     -- KNEE ARTHROPLASTY     -- VASCULAR SURGERY        No Known Allergies   No family history on file.    Review of Systems and Physical Exam      Review of Systems - provided by the patient  -- Constitution - no fever, denies fatigue, no weakness, no chills  -- Eyes - no tearing or redness, no visual disturbance  -- Ear, Nose - no tinnitus or earache, no nasal congestion or discharge  --  Mouth,Throat - no sore throat, no toothache, normal voice, normal swallowing  -- Respiratory - cough and congestion, shortness of breath, HASSAN  -- Cardiovascular - denies chest pain, no palpitations, denies claudication  -- Gastrointestinal - denies abdominal pain, nausea, vomiting, or diarrhea  -- Genitourinary - no dysuria, no denies flank pain, no hematuria or frequency   -- Musculoskeletal - denies back pain, negative for myalgias and arthralgias   -- Neurological - no headache, denies weakness or seizure; no LOC  -- Skin - denies pallor, rash, or changes in skin. no hives or welts noted  -- Psychiatric - Denies SI or HI, no psychosis or fractured thought noted     Vital signs  BP: 138/74   Pulse: 110   Resp: (!) 22     Physical Exam  -- Nursing note and vitals reviewed  -- Head: Atraumatic. Normocephalic. No obvious abnormality  -- Eyes: Pupils are equal and reactive to light. Normal conjunctiva and lids  -- Neck: Normal range of motion. Neck supple. No masses, trachea midline  -- Cardiac: Normal rate, regular rhythm and normal heart sounds  -- Pulmonary:  Crackles at the bilateral bases with diminished air exchange  -- Abdominal: Soft, no tenderness. Normal bowel sounds. Normal liver edge  -- Musculoskeletal: Normal range of motion, no effusions. Joints stable   -- Neurological: No focal deficits. Showed good interaction with staff  -- Vascular:  Less than 2 sec capillary refill in left lower extremity    Emergency Room Course      Labs     K 3.5      CO2 18 (L)   BUN 6   CREATININE 0.8    (H)   ALKPHOS 198 (H)    (H)    (H)   BILITOT 1.8 (H)   ALBUMIN 3.4 (L)   PROT 7.4   WBC 7.92   HGB 11.4 (L)   HCT 33.8 (L)      CPK 69   CPK 69   CPKMB 1.6   TROPONINI 0.033 (H)   INR 1.3 (H)   BNP 2,495 (H)   DDIMER 10.78 (H)   LACTATE 2.8 (H)   MG 1.8   TSH 4.204 (H)      Arterial blood gas  PH 7.53*   PCO2 24*   PO2 59*   HCO3 20.10*   POCSATURATED 95.6   BE -1.40       Urinalysis  -- Urinalysis performed during this ER visit showed no signs of infection     EKG  -- The EKG findings today were without concerning findings from baseline    Radiology  -- Chest x-ray showed CHF  -- The PE CT showed left CHF    Additional Work up  -- Blood cultures have also been drawn, results are pending    Medications Given  ceFEPIme injection 2 g (2 g Intravenous Given 6/12/18 1914)   vancomycin in dextrose 5 % 1 gram/250 mL IVPB 1,000 mg (not administered)   azithromycin 500 mg in 0.9 % sodium chloride 250 mL IVPB (ready to mix system)    albuterol-ipratropium 2.5 mg-0.5 mg/3 mL nebulizer solution 3 mL (3 mLs Nebulization Given 6/12/18 1709)   furosemide injection 40 mg (40 mg Intravenous Given 6/12/18 1818)   levalbuterol nebulizer solution 1.25 mg (1.25 mg Nebulization Given 6/12/18 1826)   sodium chloride 0.9% bolus 1,000 mL (1,000 mLs Intravenous New Bag 6/12/18 1913)   omnipaque 350 iohexol 75 mL (75 mLs Intravenous Given 6/12/18 1853)   lorazepam injection 0.5 mg (0.5 mg Intravenous Given 6/12/18 1934)   morphine injection 2 mg (2 mg Intravenous Given 6/12/18 2020)   ondansetron injection 4 mg (4 mg Intravenous Given 6/12/18 2020)     Critical Care ED Physician Time (minutes):  -- Performed by: Raul Dean M.D.  -- Date/Time: 8:46 PM 6/12/2018   -- Direct Patient Care (Face Time): 10  -- Additional History from Records or Taking Additional History: 10  -- Ordering, Reviewing, and Interpreting Diagnostic Studies: 10  -- Total Time in Documentation: 10  -- Consultation with Other Physicians: 10  -- Consultation with Family Related to Condition: 10  -- Total Critical Care Time: 10    ED Physician Notes  -- 6:07 AM:  This patient was a little tachypneic and tachycardic on assessment  -- hesitancy to put the patient on the floor as originally planned per superviser  -- patient to be placed in the ICU as a precaution overnight    Diagnosis  -- Sepsis  -- SOB (shortness of breath)  -- COPD with  acute bronchitis  -- Congestive heart failure    Disposition and Plan  -- Disposition: observation  -- Condition: stable  -- Telemetry monitoring  -- Morning labs  -- SCD hoses  -- Home medications  -- Nausea medication when necessary  -- Pain medication when necessary  -- Routine monitoring  -- Bed rest until otherwise stated  -- Aspirin daily  -- Cardiology consult  -- Serial cardiac enzymes  -- Pulmonology consult  -- Breathing treatments  -- IV antibiotics  -- IV Steroids  -- Blood cultures pending    This note is dictated on Dragon Natural Speaking word recognition program.  There are word recognition mistakes that are occasionally missed on review.                     Raul Dean MD  06/13/18 0608

## 2018-06-13 NOTE — NURSING
Pt with NC 4 liters, sats upper 80's. Encouraged Pursed lip breathing slow and controlled. Pt continues to talk and unable to catch breath. Encouraged PT to stay quiet and concentrate on his breathing to increase O2 sats. Pt nodded with understanding.  Notified Respiratory with Pt's resp. Distress. Placed on 40% venti Mask. Prn resp TX in process. Will cont to monitor. HOB up >60 degrees to ease respirations.

## 2018-06-13 NOTE — CONSULTS
[facility]  Cardiology  Consult Note    Patient Name: Leonardo Byrd  MRN: [id number]  Admission Date: 6/12/2018  Hospital Length of Stay: 0 days  Code Status: Full Code   Attending Provider: Joann Reaves MD   Consulting Provider: Osmani Chapin NP  Primary Care Physician: Indio Aquino MD  Principal Problem:<principal problem not specified>    Patient information was obtained from patient, past medical records and ER records.     Consults  Subjective:     Chief Complaint:  SOB     HPI:   47 year old male with h/o systolic CHF(EF 15% in April 2018), PVD, CAD with previuos MI, PPM/AICD, dyslipidemia, HTN, smoker,  presents to ED with about 2 week h/o SOB, cough, weight gain. He presented to [facility] ED on Saturday. Sent home after presumed negative work up. Presents here overnight. BNP 2400. Fluid on CXR. Has a cough, but no WBC, no fever. He says that he always gets a cough productive of clear to white sputum when he has CHF exacerbation. LActic acid elevated. He was admitted overnight with a dx of COPD and sepsis. He was started on zmax and vanc. Fluid bolus in ED    Past Medical History:   Diagnosis Date    AICD (automatic cardioverter/defibrillator) present     RYAN (acute kidney injury) 3/25/2018    CHF (congestive heart failure)     COPD with acute bronchitis 6/12/2018    Coronary artery disease     Encounter for blood transfusion     Hyperlipemia     Hypertension     MI (myocardial infarction)     Neuropathy     PAD (peripheral artery disease)     PVD (peripheral vascular disease)        Past Surgical History:   Procedure Laterality Date    AMPUTATION Right     great toe    BYBPASS GRAFT AORTOBIUFEMORAL      CARDIAC DEFIBRILLATOR PLACEMENT      coronary stents      EXPLORATION AND EVaCUATION OF HEMATOMA OF UPPER EXTREMITY Left     KNEE ARTHROPLASTY Left     VASCULAR SURGERY      fem-pop bypass       Review of patient's allergies indicates:  No Known Allergies    No  current facility-administered medications on file prior to encounter.      Current Outpatient Prescriptions on File Prior to Encounter   Medication Sig    amitriptyline (ELAVIL) 50 MG tablet Take 1 tablet (50 mg total) by mouth every evening.    aspirin (ECOTRIN) 81 MG EC tablet Take 81 mg by mouth once daily.    atorvastatin (LIPITOR) 80 MG tablet Take 1 tablet (80 mg total) by mouth every evening.    cilostazol (PLETAL) 50 MG Tab Take 2 tablets (100 mg total) by mouth 2 (two) times daily.    cyclobenzaprine (FLEXERIL) 10 MG tablet Take 10 mg by mouth 3 (three) times daily as needed for Muscle spasms.    folic acid-vit B6-vit B12 2.5-25-2 mg (FOLBIC OR EQUIV) 2.5-25-2 mg Tab Take 1 tablet by mouth once daily.    gabapentin (NEURONTIN) 800 MG tablet Take 800 mg by mouth every evening.    lidocaine (XYLOCAINE) 5 % Oint ointment Apply topically 4 (four) times daily. Apply to entire left foot    metoprolol succinate (TOPROL-XL) 25 MG 24 hr tablet Take 0.5 tablets (12.5 mg total) by mouth once daily.    nicotine (NICODERM CQ) 21 mg/24 hr Place 1 patch onto the skin once daily.    oxyCODONE (ROXICODONE) 10 mg Tab immediate release tablet Take 1 tablet (10 mg total) by mouth every 4 (four) hours as needed for Pain.    polyethylene glycol (GLYCOLAX) 17 gram PwPk Take 17 g by mouth 2 (two) times daily as needed (constipation).    pregabalin (LYRICA) 100 MG capsule Take 1 capsule (100 mg total) by mouth 3 (three) times daily.    ranolazine (RANEXA) 1,000 mg Tb12 Take 1,000 mg by mouth 2 (two) times daily.    senna-docusate 8.6-50 mg (PERICOLACE) 8.6-50 mg per tablet Take 1 tablet by mouth 2 (two) times daily.    thiamine 100 MG tablet Take 1 tablet (100 mg total) by mouth once daily.    ticagrelor (BRILINTA) 90 mg tablet Take 90 mg by mouth 2 (two) times daily.    zolpidem (AMBIEN) 5 MG Tab Take 1 tablet (5 mg total) by mouth nightly as needed.     Family History     None        Social History Main Topics     Smoking status: Former Smoker     Packs/day: 0.50     Quit date: 5/1/2018    Smokeless tobacco: Former User     Types: Chew     Quit date: 8/2/1980      Comment: Uses nicotine gum and nicotine patches    Alcohol use Yes      Comment: rarely    Drug use: No    Sexual activity: Yes     Partners: Female     Review of Systems   Constitution: Negative.   HENT: Negative.    Eyes: Negative.    Cardiovascular: Positive for leg swelling and orthopnea.   Respiratory: Positive for cough and shortness of breath.         SOB   Endocrine: Negative.    Hematologic/Lymphatic: Negative.    Skin: Negative.    Musculoskeletal:        RLE AKA, LLE toe amputation   Gastrointestinal: Negative.    Genitourinary: Negative.    Neurological: Negative.    Psychiatric/Behavioral: Negative.    Allergic/Immunologic: Negative.      Objective:     Vital Signs (Most Recent):  Temp: (!) 95.8 °F (35.4 °C) (06/13/18 0710)  Pulse: (!) 121 (06/13/18 0720)  Resp: 14 (06/13/18 0720)  BP: 100/78 (06/13/18 0720)  SpO2: 100 % (06/13/18 0753) Vital Signs (24h Range):  Temp:  [95.8 °F (35.4 °C)-97.6 °F (36.4 °C)] 95.8 °F (35.4 °C)  Pulse:  [110-132] 121  Resp:  [0-44] 14  SpO2:  [78 %-100 %] 100 %  BP: ()/() 100/78     Weight: 60.3 kg (132 lb 15 oz)  Body mass index is 20.82 kg/m².    SpO2: 100 %  O2 Device (Oxygen Therapy): venti mask      Intake/Output Summary (Last 24 hours) at 06/13/18 0909  Last data filed at 06/12/18 2049   Gross per 24 hour   Intake              500 ml   Output                0 ml   Net              500 ml       Lines/Drains/Airways     Epidural Line                 Perineural Analgesia/Anesthesia Assessment (using dermatomes) Epidural 04/05/18 1115 68 days          Peripheral Intravenous Line                 Peripheral IV - Single Lumen 06/12/18 1706 Right Wrist less than 1 day                Physical Exam   Constitutional: He is oriented to person, place, and time. He appears well-developed and well-nourished. No  distress.   HENT:   Head: Normocephalic and atraumatic.   Neck: Normal range of motion. Neck supple. JVD present. No tracheal deviation present. No thyromegaly present.   Cardiovascular: Normal rate and regular rhythm.  Exam reveals gallop (S3 gallop).    Murmur (2/6 HERMINIO) heard.  Pulmonary/Chest: No stridor. He has wheezes. He has rales.   diminshed   Musculoskeletal: He exhibits edema (trace LLE).   Neurological: He is alert and oriented to person, place, and time.   Skin: Skin is warm and dry. No rash noted. He is not diaphoretic. No erythema. No pallor.   Psychiatric: He has a normal mood and affect. His behavior is normal. Judgment and thought content normal.       Significant Labs:   ABG:   Recent Labs  Lab 06/12/18  1804   PH 7.53*   PCO2 24*   HCO3 20.10*   POCSATURATED 95.6   BE -1.40   , Blood Culture: No results for input(s): LABBLOO in the last 48 hours., BMP:   Recent Labs  Lab 06/12/18  1706 06/13/18  0632   * 121*    134*   K 3.5 3.9    102   CO2 18* 15*   BUN 6 9   CREATININE 0.8 1.1   CALCIUM 9.3 8.7   MG 1.8  --    , CMP   Recent Labs  Lab 06/12/18  1706 06/13/18  0632    134*   K 3.5 3.9    102   CO2 18* 15*   * 121*   BUN 6 9   CREATININE 0.8 1.1   CALCIUM 9.3 8.7   PROT 7.4 7.0   ALBUMIN 3.4* 3.3*   BILITOT 1.8* 2.0*   ALKPHOS 198* 186*   * 461*   * 538*   ANIONGAP 15 17*   ESTGFRAFRICA >60 >60   EGFRNONAA >60 >60   , CBC   Recent Labs  Lab 06/12/18  1706 06/13/18  0632   WBC 7.92 9.50   HGB 11.4* 10.7*   HCT 33.8* 31.6*    226   , INR   Recent Labs  Lab 06/12/18 1706   INR 1.3*   , Lipid Panel No results for input(s): CHOL, HDL, LDLCALC, TRIG, CHOLHDL in the last 48 hours.,   Pathology Results  (Last 10 years)    None      , Troponin   Recent Labs  Lab 06/12/18  1706 06/12/18  2302 06/13/18  0632   TROPONINI 0.033* 0.035* 0.048*   , All pertinent lab results from the last 24 hours have been reviewed. and   Recent Lab Results        06/13/18  0632 06/12/18  2302 06/12/18  2240 06/12/18  1940 06/12/18  1804      Benzodiazepines    Negative      Methadone metabolites    Negative      Phencyclidine    Negative      POC tHb     10.9(A)     POC O2Hb Arterial     91.6(A)     POC COHb     3.7(A)     POC MetHb     0.6     Albumin 3.3(L)         Alkaline Phosphatase 186(H)         Allens Test     N/A     (H)         Amphetamine Screen, Ur    Negative      Anion Gap 17(H)         Aniso Moderate         Appearance, UA    Clear      aPTT          (H)         BANDS 2.0         Barbiturate Screen, Ur    Negative      Basophil% 1.0         Bilirubin (UA)    Negative      Total Bilirubin 2.0  Comment:  For infants and newborns, interpretation of results should be based  on gestational age, weight and in agreement with clinical  observations.  Premature Infant recommended reference ranges:  Up to 24 hours.............<8.0 mg/dL  Up to 48 hours............<12.0 mg/dL  3-5 days..................<15.0 mg/dL  6-29 days.................<15.0 mg/dL  (H)         BNP          Site     Right Brachial     BUN, Bld 9         Calcium 8.7         Chloride 102         CO2 15(L)         Cocaine (Metab.)    Negative      Color, UA    Yellow      CPK          CPK MB          Creatinine 1.1         Creatinine, Random Ur    20.6  Comment:  The random urine reference ranges provided were established   for 24 hour urine collections.  No reference ranges exist for  random urine specimens.  Correlate clinically.  (L)      D-Dimer          DelSys     Nasal Cannula     Differential Method Manual         eGFR if  >60         eGFR if non  >60  Comment:  Calculation used to obtain the estimated glomerular filtration  rate (eGFR) is the CKD-EPI equation.            Eosinophil% 0.0         Free T4          Large/Giant Platelets Present         Glucose 121(H)         Glucose, UA    Negative      Gran% 59.0         Hematocrit 31.6(L)          Hemoglobin 10.7(L)         Hypo          Coumadin Monitoring INR          Ketones, UA    Negative      Lactate, Jose Armando 3.3  Comment:  Falsely low lactic acid results can be found in samples   containing >=13.0 mg/dL total bilirubin and/or >=3.5 mg/dL   direct bilirubin.  (H)  2.3  Comment:  Falsely low lactic acid results can be found in samples   containing >=13.0 mg/dL total bilirubin and/or >=3.5 mg/dL   direct bilirubin.  (H)       Leukocytes, UA    Negative      Lymph% 32.0         Magnesium          MB%          MCH 31.7(H)         MCHC 33.9         MCV 94         Mono% 6.0         MPV 11.2         Nitrite, UA    Negative      nRBC 2@L=100(A)         Occult Blood UA    Negative      Opiate Scrn, Ur    Negative      Other          Ovalocytes Occasional         Pathologist Review          pH, UA    6.0      Phosphorus          Platelets 226         POC BE     -1.40     POC HCO3     20.10(A)     POC PCO2     24(A)     POC PH     7.53  Comment:  NC @ 2lpm(A)     POC PO2     59(A)     POC SATURATED O2     95.6     POC TCO2     20.8     Poik Moderate         Poly Moderate         Potassium 3.9         Total Protein 7.0         Protein, UA    Negative  Comment:  Recommend a 24 hour urine protein or a urine   protein/creatinine ratio if globulin induced proteinuria is  clinically suspected.        Protime          RBC 3.38(L)         RDW 19.8(H)         Schistocytes Present         Sodium 134(L)         Specific Gravity, UA    <=1.005(A)      Specimen UA    Urine, Clean Catch      Spherocytes Moderate         Stomatocytes          Target Cells Occasional         Tear Drop Cells Moderate         Marijuana (THC) Metabolite    Negative      Toxicology Information    SEE COMMENT  Comment:  This screen includes the following classes of drugs at the   listed cut-off:  Benzodiazepines                  200 ng/ml  Methadone                        300 ng/ml  Cocaine metabolite               300 ng/ml  Opiates                           300 ng/ml  Barbiturates                     200 ng/ml  Amphetamines                    1000 ng/ml  Marijuana metabs (THC)            50 ng/ml  Phencyclidine (PCP)               25 ng/ml  High concentrations of Diphenhydramine may cross-react with  Phencyclidine PCP screening immunoassay giving a false   positive result.  High concentrations of Methylenedioxymethamphetamine (MDMA aka  Ectasy) and other structurally similar compounds may cross-   react with the Amphetamine/Methamphetamine screening   immunoassay giving a false positive result.  A metabolite of the anti-HIV drug Sustiva () may cause  false positive results in the Marijuana metabolite (THC)   screening assay.  Note: This exception list includes only more common   interferants in toxicology screen testing.  Because of many   cross-reactantspositive results on toxicology drug screens   should be confirmed whenever results do not correlate with   clinical presentation.  This report is intended for use in clinical monitoring and  management of patients. It is not intended for use in   employment related drug testing.  Because of any cross-reactants, positive results on toxicology  drug screens should be confirmed whenever results do not  correlate with clinical presentation.  Presumptive positive results are unconfirmed and may be used   only for medical purposes.        Troponin I 0.048  Comment:  The reference interval for Troponin I represents the 99th percentile   cutoff   for our facility and is consistent with 3rd generation assay   performance.  (H) 0.035  Comment:  The reference interval for Troponin I represents the 99th percentile   cutoff   for our facility and is consistent with 3rd generation assay   performance.  (H)        TSH          Urobilinogen, UA    Negative      WBC 9.50                     06/12/18  1706      Benzodiazepines      Methadone metabolites      Phencyclidine      POC tHb      POC O2Hb Arterial      POC COHb       POC MetHb      Albumin 3.4(L)     Alkaline Phosphatase 198(H)     Allens Test      (H)     Amphetamine Screen, Ur      Anion Gap 15     Aniso Moderate     Appearance, UA      aPTT 28.8  Comment:  aPTT therapeutic range = 39-69 seconds     (H)     BANDS 2.0     Barbiturate Screen, Ur      Basophil% 1.0     Bilirubin (UA)      Total Bilirubin 1.8  Comment:  For infants and newborns, interpretation of results should be based  on gestational age, weight and in agreement with clinical  observations.  Premature Infant recommended reference ranges:  Up to 24 hours.............<8.0 mg/dL  Up to 48 hours............<12.0 mg/dL  3-5 days..................<15.0 mg/dL  6-29 days.................<15.0 mg/dL  (H)     BNP 2,495  Comment:  Values of less than 100 pg/ml are consistent with non-CHF populations.(H)     Site      BUN, Bld 6     Calcium 9.3     Chloride 105     CO2 18(L)     Cocaine (Metab.)      Color, UA      CPK 69      69     CPK MB 1.6     Creatinine 0.8     Creatinine, Random Ur      D-Dimer 10.78  Comment:  The quantitative D-dimer assay should be used as an aid in   the diagnosis of deep vein thrombosis and pulmonary embolism  in patients with the appropriate presentation and clinical  history. The upper limit of the reference interval and the clinical   cut off   point are identical. Causes of a positive (>0.50 mg/L FEU) D-Dimer   test  include, but are not limited to: DVT, PE, DIC, thrombolytic   therapy, anticoagulant therapy, recent surgery, trauma, or   pregnancy, disseminated malignancy, aortic aneurysm, cirrhosis,  and severe infection. False negative results may occur in   patients with distal DVT.  (H)     DelSys      Differential Method Manual     eGFR if  >60     eGFR if non  >60  Comment:  Calculation used to obtain the estimated glomerular filtration  rate (eGFR) is the CKD-EPI equation.        Eosinophil% 0.0     Free T4 1.01     Large/Giant Platelets  "Present     Glucose 117(H)     Glucose, UA      Gran% 63.0     Hematocrit 33.8(L)     Hemoglobin 11.4(L)     Hypo Moderate     Coumadin Monitoring INR 1.3  Comment:  Coumadin Therapy:  2.0 - 3.0 for INR for all indicators except mechanical heart valves  and antiphospholipid syndromes which should use 2.5 - 3.5.  (H)     Ketones, UA      Lactate, Jose Armando 2.8  Comment:  Falsely low lactic acid results can be found in samples   containing >=13.0 mg/dL total bilirubin and/or >=3.5 mg/dL   direct bilirubin.  (H)     Leukocytes, UA      Lymph% 30.0     Magnesium 1.8     MB% 2.3  Comment:  To be positive, the MB% must be greater than 5% AND the CK-MB  greater than 6.5 ng/mL. Values not in the reference interval,   but not qualifying as positive, should be considered "trace".       MCH 31.8(H)     MCHC 33.7     MCV 94     Mono% 3.0(L)     MPV 11.1     Nitrite, UA      nRBC 2@L=100(A)     Occult Blood UA      Opiate Scrn, Ur      Other 1     Ovalocytes      Pathologist Review Review required     pH, UA      Phosphorus 3.8     Platelets 246     POC BE      POC HCO3      POC PCO2      POC PH      POC PO2      POC SATURATED O2      POC TCO2      Poik Moderate     Poly Moderate     Potassium 3.5     Total Protein 7.4     Protein, UA      Protime 13.0(H)     RBC 3.59(L)     RDW 20.2(H)     Schistocytes Present     Sodium 138     Specific Gravity, UA      Specimen UA      Spherocytes Occasional     Stomatocytes Absent     Target Cells Occasional     Tear Drop Cells Occasional     Marijuana (THC) Metabolite      Toxicology Information      Troponin I 0.033  Comment:  The reference interval for Troponin I represents the 99th percentile   cutoff   for our facility and is consistent with 3rd generation assay   performance.  (H)     TSH 4.204(H)     Urobilinogen, UA      WBC 7.92           Significant Imaging: CT scan: CT ABDOMEN PELVIS WITH CONTRAST: No results found for this visit on 06/12/18. and CT ABDOMEN PELVIS WITHOUT CONTRAST: No " results found for this visit on 06/12/18., Echocardiogram:   2D echo with color flow doppler:   Results for orders placed or performed during the hospital encounter of 03/18/18   2D echo with color flow doppler   Result Value Ref Range    EF 15 (A) 55 - 65    Mitral Valve Regurgitation MILD TO MODERATE     and X-Ray: CXR: X-Ray Chest 1 View (CXR):   Results for orders placed or performed during the hospital encounter of 06/12/18   X-Ray Chest 1 View    Narrative    EXAMINATION:  XR CHEST 1 VIEW    CLINICAL HISTORY:  CP;    TECHNIQUE:  Single frontal view of the chest was performed.    COMPARISON:  March 31, 2018.    FINDINGS:  There is moderate cardiomegaly.  Left-sided AICD remains in place.  There is mild prominence of the central pulmonary vasculature and mildly increased perihilar and basilar lung markings bilaterally suggesting mild venous congestion, less pronounced compared to prior.  No peripheral infiltrate or pleural effusion.  No pneumothorax.  No acute osseous abnormality.      Impression    As above.      Electronically signed by: UNA Luu MD  Date:    06/12/2018  Time:    17:40    and X-Ray Chest PA and Lateral (CXR): No results found for this visit on 06/12/18. and KUB: X-Ray Abdomen AP 1 View (KUB): No results found for this visit on 06/12/18.    Assessment and Plan:     No notes have been filed under this hospital service.  Service: Cardiology      VTE Risk Mitigation         Ordered     IP VTE HIGH RISK PATIENT  Once      06/12/18 1947     Place sequential compression device  Until discontinued      06/12/18 1019        Current Facility-Administered Medications   Medication    acetaminophen tablet 650 mg    aspirin EC tablet 81 mg    atorvastatin tablet 80 mg    furosemide injection 40 mg    HYDROcodone-acetaminophen 5-325 mg per tablet 1 tablet    levalbuterol nebulizer solution 1.25 mg    lisinopril tablet 2.5 mg    morphine injection 2 mg    ondansetron injection 4 mg     pantoprazole EC tablet 40 mg    pregabalin capsule 75 mg    promethazine (PHENERGAN) 12.5 mg in dextrose 5 % 50 mL IVPB    ranolazine 12 hr tablet 500 mg    spironolactone split tablet 12.5 mg    ticagrelor tablet 90 mg     Echo 4/6/18: 1 - Moderate left ventricular enlargement.     2 - Severely depressed left ventricular systolic function (EF 15-20%).     3 - Normal right ventricular systolic function .     4 - Indeterminate LV diastolic function.     5 - Severe left atrial enlargement.     6 - Mild to moderate mitral regurgitation.     7 - Intermediate central venous pressure.     Peripheral angiogram 3/23/18: Left Superficial Femoral Artery:             The left SFA has luminal irregularities.     - Left Popliteal Artery:             The left POP has luminal irregularities.     - Left Infrapopliteal Artery:             The left infrapopliteal is diffusely diseased (75%). Flow to foot.    MPI 12/16/16: Abnormal consistent with mostly scar in minimal ischemia. Small fixed perfusion abnormality of mild intensity in the apical segment. Small reversible perfusion abnormality of mild intensity in the anterior region. Medium fixed perfusion abnormality of moderate intensity in the inferior region.       DX:  Acute on chronic systolic CHF stage 4  COPD  CAD, previous MI; severe ICMO EF 15% s/p ICD 4/18  PPM/AICD  HTN  PAD  Dyslipidemia  Medical noncompliance  Smoker and other drug abuse hx  Lower extremity amputation R    Plan:   Iv diuresis  Continue brilinta asa atorvastatin   Increase toprol as tolerated  Add digoxin to improve HR  DC pletal  Low dose ACEI or ARB as tolerated/aldactone        Thank you for your consult. I will follow-up with patient. Please contact us if you have any additional questions.      Transcribed by   Osmani Chapin NP for Dr JOHN Berry  Cardiology   [facility]    I attest that I have personally seen and examined this patient. I have reviewed and discussed the management  in detail as outlined above.

## 2018-06-13 NOTE — PLAN OF CARE
Problem: Patient Care Overview  Goal: Plan of Care Review  Outcome: Ongoing (interventions implemented as appropriate)  Educate patient/family/cargiver about administration of aerosolized medications via the inhalation route to aid in bronchial hygiene & deliver medications.  Patient educated on use, benefits, & safety of oxygen use.  Pt. Was on NC @ 4lpm, but was mouth breathing and having tachypnea, so placed on 40% ventimask at 8 lpm. Respiratory treatment was given.

## 2018-06-13 NOTE — H&P
[facility] Medicine  History & Physical    Patient Name: Leonardo Byrd  MRN: [id number]  Admission Date: 6/12/2018  Attending Physician: Joann Reaves MD   Primary Care Provider: Indio Aquino MD         Patient information was obtained from patient and ER records.     Subjective:     Principal Problem:<principal problem not specified>    Chief Complaint:   Chief Complaint   Patient presents with    Shortness of Breath     c/o shortness of breath for about 2 weeks, getting worse.        HPI: 47 year old male with h/o systolic CHF(EF 15% in April 2018) presents to ED with about 2 week h/o SOB, cough, weight gain. He presented to [facility] ED on Saturday. Sent home after presumed negative work up. Presents here overnight. BNP 2400. Fluid on CXR. Has a cough, but no WBC, no fever. He says that he always gets a cough productive of clear to white sputum when he has CHF exacerbation. He has a long h/o CAD, 14 MI since age of 28 per patient. ICD. LActic acid elevated. He was admitted overnight with a dx of COPD and sepsis. He was started on zmax and vanc. Fluid bolus in ED    Past Medical History:   Diagnosis Date    AICD (automatic cardioverter/defibrillator) present     RYAN (acute kidney injury) 3/25/2018    CHF (congestive heart failure)     COPD with acute bronchitis 6/12/2018    Coronary artery disease     Encounter for blood transfusion     Hyperlipemia     Hypertension     MI (myocardial infarction)     Neuropathy     PAD (peripheral artery disease)     PVD (peripheral vascular disease)        Past Surgical History:   Procedure Laterality Date    AMPUTATION Right     great toe    BYBPASS GRAFT AORTOBIUFEMORAL      CARDIAC DEFIBRILLATOR PLACEMENT      coronary stents      EXPLORATION AND EVaCUATION OF HEMATOMA OF UPPER EXTREMITY Left     KNEE ARTHROPLASTY Left     VASCULAR SURGERY      fem-pop bypass       Review of patient's allergies indicates:  No Known  Allergies    No current facility-administered medications on file prior to encounter.      Current Outpatient Prescriptions on File Prior to Encounter   Medication Sig    amitriptyline (ELAVIL) 50 MG tablet Take 1 tablet (50 mg total) by mouth every evening.    aspirin (ECOTRIN) 81 MG EC tablet Take 81 mg by mouth once daily.    atorvastatin (LIPITOR) 80 MG tablet Take 1 tablet (80 mg total) by mouth every evening.    cilostazol (PLETAL) 50 MG Tab Take 2 tablets (100 mg total) by mouth 2 (two) times daily.    cyclobenzaprine (FLEXERIL) 10 MG tablet Take 10 mg by mouth 3 (three) times daily as needed for Muscle spasms.    folic acid-vit B6-vit B12 2.5-25-2 mg (FOLBIC OR EQUIV) 2.5-25-2 mg Tab Take 1 tablet by mouth once daily.    gabapentin (NEURONTIN) 800 MG tablet Take 800 mg by mouth every evening.    lidocaine (XYLOCAINE) 5 % Oint ointment Apply topically 4 (four) times daily. Apply to entire left foot    metoprolol succinate (TOPROL-XL) 25 MG 24 hr tablet Take 0.5 tablets (12.5 mg total) by mouth once daily.    nicotine (NICODERM CQ) 21 mg/24 hr Place 1 patch onto the skin once daily.    oxyCODONE (ROXICODONE) 10 mg Tab immediate release tablet Take 1 tablet (10 mg total) by mouth every 4 (four) hours as needed for Pain.    polyethylene glycol (GLYCOLAX) 17 gram PwPk Take 17 g by mouth 2 (two) times daily as needed (constipation).    pregabalin (LYRICA) 100 MG capsule Take 1 capsule (100 mg total) by mouth 3 (three) times daily.    ranolazine (RANEXA) 1,000 mg Tb12 Take 1,000 mg by mouth 2 (two) times daily.    senna-docusate 8.6-50 mg (PERICOLACE) 8.6-50 mg per tablet Take 1 tablet by mouth 2 (two) times daily.    thiamine 100 MG tablet Take 1 tablet (100 mg total) by mouth once daily.    ticagrelor (BRILINTA) 90 mg tablet Take 90 mg by mouth 2 (two) times daily.    zolpidem (AMBIEN) 5 MG Tab Take 1 tablet (5 mg total) by mouth nightly as needed.     Family History     None        Social  History Main Topics    Smoking status: Former Smoker     Packs/day: 0.50     Quit date: 5/1/2018    Smokeless tobacco: Former User     Types: Chew     Quit date: 8/2/1980      Comment: Uses nicotine gum and nicotine patches    Alcohol use Yes      Comment: rarely    Drug use: No    Sexual activity: Yes     Partners: Female     Review of Systems   Constitutional: Negative for activity change, appetite change, chills, diaphoresis, fatigue, fever and unexpected weight change.   HENT: Negative for congestion, dental problem, drooling, ear discharge, ear pain, facial swelling, mouth sores, nosebleeds, postnasal drip, rhinorrhea, sinus pressure, sneezing, sore throat, trouble swallowing and voice change.    Eyes: Negative for photophobia, pain, discharge, redness, itching and visual disturbance.   Respiratory: Positive for cough and shortness of breath. Negative for apnea, chest tightness and wheezing.    Cardiovascular: Positive for leg swelling. Negative for chest pain and palpitations.   Gastrointestinal: Negative for abdominal distention, abdominal pain, blood in stool, constipation, diarrhea, nausea and vomiting.   Genitourinary: Negative for difficulty urinating, dysuria, enuresis, flank pain, frequency, hematuria and urgency.   Musculoskeletal: Positive for gait problem. Negative for arthralgias, back pain, joint swelling, myalgias, neck pain and neck stiffness.   Skin: Negative for color change, rash and wound.   Neurological: Negative for dizziness, tremors, seizures, syncope, facial asymmetry, speech difficulty, weakness, light-headedness, numbness and headaches.   Hematological: Negative for adenopathy. Does not bruise/bleed easily.   Psychiatric/Behavioral: Negative for agitation, behavioral problems, confusion, decreased concentration, dysphoric mood, sleep disturbance and suicidal ideas. The patient is not nervous/anxious.      Objective:     Vital Signs (Most Recent):  Temp: (!) 95.8 °F (35.4 °C)  (06/13/18 0710)  Pulse: (!) 121 (06/13/18 0720)  Resp: 14 (06/13/18 0720)  BP: 100/78 (06/13/18 0720)  SpO2: 100 % (06/13/18 0753) Vital Signs (24h Range):  Temp:  [95.8 °F (35.4 °C)-97.6 °F (36.4 °C)] 95.8 °F (35.4 °C)  Pulse:  [110-132] 121  Resp:  [0-44] 14  SpO2:  [78 %-100 %] 100 %  BP: ()/() 100/78     Weight: 60.3 kg (132 lb 15 oz)  Body mass index is 20.82 kg/m².    Physical Exam   Constitutional: He is oriented to person, place, and time. He appears well-developed and well-nourished.   HENT:   Head: Normocephalic and atraumatic.   Right Ear: External ear normal.   Left Ear: External ear normal.   Nose: Nose normal.   Mouth/Throat: Oropharynx is clear and moist.   Eyes: Conjunctivae and EOM are normal. Pupils are equal, round, and reactive to light. Right eye exhibits no discharge. Left eye exhibits no discharge. No scleral icterus.   Neck: Normal range of motion. Neck supple. No JVD present. No tracheal deviation present. No thyromegaly present.   Cardiovascular: Normal rate, regular rhythm and intact distal pulses.  Exam reveals gallop.    No murmur heard.  S3 gallop   Pulmonary/Chest: Effort normal and breath sounds normal. No respiratory distress. He has no wheezes. He has no rales. He exhibits no tenderness.   Abdominal: Soft. Bowel sounds are normal. He exhibits no distension and no mass. There is no tenderness. There is no rebound and no guarding.   Musculoskeletal: Normal range of motion. He exhibits edema.   Trace LLE edema  Recent Amputation of all 5 toes LLE---wound at surgical site, fibrinous exudate. No erythema   Right AKA, recent. Incision with wound, some fibrinous exudate. No erythema. No pus      Lymphadenopathy:     He has no cervical adenopathy.   Neurological: He is alert and oriented to person, place, and time. He has normal reflexes. He displays normal reflexes. No cranial nerve deficit. He exhibits normal muscle tone. Coordination normal.   Skin: Skin is warm and dry.    Psychiatric: He has a normal mood and affect. His behavior is normal. Judgment and thought content normal.         CRANIAL NERVES     CN III, IV, VI   Pupils are equal, round, and reactive to light.  Extraocular motions are normal.        Significant Labs:   CBC:   Recent Labs  Lab 06/12/18  1706 06/13/18  0632   WBC 7.92 9.50   HGB 11.4* 10.7*   HCT 33.8* 31.6*    226     CMP:   Recent Labs  Lab 06/12/18  1706 06/13/18  0632    134*   K 3.5 3.9    102   CO2 18* 15*   * 121*   BUN 6 9   CREATININE 0.8 1.1   CALCIUM 9.3 8.7   PROT 7.4 7.0   ALBUMIN 3.4* 3.3*   BILITOT 1.8* 2.0*   ALKPHOS 198* 186*   * 461*   * 538*   ANIONGAP 15 17*   EGFRNONAA >60 >60     Cardiac Markers:   Recent Labs  Lab 06/12/18  1706   BNP 2,495*     Lactic Acid:   Recent Labs  Lab 06/12/18  1706 06/12/18  2240 06/13/18  0632   LACTATE 2.8* 2.3* 3.3*     Troponin:   Recent Labs  Lab 06/12/18  1706 06/12/18  2302 06/13/18  0632   TROPONINI 0.033* 0.035* 0.048*     Urine Studies:   Recent Labs  Lab 06/12/18  1940   COLORU Yellow   APPEARANCEUA Clear   PHUR 6.0   SPECGRAV <=1.005*   PROTEINUA Negative   GLUCUA Negative   KETONESU Negative   BILIRUBINUA Negative   OCCULTUA Negative   NITRITE Negative   UROBILINOGEN Negative   LEUKOCYTESUR Negative     All pertinent labs within the past 24 hours have been reviewed.    Significant Imaging: I have reviewed and interpreted all pertinent imaging results/findings within the past 24 hours.    Assessment/Plan:     Phantom pain after amputation of lower extremity    Lower dose of lyrica since he is in acute HF(can cause peripheral edema)         Acute systolic congestive heart failure    Stop IVF. Stop ABX, he is not septic. He is in heart failure  Lasix IV 40 BID   Start ACE and spironolactone for systolic CHF   Consult cards: starting Dig, Resume BB  Full code           Peripheral vascular disease of lower extremity    Continue statin, asa and brilinta   No need  to continue pletal, he is not ambulatory           VTE Risk Mitigation         Ordered     IP VTE HIGH RISK PATIENT  Once      06/12/18 5297     Place sequential compression device  Until discontinued      06/12/18 1998        Critical care time spent on the evaluation and treatment of severe organ dysfunction, review of pertinent labs and imaging studies, discussions with consulting providers and discussions with patient/family: 60 minutes.     José Luis Ga MD  Department of Hospital Medicine   [facility]

## 2018-06-13 NOTE — PLAN OF CARE
Dr Soto reviewed chart updated on current pt status, aware of cheyne rios breathing pattern. He assessed pt, new orders noted.

## 2018-06-13 NOTE — SUBJECTIVE & OBJECTIVE
Past Medical History:   Diagnosis Date    AICD (automatic cardioverter/defibrillator) present     RYAN (acute kidney injury) 3/25/2018    CHF (congestive heart failure)     COPD with acute bronchitis 6/12/2018    Coronary artery disease     Encounter for blood transfusion     Hyperlipemia     Hypertension     MI (myocardial infarction)     Neuropathy     PAD (peripheral artery disease)     PVD (peripheral vascular disease)        Past Surgical History:   Procedure Laterality Date    AMPUTATION Right     great toe    BYBPASS GRAFT AORTOBIUFEMORAL      CARDIAC DEFIBRILLATOR PLACEMENT      coronary stents      EXPLORATION AND EVaCUATION OF HEMATOMA OF UPPER EXTREMITY Left     KNEE ARTHROPLASTY Left     VASCULAR SURGERY      fem-pop bypass       Review of patient's allergies indicates:  No Known Allergies    No current facility-administered medications on file prior to encounter.      Current Outpatient Prescriptions on File Prior to Encounter   Medication Sig    amitriptyline (ELAVIL) 50 MG tablet Take 1 tablet (50 mg total) by mouth every evening.    aspirin (ECOTRIN) 81 MG EC tablet Take 81 mg by mouth once daily.    atorvastatin (LIPITOR) 80 MG tablet Take 1 tablet (80 mg total) by mouth every evening.    cilostazol (PLETAL) 50 MG Tab Take 2 tablets (100 mg total) by mouth 2 (two) times daily.    cyclobenzaprine (FLEXERIL) 10 MG tablet Take 10 mg by mouth 3 (three) times daily as needed for Muscle spasms.    folic acid-vit B6-vit B12 2.5-25-2 mg (FOLBIC OR EQUIV) 2.5-25-2 mg Tab Take 1 tablet by mouth once daily.    gabapentin (NEURONTIN) 800 MG tablet Take 800 mg by mouth every evening.    lidocaine (XYLOCAINE) 5 % Oint ointment Apply topically 4 (four) times daily. Apply to entire left foot    metoprolol succinate (TOPROL-XL) 25 MG 24 hr tablet Take 0.5 tablets (12.5 mg total) by mouth once daily.    nicotine (NICODERM CQ) 21 mg/24 hr Place 1 patch onto the skin once daily.     oxyCODONE (ROXICODONE) 10 mg Tab immediate release tablet Take 1 tablet (10 mg total) by mouth every 4 (four) hours as needed for Pain.    polyethylene glycol (GLYCOLAX) 17 gram PwPk Take 17 g by mouth 2 (two) times daily as needed (constipation).    pregabalin (LYRICA) 100 MG capsule Take 1 capsule (100 mg total) by mouth 3 (three) times daily.    ranolazine (RANEXA) 1,000 mg Tb12 Take 1,000 mg by mouth 2 (two) times daily.    senna-docusate 8.6-50 mg (PERICOLACE) 8.6-50 mg per tablet Take 1 tablet by mouth 2 (two) times daily.    thiamine 100 MG tablet Take 1 tablet (100 mg total) by mouth once daily.    ticagrelor (BRILINTA) 90 mg tablet Take 90 mg by mouth 2 (two) times daily.    zolpidem (AMBIEN) 5 MG Tab Take 1 tablet (5 mg total) by mouth nightly as needed.     Family History     None        Social History Main Topics    Smoking status: Former Smoker     Packs/day: 0.50     Quit date: 5/1/2018    Smokeless tobacco: Former User     Types: Chew     Quit date: 8/2/1980      Comment: Uses nicotine gum and nicotine patches    Alcohol use Yes      Comment: rarely    Drug use: No    Sexual activity: Yes     Partners: Female     Review of Systems   Constitution: Negative.   HENT: Negative.    Eyes: Negative.    Cardiovascular: Positive for leg swelling and orthopnea.   Respiratory: Positive for cough and shortness of breath.         SOB   Endocrine: Negative.    Hematologic/Lymphatic: Negative.    Skin: Negative.    Musculoskeletal:        RLE AKA, LLE toe amputation   Gastrointestinal: Negative.    Genitourinary: Negative.    Neurological: Negative.    Psychiatric/Behavioral: Negative.    Allergic/Immunologic: Negative.      Objective:     Vital Signs (Most Recent):  Temp: (!) 95.8 °F (35.4 °C) (06/13/18 0710)  Pulse: (!) 121 (06/13/18 0720)  Resp: 14 (06/13/18 0720)  BP: 100/78 (06/13/18 0720)  SpO2: 100 % (06/13/18 0753) Vital Signs (24h Range):  Temp:  [95.8 °F (35.4 °C)-97.6 °F (36.4 °C)] 95.8 °F  (35.4 °C)  Pulse:  [110-132] 121  Resp:  [0-44] 14  SpO2:  [78 %-100 %] 100 %  BP: ()/() 100/78     Weight: 60.3 kg (132 lb 15 oz)  Body mass index is 20.82 kg/m².    SpO2: 100 %  O2 Device (Oxygen Therapy): venti mask      Intake/Output Summary (Last 24 hours) at 06/13/18 0909  Last data filed at 06/12/18 2049   Gross per 24 hour   Intake              500 ml   Output                0 ml   Net              500 ml       Lines/Drains/Airways     Epidural Line                 Perineural Analgesia/Anesthesia Assessment (using dermatomes) Epidural 04/05/18 1115 68 days          Peripheral Intravenous Line                 Peripheral IV - Single Lumen 06/12/18 1706 Right Wrist less than 1 day                Physical Exam   Constitutional: He is oriented to person, place, and time. He appears well-developed and well-nourished. No distress.   HENT:   Head: Normocephalic and atraumatic.   Neck: Normal range of motion. Neck supple. JVD present. No tracheal deviation present. No thyromegaly present.   Cardiovascular: Normal rate and regular rhythm.  Exam reveals gallop (S3 gallop).    Murmur (2/6 HERMINIO) heard.  Pulmonary/Chest: No stridor. He has wheezes. He has rales.   diminshed   Musculoskeletal: He exhibits edema (trace LLE).   Neurological: He is alert and oriented to person, place, and time.   Skin: Skin is warm and dry. No rash noted. He is not diaphoretic. No erythema. No pallor.   Psychiatric: He has a normal mood and affect. His behavior is normal. Judgment and thought content normal.       Significant Labs:   ABG:   Recent Labs  Lab 06/12/18  1804   PH 7.53*   PCO2 24*   HCO3 20.10*   POCSATURATED 95.6   BE -1.40   , Blood Culture: No results for input(s): LABBLOO in the last 48 hours., BMP:   Recent Labs  Lab 06/12/18  1706 06/13/18  0632   * 121*    134*   K 3.5 3.9    102   CO2 18* 15*   BUN 6 9   CREATININE 0.8 1.1   CALCIUM 9.3 8.7   MG 1.8  --    , CMP   Recent Labs  Lab  06/12/18  1706 06/13/18  0632    134*   K 3.5 3.9    102   CO2 18* 15*   * 121*   BUN 6 9   CREATININE 0.8 1.1   CALCIUM 9.3 8.7   PROT 7.4 7.0   ALBUMIN 3.4* 3.3*   BILITOT 1.8* 2.0*   ALKPHOS 198* 186*   * 461*   * 538*   ANIONGAP 15 17*   ESTGFRAFRICA >60 >60   EGFRNONAA >60 >60   , CBC   Recent Labs  Lab 06/12/18  1706 06/13/18  0632   WBC 7.92 9.50   HGB 11.4* 10.7*   HCT 33.8* 31.6*    226   , INR   Recent Labs  Lab 06/12/18  1706   INR 1.3*   , Lipid Panel No results for input(s): CHOL, HDL, LDLCALC, TRIG, CHOLHDL in the last 48 hours.,   Pathology Results  (Last 10 years)    None      , Troponin   Recent Labs  Lab 06/12/18  1706 06/12/18  2302 06/13/18  0632   TROPONINI 0.033* 0.035* 0.048*   , All pertinent lab results from the last 24 hours have been reviewed. and   Recent Lab Results       06/13/18  0632 06/12/18  2302 06/12/18  2240 06/12/18  1940 06/12/18  1804      Benzodiazepines    Negative      Methadone metabolites    Negative      Phencyclidine    Negative      POC tHb     10.9(A)     POC O2Hb Arterial     91.6(A)     POC COHb     3.7(A)     POC MetHb     0.6     Albumin 3.3(L)         Alkaline Phosphatase 186(H)         Allens Test     N/A     (H)         Amphetamine Screen, Ur    Negative      Anion Gap 17(H)         Aniso Moderate         Appearance, UA    Clear      aPTT          (H)         BANDS 2.0         Barbiturate Screen, Ur    Negative      Basophil% 1.0         Bilirubin (UA)    Negative      Total Bilirubin 2.0  Comment:  For infants and newborns, interpretation of results should be based  on gestational age, weight and in agreement with clinical  observations.  Premature Infant recommended reference ranges:  Up to 24 hours.............<8.0 mg/dL  Up to 48 hours............<12.0 mg/dL  3-5 days..................<15.0 mg/dL  6-29 days.................<15.0 mg/dL  (H)         BNP          Site     Right Brachial     BUN, Bld 9          Calcium 8.7         Chloride 102         CO2 15(L)         Cocaine (Metab.)    Negative      Color, UA    Yellow      CPK          CPK MB          Creatinine 1.1         Creatinine, Random Ur    20.6  Comment:  The random urine reference ranges provided were established   for 24 hour urine collections.  No reference ranges exist for  random urine specimens.  Correlate clinically.  (L)      D-Dimer          DelSys     Nasal Cannula     Differential Method Manual         eGFR if  >60         eGFR if non  >60  Comment:  Calculation used to obtain the estimated glomerular filtration  rate (eGFR) is the CKD-EPI equation.            Eosinophil% 0.0         Free T4          Large/Giant Platelets Present         Glucose 121(H)         Glucose, UA    Negative      Gran% 59.0         Hematocrit 31.6(L)         Hemoglobin 10.7(L)         Hypo          Coumadin Monitoring INR          Ketones, UA    Negative      Lactate, Jose Armando 3.3  Comment:  Falsely low lactic acid results can be found in samples   containing >=13.0 mg/dL total bilirubin and/or >=3.5 mg/dL   direct bilirubin.  (H)  2.3  Comment:  Falsely low lactic acid results can be found in samples   containing >=13.0 mg/dL total bilirubin and/or >=3.5 mg/dL   direct bilirubin.  (H)       Leukocytes, UA    Negative      Lymph% 32.0         Magnesium          MB%          MCH 31.7(H)         MCHC 33.9         MCV 94         Mono% 6.0         MPV 11.2         Nitrite, UA    Negative      nRBC 2@L=100(A)         Occult Blood UA    Negative      Opiate Scrn, Ur    Negative      Other          Ovalocytes Occasional         Pathologist Review          pH, UA    6.0      Phosphorus          Platelets 226         POC BE     -1.40     POC HCO3     20.10(A)     POC PCO2     24(A)     POC PH     7.53  Comment:  NC @ 2lpm(A)     POC PO2     59(A)     POC SATURATED O2     95.6     POC TCO2     20.8     Poik Moderate         Poly Moderate          Potassium 3.9         Total Protein 7.0         Protein, UA    Negative  Comment:  Recommend a 24 hour urine protein or a urine   protein/creatinine ratio if globulin induced proteinuria is  clinically suspected.        Protime          RBC 3.38(L)         RDW 19.8(H)         Schistocytes Present         Sodium 134(L)         Specific Gravity, UA    <=1.005(A)      Specimen UA    Urine, Clean Catch      Spherocytes Moderate         Stomatocytes          Target Cells Occasional         Tear Drop Cells Moderate         Marijuana (THC) Metabolite    Negative      Toxicology Information    SEE COMMENT  Comment:  This screen includes the following classes of drugs at the   listed cut-off:  Benzodiazepines                  200 ng/ml  Methadone                        300 ng/ml  Cocaine metabolite               300 ng/ml  Opiates                          300 ng/ml  Barbiturates                     200 ng/ml  Amphetamines                    1000 ng/ml  Marijuana metabs (THC)            50 ng/ml  Phencyclidine (PCP)               25 ng/ml  High concentrations of Diphenhydramine may cross-react with  Phencyclidine PCP screening immunoassay giving a false   positive result.  High concentrations of Methylenedioxymethamphetamine (MDMA aka  Ectasy) and other structurally similar compounds may cross-   react with the Amphetamine/Methamphetamine screening   immunoassay giving a false positive result.  A metabolite of the anti-HIV drug Sustiva () may cause  false positive results in the Marijuana metabolite (THC)   screening assay.  Note: This exception list includes only more common   interferants in toxicology screen testing.  Because of many   cross-reactantspositive results on toxicology drug screens   should be confirmed whenever results do not correlate with   clinical presentation.  This report is intended for use in clinical monitoring and  management of patients. It is not intended for use in   employment related drug  testing.  Because of any cross-reactants, positive results on toxicology  drug screens should be confirmed whenever results do not  correlate with clinical presentation.  Presumptive positive results are unconfirmed and may be used   only for medical purposes.        Troponin I 0.048  Comment:  The reference interval for Troponin I represents the 99th percentile   cutoff   for our facility and is consistent with 3rd generation assay   performance.  (H) 0.035  Comment:  The reference interval for Troponin I represents the 99th percentile   cutoff   for our facility and is consistent with 3rd generation assay   performance.  (H)        TSH          Urobilinogen, UA    Negative      WBC 9.50                     06/12/18  1706      Benzodiazepines      Methadone metabolites      Phencyclidine      POC tHb      POC O2Hb Arterial      POC COHb      POC MetHb      Albumin 3.4(L)     Alkaline Phosphatase 198(H)     Allens Test      (H)     Amphetamine Screen, Ur      Anion Gap 15     Aniso Moderate     Appearance, UA      aPTT 28.8  Comment:  aPTT therapeutic range = 39-69 seconds     (H)     BANDS 2.0     Barbiturate Screen, Ur      Basophil% 1.0     Bilirubin (UA)      Total Bilirubin 1.8  Comment:  For infants and newborns, interpretation of results should be based  on gestational age, weight and in agreement with clinical  observations.  Premature Infant recommended reference ranges:  Up to 24 hours.............<8.0 mg/dL  Up to 48 hours............<12.0 mg/dL  3-5 days..................<15.0 mg/dL  6-29 days.................<15.0 mg/dL  (H)     BNP 2,495  Comment:  Values of less than 100 pg/ml are consistent with non-CHF populations.(H)     Site      BUN, Bld 6     Calcium 9.3     Chloride 105     CO2 18(L)     Cocaine (Metab.)      Color, UA      CPK 69      69     CPK MB 1.6     Creatinine 0.8     Creatinine, Random Ur      D-Dimer 10.78  Comment:  The quantitative D-dimer assay should be used as an  "aid in   the diagnosis of deep vein thrombosis and pulmonary embolism  in patients with the appropriate presentation and clinical  history. The upper limit of the reference interval and the clinical   cut off   point are identical. Causes of a positive (>0.50 mg/L FEU) D-Dimer   test  include, but are not limited to: DVT, PE, DIC, thrombolytic   therapy, anticoagulant therapy, recent surgery, trauma, or   pregnancy, disseminated malignancy, aortic aneurysm, cirrhosis,  and severe infection. False negative results may occur in   patients with distal DVT.  (H)     DelSys      Differential Method Manual     eGFR if  >60     eGFR if non  >60  Comment:  Calculation used to obtain the estimated glomerular filtration  rate (eGFR) is the CKD-EPI equation.        Eosinophil% 0.0     Free T4 1.01     Large/Giant Platelets Present     Glucose 117(H)     Glucose, UA      Gran% 63.0     Hematocrit 33.8(L)     Hemoglobin 11.4(L)     Hypo Moderate     Coumadin Monitoring INR 1.3  Comment:  Coumadin Therapy:  2.0 - 3.0 for INR for all indicators except mechanical heart valves  and antiphospholipid syndromes which should use 2.5 - 3.5.  (H)     Ketones, UA      Lactate, Jose Armando 2.8  Comment:  Falsely low lactic acid results can be found in samples   containing >=13.0 mg/dL total bilirubin and/or >=3.5 mg/dL   direct bilirubin.  (H)     Leukocytes, UA      Lymph% 30.0     Magnesium 1.8     MB% 2.3  Comment:  To be positive, the MB% must be greater than 5% AND the CK-MB  greater than 6.5 ng/mL. Values not in the reference interval,   but not qualifying as positive, should be considered "trace".       MCH 31.8(H)     MCHC 33.7     MCV 94     Mono% 3.0(L)     MPV 11.1     Nitrite, UA      nRBC 2@L=100(A)     Occult Blood UA      Opiate Scrn, Ur      Other 1     Ovalocytes      Pathologist Review Review required     pH, UA      Phosphorus 3.8     Platelets 246     POC BE      POC HCO3      POC PCO2      POC PH "      POC PO2      POC SATURATED O2      POC TCO2      Poik Moderate     Poly Moderate     Potassium 3.5     Total Protein 7.4     Protein, UA      Protime 13.0(H)     RBC 3.59(L)     RDW 20.2(H)     Schistocytes Present     Sodium 138     Specific Gravity, UA      Specimen UA      Spherocytes Occasional     Stomatocytes Absent     Target Cells Occasional     Tear Drop Cells Occasional     Marijuana (THC) Metabolite      Toxicology Information      Troponin I 0.033  Comment:  The reference interval for Troponin I represents the 99th percentile   cutoff   for our facility and is consistent with 3rd generation assay   performance.  (H)     TSH 4.204(H)     Urobilinogen, UA      WBC 7.92           Significant Imaging: CT scan: CT ABDOMEN PELVIS WITH CONTRAST: No results found for this visit on 06/12/18. and CT ABDOMEN PELVIS WITHOUT CONTRAST: No results found for this visit on 06/12/18., Echocardiogram:   2D echo with color flow doppler:   Results for orders placed or performed during the hospital encounter of 03/18/18   2D echo with color flow doppler   Result Value Ref Range    EF 15 (A) 55 - 65    Mitral Valve Regurgitation MILD TO MODERATE     and X-Ray: CXR: X-Ray Chest 1 View (CXR):   Results for orders placed or performed during the hospital encounter of 06/12/18   X-Ray Chest 1 View    Narrative    EXAMINATION:  XR CHEST 1 VIEW    CLINICAL HISTORY:  CP;    TECHNIQUE:  Single frontal view of the chest was performed.    COMPARISON:  March 31, 2018.    FINDINGS:  There is moderate cardiomegaly.  Left-sided AICD remains in place.  There is mild prominence of the central pulmonary vasculature and mildly increased perihilar and basilar lung markings bilaterally suggesting mild venous congestion, less pronounced compared to prior.  No peripheral infiltrate or pleural effusion.  No pneumothorax.  No acute osseous abnormality.      Impression    As above.      Electronically signed by: UNA Luu  MD  Date:    06/12/2018  Time:    17:40    and X-Ray Chest PA and Lateral (CXR): No results found for this visit on 06/12/18. and KUB: X-Ray Abdomen AP 1 View (KUB): No results found for this visit on 06/12/18.

## 2018-06-13 NOTE — PLAN OF CARE
06/13/18 1028   Medicare Message   Important Message from Medicare regarding Discharge Appeal Rights Given to patient/caregiver;Explained to patient/caregiver;Signed/date by patient/caregiver   Date IMM was signed 06/12/18   Time IMM was signed 0840   NICHOLAS Message   Medicare Outpatient and Observation Notification regarding financial responsibility Given to patient/caregiver;Explained to patient/caregiver;Signed/date by patient/caregiver   Date NICHOLAS was signed 06/13/18   Time NICHOLAS was signed 1030

## 2018-06-13 NOTE — SUBJECTIVE & OBJECTIVE
Past Medical History:   Diagnosis Date    AICD (automatic cardioverter/defibrillator) present     RYAN (acute kidney injury) 3/25/2018    CHF (congestive heart failure)     COPD with acute bronchitis 6/12/2018    Coronary artery disease     Encounter for blood transfusion     Hyperlipemia     Hypertension     MI (myocardial infarction)     Neuropathy     PAD (peripheral artery disease)     PVD (peripheral vascular disease)        Past Surgical History:   Procedure Laterality Date    AMPUTATION Right     great toe    BYBPASS GRAFT AORTOBIUFEMORAL      CARDIAC DEFIBRILLATOR PLACEMENT      coronary stents      EXPLORATION AND EVaCUATION OF HEMATOMA OF UPPER EXTREMITY Left     KNEE ARTHROPLASTY Left     VASCULAR SURGERY      fem-pop bypass       Review of patient's allergies indicates:  No Known Allergies    No current facility-administered medications on file prior to encounter.      Current Outpatient Prescriptions on File Prior to Encounter   Medication Sig    amitriptyline (ELAVIL) 50 MG tablet Take 1 tablet (50 mg total) by mouth every evening.    aspirin (ECOTRIN) 81 MG EC tablet Take 81 mg by mouth once daily.    atorvastatin (LIPITOR) 80 MG tablet Take 1 tablet (80 mg total) by mouth every evening.    cilostazol (PLETAL) 50 MG Tab Take 2 tablets (100 mg total) by mouth 2 (two) times daily.    cyclobenzaprine (FLEXERIL) 10 MG tablet Take 10 mg by mouth 3 (three) times daily as needed for Muscle spasms.    folic acid-vit B6-vit B12 2.5-25-2 mg (FOLBIC OR EQUIV) 2.5-25-2 mg Tab Take 1 tablet by mouth once daily.    gabapentin (NEURONTIN) 800 MG tablet Take 800 mg by mouth every evening.    lidocaine (XYLOCAINE) 5 % Oint ointment Apply topically 4 (four) times daily. Apply to entire left foot    metoprolol succinate (TOPROL-XL) 25 MG 24 hr tablet Take 0.5 tablets (12.5 mg total) by mouth once daily.    nicotine (NICODERM CQ) 21 mg/24 hr Place 1 patch onto the skin once daily.     oxyCODONE (ROXICODONE) 10 mg Tab immediate release tablet Take 1 tablet (10 mg total) by mouth every 4 (four) hours as needed for Pain.    polyethylene glycol (GLYCOLAX) 17 gram PwPk Take 17 g by mouth 2 (two) times daily as needed (constipation).    pregabalin (LYRICA) 100 MG capsule Take 1 capsule (100 mg total) by mouth 3 (three) times daily.    ranolazine (RANEXA) 1,000 mg Tb12 Take 1,000 mg by mouth 2 (two) times daily.    senna-docusate 8.6-50 mg (PERICOLACE) 8.6-50 mg per tablet Take 1 tablet by mouth 2 (two) times daily.    thiamine 100 MG tablet Take 1 tablet (100 mg total) by mouth once daily.    ticagrelor (BRILINTA) 90 mg tablet Take 90 mg by mouth 2 (two) times daily.    zolpidem (AMBIEN) 5 MG Tab Take 1 tablet (5 mg total) by mouth nightly as needed.     Family History     None        Social History Main Topics    Smoking status: Former Smoker     Packs/day: 0.50     Quit date: 5/1/2018    Smokeless tobacco: Former User     Types: Chew     Quit date: 8/2/1980      Comment: Uses nicotine gum and nicotine patches    Alcohol use Yes      Comment: rarely    Drug use: No    Sexual activity: Yes     Partners: Female     Review of Systems   Constitutional: Negative for activity change, appetite change, chills, diaphoresis, fatigue, fever and unexpected weight change.   HENT: Negative for congestion, dental problem, drooling, ear discharge, ear pain, facial swelling, mouth sores, nosebleeds, postnasal drip, rhinorrhea, sinus pressure, sneezing, sore throat, trouble swallowing and voice change.    Eyes: Negative for photophobia, pain, discharge, redness, itching and visual disturbance.   Respiratory: Positive for cough and shortness of breath. Negative for apnea, chest tightness and wheezing.    Cardiovascular: Positive for leg swelling. Negative for chest pain and palpitations.   Gastrointestinal: Negative for abdominal distention, abdominal pain, blood in stool, constipation, diarrhea, nausea and  vomiting.   Genitourinary: Negative for difficulty urinating, dysuria, enuresis, flank pain, frequency, hematuria and urgency.   Musculoskeletal: Positive for gait problem. Negative for arthralgias, back pain, joint swelling, myalgias, neck pain and neck stiffness.   Skin: Negative for color change, rash and wound.   Neurological: Negative for dizziness, tremors, seizures, syncope, facial asymmetry, speech difficulty, weakness, light-headedness, numbness and headaches.   Hematological: Negative for adenopathy. Does not bruise/bleed easily.   Psychiatric/Behavioral: Negative for agitation, behavioral problems, confusion, decreased concentration, dysphoric mood, sleep disturbance and suicidal ideas. The patient is not nervous/anxious.      Objective:     Vital Signs (Most Recent):  Temp: (!) 95.8 °F (35.4 °C) (06/13/18 0710)  Pulse: (!) 121 (06/13/18 0720)  Resp: 14 (06/13/18 0720)  BP: 100/78 (06/13/18 0720)  SpO2: 100 % (06/13/18 0753) Vital Signs (24h Range):  Temp:  [95.8 °F (35.4 °C)-97.6 °F (36.4 °C)] 95.8 °F (35.4 °C)  Pulse:  [110-132] 121  Resp:  [0-44] 14  SpO2:  [78 %-100 %] 100 %  BP: ()/() 100/78     Weight: 60.3 kg (132 lb 15 oz)  Body mass index is 20.82 kg/m².    Physical Exam   Constitutional: He is oriented to person, place, and time. He appears well-developed and well-nourished.   HENT:   Head: Normocephalic and atraumatic.   Right Ear: External ear normal.   Left Ear: External ear normal.   Nose: Nose normal.   Mouth/Throat: Oropharynx is clear and moist.   Eyes: Conjunctivae and EOM are normal. Pupils are equal, round, and reactive to light. Right eye exhibits no discharge. Left eye exhibits no discharge. No scleral icterus.   Neck: Normal range of motion. Neck supple. No JVD present. No tracheal deviation present. No thyromegaly present.   Cardiovascular: Normal rate, regular rhythm and intact distal pulses.  Exam reveals gallop.    No murmur heard.  S3 gallop   Pulmonary/Chest:  Effort normal and breath sounds normal. No respiratory distress. He has no wheezes. He has no rales. He exhibits no tenderness.   Abdominal: Soft. Bowel sounds are normal. He exhibits no distension and no mass. There is no tenderness. There is no rebound and no guarding.   Musculoskeletal: Normal range of motion. He exhibits edema.   Trace LLE edema  Recent Amputation of all 5 toes LLE---wound at surgical site, fibrinous exudate. No erythema   Right AKA, recent. Incision with wound, some fibrinous exudate. No erythema. No pus      Lymphadenopathy:     He has no cervical adenopathy.   Neurological: He is alert and oriented to person, place, and time. He has normal reflexes. He displays normal reflexes. No cranial nerve deficit. He exhibits normal muscle tone. Coordination normal.   Skin: Skin is warm and dry.   Psychiatric: He has a normal mood and affect. His behavior is normal. Judgment and thought content normal.         CRANIAL NERVES     CN III, IV, VI   Pupils are equal, round, and reactive to light.  Extraocular motions are normal.        Significant Labs:   CBC:   Recent Labs  Lab 06/12/18 1706 06/13/18  0632   WBC 7.92 9.50   HGB 11.4* 10.7*   HCT 33.8* 31.6*    226     CMP:   Recent Labs  Lab 06/12/18 1706 06/13/18  0632    134*   K 3.5 3.9    102   CO2 18* 15*   * 121*   BUN 6 9   CREATININE 0.8 1.1   CALCIUM 9.3 8.7   PROT 7.4 7.0   ALBUMIN 3.4* 3.3*   BILITOT 1.8* 2.0*   ALKPHOS 198* 186*   * 461*   * 538*   ANIONGAP 15 17*   EGFRNONAA >60 >60     Cardiac Markers:   Recent Labs  Lab 06/12/18 1706   BNP 2,495*     Lactic Acid:   Recent Labs  Lab 06/12/18 1706 06/12/18  2240 06/13/18  0632   LACTATE 2.8* 2.3* 3.3*     Troponin:   Recent Labs  Lab 06/12/18 1706 06/12/18  2302 06/13/18  0632   TROPONINI 0.033* 0.035* 0.048*     Urine Studies:   Recent Labs  Lab 06/12/18  1940   COLORU Yellow   APPEARANCEUA Clear   PHUR 6.0   SPECGRAV <=1.005*   PROTEINUA Negative    GLUCUA Negative   KETONESU Negative   BILIRUBINUA Negative   OCCULTUA Negative   NITRITE Negative   UROBILINOGEN Negative   LEUKOCYTESUR Negative     All pertinent labs within the past 24 hours have been reviewed.    Significant Imaging: I have reviewed and interpreted all pertinent imaging results/findings within the past 24 hours.

## 2018-06-13 NOTE — PLAN OF CARE
Dr Ga notified of BP readings. Monitoring closely. Pt eating dinner without c/o. NAD 3LNC in progress while pt eats. RR 20's.

## 2018-06-13 NOTE — PLAN OF CARE
Pt presents awake at intervals cheyne rios breathing pattern noted.Pt oriented x4.  CM in progress alarms set and on. O2 VM 40% in process. Pox 100%. RR 30's intermittent with RR 20's. BBS with fine rales to bilateral bases. PIV to right arm infiltrated removed with catheter intact. HOB elevated 30 degrees. ST noted on CM. Discussed plan of care with pt pt voiced understanding. Monitoring closeley. notifed Dr Ga of current findings.

## 2018-06-13 NOTE — PLAN OF CARE
Reviewed chart. Pt presents awake, alert oriented x4.CM in progress, alarms set and on. RR 30's. BBS with bibasilar rales noted.

## 2018-06-13 NOTE — PLAN OF CARE
Problem: Patient Care Overview  Goal: Plan of Care Review  Outcome: Ongoing (interventions implemented as appropriate)  Pt admitted with COPD excerbation and Hypoxia per ABG's. Pt rec'd multiple IV Antibiotics in ER. Pt with recent  AKA to RLE. Amputated all toes to LLE.  BLE's elevated up on pillows. Dressings intact. Arrived ICU via stretcher transferred to bed via slide board. Pt placed on Contact precautions for MRSA wound. O2 2l via NC on arrival to unit. During evening Resp distress unable to maintain sats>90%  40% venti mask initiated and resp. Treatment given.  Pt AAO and able to reposition self for comfort.  Call bell in reach. Fall risks sign posted and fall risk band on PT. IV/PO meds given thru shift for 10/10 paint to BLE pain. No falls or injuries during the evening shift. CT chest and CXR done while in ER.

## 2018-06-13 NOTE — ASSESSMENT & PLAN NOTE
Stop IVF. Stop ABX, he is not septic. He is in heart failure  Lasix IV 40 BID   Start ACE and spironolactone for systolic CHF   Consult cards: starting Dig, Resume BB  Full code

## 2018-06-13 NOTE — HPI
47 year old male with h/o systolic CHF(EF 15% in April 2018) presents to ED with about 2 week h/o SOB, cough, weight gain. He presented to [facility] ED on Saturday. Sent home after presumed negative work up. Presents here overnight. BNP 2400. Fluid on CXR. Has a cough, but no WBC, no fever. He says that he always gets a cough productive of clear to white sputum when he has CHF exacerbation. He has a long h/o CAD, 14 MI since age of 28 per patient. ICD. LActic acid elevated. He was admitted overnight with a dx of COPD and sepsis. He was started on zmax and vanc. Fluid bolus in ED;

## 2018-06-13 NOTE — PLAN OF CARE
06/13/18 1131   Discharge Assessment   Assessment Type Discharge Planning Assessment   Confirmed/corrected address and phone number on facesheet? Yes   Assessment information obtained from? Patient   Expected Length of Stay (days) 2   Communicated expected length of stay with patient/caregiver yes   Prior to hospitilization cognitive status: Alert/Oriented   Prior to hospitalization functional status: Assistive Equipment   Current cognitive status: Alert/Oriented   Current Functional Status: Assistive Equipment   Facility Arrived From: home   Lives With child(piero), adult;parent(s)   Able to Return to Prior Arrangements yes   Is patient able to care for self after discharge? Unable to determine at this time (comments)   Who are your caregiver(s) and their phone number(s)? [phone]   Patient's perception of discharge disposition home health   Readmission Within The Last 30 Days no previous admission in last 30 days   Patient currently being followed by outpatient case management? No   Patient currently receives any other outside agency services? Yes   How many hours a day does the patient receive services? 1   Name and contact number of agency or person providing outside services [facility]   Is it the patient/care giver preference to resume care with the current outside agency? Yes   Equipment Currently Used at Home walker, rolling;wheelchair;shower chair   Do you have any problems affording any of your prescribed medications? No   Is the patient taking medications as prescribed? yes   Does the patient have transportation home? Yes   Transportation Available family or friend will provide   Does the patient receive services at the [facility]? No   Discharge Plan A Home Health   Discharge Plan B Home Health   Patient/Family In Agreement With Plan yes     Patient plans to return home with [facility] at discharge.

## 2018-06-14 NOTE — NURSING
"Pt c/o pain to PIV to Rt FA.  Site asymptomatic and patent to saline flush. PT c/o of burning. Pt stated that he was going to rip out the IV, Encouraged PT the importance of IV access while in hospital for meds ordered. Informed Pt the need to try and put in a new IV before the IV in Rt arm is removed. 2 attempts made with no success. PT states "that's enough" Informed no more attempts but need to leave existing IV intact. House supervisor at bedside and educated PT with importance of IV access in case of emergency. Pt voiced understanding and agreed to leave IV in place.  Repositioned for comfort and will continue to monitor.    "

## 2018-06-14 NOTE — PROGRESS NOTES
[facility]  Cardiology  Progress Note    Patient Name: Leonardo Byrd  MRN: [id number]  Admission Date: 6/12/2018  Hospital Length of Stay: 1 days  Code Status: Full Code   Attending Physician: Joann Reaves MD   Primary Care Physician: Indio Aquino MD  Expected Discharge Date:   Principal Problem:Acute systolic congestive heart failure    Subjective:     Interval History: no specific complaints, other than wanting his IV out  BP on the lower side, unable to give CHF meds this morning    Review of Systems   Constitution: Positive for weakness.   HENT: Negative.    Eyes: Negative.    Cardiovascular: Negative.    Respiratory: Negative.    Endocrine: Negative.    Hematologic/Lymphatic: Negative.    Skin: Negative.    Musculoskeletal:        Phantom limb pain   Gastrointestinal: Negative.    Genitourinary: Negative.    Allergic/Immunologic: Negative.      Objective:     Vital Signs (Most Recent):  Temp: 96.2 °F (35.7 °C) (06/14/18 0800)  Pulse: 110 (06/14/18 1000)  Resp: 20 (06/14/18 1000)  BP: (!) 86/53 (06/14/18 1000)  SpO2: 100 % (06/14/18 1000) Vital Signs (24h Range):  Temp:  [95.6 °F (35.3 °C)-97.3 °F (36.3 °C)] 96.2 °F (35.7 °C)  Pulse:  [] 110  Resp:  [12-29] 20  SpO2:  [68 %-100 %] 100 %  BP: ()/(28-77) 86/53     Weight: 60.3 kg (132 lb 15 oz)  Body mass index is 20.82 kg/m².    SpO2: 100 %  O2 Device (Oxygen Therapy): room air      Intake/Output Summary (Last 24 hours) at 06/14/18 1132  Last data filed at 06/14/18 1000   Gross per 24 hour   Intake              370 ml   Output             1450 ml   Net            -1080 ml       Lines/Drains/Airways     Epidural Line                 Perineural Analgesia/Anesthesia Assessment (using dermatomes) Epidural 04/05/18 1115 70 days          Peripheral Intravenous Line                 Peripheral IV - Single Lumen 06/13/18 0735 Right Forearm 1 day                Physical Exam   Constitutional: He is oriented to person, place, and  time. He appears well-developed and well-nourished.   Cardiovascular: Normal rate and regular rhythm.    Murmur heard.  Pulmonary/Chest: Effort normal. He has rales.   Abdominal: Soft.   Musculoskeletal: Normal range of motion.   Rt AKA, left foot partial amputation   Neurological: He is alert and oriented to person, place, and time.   Skin: Skin is warm and dry.   Psychiatric: He has a normal mood and affect. His behavior is normal. Judgment and thought content normal.   Nursing note and vitals reviewed.      Significant Labs:   BMP:   Recent Labs  Lab 06/12/18  1706 06/13/18  0632 06/14/18  0545   * 121* 69*    134* 134*   K 3.5 3.9 3.4*    102 102   CO2 18* 15* 21*   BUN 6 9 9   CREATININE 0.8 1.1 0.9   CALCIUM 9.3 8.7 8.4*   MG 1.8  --   --    , CMP   Recent Labs  Lab 06/12/18  1706 06/13/18  0632 06/14/18  0545    134* 134*   K 3.5 3.9 3.4*    102 102   CO2 18* 15* 21*   * 121* 69*   BUN 6 9 9   CREATININE 0.8 1.1 0.9   CALCIUM 9.3 8.7 8.4*   PROT 7.4 7.0  --    ALBUMIN 3.4* 3.3*  --    BILITOT 1.8* 2.0*  --    ALKPHOS 198* 186*  --    * 461*  --    * 538*  --    ANIONGAP 15 17* 11   ESTGFRAFRICA >60 >60 >60   EGFRNONAA >60 >60 >60   , INR   Recent Labs  Lab 06/12/18  1706   INR 1.3*    and Troponin   Recent Labs  Lab 06/13/18  1813 06/14/18  0047 06/14/18  0545   TROPONINI 0.044* 0.046* 0.042*       Significant Imaging: Echocardiogram:   2D echo with color flow doppler:   Results for orders placed or performed during the hospital encounter of 03/18/18   2D echo with color flow doppler   Result Value Ref Range    EF 15 (A) 55 - 65    Mitral Valve Regurgitation MILD TO MODERATE      Assessment and Plan:     Acute on chronic systolic CHF stage 4  COPD  CAD, previous MI; severe ICMO EF 15% s/p ICD 4/18  PPM/AICD  HTN  PAD  Dyslipidemia  Medical noncompliance  Smoker and other drug abuse hx  Lower extremity amputation R     Plan:   Still with bibasilar rales  Cont  ASA/brilinta/digoxin  CHF meds as tolerated by BP--coreg/lasix IV/lisinoril/spironolactone  May go to floor on tele  Seems like his main complaint is foot phantom pain        Active Diagnoses:    Diagnosis Date Noted POA    PRINCIPAL PROBLEM:  Acute systolic congestive heart failure [I50.21] 06/13/2018 Yes    Phantom pain after amputation of lower extremity [G54.6] 06/13/2018 Yes    COPD, severe [J44.9] 06/13/2018 Unknown    Sepsis [A41.9] 06/13/2018 Yes    Ischemic cardiomyopathy [I25.5] 03/18/2018 Yes    Critical lower limb ischemia [I99.8] 03/18/2018 Yes     Chronic    Alcohol abuse [F10.10] 09/27/2017 Yes    Peripheral vascular disease of lower extremity [I73.9] 02/27/2017 Yes    Tobacco abuse [Z72.0] 02/27/2017 Yes      Problems Resolved During this Admission:    Diagnosis Date Noted Date Resolved POA    Sepsis [A41.9]  06/13/2018 Unknown    COPD with acute bronchitis [J44.0, J20.9] 06/12/2018 06/13/2018 Yes       VTE Risk Mitigation         Ordered     IP VTE HIGH RISK PATIENT  Once      06/12/18 2357     Place sequential compression device  Until discontinued      06/12/18 1249          Vicki Mcgrath NP  Cardiology  [facility]

## 2018-06-14 NOTE — NURSING
Assessment complete. PIV to RT medial FA. Site asymptomatic no redness no swelling. SL'd.  Informed PT the need to flush IV, Patent to NS flush. Pt c/o of slight burning. Offered to try and gain IV access elsewhere and remove current IV. Pt refused at this time. Offered cool compress and elevate up on pillows. Site continues to be without redness or swelling. Will continue to monitor.   Pt with O2 NC in use. Tolerating well at this time no resp distress noted.

## 2018-06-14 NOTE — PLAN OF CARE
Problem: Patient Care Overview  Goal: Plan of Care Review  Outcome: Ongoing (interventions implemented as appropriate)  PT admitted to CCU 1 6/12/18 with Respiratory distress and abnormal ABG's. NC at 2 liters inadequate for maintaining sats >90%.  Up to 4 liters then eventually to 40% venti mask due to mouth breathing and RR 30's. 's. Pt with recent AKA to RLE and all 5 toes amputated to LLE. Dressings intact on arrival. Cardiologists and Pulmonologist consulted. No falls or injuries during this evening shift.

## 2018-06-14 NOTE — NURSING
Dressings removed to BLE's. Photos taken and wounds redressed using wet to dry dressings. Encourage PT to elevate up on pillows for ease of discomfort. Pain meds slightly effective now states 8/10. Pt remains on O2 NC sats 100%.

## 2018-06-14 NOTE — CONSULTS
Recommendation for wound care as follows:      Left foot amp site ~ cleanse with normal saline and gauze, apply Santyl to adaptic and place onto wound bed (nickel thickness), cover with gauze, wrap foot and ankle with conforming gauze and secure with tape daily.     Right AKA amp site ~ cleanse with normal saline and gauze, apply Santyl to wound bed (nickel thickness), cover with adaptic then ABD and secure with tape daily.      Optimize nutrition with increased protein for wound healing, avoiding pressure to wound sites.

## 2018-06-14 NOTE — PROGRESS NOTES
[facility] Medicine  Progress Note    Patient Name: Leonardo Byrd  MRN: [id number]  Patient Class: IP- Inpatient   Admission Date: 6/12/2018  Length of Stay: 1 days  Attending Physician: Joann Reaves MD  Primary Care Provider: Indio Aquino MD        Subjective:     Principal Problem:Acute systolic congestive heart failure    HPI:  47 year old male with h/o systolic CHF(EF 15% in April 2018) presents to ED with about 2 week h/o SOB, cough, weight gain. He presented to [facility] ED on Saturday. Sent home after presumed negative work up. Presents here overnight. BNP 2400. Fluid on CXR. Has a cough, but no WBC, no fever. He says that he always gets a cough productive of clear to white sputum when he has CHF exacerbation. He has a long h/o CAD, 14 MI since age of 28 per patient. ICD. LActic acid elevated. He was admitted overnight with a dx of COPD and sepsis. He was started on zmax and vanc. Fluid bolus in ED;        Hospital Course:  6/13  He is tachypneic and a little SOB this am     6-14 Pt was assessed and felt to not be septic, so all Abx s are d'martine at this time; will monitor his clinical picture today    Past Medical History:   Diagnosis Date    AICD (automatic cardioverter/defibrillator) present     RYAN (acute kidney injury) 3/25/2018    CHF (congestive heart failure)     COPD with acute bronchitis 6/12/2018    Coronary artery disease     Encounter for blood transfusion     Hyperlipemia     Hypertension     MI (myocardial infarction)     Neuropathy     PAD (peripheral artery disease)     PVD (peripheral vascular disease)        Past Surgical History:   Procedure Laterality Date    AMPUTATION Right     great toe    BYBPASS GRAFT AORTOBIUFEMORAL      CARDIAC DEFIBRILLATOR PLACEMENT      coronary stents      EXPLORATION AND EVaCUATION OF HEMATOMA OF UPPER EXTREMITY Left     KNEE ARTHROPLASTY Left     VASCULAR SURGERY      fem-pop bypass       Review of patient's  allergies indicates:  No Known Allergies    No current facility-administered medications on file prior to encounter.      Current Outpatient Prescriptions on File Prior to Encounter   Medication Sig    amitriptyline (ELAVIL) 50 MG tablet Take 1 tablet (50 mg total) by mouth every evening.    aspirin (ECOTRIN) 81 MG EC tablet Take 81 mg by mouth once daily.    atorvastatin (LIPITOR) 80 MG tablet Take 1 tablet (80 mg total) by mouth every evening.    cilostazol (PLETAL) 50 MG Tab Take 2 tablets (100 mg total) by mouth 2 (two) times daily.    cyclobenzaprine (FLEXERIL) 10 MG tablet Take 10 mg by mouth 3 (three) times daily as needed for Muscle spasms.    folic acid-vit B6-vit B12 2.5-25-2 mg (FOLBIC OR EQUIV) 2.5-25-2 mg Tab Take 1 tablet by mouth once daily.    gabapentin (NEURONTIN) 800 MG tablet Take 800 mg by mouth every evening.    lidocaine (XYLOCAINE) 5 % Oint ointment Apply topically 4 (four) times daily. Apply to entire left foot    metoprolol succinate (TOPROL-XL) 25 MG 24 hr tablet Take 0.5 tablets (12.5 mg total) by mouth once daily.    nicotine (NICODERM CQ) 21 mg/24 hr Place 1 patch onto the skin once daily.    oxyCODONE (ROXICODONE) 10 mg Tab immediate release tablet Take 1 tablet (10 mg total) by mouth every 4 (four) hours as needed for Pain.    polyethylene glycol (GLYCOLAX) 17 gram PwPk Take 17 g by mouth 2 (two) times daily as needed (constipation).    pregabalin (LYRICA) 100 MG capsule Take 1 capsule (100 mg total) by mouth 3 (three) times daily.    ranolazine (RANEXA) 1,000 mg Tb12 Take 1,000 mg by mouth 2 (two) times daily.    senna-docusate 8.6-50 mg (PERICOLACE) 8.6-50 mg per tablet Take 1 tablet by mouth 2 (two) times daily.    thiamine 100 MG tablet Take 1 tablet (100 mg total) by mouth once daily.    ticagrelor (BRILINTA) 90 mg tablet Take 90 mg by mouth 2 (two) times daily.    zolpidem (AMBIEN) 5 MG Tab Take 1 tablet (5 mg total) by mouth nightly as needed.     Family  History     None        Social History Main Topics    Smoking status: Former Smoker     Packs/day: 0.50     Quit date: 5/1/2018    Smokeless tobacco: Former User     Types: Chew     Quit date: 8/2/1980      Comment: Uses nicotine gum and nicotine patches    Alcohol use Yes      Comment: rarely    Drug use: No    Sexual activity: Yes     Partners: Female     Review of Systems   Constitutional: Negative for activity change, appetite change, chills, diaphoresis, fatigue, fever and unexpected weight change.   HENT: Negative for congestion, dental problem, drooling, ear discharge, ear pain, facial swelling, mouth sores, nosebleeds, postnasal drip, rhinorrhea, sinus pressure, sneezing, sore throat, trouble swallowing and voice change.    Eyes: Negative for photophobia, pain, discharge, redness, itching and visual disturbance.   Respiratory: Positive for cough and shortness of breath. Negative for apnea, chest tightness and wheezing.    Cardiovascular: Positive for leg swelling. Negative for chest pain and palpitations.   Gastrointestinal: Negative for abdominal distention, abdominal pain, blood in stool, constipation, diarrhea, nausea and vomiting.   Genitourinary: Negative for difficulty urinating, dysuria, enuresis, flank pain, frequency, hematuria and urgency.   Musculoskeletal: Positive for gait problem. Negative for arthralgias, back pain, joint swelling, myalgias, neck pain and neck stiffness.   Skin: Negative for color change, rash and wound.   Neurological: Negative for dizziness, tremors, seizures, syncope, facial asymmetry, speech difficulty, weakness, light-headedness, numbness and headaches.        Pt gets agitated at times   Hematological: Negative for adenopathy. Does not bruise/bleed easily.   Psychiatric/Behavioral: Negative for agitation, behavioral problems, confusion, decreased concentration, dysphoric mood, sleep disturbance and suicidal ideas. The patient is not nervous/anxious.      Past Medical  History:   Diagnosis Date    AICD (automatic cardioverter/defibrillator) present     RYAN (acute kidney injury) 3/25/2018    CHF (congestive heart failure)     COPD with acute bronchitis 6/12/2018    Coronary artery disease     Encounter for blood transfusion     Hyperlipemia     Hypertension     MI (myocardial infarction)     Neuropathy     PAD (peripheral artery disease)     PVD (peripheral vascular disease)        Past Surgical History:   Procedure Laterality Date    AMPUTATION Right     great toe    BYBPASS GRAFT AORTOBIUFEMORAL      CARDIAC DEFIBRILLATOR PLACEMENT      coronary stents      EXPLORATION AND EVaCUATION OF HEMATOMA OF UPPER EXTREMITY Left     KNEE ARTHROPLASTY Left     VASCULAR SURGERY      fem-pop bypass       Review of patient's allergies indicates:  No Known Allergies    No current facility-administered medications on file prior to encounter.      Current Outpatient Prescriptions on File Prior to Encounter   Medication Sig    amitriptyline (ELAVIL) 50 MG tablet Take 1 tablet (50 mg total) by mouth every evening.    aspirin (ECOTRIN) 81 MG EC tablet Take 81 mg by mouth once daily.    atorvastatin (LIPITOR) 80 MG tablet Take 1 tablet (80 mg total) by mouth every evening.    cilostazol (PLETAL) 50 MG Tab Take 2 tablets (100 mg total) by mouth 2 (two) times daily.    cyclobenzaprine (FLEXERIL) 10 MG tablet Take 10 mg by mouth 3 (three) times daily as needed for Muscle spasms.    folic acid-vit B6-vit B12 2.5-25-2 mg (FOLBIC OR EQUIV) 2.5-25-2 mg Tab Take 1 tablet by mouth once daily.    gabapentin (NEURONTIN) 800 MG tablet Take 800 mg by mouth every evening.    lidocaine (XYLOCAINE) 5 % Oint ointment Apply topically 4 (four) times daily. Apply to entire left foot    metoprolol succinate (TOPROL-XL) 25 MG 24 hr tablet Take 0.5 tablets (12.5 mg total) by mouth once daily.    nicotine (NICODERM CQ) 21 mg/24 hr Place 1 patch onto the skin once daily.    oxyCODONE  (ROXICODONE) 10 mg Tab immediate release tablet Take 1 tablet (10 mg total) by mouth every 4 (four) hours as needed for Pain.    polyethylene glycol (GLYCOLAX) 17 gram PwPk Take 17 g by mouth 2 (two) times daily as needed (constipation).    pregabalin (LYRICA) 100 MG capsule Take 1 capsule (100 mg total) by mouth 3 (three) times daily.    ranolazine (RANEXA) 1,000 mg Tb12 Take 1,000 mg by mouth 2 (two) times daily.    senna-docusate 8.6-50 mg (PERICOLACE) 8.6-50 mg per tablet Take 1 tablet by mouth 2 (two) times daily.    thiamine 100 MG tablet Take 1 tablet (100 mg total) by mouth once daily.    ticagrelor (BRILINTA) 90 mg tablet Take 90 mg by mouth 2 (two) times daily.    zolpidem (AMBIEN) 5 MG Tab Take 1 tablet (5 mg total) by mouth nightly as needed.     Family History     None        Social History Main Topics    Smoking status: Former Smoker     Packs/day: 0.50     Quit date: 5/1/2018    Smokeless tobacco: Former User     Types: Chew     Quit date: 8/2/1980      Comment: Uses nicotine gum and nicotine patches    Alcohol use Yes      Comment: rarely    Drug use: No    Sexual activity: Yes     Partners: Female     Review of Systems   Constitutional: Negative for activity change, appetite change, chills, diaphoresis, fatigue, fever and unexpected weight change.   HENT: Negative for congestion, dental problem, drooling, ear discharge, ear pain, facial swelling, mouth sores, nosebleeds, postnasal drip, rhinorrhea, sinus pressure, sneezing, sore throat, trouble swallowing and voice change.    Eyes: Negative for photophobia, pain, discharge, redness, itching and visual disturbance.   Respiratory: Positive for cough and shortness of breath. Negative for apnea, chest tightness and wheezing.    Cardiovascular: Positive for leg swelling. Negative for chest pain and palpitations.   Gastrointestinal: Negative for abdominal distention, abdominal pain, blood in stool, constipation, diarrhea, nausea and vomiting.    Genitourinary: Negative for difficulty urinating, dysuria, enuresis, flank pain, frequency, hematuria and urgency.   Musculoskeletal: Positive for gait problem. Negative for arthralgias, back pain, joint swelling, myalgias, neck pain and neck stiffness.   Skin: Negative for color change, rash and wound.   Neurological: Negative for dizziness, tremors, seizures, syncope, facial asymmetry, speech difficulty, weakness, light-headedness, numbness and headaches.   Hematological: Negative for adenopathy. Does not bruise/bleed easily.   Psychiatric/Behavioral: Negative for agitation, behavioral problems, confusion, decreased concentration, dysphoric mood, sleep disturbance and suicidal ideas. The patient is not nervous/anxious.      Objective:     Vital Signs (Most Recent):  Temp: (!) 95.9 °F (35.5 °C) (06/14/18 0400)  Pulse: 99 (06/14/18 0826)  Resp: 19 (06/14/18 0826)  BP: 93/61 (06/14/18 0500)  SpO2: 100 % (06/14/18 0837) Vital Signs (24h Range):  Temp:  [95.6 °F (35.3 °C)-98 °F (36.7 °C)] 95.9 °F (35.5 °C)  Pulse:  [] 99  Resp:  [5-30] 19  SpO2:  [68 %-100 %] 100 %  BP: ()/(28-80) 93/61     Weight: 60.3 kg (132 lb 15 oz)  Body mass index is 20.82 kg/m².    Physical Exam   Constitutional: He is oriented to person, place, and time. He appears well-developed and well-nourished.   HENT:   Head: Normocephalic and atraumatic.   Right Ear: External ear normal.   Left Ear: External ear normal.   Nose: Nose normal.   Mouth/Throat: Oropharynx is clear and moist.   Eyes: Conjunctivae and EOM are normal. Pupils are equal, round, and reactive to light. Right eye exhibits no discharge. Left eye exhibits no discharge. No scleral icterus.   Neck: Normal range of motion. Neck supple. No JVD present. No tracheal deviation present. No thyromegaly present.   Cardiovascular: Normal rate, regular rhythm and intact distal pulses.  Exam reveals gallop.    No murmur heard.  S3 gallop   Pulmonary/Chest: Effort normal and breath  sounds normal. No respiratory distress. He has no wheezes. He has no rales. He exhibits no tenderness.   Abdominal: Soft. Bowel sounds are normal. He exhibits no distension and no mass. There is no tenderness. There is no rebound and no guarding.   Musculoskeletal: Normal range of motion. He exhibits edema.   Trace LLE edema  Recent Amputation of all 5 toes LLE---wound at surgical site, fibrinous exudate. No erythema   Right AKA, recent. Incision with wound, some fibrinous exudate. No erythema. No pus      Lymphadenopathy:     He has no cervical adenopathy.   Neurological: He is alert and oriented to person, place, and time. He has normal reflexes. He displays normal reflexes. No cranial nerve deficit. He exhibits normal muscle tone. Coordination normal.   Skin: Skin is warm and dry.   Psychiatric: He has a normal mood and affect. His behavior is normal. Judgment and thought content normal.         CRANIAL NERVES     CN III, IV, VI   Pupils are equal, round, and reactive to light.  Extraocular motions are normal.        Significant Labs:   CBC:     Recent Labs  Lab 06/12/18  1706 06/13/18  0632   WBC 7.92 9.50   HGB 11.4* 10.7*   HCT 33.8* 31.6*    226     CMP:     Recent Labs  Lab 06/12/18  1706 06/13/18  0632 06/14/18  0545    134* 134*   K 3.5 3.9 3.4*    102 102   CO2 18* 15* 21*   * 121* 69*   BUN 6 9 9   CREATININE 0.8 1.1 0.9   CALCIUM 9.3 8.7 8.4*   PROT 7.4 7.0  --    ALBUMIN 3.4* 3.3*  --    BILITOT 1.8* 2.0*  --    ALKPHOS 198* 186*  --    * 461*  --    * 538*  --    ANIONGAP 15 17* 11   EGFRNONAA >60 >60 >60     Cardiac Markers:     Recent Labs  Lab 06/14/18  0545   BNP 1,811*     Lactic Acid:     Recent Labs  Lab 06/12/18  1706 06/12/18  2240 06/13/18  0632   LACTATE 2.8* 2.3* 3.3*     Troponin:     Recent Labs  Lab 06/13/18  1813 06/14/18  0047 06/14/18  0545   TROPONINI 0.044* 0.046* 0.042*     Urine Studies:     Recent Labs  Lab 06/12/18  1940   COLORU Yellow    APPEARANCEUA Clear   PHUR 6.0   SPECGRAV <=1.005*   PROTEINUA Negative   GLUCUA Negative   KETONESU Negative   BILIRUBINUA Negative   OCCULTUA Negative   NITRITE Negative   UROBILINOGEN Negative   LEUKOCYTESUR Negative     All pertinent labs within the past 24 hours have been reviewed.    Significant Imaging: I have reviewed and interpreted all pertinent imaging results/findings within the past 24 hours.  Objective:     Vital Signs (Most Recent):  Temp: (!) 95.9 °F (35.5 °C) (06/14/18 0400)  Pulse: 99 (06/14/18 0826)  Resp: 19 (06/14/18 0826)  BP: 93/61 (06/14/18 0500)  SpO2: 100 % (06/14/18 0837) Vital Signs (24h Range):  Temp:  [95.6 °F (35.3 °C)-98 °F (36.7 °C)] 95.9 °F (35.5 °C)  Pulse:  [] 99  Resp:  [5-30] 19  SpO2:  [68 %-100 %] 100 %  BP: ()/(28-80) 93/61     Weight: 60.3 kg (132 lb 15 oz)  Body mass index is 20.82 kg/m².    Physical Exam   Constitutional: He is oriented to person, place, and time. He appears well-developed and well-nourished.   HENT:   Head: Normocephalic and atraumatic.   Right Ear: External ear normal.   Left Ear: External ear normal.   Nose: Nose normal.   Mouth/Throat: Oropharynx is clear and moist.   Eyes: Conjunctivae and EOM are normal. Pupils are equal, round, and reactive to light. Right eye exhibits no discharge. Left eye exhibits no discharge. No scleral icterus.   Neck: Normal range of motion. Neck supple. No JVD present. No tracheal deviation present. No thyromegaly present.   Cardiovascular: Normal rate, regular rhythm and intact distal pulses.  Exam reveals gallop.    No murmur heard.  S3 gallop   Pulmonary/Chest: Effort normal and breath sounds normal. No respiratory distress. He has no wheezes. He has no rales. He exhibits no tenderness.   Abdominal: Soft. Bowel sounds are normal. He exhibits no distension and no mass. There is no tenderness. There is no rebound and no guarding.   Musculoskeletal: Normal range of motion. He exhibits edema.   Trace LLE  edema  Recent Amputation of all 5 toes LLE---wound at surgical site, fibrinous exudate. No erythema   Right AKA, recent. Incision with wound, some fibrinous exudate. No erythema. No pus      Lymphadenopathy:     He has no cervical adenopathy.   Neurological: He is alert and oriented to person, place, and time. He has normal reflexes. He displays normal reflexes. No cranial nerve deficit. He exhibits normal muscle tone. Coordination normal.   Skin: Skin is warm and dry.   Psychiatric: He has a normal mood and affect. His behavior is normal. Judgment and thought content normal.         CRANIAL NERVES     CN III, IV, VI   Pupils are equal, round, and reactive to light.  Extraocular motions are normal.        Significant Labs:   CBC:     Recent Labs  Lab 06/12/18  1706 06/13/18  0632   WBC 7.92 9.50   HGB 11.4* 10.7*   HCT 33.8* 31.6*    226     CMP:     Recent Labs  Lab 06/12/18  1706 06/13/18  0632 06/14/18  0545    134* 134*   K 3.5 3.9 3.4*    102 102   CO2 18* 15* 21*   * 121* 69*   BUN 6 9 9   CREATININE 0.8 1.1 0.9   CALCIUM 9.3 8.7 8.4*   PROT 7.4 7.0  --    ALBUMIN 3.4* 3.3*  --    BILITOT 1.8* 2.0*  --    ALKPHOS 198* 186*  --    * 461*  --    * 538*  --    ANIONGAP 15 17* 11   EGFRNONAA >60 >60 >60     Cardiac Markers:     Recent Labs  Lab 06/14/18  0545   BNP 1,811*     Lactic Acid:     Recent Labs  Lab 06/12/18  1706 06/12/18  2240 06/13/18  0632   LACTATE 2.8* 2.3* 3.3*     Troponin:     Recent Labs  Lab 06/13/18  1813 06/14/18  0047 06/14/18  0545   TROPONINI 0.044* 0.046* 0.042*     Urine Studies:     Recent Labs  Lab 06/12/18  1940   COLORU Yellow   APPEARANCEUA Clear   PHUR 6.0   SPECGRAV <=1.005*   PROTEINUA Negative   GLUCUA Negative   KETONESU Negative   BILIRUBINUA Negative   OCCULTUA Negative   NITRITE Negative   UROBILINOGEN Negative   LEUKOCYTESUR Negative     All pertinent labs within the past 24 hours have been reviewed.    Significant Imaging: I have  reviewed and interpreted all pertinent imaging results/findings within the past 24 hours.    Assessment/Plan:      * Acute systolic congestive heart failure    Stop IVF. Stop ABX, he is not septic. He is in heart failure  Lasix IV 40 BID   Start ACE and spironolactone for systolic CHF   Consult cards: starting Dig, Resume BB  Full code           Phantom pain after amputation of lower extremity    Lower dose of lyrica since he is in acute HF(can cause peripheral edema)         Peripheral vascular disease of lower extremity    Continue statin, asa and brilinta   No need to continue pletal, he is not ambulatory   All abx s have been d'c ed, so will follow his clinical picture          VTE Risk Mitigation         Ordered     IP VTE HIGH RISK PATIENT  Once      06/12/18 2357     Place sequential compression device  Until discontinued      06/12/18 9327          Critical care time spent on the evaluation and treatment of severe organ dysfunction, review of pertinent labs and imaging studies, discussions with consulting providers and discussions with patient/family: 30  minutes.    Nghia Sapp MD  Department of Hospital Medicine   [facility]

## 2018-06-14 NOTE — PROGRESS NOTES
This note also relates to the following rows which could not be included:  SpO2 - Cannot attach notes to unvalidated device data  Pulse - Cannot attach notes to unvalidated device data  Resp - Cannot attach notes to unvalidated device data    Pt placed back on BiPAP at this time.

## 2018-06-14 NOTE — NURSING
Pt took Bipap mask off. Stated had enough. Encouraged Pt to relax and will switch to venti Mask. Tolerating well at this time. Informed PT the importance of putting Bipap back on before next dose of pain meds. Pt voiced understanding.

## 2018-06-14 NOTE — SUBJECTIVE & OBJECTIVE
Past Medical History:   Diagnosis Date    AICD (automatic cardioverter/defibrillator) present     RYAN (acute kidney injury) 3/25/2018    CHF (congestive heart failure)     COPD with acute bronchitis 6/12/2018    Coronary artery disease     Encounter for blood transfusion     Hyperlipemia     Hypertension     MI (myocardial infarction)     Neuropathy     PAD (peripheral artery disease)     PVD (peripheral vascular disease)        Past Surgical History:   Procedure Laterality Date    AMPUTATION Right     great toe    BYBPASS GRAFT AORTOBIUFEMORAL      CARDIAC DEFIBRILLATOR PLACEMENT      coronary stents      EXPLORATION AND EVaCUATION OF HEMATOMA OF UPPER EXTREMITY Left     KNEE ARTHROPLASTY Left     VASCULAR SURGERY      fem-pop bypass       Review of patient's allergies indicates:  No Known Allergies    No current facility-administered medications on file prior to encounter.      Current Outpatient Prescriptions on File Prior to Encounter   Medication Sig    amitriptyline (ELAVIL) 50 MG tablet Take 1 tablet (50 mg total) by mouth every evening.    aspirin (ECOTRIN) 81 MG EC tablet Take 81 mg by mouth once daily.    atorvastatin (LIPITOR) 80 MG tablet Take 1 tablet (80 mg total) by mouth every evening.    cilostazol (PLETAL) 50 MG Tab Take 2 tablets (100 mg total) by mouth 2 (two) times daily.    cyclobenzaprine (FLEXERIL) 10 MG tablet Take 10 mg by mouth 3 (three) times daily as needed for Muscle spasms.    folic acid-vit B6-vit B12 2.5-25-2 mg (FOLBIC OR EQUIV) 2.5-25-2 mg Tab Take 1 tablet by mouth once daily.    gabapentin (NEURONTIN) 800 MG tablet Take 800 mg by mouth every evening.    lidocaine (XYLOCAINE) 5 % Oint ointment Apply topically 4 (four) times daily. Apply to entire left foot    metoprolol succinate (TOPROL-XL) 25 MG 24 hr tablet Take 0.5 tablets (12.5 mg total) by mouth once daily.    nicotine (NICODERM CQ) 21 mg/24 hr Place 1 patch onto the skin once daily.     oxyCODONE (ROXICODONE) 10 mg Tab immediate release tablet Take 1 tablet (10 mg total) by mouth every 4 (four) hours as needed for Pain.    polyethylene glycol (GLYCOLAX) 17 gram PwPk Take 17 g by mouth 2 (two) times daily as needed (constipation).    pregabalin (LYRICA) 100 MG capsule Take 1 capsule (100 mg total) by mouth 3 (three) times daily.    ranolazine (RANEXA) 1,000 mg Tb12 Take 1,000 mg by mouth 2 (two) times daily.    senna-docusate 8.6-50 mg (PERICOLACE) 8.6-50 mg per tablet Take 1 tablet by mouth 2 (two) times daily.    thiamine 100 MG tablet Take 1 tablet (100 mg total) by mouth once daily.    ticagrelor (BRILINTA) 90 mg tablet Take 90 mg by mouth 2 (two) times daily.    zolpidem (AMBIEN) 5 MG Tab Take 1 tablet (5 mg total) by mouth nightly as needed.     Family History     None        Social History Main Topics    Smoking status: Former Smoker     Packs/day: 0.50     Quit date: 5/1/2018    Smokeless tobacco: Former User     Types: Chew     Quit date: 8/2/1980      Comment: Uses nicotine gum and nicotine patches    Alcohol use Yes      Comment: rarely    Drug use: No    Sexual activity: Yes     Partners: Female     Review of Systems   Constitutional: Negative for activity change, appetite change, chills, diaphoresis, fatigue, fever and unexpected weight change.   HENT: Negative for congestion, dental problem, drooling, ear discharge, ear pain, facial swelling, mouth sores, nosebleeds, postnasal drip, rhinorrhea, sinus pressure, sneezing, sore throat, trouble swallowing and voice change.    Eyes: Negative for photophobia, pain, discharge, redness, itching and visual disturbance.   Respiratory: Positive for cough and shortness of breath. Negative for apnea, chest tightness and wheezing.    Cardiovascular: Positive for leg swelling. Negative for chest pain and palpitations.   Gastrointestinal: Negative for abdominal distention, abdominal pain, blood in stool, constipation, diarrhea, nausea and  vomiting.   Genitourinary: Negative for difficulty urinating, dysuria, enuresis, flank pain, frequency, hematuria and urgency.   Musculoskeletal: Positive for gait problem. Negative for arthralgias, back pain, joint swelling, myalgias, neck pain and neck stiffness.   Skin: Negative for color change, rash and wound.   Neurological: Negative for dizziness, tremors, seizures, syncope, facial asymmetry, speech difficulty, weakness, light-headedness, numbness and headaches.        Pt gets agitated at times   Hematological: Negative for adenopathy. Does not bruise/bleed easily.   Psychiatric/Behavioral: Negative for agitation, behavioral problems, confusion, decreased concentration, dysphoric mood, sleep disturbance and suicidal ideas. The patient is not nervous/anxious.      Past Medical History:   Diagnosis Date    AICD (automatic cardioverter/defibrillator) present     RYAN (acute kidney injury) 3/25/2018    CHF (congestive heart failure)     COPD with acute bronchitis 6/12/2018    Coronary artery disease     Encounter for blood transfusion     Hyperlipemia     Hypertension     MI (myocardial infarction)     Neuropathy     PAD (peripheral artery disease)     PVD (peripheral vascular disease)        Past Surgical History:   Procedure Laterality Date    AMPUTATION Right     great toe    BYBPASS GRAFT AORTOBIUFEMORAL      CARDIAC DEFIBRILLATOR PLACEMENT      coronary stents      EXPLORATION AND EVaCUATION OF HEMATOMA OF UPPER EXTREMITY Left     KNEE ARTHROPLASTY Left     VASCULAR SURGERY      fem-pop bypass       Review of patient's allergies indicates:  No Known Allergies    No current facility-administered medications on file prior to encounter.      Current Outpatient Prescriptions on File Prior to Encounter   Medication Sig    amitriptyline (ELAVIL) 50 MG tablet Take 1 tablet (50 mg total) by mouth every evening.    aspirin (ECOTRIN) 81 MG EC tablet Take 81 mg by mouth once daily.    atorvastatin  (LIPITOR) 80 MG tablet Take 1 tablet (80 mg total) by mouth every evening.    cilostazol (PLETAL) 50 MG Tab Take 2 tablets (100 mg total) by mouth 2 (two) times daily.    cyclobenzaprine (FLEXERIL) 10 MG tablet Take 10 mg by mouth 3 (three) times daily as needed for Muscle spasms.    folic acid-vit B6-vit B12 2.5-25-2 mg (FOLBIC OR EQUIV) 2.5-25-2 mg Tab Take 1 tablet by mouth once daily.    gabapentin (NEURONTIN) 800 MG tablet Take 800 mg by mouth every evening.    lidocaine (XYLOCAINE) 5 % Oint ointment Apply topically 4 (four) times daily. Apply to entire left foot    metoprolol succinate (TOPROL-XL) 25 MG 24 hr tablet Take 0.5 tablets (12.5 mg total) by mouth once daily.    nicotine (NICODERM CQ) 21 mg/24 hr Place 1 patch onto the skin once daily.    oxyCODONE (ROXICODONE) 10 mg Tab immediate release tablet Take 1 tablet (10 mg total) by mouth every 4 (four) hours as needed for Pain.    polyethylene glycol (GLYCOLAX) 17 gram PwPk Take 17 g by mouth 2 (two) times daily as needed (constipation).    pregabalin (LYRICA) 100 MG capsule Take 1 capsule (100 mg total) by mouth 3 (three) times daily.    ranolazine (RANEXA) 1,000 mg Tb12 Take 1,000 mg by mouth 2 (two) times daily.    senna-docusate 8.6-50 mg (PERICOLACE) 8.6-50 mg per tablet Take 1 tablet by mouth 2 (two) times daily.    thiamine 100 MG tablet Take 1 tablet (100 mg total) by mouth once daily.    ticagrelor (BRILINTA) 90 mg tablet Take 90 mg by mouth 2 (two) times daily.    zolpidem (AMBIEN) 5 MG Tab Take 1 tablet (5 mg total) by mouth nightly as needed.     Family History     None        Social History Main Topics    Smoking status: Former Smoker     Packs/day: 0.50     Quit date: 5/1/2018    Smokeless tobacco: Former User     Types: Chew     Quit date: 8/2/1980      Comment: Uses nicotine gum and nicotine patches    Alcohol use Yes      Comment: rarely    Drug use: No    Sexual activity: Yes     Partners: Female     Review of Systems    Constitutional: Negative for activity change, appetite change, chills, diaphoresis, fatigue, fever and unexpected weight change.   HENT: Negative for congestion, dental problem, drooling, ear discharge, ear pain, facial swelling, mouth sores, nosebleeds, postnasal drip, rhinorrhea, sinus pressure, sneezing, sore throat, trouble swallowing and voice change.    Eyes: Negative for photophobia, pain, discharge, redness, itching and visual disturbance.   Respiratory: Positive for cough and shortness of breath. Negative for apnea, chest tightness and wheezing.    Cardiovascular: Positive for leg swelling. Negative for chest pain and palpitations.   Gastrointestinal: Negative for abdominal distention, abdominal pain, blood in stool, constipation, diarrhea, nausea and vomiting.   Genitourinary: Negative for difficulty urinating, dysuria, enuresis, flank pain, frequency, hematuria and urgency.   Musculoskeletal: Positive for gait problem. Negative for arthralgias, back pain, joint swelling, myalgias, neck pain and neck stiffness.   Skin: Negative for color change, rash and wound.   Neurological: Negative for dizziness, tremors, seizures, syncope, facial asymmetry, speech difficulty, weakness, light-headedness, numbness and headaches.   Hematological: Negative for adenopathy. Does not bruise/bleed easily.   Psychiatric/Behavioral: Negative for agitation, behavioral problems, confusion, decreased concentration, dysphoric mood, sleep disturbance and suicidal ideas. The patient is not nervous/anxious.      Objective:     Vital Signs (Most Recent):  Temp: (!) 95.9 °F (35.5 °C) (06/14/18 0400)  Pulse: 99 (06/14/18 0826)  Resp: 19 (06/14/18 0826)  BP: 93/61 (06/14/18 0500)  SpO2: 100 % (06/14/18 0837) Vital Signs (24h Range):  Temp:  [95.6 °F (35.3 °C)-98 °F (36.7 °C)] 95.9 °F (35.5 °C)  Pulse:  [] 99  Resp:  [5-30] 19  SpO2:  [68 %-100 %] 100 %  BP: ()/(28-80) 93/61     Weight: 60.3 kg (132 lb 15 oz)  Body mass index  is 20.82 kg/m².    Physical Exam   Constitutional: He is oriented to person, place, and time. He appears well-developed and well-nourished.   HENT:   Head: Normocephalic and atraumatic.   Right Ear: External ear normal.   Left Ear: External ear normal.   Nose: Nose normal.   Mouth/Throat: Oropharynx is clear and moist.   Eyes: Conjunctivae and EOM are normal. Pupils are equal, round, and reactive to light. Right eye exhibits no discharge. Left eye exhibits no discharge. No scleral icterus.   Neck: Normal range of motion. Neck supple. No JVD present. No tracheal deviation present. No thyromegaly present.   Cardiovascular: Normal rate, regular rhythm and intact distal pulses.  Exam reveals gallop.    No murmur heard.  S3 gallop   Pulmonary/Chest: Effort normal and breath sounds normal. No respiratory distress. He has no wheezes. He has no rales. He exhibits no tenderness.   Abdominal: Soft. Bowel sounds are normal. He exhibits no distension and no mass. There is no tenderness. There is no rebound and no guarding.   Musculoskeletal: Normal range of motion. He exhibits edema.   Trace LLE edema  Recent Amputation of all 5 toes LLE---wound at surgical site, fibrinous exudate. No erythema   Right AKA, recent. Incision with wound, some fibrinous exudate. No erythema. No pus      Lymphadenopathy:     He has no cervical adenopathy.   Neurological: He is alert and oriented to person, place, and time. He has normal reflexes. He displays normal reflexes. No cranial nerve deficit. He exhibits normal muscle tone. Coordination normal.   Skin: Skin is warm and dry.   Psychiatric: He has a normal mood and affect. His behavior is normal. Judgment and thought content normal.         CRANIAL NERVES     CN III, IV, VI   Pupils are equal, round, and reactive to light.  Extraocular motions are normal.        Significant Labs:   CBC:     Recent Labs  Lab 06/12/18  1706 06/13/18  0632   WBC 7.92 9.50   HGB 11.4* 10.7*   HCT 33.8* 31.6*   PLT  246 226     CMP:     Recent Labs  Lab 06/12/18  1706 06/13/18  0632 06/14/18  0545    134* 134*   K 3.5 3.9 3.4*    102 102   CO2 18* 15* 21*   * 121* 69*   BUN 6 9 9   CREATININE 0.8 1.1 0.9   CALCIUM 9.3 8.7 8.4*   PROT 7.4 7.0  --    ALBUMIN 3.4* 3.3*  --    BILITOT 1.8* 2.0*  --    ALKPHOS 198* 186*  --    * 461*  --    * 538*  --    ANIONGAP 15 17* 11   EGFRNONAA >60 >60 >60     Cardiac Markers:     Recent Labs  Lab 06/14/18  0545   BNP 1,811*     Lactic Acid:     Recent Labs  Lab 06/12/18  1706 06/12/18  2240 06/13/18  0632   LACTATE 2.8* 2.3* 3.3*     Troponin:     Recent Labs  Lab 06/13/18  1813 06/14/18  0047 06/14/18  0545   TROPONINI 0.044* 0.046* 0.042*     Urine Studies:     Recent Labs  Lab 06/12/18  1940   COLORU Yellow   APPEARANCEUA Clear   PHUR 6.0   SPECGRAV <=1.005*   PROTEINUA Negative   GLUCUA Negative   KETONESU Negative   BILIRUBINUA Negative   OCCULTUA Negative   NITRITE Negative   UROBILINOGEN Negative   LEUKOCYTESUR Negative     All pertinent labs within the past 24 hours have been reviewed.    Significant Imaging: I have reviewed and interpreted all pertinent imaging results/findings within the past 24 hours.  Objective:     Vital Signs (Most Recent):  Temp: (!) 95.9 °F (35.5 °C) (06/14/18 0400)  Pulse: 99 (06/14/18 0826)  Resp: 19 (06/14/18 0826)  BP: 93/61 (06/14/18 0500)  SpO2: 100 % (06/14/18 0837) Vital Signs (24h Range):  Temp:  [95.6 °F (35.3 °C)-98 °F (36.7 °C)] 95.9 °F (35.5 °C)  Pulse:  [] 99  Resp:  [5-30] 19  SpO2:  [68 %-100 %] 100 %  BP: ()/(28-80) 93/61     Weight: 60.3 kg (132 lb 15 oz)  Body mass index is 20.82 kg/m².    Physical Exam   Constitutional: He is oriented to person, place, and time. He appears well-developed and well-nourished.   HENT:   Head: Normocephalic and atraumatic.   Right Ear: External ear normal.   Left Ear: External ear normal.   Nose: Nose normal.   Mouth/Throat: Oropharynx is clear and moist.   Eyes:  Conjunctivae and EOM are normal. Pupils are equal, round, and reactive to light. Right eye exhibits no discharge. Left eye exhibits no discharge. No scleral icterus.   Neck: Normal range of motion. Neck supple. No JVD present. No tracheal deviation present. No thyromegaly present.   Cardiovascular: Normal rate, regular rhythm and intact distal pulses.  Exam reveals gallop.    No murmur heard.  S3 gallop   Pulmonary/Chest: Effort normal and breath sounds normal. No respiratory distress. He has no wheezes. He has no rales. He exhibits no tenderness.   Abdominal: Soft. Bowel sounds are normal. He exhibits no distension and no mass. There is no tenderness. There is no rebound and no guarding.   Musculoskeletal: Normal range of motion. He exhibits edema.   Trace LLE edema  Recent Amputation of all 5 toes LLE---wound at surgical site, fibrinous exudate. No erythema   Right AKA, recent. Incision with wound, some fibrinous exudate. No erythema. No pus      Lymphadenopathy:     He has no cervical adenopathy.   Neurological: He is alert and oriented to person, place, and time. He has normal reflexes. He displays normal reflexes. No cranial nerve deficit. He exhibits normal muscle tone. Coordination normal.   Skin: Skin is warm and dry.   Psychiatric: He has a normal mood and affect. His behavior is normal. Judgment and thought content normal.         CRANIAL NERVES     CN III, IV, VI   Pupils are equal, round, and reactive to light.  Extraocular motions are normal.        Significant Labs:   CBC:     Recent Labs  Lab 06/12/18  1706 06/13/18  0632   WBC 7.92 9.50   HGB 11.4* 10.7*   HCT 33.8* 31.6*    226     CMP:     Recent Labs  Lab 06/12/18  1706 06/13/18  0632 06/14/18  0545    134* 134*   K 3.5 3.9 3.4*    102 102   CO2 18* 15* 21*   * 121* 69*   BUN 6 9 9   CREATININE 0.8 1.1 0.9   CALCIUM 9.3 8.7 8.4*   PROT 7.4 7.0  --    ALBUMIN 3.4* 3.3*  --    BILITOT 1.8* 2.0*  --    ALKPHOS 198* 186*  --     * 461*  --    * 538*  --    ANIONGAP 15 17* 11   EGFRNONAA >60 >60 >60     Cardiac Markers:     Recent Labs  Lab 06/14/18  0545   BNP 1,811*     Lactic Acid:     Recent Labs  Lab 06/12/18  1706 06/12/18  2240 06/13/18  0632   LACTATE 2.8* 2.3* 3.3*     Troponin:     Recent Labs  Lab 06/13/18  1813 06/14/18  0047 06/14/18  0545   TROPONINI 0.044* 0.046* 0.042*     Urine Studies:     Recent Labs  Lab 06/12/18  1940   COLORU Yellow   APPEARANCEUA Clear   PHUR 6.0   SPECGRAV <=1.005*   PROTEINUA Negative   GLUCUA Negative   KETONESU Negative   BILIRUBINUA Negative   OCCULTUA Negative   NITRITE Negative   UROBILINOGEN Negative   LEUKOCYTESUR Negative     All pertinent labs within the past 24 hours have been reviewed.    Significant Imaging: I have reviewed and interpreted all pertinent imaging results/findings within the past 24 hours.

## 2018-06-14 NOTE — ASSESSMENT & PLAN NOTE
Continue statin, asa and brilinta   No need to continue pletal, he is not ambulatory   All abx s have been d'c ed, so will follow his clinical picture

## 2018-06-14 NOTE — PROGRESS NOTES
[facility]  Pulmonology  Progress Note    Patient Name: Leonardo Byrd  MRN: [id number]  Admission Date: 6/12/2018  Hospital Length of Stay: 1 days  Code Status: Full Code  Attending Provider: Joann Reaves MD  Primary Care Provider: Indio Aquino MD   Principal Problem: Acute systolic congestive heart failure    Subjective:     Interval History: feeling better no worsening sob    Objective:     Vital Signs (Most Recent):  Temp: 96.2 °F (35.7 °C) (06/14/18 0800)  Pulse: 110 (06/14/18 1000)  Resp: 20 (06/14/18 1000)  BP: (!) 86/53 (06/14/18 1000)  SpO2: 100 % (06/14/18 1000) Vital Signs (24h Range):  Temp:  [95.6 °F (35.3 °C)-97.3 °F (36.3 °C)] 96.2 °F (35.7 °C)  Pulse:  [] 110  Resp:  [12-30] 20  SpO2:  [68 %-100 %] 100 %  BP: ()/(28-77) 86/53     Weight: 60.3 kg (132 lb 15 oz)  Body mass index is 20.82 kg/m².      Intake/Output Summary (Last 24 hours) at 06/14/18 1042  Last data filed at 06/14/18 1000   Gross per 24 hour   Intake              490 ml   Output             1750 ml   Net            -1260 ml       Physical Exam   Constitutional: He is oriented to person, place, and time. He appears well-developed and well-nourished. He is cooperative.  Non-toxic appearance. He does not appear ill. No distress.   HENT:   Head: Normocephalic and atraumatic.   Right Ear: Hearing, tympanic membrane, external ear and ear canal normal.   Left Ear: Hearing, tympanic membrane, external ear and ear canal normal.   Nose: Nose normal. No mucosal edema, rhinorrhea or nasal deformity. No epistaxis. Right sinus exhibits no maxillary sinus tenderness and no frontal sinus tenderness. Left sinus exhibits no maxillary sinus tenderness and no frontal sinus tenderness.   Mouth/Throat: Uvula is midline, oropharynx is clear and moist and mucous membranes are normal. No trismus in the jaw. Normal dentition. No uvula swelling. No posterior oropharyngeal erythema.   Eyes: Conjunctivae and lids are normal.  No scleral icterus.   Sclera clear bilat   Neck: Trachea normal, full passive range of motion without pain and phonation normal. Neck supple.   Cardiovascular: Normal rate, regular rhythm, normal heart sounds, intact distal pulses and normal pulses.    Pulmonary/Chest: He is in respiratory distress (mild to moderate). He has decreased breath sounds in the right upper field, the right middle field, the right lower field, the left upper field, the left middle field and the left lower field. He has wheezes in the right middle field, the right lower field, the left middle field and the left lower field. He has rhonchi in the right middle field, the right lower field, the left middle field and the left lower field.           Abdominal: Soft. Normal appearance and bowel sounds are normal. He exhibits no distension. There is no tenderness.   Musculoskeletal: Normal range of motion. He exhibits no edema or deformity.   Neurological: He is alert and oriented to person, place, and time. He exhibits normal muscle tone. Coordination normal.   Skin: Skin is warm, dry and intact. He is not diaphoretic. No pallor.   Psychiatric: He has a normal mood and affect. His speech is normal and behavior is normal. Judgment and thought content normal. Cognition and memory are normal.   Nursing note and vitals reviewed.      Vents:  Oxygen Concentration (%): 28 (06/14/18 0837)    Lines/Drains/Airways     Epidural Line                 Perineural Analgesia/Anesthesia Assessment (using dermatomes) Epidural 04/05/18 1115 69 days          Peripheral Intravenous Line                 Peripheral IV - Single Lumen 06/13/18 0735 Right Forearm 1 day                Significant Labs:    CBC/Anemia Profile:    Recent Labs  Lab 06/12/18  1706 06/13/18  0632   WBC 7.92 9.50   HGB 11.4* 10.7*   HCT 33.8* 31.6*    226   MCV 94 94   RDW 20.2* 19.8*        Chemistries:    Recent Labs  Lab 06/12/18  1706 06/13/18  0632 06/14/18  0545    134* 134*  "  K 3.5 3.9 3.4*    102 102   CO2 18* 15* 21*   BUN 6 9 9   CREATININE 0.8 1.1 0.9   CALCIUM 9.3 8.7 8.4*   ALBUMIN 3.4* 3.3*  --    PROT 7.4 7.0  --    BILITOT 1.8* 2.0*  --    ALKPHOS 198* 186*  --    * 538*  --    * 461*  --    MG 1.8  --   --    PHOS 3.8  --   --        All pertinent labs within the past 24 hours have been reviewed.    Significant Imaging:  I have reviewed all pertinent imaging results/findings within the past 24 hours.    Assessment/Plan:     * Acute systolic congestive heart failure    Improved with o2 may need home oxygen will start niv         COPD, severe    Smoked a lot !!        Ischemic cardiomyopathy    Ef 15% multifactorial alcohol and ischemic CAD  Patient is a vasculopath        Critical lower limb ischemia    Bueger's disease        Alcohol abuse    Off alchol "3 months:"        Tobacco abuse    Stopped "1Week ago"        Peripheral vascular disease of lower extremity    Decreased pulses                Chris Soto MD  Pulmonology  [facility]    "

## 2018-06-14 NOTE — NURSING
Jing from [facility] wound care at bedside to assess and treat wound to left foot, and right AKA stump.

## 2018-06-14 NOTE — NURSING
Pt c/o of 10/10 BLE pain. Offered PO pain meds. Informed PT the need to remove dressings document photos of wounds and assess. Will let Pain meds take affect before dressing changes.

## 2018-06-14 NOTE — EICU
Remote video assessment- Patient sleeping in bed. BiPap in place 15/5 28%. No acute distress noted.

## 2018-06-14 NOTE — SUBJECTIVE & OBJECTIVE
Interval History: feeling better no worsening sob    Objective:     Vital Signs (Most Recent):  Temp: 96.2 °F (35.7 °C) (06/14/18 0800)  Pulse: 110 (06/14/18 1000)  Resp: 20 (06/14/18 1000)  BP: (!) 86/53 (06/14/18 1000)  SpO2: 100 % (06/14/18 1000) Vital Signs (24h Range):  Temp:  [95.6 °F (35.3 °C)-97.3 °F (36.3 °C)] 96.2 °F (35.7 °C)  Pulse:  [] 110  Resp:  [12-30] 20  SpO2:  [68 %-100 %] 100 %  BP: ()/(28-77) 86/53     Weight: 60.3 kg (132 lb 15 oz)  Body mass index is 20.82 kg/m².      Intake/Output Summary (Last 24 hours) at 06/14/18 1042  Last data filed at 06/14/18 1000   Gross per 24 hour   Intake              490 ml   Output             1750 ml   Net            -1260 ml       Physical Exam   Constitutional: He is oriented to person, place, and time. He appears well-developed and well-nourished. He is cooperative.  Non-toxic appearance. He does not appear ill. No distress.   HENT:   Head: Normocephalic and atraumatic.   Right Ear: Hearing, tympanic membrane, external ear and ear canal normal.   Left Ear: Hearing, tympanic membrane, external ear and ear canal normal.   Nose: Nose normal. No mucosal edema, rhinorrhea or nasal deformity. No epistaxis. Right sinus exhibits no maxillary sinus tenderness and no frontal sinus tenderness. Left sinus exhibits no maxillary sinus tenderness and no frontal sinus tenderness.   Mouth/Throat: Uvula is midline, oropharynx is clear and moist and mucous membranes are normal. No trismus in the jaw. Normal dentition. No uvula swelling. No posterior oropharyngeal erythema.   Eyes: Conjunctivae and lids are normal. No scleral icterus.   Sclera clear bilat   Neck: Trachea normal, full passive range of motion without pain and phonation normal. Neck supple.   Cardiovascular: Normal rate, regular rhythm, normal heart sounds, intact distal pulses and normal pulses.    Pulmonary/Chest: He is in respiratory distress (mild to moderate). He has decreased breath sounds in the  right upper field, the right middle field, the right lower field, the left upper field, the left middle field and the left lower field. He has wheezes in the right middle field, the right lower field, the left middle field and the left lower field. He has rhonchi in the right middle field, the right lower field, the left middle field and the left lower field.           Abdominal: Soft. Normal appearance and bowel sounds are normal. He exhibits no distension. There is no tenderness.   Musculoskeletal: Normal range of motion. He exhibits no edema or deformity.   Neurological: He is alert and oriented to person, place, and time. He exhibits normal muscle tone. Coordination normal.   Skin: Skin is warm, dry and intact. He is not diaphoretic. No pallor.   Psychiatric: He has a normal mood and affect. His speech is normal and behavior is normal. Judgment and thought content normal. Cognition and memory are normal.   Nursing note and vitals reviewed.      Vents:  Oxygen Concentration (%): 28 (06/14/18 0837)    Lines/Drains/Airways     Epidural Line                 Perineural Analgesia/Anesthesia Assessment (using dermatomes) Epidural 04/05/18 1115 69 days          Peripheral Intravenous Line                 Peripheral IV - Single Lumen 06/13/18 0735 Right Forearm 1 day                Significant Labs:    CBC/Anemia Profile:    Recent Labs  Lab 06/12/18  1706 06/13/18  0632   WBC 7.92 9.50   HGB 11.4* 10.7*   HCT 33.8* 31.6*    226   MCV 94 94   RDW 20.2* 19.8*        Chemistries:    Recent Labs  Lab 06/12/18  1706 06/13/18  0632 06/14/18  0545    134* 134*   K 3.5 3.9 3.4*    102 102   CO2 18* 15* 21*   BUN 6 9 9   CREATININE 0.8 1.1 0.9   CALCIUM 9.3 8.7 8.4*   ALBUMIN 3.4* 3.3*  --    PROT 7.4 7.0  --    BILITOT 1.8* 2.0*  --    ALKPHOS 198* 186*  --    * 538*  --    * 461*  --    MG 1.8  --   --    PHOS 3.8  --   --        All pertinent labs within the past 24 hours have been  reviewed.    Significant Imaging:  I have reviewed all pertinent imaging results/findings within the past 24 hours.

## 2018-06-15 PROBLEM — A41.9 SEPSIS: Status: RESOLVED | Noted: 2018-01-01 | Resolved: 2018-01-01

## 2018-06-15 PROBLEM — I73.1 BUERGER'S DISEASE: Status: ACTIVE | Noted: 2018-01-01

## 2018-06-15 NOTE — PLAN OF CARE
Problem: Patient Care Overview  Goal: Plan of Care Review  Outcome: Ongoing (interventions implemented as appropriate)  Patient admitted with CHF, resp distress, history of CAD, PAD with amputation right above knee, amputation left distal foot. BNP worsening, as diuretics held yesterday 2nd to hypotension. Tolerated IV Lasix this am. Diuresed 2200 ml urine so far this shift. Breath sounds improved, still few crackles faintly auscultated to LLL. Using BiPAP intermittently. Wound care done to both lower extremities. Pain controlled with IV morphine. Contact isolation maintained. Vital signs stable. Afebrile. Appetite good. Fall precautions maintained. Aware and agree with plan of care.

## 2018-06-15 NOTE — PLAN OF CARE
Problem: Patient Care Overview  Goal: Plan of Care Review  Outcome: Ongoing (interventions implemented as appropriate)  Patient is stable. Bilateral breath sounds reveal crackles to both bases. O2 sat on room air, %. Using BiPAP intermittently. Wound care consulted for left foot and right AKA stump. Complains of pain to left foot and phantom pain to right leg. Pain meds given. Appetite good. Cardiac monitor with SR noted. AICD/pacemaker to left chest. Afebrile, vital signs stable. Fall precautions maintained.will continue to monitor.

## 2018-06-15 NOTE — SUBJECTIVE & OBJECTIVE
Interval History: see      Review of Systems   Constitutional: Negative for activity change, appetite change, chills, diaphoresis, fatigue, fever and unexpected weight change.   HENT: Negative for congestion, dental problem, drooling, ear discharge, ear pain, facial swelling, mouth sores, nosebleeds, postnasal drip, rhinorrhea, sinus pressure, sneezing, sore throat, trouble swallowing and voice change.    Eyes: Negative for photophobia, pain, discharge, redness, itching and visual disturbance.   Respiratory: Positive for cough and shortness of breath. Negative for apnea, chest tightness and wheezing.    Cardiovascular: Negative for chest pain, palpitations and leg swelling.   Gastrointestinal: Negative for abdominal distention, abdominal pain, blood in stool, constipation, diarrhea, nausea and vomiting.   Genitourinary: Negative for difficulty urinating, dysuria, enuresis, flank pain, frequency, hematuria and urgency.   Musculoskeletal: Positive for gait problem. Negative for arthralgias, back pain, joint swelling, myalgias, neck pain and neck stiffness.   Skin: Negative for color change, rash and wound.   Neurological: Negative for dizziness, tremors, seizures, syncope, facial asymmetry, speech difficulty, weakness, light-headedness, numbness and headaches.   Hematological: Negative for adenopathy. Does not bruise/bleed easily.   Psychiatric/Behavioral: Negative for agitation, behavioral problems, confusion, decreased concentration, dysphoric mood, sleep disturbance and suicidal ideas. The patient is not nervous/anxious.      Objective:     Vital Signs (Most Recent):  Temp: 96.7 °F (35.9 °C) (06/15/18 0400)  Pulse: 90 (06/15/18 0600)  Resp: 18 (06/15/18 0600)  BP: 94/63 (06/15/18 0600)  SpO2: 96 % (06/15/18 0600) Vital Signs (24h Range):  Temp:  [96 °F (35.6 °C)-97 °F (36.1 °C)] 96.7 °F (35.9 °C)  Pulse:  [] 90  Resp:  [12-25] 18  SpO2:  [92 %-100 %] 96 %  BP: ()/(53-91) 94/63     Weight: 60.3 kg (132  lb 15 oz)  Body mass index is 20.82 kg/m².    Intake/Output Summary (Last 24 hours) at 06/15/18 0821  Last data filed at 06/15/18 0500   Gross per 24 hour   Intake             1510 ml   Output             1950 ml   Net             -440 ml      Physical Exam   Constitutional: He is oriented to person, place, and time. He appears well-developed and well-nourished.   HENT:   Head: Normocephalic and atraumatic.   Right Ear: External ear normal.   Left Ear: External ear normal.   Nose: Nose normal.   Mouth/Throat: Oropharynx is clear and moist.   Eyes: Conjunctivae and EOM are normal. Pupils are equal, round, and reactive to light. Right eye exhibits no discharge. Left eye exhibits no discharge. No scleral icterus.   Neck: Normal range of motion. Neck supple. No JVD present. No tracheal deviation present. No thyromegaly present.   Cardiovascular: Normal rate, regular rhythm and intact distal pulses.  Exam reveals gallop.    No murmur heard.  S3 gallop   Pulmonary/Chest: Effort normal and breath sounds normal. No respiratory distress. He has no wheezes. He has no rales. He exhibits no tenderness.   Abdominal: Soft. Bowel sounds are normal. He exhibits no distension and no mass. There is no tenderness. There is no rebound and no guarding.   Musculoskeletal: Normal range of motion. He exhibits edema.   Trace LLE edema  Recent Amputation of all 5 toes LLE---wound at surgical site, fibrinous exudate. No erythema   Right AKA, recent. Incision with wound, some fibrinous exudate. No erythema. No pus      Lymphadenopathy:     He has no cervical adenopathy.   Neurological: He is alert and oriented to person, place, and time. He has normal reflexes. He displays normal reflexes. No cranial nerve deficit. He exhibits normal muscle tone. Coordination normal.   Skin: Skin is warm and dry.   Psychiatric: He has a normal mood and affect. His behavior is normal. Judgment and thought content normal.       Significant Labs:   CBC: No  results for input(s): WBC, HGB, HCT, PLT in the last 48 hours.  CMP:   Recent Labs  Lab 06/14/18  0545 06/15/18  0408   * 133*   K 3.4* 3.4*    101   CO2 21* 22*   GLU 69* 115*   BUN 9 7   CREATININE 0.9 0.8   CALCIUM 8.4* 8.7   ANIONGAP 11 10   EGFRNONAA >60 >60     Cardiac Markers:   Recent Labs  Lab 06/15/18  0408   BNP 2,621*       Significant Imaging: I have reviewed and interpreted all pertinent imaging results/findings within the past 24 hours.

## 2018-06-15 NOTE — EICU
Remote video assessment- Patient lying in bed. Bipap in place. Non-labored respirations. No acute distress noted.

## 2018-06-15 NOTE — SUBJECTIVE & OBJECTIVE
Interval History: feeling better on bipap    Objective:     Vital Signs (Most Recent):  Temp: 96 °F (35.6 °C) (06/15/18 0800)  Pulse: 104 (06/15/18 1100)  Resp: 16 (06/15/18 1100)  BP: 113/74 (06/15/18 1100)  SpO2: 97 % (06/15/18 1100) Vital Signs (24h Range):  Temp:  [96 °F (35.6 °C)-97 °F (36.1 °C)] 96 °F (35.6 °C)  Pulse:  [] 104  Resp:  [12-31] 16  SpO2:  [93 %-100 %] 97 %  BP: ()/(60-91) 113/74     Weight: 60.3 kg (132 lb 15 oz)  Body mass index is 20.82 kg/m².      Intake/Output Summary (Last 24 hours) at 06/15/18 1127  Last data filed at 06/15/18 1100   Gross per 24 hour   Intake             1380 ml   Output             2650 ml   Net            -1270 ml       Physical Exam   Constitutional: He is oriented to person, place, and time. He appears well-developed and well-nourished. He is cooperative.  Non-toxic appearance. He does not appear ill. No distress.   HENT:   Head: Normocephalic and atraumatic.   Right Ear: Hearing, tympanic membrane, external ear and ear canal normal.   Left Ear: Hearing, tympanic membrane, external ear and ear canal normal.   Nose: Nose normal. No mucosal edema, rhinorrhea or nasal deformity. No epistaxis. Right sinus exhibits no maxillary sinus tenderness and no frontal sinus tenderness. Left sinus exhibits no maxillary sinus tenderness and no frontal sinus tenderness.   Mouth/Throat: Uvula is midline, oropharynx is clear and moist and mucous membranes are normal. No trismus in the jaw. Normal dentition. No uvula swelling. No posterior oropharyngeal erythema.   Eyes: Conjunctivae and lids are normal. No scleral icterus.   Sclera clear bilat   Neck: Trachea normal, full passive range of motion without pain and phonation normal. Neck supple.   Cardiovascular: Normal rate, regular rhythm, normal heart sounds, intact distal pulses and normal pulses.    Pulmonary/Chest: He is in respiratory distress (mild to moderate). He has decreased breath sounds in the right upper field,  the right middle field, the right lower field, the left upper field, the left middle field and the left lower field. He has wheezes in the right middle field, the right lower field, the left middle field and the left lower field. He has rhonchi in the right middle field, the right lower field, the left middle field and the left lower field. He has rales in the right lower field and the left lower field.   Abdominal: Soft. Normal appearance and bowel sounds are normal. He exhibits no distension. There is no tenderness.   Musculoskeletal: He exhibits no edema or deformity.        Right hip: He exhibits decreased range of motion. He exhibits no bony tenderness and no swelling.        Legs:  Neurological: He is alert and oriented to person, place, and time. He exhibits normal muscle tone. Coordination normal.   Skin: Skin is warm, dry and intact. He is not diaphoretic. No pallor.   Psychiatric: He has a normal mood and affect. His speech is normal and behavior is normal. Judgment and thought content normal. Cognition and memory are normal.   Nursing note and vitals reviewed.      Vents:  Oxygen Concentration (%): 28 (06/15/18 1000)    Lines/Drains/Airways     Epidural Line                 Perineural Analgesia/Anesthesia Assessment (using dermatomes) Epidural 04/05/18 1115 71 days          Peripheral Intravenous Line                 Peripheral IV - Single Lumen 06/14/18 1642 Left Forearm less than 1 day                Significant Labs:    CBC/Anemia Profile:  No results for input(s): WBC, HGB, HCT, PLT, MCV, RDW, IRON, FERRITIN, RETIC, FOLATE, [id number], OCCULTBLOOD in the last 48 hours.     Chemistries:    Recent Labs  Lab 06/14/18  0545 06/15/18  0408   * 133*   K 3.4* 3.4*    101   CO2 21* 22*   BUN 9 7   CREATININE 0.9 0.8   CALCIUM 8.4* 8.7       All pertinent labs within the past 24 hours have been reviewed.    Significant Imaging:  I have reviewed all pertinent imaging results/findings within the past  24 hours.

## 2018-06-15 NOTE — PROGRESS NOTES
[facility]  Cardiology  Progress Note    Patient Name: Leonardo Byrd  MRN: [id number]  Admission Date: 6/12/2018  Hospital Length of Stay: 2 days  Code Status: Full Code   Attending Physician: Joann Reaves MD   Primary Care Physician: Indio Aquino MD  Expected Discharge Date:   Principal Problem:Acute systolic congestive heart failure    Subjective:         Interval History: no specific complaints, other than wanting his IV out  BP on the lower side, unable to give CHF meds this morning    ROS     Constitution: Positive for weakness.   HENT: Negative.    Eyes: Negative.    Cardiovascular: Negative.    Respiratory: Negative.    Endocrine: Negative.    Hematologic/Lymphatic: Negative.    Skin: Negative.    Musculoskeletal:        Phantom limb pain   Gastrointestinal: Negative.    Genitourinary: Negative.    Allergic/Immunologic: Negative.     Objective:     Vital Signs (Most Recent):  Temp: 96 °F (35.6 °C) (06/15/18 0800)  Pulse: 98 (06/15/18 1000)  Resp: (!) 26 (06/15/18 1000)  BP: 114/87 (06/15/18 1000)  SpO2: 100 % (06/15/18 1000) Vital Signs (24h Range):  Temp:  [96 °F (35.6 °C)-97 °F (36.1 °C)] 96 °F (35.6 °C)  Pulse:  [] 98  Resp:  [12-31] 26  SpO2:  [93 %-100 %] 100 %  BP: ()/(60-91) 114/87     Weight: 60.3 kg (132 lb 15 oz)  Body mass index is 20.82 kg/m².    SpO2: 100 %  O2 Device (Oxygen Therapy): BiPAP      Intake/Output Summary (Last 24 hours) at 06/15/18 1105  Last data filed at 06/15/18 0900   Gross per 24 hour   Intake             1380 ml   Output             2350 ml   Net             -970 ml       Lines/Drains/Airways     Epidural Line                 Perineural Analgesia/Anesthesia Assessment (using dermatomes) Epidural 04/05/18 1115 70 days          Peripheral Intravenous Line                 Peripheral IV - Single Lumen 06/14/18 1642 Left Forearm less than 1 day                Physical Exam     Constitutional: He is oriented to person, place, and time. He  appears well-developed and well-nourished.   Cardiovascular: Normal rate and regular rhythm.    Murmur heard.  Pulmonary/Chest: Effort normal. He has rales.   Abdominal: Soft.   Musculoskeletal: Normal range of motion.   Rt AKA, left foot partial amputation   Neurological: He is alert and oriented to person, place, and time.   Skin: Skin is warm and dry.   Psychiatric: He has a normal mood and affect. His behavior is normal. Judgment and thought content normal.   Nursing note and vitals reviewed.      Significant Labs:   BMP:   Recent Labs  Lab 06/14/18  0545 06/15/18  0408   GLU 69* 115*   * 133*   K 3.4* 3.4*    101   CO2 21* 22*   BUN 9 7   CREATININE 0.9 0.8   CALCIUM 8.4* 8.7   , CMP   Recent Labs  Lab 06/14/18  0545 06/15/18  0408   * 133*   K 3.4* 3.4*    101   CO2 21* 22*   GLU 69* 115*   BUN 9 7   CREATININE 0.9 0.8   CALCIUM 8.4* 8.7   ANIONGAP 11 10   ESTGFRAFRICA >60 >60   EGFRNONAA >60 >60   , CBC No results for input(s): WBC, HGB, HCT, PLT in the last 48 hours. and Troponin   Recent Labs  Lab 06/13/18  1813 06/14/18  0047 06/14/18  0545   TROPONINI 0.044* 0.046* 0.042*         Assessment and Plan:         Active Diagnoses:    Diagnosis Date Noted POA    PRINCIPAL PROBLEM:  Acute systolic congestive heart failure [I50.21] 06/13/2018 Yes    Phantom pain after amputation of lower extremity [G54.6] 06/13/2018 Yes    COPD, severe [J44.9] 06/13/2018 Unknown    Ischemic cardiomyopathy [I25.5] 03/18/2018 Yes    Critical lower limb ischemia [I99.8] 03/18/2018 Yes     Chronic    Alcohol abuse [F10.10] 09/27/2017 Yes    Peripheral vascular disease of lower extremity [I73.9] 02/27/2017 Yes    Tobacco abuse [Z72.0] 02/27/2017 Yes      Problems Resolved During this Admission:    Diagnosis Date Noted Date Resolved POA    Sepsis [A41.9] 06/13/2018 06/15/2018 Yes    Sepsis [A41.9]  06/13/2018 Unknown    COPD with acute bronchitis [J44.0, J20.9] 06/12/2018 06/13/2018 Yes       VTE Risk  Mitigation         Ordered     IP VTE HIGH RISK PATIENT  Once      06/12/18 2357     Place sequential compression device  Until discontinued      06/12/18 2357        DX;  Acute on chronic systolic CHF stage 4  COPD  CAD, previous MI; severe ICMO EF 15% s/p ICD 4/18  PPM/AICD  HTN  PAD  Dyslipidemia  Medical noncompliance  Smoker and other drug abuse hx  Lower extremity amputation R       Jose Hicks, PAULINO  Cardiology  [facility]    PLAN:  Pt with improvements from the HF standpoint  Swith coreg to toprol given marginal BP  Lasix able to be given today as BP somewhat better  Cont. BiPAP  Unable to resume ACE given BP  Replace K+  As he progresses, may  be able toD/C over next 24-48 hours  Would send home on oral diuretic  Add back aldactone / ACE as BP dictates

## 2018-06-15 NOTE — NURSING
Patient complaining of shortness of breath. Had BiPAP on earlier for past hour. O2 sat 100% on room air. Sitting straight up in bed, resp rate 36. Breath sounds auscultated with faint crackles to left lower lobe. Good air exchange noted. HR  with sinus rhythm noted. /76. Denies chest pain. Jose Hicks NP [facility] notified of patient's complaints. Orders noted for EKG. Will continue to monitor.

## 2018-06-15 NOTE — PROGRESS NOTES
[facility] Medicine  Progress Note    Patient Name: Leonardo Byrd  MRN: [id number]  Patient Class: IP- Inpatient   Admission Date: 6/12/2018  Length of Stay: 2 days  Attending Physician: Joann Reaves MD  Primary Care Provider: Indio Aquino MD        Subjective:     Principal Problem:Acute systolic congestive heart failure    HPI:  47 year old male with h/o systolic CHF(EF 15% in April 2018) presents to ED with about 2 week h/o SOB, cough, weight gain. He presented to [facility] ED on Saturday. Sent home after presumed negative work up. Presents here overnight. BNP 2400. Fluid on CXR. Has a cough, but no WBC, no fever. He says that he always gets a cough productive of clear to white sputum when he has CHF exacerbation. He has a long h/o CAD, 14 MI since age of 28 per patient. ICD. LActic acid elevated. He was admitted overnight with a dx of COPD and sepsis. He was started on zmax and vanc. Fluid bolus in ED;        Hospital Course:  6/13  He is tachypneic and a little SOB this am     6-14 Pt was assessed and felt to not be septic, so all Abx s are d'martine at this time; will monitor his clinical picture today    6/15  Wound care saw him--on santyl, but was on gentamicin oint as well in [place](h/o MRSA Cx).   BP low. held lasix, ACE, spironolactone yesterday  He did get Dig    BNP up to 2600  He says SOB is ok, but is noticeably tachypneic         Interval History: see      Review of Systems   Constitutional: Negative for activity change, appetite change, chills, diaphoresis, fatigue, fever and unexpected weight change.   HENT: Negative for congestion, dental problem, drooling, ear discharge, ear pain, facial swelling, mouth sores, nosebleeds, postnasal drip, rhinorrhea, sinus pressure, sneezing, sore throat, trouble swallowing and voice change.    Eyes: Negative for photophobia, pain, discharge, redness, itching and visual disturbance.   Respiratory: Positive for cough and shortness  of breath. Negative for apnea, chest tightness and wheezing.    Cardiovascular: Negative for chest pain, palpitations and leg swelling.   Gastrointestinal: Negative for abdominal distention, abdominal pain, blood in stool, constipation, diarrhea, nausea and vomiting.   Genitourinary: Negative for difficulty urinating, dysuria, enuresis, flank pain, frequency, hematuria and urgency.   Musculoskeletal: Positive for gait problem. Negative for arthralgias, back pain, joint swelling, myalgias, neck pain and neck stiffness.   Skin: Negative for color change, rash and wound.   Neurological: Negative for dizziness, tremors, seizures, syncope, facial asymmetry, speech difficulty, weakness, light-headedness, numbness and headaches.   Hematological: Negative for adenopathy. Does not bruise/bleed easily.   Psychiatric/Behavioral: Negative for agitation, behavioral problems, confusion, decreased concentration, dysphoric mood, sleep disturbance and suicidal ideas. The patient is not nervous/anxious.      Objective:     Vital Signs (Most Recent):  Temp: 96.7 °F (35.9 °C) (06/15/18 0400)  Pulse: 90 (06/15/18 0600)  Resp: 18 (06/15/18 0600)  BP: 94/63 (06/15/18 0600)  SpO2: 96 % (06/15/18 0600) Vital Signs (24h Range):  Temp:  [96 °F (35.6 °C)-97 °F (36.1 °C)] 96.7 °F (35.9 °C)  Pulse:  [] 90  Resp:  [12-25] 18  SpO2:  [92 %-100 %] 96 %  BP: ()/(53-91) 94/63     Weight: 60.3 kg (132 lb 15 oz)  Body mass index is 20.82 kg/m².    Intake/Output Summary (Last 24 hours) at 06/15/18 0821  Last data filed at 06/15/18 0500   Gross per 24 hour   Intake             1510 ml   Output             1950 ml   Net             -440 ml      Physical Exam   Constitutional: He is oriented to person, place, and time. He appears well-developed and well-nourished.   HENT:   Head: Normocephalic and atraumatic.   Right Ear: External ear normal.   Left Ear: External ear normal.   Nose: Nose normal.   Mouth/Throat: Oropharynx is clear and moist.    Eyes: Conjunctivae and EOM are normal. Pupils are equal, round, and reactive to light. Right eye exhibits no discharge. Left eye exhibits no discharge. No scleral icterus.   Neck: Normal range of motion. Neck supple. No JVD present. No tracheal deviation present. No thyromegaly present.   Cardiovascular: Normal rate, regular rhythm and intact distal pulses.  Exam reveals gallop.    No murmur heard.  S3 gallop   Pulmonary/Chest: Effort normal and breath sounds normal. No respiratory distress. He has no wheezes. He has no rales. He exhibits no tenderness.   Abdominal: Soft. Bowel sounds are normal. He exhibits no distension and no mass. There is no tenderness. There is no rebound and no guarding.   Musculoskeletal: Normal range of motion. He exhibits edema.   Trace LLE edema  Recent Amputation of all 5 toes LLE---wound at surgical site, fibrinous exudate. No erythema   Right AKA, recent. Incision with wound, some fibrinous exudate. No erythema. No pus      Lymphadenopathy:     He has no cervical adenopathy.   Neurological: He is alert and oriented to person, place, and time. He has normal reflexes. He displays normal reflexes. No cranial nerve deficit. He exhibits normal muscle tone. Coordination normal.   Skin: Skin is warm and dry.   Psychiatric: He has a normal mood and affect. His behavior is normal. Judgment and thought content normal.       Significant Labs:   CBC: No results for input(s): WBC, HGB, HCT, PLT in the last 48 hours.  CMP:   Recent Labs  Lab 06/14/18  0545 06/15/18  0408   * 133*   K 3.4* 3.4*    101   CO2 21* 22*   GLU 69* 115*   BUN 9 7   CREATININE 0.9 0.8   CALCIUM 8.4* 8.7   ANIONGAP 11 10   EGFRNONAA >60 >60     Cardiac Markers:   Recent Labs  Lab 06/15/18  0408   BNP 2,621*       Significant Imaging: I have reviewed and interpreted all pertinent imaging results/findings within the past 24 hours.    Assessment/Plan:      * Acute systolic congestive heart failure    Stop IVF. Stop  ABX, he is not septic. He is in heart failure  Lasix IV 40 BID   Start ACE and spironolactone for systolic CHF   Consult cards: starting Dig, Resume BB  BP not tolerating meds, continue bipap to help with diuresis   Full code           COPD, severe              Phantom pain after amputation of lower extremity    Lower dose of lyrica since he is in acute HF(can cause peripheral edema)         Ischemic cardiomyopathy              Critical lower limb ischemia              Alcohol abuse    Has not drank in 3 months           Peripheral vascular disease of lower extremity    Continue statin, asa and brilinta   No need to continue pletal, he is not ambulatory   All abx s have been d'c ed, so will follow his clinical picture          VTE Risk Mitigation         Ordered     IP VTE HIGH RISK PATIENT  Once      06/12/18 2357     Place sequential compression device  Until discontinued      06/12/18 1607          Critical care time spent on the evaluation and treatment of severe organ dysfunction, review of pertinent labs and imaging studies, discussions with consulting providers and discussions with patient/family: 35 minutes.     José Luis Ga MD  Department of Hospital Medicine   [facility]

## 2018-06-15 NOTE — PLAN OF CARE
Problem: Patient Care Overview  Goal: Plan of Care Review  Outcome: Ongoing (interventions implemented as appropriate)  Patient admit 6/12 through ED with CHF exacerbation/resp. distress; extensive history with bilateral LE amputations/chronic wounds. Cardiology and Pulmonology following with intermittent BiPAp usage, vitals stable per flow sheet, on RA while not on Bipap. OOB to commode with minimal assistance. Pain to LE amputations controlled with IV Morphine at this time, requiring Q4 hour dosing. Instructed patient on POC with verbalized understanding.

## 2018-06-15 NOTE — ASSESSMENT & PLAN NOTE
Stop IVF. Stop ABX, he is not septic. He is in heart failure  Lasix IV 40 BID   Start ACE and spironolactone for systolic CHF   Consult cards: starting Dig, Resume BB  BP not tolerating meds, continue bipap to help with diuresis   Full code

## 2018-06-15 NOTE — PROGRESS NOTES
[facility]  Pulmonology  Progress Note    Patient Name: Leonardo Byrd  MRN: [id number]  Admission Date: 6/12/2018  Hospital Length of Stay: 2 days  Code Status: Full Code  Attending Provider: Joann Reaves MD  Primary Care Provider: Indio Aquino MD   Principal Problem: Acute systolic congestive heart failure    Subjective:     Interval History: feeling better on bipap    Objective:     Vital Signs (Most Recent):  Temp: 96 °F (35.6 °C) (06/15/18 0800)  Pulse: 104 (06/15/18 1100)  Resp: 16 (06/15/18 1100)  BP: 113/74 (06/15/18 1100)  SpO2: 97 % (06/15/18 1100) Vital Signs (24h Range):  Temp:  [96 °F (35.6 °C)-97 °F (36.1 °C)] 96 °F (35.6 °C)  Pulse:  [] 104  Resp:  [12-31] 16  SpO2:  [93 %-100 %] 97 %  BP: ()/(60-91) 113/74     Weight: 60.3 kg (132 lb 15 oz)  Body mass index is 20.82 kg/m².      Intake/Output Summary (Last 24 hours) at 06/15/18 1127  Last data filed at 06/15/18 1100   Gross per 24 hour   Intake             1380 ml   Output             2650 ml   Net            -1270 ml       Physical Exam   Constitutional: He is oriented to person, place, and time. He appears well-developed and well-nourished. He is cooperative.  Non-toxic appearance. He does not appear ill. No distress.   HENT:   Head: Normocephalic and atraumatic.   Right Ear: Hearing, tympanic membrane, external ear and ear canal normal.   Left Ear: Hearing, tympanic membrane, external ear and ear canal normal.   Nose: Nose normal. No mucosal edema, rhinorrhea or nasal deformity. No epistaxis. Right sinus exhibits no maxillary sinus tenderness and no frontal sinus tenderness. Left sinus exhibits no maxillary sinus tenderness and no frontal sinus tenderness.   Mouth/Throat: Uvula is midline, oropharynx is clear and moist and mucous membranes are normal. No trismus in the jaw. Normal dentition. No uvula swelling. No posterior oropharyngeal erythema.   Eyes: Conjunctivae and lids are normal. No scleral icterus.    Sclera clear bilat   Neck: Trachea normal, full passive range of motion without pain and phonation normal. Neck supple.   Cardiovascular: Normal rate, regular rhythm, normal heart sounds, intact distal pulses and normal pulses.    Pulmonary/Chest: He is in respiratory distress (mild to moderate). He has decreased breath sounds in the right upper field, the right middle field, the right lower field, the left upper field, the left middle field and the left lower field. He has wheezes in the right middle field, the right lower field, the left middle field and the left lower field. He has rhonchi in the right middle field, the right lower field, the left middle field and the left lower field. He has rales in the right lower field and the left lower field.   Abdominal: Soft. Normal appearance and bowel sounds are normal. He exhibits no distension. There is no tenderness.   Musculoskeletal: He exhibits no edema or deformity.        Right hip: He exhibits decreased range of motion. He exhibits no bony tenderness and no swelling.        Legs:  Neurological: He is alert and oriented to person, place, and time. He exhibits normal muscle tone. Coordination normal.   Skin: Skin is warm, dry and intact. He is not diaphoretic. No pallor.   Psychiatric: He has a normal mood and affect. His speech is normal and behavior is normal. Judgment and thought content normal. Cognition and memory are normal.   Nursing note and vitals reviewed.      Vents:  Oxygen Concentration (%): 28 (06/15/18 1000)    Lines/Drains/Airways     Epidural Line                 Perineural Analgesia/Anesthesia Assessment (using dermatomes) Epidural 04/05/18 1115 71 days          Peripheral Intravenous Line                 Peripheral IV - Single Lumen 06/14/18 1642 Left Forearm less than 1 day                Significant Labs:    CBC/Anemia Profile:  No results for input(s): WBC, HGB, HCT, PLT, MCV, RDW, IRON, FERRITIN, RETIC, FOLATE, [id number], OCCULTBLOOD in  "the last 48 hours.     Chemistries:    Recent Labs  Lab 06/14/18  0545 06/15/18  0408   * 133*   K 3.4* 3.4*    101   CO2 21* 22*   BUN 9 7   CREATININE 0.9 0.8   CALCIUM 8.4* 8.7       All pertinent labs within the past 24 hours have been reviewed.    Significant Imaging:  I have reviewed all pertinent imaging results/findings within the past 24 hours.    Assessment/Plan:     * Acute systolic congestive heart failure    Improved with o2 may need home oxygen will start niv         Buerger's disease    Stop smoking         COPD, severe    Smoked a lot !!        Ischemic cardiomyopathy    Ef 15% multifactorial alcohol and ischemic CAD  Patient is a vasculopath        Critical lower limb ischemia    Buerger's disease        Alcohol abuse    Off alchol "3 months:"        Tobacco abuse    Stopped "1Week ago"        Peripheral vascular disease of lower extremity    Decreased pulses   buerger's disease                Chris Soto MD  Pulmonology  [facility]    "

## 2018-06-15 NOTE — PROGRESS NOTES
This note also relates to the following rows which could not be included:  SpO2 - Cannot attach notes to unvalidated device data  Pulse - Cannot attach notes to unvalidated device data  Resp - Cannot attach notes to unvalidated device data    Pt placed on BiPAP by RN.

## 2018-06-16 PROBLEM — N17.9 ACUTE RENAL FAILURE: Status: ACTIVE | Noted: 2018-01-01

## 2018-06-16 PROBLEM — E87.5 HYPERKALEMIA: Status: ACTIVE | Noted: 2018-01-01

## 2018-06-16 PROBLEM — R57.0 CARDIOGENIC SHOCK: Status: ACTIVE | Noted: 2018-01-01

## 2018-06-16 PROBLEM — I95.9 SEVERE HYPOTENSION: Status: ACTIVE | Noted: 2018-01-01

## 2018-06-16 PROBLEM — E87.20 LACTIC ACID ACIDOSIS: Status: ACTIVE | Noted: 2018-01-01

## 2018-06-16 PROBLEM — K72.00 SHOCK LIVER: Status: ACTIVE | Noted: 2018-01-01

## 2018-06-16 PROBLEM — E87.20 LACTIC ACIDOSIS: Status: ACTIVE | Noted: 2018-01-01

## 2018-06-16 PROBLEM — I50.9 ACUTE ON CHRONIC HEART FAILURE: Status: ACTIVE | Noted: 2018-01-01

## 2018-06-16 PROBLEM — R65.10 SIRS (SYSTEMIC INFLAMMATORY RESPONSE SYNDROME): Status: ACTIVE | Noted: 2018-01-01

## 2018-06-16 NOTE — PROGRESS NOTES
[facility]  Pulmonology  Progress Note    Patient Name: Leonardo Byrd  MRN: [id number]  Admission Date: 6/12/2018  Hospital Length of Stay: 3 days  Code Status: Full Code  Attending Provider: Joann Reaves MD  Primary Care Provider: Indio Aquino MD   Principal Problem: Acute systolic congestive heart failure    Subjective:     Interval History: pt became very acidotic tachypnea and required intubation due to probable total body ischemia from his poor cardiac function.Await lactic and ketone levels on vet min vent 10.5 RR18(pt breathing 21) ,Tv550, AC ,peep5,o2 50%  ABG ph 7.34,Pco2 23,PO2 156,Bicarb 12.4   B-hydroxB(ketone) 0.3 ,   Lactic acid 9.87 troponin 0.28 Await CPK to r/o rhabdo from ischemia also K+ 5.7 will monitor probably due to  Acidosis  OG placed   No diarrhea   Bicarb 1 amp given   On levophed 2.7mic  Dobutamine 2.5        Objective:     Vital Signs (Most Recent):  Temp: (!) 94.6 °F (34.8 °C) (06/16/18 1142)  Pulse: 66 (06/16/18 1123)  Resp: 20 (06/16/18 1123)  BP: (!) 88/64 (06/16/18 1123)  SpO2: 100 % (06/16/18 1123) Vital Signs (24h Range):  Temp:  [94.6 °F (34.8 °C)-97.5 °F (36.4 °C)] 94.6 °F (34.8 °C)  Pulse:  [] 66  Resp:  [11-32] 20  SpO2:  [96 %-100 %] 100 %  BP: ()/(40-82) 88/64     Weight: 60.3 kg (132 lb 15 oz)  Body mass index is 20.82 kg/m².      Intake/Output Summary (Last 24 hours) at 06/16/18 1237  Last data filed at 06/16/18 1200   Gross per 24 hour   Intake           834.43 ml   Output             1530 ml   Net          -695.57 ml       Physical Exam   Constitutional: He is oriented to person, place, and time. He appears well-developed and well-nourished. He is cooperative.  Non-toxic appearance. He does not appear ill. No distress. He is intubated (on vent ).   HENT:   Head: Normocephalic and atraumatic.   Right Ear: Hearing, tympanic membrane, external ear and ear canal normal.   Left Ear: Hearing, tympanic membrane, external ear and ear  canal normal.   Nose: Nose normal. No mucosal edema, rhinorrhea or nasal deformity. No epistaxis. Right sinus exhibits no maxillary sinus tenderness and no frontal sinus tenderness. Left sinus exhibits no maxillary sinus tenderness and no frontal sinus tenderness.   Mouth/Throat: Uvula is midline, oropharynx is clear and moist and mucous membranes are normal. No trismus in the jaw. Normal dentition. No uvula swelling. No posterior oropharyngeal erythema.   Eyes: Conjunctivae and lids are normal. No scleral icterus.   Sclera clear bilat   Neck: Trachea normal, full passive range of motion without pain and phonation normal. Neck supple.   Cardiovascular: Normal rate, regular rhythm, normal heart sounds, intact distal pulses and normal pulses.    Pulmonary/Chest: No accessory muscle usage. Tachypnea noted. He is intubated (on vent ). He is in respiratory distress (none on vent was RR35). He has decreased breath sounds in the right upper field, the right middle field, the right lower field, the left upper field, the left middle field and the left lower field. He has wheezes in the right middle field, the right lower field, the left middle field and the left lower field. He has no rhonchi. He has no rales.           Abdominal: Soft. Normal appearance and bowel sounds are normal. He exhibits no distension. There is no tenderness.   Musculoskeletal: Normal range of motion. He exhibits no edema or deformity.   Neurological: He is alert and oriented to person, place, and time. He exhibits normal muscle tone. Coordination normal.   Skin: Skin is warm, dry and intact. He is not diaphoretic. No pallor.   Psychiatric: He has a normal mood and affect. His speech is normal and behavior is normal. Judgment and thought content normal. Cognition and memory are normal.   Nursing note and vitals reviewed.      Vents:  Vent Mode: A/C (06/16/18 1123)  Ventilator Initiated: Yes (06/16/18 0805)  Set Rate: (S) 16 bmp (Per Dr Soto/IZZY)  (06/16/18 1220)  Vt Set: 550 mL (06/16/18 1123)  PEEP/CPAP: 5 cmH20 (06/16/18 1123)  Oxygen Concentration (%): 50 (06/16/18 1123)  Peak Airway Pressure: 20.9 cmH2O (06/16/18 1123)  Total Ve: 10.6 mL (06/16/18 1123)  F/VT Ratio<105 (RSBI): (!) 37.11 (06/16/18 1123)    Lines/Drains/Airways     Central Venous Catheter Line                 Percutaneous Central Line Insertion/Assessment - triple lumen  06/16/18 0825 right subclavian less than 1 day          Drain                 Urethral Catheter 06/16/18 0914 Latex 16 Fr. less than 1 day          Airway                 Airway - Non-Surgical 06/16/18 0756 Other (Comment) less than 1 day          Epidural Line                 Perineural Analgesia/Anesthesia Assessment (using dermatomes) Epidural 04/05/18 1115 72 days          Peripheral Intravenous Line                 Peripheral IV - Single Lumen 06/14/18 1642 Left Forearm 1 day                Significant Labs:    CBC/Anemia Profile:    Recent Labs  Lab 06/16/18  0836   WBC 8.87   HGB 11.0*   HCT 34.3*      MCV 97   RDW 19.8*          Chemistries:    Recent Labs  Lab 06/15/18  0408 06/16/18  0335   * 132*   K 3.4* 5.7*    100   CO2 22* 13*   BUN 7 14   CREATININE 0.8 1.1   CALCIUM 8.7 9.1       All pertinent labs within the past 24 hours have been reviewed.    Significant Imaging:  I have reviewed all pertinent imaging results/findings within the past 24 hours.    Assessment/Plan:     * Acute systolic congestive heart failure    Decompensated this am   Improved with o2 may need home oxygen will start niv         Lactic acid acidosis    Severe Rapid acidosis 9.87 and elevating troponin due to hypotension low ef(15%),await CVP, given 1 amp bicarb will bolus NS if cvp low   Will need cental vascular monitoring         Severe hypotension    Due to poor Cardiac output MAP 71        SIRS (systemic inflammatory response syndrome)    Due to ischemia Hypotensive EF 15% poor perfusion will need central  "monitoring         Buerger's disease    No nicotine patch   Stop smoking         COPD, severe    Smoked a lot !!        Limb ischemia    probable        Ischemic cardiomyopathy    Worsened in last 24 hours Hr in 60's   Ef 15% multifactorial alcohol and ischemic CAD  Patient is a vasculopath        Critical lower limb ischemia    Await CPK bicarb from 22 to 12 in only 23 hours  Buerger's disease        Alcohol abuse    Off alchol "3 months:"        Tobacco abuse    Stopped "1Week ago"        Peripheral vascular disease of lower extremity    Decreased pulses   buerger's disease           await CPK to R/o Rhabdo Urine output extremely low 248 CVP is 32  Will give gm + and gm - antibiotic before transfer but doubt infectious etiology of hypotension probably cardiogenic on low dose dobutamine    Agree with transfer will need central monitoring cardiiology       Chris Soto MD  Pulmonology  [facility]    "

## 2018-06-16 NOTE — NURSING
Dr. Ga at bedside. Decision made to intubate patient as patient is declining, unable to obtain O2 sat. BP auscultated at 70/30, color dusky. ERMD and CRNA notified.

## 2018-06-16 NOTE — EICU
This is an eICU summary note. Patient video assessment done at this time. Patient intubated with levophed, dobutamine, and propofol gtts infusing. No distress noted.

## 2018-06-16 NOTE — SUBJECTIVE & OBJECTIVE
Interval History: see      Review of Systems   Unable to perform ROS: Intubated     Objective:     Vital Signs (Most Recent):  Temp: 97.5 °F (36.4 °C) (06/16/18 0400)  Pulse: 75 (06/16/18 0805)  Resp: 18 (06/16/18 0805)  BP: (!) 89/71 (06/16/18 0400)  SpO2: 97 % (06/16/18 0500) Vital Signs (24h Range):  Temp:  [96 °F (35.6 °C)-97.5 °F (36.4 °C)] 97.5 °F (36.4 °C)  Pulse:  [] 75  Resp:  [11-32] 18  SpO2:  [96 %-100 %] 97 %  BP: ()/(64-87) 89/71     Weight: 60.3 kg (132 lb 15 oz)  Body mass index is 20.82 kg/m².    Intake/Output Summary (Last 24 hours) at 06/16/18 0835  Last data filed at 06/16/18 0000   Gross per 24 hour   Intake              900 ml   Output             2150 ml   Net            -1250 ml      Physical Exam   Constitutional: He appears well-developed and well-nourished.   Intubated    HENT:   Head: Normocephalic and atraumatic.   Right Ear: External ear normal.   Left Ear: External ear normal.   Nose: Nose normal.   Mouth/Throat: Oropharynx is clear and moist.   Eyes: Conjunctivae and EOM are normal. Pupils are equal, round, and reactive to light. Right eye exhibits no discharge. Left eye exhibits no discharge. No scleral icterus.   Neck: Normal range of motion. Neck supple. No JVD present. No tracheal deviation present. No thyromegaly present.   Cardiovascular: Normal rate, regular rhythm and intact distal pulses.  Exam reveals gallop.    No murmur heard.  S3 gallop   Pulmonary/Chest: Breath sounds normal. He is in respiratory distress. He has no wheezes. He has no rales. He exhibits no tenderness.   Right upper chest with subclavian line   LEFT upper chest with ICD   Abdominal: Soft. Bowel sounds are normal. He exhibits no distension and no mass. There is no tenderness. There is no rebound and no guarding.   Musculoskeletal: Normal range of motion. He exhibits no edema.     Recent Amputation of all 5 toes LLE---wound at surgical site, fibrinous exudate. No erythema   Right AKA, recent.  Incision with wound, some fibrinous exudate. No erythema. No pus      Lymphadenopathy:     He has no cervical adenopathy.   Neurological: He has normal reflexes. He displays normal reflexes. No cranial nerve deficit. He exhibits normal muscle tone. Coordination normal.   Intubated and sedated    Skin: Skin is warm and dry. There is pallor.   Cool extremities        Significant Labs:   BMP:   Recent Labs  Lab 06/16/18  0335   *   *   K 5.7*      CO2 13*   BUN 14   CREATININE 1.1   CALCIUM 9.1     Cardiac Markers:   Recent Labs  Lab 06/16/18  0335   BNP 2,452*     Troponin: No results for input(s): TROPONINI in the last 48 hours.    Significant Imaging: I have reviewed and interpreted all pertinent imaging results/findings within the past 24 hours.

## 2018-06-16 NOTE — PROGRESS NOTES
[facility] Medicine  Progress Note    Patient Name: Leonardo Byrd  MRN: [id number]  Patient Class: IP- Inpatient   Admission Date: 6/12/2018  Length of Stay: 3 days  Attending Physician: Joann Reaves MD  Primary Care Provider: Indio Aquino MD        Subjective:     Principal Problem:Acute systolic congestive heart failure    HPI:  47 year old male with h/o systolic CHF(EF 15% in April 2018) presents to ED with about 2 week h/o SOB, cough, weight gain. He presented to [facility] ED on Saturday. Sent home after presumed negative work up. Presents here overnight. BNP 2400. Fluid on CXR. Has a cough, but no WBC, no fever. He says that he always gets a cough productive of clear to white sputum when he has CHF exacerbation. He has a long h/o CAD, 14 MI since age of 28 per patient. ICD. LActic acid elevated. He was admitted overnight with a dx of COPD and sepsis. He was started on zmax and vanc. Fluid bolus in ED;        Hospital Course:  6/13  He is tachypneic and a little SOB this am     6-14 Pt was assessed and felt to not be septic, so all Abx s are d'martine at this time; will monitor his clinical picture today    6/15  Wound care saw him--on santyl, but was on gentamicin oint as well in [place](h/o MRSA Cx).   BP low. held lasix, ACE, spironolactone yesterday  He did get Dig    BNP up to 2600  He says SOB is ok, but is noticeably tachypneic     6/16  Wore bipap sporadically last night   His BP tolerated his CHF meds yesterday. Neg 2000 last 24 hours. BNP 2400   Upon arriving to floor this am patient is c/o CP and is hypoxic, clammy, respiratory distress.   He is a full code. He was intubated and placed on mechanical ventilation, and Subclav line placed with the help of Dr. Dean and our nurse anesthetist, Zachary.   His face is pinking up but peripheral pulse ox reading in the low 60s.  Post intubation CXR shows ET tube couple of cm above altaf. Both lungs fully inflated  ABG is pending            Interval History: see      Review of Systems   Unable to perform ROS: Intubated     Objective:     Vital Signs (Most Recent):  Temp: 97.5 °F (36.4 °C) (06/16/18 0400)  Pulse: 75 (06/16/18 0805)  Resp: 18 (06/16/18 0805)  BP: (!) 89/71 (06/16/18 0400)  SpO2: 97 % (06/16/18 0500) Vital Signs (24h Range):  Temp:  [96 °F (35.6 °C)-97.5 °F (36.4 °C)] 97.5 °F (36.4 °C)  Pulse:  [] 75  Resp:  [11-32] 18  SpO2:  [96 %-100 %] 97 %  BP: ()/(64-87) 89/71     Weight: 60.3 kg (132 lb 15 oz)  Body mass index is 20.82 kg/m².    Intake/Output Summary (Last 24 hours) at 06/16/18 0835  Last data filed at 06/16/18 0000   Gross per 24 hour   Intake              900 ml   Output             2150 ml   Net            -1250 ml      Physical Exam   Constitutional: He appears well-developed and well-nourished.   Intubated    HENT:   Head: Normocephalic and atraumatic.   Right Ear: External ear normal.   Left Ear: External ear normal.   Nose: Nose normal.   Mouth/Throat: Oropharynx is clear and moist.   Eyes: Conjunctivae and EOM are normal. Pupils are equal, round, and reactive to light. Right eye exhibits no discharge. Left eye exhibits no discharge. No scleral icterus.   Neck: Normal range of motion. Neck supple. No JVD present. No tracheal deviation present. No thyromegaly present.   Cardiovascular: Normal rate, regular rhythm and intact distal pulses.  Exam reveals gallop.    No murmur heard.  S3 gallop   Pulmonary/Chest: Breath sounds normal. He is in respiratory distress. He has no wheezes. He has no rales. He exhibits no tenderness.   Right upper chest with subclavian line   LEFT upper chest with ICD   Abdominal: Soft. Bowel sounds are normal. He exhibits no distension and no mass. There is no tenderness. There is no rebound and no guarding.   Musculoskeletal: Normal range of motion. He exhibits no edema.     Recent Amputation of all 5 toes LLE---wound at surgical site, fibrinous exudate. No erythema   Right  AKA, recent. Incision with wound, some fibrinous exudate. No erythema. No pus      Lymphadenopathy:     He has no cervical adenopathy.   Neurological: He has normal reflexes. He displays normal reflexes. No cranial nerve deficit. He exhibits normal muscle tone. Coordination normal.   Intubated and sedated    Skin: Skin is warm and dry. There is pallor.   Cool extremities        Significant Labs:   BMP:   Recent Labs  Lab 06/16/18  0335   *   *   K 5.7*      CO2 13*   BUN 14   CREATININE 1.1   CALCIUM 9.1     Cardiac Markers:   Recent Labs  Lab 06/16/18  0335   BNP 2,452*     Troponin: No results for input(s): TROPONINI in the last 48 hours.    Significant Imaging: I have reviewed and interpreted all pertinent imaging results/findings within the past 24 hours.    Assessment/Plan:      * Acute systolic congestive heart failure    Stop IVF. Stop ABX, he is not septic. He is in heart failure  Lasix IV 40 BID   Start ACE and spironolactone for systolic CHF   Consult cards: starting Dig, Resume BB  Full code----after episode of respiratory distress he is intubated on 6/16: AC/Tv 700/RR 18/PEEP 7/FIO2 100%  Will work on transfer for higher level of care as we do not have cardiology or critical care services in this hospital today.             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    Lower dose of lyrica since he is in acute HF(can cause peripheral edema)         Ischemic cardiomyopathy    See above           Critical lower limb ischemia              Alcohol abuse    Has not drank in 3 months           Tobacco abuse              Peripheral vascular disease of lower extremity    Continue statin, asa and brilinta   No need to continue pletal, he is not ambulatory   All abx s have been d'c ed, so will follow his clinical picture          VTE Risk Mitigation         Ordered     IP VTE HIGH RISK PATIENT  Once      06/12/18 2357     Place  sequential compression device  Until discontinued      06/12/18 9799          Critical care time spent on the evaluation and treatment of severe organ dysfunction, review of pertinent labs and imaging studies, discussions with consulting providers and discussions with patient/family: 90 minutes.    José Luis Ga MD  Department of Hospital Medicine   [facility]

## 2018-06-16 NOTE — NURSING
Patient's father and brother are here at bedside. Updated on patient condition and plan of care. Patient will be transferred to [facility] on [address] when bed is available.

## 2018-06-16 NOTE — NURSING
Patient intubated with 8 mm ET tube, secured at 25 cm at the lips. Placed on vent with 700 ml tidal volume, 100% FiO2, PEEP 7, rate 18. Attempted to obtain ABG's prior to intubation but not successful.

## 2018-06-16 NOTE — SUBJECTIVE & OBJECTIVE
Interval History: pt became very acidotic tachypnea and required intubation due to probable total body ischemia from his poor cardiac function.Await lactic and ketone levels on vet min vent 10.5 RR18(pt breathing 21) ,Tv550, AC ,peep5,o2 50%  ABG ph 7.34,Pco2 23,PO2 156,Bicarb 12.4   B-hydroxB(ketone) 0.3 ,   Lactic acid 9.87 troponin 0.28 Await CPK to r/o rhabdo from ischemia also K+ 5.7 will monitor probably due to  Acidosis  OG placed   No diarrhea   Bicarb 1 amp given   On levophed 2.7mic  Dobutamine 2.5        Objective:     Vital Signs (Most Recent):  Temp: (!) 94.6 °F (34.8 °C) (06/16/18 1142)  Pulse: 66 (06/16/18 1123)  Resp: 20 (06/16/18 1123)  BP: (!) 88/64 (06/16/18 1123)  SpO2: 100 % (06/16/18 1123) Vital Signs (24h Range):  Temp:  [94.6 °F (34.8 °C)-97.5 °F (36.4 °C)] 94.6 °F (34.8 °C)  Pulse:  [] 66  Resp:  [11-32] 20  SpO2:  [96 %-100 %] 100 %  BP: ()/(40-82) 88/64     Weight: 60.3 kg (132 lb 15 oz)  Body mass index is 20.82 kg/m².      Intake/Output Summary (Last 24 hours) at 06/16/18 1237  Last data filed at 06/16/18 1200   Gross per 24 hour   Intake           834.43 ml   Output             1530 ml   Net          -695.57 ml       Physical Exam   Constitutional: He is oriented to person, place, and time. He appears well-developed and well-nourished. He is cooperative.  Non-toxic appearance. He does not appear ill. No distress. He is intubated (on vent ).   HENT:   Head: Normocephalic and atraumatic.   Right Ear: Hearing, tympanic membrane, external ear and ear canal normal.   Left Ear: Hearing, tympanic membrane, external ear and ear canal normal.   Nose: Nose normal. No mucosal edema, rhinorrhea or nasal deformity. No epistaxis. Right sinus exhibits no maxillary sinus tenderness and no frontal sinus tenderness. Left sinus exhibits no maxillary sinus tenderness and no frontal sinus tenderness.   Mouth/Throat: Uvula is midline, oropharynx is clear and moist and mucous membranes are normal.  No trismus in the jaw. Normal dentition. No uvula swelling. No posterior oropharyngeal erythema.   Eyes: Conjunctivae and lids are normal. No scleral icterus.   Sclera clear bilat   Neck: Trachea normal, full passive range of motion without pain and phonation normal. Neck supple.   Cardiovascular: Normal rate, regular rhythm, normal heart sounds, intact distal pulses and normal pulses.    Pulmonary/Chest: No accessory muscle usage. Tachypnea noted. He is intubated (on vent ). He is in respiratory distress (none on vent was RR35). He has decreased breath sounds in the right upper field, the right middle field, the right lower field, the left upper field, the left middle field and the left lower field. He has wheezes in the right middle field, the right lower field, the left middle field and the left lower field. He has no rhonchi. He has no rales.           Abdominal: Soft. Normal appearance and bowel sounds are normal. He exhibits no distension. There is no tenderness.   Musculoskeletal: Normal range of motion. He exhibits no edema or deformity.   Neurological: He is alert and oriented to person, place, and time. He exhibits normal muscle tone. Coordination normal.   Skin: Skin is warm, dry and intact. He is not diaphoretic. No pallor.   Psychiatric: He has a normal mood and affect. His speech is normal and behavior is normal. Judgment and thought content normal. Cognition and memory are normal.   Nursing note and vitals reviewed.      Vents:  Vent Mode: A/C (06/16/18 1123)  Ventilator Initiated: Yes (06/16/18 0805)  Set Rate: (S) 16 bmp (Per Dr Soto/IZZY) (06/16/18 1220)  Vt Set: 550 mL (06/16/18 1123)  PEEP/CPAP: 5 cmH20 (06/16/18 1123)  Oxygen Concentration (%): 50 (06/16/18 1123)  Peak Airway Pressure: 20.9 cmH2O (06/16/18 1123)  Total Ve: 10.6 mL (06/16/18 1123)  F/VT Ratio<105 (RSBI): (!) 37.11 (06/16/18 1123)    Lines/Drains/Airways     Central Venous Catheter Line                 Percutaneous Central Line  Insertion/Assessment - triple lumen  06/16/18 0825 right subclavian less than 1 day          Drain                 Urethral Catheter 06/16/18 0914 Latex 16 Fr. less than 1 day          Airway                 Airway - Non-Surgical 06/16/18 0756 Other (Comment) less than 1 day          Epidural Line                 Perineural Analgesia/Anesthesia Assessment (using dermatomes) Epidural 04/05/18 1115 72 days          Peripheral Intravenous Line                 Peripheral IV - Single Lumen 06/14/18 1642 Left Forearm 1 day                Significant Labs:    CBC/Anemia Profile:    Recent Labs  Lab 06/16/18  0836   WBC 8.87   HGB 11.0*   HCT 34.3*      MCV 97   RDW 19.8*          Chemistries:    Recent Labs  Lab 06/15/18  0408 06/16/18  0335   * 132*   K 3.4* 5.7*    100   CO2 22* 13*   BUN 7 14   CREATININE 0.8 1.1   CALCIUM 8.7 9.1       All pertinent labs within the past 24 hours have been reviewed.    Significant Imaging:  I have reviewed all pertinent imaging results/findings within the past 24 hours.

## 2018-06-16 NOTE — NURSING
Central line, TLC placed by Dr. Dean at bedside to right subclavian site aseptically. Dressing placed with bio-patch over insertion site. Blood return from all ports and flushed. Xray obtained for placement verification. Tolerated well.

## 2018-06-16 NOTE — ASSESSMENT & PLAN NOTE
Stop IVF. Stop ABX, he is not septic. He is in heart failure  Lasix IV 40 BID   Start ACE and spironolactone for systolic CHF   Consult cards: starting Dig, Resume BB  Full code----after episode of respiratory distress he is intubated on 6/16: AC/Tv 700/RR 18/PEEP 7/FIO2 100%  Will work on transfer for higher level of care as we do not have cardiology or critical care services in this hospital today.

## 2018-06-16 NOTE — ASSESSMENT & PLAN NOTE
Severe Rapid acidosis 9.87 and elevating troponin due to hypotension low ef(15%),await CVP, given 1 amp bicarb will bolus NS if cvp low   Will need cental vascular monitoring

## 2018-06-16 NOTE — NURSING
Report called to Wan Cramer RN at [facility]. Consent for transfer obtained from patient's father. Awaiting Critical Care Transport.

## 2018-06-16 NOTE — ANESTHESIA PROCEDURE NOTES
Intubation    Diagnosis: respiratory failure  Patient location during procedure: ED  Procedure start time: 6/16/2018 7:56 AM  Procedure end time: 6/16/2018 7:57 AM  Staffing  Resident/CRNA: EVAN HONEYCUTT  Performed: resident/CRNA   Preanesthetic Checklist  Completed: patient identified, site marked, surgical consent, pre-op evaluation, timeout performed, IV checked, risks and benefits discussed, monitors and equipment checked and anesthesia consent given  Intubation  Indication: respiratory distress, respiratory failure  Pre-oxygenation. Induction: intravenous, mask ventilation: easy mask.  Intubation: postinduction, laryngoscopy direct, Cintron 2.  Endotracheal Tube: oral, 7.5 mm ID, cuffed (inflated to minimal occlusive pressure)  Attempts: 1, Grade I - full view of cords  Complicating Factors: none  Tube secured at 20 cm at the lips.  Findings post-intubation: bilateral breath sounds, positive ETCO2, atraumatic / condition of teeth unchanged  Position Confirmation: auscultation

## 2018-06-16 NOTE — ASSESSMENT & PLAN NOTE
Worsened in last 24 hours Hr in 60's   Ef 15% multifactorial alcohol and ischemic CAD  Patient is a vasculopath

## 2018-06-16 NOTE — NURSING
Patient complaining of shortness of breath, sitting up in bed, color dusky, restless. Report received from DENISE Warren. Breath sounds auscultated, essentially clear. Resp rate 32, unable to obtain pulse ox reading or BP. Cardiac monitor shows sinus rhythm with  HR of 71. States he's starting to have chest pain. Placed on BiPAP mask. Dr. Ga notified of patient's condition.

## 2018-06-16 NOTE — NURSING
[facility] with portable vent same settings. IV Norepinephrine at 0.37 mcg/kg/min, Dobutamine at 2.5 mcg/kg/min, Propofol at 20 mcg/kg/min. Per IV pump. remains sedated, madie Patterson notified of transfer and room number. No change in assessment.

## 2018-06-16 NOTE — PLAN OF CARE
Problem: Patient Care Overview  Goal: Plan of Care Review  Outcome: Ongoing (interventions implemented as appropriate)  Patient admit 6/12 through ED with CHF exacerbation/resp. distress; extensive history with bilateral LE amputations/chronic wounds. Cardiology and Pulmonology following with intermittent BiPAp usage, vitals stable per flow sheet, on RA while not on Bipap. Patient reports labored breathing despite otherwise negative assessment. OOB to commode with minimal assistance. Pain to LE amputations controlled with IV Morphine at this time, requiring Q4 hour dosing. Instructed patient on POC with verbalized understanding

## 2018-06-16 NOTE — PROVIDER PROGRESS NOTES - EMERGENCY DEPT.
Encounter Date: 6/12/2018    Emergency Room Physician's Note: Central Line Placement         Was called upstairs to the ICU, patient with desaturation, imminent intubation  CRNA Zachary martinez intubated the patient, I placed a right subclavian central line    Central Line  -- Performed by: Raul Dean MD  -- Consent Done: Emergent Situation  -- Indications: vascular access  -- Anesthesia: local infiltration  -- Local anesthetic: lidocaine 1% without epinephrine  -- Anesthetic total: 5 ml  -- Preparation: skin prepped with ChloraPrep  -- Location details: right subclavian  -- Catheter type: triple lumen  -- Catheter size: 7.5 Fr  -- Number of attempts:  2 (left femoral unsuccessful)  -- Post-procedure: line sutured  -- Complications: none       Raul Dean M.D. 8:59 AM 6/16/2018

## 2018-06-17 NOTE — DISCHARGE SUMMARY
[facility] Medicine  Discharge Summary      Patient Name: Leonardo Byrd  MRN: [id number]  Admission Date: 6/12/2018  Hospital Length of Stay: 3 days  Discharge Date and Time: 6/16/2018  6:44 PM  Attending Physician: No att. providers found   Discharging Provider: José Luis Ga MD  Primary Care Provider: Indio Aquino MD      HPI:   47 year old male with h/o systolic CHF(EF 15% in April 2018) presents to ED with about 2 week h/o SOB, cough, weight gain. He presented to [facility] ED on Saturday. Sent home after presumed negative work up. Presents here overnight. BNP 2400. Fluid on CXR. Has a cough, but no WBC, no fever. He says that he always gets a cough productive of clear to white sputum when he has CHF exacerbation. He has a long h/o CAD, 14 MI since age of 28 per patient. ICD. LActic acid elevated. He was admitted overnight with a dx of COPD and sepsis. He was started on zmax and vanc. Fluid bolus in ED;        * No surgery found *      Hospital Course:   6/13  He is tachypneic and a little SOB this am     6-14 Pt was assessed and felt to not be septic, so all Abx s are d'martine at this time; will monitor his clinical picture today    6/15  Wound care saw him--on santyl, but was on gentamicin oint as well in [place](h/o MRSA Cx).   BP low. held lasix, ACE, spironolactone yesterday  He did get Dig    BNP up to 2600  He says SOB is ok, but is noticeably tachypneic     6/16  Wore bipap sporadically last night   His BP tolerated his CHF meds yesterday. Neg 2000 last 24 hours. BNP 2400   Upon arriving to floor this am patient is c/o CP and is hypoxic, clammy, respiratory distress.   He is a full code. He was intubated and placed on mechanical ventilation, and Subclav line placed with the help of Dr. Dean and our nurse anesthetist, Zachary.   His face is pinking up but peripheral pulse ox reading in the low 60s.  Post intubation CXR shows ET tube couple of cm above altaf. Both lungs fully  inflated  ABG is pending            Consults:   Consults         Status Ordering Provider     Inpatient consult to [place] [facility]  Once     Provider:  [facility]    Completed NGHIA BURNHAM     Inpatient consult to Psychiatry  Once     Provider:  Nghia Hicks MD    Completed SALONI CONNORS     Inpatient consult to Social Work/Case Management  Once     Provider:  (Not yet assigned)    Completed MERYL GARCIA          * Acute systolic congestive heart failure    Stop IVF. Stop ABX, he is not septic. He is in heart failure  Lasix IV 40 BID   Start ACE and spironolactone for systolic CHF   Consult cards: starting Dig, Resume BB  Full code----after episode of respiratory distress he is intubated on 6/16: AC/Tv 700/RR 18/PEEP 7/FIO2 100%  Will work on transfer for higher level of care as we do not have cardiology or critical care services in this hospital today.             Severe hypotension              Lactic acid acidosis              Buerger's disease              Phantom pain after amputation of lower extremity    Lower dose of lyrica since he is in acute HF(can cause peripheral edema)         Limb ischemia              Ischemic cardiomyopathy    See above           Critical lower limb ischemia              Alcohol abuse    Has not drank in 3 months           Tobacco abuse              Peripheral vascular disease of lower extremity    Continue statin, asa and brilinta   No need to continue pletal, he is not ambulatory   All abx s have been d'c ed, so will follow his clinical picture          Final Active Diagnoses:    Diagnosis Date Noted POA    PRINCIPAL PROBLEM:  Acute systolic congestive heart failure [I50.21] 06/13/2018 Yes    Lactic acid acidosis [E87.2] 06/16/2018 Unknown    SIRS (systemic inflammatory response syndrome) [R65.10] 06/16/2018 Unknown    Severe hypotension [I95.9] 06/16/2018 Unknown    Buerger's disease [I73.1] 06/15/2018 Yes    Phantom pain after  amputation of lower extremity [G54.6] 06/13/2018 Yes    COPD, severe [J44.9] 06/13/2018 Unknown    Limb ischemia [I99.8] 03/27/2018 Yes    Ischemic cardiomyopathy [I25.5] 03/18/2018 Yes    Critical lower limb ischemia [I99.8] 03/18/2018 Yes     Chronic    Alcohol abuse [F10.10] 09/27/2017 Yes    Peripheral vascular disease of lower extremity [I73.9] 02/27/2017 Yes    Tobacco abuse [Z72.0] 02/27/2017 Yes      Problems Resolved During this Admission:    Diagnosis Date Noted Date Resolved POA    Sepsis [A41.9] 06/13/2018 06/15/2018 Yes    Sepsis [A41.9]  06/13/2018 Unknown    COPD with acute bronchitis [J44.0, J20.9] 06/12/2018 06/13/2018 Yes       Discharged Condition: stable    Disposition: Discharged to Other Faci*    Follow Up:  Follow-up Information     Indio Aquino MD.    Specialty:  Cardiology  Why:  Outpatient Services  Contact information:  [address]  CATH LAB  [facility] [address] [phone]                 Patient Instructions:   No discharge procedures on file.    Significant Diagnostic Studies: Labs:   BMP:   Recent Labs  Lab 06/16/18 2003 06/16/18 2227 06/17/18  0252   * 118* 79  79   * 128* 135*  135*   K 5.3* 5.1 3.6  3.6   CL 95 95 101  101   CO2 19* 20* 23  23   BUN 23* 24* 16  16   CREATININE 2.2* 2.1* 1.4  1.4   CALCIUM 8.1* 8.0* 7.9*  7.9*   MG 1.6 1.6 1.6  1.6    and Troponin   Recent Labs  Lab 06/16/18  0836   TROPONINI 0.028*       Pending Diagnostic Studies:     Procedure Component Value Units Date/Time    EKG 12-lead [[id number]]     Order Status:  Sent Lab Status:  No result          Medications:  Reconciled Home Medications:      Medication List      ASK your doctor about these medications    amitriptyline 50 MG tablet  Commonly known as:  ELAVIL  Take 1 tablet (50 mg total) by mouth every evening.     aspirin 81 MG EC tablet  Commonly known as:  ECOTRIN  Take 81 mg by mouth once daily.     atorvastatin 80 MG tablet  Commonly known as:  LIPITOR  Take  1 tablet (80 mg total) by mouth every evening.     BRILINTA 90 mg tablet  Generic drug:  ticagrelor  Take 90 mg by mouth 2 (two) times daily.     cilostazol 50 MG Tab  Commonly known as:  PLETAL  Take 2 tablets (100 mg total) by mouth 2 (two) times daily.     cyclobenzaprine 10 MG tablet  Commonly known as:  FLEXERIL  Take 10 mg by mouth 3 (three) times daily as needed for Muscle spasms.     folic acid-vit B6-vit B12 2.5-25-2 mg 2.5-25-2 mg Tab  Commonly known as:  FOLBIC or Equiv  Take 1 tablet by mouth once daily.     gabapentin 800 MG tablet  Commonly known as:  NEURONTIN  Take 800 mg by mouth every evening.     lidocaine 5 % Oint ointment  Commonly known as:  XYLOCAINE  Apply topically 4 (four) times daily. Apply to entire left foot     metoprolol succinate 25 MG 24 hr tablet  Commonly known as:  TOPROL-XL  Take 0.5 tablets (12.5 mg total) by mouth once daily.     nicotine 21 mg/24 hr  Commonly known as:  NICODERM CQ  Place 1 patch onto the skin once daily.     oxyCODONE 10 mg Tab immediate release tablet  Commonly known as:  ROXICODONE  Take 1 tablet (10 mg total) by mouth every 4 (four) hours as needed for Pain.     polyethylene glycol 17 gram Pwpk  Commonly known as:  GLYCOLAX  Take 17 g by mouth 2 (two) times daily as needed (constipation).     pregabalin 100 MG capsule  Commonly known as:  LYRICA  Take 1 capsule (100 mg total) by mouth 3 (three) times daily.     RANEXA 1,000 mg Tb12  Generic drug:  ranolazine  Take 1,000 mg by mouth 2 (two) times daily.     senna-docusate 8.6-50 mg 8.6-50 mg per tablet  Commonly known as:  PERICOLACE  Take 1 tablet by mouth 2 (two) times daily.     thiamine 100 MG tablet  Take 1 tablet (100 mg total) by mouth once daily.     zolpidem 5 MG Tab  Commonly known as:  AMBIEN  Take 1 tablet (5 mg total) by mouth nightly as needed.            Indwelling Lines/Drains at time of discharge:   Lines/Drains/Airways     Central Venous Catheter Line                 Percutaneous Central  Line Insertion/Assessment - triple lumen  06/16/18 0825 right subclavian 1 day         Trialysis (Dialysis) Catheter 06/16/18 2109 left internal jugular less than 1 day          Drain                 Urethral Catheter 06/16/18 0914 Latex 16 Fr. 1 day         NG/OG Tube 06/16/18 2000 less than 1 day          Airway                 Airway - Non-Surgical 06/16/18 0756 Endotracheal Tube 1 day          Epidural Line                 Perineural Analgesia/Anesthesia Assessment (using dermatomes) Epidural 04/05/18 1115 72 days                Time spent on the discharge of patient: 35 minutes  Patient was seen and examined on the date of discharge and determined to be suitable for discharge.    Critical care time spent on the evaluation and treatment of severe organ dysfunction, review of pertinent labs and imaging studies, discussions with consulting providers and discussions with patient/family: 35 minutes.     José Luis Ga MD  Department of Hospital Medicine  [facility]

## 2018-06-17 NOTE — ASSESSMENT & PLAN NOTE
Diuresis, fluid removal  Assess response with decongestion though insult is likely dual (congestive/hypotensive)

## 2018-06-17 NOTE — ASSESSMENT & PLAN NOTE
Not making any urine  Have consulted nephro for HD  CVP is 19  SVO2 apparently 62  CO 3.92  CI 2.29  SVR 1374  downtitrate levo  Continue dobut  No UO-- needs HD -- nephro consulted

## 2018-06-17 NOTE — ASSESSMENT & PLAN NOTE
Likely ATN due to ADHF and ischemic insult 2/2 hypotension  Trialysis placed, position confirmed on xr, manometry, and ultrasound  Tolerating CRRT

## 2018-06-17 NOTE — PROGRESS NOTES
Received intubated pt from EMS. 8.0 ETT secured at 25cm. Placed on mechanical ventilation at documented settings. Will continue to monitor.

## 2018-06-17 NOTE — ASSESSMENT & PLAN NOTE
Unclear etiology  Will start Vanc/Cefepime  Blood/wound cultures  Urinalysis  cxr without pna findings

## 2018-06-17 NOTE — ASSESSMENT & PLAN NOTE
Unclear etiology  Will cover with 1 dose of vanc/cefepime for now --may redose with further diuresis  Blood/wound cultures  Urinalysis  cxr without pna findings

## 2018-06-17 NOTE — ASSESSMENT & PLAN NOTE
-Nephrology consulted for HD; CRRT started overnight.  -CVP is 9 this AM  -Levo, propofol gtt stopped this AM; goal MAP of 60-65.  -Continue dobutamine gtt.  -UOP ~900 ccs overnight.

## 2018-06-17 NOTE — PROGRESS NOTES
[facility]  Cardiology  Progress Note    Patient Name: Leonardo Byrd  MRN: [id number]  Admission Date: 6/16/2018  Hospital Length of Stay: 1 days  Code Status: Full Code   Attending Physician: Sae Carr MD   Primary Care Physician: Indio Aquino MD  Expected Discharge Date:   Principal Problem:<principal problem not specified>    Subjective:     Hospital Course:   No notes on file    Interval History: NAEO. Intubated; Off levo, propofol this AM. On dobutamine gtt. Will continue to monitor.    Review of Systems   Unable to perform ROS: intubated     Objective:     Vital Signs (Most Recent):  Temp: 98.8 °F (37.1 °C) (06/17/18 0731)  Pulse: 75 (06/17/18 0800)  Resp: 16 (06/17/18 0800)  BP: 90/61 (06/17/18 0800)  SpO2: 100 % (06/17/18 0800) Vital Signs (24h Range):  Temp:  [94.6 °F (34.8 °C)-99.4 °F (37.4 °C)] 98.8 °F (37.1 °C)  Pulse:  [65-82] 75  Resp:  [16-33] 16  SpO2:  [99 %-100 %] 100 %  BP: ()/(49-78) 90/61     Weight: 62.5 kg (137 lb 12.6 oz)  Body mass index is 22.24 kg/m².     SpO2: 100 %  O2 Device (Oxygen Therapy): ventilator      Intake/Output Summary (Last 24 hours) at 06/17/18 0848  Last data filed at 06/17/18 0826   Gross per 24 hour   Intake          2263.91 ml   Output             3531 ml   Net         -1267.09 ml       Lines/Drains/Airways     Central Venous Catheter Line                 Percutaneous Central Line Insertion/Assessment - triple lumen  06/16/18 0825 right subclavian 1 day         Trialysis (Dialysis) Catheter 06/16/18 2109 left internal jugular less than 1 day          Drain                 NG/OG Tube 06/16/18 2000 less than 1 day         Urethral Catheter 06/16/18 0914 Latex 16 Fr. less than 1 day          Airway                 Airway - Non-Surgical 06/16/18 0756 Endotracheal Tube 1 day          Epidural Line                 Perineural Analgesia/Anesthesia Assessment (using dermatomes) Epidural 04/05/18 1115 72 days          Peripheral  Intravenous Line                 Peripheral IV - Single Lumen 06/14/18 1642 Left Forearm 2 days                Physical Exam   Constitutional: He is oriented to person, place, and time. He appears well-developed and well-nourished.   HENT:   Head: Normocephalic and atraumatic.   Intubated, sedated     Eyes: Conjunctivae are normal. Pupils are equal, round, and reactive to light.   Neck: Normal range of motion. Neck supple. JVD present.   Cardiovascular: Normal rate, regular rhythm and normal heart sounds.  Exam reveals no gallop and no friction rub.    No murmur heard.  Pulmonary/Chest: Effort normal. No respiratory distress. He has no wheezes. He has rales. He exhibits no tenderness.   Abdominal: Soft. Bowel sounds are normal. He exhibits no distension. There is no tenderness.   Musculoskeletal: He exhibits no edema or tenderness.   R AKA  L TMA     Neurological: He is alert and oriented to person, place, and time.   Skin: Skin is dry. No erythema. No pallor.   Cool, cap refill > 3s       Significant Labs:   CMP   Recent Labs  Lab 06/16/18 2003 06/16/18 2227 06/17/18  0252   * 128* 135*  135*   K 5.3* 5.1 3.6  3.6   CL 95 95 101  101   CO2 19* 20* 23  23   * 118* 79  79   BUN 23* 24* 16  16   CREATININE 2.2* 2.1* 1.4  1.4   CALCIUM 8.1* 8.0* 7.9*  7.9*   PROT 6.2  --  5.8*   ALBUMIN 2.8* 2.7* 2.6*  2.6*   BILITOT 2.6*  --  2.4*   ALKPHOS 212*  --  197*   AST 6,263*  --  12,575*   ALT 3,207*  --  5,180*   ANIONGAP 14 13 11  11   ESTGFRAFRICA 39.2* 41.5* >60.0  >60.0   EGFRNONAA 33.9* 35.9* 58.6*  58.6*    and CBC   Recent Labs  Lab 06/16/18  0836 06/16/18 2003 06/17/18  0252   WBC 8.87 17.86*  --  12.91*   HGB 11.0* 10.8*  --  10.0*   HCT 34.3* 32.2*  < > 29.6*    180  --  141*   < > = values in this interval not displayed.    Assessment and Plan:       Shock liver    CRRT  Assess response with decongestion though insult is likely dual (congestive/hypotensive)          Lactic  acidosis    -2.1 improved from prior  -Tolerating CRRT.        Hyperkalemia    Shifted at [facility]  -CRRT started this AM.        Cardiogenic shock    -Nephrology consulted for HD; CRRT started overnight.  -CVP is 9 this AM  -Levo, propofol gtt stopped this AM; goal MAP of 60-65.  -Continue dobutamine gtt.  -UOP ~900 ccs overnight.        COPD, severe    Currently intubated/sedated  Prn nebs        Acute renal failure    Likely ATN due to ADHF and ischemic insult 2/2 hypotension  Trialysis placed, position confirmed on xr, manometry, and ultrasound  Tolerating CRRT        Leukocytosis    Unclear etiology  Will start Vanc/Cefepime  Blood/wound cultures  Urinalysis  cxr without pna findings        Alcohol abuse    On propofol  Resistant to sedation, started thrashing during line placement  Started on Librium taper.        Tobacco abuse    Nicotine patch        Peripheral vascular disease of lower extremity    Continue asa/brilinta  Lactate continues to downtrend; improved to 2.1 since last check            VTE Risk Mitigation         Ordered     IP VTE HIGH RISK PATIENT  Once      06/16/18 2001     Place sequential compression device  Until discontinued      06/16/18 2001     Place EMIGDIO hose  Until discontinued      06/16/18 2001          Easton Brownlee MD  Cardiology  [facility]

## 2018-06-17 NOTE — ASSESSMENT & PLAN NOTE
RYAN with no previously reported and underlying CKI (reviewing old labs and talking to son at bedside), on presentation with reported anuria and SLED was started, however overnight he has made slose to 1L of urine on his own and he's not on diuretics, RYAN, likely 2ndary iATN and related to cardiogenic shock, K 7.1 on presentation (shifted down to 5.3 by primary team), CXR with slight congestion, +3 blood on UA, probably Evans related    Plan/Recommendations:  1) hold SLED and give diuretic challenge, would recommend 120mg of IV lasix x 1 and if there is a response (~200 or more ccs of output over the next 2-3hrs), then would continue IV diuresis with serial RFPs, specifically 100mg IV BID, if there is some response, but less than outlined, then would also start on scheduled lasix 100mg IV BID, but also add diuril 500mg IV BID  2) renal ultrasound  3) UA, urine sodium, urine creatinine and UPC ratio  4) will attempt to collect and spin down urine for microscopic review, specifically want to ensure hematuria is not glomerular, in short, would not expect dysmorphic cells or RBC casts

## 2018-06-17 NOTE — CONSULTS
[facility]  Nephrology  Consult Note    Patient Name: Leonardo Byrd  MRN: [id number]  Admission Date: 6/16/2018  Hospital Length of Stay: 1 days  Attending Provider: Sae Carr MD   Primary Care Physician: Indio Aquino MD  Principal Problem:<principal problem not specified>    Inpatient consult to Nephrology  Consult performed by: GARY MAI  Consult ordered by: MAXIMILIAN ALCANTAR  Reason for consult: RYAN        Subjective:     HPI: 47 year old with PMH of ICM (LVEF 30-35%) s/p ICD, CAD s/p PCI, HTN, HLD, current smoker, PAD (s/p aortobifemoral bypass, L SFA and pop PTAS in September and recent R SFA and R pop 3/8 by Dr. Carl Bell and finally R AKA 3/2018.     He present ed to ED with about 2 week h/o SOB, cough, weight gain. He presented to [facility] ED on Saturday. Sent home after presumed negative work up. Presented once again overnight 6/13. BNP 2400, congested CXR, cough, no fever similar to prior CHF exacerbations. LActic acid elevated. He was admitted with COPD and sepsis. He was started on zmax and vanc and given a fluid bolus in ED.  He was found to not be septic the next day and all abx were stopped.  Unfortunately, though his UO was quite good and he was net - 2000cc on 6/16am, he progressively worsened from a respiratory status and became progressively hypotensive.  He was subsequently intubated and started on levo/dobutrex.  Lactate 10, k 7.1, creatinine now 1.8 from baseline 0.8, poor/no UO.  Transferred to [facility] for higher level of care.    Nephrology consulted for RYAN.    Past Medical History:   Diagnosis Date    AICD (automatic cardioverter/defibrillator) present     RYAN (acute kidney injury) 3/25/2018    CHF (congestive heart failure)     COPD with acute bronchitis 6/12/2018    Coronary artery disease     Encounter for blood transfusion     Hyperlipemia     Hypertension     MI (myocardial infarction)     Neuropathy     PAD (peripheral artery disease)      PVD (peripheral vascular disease)        Past Surgical History:   Procedure Laterality Date    AMPUTATION Right     great toe    BYBPASS GRAFT AORTOBIUFEMORAL      CARDIAC DEFIBRILLATOR PLACEMENT      coronary stents      EXPLORATION AND EVaCUATION OF HEMATOMA OF UPPER EXTREMITY Left     KNEE ARTHROPLASTY Left     VASCULAR SURGERY      fem-pop bypass       Review of patient's allergies indicates:  No Known Allergies  Current Facility-Administered Medications   Medication Frequency    0.9%  NaCl infusion (CRRT USE ONLY) Continuous    acetaminophen tablet 650 mg Q8H PRN    albuterol-ipratropium 2.5 mg-0.5 mg/3 mL nebulizer solution 3 mL Q4H PRN    aspirin chewable tablet 81 mg Daily    atorvastatin tablet 80 mg Daily    ceFEPIme injection 2 g Q12H    chlordiazepoxide capsule 50 mg Q8H    Followed by    [START ON 6/19/2018] chlordiazepoxide capsule 25 mg Q6H    Followed by    [START ON 6/21/2018] chlordiazepoxide capsule 10 mg Q6H    Followed by    [START ON 6/23/2018] chlordiazepoxide capsule 10 mg BID    collagenase ointment Daily    DOBUTamine 500mg in D5W 250mL infusion (premix) (NON-TITRATING) Continuous    gentamicin 0.1 % ointment Daily    HYDROcodone-acetaminophen 5-325 mg per tablet 1 tablet Q4H PRN    levalbuterol nebulizer solution 1.25 mg Q6H PRN    lidocaine HCL 2% jelly PRN    lorazepam injection 2 mg Q2H PRN    magnesium oxide tablet 800 mg PRN    magnesium oxide tablet 800 mg PRN    magnesium sulfate 2g in water 50mL IVPB (premix) PRN    morphine injection 2 mg Q4H PRN    nicotine 14 mg/24 hr 1 patch Daily    ondansetron injection 4 mg Q8H PRN    pantoprazole 40 mg in dextrose 5 % 100 mL infusion (ready to mix system) Daily    pneumoc 13-osman conj-dip cr(PF) 0.5 mL Prior to discharge    potassium chloride 10% oral solution 40 mEq PRN    potassium chloride 10% oral solution 40 mEq PRN    potassium chloride 10% oral solution 60 mEq PRN    potassium, sodium  phosphates 280-160-250 mg packet 2 packet PRN    potassium, sodium phosphates 280-160-250 mg packet 2 packet PRN    potassium, sodium phosphates 280-160-250 mg packet 2 packet PRN    pregabalin capsule 75 mg BID    propofol (DIPRIVAN) 10 mg/mL infusion Continuous    senna-docusate 8.6-50 mg per tablet 1 tablet BID    sodium chloride 0.9% flush 3 mL Q8H    sodium phosphate 20.01 mmol in dextrose 5 % 250 mL IVPB PRN    sodium phosphate 30 mmol in dextrose 5 % 250 mL IVPB PRN    sodium phosphate 39.99 mmol in dextrose 5 % 250 mL IVPB PRN    ticagrelor tablet 90 mg BID     Family History     None        Social History Main Topics    Smoking status: Former Smoker     Packs/day: 0.50     Quit date: 5/1/2018    Smokeless tobacco: Former User     Types: Chew     Quit date: 8/2/1980      Comment: Uses nicotine gum and nicotine patches    Alcohol use Yes      Comment: rarely    Drug use: No    Sexual activity: Yes     Partners: Female     Review of Systems   Unable to perform ROS: Intubated     Objective:     Vital Signs (Most Recent):  Temp: 98.4 °F (36.9 °C) (06/17/18 1100)  Pulse: 82 (06/17/18 1220)  Resp: 19 (06/17/18 1220)  BP: 100/64 (06/17/18 1220)  SpO2: 100 % (06/17/18 1220)  O2 Device (Oxygen Therapy): ventilator (06/17/18 1145) Vital Signs (24h Range):  Temp:  [96.8 °F (36 °C)-99.4 °F (37.4 °C)] 98.4 °F (36.9 °C)  Pulse:  [66-82] 82  Resp:  [16-33] 19  SpO2:  [99 %-100 %] 100 %  BP: ()/(49-78) 100/64     Weight: 62.5 kg (137 lb 12.6 oz) (06/17/18 0200)  Body mass index is 22.24 kg/m².  Body surface area is 1.71 meters squared.    I/O last 3 completed shifts:  In: 2059.4 [I.V.:1809.4; IV Piggyback:250]  Out: 3163 [Urine:902; Other:2261]    Physical Exam   HENT:   Head: Atraumatic.   Neck: No JVD present.   Cardiovascular: Normal rate and regular rhythm.  Exam reveals no friction rub.    Pulmonary/Chest: Effort normal. He has rales.   Abdominal: Soft. He exhibits no distension.   Musculoskeletal:  He exhibits edema.   Skin: Skin is warm.         Assessment/Plan:     Acute renal failure    RYAN with no previously reported and underlying CKI (reviewing old labs and talking to son at bedside), on presentation with reported anuria and SLED was started, however overnight he has made slose to 1L of urine on his own and he's not on diuretics, RYAN, likely 2ndary iATN and related to cardiogenic shock, K 7.1 on presentation (shifted down to 5.3 by primary team), CXR with slight congestion, +3 blood on UA, probably Evans related    Plan/Recommendations:  1) hold SLED and give diuretic challenge, would recommend 120mg of IV lasix x 1 and if there is a response (~200 or more ccs of output over the next 2-3hrs), then would continue IV diuresis with serial RFPs, specifically 100mg IV BID, if there is some response, but less than outlined, then would also start on scheduled lasix 100mg IV BID, but also add diuril 500mg IV BID  2) renal ultrasound  3) UA, urine sodium, urine creatinine and UPC ratio  4) will attempt to collect and spin down urine for microscopic review, specifically want to ensure hematuria is not glomerular, in short, would not expect dysmorphic cells or RBC casts                Thank you for your consult. I will follow-up with patient. Please contact us if you have any additional questions.    Chris Ross MD  Nephrology  [facility]    Patient seen and examined with Dr Ross;   I have reviewed and agree with assessment and plan

## 2018-06-17 NOTE — NURSING
HTS called to notify patient with change in pupillary size, now 7mm equal bilaterally and sluggish. Otherwise, neuro status remains the same. Order for STAT CT per Dr. Oswald.

## 2018-06-17 NOTE — ASSESSMENT & PLAN NOTE
On propofol  Resistant to sedation, started thrashing during line placement  Ativan iv prn agitation/withdrawal

## 2018-06-17 NOTE — NURSING
[facility] Connected to bedside monitor - cardiac monitoring and continuous pulse oximetry applied. Call bell within reach, side rails raised x 2, bed locked and in lowest position. Primary service notified of patient arrival. ETT in place, dobutamine, levo, and propofol infusing. Patient in no acute distress. Will continue to monitor.

## 2018-06-17 NOTE — PLAN OF CARE
Problem: Patient Care Overview  Goal: Plan of Care Review  Outcome: Ongoing (interventions implemented as appropriate)  POC reviewed with patient and patient son at bedside. Propofol infusion off majority of day. Restarted during agitation period, however currently off. Patient responded to repeated stimuli during day, moving all extremities, opening eyes periodically, however not following verbal commands. Repeat Echo implemented at bedside.  CRRT dc today. Patient making < 30cc/hr, post Lasix challenge UOP ~250cc.  gtt infusing. AXB given. Wound care implemented per DENISE Gunn previous recommendations (see flowsheets for wound assessments). Levophed required required late morning, however currently off and patient meeting MAP goals. No skin breakdown noted during assessment. 1BM this evening. All questions addressed. WCTM

## 2018-06-17 NOTE — ASSESSMENT & PLAN NOTE
On propofol  Resistant to sedation, started thrashing during line placement  Started on Librium taper.

## 2018-06-17 NOTE — PLAN OF CARE
Problem: Patient Care Overview  Goal: Plan of Care Review  Outcome: Ongoing (interventions implemented as appropriate)  No acute events throughout shift, VS and assessment per flow sheet, patient progressing towards goals as tolerated. Patient sedated on ventilator, withdraws from pain; awakened once with agitation and thrashing. Remained in NSR, BP with MAP>65; levo titrated off. Dobutamine continues to infuse. Propofol off to assess neuro status. CT of head completed. Trialysis line placed and CRRT started, at goal UF. UO picking up throughout shift, ~850cc output. No BM. Tmax 99.4. Plan of care reviewed with Leonardo Corteseur and family, all concerns addressed, will continue to monitor.

## 2018-06-17 NOTE — PROGRESS NOTES
Consult Note  Pulmonary Medicine    SUBJECTIVE     Reason for consult: mechanical ventilation    History of Present Illness  PCC is consulted for mechanical ventilation in this 47-yo male with PMHx including CAD s/p PCI, ICM (EF 30%) s/p ICD, PAD s/p BLE amputations, and current tobacco use who was admitted to [facility] yesterday. Pt is intubated and sedated, so history is taken from chart. He had presented to OSH ED with SOB x 2 weeks that day and found to have s/s of decompensated heart failure and decreased perfusion. Sepsis was also suspected and BSABX were started. He did not respond well to diuresis and his resp status deteriorated. He was intubated and started on Levo and dobutamine. He was transferred here for further management.       Past Medical History:   Diagnosis Date    AICD (automatic cardioverter/defibrillator) present     RYAN (acute kidney injury) 3/25/2018    CHF (congestive heart failure)     COPD with acute bronchitis 6/12/2018    Coronary artery disease     Encounter for blood transfusion     Hyperlipemia     Hypertension     MI (myocardial infarction)     Neuropathy     PAD (peripheral artery disease)     PVD (peripheral vascular disease)        Past Surgical History:   Procedure Laterality Date    AMPUTATION Right     great toe    BYBPASS GRAFT AORTOBIUFEMORAL      CARDIAC DEFIBRILLATOR PLACEMENT      coronary stents      EXPLORATION AND EVaCUATION OF HEMATOMA OF UPPER EXTREMITY Left     KNEE ARTHROPLASTY Left     VASCULAR SURGERY      fem-pop bypass       History reviewed. No pertinent family history.    Social History     Social History    Marital status: Single     Spouse name: N/A    Number of children: N/A    Years of education: N/A     Social History Main Topics    Smoking status: Former Smoker     Packs/day: 0.50     Quit date: 5/1/2018    Smokeless tobacco: Former User     Types: Chew     Quit date: 8/2/1980      Comment: Uses nicotine gum and nicotine patches     Alcohol use Yes      Comment: rarely    Drug use: No    Sexual activity: Yes     Partners: Female     Other Topics Concern    None     Social History Narrative    None       Current Facility-Administered Medications   Medication Dose Route Frequency Provider Last Rate Last Dose    acetaminophen tablet 650 mg  650 mg Oral Q8H PRN Brian Oswald MD        albuterol-ipratropium 2.5 mg-0.5 mg/3 mL nebulizer solution 3 mL  3 mL Nebulization Q4H PRN Brian Oswald MD        aspirin chewable tablet 81 mg  81 mg Per OG tube Daily Lisa Benitez MD        atorvastatin tablet 80 mg  80 mg Per OG tube Daily Easton Brownlee MD   80 mg at 06/17/18 0854    ceFEPIme injection 2 g  2 g Intravenous Q12H Easton Brownlee MD   2 g at 06/18/18 0005    chlorhexidine 0.12 % solution 15 mL  15 mL Mouth/Throat BID Lisa Benitez MD   15 mL at 06/17/18 2128    collagenase ointment   Topical (Top) Daily Brian Oswald MD        DOBUTamine 500mg in D5W 250mL infusion (premix) (NON-TITRATING)  2.5 mcg/kg/min Intravenous Continuous Brian Oswald MD 4.5 mL/hr at 06/18/18 0600 2.5 mcg/kg/min at 06/18/18 0600    furosemide injection 100 mg  100 mg Intravenous BID Chris Ross MD   100 mg at 06/17/18 2128    gentamicin 0.1 % ointment   Topical (Top) Daily Brian Oswald MD        HYDROcodone-acetaminophen 5-325 mg per tablet 1 tablet  1 tablet Oral Q4H PRN Brian Oswald MD   1 tablet at 06/18/18 0122    levalbuterol nebulizer solution 1.25 mg  1.25 mg Nebulization Q6H PRN Brian Oswald MD        lidocaine HCL 2% jelly   Topical (Top) PRN Brian Oswald MD        lorazepam injection 2 mg  2 mg Intravenous Q2H PRN Brian Oswald MD   2 mg at 06/18/18 0142    magnesium oxide tablet 800 mg  800 mg Oral PRN Brian Oswald MD        magnesium oxide tablet 800 mg  800 mg Oral PRN Brian Oswald MD        magnesium sulfate 2g in water 50mL IVPB (premix)  2 g Intravenous Once  Cari Pabon MD   2 g at 06/18/18 0608    morphine injection 2 mg  2 mg Intravenous Q4H PRN Brian Oswald MD        nicotine 14 mg/24 hr 1 patch  1 patch Transdermal Daily Brian Oswald MD   1 patch at 06/17/18 0855    norepinephrine 4 mg in dextrose 5% 250 mL infusion (premix) (titrating)  0.02 mcg/kg/min (Dosing Weight) Intravenous Continuous Lisa Benitez MD   Stopped at 06/18/18 0400    ondansetron injection 4 mg  4 mg Intravenous Q8H PRN Brian MELLO Oswald MD        pantoprazole 40 mg in dextrose 5 % 100 mL infusion (ready to mix system)  40 mg Intravenous Daily Sae Carr MD 0 mL/hr at 06/17/18 0948 40 mg at 06/17/18 0948    pneumoc 13-osman conj-dip cr(PF) 0.5 mL  0.5 mL Intramuscular Prior to discharge Sae Carr MD        potassium chloride 10% oral solution 40 mEq  40 mEq Oral PRN Brian A. MD Darek   40 mEq at 06/17/18 1827    potassium chloride 10% oral solution 40 mEq  40 mEq Oral PRN Brian GARRICK. MD Darek   40 mEq at 06/18/18 0122    potassium chloride 10% oral solution 60 mEq  60 mEq Oral PRN Brian A. MD Darek        potassium, sodium phosphates 280-160-250 mg packet 2 packet  2 packet Oral PRN Brian A. MD Darek        potassium, sodium phosphates 280-160-250 mg packet 2 packet  2 packet Oral PRN Brian A. MD Darek   2 packet at 06/18/18 0608    potassium, sodium phosphates 280-160-250 mg packet 2 packet  2 packet Oral PRN Brian A. MD Darek   2 packet at 06/18/18 0122    pregabalin capsule 75 mg  75 mg Per OG tube BID Easton Brownlee MD   75 mg at 06/17/18 2127    propofol (DIPRIVAN) 10 mg/mL infusion  5 mcg/kg/min Intravenous Continuous Brian Oswald MD   Stopped at 06/17/18 1822    senna-docusate 8.6-50 mg per tablet 1 tablet  1 tablet Per OG tube BID Easton Brownlee MD   1 tablet at 06/17/18 2127    sodium chloride 0.9% flush 3 mL  3 mL Intravenous Q8H Brian Oswald MD   3 mL at 06/17/18 1400    ticagrelor  tablet 90 mg  90 mg Per OG tube BID Brian Oswald MD   90 mg at 06/17/18 2123       Review of patient's allergies indicates:  No Known Allergies      Review of Systems  As above. Additional ROS not obtained as Pt is intubated and sedated.          OBJECTIVE     Vitals:    06/18/18 0600   BP: (!) 92/58   Pulse: 75   Resp: 16   Temp:        Physical Exam  Constitutional: Patient is intubated and sedated.     HENT: Head: Normocephalic and atraumatic. Right Ear: External ear normal. Left Ear: External ear normal. Nose: Nose normal.   Eyes: Conjunctivae and EOM are normal. Right eye exhibits no discharge. Left eye exhibits no discharge. No scleral icterus.   Neck: Normal range of motion. Neck supple. JVD present.  Cardiovascular: Normal rate, regular rhythm, normal heart sounds and intact distal pulses.  Exam reveals no gallop and no friction rub.  No murmur heard.  Pulmonary/Chest: No respiratory distress. Effort normal and breath sounds normal. Patient has no wheezes. Patient has no rales.   Abdominal: Soft. Bowel sounds are normal. Patient exhibits no distension and no mass. There is no tenderness.   Musculoskeletal: R AKA, L TMA Patient exhibits no edema or tenderness.   Neurological: Patient is alert and oriented to person, place, and time.  Skin: Skin is cool. No rash noted. Patient is not diaphoretic. No erythema. No pallor.       Laboratory    Recent Labs  Lab 06/12/18  1706 06/16/18 2009   INR 1.3* 2.3*         Recent Labs  Lab 06/16/18 2003 06/16/18 2058 06/17/18  0252 06/18/18  0333   WBC 17.86*  --  12.91* 11.28   HGB 10.8*  --  10.0* 10.7*   HCT 32.2* 35* 29.6* 31.5*     --  141* 135*         Recent Labs  Lab 06/16/18 2003 06/17/18 0252 06/17/18  1338 06/18/18  0009 06/18/18  0333   *  < > 135*  135*  --  138  --  138   K 5.3*  < > 3.6  3.6  < > 3.8 3.6 4.2   CL 95  < > 101  101  --  107  --  104   CO2 19*  < > 23  23  --  25  --  24   BUN 23*  < > 16  16  --  6  --  12    CREATININE 2.2*  < > 1.4  1.4  --  0.8  --  1.4   CALCIUM 8.1*  < > 7.9*  7.9*  --  7.8*  --  7.6*   PROT 6.2  --  5.8*  --   --   --  5.7*   BILITOT 2.6*  --  2.4*  --   --   --  2.7*   ALKPHOS 212*  --  197*  --   --   --  229*   ALT 3,207*  --  5,180*  --   --   --  3,882*   AST 6,263*  --  12,575*  --   --   --  6,374*   < > = values in this interval not displayed.    Recent Labs  Lab 06/18/18  0348   PH 7.480*   PCO2 34.8*   PO2 33*   HCO3 25.9   POCSATURATED 69*   BE 2         Assessment / Recommendations     Cardiogenic shock     - secondary to decompensated heart failure with possible infection as  (source possible leg wounds, consider wound care consult)  - requiring hemodynamic support and receiving diuresis, per primary         Acute combined respiratory failure     - secondary to heart failure and sepsis  - agree with management above by primary team and BXABX  - ABG suggests good oxygenation and ventilation  - CXR shows improving edema  - continue mechanical ventilation with LPV  - recommend daily SAT/SBT and CXR  - assess for extubation tomorrow   - plan discussed with oncoming fellow, Dr. Bowman       Thank you for this consult. Pulmonary will continue to follow.     Abner Eastman MD  Fellow, Pulmonary & Critical Care Medicine

## 2018-06-17 NOTE — SUBJECTIVE & OBJECTIVE
Interval History: NAEO. Intubated; Off levo, propofol this AM. On dobutamine gtt. Will continue to monitor.    Review of Systems   Unable to perform ROS: intubated     Objective:     Vital Signs (Most Recent):  Temp: 98.8 °F (37.1 °C) (06/17/18 0731)  Pulse: 75 (06/17/18 0800)  Resp: 16 (06/17/18 0800)  BP: 90/61 (06/17/18 0800)  SpO2: 100 % (06/17/18 0800) Vital Signs (24h Range):  Temp:  [94.6 °F (34.8 °C)-99.4 °F (37.4 °C)] 98.8 °F (37.1 °C)  Pulse:  [65-82] 75  Resp:  [16-33] 16  SpO2:  [99 %-100 %] 100 %  BP: ()/(49-78) 90/61     Weight: 62.5 kg (137 lb 12.6 oz)  Body mass index is 22.24 kg/m².     SpO2: 100 %  O2 Device (Oxygen Therapy): ventilator      Intake/Output Summary (Last 24 hours) at 06/17/18 0848  Last data filed at 06/17/18 0826   Gross per 24 hour   Intake          2263.91 ml   Output             3531 ml   Net         -1267.09 ml       Lines/Drains/Airways     Central Venous Catheter Line                 Percutaneous Central Line Insertion/Assessment - triple lumen  06/16/18 0825 right subclavian 1 day         Trialysis (Dialysis) Catheter 06/16/18 2109 left internal jugular less than 1 day          Drain                 NG/OG Tube 06/16/18 2000 less than 1 day         Urethral Catheter 06/16/18 0914 Latex 16 Fr. less than 1 day          Airway                 Airway - Non-Surgical 06/16/18 0756 Endotracheal Tube 1 day          Epidural Line                 Perineural Analgesia/Anesthesia Assessment (using dermatomes) Epidural 04/05/18 1115 72 days          Peripheral Intravenous Line                 Peripheral IV - Single Lumen 06/14/18 1642 Left Forearm 2 days                Physical Exam   Constitutional: He is oriented to person, place, and time. He appears well-developed and well-nourished.   HENT:   Head: Normocephalic and atraumatic.   Intubated, sedated     Eyes: Conjunctivae are normal. Pupils are equal, round, and reactive to light.   Neck: Normal range of motion. Neck supple. JVD  present.   Cardiovascular: Normal rate, regular rhythm and normal heart sounds.  Exam reveals no gallop and no friction rub.    No murmur heard.  Pulmonary/Chest: Effort normal. No respiratory distress. He has no wheezes. He has rales. He exhibits no tenderness.   Abdominal: Soft. Bowel sounds are normal. He exhibits no distension. There is no tenderness.   Musculoskeletal: He exhibits no edema or tenderness.   R AKA  L TMA     Neurological: He is alert and oriented to person, place, and time.   Skin: Skin is dry. No erythema. No pallor.   Cool, cap refill > 3s       Significant Labs:   CMP   Recent Labs  Lab 06/16/18 2003 06/16/18  2227 06/17/18  0252   * 128* 135*  135*   K 5.3* 5.1 3.6  3.6   CL 95 95 101  101   CO2 19* 20* 23  23   * 118* 79  79   BUN 23* 24* 16  16   CREATININE 2.2* 2.1* 1.4  1.4   CALCIUM 8.1* 8.0* 7.9*  7.9*   PROT 6.2  --  5.8*   ALBUMIN 2.8* 2.7* 2.6*  2.6*   BILITOT 2.6*  --  2.4*   ALKPHOS 212*  --  197*   AST 6,263*  --  12,575*   ALT 3,207*  --  5,180*   ANIONGAP 14 13 11  11   ESTGFRAFRICA 39.2* 41.5* >60.0  >60.0   EGFRNONAA 33.9* 35.9* 58.6*  58.6*    and CBC   Recent Labs  Lab 06/16/18  0836 06/16/18 2003 06/17/18  0252   WBC 8.87 17.86*  --  12.91*   HGB 11.0* 10.8*  --  10.0*   HCT 34.3* 32.2*  < > 29.6*    180  --  141*   < > = values in this interval not displayed.

## 2018-06-17 NOTE — H&P
[facility]  Cardiology  History and Physical     Patient Name: Leonardo Byrd  MRN: [id number]  Admission Date: 6/16/2018  Code Status: Full Code   Attending Provider: Sae Carr MD   Primary Care Physician: Indio Aquino MD  Principal Problem:<principal problem not specified>    Patient information was obtained from patient, relative(s), past medical records and ER records.     Subjective:     Chief Complaint:  Cardiogenic Shock     HPI:  47 year old with PMH of ICM (LVEF 30-35%) s/p ICD, CAD s/p PCI, HTN, HLD, current smoker, PAD (s/p aortobifemoral bypass, L SFA and pop PTAS in September and recent R SFA and R pop 3/8 by Dr. Carl Bell and finally R AKA 3/2018.    He present ed to ED with about 2 week h/o SOB, cough, weight gain. He presented to [facility] ED on Saturday. Sent home after presumed negative work up. Presented once again overnight 6/13. BNP 2400, congested CXR, cough, no fever similar to prior CHF exacerbations. LActic acid elevated. He was admitted with COPD and sepsis. He was started on zmax and vanc and given a fluid bolus in ED.  He was found to not be septic the next day and all abx were stopped.  Unfortunately, though his UO was quite good and he was net - 2000cc on 6/16am, he progressively worsened from a respiratory status and became progressively hypotensive.  He was subsequently intubated and started on levo/dobutrex.  Lactate 10, k 7.1, creatinine now 1.8 from baseline 0.8, poor/no UO.  Transferred to [facility] for higher level of care.       Past Medical History:   Diagnosis Date    AICD (automatic cardioverter/defibrillator) present     RYAN (acute kidney injury) 3/25/2018    CHF (congestive heart failure)     COPD with acute bronchitis 6/12/2018    Coronary artery disease     Encounter for blood transfusion     Hyperlipemia     Hypertension     MI (myocardial infarction)     Neuropathy     PAD (peripheral artery disease)     PVD (peripheral vascular  disease)        Past Surgical History:   Procedure Laterality Date    AMPUTATION Right     great toe    BYBPASS GRAFT AORTOBIUFEMORAL      CARDIAC DEFIBRILLATOR PLACEMENT      coronary stents      EXPLORATION AND EVaCUATION OF HEMATOMA OF UPPER EXTREMITY Left     KNEE ARTHROPLASTY Left     VASCULAR SURGERY      fem-pop bypass       Review of patient's allergies indicates:  No Known Allergies    Current Facility-Administered Medications on File Prior to Encounter   Medication    [] 0.45% NaCl infusion    [COMPLETED] albuterol sulfate nebulizer solution 10 mg    [COMPLETED] calcium gluconate 100 mg/mL (10%) injection    [COMPLETED] etomidate (AMIDATE) 2 mg/mL injection    [COMPLETED] piperacillin-tazobactam 4.5 g in dextrose 5 % 100 mL IVPB (ready to mix system)    [COMPLETED] sodium bicarbonate solution 50 mEq    [COMPLETED] sodium polystyrene 15 gram/60 mL suspension 30 g    [COMPLETED] succinylcholine (ANECTINE) 20 mg/mL injection    [COMPLETED] succinylcholine (ANECTINE) 20 mg/mL injection    [COMPLETED] vancomycin in dextrose 5 % 1 gram/250 mL IVPB 1,000 mg    [DISCONTINUED] acetaminophen tablet 650 mg    [DISCONTINUED] aspirin EC tablet 81 mg    [DISCONTINUED] atorvastatin tablet 80 mg    [DISCONTINUED] calcium gluconate 500 mg in dextrose 5 % 100 mL IVPB    [DISCONTINUED] clonazePAM tablet 0.5 mg    [DISCONTINUED] collagenase ointment    [DISCONTINUED] digoxin tablet 0.125 mg    [DISCONTINUED] diphenhydrAMINE capsule 25 mg    [DISCONTINUED] DOBUTamine 500mg in D5W 250mL infusion (premix) (NON-TITRATING)    [DISCONTINUED] etomidate injection    [DISCONTINUED] furosemide injection 40 mg    [DISCONTINUED] gentamicin 0.1 % ointment    [DISCONTINUED] haloperidol tablet 5 mg    [DISCONTINUED] HYDROcodone-acetaminophen 5-325 mg per tablet 1 tablet    [DISCONTINUED] levalbuterol nebulizer solution 1.25 mg    [DISCONTINUED] lidocaine HCL 2% jelly    [DISCONTINUED] metoprolol  injection 5 mg    [DISCONTINUED] metoprolol succinate (TOPROL-XL) 24 hr tablet 25 mg    [DISCONTINUED] morphine injection 2 mg    [DISCONTINUED] norepinephrine 4 mg in dextrose 5 % 250 mL infusion    [DISCONTINUED] ondansetron injection 4 mg    [DISCONTINUED] pantoprazole EC tablet 40 mg    [DISCONTINUED] pantoprazole injection 40 mg    [DISCONTINUED] potassium chloride SA CR tablet 10 mEq    [DISCONTINUED] pregabalin capsule 75 mg    [DISCONTINUED] promethazine (PHENERGAN) 12.5 mg in dextrose 5 % 50 mL IVPB    [DISCONTINUED] propofol (DIPRIVAN) 10 mg/mL infusion    [DISCONTINUED] propofol (DIPRIVAN) 10 mg/mL infusion    [DISCONTINUED] propofol (DIPRIVAN) 10 mg/mL infusion    [DISCONTINUED] ranolazine 12 hr tablet 500 mg    [DISCONTINUED] sodium chloride 0.9% flush 10 mL    [DISCONTINUED] succinylcholine injection    [DISCONTINUED] tamsulosin 24 hr capsule 0.4 mg    [DISCONTINUED] ticagrelor tablet 90 mg    [DISCONTINUED] ticagrelor tablet 90 mg    [DISCONTINUED] zolpidem tablet 5 mg     Current Outpatient Prescriptions on File Prior to Encounter   Medication Sig    amitriptyline (ELAVIL) 50 MG tablet Take 1 tablet (50 mg total) by mouth every evening.    aspirin (ECOTRIN) 81 MG EC tablet Take 81 mg by mouth once daily.    atorvastatin (LIPITOR) 80 MG tablet Take 1 tablet (80 mg total) by mouth every evening.    cilostazol (PLETAL) 50 MG Tab Take 2 tablets (100 mg total) by mouth 2 (two) times daily.    cyclobenzaprine (FLEXERIL) 10 MG tablet Take 10 mg by mouth 3 (three) times daily as needed for Muscle spasms.    folic acid-vit B6-vit B12 2.5-25-2 mg (FOLBIC OR EQUIV) 2.5-25-2 mg Tab Take 1 tablet by mouth once daily.    gabapentin (NEURONTIN) 800 MG tablet Take 800 mg by mouth every evening.    lidocaine (XYLOCAINE) 5 % Oint ointment Apply topically 4 (four) times daily. Apply to entire left foot    metoprolol succinate (TOPROL-XL) 25 MG 24 hr tablet Take 0.5 tablets (12.5 mg total) by  mouth once daily.    nicotine (NICODERM CQ) 21 mg/24 hr Place 1 patch onto the skin once daily.    oxyCODONE (ROXICODONE) 10 mg Tab immediate release tablet Take 1 tablet (10 mg total) by mouth every 4 (four) hours as needed for Pain.    polyethylene glycol (GLYCOLAX) 17 gram PwPk Take 17 g by mouth 2 (two) times daily as needed (constipation).    pregabalin (LYRICA) 100 MG capsule Take 1 capsule (100 mg total) by mouth 3 (three) times daily.    ranolazine (RANEXA) 1,000 mg Tb12 Take 1,000 mg by mouth 2 (two) times daily.    senna-docusate 8.6-50 mg (PERICOLACE) 8.6-50 mg per tablet Take 1 tablet by mouth 2 (two) times daily.    thiamine 100 MG tablet Take 1 tablet (100 mg total) by mouth once daily.    ticagrelor (BRILINTA) 90 mg tablet Take 90 mg by mouth 2 (two) times daily.    zolpidem (AMBIEN) 5 MG Tab Take 1 tablet (5 mg total) by mouth nightly as needed.     Family History     None        Social History Main Topics    Smoking status: Former Smoker     Packs/day: 0.50     Quit date: 5/1/2018    Smokeless tobacco: Former User     Types: Chew     Quit date: 8/2/1980      Comment: Uses nicotine gum and nicotine patches    Alcohol use Yes      Comment: rarely    Drug use: No    Sexual activity: Yes     Partners: Female     Review of Systems   Unable to perform ROS: intubated     Objective:     Vital Signs (Most Recent):  Temp: 99.4 °F (37.4 °C) (06/16/18 1945)  Pulse: 82 (06/16/18 2109)  Resp: 19 (06/16/18 2109)  BP: 103/78 (06/16/18 2109)  SpO2: 100 % (06/16/18 2109) Vital Signs (24h Range):  Temp:  [94.6 °F (34.8 °C)-99.4 °F (37.4 °C)] 99.4 °F (37.4 °C)  Pulse:  [65-92] 82  Resp:  [11-33] 19  SpO2:  [96 %-100 %] 100 %  BP: ()/(40-78) 103/78     Weight: 62.5 kg (137 lb 12.6 oz)  Body mass index is 22.24 kg/m².    SpO2: 100 %  O2 Device (Oxygen Therapy): ventilator    No intake or output data in the 24 hours ending 06/16/18 2118    Lines/Drains/Airways     Central Venous Catheter Line                  Percutaneous Central Line Insertion/Assessment - triple lumen  06/16/18 0825 right subclavian less than 1 day         Trialysis (Dialysis) Catheter 06/16/18 2109 left internal jugular less than 1 day          Drain                 Urethral Catheter 06/16/18 0914 Latex 16 Fr. less than 1 day          Airway                 Airway - Non-Surgical 06/16/18 0756 Other (Comment) less than 1 day          Epidural Line                 Perineural Analgesia/Anesthesia Assessment (using dermatomes) Epidural 04/05/18 1115 72 days          Peripheral Intravenous Line                 Peripheral IV - Single Lumen 06/14/18 1642 Left Forearm 2 days                Physical Exam   Constitutional: He is oriented to person, place, and time. He appears well-developed and well-nourished.   HENT:   Head: Normocephalic and atraumatic.   Intubated, sedated     Eyes: Conjunctivae are normal. Pupils are equal, round, and reactive to light.   Neck: Normal range of motion. Neck supple. JVD present.   Cardiovascular: Normal rate, regular rhythm and normal heart sounds.  Exam reveals no gallop and no friction rub.    No murmur heard.  Pulmonary/Chest: Effort normal. No respiratory distress. He has no wheezes. He has rales. He exhibits no tenderness.   Abdominal: Soft. Bowel sounds are normal. He exhibits no distension. There is no tenderness.   Musculoskeletal: He exhibits no edema or tenderness.   R AKA  L TMA     Neurological: He is alert and oriented to person, place, and time.   Skin: Skin is dry. No erythema. No pallor.   Cool, cap refill > 3s       Significant Labs:   CMP   Recent Labs  Lab 06/16/18  0335 06/16/18  0836 06/16/18 2003   * 131* 128*   K 5.7* 7.1* 5.3*    97 95   CO2 13* 11* 19*   * 99 137*   BUN 14 17 23*   CREATININE 1.1 1.6* 2.2*   CALCIUM 9.1 8.5* 8.1*   PROT  --   --  6.2   ALBUMIN  --   --  2.8*   BILITOT  --   --  2.6*   ALKPHOS  --   --  212*   ALT  --   --  3,207*   ANIONGAP 19* 23* 14    ESTGFRAFRICA >60 58* 39.2*   EGFRNONAA >60 50* 33.9*   , CBC   Recent Labs  Lab 06/16/18  0836 06/16/18 2003 06/16/18 2058   WBC 8.87 17.86*  --    HGB 11.0* 10.8*  --    HCT 34.3* 32.2* 35*    180  --    , INR   Recent Labs  Lab 06/16/18 2009   INR 2.3*   , Lipid Panel No results for input(s): CHOL, HDL, LDLCALC, TRIG, CHOLHDL in the last 48 hours. and Troponin   Recent Labs  Lab 06/16/18  0836   TROPONINI 0.028*       Significant Imaging: Echocardiogram:   2D echo with color flow doppler:   Results for orders placed or performed during the hospital encounter of 03/18/18   2D echo with color flow doppler   Result Value Ref Range    EF 15 (A) 55 - 65    Mitral Valve Regurgitation MILD TO MODERATE    , EKG: nsr 1st degree avb and rbbb and X-Ray: CXR: X-Ray Chest 1 View (CXR): No results found for this visit on 06/16/18.    Assessment and Plan:     Cardiogenic shock    Not making any urine  Have consulted nephro for HD  CVP is 19  SVO2 apparently 62  CO 3.92  CI 2.29  SVR 1374  downtitrate levo  Continue dobut  No UO-- needs HD -- nephro consulted          Shock liver    Diuresis, fluid removal  Assess response with decongestion though insult is likely dual (congestive/hypotensive)          Lactic acidosis    4.1 improved from prior  Again, non-functioning kidneys along with shock liver so would need HD to clear        Hyperkalemia    Shifted at [facility]  Now 5.4 but with no UO would still need HD as this is bound to go up        COPD, severe    Currently intubated/sedated  Prn nebs        Acute renal failure    Likely ATN due to ADHF and ischemic insult 2/2 hypotension  trialysis placed, position confirmed on xr, manometry, and ultrasound        Leukocytosis    Unclear etiology  Will cover with 1 dose of vanc/cefepime for now --may redose with further diuresis  Blood/wound cultures  Urinalysis  cxr without pna findings        Alcohol abuse    On propofol  Resistant to sedation, started thrashing  during line placement  Ativan iv prn agitation/withdrawal        Tobacco abuse    Nicotine patch        Peripheral vascular disease of lower extremity    Continue asa/brilinta  Lactate improved to 4.1 since last check            VTE Risk Mitigation         Ordered     IP VTE HIGH RISK PATIENT  Once      06/16/18 2001     Place sequential compression device  Until discontinued      06/16/18 2001     Place EMIGDIO hose  Until discontinued      06/16/18 2001        Discussed with staff  Brian Oswald MD  Cardiology   [facility]

## 2018-06-17 NOTE — SUBJECTIVE & OBJECTIVE
Past Medical History:   Diagnosis Date    AICD (automatic cardioverter/defibrillator) present     RYAN (acute kidney injury) 3/25/2018    CHF (congestive heart failure)     COPD with acute bronchitis 6/12/2018    Coronary artery disease     Encounter for blood transfusion     Hyperlipemia     Hypertension     MI (myocardial infarction)     Neuropathy     PAD (peripheral artery disease)     PVD (peripheral vascular disease)        Past Surgical History:   Procedure Laterality Date    AMPUTATION Right     great toe    BYBPASS GRAFT AORTOBIUFEMORAL      CARDIAC DEFIBRILLATOR PLACEMENT      coronary stents      EXPLORATION AND EVaCUATION OF HEMATOMA OF UPPER EXTREMITY Left     KNEE ARTHROPLASTY Left     VASCULAR SURGERY      fem-pop bypass       Review of patient's allergies indicates:  No Known Allergies  Current Facility-Administered Medications   Medication Frequency    0.9%  NaCl infusion (CRRT USE ONLY) Continuous    acetaminophen tablet 650 mg Q8H PRN    albuterol-ipratropium 2.5 mg-0.5 mg/3 mL nebulizer solution 3 mL Q4H PRN    aspirin chewable tablet 81 mg Daily    atorvastatin tablet 80 mg Daily    ceFEPIme injection 2 g Q12H    chlordiazepoxide capsule 50 mg Q8H    Followed by    [START ON 6/19/2018] chlordiazepoxide capsule 25 mg Q6H    Followed by    [START ON 6/21/2018] chlordiazepoxide capsule 10 mg Q6H    Followed by    [START ON 6/23/2018] chlordiazepoxide capsule 10 mg BID    collagenase ointment Daily    DOBUTamine 500mg in D5W 250mL infusion (premix) (NON-TITRATING) Continuous    gentamicin 0.1 % ointment Daily    HYDROcodone-acetaminophen 5-325 mg per tablet 1 tablet Q4H PRN    levalbuterol nebulizer solution 1.25 mg Q6H PRN    lidocaine HCL 2% jelly PRN    lorazepam injection 2 mg Q2H PRN    magnesium oxide tablet 800 mg PRN    magnesium oxide tablet 800 mg PRN    magnesium sulfate 2g in water 50mL IVPB (premix) PRN    morphine injection 2 mg Q4H PRN     nicotine 14 mg/24 hr 1 patch Daily    ondansetron injection 4 mg Q8H PRN    pantoprazole 40 mg in dextrose 5 % 100 mL infusion (ready to mix system) Daily    pneumoc 13-osman conj-dip cr(PF) 0.5 mL Prior to discharge    potassium chloride 10% oral solution 40 mEq PRN    potassium chloride 10% oral solution 40 mEq PRN    potassium chloride 10% oral solution 60 mEq PRN    potassium, sodium phosphates 280-160-250 mg packet 2 packet PRN    potassium, sodium phosphates 280-160-250 mg packet 2 packet PRN    potassium, sodium phosphates 280-160-250 mg packet 2 packet PRN    pregabalin capsule 75 mg BID    propofol (DIPRIVAN) 10 mg/mL infusion Continuous    senna-docusate 8.6-50 mg per tablet 1 tablet BID    sodium chloride 0.9% flush 3 mL Q8H    sodium phosphate 20.01 mmol in dextrose 5 % 250 mL IVPB PRN    sodium phosphate 30 mmol in dextrose 5 % 250 mL IVPB PRN    sodium phosphate 39.99 mmol in dextrose 5 % 250 mL IVPB PRN    ticagrelor tablet 90 mg BID     Family History     None        Social History Main Topics    Smoking status: Former Smoker     Packs/day: 0.50     Quit date: 5/1/2018    Smokeless tobacco: Former User     Types: Chew     Quit date: 8/2/1980      Comment: Uses nicotine gum and nicotine patches    Alcohol use Yes      Comment: rarely    Drug use: No    Sexual activity: Yes     Partners: Female     Review of Systems   Unable to perform ROS: Intubated     Objective:     Vital Signs (Most Recent):  Temp: 98.4 °F (36.9 °C) (06/17/18 1100)  Pulse: 82 (06/17/18 1220)  Resp: 19 (06/17/18 1220)  BP: 100/64 (06/17/18 1220)  SpO2: 100 % (06/17/18 1220)  O2 Device (Oxygen Therapy): ventilator (06/17/18 1145) Vital Signs (24h Range):  Temp:  [96.8 °F (36 °C)-99.4 °F (37.4 °C)] 98.4 °F (36.9 °C)  Pulse:  [66-82] 82  Resp:  [16-33] 19  SpO2:  [99 %-100 %] 100 %  BP: ()/(49-78) 100/64     Weight: 62.5 kg (137 lb 12.6 oz) (06/17/18 0200)  Body mass index is 22.24 kg/m².  Body surface area is  1.71 meters squared.    I/O last 3 completed shifts:  In: 2059.4 [I.V.:1809.4; IV Piggyback:250]  Out: 3163 [Urine:902; Other:2261]    Physical Exam   HENT:   Head: Atraumatic.   Neck: No JVD present.   Cardiovascular: Normal rate and regular rhythm.  Exam reveals no friction rub.    Pulmonary/Chest: Effort normal. He has rales.   Abdominal: Soft. He exhibits no distension.   Musculoskeletal: He exhibits edema.   Skin: Skin is warm.

## 2018-06-17 NOTE — HPI
47 year old with PMH of ICM (LVEF 30-35%) s/p ICD, CAD s/p PCI, HTN, HLD, current smoker, PAD (s/p aortobifemoral bypass, L SFA and pop PTAS in September and recent R SFA and R pop 3/8 by Dr. Carl Bell and finally R AKA 3/2018.     He present ed to ED with about 2 week h/o SOB, cough, weight gain. He presented to [facility] ED on Saturday. Sent home after presumed negative work up. Presented once again overnight 6/13. BNP 2400, congested CXR, cough, no fever similar to prior CHF exacerbations. LActic acid elevated. He was admitted with COPD and sepsis. He was started on zmax and vanc and given a fluid bolus in ED.  He was found to not be septic the next day and all abx were stopped.  Unfortunately, though his UO was quite good and he was net - 2000cc on 6/16am, he progressively worsened from a respiratory status and became progressively hypotensive.  He was subsequently intubated and started on levo/dobutrex.  Lactate 10, k 7.1, creatinine now 1.8 from baseline 0.8, poor/no UO.  Transfer[place] [facility] [place] level of care.    Nephrology consulted for RYAN.

## 2018-06-17 NOTE — ASSESSMENT & PLAN NOTE
Likely ATN due to ADHF and ischemic insult 2/2 hypotension  trialysis placed, position confirmed on xr, manometry, and ultrasound

## 2018-06-17 NOTE — HPI
47 year old with PMH of ICM (LVEF 30-35%) s/p ICD, CAD s/p PCI, HTN, HLD, current smoker, PAD (s/p aortobifemoral bypass, L SFA and pop PTAS in September and recent R SFA and R pop 3/8 by Dr. Carl Bell and finally R AKA 3/2018.    He presented to ED with about 2 week h/o SOB, cough, weight gain. He presented to [facility] ED on Saturday. Sent home after presumed negative work up. Presented once again overnight 6/13. BNP 2400, congested CXR, cough, no fever similar to prior CHF exacerbations. LActic acid elevated. He was admitted with COPD and sepsis. He was started on zmax and vanc and given a fluid bolus in ED.  He was found to not be septic the next day and all abx were stopped.  Unfortunately, though his UO was quite good and he was net - 2000cc on 6/16am, he progressively worsened from a respiratory status and became progressively hypotensive.  He was subsequently intubated and started on levo/dobutrex.  Lactate 10, k 7.1, creatinine now 1.8 from baseline 0.8, poor/no UO.  Transferred to [facility] [place] for higher level of care.

## 2018-06-17 NOTE — PROGRESS NOTES
CRRT NOTES    0430H patient was restarted to CRRT SLED x 12hrs, UF set at 250/hr. Lines are visible and secured, alarms engaged. For continuity of care.

## 2018-06-17 NOTE — PROGRESS NOTES
0800: TMP in 230-240s. Amandeep Carmen RN updated. Patient rinsed back per recommendation  0820: CRRT restarted per Amandeep Carmen RN

## 2018-06-17 NOTE — ASSESSMENT & PLAN NOTE
4.1 improved from prior  Again, non-functioning kidneys along with shock liver so would need HD to clear

## 2018-06-18 PROBLEM — R23.9 ALTERATION IN SKIN INTEGRITY RELATED TO SURGICAL INCISION: Status: ACTIVE | Noted: 2018-01-01

## 2018-06-18 PROBLEM — G93.40 ENCEPHALOPATHY: Status: ACTIVE | Noted: 2018-01-01

## 2018-06-18 PROBLEM — Z99.11 DEPENDENCE ON VENTILATOR, STATUS: Status: ACTIVE | Noted: 2018-01-01

## 2018-06-18 NOTE — PLAN OF CARE
06/18/18 1713   Readmission Questionnaire   At the time of your discharge, did someone talk to you about what your health problems were? No  (Transferred from OSH. N/A)   Lives With sibling(s);parent(s)  (Father, brother)   Who are your caregiver(s) and their phone number(s)? (EfrenJoseluis rubio Father [phone]   )

## 2018-06-18 NOTE — PROGRESS NOTES
[facility]  Nephrology  Progress Note    Patient Name: Leonardo Byrd  MRN: [id number]  Admission Date: 6/16/2018  Hospital Length of Stay: 2 days  Attending Provider: Sae Carr MD   Primary Care Physician: Indio Aquino MD  Principal Problem:Cardiogenic shock    Subjective:     HPI: 47 year old with PMH of ICM (LVEF 30-35%) s/p ICD, CAD s/p PCI, HTN, HLD, current smoker, PAD (s/p aortobifemoral bypass, L SFA and pop PTAS in September and recent R SFA and R pop 3/8 by Dr. Carl Bell and finally R AKA 3/2018.     He present ed to ED with about 2 week h/o SOB, cough, weight gain. He presented to [facility] ED on Saturday. Sent home after presumed negative work up. Presented once again overnight 6/13. BNP 2400, congested CXR, cough, no fever similar to prior CHF exacerbations. LActic acid elevated. He was admitted with COPD and sepsis. He was started on zmax and vanc and given a fluid bolus in ED.  He was found to not be septic the next day and all abx were stopped.  Unfortunately, though his UO was quite good and he was net - 2000cc on 6/16am, he progressively worsened from a respiratory status and became progressively hypotensive.  He was subsequently intubated and started on levo/dobutrex.  Lactate 10, k 7.1, creatinine now 1.8 from baseline 0.8, poor/no UO.  Transferred to [facility] for higher level of care.    Nephrology consulted for RYAN.        Review of patient's allergies indicates:  No Known Allergies  Current Facility-Administered Medications   Medication Frequency    acetaminophen tablet 650 mg Q8H PRN    albuterol-ipratropium 2.5 mg-0.5 mg/3 mL nebulizer solution 3 mL Q4H PRN    aspirin chewable tablet 81 mg Daily    atorvastatin tablet 80 mg Daily    ceFEPIme injection 2 g Q12H    chlorhexidine 0.12 % solution 15 mL BID    collagenase ointment Daily    DOBUTamine 500mg in D5W 250mL infusion (premix) (NON-TITRATING) Continuous    gentamicin 0.1 % ointment Daily     HYDROcodone-acetaminophen 5-325 mg per tablet 1 tablet Q4H PRN    lactulose 20 gram/30 mL solution Soln 10 g Q6H    levalbuterol nebulizer solution 1.25 mg Q6H PRN    lidocaine HCL 2% jelly PRN    lorazepam injection 2 mg Q2H PRN    magnesium oxide tablet 800 mg PRN    magnesium oxide tablet 800 mg PRN    morphine injection 2 mg Q4H PRN    nicotine 14 mg/24 hr 1 patch Daily    norepinephrine 4 mg in dextrose 5% 250 mL infusion (premix) (titrating) Continuous    ondansetron injection 4 mg Q8H PRN    pantoprazole 40 mg in dextrose 5 % 100 mL infusion (ready to mix system) Daily    pneumoc 13-osman conj-dip cr(PF) 0.5 mL Prior to discharge    potassium chloride 10% oral solution 40 mEq PRN    potassium chloride 10% oral solution 40 mEq PRN    potassium chloride 10% oral solution 60 mEq PRN    potassium, sodium phosphates 280-160-250 mg packet 2 packet PRN    potassium, sodium phosphates 280-160-250 mg packet 2 packet PRN    potassium, sodium phosphates 280-160-250 mg packet 2 packet PRN    pregabalin capsule 75 mg BID    senna-docusate 8.6-50 mg per tablet 1 tablet BID    sodium chloride 0.9% flush 3 mL Q8H    ticagrelor tablet 90 mg BID    vancomycin 750 mg in normal saline 250 mL IVPB (ready to mix system) Q12H       Objective:     Vital Signs (Most Recent):  Temp: 98.1 °F (36.7 °C) (06/18/18 1100)  Pulse: 74 (06/18/18 1200)  Resp: 14 (06/18/18 1200)  BP: 95/61 (06/18/18 1200)  SpO2: 100 % (06/18/18 1200)  O2 Device (Oxygen Therapy): ventilator (06/18/18 1148) Vital Signs (24h Range):  Temp:  [97.9 °F (36.6 °C)-98.7 °F (37.1 °C)] 98.1 °F (36.7 °C)  Pulse:  [72-84] 74  Resp:  [14-20] 14  SpO2:  [100 %] 100 %  BP: ()/(52-72) 95/61     Weight: 62.5 kg (137 lb 12.6 oz) (06/17/18 0200)  Body mass index is 22.24 kg/m².  Body surface area is 1.71 meters squared.    I/O last 3 completed shifts:  In: 3818.9 [I.V.:3258.9; NG/GT:310; IV Piggyback:250]  Out: 9440 [Urine:4522; Other:4918]    Physical  Exam   Eyes: EOM are normal.   Neck: No JVD present.   Cardiovascular: Normal rate and regular rhythm.  Exam reveals no friction rub.    Pulmonary/Chest: Effort normal. He has rales.   Abdominal: Soft. He exhibits no distension.   Musculoskeletal: He exhibits edema.   Skin: Skin is warm.           Assessment/Plan:     Acute renal failure    RYAN with no previously reported and underlying CKI (reviewing old labs and talking to son at bedside), on presentation with reported anuria and SLED was started, however overnight he has made slose to 1L of urine on his own and he's not on diuretics, RYAN, likely 2ndary iATN and related to cardiogenic shock, K 7.1 on presentation (shifted down to 5.3 by primary team), CXR with slight congestion, +3 blood on UA, probably Evans related    This morning noted with 2,700ccs of urine output overnight, CVP is 6, d/w primary team and goal is more in the 8-10 range at this time    Plan/Recommendations:  1) hold diuretics and monitor what he does on his own  2) follow serial RFPs and strict Is & Os  3) no need for RRT                Thank you for your consult. I will follow-up with patient. Please contact us if you have any additional questions.    Chris Ross MD  Nephrology  [facility]

## 2018-06-18 NOTE — ASSESSMENT & PLAN NOTE
RYAN with no previously reported and underlying CKI (reviewing old labs and talking to son at bedside), on presentation with reported anuria and SLED was started, however overnight he has made slose to 1L of urine on his own and he's not on diuretics, RYAN, likely 2ndary iATN and related to cardiogenic shock, K 7.1 on presentation (shifted down to 5.3 by primary team), CXR with slight congestion, +3 blood on UA, probably Evans related    This morning noted with 2,700ccs of urine output overnight, CVP is 6, d/w primary team and goal is more in the 8-10 range at this time    Plan/Recommendations:  1) hold diuretics and monitor what he does on his own  2) follow serial RFPs and strict Is & Os  3) no need for RRT

## 2018-06-18 NOTE — PROGRESS NOTES
Pulmonary / Critical Care Medicine  Progress Note  S: No major issues overnight.  No longer requiring vasopressors; only on a low dose of dobutamine.  Sedation held for several hours, but still not arouseable.       O: VS: Temp:  [97.9 °F (36.6 °C)-98.7 °F (37.1 °C)]   Pulse:  [72-84]   Resp:  [14-20]   BP: ()/(54-72)   SpO2:  [100 %]     I/O:   Intake           871.53 ml   Output             5768 ml   Net         -4896.47 ml       Vent: Mode A/C / Rate 16 /  / PEEP 5 / FiO2 40%    PE intubated, no distress, not arousable  limited visualization due to endotracheal tube  conjunctivae/corneas clear. PERRL.  regular rate and rhythm, S1, S2 normal, no murmur  clear to auscultation bilaterally, normal respiratory effort and normal percussion bilaterally  soft, non-tender non-distended; bowel sounds normal  warm, well perfused and no cyanosis or edema, or clubbing    Lines Right sub-clavian TLC; Day #2  Left IJ dialysis catheter; Day #1  PIV; Day #3  Evans; Day #2  OGT; Day #1  ETT; Day #2    Labs   WBC 11.28     RBC 3.36     HGB 10.7     HCT 31.5          MCV 94     MCH 31.8*     MCHC 34.0          K 4.2          CO2 24     BUN 12     CREATININE 1.4     MG 1.8     ALT 3,882     AST 6,374     ALKPHOS 229     BILITOT 2.7     PROT 5.7     ALBUMIN 2.5     PH   7.480   PCO2   34.8   PO2   33   HCO3   25.9   POCSATURATED   69   BE   2   Ammonia 73     BNP 1811 2621 2452   Lactate 4.1 2.1        Imaging CXR 06/18/2018 Cardiomegally with opacification at both bases and possible pleural fluid on the right.  Appropriately placed support devices.       Micro Blood Cx 6/16 6/16 6/12 6/12 NGTD  NGTD  No growth at 5 days  No growth at 5 days   Sputum Cx  None collected       Medications Scheduled    aspirin  81 mg Per OG tube Daily    atorvastatin  80 mg Per OG tube Daily    ceFEPime   2 g Intravenous Q12H    chlorhexidine  15 mL Mouth/Throat BID    collagenase   Topical (Top) Daily     gentamicin   Topical (Top) Daily    nicotine  1 patch Transdermal Daily    pantoprazole  40 mg Intravenous Daily    pregabalin  75 mg Per OG tube BID    senna-docusate 8.6-50 mg  1 tablet Per OG tube BID    sodium chloride 0.9%  3 mL Intravenous Q8H    ticagrelor  90 mg Per OG tube BID      Continuous Infusions:    DOBUTamine 2.5 mcg/kg/min (06/18/18 0600)    norepinephrine  Stopped (06/18/18 0400)      PRN   acetaminophen, albuterol-ipratropium, HYDROcodone-acetaminophen, levalbuterol, lidocaine HCL 2%, lorazepam, magnesium oxide, magnesium oxide, morphine, ondansetron, pneumoc 13-osman conj-dip cr(PF), potassium chloride 10%, potassium chloride 10%, potassium chloride 10%, potassium, sodium phosphates, potassium, sodium phosphates, potassium, sodium phosphates   Enteral Feeds   @ 40 cc/hr           A/P:  1. Acute hypoxemic respiratory failure.  2. Questionable resolved septic shock  3. Suspected hospital acquired pneumonia  4. Acute decompensated heart failure  5. Cardiogenic versus septic shock  6. Reported history of chronic obstructive pulmonary disease; without acute exacerbation  7. Acute metabolic encephalopathy  8. Shock liver with mildly elevate ammonia  · Respiratory failure has resolved.  Unclear what degree of cardiogenic pulmonary edema versus pulmonary disease was contributing to respiratory failure initially.  · Cardiogenic shock improving.  Remains on low dose dobutamine.  · Continue cefepime for now.  No revealing culture data up to this point.  Consider obtaining respiratory cultures if possible.  · Consider empiric treatment for hepatic encephalopathy given difficulty with awakening despite sedation holiday.  · Continue on mechanical ventilation until mentation improves.  · Will continue to follow and attempt SBT once encephalopathy has improved.    Zachary Bowman MD  [employer] Pulmonary / Critical Care Fellow  [phone]  06/18/2018  11:47 AM

## 2018-06-18 NOTE — SIGNIFICANT EVENT
Have been called about agitation which is likely 2/2 DTs. With his shocked liver benzos are unable to be used. Will start on a precedex gtt for tonight order placed.    Adrianna Pruitt MD  Cardiology Fellow  Pager [phone]

## 2018-06-18 NOTE — ASSESSMENT & PLAN NOTE
Likely due to sedation and receiving Ativan vs DT  Will do SAT today. Librium when patient awakens.  Ammonia obtained yesterday slightly elevated at 72 in setting of acute liver failure due to shock liver. Improving. Will likely not benefit from lactulose in absence of cirrhosis and improving LFT's.

## 2018-06-18 NOTE — ASSESSMENT & PLAN NOTE
R/O sepsis   Unclear etiology. Could be stress-related. WBC down trending. Blood cultures have been negative. Afebrile.  MRSA grew from right thigh stump ulcer. Contact isolation for now.   On Cefepime 2 q12. Restarting vancomycin given hypotension.

## 2018-06-18 NOTE — SUBJECTIVE & OBJECTIVE
Review of patient's allergies indicates:  No Known Allergies  Current Facility-Administered Medications   Medication Frequency    acetaminophen tablet 650 mg Q8H PRN    albuterol-ipratropium 2.5 mg-0.5 mg/3 mL nebulizer solution 3 mL Q4H PRN    aspirin chewable tablet 81 mg Daily    atorvastatin tablet 80 mg Daily    ceFEPIme injection 2 g Q12H    chlorhexidine 0.12 % solution 15 mL BID    collagenase ointment Daily    DOBUTamine 500mg in D5W 250mL infusion (premix) (NON-TITRATING) Continuous    gentamicin 0.1 % ointment Daily    HYDROcodone-acetaminophen 5-325 mg per tablet 1 tablet Q4H PRN    lactulose 20 gram/30 mL solution Soln 10 g Q6H    levalbuterol nebulizer solution 1.25 mg Q6H PRN    lidocaine HCL 2% jelly PRN    lorazepam injection 2 mg Q2H PRN    magnesium oxide tablet 800 mg PRN    magnesium oxide tablet 800 mg PRN    morphine injection 2 mg Q4H PRN    nicotine 14 mg/24 hr 1 patch Daily    norepinephrine 4 mg in dextrose 5% 250 mL infusion (premix) (titrating) Continuous    ondansetron injection 4 mg Q8H PRN    pantoprazole 40 mg in dextrose 5 % 100 mL infusion (ready to mix system) Daily    pneumoc 13-osman conj-dip cr(PF) 0.5 mL Prior to discharge    potassium chloride 10% oral solution 40 mEq PRN    potassium chloride 10% oral solution 40 mEq PRN    potassium chloride 10% oral solution 60 mEq PRN    potassium, sodium phosphates 280-160-250 mg packet 2 packet PRN    potassium, sodium phosphates 280-160-250 mg packet 2 packet PRN    potassium, sodium phosphates 280-160-250 mg packet 2 packet PRN    pregabalin capsule 75 mg BID    senna-docusate 8.6-50 mg per tablet 1 tablet BID    sodium chloride 0.9% flush 3 mL Q8H    ticagrelor tablet 90 mg BID    vancomycin 750 mg in normal saline 250 mL IVPB (ready to mix system) Q12H       Objective:     Vital Signs (Most Recent):  Temp: 98.1 °F (36.7 °C) (06/18/18 1100)  Pulse: 74 (06/18/18 1200)  Resp: 14 (06/18/18 1200)  BP:  95/61 (06/18/18 1200)  SpO2: 100 % (06/18/18 1200)  O2 Device (Oxygen Therapy): ventilator (06/18/18 1148) Vital Signs (24h Range):  Temp:  [97.9 °F (36.6 °C)-98.7 °F (37.1 °C)] 98.1 °F (36.7 °C)  Pulse:  [72-84] 74  Resp:  [14-20] 14  SpO2:  [100 %] 100 %  BP: ()/(52-72) 95/61     Weight: 62.5 kg (137 lb 12.6 oz) (06/17/18 0200)  Body mass index is 22.24 kg/m².  Body surface area is 1.71 meters squared.    I/O last 3 completed shifts:  In: 3818.9 [I.V.:3258.9; NG/GT:310; IV Piggyback:250]  Out: 9440 [Urine:4522; Other:4918]    Physical Exam   Eyes: EOM are normal.   Neck: No JVD present.   Cardiovascular: Normal rate and regular rhythm.  Exam reveals no friction rub.    Pulmonary/Chest: Effort normal. He has rales.   Abdominal: Soft. He exhibits no distension.   Musculoskeletal: He exhibits edema.   Skin: Skin is warm.

## 2018-06-18 NOTE — ASSESSMENT & PLAN NOTE
-likely due to volume overload when received IVF for suspected sepsis on initial presentation.   - CO 4.6 and CI 2.8.   - has been having good UOP. Put out 3L total urine with lasix 160mg IV in addition to 3L on SLED. SLED discontinued overnight. Currently on IV lasix 100mg bid. Got one dose this am and put out 3L resulting in drop of CVP to 6 mmHg. Will hold off on diuresis for now.  - Nephrology following, appreciate assistance.   - required Levo this am shortly, propofol gtt stopped this AM for SAT; goal MAP of 60-65. MAP this am was 71.  - Continue dobutamine gtt currently at 2.5.

## 2018-06-18 NOTE — PROGRESS NOTES
[facility]  Cardiology  Progress Note    Patient Name: Leonardo Byrd  MRN: [id number]  Admission Date: 6/16/2018  Hospital Length of Stay: 2 days  Code Status: Full Code   Attending Physician: Sae Carr MD   Primary Care Physician: Indio Aquino MD  Expected Discharge Date:   Principal Problem:Cardiogenic shock    Subjective:     Hospital Course:   No notes on file    Interval History:   Agitated overnight and received IV ativan. Required minimal amount of levophed this am currently off levo and sedation.    ROS   Unable to obtain given patient's factors.    Objective:     Vital Signs (Most Recent):  Temp: 98.1 °F (36.7 °C) (06/18/18 0300)  Pulse: 78 (06/18/18 0932)  Resp: 14 (06/18/18 0932)  BP: 94/60 (06/18/18 0932)  SpO2: 100 % (06/18/18 0932) Vital Signs (24h Range):  Temp:  [97.9 °F (36.6 °C)-98.7 °F (37.1 °C)] 98.1 °F (36.7 °C)  Pulse:  [72-84] 78  Resp:  [14-20] 14  SpO2:  [100 %] 100 %  BP: ()/(54-72) 94/60     Weight: 62.5 kg (137 lb 12.6 oz)  Body mass index is 22.24 kg/m².     SpO2: 100 %  O2 Device (Oxygen Therapy): ventilator      Intake/Output Summary (Last 24 hours) at 06/18/18 1129  Last data filed at 06/18/18 1100   Gross per 24 hour   Intake           811.53 ml   Output             5768 ml   Net         -4956.47 ml       Lines/Drains/Airways     Central Venous Catheter Line                 Percutaneous Central Line Insertion/Assessment - triple lumen  06/16/18 0825 right subclavian 2 days         Trialysis (Dialysis) Catheter 06/16/18 2109 left internal jugular 1 day          Drain                 Urethral Catheter 06/16/18 0914 Latex 16 Fr. 2 days         NG/OG Tube 06/16/18 2000 1 day          Airway                 Airway - Non-Surgical 06/16/18 0756 Endotracheal Tube 2 days          Epidural Line                 Perineural Analgesia/Anesthesia Assessment (using dermatomes) Epidural 04/05/18 1115 74 days          Peripheral Intravenous Line                  Peripheral IV - Single Lumen 06/14/18 1642 Left Forearm 3 days                Physical Exam   General: well developed, well nourished, appears to be in NAD. Sedated and intubated. Withdraws to pain.   Eyes: conjunctivae/corneas clear.   Neck: supple, symmetrical, trachea midline,   Cardiovascular: Heart: regular rate and rhythm, S1, S2 normal, no murmur  Lungs: clear to auscultation bilaterally and normal respiratory effort  Chest Wall: no tenderness  Abdomen/Rectal: Abdomen: Non distended. + BS. No masses. No TTP.   Extremities: + lower ext edema, no redness or tenderness in the calves or thighs. Pulses: 2+ and symmetric  Skin: Skin color, texture, turgor normal. No rashes or lesions  Musculoskeletal: full range of motion of joints  Lymph Nodes: No cervical or supraclavicular adenopathy  Psych/Behavioral: sedated and follows commands partially.     Significant Labs:   Recent Results (from the past 24 hour(s))   Renal function panel    Collection Time: 06/17/18  1:38 PM   Result Value Ref Range    Glucose 81 70 - 110 mg/dL    Sodium 138 136 - 145 mmol/L    Potassium 3.8 3.5 - 5.1 mmol/L    Chloride 107 95 - 110 mmol/L    CO2 25 23 - 29 mmol/L    BUN, Bld 6 6 - 20 mg/dL    Calcium 7.8 (L) 8.7 - 10.5 mg/dL    Creatinine 0.8 0.5 - 1.4 mg/dL    Albumin 2.5 (L) 3.5 - 5.2 g/dL    Phosphorus 1.4 (L) 2.7 - 4.5 mg/dL    eGFR if African American >60.0 >60 mL/min/1.73 m^2    eGFR if non African American >60.0 >60 mL/min/1.73 m^2    Anion Gap 6 (L) 8 - 16 mmol/L   Magnesium    Collection Time: 06/17/18  1:38 PM   Result Value Ref Range    Magnesium 1.9 1.6 - 2.6 mg/dL   Ammonia    Collection Time: 06/17/18  1:38 PM   Result Value Ref Range    Ammonia 73 (H) 10 - 50 umol/L   Protein / creatinine ratio, urine    Collection Time: 06/17/18  4:09 PM   Result Value Ref Range    Protein, Urine Random 172 (H) 0 - 15 mg/dL    Creatinine, Random Ur 97.0 23.0 - 375.0 mg/dL    Prot/Creat Ratio, Ur 1.77 (H) 0.00 - 0.20   Sodium, urine,  random    Collection Time: 06/17/18  4:09 PM   Result Value Ref Range    Sodium Urine Random 54 20 - 250 mmol/L   Creatinine, urine, random    Collection Time: 06/17/18  4:09 PM   Result Value Ref Range    Creatinine, Random Ur 97.0 23.0 - 375.0 mg/dL   Urinalysis    Collection Time: 06/17/18  4:09 PM   Result Value Ref Range    Specimen UA Urine, Catheterized     Color, UA Destini Yellow, Straw, Destini    Appearance, UA Cloudy (A) Clear    pH, UA 7.0 5.0 - 8.0    Specific Gravity, UA 1.015 1.005 - 1.030    Protein, UA 2+ (A) Negative    Glucose, UA Negative Negative    Ketones, UA Negative Negative    Bilirubin (UA) 1+ (A) Negative    Occult Blood UA 2+ (A) Negative    Nitrite, UA Negative Negative    Urobilinogen, UA 4.0 <2.0 EU/dL    Leukocytes, UA Negative Negative   Urinalysis Microscopic    Collection Time: 06/17/18  4:09 PM   Result Value Ref Range    RBC, UA 3 0 - 4 /hpf    WBC, UA 2 0 - 5 /hpf    Bacteria, UA Many (A) None-Occ /hpf    Squam Epithel, UA 1 /hpf    Hyaline Casts, UA 0 0-1/lpf /lpf    Granular Casts, UA 9 (A) None /lpf    Microscopic Comment SEE COMMENT    2D echo with color flow doppler    Collection Time: 06/17/18  6:05 PM   Result Value Ref Range    EF 13 (A) 55 - 65    Mitral Valve Regurgitation MODERATE (A)     Diastolic Dysfunction Yes (A)     Est. PA Systolic Pressure 45.25 (A)     Mitral Valve Mobility NORMAL     Tricuspid Valve Regurgitation MILD    Vancomycin, random    Collection Time: 06/18/18 12:09 AM   Result Value Ref Range    Vancomycin, Random 10.5 Not established ug/mL   Potassium    Collection Time: 06/18/18 12:09 AM   Result Value Ref Range    Potassium 3.6 3.5 - 5.1 mmol/L   CBC auto differential    Collection Time: 06/18/18  3:33 AM   Result Value Ref Range    WBC 11.28 3.90 - 12.70 K/uL    RBC 3.36 (L) 4.60 - 6.20 M/uL    Hemoglobin 10.7 (L) 14.0 - 18.0 g/dL    Hematocrit 31.5 (L) 40.0 - 54.0 %    MCV 94 82 - 98 fL    MCH 31.8 (H) 27.0 - 31.0 pg    MCHC 34.0 32.0 - 36.0 g/dL     RDW 20.1 (H) 11.5 - 14.5 %    Platelets 135 (L) 150 - 350 K/uL    MPV 12.4 9.2 - 12.9 fL    Immature Granulocytes 0.9 (H) 0.0 - 0.5 %    Gran # (ANC) 9.2 (H) 1.8 - 7.7 K/uL    Immature Grans (Abs) 0.10 (H) 0.00 - 0.04 K/uL    Lymph # 1.2 1.0 - 4.8 K/uL    Mono # 0.4 0.3 - 1.0 K/uL    Eos # 0.3 0.0 - 0.5 K/uL    Baso # 0.05 0.00 - 0.20 K/uL    nRBC 1 (A) 0 /100 WBC    Gran% 81.5 (H) 38.0 - 73.0 %    Lymph% 10.6 (L) 18.0 - 48.0 %    Mono% 3.6 (L) 4.0 - 15.0 %    Eosinophil% 3.0 0.0 - 8.0 %    Basophil% 0.4 0.0 - 1.9 %    Differential Method Automated    Comprehensive metabolic panel - if not done in ED    Collection Time: 06/18/18  3:33 AM   Result Value Ref Range    Sodium 138 136 - 145 mmol/L    Potassium 4.2 3.5 - 5.1 mmol/L    Chloride 104 95 - 110 mmol/L    CO2 24 23 - 29 mmol/L    Glucose 94 70 - 110 mg/dL    BUN, Bld 12 6 - 20 mg/dL    Creatinine 1.4 0.5 - 1.4 mg/dL    Calcium 7.6 (L) 8.7 - 10.5 mg/dL    Total Protein 5.7 (L) 6.0 - 8.4 g/dL    Albumin 2.5 (L) 3.5 - 5.2 g/dL    Total Bilirubin 2.7 (H) 0.1 - 1.0 mg/dL    Alkaline Phosphatase 229 (H) 55 - 135 U/L    AST 6,374 (H) 10 - 40 U/L    ALT 3,882 (H) 10 - 44 U/L    Anion Gap 10 8 - 16 mmol/L    eGFR if African American >60.0 >60 mL/min/1.73 m^2    eGFR if non  58.6 (A) >60 mL/min/1.73 m^2   Magnesium - if not done in ED    Collection Time: 06/18/18  3:33 AM   Result Value Ref Range    Magnesium 1.8 1.6 - 2.6 mg/dL   Phosphorus - if not done in ED    Collection Time: 06/18/18  3:33 AM   Result Value Ref Range    Phosphorus 2.7 2.7 - 4.5 mg/dL   ISTAT PROCEDURE    Collection Time: 06/18/18  3:42 AM   Result Value Ref Range    POC PH 7.519 (H) 7.35 - 7.45    POC PCO2 31.3 (L) 35 - 45 mmHg    POC PO2 96 80 - 100 mmHg    POC HCO3 25.5 24 - 28 mmol/L    POC BE 3 -2 to 2 mmol/L    POC SATURATED O2 98 95 - 100 %    POC TCO2 26 23 - 27 mmol/L    Rate 16     Sample ARTERIAL     Site RR     Allens Test Pass     DelSys Adult Vent     Mode AC/PRVC      Vt 450     PEEP 5     FiO2 40     Sp02 100    ISTAT PROCEDURE    Collection Time: 06/18/18  3:48 AM   Result Value Ref Range    POC PH 7.480 (H) 7.35 - 7.45    POC PCO2 34.8 (L) 35 - 45 mmHg    POC PO2 33 (LL) 40 - 60 mmHg    POC HCO3 25.9 24 - 28 mmol/L    POC BE 2 -2 to 2 mmol/L    POC SATURATED O2 69 (L) 95 - 100 %    POC TCO2 27 24 - 29 mmol/L    Rate 16     Sample VENOUS     Site Other     Allens Test N/A     DelSys Adult Vent     Mode AC/PRVC     Vt 450     PEEP 5     FiO2 40     Sp02 100                Assessment and Plan:           * Cardiogenic shock    -likely due to volume overload when received IVF for suspected sepsis on initial presentation.   - CO 4.6 and CI 2.8.   - has been having good UOP. Put out 3L total urine with lasix 160mg IV in addition to 3L on SLED. SLED discontinued overnight. Currently on IV lasix 100mg bid. Got one dose this am and put out 3L resulting in drop of CVP to 6 mmHg. Will hold off on diuresis for now.  - Nephrology following, appreciate assistance.   - required Levo this am shortly, propofol gtt stopped this AM for SAT; goal MAP of 60-65. MAP this am was 71.  - Continue dobutamine gtt currently at 2.5.            Encephalopathy    Likely due to sedation and receiving Ativan vs DT  Will do SAT today. Librium when patient awakens.  Ammonia obtained yesterday slightly elevated at 72 in setting of acute liver failure due to shock liver. Improving. Will likely not benefit from lactulose in absence of cirrhosis and improving LFT's.         Alteration in skin integrity related to surgical incision    At right AKA. Good granulation. Will not culture. Appreciate wound care's assistance.         Shock liver    Likely due to hypotension. LFT improving to 6374/3882 <-- 73135/5180.  Continue to monitor. No signs of acute decompensated liver failure.           Lactic acidosis    -2.1 improved from prior. Repeat as needed.          Hyperkalemia    Shifted at [facility].   Resolved.          COPD, severe    Currently intubated/sedated  Prn nebs.         Acute renal failure    Likely ATN due to ADHF and ischemic insult 2/2 hypotension.   Resolving. No longer requiring SLED. Put out good UOP with lasix. Holding diuresis for now. Cr 1.4 this am. Continue to follow.   Appreciate nephro's assistance.         Leukocytosis    R/O sepsis   Unclear etiology. Could be stress-related. WBC down trending. Blood cultures have been negative. Afebrile.  MRSA grew from right thigh stump ulcer. Contact isolation for now.   On Cefepime 2 q12. Restarting vancomycin given hypotension.           Alcohol abuse    Discontinuing sedation. Will start Librium upon patient's awakening.   Received Ativan IV overnight.           Tobacco abuse    Nicotine patch        Peripheral vascular disease of lower extremity    Continue asa/brilinta  Lactate continues to downtrend; improved to 2.1 since last check            VTE Risk Mitigation         Ordered     IP VTE HIGH RISK PATIENT  Once      06/16/18 2001     Place sequential compression device  Until discontinued      06/16/18 2001     Place EMIGDIO hose  Until discontinued      06/16/18 2001          Cary Harris MD  Cardiology  [facility]

## 2018-06-18 NOTE — PLAN OF CARE
Problem: Patient Care Overview  Goal: Plan of Care Review  Outcome: Ongoing (interventions implemented as appropriate)  Pt remains ventilated via ETT.  OGT for meds, clamped.  Bilat soft wrist restraints in place to prevent interference with care.  Improvement in arousability over course of shift; pt now opens eyes spontaneously and moves purposefully, aeb sitting up in bed and attempting to self-extubate. Eventually calms with redirection, emotional support, then falls asleep.  Does not consistently follow commands, does not track with eyes.  No sedatives given this shift.  Evans catheter with excellent U/O.  Wounds BLE cleaned and dressed by wound care today.  Plan to extubate when pt more appropriate.

## 2018-06-18 NOTE — ASSESSMENT & PLAN NOTE
Likely ATN due to ADHF and ischemic insult 2/2 hypotension.   Resolving. No longer requiring SLED. Put out good UOP with lasix. Holding diuresis for now. Cr 1.4 this am. Continue to follow.   Appreciate nephro's assistance.

## 2018-06-18 NOTE — ASSESSMENT & PLAN NOTE
Discontinuing sedation. Will start Librium upon patient's awakening.   Received Ativan IV overnight.

## 2018-06-18 NOTE — PLAN OF CARE
Problem: Patient Care Overview  Goal: Plan of Care Review  Outcome: Ongoing (interventions implemented as appropriate)  No acute events throughout shift, VS and assessment per flow sheet, patient progressing towards goals as tolerated. Patient remains intubated, does not follow commands, moves all extremities spontaneously, occasionally opening eyes to voice. Restless at times with facial grimacing, PRN ativan and hydrocodone given. Dobutamine gtt continues to infuse, small dose of levo turned on briefly to maintain MAP >65. ~2500 cc urine output per flores throughout shift. 1 BM. Remained afebrile. Plan of care reviewed with Leonardo Corteseur and family, all concerns addressed, will continue to monitor.

## 2018-06-18 NOTE — PLAN OF CARE
He lives in a 1 story house, w/w/c ramp & railings to entrance. ( Is a double BLE amputee.) His father & brother both help him. Patient is not medically stable for discharge. Currently on a VENT.  Needs TBD.        06/18/18 9996   Discharge Assessment   Assessment Type Discharge Planning Assessment   Confirmed/corrected address and phone number on facesheet? Yes   Assessment information obtained from? Medical Record;Other  (Father)   Expected Length of Stay (days) (TBD)   Communicated expected length of stay with patient/caregiver no  (Per MD)   Prior to hospitilization cognitive status: Unable to Assess   Prior to hospitalization functional status: Independent;Assistive Equipment;Wheelchair Bound   Current cognitive status: Coma/Sedated/Intubated   Current Functional Status: Completely Dependent  (While on VENT)   Facility Arrived From: ([facility])   Lives With sibling(s);parent(s)  (Father, brother)   Able to Return to Prior Arrangements unable to determine at this time (comments)   Is patient able to care for self after discharge? Unable to determine at this time (comments)   Who are your caregiver(s) and their phone number(s)? (Joseluis Byrd Father [phone]   )   Patient's perception of discharge disposition home or selfcare;home health  (TBD/Is current w/[facility]. Is happy w/their service. )   Readmission Within The Last 30 Days unable to assess  (Recent hospitalization. )   Patient currently being followed by outpatient case management? No   Patient currently receives any other outside agency services? No   Equipment Currently Used at Home wheelchair;other (see comments)  (stool in shower. )   Do you have any problems affording any of your prescribed medications? No   Is the patient taking medications as prescribed? yes   Does the patient have transportation home? Yes   Transportation Available car;family or friend will provide   Dialysis Name and Scheduled days (N/A)   Does the patient receive  services at the [facility]? No   Discharge Plan A Home with family;Home Health   Discharge Plan B Long-term acute care facility (LTAC);Skilled Nursing Facility  (? other level of car/TBD)   Patient/Family In Agreement With Plan unable to assess

## 2018-06-18 NOTE — ASSESSMENT & PLAN NOTE
Likely due to hypotension. LFT improving to 6374/3882 <-- 75086/5180.  Continue to monitor. No signs of acute decompensated liver failure.

## 2018-06-18 NOTE — SUBJECTIVE & OBJECTIVE
Interval History:   Agitated overnight and received IV ativan. Required minimal amount of levophed this am currently off levo and sedation.    ROS   Unable to obtain given patient's factors.    Objective:     Vital Signs (Most Recent):  Temp: 98.1 °F (36.7 °C) (06/18/18 0300)  Pulse: 78 (06/18/18 0932)  Resp: 14 (06/18/18 0932)  BP: 94/60 (06/18/18 0932)  SpO2: 100 % (06/18/18 0932) Vital Signs (24h Range):  Temp:  [97.9 °F (36.6 °C)-98.7 °F (37.1 °C)] 98.1 °F (36.7 °C)  Pulse:  [72-84] 78  Resp:  [14-20] 14  SpO2:  [100 %] 100 %  BP: ()/(54-72) 94/60     Weight: 62.5 kg (137 lb 12.6 oz)  Body mass index is 22.24 kg/m².     SpO2: 100 %  O2 Device (Oxygen Therapy): ventilator      Intake/Output Summary (Last 24 hours) at 06/18/18 1129  Last data filed at 06/18/18 1100   Gross per 24 hour   Intake           811.53 ml   Output             5768 ml   Net         -4956.47 ml       Lines/Drains/Airways     Central Venous Catheter Line                 Percutaneous Central Line Insertion/Assessment - triple lumen  06/16/18 0825 right subclavian 2 days         Trialysis (Dialysis) Catheter 06/16/18 2109 left internal jugular 1 day          Drain                 Urethral Catheter 06/16/18 0914 Latex 16 Fr. 2 days         NG/OG Tube 06/16/18 2000 1 day          Airway                 Airway - Non-Surgical 06/16/18 0756 Endotracheal Tube 2 days          Epidural Line                 Perineural Analgesia/Anesthesia Assessment (using dermatomes) Epidural 04/05/18 1115 74 days          Peripheral Intravenous Line                 Peripheral IV - Single Lumen 06/14/18 1642 Left Forearm 3 days                Physical Exam   General: well developed, well nourished, appears to be in NAD. Sedated and intubated. Withdraws to pain.   Eyes: conjunctivae/corneas clear.   Neck: supple, symmetrical, trachea midline,   Cardiovascular: Heart: regular rate and rhythm, S1, S2 normal, no murmur  Lungs: clear to auscultation bilaterally and  normal respiratory effort  Chest Wall: no tenderness  Abdomen/Rectal: Abdomen: Non distended. + BS. No masses. No TTP.   Extremities: + lower ext edema, no redness or tenderness in the calves or thighs. Pulses: 2+ and symmetric  Skin: Skin color, texture, turgor normal. No rashes or lesions  Musculoskeletal: full range of motion of joints  Lymph Nodes: No cervical or supraclavicular adenopathy  Psych/Behavioral: sedated and follows commands partially.     Significant Labs:   Recent Results (from the past 24 hour(s))   Renal function panel    Collection Time: 06/17/18  1:38 PM   Result Value Ref Range    Glucose 81 70 - 110 mg/dL    Sodium 138 136 - 145 mmol/L    Potassium 3.8 3.5 - 5.1 mmol/L    Chloride 107 95 - 110 mmol/L    CO2 25 23 - 29 mmol/L    BUN, Bld 6 6 - 20 mg/dL    Calcium 7.8 (L) 8.7 - 10.5 mg/dL    Creatinine 0.8 0.5 - 1.4 mg/dL    Albumin 2.5 (L) 3.5 - 5.2 g/dL    Phosphorus 1.4 (L) 2.7 - 4.5 mg/dL    eGFR if African American >60.0 >60 mL/min/1.73 m^2    eGFR if non African American >60.0 >60 mL/min/1.73 m^2    Anion Gap 6 (L) 8 - 16 mmol/L   Magnesium    Collection Time: 06/17/18  1:38 PM   Result Value Ref Range    Magnesium 1.9 1.6 - 2.6 mg/dL   Ammonia    Collection Time: 06/17/18  1:38 PM   Result Value Ref Range    Ammonia 73 (H) 10 - 50 umol/L   Protein / creatinine ratio, urine    Collection Time: 06/17/18  4:09 PM   Result Value Ref Range    Protein, Urine Random 172 (H) 0 - 15 mg/dL    Creatinine, Random Ur 97.0 23.0 - 375.0 mg/dL    Prot/Creat Ratio, Ur 1.77 (H) 0.00 - 0.20   Sodium, urine, random    Collection Time: 06/17/18  4:09 PM   Result Value Ref Range    Sodium Urine Random 54 20 - 250 mmol/L   Creatinine, urine, random    Collection Time: 06/17/18  4:09 PM   Result Value Ref Range    Creatinine, Random Ur 97.0 23.0 - 375.0 mg/dL   Urinalysis    Collection Time: 06/17/18  4:09 PM   Result Value Ref Range    Specimen UA Urine, Catheterized     Color, UA Destini Yellow, Straw, Destini     Appearance, UA Cloudy (A) Clear    pH, UA 7.0 5.0 - 8.0    Specific Gravity, UA 1.015 1.005 - 1.030    Protein, UA 2+ (A) Negative    Glucose, UA Negative Negative    Ketones, UA Negative Negative    Bilirubin (UA) 1+ (A) Negative    Occult Blood UA 2+ (A) Negative    Nitrite, UA Negative Negative    Urobilinogen, UA 4.0 <2.0 EU/dL    Leukocytes, UA Negative Negative   Urinalysis Microscopic    Collection Time: 06/17/18  4:09 PM   Result Value Ref Range    RBC, UA 3 0 - 4 /hpf    WBC, UA 2 0 - 5 /hpf    Bacteria, UA Many (A) None-Occ /hpf    Squam Epithel, UA 1 /hpf    Hyaline Casts, UA 0 0-1/lpf /lpf    Granular Casts, UA 9 (A) None /lpf    Microscopic Comment SEE COMMENT    2D echo with color flow doppler    Collection Time: 06/17/18  6:05 PM   Result Value Ref Range    EF 13 (A) 55 - 65    Mitral Valve Regurgitation MODERATE (A)     Diastolic Dysfunction Yes (A)     Est. PA Systolic Pressure 45.25 (A)     Mitral Valve Mobility NORMAL     Tricuspid Valve Regurgitation MILD    Vancomycin, random    Collection Time: 06/18/18 12:09 AM   Result Value Ref Range    Vancomycin, Random 10.5 Not established ug/mL   Potassium    Collection Time: 06/18/18 12:09 AM   Result Value Ref Range    Potassium 3.6 3.5 - 5.1 mmol/L   CBC auto differential    Collection Time: 06/18/18  3:33 AM   Result Value Ref Range    WBC 11.28 3.90 - 12.70 K/uL    RBC 3.36 (L) 4.60 - 6.20 M/uL    Hemoglobin 10.7 (L) 14.0 - 18.0 g/dL    Hematocrit 31.5 (L) 40.0 - 54.0 %    MCV 94 82 - 98 fL    MCH 31.8 (H) 27.0 - 31.0 pg    MCHC 34.0 32.0 - 36.0 g/dL    RDW 20.1 (H) 11.5 - 14.5 %    Platelets 135 (L) 150 - 350 K/uL    MPV 12.4 9.2 - 12.9 fL    Immature Granulocytes 0.9 (H) 0.0 - 0.5 %    Gran # (ANC) 9.2 (H) 1.8 - 7.7 K/uL    Immature Grans (Abs) 0.10 (H) 0.00 - 0.04 K/uL    Lymph # 1.2 1.0 - 4.8 K/uL    Mono # 0.4 0.3 - 1.0 K/uL    Eos # 0.3 0.0 - 0.5 K/uL    Baso # 0.05 0.00 - 0.20 K/uL    nRBC 1 (A) 0 /100 WBC    Gran% 81.5 (H) 38.0 - 73.0 %     Lymph% 10.6 (L) 18.0 - 48.0 %    Mono% 3.6 (L) 4.0 - 15.0 %    Eosinophil% 3.0 0.0 - 8.0 %    Basophil% 0.4 0.0 - 1.9 %    Differential Method Automated    Comprehensive metabolic panel - if not done in ED    Collection Time: 06/18/18  3:33 AM   Result Value Ref Range    Sodium 138 136 - 145 mmol/L    Potassium 4.2 3.5 - 5.1 mmol/L    Chloride 104 95 - 110 mmol/L    CO2 24 23 - 29 mmol/L    Glucose 94 70 - 110 mg/dL    BUN, Bld 12 6 - 20 mg/dL    Creatinine 1.4 0.5 - 1.4 mg/dL    Calcium 7.6 (L) 8.7 - 10.5 mg/dL    Total Protein 5.7 (L) 6.0 - 8.4 g/dL    Albumin 2.5 (L) 3.5 - 5.2 g/dL    Total Bilirubin 2.7 (H) 0.1 - 1.0 mg/dL    Alkaline Phosphatase 229 (H) 55 - 135 U/L    AST 6,374 (H) 10 - 40 U/L    ALT 3,882 (H) 10 - 44 U/L    Anion Gap 10 8 - 16 mmol/L    eGFR if African American >60.0 >60 mL/min/1.73 m^2    eGFR if non  58.6 (A) >60 mL/min/1.73 m^2   Magnesium - if not done in ED    Collection Time: 06/18/18  3:33 AM   Result Value Ref Range    Magnesium 1.8 1.6 - 2.6 mg/dL   Phosphorus - if not done in ED    Collection Time: 06/18/18  3:33 AM   Result Value Ref Range    Phosphorus 2.7 2.7 - 4.5 mg/dL   ISTAT PROCEDURE    Collection Time: 06/18/18  3:42 AM   Result Value Ref Range    POC PH 7.519 (H) 7.35 - 7.45    POC PCO2 31.3 (L) 35 - 45 mmHg    POC PO2 96 80 - 100 mmHg    POC HCO3 25.5 24 - 28 mmol/L    POC BE 3 -2 to 2 mmol/L    POC SATURATED O2 98 95 - 100 %    POC TCO2 26 23 - 27 mmol/L    Rate 16     Sample ARTERIAL     Site RR     Allens Test Pass     DelSys Adult Vent     Mode AC/PRVC     Vt 450     PEEP 5     FiO2 40     Sp02 100    ISTAT PROCEDURE    Collection Time: 06/18/18  3:48 AM   Result Value Ref Range    POC PH 7.480 (H) 7.35 - 7.45    POC PCO2 34.8 (L) 35 - 45 mmHg    POC PO2 33 (LL) 40 - 60 mmHg    POC HCO3 25.9 24 - 28 mmol/L    POC BE 2 -2 to 2 mmol/L    POC SATURATED O2 69 (L) 95 - 100 %    POC TCO2 27 24 - 29 mmol/L    Rate 16     Sample VENOUS     Site Other      Allens Test N/A     DelSys Adult Vent     Mode AC/PRVC     Vt 450     PEEP 5     FiO2 40     Sp02 100

## 2018-06-18 NOTE — PROGRESS NOTES
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  The L TMA is dehisced, soft yellow slough noted to wound bed, ruby-wound has purple hue, blanches.  Discussed with Dr. Bowman at bedside, cultures not recommended.    Plan of care discussed with family who verbalized understanding.     06/18/18 0820       Wound 06/12/18 2345 upper Leg   Date First Assessed/Time First Assessed: 06/12/18 2345   Pre-existing: Yes  Side: Right  Orientation: upper  Location: Leg  Wound Outcome: Amputation   Wound Image    Wound WDL ex   Dressing Appearance Dry;Intact;Clean   Drainage Amount None   Appearance Pink;Moist   Tissue loss description Full thickness   Red (%), Wound Tissue Color 100 %   Periwound Area Intact;Dry;Pink   Wound Edges Open;Dehisced   Wound Length (cm) 0.7 cm  (dehisced area )   Wound Width (cm) 7 cm  (dehisced area)   Wound Depth (cm) 0.6 cm   Wound Volume (cm^3) 2.94 cm^3   Care Cleansed with:;Sterile normal saline   Dressing Changed;Gauze;Island/border  (santyl)   Dressing Change Due 06/19/18       Wound 06/12/18 2345 Other (comment) distal Foot   Date First Assessed/Time First Assessed: 06/12/18 2345   Pre-existing: Yes  Primary Wound Type: Other (comment)  Side: Left  Orientation: distal  Location: (c) Foot  Wound Outcome: Amputation   Wound Image    Wound WDL ex   Dressing Appearance Open to air;No dressing   Drainage Amount None   Appearance Slough;Yellow  (soft)   Yellow (%), Wound Tissue Color 100 %   Periwound Area Intact;Dry;Redness  (purple)   Wound Edges Open   Wound Length (cm) 2.2 cm   Wound Width (cm) 10 cm   Care Cleansed with:;Sterile normal saline   Dressing Changed;Gauze;Island/border  (santyl)   Dressing Change Due 06/19/18   Skin Interventions   Device Skin Pressure Protection absorbent pad utilized/changed;adhesive use limited;positioning supports  utilized;pressure points protected   Pressure Reduction Devices Specialty bed utilized;Pressure-redistributing mattress utilized;Foam padding utilized   Recommendations:  Continue Santyl to wound bases daily  Foam to left heel- rubbing heel on mattress/restless at times.

## 2018-06-18 NOTE — PLAN OF CARE
06/18/18 1049   Final Note   Assessment Type Final Discharge Note   Discharge Disposition Admitted  (Transfered to [facility])   What phone number can be called within the next 1-3 days to see how you are doing after discharge? [phone]   Hospital Follow Up  Appt(s) scheduled? Yes   Discharge plans and expectations educations in teach back method with documentation complete? Yes   Right Care Referral Info   Post Acute Recommendation IRF  (Pt transfered to [facility])

## 2018-06-18 NOTE — PLAN OF CARE
06/18/18 1713   Readmission Questionnaire   At the time of your discharge, did someone talk to you about what your health problems were? (Transferred from OSH. N/A)   Lives With sibling(s);parent(s)  (Father, brother)   Who are your caregiver(s) and their phone number(s)? (SophiaJoseluis garza Father [phone]   )

## 2018-06-19 PROBLEM — N17.9 AKI (ACUTE KIDNEY INJURY): Status: ACTIVE | Noted: 2018-01-01

## 2018-06-19 NOTE — NURSING
Dr. Bowman with Pulmonary and RT at bedside. Pt extubated and placed on BiPap. Tolerated well.  Will continue to monitor.

## 2018-06-19 NOTE — NURSING
1734  Notified Dr. Jewell with CCU pt agitated, attempting to get OOB, pulled off BiPap mask, cursing at nurses. Restraints to all extremities, excluding R AKA to be applied. MD states will come by to assess pt. Pt also pulled off dsgs to wounds and placed hands in feces. Nursing staff attempted to reorient pt prior to calling MD but this was ineffective. Notified Son, Leonardo, of event. Son states a family member will come to sit with patient.

## 2018-06-19 NOTE — NURSING
Notified Dr. Benitez with Cardiology pt hitting staff and pulling apart BiPap. RT at bedside along with 5 other nurses Pt not cooperating with therapy. Previously given haldol 2 mg IVP. MD reordered precedex at set rate.

## 2018-06-19 NOTE — ASSESSMENT & PLAN NOTE
Likely due to hypotension. LFT improving to 3638/3032 <-- 6374/3882 <-- 29399/5180.  Continue to monitor. No signs of acute decompensated liver failure.

## 2018-06-19 NOTE — ASSESSMENT & PLAN NOTE
-likely due to volume overload when received IVF for suspected sepsis on initial presentation.   - CO 4.22 and SVR 1130.   - net negative 7.7 since admit ajnd 2.8 last 24 hrs. SLED discontinued 6/17/2018.   - held IV lasix 6/18/2018 due to over diuresis resulting in RYAN.   - CVP 13 mmHg today. Will give one dose of lasix 80mg today.  - Nephrology following, appreciate assistance.   - required Levo shortly overnight, precedex gtt stopped this AM for SAT and SBT ; goal MAP of 60-65.   - Continue dobutamine gtt currently at 2.5.

## 2018-06-19 NOTE — PLAN OF CARE
In brief, I was asked by nursing to evaluate Mr. Byrd from a pulmonary consultant perspective. Pt agitated and restless, but following commands. He squeezes both hands on command and is able to lift his head off the bed. I switched him to a pressure support mode and he did well. I recommend extubation and relayed my thoughts to the cardiology service.    Stephan Cintron MD  Fellow, [facility]  Cell [phone]

## 2018-06-19 NOTE — NURSING
Pt remains agitated and attempting to get out of the bed. Pt on Precedex gtt that is not keeping the pt calm as ordered by Dr Pruitt. Pt placed on spontaneous vent setting at this time. Called pt's son to calm pt but pt still is attempting to get out of the bed and remove ET tube. Pt does follow commands at this time. Pt in no noted distress, will continue to monitor pt for changes in status.

## 2018-06-19 NOTE — PHYSICIAN QUERY
PT Name: Leonardo Byrd  MR #: [id number]    Physician Query Form - Perfusion Diagnosis Clarification     CDS/: Sabiha Mcgowan  RN CCDS             Contact information: [email or web address]  This form is a permanent document in the medical record.     Query Date: June 19, 2018    By submitting this query, we are merely seeking further clarification of documentation. Please utilize your independent clinical judgment when addressing the question(s) below.    The medical record contains the following:    Indicators   Supporting Clinical Findings   Location in Medical Record   x Acute Illness (e.g. AMI, Sepsis, etc.) Acute systolic congestive heart failure     Decompensated this am   Improved with o2 may need home oxygen will start niv          PN 6/16   x Acidosis documented Interval History: pt became very acidotic tachypnea and required intubation due to probable total body ischemia from his poor cardiac function.Await lactic and ketone levels on vet min vent 10.5 RR18(pt breathing 21) ,Tv550, AC ,peep5,o2 50%  ABG ph 7.34,Pco2 23,PO2 156,Bicarb 12.4   B-hydroxB(ketone) 0.3 ,   Lactic acid 9.87 troponin 0.28 Await CPK to r/o rhabdo from ischemia also K+ 5.7 will monitor probably due to  Acidosis  OG placed   No diarrhea   Bicarb 1 amp given   On levophed 2.7mic  Dobutamine 2.5  PN 6/16    ABGs / Labs      Vital Signs     x Hypotension or Low Blood Pressure documented Severe hypotension     Due to poor Cardiac output MAP 71          SIRS (systemic inflammatory response syndrome)     Due to ischemia Hypotensive EF 15% poor perfusion will need central monitoring       PN 6/16    Altered Mental Status or Confusion     x Diaphoresis, Cold Extremities or Cyanosis Upon arriving to floor this am patient is c/o CP and is hypoxic, clammy, respiratory distress DS    Oliguria     x Medication/Treatment:  -Vasopressors  -Inotropic Drugs  -IV Fluids  -Cardiac Assist Devices  -Hemodynamic Monitoring  -Blood/Blood Products On  levophed 2.7mic    PN 6/16    Other:       Provider, please specify diagnosis or diagnoses associated with above clinical findings.    [ x ] Cardiogenic Shock  [  ] Other Shock (please specify): _________________________________  [  ] Shock Unspecified  [  ] Other Condition (please specify): ___________________________________  [  ] Clinically Undetermined    Please document in your progress notes daily for the duration of treatment until resolved and include in your discharge summary.

## 2018-06-19 NOTE — SUBJECTIVE & OBJECTIVE
Interval History:   Waxing and waning mentation. Agitated overnight and attempted self-extubation. Started on precedex. SBT performed overnight and continues to be on that now.     ROS   Unable to obtain given patient's factors.    Objective:     Vital Signs (Most Recent):  Temp: 97.5 °F (36.4 °C) (06/19/18 0300)  Pulse: 68 (06/19/18 0914)  Resp: 14 (06/19/18 0914)  BP: 92/63 (06/19/18 0900)  SpO2: 100 % (06/19/18 0914) Vital Signs (24h Range):  Temp:  [97.5 °F (36.4 °C)-98.9 °F (37.2 °C)] 97.5 °F (36.4 °C)  Pulse:  [] 68  Resp:  [14-37] 14  SpO2:  [100 %] 100 %  BP: ()/(52-73) 92/63     Weight: 58.4 kg (128 lb 12 oz)  Body mass index is 20.78 kg/m².     SpO2: 100 %  O2 Device (Oxygen Therapy): ventilator      Intake/Output Summary (Last 24 hours) at 06/19/18 0954  Last data filed at 06/19/18 0900   Gross per 24 hour   Intake           712.77 ml   Output             2575 ml   Net         -1862.23 ml       Lines/Drains/Airways     Central Venous Catheter Line                 Percutaneous Central Line Insertion/Assessment - triple lumen  06/16/18 0825 right subclavian 3 days         Trialysis (Dialysis) Catheter 06/16/18 2109 left internal jugular 2 days          Drain                 Urethral Catheter 06/16/18 0914 Latex 16 Fr. 3 days         NG/OG Tube 06/16/18 2000 2 days          Airway                 Airway - Non-Surgical 06/16/18 0756 Endotracheal Tube 3 days          Epidural Line                 Perineural Analgesia/Anesthesia Assessment (using dermatomes) Epidural 04/05/18 1115 74 days                Physical Exam  General: well developed, well nourished.  Sedated and intubated. Withdraws to pain.   Eyes: conjunctivae/corneas clear. Dilated but reactive pupils.   Neck: supple, symmetrical, trachea midline.   Cardiovascular: Heart: regular rate and rhythm, S1, S2 normal, no murmur  Lungs: clear to auscultation bilaterally and normal respiratory effort  Chest Wall: no tenderness  Abdomen/Rectal:  Abdomen: Non distended. + BS. No masses. No TTP.   Extremities: right AKA, left foot with 1-5 toe amputation. + lower ext edema on left, no redness or tenderness in the calves or thighs.   Skin: Skin color, texture, turgor normal.   Musculoskeletal: full range of motion of joints  Lymph Nodes: No cervical or supraclavicular adenopathy  Psych/Behavioral: sedated and follows commands partially.     Significant Labs:   Recent Results (from the past 24 hour(s))   Culture, Respiratory with Gram Stain    Collection Time: 06/18/18  5:32 PM   Result Value Ref Range    Gram Stain (Respiratory) No WBC's     Gram Stain (Respiratory) No organisms seen    CBC auto differential    Collection Time: 06/19/18  4:16 AM   Result Value Ref Range    WBC 12.84 (H) 3.90 - 12.70 K/uL    RBC 3.51 (L) 4.60 - 6.20 M/uL    Hemoglobin 10.9 (L) 14.0 - 18.0 g/dL    Hematocrit 32.8 (L) 40.0 - 54.0 %    MCV 93 82 - 98 fL    MCH 31.1 (H) 27.0 - 31.0 pg    MCHC 33.2 32.0 - 36.0 g/dL    RDW 20.1 (H) 11.5 - 14.5 %    Platelets 162 150 - 350 K/uL    MPV 12.7 9.2 - 12.9 fL    Immature Granulocytes 1.8 (H) 0.0 - 0.5 %    Gran # (ANC) 10.5 (H) 1.8 - 7.7 K/uL    Immature Grans (Abs) 0.23 (H) 0.00 - 0.04 K/uL    Lymph # 1.2 1.0 - 4.8 K/uL    Mono # 0.6 0.3 - 1.0 K/uL    Eos # 0.3 0.0 - 0.5 K/uL    Baso # 0.08 0.00 - 0.20 K/uL    nRBC 1 (A) 0 /100 WBC    Gran% 82.1 (H) 38.0 - 73.0 %    Lymph% 9.0 (L) 18.0 - 48.0 %    Mono% 4.3 4.0 - 15.0 %    Eosinophil% 2.2 0.0 - 8.0 %    Basophil% 0.6 0.0 - 1.9 %    Differential Method Automated    Comprehensive metabolic panel - if not done in ED    Collection Time: 06/19/18  4:16 AM   Result Value Ref Range    Sodium 140 136 - 145 mmol/L    Potassium 3.5 3.5 - 5.1 mmol/L    Chloride 101 95 - 110 mmol/L    CO2 27 23 - 29 mmol/L    Glucose 114 (H) 70 - 110 mg/dL    BUN, Bld 18 6 - 20 mg/dL    Creatinine 2.0 (H) 0.5 - 1.4 mg/dL    Calcium 7.8 (L) 8.7 - 10.5 mg/dL    Total Protein 6.4 6.0 - 8.4 g/dL    Albumin 2.7 (L) 3.5 -  5.2 g/dL    Total Bilirubin 3.1 (H) 0.1 - 1.0 mg/dL    Alkaline Phosphatase 275 (H) 55 - 135 U/L    AST 3,638 (H) 10 - 40 U/L    ALT 3,033 (H) 10 - 44 U/L    Anion Gap 12 8 - 16 mmol/L    eGFR if African American 44.0 (A) >60 mL/min/1.73 m^2    eGFR if non  38.1 (A) >60 mL/min/1.73 m^2   Magnesium - if not done in ED    Collection Time: 06/19/18  4:16 AM   Result Value Ref Range    Magnesium 2.1 1.6 - 2.6 mg/dL   Phosphorus - if not done in ED    Collection Time: 06/19/18  4:16 AM   Result Value Ref Range    Phosphorus 4.5 2.7 - 4.5 mg/dL   Ammonia    Collection Time: 06/19/18  4:16 AM   Result Value Ref Range    Ammonia 51 (H) 10 - 50 umol/L   ISTAT PROCEDURE    Collection Time: 06/19/18  8:11 AM   Result Value Ref Range    POC PH 7.403 7.35 - 7.45    POC PCO2 45.2 (H) 35 - 45 mmHg    POC PO2 35 (LL) 40 - 60 mmHg    POC HCO3 28.2 (H) 24 - 28 mmol/L    POC BE 3 -2 to 2 mmol/L    POC SATURATED O2 67 (L) 95 - 100 %    POC TCO2 30 (H) 24 - 29 mmol/L    Sample VENOUS     Site Other     Allens Test N/A     DelSys Adult Vent     Mode CPAP     PEEP 5     PS 5     FiO2 40     Min Vol 7.07     Sp02 100

## 2018-06-19 NOTE — ASSESSMENT & PLAN NOTE
R/O sepsis   Unclear etiology. Could be stress-related. WBC down trending. Blood cultures have been negative. Afebrile.  MRSA grew from right thigh stump ulcer. Contact isolation for now.   On Cefepime 2 q12. Holding on vancomycin for now.  Resp culture with no growth.

## 2018-06-19 NOTE — ASSESSMENT & PLAN NOTE
RYAN with no previously reported and underlying CKI (reviewing old labs and talking to son at bedside), on presentation with reported anuria and SLED was started, however overnight he has made slose to 1L of urine on his own and he's not on diuretics, RYAN, likely 2ndary iATN and related to cardiogenic shock, K 7.1 on presentation (shifted down to 5.3 by primary team), CXR with slight congestion, +3 blood on UA, probably Evans related    Continues with excellent urine output    Plan/Recommendations:  1) diuresis as per cardiology service  2) continue strict Is & Os and serial RFPs  3) no need for RRT, continue to monitor

## 2018-06-19 NOTE — PROGRESS NOTES
Patient extubated and placed on BiPap on documented settings.  Patient tolerated well.  Sats are 100%.  Will continue to monitor.

## 2018-06-19 NOTE — ASSESSMENT & PLAN NOTE
Likely due to sedation and receiving Ativan vs metabolic vs DT's.  Was on precedex overnight for agitation. SAT and SBT today.   Received lactulose for ammonia of 72 had improved to 51 in setting of acute liver failure due to shock liver.

## 2018-06-19 NOTE — ASSESSMENT & PLAN NOTE
Likely due to pulmonary edema vs PNA.  resp culture pending.  Cefepime and vancomycin, continue.   SBT and extubate today.

## 2018-06-19 NOTE — PLAN OF CARE
Problem: Patient Care Overview  Goal: Plan of Care Review  Outcome: Ongoing (interventions implemented as appropriate)  Pt is now resting calmly with eyes closed. Vent setting remains on spontaneous with pt's tidal voume >300, SaO2 reading 100%. Pt continues to follow commands when asked. Restraints in use. UOP >60ml/h. Pt had two small bowel movements. Precedex at 0.2mcg/kg/hr and Dobutamine infusing at 2.5mcg, kg. Pt in no noted distress at this time. Addressed concerns and answered questions about plan of care and possible extubations with pt's son. Will continue to monitor

## 2018-06-19 NOTE — PROGRESS NOTES
[facility]  Nephrology  Progress Note    Patient Name: Leonardo Byrd  MRN: [id number]  Admission Date: 6/16/2018  Hospital Length of Stay: 3 days  Attending Provider: Sae Carr MD   Primary Care Physician: Indio Aquino MD  Principal Problem:Cardiogenic shock    Subjective:     HPI: 47 year old with PMH of ICM (LVEF 30-35%) s/p ICD, CAD s/p PCI, HTN, HLD, current smoker, PAD (s/p aortobifemoral bypass, L SFA and pop PTAS in September and recent R SFA and R pop 3/8 by Dr. Carl Bell and finally R AKA 3/2018.     He present ed to ED with about 2 week h/o SOB, cough, weight gain. He presented to [facility] ED on Saturday. Sent home after presumed negative work up. Presented once again overnight 6/13. BNP 2400, congested CXR, cough, no fever similar to prior CHF exacerbations. LActic acid elevated. He was admitted with COPD and sepsis. He was started on zmax and vanc and given a fluid bolus in ED.  He was found to not be septic the next day and all abx were stopped.  Unfortunately, though his UO was quite good and he was net - 2000cc on 6/16am, he progressively worsened from a respiratory status and became progressively hypotensive.  He was subsequently intubated and started on levo/dobutrex.  Lactate 10, k 7.1, creatinine now 1.8 from baseline 0.8, poor/no UO.  Transferred to [facility] for higher level of care.    Nephrology consulted for RYAN.    Interval History: continues with excellent urine output despite holding lasix yesterday, CVP this morning up to 13    Review of patient's allergies indicates:  No Known Allergies  Current Facility-Administered Medications   Medication Frequency    acetaminophen tablet 650 mg Q8H PRN    albuterol-ipratropium 2.5 mg-0.5 mg/3 mL nebulizer solution 3 mL Q4H PRN    aspirin chewable tablet 81 mg Daily    atorvastatin tablet 80 mg Daily    ceFEPIme injection 2 g Q12H    chlorhexidine 0.12 % solution 15 mL BID    collagenase ointment Daily    DOBUTamine  500mg in D5W 250mL infusion (premix) (NON-TITRATING) Continuous    gentamicin 0.1 % ointment Daily    levalbuterol nebulizer solution 1.25 mg Q6H PRN    lidocaine HCL 2% jelly PRN    magnesium oxide tablet 800 mg PRN    magnesium oxide tablet 800 mg PRN    nicotine 14 mg/24 hr 1 patch Daily    norepinephrine 4 mg in dextrose 5% 250 mL infusion (premix) (titrating) Continuous    ondansetron injection 4 mg Q8H PRN    pneumoc 13-osman conj-dip cr(PF) 0.5 mL Prior to discharge    potassium chloride 10% oral solution 40 mEq PRN    potassium chloride 10% oral solution 40 mEq PRN    potassium chloride 10% oral solution 60 mEq PRN    potassium, sodium phosphates 280-160-250 mg packet 2 packet PRN    potassium, sodium phosphates 280-160-250 mg packet 2 packet PRN    potassium, sodium phosphates 280-160-250 mg packet 2 packet PRN    pregabalin capsule 75 mg BID    senna-docusate 8.6-50 mg per tablet 1 tablet BID    sodium chloride 0.9% flush 3 mL Q8H    ticagrelor tablet 90 mg BID       Objective:     Vital Signs (Most Recent):  Temp: 97.4 °F (36.3 °C) (06/19/18 1515)  Pulse: 74 (06/19/18 1515)  Resp: 20 (06/19/18 1515)  BP: 109/67 (06/19/18 1530)  SpO2: 100 % (06/19/18 1515)  O2 Device (Oxygen Therapy): BiPAP (06/19/18 1515) Vital Signs (24h Range):  Temp:  [97.4 °F (36.3 °C)-98.9 °F (37.2 °C)] 97.4 °F (36.3 °C)  Pulse:  [] 74  Resp:  [14-37] 20  SpO2:  [100 %] 100 %  BP: ()/(53-82) 109/67     Weight: 58.4 kg (128 lb 12 oz) (06/19/18 0300)  Body mass index is 20.78 kg/m².  Body surface area is 1.65 meters squared.    I/O last 3 completed shifts:  In: 929.1 [I.V.:369.1; NG/GT:60; IV Piggyback:500]  Out: 5565 [Urine:5565]    Physical Exam   HENT:   Head: Atraumatic.   Neck: No JVD present.   Cardiovascular: Normal rate and regular rhythm.  Exam reveals no friction rub.    Pulmonary/Chest: Effort normal. He has rales.   On ventilator   Abdominal: Soft. He exhibits no distension.   Musculoskeletal:  He exhibits no edema.   Skin: Skin is warm.   Psychiatric: He has a normal mood and affect.           Assessment/Plan:     Acute renal failure    RYAN with no previously reported and underlying CKI (reviewing old labs and talking to son at bedside), on presentation with reported anuria and SLED was started, however overnight he has made slose to 1L of urine on his own and he's not on diuretics, RYAN, likely 2ndary iATN and related to cardiogenic shock, K 7.1 on presentation (shifted down to 5.3 by primary team), CXR with slight congestion, +3 blood on UA, probably Evans related    Continues with excellent urine output    Plan/Recommendations:  1) diuresis as per cardiology service  2) continue strict Is & Os and serial RFPs  3) no need for RRT, continue to monitor              Thank you for your consult. I will follow-up with patient. Please contact us if you have any additional questions.    Chris Ross MD  Nephrology  [facility]

## 2018-06-19 NOTE — PROGRESS NOTES
[facility]  Cardiology  Progress Note    Patient Name: Leonardo Byrd  MRN: [id number]  Admission Date: 6/16/2018  Hospital Length of Stay: 3 days  Code Status: Full Code   Attending Physician: Sae Carr MD   Primary Care Physician: Indio Aquino MD  Expected Discharge Date:   Principal Problem:Cardiogenic shock    Subjective:     Hospital Course:   No notes on file    Interval History:   Waxing and waning mentation. Agitated overnight and attempted self-extubation. Started on precedex. SBT performed overnight and continues to be on that now.     ROS   Unable to obtain given patient's factors.    Objective:     Vital Signs (Most Recent):  Temp: 97.5 °F (36.4 °C) (06/19/18 0300)  Pulse: 68 (06/19/18 0914)  Resp: 14 (06/19/18 0914)  BP: 92/63 (06/19/18 0900)  SpO2: 100 % (06/19/18 0914) Vital Signs (24h Range):  Temp:  [97.5 °F (36.4 °C)-98.9 °F (37.2 °C)] 97.5 °F (36.4 °C)  Pulse:  [] 68  Resp:  [14-37] 14  SpO2:  [100 %] 100 %  BP: ()/(52-73) 92/63     Weight: 58.4 kg (128 lb 12 oz)  Body mass index is 20.78 kg/m².     SpO2: 100 %  O2 Device (Oxygen Therapy): ventilator      Intake/Output Summary (Last 24 hours) at 06/19/18 0954  Last data filed at 06/19/18 0900   Gross per 24 hour   Intake           712.77 ml   Output             2575 ml   Net         -1862.23 ml       Lines/Drains/Airways     Central Venous Catheter Line                 Percutaneous Central Line Insertion/Assessment - triple lumen  06/16/18 0825 right subclavian 3 days         Trialysis (Dialysis) Catheter 06/16/18 2109 left internal jugular 2 days          Drain                 Urethral Catheter 06/16/18 0914 Latex 16 Fr. 3 days         NG/OG Tube 06/16/18 2000 2 days          Airway                 Airway - Non-Surgical 06/16/18 0756 Endotracheal Tube 3 days          Epidural Line                 Perineural Analgesia/Anesthesia Assessment (using dermatomes) Epidural 04/05/18 1115 74 days                 Physical Exam  General: well developed, well nourished.  Sedated and intubated. Withdraws to pain.   Eyes: conjunctivae/corneas clear. Dilated but reactive pupils.   Neck: supple, symmetrical, trachea midline.   Cardiovascular: Heart: regular rate and rhythm, S1, S2 normal, no murmur  Lungs: clear to auscultation bilaterally and normal respiratory effort  Chest Wall: no tenderness  Abdomen/Rectal: Abdomen: Non distended. + BS. No masses. No TTP.   Extremities: right AKA, left foot with 1-5 toe amputation. + lower ext edema on left, no redness or tenderness in the calves or thighs.   Skin: Skin color, texture, turgor normal.   Musculoskeletal: full range of motion of joints  Lymph Nodes: No cervical or supraclavicular adenopathy  Psych/Behavioral: sedated and follows commands partially.     Significant Labs:   Recent Results (from the past 24 hour(s))   Culture, Respiratory with Gram Stain    Collection Time: 06/18/18  5:32 PM   Result Value Ref Range    Gram Stain (Respiratory) No WBC's     Gram Stain (Respiratory) No organisms seen    CBC auto differential    Collection Time: 06/19/18  4:16 AM   Result Value Ref Range    WBC 12.84 (H) 3.90 - 12.70 K/uL    RBC 3.51 (L) 4.60 - 6.20 M/uL    Hemoglobin 10.9 (L) 14.0 - 18.0 g/dL    Hematocrit 32.8 (L) 40.0 - 54.0 %    MCV 93 82 - 98 fL    MCH 31.1 (H) 27.0 - 31.0 pg    MCHC 33.2 32.0 - 36.0 g/dL    RDW 20.1 (H) 11.5 - 14.5 %    Platelets 162 150 - 350 K/uL    MPV 12.7 9.2 - 12.9 fL    Immature Granulocytes 1.8 (H) 0.0 - 0.5 %    Gran # (ANC) 10.5 (H) 1.8 - 7.7 K/uL    Immature Grans (Abs) 0.23 (H) 0.00 - 0.04 K/uL    Lymph # 1.2 1.0 - 4.8 K/uL    Mono # 0.6 0.3 - 1.0 K/uL    Eos # 0.3 0.0 - 0.5 K/uL    Baso # 0.08 0.00 - 0.20 K/uL    nRBC 1 (A) 0 /100 WBC    Gran% 82.1 (H) 38.0 - 73.0 %    Lymph% 9.0 (L) 18.0 - 48.0 %    Mono% 4.3 4.0 - 15.0 %    Eosinophil% 2.2 0.0 - 8.0 %    Basophil% 0.6 0.0 - 1.9 %    Differential Method Automated    Comprehensive metabolic  panel - if not done in ED    Collection Time: 06/19/18  4:16 AM   Result Value Ref Range    Sodium 140 136 - 145 mmol/L    Potassium 3.5 3.5 - 5.1 mmol/L    Chloride 101 95 - 110 mmol/L    CO2 27 23 - 29 mmol/L    Glucose 114 (H) 70 - 110 mg/dL    BUN, Bld 18 6 - 20 mg/dL    Creatinine 2.0 (H) 0.5 - 1.4 mg/dL    Calcium 7.8 (L) 8.7 - 10.5 mg/dL    Total Protein 6.4 6.0 - 8.4 g/dL    Albumin 2.7 (L) 3.5 - 5.2 g/dL    Total Bilirubin 3.1 (H) 0.1 - 1.0 mg/dL    Alkaline Phosphatase 275 (H) 55 - 135 U/L    AST 3,638 (H) 10 - 40 U/L    ALT 3,033 (H) 10 - 44 U/L    Anion Gap 12 8 - 16 mmol/L    eGFR if African American 44.0 (A) >60 mL/min/1.73 m^2    eGFR if non  38.1 (A) >60 mL/min/1.73 m^2   Magnesium - if not done in ED    Collection Time: 06/19/18  4:16 AM   Result Value Ref Range    Magnesium 2.1 1.6 - 2.6 mg/dL   Phosphorus - if not done in ED    Collection Time: 06/19/18  4:16 AM   Result Value Ref Range    Phosphorus 4.5 2.7 - 4.5 mg/dL   Ammonia    Collection Time: 06/19/18  4:16 AM   Result Value Ref Range    Ammonia 51 (H) 10 - 50 umol/L   ISTAT PROCEDURE    Collection Time: 06/19/18  8:11 AM   Result Value Ref Range    POC PH 7.403 7.35 - 7.45    POC PCO2 45.2 (H) 35 - 45 mmHg    POC PO2 35 (LL) 40 - 60 mmHg    POC HCO3 28.2 (H) 24 - 28 mmol/L    POC BE 3 -2 to 2 mmol/L    POC SATURATED O2 67 (L) 95 - 100 %    POC TCO2 30 (H) 24 - 29 mmol/L    Sample VENOUS     Site Other     Allens Test N/A     DelSys Adult Vent     Mode CPAP     PEEP 5     PS 5     FiO2 40     Min Vol 7.07     Sp02 100            Assessment and Plan:           * Cardiogenic shock    -likely due to volume overload when received IVF for suspected sepsis on initial presentation.   - CO 4.22 and SVR 1130.   - net negative 7.7 since admit ajnd 2.8 last 24 hrs. SLED discontinued 6/17/2018.   - held IV lasix 6/18/2018 due to over diuresis resulting in RYAN.   - CVP 13 mmHg today. Will give one dose of lasix 80mg today.  - Nephrology  following, appreciate assistance.   - required Levo shortly overnight, precedex gtt stopped this AM for SAT and SBT ; goal MAP of 60-65.   - Continue dobutamine gtt currently at 2.5.            Encephalopathy    Likely due to sedation and receiving Ativan vs metabolic vs DT's.  Was on precedex overnight for agitation. SAT and SBT today.   Received lactulose for ammonia of 72 had improved to 51 in setting of acute liver failure due to shock liver.           Alteration in skin integrity related to surgical incision    At right AKA. Good granulation. Will not culture. Appreciate wound care's assistance.         Shock liver    Likely due to hypotension. LFT improving to 3638/3032 <-- 6374/3882 <-- 88700/5180.  Continue to monitor. No signs of acute decompensated liver failure.           Lactic acidosis    -2.1 improved from prior. Repeat as needed.          Hyperkalemia    Shifted at [facility].   Resolved.         COPD, severe    Currently intubated/sedated  Prn nebs.         Acute renal failure    Likely ATN due to ADHF and ischemic insult 2/2 hypotension.   Cr worse this am to 2.0 from 1.4 with BUN of 18 likely due to over diuresis.   Continue holding diuresis for now.   Appreciate nephro's assistance.   Avoid nephrotoxins and renally dose medications.         Leukocytosis    R/O sepsis   Unclear etiology. Could be stress-related. WBC down trending. Blood cultures have been negative. Afebrile.  MRSA grew from right thigh stump ulcer. Contact isolation for now.   On Cefepime 2 q12. Holding on vancomycin for now.  Resp culture with no growth.          Acute respiratory failure with hypoxia    Likely due to pulmonary edema vs PNA.  resp culture pending.  Cefepime and vancomycin, continue.   SBT and extubate today.        Alcohol abuse    Reportedly last drink was weeks to months ago.   Discontinuing sedation.            Tobacco abuse    Nicotine patch        Peripheral vascular disease of lower extremity    Continue  asa/brilinta.  S/p right AKA. Left foot with 1-5 toe amputation.   Lactate continues to downtrend; improved to 2.1 since last check            VTE Risk Mitigation         Ordered     IP VTE HIGH RISK PATIENT  Once      06/16/18 2001     Place sequential compression device  Until discontinued      06/16/18 2001     Place EMIGDIO hose  Until discontinued      06/16/18 2001          Cary Harris MD  Cardiology  [facility]

## 2018-06-19 NOTE — ASSESSMENT & PLAN NOTE
Continue asa/brilinta.  S/p right AKA. Left foot with 1-5 toe amputation.   Lactate continues to downtrend; improved to 2.1 since last check

## 2018-06-19 NOTE — ASSESSMENT & PLAN NOTE
Likely ATN due to ADHF and ischemic insult 2/2 hypotension.   Cr worse this am to 2.0 from 1.4 with BUN of 18 likely due to over diuresis.   Continue holding diuresis for now.   Appreciate nephro's assistance.   Avoid nephrotoxins and renally dose medications.

## 2018-06-19 NOTE — NURSING
MICHELLE Jewell with CCU at bedside. No redraw of K+ level to be ordered. SBT to take place. Dr. Bowman with Pulm at bedside as well. RT notified.

## 2018-06-19 NOTE — PLAN OF CARE
Covering CM visited patient in ICU. Dad & sister-in-law is at BS. Reminded father CM's job is to assist w/any discharge planning, along w/a SW that is assigned as well. He verbalized his understanding.     [facility] given to patient after informed about it;patient verbalized their understanding.     He reports:1. A borrowed motorized scooter has to be returned to the person who loaned it to them. 2.He gave a prescription for a motorized scooter to an outside Company in April, 2018 & still hasn't heard back from them. Encouraged him or someone in the family needs to follow-up w/this Co. by calling them to ask what is the hold up. He verbalized his understanding.

## 2018-06-19 NOTE — SUBJECTIVE & OBJECTIVE
Interval History: continues with excellent urine output despite holding lasix yesterday, CVP this morning up to 13    Review of patient's allergies indicates:  No Known Allergies  Current Facility-Administered Medications   Medication Frequency    acetaminophen tablet 650 mg Q8H PRN    albuterol-ipratropium 2.5 mg-0.5 mg/3 mL nebulizer solution 3 mL Q4H PRN    aspirin chewable tablet 81 mg Daily    atorvastatin tablet 80 mg Daily    ceFEPIme injection 2 g Q12H    chlorhexidine 0.12 % solution 15 mL BID    collagenase ointment Daily    DOBUTamine 500mg in D5W 250mL infusion (premix) (NON-TITRATING) Continuous    gentamicin 0.1 % ointment Daily    levalbuterol nebulizer solution 1.25 mg Q6H PRN    lidocaine HCL 2% jelly PRN    magnesium oxide tablet 800 mg PRN    magnesium oxide tablet 800 mg PRN    nicotine 14 mg/24 hr 1 patch Daily    norepinephrine 4 mg in dextrose 5% 250 mL infusion (premix) (titrating) Continuous    ondansetron injection 4 mg Q8H PRN    pneumoc 13-osman conj-dip cr(PF) 0.5 mL Prior to discharge    potassium chloride 10% oral solution 40 mEq PRN    potassium chloride 10% oral solution 40 mEq PRN    potassium chloride 10% oral solution 60 mEq PRN    potassium, sodium phosphates 280-160-250 mg packet 2 packet PRN    potassium, sodium phosphates 280-160-250 mg packet 2 packet PRN    potassium, sodium phosphates 280-160-250 mg packet 2 packet PRN    pregabalin capsule 75 mg BID    senna-docusate 8.6-50 mg per tablet 1 tablet BID    sodium chloride 0.9% flush 3 mL Q8H    ticagrelor tablet 90 mg BID       Objective:     Vital Signs (Most Recent):  Temp: 97.4 °F (36.3 °C) (06/19/18 1515)  Pulse: 74 (06/19/18 1515)  Resp: 20 (06/19/18 1515)  BP: 109/67 (06/19/18 1530)  SpO2: 100 % (06/19/18 1515)  O2 Device (Oxygen Therapy): BiPAP (06/19/18 1515) Vital Signs (24h Range):  Temp:  [97.4 °F (36.3 °C)-98.9 °F (37.2 °C)] 97.4 °F (36.3 °C)  Pulse:  [] 74  Resp:  [14-37] 20  SpO2:   [100 %] 100 %  BP: ()/(53-82) 109/67     Weight: 58.4 kg (128 lb 12 oz) (06/19/18 0300)  Body mass index is 20.78 kg/m².  Body surface area is 1.65 meters squared.    I/O last 3 completed shifts:  In: 929.1 [I.V.:369.1; NG/GT:60; IV Piggyback:500]  Out: 5565 [Urine:5565]    Physical Exam   HENT:   Head: Atraumatic.   Neck: No JVD present.   Cardiovascular: Normal rate and regular rhythm.  Exam reveals no friction rub.    Pulmonary/Chest: Effort normal. He has rales.   On ventilator   Abdominal: Soft. He exhibits no distension.   Musculoskeletal: He exhibits no edema.   Skin: Skin is warm.   Psychiatric: He has a normal mood and affect.

## 2018-06-19 NOTE — PROGRESS NOTES
Pulmonary / Critical Care Medicine  Progress Note  S: No major issues overnight.  Extubated late this morning without any issues.  Doing well since and currently without complaint.       O: VS: Temp:  [97.5 °F (36.4 °C)-98.9 °F (37.2 °C)]   Pulse:  []   Resp:  [14-37]   BP: ()/(53-73)   SpO2:  [100 %]     I/O:     Intake 731.77 ml   Output 1765 ml   Net -1033.23 ml       Vent: Mode A/C / Rate 18 /  / PEEP 5 / FiO2 30%    PE well developed, well nourished, no distress  lips, mucosa, and tongue normal; teeth and gums normal and no throat erythema  conjunctivae/corneas clear. PERRL.  regular rate and rhythm, S1, S2 normal, no murmur  clear to auscultation bilaterally, normal respiratory effort and normal percussion bilaterally  soft, non-tender non-distended; bowel sounds normal  warm, well perfused and no cyanosis or edema, or clubbing    Labs   WBC 12.84*    RBC 3.51*    HGB 10.9*    HCT 32.8*        MCV 93    MCH 31.1*    MCHC 33.2        K 3.5        CO2 27    BUN 18    CREATININE 2.0*    MG 2.1    ALT 3,033*    AST 3,638*    ALKPHOS 275*    BILITOT 3.1*    PROT 6.4    ALBUMIN 2.7*    PH  7.403   PCO2  45.2*   PO2  35*   HCO3  28.2*   POCSATURATED  67*   BE  3       Imaging CXR 06/19/2018 Unchanged from prior radiograph.  Opacification of the right base persists.       Micro Blood Cx 6/16 6/16 NGTD  NGTD   Sputum Cx 6/18 No growth       Medications Scheduled    aspirin  81 mg Per OG tube Daily    atorvastatin  80 mg Per OG tube Daily    ceFEPime (MAXIPIME) IVPB  2 g Intravenous Q12H    chlorhexidine  15 mL Mouth/Throat BID    collagenase   Topical (Top) Daily    gentamicin   Topical (Top) Daily    nicotine  1 patch Transdermal Daily    pantoprazole 40 mg in dextrose 5 % 100 mL infusion (ready to mix system)  40 mg Intravenous Daily    pregabalin  75 mg Per OG tube BID    senna-docusate 8.6-50 mg  1 tablet Per OG tube BID    sodium chloride 0.9%  3 mL Intravenous Q8H     ticagrelor  90 mg Per OG tube BID      Continuous Infusions:    dexmedetomidine (PRECEDEX) infusion (non-titrating) Stopped (06/19/18 0845)    DOBUTamine 2.5 mcg/kg/min (06/19/18 1100)    norepinephrine bitartrate-D5W Stopped (06/19/18 0400)      PRN   acetaminophen, albuterol-ipratropium, levalbuterol, lidocaine HCL 2%, magnesium oxide, magnesium oxide, ondansetron, pneumoc 13-osman conj-dip cr(PF), potassium chloride 10%, potassium chloride 10%, potassium chloride 10%, potassium, sodium phosphates, potassium, sodium phosphates, potassium, sodium phosphates           A/P:  1. Resolved acute hypoxemic respiratory failure.  2. Suspected hospital acquired pneumonia  3. Acute decompensated heart failure  4. Cardiogenic versus septic shock  5. Reported history of chronic obstructive pulmonary disease; without acute exacerbation  6. Shock liver with mildly elevate ammonia  · Improving clinically.  Extubated this morning.  Doing well since.  · Unclear if there actually is an infectious component to his presenting shock.  · Currently on cefepime for suspected PNA given right basilar opacity on chest radiograph.  · No evidence of uncontrolled source.   · Will defer to primary team for antibiotic management.  Recommend de-escalation as appropriate.  · Cardiology managing acute cardiac decompensation.  · Transaminases steadily normalizing.     Zachary Bowman MD  [employer] Pulmonary / Critical Care Fellow  [phone]  06/19/2018  12:18 PM

## 2018-06-19 NOTE — PROGRESS NOTES
Placed patient back on assist control due to apnea episodes on spontaneous mode.  Will try spontaneous trial later, once patient awakens more. Will continue to monitor.

## 2018-06-19 NOTE — NURSING
Respiratory therapist, [place], tidal volumes were decreasing to the 100s. Suctioned pt and repositioned tube. Pt's SaO2 reading, 100% on spontaneous vent settings. Tidal volues increased >300. Pt in no noted distress at this time. Will continue to monitor.

## 2018-06-20 PROBLEM — F05 DELIRIUM DUE TO MULTIPLE ETIOLOGIES, ACUTE, HYPERACTIVE: Status: ACTIVE | Noted: 2018-01-01

## 2018-06-20 PROBLEM — Z99.11 DEPENDENCE ON VENTILATOR, STATUS: Status: RESOLVED | Noted: 2018-01-01 | Resolved: 2018-01-01

## 2018-06-20 PROBLEM — R41.0 DELIRIUM: Status: ACTIVE | Noted: 2018-01-01

## 2018-06-20 PROBLEM — E44.0 MODERATE PROTEIN-CALORIE MALNUTRITION: Status: ACTIVE | Noted: 2018-01-01

## 2018-06-20 NOTE — ASSESSMENT & PLAN NOTE
-The patient is CAM-ICU positive and fails SAVEAHAART today  -Would not recommend antipsychotics for agitation due to prolonged QTc of 693  -Would also not recommend benzodiazepines for delirium as they are known to make delirium worse  -Give a one-time dose of Depacon 250 mg IV today  -Will speak to nursing staff about his response to this dose before making further recommendations to potentially use Depacon as a scheduled and/or PRN medication for agitation  -Can use soft restraints for non-redirectable agitation    -Implement the below DELIRIUM BEHAVIOR MANAGEMENT:  - Minimize use of restraints; if physical restraints necessary, please utilize medical/chemical prns for agitation.  - Keep shades open and room lit during day and room dim at night in order to promote healthy circadian rhythms.  - Encourage family at bedside.  - Keep whiteboard in patient's room current with the date and name of the members of patient's team for easy patient self re-orientation.  - Avoid benzodiazepines, antihistamines, anticholinergics, hypnotics, and minimize opiates while controlling for pain as these medications may exacerbate delirium.    Psychiatry will continue to follow

## 2018-06-20 NOTE — ASSESSMENT & PLAN NOTE
Likely due ICU delirium vs metabolic.   Required Zyprexa 5mg IM and Haldol 2mg IV x1 yesterday due to combativeness and agitation. Started on precedex as well. Discontinuing Precedex.    Discontinuing pregabalin in setting of delirium and encephalopathy.   Received lactulose with no clear improvement in mental status.   Delirium precautions. Remove BiPAP mask.

## 2018-06-20 NOTE — ASSESSMENT & PLAN NOTE
Likely due to pulmonary edema vs PNA.  resp culture GS with no bacteria.  On Cefepime, discontinuing vancomycin.  Extubated on 6/19/2018 to BiPAP. Weaning BiPAP off as patient with minimal supplemental oxygen requirement.

## 2018-06-20 NOTE — PLAN OF CARE
Problem: Patient Care Overview  Goal: Plan of Care Review  Outcome: Ongoing (interventions implemented as appropriate)  Patient unable to participate in care planning at this time, in violent restraints.

## 2018-06-20 NOTE — SUBJECTIVE & OBJECTIVE
Interval History: excellent urine output, negative 1.3L past 24hrs, CVP 10 this morning, s/p extubation and stable from a respiratory standpoint    Review of patient's allergies indicates:  No Known Allergies  Current Facility-Administered Medications   Medication Frequency    acetaminophen tablet 650 mg Q8H PRN    albuterol-ipratropium 2.5 mg-0.5 mg/3 mL nebulizer solution 3 mL Q4H PRN    aspirin chewable tablet 81 mg Daily    atorvastatin tablet 80 mg Daily    ceFEPIme injection 2 g Q12H    collagenase ointment Daily    DOBUTamine 500mg in D5W 250mL infusion (premix) (NON-TITRATING) Continuous    gentamicin 0.1 % ointment Daily    levalbuterol nebulizer solution 1.25 mg Q6H PRN    lidocaine HCL 2% jelly PRN    magnesium oxide tablet 800 mg PRN    magnesium oxide tablet 800 mg PRN    nicotine 14 mg/24 hr 1 patch Daily    norepinephrine 4 mg in dextrose 5% 250 mL infusion (premix) (titrating) Continuous    ondansetron injection 4 mg Q8H PRN    pneumoc 13-osman conj-dip cr(PF) 0.5 mL Prior to discharge    potassium chloride 10% oral solution 40 mEq PRN    potassium chloride 10% oral solution 40 mEq PRN    potassium chloride 10% oral solution 60 mEq PRN    potassium, sodium phosphates 280-160-250 mg packet 2 packet PRN    potassium, sodium phosphates 280-160-250 mg packet 2 packet PRN    potassium, sodium phosphates 280-160-250 mg packet 2 packet PRN    senna-docusate 8.6-50 mg per tablet 1 tablet BID    sodium chloride 0.9% flush 3 mL Q8H    ticagrelor tablet 90 mg BID       Objective:     Vital Signs (Most Recent):  Temp: 97.9 °F (36.6 °C) (06/19/18 1901)  Pulse: 74 (06/20/18 0809)  Resp: 15 (06/20/18 0809)  BP: 125/78 (06/20/18 0735)  SpO2: 100 % (06/20/18 0809)  O2 Device (Oxygen Therapy): nasal cannula (06/20/18 0809) Vital Signs (24h Range):  Temp:  [97.4 °F (36.3 °C)-97.9 °F (36.6 °C)] 97.9 °F (36.6 °C)  Pulse:  [63-98] 74  Resp:  [12-36] 15  SpO2:  [100 %] 100 %  BP: ()/(53-82)  125/78     Weight: 58.4 kg (128 lb 12 oz) (06/19/18 0300)  Body mass index is 20.78 kg/m².  Body surface area is 1.65 meters squared.    I/O last 3 completed shifts:  In: 884.2 [I.V.:604.2; NG/GT:30; IV Piggyback:250]  Out: 2460 [Urine:2460]    Physical Exam   HENT:   Head: Atraumatic.   Neck: No JVD present.   Cardiovascular: Normal rate and regular rhythm.  Exam reveals no friction rub.    Pulmonary/Chest: Effort normal and breath sounds normal.   Abdominal: Soft. He exhibits no distension.   Musculoskeletal: He exhibits no edema.   R AKA amputation, L foot w/o digits, some edema   Neurological: He is alert.   Skin: Skin is warm.

## 2018-06-20 NOTE — PROGRESS NOTES
[facility]  Nephrology  Progress Note    Patient Name: Leonardo Byrd  MRN: [id number]  Admission Date: 6/16/2018  Hospital Length of Stay: 4 days  Attending Provider: Sae Carr MD   Primary Care Physician: Indio Aquino MD  Principal Problem:Cardiogenic shock    Subjective:     HPI: 47 year old with PMH of ICM (LVEF 30-35%) s/p ICD, CAD s/p PCI, HTN, HLD, current smoker, PAD (s/p aortobifemoral bypass, L SFA and pop PTAS in September and recent R SFA and R pop 3/8 by Dr. Carl Bell and finally R AKA 3/2018.     He present ed to ED with about 2 week h/o SOB, cough, weight gain. He presented to [facility] ED on Saturday. Sent home after presumed negative work up. Presented once again overnight 6/13. BNP 2400, congested CXR, cough, no fever similar to prior CHF exacerbations. LActic acid elevated. He was admitted with COPD and sepsis. He was started on zmax and vanc and given a fluid bolus in ED.  He was found to not be septic the next day and all abx were stopped.  Unfortunately, though his UO was quite good and he was net - 2000cc on 6/16am, he progressively worsened from a respiratory status and became progressively hypotensive.  He was subsequently intubated and started on levo/dobutrex.  Lactate 10, k 7.1, creatinine now 1.8 from baseline 0.8, poor/no UO.  Transferred to [facility] for higher level of care.    Nephrology consulted for RYAN.    Interval History: excellent urine output, negative 1.3L past 24hrs, CVP 10 this morning, s/p extubation and stable from a respiratory standpoint    Review of patient's allergies indicates:  No Known Allergies  Current Facility-Administered Medications   Medication Frequency    acetaminophen tablet 650 mg Q8H PRN    albuterol-ipratropium 2.5 mg-0.5 mg/3 mL nebulizer solution 3 mL Q4H PRN    aspirin chewable tablet 81 mg Daily    atorvastatin tablet 80 mg Daily    ceFEPIme injection 2 g Q12H    collagenase ointment Daily    DOBUTamine 500mg in D5W  250mL infusion (premix) (NON-TITRATING) Continuous    gentamicin 0.1 % ointment Daily    levalbuterol nebulizer solution 1.25 mg Q6H PRN    lidocaine HCL 2% jelly PRN    magnesium oxide tablet 800 mg PRN    magnesium oxide tablet 800 mg PRN    nicotine 14 mg/24 hr 1 patch Daily    norepinephrine 4 mg in dextrose 5% 250 mL infusion (premix) (titrating) Continuous    ondansetron injection 4 mg Q8H PRN    pneumoc 13-osman conj-dip cr(PF) 0.5 mL Prior to discharge    potassium chloride 10% oral solution 40 mEq PRN    potassium chloride 10% oral solution 40 mEq PRN    potassium chloride 10% oral solution 60 mEq PRN    potassium, sodium phosphates 280-160-250 mg packet 2 packet PRN    potassium, sodium phosphates 280-160-250 mg packet 2 packet PRN    potassium, sodium phosphates 280-160-250 mg packet 2 packet PRN    senna-docusate 8.6-50 mg per tablet 1 tablet BID    sodium chloride 0.9% flush 3 mL Q8H    ticagrelor tablet 90 mg BID       Objective:     Vital Signs (Most Recent):  Temp: 97.9 °F (36.6 °C) (06/19/18 1901)  Pulse: 74 (06/20/18 0809)  Resp: 15 (06/20/18 0809)  BP: 125/78 (06/20/18 0735)  SpO2: 100 % (06/20/18 0809)  O2 Device (Oxygen Therapy): nasal cannula (06/20/18 0809) Vital Signs (24h Range):  Temp:  [97.4 °F (36.3 °C)-97.9 °F (36.6 °C)] 97.9 °F (36.6 °C)  Pulse:  [63-98] 74  Resp:  [12-36] 15  SpO2:  [100 %] 100 %  BP: ()/(53-82) 125/78     Weight: 58.4 kg (128 lb 12 oz) (06/19/18 0300)  Body mass index is 20.78 kg/m².  Body surface area is 1.65 meters squared.    I/O last 3 completed shifts:  In: 884.2 [I.V.:604.2; NG/GT:30; IV Piggyback:250]  Out: 2460 [Urine:2460]    Physical Exam   HENT:   Head: Atraumatic.   Neck: No JVD present.   Cardiovascular: Normal rate and regular rhythm.  Exam reveals no friction rub.    Pulmonary/Chest: Effort normal and breath sounds normal.   Abdominal: Soft. He exhibits no distension.   Musculoskeletal: He exhibits no edema.   R AKA amputation, L  foot w/o digits, some edema   Neurological: He is alert.   Skin: Skin is warm.         Assessment/Plan:     Acute renal failure    RYAN with no previously reported and underlying CKI, likely iATN component secondary cardiac issues    Continues with excellent urine output    sCr stable at around 2    Plan/Recommendations:  1) diuresis as per cardiology service  2) continue strict Is & Os and serial RFPs  3) no need for RRT, continue to monitor              Thank you for your consult. I will follow-up with patient. Please contact us if you have any additional questions.    Chris Ross MD  Nephrology  [facility]

## 2018-06-20 NOTE — SUBJECTIVE & OBJECTIVE
Patient History           Medical as of 6/20/2018     Past Medical History     Diagnosis Date Comments Source    AICD (automatic cardioverter/defibrillator) present -- -- Provider    RYAN (acute kidney injury) 3/25/2018 -- Provider    CHF (congestive heart failure) -- -- Provider    COPD with acute bronchitis 6/12/2018 -- Provider    Coronary artery disease -- -- Provider    Encounter for blood transfusion -- -- Provider    Hx of psychiatric care -- -- Provider    Hyperlipemia -- -- Provider    Hypertension -- -- Provider    MI (myocardial infarction) -- -- Provider    Neuropathy -- -- Provider    PAD (peripheral artery disease) -- -- Provider    Psychiatric problem -- -- Provider    PVD (peripheral vascular disease) -- -- Provider          Pertinent Negatives     Diagnosis Date Noted Comments Source    Cancer 2/27/2017 -- Provider    Diabetes mellitus 2/27/2017 -- Provider    History of psychiatric hospitalization 6/20/2018 -- Provider    Teresa 6/20/2018 -- Provider    Psychiatric exam requested by authority 6/20/2018 -- Provider    Seizures 2/27/2017 -- Provider    Stroke 2/27/2017 -- Provider    Suicide attempt 6/20/2018 -- Provider    Therapy 6/20/2018 -- Provider                  Surgical as of 6/20/2018     Past Surgical History     Procedure Laterality Date Comments Source    AMPUTATION Right -- great toe Provider    KNEE ARTHROPLASTY Left -- -- Provider    VASCULAR SURGERY -- -- fem-pop bypass Provider    coronary stents [Other] -- -- -- Provider    CARDIAC DEFIBRILLATOR PLACEMENT -- -- -- Provider    EXPLORATION AND EVaCUATION OF HEMATOMA OF UPPER EXTREMITY [Other] Left -- -- Provider    BYBPASS GRAFT AORTOBIUFEMORAL [Other] -- -- -- Provider                  Family as of 6/20/2018    **None**           Tobacco Use as of 6/20/2018     Smoking Status Smoking Start Date Smoking Quit Date Packs/day Years Used    Former Smoker -- 5/1/2018 0.50 --    Types Comments Smokeless Tobacco Status Smokeless Tobacco  Quit Date Source     Chew Uses nicotine gum and nicotine patches Former User 8/2/1980 Provider            Alcohol Use as of 6/20/2018     Alcohol Use Drinks/Week Alcohol/Week Comments Source    Yes -- -- rarely Provider            Drug Use as of 6/20/2018     Drug Use Types Frequency Comments Source    No -- -- -- Provider            Sexual Activity as of 6/20/2018     Sexually Active Birth Control Partners Comments Source    Yes -- Female -- Provider            Activities of Daily Living as of 6/20/2018     Activities of Daily Living Question Response Comments Source    Patient feels they ought to cut down on drinking/drug use Not Asked -- Provider    Patient annoyed by others criticizing their drinking/drug use Not Asked -- Provider    Patient has felt bad or guilty about drinking/drug use Not Asked -- Provider    Patient has had a drink/used drugs as an eye opener in the AM Not Asked -- Provider            Social Documentation as of 6/20/2018    **None**           Occupational as of 6/20/2018    **None**           Socioeconomic as of 6/20/2018     Marital Status Spouse Name Number of Children Years Education Preferred Language Ethnicity Race Source    Single -- -- -- English /White White --         Pertinent History Q A Comments    as of 6/20/2018 Lives with      Place in Birth Order      Lives in      Number of Siblings other     Raised by      Legal Involvement      Childhood Trauma      Criminal History of arrest and incarceration     Financial Status      Highest Level of Education high school graduation     Does patient have access to a firearm? No      Service No     Primary Leisure Activity      Spirituality actively participates in organized Hindu      Past Medical History:   Diagnosis Date    AICD (automatic cardioverter/defibrillator) present     RYAN (acute kidney injury) 3/25/2018    CHF (congestive heart failure)     COPD with acute bronchitis 6/12/2018    Coronary artery  disease     Encounter for blood transfusion     Hx of psychiatric care     Hyperlipemia     Hypertension     MI (myocardial infarction)     Neuropathy     PAD (peripheral artery disease)     Psychiatric problem     PVD (peripheral vascular disease)      Past Surgical History:   Procedure Laterality Date    AMPUTATION Right     great toe    BYBPASS GRAFT AORTOBIUFEMORAL      CARDIAC DEFIBRILLATOR PLACEMENT      coronary stents      EXPLORATION AND EVaCUATION OF HEMATOMA OF UPPER EXTREMITY Left     KNEE ARTHROPLASTY Left     VASCULAR SURGERY      fem-pop bypass     Family History     None        Social History Main Topics    Smoking status: Former Smoker     Packs/day: 0.50     Quit date: 5/1/2018    Smokeless tobacco: Former User     Types: Chew     Quit date: 8/2/1980      Comment: Uses nicotine gum and nicotine patches    Alcohol use Yes      Comment: rarely    Drug use: No    Sexual activity: Yes     Partners: Female     Review of patient's allergies indicates:  No Known Allergies    No current facility-administered medications on file prior to encounter.      Current Outpatient Prescriptions on File Prior to Encounter   Medication Sig    amitriptyline (ELAVIL) 50 MG tablet Take 1 tablet (50 mg total) by mouth every evening.    aspirin (ECOTRIN) 81 MG EC tablet Take 81 mg by mouth once daily.    atorvastatin (LIPITOR) 80 MG tablet Take 1 tablet (80 mg total) by mouth every evening.    cilostazol (PLETAL) 50 MG Tab Take 2 tablets (100 mg total) by mouth 2 (two) times daily.    cyclobenzaprine (FLEXERIL) 10 MG tablet Take 10 mg by mouth 3 (three) times daily as needed for Muscle spasms.    folic acid-vit B6-vit B12 2.5-25-2 mg (FOLBIC OR EQUIV) 2.5-25-2 mg Tab Take 1 tablet by mouth once daily.    gabapentin (NEURONTIN) 800 MG tablet Take 800 mg by mouth every evening.    lidocaine (XYLOCAINE) 5 % Oint ointment Apply topically 4 (four) times daily. Apply to entire left foot     "metoprolol succinate (TOPROL-XL) 25 MG 24 hr tablet Take 0.5 tablets (12.5 mg total) by mouth once daily.    nicotine (NICODERM CQ) 21 mg/24 hr Place 1 patch onto the skin once daily.    oxyCODONE (ROXICODONE) 10 mg Tab immediate release tablet Take 1 tablet (10 mg total) by mouth every 4 (four) hours as needed for Pain.    polyethylene glycol (GLYCOLAX) 17 gram PwPk Take 17 g by mouth 2 (two) times daily as needed (constipation).    pregabalin (LYRICA) 100 MG capsule Take 1 capsule (100 mg total) by mouth 3 (three) times daily.    ranolazine (RANEXA) 1,000 mg Tb12 Take 1,000 mg by mouth 2 (two) times daily.    senna-docusate 8.6-50 mg (PERICOLACE) 8.6-50 mg per tablet Take 1 tablet by mouth 2 (two) times daily.    thiamine 100 MG tablet Take 1 tablet (100 mg total) by mouth once daily.    ticagrelor (BRILINTA) 90 mg tablet Take 90 mg by mouth 2 (two) times daily.    zolpidem (AMBIEN) 5 MG Tab Take 1 tablet (5 mg total) by mouth nightly as needed.     Psychotherapeutics     Start     Stop Route Frequency Ordered    06/19/18 1750  haloperidol lactate (HALDOL) 5 mg/mL injection     Comments:  Created by cabinet override    06/20 0559 06/19/18 1750        Review of Systems   Unable to perform ROS: Mental status change     Strengths and Liabilities: Strength: Patient has positive support network., Liability: Patient is hostile., Liability: Patient has poor judgment, Liability: Patient has possible cognitive impairment.    Objective:     Vital Signs (Most Recent):  Temp: 97.9 °F (36.6 °C) (06/19/18 1901)  Pulse: 74 (06/20/18 0809)  Resp: 15 (06/20/18 0809)  BP: 125/78 (06/20/18 0735)  SpO2: 100 % (06/20/18 0809) Vital Signs (24h Range):  Temp:  [97.4 °F (36.3 °C)-97.9 °F (36.6 °C)] 97.9 °F (36.6 °C)  Pulse:  [63-98] 74  Resp:  [12-36] 15  SpO2:  [100 %] 100 %  BP: ()/(53-82) 125/78     Height: 5' 6" (167.6 cm)  Weight: 58.4 kg (128 lb 12 oz)  Body mass index is 20.78 kg/m².      Intake/Output Summary " (Last 24 hours) at 06/20/18 1124  Last data filed at 06/20/18 0600   Gross per 24 hour   Intake           372.44 ml   Output             1605 ml   Net         -1232.56 ml       Physical Exam     Significant Labs:   Last 24 Hours:   Recent Lab Results       06/20/18  0809 06/20/18  0330      Immature Granulocytes  1.8(H)     Immature Grans (Abs)  0.20  Comment:  Mild elevation in immature granulocytes is non specific and   can be seen in a variety of conditions including stress response,   acute inflammation, trauma and pregnancy. Correlation with other   laboratory and clinical findings is essential.  (H)     Albumin  2.8(L)     Alkaline Phosphatase  278(H)     Allens Test N/A      ALT  2,438(H)     Anion Gap  10     AST  2,367(H)     Baso #  0.07     Basophil%  0.6     Total Bilirubin  3.2  Comment:  For infants and newborns, interpretation of results should be based  on gestational age, weight and in agreement with clinical  observations.  Premature Infant recommended reference ranges:  Up to 24 hours.............<8.0 mg/dL  Up to 48 hours............<12.0 mg/dL  3-5 days..................<15.0 mg/dL  6-29 days.................<15.0 mg/dL  (H)     Site Other      BUN, Bld  22(H)     Calcium  8.2(L)     Chloride  102     CO2  28     Creatinine  2.1(H)     DelSys Nasal Can      Differential Method  Automated     eGFR if   41.5(A)     eGFR if non   35.9  Comment:  Calculation used to obtain the estimated glomerular filtration  rate (eGFR) is the CKD-EPI equation.   (A)     Eos #  0.5     Eosinophil%  4.3     Glucose  93     Gran # (ANC)  8.2(H)     Gran%  73.7(H)     Hematocrit  32.1(L)     Hemoglobin  10.8(L)     Lymph #  1.5     Lymph%  13.2(L)     Magnesium  1.9     MCH  30.9     MCHC  33.6     MCV  92     Mono #  0.7     Mono%  6.4     MPV  12.3     nRBC  1(A)     Phosphorus  4.9(H)     Platelets  164     POC BE 6      POC HCO3 30.5(H)      POC PCO2 46.0(H)      POC PH 7.429       POC PO2 30(LL)      POC SATURATED O2 59(L)      POC TCO2 32(H)      Potassium  3.9     Total Protein  6.6     RBC  3.49(L)     RDW  19.9(H)     Sample VENOUS      Sodium  140     Vancomycin, Random  18.1     WBC  11.17           Significant Imaging: CXR: I have reviewed all pertinent results/findings within the past 24 hours. Endotracheal tube, right CVC and enteric tube in stable in reasonable position.  Pacing device in the left chest wall, stable.  Multiple overlying cardiac monitoring leads. Mild cardiomegaly.  There is persistent opacity in the right lower lung, although slightly improved aeration from the prior exam.  No new focal parenchymal opacity.  No new large volume of pleural fluid or pneumothorax.  EKG: I have reviewed all pertinent results/findings within the past 24 hours. Normal sinus rhythm, possible left atrial enlargement, ST and/or wave abnormalities suggesting myocardial ischemia, Abnormal ECG; QTc is 693

## 2018-06-20 NOTE — HPI
Per Cardiology notes:  47 year old with PMH of ICM (LVEF 30-35%) s/p ICD, CAD s/p PCI, HTN, HLD, current smoker, PAD (s/p aortobifemoral bypass, L SFA and pop PTAS in September and recent R SFA and R pop 3/8 by Dr. Carl Bell and finally R AKA 3/2018.     He presented to ED with about 2 week h/o SOB, cough, weight gain. He presented to [facility] ED on Saturday. Sent home after presumed negative work up. Presented once again overnight 6/13. BNP 2400, congested CXR, cough, no fever similar to prior CHF exacerbations. LActic acid elevated. He was admitted with COPD and sepsis. He was started on zmax and vanc and given a fluid bolus in ED.  He was found to not be septic the next day and all abx were stopped.  Unfortunately, though his UO was quite good and he was net - 2000cc on 6/16am, he progressively worsened from a respiratory status and became progressively hypotensive.  He was subsequently intubated and started on levo/dobutrex.  Lactate 10, k 7.1, creatinine now 1.8 from baseline 0.8, poor/no UO.  Transferred to [facility] for higher level of care.     On interview this morning:  The patient is lying in bed calmly at the beginning of the interview. Two of his daughters are present and help provide some of the history. The patient is not cooperative with interview and is profoundly confused. His answers to questions are nonsensical and he is unable to follow any commands. He is CAM-ICU positive for delirium and fails SAVEAHAART. The patient requests some water and his daughters attempt to feed him ice chips. He then becomes agitated and violent. He attempts to get out of bed and asks for his clothes. He starts yelling. His daughters are asked to leave the room and nurses place the patient in soft restraints to his wrists and his ankle. He continues to yell for his daughters and is no longer able to participate in the interview.    Per his daughters, he is treated for depression with a medication but they do not remember the  "name of the medication. He has not seen a psychiatrist in the past. They state that the patient has not had a drink of alcohol in at least a month and state that he does not use any drugs of abuse. They state that the patient was "given too much of a medication" by his doctors, but they are unable to say which medication they are referring too. They deny that the patient has had any prior suicide attempts.  "

## 2018-06-20 NOTE — PLAN OF CARE
Problem: Patient Care Overview  Goal: Plan of Care Review  Outcome: Ongoing (interventions implemented as appropriate)  Recommendations     1. TF recommendations: Novasource @ 35 mL/hr to provide 1680 calories (97% EEN), 76 g of protein (107% EPN), 602 mL fluid.               - Hold for residuals >400 mL; additional fluid per MD.   2. If able to advance diet, recommend renal diet (texture per SLP). Add Novasource ONS to aid in caloric intake.  3. RD to monitor & follow-up.

## 2018-06-20 NOTE — ASSESSMENT & PLAN NOTE
Likely ATN due to ADHF and ischemic insult with hypotension and currently with over diuresis.   Cr worse this am to 2.1 from 2.0 with BUN of 18 likely due to over diuresis. Sending for urine lytes.   Holding diuresis for now.   Appreciate nephro's assistance.   Avoid nephrotoxins and renally dose medications.

## 2018-06-20 NOTE — ASSESSMENT & PLAN NOTE
-likely due to ADHF following volume resuscitation when received IVF for suspected sepsis on initial presentation resulting in volume overload.   - CO 4.22 and SVR 1130 6/19.   - net negative 9.1 since admit ajnd 1.8 last 24 hrs. SLED discontinued 6/17/2018.   - held IV lasix 6/18/2018 due to over diuresis resulting in RYAN.   - obtaining CVP for today. Consider repeating lasix x1 today.   - Nephrology following, appreciate assistance.   - Continue dobutamine gtt currently at 2.5.

## 2018-06-20 NOTE — CONSULTS
"  [facility]  Adult Nutrition  Consult Note    SUMMARY     Recommendations    1. TF recommendations: Novasource @ 35 mL/hr to provide 1680 calories (97% EEN), 76 g of protein (107% EPN), 602 mL fluid.    - Hold for residuals >400 mL; additional fluid per MD.   2. If able to advance diet, recommend renal diet (texture per SLP). Add Novasource ONS to aid in caloric intake.  3. RD to monitor & follow-up.    Goals: Meet % EEN, EPN  Nutrition Goal Status: new  Communication of RD Recs: reviewed with RN    Reason for Assessment    Reason for Assessment: consult  Diagnosis: other (see comments) (Cardiogenic shock)  Relevant Medical History: CHF, HTN, HLD  Interdisciplinary Rounds: did not attend    General Information Comments: Pt extubated , NGT placed this AM. Awaiting ST evaluation, failed CARIDAD.   Nutrition Discharge Planning: Unable to determine    Nutrition/Diet History    Patient Reported Diet/Restrictions/Preferences: other (see comments) (JIAN; pt agitated/disoriented)  Do you have any cultural, spiritual, Rastafari conflicts, given your current situation?: jian  Factors Affecting Nutritional Intake: NPO, impaired cognitive status/motor control    Anthropometrics    Temp: 97.9 °F (36.6 °C)  Height: 5' 6" (167.6 cm)  Height (inches): 66 in  Weight Method: Bed Scale  Weight: 58.4 kg (128 lb 12 oz)  Weight (lb): 128.75 lb  Ideal Body Weight (IBW), Male: 142 lb  % Ideal Body Weight, Male (lb): 90.67 lb  BMI (Calculated): 20.8  BMI Grade: 18.5-24.9 - normal  Usual Body Weight (UBW), k.2 kg (Per chart review)  % Usual Body Weight: 88.4  % Weight Change From Usual Weight: -11.78 %  Amputation %: 16  Total Amputation %: 16  Amputation Ideal Body Weight (IBW), Male (lb): 126 lb  Amputee BMI (kg/m2): 24.81 kg/m2    Lab/Procedures/Meds    Pertinent Labs Reviewed: reviewed  Pertinent Labs Comments: BUN 22, Creat 2.1, GFR 35.9, P 4.9, Bili 3.2, AST 2367, ALT 2438  Pertinent Medications Reviewed: " reviewed  Pertinent Medications Comments: Dobutamine    Physical Findings/Assessment    Overall Physical Appearance: amputee, underweight  Tubes: nasogastric tube  Oral/Mouth Cavity: WDL  Skin: intact    Estimated/Assessed Needs    Weight Used For Calorie Calculations: 58.4 kg (128 lb 12 oz)     Energy Calorie Requirements (kcal): 1740 kcal/d  Energy Need Method: [place]-St Jeor (1.25 PAL)     Protein Requirements: 58-70 g/d (1-1.2 g/kg)  Weight Used For Protein Calculations: 58.4 kg (128 lb 12 oz)     Fluid Need Method: other (see comments) (Per MD or 1 mL/kcal)    Nutrition Prescription Ordered    Current Diet Order: NPO    Evaluation of Received Nutrient/Fluid Intake    Comments: LBM: 6/19    Nutrition Risk    Level of Risk/Frequency of Follow-up: high     Assessment and Plan    Moderate protein-calorie malnutrition      Nutrition Problem  Inadequate energy intake    Related to (etiology):   Inability to consume sufficient energy    Signs and Symptoms (as evidenced by):   NPO with no alternate means of nutrition     Nutrition Diagnosis Status:   New         Monitor and Evaluation    Food and Nutrient Intake: energy intake, food and beverage intake, enteral nutrition intake  Food and Nutrient Adminstration: diet order, enteral and parenteral nutrition administration  Physical Activity and Function: nutrition-related ADLs and IADLs  Anthropometric Measurements: weight, weight change  Biochemical Data, Medical Tests and Procedures: lipid profile, inflammatory profile, glucose/endocrine profile, gastrointestinal profile, electrolyte and renal panel  Nutrition-Focused Physical Findings: overall appearance     Nutrition Follow-Up    RD Follow-up?: Yes

## 2018-06-20 NOTE — SUBJECTIVE & OBJECTIVE
Interval History: extubated yesterday noon. Became aggressive and combative in the afternoon requiring chemo and physical restraints. Was also started on Precedex. Sedated this am. Daughter at bedside.     ROS   Unable to obtain given patient's factors.    Objective:     Vital Signs (Most Recent):  Temp: 97.9 °F (36.6 °C) (06/19/18 1901)  Pulse: 84 (06/20/18 0735)  Resp: 14 (06/20/18 0735)  BP: 125/78 (06/20/18 0735)  SpO2: 100 % (06/20/18 0735) Vital Signs (24h Range):  Temp:  [97.4 °F (36.3 °C)-97.9 °F (36.6 °C)] 97.9 °F (36.6 °C)  Pulse:  [63-98] 84  Resp:  [12-36] 14  SpO2:  [100 %] 100 %  BP: ()/(53-82) 125/78     Weight: 58.4 kg (128 lb 12 oz)  Body mass index is 20.78 kg/m².     SpO2: 100 %  O2 Device (Oxygen Therapy): BiPAP      Intake/Output Summary (Last 24 hours) at 06/20/18 0824  Last data filed at 06/20/18 0600   Gross per 24 hour   Intake           400.94 ml   Output             1745 ml   Net         -1344.06 ml       Lines/Drains/Airways     Central Venous Catheter Line                 Percutaneous Central Line Insertion/Assessment - triple lumen  06/16/18 0825 right subclavian 3 days         Trialysis (Dialysis) Catheter 06/16/18 2109 left internal jugular 3 days          Drain                 Urethral Catheter 06/16/18 0914 Latex 16 Fr. 3 days                Physical Exam     General: well developed, well nourished.  Sedated and intubated. Withdraws to pain.   Eyes: conjunctivae/corneas clear. Dilated but reactive pupils.   Neck: supple, symmetrical, trachea midline. + JVD.   Cardiovascular: Heart: regular rate and rhythm, S1, S2 normal, no murmur.   Lungs: clear to auscultation bilaterally and normal respiratory effort.   Chest Wall: no tenderness  Abdomen/Rectal: Abdomen: Non distended. + BS. No masses. No TTP.   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 + lower ext edema on left, no redness or tenderness in the calves or thighs.   Skin: Skin color, texture,  turgor normal.   Musculoskeletal: full range of motion of joints  Lymph Nodes: No cervical or supraclavicular adenopathy  Psych/Behavioral: sedated and does not follow commands.       Significant Labs:     Recent Results (from the past 24 hour(s))   Urinalysis    Collection Time: 06/19/18  8:34 AM   Result Value Ref Range    Specimen UA Urine, Catheterized     Color, UA Destini Yellow, Straw, Destini    Appearance, UA Hazy (A) Clear    pH, UA 5.0 5.0 - 8.0    Specific Gravity, UA 1.015 1.005 - 1.030    Protein, UA 1+ (A) Negative    Glucose, UA Negative Negative    Ketones, UA Negative Negative    Bilirubin (UA) Negative Negative    Occult Blood UA 2+ (A) Negative    Nitrite, UA Negative Negative    Urobilinogen, UA Negative <2.0 EU/dL    Leukocytes, UA Trace (A) Negative   Urea nitrogen, urine    Collection Time: 06/19/18  8:34 AM   Result Value Ref Range    Urine Urea Nitrogen, Random 214 140 - 1050 mg/dL   Creatinine, urine, random    Collection Time: 06/19/18  8:34 AM   Result Value Ref Range    Creatinine, Random Ur 66.0 23.0 - 375.0 mg/dL   Urinalysis Microscopic    Collection Time: 06/19/18  8:34 AM   Result Value Ref Range    RBC, UA 22 (H) 0 - 4 /hpf    WBC, UA 7 (H) 0 - 5 /hpf    Bacteria, UA Moderate (A) None-Occ /hpf    Hyaline Casts, UA 2 (A) 0-1/lpf /lpf    Microscopic Comment SEE COMMENT    CBC auto differential    Collection Time: 06/20/18  3:30 AM   Result Value Ref Range    WBC 11.17 3.90 - 12.70 K/uL    RBC 3.49 (L) 4.60 - 6.20 M/uL    Hemoglobin 10.8 (L) 14.0 - 18.0 g/dL    Hematocrit 32.1 (L) 40.0 - 54.0 %    MCV 92 82 - 98 fL    MCH 30.9 27.0 - 31.0 pg    MCHC 33.6 32.0 - 36.0 g/dL    RDW 19.9 (H) 11.5 - 14.5 %    Platelets 164 150 - 350 K/uL    MPV 12.3 9.2 - 12.9 fL    Immature Granulocytes 1.8 (H) 0.0 - 0.5 %    Gran # (ANC) 8.2 (H) 1.8 - 7.7 K/uL    Immature Grans (Abs) 0.20 (H) 0.00 - 0.04 K/uL    Lymph # 1.5 1.0 - 4.8 K/uL    Mono # 0.7 0.3 - 1.0 K/uL    Eos # 0.5 0.0 - 0.5 K/uL    Baso #  0.07 0.00 - 0.20 K/uL    nRBC 1 (A) 0 /100 WBC    Gran% 73.7 (H) 38.0 - 73.0 %    Lymph% 13.2 (L) 18.0 - 48.0 %    Mono% 6.4 4.0 - 15.0 %    Eosinophil% 4.3 0.0 - 8.0 %    Basophil% 0.6 0.0 - 1.9 %    Differential Method Automated    Comprehensive metabolic panel - if not done in ED    Collection Time: 06/20/18  3:30 AM   Result Value Ref Range    Sodium 140 136 - 145 mmol/L    Potassium 3.9 3.5 - 5.1 mmol/L    Chloride 102 95 - 110 mmol/L    CO2 28 23 - 29 mmol/L    Glucose 93 70 - 110 mg/dL    BUN, Bld 22 (H) 6 - 20 mg/dL    Creatinine 2.1 (H) 0.5 - 1.4 mg/dL    Calcium 8.2 (L) 8.7 - 10.5 mg/dL    Total Protein 6.6 6.0 - 8.4 g/dL    Albumin 2.8 (L) 3.5 - 5.2 g/dL    Total Bilirubin 3.2 (H) 0.1 - 1.0 mg/dL    Alkaline Phosphatase 278 (H) 55 - 135 U/L    AST 2,367 (H) 10 - 40 U/L    ALT 2,438 (H) 10 - 44 U/L    Anion Gap 10 8 - 16 mmol/L    eGFR if African American 41.5 (A) >60 mL/min/1.73 m^2    eGFR if non African American 35.9 (A) >60 mL/min/1.73 m^2   Magnesium - if not done in ED    Collection Time: 06/20/18  3:30 AM   Result Value Ref Range    Magnesium 1.9 1.6 - 2.6 mg/dL   Phosphorus - if not done in ED    Collection Time: 06/20/18  3:30 AM   Result Value Ref Range    Phosphorus 4.9 (H) 2.7 - 4.5 mg/dL   Vancomycin, random    Collection Time: 06/20/18  3:30 AM   Result Value Ref Range    Vancomycin, Random 18.1 Not established ug/mL   ISTAT PROCEDURE    Collection Time: 06/20/18  8:09 AM   Result Value Ref Range    POC PH 7.429 7.35 - 7.45    POC PCO2 46.0 (H) 35 - 45 mmHg    POC PO2 30 (LL) 40 - 60 mmHg    POC HCO3 30.5 (H) 24 - 28 mmol/L    POC BE 6 -2 to 2 mmol/L    POC SATURATED O2 59 (L) 95 - 100 %    POC TCO2 32 (H) 24 - 29 mmol/L    Sample VENOUS     Site Other     Allens Test N/A     DelSys Nasal Can

## 2018-06-20 NOTE — ASSESSMENT & PLAN NOTE
RYAN with no previously reported and underlying CKI, likely iATN component secondary cardiac issues    Continues with excellent urine output    sCr stable at around 2    Plan/Recommendations:  1) diuresis as per cardiology service  2) continue strict Is & Os and serial RFPs  3) no need for RRT, continue to monitor

## 2018-06-20 NOTE — ASSESSMENT & PLAN NOTE
Likely due to hypotension. LFT improving to 2367/2438 <-- 3638/3032 <-- 6374/3882 <-- 79263/4280.  Continue to monitor. No signs of acute decompensated liver failure.

## 2018-06-20 NOTE — ASSESSMENT & PLAN NOTE
R/O sepsis   Unclear etiology. Could be stress-related. WBC stable 11. Blood cultures have been negative. Afebrile.  MRSA grew from right thigh stump ulcer. Contact isolation for now.   On Cefepime 2 q12. Holding on vancomycin for now.  Resp culture with no growth.

## 2018-06-20 NOTE — PLAN OF CARE
Problem: Restraint, Nonbehavioral (nonviolent)  Goal: Clinical Justification  Outcome: Ongoing (interventions implemented as appropriate)  Restraints had to be restarted d/t new new agitation and combativeness. See notes, orders, and flowsheets for additional information.

## 2018-06-20 NOTE — CONSULTS
[facility]  Psychiatry  Consult Note    Patient Name: Leonardo Byrd  MRN: [id number]   Code Status: Full Code  Admission Date: 6/16/2018  Hospital Length of Stay: 4 days  Attending Physician: Sae Carr MD  Primary Care Provider: Indio Aquino MD    Current Legal Status: N/A    Patient information was obtained from patient and relative(s).   Inpatient consult to Psychiatry  Consult performed by: WILLIAM MCKEON  Consult ordered by: LUCA PATEL  Reason for consult: Delirium  Assessment/Recommendations: See Assessment and Plan        Subjective:     Principal Problem:Cardiogenic shock    Chief Complaint:  Delirium    HPI: Per Cardiology notes:  47 year old with PMH of ICM (LVEF 30-35%) s/p ICD, CAD s/p PCI, HTN, HLD, current smoker, PAD (s/p aortobifemoral bypass, L SFA and pop PTAS in September and recent R SFA and R pop 3/8 by Dr. Carl Bell and finally R AKA 3/2018.     He presented to ED with about 2 week h/o SOB, cough, weight gain. He presented to [facility] ED on Saturday. Sent home after presumed negative work up. Presented once again overnight 6/13. BNP 2400, congested CXR, cough, no fever similar to prior CHF exacerbations. LActic acid elevated. He was admitted with COPD and sepsis. He was started on zmax and vanc and given a fluid bolus in ED.  He was found to not be septic the next day and all abx were stopped.  Unfortunately, though his UO was quite good and he was net - 2000cc on 6/16am, he progressively worsened from a respiratory status and became progressively hypotensive.  He was subsequently intubated and started on levo/dobutrex.  Lactate 10, k 7.1, creatinine now 1.8 from baseline 0.8, poor/no UO.  [place] [facility] [place].     On interview this morning:  The patient is lying in bed calmly at the beginning of the interview. Two of his daughters are present and help provide some of the history. The patient is not cooperative with interview  "and is profoundly confused. His answers to questions are nonsensical and he is unable to follow any commands. He is CAM-ICU positive for delirium and fails SAVEAHAART. The patient requests some water and his daughters attempt to feed him ice chips. He then becomes agitated and violent. He attempts to get out of bed and asks for his clothes. He starts yelling. His daughters are asked to leave the room and nurses place the patient in soft restraints to his wrists and his ankle. He continues to yell for his daughters and is no longer able to participate in the interview.    Per his daughters, he is treated for depression with a medication but they do not remember the name of the medication. He has not seen a psychiatrist in the past. They state that the patient has not had a drink of alcohol in at least a month and state that he does not use any drugs of abuse. They state that the patient was "given too much of a medication" by his doctors, but they are unable to say which medication they are referring too. They deny that the patient has had any prior suicide attempts.    Hospital Course: No notes on file         Patient History           Medical as of 6/20/2018     Past Medical History     Diagnosis Date Comments Source    AICD (automatic cardioverter/defibrillator) present -- -- Provider    RYAN (acute kidney injury) 3/25/2018 -- Provider    CHF (congestive heart failure) -- -- Provider    COPD with acute bronchitis 6/12/2018 -- Provider    Coronary artery disease -- -- Provider    Encounter for blood transfusion -- -- Provider    Hx of psychiatric care -- -- Provider    Hyperlipemia -- -- Provider    Hypertension -- -- Provider    MI (myocardial infarction) -- -- Provider    Neuropathy -- -- Provider    PAD (peripheral artery disease) -- -- Provider    Psychiatric problem -- -- Provider    PVD (peripheral vascular disease) -- -- Provider          Pertinent Negatives     Diagnosis Date Noted Comments Source    Cancer " 2/27/2017 -- Provider    Diabetes mellitus 2/27/2017 -- Provider    History of psychiatric hospitalization 6/20/2018 -- Provider    Teresa 6/20/2018 -- Provider    Psychiatric exam requested by authority 6/20/2018 -- Provider    Seizures 2/27/2017 -- Provider    Stroke 2/27/2017 -- Provider    Suicide attempt 6/20/2018 -- Provider    Therapy 6/20/2018 -- Provider                  Surgical as of 6/20/2018     Past Surgical History     Procedure Laterality Date Comments Source    AMPUTATION Right -- great toe Provider    KNEE ARTHROPLASTY Left -- -- Provider    VASCULAR SURGERY -- -- fem-pop bypass Provider    coronary stents [Other] -- -- -- Provider    CARDIAC DEFIBRILLATOR PLACEMENT -- -- -- Provider    EXPLORATION AND EVaCUATION OF HEMATOMA OF UPPER EXTREMITY [Other] Left -- -- Provider    BYBPASS GRAFT AORTOBIUFEMORAL [Other] -- -- -- Provider                  Family as of 6/20/2018    **None**           Tobacco Use as of 6/20/2018     Smoking Status Smoking Start Date Smoking Quit Date Packs/day Years Used    Former Smoker -- 5/1/2018 0.50 --    Types Comments Smokeless Tobacco Status Smokeless Tobacco Quit Date Source     Chew Uses nicotine gum and nicotine patches Former User 8/2/1980 Provider            Alcohol Use as of 6/20/2018     Alcohol Use Drinks/Week Alcohol/Week Comments Source    Yes -- -- rarely Provider            Drug Use as of 6/20/2018     Drug Use Types Frequency Comments Source    No -- -- -- Provider            Sexual Activity as of 6/20/2018     Sexually Active Birth Control Partners Comments Source    Yes -- Female -- Provider            Activities of Daily Living as of 6/20/2018     Activities of Daily Living Question Response Comments Source    Patient feels they ought to cut down on drinking/drug use Not Asked -- Provider    Patient annoyed by others criticizing their drinking/drug use Not Asked -- Provider    Patient has felt bad or guilty about drinking/drug use Not Asked -- Provider     Patient has had a drink/used drugs as an eye opener in the AM Not Asked -- Provider            Social Documentation as of 6/20/2018    **None**           Occupational as of 6/20/2018    **None**           Socioeconomic as of 6/20/2018     Marital Status Spouse Name Number of Children Years Education Preferred Language Ethnicity Race Source    Single -- -- -- English /White White --         Pertinent History Q A Comments    as of 6/20/2018 Lives with      Place in Birth Order      Lives in      Number of Siblings other     Raised by      Legal Involvement      Childhood Trauma      Criminal History of arrest and incarceration     Financial Status      Highest Level of Education high school graduation     Does patient have access to a firearm? No      Service No     Primary Leisure Activity      Spirituality actively participates in organized Bahai      Past Medical History:   Diagnosis Date    AICD (automatic cardioverter/defibrillator) present     RYAN (acute kidney injury) 3/25/2018    CHF (congestive heart failure)     COPD with acute bronchitis 6/12/2018    Coronary artery disease     Encounter for blood transfusion     Hx of psychiatric care     Hyperlipemia     Hypertension     MI (myocardial infarction)     Neuropathy     PAD (peripheral artery disease)     Psychiatric problem     PVD (peripheral vascular disease)      Past Surgical History:   Procedure Laterality Date    AMPUTATION Right     great toe    BYBPASS GRAFT AORTOBIUFEMORAL      CARDIAC DEFIBRILLATOR PLACEMENT      coronary stents      EXPLORATION AND EVaCUATION OF HEMATOMA OF UPPER EXTREMITY Left     KNEE ARTHROPLASTY Left     VASCULAR SURGERY      fem-pop bypass     Family History     None        Social History Main Topics    Smoking status: Former Smoker     Packs/day: 0.50     Quit date: 5/1/2018    Smokeless tobacco: Former User     Types: Chew     Quit date: 8/2/1980      Comment: Uses nicotine gum  and nicotine patches    Alcohol use Yes      Comment: rarely    Drug use: No    Sexual activity: Yes     Partners: Female     Review of patient's allergies indicates:  No Known Allergies    No current facility-administered medications on file prior to encounter.      Current Outpatient Prescriptions on File Prior to Encounter   Medication Sig    amitriptyline (ELAVIL) 50 MG tablet Take 1 tablet (50 mg total) by mouth every evening.    aspirin (ECOTRIN) 81 MG EC tablet Take 81 mg by mouth once daily.    atorvastatin (LIPITOR) 80 MG tablet Take 1 tablet (80 mg total) by mouth every evening.    cilostazol (PLETAL) 50 MG Tab Take 2 tablets (100 mg total) by mouth 2 (two) times daily.    cyclobenzaprine (FLEXERIL) 10 MG tablet Take 10 mg by mouth 3 (three) times daily as needed for Muscle spasms.    folic acid-vit B6-vit B12 2.5-25-2 mg (FOLBIC OR EQUIV) 2.5-25-2 mg Tab Take 1 tablet by mouth once daily.    gabapentin (NEURONTIN) 800 MG tablet Take 800 mg by mouth every evening.    lidocaine (XYLOCAINE) 5 % Oint ointment Apply topically 4 (four) times daily. Apply to entire left foot    metoprolol succinate (TOPROL-XL) 25 MG 24 hr tablet Take 0.5 tablets (12.5 mg total) by mouth once daily.    nicotine (NICODERM CQ) 21 mg/24 hr Place 1 patch onto the skin once daily.    oxyCODONE (ROXICODONE) 10 mg Tab immediate release tablet Take 1 tablet (10 mg total) by mouth every 4 (four) hours as needed for Pain.    polyethylene glycol (GLYCOLAX) 17 gram PwPk Take 17 g by mouth 2 (two) times daily as needed (constipation).    pregabalin (LYRICA) 100 MG capsule Take 1 capsule (100 mg total) by mouth 3 (three) times daily.    ranolazine (RANEXA) 1,000 mg Tb12 Take 1,000 mg by mouth 2 (two) times daily.    senna-docusate 8.6-50 mg (PERICOLACE) 8.6-50 mg per tablet Take 1 tablet by mouth 2 (two) times daily.    thiamine 100 MG tablet Take 1 tablet (100 mg total) by mouth once daily.    ticagrelor (BRILINTA) 90 mg  "tablet Take 90 mg by mouth 2 (two) times daily.    zolpidem (AMBIEN) 5 MG Tab Take 1 tablet (5 mg total) by mouth nightly as needed.     Psychotherapeutics     Start     Stop Route Frequency Ordered    06/19/18 1750  haloperidol lactate (HALDOL) 5 mg/mL injection     Comments:  Created by cabinet override    06/20 0559   06/19/18 1750        Review of Systems   Unable to perform ROS: Mental status change     Strengths and Liabilities: Strength: Patient has positive support network., Liability: Patient is hostile., Liability: Patient has poor judgment, Liability: Patient has possible cognitive impairment.    Objective:     Vital Signs (Most Recent):  Temp: 97.9 °F (36.6 °C) (06/19/18 1901)  Pulse: 74 (06/20/18 0809)  Resp: 15 (06/20/18 0809)  BP: 125/78 (06/20/18 0735)  SpO2: 100 % (06/20/18 0809) Vital Signs (24h Range):  Temp:  [97.4 °F (36.3 °C)-97.9 °F (36.6 °C)] 97.9 °F (36.6 °C)  Pulse:  [63-98] 74  Resp:  [12-36] 15  SpO2:  [100 %] 100 %  BP: ()/(53-82) 125/78     Height: 5' 6" (167.6 cm)  Weight: 58.4 kg (128 lb 12 oz)  Body mass index is 20.78 kg/m².      Intake/Output Summary (Last 24 hours) at 06/20/18 1124  Last data filed at 06/20/18 0600   Gross per 24 hour   Intake           372.44 ml   Output             1605 ml   Net         -1232.56 ml       Physical Exam     Significant Labs:   Last 24 Hours:   Recent Lab Results       06/20/18  0809 06/20/18  0330      Immature Granulocytes  1.8(H)     Immature Grans (Abs)  0.20  Comment:  Mild elevation in immature granulocytes is non specific and   can be seen in a variety of conditions including stress response,   acute inflammation, trauma and pregnancy. Correlation with other   laboratory and clinical findings is essential.  (H)     Albumin  2.8(L)     Alkaline Phosphatase  278(H)     Allens Test N/A      ALT  2,438(H)     Anion Gap  10     AST  2,367(H)     Baso #  0.07     Basophil%  0.6     Total Bilirubin  3.2  Comment:  For infants and newborns, " interpretation of results should be based  on gestational age, weight and in agreement with clinical  observations.  Premature Infant recommended reference ranges:  Up to 24 hours.............<8.0 mg/dL  Up to 48 hours............<12.0 mg/dL  3-5 days..................<15.0 mg/dL  6-29 days.................<15.0 mg/dL  (H)     Site Other      BUN, Bld  22(H)     Calcium  8.2(L)     Chloride  102     CO2  28     Creatinine  2.1(H)     DelSys Nasal Can      Differential Method  Automated     eGFR if   41.5(A)     eGFR if non   35.9  Comment:  Calculation used to obtain the estimated glomerular filtration  rate (eGFR) is the CKD-EPI equation.   (A)     Eos #  0.5     Eosinophil%  4.3     Glucose  93     Gran # (ANC)  8.2(H)     Gran%  73.7(H)     Hematocrit  32.1(L)     Hemoglobin  10.8(L)     Lymph #  1.5     Lymph%  13.2(L)     Magnesium  1.9     MCH  30.9     MCHC  33.6     MCV  92     Mono #  0.7     Mono%  6.4     MPV  12.3     nRBC  1(A)     Phosphorus  4.9(H)     Platelets  164     POC BE 6      POC HCO3 30.5(H)      POC PCO2 46.0(H)      POC PH 7.429      POC PO2 30(LL)      POC SATURATED O2 59(L)      POC TCO2 32(H)      Potassium  3.9     Total Protein  6.6     RBC  3.49(L)     RDW  19.9(H)     Sample VENOUS      Sodium  140     Vancomycin, Random  18.1     WBC  11.17           Significant Imaging: CXR: I have reviewed all pertinent results/findings within the past 24 hours. Endotracheal tube, right CVC and enteric tube in stable in reasonable position.  Pacing device in the left chest wall, stable.  Multiple overlying cardiac monitoring leads. Mild cardiomegaly.  There is persistent opacity in the right lower lung, although slightly improved aeration from the prior exam.  No new focal parenchymal opacity.  No new large volume of pleural fluid or pneumothorax.  EKG: I have reviewed all pertinent results/findings within the past 24 hours. Normal sinus rhythm, possible left  atrial enlargement, ST and/or wave abnormalities suggesting myocardial ischemia, Abnormal ECG; QTc is 693    Assessment/Plan:     Delirium    -The patient is CAM-ICU positive and fails SAVEAHAART today  -Would not recommend antipsychotics for agitation due to prolonged QTc of 693  -Would also not recommend benzodiazepines for delirium as they are known to make delirium worse  -Give a one-time dose of Depacon 250 mg IV today  -Will speak to nursing staff about his response to this dose before making further recommendations to potentially use Depacon as a scheduled and/or PRN medication for agitation  -Can use soft restraints for non-redirectable agitation    -Implement the below DELIRIUM BEHAVIOR MANAGEMENT:  - Minimize use of restraints; if physical restraints necessary, please utilize medical/chemical prns for agitation.  - Keep shades open and room lit during day and room dim at night in order to promote healthy circadian rhythms.  - Encourage family at bedside.  - Keep whiteboard in patient's room current with the date and name of the members of patient's team for easy patient self re-orientation.  - Avoid benzodiazepines, antihistamines, anticholinergics, hypnotics, and minimize opiates while controlling for pain as these medications may exacerbate delirium.    Psychiatry will continue to follow               Total Time:  60 minutes      Bruno Li MD   Psychiatry  [facility]

## 2018-06-20 NOTE — PROGRESS NOTES
Pulmonary / Critical Care Medicine  Progress Note  S: No major issues overnight.  Doing well this morning; no complaints.       O: VS: Temp:  [97.4 °F (36.3 °C)-97.9 °F (36.6 °C)]   Pulse:  [63-98]   Resp:  [12-36]   BP: ()/(53-82)   SpO2:  [100 %]     I/O:   Intake 381.94 ml   Output 1655 ml   Net -1273.06 ml       PE well developed, well nourished, mild distress  lips, mucosa, and tongue normal; teeth and gums normal and no throat erythema  conjunctivae/corneas clear. PERRL.  regular rate and rhythm, S1, S2 normal, no murmur  clear to auscultation bilaterally, normal respiratory effort and normal percussion bilaterally  soft, non-tender non-distended; bowel sounds normal  warm, well perfused and no cyanosis or edema, or clubbing    Labs   WBC 11.17    RBC 3.49    HGB 10.8    HCT 32.1        MCV 92    MCH 30.9    MCHC 33.6        K 3.9        CO2 28    BUN 22    CREATININE 2.1    MG 1.9    ALT 2,438    AST 2,367    ALKPHOS 278    BILITOT 3.2    PROT 6.6    ALBUMIN 2.8    PH  7.429   PCO2  46.0   PO2  30   HCO3  30.5   POCSATURATED  59   BE  6       Imaging CXR 06/20/2018 No new images this morning       Micro Blood Cx 6/16 6/16 NGTD  NGTD   Sputum Cx 6/18 No growth       Medications Scheduled    aspirin  81 mg Per OG tube Daily    atorvastatin  80 mg Per OG tube Daily    collagenase   Topical (Top) Daily    furosemide  80 mg Intravenous Once    gentamicin   Topical (Top) Daily    isosorbide-hydrALAZINE 20-37.5 mg  1 tablet Oral TID    multivitamin liquid no.118  10 mL Oral Daily    nicotine  1 patch Transdermal Daily    senna-docusate 8.6-50 mg  1 tablet Per OG tube BID    sodium chloride 0.9%  3 mL Intravenous Q8H    thiamine  100 mg Oral Daily    ticagrelor  90 mg Per OG tube BID      Continuous Infusions:    DOBUTamine 2.5 mcg/kg/min (06/20/18 0600)    norepinephrine bitartrate-D5W Stopped (06/19/18 0400)      PRN   acetaminophen, albuterol-ipratropium, levalbuterol,  lidocaine HCL 2%, magnesium oxide, magnesium oxide, ondansetron, pneumoc 13-osman conj-dip cr(PF), potassium chloride 10%, potassium chloride 10%, potassium chloride 10%, potassium, sodium phosphates, potassium, sodium phosphates, potassium, sodium phosphates                 A/P:  1. Resolved acute hypoxemic respiratory failure.  2. Suspected hospital acquired pneumonia  3. Acute decompensated heart failure  4. Cardiogenic versus septic shock  5. Reported history of chronic obstructive pulmonary disease; without acute exacerbation  6. Shock liver  · Doing great this morning.  · Respiratory failure resolved.  · Pulmonary will sign off; please re-consult as needed.    Zachary Bowman MD  [employer] Pulmonary / Critical Care Fellow  [phone]  06/20/2018  10:29 AM

## 2018-06-20 NOTE — SIGNIFICANT EVENT
Patient evaluated emergently at bedside due to combative and aggressive behavior towards nursing staff and family. Patient oriented to self only. Redirection performed with patient being mildly redirectable. Currently restrained with violent restraints to three points. Case and plan d/w Psychiatry resident. Antipsychotics not currently an option given prolonged QTc. Psychiatry considering IV Depacon. Appreciate their recommendations.      KURTIS Delgadillo  PGY-3  Internal Medicine  Pager: [phone]

## 2018-06-20 NOTE — PROGRESS NOTES
[facility]  Cardiology  Progress Note    Patient Name: Leonardo Byrd  MRN: [id number]  Admission Date: 6/16/2018  Hospital Length of Stay: 4 days  Code Status: Full Code   Attending Physician: Sae Carr MD   Primary Care Physician: Indio Aquino MD  Expected Discharge Date: 6/22/2018  Principal Problem:Cardiogenic shock    Subjective:     Hospital Course:   No notes on file    Interval History: extubated yesterday noon. Became aggressive and combative in the afternoon requiring chemo and physical restraints. Was also started on Precedex. Sedated this am. Daughter at bedside.     ROS   Unable to obtain given patient's factors.    Objective:     Vital Signs (Most Recent):  Temp: 97.9 °F (36.6 °C) (06/19/18 1901)  Pulse: 84 (06/20/18 0735)  Resp: 14 (06/20/18 0735)  BP: 125/78 (06/20/18 0735)  SpO2: 100 % (06/20/18 0735) Vital Signs (24h Range):  Temp:  [97.4 °F (36.3 °C)-97.9 °F (36.6 °C)] 97.9 °F (36.6 °C)  Pulse:  [63-98] 84  Resp:  [12-36] 14  SpO2:  [100 %] 100 %  BP: ()/(53-82) 125/78     Weight: 58.4 kg (128 lb 12 oz)  Body mass index is 20.78 kg/m².     SpO2: 100 %  O2 Device (Oxygen Therapy): BiPAP      Intake/Output Summary (Last 24 hours) at 06/20/18 0824  Last data filed at 06/20/18 0600   Gross per 24 hour   Intake           400.94 ml   Output             1745 ml   Net         -1344.06 ml       Lines/Drains/Airways     Central Venous Catheter Line                 Percutaneous Central Line Insertion/Assessment - triple lumen  06/16/18 0825 right subclavian 3 days         Trialysis (Dialysis) Catheter 06/16/18 2109 left internal jugular 3 days          Drain                 Urethral Catheter 06/16/18 0914 Latex 16 Fr. 3 days                Physical Exam     General: well developed, well nourished.  Sedated and intubated. Withdraws to pain.   Eyes: conjunctivae/corneas clear. Dilated but reactive pupils.   Neck: supple, symmetrical, trachea midline. + JVD.    Cardiovascular: Heart: regular rate and rhythm, S1, S2 normal, no murmur.   Lungs: clear to auscultation bilaterally and normal respiratory effort.   Chest Wall: no tenderness  Abdomen/Rectal: Abdomen: Non distended. + BS. No masses. No TTP.   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 + lower ext edema on left, no redness or tenderness in the calves or thighs.   Skin: Skin color, texture, turgor normal.   Musculoskeletal: full range of motion of joints  Lymph Nodes: No cervical or supraclavicular adenopathy  Psych/Behavioral: sedated and does not follow commands.       Significant Labs:     Recent Results (from the past 24 hour(s))   Urinalysis    Collection Time: 06/19/18  8:34 AM   Result Value Ref Range    Specimen UA Urine, Catheterized     Color, UA Destini Yellow, Straw, Destini    Appearance, UA Hazy (A) Clear    pH, UA 5.0 5.0 - 8.0    Specific Gravity, UA 1.015 1.005 - 1.030    Protein, UA 1+ (A) Negative    Glucose, UA Negative Negative    Ketones, UA Negative Negative    Bilirubin (UA) Negative Negative    Occult Blood UA 2+ (A) Negative    Nitrite, UA Negative Negative    Urobilinogen, UA Negative <2.0 EU/dL    Leukocytes, UA Trace (A) Negative   Urea nitrogen, urine    Collection Time: 06/19/18  8:34 AM   Result Value Ref Range    Urine Urea Nitrogen, Random 214 140 - 1050 mg/dL   Creatinine, urine, random    Collection Time: 06/19/18  8:34 AM   Result Value Ref Range    Creatinine, Random Ur 66.0 23.0 - 375.0 mg/dL   Urinalysis Microscopic    Collection Time: 06/19/18  8:34 AM   Result Value Ref Range    RBC, UA 22 (H) 0 - 4 /hpf    WBC, UA 7 (H) 0 - 5 /hpf    Bacteria, UA Moderate (A) None-Occ /hpf    Hyaline Casts, UA 2 (A) 0-1/lpf /lpf    Microscopic Comment SEE COMMENT    CBC auto differential    Collection Time: 06/20/18  3:30 AM   Result Value Ref Range    WBC 11.17 3.90 - 12.70 K/uL    RBC 3.49 (L) 4.60 - 6.20 M/uL    Hemoglobin 10.8 (L) 14.0 - 18.0 g/dL     Hematocrit 32.1 (L) 40.0 - 54.0 %    MCV 92 82 - 98 fL    MCH 30.9 27.0 - 31.0 pg    MCHC 33.6 32.0 - 36.0 g/dL    RDW 19.9 (H) 11.5 - 14.5 %    Platelets 164 150 - 350 K/uL    MPV 12.3 9.2 - 12.9 fL    Immature Granulocytes 1.8 (H) 0.0 - 0.5 %    Gran # (ANC) 8.2 (H) 1.8 - 7.7 K/uL    Immature Grans (Abs) 0.20 (H) 0.00 - 0.04 K/uL    Lymph # 1.5 1.0 - 4.8 K/uL    Mono # 0.7 0.3 - 1.0 K/uL    Eos # 0.5 0.0 - 0.5 K/uL    Baso # 0.07 0.00 - 0.20 K/uL    nRBC 1 (A) 0 /100 WBC    Gran% 73.7 (H) 38.0 - 73.0 %    Lymph% 13.2 (L) 18.0 - 48.0 %    Mono% 6.4 4.0 - 15.0 %    Eosinophil% 4.3 0.0 - 8.0 %    Basophil% 0.6 0.0 - 1.9 %    Differential Method Automated    Comprehensive metabolic panel - if not done in ED    Collection Time: 06/20/18  3:30 AM   Result Value Ref Range    Sodium 140 136 - 145 mmol/L    Potassium 3.9 3.5 - 5.1 mmol/L    Chloride 102 95 - 110 mmol/L    CO2 28 23 - 29 mmol/L    Glucose 93 70 - 110 mg/dL    BUN, Bld 22 (H) 6 - 20 mg/dL    Creatinine 2.1 (H) 0.5 - 1.4 mg/dL    Calcium 8.2 (L) 8.7 - 10.5 mg/dL    Total Protein 6.6 6.0 - 8.4 g/dL    Albumin 2.8 (L) 3.5 - 5.2 g/dL    Total Bilirubin 3.2 (H) 0.1 - 1.0 mg/dL    Alkaline Phosphatase 278 (H) 55 - 135 U/L    AST 2,367 (H) 10 - 40 U/L    ALT 2,438 (H) 10 - 44 U/L    Anion Gap 10 8 - 16 mmol/L    eGFR if African American 41.5 (A) >60 mL/min/1.73 m^2    eGFR if non African American 35.9 (A) >60 mL/min/1.73 m^2   Magnesium - if not done in ED    Collection Time: 06/20/18  3:30 AM   Result Value Ref Range    Magnesium 1.9 1.6 - 2.6 mg/dL   Phosphorus - if not done in ED    Collection Time: 06/20/18  3:30 AM   Result Value Ref Range    Phosphorus 4.9 (H) 2.7 - 4.5 mg/dL   Vancomycin, random    Collection Time: 06/20/18  3:30 AM   Result Value Ref Range    Vancomycin, Random 18.1 Not established ug/mL   ISTAT PROCEDURE    Collection Time: 06/20/18  8:09 AM   Result Value Ref Range    POC PH 7.429 7.35 - 7.45    POC PCO2 46.0 (H) 35 - 45 mmHg    POC PO2  30 (LL) 40 - 60 mmHg    POC HCO3 30.5 (H) 24 - 28 mmol/L    POC BE 6 -2 to 2 mmol/L    POC SATURATED O2 59 (L) 95 - 100 %    POC TCO2 32 (H) 24 - 29 mmol/L    Sample VENOUS     Site Other     Allens Test N/A     DelSys Nasal Can            Assessment and Plan:         * Cardiogenic shock    -likely due to ADHF following volume resuscitation when received IVF for suspected sepsis on initial presentation resulting in volume overload.   - CO 4.22 and SVR 1130 6/19.   - net negative 9.1 since admit ajnd 1.8 last 24 hrs. SLED discontinued 6/17/2018.   - held IV lasix 6/18/2018 due to over diuresis resulting in RYAN.   - obtaining CVP for today. Consider repeating lasix x1 today.   - Nephrology following, appreciate assistance.   - Continue dobutamine gtt currently at 2.5.            Encephalopathy    Likely due ICU delirium vs metabolic.   Required Zyprexa 5mg IM and Haldol 2mg IV x1 yesterday due to combativeness and agitation. Started on precedex as well. Discontinuing Precedex.    Discontinuing pregabalin in setting of delirium and encephalopathy.   Received lactulose with no clear improvement in mental status.   Delirium precautions. Remove BiPAP mask.              Acute respiratory failure with hypoxia    Likely due to pulmonary edema vs PNA.  resp culture GS with no bacteria.  On Cefepime, discontinuing vancomycin.  Extubated on 6/19/2018 to BiPAP. Weaning BiPAP off as patient with minimal supplemental oxygen requirement.           Alteration in skin integrity related to surgical incision    At right AKA. Good granulation. Will not culture. Appreciate wound care's assistance.         Shock liver    Likely due to hypotension. LFT improving to 2367/2438 <-- 3638/3032 <-- 6374/3882 <-- 01194/5180.  Continue to monitor. No signs of acute decompensated liver failure.           Lactic acidosis    -2.1 improved from prior. Repeat as needed.          Hyperkalemia    Shifted at [facility].   Resolved.         COPD    Prn  nebs.         Acute renal failure    Likely ATN due to ADHF and ischemic insult with hypotension and currently with over diuresis.   Cr worse this am to 2.1 from 2.0 with BUN of 18 likely due to over diuresis. Sending for urine lytes.   Holding diuresis for now.   Appreciate nephro's assistance.   Avoid nephrotoxins and renally dose medications.         Leukocytosis    R/O sepsis   Unclear etiology. Could be stress-related. WBC stable 11. Blood cultures have been negative. Afebrile.  MRSA grew from right thigh stump ulcer. Contact isolation for now.   On Cefepime 2 q12. Holding on vancomycin for now.  Resp culture with no growth.          Alcohol abuse    Reportedly last drink was weeks to months ago.   Avoiding BZD as possible.             Tobacco abuse    Nicotine patch        Peripheral vascular disease of lower extremity    Continue asa/brilinta.  S/p right AKA. Left foot with 1-5 toe amputation.   Lactate continues to downtrend; improved to 2.1 since last check             Malnutrition    Poor enteral nutrition due to AMS.  Nutrition rec's and place NG for TF.  Bedside swallow test.         VTE Risk Mitigation         Ordered     IP VTE HIGH RISK PATIENT  Once      06/16/18 2001     Place sequential compression device  Until discontinued      06/16/18 2001     Place EMIGDIO hose  Until discontinued      06/16/18 2001          Cary Harris MD  Cardiology  [facility] [address]

## 2018-06-21 PROBLEM — E87.5 HYPERKALEMIA: Status: RESOLVED | Noted: 2018-01-01 | Resolved: 2018-01-01

## 2018-06-21 NOTE — PHYSICIAN QUERY
"PT Name: Leonardo Byrd  MR #: [id number]    Physician Query Form - Heart  Condition Clarification     CDS/: Keenan August Jr, RN              Contact information:[phone]  This form is a permanent document in the medical record.     Query Date: June 21, 2018    By submitting this query, we are merely seeking further clarification of documentation. Please utilize your independent clinical judgment when addressing the question(s) below.    The medical record contains the following   Indicators     Supporting Clinical Findings Location in Medical Record   x BNP 2452 6/16 Labs    EF      Radiology findings     x Echo Results Severely depressed left ventricular systolic function (EF low to mid teens).   Restrictive LV filling pattern, indicating markedly elevated LAP (grade 3 diastolic dysfunction).  6/17 2D Echo Report    "Ascites" documented      "SOB" or "HASSAN" documented     x "Hypoxia" documented Acute respiratory failure with hypoxia    Likely due to pulmonary edema vs PNA. 6/21 Cardiology Progress Note   x Heart Failure documented Cardiogenic shock    -likely due to ADHF following volume resuscitation when received IVF for suspected sepsis on initial presentation resulting in volume overload.  6/21 Cardiology Progress Note   x "Edema" documented Extremities: right AKA, left foot with 1-5 toe amputation. + lower ext edema on left 6/19 Cardiology Progress Note   x Diuretics/Meds furosemide injection 160 mg Once  furosemide injection 80 mg Once  furosemide injection 80 mg Once  furosemide injection 80 mg Once 6/17 MAR  6/19 MAR  6/20 MAR  6/21 MAR    Treatment:      Other:          Provider, please specify diagnosis or diagnoses associated with above clinical findings.  Further Specify the Acute Decompensated Heart Failure                               [  ] Acute on Chronic Systolic Heart Failure ( EF < 40)*  [ x ] Acute on Chronic Combined Systolic and Diastolic Heart Failure  [  ] Other (please specify): " ___________________________________  [  ] Clinically Undetermined            *American Heart Association                                                                                                          Please document in your progress notes daily for the duration of treatment until resolved and include in your discharge summary.

## 2018-06-21 NOTE — ASSESSMENT & PLAN NOTE
Likely due to hypotension. LFT improving to 1349/1847 <-- 2367/2438 <-- 3638/3032 <-- 6374/3882 <-- 63579/5180.  Continue to monitor. No signs of acute decompensated liver failure.

## 2018-06-21 NOTE — CONSULTS
Nutrition consult regarding TPN/PPN recommendations.  Please see note from 5/20 for full RD assessment; RD to follow-up 6/25.    Recommendations/Interventions:  1. TF recommendations: Novasource @ 35 mL/hr to provide 1680 calories (97% EEN), 76 g of protein (107% EPN), 602 mL fluid.               - Hold for residuals >400 mL; additional fluid per MD.   2. If able to advance diet, recommend renal diet (texture per SLP). Add Novasource ONS to aid in caloric intake.  3. TPN recommendations: 5/15 @ 65 mL/hr + 250 mL 20% lipids to provide 1608 kcals (92% EEN), 78 g of protein (111% EPN), 1560 mL fluid (GIR: 2.78).   4. PPN recommendations: 4.25/10 @ 75 mL/hr + 250 mL 20% lipids to provide 1418 kcals (81% EEN), 77 g of protein (110% EPN), 1800 mL fluid (GIR: 2.14).   5. RD to monitor & follow-up.    Thanks! DANNA Colon [id number]

## 2018-06-21 NOTE — ASSESSMENT & PLAN NOTE
-The patient is CAM-ICU positive and fails SAVEAHAART today  -Would not recommend antipsychotics for agitation due to prolonged QTc of 693 on last EKG (6/19)  -Ordered another EKG today  -Would also not recommend benzodiazepines for delirium as they are known to make delirium worse  -Increase Depacon dose to 500 mg IV q8 hours scheduled for agitation  -Can use soft restraints for non-redirectable agitation    -Implement the below DELIRIUM BEHAVIOR MANAGEMENT:  - Minimize use of restraints; if physical restraints necessary, please utilize medical/chemical prns for agitation.  - Keep shades open and room lit during day and room dim at night in order to promote healthy circadian rhythms.  - Encourage family at bedside.  - Keep whiteboard in patient's room current with the date and name of the members of patient's team for easy patient self re-orientation.  - Avoid benzodiazepines, antihistamines, anticholinergics, hypnotics, and minimize opiates while controlling for pain as these medications may exacerbate delirium.    Psychiatry will continue to follow

## 2018-06-21 NOTE — ASSESSMENT & PLAN NOTE
R/O sepsis   Unclear etiology. Likely stress-related. WBC stable 11.65. Infectious workup negative.   MRSA grew from right thigh stump ulcer. Contact isolation for now.   Discontinuing cefepime and vancomycin.

## 2018-06-21 NOTE — ASSESSMENT & PLAN NOTE
-likely due to ADHF following volume resuscitation when received IVF for suspected sepsis on initial presentation resulting in volume overload.   - obtaining CO and SVR today. Pending CVP.   - net negative 9.1 since admit ajnd 1.8 last 24 hrs. SLED discontinued 6/17/2018.   - held IV lasix 6/18/2018 due to over diuresis resulting in RYAN.   - obtaining CVP for today. Consider repeating lasix x1 today.   - Nephrology following, appreciate assistance.   - Dobutamine gtt currently increased to 5.

## 2018-06-21 NOTE — HOSPITAL COURSE
"6/21/2018: The chart was reviewed and the patient was interviewed this morning. His father was present in the room during the interview. The patient was in restraints to his wrists bilaterally and to his left ankle. He also had a mask on his face to prevent him from spitting on staff. Per his nurse, he has continued to be very agitated and interfering with treatment. The patient was agitated during the interview this morning. His speech was profane and he was shouting at his father to "call 911". He was unable to participate in CAM-ICU assessment for delirium and was unable to follow commands.    6/22/2018: The chart was reviewed and the patient was interviewed this morning. He had a mask on his face and he was in 3-point soft restraints. He was vulgar and profoundly confused. He was unable to offer coherent answers to any questions. He failed SAVEAHAART. He was oriented to person only.     06/23/2018 - received Thorazine 50mg IM yesterday for agitation. remarkable improvement in mental status today. Pt alert and oriented to person, place, city, state, month, year, situation - CAM-ICU negative with 0 errors on SAVEAHAART screen. Mood is good, thoughts are linear, pt laughing and joking around appropriately. Son at bedside, relieved by the marked improvement. Step down to floor today.     06/24/2018: per chart has been calm, no PRN medication needed. Says he is doing "a lot better" today, though he complains about not being able to walk and asks when his L foot dressing will be changed. C/o pain. Oriented to place, month, year and that is was recently father's day. No family at bedside on interview.    6/25./2018: No PRN medications have been given over the past several days for agitation. The patient is calm and oriented to person, place and time. Per his father at the bedside, he is still somewhat confused and not back to his baseline yet. However, he is out of restraints and passed SAVEAHAART this morning. His " "mood was "good" this morning. There was some instances of incoherent mumbling, but the patient was otherwise appropriate and cooperative with the interview.    6/28/2018: Chart was reviewed and the patient was interviewed this morning. No PRN medications have been given over the past several days for agitation. The patient was calm and cooperative with interview. He stated that he was told he would have his left leg amputated below the knee tomorrow. He understands the need to have this surgery and states that it will be better since his foot has continued to hurt. He denies SI/HI/AVH. He is appropriate and pleasant on interview. He passes SAVEMemoright and is CAM-ICU negative for delirium. He denies any physical complaints or side effects to Depakote.  "

## 2018-06-21 NOTE — PLAN OF CARE
Problem: Restraint, Behavioral (Violent)  Goal: Discontinuation Criteria Achieved  Outcome: Ongoing (interventions implemented as appropriate)  Reviewed care plan with family

## 2018-06-21 NOTE — SUBJECTIVE & OBJECTIVE
"Interval History: See hospital course    Family History     None        Social History Main Topics    Smoking status: Former Smoker     Packs/day: 0.50     Quit date: 5/1/2018    Smokeless tobacco: Former User     Types: Chew     Quit date: 8/2/1980      Comment: Uses nicotine gum and nicotine patches    Alcohol use Yes      Comment: rarely    Drug use: No    Sexual activity: Yes     Partners: Female     Psychotherapeutics     Start     Stop Route Frequency Ordered    06/19/18 1750  haloperidol lactate (HALDOL) 5 mg/mL injection     Comments:  Created by cabinet override    06/20 0559 06/19/18 1750           Review of Systems  Objective:     Vital Signs (Most Recent):  Temp: 97.8 °F (36.6 °C) (06/21/18 0300)  Pulse: 101 (06/21/18 1030)  Resp: (!) 21 (06/21/18 1030)  BP: 118/74 (06/21/18 0900)  SpO2: 99 % (06/21/18 0850) Vital Signs (24h Range):  Temp:  [96.8 °F (36 °C)-97.8 °F (36.6 °C)] 97.8 °F (36.6 °C)  Pulse:  [] 101  Resp:  [15-55] 21  SpO2:  [99 %] 99 %  BP: ()/(58-87) 118/74     Height: 5' 6" (167.6 cm)  Weight: 58.4 kg (128 lb 12 oz)  Body mass index is 20.78 kg/m².      Intake/Output Summary (Last 24 hours) at 06/21/18 1051  Last data filed at 06/21/18 1000   Gross per 24 hour   Intake            493.5 ml   Output             1315 ml   Net           -821.5 ml       Physical Exam      Mental Status Exam:  Appearance: unremarkable, age appropriate, normal weight, well nourished, dressed in hospital gown, in soft restraints to wrists bilaterally and to the left ankle  Behavior/Cooperation:  uncooperative, hostile  Speech: normal tone, normal rate, normal pitch, normal volume, profane, spontaneous  Language: uses words appropriately; NO aphasia or dysarthria  Mood: agitated  Affect:  congruent with mood   Thought Process: unable to assess  Thought Content: unable to assess  Level of Consciousness: Alert and Oriented x3  Memory:  Impaired  Attention/concentration: inattentive, fails " [facility] Knowledge: estimated to be below average  Insight:  Limited  Judgment: Limited      Significant Labs:   Last 24 Hours:   Recent Lab Results       06/21/18  0858 06/21/18  0400      Immature Granulocytes  1.4(H)     Immature Grans (Abs)  0.16  Comment:  Mild elevation in immature granulocytes is non specific and   can be seen in a variety of conditions including stress response,   acute inflammation, trauma and pregnancy. Correlation with other   laboratory and clinical findings is essential.  (H)     Albumin  2.8(L)     Alkaline Phosphatase  288(H)     Allens Test N/A      ALT  1,847(H)     Amylase  64     Anion Gap  15     AST  1,349(H)     Baso #  0.09     Basophil%  0.8     Total Bilirubin  3.9  Comment:  For infants and newborns, interpretation of results should be based  on gestational age, weight and in agreement with clinical  observations.  Premature Infant recommended reference ranges:  Up to 24 hours.............<8.0 mg/dL  Up to 48 hours............<12.0 mg/dL  3-5 days..................<15.0 mg/dL  6-29 days.................<15.0 mg/dL  (H)     Site Other      BUN, Bld  25(H)     Calcium  8.5(L)     Chloride  98     CO2  27     Creatinine  2.1(H)     DelSys Room Air      Differential Method  Automated     eGFR if   41.5(A)     eGFR if non   35.9  Comment:  Calculation used to obtain the estimated glomerular filtration  rate (eGFR) is the CKD-EPI equation.   (A)     Eos #  0.2     Eosinophil%  2.1     Glucose  81     Gran # (ANC)  8.0(H)     Gran%  68.5     Hematocrit  34.3(L)     Hemoglobin  12.1(L)     Lipase  112(H)     Lymph #  1.7     Lymph%  14.2(L)     Magnesium  1.5(L)     MCH  31.8(H)     MCHC  35.3     MCV  90     Mono #  1.5(H)     Mono%  13.0     MPV  12.4     nRBC  1(A)     Phosphorus  3.2     Platelets  184     POC BE 6      POC HCO3 29.0(H)      POC PCO2 37.7      POC PH 7.494(H)      POC PO2 30(LL)      POC SATURATED O2 64(L)      POC  TCO2 30(H)      Potassium  3.3(L)     Total Protein  7.0     RBC  3.81(L)     RDW  19.9(H)     Sample VENOUS      Sodium  140     WBC  11.65           Significant Imaging: None

## 2018-06-21 NOTE — SUBJECTIVE & OBJECTIVE
Interval History:   Remained aggressive overnight. Started on scheduled Depacon by Psychiatry.   Patient confused this am. Oriented to place and time. Remains paranoid with father at bedside helping with calming patient down.    ROS   Unable to obtain given patient's factors.    Objective:     Vital Signs (Most Recent):  Temp: 97.8 °F (36.6 °C) (06/21/18 0300)  Pulse: 99 (06/21/18 0730)  Resp: (!) 24 (06/21/18 0730)  BP: 106/65 (06/21/18 0702)  SpO2: 99 % (06/20/18 1245) Vital Signs (24h Range):  Temp:  [96.8 °F (36 °C)-97.8 °F (36.6 °C)] 97.8 °F (36.6 °C)  Pulse:  [] 99  Resp:  [9-55] 24  SpO2:  [97 %-100 %] 99 %  BP: (106-180)/(58-87) 106/65     Weight: 58.4 kg (128 lb 12 oz)  Body mass index is 20.78 kg/m².     SpO2: 99 %  O2 Device (Oxygen Therapy): room air      Intake/Output Summary (Last 24 hours) at 06/21/18 0853  Last data filed at 06/21/18 0500   Gross per 24 hour   Intake              389 ml   Output             1065 ml   Net             -676 ml       Lines/Drains/Airways     Central Venous Catheter Line                 Percutaneous Central Line Insertion/Assessment - triple lumen  06/16/18 0825 right subclavian 5 days         Trialysis (Dialysis) Catheter 06/16/18 2109 left internal jugular 4 days          Drain                 Urethral Catheter 06/16/18 0914 Latex 16 Fr. 4 days                Physical Exam  General: well developed, well nourished. Disoriented to place. NAD.  Eyes: conjunctivae/corneas clear.   Neck: supple, symmetrical, trachea midline. + JVD.   Cardiovascular: Heart: regular rate and rhythm, S1, S2 normal, no murmur.   Lungs: clear to auscultation bilaterally and normal respiratory effort.   Chest Wall: no tenderness  Abdomen/Rectal: Abdomen: Non distended. + BS. No masses. No TTP.   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 + lower ext edema on left, no redness or tenderness in the calves or thighs.   Skin: Skin color, texture, turgor normal.    Musculoskeletal: full range of motion of joints  Lymph Nodes: No cervical or supraclavicular adenopathy  Psych/Behavioral: paranoid, alert but disoriented to place.      Significant Labs:   Recent Results (from the past 24 hour(s))   Urinalysis    Collection Time: 06/20/18  9:44 AM   Result Value Ref Range    Specimen UA Urine, Unspecified     Color, UA Destini Yellow, Straw, Destini    Appearance, UA Hazy (A) Clear    pH, UA 5.0 5.0 - 8.0    Specific Gravity, UA 1.015 1.005 - 1.030    Protein, UA 1+ (A) Negative    Glucose, UA Negative Negative    Ketones, UA Trace (A) Negative    Bilirubin (UA) Negative Negative    Occult Blood UA 2+ (A) Negative    Nitrite, UA Negative Negative    Urobilinogen, UA Negative <2.0 EU/dL    Leukocytes, UA Negative Negative   Urinalysis Microscopic    Collection Time: 06/20/18  9:44 AM   Result Value Ref Range    RBC, UA 1 0 - 4 /hpf    WBC, UA 7 (H) 0 - 5 /hpf    Bacteria, UA Rare None-Occ /hpf    Squam Epithel, UA 1 /hpf    Hyaline Casts, UA 3 (A) 0-1/lpf /lpf    Microscopic Comment SEE COMMENT    Creatinine, urine, random    Collection Time: 06/20/18  9:45 AM   Result Value Ref Range    Creatinine, Random Ur 74.0 23.0 - 375.0 mg/dL   Urea nitrogen, urine    Collection Time: 06/20/18  9:45 AM   Result Value Ref Range    Urine Urea Nitrogen, Random 311 140 - 1050 mg/dL   CBC auto differential    Collection Time: 06/21/18  4:00 AM   Result Value Ref Range    WBC 11.65 3.90 - 12.70 K/uL    RBC 3.81 (L) 4.60 - 6.20 M/uL    Hemoglobin 12.1 (L) 14.0 - 18.0 g/dL    Hematocrit 34.3 (L) 40.0 - 54.0 %    MCV 90 82 - 98 fL    MCH 31.8 (H) 27.0 - 31.0 pg    MCHC 35.3 32.0 - 36.0 g/dL    RDW 19.9 (H) 11.5 - 14.5 %    Platelets 184 150 - 350 K/uL    MPV 12.4 9.2 - 12.9 fL    Immature Granulocytes 1.4 (H) 0.0 - 0.5 %    Gran # (ANC) 8.0 (H) 1.8 - 7.7 K/uL    Immature Grans (Abs) 0.16 (H) 0.00 - 0.04 K/uL    Lymph # 1.7 1.0 - 4.8 K/uL    Mono # 1.5 (H) 0.3 - 1.0 K/uL    Eos # 0.2 0.0 - 0.5 K/uL     Baso # 0.09 0.00 - 0.20 K/uL    nRBC 1 (A) 0 /100 WBC    Gran% 68.5 38.0 - 73.0 %    Lymph% 14.2 (L) 18.0 - 48.0 %    Mono% 13.0 4.0 - 15.0 %    Eosinophil% 2.1 0.0 - 8.0 %    Basophil% 0.8 0.0 - 1.9 %    Differential Method Automated    Comprehensive metabolic panel - if not done in ED    Collection Time: 06/21/18  4:00 AM   Result Value Ref Range    Sodium 140 136 - 145 mmol/L    Potassium 3.3 (L) 3.5 - 5.1 mmol/L    Chloride 98 95 - 110 mmol/L    CO2 27 23 - 29 mmol/L    Glucose 81 70 - 110 mg/dL    BUN, Bld 25 (H) 6 - 20 mg/dL    Creatinine 2.1 (H) 0.5 - 1.4 mg/dL    Calcium 8.5 (L) 8.7 - 10.5 mg/dL    Total Protein 7.0 6.0 - 8.4 g/dL    Albumin 2.8 (L) 3.5 - 5.2 g/dL    Total Bilirubin 3.9 (H) 0.1 - 1.0 mg/dL    Alkaline Phosphatase 288 (H) 55 - 135 U/L    AST 1,349 (H) 10 - 40 U/L    ALT 1,847 (H) 10 - 44 U/L    Anion Gap 15 8 - 16 mmol/L    eGFR if African American 41.5 (A) >60 mL/min/1.73 m^2    eGFR if non African American 35.9 (A) >60 mL/min/1.73 m^2   Magnesium - if not done in ED    Collection Time: 06/21/18  4:00 AM   Result Value Ref Range    Magnesium 1.5 (L) 1.6 - 2.6 mg/dL   Phosphorus - if not done in ED    Collection Time: 06/21/18  4:00 AM   Result Value Ref Range    Phosphorus 3.2 2.7 - 4.5 mg/dL   Amylase    Collection Time: 06/21/18  4:00 AM   Result Value Ref Range    Amylase 64 20 - 110 U/L   Lipase    Collection Time: 06/21/18  4:00 AM   Result Value Ref Range    Lipase 112 (H) 4 - 60 U/L

## 2018-06-21 NOTE — ASSESSMENT & PLAN NOTE
Likely ATN due to ADHF and ischemic insult with hypotension and currently with over diuresis.   Cr stable this am at 2.1 with increasing BUN to 25 likely due to diuresis. FeUrea is 38 indicating pre-renal.   Appreciate nephro's assistance.   Avoid nephrotoxins and renally dose medications.

## 2018-06-21 NOTE — PROGRESS NOTES
[facility]  Psychiatry  Progress Note    Patient Name: Leonardo Byrd  MRN: [id number]   Code Status: Full Code  Admission Date: 6/16/2018  Hospital Length of Stay: 5 days  Expected Discharge Date: 6/22/2018  Attending Physician: Sae Carr MD  Primary Care Provider: Indio Aquino MD    Current Legal Status: N/A    Patient information was obtained from patient.     Subjective:     Principal Problem:Cardiogenic shock    Chief Complaint: Delirium    HPI: Per Cardiology notes:  47 year old with PMH of ICM (LVEF 30-35%) s/p ICD, CAD s/p PCI, HTN, HLD, current smoker, PAD (s/p aortobifemoral bypass, L SFA and pop PTAS in September and recent R SFA and R pop 3/8 by Dr. Carl Bell and finally R AKA 3/2018.     He presented to ED with about 2 week h/o SOB, cough, weight gain. He presented to [facility] ED on Saturday. Sent home after presumed negative work up. Presented once again overnight 6/13. BNP 2400, congested CXR, cough, no fever similar to prior CHF exacerbations. LActic acid elevated. He was admitted with COPD and sepsis. He was started on zmax and vanc and given a fluid bolus in ED.  He was found to not be septic the next day and all abx were stopped.  Unfortunately, though his UO was quite good and he was net - 2000cc on 6/16am, he progressively worsened from a respiratory status and became progressively hypotensive.  He was subsequently intubated and started on levo/dobutrex.  Lactate 10, k 7.1, creatinine now 1.8 from baseline 0.8, poor/no UO.  Transferred to [facility] for higher level of care.     On interview this morning:  The patient is lying in bed calmly at the beginning of the interview. Two of his daughters are present and help provide some of the history. The patient is not cooperative with interview and is profoundly confused. His answers to questions are nonsensical and he is unable to follow any commands. He is CAM-ICU positive for delirium and fails SAVEAHAART. The patient  "requests some water and his daughters attempt to feed him ice chips. He then becomes agitated and violent. He attempts to get out of bed and asks for his clothes. He starts yelling. His daughters are asked to leave the room and nurses place the patient in soft restraints to his wrists and his ankle. He continues to yell for his daughters and is no longer able to participate in the interview.    Per his daughters, he is treated for depression with a medication but they do not remember the name of the medication. He has not seen a psychiatrist in the past. They state that the patient has not had a drink of alcohol in at least a month and state that he does not use any drugs of abuse. They state that the patient was "given too much of a medication" by his doctors, but they are unable to say which medication they are referring too. They deny that the patient has had any prior suicide attempts.    Hospital Course: 6/21/2018: The chart was reviewed and the patient was interviewed this morning. His father was present in the room during the interview. The patient was in restraints to his wrists bilaterally and to his left ankle. He also had a mask on his face to prevent him from spitting on staff. Per his nurse, he has continued to be very agitated and interfering with treatment. The patient was agitated during the interview this morning. His speech was profane and he was shouting at his father to "call 911". He was unable to participate in CAM-ICU assessment for delirium and was unable to follow commands.    Interval History: See hospital course    Family History     None        Social History Main Topics    Smoking status: Former Smoker     Packs/day: 0.50     Quit date: 5/1/2018    Smokeless tobacco: Former User     Types: Chew     Quit date: 8/2/1980      Comment: Uses nicotine gum and nicotine patches    Alcohol use Yes      Comment: rarely    Drug use: No    Sexual activity: Yes     Partners: Female " "    Psychotherapeutics     Start     Stop Route Frequency Ordered    06/19/18 1750  haloperidol lactate (HALDOL) 5 mg/mL injection     Comments:  Created by cabinet override    06/20 0559 06/19/18 1750           Review of Systems  Objective:     Vital Signs (Most Recent):  Temp: 97.8 °F (36.6 °C) (06/21/18 0300)  Pulse: 101 (06/21/18 1030)  Resp: (!) 21 (06/21/18 1030)  BP: 118/74 (06/21/18 0900)  SpO2: 99 % (06/21/18 0850) Vital Signs (24h Range):  Temp:  [96.8 °F (36 °C)-97.8 °F (36.6 °C)] 97.8 °F (36.6 °C)  Pulse:  [] 101  Resp:  [15-55] 21  SpO2:  [99 %] 99 %  BP: ()/(58-87) 118/74     Height: 5' 6" (167.6 cm)  Weight: 58.4 kg (128 lb 12 oz)  Body mass index is 20.78 kg/m².      Intake/Output Summary (Last 24 hours) at 06/21/18 1051  Last data filed at 06/21/18 1000   Gross per 24 hour   Intake            493.5 ml   Output             1315 ml   Net           -821.5 ml       Physical Exam      Mental Status Exam:  Appearance: unremarkable, age appropriate, normal weight, well nourished, dressed in hospital gown, in soft restraints to wrists bilaterally and to the left ankle  Behavior/Cooperation:  uncooperative, hostile  Speech: normal tone, normal rate, normal pitch, normal volume, profane, spontaneous  Language: uses words appropriately; NO aphasia or dysarthria  Mood: agitated  Affect:  congruent with mood   Thought Process: unable to assess  Thought Content: unable to assess  Level of Consciousness: Alert; unable to assess orientation due to pt not cooperative with assessment; suspected to be well below baseline  Memory:  Impaired  Attention/concentration: inattentive, fails [facility]  Fund of Knowledge: estimated to be below average  Insight:  Limited  Judgment: Limited      Significant Labs:   Last 24 Hours:   Recent Lab Results       06/21/18  0858 06/21/18  0400      Immature Granulocytes  1.4(H)     Immature Grans (Abs)  0.16  Comment:  Mild elevation in immature granulocytes is non " specific and   can be seen in a variety of conditions including stress response,   acute inflammation, trauma and pregnancy. Correlation with other   laboratory and clinical findings is essential.  (H)     Albumin  2.8(L)     Alkaline Phosphatase  288(H)     Allens Test N/A      ALT  1,847(H)     Amylase  64     Anion Gap  15     AST  1,349(H)     Baso #  0.09     Basophil%  0.8     Total Bilirubin  3.9  Comment:  For infants and newborns, interpretation of results should be based  on gestational age, weight and in agreement with clinical  observations.  Premature Infant recommended reference ranges:  Up to 24 hours.............<8.0 mg/dL  Up to 48 hours............<12.0 mg/dL  3-5 days..................<15.0 mg/dL  6-29 days.................<15.0 mg/dL  (H)     Site Other      BUN, Bld  25(H)     Calcium  8.5(L)     Chloride  98     CO2  27     Creatinine  2.1(H)     [facility] Room Air      Differential Method  Automated     eGFR if   41.5(A)     eGFR if non   35.9  Comment:  Calculation used to obtain the estimated glomerular filtration  rate (eGFR) is the CKD-EPI equation.   (A)     Eos #  0.2     Eosinophil%  2.1     Glucose  81     Gran # (ANC)  8.0(H)     Gran%  68.5     Hematocrit  34.3(L)     Hemoglobin  12.1(L)     Lipase  112(H)     Lymph #  1.7     Lymph%  14.2(L)     Magnesium  1.5(L)     MCH  31.8(H)     MCHC  35.3     MCV  90     Mono #  1.5(H)     Mono%  13.0     MPV  12.4     nRBC  1(A)     Phosphorus  3.2     Platelets  184     POC BE 6      POC HCO3 29.0(H)      POC PCO2 37.7      POC PH 7.494(H)      POC PO2 30(LL)      POC SATURATED O2 64(L)      POC TCO2 30(H)      Potassium  3.3(L)     Total Protein  7.0     RBC  3.81(L)     RDW  19.9(H)     Sample VENOUS      Sodium  140     WBC  11.65           Significant Imaging: None      Assessment/Plan:     Delirium    -The patient is CAM-ICU positive and fails SAVEAHAART today  -EKG from today showed QTc of 444  -Would not  recommend benzodiazepines for delirium as they are known to make delirium worse  -Continue Depacon 250 mg IV q6 hours scheduled for agitation  -Start Zyprexa 10 mg IM q12 hours scheduled for agitation  -Can use soft restraints for non-redirectable agitation    -Implement the below DELIRIUM BEHAVIOR MANAGEMENT:  - Minimize use of restraints; if physical restraints necessary, please utilize medical/chemical prns for agitation.  - Keep shades open and room lit during day and room dim at night in order to promote healthy circadian rhythms.  - Encourage family at bedside.  - Keep whiteboard in patient's room current with the date and name of the members of patient's team for easy patient self re-orientation.  - Avoid benzodiazepines, antihistamines, anticholinergics, hypnotics, and minimize opiates while controlling for pain as these medications may exacerbate delirium.    Psychiatry will continue to follow               Need for Continued Hospitalization:   No need for inpatient psychiatric hospitalization. Continue medical care as per the primary team.    Anticipated Disposition: Home or Self Care     Total time:  25 with greater than 50% of this time spent in counseling and/or coordination of care.       Bruno Li MD   Psychiatry  [facility]        STAFF NOTE:  Patient seen on rounds.  Case discussed and I agree with A & P per Dr. Li as stated.    Pt remains very confused and actively hostile towards nurses and medical staff.  Despite the mask on his face, he attempted once to spit on me as I entered his room.  While we were in the room, he angrily cursed out my resident, Dr. Li, for no apparent reason.  He was unable/unwilling to participate with a structured cognitive exam; he did know his father was in the room and was able to state his name when asked. His father and one of his daughters in the room mentioned this is not their son/father.  I explained to them the fact pt is currently  delirious and requires ongoing aggressive medical management, as well as medications that we can recommend currently to try to help calm pt and help with insomnia.  His daughter asked if pt was DNR status; I recommended they speak with the primary team.      EKG was repeated and QTc had decreased to 444.  Amylase = 64 (normal) and lipase = 112 (high).     IMP:  Acute delirium, hyperactive -- likely multiple causes; could be in the face of alcohol or sedative withdrawal.     REC:  Since QTc is now below 450 msec, I would recommend an neuroleptic like olanzapine to help with severe agitation.  Can start with Zyprexa IM 10 mg q 12 hours -- I would give a higher dose with fewer administrations since pt is refusing po and IM is only other route of administration available for Zyprexa.  I would still AVOID HALDOL, ESPECIALLY IV.  Continue Depacon 250 mg IV q 6 hours SCHEDULED.    Continue to monitor CMP with lipase and CBC daily -- will need to ensure LFT's/lipase levels continue to decline.    Avoid using sedating antihistaminic drugs, anticholinergic drugs, benzodiazepines or other similar sedative/hypnotic drugs due to increased potential for agitation and confusion.     Will f/u with you.       Nghia Welsh MD  Psychiatry Staff   [facility]

## 2018-06-21 NOTE — ASSESSMENT & PLAN NOTE
Likely due to pulmonary edema vs PNA.  resp culture GS with no bacteria.  On Cefepime, discontinuing vancomycin.  Extubated on 6/19/2018 to BiPAP.   Resolved, currently on RA.

## 2018-06-21 NOTE — PLAN OF CARE
Pt w/ psych consult - Delirium precautions and Depacon 250 mg IV q6 hours scheduled and Zyprexa 10 mg IM q12 hours scheduled for agitation.      06/21/18 1353   Discharge Reassessment   Assessment Type Discharge Planning Reassessment   Do you have any problems affording any of your prescribed medications? No   Discharge Plan A Home with family   Discharge Plan B Home with family;Home Health   Patient choice form signed by patient/caregiver N/A   Can the patient answer the patient profile reliably? No, cognitively impaired   How does the patient rate their overall health at the present time? Fair   How often would a person be available to care for the patient? Occasionally   Number of comorbid conditions (as recorded on the chart) Three

## 2018-06-21 NOTE — PROGRESS NOTES
[facility]  Cardiology  Progress Note    Patient Name: Leonardo Byrd  MRN: [id number]  Admission Date: 6/16/2018  Hospital Length of Stay: 5 days  Code Status: Full Code   Attending Physician: Sae Carr MD   Primary Care Physician: Indio Aquino MD  Expected Discharge Date: 6/22/2018  Principal Problem:Cardiogenic shock    Subjective:     Hospital Course:   No notes on file    Interval History:   Remained aggressive overnight. Started on scheduled Depacon by Psychiatry.   Patient confused this am. Oriented to place and time. Remains paranoid with father at bedside helping with calming patient down.    ROS   Unable to obtain given patient's factors.    Objective:     Vital Signs (Most Recent):  Temp: 97.8 °F (36.6 °C) (06/21/18 0300)  Pulse: 99 (06/21/18 0730)  Resp: (!) 24 (06/21/18 0730)  BP: 106/65 (06/21/18 0702)  SpO2: 99 % (06/20/18 1245) Vital Signs (24h Range):  Temp:  [96.8 °F (36 °C)-97.8 °F (36.6 °C)] 97.8 °F (36.6 °C)  Pulse:  [] 99  Resp:  [9-55] 24  SpO2:  [97 %-100 %] 99 %  BP: (106-180)/(58-87) 106/65     Weight: 58.4 kg (128 lb 12 oz)  Body mass index is 20.78 kg/m².     SpO2: 99 %  O2 Device (Oxygen Therapy): room air      Intake/Output Summary (Last 24 hours) at 06/21/18 0853  Last data filed at 06/21/18 0500   Gross per 24 hour   Intake              389 ml   Output             1065 ml   Net             -676 ml       Lines/Drains/Airways     Central Venous Catheter Line                 Percutaneous Central Line Insertion/Assessment - triple lumen  06/16/18 0825 right subclavian 5 days         Trialysis (Dialysis) Catheter 06/16/18 2109 left internal jugular 4 days          Drain                 Urethral Catheter 06/16/18 0914 Latex 16 Fr. 4 days                Physical Exam  General: well developed, well nourished. Disoriented to place. NAD.  Eyes: conjunctivae/corneas clear.   Neck: supple, symmetrical, trachea midline. + JVD.   Cardiovascular: Heart:  regular rate and rhythm, S1, S2 normal, no murmur.   Lungs: clear to auscultation bilaterally and normal respiratory effort.   Chest Wall: no tenderness  Abdomen/Rectal: Abdomen: Non distended. + BS. No masses. No TTP.   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 + lower ext edema on left, no redness or tenderness in the calves or thighs.   Skin: Skin color, texture, turgor normal.   Musculoskeletal: full range of motion of joints  Lymph Nodes: No cervical or supraclavicular adenopathy  Psych/Behavioral: paranoid, alert but disoriented to place.      Significant Labs:   Recent Results (from the past 24 hour(s))   Urinalysis    Collection Time: 06/20/18  9:44 AM   Result Value Ref Range    Specimen UA Urine, Unspecified     Color, UA Destini Yellow, Straw, Destini    Appearance, UA Hazy (A) Clear    pH, UA 5.0 5.0 - 8.0    Specific Gravity, UA 1.015 1.005 - 1.030    Protein, UA 1+ (A) Negative    Glucose, UA Negative Negative    Ketones, UA Trace (A) Negative    Bilirubin (UA) Negative Negative    Occult Blood UA 2+ (A) Negative    Nitrite, UA Negative Negative    Urobilinogen, UA Negative <2.0 EU/dL    Leukocytes, UA Negative Negative   Urinalysis Microscopic    Collection Time: 06/20/18  9:44 AM   Result Value Ref Range    RBC, UA 1 0 - 4 /hpf    WBC, UA 7 (H) 0 - 5 /hpf    Bacteria, UA Rare None-Occ /hpf    Squam Epithel, UA 1 /hpf    Hyaline Casts, UA 3 (A) 0-1/lpf /lpf    Microscopic Comment SEE COMMENT    Creatinine, urine, random    Collection Time: 06/20/18  9:45 AM   Result Value Ref Range    Creatinine, Random Ur 74.0 23.0 - 375.0 mg/dL   Urea nitrogen, urine    Collection Time: 06/20/18  9:45 AM   Result Value Ref Range    Urine Urea Nitrogen, Random 311 140 - 1050 mg/dL   CBC auto differential    Collection Time: 06/21/18  4:00 AM   Result Value Ref Range    WBC 11.65 3.90 - 12.70 K/uL    RBC 3.81 (L) 4.60 - 6.20 M/uL    Hemoglobin 12.1 (L) 14.0 - 18.0 g/dL    Hematocrit 34.3  (L) 40.0 - 54.0 %    MCV 90 82 - 98 fL    MCH 31.8 (H) 27.0 - 31.0 pg    MCHC 35.3 32.0 - 36.0 g/dL    RDW 19.9 (H) 11.5 - 14.5 %    Platelets 184 150 - 350 K/uL    MPV 12.4 9.2 - 12.9 fL    Immature Granulocytes 1.4 (H) 0.0 - 0.5 %    Gran # (ANC) 8.0 (H) 1.8 - 7.7 K/uL    Immature Grans (Abs) 0.16 (H) 0.00 - 0.04 K/uL    Lymph # 1.7 1.0 - 4.8 K/uL    Mono # 1.5 (H) 0.3 - 1.0 K/uL    Eos # 0.2 0.0 - 0.5 K/uL    Baso # 0.09 0.00 - 0.20 K/uL    nRBC 1 (A) 0 /100 WBC    Gran% 68.5 38.0 - 73.0 %    Lymph% 14.2 (L) 18.0 - 48.0 %    Mono% 13.0 4.0 - 15.0 %    Eosinophil% 2.1 0.0 - 8.0 %    Basophil% 0.8 0.0 - 1.9 %    Differential Method Automated    Comprehensive metabolic panel - if not done in ED    Collection Time: 06/21/18  4:00 AM   Result Value Ref Range    Sodium 140 136 - 145 mmol/L    Potassium 3.3 (L) 3.5 - 5.1 mmol/L    Chloride 98 95 - 110 mmol/L    CO2 27 23 - 29 mmol/L    Glucose 81 70 - 110 mg/dL    BUN, Bld 25 (H) 6 - 20 mg/dL    Creatinine 2.1 (H) 0.5 - 1.4 mg/dL    Calcium 8.5 (L) 8.7 - 10.5 mg/dL    Total Protein 7.0 6.0 - 8.4 g/dL    Albumin 2.8 (L) 3.5 - 5.2 g/dL    Total Bilirubin 3.9 (H) 0.1 - 1.0 mg/dL    Alkaline Phosphatase 288 (H) 55 - 135 U/L    AST 1,349 (H) 10 - 40 U/L    ALT 1,847 (H) 10 - 44 U/L    Anion Gap 15 8 - 16 mmol/L    eGFR if African American 41.5 (A) >60 mL/min/1.73 m^2    eGFR if non African American 35.9 (A) >60 mL/min/1.73 m^2   Magnesium - if not done in ED    Collection Time: 06/21/18  4:00 AM   Result Value Ref Range    Magnesium 1.5 (L) 1.6 - 2.6 mg/dL   Phosphorus - if not done in ED    Collection Time: 06/21/18  4:00 AM   Result Value Ref Range    Phosphorus 3.2 2.7 - 4.5 mg/dL   Amylase    Collection Time: 06/21/18  4:00 AM   Result Value Ref Range    Amylase 64 20 - 110 U/L   Lipase    Collection Time: 06/21/18  4:00 AM   Result Value Ref Range    Lipase 112 (H) 4 - 60 U/L             Assessment and Plan:         * Cardiogenic shock    -likely due to ADHF following  volume resuscitation when received IVF for suspected sepsis on initial presentation resulting in volume overload.   - obtaining CO and SVR today. Pending CVP.   - net negative 9.1 since admit ajnd 1.8 last 24 hrs. SLED discontinued 6/17/2018.   - held IV lasix 6/18/2018 due to over diuresis resulting in RYAN.   - obtaining CVP for today. Consider repeating lasix x1 today.   - Nephrology following, appreciate assistance.   - Dobutamine gtt currently increased to 5.            Encephalopathy    Likely due ICU delirium vs metabolic.   Psychiatry consulted, started IV depacon 250mg [id number]. Appreciate their assistance. Might need to increase dose.    Discontinuing pregabalin in setting of delirium and encephalopathy.   Received lactulose with no clear improvement in mental status.   Delirium precautions.              Acute respiratory failure with hypoxia    Likely due to pulmonary edema vs PNA.  resp culture GS with no bacteria.  On Cefepime, discontinuing vancomycin.  Extubated on 6/19/2018 to BiPAP.   Resolved, currently on RA.        Moderate protein-calorie malnutrition    Poor enteral nutrition due to AMS.  Nutrition rec's and place NG for TF.  Bedside swallow test.        Alteration in skin integrity related to surgical incision    At right AKA. Good granulation. Will not culture. Appreciate wound care's assistance.         Shock liver    Likely due to hypotension. LFT improving to 1349/1847 <-- 2367/2438 <-- 3638/3032 <-- 6374/3882 <-- 22318/5180.  Continue to monitor. No signs of acute decompensated liver failure.           Lactic acidosis    -2.1 improved from prior. Repeat as needed.          COPD, severe    Currently intubated/sedated  Prn nebs.         Acute renal failure    Likely ATN due to ADHF and ischemic insult with hypotension and currently with over diuresis.   Cr stable this am at 2.1 with increasing BUN to 25 likely due to diuresis. FeUrea is 38 indicating pre-renal.   Appreciate nephro's  assistance.   Avoid nephrotoxins and renally dose medications.         Leukocytosis    R/O sepsis   Unclear etiology. Likely stress-related. WBC stable 11.65. Infectious workup negative.   MRSA grew from right thigh stump ulcer. Contact isolation for now.   Discontinuing cefepime and vancomycin.             Alcohol abuse    Reportedly last drink was weeks to months ago.   Avoiding BZD as possible.             Tobacco abuse    Nicotine patch        Peripheral vascular disease of lower extremity    Continue asa/brilinta.  S/p right AKA. Left foot with 1-5 toe amputation.   Lactate continues to downtrend; improved to 2.1 since last check            VTE Risk Mitigation         Ordered     IP VTE HIGH RISK PATIENT  Once      06/16/18 2001     Place sequential compression device  Until discontinued      06/16/18 2001     Place EMIGDIO hose  Until discontinued      06/16/18 2001          Cary Harris MD  Cardiology  [facility]

## 2018-06-21 NOTE — ASSESSMENT & PLAN NOTE
Likely due ICU delirium vs metabolic.   Psychiatry consulted, started IV depacon 250mg [id number]. Appreciate their assistance. Might need to increase dose.    Discontinuing pregabalin in setting of delirium and encephalopathy.   Received lactulose with no clear improvement in mental status.   Delirium precautions.

## 2018-06-22 PROBLEM — J96.01 ACUTE RESPIRATORY FAILURE WITH HYPOXIA: Status: RESOLVED | Noted: 2018-01-01 | Resolved: 2018-01-01

## 2018-06-22 NOTE — ASSESSMENT & PLAN NOTE
Poor enteral nutrition due to AMS.  Nutrition rec's obtained for TF and parenteral nutrition.  Bedside swallow test.

## 2018-06-22 NOTE — PROGRESS NOTES
[facility]  Psychiatry  Progress Note    Patient Name: Leonardo Byrd  MRN: [id number]   Code Status: Full Code  Admission Date: 6/16/2018  Hospital Length of Stay: 6 days  Expected Discharge Date: 6/22/2018  Attending Physician: Guillermo Archer MD  Primary Care Provider: Indio Aquino MD    Current Legal Status: N/A    Patient information was obtained from patient and ER records.     Subjective:     Principal Problem:Cardiogenic shock    Chief Complaint: Delirium    HPI: Per Cardiology notes:  47 year old with PMH of ICM (LVEF 30-35%) s/p ICD, CAD s/p PCI, HTN, HLD, current smoker, PAD (s/p aortobifemoral bypass, L SFA and pop PTAS in September and recent R SFA and R pop 3/8 by Dr. Carl Bell and finally R AKA 3/2018.     He presented to ED with about 2 week h/o SOB, cough, weight gain. He presented to [facility] ED on Saturday. Sent home after presumed negative work up. Presented once again overnight 6/13. BNP 2400, congested CXR, cough, no fever similar to prior CHF exacerbations. LActic acid elevated. He was admitted with COPD and sepsis. He was started on zmax and vanc and given a fluid bolus in ED.  He was found to not be septic the next day and all abx were stopped.  Unfortunately, though his UO was quite good and he was net - 2000cc on 6/16am, he progressively worsened from a respiratory status and became progressively hypotensive.  He was subsequently intubated and started on levo/dobutrex.  Lactate 10, k 7.1, creatinine now 1.8 from baseline 0.8, poor/no UO.  Transferred to [facility] for higher level of care.     On interview this morning:  The patient is lying in bed calmly at the beginning of the interview. Two of his daughters are present and help provide some of the history. The patient is not cooperative with interview and is profoundly confused. His answers to questions are nonsensical and he is unable to follow any commands. He is CAM-ICU positive for delirium and fails SAVEAHAART.  "The patient requests some water and his daughters attempt to feed him ice chips. He then becomes agitated and violent. He attempts to get out of bed and asks for his clothes. He starts yelling. His daughters are asked to leave the room and nurses place the patient in soft restraints to his wrists and his ankle. He continues to yell for his daughters and is no longer able to participate in the interview.    Per his daughters, he is treated for depression with a medication but they do not remember the name of the medication. He has not seen a psychiatrist in the past. They state that the patient has not had a drink of alcohol in at least a month and state that he does not use any drugs of abuse. They state that the patient was "given too much of a medication" by his doctors, but they are unable to say which medication they are referring too. They deny that the patient has had any prior suicide attempts.    Hospital Course: 6/21/2018: The chart was reviewed and the patient was interviewed this morning. His father was present in the room during the interview. The patient was in restraints to his wrists bilaterally and to his left ankle. He also had a mask on his face to prevent him from spitting on staff. Per his nurse, he has continued to be very agitated and interfering with treatment. The patient was agitated during the interview this morning. His speech was profane and he was shouting at his father to "call 911". He was unable to participate in CAM-ICU assessment for delirium and was unable to follow commands.    6/22/2018: The chart was reviewed and the patient was interviewed this morning. He had a mask on his face and he was in 3-point soft restraints. He was vulgar and profoundly confused. He was unable to offer coherent answers to any questions. He failed SAVEAHAART. He was oriented to person only.     Interval History: See hospital course    Family History     None        Social History Main Topics    Smoking " "status: Former Smoker     Packs/day: 0.50     Quit date: 5/1/2018    Smokeless tobacco: Former User     Types: Chew     Quit date: 8/2/1980      Comment: Uses nicotine gum and nicotine patches    Alcohol use Yes      Comment: rarely    Drug use: No    Sexual activity: Yes     Partners: Female     Psychotherapeutics     Start     Stop Route Frequency Ordered    06/21/18 1245  OLANZapine injection 10 mg      -- IM Every 12 hours 06/21/18 1144    06/19/18 1750  haloperidol lactate (HALDOL) 5 mg/mL injection     Comments:  Created by cabinet override    06/20 0559   06/19/18 1750           Review of Systems   Unable to perform ROS: Mental status change     Objective:     Vital Signs (Most Recent):  Temp: 97.6 °F (36.4 °C) (06/22/18 0715)  Pulse: 101 (06/22/18 1100)  Resp: (!) 26 (06/22/18 1100)  BP: 103/71 (06/22/18 1100)  SpO2: 100 % (06/22/18 1100) Vital Signs (24h Range):  Temp:  [97.6 °F (36.4 °C)] 97.6 °F (36.4 °C)  Pulse:  [] 101  Resp:  [12-44] 26  SpO2:  [99 %-100 %] 100 %  BP: (103-169)/() 103/71     Height: 5' 6" (167.6 cm)  Weight: 56.7 kg (125 lb)  Body mass index is 20.18 kg/m².      Intake/Output Summary (Last 24 hours) at 06/22/18 1155  Last data filed at 06/22/18 0903   Gross per 24 hour   Intake             1548 ml   Output             2010 ml   Net             -462 ml       Physical Exam      Mental status exam:  Appearance: unremarkable, age appropriate, normal weight, well nourished, dressed in hospital gown, in soft restraints to wrists bilaterally and to the left ankle  Behavior/Cooperation:  uncooperative, hostile  Speech: normal tone, normal rate, normal pitch, normal volume, profane, spontaneous  Language: uses words appropriately; NO aphasia or dysarthria  Mood: agitated  Affect:  congruent with mood   Thought Process: unable to assess  Thought Content: unable to assess  Level of Consciousness: Alert; unable to assess orientation due to pt not cooperative with assessment; " suspected to be well below baseline  Memory:  Impaired  Attention/concentration: inattentive, fails [facility]  Fund of Knowledge: estimated to be below average  Insight:  Limited  Judgment: Limited    Significant Labs:   Last 24 Hours:   Recent Lab Results       06/22/18  0533 06/22/18  0300      Immature Granulocytes  0.8(H)     Immature Grans (Abs)  0.08  Comment:  Mild elevation in immature granulocytes is non specific and   can be seen in a variety of conditions including stress response,   acute inflammation, trauma and pregnancy. Correlation with other   laboratory and clinical findings is essential.  (H)     Albumin  3.0(L)     Alkaline Phosphatase  275(H)     Allens Test N/A      ALT  1,461(H)     Anion Gap  15     AST  797(H)     Baso #  0.06     Basophil%  0.6     Total Bilirubin  3.5  Comment:  For infants and newborns, interpretation of results should be based  on gestational age, weight and in agreement with clinical  observations.  Premature Infant recommended reference ranges:  Up to 24 hours.............<8.0 mg/dL  Up to 48 hours............<12.0 mg/dL  3-5 days..................<15.0 mg/dL  6-29 days.................<15.0 mg/dL  (H)     Site Other      BUN, Bld  23(H)     Calcium  8.8     Chloride  99     CO2  26     Creatinine  2.0(H)     DelSys Room Air      Differential Method  Automated     eGFR if   44.0(A)     eGFR if non   38.1  Comment:  Calculation used to obtain the estimated glomerular filtration  rate (eGFR) is the CKD-EPI equation.   (A)     Eos #  0.3     Eosinophil%  2.5     GGT  167(H)     Glucose  107     Gran # (ANC)  6.4     Gran%  63.9     Hematocrit  35.1(L)     Hemoglobin  12.0(L)     Lipase  74(H)     Lymph #  1.9     Lymph%  18.6     Magnesium  1.9     MCH  30.3     MCHC  34.2     MCV  89     Mode SPONT      Mono #  1.4(H)     Mono%  13.6     MPV  12.3     nRBC  0     Phosphorus  2.9     Platelets  186     POC BE 7      POC HCO3 31.2(H)       POC PCO2 43.6      POC PH 7.463(H)      POC PO2 31(LL)      POC SATURATED O2 63(L)      POC TCO2 33(H)      Potassium  3.0(L)     Total Protein  7.3     RBC  3.96(L)     RDW  19.8(H)     Sample VENOUS      Sodium  140     Sp02 100      WBC  9.94           Significant Imaging: None    Assessment/Plan:     Delirium    -The patient is CAM-ICU positive and fails SAVEAHAART today  -Would not recommend antipsychotics for agitation due to prolonged QTc of 693 on last EKG (6/19)  -Ordered another EKG today  -Would also not recommend benzodiazepines for delirium as they are known to make delirium worse  -Continue Depacon 250 mg IV q6 hours scheduled for agitation  -Continue Zyprexa 10 mg IM q12 hours for agitation  -One time dose of Thorazine 50 mg IM given this afternoon. Will reassess the patient later today and consider scheduling this medication in lieu of Zyprexa for agitation.  -Can use soft restraints for non-redirectable agitation    -Implement the below DELIRIUM BEHAVIOR MANAGEMENT:  - Minimize use of restraints; if physical restraints necessary, please utilize medical/chemical prns for agitation.  - Keep shades open and room lit during day and room dim at night in order to promote healthy circadian rhythms.  - Encourage family at bedside.  - Keep whiteboard in patient's room current with the date and name of the members of patient's team for easy patient self re-orientation.  - Avoid benzodiazepines, antihistamines, anticholinergics, hypnotics, and minimize opiates while controlling for pain as these medications may exacerbate delirium.    Psychiatry will continue to follow               Need for Continued Hospitalization:   No need for inpatient psychiatric hospitalization. Continue medical care as per the primary team.    Anticipated Disposition: Home or Self Care     Total time:  25 with greater than 50% of this time spent in counseling and/or coordination of care.       Bruno Li MD   Psychiatry  [facility]

## 2018-06-22 NOTE — NURSING
Pt remains in 3 point restraint. Pt is violent and combative, pt spitting at staff as soon as the door opens. Pt is fighting so much, his cvp became disconnected with blood leaking all of the bed. cvp disposed of and saline lock cap placed. Pt is yelling and swearing. Unable to change sheets at this time due to pt being so aggressive and combative and a danger to staff.

## 2018-06-22 NOTE — PROGRESS NOTES
[facility]  Cardiology  Progress Note    Patient Name: Leonardo Byrd  MRN: [id number]  Admission Date: 6/16/2018  Hospital Length of Stay: 6 days  Code Status: Full Code   Attending Physician: Guillermo Archer MD   Primary Care Physician: Indio Aquino MD  Expected Discharge Date: 6/22/2018  Principal Problem:Cardiogenic shock    Subjective:     Hospital Course:   No notes on file    Interval History:   Reportedly belligerent and vulgar overnight. Refuses to take his oral medications. Alert to person and time.     ROS   Unable to obtain given patient's factors.    Objective:     Vital Signs (Most Recent):  Temp: 97.6 °F (36.4 °C) (06/22/18 0715)  Pulse: 98 (06/22/18 0900)  Resp: (!) 22 (06/22/18 0900)  BP: 120/72 (06/22/18 0900)  SpO2: 100 % (06/22/18 0900) Vital Signs (24h Range):  Temp:  [97.6 °F (36.4 °C)] 97.6 °F (36.4 °C)  Pulse:  [] 98  Resp:  [12-44] 22  SpO2:  [99 %-100 %] 100 %  BP: (112-169)/() 120/72     Weight: 56.7 kg (125 lb)  Body mass index is 20.18 kg/m².     SpO2: 100 %  O2 Device (Oxygen Therapy): room air      Intake/Output Summary (Last 24 hours) at 06/22/18 0923  Last data filed at 06/22/18 0903   Gross per 24 hour   Intake             1616 ml   Output             2235 ml   Net             -619 ml       Lines/Drains/Airways     Central Venous Catheter Line                 Percutaneous Central Line Insertion/Assessment - triple lumen  06/16/18 0825 right subclavian 6 days         Trialysis (Dialysis) Catheter 06/16/18 2109 left internal jugular 5 days          Drain                 Urethral Catheter 06/16/18 0914 Latex 16 Fr. 6 days                Physical Exam    General: well developed, well nourished. Disoriented to place. NAD.  Eyes: conjunctivae/corneas clear.   Neck: supple, symmetrical, trachea midline. + JVD. Left IJ trialysis.   Cardiovascular: Heart: regular rate and rhythm, S1, S2 normal, no murmur.   Lungs: clear to auscultation bilaterally and  normal respiratory effort.   Chest Wall: no tenderness  Abdomen/Rectal: Abdomen: Non distended. + BS. No masses. No TTP.   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 + lower ext edema on left, no redness or tenderness in the calves or thighs.   Skin: Skin color, texture, turgor normal.   Musculoskeletal: full range of motion of joints  Lymph Nodes: No cervical or supraclavicular adenopathy  Psych/Behavioral: paranoid and delirious, alert but disoriented to place.       Significant Labs:   Recent Results (from the past 24 hour(s))   CBC auto differential    Collection Time: 06/22/18  3:00 AM   Result Value Ref Range    WBC 9.94 3.90 - 12.70 K/uL    RBC 3.96 (L) 4.60 - 6.20 M/uL    Hemoglobin 12.0 (L) 14.0 - 18.0 g/dL    Hematocrit 35.1 (L) 40.0 - 54.0 %    MCV 89 82 - 98 fL    MCH 30.3 27.0 - 31.0 pg    MCHC 34.2 32.0 - 36.0 g/dL    RDW 19.8 (H) 11.5 - 14.5 %    Platelets 186 150 - 350 K/uL    MPV 12.3 9.2 - 12.9 fL    Immature Granulocytes 0.8 (H) 0.0 - 0.5 %    Gran # (ANC) 6.4 1.8 - 7.7 K/uL    Immature Grans (Abs) 0.08 (H) 0.00 - 0.04 K/uL    Lymph # 1.9 1.0 - 4.8 K/uL    Mono # 1.4 (H) 0.3 - 1.0 K/uL    Eos # 0.3 0.0 - 0.5 K/uL    Baso # 0.06 0.00 - 0.20 K/uL    nRBC 0 0 /100 WBC    Gran% 63.9 38.0 - 73.0 %    Lymph% 18.6 18.0 - 48.0 %    Mono% 13.6 4.0 - 15.0 %    Eosinophil% 2.5 0.0 - 8.0 %    Basophil% 0.6 0.0 - 1.9 %    Differential Method Automated    Comprehensive metabolic panel - if not done in ED    Collection Time: 06/22/18  3:00 AM   Result Value Ref Range    Sodium 140 136 - 145 mmol/L    Potassium 3.0 (L) 3.5 - 5.1 mmol/L    Chloride 99 95 - 110 mmol/L    CO2 26 23 - 29 mmol/L    Glucose 107 70 - 110 mg/dL    BUN, Bld 23 (H) 6 - 20 mg/dL    Creatinine 2.0 (H) 0.5 - 1.4 mg/dL    Calcium 8.8 8.7 - 10.5 mg/dL    Total Protein 7.3 6.0 - 8.4 g/dL    Albumin 3.0 (L) 3.5 - 5.2 g/dL    Total Bilirubin 3.5 (H) 0.1 - 1.0 mg/dL    Alkaline Phosphatase 275 (H) 55 - 135 U/L    AST  797 (H) 10 - 40 U/L    ALT 1,461 (H) 10 - 44 U/L    Anion Gap 15 8 - 16 mmol/L    eGFR if African American 44.0 (A) >60 mL/min/1.73 m^2    eGFR if non  38.1 (A) >60 mL/min/1.73 m^2   Magnesium - if not done in ED    Collection Time: 06/22/18  3:00 AM   Result Value Ref Range    Magnesium 1.9 1.6 - 2.6 mg/dL   Phosphorus - if not done in ED    Collection Time: 06/22/18  3:00 AM   Result Value Ref Range    Phosphorus 2.9 2.7 - 4.5 mg/dL   Gamma GT    Collection Time: 06/22/18  3:00 AM   Result Value Ref Range     (H) 8 - 55 U/L   Lipase    Collection Time: 06/22/18  3:00 AM   Result Value Ref Range    Lipase 74 (H) 4 - 60 U/L   ISTAT PROCEDURE    Collection Time: 06/22/18  5:33 AM   Result Value Ref Range    POC PH 7.463 (H) 7.35 - 7.45    POC PCO2 43.6 35 - 45 mmHg    POC PO2 31 (LL) 40 - 60 mmHg    POC HCO3 31.2 (H) 24 - 28 mmol/L    POC BE 7 -2 to 2 mmol/L    POC SATURATED O2 63 (L) 95 - 100 %    POC TCO2 33 (H) 24 - 29 mmol/L    Sample VENOUS     Site Other     Allens Test N/A     DelSys Room Air     Mode SPONT     Sp02 100            Assessment and Plan:         * Cardiogenic shock    -likely due to ADHF following volume resuscitation when received IVF for suspected sepsis on initial presentation resulting in volume overload.   - CO today 3.52 and CI 2.17. CVP this am 7.   - net negative 10.8 since admit. SLED discontinued 6/17/2018.   - has been receiving daily IV lasix 80mg prn. Last dose received yesterday. Will hold off on lasix today given CVP of 7.  - Nephrology following, appreciate assistance.   - Dobutamine gtt currently increased to 5.            Encephalopathy    Likely due ICU delirium.  Psychiatry following, started IV depacon 250mg [id number] and Zyprexa 10mg IM bid. Still not optimally controlling patient's symptoms. Appreciate their assistance.   Discontinuing pregabalin in setting of delirium and encephalopathy.   Received lactulose with no clear improvement in mental status.    Delirium precautions.              Moderate protein-calorie malnutrition    Poor enteral nutrition due to AMS.  Nutrition rec's obtained for TF and parenteral nutrition.  Bedside swallow test.        Alteration in skin integrity related to surgical incision    At right AKA. Good granulation. Will not culture. Appreciate wound care's assistance.         Shock liver      Resolving            Lactic acidosis    -2.1 improved from prior. Repeat as needed.          COPD, severe    Prn nebs.         Acute renal failure    Likely ATN due to ADHF and ischemic insult with hypotension and currently with over diuresis.   Cr stable this am at 2.0 with BUN at 20. FeUrea is 38 indicating pre-renal.   Appreciate following appreciate nephro's assistance.   Avoid nephrotoxins and renally dose medications.   Strict intake and output.         Leukocytosis    Resolved   Unclear etiology. Likely stress-related. WBC improved to 9.9 after holding abx. Infectious workup was negative.   MRSA grew from right thigh stump ulcer. Contact isolation for now.             Alcohol abuse    Reportedly last drink was weeks to months ago.   Avoiding BZD as possible.             Tobacco abuse    Nicotine patch.        Peripheral vascular disease of lower extremity    Continue ASA/brilinta.  S/p right AKA. Left foot with 1-5 toe amputation.   Lactate continues to downtrend; improved to 2.1 since last check            VTE Risk Mitigation         Ordered     IP VTE HIGH RISK PATIENT  Once      06/16/18 2001     Place sequential compression device  Until discontinued      06/16/18 2001     Place EMIGDIO hose  Until discontinued      06/16/18 2001          Cary Harris MD  Cardiology  [facility]

## 2018-06-22 NOTE — ASSESSMENT & PLAN NOTE
Likely ATN due to ADHF and ischemic insult with hypotension and currently with over diuresis.   Cr stable this am at 2.0 with BUN at 20. FeUrea is 38 indicating pre-renal.   Appreciate following appreciate nephro's assistance.   Avoid nephrotoxins and renally dose medications.   Strict intake and output.

## 2018-06-22 NOTE — NURSING
Pt remains in restraints. Pt cont to be combative the entire shift. Pt making sexually explicit comments the whole shift as well. Pt remains with a mask over his face, pt cont to spit at staff every change he can. At time pt gets mask off face and as soon as pt hears his door open he immediately makes a hocker sound and you hear him spit at the privacy reanna. pts privacy reanna is covered is spit and all over the floor you see dried blood tinged sputum .  Pt tries to kick staff, grab them when able, whip head back and forth, attempt to reach his central lines by reaching from them with his hands and trying to bite them with his teeth.

## 2018-06-22 NOTE — PLAN OF CARE
Problem: Patient Care Overview  Goal: Plan of Care Review  Outcome: Ongoing (interventions implemented as appropriate)  No acute events this shift. POC reviewed with Mr. Byrd and his son; Mr. Byrd did not participated in discussion, but his son did verbalize understanding. Pt continuously vulgar and sexually inappropriate. He tries to spit on all hospital staff when they enter the room. He does not cooperate with basic nursing care such as medication administration, dressing changes, and flores care. He tries to kick staff with his Right AKA. 60 meq of K+ replaced this shift. Dobutamine gtt continuous at 5mcg. VS remain stable, no distress noted.     When his son was at the bedside, the patient was completely lucid and having meaningful conversations expressing his concern with the son driving all the way from [place] alone. The patient was NOT disoriented or confused, although still vulgar. He is aware of what he is saying and doing, but simply choosing not to cooperate.

## 2018-06-22 NOTE — ASSESSMENT & PLAN NOTE
-The patient is CAM-ICU positive and fails SAVEAHAART today  -Would not recommend antipsychotics for agitation due to prolonged QTc of 693 on last EKG (6/19)  -Ordered another EKG today  -Would also not recommend benzodiazepines for delirium as they are known to make delirium worse  -Continue Depacon 250 mg IV q6 hours scheduled for agitation  -Continue Zyprexa 10 mg IM q12 hours for agitation  -One time dose of Thorazine 50 mg IM given this afternoon. Will reassess the patient later today and consider scheduling this medication in lieu of Zyprexa for agitation.  -Can use soft restraints for non-redirectable agitation    -Implement the below DELIRIUM BEHAVIOR MANAGEMENT:  - Minimize use of restraints; if physical restraints necessary, please utilize medical/chemical prns for agitation.  - Keep shades open and room lit during day and room dim at night in order to promote healthy circadian rhythms.  - Encourage family at bedside.  - Keep whiteboard in patient's room current with the date and name of the members of patient's team for easy patient self re-orientation.  - Avoid benzodiazepines, antihistamines, anticholinergics, hypnotics, and minimize opiates while controlling for pain as these medications may exacerbate delirium.    Psychiatry will continue to follow

## 2018-06-22 NOTE — SUBJECTIVE & OBJECTIVE
Interval History:   Reportedly belligerent and vulgar overnight. Refuses to take his oral medications. Alert to person and time.     ROS   Unable to obtain given patient's factors.    Objective:     Vital Signs (Most Recent):  Temp: 97.6 °F (36.4 °C) (06/22/18 0715)  Pulse: 98 (06/22/18 0900)  Resp: (!) 22 (06/22/18 0900)  BP: 120/72 (06/22/18 0900)  SpO2: 100 % (06/22/18 0900) Vital Signs (24h Range):  Temp:  [97.6 °F (36.4 °C)] 97.6 °F (36.4 °C)  Pulse:  [] 98  Resp:  [12-44] 22  SpO2:  [99 %-100 %] 100 %  BP: (112-169)/() 120/72     Weight: 56.7 kg (125 lb)  Body mass index is 20.18 kg/m².     SpO2: 100 %  O2 Device (Oxygen Therapy): room air      Intake/Output Summary (Last 24 hours) at 06/22/18 0923  Last data filed at 06/22/18 0903   Gross per 24 hour   Intake             1616 ml   Output             2235 ml   Net             -619 ml       Lines/Drains/Airways     Central Venous Catheter Line                 Percutaneous Central Line Insertion/Assessment - triple lumen  06/16/18 0825 right subclavian 6 days         Trialysis (Dialysis) Catheter 06/16/18 2109 left internal jugular 5 days          Drain                 Urethral Catheter 06/16/18 0914 Latex 16 Fr. 6 days                Physical Exam    General: well developed, well nourished. Disoriented to place. NAD.  Eyes: conjunctivae/corneas clear.   Neck: supple, symmetrical, trachea midline. + JVD. Left IJ trialysis.   Cardiovascular: Heart: regular rate and rhythm, S1, S2 normal, no murmur.   Lungs: clear to auscultation bilaterally and normal respiratory effort.   Chest Wall: no tenderness  Abdomen/Rectal: Abdomen: Non distended. + BS. No masses. No TTP.   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 + lower ext edema on left, no redness or tenderness in the calves or thighs.   Skin: Skin color, texture, turgor normal.   Musculoskeletal: full range of motion of joints  Lymph Nodes: No cervical or supraclavicular  adenopathy  Psych/Behavioral: paranoid and delirious, alert but disoriented to place.       Significant Labs:   Recent Results (from the past 24 hour(s))   CBC auto differential    Collection Time: 06/22/18  3:00 AM   Result Value Ref Range    WBC 9.94 3.90 - 12.70 K/uL    RBC 3.96 (L) 4.60 - 6.20 M/uL    Hemoglobin 12.0 (L) 14.0 - 18.0 g/dL    Hematocrit 35.1 (L) 40.0 - 54.0 %    MCV 89 82 - 98 fL    MCH 30.3 27.0 - 31.0 pg    MCHC 34.2 32.0 - 36.0 g/dL    RDW 19.8 (H) 11.5 - 14.5 %    Platelets 186 150 - 350 K/uL    MPV 12.3 9.2 - 12.9 fL    Immature Granulocytes 0.8 (H) 0.0 - 0.5 %    Gran # (ANC) 6.4 1.8 - 7.7 K/uL    Immature Grans (Abs) 0.08 (H) 0.00 - 0.04 K/uL    Lymph # 1.9 1.0 - 4.8 K/uL    Mono # 1.4 (H) 0.3 - 1.0 K/uL    Eos # 0.3 0.0 - 0.5 K/uL    Baso # 0.06 0.00 - 0.20 K/uL    nRBC 0 0 /100 WBC    Gran% 63.9 38.0 - 73.0 %    Lymph% 18.6 18.0 - 48.0 %    Mono% 13.6 4.0 - 15.0 %    Eosinophil% 2.5 0.0 - 8.0 %    Basophil% 0.6 0.0 - 1.9 %    Differential Method Automated    Comprehensive metabolic panel - if not done in ED    Collection Time: 06/22/18  3:00 AM   Result Value Ref Range    Sodium 140 136 - 145 mmol/L    Potassium 3.0 (L) 3.5 - 5.1 mmol/L    Chloride 99 95 - 110 mmol/L    CO2 26 23 - 29 mmol/L    Glucose 107 70 - 110 mg/dL    BUN, Bld 23 (H) 6 - 20 mg/dL    Creatinine 2.0 (H) 0.5 - 1.4 mg/dL    Calcium 8.8 8.7 - 10.5 mg/dL    Total Protein 7.3 6.0 - 8.4 g/dL    Albumin 3.0 (L) 3.5 - 5.2 g/dL    Total Bilirubin 3.5 (H) 0.1 - 1.0 mg/dL    Alkaline Phosphatase 275 (H) 55 - 135 U/L     (H) 10 - 40 U/L    ALT 1,461 (H) 10 - 44 U/L    Anion Gap 15 8 - 16 mmol/L    eGFR if African American 44.0 (A) >60 mL/min/1.73 m^2    eGFR if non  38.1 (A) >60 mL/min/1.73 m^2   Magnesium - if not done in ED    Collection Time: 06/22/18  3:00 AM   Result Value Ref Range    Magnesium 1.9 1.6 - 2.6 mg/dL   Phosphorus - if not done in ED    Collection Time: 06/22/18  3:00 AM   Result Value  Ref Range    Phosphorus 2.9 2.7 - 4.5 mg/dL   Gamma GT    Collection Time: 06/22/18  3:00 AM   Result Value Ref Range     (H) 8 - 55 U/L   Lipase    Collection Time: 06/22/18  3:00 AM   Result Value Ref Range    Lipase 74 (H) 4 - 60 U/L   ISTAT PROCEDURE    Collection Time: 06/22/18  5:33 AM   Result Value Ref Range    POC PH 7.463 (H) 7.35 - 7.45    POC PCO2 43.6 35 - 45 mmHg    POC PO2 31 (LL) 40 - 60 mmHg    POC HCO3 31.2 (H) 24 - 28 mmol/L    POC BE 7 -2 to 2 mmol/L    POC SATURATED O2 63 (L) 95 - 100 %    POC TCO2 33 (H) 24 - 29 mmol/L    Sample VENOUS     Site Other     Allens Test N/A     DelSys Room Air     Mode SPONT     Sp02 100

## 2018-06-22 NOTE — SUBJECTIVE & OBJECTIVE
"Interval History: See hospital course    Family History     None        Social History Main Topics    Smoking status: Former Smoker     Packs/day: 0.50     Quit date: 5/1/2018    Smokeless tobacco: Former User     Types: Chew     Quit date: 8/2/1980      Comment: Uses nicotine gum and nicotine patches    Alcohol use Yes      Comment: rarely    Drug use: No    Sexual activity: Yes     Partners: Female     Psychotherapeutics     Start     Stop Route Frequency Ordered    06/21/18 1245  OLANZapine injection 10 mg      -- IM Every 12 hours 06/21/18 1144    06/19/18 1750  haloperidol lactate (HALDOL) 5 mg/mL injection     Comments:  Created by cabinet override    06/20 0559   06/19/18 1750           Review of Systems   Unable to perform ROS: Mental status change     Objective:     Vital Signs (Most Recent):  Temp: 97.6 °F (36.4 °C) (06/22/18 0715)  Pulse: 101 (06/22/18 1100)  Resp: (!) 26 (06/22/18 1100)  BP: 103/71 (06/22/18 1100)  SpO2: 100 % (06/22/18 1100) Vital Signs (24h Range):  Temp:  [97.6 °F (36.4 °C)] 97.6 °F (36.4 °C)  Pulse:  [] 101  Resp:  [12-44] 26  SpO2:  [99 %-100 %] 100 %  BP: (103-169)/() 103/71     Height: 5' 6" (167.6 cm)  Weight: 56.7 kg (125 lb)  Body mass index is 20.18 kg/m².      Intake/Output Summary (Last 24 hours) at 06/22/18 1155  Last data filed at 06/22/18 0903   Gross per 24 hour   Intake             1548 ml   Output             2010 ml   Net             -462 ml       Physical Exam      Mental status exam:  Appearance: unremarkable, age appropriate, normal weight, well nourished, dressed in hospital gown, in soft restraints to wrists bilaterally and to the left ankle  Behavior/Cooperation:  uncooperative, hostile  Speech: normal tone, normal rate, normal pitch, normal volume, profane, spontaneous  Language: uses words appropriately; NO aphasia or dysarthria  Mood: agitated  Affect:  congruent with mood   Thought Process: unable to assess  Thought Content: unable to " assess  Level of Consciousness: Alert; unable to assess orientation due to pt not cooperative with assessment; suspected to be well below baseline  Memory:  Impaired  Attention/concentration: inattentive, fails [facility]  Fund of Knowledge: estimated to be below average  Insight:  Limited  Judgment: Limited    Significant Labs:   Last 24 Hours:   Recent Lab Results       06/22/18  0533 06/22/18  0300      Immature Granulocytes  0.8(H)     Immature Grans (Abs)  0.08  Comment:  Mild elevation in immature granulocytes is non specific and   can be seen in a variety of conditions including stress response,   acute inflammation, trauma and pregnancy. Correlation with other   laboratory and clinical findings is essential.  (H)     Albumin  3.0(L)     Alkaline Phosphatase  275(H)     Allens Test N/A      ALT  1,461(H)     Anion Gap  15     AST  797(H)     Baso #  0.06     Basophil%  0.6     Total Bilirubin  3.5  Comment:  For infants and newborns, interpretation of results should be based  on gestational age, weight and in agreement with clinical  observations.  Premature Infant recommended reference ranges:  Up to 24 hours.............<8.0 mg/dL  Up to 48 hours............<12.0 mg/dL  3-5 days..................<15.0 mg/dL  6-29 days.................<15.0 mg/dL  (H)     Site Other      BUN, Bld  23(H)     Calcium  8.8     Chloride  99     CO2  26     Creatinine  2.0(H)     [facility] Room Air      Differential Method  Automated     eGFR if   44.0(A)     eGFR if non   38.1  Comment:  Calculation used to obtain the estimated glomerular filtration  rate (eGFR) is the CKD-EPI equation.   (A)     Eos #  0.3     Eosinophil%  2.5     GGT  167(H)     Glucose  107     Gran # (ANC)  6.4     Gran%  63.9     Hematocrit  35.1(L)     Hemoglobin  12.0(L)     Lipase  74(H)     Lymph #  1.9     Lymph%  18.6     Magnesium  1.9     MCH  30.3     MCHC  34.2     MCV  89     Mode SPONT      Mono #  1.4(H)     Mono%   13.6     MPV  12.3     nRBC  0     Phosphorus  2.9     Platelets  186     POC BE 7      POC HCO3 31.2(H)      POC PCO2 43.6      POC PH 7.463(H)      POC PO2 31(LL)      POC SATURATED O2 63(L)      POC TCO2 33(H)      Potassium  3.0(L)     Total Protein  7.3     RBC  3.96(L)     RDW  19.8(H)     Sample VENOUS      Sodium  140     Sp02 100      WBC  9.94           Significant Imaging: None

## 2018-06-22 NOTE — ASSESSMENT & PLAN NOTE
Resolved   Unclear etiology. Likely stress-related. WBC improved to 9.9 after holding abx. Infectious workup was negative.   MRSA grew from right thigh stump ulcer. Contact isolation for now.

## 2018-06-22 NOTE — ASSESSMENT & PLAN NOTE
Likely due ICU delirium.  Psychiatry following, started IV depacon 250mg [id number] and Zyprexa 10mg IM bid. Still not optimally controlling patient's symptoms. Appreciate their assistance.   Discontinuing pregabalin in setting of delirium and encephalopathy.   Received lactulose with no clear improvement in mental status.   Delirium precautions.

## 2018-06-22 NOTE — ASSESSMENT & PLAN NOTE
-likely due to ADHF following volume resuscitation when received IVF for suspected sepsis on initial presentation resulting in volume overload.   - CO today 3.52 and CI 2.17. CVP this am 7.   - net negative 10.8 since admit. SLED discontinued 6/17/2018.   - has been receiving daily IV lasix 80mg prn. Last dose received yesterday. Will hold off on lasix today given CVP of 7.  - Nephrology following, appreciate assistance.   - Dobutamine gtt currently increased to 5.

## 2018-06-23 PROBLEM — E87.20 LACTIC ACIDOSIS: Status: RESOLVED | Noted: 2018-01-01 | Resolved: 2018-01-01

## 2018-06-23 PROBLEM — N17.9 AKI (ACUTE KIDNEY INJURY): Status: RESOLVED | Noted: 2018-01-01 | Resolved: 2018-01-01

## 2018-06-23 NOTE — SUBJECTIVE & OBJECTIVE
Interval History:   Received Thorazine x1 yesterday with notable improvement in patient's behavior. Has slept ~7 hours. This morning, patient is timid, cooperative, and passed a bedside swallow test.     ROS   Unable to obtain given patient's factors.    Objective:     Vital Signs (Most Recent):  Temp: 98 °F (36.7 °C) (06/23/18 1115)  Pulse: 93 (06/23/18 1300)  Resp: 17 (06/23/18 1300)  BP: (!) 96/58 (06/23/18 1300)  SpO2: 100 % (06/23/18 1300) Vital Signs (24h Range):  Temp:  [97.3 °F (36.3 °C)-98 °F (36.7 °C)] 98 °F (36.7 °C)  Pulse:  [] 93  Resp:  [14-30] 17  SpO2:  [98 %-100 %] 100 %  BP: ()/(55-87) 96/58     Weight: 56.7 kg (125 lb)  Body mass index is 20.18 kg/m².     SpO2: 100 %  O2 Device (Oxygen Therapy): room air      Intake/Output Summary (Last 24 hours) at 06/23/18 1319  Last data filed at 06/23/18 1300   Gross per 24 hour   Intake          1251.13 ml   Output              765 ml   Net           486.13 ml       Lines/Drains/Airways     Central Venous Catheter Line                 Percutaneous Central Line Insertion/Assessment - triple lumen  06/16/18 0825 right subclavian 7 days         Trialysis (Dialysis) Catheter 06/16/18 2109 left internal jugular 6 days          Drain                 Urethral Catheter 06/16/18 0914 Latex 16 Fr. 7 days                Physical Exam  General: well developed, well nourished. Disoriented to place. NAD.  Eyes: conjunctivae/corneas clear.   Neck: supple, symmetrical, trachea midline. + JVD. Left IJ trialysis.   Cardiovascular: Heart: regular rate and rhythm, S1, S2 normal, no murmur.   Lungs: clear to auscultation bilaterally and normal respiratory effort.   Chest Wall: no tenderness  Abdomen/Rectal: Abdomen: Non distended. + BS. No masses. No TTP.   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 + lower ext edema on left, no redness or tenderness in the calves or thighs.   Skin: Skin color, texture, turgor normal.   Musculoskeletal:  full range of motion of joints  Lymph Nodes: No cervical or supraclavicular adenopathy  Psych/Behavioral: paranoid and delirious, alert but disoriented to place.    Significant Labs:   Recent Results (from the past 24 hour(s))   Potassium    Collection Time: 06/22/18  6:05 PM   Result Value Ref Range    Potassium 3.9 3.5 - 5.1 mmol/L   CBC auto differential    Collection Time: 06/23/18  3:00 AM   Result Value Ref Range    WBC 8.13 3.90 - 12.70 K/uL    RBC 3.91 (L) 4.60 - 6.20 M/uL    Hemoglobin 12.0 (L) 14.0 - 18.0 g/dL    Hematocrit 35.4 (L) 40.0 - 54.0 %    MCV 91 82 - 98 fL    MCH 30.7 27.0 - 31.0 pg    MCHC 33.9 32.0 - 36.0 g/dL    RDW 19.9 (H) 11.5 - 14.5 %    Platelets 156 150 - 350 K/uL    MPV 11.9 9.2 - 12.9 fL    Immature Granulocytes 0.9 (H) 0.0 - 0.5 %    Gran # (ANC) 4.6 1.8 - 7.7 K/uL    Immature Grans (Abs) 0.07 (H) 0.00 - 0.04 K/uL    Lymph # 1.8 1.0 - 4.8 K/uL    Mono # 1.3 (H) 0.3 - 1.0 K/uL    Eos # 0.4 0.0 - 0.5 K/uL    Baso # 0.05 0.00 - 0.20 K/uL    nRBC 0 0 /100 WBC    Gran% 56.0 38.0 - 73.0 %    Lymph% 22.1 18.0 - 48.0 %    Mono% 15.7 (H) 4.0 - 15.0 %    Eosinophil% 4.7 0.0 - 8.0 %    Basophil% 0.6 0.0 - 1.9 %    Differential Method Automated    Comprehensive metabolic panel - if not done in ED    Collection Time: 06/23/18  3:00 AM   Result Value Ref Range    Sodium 141 136 - 145 mmol/L    Potassium 3.5 3.5 - 5.1 mmol/L    Chloride 102 95 - 110 mmol/L    CO2 29 23 - 29 mmol/L    Glucose 77 70 - 110 mg/dL    BUN, Bld 20 6 - 20 mg/dL    Creatinine 1.6 (H) 0.5 - 1.4 mg/dL    Calcium 9.4 8.7 - 10.5 mg/dL    Total Protein 7.1 6.0 - 8.4 g/dL    Albumin 2.9 (L) 3.5 - 5.2 g/dL    Total Bilirubin 3.1 (H) 0.1 - 1.0 mg/dL    Alkaline Phosphatase 239 (H) 55 - 135 U/L     (H) 10 - 40 U/L    ALT 1,067 (H) 10 - 44 U/L    Anion Gap 10 8 - 16 mmol/L    eGFR if African American 57.6 (A) >60 mL/min/1.73 m^2    eGFR if non  49.8 (A) >60 mL/min/1.73 m^2   Magnesium - if not done in ED     Collection Time: 06/23/18  3:00 AM   Result Value Ref Range    Magnesium 2.0 1.6 - 2.6 mg/dL   Phosphorus - if not done in ED    Collection Time: 06/23/18  3:00 AM   Result Value Ref Range    Phosphorus 3.7 2.7 - 4.5 mg/dL

## 2018-06-23 NOTE — PROGRESS NOTES
[facility]  Cardiology  Progress Note    Patient Name: Leonardo Byrd  MRN: [id number]  Admission Date: 6/16/2018  Hospital Length of Stay: 7 days  Code Status: Full Code   Attending Physician: Guillermo rAcher MD   Primary Care Physician: Indio Aquino MD  Expected Discharge Date: 6/22/2018  Principal Problem:Cardiogenic shock    Subjective:     Hospital Course:   No notes on file    Interval History:   Received Thorazine x1 yesterday with notable improvement in patient's behavior. Has slept ~7 hours. This morning, patient is timid, cooperative, and passed a bedside swallow test.     ROS   Unable to obtain given patient's factors.    Objective:     Vital Signs (Most Recent):  Temp: 98 °F (36.7 °C) (06/23/18 1115)  Pulse: 93 (06/23/18 1300)  Resp: 17 (06/23/18 1300)  BP: (!) 96/58 (06/23/18 1300)  SpO2: 100 % (06/23/18 1300) Vital Signs (24h Range):  Temp:  [97.3 °F (36.3 °C)-98 °F (36.7 °C)] 98 °F (36.7 °C)  Pulse:  [] 93  Resp:  [14-30] 17  SpO2:  [98 %-100 %] 100 %  BP: ()/(55-87) 96/58     Weight: 56.7 kg (125 lb)  Body mass index is 20.18 kg/m².     SpO2: 100 %  O2 Device (Oxygen Therapy): room air      Intake/Output Summary (Last 24 hours) at 06/23/18 1319  Last data filed at 06/23/18 1300   Gross per 24 hour   Intake          1251.13 ml   Output              765 ml   Net           486.13 ml       Lines/Drains/Airways     Central Venous Catheter Line                 Percutaneous Central Line Insertion/Assessment - triple lumen  06/16/18 0825 right subclavian 7 days         Trialysis (Dialysis) Catheter 06/16/18 2109 left internal jugular 6 days          Drain                 Urethral Catheter 06/16/18 0914 Latex 16 Fr. 7 days                Physical Exam  General: well developed, well nourished. Disoriented to place. NAD.  Eyes: conjunctivae/corneas clear.   Neck: supple, symmetrical, trachea midline. + JVD. Left IJ trialysis.   Cardiovascular: Heart: regular rate and  rhythm, S1, S2 normal, no murmur.   Lungs: clear to auscultation bilaterally and normal respiratory effort.   Chest Wall: no tenderness  Abdomen/Rectal: Abdomen: Non distended. + BS. No masses. No TTP.   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 + lower ext edema on left, no redness or tenderness in the calves or thighs.   Skin: Skin color, texture, turgor normal.   Musculoskeletal: full range of motion of joints  Lymph Nodes: No cervical or supraclavicular adenopathy  Psych/Behavioral: paranoid and delirious, alert but disoriented to place.    Significant Labs:   Recent Results (from the past 24 hour(s))   Potassium    Collection Time: 06/22/18  6:05 PM   Result Value Ref Range    Potassium 3.9 3.5 - 5.1 mmol/L   CBC auto differential    Collection Time: 06/23/18  3:00 AM   Result Value Ref Range    WBC 8.13 3.90 - 12.70 K/uL    RBC 3.91 (L) 4.60 - 6.20 M/uL    Hemoglobin 12.0 (L) 14.0 - 18.0 g/dL    Hematocrit 35.4 (L) 40.0 - 54.0 %    MCV 91 82 - 98 fL    MCH 30.7 27.0 - 31.0 pg    MCHC 33.9 32.0 - 36.0 g/dL    RDW 19.9 (H) 11.5 - 14.5 %    Platelets 156 150 - 350 K/uL    MPV 11.9 9.2 - 12.9 fL    Immature Granulocytes 0.9 (H) 0.0 - 0.5 %    Gran # (ANC) 4.6 1.8 - 7.7 K/uL    Immature Grans (Abs) 0.07 (H) 0.00 - 0.04 K/uL    Lymph # 1.8 1.0 - 4.8 K/uL    Mono # 1.3 (H) 0.3 - 1.0 K/uL    Eos # 0.4 0.0 - 0.5 K/uL    Baso # 0.05 0.00 - 0.20 K/uL    nRBC 0 0 /100 WBC    Gran% 56.0 38.0 - 73.0 %    Lymph% 22.1 18.0 - 48.0 %    Mono% 15.7 (H) 4.0 - 15.0 %    Eosinophil% 4.7 0.0 - 8.0 %    Basophil% 0.6 0.0 - 1.9 %    Differential Method Automated    Comprehensive metabolic panel - if not done in ED    Collection Time: 06/23/18  3:00 AM   Result Value Ref Range    Sodium 141 136 - 145 mmol/L    Potassium 3.5 3.5 - 5.1 mmol/L    Chloride 102 95 - 110 mmol/L    CO2 29 23 - 29 mmol/L    Glucose 77 70 - 110 mg/dL    BUN, Bld 20 6 - 20 mg/dL    Creatinine 1.6 (H) 0.5 - 1.4 mg/dL    Calcium  9.4 8.7 - 10.5 mg/dL    Total Protein 7.1 6.0 - 8.4 g/dL    Albumin 2.9 (L) 3.5 - 5.2 g/dL    Total Bilirubin 3.1 (H) 0.1 - 1.0 mg/dL    Alkaline Phosphatase 239 (H) 55 - 135 U/L     (H) 10 - 40 U/L    ALT 1,067 (H) 10 - 44 U/L    Anion Gap 10 8 - 16 mmol/L    eGFR if African American 57.6 (A) >60 mL/min/1.73 m^2    eGFR if non  49.8 (A) >60 mL/min/1.73 m^2   Magnesium - if not done in ED    Collection Time: 06/23/18  3:00 AM   Result Value Ref Range    Magnesium 2.0 1.6 - 2.6 mg/dL   Phosphorus - if not done in ED    Collection Time: 06/23/18  3:00 AM   Result Value Ref Range    Phosphorus 3.7 2.7 - 4.5 mg/dL           Assessment and Plan:           * Cardiogenic shock    -likely due to ADHF following volume resuscitation when received IVF for suspected sepsis on initial presentation resulting in volume overload.   - CVP this am 5. Continue to hold lasix and give slow IVF. Start diet after passing swallow test and currently being cooperative.   - net negative 10L since admit. SLED discontinued 6/17/2018.   - has been receiving daily IV lasix 80mg prn. Last dose received 6/21/2018. Will hold off on lasix today given CVP of 5.  - Nephrology following, appreciate assistance.   - Dobutamine gtt currently decreased to to 2.5 mcg/kg/min.            Encephalopathy    Delirium   Likely due ICU delirium.  Psychiatry following, started IV depacon 250mg [id number] and Zyprexa 10mg IM bid. Received one dose of thorazine yesterday with significant response. Appreciate psych's assistance.  Continue holding pregabalin in setting of delirium and encephalopathy.   Delirium precautions. Step down to floor.              Moderate protein-calorie malnutrition    Poor enteral nutrition due to AMS.  Starting oral diet.           Alteration in skin integrity related to surgical incision    At right AKA. Good granulation. Will not culture. Appreciate wound care's assistance.         Shock liver      Resolving.             COPD, severe    Prn nebs.         Acute renal failure    Likely ATN due to ADHF and ischemic insult with hypotension and currently with over diuresis.   Cr improving this am to 1.6.  Nephro following, appreciate their assistance.   Avoid nephrotoxins and renally dose medications.   Strict intake and output.         Leukocytosis    Resolved   WBC this am is 8.13  Likely stress-related. WBC improved after holding abx. Infectious workup was negative.                 Alcohol abuse    Reportedly last drink was weeks to months ago.   Avoiding BZD as possible.             Tobacco abuse    Nicotine patch.        Peripheral vascular disease of lower extremity    Continue ASA/brilinta.  S/p right AKA. Left foot with 1-5 toe amputation.   Lactate continues to downtrend; improved to 2.1 since last check            VTE Risk Mitigation         Ordered     IP VTE HIGH RISK PATIENT  Once      06/16/18 2001     Place sequential compression device  Until discontinued      06/16/18 2001     Place EMIGDIO hose  Until discontinued      06/16/18 2001          Cary Harris MD  Cardiology  [facility]

## 2018-06-23 NOTE — PROGRESS NOTES
[facility]  Psychiatry  Progress Note    Patient Name: Leonardo Byrd  MRN: [id number]   Code Status: Full Code  Admission Date: 6/16/2018  Hospital Length of Stay: 7 days  Expected Discharge Date: 6/22/2018  Attending Physician: Guillermo Archer MD  Primary Care Provider: Indio Aquino MD    Current Legal Status: N/A    Patient information was obtained from patient, relative(s), past medical records and ER records.     Subjective:     Principal Problem:Cardiogenic shock    Chief Complaint: delirium    HPI: Per Cardiology notes:  47 year old with PMH of ICM (LVEF 30-35%) s/p ICD, CAD s/p PCI, HTN, HLD, current smoker, PAD (s/p aortobifemoral bypass, L SFA and pop PTAS in September and recent R SFA and R pop 3/8 by Dr. Carl Bell and finally R AKA 3/2018.     He presented to ED with about 2 week h/o SOB, cough, weight gain. He presented to [facility] ED on Saturday. Sent home after presumed negative work up. Presented once again overnight 6/13. BNP 2400, congested CXR, cough, no fever similar to prior CHF exacerbations. LActic acid elevated. He was admitted with COPD and sepsis. He was started on zmax and vanc and given a fluid bolus in ED.  He was found to not be septic the next day and all abx were stopped.  Unfortunately, though his UO was quite good and he was net - 2000cc on 6/16am, he progressively worsened from a respiratory status and became progressively hypotensive.  He was subsequently intubated and started on levo/dobutrex.  Lactate 10, k 7.1, creatinine now 1.8 from baseline 0.8, poor/no UO.  Transferred to [facility] for higher level of care.     On interview this morning:  The patient is lying in bed calmly at the beginning of the interview. Two of his daughters are present and help provide some of the history. The patient is not cooperative with interview and is profoundly confused. His answers to questions are nonsensical and he is unable to follow any commands. He is CAM-ICU positive  "for delirium and fails SAVEAHAART. The patient requests some water and his daughters attempt to feed him ice chips. He then becomes agitated and violent. He attempts to get out of bed and asks for his clothes. He starts yelling. His daughters are asked to leave the room and nurses place the patient in soft restraints to his wrists and his ankle. He continues to yell for his daughters and is no longer able to participate in the interview.    Per his daughters, he is treated for depression with a medication but they do not remember the name of the medication. He has not seen a psychiatrist in the past. They state that the patient has not had a drink of alcohol in at least a month and state that he does not use any drugs of abuse. They state that the patient was "given too much of a medication" by his doctors, but they are unable to say which medication they are referring too. They deny that the patient has had any prior suicide attempts.    Hospital Course: 6/21/2018: The chart was reviewed and the patient was interviewed this morning. His father was present in the room during the interview. The patient was in restraints to his wrists bilaterally and to his left ankle. He also had a mask on his face to prevent him from spitting on staff. Per his nurse, he has continued to be very agitated and interfering with treatment. The patient was agitated during the interview this morning. His speech was profane and he was shouting at his father to "call 911". He was unable to participate in CAM-ICU assessment for delirium and was unable to follow commands.    6/22/2018: The chart was reviewed and the patient was interviewed this morning. He had a mask on his face and he was in 3-point soft restraints. He was vulgar and profoundly confused. He was unable to offer coherent answers to any questions. He failed SAVEAHAART. He was oriented to person only.     06/23/2018 - received Thorazine 50mg IM yesterday for agitation. remarkable " "improvement in mental status today. Pt alert and oriented to person, place, city, state, month, year, situation - CAM-ICU negative with 0 errors on SAVEAHAART screen. Mood is good, thoughts are linear, pt laughing and joking around appropriately. Son at bedside, relieved by the marked improvement. Step down to floor today.     Interval History: see hospital course    Family History     None        Social History Main Topics    Smoking status: Former Smoker     Packs/day: 0.50     Quit date: 5/1/2018    Smokeless tobacco: Former User     Types: Chew     Quit date: 8/2/1980      Comment: Uses nicotine gum and nicotine patches    Alcohol use Yes      Comment: rarely    Drug use: No    Sexual activity: Yes     Partners: Female     Psychotherapeutics     Start     Stop Route Frequency Ordered    06/21/18 1245  OLANZapine injection 10 mg      -- IM Every 12 hours 06/21/18 1144    06/19/18 1750  haloperidol lactate (HALDOL) 5 mg/mL injection     Comments:  Created by cabinet override    06/20 0559   06/19/18 1750           Review of Systems  Objective:     Vital Signs (Most Recent):  Temp: 98 °F (36.7 °C) (06/23/18 1115)  Pulse: 93 (06/23/18 1300)  Resp: 17 (06/23/18 1300)  BP: (!) 96/58 (06/23/18 1300)  SpO2: 100 % (06/23/18 1300) Vital Signs (24h Range):  Temp:  [97.5 °F (36.4 °C)-98 °F (36.7 °C)] 98 °F (36.7 °C)  Pulse:  [] 93  Resp:  [14-30] 17  SpO2:  [98 %-100 %] 100 %  BP: ()/(55-87) 96/58     Height: 5' 6" (167.6 cm)  Weight: 56.7 kg (125 lb)  Body mass index is 20.18 kg/m².      Intake/Output Summary (Last 24 hours) at 06/23/18 1546  Last data filed at 06/23/18 1300   Gross per 24 hour   Intake          1187.13 ml   Output              685 ml   Net           502.13 ml       Physical Exam   Appearance: somewhat disheveled, wearing hospital gown, in NAD  Behavior: calm, cooperative, pleasant  Speech: mildly muffled, normal rate, tone, and volume  Mood: euthymic  Affect: reactive, " appropriate  Thought Process: linear  Thought Perceptions: *denied AVH  Thought Content: denied SI, HI; no delusions apparent  Sensorium: awake, alert  Attention/Concentration: intact, passes SAVEAHAART with 0 errors, CAM-ICU negative  Orientation: person, place, month, year, city, state, and situation  Memory: intact (recent, remote)  Abstraction: intact   Insight: much improved  Judgment: much improved       Significant Labs:   Recent Results (from the past 48 hour(s))   CBC auto differential    Collection Time: 06/22/18  3:00 AM   Result Value Ref Range    WBC 9.94 3.90 - 12.70 K/uL    RBC 3.96 (L) 4.60 - 6.20 M/uL    Hemoglobin 12.0 (L) 14.0 - 18.0 g/dL    Hematocrit 35.1 (L) 40.0 - 54.0 %    MCV 89 82 - 98 fL    MCH 30.3 27.0 - 31.0 pg    MCHC 34.2 32.0 - 36.0 g/dL    RDW 19.8 (H) 11.5 - 14.5 %    Platelets 186 150 - 350 K/uL    MPV 12.3 9.2 - 12.9 fL    Immature Granulocytes 0.8 (H) 0.0 - 0.5 %    Gran # (ANC) 6.4 1.8 - 7.7 K/uL    Immature Grans (Abs) 0.08 (H) 0.00 - 0.04 K/uL    Lymph # 1.9 1.0 - 4.8 K/uL    Mono # 1.4 (H) 0.3 - 1.0 K/uL    Eos # 0.3 0.0 - 0.5 K/uL    Baso # 0.06 0.00 - 0.20 K/uL    nRBC 0 0 /100 WBC    Gran% 63.9 38.0 - 73.0 %    Lymph% 18.6 18.0 - 48.0 %    Mono% 13.6 4.0 - 15.0 %    Eosinophil% 2.5 0.0 - 8.0 %    Basophil% 0.6 0.0 - 1.9 %    Differential Method Automated    Comprehensive metabolic panel - if not done in ED    Collection Time: 06/22/18  3:00 AM   Result Value Ref Range    Sodium 140 136 - 145 mmol/L    Potassium 3.0 (L) 3.5 - 5.1 mmol/L    Chloride 99 95 - 110 mmol/L    CO2 26 23 - 29 mmol/L    Glucose 107 70 - 110 mg/dL    BUN, Bld 23 (H) 6 - 20 mg/dL    Creatinine 2.0 (H) 0.5 - 1.4 mg/dL    Calcium 8.8 8.7 - 10.5 mg/dL    Total Protein 7.3 6.0 - 8.4 g/dL    Albumin 3.0 (L) 3.5 - 5.2 g/dL    Total Bilirubin 3.5 (H) 0.1 - 1.0 mg/dL    Alkaline Phosphatase 275 (H) 55 - 135 U/L     (H) 10 - 40 U/L    ALT 1,461 (H) 10 - 44 U/L    Anion Gap 15 8 - 16 mmol/L    eGFR if   44.0 (A) >60 mL/min/1.73 m^2    eGFR if non  38.1 (A) >60 mL/min/1.73 m^2   Magnesium - if not done in ED    Collection Time: 06/22/18  3:00 AM   Result Value Ref Range    Magnesium 1.9 1.6 - 2.6 mg/dL   Phosphorus - if not done in ED    Collection Time: 06/22/18  3:00 AM   Result Value Ref Range    Phosphorus 2.9 2.7 - 4.5 mg/dL   Gamma GT    Collection Time: 06/22/18  3:00 AM   Result Value Ref Range     (H) 8 - 55 U/L   Lipase    Collection Time: 06/22/18  3:00 AM   Result Value Ref Range    Lipase 74 (H) 4 - 60 U/L   ISTAT PROCEDURE    Collection Time: 06/22/18  5:33 AM   Result Value Ref Range    POC PH 7.463 (H) 7.35 - 7.45    POC PCO2 43.6 35 - 45 mmHg    POC PO2 31 (LL) 40 - 60 mmHg    POC HCO3 31.2 (H) 24 - 28 mmol/L    POC BE 7 -2 to 2 mmol/L    POC SATURATED O2 63 (L) 95 - 100 %    POC TCO2 33 (H) 24 - 29 mmol/L    Sample VENOUS     Site Other     Allens Test N/A     DelSys Room Air     Mode SPONT     Sp02 100    Potassium    Collection Time: 06/22/18  6:05 PM   Result Value Ref Range    Potassium 3.9 3.5 - 5.1 mmol/L   CBC auto differential    Collection Time: 06/23/18  3:00 AM   Result Value Ref Range    WBC 8.13 3.90 - 12.70 K/uL    RBC 3.91 (L) 4.60 - 6.20 M/uL    Hemoglobin 12.0 (L) 14.0 - 18.0 g/dL    Hematocrit 35.4 (L) 40.0 - 54.0 %    MCV 91 82 - 98 fL    MCH 30.7 27.0 - 31.0 pg    MCHC 33.9 32.0 - 36.0 g/dL    RDW 19.9 (H) 11.5 - 14.5 %    Platelets 156 150 - 350 K/uL    MPV 11.9 9.2 - 12.9 fL    Immature Granulocytes 0.9 (H) 0.0 - 0.5 %    Gran # (ANC) 4.6 1.8 - 7.7 K/uL    Immature Grans (Abs) 0.07 (H) 0.00 - 0.04 K/uL    Lymph # 1.8 1.0 - 4.8 K/uL    Mono # 1.3 (H) 0.3 - 1.0 K/uL    Eos # 0.4 0.0 - 0.5 K/uL    Baso # 0.05 0.00 - 0.20 K/uL    nRBC 0 0 /100 WBC    Gran% 56.0 38.0 - 73.0 %    Lymph% 22.1 18.0 - 48.0 %    Mono% 15.7 (H) 4.0 - 15.0 %    Eosinophil% 4.7 0.0 - 8.0 %    Basophil% 0.6 0.0 - 1.9 %    Differential Method Automated    Comprehensive  metabolic panel - if not done in ED    Collection Time: 06/23/18  3:00 AM   Result Value Ref Range    Sodium 141 136 - 145 mmol/L    Potassium 3.5 3.5 - 5.1 mmol/L    Chloride 102 95 - 110 mmol/L    CO2 29 23 - 29 mmol/L    Glucose 77 70 - 110 mg/dL    BUN, Bld 20 6 - 20 mg/dL    Creatinine 1.6 (H) 0.5 - 1.4 mg/dL    Calcium 9.4 8.7 - 10.5 mg/dL    Total Protein 7.1 6.0 - 8.4 g/dL    Albumin 2.9 (L) 3.5 - 5.2 g/dL    Total Bilirubin 3.1 (H) 0.1 - 1.0 mg/dL    Alkaline Phosphatase 239 (H) 55 - 135 U/L     (H) 10 - 40 U/L    ALT 1,067 (H) 10 - 44 U/L    Anion Gap 10 8 - 16 mmol/L    eGFR if African American 57.6 (A) >60 mL/min/1.73 m^2    eGFR if non  49.8 (A) >60 mL/min/1.73 m^2   Magnesium - if not done in ED    Collection Time: 06/23/18  3:00 AM   Result Value Ref Range    Magnesium 2.0 1.6 - 2.6 mg/dL   Phosphorus - if not done in ED    Collection Time: 06/23/18  3:00 AM   Result Value Ref Range    Phosphorus 3.7 2.7 - 4.5 mg/dL      No results found for: PHENYTOIN, PHENOBARB, VALPROATE, CBMZ    Significant Imaging: I have reviewed all pertinent imaging results/findings within the past 24 hours.    Assessment/Plan:     Delirium    - Pt had remarkable improvement in mental status s/p administration of thorazine 6/22 PM, now alert and oriented to all measures assessed and CAM-ICU negative - possibly due to thorazine, improvement of medical condition, or combination of both - pt passed swallow, can now take PO meds  - Discontinue scheduled zyprexa due to lack of efficacy  - Continue Depacon 250mg IV q6h and schedule Thorazine 50mg PO qhs - will wean off if pt's mental status remains stable  - Can use Thorazine 25mg IM q12h PRN for non-redirectable agitation  - Recommend daily EKG to monitor QTc while on thorazine - QTc on 6/22 477  - Psychiatry will continue to follow     -Implement the below DELIRIUM BEHAVIOR MANAGEMENT:  - Minimize use of restraints; if physical restraints necessary, please  utilize medical/chemical prns for agitation first  - Keep shades open and room lit during day and room dim at night in order to promote healthy circadian rhythms.  - Encourage family at bedside.  - Keep whiteboard in patient's room current with the date and name of the members of patient's team for easy patient self re-orientation.  - Avoid benzodiazepines, antihistamines, anticholinergics, hypnotics, and minimize opiates while controlling for pain as these medications may worsen delirium.               Total time:  25 with greater than 50% of this time spent in counseling and/or coordination of care.       Dustin Chapin MD  [facility] -  06/23/2018 4:03 PM

## 2018-06-23 NOTE — NURSING
Pt cooperative and pleasant today. Restraints removed this am. Pt passed bedside swallow. Dobutamine decreased to 2.5mcg.     Pt transferred to CSU on tele with all belongings including top dentures. DENISE Aaron met at bedside, meds handed off. VS stable, no distress noted.

## 2018-06-23 NOTE — SUBJECTIVE & OBJECTIVE
"Interval History: see hospital course    Family History     None        Social History Main Topics    Smoking status: Former Smoker     Packs/day: 0.50     Quit date: 5/1/2018    Smokeless tobacco: Former User     Types: Chew     Quit date: 8/2/1980      Comment: Uses nicotine gum and nicotine patches    Alcohol use Yes      Comment: rarely    Drug use: No    Sexual activity: Yes     Partners: Female     Psychotherapeutics     Start     Stop Route Frequency Ordered    06/21/18 1245  OLANZapine injection 10 mg      -- IM Every 12 hours 06/21/18 1144    06/19/18 1750  haloperidol lactate (HALDOL) 5 mg/mL injection     Comments:  Created by cabinet override    06/20 0559   06/19/18 1750           Review of Systems  Objective:     Vital Signs (Most Recent):  Temp: 98 °F (36.7 °C) (06/23/18 1115)  Pulse: 93 (06/23/18 1300)  Resp: 17 (06/23/18 1300)  BP: (!) 96/58 (06/23/18 1300)  SpO2: 100 % (06/23/18 1300) Vital Signs (24h Range):  Temp:  [97.5 °F (36.4 °C)-98 °F (36.7 °C)] 98 °F (36.7 °C)  Pulse:  [] 93  Resp:  [14-30] 17  SpO2:  [98 %-100 %] 100 %  BP: ()/(55-87) 96/58     Height: 5' 6" (167.6 cm)  Weight: 56.7 kg (125 lb)  Body mass index is 20.18 kg/m².      Intake/Output Summary (Last 24 hours) at 06/23/18 1546  Last data filed at 06/23/18 1300   Gross per 24 hour   Intake          1187.13 ml   Output              685 ml   Net           502.13 ml       Physical Exam   Appearance: somewhat disheveled, wearing hospital gown, in NAD  Behavior: calm, cooperative, pleasant  Speech: mildly muffled, normal rate, tone, and volume  Mood: euthymic  Affect: reactive, appropriate  Thought Process: linear  Thought Perceptions: *denied AVH  Thought Content: denied SI, HI; no delusions apparent  Sensorium: awake, alert  Attention/Concentration: intact, passes SAVEAHAART with 0 errors, CAM-ICU negative  Orientation: person, place, month, year, city, state, and situation  Memory: intact (recent, " remote)  Abstraction: intact   Insight: much improved  Judgment: much improved       Significant Labs:   Recent Results (from the past 48 hour(s))   CBC auto differential    Collection Time: 06/22/18  3:00 AM   Result Value Ref Range    WBC 9.94 3.90 - 12.70 K/uL    RBC 3.96 (L) 4.60 - 6.20 M/uL    Hemoglobin 12.0 (L) 14.0 - 18.0 g/dL    Hematocrit 35.1 (L) 40.0 - 54.0 %    MCV 89 82 - 98 fL    MCH 30.3 27.0 - 31.0 pg    MCHC 34.2 32.0 - 36.0 g/dL    RDW 19.8 (H) 11.5 - 14.5 %    Platelets 186 150 - 350 K/uL    MPV 12.3 9.2 - 12.9 fL    Immature Granulocytes 0.8 (H) 0.0 - 0.5 %    Gran # (ANC) 6.4 1.8 - 7.7 K/uL    Immature Grans (Abs) 0.08 (H) 0.00 - 0.04 K/uL    Lymph # 1.9 1.0 - 4.8 K/uL    Mono # 1.4 (H) 0.3 - 1.0 K/uL    Eos # 0.3 0.0 - 0.5 K/uL    Baso # 0.06 0.00 - 0.20 K/uL    nRBC 0 0 /100 WBC    Gran% 63.9 38.0 - 73.0 %    Lymph% 18.6 18.0 - 48.0 %    Mono% 13.6 4.0 - 15.0 %    Eosinophil% 2.5 0.0 - 8.0 %    Basophil% 0.6 0.0 - 1.9 %    Differential Method Automated    Comprehensive metabolic panel - if not done in ED    Collection Time: 06/22/18  3:00 AM   Result Value Ref Range    Sodium 140 136 - 145 mmol/L    Potassium 3.0 (L) 3.5 - 5.1 mmol/L    Chloride 99 95 - 110 mmol/L    CO2 26 23 - 29 mmol/L    Glucose 107 70 - 110 mg/dL    BUN, Bld 23 (H) 6 - 20 mg/dL    Creatinine 2.0 (H) 0.5 - 1.4 mg/dL    Calcium 8.8 8.7 - 10.5 mg/dL    Total Protein 7.3 6.0 - 8.4 g/dL    Albumin 3.0 (L) 3.5 - 5.2 g/dL    Total Bilirubin 3.5 (H) 0.1 - 1.0 mg/dL    Alkaline Phosphatase 275 (H) 55 - 135 U/L     (H) 10 - 40 U/L    ALT 1,461 (H) 10 - 44 U/L    Anion Gap 15 8 - 16 mmol/L    eGFR if African American 44.0 (A) >60 mL/min/1.73 m^2    eGFR if non  38.1 (A) >60 mL/min/1.73 m^2   Magnesium - if not done in ED    Collection Time: 06/22/18  3:00 AM   Result Value Ref Range    Magnesium 1.9 1.6 - 2.6 mg/dL   Phosphorus - if not done in ED    Collection Time: 06/22/18  3:00 AM   Result Value Ref Range     Phosphorus 2.9 2.7 - 4.5 mg/dL   Gamma GT    Collection Time: 06/22/18  3:00 AM   Result Value Ref Range     (H) 8 - 55 U/L   Lipase    Collection Time: 06/22/18  3:00 AM   Result Value Ref Range    Lipase 74 (H) 4 - 60 U/L   ISTAT PROCEDURE    Collection Time: 06/22/18  5:33 AM   Result Value Ref Range    POC PH 7.463 (H) 7.35 - 7.45    POC PCO2 43.6 35 - 45 mmHg    POC PO2 31 (LL) 40 - 60 mmHg    POC HCO3 31.2 (H) 24 - 28 mmol/L    POC BE 7 -2 to 2 mmol/L    POC SATURATED O2 63 (L) 95 - 100 %    POC TCO2 33 (H) 24 - 29 mmol/L    Sample VENOUS     Site Other     Allens Test N/A     DelSys Room Air     Mode SPONT     Sp02 100    Potassium    Collection Time: 06/22/18  6:05 PM   Result Value Ref Range    Potassium 3.9 3.5 - 5.1 mmol/L   CBC auto differential    Collection Time: 06/23/18  3:00 AM   Result Value Ref Range    WBC 8.13 3.90 - 12.70 K/uL    RBC 3.91 (L) 4.60 - 6.20 M/uL    Hemoglobin 12.0 (L) 14.0 - 18.0 g/dL    Hematocrit 35.4 (L) 40.0 - 54.0 %    MCV 91 82 - 98 fL    MCH 30.7 27.0 - 31.0 pg    MCHC 33.9 32.0 - 36.0 g/dL    RDW 19.9 (H) 11.5 - 14.5 %    Platelets 156 150 - 350 K/uL    MPV 11.9 9.2 - 12.9 fL    Immature Granulocytes 0.9 (H) 0.0 - 0.5 %    Gran # (ANC) 4.6 1.8 - 7.7 K/uL    Immature Grans (Abs) 0.07 (H) 0.00 - 0.04 K/uL    Lymph # 1.8 1.0 - 4.8 K/uL    Mono # 1.3 (H) 0.3 - 1.0 K/uL    Eos # 0.4 0.0 - 0.5 K/uL    Baso # 0.05 0.00 - 0.20 K/uL    nRBC 0 0 /100 WBC    Gran% 56.0 38.0 - 73.0 %    Lymph% 22.1 18.0 - 48.0 %    Mono% 15.7 (H) 4.0 - 15.0 %    Eosinophil% 4.7 0.0 - 8.0 %    Basophil% 0.6 0.0 - 1.9 %    Differential Method Automated    Comprehensive metabolic panel - if not done in ED    Collection Time: 06/23/18  3:00 AM   Result Value Ref Range    Sodium 141 136 - 145 mmol/L    Potassium 3.5 3.5 - 5.1 mmol/L    Chloride 102 95 - 110 mmol/L    CO2 29 23 - 29 mmol/L    Glucose 77 70 - 110 mg/dL    BUN, Bld 20 6 - 20 mg/dL    Creatinine 1.6 (H) 0.5 - 1.4 mg/dL    Calcium 9.4  8.7 - 10.5 mg/dL    Total Protein 7.1 6.0 - 8.4 g/dL    Albumin 2.9 (L) 3.5 - 5.2 g/dL    Total Bilirubin 3.1 (H) 0.1 - 1.0 mg/dL    Alkaline Phosphatase 239 (H) 55 - 135 U/L     (H) 10 - 40 U/L    ALT 1,067 (H) 10 - 44 U/L    Anion Gap 10 8 - 16 mmol/L    eGFR if African American 57.6 (A) >60 mL/min/1.73 m^2    eGFR if non  49.8 (A) >60 mL/min/1.73 m^2   Magnesium - if not done in ED    Collection Time: 06/23/18  3:00 AM   Result Value Ref Range    Magnesium 2.0 1.6 - 2.6 mg/dL   Phosphorus - if not done in ED    Collection Time: 06/23/18  3:00 AM   Result Value Ref Range    Phosphorus 3.7 2.7 - 4.5 mg/dL      No results found for: PHENYTOIN, PHENOBARB, VALPROATE, CBMZ    Significant Imaging: I have reviewed all pertinent imaging results/findings within the past 24 hours.

## 2018-06-23 NOTE — ASSESSMENT & PLAN NOTE
- Pt had remarkable improvement in mental status s/p administration of thorazine 6/22 PM, now alert and oriented to all measures assessed and CAM-ICU negative - possibly due to thorazine, improvement of medical condition, or combination of both - pt passed swallow, can now take PO meds  - Discontinue scheduled zyprexa due to lack of efficacy  - Continue Depacon 250mg IV q6h and schedule Thorazine 50mg PO qhs - will wean off if pt's mental status remains stable  - Can use Thorazine 25mg IM q12h PRN for non-redirectable agitation  - Recommend daily EKG to monitor QTc while on thorazine - QTc on 6/22 477  - Psychiatry will continue to follow     -Implement the below DELIRIUM BEHAVIOR MANAGEMENT:  - Minimize use of restraints; if physical restraints necessary, please utilize medical/chemical prns for agitation first  - Keep shades open and room lit during day and room dim at night in order to promote healthy circadian rhythms.  - Encourage family at bedside.  - Keep whiteboard in patient's room current with the date and name of the members of patient's team for easy patient self re-orientation.  - Avoid benzodiazepines, antihistamines, anticholinergics, hypnotics, and minimize opiates while controlling for pain as these medications may worsen delirium.

## 2018-06-23 NOTE — ASSESSMENT & PLAN NOTE
Likely ATN due to ADHF and ischemic insult with hypotension and currently with over diuresis.   Cr improving this am to 1.6.  Nephro following, appreciate their assistance.   Avoid nephrotoxins and renally dose medications.   Strict intake and output.

## 2018-06-23 NOTE — PROGRESS NOTES
Spoke with Cecy with Hospital Medicine. Patient to be transferred to the floor.    KURTIS Delgadillo  PGY-3  CCU  SL: [phone]  Pager: [phone]

## 2018-06-23 NOTE — ASSESSMENT & PLAN NOTE
Delirium   Likely due ICU delirium.  Psychiatry following, started IV depacon 250mg [id number] and Zyprexa 10mg IM bid. Received one dose of thorazine yesterday with significant response. Appreciate psych's assistance.  Continue holding pregabalin in setting of delirium and encephalopathy.   Delirium precautions. Step down to floor.

## 2018-06-23 NOTE — ASSESSMENT & PLAN NOTE
-likely due to ADHF following volume resuscitation when received IVF for suspected sepsis on initial presentation resulting in volume overload.   - CVP this am 5. Continue to hold lasix and give slow IVF. Start diet after passing swallow test and currently being cooperative.   - net negative 10L since admit. SLED discontinued 6/17/2018.   - has been receiving daily IV lasix 80mg prn. Last dose received 6/21/2018. Will hold off on lasix today given CVP of 5.  - Nephrology following, appreciate assistance.   - Dobutamine gtt currently decreased to to 2.5 mcg/kg/min.

## 2018-06-23 NOTE — ASSESSMENT & PLAN NOTE
Resolved   WBC this am is 8.13  Likely stress-related. WBC improved after holding abx. Infectious workup was negative.

## 2018-06-24 NOTE — RESIDENT HANDOFF
Handoff     Primary Team: Networked reference to record PCT  Room Number: 384/384 A     Patient Name: Leonardo Byrd MRN: [id number]     Date of Birth: [id number] Allergies: Patient has no known allergies.     Age: 47 y.o. Admit Date: 6/16/2018     Sex: male  BMI: Body mass index is 20.18 kg/m².     Code Status: Full Code        Illness Level (current clinical status): Watcher - No    Reason for Admission: Cardiogenic shock     Brief HPI     47 year old with PMH of ICM (LVEF 13%) s/p ICD, CAD s/p PCI, HTN, HLD, current smoker, PAD (s/p aortobifemoral bypass, L SFA and pop PTAS in September and recent R SFA and R pop 3/8 by Dr. Carl Bell and finally R AKA 3/2018) who presented to OSH with cc SOB, cough, weight, BNP 2400, congested CXR, and elevated lactic acid. He was admitted with COPD and hypotension though to be secondary to sepsis. He was started on zmax and vanc and given a fluid bolus.  He was found to not be septic the next day and all abx were stopped.  Unfortunately, he developed ADHF with worsened respiratory status and became progressively hypotensive.  He was subsequently intubated and started on levo/dobutamine. Transferred to [facility] [place] for higher level of care.    Hospital Course   Patient was started on SLED given poor UOP and worsening RYAN. He was kept on dobutamine 2.5 mcg/kg/min with daily CO measurements. Patient had recovering renal function and SLED was discontinued 6/17/2018. He was diuresed with IV lasix on bid then daily basis until patient became euvolemic with normal-low CVP. He was suitable for extubation on 2nd day of hospital stay however would become significantly agitated when sedation would be weaned down resulting in delaying extubation until 6/19/2018. After extubation, the patient was overtly delirious expressed by confusion, delusion, belligerence, and aggressive. He had to be restrained with violent restraints on the first 2 days following extubation. Psychiatry was consulted and patient was  finally less psychotic and delirious on 6/23/2018 after starting scheduled depacon 250mg IV [id number] and receiving one dose of Thorazine night of 6/22/2018. Given patient's stable clinical status from his cardiogenic shock standpoint it was thought best to step down patient out of ICU settings to decreased delirium recurrence.      Tasks    Cardiogenic shock     -likely due to volume overload when received IVF for suspected sepsis on initial presentation.   -  decreased to 2.5 mcg/kg/min today. Continue monitoring CVP and administer lasix prn. Received gentle rehydration IVF today given low CVP of 6.  -goals of care discussion with family and patient.             Encephalopathy     Delirium   Likely due ICU delirium.  Psychiatry following, continue IV depacon 250mg [id number]. D/c Zyprexa 10mg IM bid.   Continue po thorazine 50mg qhs.  Avoid haldol and zyprexa for prolonged QTc.         Acute renal failure     Likely ATN due to ADHF and ischemic insult with hypotension and over diuresis.   Cr improving this am to 1.6.  Follow Nephro rec's.          Leukocytosis     Resolved   WBC this am is 8.13  Likely stress-related. WBC improved after holding abx. Infectious workup was negative.             KURTIS Delgadillo  PGY-3  Internal Medicine  Pager: [phone]

## 2018-06-24 NOTE — PLAN OF CARE
Problem: Patient Care Overview  Goal: Plan of Care Review  Outcome: Ongoing (interventions implemented as appropriate)  UPDATED PLAN OF CARE W/ FAMILY PRESENT AT BEDSIDE. PT CONTINUE ON DOBUTAMINE AT 2.5MCG. PT REMAINED FREE FROM INJURY- BED ALARM ACTIVATED AND BED IN LOWEST POSITION AND LOCKED.  PT REMAINED FREE FROM INJURY- PT REPOSITION HIMSELF IN THE BED. CVP WAS 9/.  ADDRESS ALL ISSUES THROUGHOUT SHIFT- MARES REMOVED TODAY

## 2018-06-24 NOTE — SUBJECTIVE & OBJECTIVE
"Interval History: see hospital course    Family History     None        Social History Main Topics    Smoking status: Former Smoker     Packs/day: 0.50     Quit date: 5/1/2018    Smokeless tobacco: Former User     Types: Chew     Quit date: 8/2/1980      Comment: Uses nicotine gum and nicotine patches    Alcohol use Yes      Comment: rarely    Drug use: No    Sexual activity: Yes     Partners: Female     Psychotherapeutics     Start     Stop Route Frequency Ordered    06/23/18 2100  chlorproMAZINE tablet 50 mg      -- Oral Nightly 06/23/18 1600    06/23/18 1700  chlorproMAZINE injection 25 mg      -- IM Every 12 hours PRN 06/23/18 1600    06/19/18 1750  haloperidol lactate (HALDOL) 5 mg/mL injection     Comments:  Created by cabinet override    06/20 0559   06/19/18 1750           Review of Systems    Objective:     Vital Signs (Most Recent):  Temp: 97 °F (36.1 °C) (06/24/18 1218)  Pulse: 101 (06/24/18 1218)  Resp: 18 (06/24/18 1218)  BP: 119/63 (06/24/18 1218)  SpO2: 97 % (06/24/18 1218) Vital Signs (24h Range):  Temp:  [97 °F (36.1 °C)-98.4 °F (36.9 °C)] 97 °F (36.1 °C)  Pulse:  [] 101  Resp:  [14-28] 18  SpO2:  [93 %-100 %] 97 %  BP: ()/(50-76) 119/63     Height: 5' 6" (167.6 cm)  Weight: 56.7 kg (125 lb)  Body mass index is 20.18 kg/m².      Intake/Output Summary (Last 24 hours) at 06/24/18 1502  Last data filed at 06/24/18 1400   Gross per 24 hour   Intake           1557.5 ml   Output              750 ml   Net            807.5 ml       Physical Exam     Appearance: somewhat disheveled, wearing hospital gown, in NAD  Behavior: calm, cooperative, pleasant  Speech: slurred, normal rate and volume  Mood: "a lot better"  Affect: reactive, appropriate  Thought Process: linear  Thought Perceptions: denied AVH  Thought Content: denied SI, HI; no delusions apparent  Sensorium: awake, alert  Attention/Concentration: CAM-ICU negative  Orientation: person, place, month, year, city, state, and " situation  Memory: intact (recent, remote)  Abstraction: intact   Insight: improved  Judgment: improved         Significant Labs:   Recent Results (from the past 48 hour(s))   Potassium    Collection Time: 06/22/18  6:05 PM   Result Value Ref Range    Potassium 3.9 3.5 - 5.1 mmol/L   CBC auto differential    Collection Time: 06/23/18  3:00 AM   Result Value Ref Range    WBC 8.13 3.90 - 12.70 K/uL    RBC 3.91 (L) 4.60 - 6.20 M/uL    Hemoglobin 12.0 (L) 14.0 - 18.0 g/dL    Hematocrit 35.4 (L) 40.0 - 54.0 %    MCV 91 82 - 98 fL    MCH 30.7 27.0 - 31.0 pg    MCHC 33.9 32.0 - 36.0 g/dL    RDW 19.9 (H) 11.5 - 14.5 %    Platelets 156 150 - 350 K/uL    MPV 11.9 9.2 - 12.9 fL    Immature Granulocytes 0.9 (H) 0.0 - 0.5 %    Gran # (ANC) 4.6 1.8 - 7.7 K/uL    Immature Grans (Abs) 0.07 (H) 0.00 - 0.04 K/uL    Lymph # 1.8 1.0 - 4.8 K/uL    Mono # 1.3 (H) 0.3 - 1.0 K/uL    Eos # 0.4 0.0 - 0.5 K/uL    Baso # 0.05 0.00 - 0.20 K/uL    nRBC 0 0 /100 WBC    Gran% 56.0 38.0 - 73.0 %    Lymph% 22.1 18.0 - 48.0 %    Mono% 15.7 (H) 4.0 - 15.0 %    Eosinophil% 4.7 0.0 - 8.0 %    Basophil% 0.6 0.0 - 1.9 %    Differential Method Automated    Comprehensive metabolic panel - if not done in ED    Collection Time: 06/23/18  3:00 AM   Result Value Ref Range    Sodium 141 136 - 145 mmol/L    Potassium 3.5 3.5 - 5.1 mmol/L    Chloride 102 95 - 110 mmol/L    CO2 29 23 - 29 mmol/L    Glucose 77 70 - 110 mg/dL    BUN, Bld 20 6 - 20 mg/dL    Creatinine 1.6 (H) 0.5 - 1.4 mg/dL    Calcium 9.4 8.7 - 10.5 mg/dL    Total Protein 7.1 6.0 - 8.4 g/dL    Albumin 2.9 (L) 3.5 - 5.2 g/dL    Total Bilirubin 3.1 (H) 0.1 - 1.0 mg/dL    Alkaline Phosphatase 239 (H) 55 - 135 U/L     (H) 10 - 40 U/L    ALT 1,067 (H) 10 - 44 U/L    Anion Gap 10 8 - 16 mmol/L    eGFR if African American 57.6 (A) >60 mL/min/1.73 m^2    eGFR if non  49.8 (A) >60 mL/min/1.73 m^2   Magnesium - if not done in ED    Collection Time: 06/23/18  3:00 AM   Result Value Ref  Range    Magnesium 2.0 1.6 - 2.6 mg/dL   Phosphorus - if not done in ED    Collection Time: 06/23/18  3:00 AM   Result Value Ref Range    Phosphorus 3.7 2.7 - 4.5 mg/dL   CBC auto differential    Collection Time: 06/24/18  8:06 AM   Result Value Ref Range    WBC 9.16 3.90 - 12.70 K/uL    RBC 3.80 (L) 4.60 - 6.20 M/uL    Hemoglobin 11.7 (L) 14.0 - 18.0 g/dL    Hematocrit 33.9 (L) 40.0 - 54.0 %    MCV 89 82 - 98 fL    MCH 30.8 27.0 - 31.0 pg    MCHC 34.5 32.0 - 36.0 g/dL    RDW 19.9 (H) 11.5 - 14.5 %    Platelets 149 (L) 150 - 350 K/uL    MPV 11.3 9.2 - 12.9 fL    Immature Granulocytes 0.9 (H) 0.0 - 0.5 %    Gran # (ANC) 5.6 1.8 - 7.7 K/uL    Immature Grans (Abs) 0.08 (H) 0.00 - 0.04 K/uL    Lymph # 1.8 1.0 - 4.8 K/uL    Mono # 1.2 (H) 0.3 - 1.0 K/uL    Eos # 0.4 0.0 - 0.5 K/uL    Baso # 0.03 0.00 - 0.20 K/uL    nRBC 0 0 /100 WBC    Gran% 61.5 38.0 - 73.0 %    Lymph% 19.4 18.0 - 48.0 %    Mono% 13.5 4.0 - 15.0 %    Eosinophil% 4.4 0.0 - 8.0 %    Basophil% 0.3 0.0 - 1.9 %    Differential Method Automated    Comprehensive metabolic panel - if not done in ED    Collection Time: 06/24/18  8:06 AM   Result Value Ref Range    Sodium 136 136 - 145 mmol/L    Potassium 3.0 (L) 3.5 - 5.1 mmol/L    Chloride 101 95 - 110 mmol/L    CO2 24 23 - 29 mmol/L    Glucose 89 70 - 110 mg/dL    BUN, Bld 16 6 - 20 mg/dL    Creatinine 1.4 0.5 - 1.4 mg/dL    Calcium 9.1 8.7 - 10.5 mg/dL    Total Protein 6.8 6.0 - 8.4 g/dL    Albumin 2.8 (L) 3.5 - 5.2 g/dL    Total Bilirubin 2.7 (H) 0.1 - 1.0 mg/dL    Alkaline Phosphatase 217 (H) 55 - 135 U/L     (H) 10 - 40 U/L     (H) 10 - 44 U/L    Anion Gap 11 8 - 16 mmol/L    eGFR if African American >60.0 >60 mL/min/1.73 m^2    eGFR if non  58.6 (A) >60 mL/min/1.73 m^2   Magnesium - if not done in ED    Collection Time: 06/24/18  8:06 AM   Result Value Ref Range    Magnesium 1.7 1.6 - 2.6 mg/dL   Phosphorus - if not done in ED    Collection Time: 06/24/18  8:06 AM   Result  Value Ref Range    Phosphorus 3.0 2.7 - 4.5 mg/dL      No results found for: PHENYTOIN, PHENOBARB, VALPROATE, CBMZ    Significant Imaging: I have reviewed all pertinent imaging results/findings within the past 24 hours.

## 2018-06-24 NOTE — PROGRESS NOTES
Progress Note  Hospital Medicine    Primary Team: [facility] HOSP MED C  Admit Date: 6/16/2018   Length of Stay:  LOS: 8 days   SUBJECTIVE:   Reason for Admission:  Cardiogenic shock    HPI:  47 year old with PMH of ICM (LVEF 13%) s/p ICD, CAD s/p PCI, HTN, HLD, current smoker, PAD (s/p aortobifemoral bypass, L SFA and pop PTAS in September and recent R SFA and R pop 3/8 by Dr. Carl Bell and finally R AKA 3/2018) who presented to OSH with cc SOB, cough, weight, BNP 2400, congested CXR, and elevated lactic acid. He was admitted with COPD and hypotension though to be secondary to sepsis. He was started on zmax and vanc and given a fluid bolus.  He was found to not be septic the next day and all abx were stopped.  Unfortunately, he developed ADHF with worsened respiratory status and became progressively hypotensive.  He was subsequently intubated and started on levo/dobutamine. Transferred to [facility] CCU for higher level of care.    Patient was started on SLED given poor UOP and worsening RYAN. He was kept on dobutamine 2.5 mcg/kg/min with daily CO measurements. Patient had recovering renal function and SLED was discontinued 6/17/2018. He was diuresed with IV lasix on bid then daily basis until patient became euvolemic with normal-low CVP. He was suitable for extubation on 2nd day of hospital stay however would become significantly agitated when sedation would be weaned down resulting in delaying extubation until 6/19/2018. After extubation, the patient was overtly delirious expressed by confusion, delusion, belligerence, and aggressive. He had to be restrained with violent restraints on the first 2 days following extubation. Psychiatry was consulted and patient was finally less psychotic and delirious on 6/23/2018 after starting scheduled depacon 250mg IV [id number] and receiving one dose of Thorazine night of 6/22/2018. Given patient's stable clinical status from his cardiogenic shock standpoint it was thought best to step down  patient out of ICU settings to decreased delirium recurrence.     Interval history:    No acute events overnight.  Pt is calm and sitting in bed this morning.  Mostly appropriate with some tangential and slurred speech.  Denies SOB or chest pain.    Review of Systems:  Constitutional: no fever or chills  Respiratory: no cough or shortness of breath  Cardiovascular: no chest pain or palpitations  Gastrointestinal: no nausea or vomiting, no abdominal pain or change in bowel habits  Musculoskeletal: no arthralgias or myalgias     OBJECTIVE:     Temp:  [97 °F (36.1 °C)-98.4 °F (36.9 °C)]   Pulse:  []   Resp:  [14-28]   BP: ()/(50-76)   SpO2:  [93 %-100 %]  Body mass index is 20.18 kg/m².  Intake/Outake:  This Shift:  No intake/output data recorded.    Net I/O past 24h:     Intake/Output Summary (Last 24 hours) at 06/24/18 1246  Last data filed at 06/24/18 0600   Gross per 24 hour   Intake          1276.63 ml   Output              475 ml   Net           801.63 ml             Physical Exam:  Gen- well-developed, well-nourished, NAD  CVS- S1 and S2 present, 3/6 systolic murmur, RRR  Resp- CTA b/l, no work of breathing  Abd- BS+, soft, NT, ND  Ext- right AKA with well-healing incision; LLE without edema, s/p forefoot amputation with black necrotic borders and granulation tissue centrally    Laboratory:  CBC/Anemia Labs: Coags:      Recent Labs  Lab 06/22/18  0300 06/23/18  0300 06/24/18  0806   WBC 9.94 8.13 9.16   HGB 12.0* 12.0* 11.7*   HCT 35.1* 35.4* 33.9*    156 149*   MCV 89 91 89   RDW 19.8* 19.9* 19.9*    No results for input(s): PT, INR, APTT in the last 168 hours.     Chemistries:     Recent Labs  Lab 06/22/18  0300 06/22/18  1805 06/23/18  0300 06/24/18  0806     --  141 136   K 3.0* 3.9 3.5 3.0*   CL 99  --  102 101   CO2 26  --  29 24   BUN 23*  --  20 16   CREATININE 2.0*  --  1.6* 1.4   CALCIUM 8.8  --  9.4 9.1   PROT 7.3  --  7.1 6.8   BILITOT 3.5*  --  3.1* 2.7*   ALKPHOS 275*  --   239* 217*   ALT 1,461*  --  1,067* 731*   *  --  458* 260*   MG 1.9  --  2.0 1.7   PHOS 2.9  --  3.7 3.0        ABG:     Recent Labs  Lab 06/20/18  0809 06/21/18  0858 06/22/18  0533   PH 7.429 7.494* 7.463*   PCO2 46.0* 37.7 43.6   PO2 30* 30* 31*   HCO3 30.5* 29.0* 31.2*   POCSATURATED 59* 64* 63*   BE 6 6 7        Cardiac Enzymes: Ejection Fractions:    No results for input(s): CPK, CPKMB, MB, TROPONINI in the last 72 hours. EF   Date Value Ref Range Status   06/17/2018 13 (A) 55 - 65    04/06/2018 15 (A) 55 - 65         Medications:  Scheduled Meds:   aspirin  81 mg Per OG tube Daily    atorvastatin  80 mg Per OG tube Daily    chlorproMAZINE  50 mg Oral QHS    collagenase   Topical (Top) Daily    isosorbide-hydrALAZINE 20-37.5 mg  1 tablet Oral TID    multivitamin liquid no.118  10 mL Oral Daily    nicotine  1 patch Transdermal Daily    senna-docusate 8.6-50 mg  1 tablet Per OG tube BID    sodium chloride 0.9%  3 mL Intravenous Q8H    thiamine  100 mg Oral Daily    ticagrelor  90 mg Per OG tube BID    valproate sodium (DEPACON) IVPB  250 mg Intravenous Q6H                             Continuous Infusions:   DOBUTamine 2.5 mcg/kg/min (06/23/18 1700)     PRN Meds:.acetaminophen, albuterol-ipratropium, chlorproMAZINE, levalbuterol, lidocaine HCL 2%, magnesium oxide, magnesium oxide, ondansetron, pneumoc 13-osman conj-dip cr(PF), potassium chloride 10%, potassium chloride 10%, potassium chloride 10%, potassium, sodium phosphates, potassium, sodium phosphates, potassium, sodium phosphates     ASSESSMENT/PLAN:     Active Hospital Problems    Diagnosis  POA    *Cardiogenic shock [R57.0]  Yes    Moderate protein-calorie malnutrition [E44.0]  Yes    Delirium [R41.0]  No    Delirium due to multiple etiologies, acute, hyperactive [F05]  No    Alteration in skin integrity related to surgical incision [R23.9]  Yes    Encephalopathy [G93.40]  Yes    Shock liver [K72.00]  Yes    COPD, severe [J44.9]   Yes    Acute renal failure [N17.9]  Yes    Leukocytosis [D72.829]  Yes    Alcohol abuse [F10.10]  Yes    Peripheral vascular disease of lower extremity [I73.9]  Yes     S/p right AKA.     TcPO2 L foot 5/11/2018   Dorsum 43 to 171 mmHg with oxygen   Medial 61 to 144 mmHg with oxygen   Chest wall baseline 43 mmHg      Tobacco abuse [Z72.0]  Yes      Resolved Hospital Problems    Diagnosis Date Resolved POA    Hyperkalemia [E87.5] 06/21/2018 Yes    RYAN (acute kidney injury) [N17.9] 06/23/2018 Yes    Dependence on ventilator, status [Z99.11] 06/20/2018 Not Applicable    Lactic acidosis [E87.2] 06/23/2018 Yes    Acute respiratory failure with hypoxia [J96.01] 06/22/2018 Yes     Cardiogenic shock     -likely due to ADHF following volume resuscitation when received IVF for suspected sepsis on initial presentation resulting in volume overload.   - net negative 9L since admit. SLED discontinued 6/17/2018.   - has been receiving daily IV lasix 80mg prn. Last dose received 6/21/2018.   -check CVP and reassess; unclear if pt was on diuretic at home  - Nephrology following, appreciate assistance.   - Dobutamine gtt currently decreased to to 2.5 mcg/kg/min; need to assess if pt is  dependent            Encephalopathy     Delirium   Likely due ICU delirium.  Psychiatry following, appreciate recs: Chlorpromazine 50 qHS and PRN, Depacon 250mg IV q6h  Continue holding pregabalin in setting of delirium and encephalopathy.   Delirium precautions  -appears much improved                Moderate protein-calorie malnutrition     Poor enteral nutrition due to AMS.  Starting oral diet.              Alteration in skin integrity related to surgical incision     At right AKA. Good granulation. Will not culture. Appreciate wound care's assistance.    -reconsult wound care regarding left forefoot amputation       Shock liver        Resolving.                COPD, severe     Prn nebs.           Acute renal failure     Likely ATN due  to ADHF and ischemic insult with hypotension and currently with over diuresis.   Cr improving this am to 1.4  Nephro following, appreciate their assistance.   Avoid nephrotoxins and renally dose medications.   Strict intake and output.           Leukocytosis     Resolved   WBC this am is 8.13  Likely stress-related. WBC improved after holding abx. Infectious workup was negative.                       Alcohol abuse     Reportedly last drink was weeks to months ago.   Avoiding BZD as possible.                 Tobacco abuse     Nicotine patch.          Peripheral vascular disease of lower extremity     Continue ASA/brilinta.  S/p right AKA. Left foot with 1-5 toe amputation.   Lactate continues to downtrend; improved to 2.1 since last check         DVT ppx- add Heparin  CODE Status- FULL    Dispo- PT/OT, wean , improve mentation prior to discharge, voiding trial    Ana Gomez MD  Hospital Medicine Staff

## 2018-06-24 NOTE — NURSING TRANSFER
Nursing Transfer Note      6/23/2018     Transfer From: [facility]    Transfer via bed    Transfer with cardiac monitoring    Transported by Nurses    Medicines sent: at bedside    Chart send with patient: Yes    Notified: son aware    Patient reassessed at: 6.23.18 1800  Upon arrival to floor: cardiac monitor applied, patient oriented to room, call bell in reach and bed in lowest position

## 2018-06-24 NOTE — PROGRESS NOTES
[facility]  Psychiatry  Progress Note    Patient Name: Leonardo Byrd  MRN: [id number]   Code Status: Full Code  Admission Date: 6/16/2018  Hospital Length of Stay: 8 days  Expected Discharge Date: 6/22/2018  Attending Physician: Ana Gomez MD  Primary Care Provider: Indio Aquino MD    Current Legal Status: N/A      Subjective:     Principal Problem:Cardiogenic shock    Chief Complaint: delirium    HPI: Per Cardiology notes:  47 year old with PMH of ICM (LVEF 30-35%) s/p ICD, CAD s/p PCI, HTN, HLD, current smoker, PAD (s/p aortobifemoral bypass, L SFA and pop PTAS in September and recent R SFA and R pop 3/8 by Dr. Carl Bell and finally R AKA 3/2018.     He presented to ED with about 2 week h/o SOB, cough, weight gain. He presented to [facility] ED on Saturday. Sent home after presumed negative work up. Presented once again overnight 6/13. BNP 2400, congested CXR, cough, no fever similar to prior CHF exacerbations. LActic acid elevated. He was admitted with COPD and sepsis. He was started on zmax and vanc and given a fluid bolus in ED.  He was found to not be septic the next day and all abx were stopped.  Unfortunately, though his UO was quite good and he was net - 2000cc on 6/16am, he progressively worsened from a respiratory status and became progressively hypotensive.  He was subsequently intubated and started on levo/dobutrex.  Lactate 10, k 7.1, creatinine now 1.8 from baseline 0.8, poor/no UO.  Transferred to [facility] for higher level of care.     On interview this morning:  The patient is lying in bed calmly at the beginning of the interview. Two of his daughters are present and help provide some of the history. The patient is not cooperative with interview and is profoundly confused. His answers to questions are nonsensical and he is unable to follow any commands. He is CAM-ICU positive for delirium and fails SAVEAHAART. The patient requests some water and his daughters attempt to feed him  "ice chips. He then becomes agitated and violent. He attempts to get out of bed and asks for his clothes. He starts yelling. His daughters are asked to leave the room and nurses place the patient in soft restraints to his wrists and his ankle. He continues to yell for his daughters and is no longer able to participate in the interview.    Per his daughters, he is treated for depression with a medication but they do not remember the name of the medication. He has not seen a psychiatrist in the past. They state that the patient has not had a drink of alcohol in at least a month and state that he does not use any drugs of abuse. They state that the patient was "given too much of a medication" by his doctors, but they are unable to say which medication they are referring too. They deny that the patient has had any prior suicide attempts.    Hospital Course: 6/21/2018: The chart was reviewed and the patient was interviewed this morning. His father was present in the room during the interview. The patient was in restraints to his wrists bilaterally and to his left ankle. He also had a mask on his face to prevent him from spitting on staff. Per his nurse, he has continued to be very agitated and interfering with treatment. The patient was agitated during the interview this morning. His speech was profane and he was shouting at his father to "call 911". He was unable to participate in CAM-ICU assessment for delirium and was unable to follow commands.    6/22/2018: The chart was reviewed and the patient was interviewed this morning. He had a mask on his face and he was in 3-point soft restraints. He was vulgar and profoundly confused. He was unable to offer coherent answers to any questions. He failed SAVEAHAART. He was oriented to person only.     06/23/2018 - received Thorazine 50mg IM yesterday for agitation. remarkable improvement in mental status today. Pt alert and oriented to person, place, city, state, month, year, " "situation - CAM-ICU negative with 0 errors on SAVEKuznechAART screen. Mood is good, thoughts are linear, pt laughing and joking around appropriately. Son at bedside, relieved by the marked improvement. Step down to floor today.     06/24/2018: per chart has been calm, no PRN medication needed. Says he is doing "a lot better" today, though he complains about not being able to walk and asks when his L foot dressing will be changed. C/o pain. Oriented to place, month, year and that is was recently father's day. No family at bedside on interview.      Interval History: see hospital course    Family History     None        Social History Main Topics    Smoking status: Former Smoker     Packs/day: 0.50     Quit date: 5/1/2018    Smokeless tobacco: Former User     Types: Chew     Quit date: 8/2/1980      Comment: Uses nicotine gum and nicotine patches    Alcohol use Yes      Comment: rarely    Drug use: No    Sexual activity: Yes     Partners: Female     Psychotherapeutics     Start     Stop Route Frequency Ordered    06/23/18 2100  chlorproMAZINE tablet 50 mg      -- Oral Nightly 06/23/18 1600    06/23/18 1700  chlorproMAZINE injection 25 mg      -- IM Every 12 hours PRN 06/23/18 1600    06/19/18 1750  haloperidol lactate (HALDOL) 5 mg/mL injection     Comments:  Created by cabinet override    06/20 0559   06/19/18 1750           Review of Systems    Objective:     Vital Signs (Most Recent):  Temp: 97 °F (36.1 °C) (06/24/18 1218)  Pulse: 101 (06/24/18 1218)  Resp: 18 (06/24/18 1218)  BP: 119/63 (06/24/18 1218)  SpO2: 97 % (06/24/18 1218) Vital Signs (24h Range):  Temp:  [97 °F (36.1 °C)-98.4 °F (36.9 °C)] 97 °F (36.1 °C)  Pulse:  [] 101  Resp:  [14-28] 18  SpO2:  [93 %-100 %] 97 %  BP: ()/(50-76) 119/63     Height: 5' 6" (167.6 cm)  Weight: 56.7 kg (125 lb)  Body mass index is 20.18 kg/m².      Intake/Output Summary (Last 24 hours) at 06/24/18 1502  Last data filed at 06/24/18 1400   Gross per 24 hour   Intake " "          1557.5 ml   Output              750 ml   Net            807.5 ml       Physical Exam     Appearance: somewhat disheveled, wearing hospital gown, in NAD  Behavior: calm, cooperative, pleasant  Speech: slurred, normal rate and volume  Mood: "a lot better"  Affect: reactive, appropriate  Thought Process: linear  Thought Perceptions: denied AVH  Thought Content: denied SI, HI; no delusions apparent  Sensorium: awake, alert  Attention/Concentration: CAM-ICU negative  Orientation: person, place, month, year, city, state, and situation  Memory: intact (recent, remote)  Abstraction: intact   Insight: improved  Judgment: improved         Significant Labs:   Recent Results (from the past 48 hour(s))   Potassium    Collection Time: 06/22/18  6:05 PM   Result Value Ref Range    Potassium 3.9 3.5 - 5.1 mmol/L   CBC auto differential    Collection Time: 06/23/18  3:00 AM   Result Value Ref Range    WBC 8.13 3.90 - 12.70 K/uL    RBC 3.91 (L) 4.60 - 6.20 M/uL    Hemoglobin 12.0 (L) 14.0 - 18.0 g/dL    Hematocrit 35.4 (L) 40.0 - 54.0 %    MCV 91 82 - 98 fL    MCH 30.7 27.0 - 31.0 pg    MCHC 33.9 32.0 - 36.0 g/dL    RDW 19.9 (H) 11.5 - 14.5 %    Platelets 156 150 - 350 K/uL    MPV 11.9 9.2 - 12.9 fL    Immature Granulocytes 0.9 (H) 0.0 - 0.5 %    Gran # (ANC) 4.6 1.8 - 7.7 K/uL    Immature Grans (Abs) 0.07 (H) 0.00 - 0.04 K/uL    Lymph # 1.8 1.0 - 4.8 K/uL    Mono # 1.3 (H) 0.3 - 1.0 K/uL    Eos # 0.4 0.0 - 0.5 K/uL    Baso # 0.05 0.00 - 0.20 K/uL    nRBC 0 0 /100 WBC    Gran% 56.0 38.0 - 73.0 %    Lymph% 22.1 18.0 - 48.0 %    Mono% 15.7 (H) 4.0 - 15.0 %    Eosinophil% 4.7 0.0 - 8.0 %    Basophil% 0.6 0.0 - 1.9 %    Differential Method Automated    Comprehensive metabolic panel - if not done in ED    Collection Time: 06/23/18  3:00 AM   Result Value Ref Range    Sodium 141 136 - 145 mmol/L    Potassium 3.5 3.5 - 5.1 mmol/L    Chloride 102 95 - 110 mmol/L    CO2 29 23 - 29 mmol/L    Glucose 77 70 - 110 mg/dL    BUN, Bld 20 6 " - 20 mg/dL    Creatinine 1.6 (H) 0.5 - 1.4 mg/dL    Calcium 9.4 8.7 - 10.5 mg/dL    Total Protein 7.1 6.0 - 8.4 g/dL    Albumin 2.9 (L) 3.5 - 5.2 g/dL    Total Bilirubin 3.1 (H) 0.1 - 1.0 mg/dL    Alkaline Phosphatase 239 (H) 55 - 135 U/L     (H) 10 - 40 U/L    ALT 1,067 (H) 10 - 44 U/L    Anion Gap 10 8 - 16 mmol/L    eGFR if African American 57.6 (A) >60 mL/min/1.73 m^2    eGFR if non  49.8 (A) >60 mL/min/1.73 m^2   Magnesium - if not done in ED    Collection Time: 06/23/18  3:00 AM   Result Value Ref Range    Magnesium 2.0 1.6 - 2.6 mg/dL   Phosphorus - if not done in ED    Collection Time: 06/23/18  3:00 AM   Result Value Ref Range    Phosphorus 3.7 2.7 - 4.5 mg/dL   CBC auto differential    Collection Time: 06/24/18  8:06 AM   Result Value Ref Range    WBC 9.16 3.90 - 12.70 K/uL    RBC 3.80 (L) 4.60 - 6.20 M/uL    Hemoglobin 11.7 (L) 14.0 - 18.0 g/dL    Hematocrit 33.9 (L) 40.0 - 54.0 %    MCV 89 82 - 98 fL    MCH 30.8 27.0 - 31.0 pg    MCHC 34.5 32.0 - 36.0 g/dL    RDW 19.9 (H) 11.5 - 14.5 %    Platelets 149 (L) 150 - 350 K/uL    MPV 11.3 9.2 - 12.9 fL    Immature Granulocytes 0.9 (H) 0.0 - 0.5 %    Gran # (ANC) 5.6 1.8 - 7.7 K/uL    Immature Grans (Abs) 0.08 (H) 0.00 - 0.04 K/uL    Lymph # 1.8 1.0 - 4.8 K/uL    Mono # 1.2 (H) 0.3 - 1.0 K/uL    Eos # 0.4 0.0 - 0.5 K/uL    Baso # 0.03 0.00 - 0.20 K/uL    nRBC 0 0 /100 WBC    Gran% 61.5 38.0 - 73.0 %    Lymph% 19.4 18.0 - 48.0 %    Mono% 13.5 4.0 - 15.0 %    Eosinophil% 4.4 0.0 - 8.0 %    Basophil% 0.3 0.0 - 1.9 %    Differential Method Automated    Comprehensive metabolic panel - if not done in ED    Collection Time: 06/24/18  8:06 AM   Result Value Ref Range    Sodium 136 136 - 145 mmol/L    Potassium 3.0 (L) 3.5 - 5.1 mmol/L    Chloride 101 95 - 110 mmol/L    CO2 24 23 - 29 mmol/L    Glucose 89 70 - 110 mg/dL    BUN, Bld 16 6 - 20 mg/dL    Creatinine 1.4 0.5 - 1.4 mg/dL    Calcium 9.1 8.7 - 10.5 mg/dL    Total Protein 6.8 6.0 - 8.4 g/dL     Albumin 2.8 (L) 3.5 - 5.2 g/dL    Total Bilirubin 2.7 (H) 0.1 - 1.0 mg/dL    Alkaline Phosphatase 217 (H) 55 - 135 U/L     (H) 10 - 40 U/L     (H) 10 - 44 U/L    Anion Gap 11 8 - 16 mmol/L    eGFR if African American >60.0 >60 mL/min/1.73 m^2    eGFR if non  58.6 (A) >60 mL/min/1.73 m^2   Magnesium - if not done in ED    Collection Time: 06/24/18  8:06 AM   Result Value Ref Range    Magnesium 1.7 1.6 - 2.6 mg/dL   Phosphorus - if not done in ED    Collection Time: 06/24/18  8:06 AM   Result Value Ref Range    Phosphorus 3.0 2.7 - 4.5 mg/dL      No results found for: PHENYTOIN, PHENOBARB, VALPROATE, CBMZ    Significant Imaging: I have reviewed all pertinent imaging results/findings within the past 24 hours.    Assessment/Plan:     Delirium    - Pt had remarkable improvement in mental status s/p administration of thorazine 6/22 PM, now alert and oriented to all measures assessed and CAM-ICU negative - possibly due to thorazine, improvement of medical condition, or combination of both - pt passed swallow, can now take PO meds  - Discontinue scheduled zyprexa due to lack of efficacy  - Continue Depacon 250mg IV q6h   - discontinue scheduled Thorazine 50mg PO qhs given pt's mental status remains stable and QTc 543 on 6/24. Can continue to use Thorazine 25mg IM q12h PRN for non-redirectable agitation  - Recommend daily EKG to monitor QTc while on thorazine - QTc on 6/22 477, 6/23 505, 6/24 543  - Psychiatry will continue to follow     -Implement the below DELIRIUM BEHAVIOR MANAGEMENT:  - Minimize use of restraints; if physical restraints necessary, please utilize medical/chemical prns for agitation first  - Keep shades open and room lit during day and room dim at night in order to promote healthy circadian rhythms.  - Encourage family at bedside.  - Keep whiteboard in patient's room current with the date and name of the members of patient's team for easy patient self re-orientation.  -  Avoid benzodiazepines, antihistamines, anticholinergics, hypnotics, and minimize opiates while controlling for pain as these medications may worsen delirium.           Pt discussed with staff Dr Cordero.    Need for Continued Hospitalization:   No need for inpatient psychiatric hospitalization. Continue medical care as per the primary team.    Anticipated Disposition: Home or Self Care     Total time:  15 with greater than 50% of this time spent in counseling and/or coordination of care.       Darlene Levine MD   Psychiatry  [facility]

## 2018-06-24 NOTE — ASSESSMENT & PLAN NOTE
- Pt had remarkable improvement in mental status s/p administration of thorazine 6/22 PM, now alert and oriented to all measures assessed and CAM-ICU negative - possibly due to thorazine, improvement of medical condition, or combination of both - pt passed swallow, can now take PO meds  - Discontinue scheduled zyprexa due to lack of efficacy  - Continue Depacon 250mg IV q6h   - discontinue scheduled Thorazine 50mg PO qhs given pt's mental status remains stable and QTc 543 on 6/24. Can continue to use Thorazine 25mg IM q12h PRN for non-redirectable agitation  - Recommend daily EKG to monitor QTc while on thorazine - QTc on 6/22 477, 6/23 505, 6/24 543  - Psychiatry will continue to follow     -Implement the below DELIRIUM BEHAVIOR MANAGEMENT:  - Minimize use of restraints; if physical restraints necessary, please utilize medical/chemical prns for agitation first  - Keep shades open and room lit during day and room dim at night in order to promote healthy circadian rhythms.  - Encourage family at bedside.  - Keep whiteboard in patient's room current with the date and name of the members of patient's team for easy patient self re-orientation.  - Avoid benzodiazepines, antihistamines, anticholinergics, hypnotics, and minimize opiates while controlling for pain as these medications may worsen delirium.

## 2018-06-24 NOTE — NURSING
Resume care from previous nurse, pt voice no complaints, lying in bed w/ bed in lowest position and locked. Call bell within reach, bed alarm on, maintain isolation precautions.   infusing at 2.5mcg based upon 60.3kg at 4.5,ml/hr.   introduce myself to pt. visi  Not working- ipad in slot Will continue to monitor pt status

## 2018-06-25 PROBLEM — Z89.432 STATUS POST TRANSMETATARSAL AMPUTATION OF FOOT, LEFT: Status: ACTIVE | Noted: 2018-01-01

## 2018-06-25 NOTE — PLAN OF CARE
Problem: Patient Care Overview  Goal: Plan of Care Review  Outcome: Revised  Plan of care discussed with patient. Patient is free of fall/trauma/injury. Denies CP, SOB, or pain/discomfort. IV antibiotics given. Wound dressing done by wound care today. All questions addressed. Will continue to monitor

## 2018-06-25 NOTE — SUBJECTIVE & OBJECTIVE
"Interval History: See hospital course    Family History     None        Social History Main Topics    Smoking status: Former Smoker     Packs/day: 0.50     Quit date: 5/1/2018    Smokeless tobacco: Former User     Types: Chew     Quit date: 8/2/1980      Comment: Uses nicotine gum and nicotine patches    Alcohol use Yes      Comment: rarely    Drug use: No    Sexual activity: Yes     Partners: Female     Psychotherapeutics     Start     Stop Route Frequency Ordered    06/23/18 2100  chlorproMAZINE tablet 50 mg      -- Oral Nightly 06/23/18 1600    06/23/18 1700  chlorproMAZINE injection 25 mg      -- IM Every 12 hours PRN 06/23/18 1600    06/19/18 1750  haloperidol lactate (HALDOL) 5 mg/mL injection     Comments:  Created by cabinet override    06/20 0559   06/19/18 1750           Review of Systems   Respiratory: Negative for shortness of breath.    Cardiovascular: Negative for chest pain.   Gastrointestinal: Negative for abdominal pain and nausea.   Neurological: Negative for headaches.     Objective:     Vital Signs (Most Recent):  Temp: 97.2 °F (36.2 °C) (06/25/18 1000)  Pulse: (!) 116 (06/25/18 1000)  Resp: 16 (06/25/18 1000)  BP: 104/69 (06/25/18 1000)  SpO2: 95 % (06/25/18 1000) Vital Signs (24h Range):  Temp:  [96.3 °F (35.7 °C)-97.7 °F (36.5 °C)] 97.2 °F (36.2 °C)  Pulse:  [] 116  Resp:  [16-19] 16  SpO2:  [87 %-97 %] 95 %  BP: (104-123)/(62-74) 104/69     Height: 5' 6" (167.6 cm)  Weight: 56.7 kg (125 lb)  Body mass index is 20.18 kg/m².      Intake/Output Summary (Last 24 hours) at 06/25/18 1032  Last data filed at 06/25/18 0615   Gross per 24 hour   Intake          1029.14 ml   Output              900 ml   Net           129.14 ml       Physical Exam      Mental Status Exam:  Appearance: unremarkable, age appropriate, normal weight, well nourished, lying in bed, dressed in hospital gown  Behavior/Cooperation: normal, cooperative  Speech: normal tone, normal rate, normal pitch, normal " volume  Language: uses words appropriately; NO aphasia or dysarthria  Mood: euthymic  Affect:  congruent with mood and appropriate to situation/content   Thought Process: normal and logical  Thought Content: normal, no suicidality, no homicidality, delusions, or paranoia  Level of Consciousness: Alert and Oriented x3  Memory:  Intact   3/3 immediate, 3/3 at 5 minutes    Recent:  Intact   Remote:  intact  Attention/concentration: appropriate for age/education.   Fund of Knowledge: appears adequate  Insight:  Intact  Judgment: Appropriate for setting      Significant Labs:   Last 24 Hours:   Recent Lab Results       06/25/18  0536      Immature Granulocytes 0.9(H)     Immature Grans (Abs) 0.07  Comment:  Mild elevation in immature granulocytes is non specific and   can be seen in a variety of conditions including stress response,   acute inflammation, trauma and pregnancy. Correlation with other   laboratory and clinical findings is essential.  (H)     Albumin 2.8(L)     Alkaline Phosphatase 216(H)     (H)     Anion Gap 10     (H)     Baso # 0.05     Basophil% 0.6     Total Bilirubin 2.3  Comment:  For infants and newborns, interpretation of results should be based  on gestational age, weight and in agreement with clinical  observations.  Premature Infant recommended reference ranges:  Up to 24 hours.............<8.0 mg/dL  Up to 48 hours............<12.0 mg/dL  3-5 days..................<15.0 mg/dL  6-29 days.................<15.0 mg/dL  (H)     BUN, Bld 15     Calcium 9.1     Chloride 102     CO2 23     Creatinine 1.4     Differential Method Automated     eGFR if African American >60.0     eGFR if non  58.6  Comment:  Calculation used to obtain the estimated glomerular filtration  rate (eGFR) is the CKD-EPI equation.   (A)     Eos # 0.4     Eosinophil% 5.1     Glucose 100     Gran # (ANC) 4.4     Gran% 56.4     Hematocrit 31.8(L)     Hemoglobin 10.8(L)     Lymph # 1.7     Lymph% 21.3      Magnesium 1.9     MCH 30.6     MCHC 34.0     MCV 90     Mono # 1.2(H)     Mono% 15.7(H)     MPV 12.8     nRBC 0     Phosphorus 3.0     Platelets 168     Potassium 4.0     Total Protein 6.8     RBC 3.53(L)     RDW 20.1(H)     Sodium 135(L)     WBC 7.79           Significant Imaging: EKG: I have reviewed all pertinent results/findings within the past 24 hours. Sinus tachycardia, Septal infarct age undetermined, T wave abnormality consider lateral ischemia, Abnormal ECG; QTc is 456

## 2018-06-25 NOTE — PLAN OF CARE
06/25/18 1021   Discharge Reassessment   Assessment Type Discharge Planning Reassessment   Do you have any problems affording any of your prescribed medications? No   Discharge Plan A Home with family   Discharge Plan B Home with family;Home Health

## 2018-06-25 NOTE — MEDICAL/APP STUDENT
"6/25/2018 9:54 AM   Leonardo Schwartzr   1970   [id number]        Psychiatry                         Progress Note     SUBJECTIVE:     Principal Problem: Cardiogenic shock    CC: Delirium     At the time of this interview, patient is lying in bed comfortably. His father is at bedside. Patient reports he is feeling "good" this morning. He stated he wants to go fishing. Patient reports he has been eating and sleeping well. Per patient's father, he is improving but still showing signs of confusion.    Passed SAVEAHAART.          Current Medications:   Scheduled Meds:    aspirin  81 mg Per OG tube Daily    atorvastatin  80 mg Per OG tube Daily    chlorproMAZINE  50 mg Oral QHS    collagenase   Topical (Top) Daily    heparin (porcine)  5,000 Units Subcutaneous Q8H    isosorbide-hydrALAZINE 20-37.5 mg  1 tablet Oral TID    multivitamin liquid no.118  10 mL Oral Daily    nicotine  1 patch Transdermal Daily    senna-docusate 8.6-50 mg  1 tablet Per OG tube BID    sodium chloride 0.9%  3 mL Intravenous Q8H    thiamine  100 mg Oral Daily    ticagrelor  90 mg Per OG tube BID    valproate sodium (DEPACON) IVPB  250 mg Intravenous Q6H      PRN Meds: acetaminophen, albuterol-ipratropium, chlorproMAZINE, levalbuterol, lidocaine HCL 2%, magnesium oxide, magnesium oxide, ondansetron, pneumoc 13-osman conj-dip cr(PF), potassium chloride 10%, potassium chloride 10%, potassium chloride 10%, potassium, sodium phosphates, potassium, sodium phosphates, potassium, sodium phosphates     Psychotherapeutics     Start     Stop Route Frequency Ordered    06/23/18 2100  chlorproMAZINE tablet 50 mg      -- Oral Nightly 06/23/18 1600    06/23/18 1700  chlorproMAZINE injection 25 mg      -- IM Every 12 hours PRN 06/23/18 1600    06/19/18 1750  haloperidol lactate (HALDOL) 5 mg/mL injection     Comments:  Created by cabinet override    06/20 0559   06/19/18 1750          Allergies:   No Known Allergies     OBJECTIVE:   Vitals   Vitals:    " 06/25/18 1000   BP: 104/69   Pulse: (!) 116   Resp: 16   Temp: 97.2 °F (36.2 °C)        Labs/Imaging/Studies:   Recent Results (from the past 36 hour(s))   CBC auto differential    Collection Time: 06/24/18  8:06 AM   Result Value Ref Range    WBC 9.16 3.90 - 12.70 K/uL    RBC 3.80 (L) 4.60 - 6.20 M/uL    Hemoglobin 11.7 (L) 14.0 - 18.0 g/dL    Hematocrit 33.9 (L) 40.0 - 54.0 %    MCV 89 82 - 98 fL    MCH 30.8 27.0 - 31.0 pg    MCHC 34.5 32.0 - 36.0 g/dL    RDW 19.9 (H) 11.5 - 14.5 %    Platelets 149 (L) 150 - 350 K/uL    MPV 11.3 9.2 - 12.9 fL    Immature Granulocytes 0.9 (H) 0.0 - 0.5 %    Gran # (ANC) 5.6 1.8 - 7.7 K/uL    Immature Grans (Abs) 0.08 (H) 0.00 - 0.04 K/uL    Lymph # 1.8 1.0 - 4.8 K/uL    Mono # 1.2 (H) 0.3 - 1.0 K/uL    Eos # 0.4 0.0 - 0.5 K/uL    Baso # 0.03 0.00 - 0.20 K/uL    nRBC 0 0 /100 WBC    Gran% 61.5 38.0 - 73.0 %    Lymph% 19.4 18.0 - 48.0 %    Mono% 13.5 4.0 - 15.0 %    Eosinophil% 4.4 0.0 - 8.0 %    Basophil% 0.3 0.0 - 1.9 %    Differential Method Automated    Comprehensive metabolic panel - if not done in ED    Collection Time: 06/24/18  8:06 AM   Result Value Ref Range    Sodium 136 136 - 145 mmol/L    Potassium 3.0 (L) 3.5 - 5.1 mmol/L    Chloride 101 95 - 110 mmol/L    CO2 24 23 - 29 mmol/L    Glucose 89 70 - 110 mg/dL    BUN, Bld 16 6 - 20 mg/dL    Creatinine 1.4 0.5 - 1.4 mg/dL    Calcium 9.1 8.7 - 10.5 mg/dL    Total Protein 6.8 6.0 - 8.4 g/dL    Albumin 2.8 (L) 3.5 - 5.2 g/dL    Total Bilirubin 2.7 (H) 0.1 - 1.0 mg/dL    Alkaline Phosphatase 217 (H) 55 - 135 U/L     (H) 10 - 40 U/L     (H) 10 - 44 U/L    Anion Gap 11 8 - 16 mmol/L    eGFR if African American >60.0 >60 mL/min/1.73 m^2    eGFR if non  58.6 (A) >60 mL/min/1.73 m^2   Magnesium - if not done in ED    Collection Time: 06/24/18  8:06 AM   Result Value Ref Range    Magnesium 1.7 1.6 - 2.6 mg/dL   Phosphorus - if not done in ED    Collection Time: 06/24/18  8:06 AM   Result Value Ref Range     Phosphorus 3.0 2.7 - 4.5 mg/dL   CBC auto differential    Collection Time: 06/25/18  5:36 AM   Result Value Ref Range    WBC 7.79 3.90 - 12.70 K/uL    RBC 3.53 (L) 4.60 - 6.20 M/uL    Hemoglobin 10.8 (L) 14.0 - 18.0 g/dL    Hematocrit 31.8 (L) 40.0 - 54.0 %    MCV 90 82 - 98 fL    MCH 30.6 27.0 - 31.0 pg    MCHC 34.0 32.0 - 36.0 g/dL    RDW 20.1 (H) 11.5 - 14.5 %    Platelets 168 150 - 350 K/uL    MPV 12.8 9.2 - 12.9 fL    Immature Granulocytes 0.9 (H) 0.0 - 0.5 %    Gran # (ANC) 4.4 1.8 - 7.7 K/uL    Immature Grans (Abs) 0.07 (H) 0.00 - 0.04 K/uL    Lymph # 1.7 1.0 - 4.8 K/uL    Mono # 1.2 (H) 0.3 - 1.0 K/uL    Eos # 0.4 0.0 - 0.5 K/uL    Baso # 0.05 0.00 - 0.20 K/uL    nRBC 0 0 /100 WBC    Gran% 56.4 38.0 - 73.0 %    Lymph% 21.3 18.0 - 48.0 %    Mono% 15.7 (H) 4.0 - 15.0 %    Eosinophil% 5.1 0.0 - 8.0 %    Basophil% 0.6 0.0 - 1.9 %    Differential Method Automated    Comprehensive metabolic panel - if not done in ED    Collection Time: 06/25/18  5:36 AM   Result Value Ref Range    Sodium 135 (L) 136 - 145 mmol/L    Potassium 4.0 3.5 - 5.1 mmol/L    Chloride 102 95 - 110 mmol/L    CO2 23 23 - 29 mmol/L    Glucose 100 70 - 110 mg/dL    BUN, Bld 15 6 - 20 mg/dL    Creatinine 1.4 0.5 - 1.4 mg/dL    Calcium 9.1 8.7 - 10.5 mg/dL    Total Protein 6.8 6.0 - 8.4 g/dL    Albumin 2.8 (L) 3.5 - 5.2 g/dL    Total Bilirubin 2.3 (H) 0.1 - 1.0 mg/dL    Alkaline Phosphatase 216 (H) 55 - 135 U/L     (H) 10 - 40 U/L     (H) 10 - 44 U/L    Anion Gap 10 8 - 16 mmol/L    eGFR if African American >60.0 >60 mL/min/1.73 m^2    eGFR if non  58.6 (A) >60 mL/min/1.73 m^2   Magnesium - if not done in ED    Collection Time: 06/25/18  5:36 AM   Result Value Ref Range    Magnesium 1.9 1.6 - 2.6 mg/dL   Phosphorus - if not done in ED    Collection Time: 06/25/18  5:36 AM   Result Value Ref Range    Phosphorus 3.0 2.7 - 4.5 mg/dL        Mental Status Exam:   Arousal: awake and alert  Appearance: good hygiene,  "dressed in hospital gown with c/d/i wound dressings on left foot.   Behavior/Cooperation: calm and cooperative, appropriate eye contact  Psychomotor: no psychomotor retardation or agitation   Speech: responsive, mumbling and slurred at times, normal rate, soft volume  Mood: "good"   Affect: congruent and appropriate for setting  Thought Process: tangential  Thought Content: denies SI/HI; no delusions  Attention/Concentration: passed SAVEAHAART, fair concentration  Orientation: oriented to person, place, month, year, city, situation   Memory: intact  Insight: good  Judgment: appropriate for setting    ASSESSMENT/PLAN:     Delirium:   Pt appears to have significantly improved s/p administration of thorazine (06/22). He is alert and oriented on assessment and passed SAVEAHAART.  - Continue Depacon 250 mg IV q6h  - Continue Thorazine 50 mg PO qhs and continue Thorazine 25 mg IM q12h PRN for non-redirectable agitation  - Recommend daily EKG to monitor QTc while on Thorazine    Implement Delirium Behavior Management:    - Minimize use of restraints; if physical restraints necessary, utilize medical/chemical PRNs for agitation first  - Keep shades open and room lit during day and dim at night in order to promote healthy circadian rhythms  - Encourage family at bedside  - Keep whiteboard in patient's room current with date and name of members of patient's team for easy patient self re-orientation  - Avoid BZDs, antihistamines, anticholinergics, hypnotics, and minimize opiates while controlling pain as these medications may worsen delirium    Need for Continued Hospitalization:  No need for inpatient psychiatric hospitalization. Continue medical care as per primary team.    Anticipated Disposition: Home or Self-Care      "

## 2018-06-25 NOTE — PROGRESS NOTES
Consulted to see for left forefoot after amputation  Upon removal of left foot dressing noted ischemic changes to extremity and non healing surgical incision.   Call placed to Dr Ana Gomez in regards to left lower extremity and will reconsult the surgical team.   Right AKA incision with some slough and pink tissue present . Will continue santyl ointment to this area daily.     06/25/18 1330       Wound 06/12/18 2345 upper Leg   Date First Assessed/Time First Assessed: 06/12/18 2345   Pre-existing: Yes  Side: Right  Orientation: upper  Location: Leg  Wound Outcome: Amputation   Wound WDL ex   Dressing Appearance Open to air  (dressing lying in the bed)   Drainage Amount None   Drainage Characteristics/Odor No odor   Appearance Pink;Yellow;Slough;Dry   Red (%), Wound Tissue Color 50 %  (pink)   Yellow (%), Wound Tissue Color 50 %   Periwound Area Intact   Wound Edges Open   Wound Length (cm) 0.5 cm   Wound Width (cm) 4 cm   Wound Depth (cm) 0.5 cm   Wound Volume (cm^3) 1 cm^3   Care Cleansed with:;Sterile normal saline   Dressing Applied;Island/border  (santyl ointment , saline packing strip )       Wound 06/12/18 2345 Other (comment) distal Foot   Date First Assessed/Time First Assessed: 06/12/18 2345   Pre-existing: Yes  Primary Wound Type: Other (comment)  Side: Left  Orientation: distal  Location: (c) Foot  Wound Outcome: Amputation   Wound Image      Wound WDL ex   Dressing Appearance Clean;Dry;Intact   Drainage Amount None   Drainage Characteristics/Odor No odor   Appearance Black;Purple;Necrotic;Eschar   Black (%), Wound Tissue Color 100 %   Periwound Area Redness  (erythema up to 4 cm)   Wound Length (cm) 10 cm   Wound Width (cm) 10 cm   Wound Depth (cm) 0.1 cm   Wound Volume (cm^3) 10 cm^3   Care Cleansed with:;Sterile normal saline;Applied:;Povidone iodine   Dressing Applied;Abd pad;Rolled gauze     Recommendations:   1. Apply betadine to left foot ischemic changes, may wrap with abd and kerlex for  protection  2. Right AKA incision - clean with saline, apply santyl ointment to wound base, cover with saline moistened gauze packing and secure with Mepore dressing daily.  Boy Cox RN CWON  [id number]

## 2018-06-25 NOTE — PROGRESS NOTES
Progress Note  Hospital Medicine    Primary Team: [facility]OS[facility] MED C  Admit Date: 6/16/2018   Length of Stay:  LOS: 9 days   SUBJECTIVE:   Reason for Admission:  Cardiogenic shock    HPI:  47 year old with PMH of ICM (LVEF 13%) s/p ICD, CAD s/p PCI, HTN, HLD, current smoker, PAD (s/p aortobifemoral bypass, L SFA and pop PTAS in September and recent R SFA and R pop 3/8 by Dr. Carl Bell and finally R AKA 3/2018) who presented to OSH with cc SOB, cough, weight, BNP 2400, congested CXR, and elevated lactic acid. He was admitted with COPD and hypotension though to be secondary to sepsis. He was started on zmax and vanc and given a fluid bolus.  He was found to not be septic the next day and all abx were stopped.  Unfortunately, he developed ADHF with worsened respiratory status and became progressively hypotensive.  He was subsequently intubated and started on levo/dobutamine. Transferred to [facility] CCU for higher level of care.    Patient was started on SLED given poor UOP and worsening RYAN. He was kept on dobutamine 2.5 mcg/kg/min with daily CO measurements. Patient had recovering renal function and SLED was discontinued 6/17/2018. He was diuresed with IV lasix on bid then daily basis until patient became euvolemic with normal-low CVP. He was suitable for extubation on 2nd day of hospital stay however would become significantly agitated when sedation would be weaned down resulting in delaying extubation until 6/19/2018. After extubation, the patient was overtly delirious expressed by confusion, delusion, belligerence, and aggressive. He had to be restrained with violent restraints on the first 2 days following extubation. Psychiatry was consulted and patient was finally less psychotic and delirious on 6/23/2018 after starting scheduled depacon 250mg IV [id number] and receiving one dose of Thorazine night of 6/22/2018. Given patient's stable clinical status from his cardiogenic shock standpoint it was thought best to step down  patient out of ICU settings to decreased delirium recurrence.     Interval history:    No acute events overnight.  Pt is calm and answers most questions appropriate, but still with some slurred speech and slowed responses.  Denies SOB or chest pain.    Review of Systems:  Constitutional: no fever or chills  Respiratory: no cough or shortness of breath  Cardiovascular: no chest pain or palpitations  Gastrointestinal: no nausea or vomiting, no abdominal pain or change in bowel habits  Musculoskeletal: no arthralgias or myalgias     OBJECTIVE:     Temp:  [96.3 °F (35.7 °C)-97.7 °F (36.5 °C)]   Pulse:  []   Resp:  [16-19]   BP: (104-123)/(62-74)   SpO2:  [87 %-97 %]  Body mass index is 20.18 kg/m².  Intake/Outake:  This Shift:  No intake/output data recorded.    Net I/O past 24h:     Intake/Output Summary (Last 24 hours) at 06/25/18 1136  Last data filed at 06/25/18 0615   Gross per 24 hour   Intake          1029.14 ml   Output              900 ml   Net           129.14 ml             Physical Exam:  Gen- well-developed, well-nourished, NAD  CVS- S1 and S2 present, 3/6 systolic murmur, RRR  Resp- CTA b/l, no work of breathing  Abd- BS+, soft, NT, ND  Ext- right AKA with well-healing incision; LLE without edema, s/p forefoot amputation with black necrotic borders and granulation tissue centrally    Laboratory:  CBC/Anemia Labs: Coags:      Recent Labs  Lab 06/23/18 0300 06/24/18  0806 06/25/18  0536   WBC 8.13 9.16 7.79   HGB 12.0* 11.7* 10.8*   HCT 35.4* 33.9* 31.8*    149* 168   MCV 91 89 90   RDW 19.9* 19.9* 20.1*    No results for input(s): PT, INR, APTT in the last 168 hours.     Chemistries:     Recent Labs  Lab 06/23/18  0300 06/24/18  0806 06/25/18  0536    136 135*   K 3.5 3.0* 4.0    101 102   CO2 29 24 23   BUN 20 16 15   CREATININE 1.6* 1.4 1.4   CALCIUM 9.4 9.1 9.1   PROT 7.1 6.8 6.8   BILITOT 3.1* 2.7* 2.3*   ALKPHOS 239* 217* 216*   ALT 1,067* 731* 595*   * 260* 206*   MG  2.0 1.7 1.9   PHOS 3.7 3.0 3.0        ABG:     Recent Labs  Lab 06/20/18  0809 06/21/18  0858 06/22/18  0533   PH 7.429 7.494* 7.463*   PCO2 46.0* 37.7 43.6   PO2 30* 30* 31*   HCO3 30.5* 29.0* 31.2*   POCSATURATED 59* 64* 63*   BE 6 6 7        Cardiac Enzymes: Ejection Fractions:    No results for input(s): CPK, CPKMB, MB, TROPONINI in the last 72 hours. EF   Date Value Ref Range Status   06/17/2018 13 (A) 55 - 65    04/06/2018 15 (A) 55 - 65         Medications:  Scheduled Meds:   aspirin  81 mg Per OG tube Daily    atorvastatin  80 mg Per OG tube Daily    chlorproMAZINE  50 mg Oral QHS    collagenase   Topical (Top) Daily    heparin (porcine)  5,000 Units Subcutaneous Q8H    isosorbide-hydrALAZINE 20-37.5 mg  1 tablet Oral TID    multivitamin liquid no.118  10 mL Oral Daily    nicotine  1 patch Transdermal Daily    senna-docusate 8.6-50 mg  1 tablet Per OG tube BID    sodium chloride 0.9%  3 mL Intravenous Q8H    thiamine  100 mg Oral Daily    ticagrelor  90 mg Per OG tube BID    valproate sodium (DEPACON) IVPB  250 mg Intravenous Q6H                             Continuous Infusions:   DOBUTamine 2.5 mcg/kg/min (06/23/18 1700)     PRN Meds:.acetaminophen, albuterol-ipratropium, chlorproMAZINE, levalbuterol, lidocaine HCL 2%, magnesium oxide, magnesium oxide, ondansetron, pneumoc 13-osman conj-dip cr(PF), potassium chloride 10%, potassium chloride 10%, potassium chloride 10%, potassium, sodium phosphates, potassium, sodium phosphates, potassium, sodium phosphates     ASSESSMENT/PLAN:     Active Hospital Problems    Diagnosis  POA    *Cardiogenic shock [R57.0]  Yes    Moderate protein-calorie malnutrition [E44.0]  Yes    Delirium [R41.0]  No    Delirium due to multiple etiologies, acute, hyperactive [F05]  No    Alteration in skin integrity related to surgical incision [R23.9]  Yes    Encephalopathy [G93.40]  Yes    Shock liver [K72.00]  Yes    COPD, severe [J44.9]  Yes    Acute renal failure  [N17.9]  Yes    Leukocytosis [D72.829]  Yes    Alcohol abuse [F10.10]  Yes    Peripheral vascular disease of lower extremity [I73.9]  Yes     S/p right AKA.     TcPO2 L foot 5/11/2018   Dorsum 43 to 171 mmHg with oxygen   Medial 61 to 144 mmHg with oxygen   Chest wall baseline 43 mmHg      Tobacco abuse [Z72.0]  Yes      Resolved Hospital Problems    Diagnosis Date Resolved POA    Hyperkalemia [E87.5] 06/21/2018 Yes    RYAN (acute kidney injury) [N17.9] 06/23/2018 Yes    Dependence on ventilator, status [Z99.11] 06/20/2018 Not Applicable    Lactic acidosis [E87.2] 06/23/2018 Yes    Acute respiratory failure with hypoxia [J96.01] 06/22/2018 Yes     Cardiogenic shock     -likely due to ADHF following volume resuscitation when received IVF for suspected sepsis on initial presentation resulting in volume overload.   - net negative 9L since admit. SLED discontinued 6/17/2018.   - has been receiving daily IV lasix 80mg prn. Last dose received 6/21/2018.   -check CVP and reassess; pt reports he was on Lasix at home, will ask Pharmacy to assist  - Nephrology following, appreciate assistance.   - Dobutamine gtt currently decreased to to 2.5 mcg/kg/min; need to assess if pt is  dependent            Encephalopathy     Delirium   Likely due ICU delirium.  Psychiatry following, appreciate recs: Chlorpromazine 50 PRN, Depacon 250mg IV q6h  Continue holding pregabalin in setting of delirium and encephalopathy.   Delirium precautions  -appears much improved                Moderate protein-calorie malnutrition     Poor enteral nutrition due to AMS.  Starting oral diet.              Alteration in skin integrity related to surgical incision     At right AKA. Good granulation. Will not culture. Appreciate wound care's assistance.    -reconsult wound care regarding left forefoot amputation       Shock liver        Resolving.                COPD, severe     Prn nebs.           Acute renal failure     Likely ATN due to ADHF and  ischemic insult with hypotension and currently with over diuresis.   Cr improving this am to 1.4  Nephro following, appreciate their assistance.   Avoid nephrotoxins and renally dose medications.   Strict intake and output.           Leukocytosis     Resolved   Likely stress-related. WBC improved after holding abx. Infectious workup was negative.                       Alcohol abuse     Reportedly last drink was weeks to months ago.   Avoiding BZD as possible.                 Tobacco abuse     Nicotine patch.          Peripheral vascular disease of lower extremity     Continue ASA/brilinta.  S/p right AKA. Left foot with 1-5 toe amputation.          DVT ppx- add Heparin  CODE Status- FULL    Dispo- PT/OT, wean , improve mentation prior to discharge    Ana Gomez MD  Hospital Medicine Staff

## 2018-06-25 NOTE — PLAN OF CARE
Problem: Patient Care Overview  Goal: Plan of Care Review     Recommendations     Recommendation/Intervention:   1. Continue current cardiac diet.   2. If PO intake consistently <50%, order Boost Plus.   3. RD following.     Goals: Meet % EEN, EPN  Nutrition Goal Status: new

## 2018-06-25 NOTE — ASSESSMENT & PLAN NOTE
- Pt had remarkable improvement in mental status s/p administration of thorazine 6/22 PM, now alert and oriented to all measures assessed and CAM-ICU negative - possibly due to thorazine, improvement of medical condition, or combination of both - pt passed swallow, can now take PO meds  - Discontinue scheduled zyprexa due to lack of efficacy  - Continue Depacon 250mg IV q6h   - Continue scheduled Thorazine 50mg PO qhs.  - Continue to use Thorazine 25mg IM q12h PRN for non-redirectable agitation  - Recommend daily EKG to monitor QTc while on thorazine - QTc on 6/22 477, 6/23 505, 6/24 543, 6/25 456    -Implement the below DELIRIUM BEHAVIOR MANAGEMENT:  - Minimize use of restraints; if physical restraints necessary, please utilize medical/chemical prns for agitation first  - Keep shades open and room lit during day and room dim at night in order to promote healthy circadian rhythms.  - Encourage family at bedside.  - Keep whiteboard in patient's room current with the date and name of the members of patient's team for easy patient self re-orientation.  - Avoid benzodiazepines, antihistamines, anticholinergics, hypnotics, and minimize opiates while controlling for pain as these medications may worsen delirium.      Psychiatry will continue to follow

## 2018-06-25 NOTE — PROGRESS NOTES
" [facility]  Adult Nutrition  Progress Note    SUMMARY       Recommendations    Recommendation/Intervention:   1. Continue current cardiac diet.   2. If PO intake consistently <50%, order Boost Plus.   3. RD following.    Goals: Meet % EEN, EPN  Nutrition Goal Status: new  Communication of RD Recs: reviewed with RN    Reason for Assessment    Reason for Assessment: RD follow-up  Diagnosis: other (see comments) (Cardiogenic shock)  Relevant Medical History: CHF, HTN, HLD  Interdisciplinary Rounds: did not attend    General Information Comments: Pt requested to be seen later and then unavailable on 2 attempts. No RN documentation available and RN unsure of pt's intake at this time. Pt more oriented and calm at this time.    Nutrition Discharge Planning: Unable to determine    Nutrition Risk Screen    Nutrition Risk Screen: no indicators present    Nutrition/Diet History    Patient Reported Diet/Restrictions/Preferences: other (see comments) (JIAN; pt agitated/disoriented)  Do you have any cultural, spiritual, Jew conflicts, given your current situation?: jian  Factors Affecting Nutritional Intake:  (jian)    Anthropometrics    Temp: 97.8 °F (36.6 °C)  Height: 5' 6" (167.6 cm)  Height (inches): 66 in  Weight Method: Bed Scale  Weight: 56.7 kg (125 lb)  Weight (lb): 125 lb  Ideal Body Weight (IBW), Male: 142 lb  % Ideal Body Weight, Male (lb): 90.67 lb  BMI (Calculated): 20.8  BMI Grade: 18.5-24.9 - normal  Usual Body Weight (UBW), k.2 kg (Per chart review)  % Usual Body Weight: 88.4  % Weight Change From Usual Weight: -11.78 %  Amputation %: 16  Total Amputation %: 16  Amputation Ideal Body Weight (IBW), Male (lb): 126 lb  Amputee BMI (kg/m2): 24.81 kg/m2       Lab/Procedures/Meds    Pertinent Labs Reviewed: reviewed  Pertinent Labs Comments: na 135, Alk Phos 216, Alb 2.8, T.Bili 2.3, ,   Pertinent Medications Reviewed: reviewed  Pertinent Medications Comments: statin, " MVI, KCl, senna docusate, thiamine, dobutamine    Physical Findings/Assessment    Overall Physical Appearance: amputee, underweight  Tubes:  (none)  Oral/Mouth Cavity: WDL  Skin: intact    Estimated/Assessed Needs    Weight Used For Calorie Calculations: 58.4 kg (128 lb 12 oz)  Energy Calorie Requirements (kcal): 1740 kcal/d  Energy Need Method: Bell-St Jeor (1.25 PAL)  Protein Requirements: 58-70 g/d (1-1.2 g/kg)  Weight Used For Protein Calculations: 58.4 kg (128 lb 12 oz)     Fluid Need Method: other (see comments) (Per MD or 1 mL/kcal)  RDA Method (mL): 1740       Nutrition Prescription Ordered    Current Diet Order: Cardiac  Nutrition Order Comments: 2L FR    Evaluation of Received Nutrient/Fluid Intake    I/O: +129ml  Comments: LBM 6/20  % Intake of Estimated Energy Needs: Other: nancy  % Meal Intake: Other: nancy    Nutrition Risk    Level of Risk/Frequency of Follow-up: high     Assessment and Plan    Moderate protein-calorie malnutrition      Nutrition Problem  Inadequate energy intake    Related to (etiology):   Inability to consume sufficient energy    Signs and Symptoms (as evidenced by):   NPO with no alternate means of nutrition     Nutrition Diagnosis Status:   Improving           Monitor and Evaluation    Food and Nutrient Intake: energy intake, food and beverage intake, enteral nutrition intake  Food and Nutrient Adminstration: diet order, enteral and parenteral nutrition administration  Physical Activity and Function: nutrition-related ADLs and IADLs  Anthropometric Measurements: weight, weight change  Biochemical Data, Medical Tests and Procedures: lipid profile, inflammatory profile, glucose/endocrine profile, gastrointestinal profile, electrolyte and renal panel  Nutrition-Focused Physical Findings: overall appearance     Nutrition Follow-Up    RD Follow-up?: Yes

## 2018-06-26 NOTE — PHYSICIAN QUERY
PT Name: Leonardo Byrd  MR #: [id number]     Physician Query Form - Documentation Clarification      CDS/: Keenan August Jr, RN               Contact information:ext [phone]    This form is a permanent document in the medical record.     Query Date: June 26, 2018    By submitting this query, we are merely seeking further clarification of documentation. Please utilize your independent clinical judgment when addressing the question(s) below.    The Medical record reflects the following:    Supporting Clinical Findings Location in Medical Record   Wound care consulted for R AKA & L TMA  The right AKA incision is dehisced- pink moist tissue noted with dried crusted drainage to ruby-wound- dressing dry, the ruby-wound is pink/dry/intact.          MRSA grew from right thigh stump ulcer. Contact isolation for now.     Date First Assessed/Time First Assessed: 06/12/18 5527   Pre-existing: Yes  Side: Right  Orientation: upper  Location: Leg  Wound Outcome: Amputation    Appearance Pink;Yellow;Slough;Dry     Red (%), Wound Tissue Color  50 % (pink)    Yellow (%), Wound Tissue Color 50 %   6/16 Wound Care Progress Note              6/16 Wound Care Progress Note                6/19 Cardiology Progress Note      6/25 Wound Care Consult Note        6/25 Wound Care Consult Note    6/25 Wound Care Consult Note    6/25 Wound Care Consult Note                                                                                      Doctor, Please specify diagnosis or diagnoses associated with above clinical findings.  Specify the Severity of the Right Stump Ulcer    Provider Use Only    (  x  )Skin Breakdown Only    (    )Fat Layer Exposed    (    )Muscle Layer Exposed,no Necrosis    (    )Other__________________             _________________________                                                                                                           [  ] Clinically undetermined

## 2018-06-26 NOTE — PHYSICIAN QUERY
PT Name: Leonardo Byrd  MR #: [id number]     Physician Query Form - Documentation Clarification      CDS/: Keenan August Jr, RN              Contact information:ext [phone]    This form is a permanent document in the medical record.     Query Date: June 26, 2018    By submitting this query, we are merely seeking further clarification of documentation. Please utilize your independent clinical judgment when addressing the question(s) below.    The Medical record reflects the following:    Supporting Clinical Findings Location in Medical Record   Date First Assessed/Time First Assessed: 06/12/18 7814   Pre-existing: Yes  Primary Wound Type: Other (comment)  Side: Left  Orientation: distal  Location: (c) Foot  Wound Outcome: Amputation        Appearance Slough;Yellow  (soft)    Yellow (%), Wound Tissue Color 100 %    Extremities: right AKA, left foot with 1-5 toe amputation and wound ulcer. + lower ext edema on left, no redness or tenderness in the calves or thighs.     reconsult wound care regarding left forefoot amputation     Ext- right AKA with well-healing incision; LLE without edema, s/p forefoot amputation with black necrotic borders and granulation tissue centrally        Appearance Black;Purple;Necrotic;Eschar    Black (%), Wound Tissue Color 100 %   6/18 Wound Care Progress Note            6/18 Wound Care Progress Note                6/18 Wound Care Progress Note    6/18 Wound Care Progress Note    6/20 Cardiology Progress Note          6/24 Hospital Medicine Progress Note      6/25 Hospital Medicine Progress Note          6/25 Wound Care Progress Note                6/25 Wound Care Progress Note    6/25 Wound Care Progress Note                                                                                      Doctor, Please specify diagnosis or diagnoses associated with above clinical findings.  Specify the Severity of the Left Foot Ulcer    Provider Use Only    (    )Fat Layer Exposed    (    )Muscle  Involvement without Evidence of Necrosis    (  x  )Muscle Necrosis    (    )Other__________________________________________________________                                                                                                           [  ] Clinically undetermined

## 2018-06-26 NOTE — ASSESSMENT & PLAN NOTE
Wound to necrotic left TMA site with periwound erythema and edema noted  Cultures taken, orders placed  Xray and MRI ordered  Wound dressed with betadine 4x4, kerlix, and ACE  Nursing to change dressings daily, orders placed  Podiatry will follow

## 2018-06-26 NOTE — PROGRESS NOTES
Progress Note  Hospital Medicine    Primary Team: [facility]  Admit Date: 6/16/2018   Length of Stay:  LOS: 10 days   SUBJECTIVE:   Reason for Admission:  Cardiogenic shock    HPI:  47M ICM (LVEF 13%) s/p ICD, CAD s/p PCI, HTN, HLD, current smoker, PAD (s/p aortobifemoral bypass, L SFA and pop PTAS in September and recent R SFA and R pop 3/8 by Dr. Carl Bell and finally R AKA 3/2018) who presented to OSH with cc SOB, cough, weight, BNP 2400, congested CXR, and elevated lactic acid. He was admitted with COPD and hypotension though to be secondary to sepsis. He was started on zmax and vanc and given a fluid bolus.  He was found to not be septic the next day and all abx were stopped.  Unfortunately, he developed ADHF with worsened respiratory status and became progressively hypotensive.  He was subsequently intubated and started on levo/dobutamine. Transferred to [facility] for higher level of care.    Patient was started on SLED given poor UOP and worsening RYAN. He was kept on dobutamine 2.5 mcg/kg/min with daily CO measurements. Patient had recovering renal function and SLED was discontinued 6/17/2018. He was diuresed with IV lasix on bid then daily basis until patient became euvolemic with normal-low CVP. He was suitable for extubation on 2nd day of hospital stay however would become significantly agitated when sedation would be weaned down resulting in delaying extubation until 6/19/2018. After extubation, the patient was overtly delirious expressed by confusion, delusion, belligerence, and aggressive. He had to be restrained on the first 2 days following extubation. Psychiatry was consulted and patient was finally less psychotic and delirious on 6/23/2018 after starting scheduled depacon 250mg IV q4dumbci and receiving one dose of Thorazine night of 6/22/2018. Given patient's stable clinical status from his cardiogenic shock standpoint it was thought best to step down patient out of ICU settings to decreased  "delirium recurrence.     ECHO 6/17  CONCLUSIONS     1 - Mild left ventricular enlargement.     2 - Severely depressed left ventricular systolic function (EF low to mid teens).     3 - Biatrial enlargement.     4 - Restrictive LV filling pattern, indicating markedly elevated LAP (grade 3 diastolic dysfunction).     5 - Low normal to mildly depressed right ventricular systolic function .     6 - Moderate or more mitral regurgitation.     7 - Mild tricuspid regurgitation.     8 - Increased central venous pressure.     9 - Pulmonary hypertension. The estimated PA systolic pressure is 45 mmHg.     Interval history:    Patient's  gtt now stopped by Dr. Gomez per cardiology recommendations.  He reports feeling well. He is AAOx4, but he can be quite tangential.  He says, "I only want to see one doctor, not a doctor for this, and a doctor for that."    Review of Systems:  Constitutional: no fever or chills  Respiratory: no cough or shortness of breath  Cardiovascular: no chest pain or palpitations  Gastrointestinal: no nausea or vomiting, no abdominal pain or change in bowel habits  Musculoskeletal: no arthralgias or myalgias     OBJECTIVE:     Temp:  [95.8 °F (35.4 °C)-98.4 °F (36.9 °C)]   Pulse:  []   Resp:  [14-18]   BP: (103-130)/(61-74)   SpO2:  [94 %-98 %]  Body mass index is 20.18 kg/m².  Intake/Outake:  This Shift:  I/O this shift:  In: -   Out: 400 [Urine:400]    Net I/O past 24h:     Intake/Output Summary (Last 24 hours) at 06/26/18 1125  Last data filed at 06/26/18 0900   Gross per 24 hour   Intake              750 ml   Output             1950 ml   Net            -1200 ml             Physical Exam:  Gen- well-developed, well-nourished, NAD  CVS- S1 and S2 present, 3/6 systolic murmur, RRR  Resp- CTA b/l, no work of breathing  Abd- BS+, soft, NT, ND  Ext- right AKA with well-healing incision; LLE without edema, s/p forefoot amputation with black necrotic borders and granulation tissue " centrally    Laboratory:  CBC/Anemia Labs: Coags:      Recent Labs  Lab 06/24/18  0806 06/25/18  0536 06/26/18  0554   WBC 9.16 7.79 9.20   HGB 11.7* 10.8* 10.2*   HCT 33.9* 31.8* 30.0*   * 168 189   MCV 89 90 90   RDW 19.9* 20.1* 20.2*    No results for input(s): PT, INR, APTT in the last 168 hours.     Chemistries:     Recent Labs  Lab 06/24/18  0806 06/25/18  0536 06/26/18  0554    135* 135*   K 3.0* 4.0 3.8    102 102   CO2 24 23 24   BUN 16 15 13   CREATININE 1.4 1.4 1.3   CALCIUM 9.1 9.1 9.1   PROT 6.8 6.8 6.8   BILITOT 2.7* 2.3* 2.1*   ALKPHOS 217* 216* 218*   * 595* 461*   * 206* 160*   MG 1.7 1.9 1.5*   PHOS 3.0 3.0 3.6        ABG:     Recent Labs  Lab 06/22/18  0533 06/25/18  1516 06/25/18  1654   PH 7.463* 7.520* 7.492*   PCO2 43.6 30.4* 34.7*   PO2 31* 73* 35*   HCO3 31.2* 24.9 26.5   POCSATURATED 63* 96 73*   BE 7 2 3        Cardiac Enzymes: Ejection Fractions:    No results for input(s): CPK, CPKMB, MB, TROPONINI in the last 72 hours. EF   Date Value Ref Range Status   06/17/2018 13 (A) 55 - 65    04/06/2018 15 (A) 55 - 65         Medications:  Scheduled Meds:   aspirin  81 mg Per OG tube Daily    atorvastatin  80 mg Per OG tube Daily    collagenase   Topical (Top) Daily    heparin (porcine)  5,000 Units Subcutaneous Q8H    isosorbide-hydrALAZINE 20-37.5 mg  1 tablet Oral TID    multivitamin liquid no.118  10 mL Oral Daily    nicotine  1 patch Transdermal Daily    senna-docusate 8.6-50 mg  1 tablet Per OG tube BID    sodium chloride 0.9%  3 mL Intravenous Q8H    thiamine  100 mg Oral Daily    ticagrelor  90 mg Per OG tube BID    valproate sodium (DEPACON) IVPB  250 mg Intravenous Q6H                             Continuous Infusions:    PRN Meds:.acetaminophen, albuterol-ipratropium, chlorproMAZINE, levalbuterol, lidocaine HCL 2%, magnesium oxide, magnesium oxide, ondansetron, pneumoc 13-osman conj-dip cr(PF), potassium chloride 10%, potassium chloride 10%,  potassium chloride 10%, potassium, sodium phosphates, potassium, sodium phosphates, potassium, sodium phosphates     ASSESSMENT/PLAN:     Active Hospital Problems    Diagnosis  POA    *Cardiogenic shock [R57.0]  Yes    Status post transmetatarsal amputation of foot, left [Z89.432]  Not Applicable    Moderate protein-calorie malnutrition [E44.0]  Yes    Delirium [R41.0]  No    Delirium due to multiple etiologies, acute, hyperactive [F05]  No    Alteration in skin integrity related to surgical incision [R23.9]  Yes    Encephalopathy [G93.40]  Yes    Shock liver [K72.00]  Yes    COPD, severe [J44.9]  Yes    Acute renal failure [N17.9]  Yes    Leukocytosis [D72.829]  Yes    Alcohol abuse [F10.10]  Yes    Peripheral vascular disease of lower extremity [I73.9]  Yes     S/p right AKA.     TcPO2 L foot 5/11/2018   Dorsum 43 to 171 mmHg with oxygen   Medial 61 to 144 mmHg with oxygen   Chest wall baseline 43 mmHg      Tobacco abuse [Z72.0]  Yes      Resolved Hospital Problems    Diagnosis Date Resolved POA    Hyperkalemia [E87.5] 06/21/2018 Yes    RYAN (acute kidney injury) [N17.9] 06/23/2018 Yes    Dependence on ventilator, status [Z99.11] 06/20/2018 Not Applicable    Lactic acidosis [E87.2] 06/23/2018 Yes    Acute respiratory failure with hypoxia [J96.01] 06/22/2018 Yes     Cardiogenic shock  Acute on chronic combined HF  - likely due to ADHF following volume resuscitation when received IVF for suspected sepsis on initial presentation  - net negative 9L since admit. SLED discontinued 6/17/2018.   - has been receiving daily IV lasix 80mg prn. Last dose received 6/21/2018.   - check CVP and reassess; pt reports he was on Lasix at home, will ask Pharmacy to assist  - Nephrology following, appreciate assistance.   - Dobutamine weaned off  - Maintenance diuresis initiated    Acute renal failure superimposed on CKDIII  - Likely ATN due to ADHF and ischemic insult with hypotension and currently with over  diuresis.   - Cr improving this am to 1.4  - Nephro following, appreciate their assistance.   - Avoid nephrotoxins and renally dose medications.   - Strict intake and output.     Encephalopathy, metabolic  - Likely due ICU delirium.  - Psychiatry following, appreciate recs: Chlorpromazine 50 PRN, Depacon 250mg IV q6h  - Continue holding pregabalin in setting of delirium and encephalopathy.   - Delirium precautions    Moderate protein-calorie malnutrition  - Poor enteral nutrition due to AMS.  - Starting oral diet.      Alteration in skin integrity related to surgical incision  - At right AKA. Good granulation. Will not culture. Appreciate wound care's assistance.   - Reconsult wound care regarding left forefoot amputation  - Podiatry consulted  - Vascular studies ordered  - XRAY ordered  - Will consider MRI to r/o osteomyelitis    COPD, mixed simple and mucopurulent  - Duonebs prn     Alcohol abuse  - Reportedly last drink was weeks to months ago.   - Avoiding BZD as possible.     Tobacco abuse  - Nicotine patch.     Peripheral vascular disease of lower extremity  - Continue ASA/brilinta.  - S/p right AKA. Left foot with 1-5 toe amputation.      DVT ppx- add Heparin  CODE Status- FULL    Dispo- PT/OT, improve mentation prior to discharge    Antoni Trujillo MD  Hospital Medicine Staff

## 2018-06-26 NOTE — CONSULTS
[facility]  Podiatry  Consult Note    Patient Name: Leonardo Byrd  MRN: [id number]  Admission Date: 6/16/2018  Hospital Length of Stay: 10 days  Attending Physician: Antoni Trujillo MD  Primary Care Provider: Indio Aquino MD     Inpatient consult to Podiatry  Consult performed by: BRENDAN LOPEZ  Consult ordered by: LUCHO PEREZ  Reason for consult: wound  Assessment/Recommendations: See a/p        Subjective:     History of Present Illness:  Pt is a 48 y/o male  has a past medical history of AICD (automatic cardioverter/defibrillator) present; RYAN (acute kidney injury); CHF (congestive heart failure); COPD with acute bronchitis; Coronary artery disease; Encounter for blood transfusion; psychiatric care; Hyperlipemia; Hypertension; MI (myocardial infarction); Neuropathy; PAD (peripheral artery disease); Psychiatric problem; and PVD (peripheral vascular disease).     Pt admitted for cardiogenic shock and consulted to podiatry for poor wound healing of left forefoot amputation 4/5/2018 per Dr. Salvador. Pt seen with bandages intact to forefoot and is a poor historian. Denies pain. No other pedal complaints at this time.     Scheduled Meds:   aspirin  81 mg Per OG tube Daily    atorvastatin  80 mg Per OG tube Daily    collagenase   Topical (Top) Daily    furosemide  40 mg Oral Daily    heparin (porcine)  5,000 Units Subcutaneous Q8H    isosorbide-hydrALAZINE 20-37.5 mg  1 tablet Oral TID    multivitamin liquid no.118  10 mL Oral Daily    nicotine  1 patch Transdermal Daily    senna-docusate 8.6-50 mg  1 tablet Per OG tube BID    sodium chloride 0.9%  3 mL Intravenous Q8H    thiamine  100 mg Oral Daily    ticagrelor  90 mg Per OG tube BID    valproate sodium (DEPACON) IVPB  250 mg Intravenous Q6H     Continuous Infusions:  PRN Meds:acetaminophen, albuterol-ipratropium, chlorproMAZINE, levalbuterol, lidocaine HCL 2%, magnesium oxide, magnesium oxide, ondansetron, pneumoc  13-osman conj-dip cr(PF), potassium chloride 10%, potassium chloride 10%, potassium chloride 10%, potassium, sodium phosphates, potassium, sodium phosphates, potassium, sodium phosphates    Review of patient's allergies indicates:  No Known Allergies     Past Medical History:   Diagnosis Date    AICD (automatic cardioverter/defibrillator) present     RYAN (acute kidney injury) 3/25/2018    CHF (congestive heart failure)     COPD with acute bronchitis 6/12/2018    Coronary artery disease     Encounter for blood transfusion     Hx of psychiatric care     Hyperlipemia     Hypertension     MI (myocardial infarction)     Neuropathy     PAD (peripheral artery disease)     Psychiatric problem     PVD (peripheral vascular disease)      Past Surgical History:   Procedure Laterality Date    AMPUTATION Right     great toe    BYBPASS GRAFT AORTOBIUFEMORAL      CARDIAC DEFIBRILLATOR PLACEMENT      coronary stents      EXPLORATION AND EVaCUATION OF HEMATOMA OF UPPER EXTREMITY Left     KNEE ARTHROPLASTY Left     VASCULAR SURGERY      fem-pop bypass       Family History     None        Social History Main Topics    Smoking status: Former Smoker     Packs/day: 0.50     Quit date: 5/1/2018    Smokeless tobacco: Former User     Types: Chew     Quit date: 8/2/1980      Comment: Uses nicotine gum and nicotine patches    Alcohol use Yes      Comment: rarely    Drug use: No    Sexual activity: Yes     Partners: Female     Review of Systems   Skin: Positive for wound.     Objective:     Vital Signs (Most Recent):  Temp: (!) 95.8 °F (35.4 °C) (06/26/18 1200)  Pulse: 106 (06/26/18 1200)  Resp: 18 (06/26/18 1200)  BP: (!) 103/58 (06/26/18 1200)  SpO2: (!) 94 % (06/26/18 1200) Vital Signs (24h Range):  Temp:  [95.8 °F (35.4 °C)-98.4 °F (36.9 °C)] 95.8 °F (35.4 °C)  Pulse:  [] 106  Resp:  [14-18] 18  SpO2:  [94 %-98 %] 94 %  BP: (103-130)/(58-74) 103/58     Weight: 56.7 kg (125 lb)  Body mass index is 20.18  kg/m².    Foot Exam    Right Foot/Ankle     Comments  S/p BKA    Left Foot/Ankle      Inspection and Palpation  Ecchymosis: dorsum  Tenderness: none   Swelling: dorsum   Skin Exam: drainage, maceration, skin changes, ulcer and erythema;     Neurovascular  Dorsalis pedis: 1+  Posterior tibial: absent  Saphenous nerve sensation: diminished  Tibial nerve sensation: diminished  Superficial peroneal nerve sensation: diminished  Deep peroneal nerve sensation: diminished  Sural nerve sensation: diminished            Laboratory:  A1C: No results for input(s): HGBA1C in the last 4320 hours.  Blood Cultures: No results for input(s): LABBLOO in the last 48 hours.  BMP:   Recent Labs  Lab 06/26/18  0554   GLU 70   *   K 3.8      CO2 24   BUN 13   CREATININE 1.3   CALCIUM 9.1   MG 1.5*     CBC:   Recent Labs  Lab 06/26/18  0554   WBC 9.20   RBC 3.35*   HGB 10.2*   HCT 30.0*      MCV 90   MCH 30.4   MCHC 34.0     CMP:   Recent Labs  Lab 06/26/18  0554   GLU 70   CALCIUM 9.1   ALBUMIN 2.9*   PROT 6.8   *   K 3.8   CO2 24      BUN 13   CREATININE 1.3   ALKPHOS 218*   *   *   BILITOT 2.1*     Coagulation: No results for input(s): PT, INR, APTT in the last 168 hours.  CRP: No results for input(s): CRP in the last 168 hours.  ESR: No results for input(s): SEDRATE in the last 168 hours.  Prealbumin: No results for input(s): PREALBUMIN in the last 48 hours.  Wound Cultures:   Recent Labs  Lab 04/05/18  1321 05/18/18  1150   LABAERO No growth METHICILLIN RESISTANT STAPHYLOCOCCUS AUREUSManySkin kevin also present     Microbiology Results (last 7 days)     Procedure Component Value Units Date/Time    Culture, Anaerobe [[id number]] Collected:  06/26/18 1232    Order Status:  Sent Specimen:  Wound from Foot, Left Updated:  06/26/18 1332    Aerobic culture [[id number]] Collected:  06/26/18 1232    Order Status:  Sent Specimen:  Wound from Foot, Left Updated:  06/26/18 1332    Blood culture  [[id number]] Collected:  06/16/18 2156    Order Status:  Completed Specimen:  Blood from Peripheral, Antecubital, Left Updated:  06/22/18 0612     Blood Culture, Routine No growth after 5 days.    Blood culture [[id number]] Collected:  06/16/18 2000    Order Status:  Completed Specimen:  Blood from Peripheral, Hand, Left Updated:  06/22/18 0612     Blood Culture, Routine No growth after 5 days.    Culture, Respiratory with Gram Stain [[id number]]  (Susceptibility) Collected:  06/18/18 1732    Order Status:  Completed Specimen:  Respiratory from Endotracheal Aspirate Updated:  06/21/18 0728     Respiratory Culture --     ENTEROBACTER CLOACAE  Few       Respiratory Culture --     CANDIDA ALBICANS  Moderate       Gram Stain (Respiratory) No WBC's     Gram Stain (Respiratory) No organisms seen        Specimen (12h ago through future)    None          Diagnostic Results:  MRI: I have reviewed all pertinent results/findings within the past 24 hours.  Ordered  X-Ray: I have reviewed all pertinent results/findings within the past 24 hours.  Ordered    Clinical Findings:  Left TMA site with dehiscence and necrotic changes extending proximally on dorsal skin. Periwound erythema noted with positive probe to bone. Malodorous. No fluctuance or crepitus noted.               Assessment/Plan:     Acute renal failure    Per primary        Leukocytosis    Per primary, podiatry will follow        Alcohol abuse    Per primary        Tobacco abuse    Per primary        Peripheral vascular disease of lower extremity    Wound to necrotic left TMA site and PPTB with periwound erythema and edema noted  Cultures taken, orders placed  Xray and MRI ordered  ID consult placed  Arterial US ordered  Previously followed by ICARDs for PAD. Consider ICARDs consult.   Wound dressed with betadine 4x4, kerlix, and ACE  Nursing to change dressings daily, orders placed  Podiatry will follow             Thank you for your consult. I will follow-up with  patient. Please contact us if you have any additional questions.    Lucia Eubanks MD  Podiatry  [facility]

## 2018-06-26 NOTE — HPI
Pt is a 46 y/o male  has a past medical history of AICD (automatic cardioverter/defibrillator) present; RYAN (acute kidney injury); CHF (congestive heart failure); COPD with acute bronchitis; Coronary artery disease; Encounter for blood transfusion; psychiatric care; Hyperlipemia; Hypertension; MI (myocardial infarction); Neuropathy; PAD (peripheral artery disease); Psychiatric problem; and PVD (peripheral vascular disease).     Pt admitted for cardiogenic shock and consulted to podiatry for poor wound healing of left forefoot amputation 4/5/2018 per Dr. Salvador. Pt seen with bandages intact to forefoot and is a poor historian. Denies pain. No other pedal complaints at this time.

## 2018-06-26 NOTE — SUBJECTIVE & OBJECTIVE
Scheduled Meds:   aspirin  81 mg Per OG tube Daily    atorvastatin  80 mg Per OG tube Daily    collagenase   Topical (Top) Daily    furosemide  40 mg Oral Daily    heparin (porcine)  5,000 Units Subcutaneous Q8H    isosorbide-hydrALAZINE 20-37.5 mg  1 tablet Oral TID    multivitamin liquid no.118  10 mL Oral Daily    nicotine  1 patch Transdermal Daily    senna-docusate 8.6-50 mg  1 tablet Per OG tube BID    sodium chloride 0.9%  3 mL Intravenous Q8H    thiamine  100 mg Oral Daily    ticagrelor  90 mg Per OG tube BID    valproate sodium (DEPACON) IVPB  250 mg Intravenous Q6H     Continuous Infusions:  PRN Meds:acetaminophen, albuterol-ipratropium, chlorproMAZINE, levalbuterol, lidocaine HCL 2%, magnesium oxide, magnesium oxide, ondansetron, pneumoc 13-osman conj-dip cr(PF), potassium chloride 10%, potassium chloride 10%, potassium chloride 10%, potassium, sodium phosphates, potassium, sodium phosphates, potassium, sodium phosphates    Review of patient's allergies indicates:  No Known Allergies     Past Medical History:   Diagnosis Date    AICD (automatic cardioverter/defibrillator) present     RYAN (acute kidney injury) 3/25/2018    CHF (congestive heart failure)     COPD with acute bronchitis 6/12/2018    Coronary artery disease     Encounter for blood transfusion     Hx of psychiatric care     Hyperlipemia     Hypertension     MI (myocardial infarction)     Neuropathy     PAD (peripheral artery disease)     Psychiatric problem     PVD (peripheral vascular disease)      Past Surgical History:   Procedure Laterality Date    AMPUTATION Right     great toe    BYBPASS GRAFT AORTOBIUFEMORAL      CARDIAC DEFIBRILLATOR PLACEMENT      coronary stents      EXPLORATION AND EVaCUATION OF HEMATOMA OF UPPER EXTREMITY Left     KNEE ARTHROPLASTY Left     VASCULAR SURGERY      fem-pop bypass       Family History     None        Social History Main Topics    Smoking status: Former Smoker      Packs/day: 0.50     Quit date: 5/1/2018    Smokeless tobacco: Former User     Types: Chew     Quit date: 8/2/1980      Comment: Uses nicotine gum and nicotine patches    Alcohol use Yes      Comment: rarely    Drug use: No    Sexual activity: Yes     Partners: Female     Review of Systems   Skin: Positive for wound.     Objective:     Vital Signs (Most Recent):  Temp: (!) 95.8 °F (35.4 °C) (06/26/18 1200)  Pulse: 106 (06/26/18 1200)  Resp: 18 (06/26/18 1200)  BP: (!) 103/58 (06/26/18 1200)  SpO2: (!) 94 % (06/26/18 1200) Vital Signs (24h Range):  Temp:  [95.8 °F (35.4 °C)-98.4 °F (36.9 °C)] 95.8 °F (35.4 °C)  Pulse:  [] 106  Resp:  [14-18] 18  SpO2:  [94 %-98 %] 94 %  BP: (103-130)/(58-74) 103/58     Weight: 56.7 kg (125 lb)  Body mass index is 20.18 kg/m².    Foot Exam    Right Foot/Ankle     Comments  S/p BKA    Left Foot/Ankle      Inspection and Palpation  Ecchymosis: dorsum  Tenderness: none   Swelling: dorsum   Skin Exam: drainage, maceration, skin changes, ulcer and erythema;     Neurovascular  Dorsalis pedis: 1+  Posterior tibial: absent  Saphenous nerve sensation: diminished  Tibial nerve sensation: diminished  Superficial peroneal nerve sensation: diminished  Deep peroneal nerve sensation: diminished  Sural nerve sensation: diminished            Laboratory:  A1C: No results for input(s): HGBA1C in the last 4320 hours.  Blood Cultures: No results for input(s): LABBLOO in the last 48 hours.  BMP:   Recent Labs  Lab 06/26/18  0554   GLU 70   *   K 3.8      CO2 24   BUN 13   CREATININE 1.3   CALCIUM 9.1   MG 1.5*     CBC:   Recent Labs  Lab 06/26/18  0554   WBC 9.20   RBC 3.35*   HGB 10.2*   HCT 30.0*      MCV 90   MCH 30.4   MCHC 34.0     CMP:   Recent Labs  Lab 06/26/18  0554   GLU 70   CALCIUM 9.1   ALBUMIN 2.9*   PROT 6.8   *   K 3.8   CO2 24      BUN 13   CREATININE 1.3   ALKPHOS 218*   *   *   BILITOT 2.1*     Coagulation: No results for input(s):  PT, INR, APTT in the last 168 hours.  CRP: No results for input(s): CRP in the last 168 hours.  ESR: No results for input(s): SEDRATE in the last 168 hours.  Prealbumin: No results for input(s): PREALBUMIN in the last 48 hours.  Wound Cultures:   Recent Labs  Lab 04/05/18  1321 05/18/18  1150   LABAERO No growth METHICILLIN RESISTANT STAPHYLOCOCCUS AUREUSManySkin kevin also present     Microbiology Results (last 7 days)     Procedure Component Value Units Date/Time    Culture, Anaerobe [[id number]] Collected:  06/26/18 1232    Order Status:  Sent Specimen:  Wound from Foot, Left Updated:  06/26/18 1332    Aerobic culture [[id number]] Collected:  06/26/18 1232    Order Status:  Sent Specimen:  Wound from Foot, Left Updated:  06/26/18 1332    Blood culture [[id number]] Collected:  06/16/18 2156    Order Status:  Completed Specimen:  Blood from Peripheral, Antecubital, Left Updated:  06/22/18 0612     Blood Culture, Routine No growth after 5 days.    Blood culture [[id number]] Collected:  06/16/18 2000    Order Status:  Completed Specimen:  Blood from Peripheral, Hand, Left Updated:  06/22/18 0612     Blood Culture, Routine No growth after 5 days.    Culture, Respiratory with Gram Stain [[id number]]  (Susceptibility) Collected:  06/18/18 1732    Order Status:  Completed Specimen:  Respiratory from Endotracheal Aspirate Updated:  06/21/18 0728     Respiratory Culture --     ENTEROBACTER CLOACAE  Few       Respiratory Culture --     CANDIDA ALBICANS  Moderate       Gram Stain (Respiratory) No WBC's     Gram Stain (Respiratory) No organisms seen        Specimen (12h ago through future)    None          Diagnostic Results:  MRI: I have reviewed all pertinent results/findings within the past 24 hours.  Ordered  X-Ray: I have reviewed all pertinent results/findings within the past 24 hours.  Ordered    Clinical Findings:  Left TMA site with dehiscence and necrotic changes extending proximally on dorsal skin. Periwound erythema  noted with positive probe to bone. Malodorous. No fluctuance or crepitus noted.

## 2018-06-26 NOTE — PLAN OF CARE
met with pt at the bedside to complete screening.  Pt is a disabled gentleman who lives in [place] with his brother and father and plans to return.  Pt states he has been followed by a home health agency ([facility] ?).   will resume services when pt is discharged.

## 2018-06-27 PROBLEM — M86.9 OSTEOMYELITIS OF LEFT FOOT: Status: ACTIVE | Noted: 2018-01-01

## 2018-06-27 PROBLEM — I50.9 ACUTE ON CHRONIC HEART FAILURE: Status: ACTIVE | Noted: 2018-01-01

## 2018-06-27 NOTE — SUBJECTIVE & OBJECTIVE
Interval hx: Pt. Seen bedside. Relates pain to left TMA site 5/10. Bandage intact no leaks noted.   Scheduled Meds:   aspirin  81 mg Per OG tube Daily    atorvastatin  80 mg Per OG tube Daily    ceFEPime (MAXIPIME) IVPB  1 g Intravenous Q12H    collagenase   Topical (Top) Daily    furosemide  40 mg Oral Daily    heparin (porcine)  5,000 Units Subcutaneous Q8H    isosorbide-hydrALAZINE 20-37.5 mg  1 tablet Oral TID    magnesium sulfate IVPB  2 g Intravenous Once    metroNIDAZOLE  500 mg Oral Q8H    multivitamin liquid no.118  10 mL Oral Daily    nicotine  1 patch Transdermal Daily    senna-docusate 8.6-50 mg  1 tablet Per OG tube BID    sodium chloride 0.9%  3 mL Intravenous Q8H    thiamine  100 mg Oral Daily    ticagrelor  90 mg Per OG tube BID    valproate sodium (DEPACON) IVPB  250 mg Intravenous Q6H    vancomycin (VANCOCIN) IVPB  750 mg Intravenous Q24H     Continuous Infusions:  PRN Meds:acetaminophen, albuterol-ipratropium, chlorproMAZINE, levalbuterol, lidocaine HCL 2%, magnesium oxide, magnesium oxide, ondansetron, pneumoc 13-osman conj-dip cr(PF), potassium chloride 10%, potassium chloride 10%, potassium chloride 10%, potassium, sodium phosphates, potassium, sodium phosphates, potassium, sodium phosphates    Review of patient's allergies indicates:  No Known Allergies     Past Medical History:   Diagnosis Date    AICD (automatic cardioverter/defibrillator) present     RYAN (acute kidney injury) 3/25/2018    CHF (congestive heart failure)     COPD with acute bronchitis 6/12/2018    Coronary artery disease     Encounter for blood transfusion     Hx of psychiatric care     Hyperlipemia     Hypertension     MI (myocardial infarction)     Neuropathy     PAD (peripheral artery disease)     Psychiatric problem     PVD (peripheral vascular disease)      Past Surgical History:   Procedure Laterality Date    AMPUTATION Right     great toe    BYBPASS GRAFT AORTOBIUFEMORAL      CARDIAC  DEFIBRILLATOR PLACEMENT      coronary stents      EXPLORATION AND EVaCUATION OF HEMATOMA OF UPPER EXTREMITY Left     KNEE ARTHROPLASTY Left     VASCULAR SURGERY      fem-pop bypass       Family History     None        Social History Main Topics    Smoking status: Former Smoker     Packs/day: 0.50     Quit date: 5/1/2018    Smokeless tobacco: Former User     Types: Chew     Quit date: 8/2/1980      Comment: Uses nicotine gum and nicotine patches    Alcohol use Yes      Comment: rarely    Drug use: No    Sexual activity: Yes     Partners: Female     Review of Systems   Skin: Positive for wound.     Objective:     Vital Signs (Most Recent):  Temp: 97.7 °F (36.5 °C) (06/27/18 0800)  Pulse: 110 (06/27/18 0800)  Resp: 18 (06/27/18 0800)  BP: 116/76 (06/27/18 0800)  SpO2: 96 % (06/27/18 0800) Vital Signs (24h Range):  Temp:  [95.8 °F (35.4 °C)-98.9 °F (37.2 °C)] 97.7 °F (36.5 °C)  Pulse:  [] 110  Resp:  [18-19] 18  SpO2:  [94 %-98 %] 96 %  BP: ()/(56-76) 116/76     Weight:  (Pt refused weight nofied the nurse jus )  Body mass index is 20.18 kg/m².    Foot Exam    Right Foot/Ankle     Comments  S/p BKA    Left Foot/Ankle      Inspection and Palpation  Ecchymosis: dorsum  Tenderness: none   Swelling: dorsum   Skin Exam: drainage, maceration, skin changes, ulcer and erythema;     Neurovascular  Dorsalis pedis: 1+  Posterior tibial: absent  Saphenous nerve sensation: diminished  Tibial nerve sensation: diminished  Superficial peroneal nerve sensation: diminished  Deep peroneal nerve sensation: diminished  Sural nerve sensation: diminished            Laboratory:  A1C: No results for input(s): HGBA1C in the last 4320 hours.  Blood Cultures: No results for input(s): LABBLOO in the last 48 hours.  BMP:     Recent Labs  Lab 06/27/18  0529   GLU 90      K 3.4*   CL 99   CO2 25   BUN 12   CREATININE 1.4   CALCIUM 9.1   MG 1.6     CBC:     Recent Labs  Lab 06/27/18  0529   WBC 8.34   RBC 3.42*   HGB 10.5*    HCT 30.5*      MCV 89   MCH 30.7   MCHC 34.4     CMP:     Recent Labs  Lab 06/27/18  0529   GLU 90   CALCIUM 9.1   ALBUMIN 2.8*   PROT 7.0      K 3.4*   CO2 25   CL 99   BUN 12   CREATININE 1.4   ALKPHOS 217*   *   *   BILITOT 1.8*     Coagulation: No results for input(s): PT, INR, APTT in the last 168 hours.  CRP: No results for input(s): CRP in the last 168 hours.  ESR: No results for input(s): SEDRATE in the last 168 hours.  Prealbumin: No results for input(s): PREALBUMIN in the last 48 hours.  Wound Cultures:     Recent Labs  Lab 04/05/18  1321 05/18/18  1150   LABAERO No growth METHICILLIN RESISTANT STAPHYLOCOCCUS AUREUSManySkin kevin also present     Microbiology Results (last 7 days)     Procedure Component Value Units Date/Time    Culture, Anaerobe [[id number]] Collected:  06/26/18 1232    Order Status:  Sent Specimen:  Wound from Foot, Left Updated:  06/26/18 1412    Aerobic culture [[id number]] Collected:  06/26/18 1232    Order Status:  Sent Specimen:  Wound from Foot, Left Updated:  06/26/18 1412    Blood culture [[id number]] Collected:  06/16/18 2156    Order Status:  Completed Specimen:  Blood from Peripheral, Antecubital, Left Updated:  06/22/18 0612     Blood Culture, Routine No growth after 5 days.    Blood culture [[id number]] Collected:  06/16/18 2000    Order Status:  Completed Specimen:  Blood from Peripheral, Hand, Left Updated:  06/22/18 0612     Blood Culture, Routine No growth after 5 days.    Culture, Respiratory with Gram Stain [[id number]]  (Susceptibility) Collected:  06/18/18 1732    Order Status:  Completed Specimen:  Respiratory from Endotracheal Aspirate Updated:  06/21/18 0728     Respiratory Culture --     ENTEROBACTER CLOACAE  Few       Respiratory Culture --     CANDIDA ALBICANS  Moderate       Gram Stain (Respiratory) No WBC's     Gram Stain (Respiratory) No organisms seen        Specimen (12h ago through future)    None          Diagnostic Results:  MRI:  I have reviewed all pertinent results/findings within the past 24 hours.  Ordered  X-Ray: I have reviewed all pertinent results/findings within the past 24 hours.  Ordered    Clinical Findings:  Left TMA site with dehiscence and necrotic changes extending proximally on dorsal skin. Periwound erythema noted with positive probe to bone. Malodorous. No fluctuance or crepitus noted.     6/27/18

## 2018-06-27 NOTE — ASSESSMENT & PLAN NOTE
47 year old male with multiple co-morbidities including severe PAD with history of right AKA and left TMA who was admitted for cardiogenic shock and found to have infected left TMA site; see images in media tab; pt afebrile, no leukocytosis, no systemic signs of infection:  - prior cultures from may from left TMA site with MRSA and peptinophilus   - ABIs and arterial ultrasound pending    Plan:  1. Continue vancomycin, cefepime, and flagyl empirically for now; will change dose of cefepime to 2 gram IV q 12 hours  2. Will follow up cultures and tailor antibiotics accordingly  3. Pt is a high risk of limb loss due to his necrotic wound and vascular status; pt wishing for possible BKA; agree with vascular surgery consult for possible amputation for ultimate source control  4. Continue wound care to right AKA stump  5. Will follow with you

## 2018-06-27 NOTE — PLAN OF CARE
Problem: Patient Care Overview  Goal: Plan of Care Review  Outcome: Ongoing (interventions implemented as appropriate)  Reviewed plan of care with patient.  Infectious Disease consult ordered.  Cefepime 1g INJ q12h, Flagyl 500 mg tablet q8h, and Vancomycin 750mg/250 ml IVPB q24h (for skin/soft tissue injuries).  Left foot dressing changed by Podiatry (toes amputated, foot is necrotic).  K+ = 3.4, KCl+ 10% oral solution 40 mEq administered.  Mg+ = 1.6, 2g/50ml IVPB infusing.  Patient is a fall risk, but has remained free of falls/trauma/injury by using appropriate lighting, nonskid socks, and by keeping area free of debris.  Patient requests wheelchair to maintain independence when using the bathroom, and for hygiene.  Patient is able to reposition self from bed to stretcher without assistance.  Patient verbalized understanding of all instructions.

## 2018-06-27 NOTE — SUBJECTIVE & OBJECTIVE
Past Medical History:   Diagnosis Date    AICD (automatic cardioverter/defibrillator) present     RYAN (acute kidney injury) 3/25/2018    CHF (congestive heart failure)     COPD with acute bronchitis 6/12/2018    Coronary artery disease     Encounter for blood transfusion     Hx of psychiatric care     Hyperlipemia     Hypertension     MI (myocardial infarction)     Neuropathy     PAD (peripheral artery disease)     Psychiatric problem     PVD (peripheral vascular disease)        Past Surgical History:   Procedure Laterality Date    AMPUTATION Right     great toe    BYBPASS GRAFT AORTOBIUFEMORAL      CARDIAC DEFIBRILLATOR PLACEMENT      coronary stents      EXPLORATION AND EVaCUATION OF HEMATOMA OF UPPER EXTREMITY Left     KNEE ARTHROPLASTY Left     VASCULAR SURGERY      fem-pop bypass       Review of patient's allergies indicates:  No Known Allergies    Medications:  Prescriptions Prior to Admission   Medication Sig    amitriptyline (ELAVIL) 50 MG tablet Take 1 tablet (50 mg total) by mouth every evening.    aspirin (ECOTRIN) 81 MG EC tablet Take 81 mg by mouth once daily.    atorvastatin (LIPITOR) 80 MG tablet Take 1 tablet (80 mg total) by mouth every evening.    cilostazol (PLETAL) 50 MG Tab Take 2 tablets (100 mg total) by mouth 2 (two) times daily.    cyclobenzaprine (FLEXERIL) 10 MG tablet Take 10 mg by mouth 3 (three) times daily as needed for Muscle spasms.    folic acid-vit B6-vit B12 2.5-25-2 mg (FOLBIC OR EQUIV) 2.5-25-2 mg Tab Take 1 tablet by mouth once daily.    gabapentin (NEURONTIN) 800 MG tablet Take 800 mg by mouth every evening.    lidocaine (XYLOCAINE) 5 % Oint ointment Apply topically 4 (four) times daily. Apply to entire left foot    metoprolol succinate (TOPROL-XL) 25 MG 24 hr tablet Take 0.5 tablets (12.5 mg total) by mouth once daily.    nicotine (NICODERM CQ) 21 mg/24 hr Place 1 patch onto the skin once daily.    oxyCODONE (ROXICODONE) 10 mg Tab immediate  release tablet Take 1 tablet (10 mg total) by mouth every 4 (four) hours as needed for Pain.    polyethylene glycol (GLYCOLAX) 17 gram PwPk Take 17 g by mouth 2 (two) times daily as needed (constipation).    pregabalin (LYRICA) 100 MG capsule Take 1 capsule (100 mg total) by mouth 3 (three) times daily.    ranolazine (RANEXA) 1,000 mg Tb12 Take 1,000 mg by mouth 2 (two) times daily.    senna-docusate 8.6-50 mg (PERICOLACE) 8.6-50 mg per tablet Take 1 tablet by mouth 2 (two) times daily.    thiamine 100 MG tablet Take 1 tablet (100 mg total) by mouth once daily.    ticagrelor (BRILINTA) 90 mg tablet Take 90 mg by mouth 2 (two) times daily.    zolpidem (AMBIEN) 5 MG Tab Take 1 tablet (5 mg total) by mouth nightly as needed.     Antibiotics     Start     Stop Route Frequency Ordered    06/27/18 1900  ceFEPIme injection 2 g      -- IV Every 12 hours (non-standard times) 06/27/18 1533    06/27/18 1400  vancomycin (VANCOCIN) 750 mg in dextrose 5 % 250 mL IVPB      -- IV Every 24 hours (non-standard times) 06/27/18 1248    06/26/18 1715  metroNIDAZOLE tablet 500 mg      -- Oral Every 8 hours (non-standard times) 06/26/18 1706        Antifungals     None        Antivirals     None           Immunization History   Administered Date(s) Administered    PPD Test 04/09/2018       Family History     None        Social History     Social History    Marital status: Single     Spouse name: N/A    Number of children: N/A    Years of education: N/A     Social History Main Topics    Smoking status: Former Smoker     Packs/day: 0.50     Quit date: 5/1/2018    Smokeless tobacco: Former User     Types: Chew     Quit date: 8/2/1980      Comment: Uses nicotine gum and nicotine patches    Alcohol use Yes      Comment: rarely    Drug use: No    Sexual activity: Yes     Partners: Female     Other Topics Concern    None     Social History Narrative    None     Review of Systems   Constitutional: Negative for chills and fever.    Respiratory: Negative for cough and shortness of breath.    Cardiovascular: Negative for chest pain and leg swelling.   Gastrointestinal: Negative for abdominal pain, constipation, diarrhea, nausea and vomiting.   Genitourinary: Negative for dysuria, frequency and hematuria.   Musculoskeletal: Negative for back pain and myalgias.   Skin: Positive for color change and wound. Negative for rash.   Neurological: Negative for dizziness, light-headedness and headaches.   Psychiatric/Behavioral: Negative for agitation and behavioral problems. The patient is not nervous/anxious.      Objective:     Vital Signs (Most Recent):  Temp: 97.7 °F (36.5 °C) (06/27/18 1159)  Pulse: 100 (06/27/18 1525)  Resp: 18 (06/27/18 1159)  BP: 107/65 (06/27/18 1159)  SpO2: 96 % (06/27/18 1159) Vital Signs (24h Range):  Temp:  [96.7 °F (35.9 °C)-98.9 °F (37.2 °C)] 97.7 °F (36.5 °C)  Pulse:  [100-115] 100  Resp:  [18-19] 18  SpO2:  [96 %-98 %] 96 %  BP: ()/(56-76) 107/65     Weight:  (Pt refused weight nofied the nurse jus )  Body mass index is 20.18 kg/m².    Estimated Creatinine Clearance: 51.2 mL/min (based on SCr of 1.4 mg/dL).    Physical Exam   Constitutional: He is oriented to person, place, and time. He appears well-developed and well-nourished. No distress.   Cardiovascular: Normal rate and regular rhythm.    No murmur heard.  Pulmonary/Chest: Effort normal and breath sounds normal. No respiratory distress.   Abdominal: Soft. Bowel sounds are normal. He exhibits no distension.   Musculoskeletal: Normal range of motion. He exhibits no edema.   Right AKA with small area of dehiscence; no erythema.     Left foot wrapped; images reviewed in media tab   Neurological: He is alert and oriented to person, place, and time.   Skin: Skin is warm and dry.   Psychiatric: He has a normal mood and affect. His behavior is normal.       Significant Labs:   Blood Culture:   Recent Labs  Lab 03/27/18  2148 03/27/18  2212 06/12/18  1706  06/16/18 2000 06/16/18  2156   LABBLOO No growth after 5 days. No growth after 5 days. No growth after 5 days.  No growth after 5 days. No growth after 5 days. No growth after 5 days.     CBC:   Recent Labs  Lab 06/26/18  0554 06/27/18  0529   WBC 9.20 8.34   HGB 10.2* 10.5*   HCT 30.0* 30.5*    210     CMP:   Recent Labs  Lab 06/26/18  0554 06/27/18  0529   * 136   K 3.8 3.4*    99   CO2 24 25   GLU 70 90   BUN 13 12   CREATININE 1.3 1.4   CALCIUM 9.1 9.1   PROT 6.8 7.0   ALBUMIN 2.9* 2.8*   BILITOT 2.1* 1.8*   ALKPHOS 218* 217*   * 129*   * 373*   ANIONGAP 9 12   EGFRNONAA >60.0 58.6*     Wound Culture:   Recent Labs  Lab 04/05/18  1321 05/18/18  1150 06/26/18  1232   LABAERO No growth METHICILLIN RESISTANT STAPHYLOCOCCUS AUREUSManySkin kevin also present Insufficient incubation, culture in progress       Significant Imaging: I have reviewed all pertinent imaging results/findings within the past 24 hours.

## 2018-06-27 NOTE — CONSULTS
[facility]  Infectious Disease  Consult Note    Patient Name: Leonardo Byrd  MRN: [id number]  Admission Date: 6/16/2018  Hospital Length of Stay: 11 days  Attending Physician: Chris Olivas MD  Primary Care Provider: Indio Aquino MD     Isolation Status: Contact    Patient information was obtained from patient and past medical records.      Inpatient consult to Infectious Diseases  Consult performed by: SKY OROZCO  Consult ordered by: CHRIS OLIVAS    Inpatient consult to Infectious Diseases  Consult performed by: SKY OROZCO  Consult ordered by: BRENDAN LOPEZ        Assessment/Plan:     Osteomyelitis of left foot    47 year old male with multiple co-morbidities including severe PAD with history of right AKA and left TMA who was admitted for cardiogenic shock and found to have infected left TMA site; see images in media tab; pt afebrile, no leukocytosis, no systemic signs of infection:  - prior cultures from may from left TMA site with MRSA and peptinophilus   - ABIs and arterial ultrasound pending  - wound probes to bone, has necrotic areas, and erythema noted per images    Plan:  1. Continue vancomycin, cefepime, and flagyl empirically for now; will change dose of cefepime to 2 gram IV q 12 hours  2. Will follow up cultures and tailor antibiotics accordingly  3. Pt is a high risk of limb loss due to his necrotic wound and vascular status; pt wishing for possible BKA; agree with vascular surgery consult for possible amputation for ultimate source control  4. Continue wound care to right AKA stump  5. Will follow with you            Thank you for your consult. I will follow-up with patient. Please contact us if you have any additional questions.  RITA Michael Pager: [phone]    Infectious Disease  [facility]    Subjective:     Principal Problem: Cardiogenic shock    HPI: This is a 47 year old male with PMH of CHF, COPD, CAD, AICD in  place, HTN, HLD, MI, PAD, and PVD who was admitted for cardiogenic shock. Patient has a history of right AKA and left TMA earlier this year. He was found to have a left TMA wound. Podiatry consulted for wound recs. ID consulted for antibiotic recs. He is currently on vanc, cefepime, and flagyl. Pt denies fevers or chills. He has been following with either wound care or podiatry as outpatient. Cultures from May from left TMA grew MRSA and peptinophilus. Patient states he was not put on antibiotics. He is somewhat of a poor historian. Patient denies pain. He wishes to go ahead with BKA at this time per our discussion. Vascular studies are pending.     Past Medical History:   Diagnosis Date    AICD (automatic cardioverter/defibrillator) present     RYAN (acute kidney injury) 3/25/2018    CHF (congestive heart failure)     COPD with acute bronchitis 6/12/2018    Coronary artery disease     Encounter for blood transfusion     Hx of psychiatric care     Hyperlipemia     Hypertension     MI (myocardial infarction)     Neuropathy     PAD (peripheral artery disease)     Psychiatric problem     PVD (peripheral vascular disease)        Past Surgical History:   Procedure Laterality Date    AMPUTATION Right     great toe    BYBPASS GRAFT AORTOBIUFEMORAL      CARDIAC DEFIBRILLATOR PLACEMENT      coronary stents      EXPLORATION AND EVaCUATION OF HEMATOMA OF UPPER EXTREMITY Left     KNEE ARTHROPLASTY Left     VASCULAR SURGERY      fem-pop bypass       Review of patient's allergies indicates:  No Known Allergies    Medications:  Prescriptions Prior to Admission   Medication Sig    amitriptyline (ELAVIL) 50 MG tablet Take 1 tablet (50 mg total) by mouth every evening.    aspirin (ECOTRIN) 81 MG EC tablet Take 81 mg by mouth once daily.    atorvastatin (LIPITOR) 80 MG tablet Take 1 tablet (80 mg total) by mouth every evening.    cilostazol (PLETAL) 50 MG Tab Take 2 tablets (100 mg total) by mouth 2 (two) times  daily.    cyclobenzaprine (FLEXERIL) 10 MG tablet Take 10 mg by mouth 3 (three) times daily as needed for Muscle spasms.    folic acid-vit B6-vit B12 2.5-25-2 mg (FOLBIC OR EQUIV) 2.5-25-2 mg Tab Take 1 tablet by mouth once daily.    gabapentin (NEURONTIN) 800 MG tablet Take 800 mg by mouth every evening.    lidocaine (XYLOCAINE) 5 % Oint ointment Apply topically 4 (four) times daily. Apply to entire left foot    metoprolol succinate (TOPROL-XL) 25 MG 24 hr tablet Take 0.5 tablets (12.5 mg total) by mouth once daily.    nicotine (NICODERM CQ) 21 mg/24 hr Place 1 patch onto the skin once daily.    oxyCODONE (ROXICODONE) 10 mg Tab immediate release tablet Take 1 tablet (10 mg total) by mouth every 4 (four) hours as needed for Pain.    polyethylene glycol (GLYCOLAX) 17 gram PwPk Take 17 g by mouth 2 (two) times daily as needed (constipation).    pregabalin (LYRICA) 100 MG capsule Take 1 capsule (100 mg total) by mouth 3 (three) times daily.    ranolazine (RANEXA) 1,000 mg Tb12 Take 1,000 mg by mouth 2 (two) times daily.    senna-docusate 8.6-50 mg (PERICOLACE) 8.6-50 mg per tablet Take 1 tablet by mouth 2 (two) times daily.    thiamine 100 MG tablet Take 1 tablet (100 mg total) by mouth once daily.    ticagrelor (BRILINTA) 90 mg tablet Take 90 mg by mouth 2 (two) times daily.    zolpidem (AMBIEN) 5 MG Tab Take 1 tablet (5 mg total) by mouth nightly as needed.     Antibiotics     Start     Stop Route Frequency Ordered    06/27/18 1900  ceFEPIme injection 2 g      -- IV Every 12 hours (non-standard times) 06/27/18 1533    06/27/18 1400  vancomycin (VANCOCIN) 750 mg in dextrose 5 % 250 mL IVPB      -- IV Every 24 hours (non-standard times) 06/27/18 1248    06/26/18 1715  metroNIDAZOLE tablet 500 mg      -- Oral Every 8 hours (non-standard times) 06/26/18 1706        Antifungals     None        Antivirals     None           Immunization History   Administered Date(s) Administered    PPD Test 04/09/2018        Family History     None        Social History     Social History    Marital status: Single     Spouse name: N/A    Number of children: N/A    Years of education: N/A     Social History Main Topics    Smoking status: Former Smoker     Packs/day: 0.50     Quit date: 5/1/2018    Smokeless tobacco: Former User     Types: Chew     Quit date: 8/2/1980      Comment: Uses nicotine gum and nicotine patches    Alcohol use Yes      Comment: rarely    Drug use: No    Sexual activity: Yes     Partners: Female     Other Topics Concern    None     Social History Narrative    None     Review of Systems   Constitutional: Negative for chills and fever.   Respiratory: Negative for cough and shortness of breath.    Cardiovascular: Negative for chest pain and leg swelling.   Gastrointestinal: Negative for abdominal pain, constipation, diarrhea, nausea and vomiting.   Genitourinary: Negative for dysuria, frequency and hematuria.   Musculoskeletal: Negative for back pain and myalgias.   Skin: Positive for color change and wound. Negative for rash.   Neurological: Negative for dizziness, light-headedness and headaches.   Psychiatric/Behavioral: Negative for agitation and behavioral problems. The patient is not nervous/anxious.      Objective:     Vital Signs (Most Recent):  Temp: 97.7 °F (36.5 °C) (06/27/18 1159)  Pulse: 100 (06/27/18 1525)  Resp: 18 (06/27/18 1159)  BP: 107/65 (06/27/18 1159)  SpO2: 96 % (06/27/18 1159) Vital Signs (24h Range):  Temp:  [96.7 °F (35.9 °C)-98.9 °F (37.2 °C)] 97.7 °F (36.5 °C)  Pulse:  [100-115] 100  Resp:  [18-19] 18  SpO2:  [96 %-98 %] 96 %  BP: ()/(56-76) 107/65     Weight:  (Pt refused weight nofied the nurse jus )  Body mass index is 20.18 kg/m².    Estimated Creatinine Clearance: 51.2 mL/min (based on SCr of 1.4 mg/dL).    Physical Exam   Constitutional: He is oriented to person, place, and time. He appears well-developed and well-nourished. No distress.   Cardiovascular: Normal  rate and regular rhythm.    No murmur heard.  Pulmonary/Chest: Effort normal and breath sounds normal. No respiratory distress.   Abdominal: Soft. Bowel sounds are normal. He exhibits no distension.   Musculoskeletal: Normal range of motion. He exhibits no edema.   Right AKA with small area of dehiscence; no erythema.     Left foot wrapped; images reviewed in media tab   Neurological: He is alert and oriented to person, place, and time.   Skin: Skin is warm and dry.   Psychiatric: He has a normal mood and affect. His behavior is normal.       Significant Labs:   Blood Culture:   Recent Labs  Lab 03/27/18  2148 03/27/18  2212 06/12/18  1706 06/16/18  2000 06/16/18  2156   LABBLOO No growth after 5 days. No growth after 5 days. No growth after 5 days.  No growth after 5 days. No growth after 5 days. No growth after 5 days.     CBC:   Recent Labs  Lab 06/26/18  0554 06/27/18  0529   WBC 9.20 8.34   HGB 10.2* 10.5*   HCT 30.0* 30.5*    210     CMP:   Recent Labs  Lab 06/26/18  0554 06/27/18  0529   * 136   K 3.8 3.4*    99   CO2 24 25   GLU 70 90   BUN 13 12   CREATININE 1.3 1.4   CALCIUM 9.1 9.1   PROT 6.8 7.0   ALBUMIN 2.9* 2.8*   BILITOT 2.1* 1.8*   ALKPHOS 218* 217*   * 129*   * 373*   ANIONGAP 9 12   EGFRNONAA >60.0 58.6*     Wound Culture:   Recent Labs  Lab 04/05/18  1321 05/18/18  1150 06/26/18  1232   LABAERO No growth METHICILLIN RESISTANT STAPHYLOCOCCUS AUREUSManySkin kevin also present Insufficient incubation, culture in progress       Significant Imaging: I have reviewed all pertinent imaging results/findings within the past 24 hours.

## 2018-06-27 NOTE — PLAN OF CARE
Problem: Patient Care Overview  Goal: Plan of Care Review  Outcome: Ongoing (interventions implemented as appropriate)  Pt AAOx4. Pt free from falls. Pt afebrile. Pt IV site without redness or edema. Educated pt on importance of hand washing. Pt has denied any pain or discomfort this shift. Dressing to left foot CDI.

## 2018-06-27 NOTE — PROGRESS NOTES
[facility]  Podiatry  Progress Note    Patient Name: Leonardo Byrd  MRN: [id number]  Admission Date: 6/16/2018  Hospital Length of Stay: 11 days  Attending Physician: Antoni Trujillo MD  Primary Care Provider: Indio Aquino MD   Interval hx: Pt. Seen bedside. Relates pain to left TMA site 5/10. Bandage intact no leaks noted.   Scheduled Meds:   aspirin  81 mg Per OG tube Daily    atorvastatin  80 mg Per OG tube Daily    ceFEPime (MAXIPIME) IVPB  1 g Intravenous Q12H    collagenase   Topical (Top) Daily    furosemide  40 mg Oral Daily    heparin (porcine)  5,000 Units Subcutaneous Q8H    isosorbide-hydrALAZINE 20-37.5 mg  1 tablet Oral TID    magnesium sulfate IVPB  2 g Intravenous Once    metroNIDAZOLE  500 mg Oral Q8H    multivitamin liquid no.118  10 mL Oral Daily    nicotine  1 patch Transdermal Daily    senna-docusate 8.6-50 mg  1 tablet Per OG tube BID    sodium chloride 0.9%  3 mL Intravenous Q8H    thiamine  100 mg Oral Daily    ticagrelor  90 mg Per OG tube BID    valproate sodium (DEPACON) IVPB  250 mg Intravenous Q6H    vancomycin (VANCOCIN) IVPB  750 mg Intravenous Q24H     Continuous Infusions:  PRN Meds:acetaminophen, albuterol-ipratropium, chlorproMAZINE, levalbuterol, lidocaine HCL 2%, magnesium oxide, magnesium oxide, ondansetron, pneumoc 13-osman conj-dip cr(PF), potassium chloride 10%, potassium chloride 10%, potassium chloride 10%, potassium, sodium phosphates, potassium, sodium phosphates, potassium, sodium phosphates    Review of patient's allergies indicates:  No Known Allergies     Past Medical History:   Diagnosis Date    AICD (automatic cardioverter/defibrillator) present     RYAN (acute kidney injury) 3/25/2018    CHF (congestive heart failure)     COPD with acute bronchitis 6/12/2018    Coronary artery disease     Encounter for blood transfusion     Hx of psychiatric care     Hyperlipemia     Hypertension     MI (myocardial infarction)      Neuropathy     PAD (peripheral artery disease)     Psychiatric problem     PVD (peripheral vascular disease)      Past Surgical History:   Procedure Laterality Date    AMPUTATION Right     great toe    BYBPASS GRAFT AORTOBIUFEMORAL      CARDIAC DEFIBRILLATOR PLACEMENT      coronary stents      EXPLORATION AND EVaCUATION OF HEMATOMA OF UPPER EXTREMITY Left     KNEE ARTHROPLASTY Left     VASCULAR SURGERY      fem-pop bypass       Family History     None        Social History Main Topics    Smoking status: Former Smoker     Packs/day: 0.50     Quit date: 5/1/2018    Smokeless tobacco: Former User     Types: Chew     Quit date: 8/2/1980      Comment: Uses nicotine gum and nicotine patches    Alcohol use Yes      Comment: rarely    Drug use: No    Sexual activity: Yes     Partners: Female     Review of Systems   Skin: Positive for wound.     Objective:     Vital Signs (Most Recent):  Temp: 97.7 °F (36.5 °C) (06/27/18 0800)  Pulse: 110 (06/27/18 0800)  Resp: 18 (06/27/18 0800)  BP: 116/76 (06/27/18 0800)  SpO2: 96 % (06/27/18 0800) Vital Signs (24h Range):  Temp:  [95.8 °F (35.4 °C)-98.9 °F (37.2 °C)] 97.7 °F (36.5 °C)  Pulse:  [] 110  Resp:  [18-19] 18  SpO2:  [94 %-98 %] 96 %  BP: ()/(56-76) 116/76     Weight:  (Pt refused weight nofied the nurse jus )  Body mass index is 20.18 kg/m².    Foot Exam    Right Foot/Ankle     Comments  S/p BKA    Left Foot/Ankle      Inspection and Palpation  Ecchymosis: dorsum  Tenderness: none   Swelling: dorsum   Skin Exam: drainage, maceration, skin changes, ulcer and erythema;     Neurovascular  Dorsalis pedis: 1+  Posterior tibial: absent  Saphenous nerve sensation: diminished  Tibial nerve sensation: diminished  Superficial peroneal nerve sensation: diminished  Deep peroneal nerve sensation: diminished  Sural nerve sensation: diminished            Laboratory:  A1C: No results for input(s): HGBA1C in the last 4320 hours.  Blood Cultures: No results for  input(s): LABBLOO in the last 48 hours.  BMP:     Recent Labs  Lab 06/27/18  0529   GLU 90      K 3.4*   CL 99   CO2 25   BUN 12   CREATININE 1.4   CALCIUM 9.1   MG 1.6     CBC:     Recent Labs  Lab 06/27/18  0529   WBC 8.34   RBC 3.42*   HGB 10.5*   HCT 30.5*      MCV 89   MCH 30.7   MCHC 34.4     CMP:     Recent Labs  Lab 06/27/18  0529   GLU 90   CALCIUM 9.1   ALBUMIN 2.8*   PROT 7.0      K 3.4*   CO2 25   CL 99   BUN 12   CREATININE 1.4   ALKPHOS 217*   *   *   BILITOT 1.8*     Coagulation: No results for input(s): PT, INR, APTT in the last 168 hours.  CRP: No results for input(s): CRP in the last 168 hours.  ESR: No results for input(s): SEDRATE in the last 168 hours.  Prealbumin: No results for input(s): PREALBUMIN in the last 48 hours.  Wound Cultures:     Recent Labs  Lab 04/05/18  1321 05/18/18  1150   LABAERO No growth METHICILLIN RESISTANT STAPHYLOCOCCUS AUREUSManySkin kevin also present     Microbiology Results (last 7 days)     Procedure Component Value Units Date/Time    Culture, Anaerobe [[id number]] Collected:  06/26/18 1232    Order Status:  Sent Specimen:  Wound from Foot, Left Updated:  06/26/18 1412    Aerobic culture [[id number]] Collected:  06/26/18 1232    Order Status:  Sent Specimen:  Wound from Foot, Left Updated:  06/26/18 1412    Blood culture [[id number]] Collected:  06/16/18 2156    Order Status:  Completed Specimen:  Blood from Peripheral, Antecubital, Left Updated:  06/22/18 0612     Blood Culture, Routine No growth after 5 days.    Blood culture [[id number]] Collected:  06/16/18 2000    Order Status:  Completed Specimen:  Blood from Peripheral, Hand, Left Updated:  06/22/18 0612     Blood Culture, Routine No growth after 5 days.    Culture, Respiratory with Gram Stain [[id number]]  (Susceptibility) Collected:  06/18/18 1732    Order Status:  Completed Specimen:  Respiratory from Endotracheal Aspirate Updated:  06/21/18 0728     Respiratory Culture --      ENTEROBACTER CLOACAE  Few       Respiratory Culture --     CANDIDA ALBICANS  Moderate       Gram Stain (Respiratory) No WBC's     Gram Stain (Respiratory) No organisms seen        Specimen (12h ago through future)    None          Diagnostic Results:  MRI: I have reviewed all pertinent results/findings within the past 24 hours.  Ordered  X-Ray: I have reviewed all pertinent results/findings within the past 24 hours.  Ordered    Clinical Findings:  Left TMA site with dehiscence and necrotic changes extending proximally on dorsal skin. Periwound erythema noted with positive probe to bone. Malodorous. No fluctuance or crepitus noted.     6/27/18                      Assessment/Plan:     Acute renal failure    Per primary        Leukocytosis    Per primary, podiatry will follow        Tobacco abuse    Per primary        Peripheral vascular disease of lower extremity    Wound to necrotic left TMA site with periwound erythema and edema noted  Discussed options with patient: Continued local wound care vs. Debridement in OR vs. Left BKA.  L TMA site with probe to bone would likely need 6 weeks IV  abx however with PAD concern if abx would be able to reach LLE. Next option would be debridement of left TMA site in OR remove infected bone, obtain clean margins however if residual osteo remains would likely need IV abx as well. Recommend L BKA at this time as this would be the most definitive treatment.  Patient in agreement that he would eventually need a more proximal amputation such as a L BKA however patient would like to exhaust all other options at this time.   Interventional cardiology consulted to scotty PAD, patient with history of multiple interventions.   Wound dressed with betadine 4x4, kerlix, and ACE  Nursing to change dressings daily, orders placed  Podiatry will follow     Weightbearing Status: Partial heel WB left for transfers   Offloading Device: DARCO shoe.     Shalini Julian DPM PGY-3  Pager: [phone]               Shalini Julian MD  Podiatry  [facility]

## 2018-06-27 NOTE — NURSING TRANSFER
Nursing Transfer Note      6/27/2018     Transfer To: Vascular Lab,7th Floor    Transfer via stretcher    Transfer with cardiac monitoring    Transported by [facility]    Medicines sent: No    Chart send with patient: No

## 2018-06-27 NOTE — ASSESSMENT & PLAN NOTE
Wound to necrotic left TMA site with periwound erythema and edema noted  Discussed options with patient: 1. Continued local wound care vs. Debridement in OR vs. Left BKA/AKA. L TMA site with probe to bone would likely need 6 weeks IV  abx however with PAD concern if abx would be able to reach LLE. Next option would be debridement of left TMA site in OR remove infected bone, obtain clean margins however if residual osteo remains would likely need IV abx as well. Recommend L BKA at this time as this would be the most definitive treatment.  Patient in agreement that he would eventually need a more proximal amputation such as a L BKA however patient would like to exhaust all other options at this time.   Interventional cardiology consulted to eval PAD, patient with history of multiple interventions.   Wound dressed with betadine 4x4, kerlix, and ACE  Nursing to change dressings daily, orders placed  Podiatry will follow     Weightbearing Status: Partial heel WB left for transfers   Offloading Device: DARCO shoe.     Shalini Julian DPM PGY-3  Pager: [phone]

## 2018-06-27 NOTE — PROGRESS NOTES
Progress Note  Hospital Medicine    Primary Team: [facility] C  Admit Date: 6/16/2018   Length of Stay:  LOS: 11 days   SUBJECTIVE:   Reason for Admission:  Cardiogenic shock    HPI:  47M ICM (LVEF 13%) s/p ICD, CAD s/p PCI, HTN, HLD, current smoker, PAD (s/p aortobifemoral bypass, L SFA and pop PTAS in September and recent R SFA and R pop 3/8 by Dr. Carl Bell and finally R AKA 3/2018) who presented to OSH with SOB, cough, weight, BNP 2400, congested CXR, and elevated lactic acid. He was admitted with COPD and hypotension though to be secondary to sepsis. He was started on zmax and vanc and given a fluid bolus.  He was found to not be septic the next day and all abx were stopped.  Unfortunately, he developed ADHF with worsened respiratory status and became progressively hypotensive.  He was subsequently intubated and started on levo/dobutamine. Transferred to [facility] CCU for higher level of care.    Patient was started on SLED given poor UOP and worsening RYAN. He was kept on dobutamine 2.5 mcg/kg/min with daily CO measurements. Patient had recovering renal function and SLED was discontinued 6/17/2018. He was diuresed with IV lasix on bid then daily basis until patient became euvolemic with normal-low CVP. He was suitable for extubation on 2nd day of hospital stay however would become significantly agitated when sedation would be weaned down resulting in delaying extubation until 6/19/2018. After extubation, the patient was overtly delirious expressed by confusion, delusion, belligerence, and aggressive. He had to be restrained on the first 2 days following extubation. Psychiatry was consulted and patient was finally less psychotic and delirious on 6/23/2018 after starting scheduled depacon 250mg IV q9zzmsav and receiving one dose of Thorazine night of 6/22/2018. Given patient's stable clinical status from his cardiogenic shock standpoint it was thought best to step down patient out of ICU settings to decreased  delirium recurrence.     ECHO 6/17  CONCLUSIONS     1 - Mild left ventricular enlargement.     2 - Severely depressed left ventricular systolic function (EF low to mid teens).     3 - Biatrial enlargement.     4 - Restrictive LV filling pattern, indicating markedly elevated LAP (grade 3 diastolic dysfunction).     5 - Low normal to mildly depressed right ventricular systolic function .     6 - Moderate or more mitral regurgitation.     7 - Mild tricuspid regurgitation.     8 - Increased central venous pressure.     9 - Pulmonary hypertension. The estimated PA systolic pressure is 45 mmHg.     Interval history:    Patient evaluated by podiatry for LLE wound.  Podiatry, ID, and interventional cardiology are consulted.  Patient aware of need for possible proximal amputation.  He is in agreement.    Review of Systems:  Constitutional: no fever or chills  Respiratory: no cough or shortness of breath  Cardiovascular: no chest pain or palpitations  Gastrointestinal: no nausea or vomiting, no abdominal pain or change in bowel habits  Musculoskeletal: no arthralgias or myalgias     OBJECTIVE:     Temp:  [95.8 °F (35.4 °C)-98.9 °F (37.2 °C)]   Pulse:  []   Resp:  [16-19]   BP: ()/(56-76)   SpO2:  [94 %-98 %]  Body mass index is 20.18 kg/m².  Intake/Outake:  This Shift:  No intake/output data recorded.    Net I/O past 24h:     Intake/Output Summary (Last 24 hours) at 06/27/18 0847  Last data filed at 06/27/18 0600   Gross per 24 hour   Intake             1431 ml   Output             2000 ml   Net             -569 ml             Physical Exam:  Gen- well-developed, well-nourished, NAD  CVS- S1 and S2 present, 3/6 systolic murmur, RRR  Resp- CTA b/l, no work of breathing  Abd- BS+, soft, NT, ND  Ext- right AKA with well-healing incision; LLE without edema, s/p forefoot amputation with black necrotic borders and granulation tissue centrally    Laboratory:  CBC/Anemia Labs: Coags:      Recent Labs  Lab 06/25/18  6880  06/26/18  0554 06/27/18  0529   WBC 7.79 9.20 8.34   HGB 10.8* 10.2* 10.5*   HCT 31.8* 30.0* 30.5*    189 210   MCV 90 90 89   RDW 20.1* 20.2* 19.9*    No results for input(s): PT, INR, APTT in the last 168 hours.     Chemistries:     Recent Labs  Lab 06/25/18  0536 06/26/18  0554 06/27/18  0529   * 135* 136   K 4.0 3.8 3.4*    102 99   CO2 23 24 25   BUN 15 13 12   CREATININE 1.4 1.3 1.4   CALCIUM 9.1 9.1 9.1   PROT 6.8 6.8 7.0   BILITOT 2.3* 2.1* 1.8*   ALKPHOS 216* 218* 217*   * 461* 373*   * 160* 129*   MG 1.9 1.5* 1.6   PHOS 3.0 3.6 3.3        ABG:     Recent Labs  Lab 06/22/18  0533 06/25/18  1516 06/25/18  1654   PH 7.463* 7.520* 7.492*   PCO2 43.6 30.4* 34.7*   PO2 31* 73* 35*   HCO3 31.2* 24.9 26.5   POCSATURATED 63* 96 73*   BE 7 2 3        Cardiac Enzymes: Ejection Fractions:    No results for input(s): CPK, CPKMB, MB, TROPONINI in the last 72 hours. EF   Date Value Ref Range Status   06/17/2018 13 (A) 55 - 65    04/06/2018 15 (A) 55 - 65         Medications:  Scheduled Meds:   aspirin  81 mg Per OG tube Daily    atorvastatin  80 mg Per OG tube Daily    ceFEPime (MAXIPIME) IVPB  1 g Intravenous Q12H    collagenase   Topical (Top) Daily    furosemide  40 mg Oral Daily    heparin (porcine)  5,000 Units Subcutaneous Q8H    isosorbide-hydrALAZINE 20-37.5 mg  1 tablet Oral TID    magnesium sulfate IVPB  2 g Intravenous Once    metroNIDAZOLE  500 mg Oral Q8H    multivitamin liquid no.118  10 mL Oral Daily    nicotine  1 patch Transdermal Daily    potassium chloride 10%  40 mEq Oral Once    senna-docusate 8.6-50 mg  1 tablet Per OG tube BID    sodium chloride 0.9%  3 mL Intravenous Q8H    thiamine  100 mg Oral Daily    ticagrelor  90 mg Per OG tube BID    valproate sodium (DEPACON) IVPB  250 mg Intravenous Q6H    vancomycin (VANCOCIN) IVPB  750 mg Intravenous Q24H                             Continuous Infusions:    PRN Meds:.acetaminophen,  albuterol-ipratropium, chlorproMAZINE, levalbuterol, lidocaine HCL 2%, magnesium oxide, magnesium oxide, ondansetron, pneumoc 13-osman conj-dip cr(PF), potassium chloride 10%, potassium chloride 10%, potassium chloride 10%, potassium, sodium phosphates, potassium, sodium phosphates, potassium, sodium phosphates     ASSESSMENT/PLAN:     Active Hospital Problems    Diagnosis  POA    *Cardiogenic shock [R57.0]  Yes    Status post transmetatarsal amputation of foot, left [Z89.432]  Not Applicable    Moderate protein-calorie malnutrition [E44.0]  Yes    Delirium [R41.0]  No    Delirium due to multiple etiologies, acute, hyperactive [F05]  No    Alteration in skin integrity related to surgical incision [R23.9]  Yes    Encephalopathy [G93.40]  Yes    Shock liver [K72.00]  Yes    COPD, severe [J44.9]  Yes    Acute renal failure [N17.9]  Yes    Leukocytosis [D72.829]  Yes    Alcohol abuse [F10.10]  Yes    Peripheral vascular disease of lower extremity [I73.9]  Yes     S/p right AKA.     TcPO2 L foot 5/11/2018   Dorsum 43 to 171 mmHg with oxygen   Medial 61 to 144 mmHg with oxygen   Chest wall baseline 43 mmHg      Tobacco abuse [Z72.0]  Yes      Resolved Hospital Problems    Diagnosis Date Resolved POA    Hyperkalemia [E87.5] 06/21/2018 Yes    RYAN (acute kidney injury) [N17.9] 06/23/2018 Yes    Dependence on ventilator, status [Z99.11] 06/20/2018 Not Applicable    Lactic acidosis [E87.2] 06/23/2018 Yes    Acute respiratory failure with hypoxia [J96.01] 06/22/2018 Yes     Peripheral vascular disease of lower extremity  Alteration in skin integrity related to surgical incision  - At right AKA. Good granulation. Will not culture. Appreciate wound care's assistance.   - Reconsulted wound care regarding left forefoot amputation  - Podiatry consulted  - Vascular studies ordered, and interventional cardiology consulted  - XRAY completed  - MRI likely not feasible due to ICD  - ID consultation  - Start broad  spectrum abx with MRSA coverage  - Contact isolation  - Ultimately patient may require vascular consultation for more proximal amputation; has seen Dr. Trejo in past; unclear if optimization of vascular flow and abx will prevent ascension of infection; patient may not be great candidate for angiogram and PCI given recent RYAN as below    Cardiogenic shock  Acute on chronic combined HF  - likely due to ADHF following volume resuscitation when received IVF for suspected sepsis on initial presentation  - net negative 9L since admit. SLED discontinued 6/17/2018.   - has been receiving daily IV lasix 80mg prn. Last dose received 6/21/2018.   - check CVP and reassess; pt reports he was on Lasix at home, will ask Pharmacy to assist  - Nephrology following, appreciate assistance.   - Dobutamine weaned off  - Maintenance diuresis initiated    Acute renal failure superimposed on CKDIII  - Likely ATN due to ADHF and ischemic insult with hypotension and currently with over diuresis.   - Cr improving this am to 1.4  - Nephro following, appreciate their assistance.   - Avoid nephrotoxins and renally dose medications.   - Strict intake and output.     Encephalopathy, metabolic  - Likely due ICU delirium.  - Psychiatry following, appreciate recs: Chlorpromazine 50 PRN, Depacon 250mg IV q6h  - Continue holding pregabalin in setting of delirium and encephalopathy.   - Delirium precautions    Moderate protein-calorie malnutrition  - Poor enteral nutrition due to AMS.  - Starting oral diet.     COPD, mixed simple and mucopurulent  - Duonebs prn     Alcohol abuse  - Reportedly last drink was weeks to months ago.   - Avoiding BZD as possible.     Tobacco abuse  - Nicotine patch.    DVT ppx- add Heparin  CODE Status- FULL    Dispo- PT/OT ordered; will need evaluation of necrotic would and vascular eval prior to d/c     Antoni Trujillo MD  Hospital Medicine Staff

## 2018-06-28 NOTE — PLAN OF CARE
Problem: Patient Care Overview  Goal: Plan of Care Review  Outcome: Ongoing (interventions implemented as appropriate)  Reviewed plan of care with patient.  Patient will be NPO 6/29/18 for amputation below the knee left leg.  Infectious Disease consult completed.  Cefepime 2g INJ q12h, Flagyl 500 mg tablet q8h, and Vancomycin 750mg/250 ml IVPB q24h (for skin/soft tissue injuries).  Vanco trough due at 1830 hours.  Left foot dressing changed by Podiatry (toes amputated, foot is necrotic).  K+ = 3.2, KCl+ 10% oral solution 40 mEq administered.  Valproate (Depacon) q6h.  Patient is a fall risk, but has remained free of falls/trauma/injury by using appropriate lighting, nonskid socks, and by keeping area free of debris.  Physical Therapy consult completed.  Patient is able/capable of transferring self from bed to wheelchair independently and to move wheelchair as needed.  Patient verbalized understanding of all instructions.

## 2018-06-28 NOTE — PT/OT/SLP EVAL
Physical Therapy Evaluation/Discharge    Patient Name:  Leonardo Byrd   MRN:  [id number]    Recommendations:     Discharge Recommendations:  home health PT   Discharge Equipment Recommendations:  (sliding board?)   Barriers to discharge: None    Assessment:     Leonardo Byrd is a 47 y.o. male admitted with a medical diagnosis of Cardiogenic shock.  He presents with the following impairments/functional limitations:   (pt has no rehab problems.) pt omar treatment well and does not need skilled PT services at this time. Pt will be able to discharge home with no therapy needs and potentially sliding board for transfers. Pt may need skilled PT after BKA to instruct in sliding board usage.     Rehab Prognosis:  good; patient would benefit from acute skilled PT services to address these deficits and reach maximum level of function.      Recent Surgery: Procedure(s) (LRB):  AMPUTATION, BELOW KNEE (Left)      Plan:     During this hospitalization, patient to be seen  (pt is being discharged from PT. ) to address the above listed problems via  (pt is being discharged from PT)  · Plan of Care Expires:  07/26/18   Plan of Care Reviewed with: patient, father    Subjective     Communicated with nurse prior to session.  Patient found supine upon PT entry to room, agreeable to evaluation.      Chief Complaint: pt had no complaints.   Patient comments/goals:  To get better and go home.   Pain/Comfort:  · Pain Rating 1: 0/10  · Pain Rating Post-Intervention 1: 0/10    Patients cultural, spiritual, Christian conflicts given the current situation: none    Living Environment:  Pt lives with his brother and father in 1 story with w/c ramp.   Prior to admission, patients level of function was modified I at w/c level.   Patient has the following equipment: wheelchair, bath bench (Darco shoe).  DME owned (not currently used): none.  Upon discharge, patient will have assistance from father and brother. .    Objective:     Patient found with:  central line, telemetry     General Precautions: Standard, fall   Orthopedic Precautions:    Braces:       Exams:  · Cognitive Exam:  Patient is oriented to Person, Place, Time and Situation    Functional Mobility:  · Bed Mobility:     · Rolling Right: independence  · Supine to Sit: independence  ·   · Transfers:   Pt had Darco shoe on with transfer.   ·   · Bed to Chair: supervision with  no AD  using  Scoot Pivot  ·   · Wheelchair Propulsion:  Pt propelled Standard wheelchair x unlimited feet on Level tile with  Right upper extremity and Left upper extremity with Independent.     AM-PAC 6 CLICK MOBILITY  Total Score:15         Patient left in w/c with all lines intact and father present.    GOALS:    Physical Therapy Goals     Not on file                History:     Past Medical History:   Diagnosis Date    AICD (automatic cardioverter/defibrillator) present     RYAN (acute kidney injury) 3/25/2018    CHF (congestive heart failure)     COPD with acute bronchitis 6/12/2018    Coronary artery disease     Encounter for blood transfusion     Hx of psychiatric care     Hyperlipemia     Hypertension     MI (myocardial infarction)     Neuropathy     PAD (peripheral artery disease)     Psychiatric problem     PVD (peripheral vascular disease)        Past Surgical History:   Procedure Laterality Date    AMPUTATION Right     great toe    BYBPASS GRAFT AORTOBIUFEMORAL      CARDIAC DEFIBRILLATOR PLACEMENT      coronary stents      EXPLORATION AND EVaCUATION OF HEMATOMA OF UPPER EXTREMITY Left     KNEE ARTHROPLASTY Left     VASCULAR SURGERY      fem-pop bypass       Clinical Decision Making:     History  Co-morbidities and personal factors that may impact the plan of care Examination  Body Structures and Functions, activity limitations and participation restrictions that may impact the plan of care Clinical Presentation   Decision Making/ Complexity Score   Co-morbidities:   [] Time since onset of injury /  illness / exacerbation  [] Status of current condition  []Patient's cognitive status and safety concerns    [] Multiple Medical Problems (see med hx)  Personal Factors:   [] Patient's age  [] Prior Level of function   [] Patient's home situation (environment and family support)  [] Patient's level of motivation  [] Expected progression of patient      HISTORY:(criteria)    [] 14352 - no personal factors/history    [] 19528 - has 1-2 personal factor/comorbidity     [] 37551 - has >3 personal factor/comorbidity     Body Regions:  [] Objective examination findings  [] Head     []  Neck  [] Trunk   [] Upper Extremity  [] Lower Extremity    Body Systems:  [] For communication ability, affect, cognition, language, and learning style: the assessment of the ability to make needs known, consciousness, orientation (person, place, and time), expected emotional /behavioral responses, and learning preferences (eg, learning barriers, education  needs)  [] For the neuromuscular system: a general assessment of gross coordinated movement (eg, balance, gait, locomotion, transfers, and transitions) and motor function  (motor control and motor learning)  [] For the musculoskeletal system: the assessment of gross symmetry, gross range of motion, gross strength, height, and weight  [] For the integumentary system: the assessment of pliability(texture), presence of scar formation, skin color, and skin integrity  [] For cardiovascular/pulmonary system: the assessment of heart rate, respiratory rate, blood pressure, and edema     Activity limitations:    [] Patient's cognitive status and saf ety concerns          [] Status of current condition      [] Weight bearing restriction  [] Cardiopulmunary Restriction    Participation Restrictions:   [] Goals and goal agreement with the patient     [] Rehab potential (prognosis) and probable outcome      Examination of Body System: (criteria)    [] 80093 - addressing 1-2 elements    [] 64121 -  addressing a total of 3 or more elements     [] 22043 -  Addressing a total of 4 or more elements         Clinical Presentation: (criteria)  Choose one     On examination of body system using standardized tests and measures patient presents with (CHOOSE ONE) elements from any of the following: body structures and functions, activity limitations, and/or participation restrictions.  Leading to a clinical presentation that is considered (CHOOSE ONE)                              Clinical Decision Making  (Eval Complexity):  Choose One     Time Tracking:     PT Received On: 06/28/18  PT Start Time: 0908     PT Stop Time: 0918  PT Total Time (min): 10 min     Billable Minutes: Evaluation 10 min      Chacha Cramer, PT  06/28/2018

## 2018-06-28 NOTE — PROGRESS NOTES
06/28/18 1115   Vital Signs   Temp 97.6 °F (36.4 °C)   Temp src Oral   Pulse 97   Heart Rate Source SpO2   Resp 18   SpO2 97 %   Pulse Oximetry Type Intermittent   O2 Device (Oxygen Therapy) room air   BP (!) 88/54   MAP (mmHg) 66   BP Location Right arm   BP Method Automatic   Patient Position Sitting   Lulu Trujillo M.D.

## 2018-06-28 NOTE — PLAN OF CARE
Problem: Occupational Therapy Goal  Goal: Occupational Therapy Goal  Outcome: Outcome(s) achieved Date Met: 06/28/18  OT evaluation completed and pt d/c'ed 6/28/2018 as pt is at his functional baseline with no further skilled acute OT needs.

## 2018-06-28 NOTE — PT/OT/SLP EVAL
Occupational Therapy   Co-Evaluation and Discharge Note    Name: Leonardo Byrd  MRN: [id number]  Admitting Diagnosis:  Cardiogenic shock      Recommendations:     Discharge Recommendations: home  Discharge Equipment Recommendations:  none  Barriers to discharge:  None    History:     Occupational Profile:  Living Environment: Pt lives with father and brother in a H with ramp access. Bathroom has a tub/shower combo with TTB.  Previous level of function: PTA, pt was modified independence utilizing scoot pivot t/f's and independence with ADLs.   Roles and Routines: Pt is a brother and son.   Equipment Owned:  bath bench, wheelchair  Assistance upon Discharge: Pt's brother and father are able to assist as needed.     Past Medical History:   Diagnosis Date    AICD (automatic cardioverter/defibrillator) present     RYAN (acute kidney injury) 3/25/2018    CHF (congestive heart failure)     COPD with acute bronchitis 6/12/2018    Coronary artery disease     Encounter for blood transfusion     Hx of psychiatric care     Hyperlipemia     Hypertension     MI (myocardial infarction)     Neuropathy     PAD (peripheral artery disease)     Psychiatric problem     PVD (peripheral vascular disease)        Past Surgical History:   Procedure Laterality Date    AMPUTATION Right     great toe    BYBPASS GRAFT AORTOBIUFEMORAL      CARDIAC DEFIBRILLATOR PLACEMENT      coronary stents      EXPLORATION AND EVaCUATION OF HEMATOMA OF UPPER EXTREMITY Left     KNEE ARTHROPLASTY Left     VASCULAR SURGERY      fem-pop bypass       Subjective     Chief Complaint: none reported   Patient/Family stated goals: return home   Communicated with: RN Marcelino) prior to session. Pt agreeable to therapy evaluation.   Pain/Comfort:  · Pain Rating 1: 0/10  · Pain Rating Post-Intervention 1: 0/10    Patients cultural, spiritual, Hindu conflicts given the current situation: none reported     Objective:     Patient found with: central line,  "telemetry    General Precautions: Standard, fall   Orthopedic Precautions:LLE partial weight bearing   Braces: N/A     Occupational Performance:    Bed Mobility:    · Supine>sit: independence   · To attain EOB sitting position     Functional Mobility/Transfers:  · Scoot pivot bed>wheelchair: supervision  · Towards R side   · Functional Mobility: Pt engaging in functional mobility to simulate household distances via wheelchair mobility with modified independence.     Activities of Daily Living:  · LB Dressing: modified independence  · In long sit to don DARCO shoe on LLE    Cognitive/Visual Perceptual:  Pt alert and oriented with good insight into condition and with good motivation to engage in therapy session. Pt able to follow multi-step commands, communicate effectively throughout treatment session, and exhibits intact memory. Pt with intact safety awareness. No visual deficits present.     Physical Exam:  Balance:  · Sitting: independence sitting at EOB   Postural Examination: no deviations noted   Skin Integrity: intact   Edema: none noted   Sensation: intact   UE ROM:  · Right: WFL   · Left: WFL  UE Strength:  · Right: WFL  · Left: WFL    Strength:  · Right: WFL  · Left: WFL  Fine Motor Control: intact     Patient left seated in wheelchair with all lines intact, call button in reach, RN notified and father present    [facility] 6 Click:  AMPA Total Score: 21    Treatment & Education:  · Communicated OT POC   · Updated communication: Level 2 mobility   Education:    Assessment:     Leonardo Byrd is a 47 y.o. male with a medical diagnosis of Cardiogenic shock. At this time, patient is functioning at their prior level of function and does not require further acute OT services.     Clinical Decision Makin.  OT Low:  "Pt evaluation falls under low complexity for evaluation coding due to performance deficits noted in 1-3 areas as stated above and 0 co-morbities affecting current functional status. Data " "obtained from problem focused assessments. No modifications or assistance was required for completion of evaluation. Only brief occupational profile and history review completed."     Plan:     During this hospitalization, patient does not require further acute OT services.  Please re-consult if situation changes.    · Plan of Care Reviewed with: patient, father    This Plan of care has been discussed with the patient who was involved in its development and understands and is in agreement with the identified goals and treatment plan    GOALS:    Occupational Therapy Goals     Not on file          Multidisciplinary Problems (Resolved)        Problem: Occupational Therapy Goal    Goal Priority Disciplines Outcome Interventions   Occupational Therapy Goal   (Resolved)     OT, PT/OT Outcome(s) achieved                    Time Tracking:     OT Date of Treatment: 06/28/18  OT Start Time: 0904  OT Stop Time: 0914  OT Total Time (min): 10 min    Billable Minutes:Evaluation 10 minutes    Anna Olivas OT  6/28/2018    "

## 2018-06-28 NOTE — NURSING
Results for PRESTON GUTIERREZ (MRN [id number]) as of 6/28/2018 18:44   Ref. Range 6/27/2018 11:25 6/27/2018 11:30 6/28/2018 05:55 6/28/2018 05:59 6/28/2018 17:50   Vancomycin-Trough Latest Ref Range: 10.0 - 22.0 ug/mL     31.4 ()   Critical Lab Value Communication.  Lulu Trujillo M.D.

## 2018-06-28 NOTE — PROGRESS NOTES
[facility]  Psychiatry  Progress Note    Patient Name: Leonardo Byrd  MRN: [id number]   Code Status: Full Code  Admission Date: 6/16/2018  Hospital Length of Stay: 12 days  Expected Discharge Date: 7/2/2018  Attending Physician: Antoni Trujillo MD  Primary Care Provider: Indio Aquino MD    Current Legal Status: N/A    Patient information was obtained from patient and ER records.     Subjective:     Principal Problem:Cardiogenic shock    Chief Complaint: Delirium    HPI: Per Cardiology notes:  47 year old with PMH of ICM (LVEF 30-35%) s/p ICD, CAD s/p PCI, HTN, HLD, current smoker, PAD (s/p aortobifemoral bypass, L SFA and pop PTAS in September and recent R SFA and R pop 3/8 by Dr. Carl Bell and finally R AKA 3/2018.     He presented to ED with about 2 week h/o SOB, cough, weight gain. He presented to [facility] ED on Saturday. Sent home after presumed negative work up. Presented once again overnight 6/13. BNP 2400, congested CXR, cough, no fever similar to prior CHF exacerbations. LActic acid elevated. He was admitted with COPD and sepsis. He was started on zmax and vanc and given a fluid bolus in ED.  He was found to not be septic the next day and all abx were stopped.  Unfortunately, though his UO was quite good and he was net - 2000cc on 6/16am, he progressively worsened from a respiratory status and became progressively hypotensive.  He was subsequently intubated and started on levo/dobutrex.  Lactate 10, k 7.1, creatinine now 1.8 from baseline 0.8, poor/no UO.  Transferred to [facility] [place] [facility] for higher level of care.     On interview this morning:  The patient is lying in bed calmly at the beginning of the interview. Two of his daughters are present and help provide some of the history. The patient is not cooperative with interview and is profoundly confused. His answers to questions are nonsensical and he is unable to follow any commands. He is CAM-ICU positive for delirium and fails SAVEAHAART.  "The patient requests some water and his daughters attempt to feed him ice chips. He then becomes agitated and violent. He attempts to get out of bed and asks for his clothes. He starts yelling. His daughters are asked to leave the room and nurses place the patient in soft restraints to his wrists and his ankle. He continues to yell for his daughters and is no longer able to participate in the interview.    Per his daughters, he is treated for depression with a medication but they do not remember the name of the medication. He has not seen a psychiatrist in the past. They state that the patient has not had a drink of alcohol in at least a month and state that he does not use any drugs of abuse. They state that the patient was "given too much of a medication" by his doctors, but they are unable to say which medication they are referring too. They deny that the patient has had any prior suicide attempts.    Hospital Course: 6/21/2018: The chart was reviewed and the patient was interviewed this morning. His father was present in the room during the interview. The patient was in restraints to his wrists bilaterally and to his left ankle. He also had a mask on his face to prevent him from spitting on staff. Per his nurse, he has continued to be very agitated and interfering with treatment. The patient was agitated during the interview this morning. His speech was profane and he was shouting at his father to "call 911". He was unable to participate in CAM-ICU assessment for delirium and was unable to follow commands.    6/22/2018: The chart was reviewed and the patient was interviewed this morning. He had a mask on his face and he was in 3-point soft restraints. He was vulgar and profoundly confused. He was unable to offer coherent answers to any questions. He failed SAVEAHAART. He was oriented to person only.     06/23/2018 - received Thorazine 50mg IM yesterday for agitation. remarkable improvement in mental status " "today. Pt alert and oriented to person, place, city, state, month, year, situation - CAM-ICU negative with 0 errors on SAVEAHAART screen. Mood is good, thoughts are linear, pt laughing and joking around appropriately. Son at bedside, relieved by the marked improvement. Step down to floor today.     06/24/2018: per chart has been calm, no PRN medication needed. Says he is doing "a lot better" today, though he complains about not being able to walk and asks when his L foot dressing will be changed. C/o pain. Oriented to place, month, year and that is was recently father's day. No family at bedside on interview.    6/25./2018: No PRN medications have been given over the past several days for agitation. The patient is calm and oriented to person, place and time. Per his father at the bedside, he is still somewhat confused and not back to his baseline yet. However, he is out of restraints and passed SAVEAHAART this morning. His mood was "good" this morning. There was some instances of incoherent mumbling, but the patient was otherwise appropriate and cooperative with the interview.    6/28/2018: Chart was reviewed and the patient was interviewed this morning. No PRN medications have been given over the past several days for agitation. The patient was calm and cooperative with interview. He stated that he was told he would have his left leg amputated below the knee tomorrow. He understands the need to have this surgery and states that it will be better since his foot has continued to hurt. He denies SI/HI/AVH. He is appropriate and pleasant on interview. He passes SAVEAHAART and is CAM-ICU negative for delirium. He denies any physical complaints or side effects to Depakote.    Interval History: See hospital course    Family History     None        Social History Main Topics    Smoking status: Former Smoker     Packs/day: 0.50     Quit date: 5/1/2018    Smokeless tobacco: Former User     Types: Chew     Quit date: " "8/2/1980      Comment: Uses nicotine gum and nicotine patches    Alcohol use Yes      Comment: rarely    Drug use: No    Sexual activity: Yes     Partners: Female     Psychotherapeutics     Start     Stop Route Frequency Ordered    06/23/18 1700  chlorproMAZINE injection 25 mg      -- IM Every 12 hours PRN 06/23/18 1600    06/19/18 1750  haloperidol lactate (HALDOL) 5 mg/mL injection     Comments:  Created by cabinet override    06/20 0559   06/19/18 1750           Review of Systems   Respiratory: Negative for shortness of breath.    Cardiovascular: Negative for chest pain.   Gastrointestinal: Negative for abdominal pain.   Neurological: Negative for headaches.     Objective:     Vital Signs (Most Recent):  Temp: 97.6 °F (36.4 °C) (06/28/18 1115)  Pulse: 100 (06/28/18 1200)  Resp: 18 (06/28/18 1115)  BP: (!) 88/56 (06/28/18 1116)  SpO2: 97 % (06/28/18 1115) Vital Signs (24h Range):  Temp:  [97.3 °F (36.3 °C)-98.7 °F (37.1 °C)] 97.6 °F (36.4 °C)  Pulse:  [] 100  Resp:  [18] 18  SpO2:  [91 %-97 %] 97 %  BP: ()/(54-75) 88/56     Height: 5' 6" (167.6 cm)  Weight:  (bed not working for weight )  Body mass index is 20.18 kg/m².      Intake/Output Summary (Last 24 hours) at 06/28/18 1625  Last data filed at 06/28/18 0600   Gross per 24 hour   Intake              660 ml   Output              850 ml   Net             -190 ml       Physical Exam      Mental Status Exam:  Appearance: unremarkable, age appropriate, normal weight, well nourished, lying in bed, dressed in hospital gown  Behavior/Cooperation: normal, cooperative  Speech: normal tone, normal rate, normal pitch, normal volume  Language: uses words appropriately; NO aphasia or dysarthria  Mood: euthymic  Affect:  congruent with mood and appropriate to situation/content   Thought Process: normal and logical  Thought Content: normal, no suicidality, no homicidality, delusions, or paranoia  Level of Consciousness: Alert and Oriented x3  Memory:  Intact    "           3/3 immediate, 3/3 at 5 minutes               Recent:  Intact              Remote:  intact  Attention/concentration: appropriate for age/education.   Fund of Knowledge: appears adequate  Insight:  Intact  Judgment: Appropriate for setting    Significant Labs:   Last 24 Hours:   Recent Lab Results       06/28/18  0555      Immature Granulocytes 1.1(H)     Immature Grans (Abs) 0.08  Comment:  Mild elevation in immature granulocytes is non specific and   can be seen in a variety of conditions including stress response,   acute inflammation, trauma and pregnancy. Correlation with other   laboratory and clinical findings is essential.  (H)     Albumin 2.9(L)     Alkaline Phosphatase 223(H)     (H)     Anion Gap 12     (H)     Baso # 0.06     Basophil% 0.8     Total Bilirubin 1.7  Comment:  For infants and newborns, interpretation of results should be based  on gestational age, weight and in agreement with clinical  observations.  Premature Infant recommended reference ranges:  Up to 24 hours.............<8.0 mg/dL  Up to 48 hours............<12.0 mg/dL  3-5 days..................<15.0 mg/dL  6-29 days.................<15.0 mg/dL  (H)     BUN, Bld 13     Calcium 9.3     Chloride 95     CO2 27     Creatinine 1.7(H)     Differential Method Automated     eGFR if  53.6(A)     eGFR if non  46.3  Comment:  Calculation used to obtain the estimated glomerular filtration  rate (eGFR) is the CKD-EPI equation.   (A)     Eos # 0.5     Eosinophil% 6.1     Glucose 86     Gran # (ANC) 3.8     Gran% 51.2     Hematocrit 31.7(L)     Hemoglobin 11.2(L)     Lymph # 2.0     Lymph% 26.3     Magnesium 2.2     MCH 31.3(H)     MCHC 35.3     MCV 89     Mono # 1.1(H)     Mono% 14.5     MPV 11.9     nRBC 0     Phosphorus 4.5     Platelets 252     Potassium 3.2(L)     Total Protein 7.5     RBC 3.58(L)     RDW 19.7(H)     Sodium 134(L)     WBC 7.50           Significant Imaging: I have reviewed  all pertinent imaging results/findings within the past 24 hours.    Assessment/Plan:     Delirium    - Pt had remarkable improvement in mental status s/p administration of thorazine 6/22 PM, now alert and oriented to all measures assessed and CAM-ICU negative - possibly due to thorazine, improvement of medical condition, or combination of both - pt passed swallow, can now take PO meds  - Discontinue scheduled zyprexa due to lack of efficacy  - Continue Depacon 250mg IV q6h   - Continue to use Thorazine 25mg IM q12h PRN for non-redirectable agitation  - Recommend daily EKG to monitor QTc while on thorazine - QTc on 6/22 477, 6/23 505, 6/24 543, 6/25 456, 6/28 467    -Implement the below DELIRIUM BEHAVIOR MANAGEMENT:  - Minimize use of restraints; if physical restraints necessary, please utilize medical/chemical prns for agitation first  - Keep shades open and room lit during day and room dim at night in order to promote healthy circadian rhythms.  - Encourage family at bedside.  - Keep whiteboard in patient's room current with the date and name of the members of patient's team for easy patient self re-orientation.  - Avoid benzodiazepines, antihistamines, anticholinergics, hypnotics, and minimize opiates while controlling for pain as these medications may worsen delirium.      Psychiatry will continue to follow             Need for Continued Hospitalization:   No need for inpatient psychiatric hospitalization. Continue medical care as per the primary team.    Anticipated Disposition: Nursing Facility     Total time:  25 with greater than 50% of this time spent in counseling and/or coordination of care.       Bruno Li MD   Psychiatry  [facility]

## 2018-06-28 NOTE — PROGRESS NOTES
Progress Note  Hospital Medicine    Primary Team: [facility]  Admit Date: 6/16/2018   Length of Stay:  LOS: 12 days   SUBJECTIVE:   Reason for Admission:  Cardiogenic shock    HPI:  47M ICM (LVEF 13%) s/p ICD, CAD s/p PCI, HTN, HLD, current smoker, PAD (s/p aortobifemoral bypass, L SFA and pop PTAS in September and recent R SFA and R pop 3/8 by Dr. Carl Bell and finally R AKA 3/2018) who presented to OSH with SOB, cough, weight, BNP 2400, congested CXR, and elevated lactic acid. He was admitted with COPD and hypotension though to be secondary to sepsis. He was started on zmax and vanc and given a fluid bolus.  He was found to not be septic the next day and all abx were stopped.  Unfortunately, he developed ADHF with worsened respiratory status and became progressively hypotensive.  He was subsequently intubated and started on levo/dobutamine. Transferred to [facility] for higher level of care.    Patient was started on SLED given poor UOP and worsening RYAN. He was kept on dobutamine 2.5 mcg/kg/min with daily CO measurements. Patient had recovering renal function and SLED was discontinued 6/17/2018. He was diuresed with IV lasix on bid then daily basis until patient became euvolemic with normal-low CVP. He was suitable for extubation on 2nd day of hospital stay however would become significantly agitated when sedation would be weaned down resulting in delaying extubation until 6/19/2018. After extubation, the patient was overtly delirious expressed by confusion, delusion, belligerence, and aggressive. He had to be restrained on the first 2 days following extubation. Psychiatry was consulted and patient was finally less psychotic and delirious on 6/23/2018 after starting scheduled depacon 250mg IV [id number] and receiving one dose of Thorazine night of 6/22/2018. Given patient's stable clinical status from his cardiogenic shock standpoint it was thought best to step down patient out of ICU settings to decreased  delirium recurrence.     ECHO 6/17  CONCLUSIONS     1 - Mild left ventricular enlargement.     2 - Severely depressed left ventricular systolic function (EF low to mid teens).     3 - Biatrial enlargement.     4 - Restrictive LV filling pattern, indicating markedly elevated LAP (grade 3 diastolic dysfunction).     5 - Low normal to mildly depressed right ventricular systolic function .     6 - Moderate or more mitral regurgitation.     7 - Mild tricuspid regurgitation.     8 - Increased central venous pressure.     9 - Pulmonary hypertension. The estimated PA systolic pressure is 45 mmHg.     Interval history:    Patient evaluated by podiatry for LLE wound.  Podiatry, ID, and vascular are consulted. Vascular is planning proximal amputation due to gangrene with periwound erythema.    Review of Systems:  Constitutional: no fever or chills  Respiratory: no cough or shortness of breath  Cardiovascular: no chest pain or palpitations  Gastrointestinal: no nausea or vomiting, no abdominal pain or change in bowel habits  Musculoskeletal: no arthralgias or myalgias     OBJECTIVE:     Temp:  [97.2 °F (36.2 °C)-98.7 °F (37.1 °C)]   Pulse:  []   Resp:  [18]   BP: ()/(55-75)   SpO2:  [91 %-99 %]  Body mass index is 20.18 kg/m².  Intake/Outake:  This Shift:  No intake/output data recorded.    Net I/O past 24h:     Intake/Output Summary (Last 24 hours) at 06/28/18 0857  Last data filed at 06/28/18 0600   Gross per 24 hour   Intake             1260 ml   Output             1525 ml   Net             -265 ml             Physical Exam:  Gen- well-developed, well-nourished, NAD  CVS- S1 and S2 present, 3/6 systolic murmur, RRR  Resp- CTA b/l, no work of breathing  Abd- BS+, soft, NT, ND  Ext- right AKA with well-healing incision; LLE without edema, s/p forefoot amputation with black necrotic borders and granulation tissue centrally    Laboratory:  CBC/Anemia Labs: Coags:      Recent Labs  Lab 06/26/18  0554 06/27/18  1568  06/28/18  0555   WBC 9.20 8.34 7.50   HGB 10.2* 10.5* 11.2*   HCT 30.0* 30.5* 31.7*    210 252   MCV 90 89 89   RDW 20.2* 19.9* 19.7*    No results for input(s): PT, INR, APTT in the last 168 hours.     Chemistries:     Recent Labs  Lab 06/26/18  0554 06/27/18  0529 06/28/18  0555   * 136 134*   K 3.8 3.4* 3.2*    99 95   CO2 24 25 27   BUN 13 12 13   CREATININE 1.3 1.4 1.7*   CALCIUM 9.1 9.1 9.3   PROT 6.8 7.0 7.5   BILITOT 2.1* 1.8* 1.7*   ALKPHOS 218* 217* 223*   * 373* 307*   * 129* 115*   MG 1.5* 1.6 2.2   PHOS 3.6 3.3 4.5        ABG:     Recent Labs  Lab 06/22/18  0533 06/25/18  1516 06/25/18  1654   PH 7.463* 7.520* 7.492*   PCO2 43.6 30.4* 34.7*   PO2 31* 73* 35*   HCO3 31.2* 24.9 26.5   POCSATURATED 63* 96 73*   BE 7 2 3        Cardiac Enzymes: Ejection Fractions:    No results for input(s): CPK, CPKMB, MB, TROPONINI in the last 72 hours. EF   Date Value Ref Range Status   06/17/2018 13 (A) 55 - 65    04/06/2018 15 (A) 55 - 65         Medications:  Scheduled Meds:   aspirin  81 mg Per OG tube Daily    atorvastatin  80 mg Per OG tube Daily    ceFEPime (MAXIPIME) IVPB  2 g Intravenous Q12H    collagenase   Topical (Top) Daily    furosemide  40 mg Oral Daily    heparin (porcine)  5,000 Units Subcutaneous Q8H    isosorbide-hydrALAZINE 20-37.5 mg  1 tablet Oral TID    metroNIDAZOLE  500 mg Oral Q8H    multivitamin liquid no.118  10 mL Oral Daily    nicotine  1 patch Transdermal Daily    senna-docusate 8.6-50 mg  1 tablet Per OG tube BID    sodium chloride 0.9%  3 mL Intravenous Q8H    thiamine  100 mg Oral Daily    ticagrelor  90 mg Per OG tube BID    valproate sodium (DEPACON) IVPB  250 mg Intravenous Q6H    vancomycin (VANCOCIN) IVPB  750 mg Intravenous Q24H                             Continuous Infusions:    PRN Meds:.acetaminophen, albuterol-ipratropium, chlorproMAZINE, levalbuterol, lidocaine HCL 2%, magnesium oxide, magnesium oxide, ondansetron, pneumoc  13-osman conj-dip cr(PF), potassium chloride 10%, potassium chloride 10%, potassium chloride 10%, potassium, sodium phosphates, potassium, sodium phosphates, potassium, sodium phosphates     ASSESSMENT/PLAN:     Active Hospital Problems    Diagnosis  POA    *Cardiogenic shock [R57.0]  Yes    Acute on chronic heart failure [I50.9]  Yes    Osteomyelitis of left foot [M86.9]  Yes    Status post transmetatarsal amputation of foot, left [Z89.432]  Not Applicable    Moderate protein-calorie malnutrition [E44.0]  Yes    Delirium [R41.0]  No    Delirium due to multiple etiologies, acute, hyperactive [F05]  No    Alteration in skin integrity related to surgical incision [R23.9]  Yes    Encephalopathy [G93.40]  Yes    Shock liver [K72.00]  Yes    COPD, severe [J44.9]  Yes    Acute renal failure [N17.9]  Yes    Leukocytosis [D72.829]  Yes    Alcohol abuse [F10.10]  Yes    Peripheral vascular disease of lower extremity [I73.9]  Yes     S/p right AKA.     TcPO2 L foot 5/11/2018   Dorsum 43 to 171 mmHg with oxygen   Medial 61 to 144 mmHg with oxygen   Chest wall baseline 43 mmHg      Tobacco abuse [Z72.0]  Yes      Resolved Hospital Problems    Diagnosis Date Resolved POA    Hyperkalemia [E87.5] 06/21/2018 Yes    RYAN (acute kidney injury) [N17.9] 06/23/2018 Yes    Dependence on ventilator, status [Z99.11] 06/20/2018 Not Applicable    Lactic acidosis [E87.2] 06/23/2018 Yes    Acute respiratory failure with hypoxia [J96.01] 06/22/2018 Yes     Peripheral vascular disease of lower extremity  Alteration in skin integrity related to surgical incision  - At right AKA. Good granulation. Will not culture. Appreciate wound care's assistance.   - Reconsulted wound care regarding left forefoot amputation  - Podiatry consulted  - Vascular studies ordered and vascular surgery consulted  - XRAY completed  - MRI likely not feasible due to ICD  - ID consultation  - Start broad spectrum abx with MRSA coverage  - Contact  isolation  - Plan for likely L BKA, timing of operation TBD    Cardiogenic shock  Acute on chronic combined HF  - likely due to ADHF following volume resuscitation when received IVF for suspected sepsis on initial presentation  - net negative 9L since admit. SLED discontinued 6/17/2018.   - has been receiving daily IV lasix 80mg prn. Last dose received 6/21/2018.   - check CVP and reassess; pt reports he was on Lasix at home, will ask Pharmacy to assist  - Nephrology following, appreciate assistance.   - Dobutamine weaned off  - Hold diuresis as below    Acute renal failure superimposed on CKDIII  - Likely ATN due to ADHF and ischemic insult with hypotension and currently with over diuresis.   - Cr slightly worse to 1.7  - Nephro following, appreciate their assistance.   - Avoid nephrotoxins and renally dose medications.   - Strict intake and output.   - Hold diuresis    Encephalopathy, metabolic  - Likely due ICU delirium.  - Psychiatry following, appreciate recs: Chlorpromazine 50 PRN, Depacon 250mg IV q6h  - Continue holding pregabalin in setting of delirium and encephalopathy.   - Delirium precautions  - Contacted psychiatry today, 6/28, for updated recs    Moderate protein-calorie malnutrition  - Poor enteral nutrition due to AMS.  - Starting oral diet.     COPD, mixed simple and mucopurulent  - Duonebs prn     Alcohol abuse  - Reportedly last drink was weeks to months ago.   - Avoiding BZD as possible.     Tobacco abuse  - Nicotine patch.    DVT ppx- add Heparin  CODE Status- FULL    Dispo- pending timing of vascular surgery     Antoni Trujillo MD  Hospital Medicine Staff

## 2018-06-28 NOTE — PROGRESS NOTES
06/28/18 1115 06/28/18 1116   Vital Signs   Temp 97.6 °F (36.4 °C) --    Temp src Oral --    Pulse 97 --    Heart Rate Source SpO2 --    Resp 18 --    SpO2 97 % --    Pulse Oximetry Type Intermittent --    O2 Device (Oxygen Therapy) room air --    BP (!) 88/54 (!) 88/56   MAP (mmHg) 66 67   BP Location Right arm Right arm   BP Method Automatic Manual   Patient Position Sitting Sitting   Lulu Trujillo M.D.  Patient requests permission to shower.

## 2018-06-28 NOTE — ASSESSMENT & PLAN NOTE
- Pt had remarkable improvement in mental status s/p administration of thorazine 6/22 PM, now alert and oriented to all measures assessed and CAM-ICU negative - possibly due to thorazine, improvement of medical condition, or combination of both - pt passed swallow, can now take PO meds  - Discontinue scheduled zyprexa due to lack of efficacy  - Continue Depacon 250mg IV q6h   - Continue to use Thorazine 25mg IM q12h PRN for non-redirectable agitation  - Recommend daily EKG to monitor QTc while on thorazine - QTc on 6/22 477, 6/23 505, 6/24 543, 6/25 456, 6/28 467    -Implement the below DELIRIUM BEHAVIOR MANAGEMENT:  - Minimize use of restraints; if physical restraints necessary, please utilize medical/chemical prns for agitation first  - Keep shades open and room lit during day and room dim at night in order to promote healthy circadian rhythms.  - Encourage family at bedside.  - Keep whiteboard in patient's room current with the date and name of the members of patient's team for easy patient self re-orientation.  - Avoid benzodiazepines, antihistamines, anticholinergics, hypnotics, and minimize opiates while controlling for pain as these medications may worsen delirium.      Psychiatry will continue to follow

## 2018-06-28 NOTE — ANESTHESIA PREPROCEDURE EVALUATION
[facility]  Anesthesia Pre-Operative Evaluation         Patient Name: Leonardo Byrd  YOB: 1970  MRN: [id number]    SUBJECTIVE:     Pre-operative evaluation for Procedure(s) (LRB):  AMPUTATION, BELOW KNEE (Left)     06/28/2018    Leonardo Byrd is a 47 y.o. male w/ a significant PMHx of ICM (LVEF 13%) s/p ICD, CAD s/p PCI, HTN, HLD, current smoker, PAD (s/p aortobifemoral bypass, L SFA and pop PTAS in September and recent R SFA and R pop 3/8 by Dr. Carl Bell and finally R AKA 3/2018) who presented to OSH on 6/17 with SOB, cough, weight, BNP 2400, congested CXR, and elevated lactic acid, treated for cardiogenic shock, was intubated, placed on dobutamine. Extubated after two days. Dobutamine has since been weaned off. Course complicated by TMA infection with necrosis progressing proximally. Currently on RA.    Patient now presents for the above procedure(s).      LDA:   Rt subclavian TLC    Prev airway: Daniela 3. Endotracheal Tube: oral, 8.0 mm ID, cuffed (inflated to minimal occlusive pressure) Attempts: 1, Grade I - full view of cords. Complicating Factors: none. Tube secured at 23 cm at the lips.    Drips: None documented.      Patient Active Problem List   Diagnosis    Peripheral vascular disease of lower extremity    Tobacco abuse    Non compliance w medication regimen    PAD (peripheral artery disease)    Hypokalemia    Chronic systolic heart failure    Acute blood loss anemia    Alcohol abuse    Acute pain of left foot    Neuropathy due to peripheral vascular disease    PVD (peripheral vascular disease)    Critical lower limb ischemia    Atherosclerosis of native artery of left lower extremity with intermittent claudication    Ischemic cardiomyopathy    Gangrene    Leukocytosis    Acute renal failure    Limb ischemia    Acute systolic congestive heart failure    Phantom pain after amputation of lower extremity    COPD, severe    Buerger's disease     Lactic acid acidosis    SIRS (systemic inflammatory response syndrome)    Severe hypotension    Cardiogenic shock    Shock liver    Alteration in skin integrity related to surgical incision    Encephalopathy    Moderate protein-calorie malnutrition    Delirium    Delirium due to multiple etiologies, acute, hyperactive    Status post transmetatarsal amputation of foot, left    Acute on chronic heart failure    Osteomyelitis of left foot       Review of patient's allergies indicates:  No Known Allergies    Current Inpatient Medications:   aspirin  81 mg Per OG tube Daily    atorvastatin  80 mg Per OG tube Daily    ceFEPime (MAXIPIME) IVPB  2 g Intravenous Q12H    collagenase   Topical (Top) Daily    heparin (porcine)  5,000 Units Subcutaneous Q8H    isosorbide-hydrALAZINE 20-37.5 mg  1 tablet Oral TID    metroNIDAZOLE  500 mg Oral Q8H    multivitamin liquid no.118  10 mL Oral Daily    nicotine  1 patch Transdermal Daily    senna-docusate 8.6-50 mg  1 tablet Per OG tube BID    sodium chloride 0.9%  3 mL Intravenous Q8H    thiamine  100 mg Oral Daily    ticagrelor  90 mg Per OG tube BID    valproate sodium (DEPACON) IVPB  250 mg Intravenous Q6H    vancomycin (VANCOCIN) IVPB  750 mg Intravenous Q24H       No current facility-administered medications on file prior to encounter.      Current Outpatient Prescriptions on File Prior to Encounter   Medication Sig Dispense Refill    amitriptyline (ELAVIL) 50 MG tablet Take 1 tablet (50 mg total) by mouth every evening. 30 tablet 11    aspirin (ECOTRIN) 81 MG EC tablet Take 81 mg by mouth once daily.      atorvastatin (LIPITOR) 80 MG tablet Take 1 tablet (80 mg total) by mouth every evening. 90 tablet 3    cilostazol (PLETAL) 50 MG Tab Take 2 tablets (100 mg total) by mouth 2 (two) times daily.      cyclobenzaprine (FLEXERIL) 10 MG tablet Take 10 mg by mouth 3 (three) times daily as needed for Muscle spasms.      folic acid-vit B6-vit B12  2.5-25-2 mg (FOLBIC OR EQUIV) 2.5-25-2 mg Tab Take 1 tablet by mouth once daily.      gabapentin (NEURONTIN) 800 MG tablet Take 800 mg by mouth every evening.      lidocaine (XYLOCAINE) 5 % Oint ointment Apply topically 4 (four) times daily. Apply to entire left foot      metoprolol succinate (TOPROL-XL) 25 MG 24 hr tablet Take 0.5 tablets (12.5 mg total) by mouth once daily.      nicotine (NICODERM CQ) 21 mg/24 hr Place 1 patch onto the skin once daily.  0    oxyCODONE (ROXICODONE) 10 mg Tab immediate release tablet Take 1 tablet (10 mg total) by mouth every 4 (four) hours as needed for Pain. 25 tablet 0    polyethylene glycol (GLYCOLAX) 17 gram PwPk Take 17 g by mouth 2 (two) times daily as needed (constipation).  0    pregabalin (LYRICA) 100 MG capsule Take 1 capsule (100 mg total) by mouth 3 (three) times daily. 90 capsule 6    ranolazine (RANEXA) 1,000 mg Tb12 Take 1,000 mg by mouth 2 (two) times daily.      senna-docusate 8.6-50 mg (PERICOLACE) 8.6-50 mg per tablet Take 1 tablet by mouth 2 (two) times daily.      thiamine 100 MG tablet Take 1 tablet (100 mg total) by mouth once daily.      ticagrelor (BRILINTA) 90 mg tablet Take 90 mg by mouth 2 (two) times daily.      zolpidem (AMBIEN) 5 MG Tab Take 1 tablet (5 mg total) by mouth nightly as needed. 30 tablet 3       Past Surgical History:   Procedure Laterality Date    AMPUTATION Right     great toe    BYBPASS GRAFT AORTOBIUFEMORAL      CARDIAC DEFIBRILLATOR PLACEMENT      coronary stents      EXPLORATION AND EVaCUATION OF HEMATOMA OF UPPER EXTREMITY Left     KNEE ARTHROPLASTY Left     VASCULAR SURGERY      fem-pop bypass       Social History     Social History    Marital status: Single     Spouse name: N/A    Number of children: N/A    Years of education: N/A     Occupational History    Not on file.     Social History Main Topics    Smoking status: Former Smoker     Packs/day: 0.50     Quit date: 5/1/2018    Smokeless tobacco: Former  User     Types: Chew     Quit date: 8/2/1980      Comment: Uses nicotine gum and nicotine patches    Alcohol use Yes      Comment: rarely    Drug use: No    Sexual activity: Yes     Partners: Female     Other Topics Concern    Not on file     Social History Narrative    No narrative on file       OBJECTIVE:     Vital Signs Range (Last 24H):  Temp:  [36.2 °C (97.2 °F)-37.1 °C (98.7 °F)]   Pulse:  []   Resp:  [18]   BP: ()/(54-75)   SpO2:  [91 %-99 %]       CBC:   Recent Labs      06/27/18   0529  06/28/18   0555   WBC  8.34  7.50   RBC  3.42*  3.58*   HGB  10.5*  11.2*   HCT  30.5*  31.7*   PLT  210  252   MCV  89  89   MCH  30.7  31.3*   MCHC  34.4  35.3       CMP:   Recent Labs      06/27/18   0529  06/28/18   0555   NA  136  134*   K  3.4*  3.2*   CL  99  95   CO2  25  27   BUN  12  13   CREATININE  1.4  1.7*   GLU  90  86   MG  1.6  2.2   PHOS  3.3  4.5   CALCIUM  9.1  9.3   ALBUMIN  2.8*  2.9*   PROT  7.0  7.5   ALKPHOS  217*  223*   ALT  373*  307*   AST  129*  115*   BILITOT  1.8*  1.7*       INR:  No results for input(s): PT, INR, PROTIME, APTT in the last 72 hours.    Diagnostic Studies: No relevant studies.    EKG (06/27/18):  Vent. Rate : 111 BPM     Atrial Rate : 111 BPM     P-R Int : 154 ms          QRS Dur : 080 ms      QT Int : 320 ms       P-R-T Axes : 058 059 091 degrees     QTc Int : 435 ms    Sinus tachycardia  Possible Left atrial enlargement  ST depression consider lateral ischemia  Septal infarct (cited on or before 21-JUN-2018)  Abnormal ECG  When compared with ECG of 26-JUN-2018 05:46,  No significant change was found    2D ECHO:  Results for orders placed or performed during the hospital encounter of 06/16/18   2D echo with color flow doppler   Result Value Ref Range    EF 13 (A) 55 - 65    Mitral Valve Regurgitation MODERATE (A)     Diastolic Dysfunction Yes (A)     Est. PA Systolic Pressure 45.25 (A)     Mitral Valve Mobility NORMAL     Tricuspid Valve Regurgitation MILD       CONCLUSIONS     1 - Mild left ventricular enlargement.     2 - Severely depressed left ventricular systolic function (EF low to mid teens).     3 - Biatrial enlargement.     4 - Restrictive LV filling pattern, indicating markedly elevated LAP (grade 3 diastolic dysfunction).     5 - Low normal to mildly depressed right ventricular systolic function .     6 - Moderate or more mitral regurgitation.     7 - Mild tricuspid regurgitation.     8 - Increased central venous pressure.     9 - Pulmonary hypertension. The estimated PA systolic pressure is 45 mmHg.    ASSESSMENT/PLAN:     Anesthesia Evaluation    I have reviewed the Patient Summary Reports.    I have reviewed the Nursing Notes.   I have reviewed the Medications.     Review of Systems  Anesthesia Hx:  No problems with previous Anesthesia  History of prior surgery of interest to airway management or planning: Denies Family Hx of Anesthesia complications.   Denies Personal Hx of Anesthesia complications.   Social:  Smoker, Alcohol Use    Hematology/Oncology:     Oncology Normal    -- Anemia: Denies Current/Recent Cancer   EENT/Dental:  EENT/Dental Normal denies chronic allergic rhinitis    Cardiovascular:   Exercise tolerance: poor Hypertension Past MI CAD   Denies CABG/stent.  Denies Dysrhythmias.  CHF     PVD    Pulmonary:   Denies COPD.  Denies Asthma.  Denies Recent URI.  Denies Sleep Apnea.    Renal/:   Chronic Renal Disease, ARF    Hepatic/GI:  Hepatic/GI Normal  Denies GERD. Denies Liver Disease.    Neurological:   Denies TIA. Denies CVA. Denies Seizures.   Peripheral Neuropathy    Endocrine:  Endocrine Normal Denies Diabetes.    Psych:   Psychiatric History          Physical Exam  General:  Well nourished    Airway/Jaw/Neck:  Airway Findings: Mouth Opening: Normal Tongue: Normal  Pre-Existing Airway Tube(s): Oral Endotracheal tube  Size: 8.0  General Airway Assessment: Adult  Mallampati: II  Improves to II with phonation.  TM Distance: Normal, at  least 6 cm        Eyes/Ears/Nose:  EYES/EARS/NOSE FINDINGS: Normal   Dental:  Dental Findings: In tact    Chest/Lungs:  Chest/Lungs Findings: Clear to auscultation, Normal Respiratory Rate     Heart/Vascular:  Heart Findings: Rate: Normal  Rhythm: Regular Rhythm  Sounds: Normal     Abdomen:  Abdomen Findings: Normal    Musculoskeletal:  Musculoskeletal Findings: Normal   Skin:  Skin Findings:     Mental Status:  Mental Status Findings:  Cooperative, Alert and Oriented         Anesthesia Plan  Type of Anesthesia, risks & benefits discussed:  Anesthesia Type:  general, MAC, regional  Patient's Preference:   Intra-op Monitoring Plan: standard ASA monitors  Intra-op Monitoring Plan Comments:   Post Op Pain Control Plan: multimodal analgesia, IV/PO Opioids PRN and per primary service following discharge from PACU  Post Op Pain Control Plan Comments:   Induction:   IV  Beta Blocker:  Patient is not currently on a Beta-Blocker (No further documentation required).       Informed Consent: Patient understands risks and agrees with Anesthesia plan.  Questions answered. Anesthesia consent signed with patient.  ASA Score: 4     Day of Surgery Review of History & Physical:    H&P update referred to the surgeon.         Ready For Surgery From Anesthesia Perspective.

## 2018-06-28 NOTE — PROGRESS NOTES
[facility]  Infectious Disease  Progress Note    Patient Name: Leonardo Byrd  MRN: [id number]  Admission Date: 6/16/2018  Length of Stay: 12 days  Attending Physician: Antoni Trujillo MD  Primary Care Provider: Indio Aquino MD    Isolation Status: Contact  Assessment/Plan:      Osteomyelitis of left foot    47 year old male with multiple co-morbidities including severe PAD with history of right AKA and left TMA who was admitted for cardiogenic shock and found to have infected left TMA site; see images in media tab; pt afebrile, no leukocytosis, no systemic signs of infection:  - prior cultures from may from left TMA site with MRSA and peptinophilus   - BKA planned for tomorrow  - wound culture of TMA with C albicans - ? Infection or colonization - will not treat at this time as surgery will definitively treat infection and is already on other Qt prolonging agents  - stable non septic    Plan:  1. Continue vancomycin, cefepime, and flagyl empirically for now  2. Will follow up cultures and tailor antibiotics accordingly  3. BKA in AM with vascular surgery for ultimate source control  4. Continue wound care to right AKA stump  5. Will follow with you            Anticipated Disposition: tbd    Thank you for your consult. I will follow-up with patient. Please contact us if you have any additional questions.    RITA Terry  Infectious Disease  [facility]    Subjective:     Principal Problem:Cardiogenic shock    HPI: This is a 47 year old male with PMH of CHF, COPD, CAD, AICD in place, HTN, HLD, MI, PAD, and PVD who was admitted for cardiogenic shock. Patient has a history of right AKA and left TMA earlier this year. He was found to have a left TMA wound. Podiatry consulted for wound recs. ID consulted for antibiotic recs. He is currently on vanc, cefepime, and flagyl. Pt denies fevers or chills. He has been following with either wound care or podiatry as  outpatient. Cultures from May from left TMA grew MRSA and peptinophilus. Patient states he was not put on antibiotics. He is somewhat of a poor historian. Patient denies pain. He wishes to go ahead with BKA at this time per our discussion. Vascular studies are pending.   Interval History: No AEON.  AFebrile and WBC WNL.  Left foot wound growing yeast.  Possible BKA Friday per Vasc sx.  The patient denies any recent fever, chills, or sweats.      Review of Systems   Constitutional: Negative for chills, diaphoresis and fever.   Respiratory: Negative for shortness of breath.    Cardiovascular: Negative for chest pain.   Gastrointestinal: Negative for abdominal pain, diarrhea, nausea and vomiting.   Genitourinary: Negative for dysuria and hematuria.     Objective:     Vital Signs (Most Recent):  Temp: 98.3 °F (36.8 °C) (06/27/18 2300)  Pulse: 96 (06/28/18 0315)  Resp: 18 (06/27/18 2226)  BP: 95/62 (06/27/18 2300)  SpO2: 96 % (06/28/18 0100) Vital Signs (24h Range):  Temp:  [97.2 °F (36.2 °C)-98.3 °F (36.8 °C)] 98.3 °F (36.8 °C)  Pulse:  [] 96  Resp:  [18] 18  SpO2:  [91 %-99 %] 96 %  BP: ()/(55-76) 95/62     Weight:  (Pt refused weight nofied the nurse jus )  Body mass index is 20.18 kg/m².    Estimated Creatinine Clearance: 51.2 mL/min (based on SCr of 1.4 mg/dL).    Physical Exam   Constitutional: He is oriented to person, place, and time. He appears well-developed and well-nourished. No distress.   Cardiovascular: Normal rate and regular rhythm.    No murmur heard.  Pulmonary/Chest: Effort normal and breath sounds normal. No respiratory distress.   Abdominal: Soft. Bowel sounds are normal. He exhibits no distension.   Musculoskeletal: Normal range of motion. He exhibits no edema.   Right AKA with small area of dehiscence; no erythema.     Left foot wrapped; images reviewed in media tab   Neurological: He is alert and oriented to person, place, and time.   Skin: Skin is warm and dry.   Psychiatric: He  has a normal mood and affect. His behavior is normal.                     Significant Labs:   Blood Culture:   Recent Labs  Lab 18  2148 18  2212 18  1706 18  2000 18  2156   LABBLOO No growth after 5 days. No growth after 5 days. No growth after 5 days.  No growth after 5 days. No growth after 5 days. No growth after 5 days.     CBC:   Recent Labs  Lab 18  0529 18  0555   WBC 8.34 7.50   HGB 10.5* 11.2*   HCT 30.5* 31.7*    252     CMP:   Recent Labs  Lab 18  0529 18  0555    134*   K 3.4* 3.2*   CL 99 95   CO2 25 27   GLU 90 86   BUN 12 13   CREATININE 1.4 1.7*   CALCIUM 9.1 9.3   PROT 7.0 7.5   ALBUMIN 2.8* 2.9*   BILITOT 1.8* 1.7*   ALKPHOS 217* 223*   * 115*   * 307*   ANIONGAP 12 12   EGFRNONAA 58.6* 46.3*     Wound Culture:   Recent Labs  Lab 18  1321 18  1150 18  1232   LABAERO No growth METHICILLIN RESISTANT STAPHYLOCOCCUS AUREUSManySkin kevin also present YEAST ModerateIdentification pending     All pertinent labs within the past 24 hours have been reviewed.    Significant Imaging: I have reviewed all pertinent imaging results/findings within the past 24 hours.   VAS Ankle Brachial Indices Resting [[id number]] Collected: 18 1125   Order Status: Completed Updated: 18 1337   Narrative:     PAT NAME: PRESTON GUTIERREZ  MED REC#: [id number]  :      1970  SEX:      M  EXAM LUIS MANUEL: 2018 11:25  REF PHYS: SALONI CONNORS      Indication:  pvd.  Results:  Lower Extremities Segmental Pressure [mmHg]:                    Right             Left  _______________________________________________________________                    Not Examined  Brachial                            104  Low Thigh                           254  Calf                                115  Posterior Tibial                    105  Dorsalis Pedis                      102  ALDO (Post. Tib.)                    1.01  ALDO (Dors. Ped.)                     0.98    The presence of artifactually elevated pressures in arteries of the lower extremity renders all pressure measurements unreliable due to suspected medial calcinosis.    Report Summary:  Impression:   Right Leg:AKA    Lft Leg:Segmental pressures suggest minimal PAOD.The PVR waveforms suggest mild peripheral arterial occlusive disease.    Sonographer: LENORE Ortiz    Electronically Signed by:  Norris Trejo MD [[id number]]                        On: 2018 13:37   VAS US Arterial Legs Bilateral [[id number]] Collected: 18 1130   Order Status: Completed Updated: 18 1337   Narrative:     PAT NAME: PRESTON GUTIERREZ  MED REC#: [id number]  :      1970  SEX:      M  EXAM LUIS MANUEL: 2018 11:30  REF PHYS: SALONI CONNORS      Indication:  pvd.  Results:                    Right                                                                                     Left  _______________________________________________________________________________  Artery            Not               Examined                                                                PSV               [cm/s]                              Stenosis          Doppler                                                                                                              prox.             mid.              distal                              Waveform  Common Femoral                                                                                                                68                                                      Superf. Femoral                                                                                             92                82                95                                    Popliteal                                                                                                   114               119               56                                    Anterior Tibial                                                                                                                                  34                                    Posterior Tibial                                                                                            91                47                56                                    Peroneal                                                                                                                      53                                                          Report Summary:  Impression:   Color flow duplex exam reveals patent arteries of the left lower extremity. The CFA appears artificial suggesting a possible aortofemoral graft. The SFA with stent is patent. There is no evidence of a focal hemodynamically significant stenosis. The   popliteal artery, PTA and peroneal arteries are patent and without evidence of significant stenosis.DFA was not visualized.    Sonographer: LENORE Ortiz    Electronically Signed by:  Norris Trejo MD [[id number]]                        On: 06/27/2018 13:37   X-Ray Foot Complete Left [[id number]] Resulted: 06/27/18 0838   Order Status: Completed Updated: 06/27/18 0840   Narrative:     EXAMINATION:  XR FOOT COMPLETE 3 VIEW LEFT    CLINICAL HISTORY:  wound;    FINDINGS:  There is amputation of all distal metatarsals distally and digits with edema and postoperative changes.  No fracture dislocation bone destruction or foreign body seen.  There is an ulcer crater.   Impression:       See above      Electronically signed by: Moe Chris MD  Date: 06/27/2018  Time: 08:38   US Abdomen Limited [[id number]] Resulted: 06/23/18 0618   Order Status: Completed Updated: 06/23/18 0621   Narrative:     EXAMINATION:  US ABDOMEN LIMITED    CLINICAL HISTORY:  rule out obstructive jaundice;    TECHNIQUE:  Limited ultrasound of the right upper quadrant of the abdomen (including pancreas, liver, gallbladder, common bile duct, and right kidney) was  performed.    COMPARISON:  CT abdomen and pelvis March 2018.    FINDINGS:  Liver: Normal in size, measuring 13.0 cm. Homogeneous echotexture. No focal hepatic lesions.    Gallbladder: There is gallbladder sludge.  No wall thickening or pericholecystic fluid.  Sonographic Caal's negative.    Biliary system: The common duct is not dilated, measuring 3 mm.  No intrahepatic ductal dilatation.    Miscellaneous: No upper abdominal ascites.   Impression:       No evidence of intra or extrahepatic biliary ductal dilatation.    Gallbladder sludge without sonographic evidence of acute cholecystitis.    Electronically signed by resident: Taz Harden  Date: 06/23/2018  Time: 05:55    Electronically signed by: Phoenix Morrell MD  Date: 06/23/2018  Time: 06:18

## 2018-06-28 NOTE — PLAN OF CARE
Problem: Patient Care Overview  Goal: Plan of Care Review    Recommendations     Recommendation/Intervention:   1. Continue current cardiac diet and encourage PO intake.   2. Order Beneprotein.   3. If pt agreeable, order Boost Plus or Boost Breeze to aid in pro/kcal intake.   4. RD following.     Goals: Meet % EEN, EPN  Nutrition Goal Status: goal not met

## 2018-06-28 NOTE — PLAN OF CARE
Problem: Occupational Therapy Goal  Goal: Occupational Therapy Goal  Outcome: Ongoing (interventions implemented as appropriate)  Goals reviewed and remain appropriate.

## 2018-06-28 NOTE — SUBJECTIVE & OBJECTIVE
Interval History: No AEON.  AFebrile and WBC WNL.  Left foot wound growing yeast.  Possible BKA Friday per Vasc sx.  The patient denies any recent fever, chills, or sweats.      Review of Systems   Constitutional: Negative for chills, diaphoresis and fever.   Respiratory: Negative for shortness of breath.    Cardiovascular: Negative for chest pain.   Gastrointestinal: Negative for abdominal pain, diarrhea, nausea and vomiting.   Genitourinary: Negative for dysuria and hematuria.     Objective:     Vital Signs (Most Recent):  Temp: 98.3 °F (36.8 °C) (06/27/18 2300)  Pulse: 96 (06/28/18 0315)  Resp: 18 (06/27/18 2226)  BP: 95/62 (06/27/18 2300)  SpO2: 96 % (06/28/18 0100) Vital Signs (24h Range):  Temp:  [97.2 °F (36.2 °C)-98.3 °F (36.8 °C)] 98.3 °F (36.8 °C)  Pulse:  [] 96  Resp:  [18] 18  SpO2:  [91 %-99 %] 96 %  BP: ()/(55-76) 95/62     Weight:  (Pt refused weight nofied the nurse jus )  Body mass index is 20.18 kg/m².    Estimated Creatinine Clearance: 51.2 mL/min (based on SCr of 1.4 mg/dL).    Physical Exam   Constitutional: He is oriented to person, place, and time. He appears well-developed and well-nourished. No distress.   Cardiovascular: Normal rate and regular rhythm.    No murmur heard.  Pulmonary/Chest: Effort normal and breath sounds normal. No respiratory distress.   Abdominal: Soft. Bowel sounds are normal. He exhibits no distension.   Musculoskeletal: Normal range of motion. He exhibits no edema.   Right AKA with small area of dehiscence; no erythema.     Left foot wrapped; images reviewed in media tab   Neurological: He is alert and oriented to person, place, and time.   Skin: Skin is warm and dry.   Psychiatric: He has a normal mood and affect. His behavior is normal.                     Significant Labs:   Blood Culture:   Recent Labs  Lab 03/27/18  2148 03/27/18  2212 06/12/18  1706 06/16/18 2000 06/16/18  2156   [facility] No growth after 5 days. No growth after 5 days. No growth  after 5 days.  No growth after 5 days. No growth after 5 days. No growth after 5 days.     CBC:   Recent Labs  Lab 18  0529 18  0555   WBC 8.34 7.50   HGB 10.5* 11.2*   HCT 30.5* 31.7*    252     CMP:   Recent Labs  Lab 18  0529 18  0555    134*   K 3.4* 3.2*   CL 99 95   CO2 25 27   GLU 90 86   BUN 12 13   CREATININE 1.4 1.7*   CALCIUM 9.1 9.3   PROT 7.0 7.5   ALBUMIN 2.8* 2.9*   BILITOT 1.8* 1.7*   ALKPHOS 217* 223*   * 115*   * 307*   ANIONGAP 12 12   EGFRNONAA 58.6* 46.3*     Wound Culture:   Recent Labs  Lab 18  1321 18  1150 18  1232   LABAERO No growth METHICILLIN RESISTANT STAPHYLOCOCCUS AUREUSManySkin kevin also present YEAST ModerateIdentification pending     All pertinent labs within the past 24 hours have been reviewed.    Significant Imaging: I have reviewed all pertinent imaging results/findings within the past 24 hours.   VAS Ankle Brachial Indices Resting [[id number]] Collected: 18 1125   Order Status: Completed Updated: 18 1337   Narrative:     PAT NAME: PERSTON GUTIERREZ  MED REC#: [id number]  :      1970  SEX:      M  EXAM LUIS MANUEL: 2018 11:25  REF PHYS: SALONI CONNORS      Indication:  pvd.  Results:  Lower Extremities Segmental Pressure [mmHg]:                    Right             Left  _______________________________________________________________                    Not Examined  Brachial                            104  Low Thigh                           254  Calf                                115  Posterior Tibial                    105  Dorsalis Pedis                      102  ALDO (Post. Tib.)                    1.01  ALDO (Dors. Ped.)                    0.98    The presence of artifactually elevated pressures in arteries of the lower extremity renders all pressure measurements unreliable due to suspected medial calcinosis.    Report Summary:  Impression:   Right Leg:AKA    Lft Leg:Segmental pressures  suggest minimal PAOD.The PVR waveforms suggest mild peripheral arterial occlusive disease.    Sonographer: LENORE Ortiz    Electronically Signed by:  Norris Trejo MD [[id number]]                        On: 2018 13:37   VAS US Arterial Legs Bilateral [[id number]] Collected: 18 1130   Order Status: Completed Updated: 18 1337   Narrative:     PAT NAME: PRESTON GUTIERREZ  MED REC#: [id number]  :      1970  SEX:      M  EXAM LUIS MANUEL: 2018 11:30  REF PHYS: SALONI CONNORS      Indication:  pvd.  Results:                    Right                                                                                     Left  _______________________________________________________________________________  Artery            Not               Examined                                                                PSV               [cm/s]                              Stenosis          Doppler                                                                                                              prox.             mid.              distal                              Waveform  Common Femoral                                                                                                                68                                                      Superf. Femoral                                                                                             92                82                95                                    Popliteal                                                                                                   114               119               56                                    Anterior Tibial                                                                                                                                 34                                    Posterior Tibial                                                                                            91                 47                56                                    Peroneal                                                                                                                      53                                                          Report Summary:  Impression:   Color flow duplex exam reveals patent arteries of the left lower extremity. The CFA appears artificial suggesting a possible aortofemoral graft. The SFA with stent is patent. There is no evidence of a focal hemodynamically significant stenosis. The   popliteal artery, PTA and peroneal arteries are patent and without evidence of significant stenosis.DFA was not visualized.    Sonographer: LENORE Ortiz    Electronically Signed by:  Norris Trejo MD [[id number]]                        On: 06/27/2018 13:37   X-Ray Foot Complete Left [[id number]] Resulted: 06/27/18 0838   Order Status: Completed Updated: 06/27/18 0840   Narrative:     EXAMINATION:  XR FOOT COMPLETE 3 VIEW LEFT    CLINICAL HISTORY:  wound;    FINDINGS:  There is amputation of all distal metatarsals distally and digits with edema and postoperative changes.  No fracture dislocation bone destruction or foreign body seen.  There is an ulcer crater.   Impression:       See above      Electronically signed by: Moe Chris MD  Date: 06/27/2018  Time: 08:38   US Abdomen Limited [[id number]] Resulted: 06/23/18 0618   Order Status: Completed Updated: 06/23/18 0621   Narrative:     EXAMINATION:  US ABDOMEN LIMITED    CLINICAL HISTORY:  rule out obstructive jaundice;    TECHNIQUE:  Limited ultrasound of the right upper quadrant of the abdomen (including pancreas, liver, gallbladder, common bile duct, and right kidney) was performed.    COMPARISON:  CT abdomen and pelvis March 2018.    FINDINGS:  Liver: Normal in size, measuring 13.0 cm. Homogeneous echotexture. No focal hepatic lesions.    Gallbladder: There is gallbladder sludge.  No wall thickening or pericholecystic fluid.   Sonographic Caal's negative.    Biliary system: The common duct is not dilated, measuring 3 mm.  No intrahepatic ductal dilatation.    Miscellaneous: No upper abdominal ascites.   Impression:       No evidence of intra or extrahepatic biliary ductal dilatation.    Gallbladder sludge without sonographic evidence of acute cholecystitis.    Electronically signed by resident: Taz Harden  Date: 06/23/2018  Time: 05:55    Electronically signed by: Phoenix Morrell MD  Date: 06/23/2018  Time: 06:18

## 2018-06-28 NOTE — CONSULTS
Leonardo Byrd  06/28/2018    Vascular Surgery Consult Note    CC: left foot gangrene, evaluate for BKA     HPI:  Patient is a 47 y.o. male with a h/o HTN, HLD, HFrEF (10-15% 3/23/18) s/p ICD, CAD s/p PCI, EtOH abuse, PAD s/p ABF 1999 with multiple endovascular interventions presented to hospital on 3/18/18 with left foot rest pain.     S/p  3/28/18 :  R AKA for non-salvagable R leg     He was taken to cath lab on 3/19 with interventional cardiology via R CFA access with lysis performed.  Was noted to have L SFA occlusion, underwent lysis with tPA.  Catheter removed on 3/23/17 after PTA/laser arthrectomy with flow to the PT. Patient did have to be intubated during this time secondary to delirium tremens, was subsequently extubated.     IC was able to restore flow with tPA and left toes were left to demarcate then subsequently underwent TMA with Dr. Mims on 04/05/18. Patient noted to have ruby-incisional ischemic necrosis that is progressing. Patient was sent to [facility] for admission and ABx with vascular surgery evaluation for possible BKA.     Of note, patient also has required hospital admission for cardiogenic shock requiring pressors 06/12/18 and had to transition through ICU and now in CCU.      + MI; no h/o stroke  Tobacco use: Patient quit smoking 6 weeks prior 0.5-1 ppd x37 years.    Past Medical History:   Diagnosis Date    AICD (automatic cardioverter/defibrillator) present     RYAN (acute kidney injury) 3/25/2018    CHF (congestive heart failure)     COPD with acute bronchitis 6/12/2018    Coronary artery disease     Encounter for blood transfusion     Hx of psychiatric care     Hyperlipemia     Hypertension     MI (myocardial infarction)     Neuropathy     PAD (peripheral artery disease)     Psychiatric problem     PVD (peripheral vascular disease)      Past Surgical History:   Procedure Laterality Date    AMPUTATION Right     great toe    BYBPASS GRAFT AORTOBIUFEMORAL      CARDIAC  DEFIBRILLATOR PLACEMENT      coronary stents      EXPLORATION AND EVaCUATION OF HEMATOMA OF UPPER EXTREMITY Left     KNEE ARTHROPLASTY Left     VASCULAR SURGERY      fem-pop bypass     History reviewed. No pertinent family history.  Social History     Social History    Marital status: Single     Spouse name: N/A    Number of children: N/A    Years of education: N/A     Occupational History    Not on file.     Social History Main Topics    Smoking status: Former Smoker     Packs/day: 0.50     Quit date: 5/1/2018    Smokeless tobacco: Former User     Types: Chew     Quit date: 8/2/1980      Comment: Uses nicotine gum and nicotine patches    Alcohol use Yes      Comment: rarely    Drug use: No    Sexual activity: Yes     Partners: Female     Other Topics Concern    Not on file     Social History Narrative    No narrative on file     Review of patient's allergies indicates:  No Known Allergies    Current Facility-Administered Medications:     acetaminophen tablet 650 mg, 650 mg, Oral, Q8H PRN, Brian Oswald MD, 650 mg at 06/27/18 2234    albuterol-ipratropium 2.5 mg-0.5 mg/3 mL nebulizer solution 3 mL, 3 mL, Nebulization, Q4H PRN, Brian Oswald MD    aspirin chewable tablet 81 mg, 81 mg, Per OG tube, Daily, Lisa Benitez MD, 81 mg at 06/27/18 0809    atorvastatin tablet 80 mg, 80 mg, Per OG tube, Daily, Easton Brownlee MD, 80 mg at 06/27/18 0809    ceFEPIme injection 2 g, 2 g, Intravenous, Q12H, RITA Orr, 2 g at 06/27/18 1807    chlorproMAZINE injection 25 mg, 25 mg, Intramuscular, Q12H PRN, Dustin Chapin MD    collagenase ointment, , Topical (Top), Daily, Ana Gomez MD    furosemide tablet 40 mg, 40 mg, Oral, Daily, Antoni Trujillo MD, 40 mg at 06/27/18 0809    heparin (porcine) injection 5,000 Units, 5,000 Units, Subcutaneous, Q8H, Ana Gomez MD, 5,000 Units at 06/27/18 2225    isosorbide-hydrALAZINE 20-37.5 mg per tablet 1 tablet, 1 tablet, Oral, TID,  Cary Harris MD, 1 tablet at 06/27/18 2225    levalbuterol nebulizer solution 1.25 mg, 1.25 mg, Nebulization, Q6H PRN, Brian Oswald MD    lidocaine HCL 2% jelly, , Topical (Top), PRN, Brian Oswald MD    magnesium oxide tablet 800 mg, 800 mg, Oral, PRN, Brian Oswald MD    magnesium oxide tablet 800 mg, 800 mg, Oral, PRN, Brian Oswald MD    metroNIDAZOLE tablet 500 mg, 500 mg, Oral, Q8H, Antoni Trujillo MD, 500 mg at 06/27/18 2226    multivitamin liquid no.118 per dose 10 mL, 10 mL, Oral, Daily, Cary Harris MD, 10 mL at 06/27/18 0810    nicotine 14 mg/24 hr 1 patch, 1 patch, Transdermal, Daily, Brian Oswald MD, 1 patch at 06/27/18 0809    ondansetron injection 4 mg, 4 mg, Intravenous, Q8H PRN, Brian Oswald MD    pneumoc 13-osman conj-dip cr(PF) 0.5 mL, 0.5 mL, Intramuscular, Prior to discharge, Sae Carr MD    potassium chloride 10% oral solution 40 mEq, 40 mEq, Oral, PRN, Brian Oswald MD, 40 mEq at 06/19/18 0627    potassium chloride 10% oral solution 40 mEq, 40 mEq, Oral, PRN, Brian Oswald MD, 40 mEq at 06/18/18 0122    potassium chloride 10% oral solution 60 mEq, 60 mEq, Oral, PRN, Brian Oswald MD    potassium, sodium phosphates 280-160-250 mg packet 2 packet, 2 packet, Oral, PRN, Brian Oswald MD    potassium, sodium phosphates 280-160-250 mg packet 2 packet, 2 packet, Oral, PRN, Brian Oswald MD, 2 packet at 06/18/18 0608    potassium, sodium phosphates 280-160-250 mg packet 2 packet, 2 packet, Oral, PRN, Brian Oswald MD, 2 packet at 06/18/18 0122    senna-docusate 8.6-50 mg per tablet 1 tablet, 1 tablet, Per OG tube, BID, Easton Brownlee MD, 1 tablet at 06/27/18 2226    sodium chloride 0.9% flush 3 mL, 3 mL, Intravenous, Q8H, Brian Oswald MD, 3 mL at 06/27/18 2226    thiamine tablet 100 mg, 100 mg, Oral, Daily, Cary Harris MD, 100 mg at 06/27/18 0810    ticagrelor tablet 90 mg,  90 mg, Per OG tube, BID, Brian Oswald MD, 90 mg at 06/27/18 2225    valproate (DEPACON) 250 mg in dextrose 5 % 50 mL IVPB, 250 mg, Intravenous, Q6H, Bruno Li MD, Last Rate: 50 mL/hr at 06/28/18 0008, 250 mg at 06/28/18 0008    vancomycin (VANCOCIN) 750 mg in dextrose 5 % 250 mL IVPB, 750 mg, Intravenous, Q24H, Antoni Trujillo MD, Last Rate: 166.7 mL/hr at 06/27/18 1420, 750 mg at 06/27/18 1420    REVIEW OF SYSTEMS:  General: negative;   ENT: negative;   Allergy and Immunology: negative;   Hematological and Lymphatic: Negative;   Endocrine: negative;   Respiratory: no cough, shortness of breath, or wheezing;   Cardiovascular: no chest pain or dyspnea on exertion;   Gastrointestinal: no abdominal pain/back, change in bowel habits, or bloody stools; Genito-Urinary: no dysuria, trouble voiding, or hematuria;   Musculoskeletal: negative  Neurological: no TIA or stroke symptoms;   Psychiatric: no nervousness, anxiety or depression.    PHYSICAL EXAM:      Pulse: 96  Temp: 98.3 °F (36.8 °C)      General appearance:  Alert, well-appearing, and in no distress.  Oriented to person, place, and time   Neurological:  Normal speech, no focal findings noted; CN II - XII grossly intact           Musculoskeletal: Digits/nail without cyanosis/clubbing.  Normal muscle strength/tone.                 Neck: Supple, no significant adenopathy; thyroid is not enlarged                Chest:  Clear to auscultation, no wheezes, rales or rhonchi, symmetric air entry      No use of accessory muscles             Cardiac: Normal rate and regular rhythm, S1 and S2 normal; PMI non-displaced          Abdomen: Soft, nontender, nondistended, no masses or organomegaly      No rebound tenderness noted; bowel sounds normal      Extremities:   2+ femoral pulses bilaterally      Right AKA stump with medial incision dehiscence and some fibrinous cap.       No erythema or purulence.       Left pop 2+      Left TMA incision with ischemic  necrosis and ischemic changes extending proximally on dorsal and plantar surfaces.       Incisional breakdown and probes down to bone.   LAB RESULTS:  Lab Results   Component Value Date    K 3.4 (L) 06/27/2018    K 3.8 06/26/2018    K 4.0 06/25/2018    CREATININE 1.4 06/27/2018    CREATININE 1.3 06/26/2018    CREATININE 1.4 06/25/2018     Lab Results   Component Value Date    WBC 8.34 06/27/2018    WBC 9.20 06/26/2018    WBC 7.79 06/25/2018    HCT 30.5 (L) 06/27/2018    HCT 30.0 (L) 06/26/2018    HCT 31.8 (L) 06/25/2018     06/27/2018     06/26/2018     06/25/2018     Lab Results   Component Value Date    HGBA1C 5.4 09/26/2017       IMAGING:   VAS US Art Left leg  Impression:   Color flow duplex exam reveals patent arteries of the left lower extremity. The CFA appears artificial suggesting a possible aortofemoral graft. The SFA with stent is patent. There is no evidence of a focal hemodynamically significant stenosis. The   popliteal artery, PTA and peroneal arteries are patent and without evidence of significant stenosis.DFA was not visualized.    VAS ABIs  Results:  Lower Extremities Segmental Pressure [mmHg]:                    Right             Left  _______________________________________________________________                    Not Examined  Brachial                            104  Low Thigh                           254  Calf                                115  Posterior Tibial                    105  Dorsalis Pedis                      102  ALDO (Post. Tib.)                    1.01  ALDO (Dors. Ped.)                    0.98    The presence of artifactually elevated pressures in arteries of the lower extremity renders all pressure measurements unreliable due to suspected medial calcinosis.    IMP/PLAN:  47 y.o. male with h/o HTN, HLD, HFrEF (10-15% 3/23/18) s/p ICD, CAD s/p PCI, EtOH abuse, PAD s/p ABF 1999 with multiple endovascular interventions presented to hospital on 3/18/18 with  left foot rest pain - underwent R AKA after problems with attempts at L leg revascularization with Cardiology. Subsequent left TMA with now ischemic necrosis of TMA incision with necrosis progressing proximally. Patient also had to be readmitted to hospital on 06/12/18 for decompensated CHF and cardiogenic shock requiring pressors.     - right aka wound with medial wound that has fibrinous exudates, close monitoring with ongoing local wound care.   - Left foot gangrene, unsalvageable. Patent Pop a and likely best served with BKA.   - Will discuss with Dr. Trejo timing of operation. Likely Fri.      Carlos Goff MD  Vascular/Endovascular Surgery Fellow

## 2018-06-28 NOTE — SUBJECTIVE & OBJECTIVE
"Interval History: See hospital course    Family History     None        Social History Main Topics    Smoking status: Former Smoker     Packs/day: 0.50     Quit date: 5/1/2018    Smokeless tobacco: Former User     Types: Chew     Quit date: 8/2/1980      Comment: Uses nicotine gum and nicotine patches    Alcohol use Yes      Comment: rarely    Drug use: No    Sexual activity: Yes     Partners: Female     Psychotherapeutics     Start     Stop Route Frequency Ordered    06/23/18 1700  chlorproMAZINE injection 25 mg      -- IM Every 12 hours PRN 06/23/18 1600    06/19/18 1750  haloperidol lactate (HALDOL) 5 mg/mL injection     Comments:  Created by cabinet override    06/20 0559   06/19/18 1750           Review of Systems   Respiratory: Negative for shortness of breath.    Cardiovascular: Negative for chest pain.   Gastrointestinal: Negative for abdominal pain.   Neurological: Negative for headaches.     Objective:     Vital Signs (Most Recent):  Temp: 97.6 °F (36.4 °C) (06/28/18 1115)  Pulse: 100 (06/28/18 1200)  Resp: 18 (06/28/18 1115)  BP: (!) 88/56 (06/28/18 1116)  SpO2: 97 % (06/28/18 1115) Vital Signs (24h Range):  Temp:  [97.3 °F (36.3 °C)-98.7 °F (37.1 °C)] 97.6 °F (36.4 °C)  Pulse:  [] 100  Resp:  [18] 18  SpO2:  [91 %-97 %] 97 %  BP: ()/(54-75) 88/56     Height: 5' 6" (167.6 cm)  Weight:  (bed not working for weight )  Body mass index is 20.18 kg/m².      Intake/Output Summary (Last 24 hours) at 06/28/18 1625  Last data filed at 06/28/18 0600   Gross per 24 hour   Intake              660 ml   Output              850 ml   Net             -190 ml       Physical Exam      Mental Status Exam:  Appearance: unremarkable, age appropriate, normal weight, well nourished, lying in bed, dressed in hospital gown  Behavior/Cooperation: normal, cooperative  Speech: normal tone, normal rate, normal pitch, normal volume  Language: uses words appropriately; NO aphasia or dysarthria  Mood: euthymic  Affect:  " congruent with mood and appropriate to situation/content   Thought Process: normal and logical  Thought Content: normal, no suicidality, no homicidality, delusions, or paranoia  Level of Consciousness: Alert and Oriented x3  Memory:  Intact              3/3 immediate, 3/3 at 5 minutes               Recent:  Intact              Remote:  intact  Attention/concentration: appropriate for age/education.   Fund of Knowledge: appears adequate  Insight:  Intact  Judgment: Appropriate for setting    Significant Labs:   Last 24 Hours:   Recent Lab Results       06/28/18  0555      Immature Granulocytes 1.1(H)     Immature Grans (Abs) 0.08  Comment:  Mild elevation in immature granulocytes is non specific and   can be seen in a variety of conditions including stress response,   acute inflammation, trauma and pregnancy. Correlation with other   laboratory and clinical findings is essential.  (H)     Albumin 2.9(L)     Alkaline Phosphatase 223(H)     (H)     Anion Gap 12     (H)     Baso # 0.06     Basophil% 0.8     Total Bilirubin 1.7  Comment:  For infants and newborns, interpretation of results should be based  on gestational age, weight and in agreement with clinical  observations.  Premature Infant recommended reference ranges:  Up to 24 hours.............<8.0 mg/dL  Up to 48 hours............<12.0 mg/dL  3-5 days..................<15.0 mg/dL  6-29 days.................<15.0 mg/dL  (H)     BUN, Bld 13     Calcium 9.3     Chloride 95     CO2 27     Creatinine 1.7(H)     Differential Method Automated     eGFR if  53.6(A)     eGFR if non  46.3  Comment:  Calculation used to obtain the estimated glomerular filtration  rate (eGFR) is the CKD-EPI equation.   (A)     Eos # 0.5     Eosinophil% 6.1     Glucose 86     Gran # (ANC) 3.8     Gran% 51.2     Hematocrit 31.7(L)     Hemoglobin 11.2(L)     Lymph # 2.0     Lymph% 26.3     Magnesium 2.2     MCH 31.3(H)     MCHC 35.3     MCV 89      Mono # 1.1(H)     Mono% 14.5     MPV 11.9     nRBC 0     Phosphorus 4.5     Platelets 252     Potassium 3.2(L)     Total Protein 7.5     RBC 3.58(L)     RDW 19.7(H)     Sodium 134(L)     WBC 7.50           Significant Imaging: I have reviewed all pertinent imaging results/findings within the past 24 hours.

## 2018-06-28 NOTE — ASSESSMENT & PLAN NOTE
47 year old male with multiple co-morbidities including severe PAD with history of right AKA and left TMA who was admitted for cardiogenic shock and found to have infected left TMA site; see images in media tab; pt afebrile, no leukocytosis, no systemic signs of infection:  - prior cultures from may from left TMA site with MRSA and peptinophilus   - BKA planned for tomorrow  - wound culture of TMA with C albicans - ? Infection or colonization - will not treat at this time as surgery will definitively treat infection and is already on other Qt prolonging agents  - stable non septic    Plan:  1. Continue vancomycin, cefepime, and flagyl empirically for now  2. Will follow up cultures and tailor antibiotics accordingly  3. BKA in AM with vascular surgery for ultimate source control  4. Continue wound care to right AKA stump  5. Will follow with you

## 2018-06-28 NOTE — PROGRESS NOTES
" [facility]  Adult Nutrition  Progress Note    SUMMARY       Recommendations    Recommendation/Intervention:   1. Continue current cardiac diet and encourage PO intake.   2. Order Beneprotein.   3. If pt agreeable, order Boost Plus or Boost Breeze to aid in pro/kcal intake.   4. RD following.    Goals: Meet % EEN, EPN  Nutrition Goal Status: goal not met  Communication of RD Recs: reviewed with RN    Reason for Assessment    Reason for Assessment: RD follow-up  Diagnosis: other (see comments) (Cardiogenic shock)  Relevant Medical History: CHF, HTN, HLD  Interdisciplinary Rounds: did not attend    General Information Comments: Pt reports improving appetite at this time but does not like food so is only eating ~25%. Encouraged pt to eat at least 50% of meals. Agreeable to Beneprotein but dislikes Boost Plus and Boost Breeze. Pt reports a UBW of 146lb but has since undergone a R AKA. Do not believe sufficient criteria present for malnutrition diagnosis. No overt physical signs of malnutrition noted at this time. Pt for L BKA tomorrow.    Nutrition Discharge Planning: Unable to determine    Nutrition Risk Screen    Nutrition Risk Screen: no indicators present    Nutrition/Diet History    Patient Reported Diet/Restrictions/Preferences: general  Do you have any cultural, spiritual, Restorationist conflicts, given your current situation?: none reported   Factors Affecting Nutritional Intake: decreased appetite (food preferences)    Anthropometrics    Temp: 97.6 °F (36.4 °C)  Height: 5' 6" (167.6 cm)  Height (inches): 66 in  Weight Method: Bed Scale  Weight:  (bed not working for weight )  Weight (lb): 125 lb  Ideal Body Weight (IBW), Male: 142 lb  % Ideal Body Weight, Male (lb): 90.67 lb  BMI (Calculated): 20.8  BMI Grade: 18.5-24.9 - normal  Usual Body Weight (UBW), k.2 kg (Per chart review)  % Usual Body Weight: 88.4  % Weight Change From Usual Weight: -11.78 %  Amputation %: 16  Total " Amputation %: 16  Amputation Ideal Body Weight (IBW), Male (lb): 126 lb  Amputee BMI (kg/m2): 24.81 kg/m2       Lab/Procedures/Meds    Pertinent Labs Reviewed: reviewed  Pertinent Labs Comments: Na 134, K 3.2, Crt 1.7, GFR 46.3, Alk Phos 223, Alb 2.9, T.Bili 1.7, ,   Pertinent Medications Reviewed: reviewed  Pertinent Medications Comments: statin, heparin, Mg, MVI, KCl, senna docusate, thiamine    Physical Findings/Assessment    Overall Physical Appearance: amputee, underweight  Tubes:  (none)  Oral/Mouth Cavity: WDL  Skin: intact    Estimated/Assessed Needs    Weight Used For Calorie Calculations: 58.4 kg (128 lb 12 oz)  Energy Calorie Requirements (kcal): 1740 kcal/d  Energy Need Method: [place]-St Jeor (1.25 PAL)  Protein Requirements: 70-82 gm (1.2-1.4 gm/kg)  Weight Used For Protein Calculations: 58.4 kg (128 lb 12 oz)     Fluid Need Method: other (see comments) (Per MD or 1 mL/kcal)  RDA Method (mL): 1740       Nutrition Prescription Ordered    Current Diet Order: Cardiac  Nutrition Order Comments: 2L FR    Evaluation of Received Nutrient/Fluid Intake    I/O: +129ml  Comments: LBM 6/27  % Intake of Estimated Energy Needs: 0 - 25 %  % Meal Intake: 0 - 25 %    Nutrition Risk    Level of Risk/Frequency of Follow-up: high     Assessment and Plan    Nutrition Problem  Inadequate energy intake    Related to (etiology):   Food preferences and decreased appetite    Signs and Symptoms (as evidenced by):   Documented intake of 25% of meals     Interventions/Recommendations (treatment strategy):  See recs above.    Nutrition Diagnosis Status:   New       Monitor and Evaluation    Food and Nutrient Intake: energy intake, food and beverage intake, enteral nutrition intake  Food and Nutrient Adminstration: diet order, enteral and parenteral nutrition administration  Physical Activity and Function: nutrition-related ADLs and IADLs  Anthropometric Measurements: weight, weight change  Biochemical Data, Medical  Tests and Procedures: lipid profile, inflammatory profile, glucose/endocrine profile, gastrointestinal profile, electrolyte and renal panel  Nutrition-Focused Physical Findings: overall appearance     Nutrition Follow-Up    RD Follow-up?: Yes

## 2018-06-28 NOTE — PLAN OF CARE
Problem: Patient Care Overview  Goal: Plan of Care Review  Plan of care reviewed w/ pt. No acute events overnight. A&Ox4 and VSS. NSR/ST on tele. IV Vanc q24, IV Cefepime q12, PO Flagyl q12. Due for Vanc trough today. Vasc. Surgery consulted for poss. MANUELA KOHLER.

## 2018-06-29 NOTE — OP NOTE
[facility]  Surgery Department  Operative Note    SUMMARY     Patient: Leonardo Byrd  Date: 6/29/2018  MRN: [id number]    Date of Procedure: 6/29/2018     Procedure: Procedure(s) (LRB):  AMPUTATION, BELOW KNEE (Left)     Surgeon(s) and Role:     * Norris Trejo MD - Primary     * Antoni Schultz MD - Resident - Assisting     * Antoni Lamb MD - Resident - Assisting    Pre-Operative Diagnosis: Subacute osteomyelitis of left foot [M86.272]    Post-Operative Diagnosis: Post-Op Diagnosis Codes:     * Subacute osteomyelitis of left foot [M86.272]    Anesthesia: Regional    Indications for Procedure:   Leonardo Byrd is a 47 y.o.  male with Hx of osteomyelitis of the left leg without ability to salvage the leg. We offered BKA to which he agreed.     Procedure in Detail:   After appropriate informed consent obtained, the patient was taken to the operating room and placed in the supine position. The patient had previously undergone regional anesthesia to the left leg by the anesthesia team. The patient was sedated as well by anesthesia, but no general anesthesia started. The left lower extremity was prepped and draped in usual sterile fashion. After appropriate measurements, a transverse incision made approximately 10cm distal to the tibial plateau to about one half the circumference of the lower extremity. Only sharp dissection was used throughout the case. We extended our incisions down through the fascia and along the entirety of the long posterior flap. We then proceeded to elevate the periosteum using an elevator. We then divided the anterior compartment and found the AT bundle and double ligated it proximally first with an 0 prolene stick tie and 0 silk tie. We tied off distally with just an 0 silk. Next we then used the gigli saw to cut through the tibia, veering superiorly at the anterior aspect. Once divided we proceeded to cut through the deep posterior compartment encountering the TP trunk,  we proceeded to tie it off in a similar manner as previously described for the AT neurovascular bundle. Next, having identified all the vessels and appropriately sutured them, we proceeded to saw through the fibula with a gigli saw. Having done this, we then proceeded to cut the posterior flap off with a large blade. We passed the specimen off the field. Next we proceeded to tie off any venous bleeding with silk ties. We then closed the fascia over the fibia using 2-0 vicryl suture. We then closed skin using 3-0 interrupted vicryl suture and staples. Appropriate dressings were then applied.        Drains: None    Estimated Blood Loss (EBL): 100cc    Complications: No    Specimens:   Specimen (12h ago through future)    Start     Ordered    06/29/18 1126  Specimen to Pathology - Surgery  Once     Comments:  1. Left Leg for Gross      06/29/18 1126          Condition: Good    Disposition: PACU - hemodynamically stable.    Attestation: Dr. Trejo was present and scrubbed for the all key portions of the procedure.      Antoni Schultz MD PGY III  [phone]

## 2018-06-29 NOTE — NURSING TRANSFER
Nursing Transfer Note      6/29/2018     Transfer 511    Transfer via stretcher    Transfer with IV pole, tele    Transported by transport    Medicines sent: TAYLOR gonzalez    Chart send with patient: yes    Notified: father    Patient reassessed at: upon arrival to the unit

## 2018-06-29 NOTE — ASSESSMENT & PLAN NOTE
47 y.o. male with h/o HTN, HLD, HFrEF (10-15% 3/23/18) s/p ICD, CAD s/p PCI, EtOH abuse, PAD s/p ABF 1999 with multiple endovascular interventions presented to hospital on 3/18/18 with left foot rest pain - underwent R AKA after problems with attempts at L leg revascularization with Cardiology. Subsequent left TMA with now ischemic necrosis of TMA incision with necrosis progressing proximally. Patient also had to be readmitted to hospital on 06/12/18 for decompensated CHF and cardiogenic shock requiring pressors.      - right aka wound with medial wound that has fibrinous exudates, close monitoring with ongoing local wound care.   - Left foot gangrene, unsalvageable. Patent Pop a and likely best served with BKA.   - to OR today for BKA  -.Risks, benefits, and alternatives were discussed with the patient, and full informed consent was obtained prior to the procedure.

## 2018-06-29 NOTE — ANESTHESIA PROCEDURE NOTES
Sciatic Nerve Catheter    Patient location during procedure: pre-op   Block not for primary anesthetic.  Reason for block: at surgeon's request and post-op pain management   Post-op Pain Location: left LE pain   Start time: 6/29/2018 9:42 AM  Timeout: 6/29/2018 9:40 AM   End time: 6/29/2018 10:01 AM  Staffing  Anesthesiologist: VICKI VOGEL  Resident/CRNA: VANESSA GARZA  Performed: resident/CRNA   Preanesthetic Checklist  Completed: patient identified, site marked, surgical consent, pre-op evaluation, timeout performed, IV checked, risks and benefits discussed and monitors and equipment checked  Peripheral Block  Prep: ChloraPrep and site prepped and draped  Patient monitoring: heart rate, cardiac monitor, continuous pulse ox, continuous capnometry and frequent blood pressure checks  Block type: sciatic  Laterality: left  Injection technique: continuous  Needle  Needle type: Tuohy   Needle gauge: 18 G  Needle length: 3.5 in  Needle localization: anatomical landmarks and nerve stimulator  Catheter type: non-stimulating  Catheter size: 20 G  Test dose: lidocaine 1.5% with Epi 1-to-200,000 and negative     Assessment  Injection assessment: negative aspiration and negative parasthesia  Paresthesia pain: none  Heart rate change: no  Slow fractionated injection: yes  Medications:  Bolus administered: 30 mL of 0.5 bupivacaine  Epinephrine added: 3.75 mcg/mL (1/300,000)  Additional Notes  Performed Subgluteal location.  Cirilo test with appropriate loss of twitch with injection of local anesthetic.  VSS.  DOSC RN monitoring vitals throughout procedure.  Patient tolerated procedure well.

## 2018-06-29 NOTE — ANESTHESIA POSTPROCEDURE EVALUATION
"Anesthesia Post Evaluation    Patient: Leonardo Byrd    Procedure(s) Performed: Procedure(s) (LRB):  AMPUTATION, BELOW KNEE (Left)    Final Anesthesia Type: regional  Patient location during evaluation: PACU  Patient participation: Yes- Able to Participate  Level of consciousness: awake and alert  Post-procedure vital signs: reviewed and stable  Pain management: adequate  Airway patency: patent  PONV status at discharge: No PONV  Anesthetic complications: no      Cardiovascular status: hemodynamically stable  Respiratory status: unassisted  Hydration status: euvolemic  Follow-up not needed.        Visit Vitals  /66   Pulse 88   Temp 36.7 °C (98 °F) (Temporal)   Resp 18   Ht 5' 6" (1.676 m)   Wt 56.7 kg (125 lb)   SpO2 100%   BMI 20.18 kg/m²       Pain/Desiree Score: Pain Assessment Performed: Yes (6/29/2018  1:00 PM)  Presence of Pain: denies (6/29/2018  1:00 PM)  Pain Rating Prior to Med Admin: 0 (6/29/2018 12:34 PM)  Pain Rating Post Med Admin: 0 (6/29/2018  1:00 PM)  Desiree Score: 10 (6/29/2018  1:00 PM)      "

## 2018-06-29 NOTE — NURSING TRANSFER
Nursing Transfer Note      6/29/2018     Transfer To: Surgery    Transfer via stretcher    Transfer with cardiac monitoring, CSU telemetry box    Transported by Surgery Team    Medicines sent: Magnesium and Valproate    Chart send with patient: Yes    Notified: Father    Morning cleaning prep completed  Per Lucia and transport, CSU telemetry box will be returned.

## 2018-06-29 NOTE — TRANSFER OF CARE
"Anesthesia Transfer of Care Note    Patient: Leonardo Byrd    Procedure(s) Performed: Procedure(s) (LRB):  AMPUTATION, BELOW KNEE (Left)    Patient location: PACU    Anesthesia Type: MAC and regional    Transport from OR: Transported from OR on room air with adequate spontaneous ventilation    Post pain: adequate analgesia    Post assessment: no apparent anesthetic complications and tolerated procedure well    Post vital signs: stable    Level of consciousness: awake, alert and oriented    Nausea/Vomiting: no nausea/vomiting    Complications: none    Transfer of care protocol was followed      Last vitals:   Visit Vitals  /60 (BP Location: Right arm, Patient Position: Lying)   Pulse 85   Temp 37.1 °C (98.8 °F) (Temporal)   Resp 17   Ht 5' 6" (1.676 m)   Wt 56.7 kg (125 lb)   SpO2 100%   BMI 20.18 kg/m²     "

## 2018-06-29 NOTE — PLAN OF CARE
POC reviewed with pt, acknowledged understanding. Pt AAo x 4. Pt remains free of falls/injuries. Pt on telemetry remains SR. Pt tolerating clear liquid diet. Pt pain controlled with prescribed meds, ropivicane drip. L leg CDI, w/ ace wrap and leg brace. No acute events throughout shift. No distress noted, will continue to monitor.

## 2018-06-29 NOTE — CARE UPDATE
After reviewing chart, pt seems to be on ticagrelor for PAD? And not CAD. No recent heart cath from procedure tab or noted in todays note from Dr. Trujillo. Will hold med in am for procedure tomorrow and can restart as needed.     Lee James MD

## 2018-06-29 NOTE — PROGRESS NOTES
Paged Med C on call to notify of pt's request for pain medication.     0100: Spoke with  and orders received.

## 2018-06-29 NOTE — PROGRESS NOTES
-Pt to OR today for L BKA per vascular surgery  -Podiatry will sign off, please re-consult if necessary  -Please contact the on-call podiatry resident with questions or concerns    Thank you,     Lucia Eubanks  DPM PGY-2  [phone]

## 2018-06-29 NOTE — PROGRESS NOTES
[facility]  Vascular Surgery  Progress Note    Patient Name: Leonardo Byrd  MRN: [id number]  Admission Date: 6/16/2018  Primary Care Provider: Indio Aquino MD    Subjective:     Interval History: NAEON. NPO since midnight ahead of procedure    Post-Op Info:  Procedure(s) (LRB):  AMPUTATION, BELOW KNEE (Left)           Medications:  Continuous Infusions:  Scheduled Meds:   aspirin  81 mg Per OG tube Daily    atorvastatin  80 mg Per OG tube Daily    ceFEPime (MAXIPIME) IVPB  2 g Intravenous Q12H    collagenase   Topical (Top) Daily    heparin (porcine)  5,000 Units Subcutaneous Q12H    isosorbide-hydrALAZINE 20-37.5 mg  1 tablet Oral TID    magnesium sulfate IVPB  2 g Intravenous Once    metroNIDAZOLE  500 mg Oral Q8H    multivitamin liquid no.118  10 mL Oral Daily    nicotine  1 patch Transdermal Daily    potassium chloride  20 mEq Intravenous Q2H    senna-docusate 8.6-50 mg  1 tablet Per OG tube BID    sodium chloride 0.9%  3 mL Intravenous Q8H    thiamine  100 mg Oral Daily    valproate sodium (DEPACON) IVPB  250 mg Intravenous Q6H    vancomycin (VANCOCIN) IVPB  750 mg Intravenous Q24H     PRN Meds:acetaminophen, albuterol-ipratropium, chlorproMAZINE, HYDROmorphone, levalbuterol, lidocaine HCL 2%, magnesium oxide, magnesium oxide, ondansetron, oxyCODONE-acetaminophen, pneumoc 13-osman conj-dip cr(PF), potassium chloride 10%, potassium chloride 10%, potassium chloride 10%, potassium, sodium phosphates, potassium, sodium phosphates, potassium, sodium phosphates     Objective:     Vital Signs (Most Recent):  Temp: 97.9 °F (36.6 °C) (06/29/18 0500)  Pulse: 92 (06/29/18 0500)  Resp: 18 (06/28/18 2100)  BP: 112/71 (06/29/18 0500)  SpO2: 99 % (06/29/18 0500) Vital Signs (24h Range):  Temp:  [97.2 °F (36.2 °C)-98.7 °F (37.1 °C)] 97.9 °F (36.6 °C)  Pulse:  [] 92  Resp:  [18] 18  SpO2:  [97 %-99 %] 99 %  BP: ()/(54-72) 112/71          Physical Exam  A&O, NAD  RRR  No Resp  Distress  RLE bandage c/d/i    Significant Labs:  CBC:   Recent Labs  Lab 06/29/18  0415   WBC 8.47   RBC 3.38*   HGB 10.3*   HCT 29.8*      MCV 88   MCH 30.5   MCHC 34.6     CMP:   Recent Labs  Lab 06/29/18  0415   *   CALCIUM 9.0   ALBUMIN 2.7*   PROT 6.9   *   K 3.1*   CO2 24   CL 94*   BUN 15   CREATININE 1.7*   ALKPHOS 201*   *   AST 97*   BILITOT 1.2*     Coagulation: No results for input(s): LABPROT, INR, APTT in the last 48 hours.    Significant Diagnostics:  I have reviewed all pertinent imaging results/findings within the past 24 hours.    Assessment/Plan:     Peripheral vascular disease of lower extremity    47 y.o. male with h/o HTN, HLD, HFrEF (10-15% 3/23/18) s/p ICD, CAD s/p PCI, EtOH abuse, PAD s/p ABF 1999 with multiple endovascular interventions presented to hospital on 3/18/18 with left foot rest pain - underwent R AKA after problems with attempts at L leg revascularization with Cardiology. Subsequent left TMA with now ischemic necrosis of TMA incision with necrosis progressing proximally. Patient also had to be readmitted to hospital on 06/12/18 for decompensated CHF and cardiogenic shock requiring pressors.      - right aka wound with medial wound that has fibrinous exudates, close monitoring with ongoing local wound care.   - Left foot gangrene, unsalvageable. Patent Pop a and likely best served with BKA.   - to OR today for BKA  -.Risks, benefits, and alternatives were discussed with the patient, and full informed consent was obtained prior to the procedure.                Antoni Lamb MD  Vascular Surgery  [facility]

## 2018-06-29 NOTE — PLAN OF CARE
06/29/18 0830   Discharge Reassessment   Assessment Type Discharge Planning Reassessment   Do you have any problems affording any of your prescribed medications? No   Discharge Plan A Rehab   Discharge Plan B Skilled Nursing Facility

## 2018-06-29 NOTE — SUBJECTIVE & OBJECTIVE
Medications:  Continuous Infusions:  Scheduled Meds:   aspirin  81 mg Per OG tube Daily    atorvastatin  80 mg Per OG tube Daily    ceFEPime (MAXIPIME) IVPB  2 g Intravenous Q12H    collagenase   Topical (Top) Daily    heparin (porcine)  5,000 Units Subcutaneous Q12H    isosorbide-hydrALAZINE 20-37.5 mg  1 tablet Oral TID    magnesium sulfate IVPB  2 g Intravenous Once    metroNIDAZOLE  500 mg Oral Q8H    multivitamin liquid no.118  10 mL Oral Daily    nicotine  1 patch Transdermal Daily    potassium chloride  20 mEq Intravenous Q2H    senna-docusate 8.6-50 mg  1 tablet Per OG tube BID    sodium chloride 0.9%  3 mL Intravenous Q8H    thiamine  100 mg Oral Daily    valproate sodium (DEPACON) IVPB  250 mg Intravenous Q6H    vancomycin (VANCOCIN) IVPB  750 mg Intravenous Q24H     PRN Meds:acetaminophen, albuterol-ipratropium, chlorproMAZINE, HYDROmorphone, levalbuterol, lidocaine HCL 2%, magnesium oxide, magnesium oxide, ondansetron, oxyCODONE-acetaminophen, pneumoc 13-osman conj-dip cr(PF), potassium chloride 10%, potassium chloride 10%, potassium chloride 10%, potassium, sodium phosphates, potassium, sodium phosphates, potassium, sodium phosphates     Objective:     Vital Signs (Most Recent):  Temp: 97.9 °F (36.6 °C) (06/29/18 0500)  Pulse: 92 (06/29/18 0500)  Resp: 18 (06/28/18 2100)  BP: 112/71 (06/29/18 0500)  SpO2: 99 % (06/29/18 0500) Vital Signs (24h Range):  Temp:  [97.2 °F (36.2 °C)-98.7 °F (37.1 °C)] 97.9 °F (36.6 °C)  Pulse:  [] 92  Resp:  [18] 18  SpO2:  [97 %-99 %] 99 %  BP: ()/(54-72) 112/71          Physical Exam  A&O, NAD  RRR  No Resp Distress  RLE bandage c/d/i    Significant Labs:  CBC:   Recent Labs  Lab 06/29/18  0415   WBC 8.47   RBC 3.38*   HGB 10.3*   HCT 29.8*      MCV 88   MCH 30.5   MCHC 34.6     CMP:   Recent Labs  Lab 06/29/18  0415   *   CALCIUM 9.0   ALBUMIN 2.7*   PROT 6.9   *   K 3.1*   CO2 24   CL 94*   BUN 15   CREATININE 1.7*   ALKPHOS  201*   *   AST 97*   BILITOT 1.2*     Coagulation: No results for input(s): LABPROT, INR, APTT in the last 48 hours.    Significant Diagnostics:  I have reviewed all pertinent imaging results/findings within the past 24 hours.

## 2018-06-29 NOTE — PROGRESS NOTES
Progress Note  Hospital Medicine    Primary Team: [facility]  Admit Date: 6/16/2018   Length of Stay:  LOS: 13 days   SUBJECTIVE:   Reason for Admission:  Cardiogenic shock    HPI:  47M ICM (LVEF 13%) s/p ICD, CAD s/p PCI, HTN, HLD, current smoker, PAD (s/p aortobifemoral bypass, L SFA and pop PTAS in September and recent R SFA and R pop 3/8 by Dr. Carl Bell and finally R AKA 3/2018) who presented to OSH with SOB, cough, weight, BNP 2400, congested CXR, and elevated lactic acid. He was admitted with COPD and hypotension though to be secondary to sepsis. He was started on zmax and vanc and given a fluid bolus.  He was found to not be septic the next day and all abx were stopped.  Unfortunately, he developed ADHF with worsened respiratory status and became progressively hypotensive.  He was subsequently intubated and started on levo/dobutamine. Transferred to [facility] for higher level of care.    Patient was started on SLED given poor UOP and worsening RYAN. He was kept on dobutamine 2.5 mcg/kg/min with daily CO measurements. Patient had recovering renal function and SLED was discontinued 6/17/2018. He was diuresed with IV lasix on bid then daily basis until patient became euvolemic with normal-low CVP. He was suitable for extubation on 2nd day of hospital stay however would become significantly agitated when sedation would be weaned down resulting in delaying extubation until 6/19/2018. After extubation, the patient was overtly delirious expressed by confusion, delusion, belligerence, and aggressive. He had to be restrained on the first 2 days following extubation. Psychiatry was consulted and patient was finally less psychotic and delirious on 6/23/2018 after starting scheduled depacon 250mg IV [id number] and receiving one dose of Thorazine night of 6/22/2018. Given patient's stable clinical status from his cardiogenic shock standpoint it was thought best to step down patient out of ICU settings to decreased  delirium recurrence.     ECHO 6/17  CONCLUSIONS     1 - Mild left ventricular enlargement.     2 - Severely depressed left ventricular systolic function (EF low to mid teens).     3 - Biatrial enlargement.     4 - Restrictive LV filling pattern, indicating markedly elevated LAP (grade 3 diastolic dysfunction).     5 - Low normal to mildly depressed right ventricular systolic function .     6 - Moderate or more mitral regurgitation.     7 - Mild tricuspid regurgitation.     8 - Increased central venous pressure.     9 - Pulmonary hypertension. The estimated PA systolic pressure is 45 mmHg.     Interval history:    Patient seen post L BKA. He was oriented x 4 and in good spirits. He denied CP, SOB, or lightheadedness.    Review of Systems:  Constitutional: no fever or chills  Respiratory: no cough or shortness of breath  Cardiovascular: no chest pain or palpitations  Gastrointestinal: no nausea or vomiting, no abdominal pain or change in bowel habits  Musculoskeletal: no arthralgias or myalgias     OBJECTIVE:     Temp:  [97.2 °F (36.2 °C)-97.9 °F (36.6 °C)]   Pulse:  []   Resp:  [18]   BP: ()/(54-72)   SpO2:  [97 %-99 %]  Body mass index is 20.18 kg/m².  Intake/Outake:  This Shift:  No intake/output data recorded.    Net I/O past 24h:     Intake/Output Summary (Last 24 hours) at 06/29/18 0827  Last data filed at 06/29/18 0600   Gross per 24 hour   Intake              600 ml   Output              775 ml   Net             -175 ml             Physical Exam:  Gen- well-developed, well-nourished, NAD  CVS- S1 and S2 present, 3/6 systolic murmur, RRR  Resp- CTA b/l, no work of breathing  Abd- BS+, soft, NT, ND  Ext- right AKA with well-healing incision; LLE s/p BKA    Laboratory:  CBC/Anemia Labs: Coags:      Recent Labs  Lab 06/27/18  0529 06/28/18  0555 06/29/18  0415   WBC 8.34 7.50 8.47   HGB 10.5* 11.2* 10.3*   HCT 30.5* 31.7* 29.8*    252 259   MCV 89 89 88   RDW 19.9* 19.7* 19.4*    No results for  input(s): PT, INR, APTT in the last 168 hours.     Chemistries:     Recent Labs  Lab 06/27/18  0529 06/28/18  0555 06/29/18  0415    134* 133*   K 3.4* 3.2* 3.1*   CL 99 95 94*   CO2 25 27 24   BUN 12 13 15   CREATININE 1.4 1.7* 1.7*   CALCIUM 9.1 9.3 9.0   PROT 7.0 7.5 6.9   BILITOT 1.8* 1.7* 1.2*   ALKPHOS 217* 223* 201*   * 307* 234*   * 115* 97*   MG 1.6 2.2 1.5*   PHOS 3.3 4.5 4.5        ABG:     Recent Labs  Lab 06/25/18  1516 06/25/18  1654   PH 7.520* 7.492*   PCO2 30.4* 34.7*   PO2 73* 35*   HCO3 24.9 26.5   POCSATURATED 96 73*   BE 2 3        Cardiac Enzymes: Ejection Fractions:    No results for input(s): CPK, CPKMB, MB, TROPONINI in the last 72 hours. EF   Date Value Ref Range Status   06/17/2018 13 (A) 55 - 65    04/06/2018 15 (A) 55 - 65         Medications:  Scheduled Meds:   aspirin  81 mg Per OG tube Daily    atorvastatin  80 mg Per OG tube Daily    ceFEPime (MAXIPIME) IVPB  2 g Intravenous Q12H    collagenase   Topical (Top) Daily    heparin (porcine)  5,000 Units Subcutaneous Q12H    isosorbide-hydrALAZINE 20-37.5 mg  1 tablet Oral TID    magnesium sulfate IVPB  2 g Intravenous Once    metroNIDAZOLE  500 mg Oral Q8H    multivitamin liquid no.118  10 mL Oral Daily    nicotine  1 patch Transdermal Daily    potassium chloride  20 mEq Intravenous Q2H    senna-docusate 8.6-50 mg  1 tablet Per OG tube BID    sodium chloride 0.9%  3 mL Intravenous Q8H    thiamine  100 mg Oral Daily    valproate sodium (DEPACON) IVPB  250 mg Intravenous Q6H    vancomycin (VANCOCIN) IVPB  750 mg Intravenous Q24H                             Continuous Infusions:    PRN Meds:.acetaminophen, albuterol-ipratropium, chlorproMAZINE, HYDROmorphone, levalbuterol, lidocaine HCL 2%, magnesium oxide, magnesium oxide, ondansetron, oxyCODONE-acetaminophen, pneumoc 13-osman conj-dip cr(PF), potassium chloride 10%, potassium chloride 10%, potassium chloride 10%, potassium, sodium phosphates, potassium,  sodium phosphates, potassium, sodium phosphates     ASSESSMENT/PLAN:     Active Hospital Problems    Diagnosis  POA    *Cardiogenic shock [R57.0]  Yes    Acute on chronic heart failure [I50.9]  Yes    Osteomyelitis of left foot [M86.9]  Yes    Status post transmetatarsal amputation of foot, left [Z89.432]  Not Applicable    Moderate protein-calorie malnutrition [E44.0]  Yes    Delirium [R41.0]  No    Delirium due to multiple etiologies, acute, hyperactive [F05]  No    Alteration in skin integrity related to surgical incision [R23.9]  Yes    Encephalopathy [G93.40]  Yes    Shock liver [K72.00]  Yes    COPD, severe [J44.9]  Yes    Acute renal failure [N17.9]  Yes    Leukocytosis [D72.829]  Yes    Alcohol abuse [F10.10]  Yes    Peripheral vascular disease of lower extremity [I73.9]  Yes     S/p right AKA.     TcPO2 L foot 5/11/2018   Dorsum 43 to 171 mmHg with oxygen   Medial 61 to 144 mmHg with oxygen   Chest wall baseline 43 mmHg      Tobacco abuse [Z72.0]  Yes      Resolved Hospital Problems    Diagnosis Date Resolved POA    Hyperkalemia [E87.5] 06/21/2018 Yes    RYAN (acute kidney injury) [N17.9] 06/23/2018 Yes    Dependence on ventilator, status [Z99.11] 06/20/2018 Not Applicable    Lactic acidosis [E87.2] 06/23/2018 Yes    Acute respiratory failure with hypoxia [J96.01] 06/22/2018 Yes     Peripheral vascular disease of lower extremity  Alteration in skin integrity related to surgical incision  S/p BKA  - At right AKA. Good granulation. Appreciate wound care's assistance.   - S/p BKA  - Continue perioperative vanc with trough 15-20    Cardiogenic shock  Acute on chronic combined HF  - likely due to ADHF following volume resuscitation when received IVF for suspected sepsis on initial presentation  - net negative 9L since admit. SLED discontinued 6/17/2018.   - has been receiving daily IV lasix 80mg prn. Last dose received 6/21/2018.   - check CVP and reassess; pt reports he was on Lasix at home,  will ask Pharmacy to assist  - Nephrology following, appreciate assistance.   - Dobutamine weaned off  - Okay to resume BB, hold for SBP <90  - Hold diuresis as below  - Hold imdur due to relative hypotension    Acute renal failure superimposed on CKDIII  - Likely ATN due to ADHF and ischemic insult with hypotension and currently with over diuresis.   - Cr slightly worse to 1.7  - Nephro following, appreciate their assistance.   - Avoid nephrotoxins and renally dose medications.   - Strict intake and output.   - Hold diuresis    Encephalopathy, metabolic  - Likely due ICU delirium.  - Psychiatry following, appreciate recs: Chlorpromazine 50 PRN, Depacon 250mg IV q6h  - Continue holding pregabalin in setting of delirium and encephalopathy.   - Delirium precautions  - Appreciate updated psychiatry recs    Moderate protein-calorie malnutrition  - Poor enteral nutrition due to AMS    COPD, mixed simple and mucopurulent  - Duonebs prn     Alcohol abuse  - Reportedly last drink was weeks to months ago.   - Avoiding BZD as possible.     Tobacco abuse  - Nicotine patch.    DVT ppx- add Heparin  CODE Status- FULL    Dispo- will possibly need rehab post L BKA; continue with extremely close inpatient monitoring     Antoni Trujillo MD  Hospital Medicine Staff

## 2018-06-29 NOTE — PLAN OF CARE
spoke to pts sister yesterday who assist with pts care.  She is requesting a motorize scooter when pt is discharged.  Pt is currently being followed by [facility]

## 2018-06-29 NOTE — CARE UPDATE
RN Proactive Rounding Note  Time of Visit: 1721    Admit Date: 2018  LOS: 13  Code Status: Full Code   Date of Visit: 2018  : 1970  Age: 47 y.o.  Sex: male  Bed: 511/511 A:   MRN: [id number]  Was the patient discharged from an ICU this admission? no   Was the patient discharged from a PACU within last 24 hours? yes  Did the patient receive conscious sedation/general anesthesia in last 24 hours? yes  Was the patient in the ED within the past 24 hours? no  Was the patient started on NIPPV within the past 24 hours? no  Attending Physician: Antoni Trujillo MD  Primary Service: [facility]      ASSESSMENT:     Abnormal Vital Signs: hypotension  Clinical Issues: Circulatory. Proactive rounding for hypotension. BP 80s/50s. Patient sleeping in bed on arrival to bedside, arouses to verbal stimuli. No complaint of discomfort at present time. Ropivacaine perineural infusion at 8mL/hr. Patient denies blurry vision, dizziness, or visual disturbances. Skin is warm and dry. TLC to right subclavian in place, but tubing (cap of tubing is ripped off), tubing kinked and taped to prevent air entering line. Distal port has positive aspiration of blood and flushes without difficulty. I was unable to obtain peripheral IV access. Dr. Schultz to bedside and MD notified of status of central line and no PIV. MD ordered chest x-ray and to give NS bolus through central line after x-ray confirmation then night MD can re-evaluate central access.     INTERVENTIONS/ RECOMMENDATIONS:     Continue current plan of care. Give NS bolus after confirming TLC placement to right subclavian then re-evaluate for new IV access.    Discussed plan of care with DENISE JAMES ESCALATION:     Yes    Orders received and case discussed with Dr Schultz    Disposition: no transfer at this time    FOLLOW-UP/CONTINGENCY:       Call back the Rapid Response Nurse at x [phone]  for additional questions or concerns

## 2018-06-29 NOTE — PROGRESS NOTES
Attempted to help pt with chlorhexidine bath this AM and he refused. Educated pt on reasoning of the bath and risks associated with not receiving it before operation.

## 2018-06-29 NOTE — BRIEF OP NOTE
Brief Operative Note  Date: 06/29/2018    Pre-op Diagnosis:  Subacute osteomyelitis of left foot [M86.272]; gangrenous L foot - non-salvageable     Post-op Diagnosis:  Same    Procedure(s):  Left below knee amputation    Surgeon: Norris Trejo MD FACS    Assistant: UNA Schultz MD; UNA Lamb MD    Anesthesia: Regional    Findings/Key Components:  Fair perfusion to L lower leg    EBL: 100 ml      Specimens     Start     Ordered    06/29/18 1126  Specimen to Pathology - Surgery  Once      06/29/18 1126        I attest to being present for the procedure and performing the case.  Norris Trejo MD FACS

## 2018-06-29 NOTE — SUBJECTIVE & OBJECTIVE
Interval History: No AEON. Afebrile and WBC WNL. Vanc level high but was drawn 3 hrs after dose.  Going to OR today.  The patient denies any recent fever, chills, or sweats.       Review of Systems   Constitutional: Negative for chills, diaphoresis and fever.   Respiratory: Negative for shortness of breath.    Cardiovascular: Negative for chest pain.   Gastrointestinal: Negative for abdominal pain, diarrhea, nausea and vomiting.   Genitourinary: Negative for dysuria and hematuria.     Objective:     Vital Signs (Most Recent):  Temp: 97.6 °F (36.4 °C) (06/29/18 0850)  Pulse: 92 (06/29/18 0850)  Resp: 16 (06/29/18 0850)  BP: 111/71 (06/29/18 0850)  SpO2: 100 % (06/29/18 0850) Vital Signs (24h Range):  Temp:  [97.2 °F (36.2 °C)-97.9 °F (36.6 °C)] 97.6 °F (36.4 °C)  Pulse:  [] 92  Resp:  [16-18] 16  SpO2:  [97 %-100 %] 100 %  BP: ()/(54-72) 111/71     Weight:  (bed not working for weight )  Body mass index is 20.18 kg/m².    Estimated Creatinine Clearance: 42.2 mL/min (A) (based on SCr of 1.7 mg/dL (H)).    Physical Exam   Constitutional: He is oriented to person, place, and time. He appears well-developed and well-nourished. No distress.   Cardiovascular: Normal rate and regular rhythm.    No murmur heard.  Pulmonary/Chest: Effort normal and breath sounds normal. No respiratory distress.   Abdominal: Soft. Bowel sounds are normal. He exhibits no distension.   Musculoskeletal: Normal range of motion. He exhibits no edema.   Right AKA with small area of dehiscence; no erythema.     Left foot wrapped; images reviewed in media tab   Neurological: He is alert and oriented to person, place, and time.   Skin: Skin is warm and dry.   Psychiatric: He has a normal mood and affect. His behavior is normal.       Significant Labs:   Blood Culture:   Recent Labs  Lab 03/27/18  2148 03/27/18  2212 06/12/18  1706 06/16/18 2000 06/16/18  2156   [facility] No growth after 5 days. No growth after 5 days. No growth after 5 days.   No growth after 5 days. No growth after 5 days. No growth after 5 days.     CBC:   Recent Labs  Lab 06/28/18  0555 06/29/18  0415   WBC 7.50 8.47   HGB 11.2* 10.3*   HCT 31.7* 29.8*    259     CMP:   Recent Labs  Lab 06/28/18  0555 06/29/18  0415   * 133*   K 3.2* 3.1*   CL 95 94*   CO2 27 24   GLU 86 112*   BUN 13 15   CREATININE 1.7* 1.7*   CALCIUM 9.3 9.0   PROT 7.5 6.9   ALBUMIN 2.9* 2.7*   BILITOT 1.7* 1.2*   ALKPHOS 223* 201*   * 97*   * 234*   ANIONGAP 12 15   EGFRNONAA 46.3* 46.3*     Wound Culture:   Recent Labs  Lab 04/05/18  1321 05/18/18  1150 06/26/18  1232   LABAERO No growth METHICILLIN RESISTANT STAPHYLOCOCCUS AUREUSManySkin kevin also present CANDIDA ALBICANSModerate     All pertinent labs within the past 24 hours have been reviewed.    Significant Imaging: I have reviewed all pertinent imaging results/findings within the past 24 hours.

## 2018-06-29 NOTE — ANESTHESIA PROCEDURE NOTES
Femoral Nerve Single Injection Block    Patient location during procedure: pre-op   Block not for primary anesthetic.  Reason for block: at surgeon's request and post-op pain management   Post-op Pain Location: left LE   Start time: 6/29/2018 9:42 AM  Timeout: 6/29/2018 9:40 AM   End time: 6/29/2018 10:02 AM  Staffing  Anesthesiologist: VICKI VOGEL  Resident/CRNA: VANESSA GARZA  Performed: resident/CRNA   Preanesthetic Checklist  Completed: patient identified, site marked, surgical consent, pre-op evaluation, timeout performed, IV checked, risks and benefits discussed and monitors and equipment checked  Peripheral Block  Patient position: supine  Prep: ChloraPrep  Patient monitoring: heart rate, cardiac monitor, continuous pulse ox, continuous capnometry and frequent blood pressure checks  Block type: femoral  Laterality: left  Injection technique: single shot  Needle  Needle type: Stimuplex   Needle gauge: 22 G  Needle length: 2 in  Needle localization: anatomical landmarks and ultrasound guidance   -ultrasound image captured on disc.  Assessment  Injection assessment: negative aspiration, negative parasthesia and local visualized surrounding nerve  Paresthesia pain: none  Heart rate change: no  Slow fractionated injection: yes  Medications:  Bolus administered: 20 mL of 0.5 bupivacaine  Epinephrine added: 3.75 mcg/mL (1/300,000)  Additional Notes  VSS.  DOS[facility] RN monitoring vitals throughout procedure.  Patient tolerated procedure well.

## 2018-06-29 NOTE — PROGRESS NOTES
Patient off floor at time of being seen for BKA.    Chart reviewed:  Afebrile and WBC WNL  Wound left foot growing C albicans.  BKA will be definitive source control.    Rec routine post op abx per Sx team   Pre-Op ABX can be dc'd    Will sign off.  Reconsult as needed.    Jeison Haro PA-C MPH  Pager [phone]

## 2018-06-29 NOTE — NURSING
Per MOOSE Trujillo M.D. okay to administer morning medications with the exception of Heparin or any other blood thinners.  Okay to administer aspirin.  POC for surgery is Lucia @ [id number].

## 2018-06-29 NOTE — NURSING TRANSFER
Nursing Transfer Note      6/29/2018     Transfer To: 511a    Transfer via stretcher    Transfer with tele    Transported by pct    Medicines sent: yes, potassium 20Meq , ropivacaine    Chart send with patient: yes    Notified: spouse

## 2018-06-29 NOTE — NURSING
Page MD on call regarding PICC and low B/p.      MD paged back; Advised to give 250 NS bolus and try to place a peripheral  IV..

## 2018-06-30 NOTE — PROGRESS NOTES
Progress Note  Hospital Medicine    Primary Team: [facility] HOSP MED C  Admit Date: 6/16/2018   Length of Stay:  LOS: 14 days   SUBJECTIVE:   Reason for Admission:  Cardiogenic shock    HPI:  47M ICM (LVEF 13%) s/p ICD, CAD s/p PCI, HTN, HLD, current smoker, PAD (s/p aortobifemoral bypass, L SFA and pop PTAS in September and recent R SFA and R pop 3/8 by Dr. Carl Bell and finally R AKA 3/2018) who presented to OSH with SOB, cough, weight, BNP 2400, congested CXR, and elevated lactic acid. He was admitted with COPD and hypotension though to be secondary to sepsis. He was started on zmax and vanc and given a fluid bolus.  He was found to not be septic the next day and all abx were stopped.  Unfortunately, he developed ADHF with worsened respiratory status and became progressively hypotensive.  He was subsequently intubated and started on levo/dobutamine. Transferred to [facility] CCU for higher level of care.    Patient was started on SLED given poor UOP and worsening RYAN. He was kept on dobutamine 2.5 mcg/kg/min with daily CO measurements. Patient had recovering renal function and SLED was discontinued 6/17/2018. He was diuresed with IV lasix on bid then daily basis until patient became euvolemic with normal-low CVP. He was suitable for extubation on 2nd day of hospital stay however would become significantly agitated when sedation would be weaned down resulting in delaying extubation until 6/19/2018. After extubation, the patient was overtly delirious expressed by confusion, delusion, belligerence, and aggressive. He had to be restrained on the first 2 days following extubation. Psychiatry was consulted and patient was finally less psychotic and delirious on 6/23/2018 after starting scheduled depacon 250mg IV [id number] and receiving one dose of Thorazine night of 6/22/2018. Given patient's stable clinical status from his cardiogenic shock standpoint it was thought best to step down patient out of ICU settings to decreased  delirium recurrence.     ECHO 6/17  CONCLUSIONS     1 - Mild left ventricular enlargement.     2 - Severely depressed left ventricular systolic function (EF low to mid teens).     3 - Biatrial enlargement.     4 - Restrictive LV filling pattern, indicating markedly elevated LAP (grade 3 diastolic dysfunction).     5 - Low normal to mildly depressed right ventricular systolic function .     6 - Moderate or more mitral regurgitation.     7 - Mild tricuspid regurgitation.     8 - Increased central venous pressure.     9 - Pulmonary hypertension. The estimated PA systolic pressure is 45 mmHg.     Interval history:    Patient was oriented x 4 and in good spirits. He denied CP, SOB, or lightheadedness.  Discussed plan of care with patient and RN. PT/OT ordered.  Patient being seen by anesthesia pain service with perineural catheter in place infusing ropivacaine.    Review of Systems:  Constitutional: no fever or chills  Respiratory: no cough or shortness of breath  Cardiovascular: no chest pain or palpitations  Gastrointestinal: no nausea or vomiting, no abdominal pain or change in bowel habits  Musculoskeletal: no arthralgias or myalgias     OBJECTIVE:     Temp:  [95.4 °F (35.2 °C)-98.8 °F (37.1 °C)]   Pulse:  [69-99]   Resp:  [10-27]   BP: ()/(47-73)   SpO2:  [94 %-100 %]  Body mass index is 20.18 kg/m².  Intake/Outake:  This Shift:  No intake/output data recorded.    Net I/O past 24h:   No intake or output data in the 24 hours ending 06/30/18 0929          Physical Exam:  Gen- well-developed, well-nourished, NAD  CVS- S1 and S2 present, 3/6 systolic murmur, RRR  Resp- CTA b/l, no work of breathing  Abd- BS+, soft, NT, ND  Ext- right AKA with well-healing incision; LLE s/p BKA    Laboratory:  CBC/Anemia Labs: Coags:      Recent Labs  Lab 06/28/18  0555 06/29/18  0415 06/30/18  0429   WBC 7.50 8.47 10.63   HGB 11.2* 10.3* 9.7*   HCT 31.7* 29.8* 28.2*    259 283   MCV 89 88 90   RDW 19.7* 19.4* 19.6*    No  results for input(s): PT, INR, APTT in the last 168 hours.     Chemistries:     Recent Labs  Lab 06/27/18  0529 06/28/18  0555 06/29/18  0415    134* 133*   K 3.4* 3.2* 3.1*   CL 99 95 94*   CO2 25 27 24   BUN 12 13 15   CREATININE 1.4 1.7* 1.7*   CALCIUM 9.1 9.3 9.0   PROT 7.0 7.5 6.9   BILITOT 1.8* 1.7* 1.2*   ALKPHOS 217* 223* 201*   * 307* 234*   * 115* 97*   MG 1.6 2.2 1.5*   PHOS 3.3 4.5 4.5        ABG:     Recent Labs  Lab 06/25/18  1516 06/25/18  1654   PH 7.520* 7.492*   PCO2 30.4* 34.7*   PO2 73* 35*   HCO3 24.9 26.5   POCSATURATED 96 73*   BE 2 3        Cardiac Enzymes: Ejection Fractions:    No results for input(s): CPK, CPKMB, MB, TROPONINI in the last 72 hours. EF   Date Value Ref Range Status   06/17/2018 13 (A) 55 - 65    04/06/2018 15 (A) 55 - 65         Medications:  Scheduled Meds:   acetaminophen  1,000 mg Oral Q6H    aspirin  81 mg Per OG tube Daily    atorvastatin  80 mg Per OG tube Daily    collagenase   Topical (Top) Daily    heparin (porcine)  5,000 Units Subcutaneous Q12H    metoprolol tartrate  12.5 mg Oral BID    multivitamin liquid no.118  10 mL Oral Daily    nicotine  1 patch Transdermal Daily    pregabalin  75 mg Oral QHS    senna-docusate 8.6-50 mg  1 tablet Per OG tube BID    sodium chloride 0.9%  3 mL Intravenous Q8H    thiamine  100 mg Oral Daily    valproate sodium (DEPACON) IVPB  250 mg Intravenous Q6H    vancomycin (VANCOCIN) IVPB  750 mg Intravenous Q24H                             Continuous Infusions:   ropivacaine (PF) 2 mg/ml (0.2%) 8 mL/hr (06/30/18 0021)     PRN Meds:.albuterol-ipratropium, chlorproMAZINE, levalbuterol, lidocaine HCL 2%, magnesium oxide, magnesium oxide, morphine, morphine, ondansetron, oxyCODONE, oxyCODONE, pneumoc 13-osman conj-dip cr(PF), potassium chloride 10%, potassium chloride 10%, potassium chloride 10%, potassium, sodium phosphates, potassium, sodium phosphates, potassium, sodium phosphates, promethazine  (PHENERGAN) IVPB     ASSESSMENT/PLAN:     Active Hospital Problems    Diagnosis  POA    *Cardiogenic shock [R57.0]  Yes    Acute on chronic heart failure [I50.9]  Yes    Osteomyelitis of left foot [M86.9]  Yes    Status post transmetatarsal amputation of foot, left [Z89.432]  Not Applicable    Moderate protein-calorie malnutrition [E44.0]  Yes    Delirium [R41.0]  No    Delirium due to multiple etiologies, acute, hyperactive [F05]  No    Alteration in skin integrity related to surgical incision [R23.9]  Yes    Encephalopathy [G93.40]  Yes    Shock liver [K72.00]  Yes    COPD, severe [J44.9]  Yes    Acute renal failure [N17.9]  Yes    Leukocytosis [D72.829]  Yes    Alcohol abuse [F10.10]  Yes    Peripheral vascular disease of lower extremity [I73.9]  Yes     S/p right AKA.     TcPO2 L foot 5/11/2018   Dorsum 43 to 171 mmHg with oxygen   Medial 61 to 144 mmHg with oxygen   Chest wall baseline 43 mmHg      Tobacco abuse [Z72.0]  Yes      Resolved Hospital Problems    Diagnosis Date Resolved POA    Hyperkalemia [E87.5] 06/21/2018 Yes    RYAN (acute kidney injury) [N17.9] 06/23/2018 Yes    Dependence on ventilator, status [Z99.11] 06/20/2018 Not Applicable    Lactic acidosis [E87.2] 06/23/2018 Yes    Acute respiratory failure with hypoxia [J96.01] 06/22/2018 Yes     Peripheral vascular disease of lower extremity  Alteration in skin integrity related to surgical incision  S/p BKA  - At right AKA. Good granulation. Appreciate wound care's assistance.   - S/p BKA  - Continue perioperative vanc with trough 15-20; can stop today, 6/30    Cardiogenic shock  Acute on chronic combined HF  - likely due to ADHF following volume resuscitation when received IVF for suspected sepsis on initial presentation  - net negative 9L since admit. SLED discontinued 6/17/2018.   - has been receiving daily IV lasix 80mg prn. Last dose received 6/21/2018.   - check CVP and reassess; pt reports he was on Lasix at home, will ask  Pharmacy to assist  - Nephrology following, appreciate assistance.   - Dobutamine weaned off  - Okay to resume BB, hold for SBP <90  - Hold diuresis as below  - Hold imdur due to relative hypotension    Acute renal failure superimposed on CKDIII  - Likely ATN due to ADHF and ischemic insult with hypotension and currently with over diuresis.   - Cr slightly worse to 1.7  - Nephro following, appreciate their assistance.   - Avoid nephrotoxins and renally dose medications.   - Strict intake and output.   - Hold diuresis    Encephalopathy, metabolic  - Likely due ICU delirium.  - Psychiatry following, appreciate recs: Chlorpromazine 50 PRN, Depacon 250mg IV q6h  - Continue holding pregabalin in setting of delirium and encephalopathy.   - Delirium precautions  - Appreciate updated psychiatry recs    Moderate protein-calorie malnutrition  - Poor enteral nutrition due to AMS    COPD, mixed simple and mucopurulent  - Duonebs prn     Alcohol abuse  - Reportedly last drink was weeks to months ago.   - Avoiding BZD as possible.     Tobacco abuse  - Nicotine patch.    DVT ppx- add Heparin  CODE Status- FULL    Dispo- PT/OT for safe disposition; SNF v HHPTOT     Antoni Trujillo MD  Hospital Medicine Staff

## 2018-06-30 NOTE — CARE UPDATE
Rapid Response Follow-up Note    Followed up with patient for proactive rounding.   No acute issues at this time. Patient lying in bed, sleeping, awakens to voice, drowsy. Denies discomfort, on room air.   Suggest to RN hold 2100 Lopressor dose, and maybe recheck CBC in AM.  Reviewed plan of care with primary RN, Tika.  Please call Rapid Response RN with any questions or concerns at  [phone]

## 2018-06-30 NOTE — ADDENDUM NOTE
Addendum  created 06/30/18 1644 by Renetta Gardner MD    Charge Capture section accepted, Sign clinical note

## 2018-06-30 NOTE — PLAN OF CARE
Problem: Patient Care Overview  Goal: Plan of Care Review  Outcome: Ongoing (interventions implemented as appropriate)  Patient AAOx4. No complaints of pain. VS stable, afrebrile. Neurovascular intact. Skin intact, with exception of LLE BKA. Free from falls. Frequent rounds made for safety, pain and comfort. Bed at lowest position, call light within reach, side rails up x2.

## 2018-06-30 NOTE — ASSESSMENT & PLAN NOTE
47 y.o. male with h/o HTN, HLD, HFrEF (10-15% 3/23/18) s/p ICD, CAD s/p PCI, EtOH abuse, PAD s/p ABF 1999 with multiple endovascular interventions presented to hospital on 3/18/18 with left foot rest pain - underwent R AKA after problems with attempts at L leg revascularization with Cardiology. Subsequent left TMA with now ischemic necrosis of TMA incision with necrosis progressing proximally. Patient also had to be readmitted to hospital on 06/12/18 for decompensated CHF and cardiogenic shock requiring pressors. Now 1 Day Post-Op R BKA       - right aka wound with medial wound that has fibrinous exudates, close monitoring with ongoing local wound care.   - dressing change tomorrow  - contracted Acute pain service this morning for perineural cath issues  - cont knee immobilizer   - rest of care per primary, pain mgmt per anesthesia

## 2018-06-30 NOTE — ANESTHESIA POST-OP PAIN MANAGEMENT
Acute Pain Service Progress Note    Leonardo Byrd is a 47 y.o., male, [id number].    Surgery:  Below the knee amputation    Post Op Day #: 1    Catheter type: perineural  sciatic    Infusion type: Ropivacaine 0.2%  8ml/hr basal    Problem List:    Active Hospital Problems    Diagnosis  POA    *Cardiogenic shock [R57.0]  Yes    Acute on chronic heart failure [I50.9]  Yes    Osteomyelitis of left foot [M86.9]  Yes    Status post transmetatarsal amputation of foot, left [Z89.432]  Not Applicable    Moderate protein-calorie malnutrition [E44.0]  Yes    Delirium [R41.0]  No    Delirium due to multiple etiologies, acute, hyperactive [F05]  No    Alteration in skin integrity related to surgical incision [R23.9]  Yes    Encephalopathy [G93.40]  Yes    Shock liver [K72.00]  Yes    COPD, severe [J44.9]  Yes    Acute renal failure [N17.9]  Yes    Leukocytosis [D72.829]  Yes    Alcohol abuse [F10.10]  Yes    Peripheral vascular disease of lower extremity [I73.9]  Yes     S/p right AKA.     TcPO2 L foot 5/11/2018   Dorsum 43 to 171 mmHg with oxygen   Medial 61 to 144 mmHg with oxygen   Chest wall baseline 43 mmHg      Tobacco abuse [Z72.0]  Yes      Resolved Hospital Problems    Diagnosis Date Resolved POA    Hyperkalemia [E87.5] 06/21/2018 Yes    RYAN (acute kidney injury) [N17.9] 06/23/2018 Yes    Dependence on ventilator, status [Z99.11] 06/20/2018 Not Applicable    Lactic acidosis [E87.2] 06/23/2018 Yes    Acute respiratory failure with hypoxia [J96.01] 06/22/2018 Yes       Subjective:     General appearance of alert, oriented, no complaints   Pain with rest: 4    Numbers   Pain with movement: 8    Numbers   Side Effects    1. Pruritis No    2. Nausea No    3. Motor Blockade No, 0=Ability to raise lower extremities off bed    4. Sedation No, S=sleep, easy to arouse    Objective:     Vitals   Vitals:    06/30/18 0809   BP: 96/73   Pulse: 77   Resp: 16   Temp: (!) 35.2 °C (95.4 °F)        Labs    Admission on  06/16/2018   No results displayed because visit has over 200 results.           Meds   Current Facility-Administered Medications   Medication Dose Route Frequency Provider Last Rate Last Dose    acetaminophen tablet 1,000 mg  1,000 mg Oral Q6H Ronnie Evangelista MD   1,000 mg at 06/30/18 0618    albuterol-ipratropium 2.5 mg-0.5 mg/3 mL nebulizer solution 3 mL  3 mL Nebulization Q4H PRN Brian Oswald MD        aspirin chewable tablet 81 mg  81 mg Per OG tube Daily Lisa Benitez MD   81 mg at 06/30/18 0933    atorvastatin tablet 80 mg  80 mg Per OG tube Daily Easton Brownlee MD   80 mg at 06/30/18 0932    chlorproMAZINE injection 25 mg  25 mg Intramuscular Q12H PRN Dustin Chapin MD        collagenase ointment   Topical (Top) Daily Ana Gomez MD        heparin (porcine) injection 5,000 Units  5,000 Units Subcutaneous Q12H Lee James MD   5,000 Units at 06/30/18 0933    levalbuterol nebulizer solution 1.25 mg  1.25 mg Nebulization Q6H PRN Brian Oswald MD        lidocaine HCL 2% jelly   Topical (Top) PRN Brian Oswald MD        magnesium oxide tablet 800 mg  800 mg Oral PRN Brian Oswald MD        magnesium oxide tablet 800 mg  800 mg Oral PRN Brian Oswald MD        metoprolol tartrate (LOPRESSOR) split tablet 12.5 mg  12.5 mg Oral BID Antoni Trujillo MD   Stopped at 06/29/18 2038    morphine injection 2 mg  2 mg Intravenous Q1H PRN Ronnie Evangelista MD        morphine injection 2 mg  2 mg Intravenous Q1H PRN Ronnie Evangelista MD        multivitamin liquid no.118 per dose 10 mL  10 mL Oral Daily Cary Harris MD   10 mL at 06/30/18 0900    nicotine 14 mg/24 hr 1 patch  1 patch Transdermal Daily Brian Oswald MD   1 patch at 06/30/18 0900    ondansetron injection 4 mg  4 mg Intravenous Q12H PRN Fely Paul MD        oxyCODONE immediate release tablet 10 mg  10 mg Oral Q3H PRN Ronnie Evangelista MD   10 mg at 06/30/18 0811     oxyCODONE immediate release tablet 5 mg  5 mg Oral Q3H PRN Ronnie Evangelista MD        pneumoc 13-osman conj-dip cr(PF) 0.5 mL  0.5 mL Intramuscular Prior to discharge Sae Carr MD        potassium chloride 10% oral solution 40 mEq  40 mEq Oral PRN Brian Oswald MD   40 mEq at 06/19/18 0627    potassium chloride 10% oral solution 40 mEq  40 mEq Oral PRN Brian Oswald MD   40 mEq at 06/18/18 0122    potassium chloride 10% oral solution 60 mEq  60 mEq Oral PRN Brian Oswald MD        potassium, sodium phosphates 280-160-250 mg packet 2 packet  2 packet Oral PRN Brian Oswald MD        potassium, sodium phosphates 280-160-250 mg packet 2 packet  2 packet Oral PRN Brian Oswald MD   2 packet at 06/18/18 0608    potassium, sodium phosphates 280-160-250 mg packet 2 packet  2 packet Oral PRN Brian Oswald MD   2 packet at 06/18/18 0122    pregabalin capsule 75 mg  75 mg Oral QHS Ronnie Evangelista MD   75 mg at 06/29/18 2038    promethazine (PHENERGAN) 6.25 mg in dextrose 5 % 50 mL IVPB  6.25 mg Intravenous Q6H PRN Fely Paul MD        ropivacaine (PF) 2 mg/ml (0.2%) infusion  8 mL/hr Perineural Continuous Fely Paul MD 8 mL/hr at 06/30/18 0021 8 mL/hr at 06/30/18 0021    senna-docusate 8.6-50 mg per tablet 1 tablet  1 tablet Per OG tube BID Easton Brownlee MD   1 tablet at 06/30/18 0900    sodium chloride 0.9% flush 3 mL  3 mL Intravenous Q8H Brian Oswald MD   3 mL at 06/30/18 0600    thiamine tablet 100 mg  100 mg Oral Daily Cary Harris MD   100 mg at 06/30/18 0933    valproate (DEPACON) 250 mg in dextrose 5 % 50 mL IVPB  250 mg Intravenous Q6H Bruno Li MD 50 mL/hr at 06/30/18 0617 250 mg at 06/30/18 0617    vancomycin (VANCOCIN) 750 mg in dextrose 5 % 250 mL IVPB  750 mg Intravenous Q24H Antoni Trujillo .7 mL/hr at 06/29/18 1933 750 mg at 06/29/18 1933        Anticoagulant dose heparin 5000u  q12hr    Assessment:     Pain control inadequate    Plan:     patient reports adequate pain control overnight. This morning he reports that infusion pump has been alarming. His pain has increased since then.    - bolused with 6ml via cathether. Pain controlled post bolus    Evaluator Saulo Burton          I have reviewed and concur with the resident's history, physical, assessment, and plan.  I have personally interviewed and examined the patient at bedside.

## 2018-06-30 NOTE — PROGRESS NOTES
[facility]  Vascular Surgery  Progress Note    Patient Name: Leonardo Byrd  MRN: [id number]  Admission Date: 6/16/2018  Primary Care Provider: Indio Aquino MD    Subjective:     Interval History: NAEON, some issues with perineural pump. Dressing c/d/i. Hgb 9.7    Post-Op Info:  Procedure(s) (LRB):  AMPUTATION, BELOW KNEE (Left)   1 Day Post-Op       Medications:  Continuous Infusions:   ropivacaine (PF) 2 mg/ml (0.2%) 8 mL/hr (06/30/18 0021)     Scheduled Meds:   acetaminophen  1,000 mg Oral Q6H    aspirin  81 mg Per OG tube Daily    atorvastatin  80 mg Per OG tube Daily    collagenase   Topical (Top) Daily    heparin (porcine)  5,000 Units Subcutaneous Q12H    metoprolol tartrate  12.5 mg Oral BID    multivitamin liquid no.118  10 mL Oral Daily    nicotine  1 patch Transdermal Daily    pregabalin  75 mg Oral QHS    senna-docusate 8.6-50 mg  1 tablet Per OG tube BID    sodium chloride 0.9%  3 mL Intravenous Q8H    thiamine  100 mg Oral Daily    valproate sodium (DEPACON) IVPB  250 mg Intravenous Q6H    vancomycin (VANCOCIN) IVPB  750 mg Intravenous Q24H     PRN Meds:albuterol-ipratropium, chlorproMAZINE, levalbuterol, lidocaine HCL 2%, magnesium oxide, magnesium oxide, morphine, morphine, ondansetron, oxyCODONE, oxyCODONE, pneumoc 13-osman conj-dip cr(PF), potassium chloride 10%, potassium chloride 10%, potassium chloride 10%, potassium, sodium phosphates, potassium, sodium phosphates, potassium, sodium phosphates, promethazine (PHENERGAN) IVPB     Objective:     Vital Signs (Most Recent):  Temp: 96.2 °F (35.7 °C) (06/30/18 0416)  Pulse: 93 (06/30/18 0656)  Resp: 18 (06/30/18 0416)  BP: 98/68 (06/30/18 0416)  SpO2: 98 % (06/30/18 0416) Vital Signs (24h Range):  Temp:  [96.2 °F (35.7 °C)-98.8 °F (37.1 °C)] 96.2 °F (35.7 °C)  Pulse:  [] 93  Resp:  [10-27] 18  SpO2:  [94 %-100 %] 98 %  BP: ()/(47-71) 98/68          Physical Exam    A&O, NAD, slightly confused this  AM  RRR  No Resp Distress  R BKA stump without obvious evidence of hematom. bandage c/d/i    Significant Labs:  CBC:     Recent Labs  Lab 06/30/18  0429   WBC 10.63   RBC 3.14*   HGB 9.7*   HCT 28.2*      MCV 90   MCH 30.9   MCHC 34.4     CMP:     Recent Labs  Lab 06/29/18  0415   *   CALCIUM 9.0   ALBUMIN 2.7*   PROT 6.9   *   K 3.1*   CO2 24   CL 94*   BUN 15   CREATININE 1.7*   ALKPHOS 201*   *   AST 97*   BILITOT 1.2*     Coagulation: No results for input(s): LABPROT, INR, APTT in the last 48 hours.    Significant Diagnostics:  I have reviewed all pertinent imaging results/findings within the past 24 hours.    Assessment/Plan:     Peripheral vascular disease of lower extremity    47 y.o. male with h/o HTN, HLD, HFrEF (10-15% 3/23/18) s/p ICD, CAD s/p PCI, EtOH abuse, PAD s/p ABF 1999 with multiple endovascular interventions presented to hospital on 3/18/18 with left foot rest pain - underwent R AKA after problems with attempts at L leg revascularization with Cardiology. Subsequent left TMA with now ischemic necrosis of TMA incision with necrosis progressing proximally. Patient also had to be readmitted to hospital on 06/12/18 for decompensated CHF and cardiogenic shock requiring pressors. Now 1 Day Post-Op R BKA       - right aka wound with medial wound that has fibrinous exudates, close monitoring with ongoing local wound care.   - dressing change tomorrow  - contracted Acute pain service this morning for perineural cath issues  - cont knee immobilizer   - rest of care per primary, pain mgmt per anesthesia               Antoni Lamb MD  Vascular Surgery  [facility]    Vascular Attending  Agree with above    Norris Trejo MD FACS

## 2018-06-30 NOTE — PLAN OF CARE
Problem: Patient Care Overview  Goal: Plan of Care Review  Patient AAOX4, side rails up x2, subclavian PICC infusing with no concerns and site clean, dry and intact.  Dressing to Left BKA remains dry and intact with immobilizer in place.  Right AKA healing from previous surgery, no dressing, site has dried drainage.  Patient blood pressures still running in systolic 80s and diastolic 50s.  Ropivacaine infusing with no concerns at 8ml/hr site remains clean, dry and intact. Dominga Powell LPN

## 2018-06-30 NOTE — NURSING
Pt very drowsy, arouseable to verbal and tactile stimulation. VS stable. Will continue to monitor.

## 2018-06-30 NOTE — CARE UPDATE
Rapid Response Follow-up Note    Followed up with patient for proactive rounding.   No acute issues at this time. BP stable.  Patient slightly lethargic due to recent administration of narcotics.  Oriented X4.  Reviewed plan of care with primary RN.   Please call Rapid Response RN with any questions or concerns at  [phone]

## 2018-06-30 NOTE — SUBJECTIVE & OBJECTIVE
Medications:  Continuous Infusions:   ropivacaine (PF) 2 mg/ml (0.2%) 8 mL/hr (06/30/18 0021)     Scheduled Meds:   acetaminophen  1,000 mg Oral Q6H    aspirin  81 mg Per OG tube Daily    atorvastatin  80 mg Per OG tube Daily    collagenase   Topical (Top) Daily    heparin (porcine)  5,000 Units Subcutaneous Q12H    metoprolol tartrate  12.5 mg Oral BID    multivitamin liquid no.118  10 mL Oral Daily    nicotine  1 patch Transdermal Daily    pregabalin  75 mg Oral QHS    senna-docusate 8.6-50 mg  1 tablet Per OG tube BID    sodium chloride 0.9%  3 mL Intravenous Q8H    thiamine  100 mg Oral Daily    valproate sodium (DEPACON) IVPB  250 mg Intravenous Q6H    vancomycin (VANCOCIN) IVPB  750 mg Intravenous Q24H     PRN Meds:albuterol-ipratropium, chlorproMAZINE, levalbuterol, lidocaine HCL 2%, magnesium oxide, magnesium oxide, morphine, morphine, ondansetron, oxyCODONE, oxyCODONE, pneumoc 13-osman conj-dip cr(PF), potassium chloride 10%, potassium chloride 10%, potassium chloride 10%, potassium, sodium phosphates, potassium, sodium phosphates, potassium, sodium phosphates, promethazine (PHENERGAN) IVPB     Objective:     Vital Signs (Most Recent):  Temp: 96.2 °F (35.7 °C) (06/30/18 0416)  Pulse: 93 (06/30/18 0656)  Resp: 18 (06/30/18 0416)  BP: 98/68 (06/30/18 0416)  SpO2: 98 % (06/30/18 0416) Vital Signs (24h Range):  Temp:  [96.2 °F (35.7 °C)-98.8 °F (37.1 °C)] 96.2 °F (35.7 °C)  Pulse:  [] 93  Resp:  [10-27] 18  SpO2:  [94 %-100 %] 98 %  BP: ()/(47-71) 98/68          Physical Exam    A&O, NAD, slightly confused this AM  RRR  No Resp Distress  R BKA stump without obvious evidence of hematom. bandage c/d/i    Significant Labs:  CBC:     Recent Labs  Lab 06/30/18 0429   WBC 10.63   RBC 3.14*   HGB 9.7*   HCT 28.2*      MCV 90   MCH 30.9   MCHC 34.4     CMP:     Recent Labs  Lab 06/29/18  0415   *   CALCIUM 9.0   ALBUMIN 2.7*   PROT 6.9   *   K 3.1*   CO2 24   CL 94*   BUN  15   CREATININE 1.7*   ALKPHOS 201*   *   AST 97*   BILITOT 1.2*     Coagulation: No results for input(s): LABPROT, INR, APTT in the last 48 hours.    Significant Diagnostics:  I have reviewed all pertinent imaging results/findings within the past 24 hours.

## 2018-06-30 NOTE — PLAN OF CARE
Chart reviewed today and patient discussed with RN this morning. Reports that patient had episode of hypotension last night, now improved, but no problems with agitation last night or today.    Given fair behavioral control and recent hypotension, will advise to continue current management but not restart scheduled nighttime Thorazine at this time.     Logan Kumar MD  Resident, [facility]   Pager [phone]      .

## 2018-07-01 NOTE — ANESTHESIA POST-OP PAIN MANAGEMENT
Called by nursing stating that the sciatic nerve catheter has become disconnected from the tubing/pump at the alligator clip.  Patient is somnolent and disoriented and is pulling at the detached catheter.  Nurse states that this level of mentation is better than baseline, patient is under psych evaluation.  Patient is under MRSA contact precautions.  The tegaderms on top of the catheter have been disrupted.  The disconnect was not witnessed and was within the past hour, and with active manipulation by the patient, likely contaminated.  Catheter removed from patient, blue tip intact.  No areas of erythema, tenderness, edema around site of entry.  Pt tolerated well.  Attempted to educate patient regarding continued pain management, however patient's mental status prohibits this.  Will defer further evaluation and management to day team Anesthesiology Acute Pain Service.    Wan Montes MD PGY3/CA2  Anesthesiology  [facility]  Pager: [phone]  Acute Pain Service / [facility]  Spectra: [phone]

## 2018-07-01 NOTE — PROGRESS NOTES
[facility]  Vascular Surgery  Progress Note    Patient Name: Leonardo Byrd  MRN: [id number]  Admission Date: 6/16/2018  Primary Care Provider: Indio Aquino MD    Subjective:     Interval History: Pain controlled on PO meds. L BKA incision CDI.     Post-Op Info:  Procedure(s) (LRB):  AMPUTATION, BELOW KNEE (Left)   2 Days Post-Op       Medications:  Continuous Infusions:    Scheduled Meds:   acetaminophen  1,000 mg Oral Q12H    aspirin  81 mg Per OG tube Daily    atorvastatin  80 mg Per OG tube Daily    collagenase   Topical (Top) Daily    heparin (porcine)  5,000 Units Subcutaneous Q12H    metoprolol tartrate  12.5 mg Oral BID    multivitamin liquid no.118  10 mL Oral Daily    nicotine  1 patch Transdermal Daily    pregabalin  75 mg Oral QHS    senna-docusate 8.6-50 mg  1 tablet Per OG tube BID    sodium chloride 0.9%  3 mL Intravenous Q8H    thiamine  100 mg Oral Daily    valproate sodium (DEPACON) IVPB  250 mg Intravenous Q6H     PRN Meds:albuterol-ipratropium, chlorproMAZINE, levalbuterol, lidocaine HCL 2%, magnesium oxide, magnesium oxide, morphine, morphine, ondansetron, oxyCODONE, oxyCODONE, pneumoc 13-osman conj-dip cr(PF), potassium chloride 10%, potassium chloride 10%, potassium chloride 10%, potassium, sodium phosphates, potassium, sodium phosphates, potassium, sodium phosphates, promethazine (PHENERGAN) IVPB     Objective:     Vital Signs (Most Recent):  Temp: 96.9 °F (36.1 °C) (07/01/18 0732)  Pulse: 98 (07/01/18 0732)  Resp: 16 (07/01/18 0732)  BP: 100/69 (07/01/18 0732)  SpO2: 99 % (07/01/18 0732) Vital Signs (24h Range):  Temp:  [96.4 °F (35.8 °C)-98.5 °F (36.9 °C)] 96.9 °F (36.1 °C)  Pulse:  [] 98  Resp:  [16] 16  SpO2:  [95 %-99 %] 99 %  BP: ()/(59-74) 100/69          Physical Exam    A&O, NAD, slightly confused this AM  RRR  No Resp Distress  L BKA stump without obvious evidence of hematom. bandage c/d/i  R AKA       Significant Labs:  CBC:     Recent  Labs  Lab 07/01/18  0545   WBC 15.32*   RBC 3.05*   HGB 9.4*   HCT 27.4*      MCV 90   MCH 30.8   MCHC 34.3     CMP:     Recent Labs  Lab 07/01/18  0545   GLU 77   CALCIUM 8.7   ALBUMIN 2.5*   PROT 6.7   *   K 3.7   CO2 24   CL 96   BUN 17   CREATININE 1.6*   ALKPHOS 174*   *   *   BILITOT 0.9     Coagulation: No results for input(s): LABPROT, INR, APTT in the last 48 hours.    Significant Diagnostics:  I have reviewed all pertinent imaging results/findings within the past 24 hours.    Assessment/Plan:     Peripheral vascular disease of lower extremity    47 y.o. male with h/o HTN, HLD, HFrEF (10-15% 3/23/18) s/p ICD, CAD s/p PCI, EtOH abuse, PAD s/p ABF 1999 with multiple endovascular interventions presented to hospital on 3/18/18 with left foot rest pain - underwent R AKA after problems with attempts at L leg revascularization with Cardiology. Subsequent left TMA with now ischemic necrosis of TMA incision with necrosis progressing proximally. Patient also had to be readmitted to hospital on 06/12/18 for decompensated CHF and cardiogenic shock requiring pressors. Now 2 Days Post-Op R BKA       - right aka wound with medial wound that has fibrinous exudates, close monitoring with ongoing local wound care.   - changed dressing L BKA this AM  - pain controlled  - perineural cath D/C'd  - cont knee immobilizer in order to prevent posterior contracture  - rest of care per primary, pain mgmt per anesthesia  - recommend rehab vs SNF             Francisca Colon MD  Vascular Surgery  [facility]

## 2018-07-01 NOTE — SUBJECTIVE & OBJECTIVE
Medications:  Continuous Infusions:    Scheduled Meds:   acetaminophen  1,000 mg Oral Q12H    aspirin  81 mg Per OG tube Daily    atorvastatin  80 mg Per OG tube Daily    collagenase   Topical (Top) Daily    heparin (porcine)  5,000 Units Subcutaneous Q12H    metoprolol tartrate  12.5 mg Oral BID    multivitamin liquid no.118  10 mL Oral Daily    nicotine  1 patch Transdermal Daily    pregabalin  75 mg Oral QHS    senna-docusate 8.6-50 mg  1 tablet Per OG tube BID    sodium chloride 0.9%  3 mL Intravenous Q8H    thiamine  100 mg Oral Daily    valproate sodium (DEPACON) IVPB  250 mg Intravenous Q6H     PRN Meds:albuterol-ipratropium, chlorproMAZINE, levalbuterol, lidocaine HCL 2%, magnesium oxide, magnesium oxide, morphine, morphine, ondansetron, oxyCODONE, oxyCODONE, pneumoc 13-osman conj-dip cr(PF), potassium chloride 10%, potassium chloride 10%, potassium chloride 10%, potassium, sodium phosphates, potassium, sodium phosphates, potassium, sodium phosphates, promethazine (PHENERGAN) IVPB     Objective:     Vital Signs (Most Recent):  Temp: 96.9 °F (36.1 °C) (07/01/18 0732)  Pulse: 98 (07/01/18 0732)  Resp: 16 (07/01/18 0732)  BP: 100/69 (07/01/18 0732)  SpO2: 99 % (07/01/18 0732) Vital Signs (24h Range):  Temp:  [96.4 °F (35.8 °C)-98.5 °F (36.9 °C)] 96.9 °F (36.1 °C)  Pulse:  [] 98  Resp:  [16] 16  SpO2:  [95 %-99 %] 99 %  BP: ()/(59-74) 100/69          Physical Exam    A&O, NAD, slightly confused this AM  RRR  No Resp Distress  R BKA stump without obvious evidence of hematom. bandage c/d/i    Significant Labs:  CBC:     Recent Labs  Lab 07/01/18  0545   WBC 15.32*   RBC 3.05*   HGB 9.4*   HCT 27.4*      MCV 90   MCH 30.8   MCHC 34.3     CMP:     Recent Labs  Lab 07/01/18  0545   GLU 77   CALCIUM 8.7   ALBUMIN 2.5*   PROT 6.7   *   K 3.7   CO2 24   CL 96   BUN 17   CREATININE 1.6*   ALKPHOS 174*   *   *   BILITOT 0.9     Coagulation: No results for input(s):  LABPROT, INR, APTT in the last 48 hours.    Significant Diagnostics:  I have reviewed all pertinent imaging results/findings within the past 24 hours.

## 2018-07-01 NOTE — PLAN OF CARE
Chart reviewed today with staff, Dr. Ascencio. Spoke with primary team about patient's worsening agitation and delirium today.    Recommend restarting Thorazine 25mg PO nightly. Please monitor for hypotension.    Su Villalobos DO, MBA  [facility], PGY-2  P: [phone]

## 2018-07-01 NOTE — ADDENDUM NOTE
Addendum  created 07/01/18 1102 by Wan Montes MD    LDA properties accepted, Order list changed, Sign clinical note

## 2018-07-01 NOTE — PROGRESS NOTES
Progress Note  Hospital Medicine    Primary Team: [facility]  Admit Date: 6/16/2018   Length of Stay:  LOS: 15 days   SUBJECTIVE:   Reason for Admission:  Cardiogenic shock    HPI:  47M ICM (LVEF 13%) s/p ICD, CAD s/p PCI, HTN, HLD, current smoker, PAD (s/p aortobifemoral bypass, L SFA and pop PTAS in September and recent R SFA and R pop 3/8 by Dr. Carl Bell and finally R AKA 3/2018) who presented to OSH with SOB, cough, weight, BNP 2400, congested CXR, and elevated lactic acid. He was admitted with COPD and hypotension though to be secondary to sepsis. He was started on zmax and vanc and given a fluid bolus.  He was found to not be septic the next day and all abx were stopped.  Unfortunately, he developed ADHF with worsened respiratory status and became progressively hypotensive.  He was subsequently intubated and started on levo/dobutamine. Transferred to [facility] CCU for higher level of care.    Patient was started on SLED given poor UOP and worsening RYAN. He was kept on dobutamine 2.5 mcg/kg/min with daily CO measurements. Patient had recovering renal function and SLED was discontinued 6/17/2018. He was diuresed with IV lasix on bid then daily basis until patient became euvolemic with normal-low CVP. He was suitable for extubation on 2nd day of hospital stay however would become significantly agitated when sedation would be weaned down resulting in delaying extubation until 6/19/2018. After extubation, the patient was overtly delirious expressed by confusion, delusion, belligerence, and aggressive. He had to be restrained on the first 2 days following extubation. Psychiatry was consulted and patient was finally less psychotic and delirious on 6/23/2018 after starting scheduled depacon 250mg IV [id number] and receiving one dose of Thorazine night of 6/22/2018. Given patient's stable clinical status from his cardiogenic shock standpoint it was thought best to step down patient out of ICU settings to decreased  "delirium recurrence.     ECHO 6/17  CONCLUSIONS     1 - Mild left ventricular enlargement.     2 - Severely depressed left ventricular systolic function (EF low to mid teens).     3 - Biatrial enlargement.     4 - Restrictive LV filling pattern, indicating markedly elevated LAP (grade 3 diastolic dysfunction).     5 - Low normal to mildly depressed right ventricular systolic function .     6 - Moderate or more mitral regurgitation.     7 - Mild tricuspid regurgitation.     8 - Increased central venous pressure.     9 - Pulmonary hypertension. The estimated PA systolic pressure is 45 mmHg.     Interval history:    Patient removed perineural catheter overnight. Acute pain service will see today.  Patient reports catheter "fell out". He reports he wasn't confused or agitated. The patient is in good spirits. He offers no complaints. POC updated with patient and son.    Review of Systems:  Constitutional: no fever or chills  Respiratory: no cough or shortness of breath  Cardiovascular: no chest pain or palpitations  Gastrointestinal: no nausea or vomiting, no abdominal pain or change in bowel habits  Musculoskeletal: no arthralgias or myalgias     OBJECTIVE:     Temp:  [96.4 °F (35.8 °C)-98.5 °F (36.9 °C)]   Pulse:  []   Resp:  [16]   BP: ()/(59-74)   SpO2:  [95 %-99 %]  Body mass index is 20.18 kg/m².  Intake/Outake:  This Shift:  No intake/output data recorded.    Net I/O past 24h:     Intake/Output Summary (Last 24 hours) at 07/01/18 0811  Last data filed at 07/01/18 0338   Gross per 24 hour   Intake                0 ml   Output              300 ml   Net             -300 ml             Physical Exam:  Gen- well-developed, well-nourished, NAD  CVS- S1 and S2 present, 3/6 systolic murmur, RRR  Resp- CTA b/l, no work of breathing  Abd- BS+, soft, NT, ND  Ext- right AKA with well-healing incision; LLE s/p BKA    Laboratory:  CBC/Anemia Labs: Coags:      Recent Labs  Lab 06/29/18  3289 06/30/18  8115 " 07/01/18  0545   WBC 8.47 10.63 15.32*   HGB 10.3* 9.7* 9.4*   HCT 29.8* 28.2* 27.4*    283 305   MCV 88 90 90   RDW 19.4* 19.6* 19.6*    No results for input(s): PT, INR, APTT in the last 168 hours.     Chemistries:     Recent Labs  Lab 06/28/18  0555 06/29/18  0415 06/30/18  1333 07/01/18  0545   * 133* 133* 130*   K 3.2* 3.1* 3.4* 3.7   CL 95 94* 97 96   CO2 27 24 27 24   BUN 13 15 18 17   CREATININE 1.7* 1.7* 1.8* 1.6*   CALCIUM 9.3 9.0 8.6* 8.7   PROT 7.5 6.9 6.6 6.7   BILITOT 1.7* 1.2* 0.8 0.9   ALKPHOS 223* 201* 181* 174*   * 234* 123* 102*   * 97* 106* 128*   MG 2.2 1.5*  --  1.8   PHOS 4.5 4.5  --  4.3        ABG:     Recent Labs  Lab 06/25/18  1516 06/25/18  1654   PH 7.520* 7.492*   PCO2 30.4* 34.7*   PO2 73* 35*   HCO3 24.9 26.5   POCSATURATED 96 73*   BE 2 3        Cardiac Enzymes: Ejection Fractions:    No results for input(s): CPK, CPKMB, MB, TROPONINI in the last 72 hours. EF   Date Value Ref Range Status   06/17/2018 13 (A) 55 - 65    04/06/2018 15 (A) 55 - 65         Medications:  Scheduled Meds:   acetaminophen  1,000 mg Oral Q12H    aspirin  81 mg Per OG tube Daily    atorvastatin  80 mg Per OG tube Daily    collagenase   Topical (Top) Daily    heparin (porcine)  5,000 Units Subcutaneous Q12H    metoprolol tartrate  12.5 mg Oral BID    multivitamin liquid no.118  10 mL Oral Daily    nicotine  1 patch Transdermal Daily    potassium chloride SA  40 mEq Oral Once    pregabalin  75 mg Oral QHS    senna-docusate 8.6-50 mg  1 tablet Per OG tube BID    sodium chloride 0.9%  3 mL Intravenous Q8H    thiamine  100 mg Oral Daily    valproate sodium (DEPACON) IVPB  250 mg Intravenous Q6H                             Continuous Infusions:    PRN Meds:.albuterol-ipratropium, chlorproMAZINE, levalbuterol, lidocaine HCL 2%, magnesium oxide, magnesium oxide, morphine, morphine, ondansetron, oxyCODONE, oxyCODONE, pneumoc 13-osman conj-dip cr(PF), potassium chloride 10%,  potassium chloride 10%, potassium chloride 10%, potassium, sodium phosphates, potassium, sodium phosphates, potassium, sodium phosphates, promethazine (PHENERGAN) IVPB     ASSESSMENT/PLAN:     Active Hospital Problems    Diagnosis  POA    *Cardiogenic shock [R57.0]  Yes    Acute on chronic heart failure [I50.9]  Yes    Osteomyelitis of left foot [M86.9]  Yes    Status post transmetatarsal amputation of foot, left [Z89.432]  Not Applicable    Moderate protein-calorie malnutrition [E44.0]  Yes    Delirium [R41.0]  No    Delirium due to multiple etiologies, acute, hyperactive [F05]  No    Alteration in skin integrity related to surgical incision [R23.9]  Yes    Encephalopathy [G93.40]  Yes    Shock liver [K72.00]  Yes    COPD, severe [J44.9]  Yes    Acute renal failure [N17.9]  Yes    Leukocytosis [D72.829]  Yes    Alcohol abuse [F10.10]  Yes    Peripheral vascular disease of lower extremity [I73.9]  Yes     S/p right AKA.     TcPO2 L foot 5/11/2018   Dorsum 43 to 171 mmHg with oxygen   Medial 61 to 144 mmHg with oxygen   Chest wall baseline 43 mmHg      Tobacco abuse [Z72.0]  Yes      Resolved Hospital Problems    Diagnosis Date Resolved POA    Hyperkalemia [E87.5] 06/21/2018 Yes    RYAN (acute kidney injury) [N17.9] 06/23/2018 Yes    Dependence on ventilator, status [Z99.11] 06/20/2018 Not Applicable    Lactic acidosis [E87.2] 06/23/2018 Yes    Acute respiratory failure with hypoxia [J96.01] 06/22/2018 Yes     Peripheral vascular disease of lower extremity  Gangrene of L foot  S/p BKA  - S/p BKA  - Hold further abx  - Patient afebrile; leucocytosis may be post-surgical stress in nature; will continue to monitor, defer abx at this time    Cardiogenic shock  Acute on chronic combined HF  - likely due to ADHF following volume resuscitation when received IVF for suspected sepsis on initial presentation  - net negative 9L since admit. SLED discontinued 6/17/2018.   - has been receiving daily IV lasix  80mg prn. Last dose received 6/21/2018.   - check CVP and reassess; pt reports he was on Lasix at home, will ask Pharmacy to assist  - Nephrology following, appreciate assistance.   - Dobutamine weaned off  - Okay to resume BB, hold for SBP <90  - Hold diuresis as below  - Hold imdur due to relative hypotension    Acute renal failure superimposed on CKDIII  - Likely ATN due to ADHF and ischemic insult with hypotension and currently with over diuresis.   - Cr slightly worse to 1.7  - Nephro following, appreciate their assistance.   - Avoid nephrotoxins and renally dose medications.   - Strict intake and output.   - Hold diuresis    Encephalopathy, metabolic  - Likely due ICU delirium.  - Psychiatry following, appreciate recs: Depacon 250mg IV q6h  - Continue holding pregabalin in setting of delirium and encephalopathy.   - Delirium precautions  - Appreciate updated psychiatry recs    Moderate protein-calorie malnutrition  - Poor enteral nutrition due to AMS    COPD, mixed simple and mucopurulent  - Duonebs prn     Alcohol abuse  - Reportedly last drink was weeks to months ago.   - Avoiding BZD as possible.     Tobacco abuse  - Nicotine patch.    DVT ppx- add Heparin  CODE Status- FULL    Dispo- PT/OT for safe disposition; SNF v HHPTOT     Antoni Trujillo MD  Hospital Medicine Staff

## 2018-07-01 NOTE — ASSESSMENT & PLAN NOTE
47 y.o. male with h/o HTN, HLD, HFrEF (10-15% 3/23/18) s/p ICD, CAD s/p PCI, EtOH abuse, PAD s/p ABF 1999 with multiple endovascular interventions presented to hospital on 3/18/18 with left foot rest pain - underwent R AKA after problems with attempts at L leg revascularization with Cardiology. Subsequent left TMA with now ischemic necrosis of TMA incision with necrosis progressing proximally. Patient also had to be readmitted to hospital on 06/12/18 for decompensated CHF and cardiogenic shock requiring pressors. Now 2 Days Post-Op R BKA       - right aka wound with medial wound that has fibrinous exudates, close monitoring with ongoing local wound care.   - changed dressing L BKA this AM  - pain controlled  - perineural cath D/C'd  - cont knee immobilizer in order to prevent posterior contracture  - rest of care per primary, pain mgmt per anesthesia  - recommend rehab vs SNF

## 2018-07-02 NOTE — ASSESSMENT & PLAN NOTE
- Pt had remarkable improvement in mental status s/p administration of thorazine 6/22 PM, now alert and oriented to all measures assessed and CAM-ICU negative - possibly due to thorazine, improvement of medical condition, or combination of both - pt passed swallow, can now take PO meds  - Discontinued scheduled zyprexa due to lack of efficacy    - Continue Depacon 250mg IV q6h / Check levels   - Reinitiated Thorazine 25mg qhs for non-redirectable agitation / Continue the same   - Recommend daily EKG to monitor QTc while on thorazine - QTc on 6/22 477, 6/23 505, 6/24 543, 6/25 456, 6/28 467    - Implement the below DELIRIUM BEHAVIOR MANAGEMENT:  - Minimize use of restraints; if physical restraints necessary, please utilize medical/chemical prns for agitation first  - Keep shades open and room lit during day and room dim at night in order to promote healthy circadian rhythms.  - Encourage family at bedside.  - Keep whiteboard in patient's room current with the date and name of the members of patient's team for easy patient self re-orientation.  - Avoid benzodiazepines, antihistamines, anticholinergics, hypnotics, and minimize opiates while controlling for pain as these medications may worsen delirium.      Psychiatry will continue to follow

## 2018-07-02 NOTE — PROGRESS NOTES
Progress Note  Hospital Medicine    Primary Team: [facility] HOSP MED C  Admit Date: 6/16/2018   Length of Stay:  LOS: 16 days   SUBJECTIVE:   Reason for Admission:  Cardiogenic shock    HPI:  47M ICM (LVEF 13%) s/p ICD, CAD s/p PCI, HTN, HLD, current smoker, PAD (s/p aortobifemoral bypass, L SFA and pop PTAS in September and recent R SFA and R pop 3/8 by Dr. Carl Bell and finally R AKA 3/2018) who presented to [facility] on 6/12 with SOB, cough, weight, BNP 2400, congested CXR, and elevated lactic acid. He was admitted with COPD and hypotension though to be secondary to sepsis. He was started on azithromycin and vancomycin, and given a fluid bolus.  He was found to not be septic the next day and all abx were stopped; etiology then thought cardiogenic shock in etiology.  Unfortunately, he developed ADHF with worsened respiratory status and became progressively hypotensive.  He was subsequently intubated and started on levo/dobutamine. Transferred to [facility] CCU for higher level of care on 6/16.  Patient was started on SLED given poor UOP and worsening RYAN. He was kept on dobutamine 2.5 mcg/kg/min with daily CO measurements. Patient had recovering renal function and SLED was discontinued 6/17/2018. He was diuresed with IV lasix BID then daily basis until patient became euvolemic with normal-low CVP. He was suitable for extubation on 2nd day of hospital stay however would become significantly agitated when sedation would be weaned down resulting in delaying extubation until 6/19/2018. After extubation, the patient was overtly delirious expressed by confusion, delusion, belligerence, and aggressive. He had to be restrained on the first 2 days following extubation. Psychiatry was consulted and patient was finally less psychotic and delirious on 6/23/2018 after starting scheduled depacon 250mg IV q6h and receiving one dose of thorazine night of 6/22/2018. Given patient's stable clinical status from his cardiogenic shock  "standpoint it was thought best to step down patient out of ICU settings to decreased delirium recurrence. [facility] [place]24 with Dr. Gomez.  The focus quickly shifted from patient's cardiorenal stabilization to his gangrenous L foot.  There was overwhelming concern of osteomyelitis and risk of ascending infection.  A multidisciplinary approach was taken in which the patient was seen by podiatry, vascular, and ID. The patient's extremity was deemed non-viable and the patient was then scheduled for BKA, which occurred on 6/29.  Post-operatively the patient's course is c/b worsening delirium for which psychiatry continues to follow, as well as leucocytosis which may be reactive.  PT/OT is determining safe disposition for the patient.    ECHO 6/17  CONCLUSIONS     1 - Mild left ventricular enlargement.     2 - Severely depressed left ventricular systolic function (EF low to mid teens).     3 - Biatrial enlargement.     4 - Restrictive LV filling pattern, indicating markedly elevated LAP (grade 3 diastolic dysfunction).     5 - Low normal to mildly depressed right ventricular systolic function .     6 - Moderate or more mitral regurgitation.     7 - Mild tricuspid regurgitation.     8 - Increased central venous pressure.     9 - Pulmonary hypertension. The estimated PA systolic pressure is 45 mmHg.     Interval history:    Patient with worsening confusion overnight. Patient is always adamant with me that he is not confused, that there were people on roof doing construction and eating. He says there was also a woman in his room. He is oriented x 4 this AM.  His father is in the room.  Both patient and father express being pleased with post-operative course. Expressed to patient that we need further stabilization of mental status as well as PT/OT to decide home v SNF.  He says, "I'm going home, and that's that". Father reports that he has HH in past as well as all the equipment needed.    Review of " Systems:  Constitutional: no fever or chills  Respiratory: no cough or shortness of breath  Cardiovascular: no chest pain or palpitations  Gastrointestinal: no nausea or vomiting, no abdominal pain or change in bowel habits  Musculoskeletal: no arthralgias or myalgias     OBJECTIVE:     Temp:  [96.3 °F (35.7 °C)-98.2 °F (36.8 °C)]   Pulse:  []   Resp:  [16-18]   BP: ()/(59-81)   SpO2:  [92 %-100 %]  Body mass index is 20.18 kg/m².  Intake/Outake:  This Shift:  No intake/output data recorded.    Net I/O past 24h:     Intake/Output Summary (Last 24 hours) at 07/02/18 0851  Last data filed at 07/02/18 0500   Gross per 24 hour   Intake             1020 ml   Output             1000 ml   Net               20 ml             Physical Exam:  Gen- well-developed, well-nourished, NAD  CVS- S1 and S2 present, 3/6 systolic murmur, RRR  Resp- CTA b/l, no work of breathing  Abd- BS+, soft, NT, ND  Ext- right AKA with well-healing incision; LLE s/p BKA    Laboratory:  CBC/Anemia Labs: Coags:      Recent Labs  Lab 06/30/18  0429 07/01/18  0545 07/02/18  0550   WBC 10.63 15.32* 12.80*   HGB 9.7* 9.4* 8.3*   HCT 28.2* 27.4* 23.9*    305 279   MCV 90 90 89   RDW 19.6* 19.6* 19.3*    No results for input(s): PT, INR, APTT in the last 168 hours.     Chemistries:     Recent Labs  Lab 06/29/18  0415 06/30/18  1333 07/01/18  0545 07/02/18  0550   * 133* 130* 132*   K 3.1* 3.4* 3.7 4.3   CL 94* 97 96 100   CO2 24 27 24 24   BUN 15 18 17 17   CREATININE 1.7* 1.8* 1.6* 1.4   CALCIUM 9.0 8.6* 8.7 8.8   PROT 6.9 6.6 6.7 6.3   BILITOT 1.2* 0.8 0.9 0.9   ALKPHOS 201* 181* 174* 158*   * 123* 102* 72*   AST 97* 106* 128* 117*   MG 1.5*  --  1.8 1.9   PHOS 4.5  --  4.3 4.1        ABG:     Recent Labs  Lab 06/25/18  1516 06/25/18  1654   PH 7.520* 7.492*   PCO2 30.4* 34.7*   PO2 73* 35*   HCO3 24.9 26.5   POCSATURATED 96 73*   BE 2 3        Cardiac Enzymes: Ejection Fractions:    No results for input(s): CPK, CPKMB, MB,  TROPONINI in the last 72 hours. EF   Date Value Ref Range Status   06/17/2018 13 (A) 55 - 65    04/06/2018 15 (A) 55 - 65         Medications:  Scheduled Meds:   acetaminophen  1,000 mg Oral Q12H    aspirin  81 mg Per OG tube Daily    atorvastatin  80 mg Per OG tube Daily    chlorproMAZINE  25 mg Oral QHS    collagenase   Topical (Top) Daily    heparin (porcine)  5,000 Units Subcutaneous Q12H    metoprolol tartrate  12.5 mg Oral BID    multivitamin liquid no.118  10 mL Oral Daily    nicotine  1 patch Transdermal Daily    pregabalin  75 mg Oral QHS    senna-docusate 8.6-50 mg  1 tablet Per OG tube BID    sodium chloride 0.9%  3 mL Intravenous Q8H    thiamine  100 mg Oral Daily    valproate sodium (DEPACON) IVPB  250 mg Intravenous Q6H                             Continuous Infusions:    PRN Meds:.albuterol-ipratropium, chlorproMAZINE, levalbuterol, lidocaine HCL 2%, magnesium oxide, magnesium oxide, morphine, morphine, ondansetron, oxyCODONE, oxyCODONE, pneumoc 13-osman conj-dip cr(PF), potassium chloride 10%, potassium chloride 10%, potassium chloride 10%, potassium, sodium phosphates, potassium, sodium phosphates, potassium, sodium phosphates, promethazine (PHENERGAN) IVPB     ASSESSMENT/PLAN:     Active Hospital Problems    Diagnosis  POA    *Cardiogenic shock [R57.0]  Yes    Acute on chronic heart failure [I50.9]  Yes    Osteomyelitis of left foot [M86.9]  Yes    Status post transmetatarsal amputation of foot, left [Z89.432]  Not Applicable    Moderate protein-calorie malnutrition [E44.0]  Yes    Delirium [R41.0]  No    Delirium due to multiple etiologies, acute, hyperactive [F05]  No    Alteration in skin integrity related to surgical incision [R23.9]  Yes    Encephalopathy [G93.40]  Yes    Shock liver [K72.00]  Yes    COPD, severe [J44.9]  Yes    Acute renal failure [N17.9]  Yes    Leukocytosis [D72.829]  Yes    Alcohol abuse [F10.10]  Yes    Peripheral vascular disease of lower  extremity [I73.9]  Yes     S/p right AKA.     TcPO2 L foot 5/11/2018   Dorsum 43 to 171 mmHg with oxygen   Medial 61 to 144 mmHg with oxygen   Chest wall baseline 43 mmHg      Tobacco abuse [Z72.0]  Yes      Resolved Hospital Problems    Diagnosis Date Resolved POA    Hyperkalemia [E87.5] 06/21/2018 Yes    RYAN (acute kidney injury) [N17.9] 06/23/2018 Yes    Dependence on ventilator, status [Z99.11] 06/20/2018 Not Applicable    Lactic acidosis [E87.2] 06/23/2018 Yes    Acute respiratory failure with hypoxia [J96.01] 06/22/2018 Yes     Peripheral vascular disease of lower extremity  Gangrene of L foot  S/p BKA  - S/p BKA  - Hold further abx  - Patient afebrile; leucocytosis may be post-surgical stress in nature; will continue to monitor, defer abx at this time    Cardiogenic shock  Acute on chronic combined HF  - likely due to ADHF following volume resuscitation when received IVF for suspected sepsis on initial presentation  - net negative ~6L since admit  - Okay to resume BB, hold for SBP <90  - Hold ACEI due to renal function status  - Hold diuresis as below  - Hold imdur due to relative hypotension    Acute renal failure superimposed on CKDIII  - Likely ATN due to ADHF and ischemic insult with hypotension and currently with over diuresis.   - Cr improved to 1.4  - Nephro had been following  - Avoid nephrotoxins and renally dose medications.   - Strict intake and output.   - Hold diuresis  - Patient given 250 cc fluid challenge on 7/1 for hypoTN, hypoNa, and RYAN with improvement in all parameters    Encephalopathy, metabolic  - Likely due ICU delirium.  - Psychiatry following, appreciate recs: Depacon 250mg IV q6h  - Continue holding pregabalin in setting of delirium and encephalopathy.   - Delirium precautions  - Appreciate updated psychiatry recs    Moderate protein-calorie malnutrition  - Poor enteral nutrition due to AMS    COPD, mixed simple and mucopurulent  - Duonebs prn     Alcohol abuse  - Reportedly  last drink was weeks to months ago.   - Avoiding BZD as possible.     Tobacco abuse  - Nicotine patch.    DVT ppx- add Heparin  CODE Status- FULL    Dispo- PT/OT for safe disposition; SNF v PTOT     Antoni Trujillo MD  [facility] Medicine Staff

## 2018-07-02 NOTE — PLAN OF CARE
Problem: Patient Care Overview  Goal: Plan of Care Review    Recommendations    Recommendation/Intervention:     1. Continue current cardiac diet and encourage PO intake.   2. If pt agreeable, order Boost Plus or Boost Breeze to aid in pro/kcal intake.   3. RD following.    Goals: Meet % EEN, EPN  Nutrition Goal Status: progressing towards goal

## 2018-07-02 NOTE — PROGRESS NOTES
[facility]  Psychiatry  Progress Note    Patient Name: Leonardo Byrd  MRN: [id number]   Code Status: Full Code  Admission Date: 6/16/2018  Hospital Length of Stay: 16 days  Expected Discharge Date: 7/5/2018  Attending Physician: Antoni Trujillo MD  Primary Care Provider: Indio Aquino MD    Current Legal Status: N/A    Patient information was obtained from patient, parent, past medical records and ER records.     Subjective:     Principal Problem:Cardiogenic shock    Chief Complaint: Delirium     HPI: Per Cardiology notes:  47 year old with PMH of ICM (LVEF 30-35%) s/p ICD, CAD s/p PCI, HTN, HLD, current smoker, PAD (s/p aortobifemoral bypass, L SFA and pop PTAS in September and recent R SFA and R pop 3/8 by Dr. Carl Bell and finally R AKA 3/2018.     He presented to ED with about 2 week h/o SOB, cough, weight gain. He presented to [facility] ED on Saturday. Sent home after presumed negative work up. Presented once again overnight 6/13. BNP 2400, congested CXR, cough, no fever similar to prior CHF exacerbations. LActic acid elevated. He was admitted with COPD and sepsis. He was started on zmax and vanc and given a fluid bolus in ED.  He was found to not be septic the next day and all abx were stopped.  Unfortunately, though his UO was quite good and he was net - 2000cc on 6/16am, he progressively worsened from a respiratory status and became progressively hypotensive.  He was subsequently intubated and started on levo/dobutrex.  Lactate 10, k 7.1, creatinine now 1.8 from baseline 0.8, poor/no UO.  Transferred to [facility] for higher level of care.     On interview this morning:  The patient is lying in bed calmly at the beginning of the interview. Two of his daughters are present and help provide some of the history. The patient is not cooperative with interview and is profoundly confused. His answers to questions are nonsensical and he is unable to follow any commands. He is CAM-ICU positive for  "delirium and fails SAVEAHAART. The patient requests some water and his daughters attempt to feed him ice chips. He then becomes agitated and violent. He attempts to get out of bed and asks for his clothes. He starts yelling. His daughters are asked to leave the room and nurses place the patient in soft restraints to his wrists and his ankle. He continues to yell for his daughters and is no longer able to participate in the interview.    Per his daughters, he is treated for depression with a medication but they do not remember the name of the medication. He has not seen a psychiatrist in the past. They state that the patient has not had a drink of alcohol in at least a month and state that he does not use any drugs of abuse. They state that the patient was "given too much of a medication" by his doctors, but they are unable to say which medication they are referring too. They deny that the patient has had any prior suicide attempts.    Hospital Course: 6/21/2018: The chart was reviewed and the patient was interviewed this morning. His father was present in the room during the interview. The patient was in restraints to his wrists bilaterally and to his left ankle. He also had a mask on his face to prevent him from spitting on staff. Per his nurse, he has continued to be very agitated and interfering with treatment. The patient was agitated during the interview this morning. His speech was profane and he was shouting at his father to "call 911". He was unable to participate in CAM-ICU assessment for delirium and was unable to follow commands.    6/22/2018: The chart was reviewed and the patient was interviewed this morning. He had a mask on his face and he was in 3-point soft restraints. He was vulgar and profoundly confused. He was unable to offer coherent answers to any questions. He failed SAVEAHAART. He was oriented to person only.     06/23/2018 - received Thorazine 50mg IM yesterday for agitation. remarkable " "improvement in mental status today. Pt alert and oriented to person, place, city, state, month, year, situation - CAM-ICU negative with 0 errors on SAVEAHAART screen. Mood is good, thoughts are linear, pt laughing and joking around appropriately. Son at bedside, relieved by the marked improvement. Step down to floor today.     06/24/2018: per chart has been calm, no PRN medication needed. Says he is doing "a lot better" today, though he complains about not being able to walk and asks when his L foot dressing will be changed. C/o pain. Oriented to place, month, year and that is was recently father's day. No family at bedside on interview.    6/25./2018: No PRN medications have been given over the past several days for agitation. The patient is calm and oriented to person, place and time. Per his father at the bedside, he is still somewhat confused and not back to his baseline yet. However, he is out of restraints and passed SAVEAHAART this morning. His mood was "good" this morning. There was some instances of incoherent mumbling, but the patient was otherwise appropriate and cooperative with the interview.    6/28/2018: Chart was reviewed and the patient was interviewed this morning. No PRN medications have been given over the past several days for agitation. The patient was calm and cooperative with interview. He stated that he was told he would have his left leg amputated below the knee tomorrow. He understands the need to have this surgery and states that it will be better since his foot has continued to hurt. He denies SI/HI/AVH. He is appropriate and pleasant on interview. He passes SAVEAHAART and is CAM-ICU negative for delirium. He denies any physical complaints or side effects to Depakote.    Interval History:   No concerns for agitations / aggressive behavior overnight. On thorazine / Depacon. Cleared CAMICU / SAVEHAART.      Family History     None        Social History Main Topics    Smoking status: " "Former Smoker     Packs/day: 0.50     Quit date: 5/1/2018    Smokeless tobacco: Former User     Types: Chew     Quit date: 8/2/1980      Comment: Uses nicotine gum and nicotine patches    Alcohol use Yes      Comment: rarely    Drug use: No    Sexual activity: Yes     Partners: Female     Psychotherapeutics     Start     Stop Route Frequency Ordered    07/02/18 2100  chlorproMAZINE tablet 25 mg      -- Oral Nightly 07/01/18 1837    06/23/18 1700  chlorproMAZINE injection 25 mg      -- IM Every 12 hours PRN 06/23/18 1600    06/19/18 1750  haloperidol lactate (HALDOL) 5 mg/mL injection     Comments:  Created by cabinet override    06/20 0559   06/19/18 1750           Review of Systems   Respiratory: Negative for shortness of breath.    Cardiovascular: Negative for chest pain.   Gastrointestinal: Negative for abdominal pain.   Neurological: Negative for headaches.     Objective:     Vital Signs (Most Recent):  Temp: 97.1 °F (36.2 °C) (07/02/18 1144)  Pulse: 102 (07/02/18 1521)  Resp: 16 (07/02/18 1144)  BP: (!) 96/58 (07/02/18 1144)  SpO2: (!) 92 % (07/02/18 1144) Vital Signs (24h Range):  Temp:  [96.3 °F (35.7 °C)-98.2 °F (36.8 °C)] 97.1 °F (36.2 °C)  Pulse:  [] 102  Resp:  [16-18] 16  SpO2:  [92 %-100 %] 92 %  BP: ()/(58-78) 96/58     Height: 5' 6" (167.6 cm) (per RN 6/16/18)  Weight:  (bed not working for weight )  Body mass index is 20.18 kg/m².      Intake/Output Summary (Last 24 hours) at 07/02/18 1601  Last data filed at 07/02/18 1330   Gross per 24 hour   Intake              330 ml   Output             1150 ml   Net             -820 ml       Physical Exam          Mental Status Exam:  Appearance: unremarkable, age appropriate, normal weight, well nourished, lying in bed, dressed in hospital gown  Behavior/Cooperation: normal, cooperative  Speech: normal tone, normal rate, normal pitch, normal volume  Language: uses words appropriately; NO aphasia or dysarthria  Mood: euthymic  Affect:  congruent " with mood and appropriate to situation/content   Thought Process: normal and logical  Thought Content: normal, no suicidality, no homicidality, delusions, or paranoia  Level of Consciousness: Alert and Oriented x3  Memory:  Intact              3/3 immediate, 3/3 at 5 minutes               Recent:  Intact              Remote:  intact  Attention/concentration: appropriate for age/education.   Fund of Knowledge: appears adequate  Insight:  Intact  Judgment: Appropriate for setting    Significant Labs:   Last 24 Hours:   Recent Lab Results       07/02/18  0550      Immature Granulocytes 1.3(H)     Immature Grans (Abs) 0.17  Comment:  Mild elevation in immature granulocytes is non specific and   can be seen in a variety of conditions including stress response,   acute inflammation, trauma and pregnancy. Correlation with other   laboratory and clinical findings is essential.  (H)     Albumin 2.3(L)     Alkaline Phosphatase 158(H)     ALT 72(H)     Anion Gap 8     Aniso Slight     (H)     Baso # 0.08     Basophil% 0.6     Total Bilirubin 0.9  Comment:  For infants and newborns, interpretation of results should be based  on gestational age, weight and in agreement with clinical  observations.  Premature Infant recommended reference ranges:  Up to 24 hours.............<8.0 mg/dL  Up to 48 hours............<12.0 mg/dL  3-5 days..................<15.0 mg/dL  6-29 days.................<15.0 mg/dL       BUN, Bld 17     Calcium 8.8     Chloride 100     CO2 24     Creatinine 1.4     Differential Method Automated     eGFR if African American >60.0     eGFR if non  58.6  Comment:  Calculation used to obtain the estimated glomerular filtration  rate (eGFR) is the CKD-EPI equation.   (A)     Eos # 0.2     Eosinophil% 1.2     Glucose 74     Gran # (ANC) 7.5     Gran% 58.7     Hematocrit 23.9(L)     Hemoglobin 8.3(L)     Hypo Occasional     Lymph # 2.8     Lymph% 21.8     Magnesium 1.9     MCH 30.7     MCHC 34.7      MCV 89     Mono # 2.1(H)     Mono% 16.4(H)     MPV 11.5     nRBC 0     Phosphorus 4.1     Platelet Estimate Appears normal     Platelets 279     Poly Occasional     Potassium 4.3     Total Protein 6.3     RBC 2.70(L)     RDW 19.3(H)     Sodium 132(L)     Target Cells Occasional     WBC 12.80(H)           Significant Imaging: I have reviewed all pertinent imaging results/findings within the past 24 hours.    Assessment/Plan:     Delirium    - Pt had remarkable improvement in mental status s/p administration of thorazine 6/22 PM, now alert and oriented to all measures assessed and CAM-ICU negative - possibly due to thorazine, improvement of medical condition, or combination of both - pt passed swallow, can now take PO meds  - Discontinued scheduled zyprexa due to lack of efficacy    - Continue Depacon 250mg IV q6h / Check levels   - Reinitiated Thorazine 25mg qhs for non-redirectable agitation / Continue the same   - Recommend daily EKG to monitor QTc while on thorazine - QTc on 6/22 477, 6/23 505, 6/24 543, 6/25 456, 6/28 467    - Implement the below DELIRIUM BEHAVIOR MANAGEMENT:  - Minimize use of restraints; if physical restraints necessary, please utilize medical/chemical prns for agitation first  - Keep shades open and room lit during day and room dim at night in order to promote healthy circadian rhythms.  - Encourage family at bedside.  - Keep whiteboard in patient's room current with the date and name of the members of patient's team for easy patient self re-orientation.  - Avoid benzodiazepines, antihistamines, anticholinergics, hypnotics, and minimize opiates while controlling for pain as these medications may worsen delirium.      Psychiatry will continue to follow             Need for Continued Hospitalization:   No need for inpatient psychiatric hospitalization. Continue medical care as per the primary team.    Anticipated Disposition: Skilled Nursing Facility     Total time:  25 with greater than 50%  of this time spent in counseling and/or coordination of care.       Krishna Anderson MD   Psychiatry  [facility]

## 2018-07-02 NOTE — NURSING
This nurse spoke with Bethany QUINN, regarding pt R subclavian PICC. One of three of the lumens is missing its hub. MD is aware. No intervention ordered at this time. Will continue to monitor.

## 2018-07-02 NOTE — PROGRESS NOTES
[facility]  Vascular Surgery  Progress Note    Patient Name: Leonardo Byrd  MRN: [id number]  Admission Date: 6/16/2018  Primary Care Provider: Indio Aquino MD    Subjective:     Interval History: No acute events overnight. Pain controlled on PO meds.     Post-Op Info:  Procedure(s) (LRB):  AMPUTATION, BELOW KNEE (Left)   3 Days Post-Op       Medications:  Continuous Infusions:    Scheduled Meds:   acetaminophen  1,000 mg Oral Q12H    aspirin  81 mg Per OG tube Daily    atorvastatin  80 mg Per OG tube Daily    chlorproMAZINE  25 mg Oral QHS    collagenase   Topical (Top) Daily    heparin (porcine)  5,000 Units Subcutaneous Q12H    metoprolol tartrate  12.5 mg Oral BID    multivitamin liquid no.118  10 mL Oral Daily    nicotine  1 patch Transdermal Daily    pregabalin  75 mg Oral QHS    senna-docusate 8.6-50 mg  1 tablet Per OG tube BID    sodium chloride 0.9%  3 mL Intravenous Q8H    thiamine  100 mg Oral Daily    valproate sodium (DEPACON) IVPB  250 mg Intravenous Q6H     PRN Meds:albuterol-ipratropium, chlorproMAZINE, levalbuterol, lidocaine HCL 2%, magnesium oxide, magnesium oxide, morphine, morphine, ondansetron, oxyCODONE, oxyCODONE, pneumoc 13-osman conj-dip cr(PF), potassium chloride 10%, potassium chloride 10%, potassium chloride 10%, potassium, sodium phosphates, potassium, sodium phosphates, potassium, sodium phosphates, promethazine (PHENERGAN) IVPB     Objective:     Vital Signs (Most Recent):  Temp: 96.5 °F (35.8 °C) (07/02/18 0550)  Pulse: 97 (07/02/18 0550)  Resp: 16 (07/02/18 0550)  BP: (!) 91/59 (07/02/18 0550)  SpO2: 100 % (07/02/18 0550) Vital Signs (24h Range):  Temp:  [96.3 °F (35.7 °C)-98.2 °F (36.8 °C)] 96.5 °F (35.8 °C)  Pulse:  [] 97  Resp:  [16-18] 16  SpO2:  [92 %-100 %] 100 %  BP: ()/(59-81) 91/59          Physical Exam    A&O, NAD, slightly confused this AM  RRR  No Resp Distress  L BKA stump, bandage c/d/i, minor ruby-incisional ischemia  present   R AKA     Significant Labs:  CBC:     Recent Labs  Lab 07/02/18  0550   WBC 12.80*   RBC 2.70*   HGB 8.3*   HCT 23.9*      MCV 89   MCH 30.7   MCHC 34.7     CMP:     Recent Labs  Lab 07/02/18  0550   GLU 74   CALCIUM 8.8   ALBUMIN 2.3*   PROT 6.3   *   K 4.3   CO2 24      BUN 17   CREATININE 1.4   ALKPHOS 158*   ALT 72*   *   BILITOT 0.9     Coagulation: No results for input(s): LABPROT, INR, APTT in the last 48 hours.    Significant Diagnostics:  I have reviewed all pertinent imaging results/findings within the past 24 hours.    Assessment/Plan:     Peripheral vascular disease of lower extremity    47 y.o. male with h/o HTN, HLD, HFrEF (10-15% 3/23/18) s/p ICD, CAD s/p PCI, EtOH abuse, PAD s/p ABF 1999 with multiple endovascular interventions presented to hospital on 3/18/18 with left foot rest pain - underwent R AKA after problems with attempts at L leg revascularization with Cardiology. Subsequent left TMA with now ischemic necrosis of TMA incision with necrosis progressing proximally. Patient also had to be readmitted to hospital on 06/12/18 for decompensated CHF and cardiogenic shock requiring pressors. Now 3 Days Post-Op L BKA       - right aka wound medial much improved with wound care   - changed dressing L BKA this AM  - pain controlled  - perineural cath D/C'd  - cont knee immobilizer in order to prevent posterior contracture  - rest of care per primary, pain mgmt per anesthesia  - recommend rehab vs SNF   - recommend f/u with Dr. Trejo 2 weeks post DC            Francisca Colon MD  Vascular Surgery  [facility]

## 2018-07-02 NOTE — SUBJECTIVE & OBJECTIVE
"Interval History:   No concerns for agitations / aggressive behavior overnight. On thorazine / Depacon. Cleared [facility] / [facility].      Family History     None        Social History Main Topics    Smoking status: Former Smoker     Packs/day: 0.50     Quit date: 5/1/2018    Smokeless tobacco: Former User     Types: Chew     Quit date: 8/2/1980      Comment: Uses nicotine gum and nicotine patches    Alcohol use Yes      Comment: rarely    Drug use: No    Sexual activity: Yes     Partners: Female     Psychotherapeutics     Start     Stop Route Frequency Ordered    07/02/18 2100  chlorproMAZINE tablet 25 mg      -- Oral Nightly 07/01/18 1837    06/23/18 1700  chlorproMAZINE injection 25 mg      -- IM Every 12 hours PRN 06/23/18 1600    06/19/18 1750  haloperidol lactate (HALDOL) 5 mg/mL injection     Comments:  Created by cabinet override    06/20 0559   06/19/18 1750           Review of Systems   Respiratory: Negative for shortness of breath.    Cardiovascular: Negative for chest pain.   Gastrointestinal: Negative for abdominal pain.   Neurological: Negative for headaches.     Objective:     Vital Signs (Most Recent):  Temp: 97.1 °F (36.2 °C) (07/02/18 1144)  Pulse: 102 (07/02/18 1521)  Resp: 16 (07/02/18 1144)  BP: (!) 96/58 (07/02/18 1144)  SpO2: (!) 92 % (07/02/18 1144) Vital Signs (24h Range):  Temp:  [96.3 °F (35.7 °C)-98.2 °F (36.8 °C)] 97.1 °F (36.2 °C)  Pulse:  [] 102  Resp:  [16-18] 16  SpO2:  [92 %-100 %] 92 %  BP: ()/(58-78) 96/58     Height: 5' 6" (167.6 cm) (per RN 6/16/18)  Weight:  (bed not working for weight )  Body mass index is 20.18 kg/m².      Intake/Output Summary (Last 24 hours) at 07/02/18 1601  Last data filed at 07/02/18 1330   Gross per 24 hour   Intake              330 ml   Output             1150 ml   Net             -820 ml       Physical Exam          Mental Status Exam:  Appearance: unremarkable, age appropriate, normal weight, well nourished, lying in bed, dressed in " [facility] [place] gow  Behavior/Cooperation: normal, cooperative  Speech: normal tone, normal rate, normal pitch, normal volume  Language: uses words appropriately; NO aphasia or dysarthria  Mood: euthymic  Affect:  congruent with mood and appropriate to situation/content   Thought Process: normal and logical  Thought Content: normal, no suicidality, no homicidality, delusions, or paranoia  Level of Consciousness: Alert and Oriented x3  Memory:  Intact              3/3 immediate, 3/3 at 5 minutes               Recent:  Intact              Remote:  intact  Attention/concentration: appropriate for age/education.   Fund of Knowledge: appears adequate  Insight:  Intact  Judgment: Appropriate for setting    Significant Labs:   Last 24 Hours:   Recent Lab Results       07/02/18  0550      Immature Granulocytes 1.3(H)     Immature Grans (Abs) 0.17  Comment:  Mild elevation in immature granulocytes is non specific and   can be seen in a variety of conditions including stress response,   acute inflammation, trauma and pregnancy. Correlation with other   laboratory and clinical findings is essential.  (H)     Albumin 2.3(L)     Alkaline Phosphatase 158(H)     ALT 72(H)     Anion Gap 8     Aniso Slight     (H)     Baso # 0.08     Basophil% 0.6     Total Bilirubin 0.9  Comment:  For infants and newborns, interpretation of results should be based  on gestational age, weight and in agreement with clinical  observations.  Premature Infant recommended reference ranges:  Up to 24 hours.............<8.0 mg/dL  Up to 48 hours............<12.0 mg/dL  3-5 days..................<15.0 mg/dL  6-29 days.................<15.0 mg/dL       BUN, Bld 17     Calcium 8.8     Chloride 100     CO2 24     Creatinine 1.4     Differential Method Automated     eGFR if African American >60.0     eGFR if non  58.6  Comment:  Calculation used to obtain the estimated glomerular filtration  rate (eGFR) is the CKD-EPI equation.   (A)     Eos #  0.2     Eosinophil% 1.2     Glucose 74     Gran # (ANC) 7.5     Gran% 58.7     Hematocrit 23.9(L)     Hemoglobin 8.3(L)     Hypo Occasional     Lymph # 2.8     Lymph% 21.8     Magnesium 1.9     MCH 30.7     MCHC 34.7     MCV 89     Mono # 2.1(H)     Mono% 16.4(H)     MPV 11.5     nRBC 0     Phosphorus 4.1     Platelet Estimate Appears normal     Platelets 279     Poly Occasional     Potassium 4.3     Total Protein 6.3     RBC 2.70(L)     RDW 19.3(H)     Sodium 132(L)     Target Cells Occasional     WBC 12.80(H)           Significant Imaging: I have reviewed all pertinent imaging results/findings within the past 24 hours.

## 2018-07-02 NOTE — PLAN OF CARE
Problem: Patient Care Overview  Goal: Plan of Care Review  Patient AAOX3, disoriented to situation but can easily be redirected.  Patient having increased delirium today and MD notified, Psych was consulted to follow up with patient.  IV remains infusing and clean, dry and intact.  Patient has been repositioned throughout shift no skin breakdown noted.  Left BKA surgical site dressing, clean, dry and intact.  Dominga Powell LPN

## 2018-07-02 NOTE — ASSESSMENT & PLAN NOTE
47 y.o. male with h/o HTN, HLD, HFrEF (10-15% 3/23/18) s/p ICD, CAD s/p PCI, EtOH abuse, PAD s/p ABF 1999 with multiple endovascular interventions presented to hospital on 3/18/18 with left foot rest pain - underwent R AKA after problems with attempts at L leg revascularization with Cardiology. Subsequent left TMA with now ischemic necrosis of TMA incision with necrosis progressing proximally. Patient also had to be readmitted to hospital on 06/12/18 for decompensated CHF and cardiogenic shock requiring pressors. Now 3 Days Post-Op L BKA       - right aka wound medial much improved with wound care   - changed dressing L BKA this AM  - pain controlled  - perineural cath D/C'd  - cont knee immobilizer in order to prevent posterior contracture  - rest of care per primary, pain mgmt per anesthesia  - recommend rehab vs SNF

## 2018-07-02 NOTE — SUBJECTIVE & OBJECTIVE
Medications:  Continuous Infusions:    Scheduled Meds:   acetaminophen  1,000 mg Oral Q12H    aspirin  81 mg Per OG tube Daily    atorvastatin  80 mg Per OG tube Daily    chlorproMAZINE  25 mg Oral QHS    collagenase   Topical (Top) Daily    heparin (porcine)  5,000 Units Subcutaneous Q12H    metoprolol tartrate  12.5 mg Oral BID    multivitamin liquid no.118  10 mL Oral Daily    nicotine  1 patch Transdermal Daily    pregabalin  75 mg Oral QHS    senna-docusate 8.6-50 mg  1 tablet Per OG tube BID    sodium chloride 0.9%  3 mL Intravenous Q8H    thiamine  100 mg Oral Daily    valproate sodium (DEPACON) IVPB  250 mg Intravenous Q6H     PRN Meds:albuterol-ipratropium, chlorproMAZINE, levalbuterol, lidocaine HCL 2%, magnesium oxide, magnesium oxide, morphine, morphine, ondansetron, oxyCODONE, oxyCODONE, pneumoc 13-osman conj-dip cr(PF), potassium chloride 10%, potassium chloride 10%, potassium chloride 10%, potassium, sodium phosphates, potassium, sodium phosphates, potassium, sodium phosphates, promethazine (PHENERGAN) IVPB     Objective:     Vital Signs (Most Recent):  Temp: 96.5 °F (35.8 °C) (07/02/18 0550)  Pulse: 97 (07/02/18 0550)  Resp: 16 (07/02/18 0550)  BP: (!) 91/59 (07/02/18 0550)  SpO2: 100 % (07/02/18 0550) Vital Signs (24h Range):  Temp:  [96.3 °F (35.7 °C)-98.2 °F (36.8 °C)] 96.5 °F (35.8 °C)  Pulse:  [] 97  Resp:  [16-18] 16  SpO2:  [92 %-100 %] 100 %  BP: ()/(59-81) 91/59          Physical Exam    A&O, NAD, slightly confused this AM  RRR  No Resp Distress  L BKA stump, bandage c/d/i, minor ruby-incisional ischemia present   R AKA     Significant Labs:  CBC:     Recent Labs  Lab 07/02/18  0550   WBC 12.80*   RBC 2.70*   HGB 8.3*   HCT 23.9*      MCV 89   MCH 30.7   MCHC 34.7     CMP:     Recent Labs  Lab 07/02/18  0550   GLU 74   CALCIUM 8.8   ALBUMIN 2.3*   PROT 6.3   *   K 4.3   CO2 24      BUN 17   CREATININE 1.4   ALKPHOS 158*   ALT 72*   *    BILITOT 0.9     Coagulation: No results for input(s): LABPROT, INR, APTT in the last 48 hours.    Significant Diagnostics:  I have reviewed all pertinent imaging results/findings within the past 24 hours.

## 2018-07-02 NOTE — PROGRESS NOTES
" [facility]  Adult Nutrition  Progress Note    SUMMARY       Recommendations    Recommendation/Intervention:     1. Continue current cardiac diet and encourage PO intake.   2. If pt agreeable, order Boost Plus or Boost Breeze to aid in pro/kcal intake.   3. RD following.    Goals: Meet % EEN, EPN  Nutrition Goal Status: progressing towards goal  Communication of RD Recs:  (POC)    Reason for Assessment    Reason for Assessment: RD follow-up  Diagnosis: other (see comments) (Cardiogenic shock)  Relevant Medical History: CHF, HTN, HLD  Interdisciplinary Rounds: attended  General Information Comments: S/ R AKA 3/2018. POD3 L BKA. Pt was very tired at visit. Reported good appetite. Per chart review, pt eating 50-75% of meals. Pt appears well-nourished. Do not believe sufficient criteria present for malnutrition diagnosis.  Nutrition Discharge Planning: adequate po intake    Nutrition Risk Screen    Nutrition Risk Screen: no indicators present    Nutrition/Diet History    Patient Reported Diet/Restrictions/Preferences: general  Do you have any cultural, spiritual, Worship conflicts, given your current situation?: none reported   Factors Affecting Nutritional Intake: decreased appetite (food preferences)    Anthropometrics    Temp: 96.9 °F (36.1 °C)  Height: 5' 6" (167.6 cm) (per RN 18)  Height (inches): 66 in  Weight Method: Bed Scale  Weight:  (bed not working for weight )  Weight (lb): 125 lb  Ideal Body Weight (IBW), Male: 142 lb  % Ideal Body Weight, Male (lb): 88.03 lb  BMI (Calculated): 20.2  BMI Grade: 18.5-24.9 - normal  Usual Body Weight (UBW), k.2 kg (Per chart review)  % Usual Body Weight: 88.4  % Weight Change From Usual Weight: -11.78 %  Amputation %: 16, 5.9  Total Amputation %: 21.9  Amputation Ideal Body Weight (IBW), Male (lb): 120.1 lb  Amputee BMI (kg/m2): 25.9 kg/m2     Lab/Procedures/Meds    Pertinent Labs Reviewed: reviewed  Pertinent Labs Comments: Na 134, K " 3.2, Crt 1.7, GFR 46.3, Alk Phos 223, Alb 2.9, T.Bili 1.7, ,   Pertinent Medications Reviewed: reviewed  Pertinent Medications Comments: statin, heparin, Mg, MVI, KCl, senna docusate, thiamine    Physical Findings/Assessment    Overall Physical Appearance: amputee, underweight  Tubes:  (none)  Oral/Mouth Cavity: WDL  Skin: incision(s)    Estimated/Assessed Needs    Weight Used For Calorie Calculations: 56.7 kg (125 lb)  Energy Calorie Requirements (kcal): 1700 kcal/day  Energy Need Method: Revillo-St Jeor (X 1.25)  Protein Requirements: 70-82 gm (1.2-1.4 gm/kg)  Weight Used For Protein Calculations: 56.7 kg (125 lb)     Fluid Need Method: other (see comments) (Per MD or 1 mL/kcal)  RDA Method (mL): 1700     Nutrition Prescription Ordered    Current Diet Order: Cardiac    Evaluation of Received Nutrient/Fluid Intake    I/O: -5.59L since admit  Tolerance: tolerating     % Intake of Estimated Energy Needs: 50 - 75 %  % Meal Intake: 50 - 75 %    Nutrition Risk    Level of Risk/Frequency of Follow-up: low (f/u 1 X wk)     Assessment and Plan    Nutrition Problem  Inadequate energy intake     Related to (etiology):   Food preferences and decreased appetite     Signs and Symptoms (as evidenced by):   Documented intake of 25% of meals      Nutrition Diagnosis Status:   Improving - pt now eating 50-75% of meals     Monitor and Evaluation    Food and Nutrient Intake: energy intake, food and beverage intake  Food and Nutrient Adminstration: diet order  Physical Activity and Function: nutrition-related ADLs and IADLs  Anthropometric Measurements: weight, weight change, body mass index  Biochemical Data, Medical Tests and Procedures: electrolyte and renal panel, gastrointestinal profile, glucose/endocrine profile, inflammatory profile, lipid profile  Nutrition-Focused Physical Findings: overall appearance     Nutrition Follow-Up    RD Follow-up?: Yes

## 2018-07-02 NOTE — PLAN OF CARE
Problem: Patient Care Overview  Goal: Plan of Care Review  Outcome: Ongoing (interventions implemented as appropriate)  Pt AO x4. VSS. Pt did not verbalize any hallucinations today. POC reviewed with pt.  No skin break down noted. Pain controlled with prn medication. No falls noted this shift. Bed in low position. Call light in reach. Will continue to monitor.

## 2018-07-03 NOTE — PROGRESS NOTES
Pt refused Depacon and stated to call the  Because he wants to get discharged in the next 10 minutes. Dr. Gomez notified and verbally stated that she is waiting to hear from the psychiatrist first and she would like me to start the Depacon.

## 2018-07-03 NOTE — PLAN OF CARE
07/03/18 0833   Discharge Reassessment   Assessment Type Discharge Planning Reassessment   Discharge Plan A Home Health   Discharge Plan B Skilled Nursing Facility

## 2018-07-03 NOTE — PT/OT/SLP RE-EVAL
Physical Therapy Re-evaluation/Discharge    Patient Name:  Leonardo Byrd   MRN:  [id number]    Recommendations:     Discharge Recommendations:  home health PT   Discharge Equipment Recommendations:  (pt. requesting an electric scooter)   Barriers to discharge: None    Assessment:     Leonardo Byrd is a 47 y.o. male admitted with a medical diagnosis of Cardiogenic shock.  He presents with the following impairments/functional limitations:    Pt. cooperative and tolerated treatment well. Pt. awaiting discharge home with home health.    Rehab Prognosis:  good; patient would benefit from acute skilled PT services to address these deficits and reach maximum level of function.      Recent Surgery: Procedure(s) (LRB):  AMPUTATION, BELOW KNEE (Left) 4 Days Post-Op    Plan:     ·   (Discontinue acute PT)   ·   · Plan of Care Expires:  07/26/18   Plan of Care Reviewed with: patient, father    Subjective     Communicated with bryan prior to session.  Patient found seated on BSC upon PT entry to room, agreeable to evaluation.      Chief Complaint: none stated  Patient comments/goals: to go home  Pain/Comfort:  · Pain Rating 1:  (pt. did not rate)  · Location - Side 1: Left  · Location 1: leg  · Pain Addressed 1: Reposition, Cessation of Activity    Patients cultural, spiritual, Anabaptism conflicts given the current situation: no      Objective:     Patient found with: central line, telemetry     General Precautions: Standard, fall   Orthopedic Precautions:RLE non weight bearing, LLE non weight bearing   Braces: Knee immobilizer ((L) LE)     Exams:  · RUE ROM: WFL  · RUE Strength: WFL  · LUE ROM: WFL  · LUE Strength: WFL  · RLE ROM: WFL  · RLE Strength: WFL  · LLE ROM: WFL except limited by (L) knee immobilizer  · LLE Strength: WFL except limited by (L) knee immobilizer    Functional Mobility:  · Bed Mobility:     · Sit to Supine: modified independence  · Transfers:     · Bed to Chair: modified independence with  no AD  using   Scoot Pivot  · Balance: fair+    AM-PAC 6 CLICK MOBILITY  Total Score:15       Therapeutic Activities and Exercises:   Discussed therapy/DME needs and PT POC.    Patient left supine with all lines intact and call button in reach.    GOALS:    Physical Therapy Goals     Not on file          Multidisciplinary Problems (Resolved)        Problem: Physical Therapy Goal    Goal Priority Disciplines Outcome Goal Variances Interventions   Physical Therapy Goal   (Resolved)     PT/OT, PT Outcome(s) achieved                     History:     Past Medical History:   Diagnosis Date    AICD (automatic cardioverter/defibrillator) present     RYAN (acute kidney injury) 3/25/2018    CHF (congestive heart failure)     COPD with acute bronchitis 6/12/2018    Coronary artery disease     Encounter for blood transfusion     Hx of psychiatric care     Hyperlipemia     Hypertension     MI (myocardial infarction)     Neuropathy     PAD (peripheral artery disease)     Psychiatric problem     PVD (peripheral vascular disease)        Past Surgical History:   Procedure Laterality Date    AMPUTATION Right     great toe    BELOW KNEE AMPUTATION OF LOWER EXTREMITY Left 6/29/2018    Procedure: AMPUTATION, BELOW KNEE;  Surgeon: Norris Trejo MD;  Location: [facility] OR [address] [place] [address];  Service: Peripheral Vascular;  Laterality: Left;    BYBPASS GRAFT AORTOBIUFEMORAL      CARDIAC DEFIBRILLATOR PLACEMENT      coronary stents      EXPLORATION AND EVaCUATION OF HEMATOMA OF UPPER EXTREMITY Left     KNEE ARTHROPLASTY Left     VASCULAR SURGERY      fem-pop bypass       Clinical Decision Making:     History  Co-morbidities and personal factors that may impact the plan of care Examination  Body Structures and Functions, activity limitations and participation restrictions that may impact the plan of care Clinical Presentation   Decision Making/ Complexity Score   Co-morbidities:   [] Time since onset of injury / illness / exacerbation  [] Status  of current condition  []Patient's cognitive status and safety concerns    [] Multiple Medical Problems (see med hx)  Personal Factors:   [] Patient's age  [] Prior Level of function   [] Patient's home situation (environment and family support)  [] Patient's level of motivation  [] Expected progression of patient      HISTORY:(criteria)    [] 33563 - no personal factors/history    [] 59571 - has 1-2 personal factor/comorbidity     [] 64663 - has >3 personal factor/comorbidity     Body Regions:  [] Objective examination findings  [] Head     []  Neck  [] Trunk   [] Upper Extremity  [] Lower Extremity    Body Systems:  [] For communication ability, affect, cognition, language, and learning style: the assessment of the ability to make needs known, consciousness, orientation (person, place, and time), expected emotional /behavioral responses, and learning preferences (eg, learning barriers, education  needs)  [] For the neuromuscular system: a general assessment of gross coordinated movement (eg, balance, gait, locomotion, transfers, and transitions) and motor function  (motor control and motor learning)  [] For the musculoskeletal system: the assessment of gross symmetry, gross range of motion, gross strength, height, and weight  [] For the integumentary system: the assessment of pliability(texture), presence of scar formation, skin color, and skin integrity  [] For cardiovascular/pulmonary system: the assessment of heart rate, respiratory rate, blood pressure, and edema     Activity limitations:    [] Patient's cognitive status and saf ety concerns          [] Status of current condition      [] Weight bearing restriction  [] Cardiopulmunary Restriction    Participation Restrictions:   [] Goals and goal agreement with the patient     [] Rehab potential (prognosis) and probable outcome      Examination of Body System: (criteria)    [] 96278 - addressing 1-2 elements    [] 55268 - addressing a total of 3 or more  elements     [] 64668 -  Addressing a total of 4 or more elements         Clinical Presentation: (criteria)  Choose one     On examination of body system using standardized tests and measures patient presents with (CHOOSE ONE) elements from any of the following: body structures and functions, activity limitations, and/or participation restrictions.  Leading to a clinical presentation that is considered (CHOOSE ONE)                              Clinical Decision Making  (Eval Complexity):  Choose One     Time Tracking:     PT Received On: 07/03/18  PT Start Time: 1520     PT Stop Time: 1533  PT Total Time (min): 13 min     Billable Minutes: Re-eval 13      Antoni Tello, PT  07/03/2018

## 2018-07-03 NOTE — TELEPHONE ENCOUNTER
----- Message from Evelin Mccarthy RN sent at 7/3/2018  1:02 PM CDT -----  Needs follow up with Dr Trejo in 2 weeks

## 2018-07-03 NOTE — PROGRESS NOTES
Progress Note  Hospital Medicine    Primary Team: [facility] HOSP MED C  Admit Date: 6/16/2018   Length of Stay:  LOS: 17 days   SUBJECTIVE:   Reason for Admission:  Cardiogenic shock    HPI:    47M ICM (LVEF 13%) s/p ICD, CAD s/p PCI, HTN, HLD, current smoker, PAD (s/p aortobifemoral bypass, L SFA and pop PTAS in September and recent R SFA and R pop 3/8 by Dr. Carl Bell and finally R AKA 3/2018) who presented to [facility] on 6/12 with SOB, cough, weight, BNP 2400, congested CXR, and elevated lactic acid. He was admitted with COPD and hypotension though to be secondary to sepsis. He was started on azithromycin and vancomycin, and given a fluid bolus.  He was found to not be septic the next day and all abx were stopped; etiology then thought cardiogenic shock in etiology.  Unfortunately, he developed ADHF with worsened respiratory status and became progressively hypotensive.  He was subsequently intubated and started on levo/dobutamine. Transferred to [facility] CCU for higher level of care on 6/16.  Patient was started on SLED given poor UOP and worsening RYAN. He was kept on dobutamine 2.5 mcg/kg/min with daily CO measurements. Patient had recovering renal function and SLED was discontinued 6/17/2018. He was diuresed with IV lasix BID then daily basis until patient became euvolemic with normal-low CVP. He was suitable for extubation on 2nd day of hospital stay however would become significantly agitated when sedation would be weaned down resulting in delaying extubation until 6/19/2018. After extubation, the patient was overtly delirious expressed by confusion, delusion, belligerence, and aggressive. He had to be restrained on the first 2 days following extubation. Psychiatry was consulted and patient was finally less psychotic and delirious on 6/23/2018 after starting scheduled depacon 250mg IV q6h and receiving one dose of thorazine night of 6/22/2018. Given patient's stable clinical status from his cardiogenic shock  "standpoint it was thought best to step down patient out of ICU settings to decreased delirium recurrence. [facility] assumed care on 6/24 with Dr. Gomez.  The focus quickly shifted from patient's cardiorenal stabilization to his gangrenous L foot.  There was overwhelming concern of osteomyelitis and risk of ascending infection.  A multidisciplinary approach was taken in which the patient was seen by podiatry, vascular, and ID. The patient's extremity was deemed non-viable and the patient was then scheduled for BKA, which occurred on 6/29.  Post-operatively the patient's course is c/b worsening delirium for which psychiatry continues to follow, as well as leucocytosis which may be reactive.  PT/OT is determining safe disposition for the patient.     ECHO 6/17  CONCLUSIONS     1 - Mild left ventricular enlargement.     2 - Severely depressed left ventricular systolic function (EF low to mid teens).     3 - Biatrial enlargement.     4 - Restrictive LV filling pattern, indicating markedly elevated LAP (grade 3 diastolic dysfunction).     5 - Low normal to mildly depressed right ventricular systolic function .     6 - Moderate or more mitral regurgitation.     7 - Mild tricuspid regurgitation.     8 - Increased central venous pressure.     9 - Pulmonary hypertension. The estimated PA systolic pressure is 45 mmHg.      Interval history:    No acute events overnight.  Pt is more alert and appropriate than described yesterday.  Father at bedside believes pt "had too much medicine" yesterday and is "waking up" now.  No PRN agents given overnight.  Pt is eager for discharge home with HH.  Oriented to situation and denies pain in leg.  Father feels he will be able to take care of pt at home with HH.    Review of Systems:  Constitutional: no fever or chills  Respiratory: no cough or shortness of breath  Cardiovascular: no chest pain or palpitations  Gastrointestinal: no nausea or vomiting, no abdominal pain or change in bowel " habits  Musculoskeletal: no arthralgias or myalgias     OBJECTIVE:     Temp:  [96.6 °F (35.9 °C)-98.5 °F (36.9 °C)]   Pulse:  []   Resp:  [16-20]   BP: ()/(58-78)   SpO2:  [92 %-100 %]  Body mass index is 20.18 kg/m².  Intake/Outake:  This Shift:  No intake/output data recorded.    Net I/O past 24h:     Intake/Output Summary (Last 24 hours) at 07/03/18 1041  Last data filed at 07/03/18 0005   Gross per 24 hour   Intake                0 ml   Output              950 ml   Net             -950 ml             Physical Exam:  Gen- well-developed, well-nourished, NAD  CVS- S1 and S2 present, 3/6 systolic murmur, RRR  Resp- CTA b/l, no work of breathing  Abd- BS+, soft, NT, ND  Ext- right AKA With well-healed incision; LLE s/p BKA    Laboratory:  CBC/Anemia Labs: Coags:      Recent Labs  Lab 07/01/18  0545 07/02/18  0550 07/03/18  0400   WBC 15.32* 12.80* 9.91   HGB 9.4* 8.3* 8.1*   HCT 27.4* 23.9* 23.5*    279 296   MCV 90 89 90   RDW 19.6* 19.3* 19.6*    No results for input(s): PT, INR, APTT in the last 168 hours.     Chemistries:     Recent Labs  Lab 06/29/18  0415 06/30/18  1333 07/01/18  0545 07/02/18  0550   * 133* 130* 132*   K 3.1* 3.4* 3.7 4.3   CL 94* 97 96 100   CO2 24 27 24 24   BUN 15 18 17 17   CREATININE 1.7* 1.8* 1.6* 1.4   CALCIUM 9.0 8.6* 8.7 8.8   PROT 6.9 6.6 6.7 6.3   BILITOT 1.2* 0.8 0.9 0.9   ALKPHOS 201* 181* 174* 158*   * 123* 102* 72*   AST 97* 106* 128* 117*   MG 1.5*  --  1.8 1.9   PHOS 4.5  --  4.3 4.1        Medications:  Scheduled Meds:   acetaminophen  1,000 mg Oral Q12H    aspirin  81 mg Per OG tube Daily    atorvastatin  80 mg Per OG tube Daily    chlorproMAZINE  25 mg Oral QHS    collagenase   Topical (Top) Daily    heparin (porcine)  5,000 Units Subcutaneous Q12H    metoprolol tartrate  12.5 mg Oral BID    multivitamin liquid no.118  10 mL Oral Daily    nicotine  1 patch Transdermal Daily    pregabalin  75 mg Oral QHS    senna-docusate 8.6-50 mg   1 tablet Per OG tube BID    sodium chloride 0.9%  3 mL Intravenous Q8H    thiamine  100 mg Oral Daily    valproate sodium (DEPACON) IVPB  250 mg Intravenous Q6H                             Continuous Infusions:  PRN Meds:.albuterol-ipratropium, chlorproMAZINE, levalbuterol, lidocaine HCL 2%, magnesium oxide, magnesium oxide, morphine, morphine, ondansetron, oxyCODONE, oxyCODONE, pneumoc 13-osman conj-dip cr(PF), potassium chloride 10%, potassium chloride 10%, potassium chloride 10%, potassium, sodium phosphates, potassium, sodium phosphates, potassium, sodium phosphates, promethazine (PHENERGAN) IVPB     ASSESSMENT/PLAN:     Active Hospital Problems    Diagnosis  POA    *Cardiogenic shock [R57.0]  Yes    Acute on chronic heart failure [I50.9]  Yes    Osteomyelitis of left foot [M86.9]  Yes    Status post transmetatarsal amputation of foot, left [Z89.432]  Not Applicable    Moderate protein-calorie malnutrition [E44.0]  Yes    Delirium [R41.0]  No    Delirium due to multiple etiologies, acute, hyperactive [F05]  No    Alteration in skin integrity related to surgical incision [R23.9]  Yes    Encephalopathy [G93.40]  Yes    Shock liver [K72.00]  Yes    COPD, severe [J44.9]  Yes    Acute renal failure [N17.9]  Yes    Leukocytosis [D72.829]  Yes    Alcohol abuse [F10.10]  Yes    Peripheral vascular disease of lower extremity [I73.9]  Yes     S/p right AKA.     TcPO2 L foot 5/11/2018   Dorsum 43 to 171 mmHg with oxygen   Medial 61 to 144 mmHg with oxygen   Chest wall baseline 43 mmHg      Tobacco abuse [Z72.0]  Yes      Resolved Hospital Problems    Diagnosis Date Resolved POA    Hyperkalemia [E87.5] 06/21/2018 Yes    RYAN (acute kidney injury) [N17.9] 06/23/2018 Yes    Dependence on ventilator, status [Z99.11] 06/20/2018 Not Applicable    Lactic acidosis [E87.2] 06/23/2018 Yes    Acute respiratory failure with hypoxia [J96.01] 06/22/2018 Yes     Peripheral vascular disease of lower  extremity  Gangrene of L foot  S/p BKA  - S/p BKA  - Hold further abx  - Patient afebrile; leucocytosis likely was post-surgical stress in nature, resolved now     Cardiogenic shock  Acute on chronic combined HF  - likely due to ADHF following volume resuscitation when received IVF for suspected sepsis on initial presentation  - net negative ~6L since admit  - Okay to resume BB, hold for SBP <90  - Hold ACEI due to renal function status  - Hold diuresis as below  - Hold imdur due to relative hypotension     Acute renal failure superimposed on CKDIII  - Likely ATN due to ADHF and ischemic insult with hypotension and currently with over diuresis.   - Cr improved to 1.4  - Nephro had been following  - Avoid nephrotoxins and renally dose medications.   - Strict intake and output.   - Hold diuresis  - Patient given 250 cc fluid challenge on 7/1 for hypoTN, hypoNa, and RYAN with improvement in all parameters     Encephalopathy, metabolic  - Likely due ICU delirium.  - Psychiatry following, appreciate recs: Depacon 250mg IV q6h; will d/w team if this needs to be continued upon discharge  - Continue holding pregabalin in setting of delirium and encephalopathy.   - Delirium precautions  - Appreciate updated psychiatry recs  -EKG with QTc 457     Moderate protein-calorie malnutrition  - Poor enteral nutrition due to AMS     COPD, mixed simple and mucopurulent  - Duonebs prn      Alcohol abuse  - Reportedly last drink was weeks to months ago.   - Avoiding BZD as possible.      Tobacco abuse  - Nicotine patch.    DVT ppx- Heparin  CODE Status- FULL    Dispo- possible d/c home with  today    Ana Gomez MD  Hospital Medicine Staff

## 2018-07-03 NOTE — SUBJECTIVE & OBJECTIVE
Medications:  Continuous Infusions:    Scheduled Meds:   acetaminophen  1,000 mg Oral Q12H    aspirin  81 mg Per OG tube Daily    atorvastatin  80 mg Per OG tube Daily    chlorproMAZINE  25 mg Oral QHS    collagenase   Topical (Top) Daily    heparin (porcine)  5,000 Units Subcutaneous Q12H    metoprolol tartrate  12.5 mg Oral BID    multivitamin liquid no.118  10 mL Oral Daily    nicotine  1 patch Transdermal Daily    pregabalin  75 mg Oral QHS    senna-docusate 8.6-50 mg  1 tablet Per OG tube BID    sodium chloride 0.9%  3 mL Intravenous Q8H    thiamine  100 mg Oral Daily    valproate sodium (DEPACON) IVPB  250 mg Intravenous Q6H     PRN Meds:albuterol-ipratropium, chlorproMAZINE, levalbuterol, lidocaine HCL 2%, magnesium oxide, magnesium oxide, morphine, morphine, ondansetron, oxyCODONE, oxyCODONE, pneumoc 13-osman conj-dip cr(PF), potassium chloride 10%, potassium chloride 10%, potassium chloride 10%, potassium, sodium phosphates, potassium, sodium phosphates, potassium, sodium phosphates, promethazine (PHENERGAN) IVPB     Objective:     Vital Signs (Most Recent):  Temp: 97.7 °F (36.5 °C) (07/03/18 0359)  Pulse: 97 (07/03/18 0700)  Resp: 18 (07/03/18 0359)  BP: 105/62 (07/03/18 0359)  SpO2: 98 % (07/03/18 0359) Vital Signs (24h Range):  Temp:  [96.9 °F (36.1 °C)-98.5 °F (36.9 °C)] 97.7 °F (36.5 °C)  Pulse:  [] 97  Resp:  [16-18] 18  SpO2:  [92 %-98 %] 98 %  BP: ()/(58-78) 105/62          Physical Exam    A&O, NAD, slightly confused this AM  RRR  No Resp Distress  L BKA stump, bandage c/d/i, minor ruby-incisional ischemia present   R AKA     Significant Labs:  CBC:     Recent Labs  Lab 07/03/18  0400   WBC 9.91   RBC 2.61*   HGB 8.1*   HCT 23.5*      MCV 90   MCH 31.0   MCHC 34.5     CMP:     Recent Labs  Lab 07/02/18  0550   GLU 74   CALCIUM 8.8   ALBUMIN 2.3*   PROT 6.3   *   K 4.3   CO2 24      BUN 17   CREATININE 1.4   ALKPHOS 158*   ALT 72*   *   BILITOT 0.9      Coagulation: No results for input(s): LABPROT, INR, APTT in the last 48 hours.    Significant Diagnostics:  I have reviewed all pertinent imaging results/findings within the past 24 hours.

## 2018-07-03 NOTE — PLAN OF CARE
will resume home health with [facility] when orders are completed.  Pt reports he will return home with his family.  pts father will transport him home by car.    Pt refuse skilled placement at this time.

## 2018-07-03 NOTE — PLAN OF CARE
[facility]    HOME HEALTH ORDERS  FACE TO FACE ENCOUNTER    Patient Name: Leonardo Byrd  YOB: 1970    PCP: Indio Aquino MD   PCP Address: [address] / [place] [address]  PCP Phone Number: [phone]  PCP Fax: [phone]    Encounter Date: 07/03/2018    Admit to Home Health    Diagnoses:  Active Hospital Problems    Diagnosis  POA    *Cardiogenic shock [R57.0]  Yes    Acute on chronic heart failure [I50.9]  Yes    Osteomyelitis of left foot [M86.9]  Yes    Status post transmetatarsal amputation of foot, left [Z89.432]  Not Applicable    Moderate protein-calorie malnutrition [E44.0]  Yes    Delirium [R41.0]  No    Delirium due to multiple etiologies, acute, hyperactive [F05]  No    Alteration in skin integrity related to surgical incision [R23.9]  Yes    Encephalopathy [G93.40]  Yes    Shock liver [K72.00]  Yes    COPD, severe [J44.9]  Yes    Acute renal failure [N17.9]  Yes    Leukocytosis [D72.829]  Yes    Alcohol abuse [F10.10]  Yes    Peripheral vascular disease of lower extremity [I73.9]  Yes     S/p right AKA.     TcPO2 L foot 5/11/2018   Dorsum 43 to 171 mmHg with oxygen   Medial 61 to 144 mmHg with oxygen   Chest wall baseline 43 mmHg      Tobacco abuse [Z72.0]  Yes      Resolved Hospital Problems    Diagnosis Date Resolved POA    Hyperkalemia [E87.5] 06/21/2018 Yes    RYAN (acute kidney injury) [N17.9] 06/23/2018 Yes    Dependence on ventilator, status [Z99.11] 06/20/2018 Not Applicable    Lactic acidosis [E87.2] 06/23/2018 Yes    Acute respiratory failure with hypoxia [J96.01] 06/22/2018 Yes       No future appointments.  Follow-up Information     Call Norris Trejo MD.    Specialty:  Vascular Surgery  Why:  Dr Trejo's office will contact you to schedule follow up. Call them on Friday if you have not heard from them.  Contact information:  [address]  [facility] [id number] [phone]                     I have seen and  examined this patient face to face today. My clinical findings that support the need for the home health skilled services and home bound status are the following:  Weakness/numbness causing balance and gait disturbance due to Heart Failure making it taxing to leave home.    Allergies:Review of patient's allergies indicates:  No Known Allergies    Diet: cardiac diet and renal diet    Activities: activity as tolerated    Nursing:   SN to complete comprehensive assessment including routine vital signs. Instruct on disease process and s/s of complications to report to MD. Review/verify medication list sent home with the patient at time of discharge  and instruct patient/caregiver as needed. Frequency may be adjusted depending on start of care date.    Notify MD if SBP > 160 or < 90; DBP > 90 or < 50; HR > 120 or < 50; Temp > 101      CONSULTS:    Physical Therapy to evaluate and treat. Evaluate for home safety and equipment needs; Establish/upgrade home exercise program. Perform / instruct on therapeutic exercises, gait training, transfer training, and Range of Motion.  Occupational Therapy to evaluate and treat. Evaluate home environment for safety and equipment needs. Perform/Instruct on transfers, ADL training, ROM, and therapeutic exercises.    MISCELLANEOUS CARE:  Heart Failure:      SN to instruct on the following:    Instruct on the definition of CHF.   Instruct on the signs/sympoms of CHF to be reported.   Instruct on and monitor daily weights.   Instruct on factors that cause exacerbation.   Instruct on action, dose, schedule, and side effects of medications.   Instruct on diet as prescribed.   Instruct on activity allowed.   Instruct on life-style modifications for life long management of CHF   SN to assess compliance with daily weights, diet, medications, fluid retention,    safety precautions, activities permitted and life-style modifications.   Additional 1-2 SN visits per week as needed for signs and  symptoms     of CHF exacerbation.      For Weight Gain > 2-3 lbs in 1 day or 4-6 lbs over 1 week notify PCP:  Obtain BMP lab test in 3 days  Call PCP    WOUND CARE ORDERS  yes:  Surgical Wound:  Location: left BKA    Consult ET nurse        Apply the following to wound:  Cleanse with normal saline. Use 4x4 to cover incision site, then wrap with Kerlix and ACE bandage.  Change daily.  Keep left leg in immobilizer.      Medications: Review discharge medications with patient and family and provide education.      Current Discharge Medication List      START taking these medications    Details   collagenase ointment Apply topically once daily.  Qty: 15 g, Refills: 0      divalproex (DEPAKOTE) 250 MG EC tablet Take 250mg (1 tablet) at 0700, then take 250mg (1 tablet) at 1200, then take 500mg (2 tablets0 at 1900  Qty: 120 tablet, Refills: 1      metoprolol tartrate (LOPRESSOR) 25 MG tablet Take 0.5 tablets (12.5 mg total) by mouth 2 (two) times daily.  Qty: 30 tablet, Refills: 1         CONTINUE these medications which have CHANGED    Details   pregabalin (LYRICA) 75 MG capsule Take 1 capsule (75 mg total) by mouth every evening.  Qty: 30 capsule, Refills: 1         CONTINUE these medications which have NOT CHANGED    Details   aspirin (ECOTRIN) 81 MG EC tablet Take 81 mg by mouth once daily.      atorvastatin (LIPITOR) 80 MG tablet Take 1 tablet (80 mg total) by mouth every evening.  Qty: 90 tablet, Refills: 3      lidocaine (XYLOCAINE) 5 % Oint ointment Apply topically 4 (four) times daily. Apply to entire left foot      nicotine (NICODERM CQ) 21 mg/24 hr Place 1 patch onto the skin once daily.  Refills: 0      oxyCODONE (ROXICODONE) 10 mg Tab immediate release tablet Take 1 tablet (10 mg total) by mouth every 4 (four) hours as needed for Pain.  Qty: 25 tablet, Refills: 0      polyethylene glycol (GLYCOLAX) 17 gram PwPk Take 17 g by mouth 2 (two) times daily as needed (constipation).  Refills: 0      senna-docusate  8.6-50 mg (PERICOLACE) 8.6-50 mg per tablet Take 1 tablet by mouth 2 (two) times daily.      thiamine 100 MG tablet Take 1 tablet (100 mg total) by mouth once daily.         STOP taking these medications       metoprolol succinate (TOPROL-XL) 25 MG 24 hr tablet Comments:   Reason for Stopping:         amitriptyline (ELAVIL) 50 MG tablet Comments:   Reason for Stopping:         cilostazol (PLETAL) 50 MG Tab Comments:   Reason for Stopping:         cyclobenzaprine (FLEXERIL) 10 MG tablet Comments:   Reason for Stopping:         folic acid-vit B6-vit B12 2.5-25-2 mg (FOLBIC OR EQUIV) 2.5-25-2 mg Tab Comments:   Reason for Stopping:         gabapentin (NEURONTIN) 800 MG tablet Comments:   Reason for Stopping:         ranolazine (RANEXA) 1,000 mg Tb12 Comments:   Reason for Stopping:         ticagrelor (BRILINTA) 90 mg tablet Comments:   Reason for Stopping:         zolpidem (AMBIEN) 5 MG Tab Comments:   Reason for Stopping:               I certify that this patient is confined to his home and needs intermittent skilled nursing care, physical therapy and occupational therapy.

## 2018-07-03 NOTE — PROGRESS NOTES
[facility]  Vascular Surgery  Progress Note    Patient Name: Leonardo Byrd  MRN: [id number]  Admission Date: 6/16/2018  Primary Care Provider: Indio Aquino MD    Subjective:     Interval History:  Pain controlled with PO PRN medication.     Post-Op Info:  Procedure(s) (LRB):  AMPUTATION, BELOW KNEE (Left)   4 Days Post-Op       Medications:  Continuous Infusions:    Scheduled Meds:   acetaminophen  1,000 mg Oral Q12H    aspirin  81 mg Per OG tube Daily    atorvastatin  80 mg Per OG tube Daily    chlorproMAZINE  25 mg Oral QHS    collagenase   Topical (Top) Daily    heparin (porcine)  5,000 Units Subcutaneous Q12H    metoprolol tartrate  12.5 mg Oral BID    multivitamin liquid no.118  10 mL Oral Daily    nicotine  1 patch Transdermal Daily    pregabalin  75 mg Oral QHS    senna-docusate 8.6-50 mg  1 tablet Per OG tube BID    sodium chloride 0.9%  3 mL Intravenous Q8H    thiamine  100 mg Oral Daily    valproate sodium (DEPACON) IVPB  250 mg Intravenous Q6H     PRN Meds:albuterol-ipratropium, chlorproMAZINE, levalbuterol, lidocaine HCL 2%, magnesium oxide, magnesium oxide, morphine, morphine, ondansetron, oxyCODONE, oxyCODONE, pneumoc 13-osman conj-dip cr(PF), potassium chloride 10%, potassium chloride 10%, potassium chloride 10%, potassium, sodium phosphates, potassium, sodium phosphates, potassium, sodium phosphates, promethazine (PHENERGAN) IVPB     Objective:     Vital Signs (Most Recent):  Temp: 97.7 °F (36.5 °C) (07/03/18 0359)  Pulse: 97 (07/03/18 0700)  Resp: 18 (07/03/18 0359)  BP: 105/62 (07/03/18 0359)  SpO2: 98 % (07/03/18 0359) Vital Signs (24h Range):  Temp:  [96.9 °F (36.1 °C)-98.5 °F (36.9 °C)] 97.7 °F (36.5 °C)  Pulse:  [] 97  Resp:  [16-18] 18  SpO2:  [92 %-98 %] 98 %  BP: ()/(58-78) 105/62          Physical Exam    A&O, NAD, slightly confused this AM  RRR  No Resp Distress  L BKA stump, bandage c/d/i, minor ruby-incisional ischemia present   R AKA      Significant Labs:  CBC:     Recent Labs  Lab 07/03/18  0400   WBC 9.91   RBC 2.61*   HGB 8.1*   HCT 23.5*      MCV 90   MCH 31.0   MCHC 34.5     CMP:     Recent Labs  Lab 07/02/18  0550   GLU 74   CALCIUM 8.8   ALBUMIN 2.3*   PROT 6.3   *   K 4.3   CO2 24      BUN 17   CREATININE 1.4   ALKPHOS 158*   ALT 72*   *   BILITOT 0.9     Coagulation: No results for input(s): LABPROT, INR, APTT in the last 48 hours.    Significant Diagnostics:  I have reviewed all pertinent imaging results/findings within the past 24 hours.    Assessment/Plan:     Peripheral vascular disease of lower extremity    47 y.o. male with h/o HTN, HLD, HFrEF (10-15% 3/23/18) s/p ICD, CAD s/p PCI, EtOH abuse, PAD s/p ABF 1999 with multiple endovascular interventions presented to hospital on 3/18/18 with left foot rest pain - underwent R AKA after problems with attempts at L leg revascularization with Cardiology. Subsequent left TMA with now ischemic necrosis of TMA incision with necrosis progressing proximally. Patient also had to be readmitted to hospital on 06/12/18 for decompensated CHF and cardiogenic shock requiring pressors. Now 4 Days Post-Op L BKA       - right aka wound medial much improved with wound care   - changed dressing L BKA this AM  - pain controlled  - cont knee immobilizer in order to prevent posterior contracture  - rest of care per primary, pain mgmt per anesthesia  - recommend rehab vs SNF  - recommend f/u with Dr. Trejo 2 weeks post DC            Francisca Colon MD  Vascular Surgery  [facility]

## 2018-07-03 NOTE — PLAN OF CARE
Problem: Physical Therapy Goal  Goal: Physical Therapy Goal  Outcome: Outcome(s) achieved Date Met: 07/03/18  No goals set

## 2018-07-03 NOTE — ASSESSMENT & PLAN NOTE
47 y.o. male with h/o HTN, HLD, HFrEF (10-15% 3/23/18) s/p ICD, CAD s/p PCI, EtOH abuse, PAD s/p ABF 1999 with multiple endovascular interventions presented to hospital on 3/18/18 with left foot rest pain - underwent R AKA after problems with attempts at L leg revascularization with Cardiology. Subsequent left TMA with now ischemic necrosis of TMA incision with necrosis progressing proximally. Patient also had to be readmitted to hospital on 06/12/18 for decompensated CHF and cardiogenic shock requiring pressors. Now 4 Days Post-Op L BKA       - right aka wound medial much improved with wound care   - changed dressing L BKA this AM  - pain controlled  - cont knee immobilizer in order to prevent posterior contracture  - rest of care per primary, pain mgmt per anesthesia  - recommend rehab vs SNF

## 2018-07-03 NOTE — PROGRESS NOTES
Discharge Note    Pt alert and oriented X4 with no complaints of pain. Pt and father verbalized understanding of discharge teaching with No questions or concerns.

## 2018-07-04 NOTE — DISCHARGE SUMMARY
DISCHARGE SUMMARY  Hospital Medicine    Team: [facility] MED C    Patient Name: Leonardo Byrd  YOB: 1970    Admit Date: 6/16/2018    Discharge Date: 7/3/2018    Discharge Attending Physician: Ana Gomez MD    Principal Diagnoses:  Active Hospital Problems    Diagnosis  POA    *Cardiogenic shock [R57.0]  Yes    Acute on chronic heart failure [I50.9]  Yes    Osteomyelitis of left foot [M86.9]  Yes    Status post transmetatarsal amputation of foot, left [Z89.432]  Not Applicable    Moderate protein-calorie malnutrition [E44.0]  Yes    Delirium [R41.0]  No    Delirium due to multiple etiologies, acute, hyperactive [F05]  No    Alteration in skin integrity related to surgical incision [R23.9]  Yes    Encephalopathy [G93.40]  Yes    Shock liver [K72.00]  Yes    COPD, severe [J44.9]  Yes    Acute renal failure [N17.9]  Yes    Leukocytosis [D72.829]  Yes    Alcohol abuse [F10.10]  Yes    Peripheral vascular disease of lower extremity [I73.9]  Yes     S/p right AKA.     TcPO2 L foot 5/11/2018   Dorsum 43 to 171 mmHg with oxygen   Medial 61 to 144 mmHg with oxygen   Chest wall baseline 43 mmHg      Tobacco abuse [Z72.0]  Yes      Resolved Hospital Problems    Diagnosis Date Resolved POA    Hyperkalemia [E87.5] 06/21/2018 Yes    RYAN (acute kidney injury) [N17.9] 06/23/2018 Yes    Dependence on ventilator, status [Z99.11] 06/20/2018 Not Applicable    Lactic acidosis [E87.2] 06/23/2018 Yes    Acute respiratory failure with hypoxia [J96.01] 06/22/2018 Yes       Discharged Condition: stable    HOSPITAL COURSE:      Initial Presentation:    47M ICM (LVEF 13%) s/p ICD, CAD s/p PCI, HTN, HLD, current smoker, PAD (s/p aortobifemoral bypass, L SFA and pop PTAS in September and recent R SFA and R pop 3/8 by Dr. Carl Bell and finally R AKA 3/2018) who presented to [facility] on 6/12 with SOB, cough, weight, BNP 2400, congested CXR, and elevated lactic acid. He was admitted with COPD and  hypotension thought to be secondary to sepsis. He was started on azithromycin and vancomycin, and given a fluid bolus.  He was found to not be septic the next day and all abx were stopped; etiology then thought cardiogenic shock in etiology.  Unfortunately, he developed ADHF with worsened respiratory status and became progressively hypotensive.  He was subsequently intubated and started on levo/dobutamine. Transferred to [facility] CCU for higher level of care on 6/16.      Course of Principle Problem for Admission:    Patient was started on SLED given poor UOP and worsening RYAN. He was kept on dobutamine 2.5 mcg/kg/min with daily CO measurements. Patient had recovering renal function and SLED was discontinued 6/17/2018. He was diuresed with IV lasix BID then daily basis until patient became euvolemic with normal-low CVP. He was suitable for extubation on 2nd day of hospital stay however would become significantly agitated when sedation would be weaned down resulting in delaying extubation until 6/19/2018. After extubation, the patient was overtly delirious expressed by confusion, delusion, belligerence, and aggressive. He had to be restrained on the first 2 days following extubation. Psychiatry was consulted and patient was finally less psychotic and delirious on 6/23/2018 after starting scheduled depacon 250mg IV q6h and receiving one dose of thorazine night of 6/22/2018. Given patient's stable clinical status from his cardiogenic shock standpoint it was thought best to step down patient out of ICU settings to decreased delirium recurrence. [facility] assumed care on 6/24 with Dr. Goemz.  The focus quickly shifted from patient's cardiorenal stabilization to his gangrenous L foot.  There was overwhelming concern of osteomyelitis and risk of ascending infection.  A multidisciplinary approach was taken in which the patient was seen by podiatry, vascular, and ID. The patient's extremity was deemed non-viable and the patient was  then scheduled for BKA, which occurred on 6/29.  Post-operatively the patient's course was c/b worsening delirium for which psychiatry assisted.  Mentation improved with medication and he was discharge home with HH under his father's care.    Other Medical Problems Addressed in the Hospital:    Cardiogenic shock  Acute on chronic combined HF  - likely due to ADHF following volume resuscitation when received IVF for suspected sepsis on initial presentation  - resumed BB, hold for SBP <90  - Hold ACEI due to renal function status  - Hold diuresis as below  - Hold imdur due to relative hypotension    Peripheral vascular disease of lower extremity  Gangrene of L foot  S/p BKA  - S/p BKA  - Hold further abx  - Patient afebrile; leucocytosis likely was post-surgical stress in nature, resolved now  -wound care via   -f/u with Dr. Trejo (Vascular Surgery) in 2 weeks    Acute renal failure superimposed on CKDIII  - Likely ATN due to ADHF and ischemic insult with hypotension and currently with over diuresis.   - Cr improved to 1.4  - Nephro had been following  - Avoid nephrotoxins and renally dose medications.   - Strict intake and output.   - Hold diuresis, euvolemic     Encephalopathy, metabolic  - Likely due ICU delirium.  - Psychiatry following, appreciate recs: Depacon 250mg IV q6h; this was changed to Depacon 250 mg PO at 0700, 1200, and 09201 upon discharge  - Continue holding pregabalin in setting of delirium and encephalopathy.   - Pt given phone number to schedule appt with Psychiatry; also will have PCP appt to consider weaning/stopping Depacon pending stable mentation  -EKG with QTc 457     Moderate protein-calorie malnutrition  - Poor enteral nutrition due to AMS     COPD, mixed simple and mucopurulent  - Duonebs prn      Alcohol abuse  - Reportedly last drink was weeks to months ago.   - Avoiding BZD as possible.      Tobacco abuse  - Nicotine patch.      Consults: Cardiology, Infectious Disease, Nephrology,  Psychiatry, Pulmonary/Intensive care and Podiatry    Last CBC/BMP:    CBC/Anemia Labs: Coags:      Recent Labs  Lab 07/01/18  0545 07/02/18  0550 07/03/18  0400   WBC 15.32* 12.80* 9.91   HGB 9.4* 8.3* 8.1*   HCT 27.4* 23.9* 23.5*    279 296   MCV 90 89 90   RDW 19.6* 19.3* 19.6*    No results for input(s): PT, INR, APTT in the last 168 hours.     Chemistries:     Recent Labs  Lab 06/29/18  0415 06/30/18  1333 07/01/18  0545 07/02/18  0550   * 133* 130* 132*   K 3.1* 3.4* 3.7 4.3   CL 94* 97 96 100   CO2 24 27 24 24   BUN 15 18 17 17   CREATININE 1.7* 1.8* 1.6* 1.4   CALCIUM 9.0 8.6* 8.7 8.8   PROT 6.9 6.6 6.7 6.3   BILITOT 1.2* 0.8 0.9 0.9   ALKPHOS 201* 181* 174* 158*   * 123* 102* 72*   AST 97* 106* 128* 117*   MG 1.5*  --  1.8 1.9   PHOS 4.5  --  4.3 4.1          Significant Diagnostic Studies: please see chart    Special Treatments/Procedures:   Procedure(s) (LRB):  AMPUTATION, BELOW KNEE (Left)     Disposition: Home-Health Care [facility]      Future Scheduled Appointments:  Future Appointments  Date Time Provider Department Center   7/19/2018 9:35 AM Kassidy Giraldo MD [facility] CARDIO [facility]   7/19/2018 1:00 PM Norris Trejo MD [facility] VASCSUR [facility]     Discharge Medication List:       Leonardo Byrd   Home Medication Instructions CAM:[id number]    Printed on:07/04/18 1432   Medication Information                      aspirin (ECOTRIN) 81 MG EC tablet  Take 81 mg by mouth once daily.             atorvastatin (LIPITOR) 80 MG tablet  Take 1 tablet (80 mg total) by mouth every evening.             collagenase ointment  Apply topically once daily.             divalproex (DEPAKOTE) 250 MG EC tablet  Take 250mg (1 tablet) at 0700, then take 250mg (1 tablet) at 1200, then take 500mg (2 tablets0 at 1900             lidocaine (XYLOCAINE) 5 % Oint ointment  Apply topically 4 (four) times daily. Apply to entire left foot             metoprolol tartrate (LOPRESSOR) 25 MG tablet  Take 0.5 tablets (12.5  mg total) by mouth 2 (two) times daily.             nicotine (NICODERM CQ) 21 mg/24 hr  Place 1 patch onto the skin once daily.             oxyCODONE (ROXICODONE) 10 mg Tab immediate release tablet  Take 1 tablet (10 mg total) by mouth every 4 (four) hours as needed for Pain.             polyethylene glycol (GLYCOLAX) 17 gram PwPk  Take 17 g by mouth 2 (two) times daily as needed (constipation).             pregabalin (LYRICA) 75 MG capsule  Take 1 capsule (75 mg total) by mouth every evening.             senna-docusate 8.6-50 mg (PERICOLACE) 8.6-50 mg per tablet  Take 1 tablet by mouth 2 (two) times daily.             thiamine 100 MG tablet  Take 1 tablet (100 mg total) by mouth once daily.                 Patient Instructions:    Discharge Procedure Orders  Notify your health care provider if you experience any of the following:  temperature >100.4     Notify your health care provider if you experience any of the following:  redness, tenderness, or signs of infection (pain, swelling, redness, odor or green/yellow discharge around incision site)     Notify your health care provider if you experience any of the following:  persistent dizziness, light-headedness, or visual disturbances     Notify your health care provider if you experience any of the following:  increased confusion or weakness     Activity as tolerated         At the time of discharge patient was told to take all medications as prescribed, to keep all followup appointments, and to call their primary care physician or return to the emergency room if they have any worsening or concerning symptoms.    Signing Physician:  Ana Gomez MD

## 2018-07-04 NOTE — PLAN OF CARE
07/04/18 1850   Final Note   Assessment Type Final Discharge Note   Discharge Disposition Home-Health   Hospital Follow Up  Appt(s) scheduled? Yes

## 2018-07-12 NOTE — ED PROVIDER NOTES
[facility]                                                 Chief Complaint  47 y.o. male with Leg Pain (states ran out of pain medicine   s/p left bka 1 week ago )    History of Present Illness  Leonardo Byrd presents to the emergency room with chronic pain issues  Patient had a left below-the-knee amputation last week at [facility]  Patient states he has not followed up with his physician, ran out of meds  Patient presents via EMS for refill on his pain medication this evening  Patient on exam has a healing left BKA without complication noted now  The patient is neurovascularly intact, still smoking and noncompliance  Patient had spent considerable time [facility] with multiple issues  Patient denies any shortness of breath or any other complicating factors  Patient has good oxygenation and stable vitals, no fever on presentation    The history is provided by the patient   device was not used during this ER visit    Past Medical History   -- AICD (automatic cardioverter/defibrillator) present     -- RYAN (acute kidney injury)     -- CHF (congestive heart failure)     -- COPD with acute bronchitis     -- Coronary artery disease     -- Encounter for blood transfusion     -- Hyperlipemia     -- Hypertension     -- MI (myocardial infarction)     -- Neuropathy     -- PAD (peripheral artery disease)     -- PVD (peripheral vascular disease)        Past Surgical History   -- AMPUTATION       -- BYBPASS GRAFT AORTOBIUFEMORAL       -- CARDIAC DEFIBRILLATOR PLACEMENT       -- coronary stents       -- EXPLORATION AND EVaCUATION OF HEMATOMA OF UPPER EXTREMITY       -- KNEE ARTHROPLASTY       -- VASCULAR SURGERY          No Known Allergies   No family history on file.     Review of Systems and Physical Exam      Review of Systems  -- Constitution - no fever, denies fatigue, no weakness, no chills  -- Eyes - no tearing or redness, no visual disturbance  -- Ear, Nose - no tinnitus or  earache, no nasal congestion or discharge  -- Mouth,Throat - no sore throat, no toothache, normal voice, normal swallowing  -- Respiratory - denies cough and congestion, no shortness of breath, no HASSAN  -- Cardiovascular - denies chest pain, no palpitations, denies claudication  -- Gastrointestinal - denies abdominal pain, nausea, vomiting, or diarrhea  -- Genitourinary - no dysuria, denies flank pain, no hematuria, no STD risk  -- Musculoskeletal - postoperative pain from the left BKA  -- Neurological - no headache, denies weakness or seizure; no LOC  -- Skin - denies pallor, rash, or changes in skin. no hives or welts noted    /80  Pulse 94   Temp 97.1 °F (36.2 °C) (Oral)   Resp 18   SpO2 97%     Physical Exam  -- Nursing note and vitals reviewed  -- Head: Atraumatic. Normocephalic. No obvious abnormality  -- Eyes: Pupils are equal and reactive to light. Normal conjunctiva and lids  -- Cardiac: Normal rate, regular rhythm and normal heart sounds  -- Pulmonary: Normal respiratory effort, breath sounds clear to auscultation  -- Abdominal: Soft, no tenderness. Normal bowel sounds. Normal liver edge  -- Musculoskeletal: Normal range of motion, no effusions. Joints stable   -- Neurological: No focal deficits. Showed good interaction with staff  -- Vascular: Posterior tibial, dorsalis pedis and radial pulses 2+ bilaterally    -- Lymphatics: No cervical or peripheral lymphadenopathy. No edema noted  -- Skin:  Healing left BKA without issue    Emergency Room Course      Lab Results   07/11/2018   K 3.5 07/11/2018    07/11/2018   CO2 27 07/11/2018   BUN 8 07/11/2018   CREATININE 1.1 07/11/2018   GLU 93 07/11/2018   ALKPHOS 152 (H) 07/11/2018   AST 42 (H) 07/11/2018   ALT 45 (H) 07/11/2018   BILITOT 0.6 07/11/2018   ALBUMIN 2.4 (L) 07/11/2018   PROT 6.8 07/11/2018   WBC 8.03 07/11/2018   HGB 9.1 (L) 07/11/2018   HCT 28.0 (L) 07/11/2018    (H) 07/11/2018   CPK 96 07/12/2018   CPK 96 07/12/2018    CPKMB 2.0 07/12/2018   TROPONINI 0.021 07/12/2018   INR 2.3 (H) 06/16/2018   BNP 3,926 (H) 07/11/2018     EKG  -- The EKG findings today were without concerning findings from baseline  -- The troponin drawn in the ER today was within normal limits  -- The 2nd troponin drawn in the ER today was within normal limits     Radiology  -- Chest x-ray showed no infiltrate and showed no acute pathology    Medications Given  morphine injection 4 mg (4 mg Intramuscular Given 7/12/18 0007)   ondansetron injection 4 mg (4 mg Intramuscular Given 7/12/18 0007)     Medical Decision Making  -- Diagnosis management comments: 47 y.o. male with chronic pain issues  -- patient with a recent left below-the-knee amputation, was at [facility]  -- patient ran out of pain medication presents the ER this evening for refill  -- patient is hemodynamically stable, healing left BKA noted today  -- patient's lab work was stable, performed as a precaution tonight  -- refill minimal pain medication with close PCP follow-up advised  -- patient's pain controlled in the ER with morphine, chronic pain issues    Diagnosis  -- The encounter diagnosis was Chronic pain.    Disposition and Plan  -- Disposition: home  -- Condition: stable  -- Follow-up: Patient to follow up with Indio Aquino MD in 1-2 days.  -- I advised the patient that we have found no life threatening condition today  -- At this time, I believe the patient is clinically stable for discharge.   -- The patient acknowledges that close follow up with a MD is required   -- Patient agrees to comply with all instruction and direction given in the ER    This note is dictated on Dragon Natural Speaking word recognition program.  There are word recognition mistakes that are occasionally missed on review.            Raul Dean MD  07/12/18 0812

## 2018-07-13 NOTE — ED PROVIDER NOTES
Encounter Date: 7/13/2018    SCRIBE #1 NOTE: I, Jeison Rosales, am scribing for, and in the presence of,  Dr. Mueller. I have scribed the following portions of the note - the Resident attestation.       History     Chief Complaint   Patient presents with    Chest Pain     Pt cp hx of 14 MI unknown stents pt reports SOB. Pt had bka and aka     Pt is a 47 year old male with PMH of PAD s/p bilateral amputations, CAD, CHF, COPD, MI, AICD present who presents to the ED with chest pain and shortness of breath. Pt states that his chest pain feels different and less intense than he previous heart attacks and his SOB has been worsening over the past 4 days. He notes no exacerbating or ameliorative factors. Pt presented to the ED at [facility] and was transferred here. Pt recently underwent a BKA of the left leg on 06/29.          Review of patient's allergies indicates:  No Known Allergies  Past Medical History:   Diagnosis Date    AICD (automatic cardioverter/defibrillator) present     RYAN (acute kidney injury) 3/25/2018    CHF (congestive heart failure)     COPD with acute bronchitis 6/12/2018    Coronary artery disease     Encounter for blood transfusion     Hx of psychiatric care     Hyperlipemia     Hypertension     MI (myocardial infarction)     Neuropathy     PAD (peripheral artery disease)     Psychiatric problem     PVD (peripheral vascular disease)      Past Surgical History:   Procedure Laterality Date    AMPUTATION Right     great toe    BELOW KNEE AMPUTATION OF LOWER EXTREMITY Left 6/29/2018    Procedure: AMPUTATION, BELOW KNEE;  Surgeon: Norris Trejo MD;  [place] [facility] [place] [address] [place] [address];  Service: Peripheral Vascular;  Laterality: Left;    BYBPASS GRAFT AORTOBIUFEMORAL      CARDIAC DEFIBRILLATOR PLACEMENT      coronary stents      EXPLORATION AND EVaCUATION OF HEMATOMA OF UPPER EXTREMITY Left     KNEE ARTHROPLASTY Left     VASCULAR SURGERY      fem-pop bypass     History reviewed. No pertinent  family history.  Social History   Substance Use Topics    Smoking status: Former Smoker     Packs/day: 0.50     Quit date: 5/1/2018    Smokeless tobacco: Former User     Types: Chew     Quit date: 8/2/1980      Comment: Uses nicotine gum and nicotine patches    Alcohol use Yes      Comment: rarely     Review of Systems   Constitutional: Negative for chills and fever.   HENT: Negative for trouble swallowing.    Eyes: Negative for visual disturbance.   Respiratory: Positive for shortness of breath.    Cardiovascular: Positive for chest pain.   Gastrointestinal: Negative for abdominal pain.   Genitourinary: Negative for difficulty urinating.   Musculoskeletal: Positive for myalgias (pt notes pain at his left leg amputation site ).   Skin: Negative for rash.   Neurological: Negative for syncope.       Physical Exam     Initial Vitals [07/13/18 1820]   BP Pulse Resp Temp SpO2   95/74 94 16 97.9 °F (36.6 °C) 96 %      MAP       --         Physical Exam    Constitutional: He appears well-developed and well-nourished. He is not diaphoretic. No distress.   Cardiovascular: Normal rate, regular rhythm and normal heart sounds.   Pulmonary/Chest: Breath sounds normal.   Abdominal: Soft. Bowel sounds are normal. There is no tenderness.   Neurological: He is alert and oriented to person, place, and time.   Skin: Skin is warm and dry.   Amputations sites clean and dry. Left knee staples present, wound healing appropriately with no discharge or erythema.          ED Course   Procedures  Labs Reviewed   CBC W/ AUTO DIFFERENTIAL - Abnormal; Notable for the following:        Result Value    RBC 2.94 (*)     Hemoglobin 9.1 (*)     Hematocrit 28.1 (*)     RDW 21.8 (*)     Platelets 446 (*)     nRBC 2 (*)     All other components within normal limits   COMPREHENSIVE METABOLIC PANEL - Abnormal; Notable for the following:     CO2 16 (*)     Albumin 2.7 (*)     Alkaline Phosphatase 193 (*)     AST 65 (*)     Anion Gap 17 (*)     eGFR if  non  58.6 (*)     All other components within normal limits   TROPONIN I - Abnormal; Notable for the following:     Troponin I 0.035 (*)     All other components within normal limits   B-TYPE NATRIURETIC PEPTIDE - Abnormal; Notable for the following:     BNP 4,587 (*)     All other components within normal limits   LACTIC ACID, PLASMA - Abnormal; Notable for the following:     Lactate (Lactic Acid) 3.8 (*)     All other components within normal limits   TROPONIN I - Abnormal; Notable for the following:     Troponin I 0.034 (*)     All other components within normal limits   LACTIC ACID, PLASMA - Abnormal; Notable for the following:     Lactate (Lactic Acid) 4.9 (*)     All other components within normal limits   URINALYSIS, REFLEX TO URINE CULTURE - Abnormal; Notable for the following:     Appearance, UA Hazy (*)     Protein, UA 1+ (*)     All other components within normal limits    Narrative:     REC'D 1 CUP  Preferred Collection Type->Urine, Clean Catch   URINALYSIS MICROSCOPIC - Abnormal; Notable for the following:     Bacteria, UA Moderate (*)     Hyaline Casts, UA 41 (*)     All other components within normal limits    Narrative:     REC'D 1 CUP  Preferred Collection Type->Urine, Clean Catch   LACTIC ACID, PLASMA - Abnormal; Notable for the following:     Lactate (Lactic Acid) 3.3 (*)     All other components within normal limits   ISTAT LACTATE - Abnormal; Notable for the following:     POC Lactate 2.57 (*)     All other components within normal limits   CULTURE, BLOOD    Narrative:     Aerobic and anaerobic   CULTURE, BLOOD    Narrative:     Aerobic and anaerobic   PROTIME-INR   TROPONIN I     EKG Readings: (Independently Interpreted)   Rate 96, sinus rhythm. QT prolongation with QTc of 482. No ST segment changes, no significant changes from previous EKG on 07/11       Imaging Results          X-Ray Knee 1 or 2 View Bilateral (Final result)  Result time 07/13/18 22:47:09   Procedure changed  from X-Ray Knee 3 View Bilateral     Final result by Jakob Gil MD (07/13/18 22:47:09)                 Impression:      Postoperative changes as described above, noting significant soft tissue swelling in the right thigh.      Electronically signed by: Jakob Gil MD  Date:    07/13/2018  Time:    22:47             Narrative:    EXAMINATION:  XR KNEE 1 OR 2 VIEW BILATERAL    CLINICAL HISTORY:  Bilateral lower extremity pain.  History of above the knee and below the knee amputation.    TECHNIQUE:  Two views of the left knee and two views of the right femoral stump.    COMPARISON:  None.    FINDINGS:  Postoperative changes of left below the knee amputation.  No evidence of bony erosion or acute fracture.  Alignment at the left knee joint is unremarkable.  Left femoral and popliteal stent are present.  Surgical staples overlie the amputation site.  Right lower extremity demonstrates above the knee amputation.  There is severe soft tissue edema which is asymmetric when compared with the left.  No evidence of bony erosion or acute fracture.  Soft tissue calcifications are noted adjacent to the mid right femoral shaft.                               X-Ray Chest AP Portable (Final result)  Result time 07/13/18 20:00:18    Final result by Kiko Gar MD (07/13/18 20:00:18)                 Impression:      1. Improvement of interstitial attenuation suggesting improving edema, no large focal consolidation.  Stable findings elsewhere.      Electronically signed by: Kiko Gar MD  Date:    07/13/2018  Time:    20:00             Narrative:    EXAMINATION:  XR CHEST AP PORTABLE    CLINICAL HISTORY:  CHF;    TECHNIQUE:  Single frontal view of the chest was performed.    COMPARISON:  07/11/2018    FINDINGS:  The cardiomediastinal silhouette is enlarged, stable, noting prominence of the superior mediastinum, also stable.  Left chest wall pacer stable..  There is no pleural effusion.  The trachea is midline.   The lungs are symmetrically expanded bilaterally with coarse interstitial attenuation, improved since the previous exam..  No large focal consolidation seen.  There is no pneumothorax.  The osseous structures are unremarkable.                                 Medical Decision Making:   History:   Old Medical Records: I decided to obtain old medical records.  Old Records Summarized: records from previous admission(s).       <> Summary of Records: Most Recent EF low teens to 20s.   Initial Assessment:   SOB concerning for COPD vs CHF. Given pts poor vascular history, will evaluate for ACS. Hypotension and recent surgery concerning for occult infection. Will monitor response to 250ml Saline bolus and CBC/Lactate. Lack of tachypnea reassuring for PE.  Independently Interpreted Test(s):   I have ordered and independently interpreted EKG Reading(s) - see prior notes  Clinical Tests:   Lab Tests: Reviewed and Ordered  The following lab test(s) were unremarkable: PT  Radiological Study: Ordered and Reviewed  Medical Tests: Ordered and Reviewed  ED Management:  7:22 PM CBC shows stable chronic anemia, no elevation in WBC  7:56 PM BNP >4500. Lactate >3. Troponins elevated at 0.035  8:05 PM lactate elevated at 3.8. Will recheck to see if responsive to fluid or anomalous.   10:01 PM Second lactate elevated at 4.9. Sepsis alert initiated. 1L LR bolus, IV antibiotics initiated. Pt continues to remain alert, oriented, and conversing in full sentences.  Critical Care consulted. Given improvement on CXR, pulmonary source of infection unlikely. Will obtain films of recent amputations site.  10:48 PM XR of LE shows some soft tissue edema of the right knee  2:02 AM Bedside US shows fluid overload. Will diurese pt and look for improvement of lactate.     Additional MDM:   Heart Score:    History:          Slightly suspicious.  ECG:             Nonspecific repolarisation disturbance  Age:               45-65 years  Risk factors: >= 3 risk  factors or history of atherosclerotic disease  Troponin:       1-2x normal limit  Final Score: 5      Heart Failure Score:   Systolic BP = 90-99  Heart Rate = 90-94  Sodium = 138  COPD = Yes  Black = No  BUN = 10-19  Age = 40-49  Patient has a GWTG HF Risk Score for In-Hospital Mortality of 41 which translates into a probability of death of 1-5% (Low Risk).    APC / Resident Notes:   2:53 AM  I, Lexy Sapp PA-C, have assumed care of this patient from Dr. Phillip Neves pending repeat lactate after 80 mg IV lasix per critical care recommendations. Will continue to monitor.     Patient reassessed, patient resting comfortably in NAD on RA. Patient alert and mentation intact. Patient BP 98/70 with HR 83 bpm and sat 100% on 2 L via NC. Patient received 750 mL IVF, vanc, and zosyn IV while in ED. Will continue to monitor.      Patient reassessed, patient becomes hypoxic to 88% on 3 L via NC with minimal exertion within stretcher. Repeating lactate now after 80 mg IVF. Will continue to monitor.     4:49 AM  Patient reassessed, patient resting comfortably in NAD on 3 L via NC. Patient /70 with 95 HR and sat 100 %. POCT lactate 2.57. Awaiting serum lactate. Case re discussed with critical care medicine, they believe there service is not warranted at this time. They believe patient's initial lactate acidosis secondary stress of CHF exacerbation.     Will admit to medicine.   5:25 AM  Lexy Sapp PA-C         Scribe Attestation:   Scribe #1: I performed the above scribed service and the documentation accurately describes the services I performed. I attest to the accuracy of the note.    Attending Attestation:   Physician Attestation Statement for Resident:  As the supervising MD   Physician Attestation Statement: I have personally seen and examined this patient.   I agree with the above history. -:   As the supervising MD I agree with the above PE.    As the supervising MD I agree with the above  treatment, course, plan, and disposition.              Clinical Impression:   The primary encounter diagnosis was Acute on chronic combined systolic and diastolic congestive heart failure. Diagnoses of Chest pain, Pain, Lactic acidosis, and Elevated troponin were also pertinent to this visit.      Disposition:   Disposition: Admitted  Condition: Serious                        Lexy Sapp PA-C  07/14/18 0525

## 2018-07-14 PROBLEM — I50.43 ACUTE ON CHRONIC COMBINED SYSTOLIC AND DIASTOLIC CONGESTIVE HEART FAILURE: Status: ACTIVE | Noted: 2018-01-01

## 2018-07-14 NOTE — ED NOTES
Tele box [id number] attached to patient. CTM called for placement verification, spoke to Shalini/ REYNA/ Jeanette

## 2018-07-14 NOTE — ED NOTES
Report received from DENISE Sabillon.  Patient is resting with eyes closed and respirations even and unlabored. Patient does not appear to be in any distress at this time and is easily aroused. Both side rails up, call bell in reach, stretcher in low position.

## 2018-07-14 NOTE — PROGRESS NOTES
Pt requested to have pill K replacement vs the disintegrating tablets. MD ordered KChlor 40mEq PO tablet one time dose.  Pt also requested lidocaine jelly for recent L BKA. Pt states he uses this at home for pain. MD to place order for lidocaine jelly.  Will continue to monitor pt.

## 2018-07-14 NOTE — ED TRIAGE NOTES
Pt arrived to ED with CC of SOB and CP intermittent onset last night - denies CP at this time - pt 95% SpO2 on RA, placed on 2L O2 NC supplemental per MD verbal order. Pt denies N/V or fever, reports cough.    Patient identifiers verified and correct for Leonardo Corteseur.    LOC: The patient is awake, alert and oriented x 4. Pt is speaking appropriately, no slurred speech.  APPEARANCE: Patient resting and in no acute distress. Pt is clean and well groomed. No JVD visible.   SKIN: Skin is warm dry and intact, and color is consistent with ethnicity. No tenting observed and capillary refill <3 seconds. No clubbing noted to nail beds. Scattered bruising noted throughout abdomen.  MUSCULOSKELETAL: Right AKA noted, dressed on arrival. Left BKE noted, dressed on arrival. Full ROM noted to bilateral hips and left knee joint. Full ROM noted to BUE.  RESPIRATORY: Airway is open and patent. Respirations-unlabored, regular rate, equal bilaterally on inspiration and expiration. No accessory muscle use noted. Lungs clear to auscultation in all fields bilaterally anterior and posterior.   CARDIAC: Patient has regular heart rate. Patient has no c/o chest pain.  ABDOMEN: Soft and non-tender to palpation with no distention noted.   NEUROLOGIC: Eyes open spontaneously and facial expression symmetrical. Pt behavior appropriate to situation, and pt follows commands.  Pt reports sensation present in all extremities when touched with a finger. PERRLA  : No complaints of frequency, burning, urgency or blood in the urine.

## 2018-07-14 NOTE — H&P
"HISTORY & PHYSICAL  Hospital Medicine    Team: [facility]    PRESENTING HISTORY     Chief Complaint/Reason for Admission:  Evaluation of acute on chronic combined HF    History of Present Illness:  47M ICM (LVEF 13%) s/p ICD, CAD s/p PCI, HTN, HLD, current smoker, PAD (s/p aortobifemoral bypass, L SFA and pop PTAS in September and recent R SFA and R pop 3/8 by Dr. Carl Bell and finally R AKA 3/2018 and L BKA 6/2018) here for evaluation of acute on chronic combined HF.  Patient reports since d/c from hospital he has not taken lasix.  He developed worsening SOB 2 days prior.  He called his son, who is a critical care practitioner.  His son advised him to take the grandfather's lasix.  The patient reports he felt uncomfortable taking his dad's medication so he did not, despite the fact he thought it might make him feel better.  He only continued to deteriorate to the point he had "trouble saying a sentence".  He reports no chest pain or pressure even though there is some documentation of this in ED.  He denies any cough, URI symptoms, fevers, chills, congestion.  He denies any drainage or erythema from L BKA site.  He reports that he is non-adherent to fluid restriction but does watch his salt intake.  He has given up on tobacco and alcohol.    In ED patient noted to have elevated lactate. Patient started on IVF and IV abx. CCM was consulted and completed bedside U/S revealing dilated IVC.  Patient given IV lasix and triaged to general medical floor.  The patient reports feeling much improved after IV lasix.    Of note, patient noted for recent admission after presenting to [facility] on 6/12 with SOB, cough, weight, BNP 2400, congested CXR, and elevated lactic acid. He was admitted with COPD and hypotension thought to be secondary to sepsis. He was started on azithromycin and vancomycin, and given a fluid bolus.  He was found to not be septic the next day and all abx were stopped; etiology then thought " cardiogenic shock in etiology.  Unfortunately, he developed ADHF with worsened respiratory status and became progressively hypotensive.  He was subsequently intubated and started on levo/dobutamine. Transferred to [facility]U for higher level of care on 6/16.  Patient was started on SLED given poor UOP and worsening RYAN. He was kept on dobutamine 2.5 mcg/kg/min with daily CO measurements. Patient had recovering renal function and SLED was discontinued 6/17/2018. He was diuresed with IV lasix BID then daily basis until patient became euvolemic with normal-low CVP. He was suitable for extubation on 2nd day of hospital stay however would become significantly agitated when sedation would be weaned down resulting in delaying extubation until 6/19/2018. After extubation, the patient was overtly delirious expressed by confusion, delusion, belligerence, and aggressive. He had to be restrained on the first 2 days following extubation. Psychiatry was consulted and patient was finally less psychotic and delirious on 6/23/2018 after starting scheduled depacon 250mg IV q6h and receiving one dose of thorazine night of 6/22/2018. Given patient's stable clinical status from his cardiogenic shock standpoint it was thought best to step down patient out of ICU settings to decreased delirium recurrence. C assumed care on 6/24 with Dr. Gomez.  The focus quickly shifted from patient's cardiorenal stabilization to his gangrenous L foot.  There was overwhelming concern of osteomyelitis and risk of ascending infection.  A multidisciplinary approach was taken in which the patient was seen by podiatry, vascular, and ID. The patient's extremity was deemed non-viable and the patient was then scheduled for BKA, which occurred on 6/29.  Post-operatively the patient's course was c/b worsening delirium for which psychiatry assisted.  Mentation improved with medication and he was discharge home with  under his father's care.    ECHO  6/2018  CONCLUSIONS     1 - Mild left ventricular enlargement.     2 - Severely depressed left ventricular systolic function (EF low to mid teens).     3 - Biatrial enlargement.     4 - Restrictive LV filling pattern, indicating markedly elevated LAP (grade 3 diastolic dysfunction).     5 - Low normal to mildly depressed right ventricular systolic function .     6 - Moderate or more mitral regurgitation.     7 - Mild tricuspid regurgitation.     8 - Increased central venous pressure.     9 - Pulmonary hypertension. The estimated PA systolic pressure is 45 mmHg.     Review of Systems:    Review of Systems   Constitutional: Negative for chills and fever.   HENT: Negative for congestion and sore throat.    Eyes: Negative for photophobia, pain and discharge.   Respiratory: Positive for shortness of breath. Negative for cough, hemoptysis and sputum production.    Cardiovascular: Negative for chest pain, palpitations and leg swelling.   Gastrointestinal: Negative for abdominal pain, diarrhea, nausea and vomiting.   Genitourinary: Negative for dysuria and urgency.   Musculoskeletal: Negative for myalgias and neck pain.   Skin: Negative for itching and rash.   Neurological: Negative for sensory change, focal weakness and headaches.   Endo/Heme/Allergies: Negative for polydipsia. Does not bruise/bleed easily.   Psychiatric/Behavioral: Negative for depression and suicidal ideas.     PAST HISTORY:     Past Medical History:   Diagnosis Date    AICD (automatic cardioverter/defibrillator) present     RYAN (acute kidney injury) 3/25/2018    CHF (congestive heart failure)     COPD with acute bronchitis 6/12/2018    Coronary artery disease     Encounter for blood transfusion     Hx of psychiatric care     Hyperlipemia     Hypertension     MI (myocardial infarction)     Neuropathy     PAD (peripheral artery disease)     Psychiatric problem     PVD (peripheral vascular disease)        Past Surgical History:   Procedure  Laterality Date    AMPUTATION Right     great toe    BELOW KNEE AMPUTATION OF LOWER EXTREMITY Left 6/29/2018    Procedure: AMPUTATION, BELOW KNEE;  Surgeon: Norris Trejo MD;  Location: [facility] OR [address] [place] [address];  Service: Peripheral Vascular;  Laterality: Left;    BYBPASS GRAFT AORTOBIUFEMORAL      CARDIAC DEFIBRILLATOR PLACEMENT      coronary stents      EXPLORATION AND EVaCUATION OF HEMATOMA OF UPPER EXTREMITY Left     KNEE ARTHROPLASTY Left     VASCULAR SURGERY      fem-pop bypass       History reviewed. No pertinent family history.    Social History     Social History    Marital status: Single     Spouse name: N/A    Number of children: N/A    Years of education: N/A     Social History Main Topics    Smoking status: Former Smoker     Packs/day: 0.50     Quit date: 5/1/2018    Smokeless tobacco: Former User     Types: Chew     Quit date: 8/2/1980      Comment: Uses nicotine gum and nicotine patches    Alcohol use Yes      Comment: rarely    Drug use: No    Sexual activity: Yes     Partners: Female     Other Topics Concern    None     Social History Narrative    None       MEDICATIONS & ALLERGIES:     No current facility-administered medications on file prior to encounter.      Current Outpatient Prescriptions on File Prior to Encounter   Medication Sig Dispense Refill    atorvastatin (LIPITOR) 80 MG tablet Take 1 tablet (80 mg total) by mouth every evening. 90 tablet 3    HYDROcodone-acetaminophen (NORCO) 5-325 mg per tablet Take 1 tablet by mouth every 4 (four) hours as needed for Pain. 10 tablet 0    lidocaine (XYLOCAINE) 5 % Oint ointment Apply topically 4 (four) times daily. Apply to entire left foot      metoprolol tartrate (LOPRESSOR) 25 MG tablet Take 0.5 tablets (12.5 mg total) by mouth 2 (two) times daily. 30 tablet 1    nicotine (NICODERM CQ) 21 mg/24 hr Place 1 patch onto the skin once daily.  0    oxyCODONE (ROXICODONE) 10 mg Tab immediate release tablet Take 1 tablet (10 mg total)  by mouth every 4 (four) hours as needed for Pain. 25 tablet 0    pregabalin (LYRICA) 75 MG capsule Take 1 capsule (75 mg total) by mouth every evening. 30 capsule 1    aspirin (ECOTRIN) 81 MG EC tablet Take 81 mg by mouth once daily.      collagenase ointment Apply topically once daily. 15 g 0    divalproex (DEPAKOTE) 250 MG EC tablet Take 250mg (1 tablet) at 0700, then take 250mg (1 tablet) at 1200, then take 500mg (2 tablets0 at 1900 120 tablet 1    polyethylene glycol (GLYCOLAX) 17 gram PwPk Take 17 g by mouth 2 (two) times daily as needed (constipation).  0    senna-docusate 8.6-50 mg (PERICOLACE) 8.6-50 mg per tablet Take 1 tablet by mouth 2 (two) times daily.      thiamine 100 MG tablet Take 1 tablet (100 mg total) by mouth once daily.        Review of patient's allergies indicates:  No Known Allergies    OBJECTIVE:     Vital Signs:  Temp:  [96.5 °F (35.8 °C)-97.9 °F (36.6 °C)] 97.5 °F (36.4 °C)  Pulse:  [80-96] 91  Resp:  [12-36] 16  SpO2:  [87 %-100 %] 100 %  BP: ()/(49-77) 101/59  Body mass index is 20.18 kg/m².     Physical Exam:    Objective:  General Appearance:  Ill-appearing, in no acute distress, comfortable and not in pain.    Vital signs: (most recent): Blood pressure (!) 101/59, pulse 91, temperature 97.5 °F (36.4 °C), temperature source Oral, resp. rate 16, weight 56.7 kg (125 lb), SpO2 100 %.  No fever.    Output: Producing urine and producing stool.    HEENT: Normal HEENT exam.  (+JVD)    Lungs:  Normal effort.  Breath sounds clear to auscultation.  He is not in respiratory distress.  There are rales and rhonchi.  No wheezes.    Heart: Normal rate.  Regular rhythm.  S1 normal and S2 normal.  No murmur.   Chest: Symmetric chest wall expansion.   Abdomen: Abdomen is soft.  Bowel sounds are normal.   There is no abdominal tenderness.     Extremities: There is deformity, venous stasis and dependent edema.  There is no effusion or local swelling.  (S/p R AKA and L BKA)  Pulses: There are  decreased pulses.    Neurological: Patient is alert.  Normal strength.  (Oriented to person, place, and situation - difficulty with date thought August 2018).    Pupils:  Pupils are equal, round, and reactive to light.    Skin:  Warm and dry.      Laboratory  Lab Results   Component Value Date    WBC 8.60 07/13/2018    HGB 9.1 (L) 07/13/2018    HCT 28.1 (L) 07/13/2018    MCV 96 07/13/2018     (H) 07/13/2018       Recent Labs  Lab 07/13/18  1845   GLU 74      K 4.5      CO2 16*   BUN 14   CREATININE 1.4   CALCIUM 8.8     Lab Results   Component Value Date    INR 1.2 07/13/2018    INR 2.3 (H) 06/16/2018    INR 1.3 (H) 06/12/2018     Lab Results   Component Value Date    HGBA1C 5.4 09/26/2017     No results for input(s): POCTGLUCOSE in the last 72 hours.    Diagnostic Results:  Labs: Reviewed  ECG: Reviewed  X-Ray: Reviewed  Echo: Reviewed    ASSESSMENT & PLAN:     Current Problems List:  Active Hospital Problems    Diagnosis  POA    *Acute on chronic combined systolic and diastolic congestive heart failure [I50.43]  Yes    Lactic acid acidosis [E87.2]  Yes    COPD, severe [J44.9]  Yes    Acute renal failure [N17.9]  Yes    Ischemic cardiomyopathy [I25.5]  Yes    PAD (peripheral artery disease) [I73.9]  Yes    Peripheral vascular disease of lower extremity [I73.9]  Yes     S/p right AKA.     TcPO2 L foot 5/11/2018   Dorsum 43 to 171 mmHg with oxygen   Medial 61 to 144 mmHg with oxygen   Chest wall baseline 43 mmHg      Tobacco abuse [Z72.0]  Yes    Non compliance w medication regimen [Z91.14]  Not Applicable      Resolved Hospital Problems    Diagnosis Date Resolved POA   No resolved problems to display.       HIGH RISK CONDITION(S):  Patient has a condition that poses threat to life and bodily function: Cardiogenic shock    Problem Assessment & Treatment Plan:    Cardiogenic shock  Acute on chronic combined HF  - likely due to ADHF following volume resuscitation when received IVF for  suspected sepsis on initial presentation  - okay to c/w BB, hold for SBP <90  - Hold ACEI due to renal function status  - Hold imdur due to relative hypotension  - Start lasix 80 IV BID  - Strict Is/Os, daily weights  - Consider consult to co-management cardiology     Peripheral vascular disease of lower extremity  S/p BKA  - S/p BKA  - No further abx needed  - Consult to wound care     Acute renal failure superimposed on CKDIII  - Likely ATN due to ADHF and ischemic insult with hypotension  - Avoid nephrotoxins and renally dose medications.   - Strict intake and output.   - Monitor sCr with diuresis     Encephalopathy, metabolic  AGMA, due to lactic acidosis  - Patient with disorientation to date, but oriented to person, place, and situation  - Suspect due to hypoxia and metabolic derangements with AGMA  - Treat underlying medical conditions  - Patient completely cooperative  - Consider consultation to psychiatry  - Consider resumption of depacon     Moderate protein-calorie malnutrition  - Nutrition consult     COPD, mixed simple and mucopurulent  - Duonebs prn      Alcohol abuse  - Reportedly last drink was weeks to months ago.   - Avoiding BZD as possible.      Tobacco abuse  - Nicotine patch PRN    DVT/GI PPx in place  Cardiac diet with 1500 cc fluid restriction  Full code    Disposition:  Pending diuresis; home with home health v SNF when stable - will have PT/OT evaluate for safe disposition.  Patient is critically ill and there should be low threshold to involve CCU service for transfer.    Antoni Trujillo MD  [facility] Medicine

## 2018-07-14 NOTE — PROGRESS NOTES
Notified MD Ronnie of pt's Mg=1.2 and K=3.4. MD to place orders for replacement K and Mg. Will continue to monitor pt.

## 2018-07-14 NOTE — CARE UPDATE
Patients lactic acid is trending down.  Patient is stable for floor admission.  ICU will sign off.  Please contact ICU if patient decompensates and higher level of care is required.    Full consult note to follow.    Karen Warren MD  Internal Medicine, PGY3  Pager [phone]

## 2018-07-14 NOTE — CONSULTS
Patient was seen at bedside, full note to follow:    Initial recs:  -Patient volume overloaded on bedside US of IVC and physical exam  -more cardiogenic in nature vs sepsis   -80 IV lasix once, measure I/O for adequate response (same as last discharge)  -Obtain 3rd lactic acid around 0200  -will continue to follow, please call with next lactic acid             Evans Potts MD  Resident Physician - PGY2

## 2018-07-15 NOTE — PROGRESS NOTES
Pt c/o pain at his L BKA site, rate 9/10. Pt stated that current PO prn pain medication is not working and is requesting IV pain medication. MD Gonzalez notified. MD stated that she will place one time dose of IV pain med and modify Po prn pain medication order. Awaiting new orders. Will continue to monitor.

## 2018-07-15 NOTE — PLAN OF CARE
Problem: Patient Care Overview  Goal: Plan of Care Review  Outcome: Ongoing (interventions implemented as appropriate)  Pt c/o SOB and dizziness relieved with prn breathing tx. L BKA pain relieved with one time dose of IV pain med. Pt remains free of falls or injuries. Pt verbalizes complete understanding of plan of care. Will continue to monitor.

## 2018-07-15 NOTE — ASSESSMENT & PLAN NOTE
-48 y/o male with ICM, HFrEF (10-15%), NYHA class III symptoms. Found to be volume overloaded with increased CVP, BNP > 5400, pulmonary edema/cardiomegaly on CXR (although improving), and hypoxemia consistent with CHF exacerbation. Likely 2/2 to not being on loop diuretic therapy outpatient. Cardiology was consulted for concern for cardiogenic shock in the setting of worsening BP, lactate, kidney function, and WBC.  -On interview today, pt denied worsening SOB/sx of volume overload, reported feeling better than on presentation. On exam, warm and dry with no signs of respiratory distress on low flow NC.  -Hold initiation of dobutamine, given pt is currently HD stable and asymptomatic. BP 80s-90s/50s-60s at rest may be baseline.  -HTS consulted for RHC (plan for tomorrow).  -Discontinue lasix in the setting of hypotension.  -Continue home lopressor 12.5 mg BID.  -Continue home ASA 81 mg daily.  -Continue home atorvastatin 80 mg daily.  -Monitor.

## 2018-07-15 NOTE — PROGRESS NOTES
07/14/18 1917   Vital Signs   Temp 97.7 °F (36.5 °C)   Temp src Oral   Pulse 96   Resp (!) 24   SpO2 100 %   Flow (L/min) 3   O2 Device (Oxygen Therapy) nasal cannula   BP (!) 91/47   MAP (mmHg) 64     Pt c/o SOB, HASSAN, and dizziness. VSS. MD Gonzalez notified. Instructed to give PRN  Neb tx now. Orders to be carried out. Will continue to monitor.

## 2018-07-15 NOTE — PROGRESS NOTES
"Progress Note  Hospital Medicine    Provider team: [facility] [place] [facility]  Admit Date: 7/13/2018  Encounter Date: 07/15/2018     SUBJECTIVE:     Follow-up Visit for: Acute on chronic combined systolic and diastolic congestive heart failure    HPI (See H&P for complete P,F,SHx):   47M ICM (LVEF 13%) s/p ICD, CAD s/p PCI, HTN, HLD, current smoker, PAD (s/p aortobifemoral bypass, L SFA and pop PTAS in September and recent R SFA and R pop 3/8 by Dr. Carl Bell and finally R AKA 3/2018 and L BKA 6/2018) here for evaluation of acute on chronic combined HF.  Patient reports since d/c from hospital he has not taken lasix.  He developed worsening SOB 2 days prior.  He called his son, who is a critical care practitioner.  His son advised him to take the grandfather's lasix.  The patient reports he felt uncomfortable taking his dad's medication so he did not, despite the fact he thought it might make him feel better.  He only continued to deteriorate to the point he had "trouble saying a sentence".  He reports no chest pain or pressure even though there is some documentation of this in ED.  He denies any cough, URI symptoms, fevers, chills, congestion.  He denies any drainage or erythema from L BKA site.  He reports that he is non-adherent to fluid restriction but does watch his salt intake.  He has given up on tobacco and alcohol.     In ED patient noted to have elevated lactate. Patient started on IVF and IV abx. CCM was consulted and completed bedside U/S revealing dilated IVC.  Patient given IV lasix and triaged to general medical floor.  The patient reports feeling much improved after IV lasix.     Of note, patient noted for recent admission after presenting to [facility] on 6/12 with SOB, cough, weight, BNP 2400, congested CXR, and elevated lactic acid. He was admitted with COPD and hypotension thought to be secondary to sepsis. He was started on azithromycin and vancomycin, and given a fluid bolus.  He was found to not " be septic the next day and all abx were stopped; etiology then thought cardiogenic shock in etiology.  Unfortunately, he developed ADHF with worsened respiratory status and became progressively hypotensive.  He was subsequently intubated and started on levo/dobutamine. Transferred to [facility] CCU for higher level of care on 6/16.  Patient was started on SLED given poor UOP and worsening RYAN. He was kept on dobutamine 2.5 mcg/kg/min with daily CO measurements. Patient had recovering renal function and SLED was discontinued 6/17/2018. He was diuresed with IV lasix BID then daily basis until patient became euvolemic with normal-low CVP. He was suitable for extubation on 2nd day of hospital stay however would become significantly agitated when sedation would be weaned down resulting in delaying extubation until 6/19/2018. After extubation, the patient was overtly delirious expressed by confusion, delusion, belligerence, and aggressive. He had to be restrained on the first 2 days following extubation. Psychiatry was consulted and patient was finally less psychotic and delirious on 6/23/2018 after starting scheduled depacon 250mg IV q6h and receiving one dose of thorazine night of 6/22/2018. Given patient's stable clinical status from his cardiogenic shock standpoint it was thought best to step down patient out of ICU settings to decreased delirium recurrence. IMC assumed care on 6/24 with Dr. Gomez.  The focus quickly shifted from patient's cardiorenal stabilization to his gangrenous L foot.  There was overwhelming concern of osteomyelitis and risk of ascending infection.  A multidisciplinary approach was taken in which the patient was seen by podiatry, vascular, and ID. The patient's extremity was deemed non-viable and the patient was then scheduled for BKA, which occurred on 6/29.  Post-operatively the patient's course was c/b worsening delirium for which psychiatry assisted.  Mentation improved with medication and he was  discharge home with HH under his father's care.     ECHO 6/2018  CONCLUSIONS     1 - Mild left ventricular enlargement.     2 - Severely depressed left ventricular systolic function (EF low to mid teens).     3 - Biatrial enlargement.     4 - Restrictive LV filling pattern, indicating markedly elevated LAP (grade 3 diastolic dysfunction).     5 - Low normal to mildly depressed right ventricular systolic function .     6 - Moderate or more mitral regurgitation.     7 - Mild tricuspid regurgitation.     8 - Increased central venous pressure.     9 - Pulmonary hypertension. The estimated PA systolic pressure is 45 mmHg.      Interval history:  Patient with hypoTN and hypothermia overnight. Cardiology is consulted.  I discussed with co-management team. I recommend ICU level care.  Patient with SOB.  He denies any pain.  All questions answered to patient satisfaction.    Review of Systems:  Review of Systems   Constitutional: Negative for chills and fever.   HENT: Negative for congestion and sore throat.    Eyes: Negative for photophobia, pain and discharge.   Respiratory: Positive for shortness of breath. Negative for cough, hemoptysis and sputum production.    Cardiovascular: Negative for chest pain, palpitations and leg swelling.   Gastrointestinal: Negative for abdominal pain, diarrhea, nausea and vomiting.   Genitourinary: Negative for dysuria and urgency.   Musculoskeletal: Negative for myalgias and neck pain.   Skin: Negative for itching and rash.   Neurological: Negative for sensory change, focal weakness and headaches.   Endo/Heme/Allergies: Negative for polydipsia. Does not bruise/bleed easily.   Psychiatric/Behavioral: Negative for depression and suicidal ideas.       OBJECTIVE:     Intake/Output Summary (Last 24 hours) at 07/15/18 0821  Last data filed at 07/15/18 0700   Gross per 24 hour   Intake             1230 ml   Output             2925 ml   Net            -1695 ml     Vital Signs Range (Last  24H):  Temp:  [95.4 °F (35.2 °C)-98 °F (36.7 °C)]   Pulse:  [80-97]   Resp:  [16-24]   BP: ()/(47-70)   SpO2:  [97 %-100 %]   Body mass index is 20.18 kg/m².    Objective:  General Appearance:  Comfortable, well-appearing, in no acute distress and not in pain.    Vital signs: (most recent): Blood pressure (!) 83/55, pulse 90, temperature 97.6 °F (36.4 °C), temperature source Axillary, resp. rate 18, weight 56.7 kg (125 lb), SpO2 96 %.  No fever.    Output: Producing urine and producing stool.    HEENT: Normal HEENT exam.  (+JVD)    Lungs:  Normal effort.  Breath sounds clear to auscultation.  He is not in respiratory distress.  There are rales.  No wheezes.    Heart: Normal rate.  Regular rhythm.  S1 normal and S2 normal.  Positive for murmur.    Chest: Symmetric chest wall expansion.   Abdomen: Abdomen is soft.  Bowel sounds are normal.   There is no abdominal tenderness.     Extremities: Normal range of motion.  There is venous stasis and dependent edema.  There is no deformity, effusion or local swelling.    Pulses: Distal pulses are intact.    Neurological: Patient is alert and oriented to person, place and time.  Normal strength.    Pupils:  Pupils are equal, round, and reactive to light.    Skin:  Warm and dry.      Medications:  Medication list was reviewed in EPIC and changes noted under Assessment/Plan and MAR.    Laboratory:  Recent Labs      07/15/18   0326   WBC  15.19*   RBC  3.08*   HGB  9.5*   HCT  29.8*   PLT  364*   MCV  97   MCH  30.8   MCHC  31.9*   GRAN  82.9*  12.6*   LYMPH  9.3*  1.4   MONO  7.2  1.1*   EOS  0.0      Recent Labs      07/15/18   0326   GLU  144*   NA  135*   K  4.1   CL  100   CO2  20*   BUN  36*   CREATININE  1.9*   CALCIUM  8.8   ANIONGAP  15   MG  2.6       ASSESSMENT/PLAN:     Active Hospital Problems    Diagnosis  POA    *Acute on chronic combined systolic and diastolic congestive heart failure [I50.43]  Yes    Lactic acid acidosis [E87.2]  Yes    COPD, severe  [J44.9]  Yes    Acute renal failure [N17.9]  Yes    Ischemic cardiomyopathy [I25.5]  Yes    PAD (peripheral artery disease) [I73.9]  Yes    Peripheral vascular disease of lower extremity [I73.9]  Yes     S/p right AKA.     TcPO2 L foot 5/11/2018   [place]um 43 to 171 mmHg with oxygen   Medial 61 to 144 mmHg with oxygen   Chest wall baseline 43 mmHg      Tobacco abuse [Z72.0]  Yes    Non compliance w medication regimen [Z91.14]  Not Applicable      Resolved Hospital Problems    Diagnosis Date Resolved POA   No resolved problems to display.      Cardiogenic shock  Acute on chronic combined HF  - likely due to ADHF following volume resuscitation when received IVF for suspected sepsis on initial presentation  - okay to c/w BB, hold for SBP <90  - Hold ACEI due to renal function status  - Hold imdur due to relative hypotension  - Start lasix 80 IV BID, hold for SBP <90  - Strict Is/Os, daily weights  - Cardiology co-management consulted for inotropic support  - Patient appears to warrant CCU level care at this point     Peripheral vascular disease of lower extremity  S/p BKA  - S/p BKA  - No further abx needed  - Consult to wound care     Acute renal failure superimposed on CKDIII  - Likely ATN due to ADHF and ischemic insult with hypotension  - Avoid nephrotoxins and renally dose medications.   - Strict intake and output.   - Monitor sCr with diuresis     Encephalopathy, metabolic  AGMA, due to lactic acidosis  - Patient with disorientation to date, but oriented to person, place, and situation  - Suspect due to hypoxia and metabolic derangements with AGMA  - Treat underlying medical conditions  - Patient completely cooperative  - Consider consultation to psychiatry  - Consider resumption of depacon     Moderate protein-calorie malnutrition  - Nutrition consult     COPD, mixed simple and mucopurulent  - Duonebs prn      Alcohol abuse  - Reportedly last drink was weeks to months ago.   - Avoiding BZD as possible.       Tobacco abuse  - Nicotine patch PRN     DVT/GI PPx in place  Cardiac diet with 1500 cc fluid restriction  Full code    Anticipated discharge date and disposition:   Likely transfer to CCU. Cardiology co-management consulted.  Antoni Trujillo MD  [facility]

## 2018-07-15 NOTE — PROGRESS NOTES
07/15/18 0813   Vital Signs   BP (!) 89/65   MAP (mmHg) 72   BP Location Right arm   BP Method Automatic   Patient Position Lying   Notified Dr Trujillo. MD ordered to hold lopressor and lasix for now, he is awaiting recommendation from cardiology. Will continue to monitor pt.

## 2018-07-15 NOTE — PROGRESS NOTES
07/15/18 0956 07/15/18 1008   Critical Value Communication   Notified Physician/Designee --  Ronnie   Date Result Received 07/15/18 --    Time Result Received 0956 --    Resulting Department of Critical Value lab --    Critical Test #1 lactic --    Critical Test #1 Result 3.8 --    Date Notified --  07/15/18   Time Notified --  1008   Read Back Verification --  Yes   Date of 1st Attempt 07/15/18 --    Time of 1st Attempt 0957 --    Physician Directive --  no new orders at this time

## 2018-07-15 NOTE — PROGRESS NOTES
07/15/18 0630   Vital Signs   Temp (!) 95.4 °F (35.2 °C)   Temp src Rectal   MD John notified of above temp. Bear magedgger ordered. No other orders given at this time. Will continue to monitor.

## 2018-07-15 NOTE — SUBJECTIVE & OBJECTIVE
Past Medical History:   Diagnosis Date    AICD (automatic cardioverter/defibrillator) present     RYAN (acute kidney injury) 3/25/2018    CHF (congestive heart failure)     COPD with acute bronchitis 6/12/2018    Coronary artery disease     Encounter for blood transfusion     Hx of psychiatric care     Hyperlipemia     Hypertension     MI (myocardial infarction)     Neuropathy     PAD (peripheral artery disease)     Psychiatric problem     PVD (peripheral vascular disease)        Past Surgical History:   Procedure Laterality Date    AMPUTATION Right     great toe    BELOW KNEE AMPUTATION OF LOWER EXTREMITY Left 6/29/2018    Procedure: AMPUTATION, BELOW KNEE;  Surgeon: Norris Trejo MD;  Location: [facility] OR [address] [place] [address];  Service: Peripheral Vascular;  Laterality: Left;    BYBPASS GRAFT AORTOBIUFEMORAL      CARDIAC DEFIBRILLATOR PLACEMENT      coronary stents      EXPLORATION AND EVaCUATION OF HEMATOMA OF UPPER EXTREMITY Left     KNEE ARTHROPLASTY Left     VASCULAR SURGERY      fem-pop bypass       Review of patient's allergies indicates:  No Known Allergies    No current facility-administered medications on file prior to encounter.      Current Outpatient Prescriptions on File Prior to Encounter   Medication Sig    atorvastatin (LIPITOR) 80 MG tablet Take 1 tablet (80 mg total) by mouth every evening.    HYDROcodone-acetaminophen (NORCO) 5-325 mg per tablet Take 1 tablet by mouth every 4 (four) hours as needed for Pain.    lidocaine (XYLOCAINE) 5 % Oint ointment Apply topically 4 (four) times daily. Apply to entire left foot    metoprolol tartrate (LOPRESSOR) 25 MG tablet Take 0.5 tablets (12.5 mg total) by mouth 2 (two) times daily.    nicotine (NICODERM CQ) 21 mg/24 hr Place 1 patch onto the skin once daily.    oxyCODONE (ROXICODONE) 10 mg Tab immediate release tablet Take 1 tablet (10 mg total) by mouth every 4 (four) hours as needed for Pain.    pregabalin (LYRICA) 75 MG capsule Take 1  capsule (75 mg total) by mouth every evening.    aspirin (ECOTRIN) 81 MG EC tablet Take 81 mg by mouth once daily.    collagenase ointment Apply topically once daily.    divalproex (DEPAKOTE) 250 MG EC tablet Take 250mg (1 tablet) at 0700, then take 250mg (1 tablet) at 1200, then take 500mg (2 tablets0 at 1900    polyethylene glycol (GLYCOLAX) 17 gram PwPk Take 17 g by mouth 2 (two) times daily as needed (constipation).    senna-docusate 8.6-50 mg (PERICOLACE) 8.6-50 mg per tablet Take 1 tablet by mouth 2 (two) times daily.    thiamine 100 MG tablet Take 1 tablet (100 mg total) by mouth once daily.     Family History     None        Social History Main Topics    Smoking status: Former Smoker     Packs/day: 0.50     Quit date: 5/1/2018    Smokeless tobacco: Former User     Types: Chew     Quit date: 8/2/1980      Comment: Uses nicotine gum and nicotine patches    Alcohol use Yes      Comment: rarely    Drug use: No    Sexual activity: Yes     Partners: Female     Review of Systems   Constitution: Negative for chills, diaphoresis, fever, weakness and malaise/fatigue.   HENT: Negative for congestion, nosebleeds and sore throat.    Eyes: Negative for blurred vision, double vision and pain.   Cardiovascular: Negative for chest pain, claudication, dyspnea on exertion, irregular heartbeat, leg swelling, near-syncope, orthopnea, palpitations, paroxysmal nocturnal dyspnea and syncope.   Respiratory: Positive for shortness of breath. Negative for cough and sleep disturbances due to breathing.    Endocrine: Negative for cold intolerance, heat intolerance, polydipsia and polyuria.   Hematologic/Lymphatic: Negative for adenopathy.   Skin: Negative for color change and poor wound healing.   Musculoskeletal: Negative for arthritis and neck pain.   Gastrointestinal: Negative for bloating, abdominal pain, change in bowel habit, constipation, diarrhea, heartburn, hematemesis, hematochezia, nausea and vomiting.    Genitourinary: Negative for bladder incontinence, dysuria, flank pain, frequency, hematuria, hesitancy, nocturia and urgency.   Neurological: Negative for dizziness, headaches, light-headedness, loss of balance, numbness, paresthesias, sensory change and tremors.   Psychiatric/Behavioral: Negative for altered mental status. The patient does not have insomnia and is not nervous/anxious.      Objective:     Vital Signs (Most Recent):  Temp: 96.6 °F (35.9 °C) (07/15/18 1201)  Pulse: 87 (07/15/18 1600)  Resp: 18 (07/15/18 1536)  BP: 106/73 (07/15/18 1536)  SpO2: 100 % (07/15/18 1201) Vital Signs (24h Range):  Temp:  [95.4 °F (35.2 °C)-97.7 °F (36.5 °C)] 96.6 °F (35.9 °C)  Pulse:  [80-96] 87  Resp:  [16-24] 18  SpO2:  [96 %-100 %] 100 %  BP: ()/(47-73) 106/73     Weight: 59.8 kg (131 lb 14.4 oz)  Body mass index is 20.66 kg/m².    SpO2: 100 %  O2 Device (Oxygen Therapy): room air      Intake/Output Summary (Last 24 hours) at 07/15/18 1735  Last data filed at 07/15/18 1300   Gross per 24 hour   Intake             1000 ml   Output             1425 ml   Net             -425 ml       Lines/Drains/Airways     Central Venous Catheter Line                 Percutaneous Central Line Insertion/Assessment - triple lumen  06/16/18 0825 right subclavian 29 days          Peripheral Intravenous Line                 Peripheral IV - Single Lumen 07/14/18 0055 Right Antecubital 1 day              Physical Exam   Constitutional: He is oriented to person, place, and time. No distress.   HENT:   Head: Normocephalic and atraumatic.   Eyes: Conjunctivae and EOM are normal.   Neck: Normal range of motion. No JVD present.   Cardiovascular: Normal rate, regular rhythm, normal heart sounds and intact distal pulses.    Pulmonary/Chest: Effort normal. No respiratory distress. He has no wheezes. He has no rales.   Abdominal: Soft. He exhibits no distension. There is no tenderness.   Musculoskeletal: He exhibits no edema.   R AKA  ADEEL SNYDERA    Neurological: He is alert and oriented to person, place, and time.   Skin: Skin is warm, dry and intact. Bruising noted. He is not diaphoretic.   Psychiatric: He has a normal mood and affect. His speech is normal. Thought content normal.     Significant Labs: All pertinent lab results from the last 24 hours have been reviewed.     7/13/2018 18:46 7/13/2018 20:34 7/14/2018 04:22 7/15/2018 08:49   Lactate, Jose Armando 3.8 (HH) 4.9 (HH) 3.3 (H) 3.8 (HH)     CMP:   7/15/2018 03:26   Sodium 135 (L)   Potassium 4.1   Chloride 100   CO2 20 (L)   Anion Gap 15   BUN, Bld 36 (H)   Creatinine 1.9 (H)   eGFR if non African American 40.5 (A)   eGFR if African American 46.8 (A)   Glucose 144 (H)   Calcium 8.8   Magnesium 2.6   Alkaline Phosphatase 203 (H)   Total Protein 7.3   Albumin 2.8 (L)   Total Bilirubin 0.8   AST 70 (H)   ALT 42     CBC:   7/15/2018 03:26   WBC 15.19 (H)   RBC 3.08 (L)   Hemoglobin 9.5 (L)   Hematocrit 29.8 (L)   MCV 97   MCH 30.8   MCHC 31.9 (L)   RDW 21.7 (H)   Platelets 364 (H)   MPV 10.6   Gran% 82.9 (H)   Gran # (ANC) 12.6 (H)   Immature Granulocytes 0.5   Immature Grans (Abs) 0.07 (H)   Lymph% 9.3 (L)   Lymph # 1.4   Mono% 7.2   Mono # 1.1 (H)   Eosinophil% 0.0   Eos # 0.0   Basophil% 0.1   Baso # 0.01   nRBC 2 (A)   Platelet Estimate Appears normal     Significant Imaging: reviewed   EKG 7/14/18:  Normal sinus rhythm  Possible Left atrial enlargement  Septal infarct ,age undetermined  Abnormal ECG  When compared with ECG of 13-JUL-2018 18:51,  Questionable change in axis    2D echo with CFD 7/14/18:     1 - Moderate left ventricular enlargement.     2 - Severely depressed left ventricular systolic function (EF 10-15%).     3 - Right ventricular enlargement with moderately depressed systolic function.     4 - Biatrial enlargement.     5 - Moderate to severe mitral regurgitation.     6 - Moderate to severe tricuspid regurgitation.     7 - Left pleural effusion.     8 - Ascites is noted.     9 - Pulmonary  hypertension. The estimated PA systolic pressure is 58 mmHg.     10 - Increased central venous pressure.

## 2018-07-15 NOTE — PROGRESS NOTES
Patient admitted to [facility] Patient alert and oriented to room. Cardiac monitoring maintained throughout transfer. Patient connected to telemetry, assessed, denies any pain, oriented to room, and instructed to call for assistance or any needs. Call bell in reach. Reviewed goals for today. Will continue to monitor

## 2018-07-15 NOTE — CONSULTS
[facility]  Cardiology  Consult Note    Patient Name: Leonardo Byrd  MRN: [id number]  Admission Date: 7/13/2018  Hospital Length of Stay: 1 days  Code Status: Full Code   Attending Provider: Chris Olivas MD   Consulting Provider: Nahid Barbour MD  Primary Care Physician: Indio Aquino MD  Principal Problem:Acute on chronic combined systolic and diastolic congestive heart failure    Patient information was obtained from patient and past medical records.     Inpatient consult to Cardiology  Consult performed by: NAHID BARBOUR  Consult ordered by: CHRIS OLIVAS        Subjective:     Chief Complaint: shortness of breath    HPI:   48 y/o male with ICM (LVEF 13%) s/p ICD, CAD s/p PCI, HTN, HLD, current smoker, PAD (s/p aortobifemoral bypass, L SFA and pop PTAS in September and recent R SFA and R pop 3/8 by Dr. 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    Patient reports since d/c from hospital he has not taken lasix.  He developed worsening SOB 2 days prior.  He called his son, a critical care practitioner, who advised him to take the grandfather's lasix.  The patient reports he felt uncomfortable taking his dad's medication so he did not. His breathing continued to deteriorate to the point he had trouble speaking in sentences.  Denies orthopnea, PND, lower ext swelling, weight changes. Denies chest pain or pressure even though there is some documentation of this in ED.  Denies any cough, URI symptoms, fevers, chills, congestion. Denies any drainage or erythema from L BKA site.  Reports non compliance with fluid restriction but does watch his salt intake. Quit tobacco use 4.5 months ago, quit EtOH use 6 months ago.    Of note, patient noted for recent admission after presenting to [facility] on 6/12 with SOB, cough, weight, BNP 2400, congested CXR, and elevated lactic acid. He was admitted with COPD and hypotension thought to be secondary  to sepsis. He was started on azithromycin and vancomycin, and given a fluid bolus.  He was found to not be septic the next day and all abx were stopped; etiology then thought cardiogenic shock in etiology.  Unfortunately, he developed ADHF with worsened respiratory status and became progressively hypotensive.  He was subsequently intubated and started on levo/dobutamine. [facility] [place] [facility] [place] higher level of care on 6/16.  Patient was started on SLED given poor UOP and worsening RYAN. He was kept on dobutamine 2.5 mcg/kg/min with daily CO measurements. Patient had recovering renal function and SLED was discontinued 6/17/2018. He was diuresed with IV lasix BID then daily basis until patient became euvolemic with normal-low CVP. He was suitable for extubation on 2nd day of hospital stay however would become significantly agitated when sedation would be weaned down resulting in delaying extubation until 6/19/2018. After extubation, the patient was overtly delirious expressed by confusion, delusion, belligerence, and aggressive. He had to be restrained on the first 2 days following extubation. Psychiatry was consulted and patient was finally less psychotic and delirious on 6/23/2018 after starting scheduled depacon 250mg IV q6h and receiving one dose of thorazine night of 6/22/2018. Given patient's stable clinical status from his cardiogenic shock standpoint it was thought best to step down patient out of ICU settings to decreased delirium recurrence. [facility] assumed care on 6/24 with Dr. Gomez.  The focus quickly shifted from patient's cardiorenal stabilization to his gangrenous L foot.  There was overwhelming concern of osteomyelitis and risk of ascending infection.  A multidisciplinary approach was taken in which the patient was seen by podiatry, vascular, and ID. The patient's extremity was deemed non-viable and the patient was then scheduled for BKA, which occurred on 6/29.  Post-operatively the patient's course  was c/b worsening delirium for which psychiatry assisted.  Mentation improved with medication and he was discharge home with HH under his father's care.     ECHO 6/2018  CONCLUSIONS     1 - Mild left ventricular enlargement.     2 - Severely depressed left ventricular systolic function (EF low to mid teens).     3 - Biatrial enlargement.     4 - Restrictive LV filling pattern, indicating markedly elevated LAP (grade 3 diastolic dysfunction).     5 - Low normal to mildly depressed right ventricular systolic function .     6 - Moderate or more mitral regurgitation.     7 - Mild tricuspid regurgitation.     8 - Increased central venous pressure.     9 - Pulmonary hypertension. The estimated PA systolic pressure is 45 mmHg.    Interval history:  In the ED, patient was found to be hypotensive to 80s-90s/50s-60s. HR was in 90s. EKG showed NSR. RR in teens-20s. SpO2 100% on 3 L NC. Afebrile, no leukocytosis. Initial lactate 3.8-4.9. BNP > 5400. Initial Cr 1.4 (was 1.1 two days prior). CXR showed improvement in cardiomegaly and interstitial attenuation, suggesting improving pulmonary edema, compared to prior. No consolidation. Bedside U/S revealed dilated IVC. Pt was given IVF and IV abx. Pt was given IV lasix followed by symptomatic improvement. 2D echo with CFD showed EF 10-15%, RV enlargement with moderately depressed systolic function, moderate-severe MR, moderate-severe TR, biatrial enlargement, pulm HTN with PA pressure 58, increased CVP, L pleural effusion, and ascites. Pt was admitted to the floor for acute on chronic combined HF. Initially, BP improved to [id number], lactate improved to 3.4, but Cr up to 1.6. Overnight, BP down to 80s-90s/50s-60s again and had an episode of hypothermia to 95.4 F. The next morning, WBC up to 15, lactate up to 3.8, Cr up to 1.9. Cardiology was consulted for evaluation of cardiogenic shock in the setting of CHF exacerbation.    Past Medical History:   Diagnosis Date    AICD  (automatic cardioverter/defibrillator) present     RYAN (acute kidney injury) 3/25/2018    CHF (congestive heart failure)     COPD with acute bronchitis 6/12/2018    Coronary artery disease     Encounter for blood transfusion     Hx of psychiatric care     Hyperlipemia     Hypertension     MI (myocardial infarction)     Neuropathy     PAD (peripheral artery disease)     Psychiatric problem     PVD (peripheral vascular disease)        Past Surgical History:   Procedure Laterality Date    AMPUTATION Right     great toe    BELOW KNEE AMPUTATION OF LOWER EXTREMITY Left 6/29/2018    Procedure: AMPUTATION, BELOW KNEE;  Surgeon: Norris Trejo MD;  Location: [facility] OR [address] [place] [address];  Service: Peripheral Vascular;  Laterality: Left;    BYBPASS GRAFT AORTOBIUFEMORAL      CARDIAC DEFIBRILLATOR PLACEMENT      coronary stents      EXPLORATION AND EVaCUATION OF HEMATOMA OF UPPER EXTREMITY Left     KNEE ARTHROPLASTY Left     VASCULAR SURGERY      fem-pop bypass       Review of patient's allergies indicates:  No Known Allergies    No current facility-administered medications on file prior to encounter.      Current Outpatient Prescriptions on File Prior to Encounter   Medication Sig    atorvastatin (LIPITOR) 80 MG tablet Take 1 tablet (80 mg total) by mouth every evening.    HYDROcodone-acetaminophen (NORCO) 5-325 mg per tablet Take 1 tablet by mouth every 4 (four) hours as needed for Pain.    lidocaine (XYLOCAINE) 5 % Oint ointment Apply topically 4 (four) times daily. Apply to entire left foot    metoprolol tartrate (LOPRESSOR) 25 MG tablet Take 0.5 tablets (12.5 mg total) by mouth 2 (two) times daily.    nicotine (NICODERM CQ) 21 mg/24 hr Place 1 patch onto the skin once daily.    oxyCODONE (ROXICODONE) 10 mg Tab immediate release tablet Take 1 tablet (10 mg total) by mouth every 4 (four) hours as needed for Pain.    pregabalin (LYRICA) 75 MG capsule Take 1 capsule (75 mg total) by mouth every evening.     aspirin (ECOTRIN) 81 MG EC tablet Take 81 mg by mouth once daily.    collagenase ointment Apply topically once daily.    divalproex (DEPAKOTE) 250 MG EC tablet Take 250mg (1 tablet) at 0700, then take 250mg (1 tablet) at 1200, then take 500mg (2 tablets0 at 1900    polyethylene glycol (GLYCOLAX) 17 gram PwPk Take 17 g by mouth 2 (two) times daily as needed (constipation).    senna-docusate 8.6-50 mg (PERICOLACE) 8.6-50 mg per tablet Take 1 tablet by mouth 2 (two) times daily.    thiamine 100 MG tablet Take 1 tablet (100 mg total) by mouth once daily.     Family History     None        Social History Main Topics    Smoking status: Former Smoker     Packs/day: 0.50     Quit date: 5/1/2018    Smokeless tobacco: Former User     Types: Chew     Quit date: 8/2/1980      Comment: Uses nicotine gum and nicotine patches    Alcohol use Yes      Comment: rarely    Drug use: No    Sexual activity: Yes     Partners: Female     Review of Systems   Constitution: Negative for chills, diaphoresis, fever, weakness and malaise/fatigue.   HENT: Negative for congestion, nosebleeds and sore throat.    Eyes: Negative for blurred vision, double vision and pain.   Cardiovascular: Negative for chest pain, claudication, dyspnea on exertion, irregular heartbeat, leg swelling, near-syncope, orthopnea, palpitations, paroxysmal nocturnal dyspnea and syncope.   Respiratory: Positive for shortness of breath. Negative for cough and sleep disturbances due to breathing.    Endocrine: Negative for cold intolerance, heat intolerance, polydipsia and polyuria.   Hematologic/Lymphatic: Negative for adenopathy.   Skin: Negative for color change and poor wound healing.   Musculoskeletal: Negative for arthritis and neck pain.   Gastrointestinal: Negative for bloating, abdominal pain, change in bowel habit, constipation, diarrhea, heartburn, hematemesis, hematochezia, nausea and vomiting.   Genitourinary: Negative for bladder incontinence, dysuria,  flank pain, frequency, hematuria, hesitancy, nocturia and urgency.   Neurological: Negative for dizziness, headaches, light-headedness, loss of balance, numbness, paresthesias, sensory change and tremors.   Psychiatric/Behavioral: Negative for altered mental status. The patient does not have insomnia and is not nervous/anxious.      Objective:     Vital Signs (Most Recent):  Temp: 96.6 °F (35.9 °C) (07/15/18 1201)  Pulse: 87 (07/15/18 1600)  Resp: 18 (07/15/18 1536)  BP: 106/73 (07/15/18 1536)  SpO2: 100 % (07/15/18 1201) Vital Signs (24h Range):  Temp:  [95.4 °F (35.2 °C)-97.7 °F (36.5 °C)] 96.6 °F (35.9 °C)  Pulse:  [80-96] 87  Resp:  [16-24] 18  SpO2:  [96 %-100 %] 100 %  BP: ()/(47-73) 106/73     Weight: 59.8 kg (131 lb 14.4 oz)  Body mass index is 20.66 kg/m².    SpO2: 100 %  O2 Device (Oxygen Therapy): room air      Intake/Output Summary (Last 24 hours) at 07/15/18 1735  Last data filed at 07/15/18 1300   Gross per 24 hour   Intake             1000 ml   Output             1425 ml   Net             -425 ml       Lines/Drains/Airways     Central Venous Catheter Line                 Percutaneous Central Line Insertion/Assessment - triple lumen  06/16/18 0825 right subclavian 29 days          Peripheral Intravenous Line                 Peripheral IV - Single Lumen 07/14/18 0055 Right Antecubital 1 day              Physical Exam   Constitutional: He is oriented to person, place, and time. No distress.   HENT:   Head: Normocephalic and atraumatic.   Eyes: Conjunctivae and EOM are normal.   Neck: Normal range of motion. No JVD present.   Cardiovascular: Normal rate, regular rhythm, normal heart sounds and intact distal pulses.    Pulmonary/Chest: Effort normal. No respiratory distress. He has no wheezes. He has no rales.   Abdominal: Soft. He exhibits no distension. There is no tenderness.   Musculoskeletal: He exhibits no edema.   R AKA  L BKA   Neurological: He is alert and oriented to person, place, and  time.   Skin: Skin is warm, dry and intact. Bruising noted. He is not diaphoretic.   Psychiatric: He has a normal mood and affect. His speech is normal. Thought content normal.     Significant Labs: All pertinent lab results from the last 24 hours have been reviewed.     7/13/2018 18:46 7/13/2018 20:34 7/14/2018 04:22 7/15/2018 08:49   Lactate, Jose Armando 3.8 (HH) 4.9 (HH) 3.3 (H) 3.8 (HH)     CMP:   7/15/2018 03:26   Sodium 135 (L)   Potassium 4.1   Chloride 100   CO2 20 (L)   Anion Gap 15   BUN, Bld 36 (H)   Creatinine 1.9 (H)   eGFR if non African American 40.5 (A)   eGFR if African American 46.8 (A)   Glucose 144 (H)   Calcium 8.8   Magnesium 2.6   Alkaline Phosphatase 203 (H)   Total Protein 7.3   Albumin 2.8 (L)   Total Bilirubin 0.8   AST 70 (H)   ALT 42     CBC:   7/15/2018 03:26   WBC 15.19 (H)   RBC 3.08 (L)   Hemoglobin 9.5 (L)   Hematocrit 29.8 (L)   MCV 97   MCH 30.8   MCHC 31.9 (L)   RDW 21.7 (H)   Platelets 364 (H)   MPV 10.6   Gran% 82.9 (H)   Gran # (ANC) 12.6 (H)   Immature Granulocytes 0.5   Immature Grans (Abs) 0.07 (H)   Lymph% 9.3 (L)   Lymph # 1.4   Mono% 7.2   Mono # 1.1 (H)   Eosinophil% 0.0   Eos # 0.0   Basophil% 0.1   Baso # 0.01   nRBC 2 (A)   Platelet Estimate Appears normal     Significant Imaging: reviewed   EKG 7/14/18:  Normal sinus rhythm  Possible Left atrial enlargement  Septal infarct ,age undetermined  Abnormal ECG  When compared with ECG of 13-JUL-2018 18:51,  Questionable change in axis    2D echo with CFD 7/14/18:     1 - Moderate left ventricular enlargement.     2 - Severely depressed left ventricular systolic function (EF 10-15%).     3 - Right ventricular enlargement with moderately depressed systolic function.     4 - Biatrial enlargement.     5 - Moderate to severe mitral regurgitation.     6 - Moderate to severe tricuspid regurgitation.     7 - Left pleural effusion.     8 - Ascites is noted.     9 - Pulmonary hypertension. The estimated PA systolic pressure is 58 mmHg.      10 - Increased central venous pressure.     Assessment and Plan:     * Acute on chronic combined systolic and diastolic congestive heart failure    -46 y/o male with ICM, HFrEF (10-15%), NYHA class III symptoms. Found to be volume overloaded with increased CVP, BNP > 5400, pulmonary edema/cardiomegaly on CXR (although improving), and hypoxemia consistent with CHF exacerbation. Likely 2/2 to not being on loop diuretic therapy outpatient. Cardiology was consulted for concern for cardiogenic shock in the setting of worsening BP, lactate, kidney function, and WBC.  -On interview today, pt denied worsening SOB/sx of volume overload, reported feeling better than on presentation. On exam, warm and dry with no signs of respiratory distress on low flow NC.  -Hold initiation of dobutamine, given pt is currently HD stable and asymptomatic. BP 80s-90s/50s-60s at rest may be baseline.  -HTS consulted for RHC (plan for tomorrow).  -Discontinue lasix in the setting of hypotension.  -Continue home lopressor 12.5 mg BID.  -Continue home ASA 81 mg daily.  -Continue home atorvastatin 80 mg daily.  -Monitor.            VTE Risk Mitigation         Ordered     heparin (porcine) injection 5,000 Units  Every 8 hours      07/14/18 0819     IP VTE HIGH RISK PATIENT  Once      07/14/18 0819     Place sequential compression device  Until discontinued      07/14/18 0819     IP VTE HIGH RISK PATIENT  Once      07/14/18 0819          Thank you for your consult. I will follow-up with patient. Please contact us if you have any additional questions.    Nahid Barbour MD  Cardiology   [facility]

## 2018-07-15 NOTE — HPI
Given encephalopathy, history was obtained from chart and nurses.    48 y/o male with ICM (LVEF 13%) s/p ICD, CAD s/p PCI, HTN, HLD, current smoker, PAD (s/p aortobifemoral bypass, L SFA and pop PTAS in September and recent R SFA and R pop 3/8 by Dr. 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 He had not been compliant with his lasix.     In the ED, patient was found to be hypotensive to 80s-90s/50s-60s. HR was in 90s. EKG showed NSR. RR in teens-20s. SpO2 100% on 3 L NC. Afebrile, no leukocytosis. Initial lactate 3.8-4.9. BNP > 5400. Initial Cr 1.4 (was 1.1 two days prior). CXR showed improvement in cardiomegaly and interstitial attenuation, suggesting improving pulmonary edema, compared to prior. No consolidation. Bedside U/S revealed dilated IVC. Pt was given IVF and IV abx. Pt was given IV lasix followed by symptomatic improvement. 2D echo with CFD showed EF 10-15%, RV enlargement with moderately depressed systolic function, moderate-severe MR, moderate-severe TR, biatrial enlargement, pulm HTN with PA pressure 58, increased CVP, L pleural effusion, and ascites. Pt was admitted to the floor for acute on chronic combined HF. Initially, BP improved to [id number], lactate improved to 3.4, but Cr up to 1.6. Overnight, BP down to 80s-90s/50s-60s again and had an episode of hypothermia to 95.4 F. The next morning, WBC up to 15, lactate up to 3.8, Cr up to 1.9. Cardiology was consulted for evaluation of cardiogenic shock in the setting of CHF exacerbation.    Patient reported since d/c from hospital he has not taken lasix.  He developed worsening SOB 2 days prior.  He called his son, a critical care practitioner, who advised him to take the grandfather's lasix.  The patient reports he felt uncomfortable taking his dad's medication so he did not. His breathing continued to deteriorate to the point he had trouble speaking in sentences.  Denies orthopnea, PND, lower ext  swelling, weight changes. Denies chest pain or pressure even though there is some documentation of this in ED.  Denies any cough, URI symptoms, fevers, chills, congestion. Denies any drainage or erythema from L BKA site.  Reports non compliance with fluid restriction but does watch his salt intake. Quit tobacco use 4.5 months ago, quit EtOH use 6 months ago.    Today AM, the patient became mildly encephalopathic and the nurse called me to bedside. He could not establish a conversation due to lack of interest, ABG was compatible with metabolic acidosis. LA was 10. I spoke with primary team and cardiology consultant and we agree on transferring the patient to the CCU for better cardiovascular monitoring and starting the patient on dobutamine drip which has not been feasible due to lack of venous access. We attempted to place a central line emergently due to imminent deterioration (he also agreed orally to undergo it) but then he became altered during the procedure and it had to be aborted. Right now he does not have peripheral access.

## 2018-07-15 NOTE — HOSPITAL COURSE
7/17: Please see several notes from today, In summary; patient decompensated in the AM, refused all labs and needed psychiatry consult to be declared incompetent, family signed consent for CVC and RHC on behalf of patient. Later in PM he decompensated again requiring intubation, emergent CVC and A-Line placement and was placed on pressors.      7/18: Patient Intubated and sedated throughout the day. He does follow commands on low sedation. He continues to diurese well off lasix and is negative 5 liters. MAPs have been good on 5 of . Minimal vent settings extubate in AM.    7/19: Patient with thick secretions, new infiltrate on RLL of CXR, 1 fever overnight, cultures sent, broad antibiotics started. Decided to keep intubated today. 1 dose of IV lasix given. MAPs ok.    7/20: Overnight, had some runs of Aflutter with abberancy, again off fentanyl and became extremely agitated, trying to wean off ,     7/21: Got extubated today and was extremely agitated requiring sedation to keep calm, continued diuresis and antibiotics.    7/22: Pt developed hypoxia and respiratory distress initially amenable to NIPPV. Lactic acid measured at 17; antibiotics broadened with vancomycin re-added. Patient became unresponsive around 1400 and was emergently intubated; norepinephrine was started for hypotension surrounding this episode. Patient did not lose his pulse throughout. Discussed patient's course with patient's son, Leonardo Cook, who advised that the current consensus among family members is that patient should be resuscitated in the event of cardiac arrest, up to 20-25 minutes.    Pt became increasingly hypotensive requiring vasopressor support in addition to inotropic support with dobutamine. Pt became unresponsive at 1940, and at that time was found to be tachycardic, hypotensive, and acidotic with a pH of 7.1 and bicarb of 8. Pt was given bicarb IV and started on a bicarb gtt. At 2020, pt was found to be pulseless and  went into PEA arrest. Code Blue was activated and pt underwent ACLS with 5x epinephrine and 3x shocks resulting in ROSC. Pt was found to be in idoventricular rhythm afterward; pt required max doses of epinephrine, norepinephrine, and vasopressin as well as dobutamine to maintain blood pressure. Discussed case with pt's family throughout; decision was made to make pt DNR. Pt was declared  at 2300.

## 2018-07-16 NOTE — PT/OT/SLP PROGRESS
Occupational Therapy Screen & Discharge      Patient Name:  Leonardo Byrd   MRN:  [id number]    Received OT evaluation/treatment 7/16/2018. Upon attempt, pt seated [facility] reported he was able to independently transfer to bedside chair. He reports being at his functional baseline with ability to complete ADLs and transfers without assist. Pt also reports support at home and no need for additional DME. Pt with no skilled acute OT needs at this time. Will d/c OT orders.     Anna Olivas, OT  7/16/2018

## 2018-07-16 NOTE — PROGRESS NOTES
[facility]  Cardiology  Progress Note    Patient Name: Leonardo Byrd  MRN: [id number]  Admission Date: 7/13/2018  Hospital Length of Stay: 2 days  Code Status: Full Code   Attending Physician: Antoni Trujillo MD   Primary Care Physician: Indio Aquino MD  Expected Discharge Date: 7/19/2018  Principal Problem:Acute on chronic combined systolic and diastolic congestive heart failure    Subjective:     Hospital Course:   In the ED, patient was found to be hypotensive to 80s-90s/50s-60s. HR was in 90s. EKG showed NSR. RR in teens-20s. SpO2 100% on 3 L NC. Afebrile, no leukocytosis. Initial lactate 3.8-4.9. BNP > 5400. Initial Cr 1.4 (was 1.1 two days prior). CXR showed improvement in cardiomegaly and interstitial attenuation, suggesting improving pulmonary edema, compared to prior. No consolidation. Bedside U/S revealed dilated IVC. Pt was given IVF and IV abx. Pt was given IV lasix followed by symptomatic improvement. 2D echo with CFD showed EF 10-15%, RV enlargement with moderately depressed systolic function, moderate-severe MR, moderate-severe TR, biatrial enlargement, pulm HTN with PA pressure 58, increased CVP, L pleural effusion, and ascites. Pt was admitted to the floor for acute on chronic combined HF. Initially, BP improved to [id number], lactate improved to 3.4, but Cr up to 1.6. Overnight, BP down to 80s-90s/50s-60s again and had an episode of hypothermia to 95.4 F. The next morning, WBC up to 15, lactate up to 3.8, Cr up to 1.9. Cardiology was consulted for evaluation of cardiogenic shock in the setting of CHF exacerbation.    7/16: Doing better no chest pain, no shortness of breath. RHC in the AM.    Interval History: Doing better today improved breathing today no chest pain    Review of Systems   Constitution: Negative for chills, diaphoresis, fever, weakness and malaise/fatigue.   HENT: Negative for congestion, nosebleeds and sore throat.    Eyes: Negative for blurred  vision, double vision and pain.   Cardiovascular: Negative for chest pain, claudication, dyspnea on exertion, irregular heartbeat, leg swelling, near-syncope, orthopnea, palpitations, paroxysmal nocturnal dyspnea and syncope.   Respiratory: Negative for cough, shortness of breath and sleep disturbances due to breathing.    Endocrine: Negative for cold intolerance, heat intolerance, polydipsia and polyuria.   Hematologic/Lymphatic: Negative for adenopathy.   Skin: Negative for color change and poor wound healing.   Musculoskeletal: Negative for arthritis and neck pain.   Gastrointestinal: Negative for bloating, abdominal pain, change in bowel habit, constipation, diarrhea, heartburn, hematemesis, hematochezia, nausea and vomiting.   Genitourinary: Negative for bladder incontinence, dysuria, flank pain, frequency, hematuria, hesitancy, nocturia and urgency.   Neurological: Negative for dizziness, headaches, light-headedness, loss of balance, numbness, paresthesias, sensory change and tremors.   Psychiatric/Behavioral: Negative for altered mental status. The patient does not have insomnia and is not nervous/anxious.         Objective:     Vital Signs (Most Recent):  Temp: 96.2 °F (35.7 °C) (07/16/18 1531)  Pulse: 108 (07/16/18 1534)  Resp: 18 (07/16/18 1531)  BP: 92/63 (07/16/18 1531)  SpO2: 98 % (07/16/18 1531) Vital Signs (24h Range):  Temp:  [94.3 °F (34.6 °C)-97.6 °F (36.4 °C)] 96.2 °F (35.7 °C)  Pulse:  [] 108  Resp:  [18-20] 18  SpO2:  [91 %-98 %] 98 %  BP: ()/(63-77) 92/63     Weight: 59.8 kg (131 lb 14.4 oz)  Body mass index is 20.66 kg/m².     SpO2: 98 %  O2 Device (Oxygen Therapy): room air      Intake/Output Summary (Last 24 hours) at 07/16/18 1625  Last data filed at 07/16/18 1559   Gross per 24 hour   Intake              720 ml   Output             2050 ml   Net            -1330 ml       Lines/Drains/Airways     Central Venous Catheter Line                 Percutaneous Central Line  Insertion/Assessment - triple lumen  06/16/18 0825 right subclavian 30 days          Peripheral Intravenous Line                 Peripheral IV - Single Lumen 07/14/18 0055 Right Antecubital 2 days         Peripheral IV - Single Lumen 07/15/18 1800 Left Hand less than 1 day                Physical Exam   Constitutional: He is oriented to person, place, and time. No distress.   HENT:   Head: Normocephalic and atraumatic.   Eyes: Conjunctivae and EOM are normal.   Neck: Normal range of motion. No JVD present.   Cardiovascular: Normal rate, regular rhythm, normal heart sounds and intact distal pulses.    Pulmonary/Chest: Effort normal. No respiratory distress. He has no wheezes. He has no rales.   Abdominal: Soft. He exhibits no distension. There is no tenderness.   Musculoskeletal: He exhibits no edema.   R AKA  L BKA   Neurological: He is alert and oriented to person, place, and time.   Skin: Skin is warm, dry and intact. Bruising noted. He is not diaphoretic.   Psychiatric: He has a normal mood and affect. His speech is normal. Thought content normal.          Significant Labs:   BMP:   Recent Labs  Lab 07/15/18  0326 07/16/18 0412   * 77   * 140   K 4.1 3.4*    103   CO2 20* 23   BUN 36* 32*   CREATININE 1.9* 1.4   CALCIUM 8.8 8.7   MG 2.6 2.0   , CMP   Recent Labs  Lab 07/15/18  0326 07/16/18  0412   * 140   K 4.1 3.4*    103   CO2 20* 23   * 77   BUN 36* 32*   CREATININE 1.9* 1.4   CALCIUM 8.8 8.7   PROT 7.3 7.2   ALBUMIN 2.8* 2.8*   BILITOT 0.8 0.9   ALKPHOS 203* 243*   AST 70* 66*   ALT 42 42   ANIONGAP 15 14   ESTGFRAFRICA 46.8* >60.0   EGFRNONAA 40.5* 58.6*   , CBC   Recent Labs  Lab 07/15/18  0326 07/16/18  0412   WBC 15.19* 11.40   HGB 9.5* 8.3*   HCT 29.8* 26.8*   * 330    and Troponin No results for input(s): TROPONINI in the last 48 hours.    Significant Imaging: Echocardiogram:   2D echo with color flow doppler:   Results for orders placed or performed during  the hospital encounter of 07/13/18   2D echo with color flow doppler   Result Value Ref Range    EF 10 (A) 55 - 65    Mitral Valve Regurgitation MODERATE TO SEVERE (A)     Est. PA Systolic Pressure 57.51 (A)     Mitral Valve Mobility NORMAL     Tricuspid Valve Regurgitation MODERATE TO SEVERE (A)      Assessment and Plan:       * Acute on chronic combined systolic and diastolic congestive heart failure    -Hold initiation of dobutamine, given pt is currently HD stable and asymptomatic. BP 80s-90s/50s-60s at rest may be baseline.  -HTS consulted for RHC will do in AM  -Discontinue lasix in the setting of hypotension.  -Continue home lopressor 12.5 mg BID.  -Continue home ASA 81 mg daily.  -Continue home atorvastatin 80 mg daily.  -Monitor.            VTE Risk Mitigation         Ordered     IP VTE HIGH RISK PATIENT  Once      07/14/18 0819     Place sequential compression device  Until discontinued      07/14/18 0819     IP VTE HIGH RISK PATIENT  Once      07/14/18 0819          Teresa Can MD  Cardiology  [facility]

## 2018-07-16 NOTE — PLAN OF CARE
Problem: Physical Therapy Goal  Goal: Physical Therapy Goal  Outcome: Outcome(s) achieved Date Met: 07/16/18    Pt is safe and independent with all mobility. Pt no longer requires PT services.  Appropriate transfer level with nursing staff: Bed <> Chair:  Scoot Pivot with Independent.    Madie Parikh PT, DPT  7/16/2018  Pager: [phone]

## 2018-07-16 NOTE — PLAN OF CARE
07/16/18 1104   Discharge Assessment   Assessment Type Discharge Planning Assessment   Confirmed/corrected address and phone number on facesheet? No   Assessment information obtained from? Medical Record   Expected Length of Stay (days) 5   Communicated expected length of stay with patient/caregiver yes   Prior to hospitilization cognitive status: Alert/Oriented   Prior to hospitalization functional status: Assistive Equipment;Needs Assistance   Current cognitive status: Alert/Oriented   Current Functional Status: Assistive Equipment;Needs Assistance   Lives With sibling(s);parent(s)   Able to Return to Prior Arrangements yes   Is patient able to care for self after discharge? Unable to determine at this time (comments)   Patient's perception of discharge disposition home or selfcare;home health   Readmission Within The Last 30 Days previous discharge plan unsuccessful   Patient currently being followed by outpatient case management? No   Equipment Currently Used at Home bath bench;wheelchair   Do you have any problems affording any of your prescribed medications? TBD   Is the patient taking medications as prescribed? yes   Does the patient have transportation home? Yes   Transportation Available car;family or friend will provide   Does the patient receive services at the [facility]? No   Discharge Plan A Home;Home Health  (possible  infusion)   Discharge Plan B Home with family;Home Health   Readmission Questionnaire   At the time of your discharge, did someone talk to you about what your health problems were? Yes   At the time of discharge, did someone talk to you about what to watch out for regarding worsening of your health problem? Yes   At the time of discharge, did someone talk to you about what to do if you experienced worsening of your health problem? Yes   At the time of discharge, did someone talk to you about which medication to take when you left the hospital and which ones to stop taking?  Yes   At the time of discharge, did someone talk to you about when and where to follow up with a doctor after you left the hospital? Yes   How often do you need to have someone help you when you read instructions, pamphlets, or other written material from your doctor or pharmacy? Sometimes   Do you have problems taking your medications as prescribed? No   Do you have any problems affording any of  your prescribed medications? No   Do you have problems obtaining/receiving your medications? No   Does the patient have transportation to healthcare appointments? Yes   Living Arrangements house   Does your caregiver provide all the help you need? Yes   Are you currently feeling confused? No   Are you currently having problems thinking? No   Are you currently having memory problems? No   Admitted with CHF. Known to this CM from recent admit. Was DC with [facility]. May now need  gtt too.

## 2018-07-16 NOTE — ASSESSMENT & PLAN NOTE
-Hold initiation of dobutamine, given pt is currently HD stable and asymptomatic. BP 80s-90s/50s-60s at rest may be baseline.  -HTS consulted for RHC will do in AM  -Discontinue lasix in the setting of hypotension.  -Continue home lopressor 12.5 mg BID.  -Continue home ASA 81 mg daily.  -Continue home atorvastatin 80 mg daily.  -Monitor.

## 2018-07-16 NOTE — PROGRESS NOTES
"Progress Note  Hospital Medicine    Provider team:    [facility] [place] [place] HOSP MED C  [facility] [place] [place] CARDIOLOGY TEAM A - CARDIOLOGY CONSULT STEPDOWN  [facility] [place] [place] RAPID RESPONSE TEAM  Admit Date: 7/13/2018  Encounter Date: 07/16/2018     SUBJECTIVE:     Follow-up Visit for: Acute on chronic combined systolic and diastolic congestive heart failure    HPI (See H&P for complete P,F,SHx):   47M ICM (LVEF 10-15%) s/p ICD, CAD s/p PCI, HTN, HLD, current smoker, PAD (s/p aortobifemoral bypass, L SFA and pop PTAS in September and recent R SFA and R pop 3/8 by Dr. Carl Bell and finally R AKA 3/2018 and L BKA 6/2018) here for evaluation of acute on chronic combined HF.  Patient reports since d/c from hospital he has not taken lasix.  He developed worsening SOB 2 days prior.  He called his son, who is a critical care practitioner.  His son advised him to take the grandfather's lasix.  The patient reports he felt uncomfortable taking his dad's medication so he did not, despite the fact he thought it might make him feel better.  He only continued to deteriorate to the point he had "trouble saying a sentence".  He reports no chest pain or pressure even though there is some documentation of this in ED.  He denies any cough, URI symptoms, fevers, chills, congestion.  He denies any drainage or erythema from L BKA site.  He reports that he is non-adherent to fluid restriction but does watch his salt intake.  He has given up on tobacco and alcohol.     In ED patient noted to have elevated lactate. Patient started on IVF and IV abx. CCM was consulted and completed bedside U/S revealing dilated IVC.  Patient given IV lasix and triaged to general medical floor.  The patient reports feeling much improved after IV lasix.     Of note, patient noted for recent admission after presenting to [facility] on 6/12 with SOB, cough, weight, BNP 2400, congested CXR, and elevated lactic acid. He was admitted with COPD and hypotension thought to be secondary to sepsis. He was " started on azithromycin and vancomycin, and given a fluid bolus.  He was found to not be septic the next day and all abx were stopped; etiology then thought cardiogenic shock in etiology.  Unfortunately, he developed ADHF with worsened respiratory status and became progressively hypotensive.  He was subsequently intubated and started on levo/dobutamine. Transferred to [facility] [place] [facility]U for higher level of care on 6/16.  Patient was started on SLED given poor UOP and worsening RYAN. He was kept on dobutamine 2.5 mcg/kg/min with daily CO measurements. Patient had recovering renal function and SLED was discontinued 6/17/2018. He was diuresed with IV lasix BID then daily basis until patient became euvolemic with normal-low CVP. He was suitable for extubation on 2nd day of hospital stay however would become significantly agitated when sedation would be weaned down resulting in delaying extubation until 6/19/2018. After extubation, the patient was overtly delirious expressed by confusion, delusion, belligerence, and aggressive. He had to be restrained on the first 2 days following extubation. Psychiatry was consulted and patient was finally less psychotic and delirious on 6/23/2018 after starting scheduled depacon 250mg IV q6h and receiving one dose of thorazine night of 6/22/2018. Given patient's stable clinical status from his cardiogenic shock standpoint it was thought best to step down patient out of ICU settings to decreased delirium recurrence. C assumed care on 6/24 with Dr. Gomez.  The focus quickly shifted from patient's cardiorenal stabilization to his gangrenous L foot.  There was overwhelming concern of osteomyelitis and risk of ascending infection.  A multidisciplinary approach was taken in which the patient was seen by podiatry, vascular, and ID. The patient's extremity was deemed non-viable and the patient was then scheduled for BKA, which occurred on 6/29.  Post-operatively the patient's course was c/b worsening  delirium for which psychiatry assisted.  Mentation improved with medication and he was discharge home with HH under his father's care.     ECHO 7/14  CONCLUSIONS     1 - Moderate left ventricular enlargement.     2 - Severely depressed left ventricular systolic function (EF 10-15%).     3 - Right ventricular enlargement with moderately depressed systolic function.     4 - Biatrial enlargement.     5 - Moderate to severe mitral regurgitation.     6 - Moderate to severe tricuspid regurgitation.     7 - Left pleural effusion.     8 - Ascites is noted.     9 - Pulmonary hypertension. The estimated PA systolic pressure is 58 mmHg.     10 - Increased central venous pressure.      Interval history:  Patient with persistent hypoTN and hypothermia overnight. Cardiology is consulted.  Patient is being planned for RHC today.    Review of Systems:  Review of Systems   Constitutional: Negative for chills and fever.   HENT: Negative for congestion and sore throat.    Eyes: Negative for photophobia, pain and discharge.   Respiratory: Positive for shortness of breath. Negative for cough, hemoptysis and sputum production.    Cardiovascular: Negative for chest pain, palpitations and leg swelling.   Gastrointestinal: Negative for abdominal pain, diarrhea, nausea and vomiting.   Genitourinary: Negative for dysuria and urgency.   Musculoskeletal: Negative for myalgias and neck pain.   Skin: Negative for itching and rash.   Neurological: Negative for sensory change, focal weakness and headaches.   Endo/Heme/Allergies: Negative for polydipsia. Does not bruise/bleed easily.   Psychiatric/Behavioral: Negative for depression and suicidal ideas.       OBJECTIVE:       Intake/Output Summary (Last 24 hours) at 07/16/18 0855  Last data filed at 07/16/18 0500   Gross per 24 hour   Intake              600 ml   Output             1700 ml   Net            -1100 ml     Vital Signs Range (Last 24H):  Temp:  [94.3 °F (34.6 °C)-97.6 °F (36.4 °C)]  "  Pulse:  []   Resp:  [16-20]   BP: ()/(55-77)   SpO2:  [94 %-100 %]   Body mass index is 20.66 kg/m².    Objective:  General Appearance:  Comfortable, well-appearing, in no acute distress and not in pain.    Vital signs: (most recent): Blood pressure 99/66, pulse 91, temperature (!) 94.3 °F (34.6 °C), temperature source Axillary, resp. rate 18, height 5' 7" (1.702 m), weight 59.8 kg (131 lb 14.4 oz), SpO2 (!) 94 %.  No fever.    Output: Producing urine and producing stool.    HEENT: Normal HEENT exam.  (+JVD)    Lungs:  Normal effort.  Breath sounds clear to auscultation.  He is not in respiratory distress.  There are rales.  No wheezes.    Heart: Normal rate.  Regular rhythm.  S1 normal and S2 normal.  Positive for murmur.    Chest: Symmetric chest wall expansion.   Abdomen: Abdomen is soft.  Bowel sounds are normal.   There is no abdominal tenderness.     Extremities: Normal range of motion.  There is venous stasis and dependent edema.  There is no deformity, effusion or local swelling.    Pulses: Distal pulses are intact.    Neurological: Patient is alert and oriented to person, place and time.  Normal strength.    Pupils:  Pupils are equal, round, and reactive to light.    Skin:  Warm and dry.      Medications:  Medication list was reviewed in EPIC and changes noted under Assessment/Plan and MAR.    Laboratory:  Recent Labs      07/16/18   0412   WBC  11.40   RBC  2.73*   HGB  8.3*   HCT  26.8*   PLT  330   MCV  98   MCH  30.4   MCHC  31.0*   GRAN  62.4  7.1   LYMPH  30.0  3.4   MONO  5.3  0.6   EOS  0.2      Recent Labs      07/16/18   0412   GLU  77   NA  140   K  3.4*   CL  103   CO2  23   BUN  32*   CREATININE  1.4   CALCIUM  8.7   ANIONGAP  14   MG  2.0       ASSESSMENT/PLAN:     Active Hospital Problems    Diagnosis  POA    *Acute on chronic combined systolic and diastolic congestive heart failure [I50.43]  Yes    Lactic acid acidosis [E87.2]  Yes    COPD, severe [J44.9]  Yes    Acute " renal failure [N17.9]  Yes    Ischemic cardiomyopathy [I25.5]  Yes    PAD (peripheral artery disease) [I73.9]  Yes    Peripheral vascular disease of lower extremity [I73.9]  Yes     S/p right AKA.     TcPO2 L foot 5/11/2018   Dorsum 43 to 171 mmHg with oxygen   Medial 61 to 144 mmHg with oxygen   Chest wall baseline 43 mmHg      Tobacco abuse [Z72.0]  Yes    Non compliance w medication regimen [Z91.14]  Not Applicable      Resolved Hospital Problems    Diagnosis Date Resolved POA   No resolved problems to display.      Cardiogenic shock  Acute on chronic combined HF  - likely due to ADHF following volume resuscitation when received IVF for suspected sepsis on initial presentation  - okay to c/w BB, hold for SBP <90  - Hold ACEI due to renal function status  - Hold imdur due to relative hypotension  - Holding lasix for now  - Strict Is/Os, daily weights  - Cardiology co-management consulted for inotropic support  - Plan for RHC today, 7/16     Peripheral vascular disease of lower extremity  S/p BKA  - S/p BKA  - No further abx needed  - Consult to wound care     Acute renal failure superimposed on CKDIII  - Likely ATN due to ADHF and ischemic insult with hypotension  - Avoid nephrotoxins and renally dose medications.   - Strict intake and output.   - Monitor sCr with diuresis     Encephalopathy, metabolic  - Patient with disorientation to date, but oriented to person, place, and situation  - Suspect due to hypoxia  - Treat underlying medical conditions  - Patient completely cooperative  - Consider consultation to psychiatry  - Consider resumption of depacon     Moderate protein-calorie malnutrition  - Nutrition consult     COPD, mixed simple and mucopurulent  - Duonebs prn      Alcohol abuse  - Reportedly last drink was weeks to months ago.   - Avoiding BZD as possible.      Tobacco abuse  - Nicotine patch PRN     DVT/GI PPx in place  Cardiac diet with 1500 cc fluid restriction  Full code    Anticipated  discharge date and disposition:   Disposition pending further cardiology evaluation.  Antoni Trujillo MD  [facility]

## 2018-07-16 NOTE — PT/OT/SLP EVAL
Physical Therapy Evaluation / Discharge Summary    Patient Name:  Leonardo Byrd   MRN:  [id number]  Admitting Diagnosis:  Acute on chronic combined systolic and diastolic congestive heart failure   Recent Surgery: Procedure(s) (LRB):  CATHETERIZATION, HEART, RIGHT, FOR CONGENITAL HEART DEFECT (Bilateral)      Recommendations:     Discharge Recommendations:  Home no needs  Discharge Equipment Recommendations:  (pt waiting on power scotter and to be fitted for prosthesis)   Barriers to discharge: None    Plan:       Pt is safe and independent with all mobility. Pt no longer requires PT services.  · Plan of Care Expires:      Plan of Care Reviewed with: patient    This Plan of care has been discussed with the patient who was involved in its development and understands and is in agreement with the identified goals and treatment plan    History:     Patient lives with his father in [place], [place]  in a single family home with no MANI, ramps present.  Patient reports being independent with mobility & with ADLs.  Patient uses DME as follows: bath bench, wheelchair.    DME owned (not currently used): none.  Hand Dominance: right    Roles/Repsonsibilities:   Work: no.    Drive: no.    Managing Medicines/Managing Home: yes.    Hobbies: none stated.  Upon discharge, patient will have assistance from father.    Past Medical History:   Diagnosis Date    AICD (automatic cardioverter/defibrillator) present     RYAN (acute kidney injury) 3/25/2018    CHF (congestive heart failure)     COPD with acute bronchitis 6/12/2018    Coronary artery disease     Encounter for blood transfusion     Hx of psychiatric care     Hyperlipemia     Hypertension     MI (myocardial infarction)     Neuropathy     PAD (peripheral artery disease)     Psychiatric problem     PVD (peripheral vascular disease)        Past Surgical History:   Procedure Laterality Date    AMPUTATION Right     great toe    BELOW KNEE AMPUTATION OF LOWER EXTREMITY  "Left 6/29/2018    Procedure: AMPUTATION, BELOW KNEE;  Surgeon: Norris Trejo MD;  Location: [facility] OR [address] [place] [address];  Service: Peripheral Vascular;  Laterality: Left;    BYBPASS GRAFT AORTOBIUFEMORAL      CARDIAC DEFIBRILLATOR PLACEMENT      coronary stents      EXPLORATION AND EVaCUATION OF HEMATOMA OF UPPER EXTREMITY Left     KNEE ARTHROPLASTY Left     VASCULAR SURGERY      fem-pop bypass       Subjective     Communicated with DENISE Munguia prior to session.  Patient found seated in bedside chair upon PT entry to room, agreeable to evaluation.   "I'm moving just fine."  Chief Complaint: SOB  Patient comments/goals: none stated  Pain/Comfort:  · Pain Rating 1: 0/10  · Pain Rating Post-Intervention 1: 0/10    Objective:     Patient found with:  nasal cannula    General Precautions: Standard, Cardiac     Orthopedic Precautions: L BKA, R AKA  Braces:     Respiratory Status: nasal cannula    Exam:  · Mental Status: Patient is oriented to Person, Place, Time and Situation and follows 100% of verbal commands. Pt is Alert and Cooperative during session.  · Skin Integrity: Visible skin intact  · Edema: none noted  · Sensation: Intact  · Hearing: Intact  · Vision:  Intact  · Coordination:  NT  · Range of Motion:  · RUE: WFL  · LUE: WFL  · RLE: BKA  · LLE: AKA  · Strength Exam:  · Lower Extremity Strength  (R) LE  (L) LE    Hip Flexion: 5/5 Hip flexion: 5/5   Knee flexion: 4/5 Knee flexion: ---   Knee extension: 4/5 Knee extension --   Ankle dorsiflexion: - Ankle dorsiflexion: -   Ankle plantarflexion: - Ankle plantarflexion: -     Functional Mobility:  Deferred functional mobility. Pt and RN state pt transferred to bedside chair independently. Pt voices baseline mobility with no questions or concerns about returning home    Therapeutic Activities & Exercises:     Education:  Patient provided with daily orientation and goals of this PT session. Patient agreed to participate in session. Patient aware of patient's deficits and " therapy progression. Encouraged patient to perform daily exercises & mobility to increase endurance and decrease effects of bedrest. Time provided for therapeutic counseling and discussion of health disposition. All questions answered to patient's satisfaction, within scope of PT practice; voiced no other concerns. White board updated in patient's room, RN notified of session.    Patient left up in chair, with head in midline, neutral pelvis & heels floated for skin protection with all lines intact and call button in reach    AM-PAC 6 CLICK MOBILITY  Total Score:15     Assessment:     Leonardo Byrd is a 47 y.o. male admitted with a medical diagnosis of Acute on chronic combined systolic and diastolic congestive heart failure.  Pt is safe and independent with all mobility. Pt no longer requires PT services.    Problem List: no impairments identified  Rehab Prognosis: excellent    GOALS: No goals identified at [facility].     Clinical Decision Making:   On examination of body system using standardized tests and measures patient presents with 1-2 elements from any of the following: body structures and functions, activity limitations, and/or participation restrictions.  Leading to a clinical presentation that is considered stable and/or uncomplicated  Evaluation Complexity:  Low- 64370      Time Tracking:     PT Received On: 07/16/18  PT Start Time: 0844     PT Stop Time: 0852  PT Total Time (min): 8 min     Billable Minutes: Evaluation 8    Madie Parikh PT, DPT  07/16/2018  Pager:[phone]

## 2018-07-16 NOTE — PLAN OF CARE
Problem: Patient Care Overview  Goal: Plan of Care Review  Outcome: Ongoing (interventions implemented as appropriate)  Pt is A, A, Ox4. Calm, cooperative. Free of falls, trauma, and injuries. Pt has RLE AKA and ITALO KOHLER with staples CDI. Pt educated on treatment plan. Pt demonstrates and verbalizes understanding. VSS. Wound care consulted. Holding diuresis d/t low BP. R heart cath planned. Plan of care reviewed with pt.

## 2018-07-16 NOTE — PLAN OF CARE
Problem: Patient Care Overview  Goal: Plan of Care Review  Outcome: Ongoing (interventions implemented as appropriate)  Pain being managed with prn meds; denies chest pain, SOB, or other discomfort. Pt remains free of falls or injuries. Pt verbalizes complete understanding of plan of care. Will continue to monitor.

## 2018-07-16 NOTE — CARE UPDATE
RN Proactive Rounding Note  Time of Visit: 09:15     Admit Date: 2018  LOS: 2  Code Status: Full Code   Date of Visit: 2018  : 1970  Age: 47 y.o.  Sex: male  Bed: [facility] 306 /[facility] A:   MRN: [id number]  Was the patient discharged from an ICU this admission? no  Was the patient discharged from a PACU within last 24 hours? no  Did the patient receive conscious sedation/general anesthesia in last 24 hours? no  Was the patient in the ED within the past 24 hours? no  Was the patient started on NIPPV within the past 24 hours? no  Attending Physician: Antoni Trujillo MD  Primary Service: [facility] CARDIOLOGY TEAM A - CARDIOLOGY CONSULT STEPDOWN      ASSESSMENT:     Abnormal Vital Signs: Temp 94.3 ax   Clinical Issues: Circulatory     INTERVENTIONS/ RECOMMENDATIONS:     Patient admitted for CHF exacerbation seen on proactive rounds for hypothermia. Upon bedside exam, patient sitting up comfortably in chair. No complaints. Temp checked and was 96.3 oral. Vitals stable. Patient stable at this time.     Discussed plan of care with DENISE Munguia     PHYSICIAN ESCALATION:     Yes/No no    Orders received and case discussed with   N/a      Disposition: CSU     FOLLOW-UP/CONTINGENCY:       Call back the Rapid Response Nurse at x [phone]  for additional questions or concerns

## 2018-07-16 NOTE — SUBJECTIVE & OBJECTIVE
Interval History: Doing better today improved breathing today no chest pain    Review of Systems   Constitution: Negative for chills, diaphoresis, fever, weakness and malaise/fatigue.   HENT: Negative for congestion, nosebleeds and sore throat.    Eyes: Negative for blurred vision, double vision and pain.   Cardiovascular: Negative for chest pain, claudication, dyspnea on exertion, irregular heartbeat, leg swelling, near-syncope, orthopnea, palpitations, paroxysmal nocturnal dyspnea and syncope.   Respiratory: Negative for cough, shortness of breath and sleep disturbances due to breathing.    Endocrine: Negative for cold intolerance, heat intolerance, polydipsia and polyuria.   Hematologic/Lymphatic: Negative for adenopathy.   Skin: Negative for color change and poor wound healing.   Musculoskeletal: Negative for arthritis and neck pain.   Gastrointestinal: Negative for bloating, abdominal pain, change in bowel habit, constipation, diarrhea, heartburn, hematemesis, hematochezia, nausea and vomiting.   Genitourinary: Negative for bladder incontinence, dysuria, flank pain, frequency, hematuria, hesitancy, nocturia and urgency.   Neurological: Negative for dizziness, headaches, light-headedness, loss of balance, numbness, paresthesias, sensory change and tremors.   Psychiatric/Behavioral: Negative for altered mental status. The patient does not have insomnia and is not nervous/anxious.         Objective:     Vital Signs (Most Recent):  Temp: 96.2 °F (35.7 °C) (07/16/18 1531)  Pulse: 108 (07/16/18 1534)  Resp: 18 (07/16/18 1531)  BP: 92/63 (07/16/18 1531)  SpO2: 98 % (07/16/18 1531) Vital Signs (24h Range):  Temp:  [94.3 °F (34.6 °C)-97.6 °F (36.4 °C)] 96.2 °F (35.7 °C)  Pulse:  [] 108  Resp:  [18-20] 18  SpO2:  [91 %-98 %] 98 %  BP: ()/(63-77) 92/63     Weight: 59.8 kg (131 lb 14.4 oz)  Body mass index is 20.66 kg/m².     SpO2: 98 %  O2 Device (Oxygen Therapy): room air      Intake/Output Summary (Last 24  hours) at 07/16/18 1625  Last data filed at 07/16/18 1559   Gross per 24 hour   Intake              720 ml   Output             2050 ml   Net            -1330 ml       Lines/Drains/Airways     Central Venous Catheter Line                 Percutaneous Central Line Insertion/Assessment - triple lumen  06/16/18 0825 right subclavian 30 days          Peripheral Intravenous Line                 Peripheral IV - Single Lumen 07/14/18 0055 Right Antecubital 2 days         Peripheral IV - Single Lumen 07/15/18 1800 Left Hand less than 1 day                Physical Exam   Constitutional: He is oriented to person, place, and time. No distress.   HENT:   Head: Normocephalic and atraumatic.   Eyes: Conjunctivae and EOM are normal.   Neck: Normal range of motion. No JVD present.   Cardiovascular: Normal rate, regular rhythm, normal heart sounds and intact distal pulses.    Pulmonary/Chest: Effort normal. No respiratory distress. He has no wheezes. He has no rales.   Abdominal: Soft. He exhibits no distension. There is no tenderness.   Musculoskeletal: He exhibits no edema.   R AKA  L BKA   Neurological: He is alert and oriented to person, place, and time.   Skin: Skin is warm, dry and intact. Bruising noted. He is not diaphoretic.   Psychiatric: He has a normal mood and affect. His speech is normal. Thought content normal.          Significant Labs:   BMP:   Recent Labs  Lab 07/15/18  0326 07/16/18 0412   * 77   * 140   K 4.1 3.4*    103   CO2 20* 23   BUN 36* 32*   CREATININE 1.9* 1.4   CALCIUM 8.8 8.7   MG 2.6 2.0   , CMP   Recent Labs  Lab 07/15/18  0326 07/16/18 0412   * 140   K 4.1 3.4*    103   CO2 20* 23   * 77   BUN 36* 32*   CREATININE 1.9* 1.4   CALCIUM 8.8 8.7   PROT 7.3 7.2   ALBUMIN 2.8* 2.8*   BILITOT 0.8 0.9   ALKPHOS 203* 243*   AST 70* 66*   ALT 42 42   ANIONGAP 15 14   ESTGFRAFRICA 46.8* >60.0   EGFRNONAA 40.5* 58.6*   , CBC   Recent Labs  Lab 07/15/18  0326 07/16/18 0412    WBC 15.19* 11.40   HGB 9.5* 8.3*   HCT 29.8* 26.8*   * 330    and Troponin No results for input(s): TROPONINI in the last 48 hours.    Significant Imaging: Echocardiogram:   2D echo with color flow doppler:   Results for orders placed or performed during the hospital encounter of 07/13/18   2D echo with color flow doppler   Result Value Ref Range    EF 10 (A) 55 - 65    Mitral Valve Regurgitation MODERATE TO SEVERE (A)     Est. PA Systolic Pressure 57.51 (A)     Mitral Valve Mobility NORMAL     Tricuspid Valve Regurgitation MODERATE TO SEVERE (A)

## 2018-07-16 NOTE — PLAN OF CARE
Discharge planning screening completed with pt in room.  Pt is familiar to  from previous admit.  Pt is an alert disabled gentleman who lives in [facility] with his father and son and plans to return.  Pt is currently being followed by [facility].  sw will follow.

## 2018-07-16 NOTE — PROGRESS NOTES
Pt had a 17 beat run of V tach. Pt is symptomatic. VSS. MD Gonzalez notified. No new orders given at this time. Will continue to monitor.

## 2018-07-17 NOTE — ASSESSMENT & PLAN NOTE
- Intubated, sedated increased PEEP requirement  -Diurese as needed,   - Trend CVP, SVO2, ABG  -Epi and Dobutamine drips, wean epi first  - Repeat TTE in AM

## 2018-07-17 NOTE — NURSING TRANSFER
Nursing Transfer Note      7/17/2018     Transfer to [facility] 3065  Transfer via bed    Transfer with 2L nasal cannula    Transported by RN escort x 2    Medicines sent: none to be sent    Chart send with patient: Yes    Notified: MD to notify family    Patient reassessed at 0400    Upon arrival to floor RN received patient at bedside    Barb Wright RN  7/17/2018

## 2018-07-17 NOTE — PROGRESS NOTES
"Pt transferred from CSU as upgrade/CORE call for worsening cardiogenic shock/lactic 10. Pt skin cool to touch, lips and earlobes cyanotic. Pt complains of SOB with panic attacks as witnessed by fellow, Dr. Jeimy Mayorga. 4L oxygen via nasal cannula applied with oxygen saturation picking up intermittently, reading in 90s. Cardiology fellow, Dr. Mayorga with assistance from Dr. Oswald, attempted to place R IJ TLC via emergency consent due to pt's rapidly declining status, however pt caused physicians to abort procedure by throwing off sterile field and stating "I can't do this anymore". Ativan PO ordered for increasing anxiety by fellow. Pt refused medication despite benefits explained. MD aware of refusal. Also attempted to remove L AC IV due to IV not flushing, however pt refused. MD gave order to retime labs for AM tomorrow as all morning labs and CORE labs drawn on floor. Report given to Magda/ Yas at 0635.  "

## 2018-07-17 NOTE — NURSING
Rapid Response Nurse Note     Rapid Response Metrics:     Admit Date: 2018  LOS: 3  Code Status: Full Code   Date of Consult: 2018  : 1970  Age: 47 y.o.  Weight:   Wt Readings from Last 1 Encounters:   07/15/18 59.8 kg (131 lb 14.4 oz)     Sex: male  Bed: [place] [address] /[place] [address] A:   MRN: [id number]  Time Rapid Response Team page Received: 0238  Time Rapid Response Team at Bedside: 0238  Time Rapid Response Team left Bedside: 0510  Was the patient discharged from an ICU this admission?no  Was the patient discharged from a PACU within last 24 hours? no  Did the patient receive conscious sedation/general anesthesia within last 24 hours? no  Was the patient in the ED within the past 24 hours?no  Was the patient started on NIPPV within the past 24 hours?no  Attending Physician: Antoni Trujillo MD  Primary Service: [facility]  Consult Requested By: Antoni Trujillo MD   Rapid Response Indication(s): AMS, decrease O2Sats  Disposition: Transfer to [facility] 3065    SITUATION:     Reason for Call:   Called to evaluate the patient for    Neuro  Respiratory  Circulatory      BACKGROUND:     Why is the patient in the hospital?: Acute on chronic combined systolic and diastolic congestive heart failure  What changed?: Patient being increasing confused. Pulled out PIV, Talking to people not there and does not know where he is. Skin cool and calmly to touch. Unable to good temp or O2 sat. Rapid response called    ASSESSMENT:     What did you find: See above. VVG 30. Called  with results. Unable to obtain ABG at present time, patient refusing. Will consult Cards.     RECOMMENDATIONS:     We recommend: VVG, ABG, Lactic, EKG, CBC, CMP    FOLLOW-UP/CONTINGENCY PLAN:       Call back the rapid Response Nurse at x [phone]  For additional       PHYSICIAN ESCALATION:     Orders received and case discussed with Dr. John and Dr. Munson

## 2018-07-17 NOTE — NURSING
Cardiology fellow called about Patient beginning to appear more fatigued with cardiac monitor as only intervention.  Spoke with Dr. Arevalo-manual. Stated psych consult will determine plan of care, no new orders given

## 2018-07-17 NOTE — SUBJECTIVE & OBJECTIVE
Past Medical History:   Diagnosis Date    AICD (automatic cardioverter/defibrillator) present     RYAN (acute kidney injury) 3/25/2018    CHF (congestive heart failure)     COPD with acute bronchitis 6/12/2018    Coronary artery disease     Encounter for blood transfusion     Hx of psychiatric care     Hyperlipemia     Hypertension     MI (myocardial infarction)     Neuropathy     PAD (peripheral artery disease)     Psychiatric problem     PVD (peripheral vascular disease)        Past Surgical History:   Procedure Laterality Date    AMPUTATION Right     great toe    BELOW KNEE AMPUTATION OF LOWER EXTREMITY Left 6/29/2018    Procedure: AMPUTATION, BELOW KNEE;  Surgeon: Norris Trejo MD;  Location: [facility] OR [address] [place] [address];  Service: Peripheral Vascular;  Laterality: Left;    BYBPASS GRAFT AORTOBIUFEMORAL      CARDIAC DEFIBRILLATOR PLACEMENT      coronary stents      EXPLORATION AND EVaCUATION OF HEMATOMA OF UPPER EXTREMITY Left     KNEE ARTHROPLASTY Left     VASCULAR SURGERY      fem-pop bypass       Review of patient's allergies indicates:  No Known Allergies    No current facility-administered medications on file prior to encounter.      Current Outpatient Prescriptions on File Prior to Encounter   Medication Sig    atorvastatin (LIPITOR) 80 MG tablet Take 1 tablet (80 mg total) by mouth every evening.    HYDROcodone-acetaminophen (NORCO) 5-325 mg per tablet Take 1 tablet by mouth every 4 (four) hours as needed for Pain.    lidocaine (XYLOCAINE) 5 % Oint ointment Apply topically 4 (four) times daily. Apply to entire left foot    metoprolol tartrate (LOPRESSOR) 25 MG tablet Take 0.5 tablets (12.5 mg total) by mouth 2 (two) times daily.    nicotine (NICODERM CQ) 21 mg/24 hr Place 1 patch onto the skin once daily.    oxyCODONE (ROXICODONE) 10 mg Tab immediate release tablet Take 1 tablet (10 mg total) by mouth every 4 (four) hours as needed for Pain.    pregabalin (LYRICA) 75 MG capsule Take 1  capsule (75 mg total) by mouth every evening.    aspirin (ECOTRIN) 81 MG EC tablet Take 81 mg by mouth once daily.    collagenase ointment Apply topically once daily.    divalproex (DEPAKOTE) 250 MG EC tablet Take 250mg (1 tablet) at 0700, then take 250mg (1 tablet) at 1200, then take 500mg (2 tablets0 at 1900    polyethylene glycol (GLYCOLAX) 17 gram PwPk Take 17 g by mouth 2 (two) times daily as needed (constipation).    senna-docusate 8.6-50 mg (PERICOLACE) 8.6-50 mg per tablet Take 1 tablet by mouth 2 (two) times daily.    thiamine 100 MG tablet Take 1 tablet (100 mg total) by mouth once daily.     Family History     None        Social History Main Topics    Smoking status: Former Smoker     Packs/day: 0.50     Quit date: 5/1/2018    Smokeless tobacco: Former User     Types: Chew     Quit date: 8/2/1980      Comment: Uses nicotine gum and nicotine patches    Alcohol use Yes      Comment: rarely    Drug use: No    Sexual activity: Yes     Partners: Female     Review of Systems   Unable to perform ROS: mental status change     Objective:     Vital Signs (Most Recent):  Temp: 98.8 °F (37.1 °C) (07/16/18 2324)  Pulse: 93 (07/17/18 0324)  Resp: 20 (07/17/18 0324)  BP: 113/71 (07/17/18 0442)  SpO2: 96 % (07/16/18 2324) Vital Signs (24h Range):  Temp:  [94.3 °F (34.6 °C)-98.8 °F (37.1 °C)] 98.8 °F (37.1 °C)  Pulse:  [] 93  Resp:  [16-22] 20  SpO2:  [91 %-98 %] 96 %  BP: ()/(63-77) 113/71     Weight: 59.8 kg (131 lb 14.4 oz)  Body mass index is 20.66 kg/m².    SpO2: 96 %  O2 Device (Oxygen Therapy): nasal cannula      Intake/Output Summary (Last 24 hours) at 07/17/18 0516  Last data filed at 07/16/18 2200   Gross per 24 hour   Intake              960 ml   Output              800 ml   Net              160 ml       Lines/Drains/Airways     Central Venous Catheter Line                 Percutaneous Central Line Insertion/Assessment - triple lumen  06/16/18 0825 right subclavian 30 days           Peripheral Intravenous Line                 Peripheral IV - Single Lumen 07/14/18 0055 Right Antecubital 3 days                Physical Exam   Constitutional: He appears well-developed and well-nourished. He appears distressed.   HENT:   Head: Normocephalic and atraumatic.   Eyes: Pupils are equal, round, and reactive to light. Right eye exhibits no discharge. Left eye exhibits no discharge.   Neck: Normal range of motion. JVD present.   Cardiovascular: Normal rate and regular rhythm.  Exam reveals no friction rub.    Murmur heard.  Systolic murmur, more pronounced in the apex, radiated to the axilla. Hands and distal forearms are a bit cold. Radial pulses are +2 BL   Pulmonary/Chest: No stridor. No respiratory distress. He has no wheezes.   Abdominal: Soft. Bowel sounds are normal. He exhibits no distension.   Musculoskeletal: He exhibits no edema or deformity.   BKA   Lymphadenopathy:     He has no cervical adenopathy.   Neurological:   Somnolent, difficult to assess orientation due to irritability.   Skin: Skin is warm and dry.       Significant Labs: ABG showed metabolic acidosis with respiratory compensation. LA was elevated at 10.    Significant Imaging: TTE showed a LVEF 10-15% with mitral regurgitation.

## 2018-07-17 NOTE — PROGRESS NOTES
"RN entered room after hearing patient's outburst. Upon entering the room, RN found patient had removed nasal cannula and telemetry. Patient stated that "he just didn't feel right". Patient visibly short of breath, pale. Vital signs reassessed and patient asked to further elaborate on symptoms. Patient unable to further articulate symptoms, mumbling under breath. Patient oriented x 4. Dr. Gonzalez paged and updated on change in patient's presentation. Patient stated that he felt some improvement in his symptoms while RN at bedside. RN left bedside and continued to reassess patient when patient would outburst. Patient beginning to present in a more altered state, asking if his brother was in the hallway, removed his IV, continuing to mumble under his breath. Dr. John paged and alerted to continuing changes in presentation. Dr. John stated that he would reassess the patient. Will continue to monitor the patient for additional changes.     Barb Wright RN  7/17/2018    "

## 2018-07-17 NOTE — NURSING
Pt c/o worsening SOB and 8/10 chest pain. Pt unable to describe, cardiology team notified, Dr. Arevalo-manual to bedside.  Pt agreed to Oxygen administration and additional monitoring to treat chest pain.  Nitroglycerin orders obtained. Pt continues to refuse lab work or IV access. Family @ bedside. Will continue to monitor

## 2018-07-17 NOTE — NURSING
Dr. Arevalo-manual @ bedside with patient's son on speaker phone, Dad and brother @ bedside, attempting to discuss plan of care. Patient refusing oxygen, monitoring, and further treatment at this time. Patient continues to be verbally abusive.  Will continue to monitor and deescalate patient.  Patient remains on cardiac monitor with continuous visual monitoring.

## 2018-07-17 NOTE — PROGRESS NOTES
Wound care consulted for left BKA  Mr. Byrd had a Left BKA in June 2018.  The staples are intact, wound edges not fully approximated with eschar noted between wound edges, no active drainage noted, staples intact (24) and erythema noted at corners of incision (medial/lateral) extending ~ 1 cm from the wound edge. Mr. Byrd denies tenderness.  Plan of care discussed with patient/family who verbalized understanding.     07/17/18 1220       Wound 07/15/18 2000 Other (comment) Leg   Date First Assessed/Time First Assessed: 07/15/18 2000   Pre-existing: Yes  Primary Wound Type: (c) Other (comment)  Side: Left  Location: Leg  Wound Outcome: Amputation   Wound Image    Wound WDL ex   Dressing Appearance Open to air;No dressing   Drainage Amount None   Appearance Eschar;Christopher intact   Periwound Area Intact;Dry;Redness   Wound Edges Undefined   Wound Length (cm) 24 cm  (24 staples)   Wound Width (cm) 1 cm   Wound Depth (cm) 0 cm   Wound Volume (cm^3) 0 cm^3   Care Cleansed with:;Sterile normal saline   consultant Recommendations:  Nursing to apply Betadine solution to incision area daily  Remove every other staple now and in 5 days remove others.   ELSIE José RN, CN  [id number]

## 2018-07-17 NOTE — H&P
[facility]  Cardiology  History and Physical     Patient Name: Leonardo Byrd  MRN: [id number]  Admission Date: 7/13/2018  Code Status: Full Code   Attending Provider: Antoni Yang MD   Primary Care Physician: Indio Aquino MD  Principal Problem:Acute on chronic combined systolic and diastolic congestive heart failure    Patient information was obtained from past medical records and ER records.     Subjective:     Chief Complaint:  SOB, fluid over load, transferred to CCU for cardiogenic shock     HPI:  Given encephalopathy, history was obtained from chart and nurses.    48 y/o male with ICM (LVEF 13%) s/p ICD, CAD s/p PCI, HTN, HLD, current smoker, PAD (s/p aortobifemoral bypass, L SFA and pop PTAS in September and recent R SFA and R pop 3/8 by Dr. 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 He had not been compliant with his lasix.     In the ED, patient was found to be hypotensive to 80s-90s/50s-60s. HR was in 90s. EKG showed NSR. RR in teens-20s. SpO2 100% on 3 L NC. Afebrile, no leukocytosis. Initial lactate 3.8-4.9. BNP > 5400. Initial Cr 1.4 (was 1.1 two days prior). CXR showed improvement in cardiomegaly and interstitial attenuation, suggesting improving pulmonary edema, compared to prior. No consolidation. Bedside U/S revealed dilated IVC. Pt was given IVF and IV abx. Pt was given IV lasix followed by symptomatic improvement. 2D echo with CFD showed EF 10-15%, RV enlargement with moderately depressed systolic function, moderate-severe MR, moderate-severe TR, biatrial enlargement, pulm HTN with PA pressure 58, increased CVP, L pleural effusion, and ascites. Pt was admitted to the floor for acute on chronic combined HF. Initially, BP improved to [id number]/60s-70s, lactate improved to 3.4, but Cr up to 1.6. Overnight, BP down to 80s-90s/50s-60s again and had an episode of hypothermia to 95.4 F. The next morning, WBC up to 15, lactate up to 3.8,  Cr up to 1.9. Cardiology was consulted for evaluation of cardiogenic shock in the setting of CHF exacerbation.    Patient reported since d/c from hospital he has not taken lasix.  He developed worsening SOB 2 days prior.  He called his son, a critical care practitioner, who advised him to take the grandfather's lasix.  The patient reports he felt uncomfortable taking his dad's medication so he did not. His breathing continued to deteriorate to the point he had trouble speaking in sentences.  Denies orthopnea, PND, lower ext swelling, weight changes. Denies chest pain or pressure even though there is some documentation of this in ED.  Denies any cough, URI symptoms, fevers, chills, congestion. Denies any drainage or erythema from L BKA site.  Reports non compliance with fluid restriction but does watch his salt intake. Quit tobacco use 4.5 months ago, quit EtOH use 6 months ago.    Today AM, the patient became mildly encephalopathic and the nurse called me to bedside. He could not establish a conversation due to lack of interest, ABG was compatible with metabolic acidosis. LA was 10. I spoke with primary team and cardiology consultant and we agree on transferring the patient to the CCU for better cardiovascular monitoring and starting the patient on dobutamine drip which has not been feasible due to lack of venous access. We attempted to place a central line emergently due to imminent deterioration (he also agreed orally to undergo it) but then he became altered during the procedure and it had to be aborted. Right now he does not have peripheral access.      Past Medical History:   Diagnosis Date    AICD (automatic cardioverter/defibrillator) present     RYAN (acute kidney injury) 3/25/2018    CHF (congestive heart failure)     COPD with acute bronchitis 6/12/2018    Coronary artery disease     Encounter for blood transfusion     Hx of psychiatric care     Hyperlipemia     Hypertension     MI (myocardial  infarction)     Neuropathy     PAD (peripheral artery disease)     Psychiatric problem     PVD (peripheral vascular disease)        Past Surgical History:   Procedure Laterality Date    AMPUTATION Right     great toe    BELOW KNEE AMPUTATION OF LOWER EXTREMITY Left 6/29/2018    Procedure: AMPUTATION, BELOW KNEE;  Surgeon: Norris Trejo MD;  Location: [facility] OR [address] [place] [address];  Service: Peripheral Vascular;  Laterality: Left;    BYBPASS GRAFT AORTOBIUFEMORAL      CARDIAC DEFIBRILLATOR PLACEMENT      coronary stents      EXPLORATION AND EVaCUATION OF HEMATOMA OF UPPER EXTREMITY Left     KNEE ARTHROPLASTY Left     VASCULAR SURGERY      fem-pop bypass       Review of patient's allergies indicates:  No Known Allergies    No current facility-administered medications on file prior to encounter.      Current Outpatient Prescriptions on File Prior to Encounter   Medication Sig    atorvastatin (LIPITOR) 80 MG tablet Take 1 tablet (80 mg total) by mouth every evening.    HYDROcodone-acetaminophen (NORCO) 5-325 mg per tablet Take 1 tablet by mouth every 4 (four) hours as needed for Pain.    lidocaine (XYLOCAINE) 5 % Oint ointment Apply topically 4 (four) times daily. Apply to entire left foot    metoprolol tartrate (LOPRESSOR) 25 MG tablet Take 0.5 tablets (12.5 mg total) by mouth 2 (two) times daily.    nicotine (NICODERM CQ) 21 mg/24 hr Place 1 patch onto the skin once daily.    oxyCODONE (ROXICODONE) 10 mg Tab immediate release tablet Take 1 tablet (10 mg total) by mouth every 4 (four) hours as needed for Pain.    pregabalin (LYRICA) 75 MG capsule Take 1 capsule (75 mg total) by mouth every evening.    aspirin (ECOTRIN) 81 MG EC tablet Take 81 mg by mouth once daily.    collagenase ointment Apply topically once daily.    divalproex (DEPAKOTE) 250 MG EC tablet Take 250mg (1 tablet) at 0700, then take 250mg (1 tablet) at 1200, then take 500mg (2 tablets0 at 1900    polyethylene glycol (GLYCOLAX) 17 gram PwPk  Take 17 g by mouth 2 (two) times daily as needed (constipation).    senna-docusate 8.6-50 mg (PERICOLACE) 8.6-50 mg per tablet Take 1 tablet by mouth 2 (two) times daily.    thiamine 100 MG tablet Take 1 tablet (100 mg total) by mouth once daily.     Family History     None        Social History Main Topics    Smoking status: Former Smoker     Packs/day: 0.50     Quit date: 5/1/2018    Smokeless tobacco: Former User     Types: Chew     Quit date: 8/2/1980      Comment: Uses nicotine gum and nicotine patches    Alcohol use Yes      Comment: rarely    Drug use: No    Sexual activity: Yes     Partners: Female     Review of Systems   Unable to perform ROS: mental status change     Objective:     Vital Signs (Most Recent):  Temp: 98.8 °F (37.1 °C) (07/16/18 2324)  Pulse: 93 (07/17/18 0324)  Resp: 20 (07/17/18 0324)  BP: 113/71 (07/17/18 0442)  SpO2: 96 % (07/16/18 2324) Vital Signs (24h Range):  Temp:  [94.3 °F (34.6 °C)-98.8 °F (37.1 °C)] 98.8 °F (37.1 °C)  Pulse:  [] 93  Resp:  [16-22] 20  SpO2:  [91 %-98 %] 96 %  BP: ()/(63-77) 113/71     Weight: 59.8 kg (131 lb 14.4 oz)  Body mass index is 20.66 kg/m².    SpO2: 96 %  O2 Device (Oxygen Therapy): nasal cannula      Intake/Output Summary (Last 24 hours) at 07/17/18 0516  Last data filed at 07/16/18 2200   Gross per 24 hour   Intake              960 ml   Output              800 ml   Net              160 ml       Lines/Drains/Airways     Central Venous Catheter Line                 Percutaneous Central Line Insertion/Assessment - triple lumen  06/16/18 0825 right subclavian 30 days          Peripheral Intravenous Line                 Peripheral IV - Single Lumen 07/14/18 0055 Right Antecubital 3 days                Physical Exam   Constitutional: He appears well-developed and well-nourished. He appears distressed.   HENT:   Head: Normocephalic and atraumatic.   Eyes: Pupils are equal, round, and reactive to light. Right eye exhibits no discharge. Left  eye exhibits no discharge.   Neck: Normal range of motion. JVD present.   Cardiovascular: Normal rate and regular rhythm.  Exam reveals no friction rub.    Murmur heard.  Systolic murmur, more pronounced in the apex, radiated to the axilla. Hands and distal forearms are a bit cold. Radial pulses are +2 BL   Pulmonary/Chest: No stridor. No respiratory distress. He has no wheezes.   Abdominal: Soft. Bowel sounds are normal. He exhibits no distension.   Musculoskeletal: He exhibits no edema or deformity.   BKA   Lymphadenopathy:     He has no cervical adenopathy.   Neurological:   Somnolent, difficult to assess orientation due to irritability.   Skin: Skin is warm and dry.       Significant Labs: ABG showed metabolic acidosis with respiratory compensation. LA was elevated at 10.    Significant Imaging: TTE showed a LVEF 10-15% with mitral regurgitation.    Assessment and Plan:     * Acute on chronic combined systolic and diastolic congestive heart failure    -Start dobutamine at a rate of 5 and hold BB. Will need to get IV access. Attempted to get central line but the patient became altered. Gave ativan 3 mg and recommended day team to try to get central line.   -HTS consulted for RHC will do in AM  -Discontinue lasix in the setting of hypotension.  -Continue home ASA 81 mg daily.  -Continue home atorvastatin 80 mg daily.  -Monitor.  -Trend LA  -Non-invasive positive pressure ventilation        Encephalopathy    Likely secondary to cardiogenic shock. Treat per heart failure section.        Lactic acid acidosis    As per acute on chronic combined systemic and diastolic heart failure section.        COPD, severe    -Duonebs prn        Alcohol abuse    -Thiamine  -Monitor for withdrawal            VTE Risk Mitigation         Ordered     IP VTE HIGH RISK PATIENT  Once      07/14/18 0819     Place sequential compression device  Until discontinued      07/14/18 0819     IP VTE HIGH RISK PATIENT  Once      07/14/18 0819           Tamir Darnell MD  Cardiology   [facility]

## 2018-07-17 NOTE — NURSING
"Pt began having visual hallucinations that his brother, Reese, has in his room beating his foot and causing him pain.  Pt told his family had gone home for the day, but he remained insistent his family has in the room.  Pt loudly cursing and yelling at "Reese."  Cardiology notified of change in level of confusion.  Pt redirected with television.     "

## 2018-07-17 NOTE — PLAN OF CARE
Plan of Care    I discussed with Anesthesiology who advised if needed to consult IR for invasive sedation or mask sedation in procedure room. They do not specilaise in IM sedation. Psychiatry recommends IM Ativan, discussed pharmakokinetics with Pharm D. Will proceed to start with 2mg.    Will do central line.    Teresa Walter MD (Mickey)  Cardiology Fellow  PGY 4  [facility]  Pager - [phone]

## 2018-07-17 NOTE — PROGRESS NOTES
Rapid iniated @0240 d/t persistent altered mental status and shortness of breath. Dr. John notified, deferred to cardiologist Dr. Jara, who swiftly arrived at patient's bedside. Rapid Response nurses and floor staff at bedside as well. Repeat VS documented, repeat labs drawn per MD order. Patient to be transferred to CMICU due to elevated lactic acid of 10, SVO2 of 30, and ABG results outside of normal limits. Plan of care discussed with patient by MD and RN at bedside. Patient transferred while monitored, 2L O2 nasal cannula, with two RN escorts. Report provided to Mariella WALKER.     Barb Wright, DENISE  7/17/2018

## 2018-07-17 NOTE — ASSESSMENT & PLAN NOTE
-Start dobutamine at a rate of 5 and hold BB. Will need to get IV access. Attempted to get central line but the patient became altered. Gave ativan 3 mg and recommended day team to try to get central line.   -HTS consulted for RHC will do in AM  -Discontinue lasix in the setting of hypotension.  -Continue home ASA 81 mg daily.  -Continue home atorvastatin 80 mg daily.  -Monitor.  -Trend LA

## 2018-07-17 NOTE — PROGRESS NOTES
Pt is very agitated and confused. He will not wear oxygen. Family and two RN's are at bedside. We tried extensively to get him to wear oxygen and he will not comply.

## 2018-07-17 NOTE — PROGRESS NOTES
Patient received from 3065 to 6040. Patient on 2l nasal cannula. Sats are 99%. Will continue to monitor.

## 2018-07-17 NOTE — CONSULTS
"Inpatient consult to Psychiatry    Consult received and unable to perform full psych eval on pt today due to AMS. Will attempt tomorrow.     Today at 11:30 am  Pt seen laying in bed with closed eyes not opening eyes even after prompting pt to do so. Pt seems irritable and answers question mostly in "yes" or "no" and he is unable to elaborate further despite prompting him. Pt also very somnolent and needed to be redirected several times.    Pt is currently Delirious and  CAM -ICU positive as follows :-  Pt has had change in mentation overnight and has been waxing and waning per family and nursing. Pt also had similar delirious episode at his previous admission also  Pt is RASS (-2) but earlier this morning pt was RASS +2 with agitated hyperactive delirium   Pt failed SAVEAHAART test ( squeezed hand on any letter despite redirecting several times) this displays decreased concentration  Pt also displayed disorganized thought process- by stating yes to the questions "is 1 pound more than 2 pounds" and "would you pound a hammer with a nail"    CAPACITY EVALUATION   (Capacity for a given decision requires that the patient:  Understands the proposed treatment and/or the proposed options for his care.  Appreciates his own medical situation and can abstract how the treatments/proposed options for care apply to his medical situation.  Understands the consequences of his medical decisions in a reasonable and factual manner supported by the facts and his own values in a consistent fashion.  Demonstrates the ability to communicate a choice clearly and consistently.)    Capacity for:  · Central line placement   · Possible Right heart cath    Assessment of capacity:  · The patient understands the clinical condition relation to the evaluation:  No.  · The patient understands the nature of the proposed assessments and/or treatments:  No, he is unable to discern the importance of the recommended treatment plan.  · The patient " understands the risks and benefits of the proposed assessments and/or treatments:  No, discussed with pt several times if he is ok with dying because that is the possibility if he is refusing the treatment. Pt unable to respond to this question with logical reasoning or consistent answers  · The patient understands the risks and benefits of refusing the proposed assessments and/or treatments:  No unable to reprot them back to me  · The patient is not in agreement with the assessment and is ambivalent   · The patient has evidence of impaired insight and/or judgement:  Yes.    · Based upon my evaluation of Leonardo Byrd regarding his decision-making capacity for Central line placement and Possible Right heart cath (life sustaining treatment decisions), I found with reasonable certainty that he does not have capacity to make medical decisions on his behalf.   --------------------------------------------------------------------------------------------------------------------------------------------------------------------------------------------------------------------------------------      IMPRESSION  Delirium multifactorial, and with mixed state of hypoactive and hyperactive ( likely causes hypoxia, hypoperfusion, opiates, other metabolic abnormalities)   H/o of adjustment d/o  H/o of Alcohol use (no utox was obtained at this admission unable to verify active use)  H/o of polysubstance use ( no utox was obtained at this admission unable to verify active use)    RECOMMENDATIONS  Psych meds  · PRN IM Ativan 2mg only for sedation if pt is agitated as earlier and non redirectable. And would not recommend antipsychotic due to risk of causing adverse cardiac events  · Recommended to consult Anesthesia to assist with sedation as pt has no IV access and needs central line placement   · Please follow delirium precautions  · Limit or Discontinue use of OPIATES ( pt received 4 doses of IV dilaudid yesterday)    · Limit or  Discontinue use Anti-cholinergic medications as they may worsen delirium.  Keep window shades open and room lit during day and room dim at night in order to promote normal sleep-wake cycles  Encourage family at bedside. Bath patient often to situation, location, date.  Continue medical workup for causative etiology of Delirium.    Follow up  · Will follow with pt will in house for management of delirium and assist with liaison of pts  family and med teams  Legal  · IF PT tries to Leave AMA Seek PEC  because pt is in imminent danger of hurting self and is gravely disabled in current condition  · Currently for the above mentioned reasons of delirium, pt does not have medical decisional making capacity   · Spoke to pts father extensively and reviewed pts current clinical condition including delirium  who is currently pts next of Kin for medical decision making on pts behalf. Dr. Walter to keep in communication with Pts son who is very involved in pts care. Encouraged med teams to communicate always with pts family particularly pts son to avoid any miscommunication.  In future due to complexity of pts case may also recommend a family meeting between med teams, pts father and conference call with son as well.    -Please contact ON CALL psychiatry resident for any acute issues that may arise.    JUANCHO SCHWARTZ MD   Department of Psychiatry   [facility]  7/17/2018 1:20 PM

## 2018-07-17 NOTE — SIGNIFICANT EVENT
"Plan of Care    Mr Stewart became confused overnight, cold and clammy with lactate of 10, elevating Cr and Bilirubin likely worsening heart failure and progressing toward cardiogenic shock. /67 HR 100s. Denies chest pain but endorses SOB. Mild crackles on exam.     Patient refuses all care despite lengthy 20 minute discussion I had at bedside with him I explained in detail that without IV access, dobutamine ggt, RHC and advanced HF therapy he may likely go into cardiac arrest. He continues to want to be a "Full code" but has no IV access for ACLS. I have instructed nurses to have I/O drill close by.    He seems anxious and in a panic, we have ordered 2mg Ativan to calm him which he has also refused.  He is alert and oriented x 3 and can clearly articulate that he does not want any further "sticks or pills"    Very poor prognosis. Discussed with Son (He is an RN) and Father at length.    Teresa Walter MD (Mickey)  Cardiology Fellow  PGY 4  [facility]  Pager - [phone]      "

## 2018-07-17 NOTE — SIGNIFICANT EVENT
Pt was given IM sedation for TLC placement. During time out phase, pt became encephalopathic and hypoxic. Decision to intubate was made. Anesthesia team was notified. Since pt did not have IV access, RN placed IO. Pt's father, Joseluis was updated on the phone via these series of events. Will obtain labs there after and place to start dobutamine gtt.     Valeria Paul, PGY-4 (Cardiology Fellow)

## 2018-07-18 NOTE — PROGRESS NOTES
Pt emergently intubated via anesthesia with L humeral IO placed by DENISE Blanca due to lack of access and pt rapidly decompensating status. R IJ TLC placed immediately after by Dr. Monet with Cards. Pt BP dropped shortly after administration of RSI drugs with Epi/Kwame given, see MAR. HR 110s-120s in ST, pulse weak and thready. Bicarb amp also pushed to correct acidosis read from STAT ABG with brachial A-line placement. Line confirmed via X-ray. Pt placed on Dobutamine, Lasix, and Fentanyl.

## 2018-07-18 NOTE — SIGNIFICANT EVENT
Patient emergently intubated by anesthesia MD with an 8.0EET for airway protection.Mechanical vent support initiated with the following settings:AC rate of 16,VT 450cc ,peep 5cmh20,fio2 100%.ABg pending.

## 2018-07-18 NOTE — PROGRESS NOTES
Cards fellow, Dr. Paul called and notified of pt's CVP down to 7 with significant diuresis still, >500cc/hr, while on lasix gtt at 10mg/hr. HR 90s in NSR. MAPs in 70s-80s on Dobutamine. MD gave order to continue lasix gtt as ordered for now and to call back if CVP drops below 5. Will continue to monitor.

## 2018-07-18 NOTE — PROGRESS NOTES
[facility]  Cardiology  Progress Note    Patient Name: Leonardo Byrd  MRN: [id number]  Admission Date: 7/13/2018  Hospital Length of Stay: 3 days  Code Status: Full Code   Attending Physician: Antoni Yang MD   Primary Care Physician: Indio Aquino MD  Expected Discharge Date: 7/19/2018  Principal Problem:Acute on chronic combined systolic and diastolic congestive heart failure    Subjective:     Hospital Course:   7/17: Please see several notes from today, In summary; patient decompensated in the AM, refused all labs and needed psychiatry consult to be declared incompetent, family signed consent for CVC and RHC on behalf of patient. Later in PM he decompensated again requiring intubation, emergent CVC and A-Line placement and was placed on pressors.    Interval History: Please see several notes from today, In summary; patient decompensated in the AM, refused all labs and needed psychiatry consult to be declared incompetent, family signed consent for CVC and RHC on behalf of patient. Later in PM he decompensated again requiring intubation, emergent CVC and A-Line placement and was placed on pressors.    Review of Systems   Unable to perform ROS: intubated     Objective:     Vital Signs (Most Recent):  Temp: 97.8 °F (36.6 °C) (07/17/18 1500)  Pulse: (!) 118 (07/17/18 1959)  Resp: (!) 25 (07/17/18 1959)  BP: (!) 144/94 (07/17/18 1959)  SpO2: 99 % (07/17/18 1907) Vital Signs (24h Range):  Temp:  [97 °F (36.1 °C)-98.8 °F (37.1 °C)] 97.8 °F (36.6 °C)  Pulse:  [] 118  Resp:  [11-50] 25  SpO2:  [82 %-100 %] 99 %  BP: ()/(19-94) 144/94     Weight: 50.4 kg (111 lb 1.8 oz)  Body mass index is 17.4 kg/m².     SpO2: 99 %  O2 Device (Oxygen Therapy): ventilator      Intake/Output Summary (Last 24 hours) at 07/17/18 2020  Last data filed at 07/16/18 2200   Gross per 24 hour   Intake              600 ml   Output                0 ml   Net              600 ml       Lines/Drains/Airways      Central Venous Catheter Line                 Percutaneous Central Line Insertion/Assessment - triple lumen  06/16/18 0825 right subclavian 31 days          Airway                 Airway - Non-Surgical 07/17/18 1900 Endotracheal Tube less than 1 day                Physical Exam   Constitutional: He appears well-developed. He appears cachectic. He is sedated.   Neck: JVD present. No thyroid mass present.   Cardiovascular: Tachycardia present.    Pulmonary/Chest: He is in respiratory distress. He exhibits no tenderness.   Abdominal: He exhibits no distension. There is no tenderness.   Neurological: He is unresponsive.   Skin: Skin is dry.          Significant Labs:   BMP:   Recent Labs  Lab 07/16/18 0412 07/17/18  0354   GLU 77 86  86    133*  133*   K 3.4* 5.3*  5.3*    99  99   CO2 23 12*  12*   BUN 32* 34*  34*   CREATININE 1.4 1.5*  1.5*   CALCIUM 8.7 8.8  8.8   MG 2.0  --    , CMP   Recent Labs  Lab 07/16/18 0412 07/17/18  0354    133*  133*   K 3.4* 5.3*  5.3*    99  99   CO2 23 12*  12*   GLU 77 86  86   BUN 32* 34*  34*   CREATININE 1.4 1.5*  1.5*   CALCIUM 8.7 8.8  8.8   PROT 7.2 7.5  7.5   ALBUMIN 2.8* 3.0*  3.0*   BILITOT 0.9 1.6*  1.6*   ALKPHOS 243* 286*  286*   AST 66* 78*  78*   ALT 42 45*  45*   ANIONGAP 14 22*  22*   ESTGFRAFRICA >60.0 >60.0  >60.0   EGFRNONAA 58.6* 53.9*  53.9*   , CBC   Recent Labs  Lab 07/16/18 0412 07/17/18  0354 07/17/18  1942   WBC 11.40 8.37  8.37 8.56   HGB 8.3* 8.5*  8.5* 8.1*   HCT 26.8* 27.4*  27.4* 25.3*    341  341 326    and Troponin No results for input(s): TROPONINI in the last 48 hours.    Significant Imaging: Echocardiogram:     2D echo with color flow doppler:   Results for orders placed or performed during the hospital encounter of 07/13/18   2D echo with color flow doppler   Result Value Ref Range    EF 10 (A) 55 - 65    Mitral Valve Regurgitation MODERATE TO SEVERE (A)     Est. PA Systolic Pressure  57.51 (A)     Mitral Valve Mobility NORMAL     Tricuspid Valve Regurgitation MODERATE TO SEVERE (A)      Assessment and Plan:       * Acute on chronic combined systolic and diastolic congestive heart failure    - Intubated, sedated increased PEEP requirement  -Diurese as needed,   - Trend CVP, SVO2, ABG  -Epi and Dobutamine drips, wean epi first  - Repeat TTE in AM        Acute respiratory failure with hypoxia    - Unable to get reliable O2 Sat on patient  - Became increasingly SOB  - Emergent intubation for airway protection          Encephalopathy    Likely secondary to cardiogenic shock. Treat per heart failure section.        Lactic acid acidosis    As per acute on chronic combined systemic and diastolic heart failure section.  -Trend Lactate        COPD, severe    -Duonebs prn  - Inhaled steroids        Alcohol abuse    -Thiamine  -Monitor for withdrawal            VTE Risk Mitigation         Ordered     IP VTE HIGH RISK PATIENT  Once      07/14/18 0819     Place sequential compression device  Until discontinued      07/14/18 0819     IP VTE HIGH RISK PATIENT  Once      07/14/18 0819          Teresa Can MD  Cardiology  [facility]

## 2018-07-18 NOTE — ASSESSMENT & PLAN NOTE
-CVP 4-6  - Well diuresed 5L off  - Hold Lasix and watch volume status  -SVO2 improving now in 50s  -Continue , Picc line

## 2018-07-18 NOTE — PROGRESS NOTES
Dr. Paul at bedside for reassessment of pt. MD informed that pt's CVP now 9 after IV lasix push of 80mg and Lasix gtt currently infusing at 20mg/hr. Pt has put out over 1.5L clear yellow urine since flores placed. HR trending lower, now in 90s in NSR. BP being maintained on  gtt only, Epi not needed at this time despite diuresis. Skin now warm. New orders placed to decrease lasix gtt down to 10mg/hr. Will continue to monitor.

## 2018-07-18 NOTE — CONSULTS
Double lumen PICC placed in right brachial vein,27 cm in length, 0 cm exposed with arm circumference 27 cm.. Lot# [id number]

## 2018-07-18 NOTE — ASSESSMENT & PLAN NOTE
- Unable to get reliable O2 Sat on patient  - Became increasingly SOB  - Emergent intubation for airway protection

## 2018-07-18 NOTE — TELEPHONE ENCOUNTER
Home Health SOC 07/04/2018 to 09/01/218 with [facility] , Dr Ana Gomez. SN, PT, OT services.

## 2018-07-18 NOTE — SUBJECTIVE & OBJECTIVE
Patient History           Medical as of 7/18/2018     Past Medical History     Diagnosis Date Comments Source    AICD (automatic cardioverter/defibrillator) present -- -- Provider    RYAN (acute kidney injury) 3/25/2018 -- Provider    CHF (congestive heart failure) -- -- Provider    COPD with acute bronchitis 6/12/2018 -- Provider    Coronary artery disease -- -- Provider    Encounter for blood transfusion -- -- Provider    Hx of psychiatric care -- -- Provider    Hyperlipemia -- -- Provider    Hypertension -- -- Provider    MI (myocardial infarction) -- -- Provider    Neuropathy -- -- Provider    PAD (peripheral artery disease) -- -- Provider    Psychiatric problem -- -- Provider    PVD (peripheral vascular disease) -- -- Provider          Pertinent Negatives     Diagnosis Date Noted Comments Source    Cancer 2/27/2017 -- Provider    Diabetes mellitus 2/27/2017 -- Provider    History of psychiatric hospitalization 6/20/2018 -- Provider    Teresa 6/20/2018 -- Provider    Psychiatric exam requested by authority 6/20/2018 -- Provider    Seizures 2/27/2017 -- Provider    Stroke 2/27/2017 -- Provider    Suicide attempt 6/20/2018 -- Provider    Therapy 6/20/2018 -- Provider                  Surgical as of 7/18/2018     Past Surgical History     Procedure Laterality Date Comments Source    AMPUTATION Right -- great toe Provider    KNEE ARTHROPLASTY Left -- -- Provider    VASCULAR SURGERY -- -- fem-pop bypass Provider    coronary stents [Other] -- -- -- Provider    CARDIAC DEFIBRILLATOR PLACEMENT -- -- -- Provider    EXPLORATION AND EVaCUATION OF HEMATOMA OF UPPER EXTREMITY [Other] Left -- -- Provider    BYBPASS GRAFT AORTOBIUFEMORAL [Other] -- -- -- Provider    BELOW KNEE AMPUTATION OF LOWER EXTREMITY Left 6/29/2018 Procedure: AMPUTATION, BELOW KNEE;  Surgeon: Norris Trejo MD;  Location: [facility] OR [address] [place] [address];  Service: Peripheral Vascular;  Laterality: Left; Provider    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 Bilateral 7/17/2018 Procedure: CATHETERIZATION, HEART, RIGHT, FOR CONGENITAL HEART DEFECT;  Surgeon: January Mosley MD;  Location: [facility] CATH LAB;  Service: Cardiology;  Laterality: Bilateral; Provider                  Family as of 7/18/2018    **None**           Tobacco Use as of 7/18/2018     Smoking Status Smoking Start Date Smoking Quit Date Packs/day Years Used    Former Smoker -- 5/1/2018 0.50 --    Types Comments Smokeless Tobacco Status Smokeless Tobacco Quit Date Source     Chew Uses nicotine gum and nicotine patches Former User 8/2/1980 Provider            Alcohol Use as of 7/18/2018     Alcohol Use Drinks/Week Alcohol/Week Comments Source    Yes -- -- rarely Provider            Drug Use as of 7/18/2018     Drug Use Types Frequency Comments Source    No -- -- -- Provider            Sexual Activity as of 7/18/2018     Sexually Active Birth Control Partners Comments Source    Yes -- Female -- Provider            Activities of Daily Living as of 7/18/2018     Activities of Daily Living Question Response Comments Source    Patient feels they ought to cut down on drinking/drug use Not Asked -- Provider    Patient annoyed by others criticizing their drinking/drug use Not Asked -- Provider    Patient has felt bad or guilty about drinking/drug use Not Asked -- Provider    Patient has had a drink/used drugs as an eye opener in the AM Not Asked -- Provider            Social Documentation as of 7/18/2018    **None**           Occupational as of 7/18/2018    **None**           Socioeconomic as of 7/18/2018     Marital Status Spouse Name Number of Children Years Education Preferred Language Ethnicity Race Source    Single -- -- -- English /White White --         Pertinent History Q A Comments    as of 7/18/2018 Lives with      Place in Birth Order      Lives in      Number of Siblings other     Raised by      Legal Involvement      Childhood Trauma      Criminal History of arrest and incarceration      Financial Status      Highest Level of Education high school graduation     Does patient have access to a firearm? No      Service No     Primary Leisure Activity      Spirituality actively participates in organized [employer]      Past Medical History:   Diagnosis Date    AICD (automatic cardioverter/defibrillator) present     RYAN (acute kidney injury) 3/25/2018    CHF (congestive heart failure)     COPD with acute bronchitis 6/12/2018    Coronary artery disease     Encounter for blood transfusion     Hx of psychiatric care     Hyperlipemia     Hypertension     MI (myocardial infarction)     Neuropathy     PAD (peripheral artery disease)     Psychiatric problem     PVD (peripheral vascular disease)      Past Surgical History:   Procedure Laterality Date    AMPUTATION Right     great toe    BELOW KNEE AMPUTATION OF LOWER EXTREMITY Left 6/29/2018    Procedure: AMPUTATION, BELOW KNEE;  Surgeon: Norris Trejo MD;  Location: [facility] OR [address] [place] [address];  Service: Peripheral Vascular;  Laterality: Left;    BYBPASS GRAFT AORTOBIUFEMORAL      CARDIAC DEFIBRILLATOR PLACEMENT      coronary stents      EXPLORATION AND EVaCUATION OF HEMATOMA OF UPPER EXTREMITY Left     KNEE ARTHROPLASTY Left     RIGHT HEART CATHETERIZATION FOR CONGENITAL HEART DEFECT Bilateral 7/17/2018    Procedure: CATHETERIZATION, HEART, RIGHT, FOR CONGENITAL HEART DEFECT;  Surgeon: January Mosley MD;  Location: [facility] CATH LAB;  Service: Cardiology;  Laterality: Bilateral;    VASCULAR SURGERY      fem-pop bypass     Family History     None        Social History Main Topics    Smoking status: Former Smoker     Packs/day: 0.50     Quit date: 5/1/2018    Smokeless tobacco: Former User     Types: Chew     Quit date: 8/2/1980      Comment: Uses nicotine gum and nicotine patches    Alcohol use Yes      Comment: rarely    Drug use: No    Sexual activity: Yes     Partners: Female     Review of patient's allergies indicates:  No Known  Allergies    No current facility-administered medications on file prior to encounter.      Current Outpatient Prescriptions on File Prior to Encounter   Medication Sig    atorvastatin (LIPITOR) 80 MG tablet Take 1 tablet (80 mg total) by mouth every evening.    HYDROcodone-acetaminophen (NORCO) 5-325 mg per tablet Take 1 tablet by mouth every 4 (four) hours as needed for Pain.    lidocaine (XYLOCAINE) 5 % Oint ointment Apply topically 4 (four) times daily. Apply to entire left foot    metoprolol tartrate (LOPRESSOR) 25 MG tablet Take 0.5 tablets (12.5 mg total) by mouth 2 (two) times daily.    nicotine (NICODERM CQ) 21 mg/24 hr Place 1 patch onto the skin once daily.    oxyCODONE (ROXICODONE) 10 mg Tab immediate release tablet Take 1 tablet (10 mg total) by mouth every 4 (four) hours as needed for Pain.    pregabalin (LYRICA) 75 MG capsule Take 1 capsule (75 mg total) by mouth every evening.    aspirin (ECOTRIN) 81 MG EC tablet Take 81 mg by mouth once daily.    collagenase ointment Apply topically once daily.    divalproex (DEPAKOTE) 250 MG EC tablet Take 250mg (1 tablet) at 0700, then take 250mg (1 tablet) at 1200, then take 500mg (2 tablets0 at 1900    polyethylene glycol (GLYCOLAX) 17 gram PwPk Take 17 g by mouth 2 (two) times daily as needed (constipation).    senna-docusate 8.6-50 mg (PERICOLACE) 8.6-50 mg per tablet Take 1 tablet by mouth 2 (two) times daily.    thiamine 100 MG tablet Take 1 tablet (100 mg total) by mouth once daily.     Psychotherapeutics     None        Review of Systems  Strengths and Liabilities: unable to assess    Objective:     Vital Signs (Most Recent):  Temp: 99.5 °F (37.5 °C) (07/18/18 1505)  Pulse: 83 (07/18/18 1505)  Resp: 14 (07/18/18 1500)  BP: (!) 103/48 (07/18/18 1505)  SpO2: (!) 94 % (07/18/18 1500) Vital Signs (24h Range):  Temp:  [97.6 °F (36.4 °C)-99.7 °F (37.6 °C)] 99.5 °F (37.5 °C)  Pulse:  [] 83  Resp:  [2-116] 14  SpO2:  [88 %-100 %] 94 %  BP:  "()/(19-99) 103/48  Arterial Line BP: ()/(45-89) 104/48     Height: 5' 7" (170.2 cm)  Weight: 52 kg (114 lb 10.2 oz)  Body mass index is 17.96 kg/m².      Intake/Output Summary (Last 24 hours) at 07/18/18 1529  Last data filed at 07/18/18 1400   Gross per 24 hour   Intake          1330.08 ml   Output             7360 ml   Net         -6029.92 ml       Physical Exam   Psychiatric:   Mental Status Exam:  Appearance: lying in bed, intubated  Level of Consciousness: sedated  Grooming: hospital gown  Psychomotor Behavior: sedated  Speech: unable to assess  Language: unable to assess  Orientation: unable to assess  Attention Span/Concentration: unable to assess  Memory: unable to assess  Cognition: unable to assess  Mood: unable to assess  Affect: unable to assess  Thought Process: unable to assess  Associations:unable to assess  Thought Content:unable to assess  Fund of Knowledge: adequate to education level  Insight:unable to assess  Judgment: unable to assess          Significant Labs:   Last 24 Hours:   Recent Lab Results       07/18/18  0509 07/18/18  0508 07/18/18  0501 07/18/18  0008 07/18/18  0005      Immature Granulocytes  0.6(H)        Immature Grans (Abs)  0.05  Comment:  Mild elevation in immature granulocytes is non specific and   can be seen in a variety of conditions including stress response,   acute inflammation, trauma and pregnancy. Correlation with other   laboratory and clinical findings is essential.  (H)        Albumin  2.9(L)        Alkaline Phosphatase  286(H)        Allens Test N/A  N/A N/A      ALT  100(H)        Anion Gap  13   20(H)     AST  245(H)        Baso #  0.02        Basophil%  0.2        Bilirubin, Direct          Total Bilirubin  2.2  Comment:  For infants and newborns, interpretation of results should be based  on gestational age, weight and in agreement with clinical  observations.  Premature Infant recommended reference ranges:  Up to 24 hours.............<8.0 " mg/dL  Up to 48 hours............<12.0 mg/dL  3-5 days..................<15.0 mg/dL  6-29 days.................<15.0 mg/dL  (H)        Blood Culture, Routine          Site Other  Jenny/UAC Other      BUN, Bld  36(H)   39(H)     Calcium  8.8   8.9     Chloride  99   98     CO2  28   21(L)     Creatinine  1.3   1.5(H)     DelSys   Adult Vent       Differential Method  Automated        eGFR if   >60.0   >60.0     eGFR if non   >60.0  Comment:  Calculation used to obtain the estimated glomerular filtration  rate (eGFR) is the CKD-EPI equation.      53.9  Comment:  Calculation used to obtain the estimated glomerular filtration  rate (eGFR) is the CKD-EPI equation.   (A)     Eos #  0.0        Eosinophil%  0.1        FiO2   40       Glucose  103   160(H)     Gran # (ANC)  6.4        Gran%  74.4(H)        Group & Rh          Hematocrit  24.8(L)        Hemoglobin  8.0(L)        INDIRECT ARTEM          Coumadin Monitoring INR          Lactate, Jose Armando  1.5  Comment:  Falsely low lactic acid results can be found in samples   containing >=13.0 mg/dL total bilirubin and/or >=3.5 mg/dL   direct bilirubin.     5.9  Comment:  Falsely low lactic acid results can be found in samples   containing >=13.0 mg/dL total bilirubin and/or >=3.5 mg/dL   direct bilirubin.  *Critical value -   Results called to and read back by: Mariella Lama RN  at 0107 on 07/18/2018 by jing  ()     Lymph #  1.5        Lymph%  16.9(L)        Magnesium  2.4   1.8     MCH  30.3        MCHC  32.3        MCV  94        Min Vol   7.8       Mode   AC/PRVC       Mono #  0.7        Mono%  7.8        MPV  10.8        nRBC  5(A)        PEEP   5       Phosphorus          PiP   22       Platelets  292        POC BE 11  14 2      POC HCO3 33.8(H)  35.5(H) 24.7      POC PCO2 39.3  37.5 28.4(L)      POC PH 7.542(H)  7.584(HH) 7.548(H)      POC PO2 44  122(H) 42      POC SATURATED O2 85(L)  99 84(L)      POC TCO2 35(H)  37(H) 26       Potassium  3.8   3.6     Total Protein  7.0        Protime          Rate   16       RBC  2.64(L)        RDW  20.6(H)        Sample VENOUS  ARTERIAL VENOUS      Sodium  140   139     Sp02   99       Vt   450       WBC  8.64                    07/17/18  2359 07/17/18 2200 07/17/18 2110 07/17/18 2028 07/17/18 1959      Immature Granulocytes          Immature Grans (Abs)          Albumin          Alkaline Phosphatase          Allens Test N/A   N/A N/A     ALT          Anion Gap          AST          Baso #          Basophil%          Bilirubin, Direct          Total Bilirubin          Blood Culture, Routine  No Growth to date[P] No Growth to date[P]       Site Jenny/UAC   Other Jenny/UAC     BUN, Bld          Calcium          Chloride          CO2          Creatinine          DelSys Adult Vent    Adult Vent     Differential Method          eGFR if           eGFR if non           Eos #          Eosinophil%          FiO2 60    100     Glucose          Gran # (ANC)          Gran%          Group & Rh          Hematocrit          Hemoglobin          INDIRECT ARTEM          Coumadin Monitoring INR          Lactate, Jose Armando          Lymph #          Lymph%          Magnesium          MCH          MCHC          MCV          Min Vol 11.4    11.8     Mode AC/PRVC    AC/PRVC     Mono #          Mono%          MPV          nRBC          PEEP 8    8     Phosphorus          PiP 26    22     Platelets          POC BE 4   -15 -14     POC HCO3 25.9   13.3(L) 13.3(L)     POC PCO2 26.2(LL)   34.8(L) 29.5(L)     POC PH 7.604(HH)   7.189(L) 7.261(LL)     POC PO2 186(H)   44 227(H)     POC SATURATED O2 100   70(L) 100     POC TCO2 27   14(L) 14(L)     Potassium          Total Protein          Protime          Rate 25    25     RBC          RDW          Sample ARTERIAL   VENOUS ARTERIAL     Sodium          Sp02 99    99     Vt 450    450     WBC                      07/17/18 1945 07/17/18 1942 07/17/18 1925       Immature Granulocytes  0.9(H)      Immature Grans (Abs)  0.08  Comment:  Mild elevation in immature granulocytes is non specific and   can be seen in a variety of conditions including stress response,   acute inflammation, trauma and pregnancy. Correlation with other   laboratory and clinical findings is essential.  (H)      Albumin  2.9(L)      Alkaline Phosphatase  283(H)      Allens Test   N/A     ALT  65(H)      Anion Gap  28(H)      AST  137(H)      Baso #  0.01      Basophil%  0.1      Bilirubin, Direct  1.2(H)      Total Bilirubin  1.7  Comment:  For infants and newborns, interpretation of results should be based  on gestational age, weight and in agreement with clinical  observations.  Premature Infant recommended reference ranges:  Up to 24 hours.............<8.0 mg/dL  Up to 48 hours............<12.0 mg/dL  3-5 days..................<15.0 mg/dL  6-29 days.................<15.0 mg/dL  (H)      Blood Culture, Routine        Site   Other     BUN, Bld  39(H)      Calcium  8.5(L)      Chloride  98      CO2  14(L)      Creatinine  1.6(H)      DelSys        Differential Method  Automated      eGFR if   57.6(A)      eGFR if non   49.8  Comment:  Calculation used to obtain the estimated glomerular filtration  rate (eGFR) is the CKD-EPI equation.   (A)      Eos #  0.0      Eosinophil%  0.0      FiO2        Glucose  133(H)      Gran # (ANC)  6.6      Gran%  76.8(H)      Group & Rh  A NEG      Hematocrit  25.3(L)      Hemoglobin  8.1(L)      INDIRECT ARTEM  NEG      Coumadin Monitoring INR  2.2  Comment:  Coumadin Therapy:  2.0 - 3.0 for INR for all indicators except mechanical heart valves  and antiphospholipid syndromes which should use 2.5 - 3.5.  (H)      Lactate, Jose Armando >12.0  Comment:  Falsely low lactic acid results can be found in samples   containing >=13.0 mg/dL total bilirubin and/or >=3.5 mg/dL   direct bilirubin.  *Critical value -  Results called to and read back  by:Mariella Lama RN  ()       Lymph #  1.4      Lymph%  16.2(L)      Magnesium  2.2      MCH  31.2(H)      MCHC  32.0      MCV  97      Min Vol        Mode        Mono #  0.5      Mono%  6.0      MPV  11.3      nRBC  5(A)      PEEP        Phosphorus  6.4(H)      PiP        Platelets  326      POC BE   -22     POC HCO3   7.6(L)     POC PCO2   25.9(L)     POC PH   7.074(L)     POC PO2   83(HH)     POC SATURATED O2   91(L)     POC TCO2   8(L)     Potassium  4.3      Total Protein  7.0      Protime  21.0(H)      Rate        RBC  2.60(L)      RDW  20.9(H)      Sample   VENOUS     Sodium  140      Sp02        Vt        WBC  8.56            Significant Imaging: I have reviewed all pertinent imaging results/findings within the past 24 hours.

## 2018-07-18 NOTE — ASSESSMENT & PLAN NOTE
Delirium multifactorial, and with mixed state of hypoactive and hyperactive ( likely causes hypoxia, hypoperfusion, opiates, other metabolic abnormalities)   H/o of adjustment d/o  H/o of Alcohol use (no utox was obtained at this admission unable to verify active use)  H/o of polysubstance use ( no utox was obtained at this admission unable to verify active use)     RECOMMENDATIONS  Psych meds  ? PRN IM Ativan 2mg only for sedation if pt is agitated as earlier and non redirectable. And would not recommend antipsychotic due to risk of causing adverse cardiac events  ? Recommended to consult Anesthesia to assist with sedation as pt has no IV access and needs central line placement   ? Please follow delirium precautions  § Limit or Discontinue use of OPIATES ( pt received 4 doses of IV dilaudid yesterday)    § Limit or Discontinue use Anti-cholinergic medications as they may worsen delirium.  · Keep window shades open and room lit during day and room dim at night in order to promote normal sleep-wake cycles  · Encourage family at bedside. [place] patient often to situation, location, date.  · Continue medical workup for causative etiology of Delirium.     - Pt currently sedated and intubated. Will attempt interview tomorrow. Will follow with pt will in house for management of delirium and assist with liaison of pts  family and med teams     -Please contact ON CALL psychiatry resident for any acute issues that may arise.    Psychiatry CL will continue to follow.

## 2018-07-18 NOTE — SUBJECTIVE & OBJECTIVE
Interval History: Please see several notes from today, In summary; patient decompensated in the AM, refused all labs and needed psychiatry consult to be declared incompetent, family signed consent for CVC and RHC on behalf of patient. Later in PM he decompensated again requiring intubation, emergent CVC and A-Line placement and was placed on pressors.    Review of Systems   Unable to perform ROS: intubated     Objective:     Vital Signs (Most Recent):  Temp: 97.8 °F (36.6 °C) (07/17/18 1500)  Pulse: (!) 118 (07/17/18 1959)  Resp: (!) 25 (07/17/18 1959)  BP: (!) 144/94 (07/17/18 1959)  SpO2: 99 % (07/17/18 1907) Vital Signs (24h Range):  Temp:  [97 °F (36.1 °C)-98.8 °F (37.1 °C)] 97.8 °F (36.6 °C)  Pulse:  [] 118  Resp:  [11-50] 25  SpO2:  [82 %-100 %] 99 %  BP: ()/(19-94) 144/94     Weight: 50.4 kg (111 lb 1.8 oz)  Body mass index is 17.4 kg/m².     SpO2: 99 %  O2 Device (Oxygen Therapy): ventilator      Intake/Output Summary (Last 24 hours) at 07/17/18 2020  Last data filed at 07/16/18 2200   Gross per 24 hour   Intake              600 ml   Output                0 ml   Net              600 ml       Lines/Drains/Airways     Central Venous Catheter Line                 Percutaneous Central Line Insertion/Assessment - triple lumen  06/16/18 0825 right subclavian 31 days          Airway                 Airway - Non-Surgical 07/17/18 1900 Endotracheal Tube less than 1 day                Physical Exam   Constitutional: He appears well-developed. He appears cachectic. He is sedated.   Neck: JVD present. No thyroid mass present.   Cardiovascular: Tachycardia present.    Pulmonary/Chest: He is in respiratory distress. He exhibits no tenderness.   Abdominal: He exhibits no distension. There is no tenderness.   Neurological: He is unresponsive.   Skin: Skin is dry.          Significant Labs:   BMP:   Recent Labs  Lab 07/16/18  0412 07/17/18  0354   GLU 77 86  86    133*  133*   K 3.4* 5.3*  5.3*     99  99   CO2 23 12*  12*   BUN 32* 34*  34*   CREATININE 1.4 1.5*  1.5*   CALCIUM 8.7 8.8  8.8   MG 2.0  --    , CMP   Recent Labs  Lab 07/16/18  0412 07/17/18  0354    133*  133*   K 3.4* 5.3*  5.3*    99  99   CO2 23 12*  12*   GLU 77 86  86   BUN 32* 34*  34*   CREATININE 1.4 1.5*  1.5*   CALCIUM 8.7 8.8  8.8   PROT 7.2 7.5  7.5   ALBUMIN 2.8* 3.0*  3.0*   BILITOT 0.9 1.6*  1.6*   ALKPHOS 243* 286*  286*   AST 66* 78*  78*   ALT 42 45*  45*   ANIONGAP 14 22*  22*   ESTGFRAFRICA >60.0 >60.0  >60.0   EGFRNONAA 58.6* 53.9*  53.9*   , CBC   Recent Labs  Lab 07/16/18  0412 07/17/18  0354 07/17/18  1942   WBC 11.40 8.37  8.37 8.56   HGB 8.3* 8.5*  8.5* 8.1*   HCT 26.8* 27.4*  27.4* 25.3*    341  341 326    and Troponin No results for input(s): TROPONINI in the last 48 hours.    Significant Imaging: Echocardiogram:     2D echo with color flow doppler:   Results for orders placed or performed during the hospital encounter of 07/13/18   2D echo with color flow doppler   Result Value Ref Range    EF 10 (A) 55 - 65    Mitral Valve Regurgitation MODERATE TO SEVERE (A)     Est. PA Systolic Pressure 57.51 (A)     Mitral Valve Mobility NORMAL     Tricuspid Valve Regurgitation MODERATE TO SEVERE (A)

## 2018-07-18 NOTE — PROGRESS NOTES
1700- Cardiology resident at bedside to assess patient. Orders 2mg IM Ativan PRN once per Pysch recs for agitation. Pt is currently sitting calmly in bed and not overly agitated. MD informed that we have not been able to obtain an O2 sat for about an hour.    1730 - Dr. Yang at bedside to reassess and inquires about IM Ativan for central line placement. IM ativan given. MD updated on current respiratory status.    1805 - Dr. Mickey Can to bedside for central line placement. Informed that O2 sats have still not been obtained and there is currently no cuff pressure. Pt placed on a non-rebreather.  Charge RN's called to bedside to try and obtain peripheral access with ultrasound. Pt mental status is progressively worsening and he is not as responsive.     1830 - Dr. Mickey Can back at bedside to attempt TLC placement, but pt cannot tolerate laying down for procedure and still has no obtainable O2 sat or BP. Anesthesia called at this time for emergent intubation, Anesthesia MD X 2 at bedside,  IO access gained, anesthesia MD pushed intubation drugs.

## 2018-07-18 NOTE — PLAN OF CARE
Problem: Patient Care Overview  Goal: Plan of Care Review  Outcome: Ongoing (interventions implemented as appropriate)  Recommendations     1. If able to extubate & advance diet, recommend 2 gram sodium diet (texture per SLP).   2. If unable to extubate, initiate enteral nutrition. Recommend Isosource 1.5 @ 40 mL/hr to provide 1440 calories (104% EEN), 65 grams of protein (100% EPN), 733 mL fluid.               - Hold for residuals >400 mL; additional fluid per MD.   3. RD to monitor & follow-up.

## 2018-07-18 NOTE — PROCEDURES
"Leonardo Byrd is a 47 y.o. male patient.    Temp: 97.8 °F (36.6 °C) (07/17/18 1500)  Pulse: (!) 113 (07/17/18 1925)  Resp: 16 (07/17/18 1925)  BP: (!) 40/19 (07/17/18 1925)  SpO2: 99 % (07/17/18 1907)  Weight: 50.4 kg (111 lb 1.8 oz) (07/17/18 0500)  Height: 5' 7" (170.2 cm) (07/15/18 1201)       Central Line  Date/Time: 7/17/2018 7:51 PM  Location procedure was performed: [facility] ([facility])  Performed by: TERESA VAZQUEZ  Pre-operative Diagnosis: CARDIOGENIC SHOCK, HYPOTENSION  Post-operative diagnosis: CARDIOGENIC SHOCK  Indications: med administration and vascular access    Anesthesia:  Local Anesthetic: lidocaine 1% without epinephrine  Preparation: skin prepped with ChloraPrep  Location details: right internal jugular  Catheter type: triple lumen  Catheter size: 8 Fr  Ultrasound guidance: yes  Vessel Caliber: medium, compressibility normal  Needle advanced into vessel with real time Ultrasound guidance.  Manometry: Yes  Number of attempts: 2  Assessment: placement verified by x-ray  Technical procedures used: SELDINGER  Complications: none  Estimated blood loss (mL): 3  Specimens: No  Implants: No  Post-procedure: line sutured and chlorhexidine patch  Complications: No          Teresa Can  7/17/2018  "

## 2018-07-18 NOTE — HPI
Given encephalopathy, history was obtained from chart and nurses.     48 y/o male with ICM (LVEF 13%) s/p ICD, CAD s/p PCI, HTN, HLD, current smoker, PAD (s/p aortobifemoral bypass, L SFA and pop PTAS in September and recent R SFA and R pop 3/8 by Dr. 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 He had not been compliant with his lasix.      In the ED, patient was found to be hypotensive to 80s-90s/50s-60s. HR was in 90s. EKG showed NSR. RR in teens-20s. SpO2 100% on 3 L NC. Afebrile, no leukocytosis. Initial lactate 3.8-4.9. BNP > 5400. Initial Cr 1.4 (was 1.1 two days prior). CXR showed improvement in cardiomegaly and interstitial attenuation, suggesting improving pulmonary edema, compared to prior. No consolidation. Bedside U/S revealed dilated IVC. Pt was given IVF and IV abx. Pt was given IV lasix followed by symptomatic improvement. 2D echo with CFD showed EF 10-15%, RV enlargement with moderately depressed systolic function, moderate-severe MR, moderate-severe TR, biatrial enlargement, pulm HTN with PA pressure 58, increased CVP, L pleural effusion, and ascites. Pt was admitted to the floor for acute on chronic combined HF. Initially, BP improved to [id number]/60s-70s, lactate improved to 3.4, but Cr up to 1.6. Overnight, BP down to 80s-90s/50s-60s again and had an episode of hypothermia to 95.4 F. The next morning, WBC up to 15, lactate up to 3.8, Cr up to 1.9. Cardiology was consulted for evaluation of cardiogenic shock in the setting of CHF exacerbation.    On 7/17, patient decompensated in the AM, refused all labs and needed psychiatry consult to be declared incompetent, family signed consent for CVC and RHC on behalf of patient. Later in PM he decompensated again requiring intubation, emergent CVC and A-Line placement and was placed on pressors.    On Psychiatry Interview:  Pt was unable to be interviewed secondary to intubation and sedation. HPI obtained per chart  review.

## 2018-07-18 NOTE — PLAN OF CARE
Problem: Patient Care Overview  Goal: Plan of Care Review  Outcome: Ongoing (interventions implemented as appropriate)  No further events overnight. Pt aggressively diuresed with net output of 5.2 L. Cards fellow called and notified of latest CVP of 6 at 0545 with urinary output still >500cc/hour. MD gave order to stop Lasix gtt at this time. Potassium replacements given. HR remains 80s-90s in NSR with rare PVCs. BP requiring no pressor support, MAPs 70s. SVO2 71-85. Dobutamine infusing at 5mcgs/kg/min. Oxygen saturation % on 28% FIO2 via vent. Rate decreased to 14 with repeat ABG drawn at 0500 per MD order. Afebrile. Skin now warm. Lactic 1.5. Creatinine down to 1.3. Liver enzymes elevated, Dr. Paul aware. Blood cultures drawn X2. Pt sedated on fentanyl to keep calm, however does arouse to voice when sedation decreased, moves all extremities spontaneously. Pupils brisk, equal and reactive to light. RASS -2 to -3. Wound care performed to left limb incision site. POC reviewed with pt, who was unable to verbalize understanding. Plan for possible extubation today.

## 2018-07-18 NOTE — PLAN OF CARE
Problem: Patient Care Overview  Goal: Plan of Care Review  Outcome: Ongoing (interventions implemented as appropriate)  No acute events throughout day. See vital signs and assessments in flowsheets. See below for updates on today's progress.     Pulmonary: remains intubated, no changes made to ventilator settings, plan to wean and attempt extubation tommorow    Cardiovascular: patient remains in SR 80-95, Diuresed 1100cc throughout the shift, CVP ranged from 4-7,     Neurological: patient remains sedated on fentanyl drip, arouses with minimal tactile or verbal stimuli.      Gastrointestinal: No bowel movements this shift    Genitourinary: Evans continues to drain,     Integumentary/Other: Bath given during shift, right BKA painted with betadine. PICC line placed and verified by X-ray today    Infusions: Dobutamine 5 mcg/kg/min infusing to R upper arm PICC, Fentanyl 225 mcg/hour    Patient progressing towards goals as tolerated, plan of care communicated and reviewed with Leonardo Schwartzr and family. All concerns addressed. Will continue to monitor.

## 2018-07-18 NOTE — SUBJECTIVE & OBJECTIVE
Interval History: Patient Intubated and sedated throughout the day. He does follow commands on low sedation.     Review of Systems   Unable to perform ROS: intubated        Objective:     Vital Signs (Most Recent):  Temp: 99.5 °F (37.5 °C) (07/18/18 1505)  Pulse: 86 (07/18/18 1734)  Resp: 14 (07/18/18 1734)  BP: (!) 104/52 (07/18/18 1700)  SpO2: 97 % (07/18/18 1734) Vital Signs (24h Range):  Temp:  [97.6 °F (36.4 °C)-99.7 °F (37.6 °C)] 99.5 °F (37.5 °C)  Pulse:  [] 86  Resp:  [2-116] 14  SpO2:  [93 %-100 %] 97 %  BP: ()/(19-99) 104/52  Arterial Line BP: ()/(45-89) 104/51     Weight: 52 kg (114 lb 10.2 oz)  Body mass index is 17.96 kg/m².     SpO2: 97 %  O2 Device (Oxygen Therapy): ventilator      Intake/Output Summary (Last 24 hours) at 07/18/18 1749  Last data filed at 07/18/18 1700   Gross per 24 hour   Intake          1424.59 ml   Output             7485 ml   Net         -6060.41 ml       Lines/Drains/Airways     Peripherally Inserted Central Catheter Line                 PICC Double Lumen 07/18/18 1215 right brachial less than 1 day          Central Venous Catheter Line                 Percutaneous Central Line Insertion/Assessment - triple lumen  07/17/18 1920 right internal jugular less than 1 day          Drain                 NG/OG Tube 07/17/18 2000 18 Fr. Center mouth less than 1 day         Urethral Catheter 07/17/18 2010 less than 1 day          Airway                 Airway - Non-Surgical 07/17/18 1900 Endotracheal Tube less than 1 day          Arterial Line                 Arterial Line 07/17/18 2000 Right Brachial less than 1 day                Physical Exam   Constitutional: He appears well-developed. He appears cachectic. He is sedated.   Neck: JVD present. No thyroid mass present.   Cardiovascular: Tachycardia present.    Pulmonary/Chest: He is in respiratory distress. He exhibits no tenderness.   Abdominal: He exhibits no distension. There is no tenderness.   Neurological: He is  unresponsive.   Skin: Skin is dry.          Significant Labs:   CMP   Recent Labs  Lab 07/17/18  0354 07/17/18 1942 07/18/18  0005 07/18/18  0508   *  133* 140 139 140   K 5.3*  5.3* 4.3 3.6 3.8   CL 99  99 98 98 99   CO2 12*  12* 14* 21* 28   GLU 86  86 133* 160* 103   BUN 34*  34* 39* 39* 36*   CREATININE 1.5*  1.5* 1.6* 1.5* 1.3   CALCIUM 8.8  8.8 8.5* 8.9 8.8   PROT 7.5  7.5 7.0  --  7.0   ALBUMIN 3.0*  3.0* 2.9*  --  2.9*   BILITOT 1.6*  1.6* 1.7*  --  2.2*   ALKPHOS 286*  286* 283*  --  286*   AST 78*  78* 137*  --  245*   ALT 45*  45* 65*  --  100*   ANIONGAP 22*  22* 28* 20* 13   ESTGFRAFRICA >60.0  >60.0 57.6* >60.0 >60.0   EGFRNONAA 53.9*  53.9* 49.8* 53.9* >60.0   , CBC   Recent Labs  Lab 07/17/18  0354 07/17/18 1942 07/18/18  0508   WBC 8.37  8.37 8.56 8.64   HGB 8.5*  8.5* 8.1* 8.0*   HCT 27.4*  27.4* 25.3* 24.8*     341 326 292    and Troponin No results for input(s): TROPONINI in the last 48 hours.    Significant Imaging: Echocardiogram:   2D echo with color flow doppler:   Results for orders placed or performed during the hospital encounter of 07/13/18   2D echo with color flow doppler   Result Value Ref Range    EF 10 (A) 55 - 65    Mitral Valve Regurgitation MODERATE TO SEVERE (A)     Est. PA Systolic Pressure 57.51 (A)     Mitral Valve Mobility NORMAL     Tricuspid Valve Regurgitation MODERATE TO SEVERE (A)

## 2018-07-18 NOTE — PROCEDURES
"Leonardo Byrd is a 47 y.o. male patient.    Temp: 97.7 °F (36.5 °C) (07/18/18 0700)  Pulse: 88 (07/18/18 1140)  Resp: 14 (07/18/18 1140)  BP: (!) 107/54 (07/18/18 1100)  SpO2: 96 % (07/18/18 1140)  Weight: 52 kg (114 lb 10.2 oz) (07/18/18 1000)  Height: 5' 7" (170.2 cm) (07/18/18 1000)    PICC  Date/Time: 7/18/2018 12:15 PM  Performed by: YUSEF SWAIN  Consent Done: Yes  Time out: Immediately prior to procedure a time out was called to verify the correct patient, procedure, equipment, support staff and site/side marked as required  Indications: med administration and vascular access  Anesthesia: local infiltration  Local anesthetic: lidocaine 1% without epinephrine  Anesthetic Total (mL): 2  Preparation: skin prepped with ChloraPrep  Skin prep agent dried: skin prep agent completely dried prior to procedure  Sterile barriers: all five maximum sterile barriers used - cap, mask, sterile gown, sterile gloves, and large sterile sheet  Hand hygiene: hand hygiene performed prior to central venous catheter insertion  Location details: right brachial  Catheter type: double lumen  Catheter size: 5 Fr  Catheter Length: 34cm    Ultrasound guidance: yes  Vessel Caliber: patent, compressibility normal  Needle advanced into vessel with real time Ultrasound guidance.  Guidewire confirmed in vessel.  Image recorded and saved.  Sterile sheath used.  Number of attempts: 1  Post-procedure: blood return through all ports, chlorhexidine patch and sterile dressing applied  Technical procedures used: 3CG  Implants: No  Assessment: placement verified by x-ray  Complications: none          Paty Antoine  7/18/2018  "

## 2018-07-18 NOTE — PROGRESS NOTES
[facility]  Cardiology  Progress Note    Patient Name: Leonardo Byrd  MRN: [id number]  Admission Date: 7/13/2018  Hospital Length of Stay: 4 days  Code Status: Full Code   Attending Physician: Antoni Yang MD   Primary Care Physician: Indio Aquino MD  Expected Discharge Date: 7/26/2018  Principal Problem:Acute on chronic combined systolic and diastolic congestive heart failure    Subjective:     Hospital Course:   7/17: Please see several notes from today, In summary; patient decompensated in the AM, refused all labs and needed psychiatry consult to be declared incompetent, family signed consent for CVC and RHC on behalf of patient. Later in PM he decompensated again requiring intubation, emergent CVC and A-Line placement and was placed on pressors.    Interval History: Patient Intubated and sedated throughout the day. He does follow commands on low sedation.     Review of Systems   Unable to perform ROS: intubated        Objective:     Vital Signs (Most Recent):  Temp: 99.5 °F (37.5 °C) (07/18/18 1505)  Pulse: 86 (07/18/18 1734)  Resp: 14 (07/18/18 1734)  BP: (!) 104/52 (07/18/18 1700)  SpO2: 97 % (07/18/18 1734) Vital Signs (24h Range):  Temp:  [97.6 °F (36.4 °C)-99.7 °F (37.6 °C)] 99.5 °F (37.5 °C)  Pulse:  [] 86  Resp:  [2-116] 14  SpO2:  [93 %-100 %] 97 %  BP: ()/(19-99) 104/52  Arterial Line BP: ()/(45-89) 104/51     Weight: 52 kg (114 lb 10.2 oz)  Body mass index is 17.96 kg/m².     SpO2: 97 %  O2 Device (Oxygen Therapy): ventilator      Intake/Output Summary (Last 24 hours) at 07/18/18 1749  Last data filed at 07/18/18 1700   Gross per 24 hour   Intake          1424.59 ml   Output             7485 ml   Net         -6060.41 ml       Lines/Drains/Airways     Peripherally Inserted Central Catheter Line                 PICC Double Lumen 07/18/18 1215 right brachial less than 1 day          Central Venous Catheter Line                 Percutaneous Central Line  Insertion/Assessment - triple lumen  07/17/18 1920 right internal jugular less than 1 day          Drain                 NG/OG Tube 07/17/18 2000 18 Fr. Center mouth less than 1 day         Urethral Catheter 07/17/18 2010 less than 1 day          Airway                 Airway - Non-Surgical 07/17/18 1900 Endotracheal Tube less than 1 day          Arterial Line                 Arterial Line 07/17/18 2000 Right Brachial less than 1 day                Physical Exam   Constitutional: He appears well-developed. He appears cachectic. He is sedated.   Neck: JVD present. No thyroid mass present.   Cardiovascular: Tachycardia present.    Pulmonary/Chest: He is in respiratory distress. He exhibits no tenderness.   Abdominal: He exhibits no distension. There is no tenderness.   Neurological: He is unresponsive.   Skin: Skin is dry.          Significant Labs:   CMP   Recent Labs  Lab 07/17/18 0354 07/17/18 1942 07/18/18  0005 07/18/18  0508   *  133* 140 139 140   K 5.3*  5.3* 4.3 3.6 3.8   CL 99  99 98 98 99   CO2 12*  12* 14* 21* 28   GLU 86  86 133* 160* 103   BUN 34*  34* 39* 39* 36*   CREATININE 1.5*  1.5* 1.6* 1.5* 1.3   CALCIUM 8.8  8.8 8.5* 8.9 8.8   PROT 7.5  7.5 7.0  --  7.0   ALBUMIN 3.0*  3.0* 2.9*  --  2.9*   BILITOT 1.6*  1.6* 1.7*  --  2.2*   ALKPHOS 286*  286* 283*  --  286*   AST 78*  78* 137*  --  245*   ALT 45*  45* 65*  --  100*   ANIONGAP 22*  22* 28* 20* 13   ESTGFRAFRICA >60.0  >60.0 57.6* >60.0 >60.0   EGFRNONAA 53.9*  53.9* 49.8* 53.9* >60.0   , CBC   Recent Labs  Lab 07/17/18  0354 07/17/18 1942 07/18/18  0508   WBC 8.37  8.37 8.56 8.64   HGB 8.5*  8.5* 8.1* 8.0*   HCT 27.4*  27.4* 25.3* 24.8*     341 326 292    and Troponin No results for input(s): TROPONINI in the last 48 hours.    Significant Imaging: Echocardiogram:   2D echo with color flow doppler:   Results for orders placed or performed during the hospital encounter of 07/13/18   2D echo with color flow  doppler   Result Value Ref Range    EF 10 (A) 55 - 65    Mitral Valve Regurgitation MODERATE TO SEVERE (A)     Est. PA Systolic Pressure 57.51 (A)     Mitral Valve Mobility NORMAL     Tricuspid Valve Regurgitation MODERATE TO SEVERE (A)      Assessment and Plan:       * Acute on chronic combined systolic and diastolic congestive heart failure    -CVP 4-6  - Well diuresed 5L off  - Hold Lasix and watch volume status  -SVO2 improving now in 50s  -Continue , Picc line           Acute respiratory failure with hypoxia    - Doing well on minimal vent settings  - Likely extubate tomorrow AM  - Hypoxia resolved with diuresis  -ABG improved, HCO3 normal          Encephalopathy    Likely secondary to cardiogenic shock. Treat per heart failure section.        Lactic acid acidosis    As per acute on chronic combined systemic and diastolic heart failure section.  -Trend Lactate        COPD, severe    -Duonebs prn  - Inhaled steroids        Alcohol abuse    -Thiamine  -Monitor for withdrawal            VTE Risk Mitigation         Ordered     IP VTE HIGH RISK PATIENT  Once      07/14/18 0819     Place sequential compression device  Until discontinued      07/14/18 0819     IP VTE HIGH RISK PATIENT  Once      07/14/18 0819          Teresa Can MD  Cardiology  [facility]

## 2018-07-18 NOTE — ASSESSMENT & PLAN NOTE
- Doing well on minimal vent settings  - Likely extubate tomorrow AM  - Hypoxia resolved with diuresis  -ABG improved, HCO3 normal

## 2018-07-18 NOTE — ANESTHESIA PROCEDURE NOTES
Intubation    Diagnosis: Acute hypoxemic respiratory failure  Patient location during procedure: ICU  Procedure start time: 7/17/2018 7:01 PM  Timeout: 7/17/2018 7:00 PM  Procedure end time: 7/17/2018 7:10 PM  Staffing  Anesthesiologist: KATHARINE OWEN  Resident/CRNA: BRYCE MAO  Performed: resident/CRNA   Anesthesiologist was present at the time of the procedure.  Preanesthetic Checklist  Completed: patient identified, site marked, surgical consent, pre-op evaluation, timeout performed, IV checked, risks and benefits discussed, monitors and equipment checked and anesthesia consent given  Intubation  Pre-oxygenation. Induction: intravenous rapid sequence, mask ventilation: n/a.  Intubation: postinduction, laryngoscopy direct, Daniela 3.  Endotracheal Tube: oral, 8.0 mm ID, cuffed (inflated to minimal occlusive pressure)  Attempts: 1, Grade I - full view of cords  DIFFICULT INTUBATION DESCRIPTOR: Significant amount of secretion within the oropharynx   Tube secured at 25 cm at the lips.  Findings post-intubation: bilateral breath sounds, positive ETCO2, atraumatic / condition of teeth unchanged  Position Confirmation: auscultation  Additional Notes  No IV access upon arrival. IO placed x3, left shoulder ultimately worked. No BP for at least 45 minutes prior to arrival per RN at bedside and pulse ox had not been picking up SaO2 for even longer; of note, during this time pt had strong femoral pulse and patient was very agitated as we were placing IO and started to give medications. Pre-treated with epinephrine and phenylephrine followed by etomidate and rocuronium. RSI completed, ETT placed immediately. Stimulation with direct laryngoscopy along with pressors resulted in BP reading. Central line soon placed by primary followed by brachial arterial line.

## 2018-07-18 NOTE — CONSULTS
"  [facility]  Adult Nutrition  Consult Note    SUMMARY     Recommendations    1. If able to extubate & advance diet, recommend 2 gram sodium diet (texture per SLP).   2. If unable to extubate, initiate enteral nutrition. Recommend Isosource 1.5 @ 40 mL/hr to provide 1440 calories (104% EEN), 65 grams of protein (100% EPN), 733 mL fluid.    - Hold for residuals >400 mL; additional fluid per MD.   3. RD to monitor & follow-up.    Goals: Meet % EEN, EPN  Nutrition Goal Status: new  Communication of RD Recs: reviewed with RN    Reason for Assessment    Reason for Assessment: consult  Diagnosis: other (see comments) (HF)  Relevant Medical History: CHF, HTN, RYAN, BKA  Interdisciplinary Rounds: did not attend    General Information Comments: Pt intubated overnight, possible extubation today. Prior to intubation, pt on cardiac diet w/ 50% PO intake. Unsure of UBW, but chart review shows wt loss PTA. Patient appears underweight, but RD unable to determine patient's PO intake PTA. Therefore, RD unable to determine if patient meets criteria for malnutrition at this time.    Nutrition Discharge Planning: Unable to determine    Nutrition/Diet History    Patient Reported Diet/Restrictions/Preferences: other (see comments) (JIAN)  Do you have any cultural, spiritual, Zoroastrian conflicts, given your current situation?: none  Factors Affecting Nutritional Intake: NPO, on mechanical ventilation    Anthropometrics    Temp: 97.7 °F (36.5 °C)  Height Method: Stated  Height: 5' 7" (170.2 cm)  Height (inches): 67 in  Weight Method: Bed Scale  Weight: 52 kg (114 lb 10.2 oz)  Weight (lb): 114.64 lb  Ideal Body Weight (IBW), Male: 148 lb  % Ideal Body Weight, Male (lb): 77.46 lb  BMI (Calculated): 18  BMI Grade: 17 - 18.4 protein-energy malnutrition grade I  Usual Body Weight (UBW), kg:  (125# (6/2018), per chart review)  Amputation %: 5.9  Total Amputation %: 5.9  Amputation Ideal Body Weight (IBW), Male (lb): 142.1 " lb  Amputee BMI (kg/m2): 19.13 kg/m2    Lab/Procedures/Meds    Pertinent Labs Reviewed: reviewed  Pertinent Labs Comments: BUN 36  Pertinent Medications Reviewed: reviewed  Pertinent Medications Comments: Dobutamine, Fentanyl    Physical Findings/Assessment    Overall Physical Appearance: amputee, loss of muscle mass, loss of subcutaneous fat, underweight, on ventilator support  Tubes: orogastric tube  Oral/Mouth Cavity: WDL  Skin: intact    Estimated/Assessed Needs    Weight Used For Calorie Calculations: 52 kg (114 lb 10.2 oz)     Energy Calorie Requirements (kcal): 1386 kcal/d  Energy Need Method: [facility]     Protein Requirements: 63-78 g/d (1.2-1.5 g/kg)  Weight Used For Protein Calculations: 52 kg (114 lb 10.2 oz)     Fluid Need Method: other (see comments) (Per MD or 1 mL/kcal)    Nutrition Prescription Ordered    Current Diet Order: NPO    Evaluation of Received Nutrient/Fluid Intake    Comments: LBM: not recorded    Nutrition Risk    Level of Risk/Frequency of Follow-up:  (2x/week)     Assessment and Plan    Nutrition Problem  Inadequate energy intake    Related to (etiology):   Inability to consume sufficient energy    Signs and Symptoms (as evidenced by):   NPO with no alternate means of nutrition      Nutrition Diagnosis Status:   New    Monitor and Evaluation    Food and Nutrient Intake: energy intake, food and beverage intake, enteral nutrition intake  Food and Nutrient Adminstration: diet order, enteral and parenteral nutrition administration  Physical Activity and Function: nutrition-related ADLs and IADLs  Anthropometric Measurements: weight, weight change  Biochemical Data, Medical Tests and Procedures: lipid profile, inflammatory profile, glucose/endocrine profile, gastrointestinal profile, electrolyte and renal panel  Nutrition-Focused Physical Findings: overall appearance     Nutrition Follow-Up    RD Follow-up?: Yes

## 2018-07-18 NOTE — CONSULTS
[facility]  Psychiatry  Consult Note    Patient Name: Leonardo Byrd  MRN: [id number]   Code Status: Full Code  Admission Date: 7/13/2018  Hospital Length of Stay: 4 days  Attending Physician: Antoni Yang MD  Primary Care Provider: Indio Aquino MD    Current Legal Status: N/A    Patient information was obtained from Medical Team adn Chart Review.   Consults  Subjective:     Principal Problem:Acute on chronic combined systolic and diastolic congestive heart failure    Chief Complaint:  Agitation     HPI: Given encephalopathy, history was obtained from chart and nurses.     46 y/o male with ICM (LVEF 13%) s/p ICD, CAD s/p PCI, HTN, HLD, current smoker, PAD (s/p aortobifemoral bypass, L SFA and pop PTAS in September and recent R SFA and R pop 3/8 by Dr. 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 He had not been compliant with his lasix.      In the ED, patient was found to be hypotensive to 80s-90s/50s-60s. HR was in 90s. EKG showed NSR. RR in teens-20s. SpO2 100% on 3 L NC. Afebrile, no leukocytosis. Initial lactate 3.8-4.9. BNP > 5400. Initial Cr 1.4 (was 1.1 two days prior). CXR showed improvement in cardiomegaly and interstitial attenuation, suggesting improving pulmonary edema, compared to prior. No consolidation. Bedside U/S revealed dilated IVC. Pt was given IVF and IV abx. Pt was given IV lasix followed by symptomatic improvement. 2D echo with CFD showed EF 10-15%, RV enlargement with moderately depressed systolic function, moderate-severe MR, moderate-severe TR, biatrial enlargement, pulm HTN with PA pressure 58, increased CVP, L pleural effusion, and ascites. Pt was admitted to the floor for acute on chronic combined HF. Initially, BP improved to [id number]/60s-70s, lactate improved to 3.4, but Cr up to 1.6. Overnight, BP down to 80s-90s/50s-60s again and had an episode of hypothermia to 95.4 F. The next morning, WBC up to 15, lactate up to  3.8, Cr up to 1.9. Cardiology was consulted for evaluation of cardiogenic shock in the setting of CHF exacerbation.    On 7/17, patient decompensated in the AM, refused all labs and needed psychiatry consult to be declared incompetent, family signed consent for CVC and RHC on behalf of patient. Later in PM he decompensated again requiring intubation, emergent CVC and A-Line placement and was placed on pressors.    On Psychiatry Interview:  Pt was unable to be interviewed secondary to intubation and sedation. HPI obtained per chart review.    Hospital Course: No notes on file         Patient History           Medical as of 7/18/2018     Past Medical History     Diagnosis Date Comments Source    AICD (automatic cardioverter/defibrillator) present -- -- Provider    RYAN (acute kidney injury) 3/25/2018 -- Provider    CHF (congestive heart failure) -- -- Provider    COPD with acute bronchitis 6/12/2018 -- Provider    Coronary artery disease -- -- Provider    Encounter for blood transfusion -- -- Provider    Hx of psychiatric care -- -- Provider    Hyperlipemia -- -- Provider    Hypertension -- -- Provider    MI (myocardial infarction) -- -- Provider    Neuropathy -- -- Provider    PAD (peripheral artery disease) -- -- Provider    Psychiatric problem -- -- Provider    PVD (peripheral vascular disease) -- -- Provider          Pertinent Negatives     Diagnosis Date Noted Comments Source    Cancer 2/27/2017 -- Provider    Diabetes mellitus 2/27/2017 -- Provider    History of psychiatric hospitalization 6/20/2018 -- Provider    Teresa 6/20/2018 -- Provider    Psychiatric exam requested by authority 6/20/2018 -- Provider    Seizures 2/27/2017 -- Provider    Stroke 2/27/2017 -- Provider    Suicide attempt 6/20/2018 -- Provider    Therapy 6/20/2018 -- Provider                  Surgical as of 7/18/2018     Past Surgical History     Procedure Laterality Date Comments Source    AMPUTATION Right -- great toe Provider    KNEE  ARTHROPLASTY Left -- -- Provider    VASCULAR SURGERY -- -- fem-pop bypass Provider    coronary stents [Other] -- -- -- Provider    CARDIAC DEFIBRILLATOR PLACEMENT -- -- -- Provider    EXPLORATION AND EVaCUATION OF HEMATOMA OF UPPER EXTREMITY [Other] Left -- -- Provider    BYBPASS GRAFT AORTOBIUFEMORAL [Other] -- -- -- Provider    BELOW KNEE AMPUTATION OF LOWER EXTREMITY Left 6/29/2018 Procedure: AMPUTATION, BELOW KNEE;  Surgeon: Norris Trejo MD;  Location: [facility] OR [address] [place] [address];  Service: Peripheral Vascular;  Laterality: Left; Provider    RIGHT HEART CATHETERIZATION FOR CONGENITAL HEART DEFECT Bilateral 7/17/2018 Procedure: CATHETERIZATION, HEART, RIGHT, FOR CONGENITAL HEART DEFECT;  Surgeon: January Mosley MD;  Location: [facility] CATH LAB;  Service: Cardiology;  Laterality: Bilateral; Provider                  Family as of 7/18/2018    **None**           Tobacco Use as of 7/18/2018     Smoking Status Smoking Start Date Smoking Quit Date Packs/day Years Used    Former Smoker -- 5/1/2018 0.50 --    Types Comments Smokeless Tobacco Status Smokeless Tobacco Quit Date Source     Chew Uses nicotine gum and nicotine patches Former User 8/2/1980 Provider            Alcohol Use as of 7/18/2018     Alcohol Use Drinks/Week Alcohol/Week Comments Source    Yes -- -- rarely Provider            Drug Use as of 7/18/2018     Drug Use Types Frequency Comments Source    No -- -- -- Provider            Sexual Activity as of 7/18/2018     Sexually Active Birth Control Partners Comments Source    Yes -- Female -- Provider            Activities of Daily Living as of 7/18/2018     Activities of Daily Living Question Response Comments Source    Patient feels they ought to cut down on drinking/drug use Not Asked -- Provider    Patient annoyed by others criticizing their drinking/drug use Not Asked -- Provider    Patient has felt bad or guilty about drinking/drug use Not Asked -- Provider    Patient has had a drink/used drugs as an eye opener in  the AM Not Asked -- Provider            Social Documentation as of 7/18/2018    **None**           Occupational as of 7/18/2018    **None**           Socioeconomic as of 7/18/2018     Marital Status Spouse Name Number of Children Years Education Preferred Language Ethnicity Race Source    Single -- -- -- English /White White --         Pertinent History Q A Comments    as of 7/18/2018 Lives with      Place in Birth Order      Lives in      Number of Siblings other     Raised by      Legal Involvement      Childhood Trauma      Criminal History of arrest and incarceration     Financial Status      Highest Level of Education high school graduation     Does patient have access to a firearm? No      Service No     Primary Leisure Activity      Spirituality actively participates in organized [employer]      Past Medical History:   Diagnosis Date    AICD (automatic cardioverter/defibrillator) present     RYAN (acute kidney injury) 3/25/2018    CHF (congestive heart failure)     COPD with acute bronchitis 6/12/2018    Coronary artery disease     Encounter for blood transfusion     Hx of psychiatric care     Hyperlipemia     Hypertension     MI (myocardial infarction)     Neuropathy     PAD (peripheral artery disease)     Psychiatric problem     PVD (peripheral vascular disease)      Past Surgical History:   Procedure Laterality Date    AMPUTATION Right     great toe    BELOW KNEE AMPUTATION OF LOWER EXTREMITY Left 6/29/2018    Procedure: AMPUTATION, BELOW KNEE;  Surgeon: Norris Trejo MD;  Location: [facility] OR [address] [place] [address];  Service: Peripheral Vascular;  Laterality: Left;    BYBPASS GRAFT AORTOBIUFEMORAL      CARDIAC DEFIBRILLATOR PLACEMENT      coronary stents      EXPLORATION AND EVaCUATION OF HEMATOMA OF UPPER EXTREMITY Left     KNEE ARTHROPLASTY Left     RIGHT HEART CATHETERIZATION FOR CONGENITAL HEART DEFECT Bilateral 7/17/2018    Procedure: CATHETERIZATION, HEART, RIGHT, FOR CONGENITAL  HEART DEFECT;  Surgeon: January Mosley MD;  Location: [facility] CATH LAB;  Service: Cardiology;  Laterality: Bilateral;    VASCULAR SURGERY      fem-pop bypass     Family History     None        Social History Main Topics    Smoking status: Former Smoker     Packs/day: 0.50     Quit date: 5/1/2018    Smokeless tobacco: Former User     Types: Chew     Quit date: 8/2/1980      Comment: Uses nicotine gum and nicotine patches    Alcohol use Yes      Comment: rarely    Drug use: No    Sexual activity: Yes     Partners: Female     Review of patient's allergies indicates:  No Known Allergies    No current facility-administered medications on file prior to encounter.      Current Outpatient Prescriptions on File Prior to Encounter   Medication Sig    atorvastatin (LIPITOR) 80 MG tablet Take 1 tablet (80 mg total) by mouth every evening.    HYDROcodone-acetaminophen (NORCO) 5-325 mg per tablet Take 1 tablet by mouth every 4 (four) hours as needed for Pain.    lidocaine (XYLOCAINE) 5 % Oint ointment Apply topically 4 (four) times daily. Apply to entire left foot    metoprolol tartrate (LOPRESSOR) 25 MG tablet Take 0.5 tablets (12.5 mg total) by mouth 2 (two) times daily.    nicotine (NICODERM CQ) 21 mg/24 hr Place 1 patch onto the skin once daily.    oxyCODONE (ROXICODONE) 10 mg Tab immediate release tablet Take 1 tablet (10 mg total) by mouth every 4 (four) hours as needed for Pain.    pregabalin (LYRICA) 75 MG capsule Take 1 capsule (75 mg total) by mouth every evening.    aspirin (ECOTRIN) 81 MG EC tablet Take 81 mg by mouth once daily.    collagenase ointment Apply topically once daily.    divalproex (DEPAKOTE) 250 MG EC tablet Take 250mg (1 tablet) at 0700, then take 250mg (1 tablet) at 1200, then take 500mg (2 tablets0 at 1900    polyethylene glycol (GLYCOLAX) 17 gram PwPk Take 17 g by mouth 2 (two) times daily as needed (constipation).    senna-docusate 8.6-50 mg (PERICOLACE) 8.6-50 mg per tablet Take 1  "tablet by mouth 2 (two) times daily.    thiamine 100 MG tablet Take 1 tablet (100 mg total) by mouth once daily.     Psychotherapeutics     None        Review of Systems  Strengths and Liabilities: unable to assess    Objective:     Vital Signs (Most Recent):  Temp: 99.5 °F (37.5 °C) (07/18/18 1505)  Pulse: 83 (07/18/18 1505)  Resp: 14 (07/18/18 1500)  BP: (!) 103/48 (07/18/18 1505)  SpO2: (!) 94 % (07/18/18 1500) Vital Signs (24h Range):  Temp:  [97.6 °F (36.4 °C)-99.7 °F (37.6 °C)] 99.5 °F (37.5 °C)  Pulse:  [] 83  Resp:  [2-116] 14  SpO2:  [88 %-100 %] 94 %  BP: ()/(19-99) 103/48  Arterial Line BP: ()/(45-89) 104/48     Height: 5' 7" (170.2 cm)  Weight: 52 kg (114 lb 10.2 oz)  Body mass index is 17.96 kg/m².      Intake/Output Summary (Last 24 hours) at 07/18/18 1529  Last data filed at 07/18/18 1400   Gross per 24 hour   Intake          1330.08 ml   Output             7360 ml   Net         -6029.92 ml       Physical Exam   Psychiatric:   Mental Status Exam:  Appearance: lying in bed, intubated  Level of Consciousness: sedated  Grooming: hospital gown  Psychomotor Behavior: sedated  Speech: unable to assess  Language: unable to assess  Orientation: unable to assess  Attention Span/Concentration: unable to assess  Memory: unable to assess  Cognition: unable to assess  Mood: unable to assess  Affect: unable to assess  Thought Process: unable to assess  Associations:unable to assess  Thought Content:unable to assess  Fund of Knowledge: adequate to education level  Insight:unable to assess  Judgment: unable to assess          Significant Labs:   Last 24 Hours:   Recent Lab Results       07/18/18  0509 07/18/18  0508 07/18/18  0501 07/18/18  0008 07/18/18  0005      Immature Granulocytes  0.6(H)        Immature Grans (Abs)  0.05  Comment:  Mild elevation in immature granulocytes is non specific and   can be seen in a variety of conditions including stress response,   acute inflammation, trauma and " pregnancy. Correlation with other   laboratory and clinical findings is essential.  (H)        Albumin  2.9(L)        Alkaline Phosphatase  286(H)        Allens Test N/A  N/A N/A      ALT  100(H)        Anion Gap  13   20(H)     AST  245(H)        Baso #  0.02        Basophil%  0.2        Bilirubin, Direct          Total Bilirubin  2.2  Comment:  For infants and newborns, interpretation of results should be based  on gestational age, weight and in agreement with clinical  observations.  Premature Infant recommended reference ranges:  Up to 24 hours.............<8.0 mg/dL  Up to 48 hours............<12.0 mg/dL  3-5 days..................<15.0 mg/dL  6-29 days.................<15.0 mg/dL  (H)        Blood Culture, Routine          Site Other  Jenny/UAC Other      BUN, Bld  36(H)   39(H)     Calcium  8.8   8.9     Chloride  99   98     CO2  28   21(L)     Creatinine  1.3   1.5(H)     DelSys   Adult Vent       Differential Method  Automated        eGFR if   >60.0   >60.0     eGFR if non   >60.0  Comment:  Calculation used to obtain the estimated glomerular filtration  rate (eGFR) is the CKD-EPI equation.      53.9  Comment:  Calculation used to obtain the estimated glomerular filtration  rate (eGFR) is the CKD-EPI equation.   (A)     Eos #  0.0        Eosinophil%  0.1        FiO2   40       Glucose  103   160(H)     Gran # (ANC)  6.4        Gran%  74.4(H)        Group & Rh          Hematocrit  24.8(L)        Hemoglobin  8.0(L)        INDIRECT ARTEM          Coumadin Monitoring INR          Lactate, Jose Armando  1.5  Comment:  Falsely low lactic acid results can be found in samples   containing >=13.0 mg/dL total bilirubin and/or >=3.5 mg/dL   direct bilirubin.     5.9  Comment:  Falsely low lactic acid results can be found in samples   containing >=13.0 mg/dL total bilirubin and/or >=3.5 mg/dL   direct bilirubin.  *Critical value -   Results called to and read back by: Mariella Lama RN  at  0107 on 07/18/2018 by jing  ()     Lymph #  1.5        Lymph%  16.9(L)        Magnesium  2.4   1.8     MCH  30.3        MCHC  32.3        MCV  94        Min Vol   7.8       Mode   AC/PRVC       Mono #  0.7        Mono%  7.8        MPV  10.8        nRBC  5(A)        PEEP   5       Phosphorus          PiP   22       Platelets  292        POC BE 11  14 2      POC HCO3 33.8(H)  35.5(H) 24.7      POC PCO2 39.3  37.5 28.4(L)      POC PH 7.542(H)  7.584(HH) 7.548(H)      POC PO2 44  122(H) 42      POC SATURATED O2 85(L)  99 84(L)      POC TCO2 35(H)  37(H) 26      Potassium  3.8   3.6     Total Protein  7.0        Protime          Rate   16       RBC  2.64(L)        RDW  20.6(H)        Sample VENOUS  ARTERIAL VENOUS      Sodium  140   139     Sp02   99       Vt   450       WBC  8.64                    07/17/18  2359 07/17/18  2200 07/17/18  2110 07/17/18  2028 07/17/18  1959      Immature Granulocytes          Immature Grans (Abs)          Albumin          Alkaline Phosphatase          Allens Test N/A   N/A N/A     ALT          Anion Gap          AST          Baso #          Basophil%          Bilirubin, Direct          Total Bilirubin          Blood Culture, Routine  No Growth to date[P] No Growth to date[P]       Site Jenny/UAC   Other Jenny/UAC     BUN, Bld          Calcium          Chloride          CO2          Creatinine          DelSys Adult Vent    Adult Vent     Differential Method          eGFR if           eGFR if non           Eos #          Eosinophil%          FiO2 60    100     Glucose          Gran # (ANC)          Gran%          Group & Rh          Hematocrit          Hemoglobin          INDIRECT ARTEM          Coumadin Monitoring INR          Lactate, Jose Armando          Lymph #          Lymph%          Magnesium          MCH          MCHC          MCV          Min Vol 11.4    11.8     Mode AC/PRVC    AC/PRVC     Mono #          Mono%          MPV          nRBC          PEEP 8     8     Phosphorus          PiP 26    22     Platelets          POC BE 4   -15 -14     POC HCO3 25.9   13.3(L) 13.3(L)     POC PCO2 26.2(LL)   34.8(L) 29.5(L)     POC PH 7.604(HH)   7.189(L) 7.261(LL)     POC PO2 186(H)   44 227(H)     POC SATURATED O2 100   70(L) 100     POC TCO2 27   14(L) 14(L)     Potassium          Total Protein          Protime          Rate 25    25     RBC          RDW          Sample ARTERIAL   VENOUS ARTERIAL     Sodium          Sp02 99    99     Vt 450    450     WBC                      07/17/18 1945 07/17/18 1942 07/17/18  1925      Immature Granulocytes  0.9(H)      Immature Grans (Abs)  0.08  Comment:  Mild elevation in immature granulocytes is non specific and   can be seen in a variety of conditions including stress response,   acute inflammation, trauma and pregnancy. Correlation with other   laboratory and clinical findings is essential.  (H)      Albumin  2.9(L)      Alkaline Phosphatase  283(H)      Allens Test   N/A     ALT  65(H)      Anion Gap  28(H)      AST  137(H)      Baso #  0.01      Basophil%  0.1      Bilirubin, Direct  1.2(H)      Total Bilirubin  1.7  Comment:  For infants and newborns, interpretation of results should be based  on gestational age, weight and in agreement with clinical  observations.  Premature Infant recommended reference ranges:  Up to 24 hours.............<8.0 mg/dL  Up to 48 hours............<12.0 mg/dL  3-5 days..................<15.0 mg/dL  6-29 days.................<15.0 mg/dL  (H)      Blood Culture, Routine        Site   Other     BUN, Bld  39(H)      Calcium  8.5(L)      Chloride  98      CO2  14(L)      Creatinine  1.6(H)      DelSys        Differential Method  Automated      eGFR if   57.6(A)      eGFR if non   49.8  Comment:  Calculation used to obtain the estimated glomerular filtration  rate (eGFR) is the CKD-EPI equation.   (A)      Eos #  0.0      Eosinophil%  0.0      FiO2        Glucose  133(H)       Gran # (ANC)  6.6      Gran%  76.8(H)      Group & Rh  A NEG      Hematocrit  25.3(L)      Hemoglobin  8.1(L)      INDIRECT ARTEM  NEG      Coumadin Monitoring INR  2.2  Comment:  Coumadin Therapy:  2.0 - 3.0 for INR for all indicators except mechanical heart valves  and antiphospholipid syndromes which should use 2.5 - 3.5.  (H)      Lactate, Jose Armando >12.0  Comment:  Falsely low lactic acid results can be found in samples   containing >=13.0 mg/dL total bilirubin and/or >=3.5 mg/dL   direct bilirubin.  *Critical value -  Results called to and read back by:Mariella Lama RN  ()       Lymph #  1.4      Lymph%  16.2(L)      Magnesium  2.2      MCH  31.2(H)      MCHC  32.0      MCV  97      Min Vol        Mode        Mono #  0.5      Mono%  6.0      MPV  11.3      nRBC  5(A)      PEEP        Phosphorus  6.4(H)      PiP        Platelets  326      POC BE   -22     POC HCO3   7.6(L)     POC PCO2   25.9(L)     POC PH   7.074(L)     POC PO2   83(HH)     POC SATURATED O2   91(L)     POC TCO2   8(L)     Potassium  4.3      Total Protein  7.0      Protime  21.0(H)      Rate        RBC  2.60(L)      RDW  20.9(H)      Sample   VENOUS     Sodium  140      Sp02        Vt        WBC  8.56            Significant Imaging: I have reviewed all pertinent imaging results/findings within the past 24 hours.    Assessment/Plan:     Delirium      Delirium multifactorial, and with mixed state of hypoactive and hyperactive ( likely causes hypoxia, hypoperfusion, opiates, other metabolic abnormalities)   H/o of adjustment d/o  H/o of Alcohol use (no utox was obtained at this admission unable to verify active use)  H/o of polysubstance use ( no utox was obtained at this admission unable to verify active use)     RECOMMENDATIONS  Psych meds  ? PRN IM Ativan 2mg only for sedation if pt is agitated as earlier and non redirectable. And would not recommend antipsychotic due to risk of causing adverse cardiac events  ? Recommended to consult  Anesthesia to assist with sedation as pt has no IV access and needs central line placement   ? Please follow delirium precautions  § Limit or Discontinue use of OPIATES ( pt received 4 doses of IV dilaudid yesterday)    § Limit or Discontinue use Anti-cholinergic medications as they may worsen delirium.  · Keep window shades open and room lit during day and room dim at night in order to promote normal sleep-wake cycles  · Encourage family at bedside. Gravity patient often to situation, location, date.  · Continue medical workup for causative etiology of Delirium.     - Pt currently sedated and intubated. Will attempt interview tomorrow. Will follow with pt will in house for management of delirium and assist with liaison of pts  family and med teams     -Please contact ON CALL psychiatry resident for any acute issues that may arise.    Psychiatry CL will continue to follow.             Total Time:  45 minutes     Cyndy Redd MD   Psychiatry  [facility]

## 2018-07-19 PROBLEM — J69.0 ASPIRATION PNEUMONIA: Status: ACTIVE | Noted: 2018-01-01

## 2018-07-19 NOTE — PROGRESS NOTES
Dr. Disla with Cards called and notified of pt's potassium of 3.0 and Mag of 1.8 just resulted from lab. Pt has minor ectopy per monitor with rare PVCs/PACs. MD gave orders for 80 mEq potassium chloride and 2G Mag. Will continue to monitor.

## 2018-07-19 NOTE — PLAN OF CARE
Problem: Patient Care Overview  Goal: Plan of Care Review  Outcome: Ongoing (interventions implemented as appropriate)  No events overnight. Pt rested comfortably in bed. CVP 6-7. HR remains 80s-90s in NSR with rare PVCs. BP requiring no pressor support, MAPs 70s. SVO2 80. Dobutamine infusing at 5mcgs/kg/min. Oxygen saturation % on 30% FIO2 via vent. Tmax 100.6. Skin warm. Lactic 0.7. Creatinine down to 1.1. Pt sedated on fentanyl to keep calm, however does arouse to voice when sedation decreased, moves all extremities spontaneously. Pupils brisk, equal and reactive to light. RASS -2. Evans putting out 15-200cc/hr of urine. No BM overnight, bowel sounds active. Laxative administered with night meds. POC reviewed with pt, who was unable to verbalize understanding. Plan for possible extubation today.

## 2018-07-19 NOTE — PLAN OF CARE
Problem: Patient Care Overview  Goal: Plan of Care Review  Outcome: Ongoing (interventions implemented as appropriate)  No acute events throughout day. See vital signs and assessments in flowsheets. See below for updates on today's progress.     Pulmonary: patient remains on ventilator today with no changes made to vent setting. SVO2 80%, sputum has evolved to a yellow/green color with thick consistency.        Cardiovascular: SR 80/115 today, Lasix 40 mg administered IV push, dobutamine infusion maintained @ 5 mcg/kg/min    Neurological: Fentanyl drip weaned to 25 mcg/hour, Precedex initiated to assist in extubation.  Pt wakes with light tactile stimulation, but does not follow commands or able to be redirected    Gastrointestinal: WNL, no bowel movements today    Genitourinary: Evans in place with about 1300 cc dark yellow output throughout the day    Integumentary/Other: patient maintaining body temperature about 99 degrees, increased difficulty of keeping dressings in place.  TLC IJ dressing replaced.     Infusions: Precedex 1 mcg/kg./hour, Dobutamine 5 mcg/kg/min, Fentanyl 25 mcg/ hour, Zosyn q 8hr, Vanc q 24 hour    Patient progressing towards goals as tolerated, plan of care communicated and reviewed with Leonardo Corteseur and family. All concerns addressed. Will continue to monitor.

## 2018-07-19 NOTE — TREATMENT PLAN
Interval progress note:   Nursing notified of repeat BMP (K 3.0 / Mag 1.8) on telemetry with PVCs and NSVT. Patient seen, HD stable, intubated, nursing at bedside. Ordered 2mg IV Mag with KCL 40mg IV x2. Discussed with Dr. Yang, agrees with plan.     Follow up AM BMP / Mag      Yana Disla M.D.  Page # [phone]  Cardiovascular Fellow PGY-IV  [facility]

## 2018-07-20 PROBLEM — I48.92 ATRIAL FLUTTER: Status: ACTIVE | Noted: 2018-01-01

## 2018-07-20 NOTE — PLAN OF CARE
Problem: Fall Risk (Adult)  Intervention: Monitor/Assist with Self Care  Problem: Patient Care Overview  Goal: Plan of Care Review  Outcome: Ongoing (interventions implemented as appropriate)    See vital signs and assessments in flowsheets. See below for updates on today's progress.      Pulmonary: patient remains on ventilator today with no changes made to vent setting. SVO2 57%, sputum is thick and yellow color.         Cardiovascular: converted to SR in the 70s-80s.  Blood pressure 80-90s/60s, dobutamine infusion decreased to 2.5 mcg/kg/min, Amiodarone decreased to @0.5 mg/min,      Neurological: Fentanyl drip @ 150 mcg/hour, Precedex increased to 1.2 mcg/ml, pt respondeds to touch, pt does not follow commands, pt does not track      Gastrointestinal: WNL, no bowel movements today     Genitourinary: Evans in place with 20 - 60 ml/hr, urine is dark yellow output throughout the day     Integumentary/Other: patient maintaining body temperature about 99 degrees. Pt received potassium and magnesium replacement - potassium is no 4.9 and magnesium is 2.4 from 1600 lab draw.     Patient progressing towards goals as tolerated, plan of care communicated and reviewed with Leonardo Corteseur and family. All concerns addressed. Will continue to monitor.

## 2018-07-20 NOTE — ASSESSMENT & PLAN NOTE
48 y/o M with cardiogenic shock and and AF RVR resulting in multiple ICD shocks, additional telemetry review shows AFL typical, as well as A.tach. This is in the setting of inotropic support with .     -Agree with transitioning to PO amiodarone   -Load with Amiodarone 400mg BID for 1 week and thereafter transition to Amiodarone 200mg daily   -Wean dobutamine as tolerated   -Given his renal function milrinone may not be the best option

## 2018-07-20 NOTE — ASSESSMENT & PLAN NOTE
Patient developed narrow complex tachycardia this morning which appeared at different periods on telemetry to alternate between atrial fibrillation and typical counterclockwise atrial flutter  Successful restoration of normal sinus rhythm with IV amiodarone  Continue 24 hour amiodarone load (1 mg/min x 6 hours, 0.5 mg/min x 18 hours), then switch to oral amiodarone  Avoid beta agonists; recommend discontinuing dobutamine and epinephrine, use milrinone if needed  See device interrogation for details

## 2018-07-20 NOTE — SUBJECTIVE & OBJECTIVE
"Interval History: Fever overnight, thick secretions, responds to tactile stimuli but not much response to "thumbs up" some "flinching" observed today not sure etiology.    Review of Systems   Unable to perform ROS: intubated        Objective:     Vital Signs (Most Recent):  Temp: 100.2 °F (37.9 °C) (07/19/18 1900)  Pulse: 97 (07/19/18 1902)  Resp: 16 (07/19/18 1902)  BP: (!) 112/59 (07/19/18 0705)  SpO2: 100 % (07/19/18 1902) Vital Signs (24h Range):  Temp:  [99.5 °F (37.5 °C)-100.7 °F (38.2 °C)] 100.2 °F (37.9 °C)  Pulse:  [] 97  Resp:  [14-18] 16  SpO2:  [90 %-100 %] 100 %  BP: ()/(50-59) 112/59  Arterial Line BP: ()/(50-74) 133/69     Weight: 48 kg (105 lb 13.1 oz)  Body mass index is 16.57 kg/m².     SpO2: 100 %  O2 Device (Oxygen Therapy): ventilator      Intake/Output Summary (Last 24 hours) at 07/19/18 1922  Last data filed at 07/19/18 1900   Gross per 24 hour   Intake          1580.66 ml   Output             2495 ml   Net          -914.34 ml       Lines/Drains/Airways     Peripherally Inserted Central Catheter Line                 PICC Double Lumen 07/18/18 1215 right brachial 1 day          Central Venous Catheter Line                 Percutaneous Central Line Insertion/Assessment - triple lumen  07/17/18 1920 right internal jugular 2 days          Drain                 NG/OG Tube 07/17/18 2000 18 Fr. Center mouth 1 day         Urethral Catheter 07/17/18 2010 1 day          Airway                 Airway - Non-Surgical 07/17/18 1900 Endotracheal Tube 2 days          Arterial Line                 Arterial Line 07/17/18 2000 Right Brachial 1 day                Physical Exam   Constitutional: He appears well-developed. He appears cachectic. He is sedated.   Neck: No JVD present. No thyroid mass present.   Cardiovascular: Tachycardia present.    Pulmonary/Chest: No respiratory distress. He has rales. He exhibits no tenderness.   Abdominal: He exhibits no distension. There is no tenderness. "   Neurological: He is unresponsive.   Skin: Skin is dry.        Significant Labs:   BMP:   Recent Labs  Lab 07/18/18 0508 07/18/18 2013 07/19/18 0315    88 79    143 142   K 3.8 3.0* 4.1   CL 99 101 104   CO2 28 32* 28   BUN 36* 29* 24*   CREATININE 1.3 1.2 1.1   CALCIUM 8.8 8.4* 8.3*   MG 2.4 1.8 2.7*   , CMP   Recent Labs  Lab 07/17/18 1942 07/18/18 0508 07/18/18 2013 07/19/18 0315     < > 140 143 142   K 4.3  < > 3.8 3.0* 4.1   CL 98  < > 99 101 104   CO2 14*  < > 28 32* 28   *  < > 103 88 79   BUN 39*  < > 36* 29* 24*   CREATININE 1.6*  < > 1.3 1.2 1.1   CALCIUM 8.5*  < > 8.8 8.4* 8.3*   PROT 7.0  --  7.0  --  6.3   ALBUMIN 2.9*  --  2.9*  --  2.5*   BILITOT 1.7*  --  2.2*  --  2.4*   ALKPHOS 283*  --  286*  --  270*   *  --  245*  --  299*   ALT 65*  --  100*  --  122*   ANIONGAP 28*  < > 13 10 10   ESTGFRAFRICA 57.6*  < > >60.0 >60.0 >60.0   EGFRNONAA 49.8*  < > >60.0 >60.0 >60.0   < > = values in this interval not displayed., CBC   Recent Labs  Lab 07/17/18 1942 07/18/18 0508 07/19/18 0315   WBC 8.56 8.64 9.59   HGB 8.1* 8.0* 7.8*   HCT 25.3* 24.8* 24.4*    292 279    and Troponin No results for input(s): TROPONINI in the last 48 hours.    Significant Imaging: Echocardiogram:   2D echo with color flow doppler:   Results for orders placed or performed during the hospital encounter of 07/13/18   2D echo with color flow doppler   Result Value Ref Range    EF 10 (A) 55 - 65    Mitral Valve Regurgitation MODERATE TO SEVERE (A)     Est. PA Systolic Pressure 57.51 (A)     Mitral Valve Mobility NORMAL     Tricuspid Valve Regurgitation MODERATE TO SEVERE (A)

## 2018-07-20 NOTE — SIGNIFICANT EVENT
Notified by RN that patient was in a tachyarrhythmia. Upon my arrival to the patient's room, the patient was in a narrow complex tachycardia, being shocked multiple times by his ICD, was thrashing in bed and disconnected his ventilator. He was given an amio load with 300 mg IV x 1 followed by amio infusion and lidocaine 100 mg IV x 1 followed by infusion 1 mg/min was also ordered when his tachyarrhythmia persisted. This was discussed with staff. The patient later converted to NSR. The device was interrogated, see report for details.    Ermias Trivedi MD  PGY-IV

## 2018-07-20 NOTE — PROGRESS NOTES
[facility]  Psychiatry  Progress Note    Patient Name: Leonardo Byrd  MRN: [id number]   Code Status: Full Code  Admission Date: 7/13/2018  Hospital Length of Stay: 6 days  Expected Discharge Date: 7/26/2018  Attending Physician: Guillermo Archer MD  Primary Care Provider: Indio Aquino MD    Current Legal Status: N/A    Patient information was obtained from nursing staff.     Subjective:     Principal Problem:Acute on chronic combined systolic and diastolic congestive heart failure    Chief Complaint: Agitation    HPI: Given encephalopathy, history was obtained from chart and nurses.     48 y/o male with ICM (LVEF 13%) s/p ICD, CAD s/p PCI, HTN, HLD, current smoker, PAD (s/p aortobifemoral bypass, L SFA and pop PTAS in September and recent R SFA and R pop 3/8 by Dr. 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 He had not been compliant with his lasix.      In the ED, patient was found to be hypotensive to 80s-90s/50s-60s. HR was in 90s. EKG showed NSR. RR in teens-20s. SpO2 100% on 3 L NC. Afebrile, no leukocytosis. Initial lactate 3.8-4.9. BNP > 5400. Initial Cr 1.4 (was 1.1 two days prior). CXR showed improvement in cardiomegaly and interstitial attenuation, suggesting improving pulmonary edema, compared to prior. No consolidation. Bedside U/S revealed dilated IVC. Pt was given IVF and IV abx. Pt was given IV lasix followed by symptomatic improvement. 2D echo with CFD showed EF 10-15%, RV enlargement with moderately depressed systolic function, moderate-severe MR, moderate-severe TR, biatrial enlargement, pulm HTN with PA pressure 58, increased CVP, L pleural effusion, and ascites. Pt was admitted to the floor for acute on chronic combined HF. Initially, BP improved to [id number]-70s, lactate improved to 3.4, but Cr up to 1.6. Overnight, BP down to 80s-90s/50s-60s again and had an episode of hypothermia to 95.4 F. The next morning, WBC up to 15,  lactate up to 3.8, Cr up to 1.9. Cardiology was consulted for evaluation of cardiogenic shock in the setting of CHF exacerbation.    On 7/17, patient decompensated in the AM, refused all labs and needed psychiatry consult to be declared incompetent, family signed consent for CVC and RHC on behalf of patient. Later in PM he decompensated again requiring intubation, emergent CVC and A-Line placement and was placed on pressors.    On Psychiatry Interview:  Pt was unable to be interviewed secondary to intubation and sedation. HPI obtained per chart review.    Hospital Course: 07/20/2018  Pt remains sedated and intubated. Pt's chart was reviewed and writer went to pt's bedside. History obtained from pt's nurse. Pt's sedation was decreased this morning and he was awakened. Pt then became agressive, combative and his AICD discharged several times. Primary team made decision to continue sedation and intubation.          Patient History           Medical as of 7/20/2018     Past Medical History     Diagnosis Date Comments Source    AICD (automatic cardioverter/defibrillator) present -- -- Provider    RYAN (acute kidney injury) 3/25/2018 -- Provider    CHF (congestive heart failure) -- -- Provider    COPD with acute bronchitis 6/12/2018 -- Provider    Coronary artery disease -- -- Provider    Encounter for blood transfusion -- -- Provider    Hx of psychiatric care -- -- Provider    Hyperlipemia -- -- Provider    Hypertension -- -- Provider    MI (myocardial infarction) -- -- Provider    Neuropathy -- -- Provider    PAD (peripheral artery disease) -- -- Provider    Psychiatric problem -- -- Provider    PVD (peripheral vascular disease) -- -- Provider          Pertinent Negatives     Diagnosis Date Noted Comments Source    Cancer 2/27/2017 -- Provider    Diabetes mellitus 2/27/2017 -- Provider    History of psychiatric hospitalization 6/20/2018 -- Provider    Teresa 6/20/2018 -- Provider    Psychiatric exam requested by authority  6/20/2018 -- Provider    Seizures 2/27/2017 -- Provider    Stroke 2/27/2017 -- Provider    Suicide attempt 6/20/2018 -- Provider    Therapy 6/20/2018 -- Provider                  Surgical as of 7/20/2018     Past Surgical History     Procedure Laterality Date Comments Source    AMPUTATION Right -- great toe Provider    KNEE ARTHROPLASTY Left -- -- Provider    VASCULAR SURGERY -- -- fem-pop bypass Provider    coronary stents [Other] -- -- -- Provider    CARDIAC DEFIBRILLATOR PLACEMENT -- -- -- Provider    EXPLORATION AND EVaCUATION OF HEMATOMA OF UPPER EXTREMITY [Other] Left -- -- Provider    BYBPASS GRAFT AORTOBIUFEMORAL [Other] -- -- -- Provider    BELOW KNEE AMPUTATION OF LOWER EXTREMITY Left 6/29/2018 Procedure: AMPUTATION, BELOW KNEE;  Surgeon: Norris Trejo MD;  Location: [facility] OR [address] [place] [address];  Service: Peripheral Vascular;  Laterality: Left; Provider    RIGHT HEART CATHETERIZATION FOR CONGENITAL HEART DEFECT Bilateral 7/17/2018 Procedure: CATHETERIZATION, HEART, RIGHT, FOR CONGENITAL HEART DEFECT;  Surgeon: January Mosley MD;  Location: [facility] CATH LAB;  Service: Cardiology;  Laterality: Bilateral; Provider                  Family as of 7/20/2018    **None**           Tobacco Use as of 7/20/2018     Smoking Status Smoking Start Date Smoking Quit Date Packs/day Years Used    Former Smoker -- 5/1/2018 0.50 --    Types Comments Smokeless Tobacco Status Smokeless Tobacco Quit Date Source     Chew Uses nicotine gum and nicotine patches Former User 8/2/1980 Provider            Alcohol Use as of 7/20/2018     Alcohol Use Drinks/Week Alcohol/Week Comments Source    Yes -- -- rarely Provider            Drug Use as of 7/20/2018     Drug Use Types Frequency Comments Source    No -- -- -- Provider            Sexual Activity as of 7/20/2018     Sexually Active Birth Control Partners Comments Source    Yes -- Female -- Provider            Activities of Daily Living as of 7/20/2018     Activities of Daily Living Question  Response Comments Source    Patient feels they ought to cut down on drinking/drug use Not Asked -- Provider    Patient annoyed by others criticizing their drinking/drug use Not Asked -- Provider    Patient has felt bad or guilty about drinking/drug use Not Asked -- Provider    Patient has had a drink/used drugs as an eye opener in the AM Not Asked -- Provider            Social Documentation as of 7/20/2018    **None**           Occupational as of 7/20/2018    **None**           Socioeconomic as of 7/20/2018     Marital Status Spouse Name Number of Children Years Education Preferred Language Ethnicity Race Source    Single -- -- -- English /White White --         Pertinent History Q A Comments    as of 7/20/2018 Lives with      Place in Birth Order      Lives in      Number of Siblings other     Raised by      Legal Involvement      Childhood Trauma      Criminal History of arrest and incarceration     Financial Status      Highest Level of Education high school graduation     Does patient have access to a firearm? No      Service No     Primary Leisure Activity      Spirituality actively participates in organized Sikhism      Past Medical History:   Diagnosis Date    AICD (automatic cardioverter/defibrillator) present     RYAN (acute kidney injury) 3/25/2018    CHF (congestive heart failure)     COPD with acute bronchitis 6/12/2018    Coronary artery disease     Encounter for blood transfusion     Hx of psychiatric care     Hyperlipemia     Hypertension     MI (myocardial infarction)     Neuropathy     PAD (peripheral artery disease)     Psychiatric problem     PVD (peripheral vascular disease)      Past Surgical History:   Procedure Laterality Date    AMPUTATION Right     great toe    BELOW KNEE AMPUTATION OF LOWER EXTREMITY Left 6/29/2018    Procedure: AMPUTATION, BELOW KNEE;  Surgeon: Norris Trejo MD;  Location: [facility] OR [address] [place] [address];  Service: Peripheral Vascular;  Laterality:  Left;    BYBPASS GRAFT AORTOBIUFEMORAL      CARDIAC DEFIBRILLATOR PLACEMENT      coronary stents      EXPLORATION AND EVaCUATION OF HEMATOMA OF UPPER EXTREMITY Left     KNEE ARTHROPLASTY Left     RIGHT HEART CATHETERIZATION FOR CONGENITAL HEART DEFECT Bilateral 7/17/2018    Procedure: CATHETERIZATION, HEART, RIGHT, FOR CONGENITAL HEART DEFECT;  Surgeon: January Mosley MD;  Location: [facility] CATH LAB;  Service: Cardiology;  Laterality: Bilateral;    VASCULAR SURGERY      fem-pop bypass     Family History     None        Social History Main Topics    Smoking status: Former Smoker     Packs/day: 0.50     Quit date: 5/1/2018    Smokeless tobacco: Former User     Types: Chew     Quit date: 8/2/1980      Comment: Uses nicotine gum and nicotine patches    Alcohol use Yes      Comment: rarely    Drug use: No    Sexual activity: Yes     Partners: Female     Review of patient's allergies indicates:  No Known Allergies    No current facility-administered medications on file prior to encounter.      Current Outpatient Prescriptions on File Prior to Encounter   Medication Sig    atorvastatin (LIPITOR) 80 MG tablet Take 1 tablet (80 mg total) by mouth every evening.    HYDROcodone-acetaminophen (NORCO) 5-325 mg per tablet Take 1 tablet by mouth every 4 (four) hours as needed for Pain.    lidocaine (XYLOCAINE) 5 % Oint ointment Apply topically 4 (four) times daily. Apply to entire left foot    metoprolol tartrate (LOPRESSOR) 25 MG tablet Take 0.5 tablets (12.5 mg total) by mouth 2 (two) times daily.    nicotine (NICODERM CQ) 21 mg/24 hr Place 1 patch onto the skin once daily.    oxyCODONE (ROXICODONE) 10 mg Tab immediate release tablet Take 1 tablet (10 mg total) by mouth every 4 (four) hours as needed for Pain.    pregabalin (LYRICA) 75 MG capsule Take 1 capsule (75 mg total) by mouth every evening.    aspirin (ECOTRIN) 81 MG EC tablet Take 81 mg by mouth once daily.    collagenase ointment Apply topically once  "daily.    divalproex (DEPAKOTE) 250 MG EC tablet Take 250mg (1 tablet) at 0700, then take 250mg (1 tablet) at 1200, then take 500mg (2 tablets0 at 1900    polyethylene glycol (GLYCOLAX) 17 gram PwPk Take 17 g by mouth 2 (two) times daily as needed (constipation).    senna-docusate 8.6-50 mg (PERICOLACE) 8.6-50 mg per tablet Take 1 tablet by mouth 2 (two) times daily.    thiamine 100 MG tablet Take 1 tablet (100 mg total) by mouth once daily.     Psychotherapeutics     Start     Stop Route Frequency Ordered    07/19/18 0945  dexmedetomidine (PRECEDEX) 400mcg/100mL 0.9% NaCL infusion     Question Answer Comment   Titrate by (in mcg/kg/hr): 0.1    Titrate interval: (in minutes) 5    To maintain a RASS score of: RASS (-1) Drowsy - Not fully alert, but has sustained awakening (eye-opening/eye contact) to voice (>10 seconds)    Maximum dose of (in mcg/kg/hr): 1.4        -- IV Continuous 07/19/18 0832        Review of Systems    Strengths and Liabilities: unable to assess    Objective:     Vital Signs (Most Recent):  Temp: 97.9 °F (36.6 °C) (07/20/18 0701)  Pulse: 81 (07/20/18 1000)  Resp: 14 (07/20/18 1000)  BP: (!) 112/59 (07/19/18 0705)  SpO2: 99 % (07/20/18 1000) Vital Signs (24h Range):  Temp:  [97.9 °F (36.6 °C)-100.2 °F (37.9 °C)] 97.9 °F (36.6 °C)  Pulse:  [] 81  Resp:  [14-16] 14  SpO2:  [90 %-100 %] 99 %  Arterial Line BP: ()/(56-78) 91/60     Height: 5' 7" (170.2 cm)  Weight: 48 kg (105 lb 13.1 oz)  Body mass index is 16.57 kg/m².      Intake/Output Summary (Last 24 hours) at 07/20/18 1117  Last data filed at 07/20/18 1000   Gross per 24 hour   Intake          1570.43 ml   Output             2295 ml   Net          -724.57 ml       Physical Exam   Psychiatric:   Mental Status Exam:  Appearance: lying in bed, intubated  Level of Consciousness: sedated  Grooming: hospital gown  Psychomotor Behavior: sedated  Speech: unable to assess  Language: unable to assess  Orientation: unable to " assess  Attention Span/Concentration: unable to assess  Memory: unable to assess  Cognition: unable to assess  Mood: unable to assess  Affect: unable to assess  Thought Process: unable to assess  Associations:unable to assess  Thought Content:unable to assess  Fund of Knowledge: adequate to education level  Insight:unable to assess  Judgment: unable to assess            Significant Labs:   Last 24 Hours:   Recent Lab Results       07/20/18  0938 07/20/18  0347      Immature Granulocytes  0.4     Immature Grans (Abs)  0.04  Comment:  Mild elevation in immature granulocytes is non specific and   can be seen in a variety of conditions including stress response,   acute inflammation, trauma and pregnancy. Correlation with other   laboratory and clinical findings is essential.       Albumin  2.4(L)     Alkaline Phosphatase  274(H)     Allens Test N/A      ALT  97(H)     Anion Gap  13     AST  217(H)     Baso #  0.05     Basophil%  0.5     Total Bilirubin  2.0  Comment:  For infants and newborns, interpretation of results should be based  on gestational age, weight and in agreement with clinical  observations.  Premature Infant recommended reference ranges:  Up to 24 hours.............<8.0 mg/dL  Up to 48 hours............<12.0 mg/dL  3-5 days..................<15.0 mg/dL  6-29 days.................<15.0 mg/dL  (H)     Site Other      BUN, Bld  17     Calcium  8.3(L)     Chloride  104     CO2  26     Creatinine  1.1     DelSys Adult Vent      Differential Method  Automated     eGFR if African American  >60.0     eGFR if non   >60.0  Comment:  Calculation used to obtain the estimated glomerular filtration  rate (eGFR) is the CKD-EPI equation.        Eos #  0.1     Eosinophil%  1.5     FiO2 40      Glucose  99     Gran # (ANC)  7.0     Gran%  77.0(H)     Hematocrit  26.3(L)     Hemoglobin  8.5(L)     Lactate, Jose Armando  0.7  Comment:  Falsely low lactic acid results can be found in samples   containing >=13.0  mg/dL total bilirubin and/or >=3.5 mg/dL   direct bilirubin.       Lymph #  1.3     Lymph%  13.8(L)     Magnesium  2.0     MCH  30.2     MCHC  32.3     MCV  94     Min Vol 6.5      Mode AC/PRVC      Mono #  0.6     Mono%  6.8     MPV  10.9     nRBC  1(A)     PEEP 5      PiP 33      Platelets  264     POC BE 10      POC HCO3 33.2(H)      POC PCO2 42.9      POC PH 7.497(H)      POC PO2 27(LL)      POC SATURATED O2 57(L)      POC TCO2 35(H)      Potassium  3.1(L)     Total Protein  6.7     Rate 14      RBC  2.81(L)     RDW  21.4(H)     Sample VENOUS      Sodium  143     Sp02 100      Vt 450      WBC  9.11           Significant Imaging: I have reviewed all pertinent imaging results/findings within the past 24 hours.    Assessment/Plan:     Delirium      Delirium multifactorial, and with mixed state of hypoactive and hyperactive   H/o of adjustment d/o  H/o of Alcohol use (no utox was obtained at this admission unable to verify active use)  H/o of polysubstance use ( no utox was obtained at this admission unable to verify active use)     RECOMMENDATIONS  Psych meds  ? PRN Depacon 250 mg BID if pt is agitated and non redirectable. And would not recommend antipsychotic due to risk of causing adverse cardiac events  ? Please follow delirium precautions  § Limit or Discontinue use of OPIATES   § Limit or Discontinue use Anti-cholinergic medications as they may worsen delirium.  · Keep window shades open and room lit during day and room dim at night in order to promote normal sleep-wake cycles  · Encourage family at bedside. [place]riton patient often to situation, location, date.  · Continue medical workup for causative etiology of Delirium.     - Pt currently sedated and intubated. Will attempt interview tomorrow. Will follow with pt will in house for management of delirium and assist with liaison of pts  family and med teams     -Please contact ON CALL psychiatry resident for any acute issues that may arise.    Psychiatry CL  will sign off at this time.             Need for Continued Hospitalization:   No need for inpatient psychiatric hospitalization. Continue medical care as per the primary team.    Anticipated Disposition: Home or Self Care     Total time:  15 with greater than 50% of this time spent in counseling and/or coordination of care.       Cyndy Redd MD   Psychiatry  [facility]

## 2018-07-20 NOTE — PROGRESS NOTES
"[facility]  Cardiology  Progress Note    Patient Name: Leonardo Byrd  MRN: [id number]  Admission Date: 7/13/2018  Hospital Length of Stay: 5 days  Code Status: Full Code   Attending Physician: Antoni Yang MD   Primary Care Physician: Indio Aquino MD  Expected Discharge Date: 7/26/2018  Principal Problem:Acute on chronic combined systolic and diastolic congestive heart failure    Subjective:     Hospital Course:   7/17: Please see several notes from today, In summary; patient decompensated in the AM, refused all labs and needed psychiatry consult to be declared incompetent, family signed consent for CVC and RHC on behalf of patient. Later in PM he decompensated again requiring intubation, emergent CVC and A-Line placement and was placed on pressors.    Interval History: Fever overnight, thick secretions, responds to tactile stimuli but not much response to "thumbs up" some "flinching" observed today not sure etiology.    Review of Systems   Unable to perform ROS: intubated        Objective:     Vital Signs (Most Recent):  Temp: 100.2 °F (37.9 °C) (07/19/18 1900)  Pulse: 97 (07/19/18 1902)  Resp: 16 (07/19/18 1902)  BP: (!) 112/59 (07/19/18 0705)  SpO2: 100 % (07/19/18 1902) Vital Signs (24h Range):  Temp:  [99.5 °F (37.5 °C)-100.7 °F (38.2 °C)] 100.2 °F (37.9 °C)  Pulse:  [] 97  Resp:  [14-18] 16  SpO2:  [90 %-100 %] 100 %  BP: ()/(50-59) 112/59  Arterial Line BP: ()/(50-74) 133/69     Weight: 48 kg (105 lb 13.1 oz)  Body mass index is 16.57 kg/m².     SpO2: 100 %  O2 Device (Oxygen Therapy): ventilator      Intake/Output Summary (Last 24 hours) at 07/19/18 1922  Last data filed at 07/19/18 1900   Gross per 24 hour   Intake          1580.66 ml   Output             2495 ml   Net          -914.34 ml       Lines/Drains/Airways     Peripherally Inserted Central Catheter Line                 PICC Double Lumen 07/18/18 1215 right brachial 1 day          Central Venous " Catheter Line                 Percutaneous Central Line Insertion/Assessment - triple lumen  07/17/18 1920 right internal jugular 2 days          Drain                 NG/OG Tube 07/17/18 2000 18 Fr. Center mouth 1 day         Urethral Catheter 07/17/18 2010 1 day          Airway                 Airway - Non-Surgical 07/17/18 1900 Endotracheal Tube 2 days          Arterial Line                 Arterial Line 07/17/18 2000 Right Brachial 1 day                Physical Exam   Constitutional: He appears well-developed. He appears cachectic. He is sedated.   Neck: No JVD present. No thyroid mass present.   Cardiovascular: Tachycardia present.    Pulmonary/Chest: No respiratory distress. He has rales. He exhibits no tenderness.   Abdominal: He exhibits no distension. There is no tenderness.   Neurological: He is unresponsive.   Skin: Skin is dry.        Significant Labs:   BMP:   Recent Labs  Lab 07/18/18  0508 07/18/18 2013 07/19/18  0315    88 79    143 142   K 3.8 3.0* 4.1   CL 99 101 104   CO2 28 32* 28   BUN 36* 29* 24*   CREATININE 1.3 1.2 1.1   CALCIUM 8.8 8.4* 8.3*   MG 2.4 1.8 2.7*   , CMP   Recent Labs  Lab 07/17/18  1942  07/18/18  0508 07/18/18 2013 07/19/18  0315     < > 140 143 142   K 4.3  < > 3.8 3.0* 4.1   CL 98  < > 99 101 104   CO2 14*  < > 28 32* 28   *  < > 103 88 79   BUN 39*  < > 36* 29* 24*   CREATININE 1.6*  < > 1.3 1.2 1.1   CALCIUM 8.5*  < > 8.8 8.4* 8.3*   PROT 7.0  --  7.0  --  6.3   ALBUMIN 2.9*  --  2.9*  --  2.5*   BILITOT 1.7*  --  2.2*  --  2.4*   ALKPHOS 283*  --  286*  --  270*   *  --  245*  --  299*   ALT 65*  --  100*  --  122*   ANIONGAP 28*  < > 13 10 10   ESTGFRAFRICA 57.6*  < > >60.0 >60.0 >60.0   EGFRNONAA 49.8*  < > >60.0 >60.0 >60.0   < > = values in this interval not displayed., CBC   Recent Labs  Lab 07/17/18  1942 07/18/18  0508 07/19/18  0315   WBC 8.56 8.64 9.59   HGB 8.1* 8.0* 7.8*   HCT 25.3* 24.8* 24.4*    292 279    and  Troponin No results for input(s): TROPONINI in the last 48 hours.    Significant Imaging: Echocardiogram:   2D echo with color flow doppler:   Results for orders placed or performed during the hospital encounter of 07/13/18   2D echo with color flow doppler   Result Value Ref Range    EF 10 (A) 55 - 65    Mitral Valve Regurgitation MODERATE TO SEVERE (A)     Est. PA Systolic Pressure 57.51 (A)     Mitral Valve Mobility NORMAL     Tricuspid Valve Regurgitation MODERATE TO SEVERE (A)      Assessment and Plan:       * Acute on chronic combined systolic and diastolic congestive heart failure    -CVP 8  - 1 dose of IV lasix today  - SVO2 80s (may be sepsis)   -leave on   -Lactate normal, EF 10%          Aspiration pneumonia    - Emergent intubation was needed to save patients life in acute resp distress  - May have aspirated  -Fever overnight, mild WBC elevations, thick sputum, CXR with new consolidation, Procal 0.4  - DDx Aspiration pneumonitis, pleural effusion  -Will cover with broad spectrum antibiotics, Follow cultures        Acute respiratory failure with hypoxia    - Doing well on minimal vent settings  - due to suspected Aspiration PNA will postpone extubation  -Watch secretions, saturation and try SBT in AM        Encephalopathy    Likely secondary to cardiogenic shock. Treat per heart failure section.        Lactic acid acidosis    As per acute on chronic combined systemic and diastolic heart failure section.  -Trend Lactate  -Resolved        COPD, severe    -Duonebs prn  - Inhaled steroids        Alcohol abuse    -Thiamine  -Monitor for withdrawal            VTE Risk Mitigation         Ordered     IP VTE HIGH RISK PATIENT  Once      07/14/18 0819     IP VTE HIGH RISK PATIENT  Once      07/14/18 0819          Teresa Can MD  Cardiology  [facility]

## 2018-07-20 NOTE — HOSPITAL COURSE
07/20/2018  Pt remains sedated and intubated. Pt's chart was reviewed and writer went to pt's bedside. History obtained from pt's nurse. Pt's sedation was decreased this morning and he was awakened. Pt then became agressive, combative and his AICD discharged several times. Primary team made decision to continue sedation and intubation.

## 2018-07-20 NOTE — ASSESSMENT & PLAN NOTE
-CVP 8  - 1 dose of IV lasix today  - SVO2 80s (may be sepsis)   -leave on   -Lactate normal, EF 10%

## 2018-07-20 NOTE — CONSULTS
[facility]  Cardiac Electrophysiology  Consult Note    Admission Date: 7/13/2018  Code Status: Full Code   Attending Provider: Guillermo Archer MD  Consulting Provider: Ermias Trivedi MD  Principal Problem:Acute on chronic combined systolic and diastolic congestive heart failure    Consults  Subjective:     Chief Complaint:  Atrial flutter    HPI:   47 M with PMH ICM (EF 10-15%) s/p ICD ([employer]), CAD s/p PCI, HTN, HLD, PAD admitted with altered mental status and decompensated HF, consulted to EP for tachyarrhythmia. The patient developed a narrow complex tachycardia this morning that resulted in 22 ICD shocks. The rhythm appeared to transition between atrial fibrillation and typical counterclockwise atrial flutter. The patient was given 300 mg IV amiodarone followed by amiodarone 1 mg/min with restoration of normal sinus rhythm. The patient is intubated and sedated.    Past Medical History:   Diagnosis Date    AICD (automatic cardioverter/defibrillator) present     RYAN (acute kidney injury) 3/25/2018    CHF (congestive heart failure)     COPD with acute bronchitis 6/12/2018    Coronary artery disease     Encounter for blood transfusion     Hx of psychiatric care     Hyperlipemia     Hypertension     MI (myocardial infarction)     Neuropathy     PAD (peripheral artery disease)     Psychiatric problem     PVD (peripheral vascular disease)        Past Surgical History:   Procedure Laterality Date    AMPUTATION Right     great toe    BELOW KNEE AMPUTATION OF LOWER EXTREMITY Left 6/29/2018    Procedure: AMPUTATION, BELOW KNEE;  Surgeon: Norris Trejo MD;  Location: [facility] OR [address] [place] [address];  Service: Peripheral Vascular;  Laterality: Left;    BYBPASS GRAFT AORTOBIUFEMORAL      CARDIAC DEFIBRILLATOR PLACEMENT      coronary stents      EXPLORATION AND EVaCUATION OF HEMATOMA OF UPPER EXTREMITY Left     KNEE ARTHROPLASTY Left     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      Current Outpatient Prescriptions on File Prior to Encounter   Medication Sig    atorvastatin (LIPITOR) 80 MG tablet Take 1 tablet (80 mg total) by mouth every evening.    HYDROcodone-acetaminophen (NORCO) 5-325 mg per tablet Take 1 tablet by mouth every 4 (four) hours as needed for Pain.    lidocaine (XYLOCAINE) 5 % Oint ointment Apply topically 4 (four) times daily. Apply to entire left foot    metoprolol tartrate (LOPRESSOR) 25 MG tablet Take 0.5 tablets (12.5 mg total) by mouth 2 (two) times daily.    nicotine (NICODERM CQ) 21 mg/24 hr Place 1 patch onto the skin once daily.    oxyCODONE (ROXICODONE) 10 mg Tab immediate release tablet Take 1 tablet (10 mg total) by mouth every 4 (four) hours as needed for Pain.    pregabalin (LYRICA) 75 MG capsule Take 1 capsule (75 mg total) by mouth every evening.    aspirin (ECOTRIN) 81 MG EC tablet Take 81 mg by mouth once daily.    collagenase ointment Apply topically once daily.    divalproex (DEPAKOTE) 250 MG EC tablet Take 250mg (1 tablet) at 0700, then take 250mg (1 tablet) at 1200, then take 500mg (2 tablets0 at 1900    polyethylene glycol (GLYCOLAX) 17 gram PwPk Take 17 g by mouth 2 (two) times daily as needed (constipation).    senna-docusate 8.6-50 mg (PERICOLACE) 8.6-50 mg per tablet Take 1 tablet by mouth 2 (two) times daily.    thiamine 100 MG tablet Take 1 tablet (100 mg total) by mouth once daily.     Family History     None        Social History Main Topics    Smoking status: Former Smoker     Packs/day: 0.50     Quit date: 5/1/2018    Smokeless tobacco: Former  User     Types: Chew     Quit date: 8/2/1980      Comment: Uses nicotine gum and nicotine patches    Alcohol use Yes      Comment: rarely    Drug use: No    Sexual activity: Yes     Partners: Female     Review of Systems   Unable to perform ROS: intubated     Objective:     Vital Signs (Most Recent):  Temp: 98.9 °F (37.2 °C) (07/20/18 1100)  Pulse: 75 (07/20/18 1200)  Resp: 14 (07/20/18 1200)  BP: (!) 112/59 (07/19/18 0705)  SpO2: 99 % (07/20/18 1200) Vital Signs (24h Range):  Temp:  [97.9 °F (36.6 °C)-100.2 °F (37.9 °C)] 98.9 °F (37.2 °C)  Pulse:  [] 75  Resp:  [14-16] 14  SpO2:  [90 %-100 %] 99 %  Arterial Line BP: ()/(54-78) 84/54       Weight: 48 kg (105 lb 13.1 oz)  Body mass index is 16.57 kg/m².    SpO2: 99 %  O2 Device (Oxygen Therapy): ventilator    Physical Exam   Constitutional: He is oriented to person, place, and time. He appears well-developed and well-nourished. No distress.   Intubated    HENT:   Head: Normocephalic and atraumatic.   Eyes: EOM are normal. Pupils are equal, round, and reactive to light.   Neck: Normal range of motion. No JVD present.   Cardiovascular: Normal heart sounds and intact distal pulses.  Exam reveals no gallop and no friction rub.    No murmur heard.  Tachycardic, irregularly irregular rhythm   Pulmonary/Chest: He has no wheezes.   Mechanical breath sounds   Abdominal: Soft. Bowel sounds are normal. He exhibits no distension. There is no tenderness.   Musculoskeletal: Normal range of motion. He exhibits no edema.   Bilateral lower extremity amputation   Neurological: He is alert and oriented to person, place, and time.   Skin: Skin is warm. No rash noted. He is not diaphoretic.   Psychiatric: He has a normal mood and affect. His behavior is normal.       Significant Labs:     Recent Results (from the past 24 hour(s))   CBC auto differential    Collection Time: 07/20/18  3:47 AM   Result Value Ref Range    WBC 9.11 3.90 - 12.70 K/uL    RBC 2.81 (L) 4.60 - 6.20  M/uL    Hemoglobin 8.5 (L) 14.0 - 18.0 g/dL    Hematocrit 26.3 (L) 40.0 - 54.0 %    MCV 94 82 - 98 fL    MCH 30.2 27.0 - 31.0 pg    MCHC 32.3 32.0 - 36.0 g/dL    RDW 21.4 (H) 11.5 - 14.5 %    Platelets 264 150 - 350 K/uL    MPV 10.9 9.2 - 12.9 fL    Immature Granulocytes 0.4 0.0 - 0.5 %    Gran # (ANC) 7.0 1.8 - 7.7 K/uL    Immature Grans (Abs) 0.04 0.00 - 0.04 K/uL    Lymph # 1.3 1.0 - 4.8 K/uL    Mono # 0.6 0.3 - 1.0 K/uL    Eos # 0.1 0.0 - 0.5 K/uL    Baso # 0.05 0.00 - 0.20 K/uL    nRBC 1 (A) 0 /100 WBC    Gran% 77.0 (H) 38.0 - 73.0 %    Lymph% 13.8 (L) 18.0 - 48.0 %    Mono% 6.8 4.0 - 15.0 %    Eosinophil% 1.5 0.0 - 8.0 %    Basophil% 0.5 0.0 - 1.9 %    Differential Method Automated    Comprehensive metabolic panel - if not done in ED    Collection Time: 07/20/18  3:47 AM   Result Value Ref Range    Sodium 143 136 - 145 mmol/L    Potassium 3.1 (L) 3.5 - 5.1 mmol/L    Chloride 104 95 - 110 mmol/L    CO2 26 23 - 29 mmol/L    Glucose 99 70 - 110 mg/dL    BUN, Bld 17 6 - 20 mg/dL    Creatinine 1.1 0.5 - 1.4 mg/dL    Calcium 8.3 (L) 8.7 - 10.5 mg/dL    Total Protein 6.7 6.0 - 8.4 g/dL    Albumin 2.4 (L) 3.5 - 5.2 g/dL    Total Bilirubin 2.0 (H) 0.1 - 1.0 mg/dL    Alkaline Phosphatase 274 (H) 55 - 135 U/L     (H) 10 - 40 U/L    ALT 97 (H) 10 - 44 U/L    Anion Gap 13 8 - 16 mmol/L    eGFR if African American >60.0 >60 mL/min/1.73 m^2    eGFR if non African American >60.0 >60 mL/min/1.73 m^2   Magnesium - if not done in ED    Collection Time: 07/20/18  3:47 AM   Result Value Ref Range    Magnesium 2.0 1.6 - 2.6 mg/dL   Lactic acid, plasma    Collection Time: 07/20/18  3:47 AM   Result Value Ref Range    Lactate (Lactic Acid) 0.7 0.5 - 2.2 mmol/L   ISTAT PROCEDURE    Collection Time: 07/20/18  9:38 AM   Result Value Ref Range    POC PH 7.497 (H) 7.35 - 7.45    POC PCO2 42.9 35 - 45 mmHg    POC PO2 27 (LL) 40 - 60 mmHg    POC HCO3 33.2 (H) 24 - 28 mmol/L    POC BE 10 -2 to 2 mmol/L    POC SATURATED O2 57 (L) 95 -  100 %    POC TCO2 35 (H) 24 - 29 mmol/L    Rate 14     Sample VENOUS     Site Other     Allens Test N/A     DelSys Adult Vent     Mode AC/PRVC     Vt 450     PEEP 5     PiP 33     FiO2 40     Min Vol 6.5     Sp02 100                    Assessment and Plan:     Atrial flutter    Patient developed narrow complex tachycardia this morning which appeared at different periods on telemetry to alternate between atrial fibrillation and typical counterclockwise atrial flutter  Successful restoration of normal sinus rhythm with IV amiodarone  Continue 24 hour amiodarone load (1 mg/min x 6 hours, 0.5 mg/min x 18 hours), then switch to oral amiodarone  Avoid beta agonists; recommend discontinuing dobutamine and epinephrine, use milrinone if needed  See device interrogation for details  Patient will need flutter ablation prior to discharge            Thank you for your consult. I will follow-up with patient. Please contact us if you have any additional questions.    Ermias Trivedi MD  Cardiac Electrophysiology  [facility]

## 2018-07-20 NOTE — PLAN OF CARE
07/20/18 1431   Discharge Assessment   Assessment Type Discharge Planning Reassessment   Discharge Plan A (TBD)     Pt remains in ICU. Not medically stable for discharge. Discharge dispo remains unclear. TBD.

## 2018-07-20 NOTE — PROCEDURES
[facility]  Central Venous Catheter  Procedure Note    SUMMARY     Date of Procedure: 7/17/18 6 AM  Procedure: Insertion of Central Venous Catheter  Provider: Tamir Darnell MD  Assisting Provider: Brian Oswald  Indications: vascular access, medication administration, central venous blood draws   Pre-Procedure Diagnosis: Cardiogenic shock  Post-Procedure Diagnosis: Cardiogenic shock  Location procedure was performed: [facility] ([facility])  Indications: med administration and vascular access   Local Anesthetic: lidocaine 1% without epinephrine  Preparation: skin prepped with ChloraPrep  Location details: right internal jugular  Catheter type: triple lumen  Catheter size: 7 Fr  Ultrasound guidance: yes  Vessel Caliber: medium, compressibility normal  Needle advanced almost into the vessel with real time Ultrasound guidance.  Number of attempts: 1  Complications: none  Estimated blood loss (mL): 0  Specimens: No  Implants: No  Complications: None.  Informed consent was not obtained for the procedure due to encephalopathy secondary to cardiogenic shock (emergency).   Note: The procedure was not successful (could not get the needle into the IJ vein because the patient was encephalopathic and started to move. Dr. Oswald and myself had to stop at that point to prevent injury to the neck structures which would have caused potentially serious complications. After getting the needle out of the subcutaneous fat, we cleaned the area and then reassessed the patient who did not have any signs of hematoma or damage to regional structures. I reassessed him about 8 AM with Dr. Walter the same day and he was still encephalopathic and moving in bed, refusing the procedure.

## 2018-07-20 NOTE — SIGNIFICANT EVENT
Portable x-ray being performed. Patient awakened and became extremely agitated, attempting to get out of bed, very combative. During his agitated stated he began to receive multiple shocks per his internal AICD. Dr. Trivedi notified. Orders received. Interventions immediately initiated. See flow sheet.

## 2018-07-20 NOTE — ASSESSMENT & PLAN NOTE
As per acute on chronic combined systemic and diastolic heart failure section.  -Trend Lactate  -Resolved

## 2018-07-20 NOTE — SUBJECTIVE & OBJECTIVE
Past Medical History:   Diagnosis Date    AICD (automatic cardioverter/defibrillator) present     RYAN (acute kidney injury) 3/25/2018    CHF (congestive heart failure)     COPD with acute bronchitis 6/12/2018    Coronary artery disease     Encounter for blood transfusion     Hx of psychiatric care     Hyperlipemia     Hypertension     MI (myocardial infarction)     Neuropathy     PAD (peripheral artery disease)     Psychiatric problem     PVD (peripheral vascular disease)        Past Surgical History:   Procedure Laterality Date    AMPUTATION Right     great toe    BELOW KNEE AMPUTATION OF LOWER EXTREMITY Left 6/29/2018    Procedure: AMPUTATION, BELOW KNEE;  Surgeon: Norris Trejo MD;  Location: [facility] OR [address] [place] [address];  Service: Peripheral Vascular;  Laterality: Left;    BYBPASS GRAFT AORTOBIUFEMORAL      CARDIAC DEFIBRILLATOR PLACEMENT      coronary stents      EXPLORATION AND EVaCUATION OF HEMATOMA OF UPPER EXTREMITY Left     KNEE ARTHROPLASTY Left     RIGHT HEART CATHETERIZATION FOR CONGENITAL HEART DEFECT Bilateral 7/17/2018    Procedure: CATHETERIZATION, HEART, RIGHT, FOR CONGENITAL HEART DEFECT;  Surgeon: January Mosley MD;  Location: [facility] CATH LAB;  Service: Cardiology;  Laterality: Bilateral;    VASCULAR SURGERY      fem-pop bypass       Review of patient's allergies indicates:  No Known Allergies    No current facility-administered medications on file prior to encounter.      Current Outpatient Prescriptions on File Prior to Encounter   Medication Sig    atorvastatin (LIPITOR) 80 MG tablet Take 1 tablet (80 mg total) by mouth every evening.    HYDROcodone-acetaminophen (NORCO) 5-325 mg per tablet Take 1 tablet by mouth every 4 (four) hours as needed for Pain.    lidocaine (XYLOCAINE) 5 % Oint ointment Apply topically 4 (four) times daily. Apply to entire left foot    metoprolol tartrate (LOPRESSOR) 25 MG tablet Take 0.5 tablets (12.5 mg total) by mouth 2 (two) times daily.     nicotine (NICODERM CQ) 21 mg/24 hr Place 1 patch onto the skin once daily.    oxyCODONE (ROXICODONE) 10 mg Tab immediate release tablet Take 1 tablet (10 mg total) by mouth every 4 (four) hours as needed for Pain.    pregabalin (LYRICA) 75 MG capsule Take 1 capsule (75 mg total) by mouth every evening.    aspirin (ECOTRIN) 81 MG EC tablet Take 81 mg by mouth once daily.    collagenase ointment Apply topically once daily.    divalproex (DEPAKOTE) 250 MG EC tablet Take 250mg (1 tablet) at 0700, then take 250mg (1 tablet) at 1200, then take 500mg (2 tablets0 at 1900    polyethylene glycol (GLYCOLAX) 17 gram PwPk Take 17 g by mouth 2 (two) times daily as needed (constipation).    senna-docusate 8.6-50 mg (PERICOLACE) 8.6-50 mg per tablet Take 1 tablet by mouth 2 (two) times daily.    thiamine 100 MG tablet Take 1 tablet (100 mg total) by mouth once daily.     Family History     None        Social History Main Topics    Smoking status: Former Smoker     Packs/day: 0.50     Quit date: 5/1/2018    Smokeless tobacco: Former User     Types: Chew     Quit date: 8/2/1980      Comment: Uses nicotine gum and nicotine patches    Alcohol use Yes      Comment: rarely    Drug use: No    Sexual activity: Yes     Partners: Female     Review of Systems   Unable to perform ROS: intubated     Objective:     Vital Signs (Most Recent):  Temp: 98.9 °F (37.2 °C) (07/20/18 1100)  Pulse: 75 (07/20/18 1200)  Resp: 14 (07/20/18 1200)  BP: (!) 112/59 (07/19/18 0705)  SpO2: 99 % (07/20/18 1200) Vital Signs (24h Range):  Temp:  [97.9 °F (36.6 °C)-100.2 °F (37.9 °C)] 98.9 °F (37.2 °C)  Pulse:  [] 75  Resp:  [14-16] 14  SpO2:  [90 %-100 %] 99 %  Arterial Line BP: ()/(54-78) 84/54       Weight: 48 kg (105 lb 13.1 oz)  Body mass index is 16.57 kg/m².    SpO2: 99 %  O2 Device (Oxygen Therapy): ventilator    Physical Exam   Constitutional: He is oriented to person, place, and time. He appears well-developed and well-nourished. No  distress.   Intubated    HENT:   Head: Normocephalic and atraumatic.   Eyes: EOM are normal. Pupils are equal, round, and reactive to light.   Neck: Normal range of motion. No JVD present.   Cardiovascular: Normal heart sounds and intact distal pulses.  Exam reveals no gallop and no friction rub.    No murmur heard.  Tachycardic, irregularly irregular rhythm   Pulmonary/Chest: He has no wheezes.   Mechanical breath sounds   Abdominal: Soft. Bowel sounds are normal. He exhibits no distension. There is no tenderness.   Musculoskeletal: Normal range of motion. He exhibits no edema.   Bilateral lower extremity amputation   Neurological: He is alert and oriented to person, place, and time.   Skin: Skin is warm. No rash noted. He is not diaphoretic.   Psychiatric: He has a normal mood and affect. His behavior is normal.       Significant Labs:     Recent Results (from the past 24 hour(s))   CBC auto differential    Collection Time: 07/20/18  3:47 AM   Result Value Ref Range    WBC 9.11 3.90 - 12.70 K/uL    RBC 2.81 (L) 4.60 - 6.20 M/uL    Hemoglobin 8.5 (L) 14.0 - 18.0 g/dL    Hematocrit 26.3 (L) 40.0 - 54.0 %    MCV 94 82 - 98 fL    MCH 30.2 27.0 - 31.0 pg    MCHC 32.3 32.0 - 36.0 g/dL    RDW 21.4 (H) 11.5 - 14.5 %    Platelets 264 150 - 350 K/uL    MPV 10.9 9.2 - 12.9 fL    Immature Granulocytes 0.4 0.0 - 0.5 %    Gran # (ANC) 7.0 1.8 - 7.7 K/uL    Immature Grans (Abs) 0.04 0.00 - 0.04 K/uL    Lymph # 1.3 1.0 - 4.8 K/uL    Mono # 0.6 0.3 - 1.0 K/uL    Eos # 0.1 0.0 - 0.5 K/uL    Baso # 0.05 0.00 - 0.20 K/uL    nRBC 1 (A) 0 /100 WBC    Gran% 77.0 (H) 38.0 - 73.0 %    Lymph% 13.8 (L) 18.0 - 48.0 %    Mono% 6.8 4.0 - 15.0 %    Eosinophil% 1.5 0.0 - 8.0 %    Basophil% 0.5 0.0 - 1.9 %    Differential Method Automated    Comprehensive metabolic panel - if not done in ED    Collection Time: 07/20/18  3:47 AM   Result Value Ref Range    Sodium 143 136 - 145 mmol/L    Potassium 3.1 (L) 3.5 - 5.1 mmol/L    Chloride 104 95 - 110  mmol/L    CO2 26 23 - 29 mmol/L    Glucose 99 70 - 110 mg/dL    BUN, Bld 17 6 - 20 mg/dL    Creatinine 1.1 0.5 - 1.4 mg/dL    Calcium 8.3 (L) 8.7 - 10.5 mg/dL    Total Protein 6.7 6.0 - 8.4 g/dL    Albumin 2.4 (L) 3.5 - 5.2 g/dL    Total Bilirubin 2.0 (H) 0.1 - 1.0 mg/dL    Alkaline Phosphatase 274 (H) 55 - 135 U/L     (H) 10 - 40 U/L    ALT 97 (H) 10 - 44 U/L    Anion Gap 13 8 - 16 mmol/L    eGFR if African American >60.0 >60 mL/min/1.73 m^2    eGFR if non African American >60.0 >60 mL/min/1.73 m^2   Magnesium - if not done in ED    Collection Time: 07/20/18  3:47 AM   Result Value Ref Range    Magnesium 2.0 1.6 - 2.6 mg/dL   Lactic acid, plasma    Collection Time: 07/20/18  3:47 AM   Result Value Ref Range    Lactate (Lactic Acid) 0.7 0.5 - 2.2 mmol/L   ISTAT PROCEDURE    Collection Time: 07/20/18  9:38 AM   Result Value Ref Range    POC PH 7.497 (H) 7.35 - 7.45    POC PCO2 42.9 35 - 45 mmHg    POC PO2 27 (LL) 40 - 60 mmHg    POC HCO3 33.2 (H) 24 - 28 mmol/L    POC BE 10 -2 to 2 mmol/L    POC SATURATED O2 57 (L) 95 - 100 %    POC TCO2 35 (H) 24 - 29 mmol/L    Rate 14     Sample VENOUS     Site Other     Allens Test N/A     DelSys Adult Vent     Mode AC/PRVC     Vt 450     PEEP 5     PiP 33     FiO2 40     Min Vol 6.5     Sp02 100

## 2018-07-20 NOTE — SUBJECTIVE & OBJECTIVE
Patient History           Medical as of 7/20/2018     Past Medical History     Diagnosis Date Comments Source    AICD (automatic cardioverter/defibrillator) present -- -- Provider    RYAN (acute kidney injury) 3/25/2018 -- Provider    CHF (congestive heart failure) -- -- Provider    COPD with acute bronchitis 6/12/2018 -- Provider    Coronary artery disease -- -- Provider    Encounter for blood transfusion -- -- Provider    Hx of psychiatric care -- -- Provider    Hyperlipemia -- -- Provider    Hypertension -- -- Provider    MI (myocardial infarction) -- -- Provider    Neuropathy -- -- Provider    PAD (peripheral artery disease) -- -- Provider    Psychiatric problem -- -- Provider    PVD (peripheral vascular disease) -- -- Provider          Pertinent Negatives     Diagnosis Date Noted Comments Source    Cancer 2/27/2017 -- Provider    Diabetes mellitus 2/27/2017 -- Provider    History of psychiatric hospitalization 6/20/2018 -- Provider    Teresa 6/20/2018 -- Provider    Psychiatric exam requested by authority 6/20/2018 -- Provider    Seizures 2/27/2017 -- Provider    Stroke 2/27/2017 -- Provider    Suicide attempt 6/20/2018 -- Provider    Therapy 6/20/2018 -- Provider                  Surgical as of 7/20/2018     Past Surgical History     Procedure Laterality Date Comments Source    AMPUTATION Right -- great toe Provider    KNEE ARTHROPLASTY Left -- -- Provider    VASCULAR SURGERY -- -- fem-pop bypass Provider    coronary stents [Other] -- -- -- Provider    CARDIAC DEFIBRILLATOR PLACEMENT -- -- -- Provider    EXPLORATION AND EVaCUATION OF HEMATOMA OF UPPER EXTREMITY [Other] Left -- -- Provider    BYBPASS GRAFT AORTOBIUFEMORAL [Other] -- -- -- Provider    BELOW KNEE AMPUTATION OF LOWER EXTREMITY Left 6/29/2018 Procedure: AMPUTATION, BELOW KNEE;  Surgeon: Norris Trejo MD;  Location: [facility] OR [address] [place] [address];  Service: Peripheral Vascular;  Laterality: Left; Provider    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 Bilateral 7/17/2018 Procedure: CATHETERIZATION, HEART, RIGHT, FOR CONGENITAL HEART DEFECT;  Surgeon: January Mosley MD;  Location: [facility] CATH LAB;  Service: Cardiology;  Laterality: Bilateral; Provider                  Family as of 7/20/2018    **None**           Tobacco Use as of 7/20/2018     Smoking Status Smoking Start Date Smoking Quit Date Packs/day Years Used    Former Smoker -- 5/1/2018 0.50 --    Types Comments Smokeless Tobacco Status Smokeless Tobacco Quit Date Source     Chew Uses nicotine gum and nicotine patches Former User 8/2/1980 Provider            Alcohol Use as of 7/20/2018     Alcohol Use Drinks/Week Alcohol/Week Comments Source    Yes -- -- rarely Provider            Drug Use as of 7/20/2018     Drug Use Types Frequency Comments Source    No -- -- -- Provider            Sexual Activity as of 7/20/2018     Sexually Active Birth Control Partners Comments Source    Yes -- Female -- Provider            Activities of Daily Living as of 7/20/2018     Activities of Daily Living Question Response Comments Source    Patient feels they ought to cut down on drinking/drug use Not Asked -- Provider    Patient annoyed by others criticizing their drinking/drug use Not Asked -- Provider    Patient has felt bad or guilty about drinking/drug use Not Asked -- Provider    Patient has had a drink/used drugs as an eye opener in the AM Not Asked -- Provider            Social Documentation as of 7/20/2018    **None**           Occupational as of 7/20/2018    **None**           Socioeconomic as of 7/20/2018     Marital Status Spouse Name Number of Children Years Education Preferred Language Ethnicity Race Source    Single -- -- -- English /White White --         Pertinent History Q A Comments    as of 7/20/2018 Lives with      Place in Birth Order      Lives in      Number of Siblings other     Raised by      Legal Involvement      Childhood Trauma      Criminal History of arrest and incarceration      Financial Status      Highest Level of Education high school graduation     Does patient have access to a firearm? No      Service No     Primary Leisure Activity      Spirituality actively participates in organized [employer]      Past Medical History:   Diagnosis Date    AICD (automatic cardioverter/defibrillator) present     RYAN (acute kidney injury) 3/25/2018    CHF (congestive heart failure)     COPD with acute bronchitis 6/12/2018    Coronary artery disease     Encounter for blood transfusion     Hx of psychiatric care     Hyperlipemia     Hypertension     MI (myocardial infarction)     Neuropathy     PAD (peripheral artery disease)     Psychiatric problem     PVD (peripheral vascular disease)      Past Surgical History:   Procedure Laterality Date    AMPUTATION Right     great toe    BELOW KNEE AMPUTATION OF LOWER EXTREMITY Left 6/29/2018    Procedure: AMPUTATION, BELOW KNEE;  Surgeon: Norris Trejo MD;  Location: [facility] OR [address] [place] [address];  Service: Peripheral Vascular;  Laterality: Left;    BYBPASS GRAFT AORTOBIUFEMORAL      CARDIAC DEFIBRILLATOR PLACEMENT      coronary stents      EXPLORATION AND EVaCUATION OF HEMATOMA OF UPPER EXTREMITY Left     KNEE ARTHROPLASTY Left     RIGHT HEART CATHETERIZATION FOR CONGENITAL HEART DEFECT Bilateral 7/17/2018    Procedure: CATHETERIZATION, HEART, RIGHT, FOR CONGENITAL HEART DEFECT;  Surgeon: January Mosley MD;  Location: [facility] CATH LAB;  Service: Cardiology;  Laterality: Bilateral;    VASCULAR SURGERY      fem-pop bypass     Family History     None        Social History Main Topics    Smoking status: Former Smoker     Packs/day: 0.50     Quit date: 5/1/2018    Smokeless tobacco: Former User     Types: Chew     Quit date: 8/2/1980      Comment: Uses nicotine gum and nicotine patches    Alcohol use Yes      Comment: rarely    Drug use: No    Sexual activity: Yes     Partners: Female     Review of patient's allergies indicates:  No Known  Allergies    No current facility-administered medications on file prior to encounter.      Current Outpatient Prescriptions on File Prior to Encounter   Medication Sig    atorvastatin (LIPITOR) 80 MG tablet Take 1 tablet (80 mg total) by mouth every evening.    HYDROcodone-acetaminophen (NORCO) 5-325 mg per tablet Take 1 tablet by mouth every 4 (four) hours as needed for Pain.    lidocaine (XYLOCAINE) 5 % Oint ointment Apply topically 4 (four) times daily. Apply to entire left foot    metoprolol tartrate (LOPRESSOR) 25 MG tablet Take 0.5 tablets (12.5 mg total) by mouth 2 (two) times daily.    nicotine (NICODERM CQ) 21 mg/24 hr Place 1 patch onto the skin once daily.    oxyCODONE (ROXICODONE) 10 mg Tab immediate release tablet Take 1 tablet (10 mg total) by mouth every 4 (four) hours as needed for Pain.    pregabalin (LYRICA) 75 MG capsule Take 1 capsule (75 mg total) by mouth every evening.    aspirin (ECOTRIN) 81 MG EC tablet Take 81 mg by mouth once daily.    collagenase ointment Apply topically once daily.    divalproex (DEPAKOTE) 250 MG EC tablet Take 250mg (1 tablet) at 0700, then take 250mg (1 tablet) at 1200, then take 500mg (2 tablets0 at 1900    polyethylene glycol (GLYCOLAX) 17 gram PwPk Take 17 g by mouth 2 (two) times daily as needed (constipation).    senna-docusate 8.6-50 mg (PERICOLACE) 8.6-50 mg per tablet Take 1 tablet by mouth 2 (two) times daily.    thiamine 100 MG tablet Take 1 tablet (100 mg total) by mouth once daily.     Psychotherapeutics     Start     Stop Route Frequency Ordered    07/19/18 0906  dexmedetomidine (PRECEDEX) 400mcg/100mL 0.9% NaCL infusion     Question Answer Comment   Titrate by (in mcg/kg/hr): 0.1    Titrate interval: (in minutes) 5    To maintain a RASS score of: RASS (-1) Drowsy - Not fully alert, but has sustained awakening (eye-opening/eye contact) to voice (>10 seconds)    Maximum dose of (in mcg/kg/hr): 1.4        -- IV Continuous 07/19/18 2876     "    Review of Systems    Strengths and Liabilities: unable to assess    Objective:     Vital Signs (Most Recent):  Temp: 97.9 °F (36.6 °C) (07/20/18 0701)  Pulse: 81 (07/20/18 1000)  Resp: 14 (07/20/18 1000)  BP: (!) 112/59 (07/19/18 0705)  SpO2: 99 % (07/20/18 1000) Vital Signs (24h Range):  Temp:  [97.9 °F (36.6 °C)-100.2 °F (37.9 °C)] 97.9 °F (36.6 °C)  Pulse:  [] 81  Resp:  [14-16] 14  SpO2:  [90 %-100 %] 99 %  Arterial Line BP: ()/(56-78) 91/60     Height: 5' 7" (170.2 cm)  Weight: 48 kg (105 lb 13.1 oz)  Body mass index is 16.57 kg/m².      Intake/Output Summary (Last 24 hours) at 07/20/18 1117  Last data filed at 07/20/18 1000   Gross per 24 hour   Intake          1570.43 ml   Output             2295 ml   Net          -724.57 ml       Physical Exam   Psychiatric:   Mental Status Exam:  Appearance: lying in bed, intubated  Level of Consciousness: sedated  Grooming: hospital gown  Psychomotor Behavior: sedated  Speech: unable to assess  Language: unable to assess  Orientation: unable to assess  Attention Span/Concentration: unable to assess  Memory: unable to assess  Cognition: unable to assess  Mood: unable to assess  Affect: unable to assess  Thought Process: unable to assess  Associations:unable to assess  Thought Content:unable to assess  Fund of Knowledge: adequate to education level  Insight:unable to assess  Judgment: unable to assess            Significant Labs:   Last 24 Hours:   Recent Lab Results       07/20/18  0938 07/20/18  0347      Immature Granulocytes  0.4     Immature Grans (Abs)  0.04  Comment:  Mild elevation in immature granulocytes is non specific and   can be seen in a variety of conditions including stress response,   acute inflammation, trauma and pregnancy. Correlation with other   laboratory and clinical findings is essential.       Albumin  2.4(L)     Alkaline Phosphatase  274(H)     Allens Test N/A      ALT  97(H)     Anion Gap  13     AST  217(H)     Baso #  0.05     " Basophil%  0.5     Total Bilirubin  2.0  Comment:  For infants and newborns, interpretation of results should be based  on gestational age, weight and in agreement with clinical  observations.  Premature Infant recommended reference ranges:  Up to 24 hours.............<8.0 mg/dL  Up to 48 hours............<12.0 mg/dL  3-5 days..................<15.0 mg/dL  6-29 days.................<15.0 mg/dL  (H)     Site Other      BUN, Bld  17     Calcium  8.3(L)     Chloride  104     CO2  26     Creatinine  1.1     DelChromas Adult Vent      Differential Method  Automated     eGFR if African American  >60.0     eGFR if non   >60.0  Comment:  Calculation used to obtain the estimated glomerular filtration  rate (eGFR) is the CKD-EPI equation.        Eos #  0.1     Eosinophil%  1.5     FiO2 40      Glucose  99     Gran # (ANC)  7.0     Gran%  77.0(H)     Hematocrit  26.3(L)     Hemoglobin  8.5(L)     Lactate, Jose Armando  0.7  Comment:  Falsely low lactic acid results can be found in samples   containing >=13.0 mg/dL total bilirubin and/or >=3.5 mg/dL   direct bilirubin.       Lymph #  1.3     Lymph%  13.8(L)     Magnesium  2.0     MCH  30.2     MCHC  32.3     MCV  94     Min Vol 6.5      Mode AC/PRVC      Mono #  0.6     Mono%  6.8     MPV  10.9     nRBC  1(A)     PEEP 5      PiP 33      Platelets  264     POC BE 10      POC HCO3 33.2(H)      POC PCO2 42.9      POC PH 7.497(H)      POC PO2 27(LL)      POC SATURATED O2 57(L)      POC TCO2 35(H)      Potassium  3.1(L)     Total Protein  6.7     Rate 14      RBC  2.81(L)     RDW  21.4(H)     Sample VENOUS      Sodium  143     Sp02 100      Vt 450      WBC  9.11           Significant Imaging: I have reviewed all pertinent imaging results/findings within the past 24 hours.

## 2018-07-20 NOTE — PROCEDURES
"Leonardo Byrd is a 47 y.o. male patient.    Temp: 98.9 °F (37.2 °C) (07/20/18 1100)  Pulse: 74 (07/20/18 1300)  Resp: 14 (07/20/18 1300)  BP: (!) 112/59 (07/19/18 0705)  SpO2: 100 % (07/20/18 1300)  Weight: 48 kg (105 lb 13.1 oz) (07/19/18 0500)  Height: 5' 7" (170.2 cm) (07/18/18 1000)       ICD programming  Date/Time: 7/20/2018 1:08 PM  Performed by: ABY TRIVEDI  Authorized by: ABY TRIVEDI   Consent: The procedure was performed in an emergent situation. Verbal consent not obtained.  Patient tolerance: Patient tolerated the procedure well with no immediate complications      Medtronic  Battery 7.8 years    Pacing impedance 323 ohms  Defibrillation impedance RV = 39 ohms  SVC = 50 ohms  Capture threshold 0.875V @ 0.40ms    VF detection >200 bpm  ATP during charging, 25J x 6  FVT  200-231 bpm  Burst (1), 35J x 5  VT  182-200 bpm  Burst (2), Ramp (2), 20J, 35J x 3    Treated  VF 18  VT (133-182 bpm) 2  VT-NS (>4 beats, >182 bpm) 234  Total shocks 22    %   <0.1%    Based on Optivol fluid index, patient has been in heart failure since April 2018    Aby Trivedi  7/20/2018  "

## 2018-07-20 NOTE — ASSESSMENT & PLAN NOTE
Delirium multifactorial, and with mixed state of hypoactive and hyperactive   H/o of adjustment d/o  H/o of Alcohol use (no utox was obtained at this admission unable to verify active use)  H/o of polysubstance use ( no utox was obtained at this admission unable to verify active use)     RECOMMENDATIONS  Psych meds  ? PRN IM Ativan 2mg only for sedation if pt is agitated and non redirectable. And would not recommend antipsychotic due to risk of causing adverse cardiac events  ? Please follow delirium precautions  § Limit or Discontinue use of OPIATES   § Limit or Discontinue use Anti-cholinergic medications as they may worsen delirium.  · Keep window shades open and room lit during day and room dim at night in order to promote normal sleep-wake cycles  · Encourage family at bedside. [place] patient often to situation, location, date.  · Continue medical workup for causative etiology of Delirium.     - Pt currently sedated and intubated. Will attempt interview tomorrow. Will follow with pt will in house for management of delirium and assist with liaison of pts  family and med teams     -Please contact ON CALL psychiatry resident for any acute issues that may arise.    Psychiatry CL will continue to follow.

## 2018-07-20 NOTE — HPI
47 M with PMH ICM (EF 10-15%) s/p ICD (Medtronic), CAD s/p PCI, HTN, HLD, PAD admitted with altered mental status and decompensated HF, consulted to EP for tachyarrhythmia. The patient developed a narrow complex tachycardia this morning that resulted in 22 ICD shocks. The rhythm appeared to transition between atrial fibrillation and typical counterclockwise atrial flutter. The patient was given 300 mg IV amiodarone followed by amiodarone 1 mg/min with restoration of normal sinus rhythm. The patient is intubated and sedated.

## 2018-07-21 PROBLEM — R45.1 AGITATION: Status: ACTIVE | Noted: 2018-01-01

## 2018-07-21 NOTE — PROGRESS NOTES
[facility]  Cardiology  Progress Note    Patient Name: Leonardo Byrd  MRN: [id number]  Admission Date: 7/13/2018  Hospital Length of Stay: 6 days  Code Status: Full Code   Attending Physician: Guillermo Archer MD   Primary Care Physician: Indio Aquino MD  Expected Discharge Date: 7/26/2018  Principal Problem:Acute on chronic combined systolic and diastolic congestive heart failure    Subjective:     Hospital Course:   7/17: Please see several notes from today, In summary; patient decompensated in the AM, refused all labs and needed psychiatry consult to be declared incompetent, family signed consent for CVC and RHC on behalf of patient. Later in PM he decompensated again requiring intubation, emergent CVC and A-Line placement and was placed on pressors.      7/18: Patient Intubated and sedated throughout the day. He does follow commands on low sedation. He continues to diurese well off lasix and is negative 5 liters. MAPs have been good on 5 of . Minimal vent settings extubate in AM.    7/19: Patient with thick secretions, new infiltrate on RLL of CXR, 1 fever overnight, cultures sent, broad antibiotics started. Decided to keep intubated today. 1 dose of IV lasix given. MAPs ok.    7/20: Overnight, had some runs of Aflutter with abberancy, again off fentanyl and became extremely agitated, trying to wean off ,     Interval History: Had runs of A flutter w Abberency vs Vtach and had multiple ICD shocks.    Review of Systems   Unable to perform ROS: intubated        Objective:     Vital Signs (Most Recent):  Temp: 99.6 °F (37.6 °C) (07/20/18 1500)  Pulse: 74 (07/20/18 1800)  Resp: 14 (07/20/18 1800)  BP: (!) 112/59 (07/19/18 0705)  SpO2: 100 % (07/20/18 1800) Vital Signs (24h Range):  Temp:  [97.9 °F (36.6 °C)-100 °F (37.8 °C)] 99.6 °F (37.6 °C)  Pulse:  [] 74  Resp:  [14-16] 14  SpO2:  [94 %-100 %] 100 %  Arterial Line BP: ()/(54-78) 95/60     Weight: 48 kg (105 lb 13.1  oz)  Body mass index is 16.57 kg/m².     SpO2: 100 %  O2 Device (Oxygen Therapy): ventilator      Intake/Output Summary (Last 24 hours) at 07/20/18 1916  Last data filed at 07/20/18 1800   Gross per 24 hour   Intake          1830.33 ml   Output             1210 ml   Net           620.33 ml       Lines/Drains/Airways     Peripherally Inserted Central Catheter Line                 PICC Double Lumen 07/18/18 1215 right brachial 2 days          Central Venous Catheter Line                 Percutaneous Central Line Insertion/Assessment - triple lumen  07/17/18 1920 right internal jugular 2 days          Drain                 NG/OG Tube 07/17/18 2000 18 Fr. Center mouth 2 days         Urethral Catheter 07/17/18 2010 2 days          Airway                 Airway - Non-Surgical 07/17/18 1900 Endotracheal Tube 3 days          Arterial Line                 Arterial Line 07/17/18 2000 Right Brachial 2 days                Physical Exam   Constitutional: He appears well-developed. He appears cachectic. He is sedated.   Neck: No JVD present. No thyroid mass present.   Cardiovascular: Tachycardia present.    Pulmonary/Chest: No respiratory distress. He has rales. He exhibits no tenderness.   Abdominal: He exhibits no distension. There is no tenderness.   Neurological: He is unresponsive.   Skin: Skin is dry.          Significant Labs:   BMP:   Recent Labs  Lab 07/19/18  0315 07/20/18  0347 07/20/18  1609   GLU 79 99 136*    143 142   K 4.1 3.1* 4.9    104 105   CO2 28 26 26   BUN 24* 17 18   CREATININE 1.1 1.1 1.5*   CALCIUM 8.3* 8.3* 8.3*   MG 2.7* 2.0 2.4       Significant Imaging: Echocardiogram:   2D echo with color flow doppler:   Results for orders placed or performed during the hospital encounter of 07/13/18   2D echo with color flow doppler   Result Value Ref Range    EF 10 (A) 55 - 65    Mitral Valve Regurgitation MODERATE TO SEVERE (A)     Est. PA Systolic Pressure 57.51 (A)     Mitral Valve Mobility NORMAL      Tricuspid Valve Regurgitation MODERATE TO SEVERE (A)      Assessment and Plan:     Brief HPI: 46 yo M with ICM 10%, PAD B/L Amputations, with acute resp failure needing intubation, Aspiration PNA, Aflutter resolved on Amio and agitation.    * Acute on chronic combined systolic and diastolic congestive heart failure    -CVP 8  - Hold Lasix today  -  SVO2 58  -leave on  2.5  -Lactate normal, EF 10%          Aspiration pneumonia    - Emergent intubation was needed to save patients life in acute resp distress  - May have aspirated  -Fever overnight, mild WBC elevations, thick sputum, CXR with new consolidation, Procal 0.4  - DDx Aspiration pneumonitis, pleural effusion  -Will cover with broad spectrum antibiotics, Follow cultures        Acute respiratory failure with hypoxia    - Doing well on minimal vent settings  - due to suspected Aspiration PNA will postpone extubation  -Watch secretions, saturation and try SBT in AM        Atrial flutter    - Resolved s/p Amiodarone IV  - Switch to PO Amiodarone tomorrow  - Likely from  and dilated cardiomyopathy        Encephalopathy    Likely secondary to cardiogenic shock. Treat per heart failure section.        Lactic acid acidosis    As per acute on chronic combined systemic and diastolic heart failure section.  -Trend Lactate  -Resolved        COPD, severe    -Duonebs prn  - Inhaled steroids        Alcohol abuse    -Thiamine  -Monitor for withdrawal            VTE Risk Mitigation         Ordered     IP VTE HIGH RISK PATIENT  Once      07/14/18 0819     IP VTE HIGH RISK PATIENT  Once      07/14/18 0819          Teresa Can MD  Cardiology  [facility]

## 2018-07-21 NOTE — SUBJECTIVE & OBJECTIVE
Interval History: Extremely agitated after extubation today, vitals stable.    Review of Systems   Unable to perform ROS: mental status change        Objective:     Vital Signs (Most Recent):  Temp: 99.6 °F (37.6 °C) (07/21/18 1100)  Pulse: (!) 119 (07/21/18 1700)  Resp: (!) 49 (07/21/18 1700)  BP: (!) 112/59 (07/19/18 0705)  SpO2: (!) 78 % (07/21/18 1700) Vital Signs (24h Range):  Temp:  [98.9 °F (37.2 °C)-100.5 °F (38.1 °C)] 99.6 °F (37.6 °C)  Pulse:  [] 119  Resp:  [14-50] 49  SpO2:  [78 %-100 %] 78 %  Arterial Line BP: ()/(60-79) 101/60     Weight: 48 kg (105 lb 13.1 oz)  Body mass index is 16.57 kg/m².     SpO2: (!) 78 %  O2 Device (Oxygen Therapy): venti mask      Intake/Output Summary (Last 24 hours) at 07/21/18 1808  Last data filed at 07/21/18 1707   Gross per 24 hour   Intake           2065.2 ml   Output             1196 ml   Net            869.2 ml       Lines/Drains/Airways     Peripherally Inserted Central Catheter Line                 PICC Double Lumen 07/18/18 1215 right brachial 3 days          Central Venous Catheter Line                 Percutaneous Central Line Insertion/Assessment - triple lumen  07/17/18 1920 right internal jugular 3 days          Drain                 Urethral Catheter 07/17/18 2010 3 days          Arterial Line                 Arterial Line 07/17/18 2000 Right Brachial 3 days                Physical Exam   Constitutional: He appears well-developed. He appears cachectic. He is sedated.   HENT:   Head: Normocephalic.   Neck: No JVD present. No thyroid mass present.   Cardiovascular: Tachycardia present.    Pulmonary/Chest: He is in respiratory distress. He has rales. He exhibits no tenderness.   Abdominal: He exhibits no distension. There is no tenderness.   Musculoskeletal: He exhibits no edema.   Neurological: He is disoriented.   Skin: He is diaphoretic.          Significant Labs:   BMP:   Recent Labs  Lab 07/20/18  0347 07/20/18  1609 07/21/18  0346   GLU 99 136*  166*    142 140   K 3.1* 4.9 3.8    105 106   CO2 26 26 23   BUN 17 18 20   CREATININE 1.1 1.5* 1.6*   CALCIUM 8.3* 8.3* 7.9*   MG 2.0 2.4 2.4   , CMP   Recent Labs  Lab 07/20/18  0347 07/20/18  1609 07/21/18  0346    142 140   K 3.1* 4.9 3.8    105 106   CO2 26 26 23   GLU 99 136* 166*   BUN 17 18 20   CREATININE 1.1 1.5* 1.6*   CALCIUM 8.3* 8.3* 7.9*   PROT 6.7  --  6.6   ALBUMIN 2.4*  --  2.3*   BILITOT 2.0*  --  1.3*   ALKPHOS 274*  --  266*   *  --  177*   ALT 97*  --  78*   ANIONGAP 13 11 11   ESTGFRAFRICA >60.0 >60.0 57.6*   EGFRNONAA >60.0 53.9* 49.8*   , CBC   Recent Labs  Lab 07/20/18  0347 07/21/18  0346   WBC 9.11 9.29   HGB 8.5* 8.7*   HCT 26.3* 27.4*    245    and Troponin No results for input(s): TROPONINI in the last 48 hours.    Significant Imaging: Echocardiogram:   2D echo with color flow doppler:   Results for orders placed or performed during the hospital encounter of 07/13/18   2D echo with color flow doppler   Result Value Ref Range    EF 10 (A) 55 - 65    Mitral Valve Regurgitation MODERATE TO SEVERE (A)     Est. PA Systolic Pressure 57.51 (A)     Mitral Valve Mobility NORMAL     Tricuspid Valve Regurgitation MODERATE TO SEVERE (A)

## 2018-07-21 NOTE — ASSESSMENT & PLAN NOTE
- Extreme agitation post extubation and has done this in past, trying to get out of bed tachypnic, concerned he will tire out and require reintubation.   -Gave precedex, prn fentanyl and ativan to calm him down.  - Psych reconsulted  -get EKG to check QTC consider thorazine

## 2018-07-21 NOTE — ASSESSMENT & PLAN NOTE
- Emergent intubation was needed to save patients life in acute resp distress  - May have aspirated  -Fever overnight, mild WBC elevations, thick sputum, CXR with new consolidation, Procal 0.4  - DDx Aspiration pneumonitis, pleural effusion  -Will cover with broad spectrum antibiotics, Follow cultures

## 2018-07-21 NOTE — SUBJECTIVE & OBJECTIVE
Interval hx:  NSR 80's with runs of Atrial tachcardia   CVP 11 this Am and SVO2 40 on  5  Past Surgical History:   Procedure Laterality Date    AMPUTATION Right     great toe    BELOW KNEE AMPUTATION OF LOWER EXTREMITY Left 6/29/2018    Procedure: AMPUTATION, BELOW KNEE;  Surgeon: Norris Trejo MD;  Location: [facility] OR [address] [place] [address];  Service: Peripheral Vascular;  Laterality: Left;    BYBPASS GRAFT AORTOBIUFEMORAL      CARDIAC DEFIBRILLATOR PLACEMENT      coronary stents      EXPLORATION AND EVaCUATION OF HEMATOMA OF UPPER EXTREMITY Left     KNEE ARTHROPLASTY Left     RIGHT HEART CATHETERIZATION FOR CONGENITAL HEART DEFECT Bilateral 7/17/2018    Procedure: CATHETERIZATION, HEART, RIGHT, FOR CONGENITAL HEART DEFECT;  Surgeon: January Mosley MD;  Location: [facility] CATH LAB;  Service: Cardiology;  Laterality: Bilateral;    VASCULAR SURGERY      fem-pop bypass       Review of patient's allergies indicates:  No Known Allergies    No current facility-administered medications on file prior to encounter.      Current Outpatient Prescriptions on File Prior to Encounter   Medication Sig    atorvastatin (LIPITOR) 80 MG tablet Take 1 tablet (80 mg total) by mouth every evening.    HYDROcodone-acetaminophen (NORCO) 5-325 mg per tablet Take 1 tablet by mouth every 4 (four) hours as needed for Pain.    lidocaine (XYLOCAINE) 5 % Oint ointment Apply topically 4 (four) times daily. Apply to entire left foot    metoprolol tartrate (LOPRESSOR) 25 MG tablet Take 0.5 tablets (12.5 mg total) by mouth 2 (two) times daily.    nicotine (NICODERM CQ) 21 mg/24 hr Place 1 patch onto the skin once daily.    oxyCODONE (ROXICODONE) 10 mg Tab immediate release tablet Take 1 tablet (10 mg total) by mouth every 4 (four) hours as needed for Pain.    pregabalin (LYRICA) 75 MG capsule Take 1 capsule (75 mg total) by mouth every evening.    aspirin (ECOTRIN) 81 MG EC tablet Take 81 mg by mouth once daily.    collagenase ointment Apply  topically once daily.    divalproex (DEPAKOTE) 250 MG EC tablet Take 250mg (1 tablet) at 0700, then take 250mg (1 tablet) at 1200, then take 500mg (2 tablets0 at 1900    polyethylene glycol (GLYCOLAX) 17 gram PwPk Take 17 g by mouth 2 (two) times daily as needed (constipation).    senna-docusate 8.6-50 mg (PERICOLACE) 8.6-50 mg per tablet Take 1 tablet by mouth 2 (two) times daily.    thiamine 100 MG tablet Take 1 tablet (100 mg total) by mouth once daily.     Family History     None        Social History Main Topics    Smoking status: Former Smoker     Packs/day: 0.50     Quit date: 5/1/2018    Smokeless tobacco: Former User     Types: Chew     Quit date: 8/2/1980      Comment: Uses nicotine gum and nicotine patches    Alcohol use Yes      Comment: rarely    Drug use: No    Sexual activity: Yes     Partners: Female     Review of Systems   Unable to perform ROS: intubated     Objective:     Vital Signs (Most Recent):  Temp: 99.2 °F (37.3 °C) (07/21/18 0701)  Pulse: (!) 126 (07/21/18 0950)  Resp: (!) 50 (07/21/18 0950)  BP: (!) 112/59 (07/19/18 0705)  SpO2: (!) 85 % (07/21/18 0950) Vital Signs (24h Range):  Temp:  [98.9 °F (37.2 °C)-100.5 °F (38.1 °C)] 99.2 °F (37.3 °C)  Pulse:  [] 126  Resp:  [14-50] 50  SpO2:  [85 %-100 %] 85 %  Arterial Line BP: ()/(54-71) 97/63       Weight: 48 kg (105 lb 13.1 oz)  Body mass index is 16.57 kg/m².    SpO2: (!) 85 %  O2 Device (Oxygen Therapy): nasal cannula    Physical Exam   Constitutional: He is oriented to person, place, and time. He appears well-developed and well-nourished. No distress.   Intubated    HENT:   Head: Normocephalic and atraumatic.   Eyes: EOM are normal. Pupils are equal, round, and reactive to light.   Neck: Normal range of motion. No JVD present.   Cardiovascular: Normal rate, regular rhythm, normal heart sounds and intact distal pulses.  Exam reveals no gallop and no friction rub.    No murmur heard.  Pulmonary/Chest: He has no wheezes.    Mechanical breath sounds   Abdominal: Soft. Bowel sounds are normal. He exhibits no distension. There is no tenderness.   Musculoskeletal: Normal range of motion. He exhibits no edema.   Bilateral lower extremity amputation   Neurological: He is alert and oriented to person, place, and time.   Skin: Skin is warm. No rash noted. He is not diaphoretic.   Psychiatric: He has a normal mood and affect. His behavior is normal.       Significant Labs:     Recent Results (from the past 24 hour(s))   Basic metabolic panel    Collection Time: 07/20/18  4:09 PM   Result Value Ref Range    Sodium 142 136 - 145 mmol/L    Potassium 4.9 3.5 - 5.1 mmol/L    Chloride 105 95 - 110 mmol/L    CO2 26 23 - 29 mmol/L    Glucose 136 (H) 70 - 110 mg/dL    BUN, Bld 18 6 - 20 mg/dL    Creatinine 1.5 (H) 0.5 - 1.4 mg/dL    Calcium 8.3 (L) 8.7 - 10.5 mg/dL    Anion Gap 11 8 - 16 mmol/L    eGFR if African American >60.0 >60 mL/min/1.73 m^2    eGFR if non  53.9 (A) >60 mL/min/1.73 m^2   Magnesium    Collection Time: 07/20/18  4:09 PM   Result Value Ref Range    Magnesium 2.4 1.6 - 2.6 mg/dL   VANCOMYCIN, TROUGH before 4th dose    Collection Time: 07/20/18 11:12 PM   Result Value Ref Range    Vancomycin-Trough 12.2 10.0 - 22.0 ug/mL   CBC auto differential    Collection Time: 07/21/18  3:46 AM   Result Value Ref Range    WBC 9.29 3.90 - 12.70 K/uL    RBC 2.91 (L) 4.60 - 6.20 M/uL    Hemoglobin 8.7 (L) 14.0 - 18.0 g/dL    Hematocrit 27.4 (L) 40.0 - 54.0 %    MCV 94 82 - 98 fL    MCH 29.9 27.0 - 31.0 pg    MCHC 31.8 (L) 32.0 - 36.0 g/dL    RDW 21.5 (H) 11.5 - 14.5 %    Platelets 245 150 - 350 K/uL    MPV 11.3 9.2 - 12.9 fL    Immature Granulocytes 1.0 (H) 0.0 - 0.5 %    Gran # (ANC) 5.6 1.8 - 7.7 K/uL    Immature Grans (Abs) 0.09 (H) 0.00 - 0.04 K/uL    Lymph # 2.4 1.0 - 4.8 K/uL    Mono # 0.8 0.3 - 1.0 K/uL    Eos # 0.3 0.0 - 0.5 K/uL    Baso # 0.07 0.00 - 0.20 K/uL    nRBC 2 (A) 0 /100 WBC    Gran% 60.1 38.0 - 73.0 %    Lymph%  25.4 18.0 - 48.0 %    Mono% 9.0 4.0 - 15.0 %    Eosinophil% 3.7 0.0 - 8.0 %    Basophil% 0.8 0.0 - 1.9 %    Differential Method Automated    Comprehensive metabolic panel - if not done in ED    Collection Time: 07/21/18  3:46 AM   Result Value Ref Range    Sodium 140 136 - 145 mmol/L    Potassium 3.8 3.5 - 5.1 mmol/L    Chloride 106 95 - 110 mmol/L    CO2 23 23 - 29 mmol/L    Glucose 166 (H) 70 - 110 mg/dL    BUN, Bld 20 6 - 20 mg/dL    Creatinine 1.6 (H) 0.5 - 1.4 mg/dL    Calcium 7.9 (L) 8.7 - 10.5 mg/dL    Total Protein 6.6 6.0 - 8.4 g/dL    Albumin 2.3 (L) 3.5 - 5.2 g/dL    Total Bilirubin 1.3 (H) 0.1 - 1.0 mg/dL    Alkaline Phosphatase 266 (H) 55 - 135 U/L     (H) 10 - 40 U/L    ALT 78 (H) 10 - 44 U/L    Anion Gap 11 8 - 16 mmol/L    eGFR if African American 57.6 (A) >60 mL/min/1.73 m^2    eGFR if non  49.8 (A) >60 mL/min/1.73 m^2   Magnesium - if not done in ED    Collection Time: 07/21/18  3:46 AM   Result Value Ref Range    Magnesium 2.4 1.6 - 2.6 mg/dL   Lactic acid, plasma    Collection Time: 07/21/18  3:46 AM   Result Value Ref Range    Lactate (Lactic Acid) 1.2 0.5 - 2.2 mmol/L   ISTAT PROCEDURE    Collection Time: 07/21/18  8:34 AM   Result Value Ref Range    POC PH 7.384 7.35 - 7.45    POC PCO2 47.7 (H) 35 - 45 mmHg    POC PO2 21 (LL) 40 - 60 mmHg    POC HCO3 28.5 (H) 24 - 28 mmol/L    POC BE 3 -2 to 2 mmol/L    POC SATURATED O2 33 (L) 95 - 100 %    POC TCO2 30 (H) 24 - 29 mmol/L    Rate 14     Sample VENOUS     Site Other     Allens Test N/A     DelSys Adult Vent     Mode AC/PRVC     Vt 450     PEEP 5     FiO2 40     Sp02 99    ISTAT PROCEDURE    Collection Time: 07/21/18  8:49 AM   Result Value Ref Range    POC PH 7.389 7.35 - 7.45    POC PCO2 46.5 (H) 35 - 45 mmHg    POC PO2 23 (LL) 40 - 60 mmHg    POC HCO3 28.1 (H) 24 - 28 mmol/L    POC BE 3 -2 to 2 mmol/L    POC SATURATED O2 40 (L) 95 - 100 %    POC TCO2 29 24 - 29 mmol/L    Rate 14     Sample VENOUS     Site Other      Allens Test N/A     DelSys Adult Vent     Mode AC/PRVC     Vt 450     PEEP 5     PiP 24     FiO2 40     Min Vol 6.47     Sp02 100

## 2018-07-21 NOTE — NURSING
0930: pt agitated, SBT trial ongoing;   0950: pt extubated to 3L NC  1010: pt combative, potential harm to self, HR 140s, tachynepic 45-50; spitting at staff  1014: violent restraints ordered by Mickey Moore MD   1200: Violent restraints DC; pt calm, resting HR 80s, RR 20, O2 - 97%  1215: non-violent restraints restarted; post violent restraint removal     See MAR and flowsheets for more information. VS stable, will continue to monitor.

## 2018-07-21 NOTE — ASSESSMENT & PLAN NOTE
- Resolved s/p Amiodarone IV  - Switched to PO Amiodarone tomorrow   - Start Eliquis tommorow for RAVEN HANC 4  - Likely from  and dilated cardiomyopathy

## 2018-07-21 NOTE — PROGRESS NOTES
[facility]  Cardiac Electrophysiology  Progress Note    Admission Date: 7/13/2018  Code Status: Full Code   Attending Physician: Guillermo Archer MD   Expected Discharge Date: 7/26/2018  Principal Problem:Acute on chronic combined systolic and diastolic congestive heart failure    Subjective:     Interval hx:  NSR 80's with runs of Atrial tachcardia   CVP 11 this Am and SVO2 40 on  5  Past Surgical History:   Procedure Laterality Date    AMPUTATION Right     great toe    BELOW KNEE AMPUTATION OF LOWER EXTREMITY Left 6/29/2018    Procedure: AMPUTATION, BELOW KNEE;  Surgeon: Norris Trejo MD;  Location: [facility] OR [address] [place] [address];  Service: Peripheral Vascular;  Laterality: Left;    BYBPASS GRAFT AORTOBIUFEMORAL      CARDIAC DEFIBRILLATOR PLACEMENT      coronary stents      EXPLORATION AND EVaCUATION OF HEMATOMA OF UPPER EXTREMITY Left     KNEE ARTHROPLASTY Left     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      Current Outpatient Prescriptions on File Prior to Encounter   Medication Sig    atorvastatin (LIPITOR) 80 MG tablet Take 1 tablet (80 mg total) by mouth every evening.    HYDROcodone-acetaminophen (NORCO) 5-325 mg per tablet Take 1 tablet by mouth every 4 (four) hours as needed for Pain.    lidocaine (XYLOCAINE) 5 % Oint ointment Apply topically 4 (four) times daily. Apply to entire left foot    metoprolol tartrate (LOPRESSOR) 25 MG tablet Take 0.5 tablets (12.5 mg total) by mouth 2 (two) times daily.    nicotine (NICODERM CQ) 21 mg/24 hr Place 1 patch onto the skin once daily.    oxyCODONE  (ROXICODONE) 10 mg Tab immediate release tablet Take 1 tablet (10 mg total) by mouth every 4 (four) hours as needed for Pain.    pregabalin (LYRICA) 75 MG capsule Take 1 capsule (75 mg total) by mouth every evening.    aspirin (ECOTRIN) 81 MG EC tablet Take 81 mg by mouth once daily.    collagenase ointment Apply topically once daily.    divalproex (DEPAKOTE) 250 MG EC tablet Take 250mg (1 tablet) at 0700, then take 250mg (1 tablet) at 1200, then take 500mg (2 tablets0 at 1900    polyethylene glycol (GLYCOLAX) 17 gram PwPk Take 17 g by mouth 2 (two) times daily as needed (constipation).    senna-docusate 8.6-50 mg (PERICOLACE) 8.6-50 mg per tablet Take 1 tablet by mouth 2 (two) times daily.    thiamine 100 MG tablet Take 1 tablet (100 mg total) by mouth once daily.     Family History     None        Social History Main Topics    Smoking status: Former Smoker     Packs/day: 0.50     Quit date: 5/1/2018    Smokeless tobacco: Former User     Types: Chew     Quit date: 8/2/1980      Comment: Uses nicotine gum and nicotine patches    Alcohol use Yes      Comment: rarely    Drug use: No    Sexual activity: Yes     Partners: Female     Review of Systems   Unable to perform ROS: intubated     Objective:     Vital Signs (Most Recent):  Temp: 99.2 °F (37.3 °C) (07/21/18 0701)  Pulse: (!) 126 (07/21/18 0950)  Resp: (!) 50 (07/21/18 0950)  BP: (!) 112/59 (07/19/18 0705)  SpO2: (!) 85 % (07/21/18 0950) Vital Signs (24h Range):  Temp:  [98.9 °F (37.2 °C)-100.5 °F (38.1 °C)] 99.2 °F (37.3 °C)  Pulse:  [] 126  Resp:  [14-50] 50  SpO2:  [85 %-100 %] 85 %  Arterial Line BP: ()/(54-71) 97/63       Weight: 48 kg (105 lb 13.1 oz)  Body mass index is 16.57 kg/m².    SpO2: (!) 85 %  O2 Device (Oxygen Therapy): nasal cannula    Physical Exam   Constitutional: He is oriented to person, place, and time. He appears well-developed and well-nourished. No distress.   Intubated    HENT:   Head: Normocephalic and  atraumatic.   Eyes: EOM are normal. Pupils are equal, round, and reactive to light.   Neck: Normal range of motion. No JVD present.   Cardiovascular: Normal rate, regular rhythm, normal heart sounds and intact distal pulses.  Exam reveals no gallop and no friction rub.    No murmur heard.  Pulmonary/Chest: He has no wheezes.   Mechanical breath sounds   Abdominal: Soft. Bowel sounds are normal. He exhibits no distension. There is no tenderness.   Musculoskeletal: Normal range of motion. He exhibits no edema.   Bilateral lower extremity amputation   Neurological: He is alert and oriented to person, place, and time.   Skin: Skin is warm. No rash noted. He is not diaphoretic.   Psychiatric: He has a normal mood and affect. His behavior is normal.       Significant Labs:     Recent Results (from the past 24 hour(s))   Basic metabolic panel    Collection Time: 07/20/18  4:09 PM   Result Value Ref Range    Sodium 142 136 - 145 mmol/L    Potassium 4.9 3.5 - 5.1 mmol/L    Chloride 105 95 - 110 mmol/L    CO2 26 23 - 29 mmol/L    Glucose 136 (H) 70 - 110 mg/dL    BUN, Bld 18 6 - 20 mg/dL    Creatinine 1.5 (H) 0.5 - 1.4 mg/dL    Calcium 8.3 (L) 8.7 - 10.5 mg/dL    Anion Gap 11 8 - 16 mmol/L    eGFR if African American >60.0 >60 mL/min/1.73 m^2    eGFR if non  53.9 (A) >60 mL/min/1.73 m^2   Magnesium    Collection Time: 07/20/18  4:09 PM   Result Value Ref Range    Magnesium 2.4 1.6 - 2.6 mg/dL   VANCOMYCIN, TROUGH before 4th dose    Collection Time: 07/20/18 11:12 PM   Result Value Ref Range    Vancomycin-Trough 12.2 10.0 - 22.0 ug/mL   CBC auto differential    Collection Time: 07/21/18  3:46 AM   Result Value Ref Range    WBC 9.29 3.90 - 12.70 K/uL    RBC 2.91 (L) 4.60 - 6.20 M/uL    Hemoglobin 8.7 (L) 14.0 - 18.0 g/dL    Hematocrit 27.4 (L) 40.0 - 54.0 %    MCV 94 82 - 98 fL    MCH 29.9 27.0 - 31.0 pg    MCHC 31.8 (L) 32.0 - 36.0 g/dL    RDW 21.5 (H) 11.5 - 14.5 %    Platelets 245 150 - 350 K/uL    MPV 11.3  9.2 - 12.9 fL    Immature Granulocytes 1.0 (H) 0.0 - 0.5 %    Gran # (ANC) 5.6 1.8 - 7.7 K/uL    Immature Grans (Abs) 0.09 (H) 0.00 - 0.04 K/uL    Lymph # 2.4 1.0 - 4.8 K/uL    Mono # 0.8 0.3 - 1.0 K/uL    Eos # 0.3 0.0 - 0.5 K/uL    Baso # 0.07 0.00 - 0.20 K/uL    nRBC 2 (A) 0 /100 WBC    Gran% 60.1 38.0 - 73.0 %    Lymph% 25.4 18.0 - 48.0 %    Mono% 9.0 4.0 - 15.0 %    Eosinophil% 3.7 0.0 - 8.0 %    Basophil% 0.8 0.0 - 1.9 %    Differential Method Automated    Comprehensive metabolic panel - if not done in ED    Collection Time: 07/21/18  3:46 AM   Result Value Ref Range    Sodium 140 136 - 145 mmol/L    Potassium 3.8 3.5 - 5.1 mmol/L    Chloride 106 95 - 110 mmol/L    CO2 23 23 - 29 mmol/L    Glucose 166 (H) 70 - 110 mg/dL    BUN, Bld 20 6 - 20 mg/dL    Creatinine 1.6 (H) 0.5 - 1.4 mg/dL    Calcium 7.9 (L) 8.7 - 10.5 mg/dL    Total Protein 6.6 6.0 - 8.4 g/dL    Albumin 2.3 (L) 3.5 - 5.2 g/dL    Total Bilirubin 1.3 (H) 0.1 - 1.0 mg/dL    Alkaline Phosphatase 266 (H) 55 - 135 U/L     (H) 10 - 40 U/L    ALT 78 (H) 10 - 44 U/L    Anion Gap 11 8 - 16 mmol/L    eGFR if African American 57.6 (A) >60 mL/min/1.73 m^2    eGFR if non  49.8 (A) >60 mL/min/1.73 m^2   Magnesium - if not done in ED    Collection Time: 07/21/18  3:46 AM   Result Value Ref Range    Magnesium 2.4 1.6 - 2.6 mg/dL   Lactic acid, plasma    Collection Time: 07/21/18  3:46 AM   Result Value Ref Range    Lactate (Lactic Acid) 1.2 0.5 - 2.2 mmol/L   ISTAT PROCEDURE    Collection Time: 07/21/18  8:34 AM   Result Value Ref Range    POC PH 7.384 7.35 - 7.45    POC PCO2 47.7 (H) 35 - 45 mmHg    POC PO2 21 (LL) 40 - 60 mmHg    POC HCO3 28.5 (H) 24 - 28 mmol/L    POC BE 3 -2 to 2 mmol/L    POC SATURATED O2 33 (L) 95 - 100 %    POC TCO2 30 (H) 24 - 29 mmol/L    Rate 14     Sample VENOUS     Site Other     Allens Test N/A     DelSys Adult Vent     Mode AC/PRVC     Vt 450     PEEP 5     FiO2 40     Sp02 99    ISTAT PROCEDURE    Collection  Time: 07/21/18  8:49 AM   Result Value Ref Range    POC PH 7.389 7.35 - 7.45    POC PCO2 46.5 (H) 35 - 45 mmHg    POC PO2 23 (LL) 40 - 60 mmHg    POC HCO3 28.1 (H) 24 - 28 mmol/L    POC BE 3 -2 to 2 mmol/L    POC SATURATED O2 40 (L) 95 - 100 %    POC TCO2 29 24 - 29 mmol/L    Rate 14     Sample VENOUS     Site Other     Allens Test N/A     DelSys Adult Vent     Mode AC/PRVC     Vt 450     PEEP 5     PiP 24     FiO2 40     Min Vol 6.47     Sp02 100          Assessment and Plan:     Atrial flutter    48 y/o M with cardiogenic shock and and AF RVR resulting in multiple ICD shocks, additional telemetry review shows AFL typical, as well as A.tach. This is in the setting of inotropic support with .     -Agree with transitioning to PO amiodarone   -Load with Amiodarone 400mg BID for 1 week and thereafter transition to Amiodarone 200mg daily   -Wean dobutamine as tolerated   -Given his renal function milrinone may not be the best option   -Plan for RFA CTI for his atrial flutter prior to discharge            Cari Pabon MD  Cardiac Electrophysiology  [facility]

## 2018-07-21 NOTE — SUBJECTIVE & OBJECTIVE
Interval History: Had runs of A flutter w Abberency vs Vtach and had multiple ICD shocks.    Review of Systems   Unable to perform ROS: intubated        Objective:     Vital Signs (Most Recent):  Temp: 99.6 °F (37.6 °C) (07/20/18 1500)  Pulse: 74 (07/20/18 1800)  Resp: 14 (07/20/18 1800)  BP: (!) 112/59 (07/19/18 0705)  SpO2: 100 % (07/20/18 1800) Vital Signs (24h Range):  Temp:  [97.9 °F (36.6 °C)-100 °F (37.8 °C)] 99.6 °F (37.6 °C)  Pulse:  [] 74  Resp:  [14-16] 14  SpO2:  [94 %-100 %] 100 %  Arterial Line BP: ()/(54-78) 95/60     Weight: 48 kg (105 lb 13.1 oz)  Body mass index is 16.57 kg/m².     SpO2: 100 %  O2 Device (Oxygen Therapy): ventilator      Intake/Output Summary (Last 24 hours) at 07/20/18 1916  Last data filed at 07/20/18 1800   Gross per 24 hour   Intake          1830.33 ml   Output             1210 ml   Net           620.33 ml       Lines/Drains/Airways     Peripherally Inserted Central Catheter Line                 PICC Double Lumen 07/18/18 1215 right brachial 2 days          Central Venous Catheter Line                 Percutaneous Central Line Insertion/Assessment - triple lumen  07/17/18 1920 right internal jugular 2 days          Drain                 NG/OG Tube 07/17/18 2000 18 Fr. Center mouth 2 days         Urethral Catheter 07/17/18 2010 2 days          Airway                 Airway - Non-Surgical 07/17/18 1900 Endotracheal Tube 3 days          Arterial Line                 Arterial Line 07/17/18 2000 Right Brachial 2 days                Physical Exam   Constitutional: He appears well-developed. He appears cachectic. He is sedated.   Neck: No JVD present. No thyroid mass present.   Cardiovascular: Tachycardia present.    Pulmonary/Chest: No respiratory distress. He has rales. He exhibits no tenderness.   Abdominal: He exhibits no distension. There is no tenderness.   Neurological: He is unresponsive.   Skin: Skin is dry.          Significant Labs:   BMP:   Recent Labs  Lab  07/19/18  0315 07/20/18  0347 07/20/18  1609   GLU 79 99 136*    143 142   K 4.1 3.1* 4.9    104 105   CO2 28 26 26   BUN 24* 17 18   CREATININE 1.1 1.1 1.5*   CALCIUM 8.3* 8.3* 8.3*   MG 2.7* 2.0 2.4       Significant Imaging: Echocardiogram:   2D echo with color flow doppler:   Results for orders placed or performed during the hospital encounter of 07/13/18   2D echo with color flow doppler   Result Value Ref Range    EF 10 (A) 55 - 65    Mitral Valve Regurgitation MODERATE TO SEVERE (A)     Est. PA Systolic Pressure 57.51 (A)     Mitral Valve Mobility NORMAL     Tricuspid Valve Regurgitation MODERATE TO SEVERE (A)

## 2018-07-21 NOTE — SUBJECTIVE & OBJECTIVE
Interval hx:  NSR 80's with runs of Atrial tachcardia   CVP 11 this Am and SVO2 40 on  5  Past Surgical History:   Procedure Laterality Date    AMPUTATION Right     great toe    BELOW KNEE AMPUTATION OF LOWER EXTREMITY Left 6/29/2018    Procedure: AMPUTATION, BELOW KNEE;  Surgeon: Norris Trejo MD;  Location: [facility] OR [address] [place] [address];  Service: Peripheral Vascular;  Laterality: Left;    BYBPASS GRAFT AORTOBIUFEMORAL      CARDIAC DEFIBRILLATOR PLACEMENT      coronary stents      EXPLORATION AND EVaCUATION OF HEMATOMA OF UPPER EXTREMITY Left     KNEE ARTHROPLASTY Left     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      Current Outpatient Prescriptions on File Prior to Encounter   Medication Sig    atorvastatin (LIPITOR) 80 MG tablet Take 1 tablet (80 mg total) by mouth every evening.    HYDROcodone-acetaminophen (NORCO) 5-325 mg per tablet Take 1 tablet by mouth every 4 (four) hours as needed for Pain.    lidocaine (XYLOCAINE) 5 % Oint ointment Apply topically 4 (four) times daily. Apply to entire left foot    metoprolol tartrate (LOPRESSOR) 25 MG tablet Take 0.5 tablets (12.5 mg total) by mouth 2 (two) times daily.    nicotine (NICODERM CQ) 21 mg/24 hr Place 1 patch onto the skin once daily.    oxyCODONE (ROXICODONE) 10 mg Tab immediate release tablet Take 1 tablet (10 mg total) by mouth every 4 (four) hours as needed for Pain.    pregabalin (LYRICA) 75 MG capsule Take 1 capsule (75 mg total) by mouth every evening.    aspirin (ECOTRIN) 81 MG EC tablet Take 81 mg by mouth once daily.    collagenase ointment Apply  topically once daily.    divalproex (DEPAKOTE) 250 MG EC tablet Take 250mg (1 tablet) at 0700, then take 250mg (1 tablet) at 1200, then take 500mg (2 tablets0 at 1900    polyethylene glycol (GLYCOLAX) 17 gram PwPk Take 17 g by mouth 2 (two) times daily as needed (constipation).    senna-docusate 8.6-50 mg (PERICOLACE) 8.6-50 mg per tablet Take 1 tablet by mouth 2 (two) times daily.    thiamine 100 MG tablet Take 1 tablet (100 mg total) by mouth once daily.     Family History     None        Social History Main Topics    Smoking status: Former Smoker     Packs/day: 0.50     Quit date: 5/1/2018    Smokeless tobacco: Former User     Types: Chew     Quit date: 8/2/1980      Comment: Uses nicotine gum and nicotine patches    Alcohol use Yes      Comment: rarely    Drug use: No    Sexual activity: Yes     Partners: Female     Review of Systems   Unable to perform ROS: intubated     Objective:     Vital Signs (Most Recent):  Temp: 99.2 °F (37.3 °C) (07/21/18 0701)  Pulse: (!) 126 (07/21/18 0950)  Resp: (!) 50 (07/21/18 0950)  BP: (!) 112/59 (07/19/18 0705)  SpO2: (!) 85 % (07/21/18 0950) Vital Signs (24h Range):  Temp:  [98.9 °F (37.2 °C)-100.5 °F (38.1 °C)] 99.2 °F (37.3 °C)  Pulse:  [] 126  Resp:  [14-50] 50  SpO2:  [85 %-100 %] 85 %  Arterial Line BP: ()/(54-71) 97/63       Weight: 48 kg (105 lb 13.1 oz)  Body mass index is 16.57 kg/m².    SpO2: (!) 85 %  O2 Device (Oxygen Therapy): nasal cannula    Physical Exam   Constitutional: He is oriented to person, place, and time. He appears well-developed and well-nourished. No distress.   Intubated    HENT:   Head: Normocephalic and atraumatic.   Eyes: EOM are normal. Pupils are equal, round, and reactive to light.   Neck: Normal range of motion. No JVD present.   Cardiovascular: Normal rate, regular rhythm, normal heart sounds and intact distal pulses.  Exam reveals no gallop and no friction rub.    No murmur heard.  Pulmonary/Chest: He has no wheezes.    Mechanical breath sounds   Abdominal: Soft. Bowel sounds are normal. He exhibits no distension. There is no tenderness.   Musculoskeletal: Normal range of motion. He exhibits no edema.   Bilateral lower extremity amputation   Neurological: He is alert and oriented to person, place, and time.   Skin: Skin is warm. No rash noted. He is not diaphoretic.   Psychiatric: He has a normal mood and affect. His behavior is normal.       Significant Labs:     Recent Results (from the past 24 hour(s))   Basic metabolic panel    Collection Time: 07/20/18  4:09 PM   Result Value Ref Range    Sodium 142 136 - 145 mmol/L    Potassium 4.9 3.5 - 5.1 mmol/L    Chloride 105 95 - 110 mmol/L    CO2 26 23 - 29 mmol/L    Glucose 136 (H) 70 - 110 mg/dL    BUN, Bld 18 6 - 20 mg/dL    Creatinine 1.5 (H) 0.5 - 1.4 mg/dL    Calcium 8.3 (L) 8.7 - 10.5 mg/dL    Anion Gap 11 8 - 16 mmol/L    eGFR if African American >60.0 >60 mL/min/1.73 m^2    eGFR if non  53.9 (A) >60 mL/min/1.73 m^2   Magnesium    Collection Time: 07/20/18  4:09 PM   Result Value Ref Range    Magnesium 2.4 1.6 - 2.6 mg/dL   VANCOMYCIN, TROUGH before 4th dose    Collection Time: 07/20/18 11:12 PM   Result Value Ref Range    Vancomycin-Trough 12.2 10.0 - 22.0 ug/mL   CBC auto differential    Collection Time: 07/21/18  3:46 AM   Result Value Ref Range    WBC 9.29 3.90 - 12.70 K/uL    RBC 2.91 (L) 4.60 - 6.20 M/uL    Hemoglobin 8.7 (L) 14.0 - 18.0 g/dL    Hematocrit 27.4 (L) 40.0 - 54.0 %    MCV 94 82 - 98 fL    MCH 29.9 27.0 - 31.0 pg    MCHC 31.8 (L) 32.0 - 36.0 g/dL    RDW 21.5 (H) 11.5 - 14.5 %    Platelets 245 150 - 350 K/uL    MPV 11.3 9.2 - 12.9 fL    Immature Granulocytes 1.0 (H) 0.0 - 0.5 %    Gran # (ANC) 5.6 1.8 - 7.7 K/uL    Immature Grans (Abs) 0.09 (H) 0.00 - 0.04 K/uL    Lymph # 2.4 1.0 - 4.8 K/uL    Mono # 0.8 0.3 - 1.0 K/uL    Eos # 0.3 0.0 - 0.5 K/uL    Baso # 0.07 0.00 - 0.20 K/uL    nRBC 2 (A) 0 /100 WBC    Gran% 60.1 38.0 - 73.0 %    Lymph%  25.4 18.0 - 48.0 %    Mono% 9.0 4.0 - 15.0 %    Eosinophil% 3.7 0.0 - 8.0 %    Basophil% 0.8 0.0 - 1.9 %    Differential Method Automated    Comprehensive metabolic panel - if not done in ED    Collection Time: 07/21/18  3:46 AM   Result Value Ref Range    Sodium 140 136 - 145 mmol/L    Potassium 3.8 3.5 - 5.1 mmol/L    Chloride 106 95 - 110 mmol/L    CO2 23 23 - 29 mmol/L    Glucose 166 (H) 70 - 110 mg/dL    BUN, Bld 20 6 - 20 mg/dL    Creatinine 1.6 (H) 0.5 - 1.4 mg/dL    Calcium 7.9 (L) 8.7 - 10.5 mg/dL    Total Protein 6.6 6.0 - 8.4 g/dL    Albumin 2.3 (L) 3.5 - 5.2 g/dL    Total Bilirubin 1.3 (H) 0.1 - 1.0 mg/dL    Alkaline Phosphatase 266 (H) 55 - 135 U/L     (H) 10 - 40 U/L    ALT 78 (H) 10 - 44 U/L    Anion Gap 11 8 - 16 mmol/L    eGFR if African American 57.6 (A) >60 mL/min/1.73 m^2    eGFR if non  49.8 (A) >60 mL/min/1.73 m^2   Magnesium - if not done in ED    Collection Time: 07/21/18  3:46 AM   Result Value Ref Range    Magnesium 2.4 1.6 - 2.6 mg/dL   Lactic acid, plasma    Collection Time: 07/21/18  3:46 AM   Result Value Ref Range    Lactate (Lactic Acid) 1.2 0.5 - 2.2 mmol/L   ISTAT PROCEDURE    Collection Time: 07/21/18  8:34 AM   Result Value Ref Range    POC PH 7.384 7.35 - 7.45    POC PCO2 47.7 (H) 35 - 45 mmHg    POC PO2 21 (LL) 40 - 60 mmHg    POC HCO3 28.5 (H) 24 - 28 mmol/L    POC BE 3 -2 to 2 mmol/L    POC SATURATED O2 33 (L) 95 - 100 %    POC TCO2 30 (H) 24 - 29 mmol/L    Rate 14     Sample VENOUS     Site Other     Allens Test N/A     DelSys Adult Vent     Mode AC/PRVC     Vt 450     PEEP 5     FiO2 40     Sp02 99    ISTAT PROCEDURE    Collection Time: 07/21/18  8:49 AM   Result Value Ref Range    POC PH 7.389 7.35 - 7.45    POC PCO2 46.5 (H) 35 - 45 mmHg    POC PO2 23 (LL) 40 - 60 mmHg    POC HCO3 28.1 (H) 24 - 28 mmol/L    POC BE 3 -2 to 2 mmol/L    POC SATURATED O2 40 (L) 95 - 100 %    POC TCO2 29 24 - 29 mmol/L    Rate 14     Sample VENOUS     Site Other      Allens Test N/A     DelSys Adult Vent     Mode AC/PRVC     Vt 450     PEEP 5     PiP 24     FiO2 40     Min Vol 6.47     Sp02 100

## 2018-07-21 NOTE — ASSESSMENT & PLAN NOTE
- Doing well on minimal vent settings  - due to suspected Aspiration PNA will postpone extubation  -Watch secretions, saturation and try SBT in AM

## 2018-07-21 NOTE — ASSESSMENT & PLAN NOTE
- Blood/ sputum culutre NGTD  - Cont broad spectrum antibiotics for 10-14 days  -Switched zosyn to cefepime/falgyl due to RYAN  - Stopped Vanc

## 2018-07-21 NOTE — PROGRESS NOTES
[facility]  Cardiology  Progress Note    Patient Name: Leonardo Byrd  MRN: [id number]  Admission Date: 7/13/2018  Hospital Length of Stay: 7 days  Code Status: Full Code   Attending Physician: Guillermo Archer MD   Primary Care Physician: Indio Aquino MD  Expected Discharge Date: 7/26/2018  Principal Problem:Acute on chronic combined systolic and diastolic congestive heart failure    Subjective:     Hospital Course:   7/17: Please see several notes from today, In summary; patient decompensated in the AM, refused all labs and needed psychiatry consult to be declared incompetent, family signed consent for CVC and RHC on behalf of patient. Later in PM he decompensated again requiring intubation, emergent CVC and A-Line placement and was placed on pressors.      7/18: Patient Intubated and sedated throughout the day. He does follow commands on low sedation. He continues to diurese well off lasix and is negative 5 liters. MAPs have been good on 5 of . Minimal vent settings extubate in AM.    7/19: Patient with thick secretions, new infiltrate on RLL of CXR, 1 fever overnight, cultures sent, broad antibiotics started. Decided to keep intubated today. 1 dose of IV lasix given. MAPs ok.    7/20: Overnight, had some runs of Aflutter with abberancy, again off fentanyl and became extremely agitated, trying to wean off ,     7/21: Got extubated today and was extremely agitated requiring sedation to keep calm, continued diuresis and antibiotics.    Interval History: Extremely agitated after extubation today, vitals stable.    Review of Systems   Unable to perform ROS: mental status change        Objective:     Vital Signs (Most Recent):  Temp: 99.6 °F (37.6 °C) (07/21/18 1100)  Pulse: (!) 119 (07/21/18 1700)  Resp: (!) 49 (07/21/18 1700)  BP: (!) 112/59 (07/19/18 0705)  SpO2: (!) 78 % (07/21/18 1700) Vital Signs (24h Range):  Temp:  [98.9 °F (37.2 °C)-100.5 °F (38.1 °C)] 99.6 °F (37.6  °C)  Pulse:  [] 119  Resp:  [14-50] 49  SpO2:  [78 %-100 %] 78 %  Arterial Line BP: ()/(60-79) 101/60     Weight: 48 kg (105 lb 13.1 oz)  Body mass index is 16.57 kg/m².     SpO2: (!) 78 %  O2 Device (Oxygen Therapy): venti mask      Intake/Output Summary (Last 24 hours) at 07/21/18 1808  Last data filed at 07/21/18 1707   Gross per 24 hour   Intake           2065.2 ml   Output             1196 ml   Net            869.2 ml       Lines/Drains/Airways     Peripherally Inserted Central Catheter Line                 PICC Double Lumen 07/18/18 1215 right brachial 3 days          Central Venous Catheter Line                 Percutaneous Central Line Insertion/Assessment - triple lumen  07/17/18 1920 right internal jugular 3 days          Drain                 Urethral Catheter 07/17/18 2010 3 days          Arterial Line                 Arterial Line 07/17/18 2000 Right Brachial 3 days                Physical Exam   Constitutional: He appears well-developed. He appears cachectic. He is sedated.   HENT:   Head: Normocephalic.   Neck: No JVD present. No thyroid mass present.   Cardiovascular: Tachycardia present.    Pulmonary/Chest: He is in respiratory distress. He has rales. He exhibits no tenderness.   Abdominal: He exhibits no distension. There is no tenderness.   Musculoskeletal: He exhibits no edema.   Neurological: He is disoriented.   Skin: He is diaphoretic.          Significant Labs:   BMP:   Recent Labs  Lab 07/20/18  0347 07/20/18  1609 07/21/18  0346   GLU 99 136* 166*    142 140   K 3.1* 4.9 3.8    105 106   CO2 26 26 23   BUN 17 18 20   CREATININE 1.1 1.5* 1.6*   CALCIUM 8.3* 8.3* 7.9*   MG 2.0 2.4 2.4   , CMP   Recent Labs  Lab 07/20/18  0347 07/20/18  1609 07/21/18  0346    142 140   K 3.1* 4.9 3.8    105 106   CO2 26 26 23   GLU 99 136* 166*   BUN 17 18 20   CREATININE 1.1 1.5* 1.6*   CALCIUM 8.3* 8.3* 7.9*   PROT 6.7  --  6.6   ALBUMIN 2.4*  --  2.3*   BILITOT 2.0*  --   1.3*   ALKPHOS 274*  --  266*   *  --  177*   ALT 97*  --  78*   ANIONGAP 13 11 11   ESTGFRAFRICA >60.0 >60.0 57.6*   EGFRNONAA >60.0 53.9* 49.8*   , CBC   Recent Labs  Lab 07/20/18  0347 07/21/18  0346   WBC 9.11 9.29   HGB 8.5* 8.7*   HCT 26.3* 27.4*    245    and Troponin No results for input(s): TROPONINI in the last 48 hours.    Significant Imaging: Echocardiogram:   2D echo with color flow doppler:   Results for orders placed or performed during the hospital encounter of 07/13/18   2D echo with color flow doppler   Result Value Ref Range    EF 10 (A) 55 - 65    Mitral Valve Regurgitation MODERATE TO SEVERE (A)     Est. PA Systolic Pressure 57.51 (A)     Mitral Valve Mobility NORMAL     Tricuspid Valve Regurgitation MODERATE TO SEVERE (A)      Assessment and Plan:     Brief HPI: 46 yo CAD, ICM, BKA, PAD presenting w cardiogenic shock, aspiration PNA.    * Acute on chronic combined systolic and diastolic congestive heart failure    -CVP elevated  - Lasix 40 IV Qday  -  SVO2 58 -> 40s  - now on 5  -Lactate normal, EF 10%          Aspiration pneumonia    - Blood/ sputum culutre NGTD  - Cont broad spectrum antibiotics for 10-14 days  -Switched zosyn to cefepime/falgyl due to RYAN  - Stopped Vanc        Acute respiratory failure with hypoxia    - Extubated, ABG remains stable  - Tachypnic due to agitation   -Continue diuresis and PNA Tx        Agitation    - Extreme agitation post extubation and has done this in past, trying to get out of bed tachypnic, concerned he will tire out and require reintubation.   -Gave precedex, prn fentanyl and ativan to calm him down.  - Psych reconsulted  -get EKG to check QTC consider thorazine        Atrial flutter    - Resolved s/p Amiodarone IV  - Switched to PO Amiodarone tomorrow   - Start Eliquis tommorow for AC, CHADSVASC 4  - Likely from  and dilated cardiomyopathy        Encephalopathy    Likely secondary to cardiogenic shock. Treat per heart failure  section.        Lactic acid acidosis    As per acute on chronic combined systemic and diastolic heart failure section.  -Trend Lactate  -Resolved        COPD, severe    -Duonebs prn  - Inhaled steroids        Alcohol abuse    -Thiamine  -Monitor for withdrawal            VTE Risk Mitigation         Ordered     IP VTE HIGH RISK PATIENT  Once      07/14/18 0819     IP VTE HIGH RISK PATIENT  Once      07/14/18 0819          Teresa Can MD  Cardiology  [facility]

## 2018-07-21 NOTE — ASSESSMENT & PLAN NOTE
- Resolved s/p Amiodarone IV  - Switch to PO Amiodarone tomorrow  - Likely from  and dilated cardiomyopathy

## 2018-07-21 NOTE — CARE UPDATE
His [id number] is elevated to atleast 3, recommend anticoagulation for CVA prophylaxis.    Cari Pabon DO  Cardiology Fellow PGY-5

## 2018-07-21 NOTE — PLAN OF CARE
Problem: Pressure Ulcer Risk (Mannie Scale) (Adult,Obstetrics,Pediatric)  Intervention: Turn/Reposition Often  Problem: Fall Risk (Adult)  Intervention: Monitor/Assist with Self Care  Problem: Patient Care Overview  Goal: Plan of Care Review  Outcome: Ongoing (interventions implemented as appropriate)     Please see previous nursing note for occurrence information. See vital signs and assessments in flowsheets. See below for updates on today's progress.      Pulmonary: patient extubated converted to venti mask 40% 15L, sputum is thick and yellow color, SVO2 40%     Cardiovascular: converted to -140s.  Blood pressure 100-120s/70s, dobutamine infusion remained @5 mcg/kg/min, Amiodarone DC     Neurological: Fentanyl drip DC, Precedex increased to 1.4 mcg/ml, pt confused, restless and moves all extremities      Gastrointestinal: WNL, no bowel movements today, Tube feeds DC      Genitourinary: Evans in place with 20-30/hr UO. Lasix given - UO increased to 260, urine is clouldy, jem/dark yellow output throughout the day     Integumentary/Other: patient maintaining body temperature about 99 degrees.     Patient progressing towards goals as tolerated, plan of care communicated and reviewed with Leonardo Corteseur and family. All concerns addressed. Will continue to monitor.

## 2018-07-22 PROBLEM — R45.1 AGITATION: Status: ACTIVE | Noted: 2018-01-01

## 2018-07-22 NOTE — PROGRESS NOTES
[facility]  Cardiology  Progress Note    Patient Name: Leonardo Byrd  MRN: [id number]  Admission Date: 7/13/2018  Hospital Length of Stay: 8 days  Code Status: Full Code   Attending Physician: Guillermo Archer MD   Primary Care Physician: Indio Aquino MD  Expected Discharge Date: 7/26/2018  Principal Problem:Acute respiratory failure with hypoxia    Subjective:     Hospital Course:   7/17: Please see several notes from today, In summary; patient decompensated in the AM, refused all labs and needed psychiatry consult to be declared incompetent, family signed consent for CVC and RHC on behalf of patient. Later in PM he decompensated again requiring intubation, emergent CVC and A-Line placement and was placed on pressors.      7/18: Patient Intubated and sedated throughout the day. He does follow commands on low sedation. He continues to diurese well off lasix and is negative 5 liters. MAPs have been good on 5 of . Minimal vent settings extubate in AM.    7/19: Patient with thick secretions, new infiltrate on RLL of CXR, 1 fever overnight, cultures sent, broad antibiotics started. Decided to keep intubated today. 1 dose of IV lasix given. MAPs ok.    7/20: Overnight, had some runs of Aflutter with abberancy, again off fentanyl and became extremely agitated, trying to wean off ,     7/21: Got extubated today and was extremely agitated requiring sedation to keep calm, continued diuresis and antibiotics.    7/22: Pt developed hypoxia and respiratory distress initially amenable to NIPPV. Lactic acid measured at 17; antibiotics broadened with vancomycin re-added. Patient became unresponsive around 1400 and was emergently intubated; norepinephrine was started for hypotension surrounding this episode. Patient did not lose his pulse throughout. Discussed patient's course with patient's son, Leonardo Cook, who advised that the current consensus among family members is that patient should be  resuscitated in the event of cardiac arrest, up to 20-25 minutes.    Interval History: See hospital course; emergently intubated for hypoxic respiratory failure and an episode of unresponsiveness    Review of Systems   Unable to perform ROS: intubated     Objective:     Vital Signs (Most Recent):  Temp: (!) 100.9 °F (38.3 °C) (07/22/18 0715)  Pulse: (!) 111 (07/22/18 1315)  Resp: (!) 22 (07/22/18 1315)  BP: (!) 112/59 (07/19/18 0705)  SpO2: 95 % (07/22/18 1000) Vital Signs (24h Range):  Temp:  [100.9 °F (38.3 °C)-102.7 °F (39.3 °C)] 100.9 °F (38.3 °C)  Pulse:  [] 111  Resp:  [17-68] 22  SpO2:  [62 %-100 %] 95 %  Arterial Line BP: ()/(48-87) 75/54     Weight: 53 kg (116 lb 13.5 oz)  Body mass index is 18.3 kg/m².     SpO2: 95 %  O2 Device (Oxygen Therapy): BiPAP      Intake/Output Summary (Last 24 hours) at 07/22/18 1525  Last data filed at 07/22/18 1351   Gross per 24 hour   Intake          1495.17 ml   Output             1065 ml   Net           430.17 ml       Lines/Drains/Airways     Peripherally Inserted Central Catheter Line                 PICC Double Lumen 07/18/18 1215 right brachial 4 days          Central Venous Catheter Line                 Percutaneous Central Line Insertion/Assessment - triple lumen  07/17/18 1920 right internal jugular 4 days          Drain                 Urethral Catheter 07/17/18 2010 4 days         NG/OG Tube 07/22/18 1324 orogastric Center mouth less than 1 day          Airway                 Airway - Non-Surgical 07/22/18 1319 Endotracheal Tube less than 1 day          Arterial Line                 Arterial Line 07/17/18 2000 Right Brachial 4 days                Physical Exam   Constitutional: He is oriented to person, place, and time. He appears well-developed and well-nourished. No distress.   Intubated    HENT:   Head: Normocephalic and atraumatic.   Eyes: EOM are normal. Pupils are equal, round, and reactive to light.   Neck: Normal range of motion. No JVD present.    Cardiovascular: Normal rate, regular rhythm, normal heart sounds and intact distal pulses.  Exam reveals no gallop and no friction rub.    No murmur heard.  Pulmonary/Chest: He has no wheezes.   Mechanical breath sounds   Abdominal: Soft. Bowel sounds are normal. He exhibits no distension. There is no tenderness.   Musculoskeletal: Normal range of motion. He exhibits no edema.   Bilateral lower extremity amputation   Neurological: He is alert and oriented to person, place, and time.   Skin: Skin is warm. No rash noted. He is not diaphoretic.   Psychiatric: He has a normal mood and affect. His behavior is normal.       Significant Labs:   ABG:   Recent Labs  Lab 07/22/18  0803 07/22/18  0904 07/22/18  1321   PH 7.293* 7.344* 7.060*   PCO2 18.6* 30.6* 53.8*   HCO3 9.0* 16.7* 15.2*   POCSATURATED 99 61* 99   BE -18 -9 -15   , CMP   Recent Labs  Lab 07/20/18  1609 07/21/18  0346 07/22/18  0107    140 145   K 4.9 3.8 4.8    106 106   CO2 26 23 15*   * 166* 42*   BUN 18 20 30*   CREATININE 1.5* 1.6* 2.2*   CALCIUM 8.3* 7.9* 8.0*   PROT  --  6.6 7.1   ALBUMIN  --  2.3* 2.6*   BILITOT  --  1.3* 1.8*   ALKPHOS  --  266* 252*   AST  --  177* 510*   ALT  --  78* 133*   ANIONGAP 11 11 24*   ESTGFRAFRICA >60.0 57.6* 39.2*   EGFRNONAA 53.9* 49.8* 33.9*    and CBC   Recent Labs  Lab 07/21/18  0346 07/22/18  0107   WBC 9.29 13.27*   HGB 8.7* 8.3*   HCT 27.4* 25.6*    223       Significant Imaging: X-Ray: CXR: X-Ray Chest 1 View (CXR):   Results for orders placed or performed during the hospital encounter of 07/13/18   X-Ray Chest 1 View    Narrative    EXAMINATION:  XR CHEST 1 VIEW    CLINICAL HISTORY:  Resp distress;    TECHNIQUE:  Single frontal view of the chest was performed.    COMPARISON:  07/21/2018    FINDINGS:  Cardiac device projects over the left hemithorax with a single defibrillator lead projecting over the right ventricle.  Stable positioning of 2 right-sided central venous catheters with  the tips projecting over the SVC.  Interval extubation and removal of enteric tube.  Stable increased opacity of the bilateral lower lung zones which may reflect edema, pneumonia, or aspiration.  No significant pleural fluid or pneumothorax.    The cardiac silhouette is enlarged but stable.    Remote left rib fractures again seen.  No evidence of pneumoperitoneum.      Impression    Interval extubation and removal of enteric tube.    No significant change in cardiopulmonary status.    Electronically signed by resident: Jelly Taylor  Date:    07/22/2018  Time:    10:22    Electronically signed by: Yimi Metz MD  Date:    07/22/2018  Time:    10:26     Assessment and Plan:     Brief HPI: 47M with ICM (EF 10%), CAD s/p PCI, PAD, bilat AKA admitted for ADCHF. Course complicated by multiple intubations and pneumonia with dependence on dobutamine for several days for inotropic support.    * Acute respiratory failure with hypoxia    Re-intubated after PM episode of resp distress (following decrease of dobutamine dose).  - Oxygenating with continued lactic acidosis, peak 17 most recently 15.  - DDx includes cardiogenic shock and sepsis from pneumonia  -- Pt is on dobutamine and norepinephrine for blood pressure support  -- Pt is on vanc + cefepime for MRSA + klebsiella grown from sputum culture        Acute on chronic combined systolic and diastolic congestive heart failure    HFrEF with prolonged ICU course  - Lasix 10 mg /hr  - SVO2 40 today,   -  now on 5  - Lactate elevated, with peak of 17 and PM result of 15  - EF 10%        Agitation    - Extreme agitation post extubation and has done this in past, trying to get out of bed tachypnic, concerned he will tire out and require reintubation.   -Gave precedex, prn fentanyl and ativan to calm him down.  - Psych reconsulted  -get EKG to check QTC consider thorazine        Atrial flutter    - Resolved s/p Amiodarone IV  - Switched to PO Amiodarone tomorrow   - Start  Eliquis tommorow for AC, CHADSVASC 4  - Likely from  and dilated cardiomyopathy        Aspiration pneumonia    - Blood/ sputum culutre NGTD  - Cont broad spectrum antibiotics for 10-14 days  -Switched zosyn to cefepime/falgyl due to RYAN  - Stopped Vanc        Encephalopathy    Likely secondary to cardiogenic shock. Treat per heart failure section.        Lactic acid acidosis    As per acute on chronic combined systemic and diastolic heart failure section.  -Trend Lactate  -Resolved        COPD, severe    -Duonebs prn  - Inhaled steroids        Alcohol abuse    -Thiamine  -Monitor for withdrawal            VTE Risk Mitigation         Ordered     heparin 25,000 units in dextrose 5% 250 mL (100 units/mL) infusion  Continuous      07/22/18 1124     heparin 25,000 units in dextrose 5% 250 mL (100 units/mL) bolus from bag; ADDITIONAL PRN BOLUS  As needed (PRN)      07/22/18 1124     heparin 25,000 units in dextrose 5% 250 mL (100 units/mL) bolus from bag; ADDITIONAL PRN BOLUS  As needed (PRN)      07/22/18 1124     IP VTE HIGH RISK PATIENT  Once      07/14/18 0819     IP VTE HIGH RISK PATIENT  Once      07/14/18 0819          Renato Rehman MD  Cardiology  [facility]

## 2018-07-22 NOTE — NURSING
Dr. Pabon notified that patient oxygen saturation is not picking up, and patient is still restless in bed, Respiratory called and notified about a new ABG order Dr Pabon at bedside giving patient's family an update on patient care, will continue to monitor.

## 2018-07-22 NOTE — SUBJECTIVE & OBJECTIVE
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      Current Outpatient Prescriptions on File Prior to Encounter   Medication Sig    atorvastatin (LIPITOR) 80 MG tablet Take 1 tablet (80 mg total) by mouth every evening.    HYDROcodone-acetaminophen (NORCO) 5-325 mg per tablet Take 1 tablet by mouth every 4 (four) hours as needed for Pain.    lidocaine (XYLOCAINE) 5 % Oint ointment Apply topically 4 (four) times daily. Apply to entire left foot    metoprolol tartrate (LOPRESSOR) 25 MG tablet Take 0.5 tablets (12.5 mg total) by mouth 2 (two) times daily.    nicotine (NICODERM CQ) 21 mg/24 hr Place 1 patch onto the skin once daily.    oxyCODONE (ROXICODONE) 10 mg Tab immediate release tablet Take 1 tablet (10 mg total) by mouth every 4 (four) hours as needed for Pain.    pregabalin (LYRICA) 75 MG capsule Take 1 capsule (75 mg total) by mouth every evening.    aspirin (ECOTRIN) 81 MG EC tablet Take 81 mg by mouth once daily.    collagenase ointment Apply topically once daily.    divalproex (DEPAKOTE)  250 MG EC tablet Take 250mg (1 tablet) at 0700, then take 250mg (1 tablet) at 1200, then take 500mg (2 tablets0 at 1900    polyethylene glycol (GLYCOLAX) 17 gram PwPk Take 17 g by mouth 2 (two) times daily as needed (constipation).    senna-docusate 8.6-50 mg (PERICOLACE) 8.6-50 mg per tablet Take 1 tablet by mouth 2 (two) times daily.    thiamine 100 MG tablet Take 1 tablet (100 mg total) by mouth once daily.     Family History     None        Social History Main Topics    Smoking status: Former Smoker     Packs/day: 0.50     Quit date: 5/1/2018    Smokeless tobacco: Former User     Types: Chew     Quit date: 8/2/1980      Comment: Uses nicotine gum and nicotine patches    Alcohol use Yes      Comment: rarely    Drug use: No    Sexual activity: Yes     Partners: Female     Review of Systems   Unable to perform ROS: intubated     Objective:     Vital Signs (Most Recent):  Temp: (!) 100.9 °F (38.3 °C) (07/22/18 0715)  Pulse: (!) 129 (07/22/18 1100)  Resp: (!) 37 (07/22/18 1100)  BP: (!) 112/59 (07/19/18 0705)  SpO2: 95 % (07/22/18 1000) Vital Signs (24h Range):  Temp:  [99.8 °F (37.7 °C)-102.7 °F (39.3 °C)] 100.9 °F (38.3 °C)  Pulse:  [] 129  Resp:  [17-68] 37  SpO2:  [62 %-100 %] 95 %  Arterial Line BP: ()/(60-87) 108/78       Weight: 53 kg (116 lb 13.5 oz)  Body mass index is 18.3 kg/m².    SpO2: 95 %  O2 Device (Oxygen Therapy): BiPAP    Physical Exam   Constitutional: He is oriented to person, place, and time. He appears well-developed and well-nourished. No distress.   Intubated    HENT:   Head: Normocephalic and atraumatic.   Eyes: EOM are normal. Pupils are equal, round, and reactive to light.   Neck: Normal range of motion. No JVD present.   Cardiovascular: Normal rate, regular rhythm, normal heart sounds and intact distal pulses.  Exam reveals no gallop and no friction rub.    No murmur heard.  Pulmonary/Chest: He has no wheezes.   Mechanical breath sounds   Abdominal: Soft. Bowel sounds  are normal. He exhibits no distension. There is no tenderness.   Musculoskeletal: Normal range of motion. He exhibits no edema.   Bilateral lower extremity amputation   Neurological: He is alert and oriented to person, place, and time.   Skin: Skin is warm. No rash noted. He is not diaphoretic.   Psychiatric: He has a normal mood and affect. His behavior is normal.       Significant Labs:     Recent Results (from the past 24 hour(s))   CBC auto differential    Collection Time: 07/22/18  1:07 AM   Result Value Ref Range    WBC 13.27 (H) 3.90 - 12.70 K/uL    RBC 2.73 (L) 4.60 - 6.20 M/uL    Hemoglobin 8.3 (L) 14.0 - 18.0 g/dL    Hematocrit 25.6 (L) 40.0 - 54.0 %    MCV 94 82 - 98 fL    MCH 30.4 27.0 - 31.0 pg    MCHC 32.4 32.0 - 36.0 g/dL    RDW 21.5 (H) 11.5 - 14.5 %    Platelets 223 150 - 350 K/uL    MPV 11.4 9.2 - 12.9 fL    Immature Granulocytes 1.2 (H) 0.0 - 0.5 %    Gran # (ANC) 10.2 (H) 1.8 - 7.7 K/uL    Immature Grans (Abs) 0.16 (H) 0.00 - 0.04 K/uL    Lymph # 1.6 1.0 - 4.8 K/uL    Mono # 1.3 (H) 0.3 - 1.0 K/uL    Eos # 0.0 0.0 - 0.5 K/uL    Baso # 0.03 0.00 - 0.20 K/uL    nRBC 1 (A) 0 /100 WBC    Gran% 76.9 (H) 38.0 - 73.0 %    Lymph% 12.1 (L) 18.0 - 48.0 %    Mono% 9.5 4.0 - 15.0 %    Eosinophil% 0.1 0.0 - 8.0 %    Basophil% 0.2 0.0 - 1.9 %    Differential Method Automated    Comprehensive metabolic panel - if not done in ED    Collection Time: 07/22/18  1:07 AM   Result Value Ref Range    Sodium 145 136 - 145 mmol/L    Potassium 4.8 3.5 - 5.1 mmol/L    Chloride 106 95 - 110 mmol/L    CO2 15 (L) 23 - 29 mmol/L    Glucose 42 (LL) 70 - 110 mg/dL    BUN, Bld 30 (H) 6 - 20 mg/dL    Creatinine 2.2 (H) 0.5 - 1.4 mg/dL    Calcium 8.0 (L) 8.7 - 10.5 mg/dL    Total Protein 7.1 6.0 - 8.4 g/dL    Albumin 2.6 (L) 3.5 - 5.2 g/dL    Total Bilirubin 1.8 (H) 0.1 - 1.0 mg/dL    Alkaline Phosphatase 252 (H) 55 - 135 U/L     (H) 10 - 40 U/L     (H) 10 - 44 U/L    Anion Gap 24 (H) 8 - 16 mmol/L    eGFR if African  American 39.2 (A) >60 mL/min/1.73 m^2    eGFR if non  33.9 (A) >60 mL/min/1.73 m^2   Lactic acid, plasma    Collection Time: 07/22/18  1:07 AM   Result Value Ref Range    Lactate (Lactic Acid) 11.2 (HH) 0.5 - 2.2 mmol/L   Magnesium - if not done in ED    Collection Time: 07/22/18  1:07 AM   Result Value Ref Range    Magnesium 2.2 1.6 - 2.6 mg/dL   POCT glucose    Collection Time: 07/22/18  6:06 AM   Result Value Ref Range    POCT Glucose 24 (LL) 70 - 110 mg/dL   POCT glucose    Collection Time: 07/22/18  6:48 AM   Result Value Ref Range    POCT Glucose 152 (H) 70 - 110 mg/dL   POCT glucose    Collection Time: 07/22/18  8:01 AM   Result Value Ref Range    POCT Glucose 89 70 - 110 mg/dL   ISTAT PROCEDURE    Collection Time: 07/22/18  8:03 AM   Result Value Ref Range    POC PH 7.293 (L) 7.35 - 7.45    POC PCO2 18.6 (LL) 35 - 45 mmHg    POC PO2 137 (H) 80 - 100 mmHg    POC HCO3 9.0 (L) 24 - 28 mmol/L    POC BE -18 -2 to 2 mmol/L    POC SATURATED O2 99 95 - 100 %    POC Lactate 17.72 (HH) 0.36 - 1.25 mmol/L    POC TCO2 10 (L) 23 - 27 mmol/L    Verbal Result Readback Performed Yes     Provider Credentials: MD     Provider Notified: SHERITA     Time Notifed: 803     Sample ARTERIAL     Site [facility]     Allens Test N/A     DelSys Venti Mask     Mode SPONT     FiO2 40    VANCOMYCIN, TROUGH before next dose    Collection Time: 07/22/18  8:26 AM   Result Value Ref Range    Vancomycin-Trough 10.6 10.0 - 22.0 ug/mL   ISTAT PROCEDURE    Collection Time: 07/22/18  9:04 AM   Result Value Ref Range    POC PH 7.344 (L) 7.35 - 7.45    POC PCO2 30.6 (L) 35 - 45 mmHg    POC PO2 33 (LL) 40 - 60 mmHg    POC HCO3 16.7 (L) 24 - 28 mmol/L    POC BE -9 -2 to 2 mmol/L    POC SATURATED O2 61 (L) 95 - 100 %    POC TCO2 18 (L) 24 - 29 mmol/L    Sample VENOUS     Site Other     Allens Test N/A     DelSys CPAP/BiPAP     Mode BiPAP     FiO2 100     Sp02 100     IP 15     EP 6    POCT glucose    Collection Time: 07/22/18 12:17 PM    Result Value Ref Range    POCT Glucose 129 (H) 70 - 110 mg/dL

## 2018-07-22 NOTE — ASSESSMENT & PLAN NOTE
Re-intubated after PM episode of resp distress (following decrease of dobutamine dose).  - Oxygenating with continued lactic acidosis, peak 17 most recently 15.  - DDx includes cardiogenic shock and sepsis from pneumonia  -- Pt is on dobutamine and norepinephrine for blood pressure support  -- Pt is on vanc + cefepime for MRSA + klebsiella grown from sputum culture

## 2018-07-22 NOTE — NURSING
Respiratory at bedside to connect patient to BiPap, and changed pulse ox probe to nose probe, will continue to monitor.

## 2018-07-22 NOTE — SUBJECTIVE & OBJECTIVE
Interval History: See hospital course; emergently intubated for hypoxic respiratory failure and an episode of unresponsiveness    Review of Systems   Unable to perform ROS: intubated     Objective:     Vital Signs (Most Recent):  Temp: (!) 100.9 °F (38.3 °C) (07/22/18 0715)  Pulse: (!) 111 (07/22/18 1315)  Resp: (!) 22 (07/22/18 1315)  BP: (!) 112/59 (07/19/18 0705)  SpO2: 95 % (07/22/18 1000) Vital Signs (24h Range):  Temp:  [100.9 °F (38.3 °C)-102.7 °F (39.3 °C)] 100.9 °F (38.3 °C)  Pulse:  [] 111  Resp:  [17-68] 22  SpO2:  [62 %-100 %] 95 %  Arterial Line BP: ()/(48-87) 75/54     Weight: 53 kg (116 lb 13.5 oz)  Body mass index is 18.3 kg/m².     SpO2: 95 %  O2 Device (Oxygen Therapy): BiPAP      Intake/Output Summary (Last 24 hours) at 07/22/18 1525  Last data filed at 07/22/18 1351   Gross per 24 hour   Intake          1495.17 ml   Output             1065 ml   Net           430.17 ml       Lines/Drains/Airways     Peripherally Inserted Central Catheter Line                 PICC Double Lumen 07/18/18 1215 right brachial 4 days          Central Venous Catheter Line                 Percutaneous Central Line Insertion/Assessment - triple lumen  07/17/18 1920 right internal jugular 4 days          Drain                 Urethral Catheter 07/17/18 2010 4 days         NG/OG Tube 07/22/18 1324 orogastric Center mouth less than 1 day          Airway                 Airway - Non-Surgical 07/22/18 1319 Endotracheal Tube less than 1 day          Arterial Line                 Arterial Line 07/17/18 2000 Right Brachial 4 days                Physical Exam   Constitutional: He is oriented to person, place, and time. He appears well-developed and well-nourished. No distress.   Intubated    HENT:   Head: Normocephalic and atraumatic.   Eyes: EOM are normal. Pupils are equal, round, and reactive to light.   Neck: Normal range of motion. No JVD present.   Cardiovascular: Normal rate, regular rhythm, normal heart sounds  and intact distal pulses.  Exam reveals no gallop and no friction rub.    No murmur heard.  Pulmonary/Chest: He has no wheezes.   Mechanical breath sounds   Abdominal: Soft. Bowel sounds are normal. He exhibits no distension. There is no tenderness.   Musculoskeletal: Normal range of motion. He exhibits no edema.   Bilateral lower extremity amputation   Neurological: He is alert and oriented to person, place, and time.   Skin: Skin is warm. No rash noted. He is not diaphoretic.   Psychiatric: He has a normal mood and affect. His behavior is normal.       Significant Labs:   ABG:   Recent Labs  Lab 07/22/18  0803 07/22/18  0904 07/22/18  1321   PH 7.293* 7.344* 7.060*   PCO2 18.6* 30.6* 53.8*   HCO3 9.0* 16.7* 15.2*   POCSATURATED 99 61* 99   BE -18 -9 -15   , CMP   Recent Labs  Lab 07/20/18  1609 07/21/18  0346 07/22/18  0107    140 145   K 4.9 3.8 4.8    106 106   CO2 26 23 15*   * 166* 42*   BUN 18 20 30*   CREATININE 1.5* 1.6* 2.2*   CALCIUM 8.3* 7.9* 8.0*   PROT  --  6.6 7.1   ALBUMIN  --  2.3* 2.6*   BILITOT  --  1.3* 1.8*   ALKPHOS  --  266* 252*   AST  --  177* 510*   ALT  --  78* 133*   ANIONGAP 11 11 24*   ESTGFRAFRICA >60.0 57.6* 39.2*   EGFRNONAA 53.9* 49.8* 33.9*    and CBC   Recent Labs  Lab 07/21/18  0346 07/22/18  0107   WBC 9.29 13.27*   HGB 8.7* 8.3*   HCT 27.4* 25.6*    223       Significant Imaging: X-Ray: CXR: X-Ray Chest 1 View (CXR):   Results for orders placed or performed during the hospital encounter of 07/13/18   X-Ray Chest 1 View    Narrative    EXAMINATION:  XR CHEST 1 VIEW    CLINICAL HISTORY:  Resp distress;    TECHNIQUE:  Single frontal view of the chest was performed.    COMPARISON:  07/21/2018    FINDINGS:  Cardiac device projects over the left hemithorax with a single defibrillator lead projecting over the right ventricle.  Stable positioning of 2 right-sided central venous catheters with the tips projecting over the SVC.  Interval extubation and removal  of enteric tube.  Stable increased opacity of the bilateral lower lung zones which may reflect edema, pneumonia, or aspiration.  No significant pleural fluid or pneumothorax.    The cardiac silhouette is enlarged but stable.    Remote left rib fractures again seen.  No evidence of pneumoperitoneum.      Impression    Interval extubation and removal of enteric tube.    No significant change in cardiopulmonary status.    Electronically signed by resident: Jelly Taylor  Date:    07/22/2018  Time:    10:22    Electronically signed by: Yimi Metz MD  Date:    07/22/2018  Time:    10:26

## 2018-07-22 NOTE — NURSING
While in the room patient became only arousable to voice and gentle shaking, blood pressure dropped to 70/40, Dr Montelongo called and notified and respiratory on the way to room, code cart obtained by Afsaneh WALKER.

## 2018-07-22 NOTE — NURSING
Upon assessment patient found abdominal breathing, unable to get oxygen sat on the monitor, patient breathing about 30 breaths/per min, appears to be in distress. Temperature 100.9, Ahmed notified, Respiratory and MD called to the bedside to assess, will continue to monitor.

## 2018-07-22 NOTE — PROGRESS NOTES
[facility]  Cardiac Electrophysiology  Progress Note    Admission Date: 7/13/2018  Code Status: Full Code   Attending Physician: Guillermo Archer MD   Expected Discharge Date: 7/26/2018  Principal Problem:Acute on chronic combined systolic and diastolic congestive heart failure    Subjective:     Interval hx:  In typical AFL rate 140's 2:1   Had sinus rhythm within 24 hours     Past Surgical History:   Procedure Laterality Date    AMPUTATION Right     great toe    BELOW KNEE AMPUTATION OF LOWER EXTREMITY Left 6/29/2018    Procedure: AMPUTATION, BELOW KNEE;  Surgeon: Norris Trejo MD;  Location: [facility] OR [address] [place] [address];  Service: Peripheral Vascular;  Laterality: Left;    BYBPASS GRAFT AORTOBIUFEMORAL      CARDIAC DEFIBRILLATOR PLACEMENT      coronary stents      EXPLORATION AND EVaCUATION OF HEMATOMA OF UPPER EXTREMITY Left     KNEE ARTHROPLASTY Left     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      Current Outpatient Prescriptions on File Prior to Encounter   Medication Sig    atorvastatin (LIPITOR) 80 MG tablet Take 1 tablet (80 mg total) by mouth every evening.    HYDROcodone-acetaminophen (NORCO) 5-325 mg per tablet Take 1 tablet by mouth every 4 (four) hours as needed for Pain.    lidocaine (XYLOCAINE) 5 % Oint ointment Apply topically 4 (four) times daily. Apply to entire left foot    metoprolol tartrate (LOPRESSOR) 25 MG tablet Take 0.5 tablets (12.5 mg total) by mouth 2 (two) times daily.    nicotine (NICODERM CQ) 21 mg/24 hr Place 1 patch onto the skin once daily.    oxyCODONE  (ROXICODONE) 10 mg Tab immediate release tablet Take 1 tablet (10 mg total) by mouth every 4 (four) hours as needed for Pain.    pregabalin (LYRICA) 75 MG capsule Take 1 capsule (75 mg total) by mouth every evening.    aspirin (ECOTRIN) 81 MG EC tablet Take 81 mg by mouth once daily.    collagenase ointment Apply topically once daily.    divalproex (DEPAKOTE) 250 MG EC tablet Take 250mg (1 tablet) at 0700, then take 250mg (1 tablet) at 1200, then take 500mg (2 tablets0 at 1900    polyethylene glycol (GLYCOLAX) 17 gram PwPk Take 17 g by mouth 2 (two) times daily as needed (constipation).    senna-docusate 8.6-50 mg (PERICOLACE) 8.6-50 mg per tablet Take 1 tablet by mouth 2 (two) times daily.    thiamine 100 MG tablet Take 1 tablet (100 mg total) by mouth once daily.     Family History     None        Social History Main Topics    Smoking status: Former Smoker     Packs/day: 0.50     Quit date: 5/1/2018    Smokeless tobacco: Former User     Types: Chew     Quit date: 8/2/1980      Comment: Uses nicotine gum and nicotine patches    Alcohol use Yes      Comment: rarely    Drug use: No    Sexual activity: Yes     Partners: Female     Review of Systems   Unable to perform ROS: intubated     Objective:     Vital Signs (Most Recent):  Temp: (!) 100.9 °F (38.3 °C) (07/22/18 0715)  Pulse: (!) 129 (07/22/18 1100)  Resp: (!) 37 (07/22/18 1100)  BP: (!) 112/59 (07/19/18 0705)  SpO2: 95 % (07/22/18 1000) Vital Signs (24h Range):  Temp:  [99.8 °F (37.7 °C)-102.7 °F (39.3 °C)] 100.9 °F (38.3 °C)  Pulse:  [] 129  Resp:  [17-68] 37  SpO2:  [62 %-100 %] 95 %  Arterial Line BP: ()/(60-87) 108/78       Weight: 53 kg (116 lb 13.5 oz)  Body mass index is 18.3 kg/m².    SpO2: 95 %  O2 Device (Oxygen Therapy): BiPAP    Physical Exam   Constitutional: He is oriented to person, place, and time. He appears well-developed and well-nourished. No distress.   Intubated    HENT:   Head: Normocephalic and atraumatic.   Eyes:  EOM are normal. Pupils are equal, round, and reactive to light.   Neck: Normal range of motion. No JVD present.   Cardiovascular: Normal rate, regular rhythm, normal heart sounds and intact distal pulses.  Exam reveals no gallop and no friction rub.    No murmur heard.  Pulmonary/Chest: He has no wheezes.   Mechanical breath sounds   Abdominal: Soft. Bowel sounds are normal. He exhibits no distension. There is no tenderness.   Musculoskeletal: Normal range of motion. He exhibits no edema.   Bilateral lower extremity amputation   Neurological: He is alert and oriented to person, place, and time.   Skin: Skin is warm. No rash noted. He is not diaphoretic.   Psychiatric: He has a normal mood and affect. His behavior is normal.       Significant Labs:     Recent Results (from the past 24 hour(s))   CBC auto differential    Collection Time: 07/22/18  1:07 AM   Result Value Ref Range    WBC 13.27 (H) 3.90 - 12.70 K/uL    RBC 2.73 (L) 4.60 - 6.20 M/uL    Hemoglobin 8.3 (L) 14.0 - 18.0 g/dL    Hematocrit 25.6 (L) 40.0 - 54.0 %    MCV 94 82 - 98 fL    MCH 30.4 27.0 - 31.0 pg    MCHC 32.4 32.0 - 36.0 g/dL    RDW 21.5 (H) 11.5 - 14.5 %    Platelets 223 150 - 350 K/uL    MPV 11.4 9.2 - 12.9 fL    Immature Granulocytes 1.2 (H) 0.0 - 0.5 %    Gran # (ANC) 10.2 (H) 1.8 - 7.7 K/uL    Immature Grans (Abs) 0.16 (H) 0.00 - 0.04 K/uL    Lymph # 1.6 1.0 - 4.8 K/uL    Mono # 1.3 (H) 0.3 - 1.0 K/uL    Eos # 0.0 0.0 - 0.5 K/uL    Baso # 0.03 0.00 - 0.20 K/uL    nRBC 1 (A) 0 /100 WBC    Gran% 76.9 (H) 38.0 - 73.0 %    Lymph% 12.1 (L) 18.0 - 48.0 %    Mono% 9.5 4.0 - 15.0 %    Eosinophil% 0.1 0.0 - 8.0 %    Basophil% 0.2 0.0 - 1.9 %    Differential Method Automated    Comprehensive metabolic panel - if not done in ED    Collection Time: 07/22/18  1:07 AM   Result Value Ref Range    Sodium 145 136 - 145 mmol/L    Potassium 4.8 3.5 - 5.1 mmol/L    Chloride 106 95 - 110 mmol/L    CO2 15 (L) 23 - 29 mmol/L    Glucose 42 (LL) 70 - 110 mg/dL     BUN, Bld 30 (H) 6 - 20 mg/dL    Creatinine 2.2 (H) 0.5 - 1.4 mg/dL    Calcium 8.0 (L) 8.7 - 10.5 mg/dL    Total Protein 7.1 6.0 - 8.4 g/dL    Albumin 2.6 (L) 3.5 - 5.2 g/dL    Total Bilirubin 1.8 (H) 0.1 - 1.0 mg/dL    Alkaline Phosphatase 252 (H) 55 - 135 U/L     (H) 10 - 40 U/L     (H) 10 - 44 U/L    Anion Gap 24 (H) 8 - 16 mmol/L    eGFR if African American 39.2 (A) >60 mL/min/1.73 m^2    eGFR if non  33.9 (A) >60 mL/min/1.73 m^2   Lactic acid, plasma    Collection Time: 07/22/18  1:07 AM   Result Value Ref Range    Lactate (Lactic Acid) 11.2 (HH) 0.5 - 2.2 mmol/L   Magnesium - if not done in ED    Collection Time: 07/22/18  1:07 AM   Result Value Ref Range    Magnesium 2.2 1.6 - 2.6 mg/dL   POCT glucose    Collection Time: 07/22/18  6:06 AM   Result Value Ref Range    POCT Glucose 24 (LL) 70 - 110 mg/dL   POCT glucose    Collection Time: 07/22/18  6:48 AM   Result Value Ref Range    POCT Glucose 152 (H) 70 - 110 mg/dL   POCT glucose    Collection Time: 07/22/18  8:01 AM   Result Value Ref Range    POCT Glucose 89 70 - 110 mg/dL   ISTAT PROCEDURE    Collection Time: 07/22/18  8:03 AM   Result Value Ref Range    POC PH 7.293 (L) 7.35 - 7.45    POC PCO2 18.6 (LL) 35 - 45 mmHg    POC PO2 137 (H) 80 - 100 mmHg    POC HCO3 9.0 (L) 24 - 28 mmol/L    POC BE -18 -2 to 2 mmol/L    POC SATURATED O2 99 95 - 100 %    POC Lactate 17.72 (HH) 0.36 - 1.25 mmol/L    POC TCO2 10 (L) 23 - 27 mmol/L    Verbal Result Readback Performed Yes     Provider Credentials: MD     Provider Notified: REZAN     Time Notifed: 803     Sample ARTERIAL     Site Jenny/[facility]     Allens Test N/A     DelSys Venti Mask     Mode SPONT     FiO2 40    VANCOMYCIN, TROUGH before next dose    Collection Time: 07/22/18  8:26 AM   Result Value Ref Range    Vancomycin-Trough 10.6 10.0 - 22.0 ug/mL   ISTAT PROCEDURE    Collection Time: 07/22/18  9:04 AM   Result Value Ref Range    POC PH 7.344 (L) 7.35 - 7.45    POC PCO2 30.6 (L) 35 -  45 mmHg    POC PO2 33 (LL) 40 - 60 mmHg    POC HCO3 16.7 (L) 24 - 28 mmol/L    POC BE -9 -2 to 2 mmol/L    POC SATURATED O2 61 (L) 95 - 100 %    POC TCO2 18 (L) 24 - 29 mmol/L    Sample VENOUS     Site Other     Allens Test N/A     DelSys CPAP/BiPAP     Mode BiPAP     FiO2 100     Sp02 100     IP 15     EP 6    POCT glucose    Collection Time: 07/22/18 12:17 PM   Result Value Ref Range    POCT Glucose 129 (H) 70 - 110 mg/dL         Assessment and Plan:     Atrial flutter    46 y/o M with cardiogenic shock and and AF RVR resulting in multiple ICD shocks, additional telemetry review shows AFL typical, as well as A.tach. This is in the setting of inotropic support with .     -Agree with transitioning to PO amiodarone   -Load with Amiodarone 400mg BID for 1 week and thereafter transition to Amiodarone 200mg daily   -Wean dobutamine as tolerated   -Given his renal function milrinone may not be the best option   -Heparin Anticoagulation, evidence of sinus rhythm today on review of telemetry within 24 hours, therefore will DCCV tomorrow in an effort to control rhythm and perhaps aid his recovery from cardiogenic shock            Cari Pabon MD  Cardiac Electrophysiology  [facility]

## 2018-07-22 NOTE — NURSING
Per Dr Cm longoria to keep patient MAP of 60, he was also notified about low to no urine output, and latest temperature of 102, will continue to monitor.

## 2018-07-22 NOTE — NURSING
Patient intubated, blood pressure 140/70, heart rate 105, family outside of room talking with Dr Fernandez, will continue to monitor.

## 2018-07-22 NOTE — NURSING
Patient appears to be uncomfortable again unable to get a o2 Sat, temperature 100.9  and patient voided only 75 cc since the  80 mg of Lasix given  and since Lasix drip was started at 0833, Dr Pabon notified of these issues, new orders for urine samples, will continue to monitor.

## 2018-07-22 NOTE — NURSING
Dr. Pabon notified about concerns related to patient breathing, o2 saturation and patient becoming more somnolent upon assessment. MD On the way to bedside.

## 2018-07-22 NOTE — ASSESSMENT & PLAN NOTE
48 y/o M with cardiogenic shock and and AF RVR resulting in multiple ICD shocks, additional telemetry review shows AFL typical, as well as A.tach. This is in the setting of inotropic support with .     -Agree with transitioning to PO amiodarone   -Load with Amiodarone 400mg BID for 1 week and thereafter transition to Amiodarone 200mg daily   -Wean dobutamine as tolerated   -Given his renal function milrinone may not be the best option   -Heparin Anticoagulation, evidence of sinus rhythm today on review of telemetry within 24 hours, therefore will DCCV tomorrow in an effort to control rhythm and perhaps aid his recovery from cardiogenic shock

## 2018-07-22 NOTE — ASSESSMENT & PLAN NOTE
HFrEF with prolonged ICU course  - Lasix 10 mg /hr  - SVO2 40 today,   -  now on 5  - Lactate elevated, with peak of 17 and PM result of 15  - EF 10%

## 2018-07-22 NOTE — PLAN OF CARE
Paged by primary team. Requesting recommendations for agitation post-extubation - specifically whether to use PRN chlorpromazine. Patient on precedex drip since 7/19. Discussed starting PRN Depacon 250 mg IV for agitation as per psych C-L team recommendations as well as avoiding antipsychotics due to risk of adverse cardiac events. Primary team on board with plan. Primary team to call/ page PRN.     Antoinette Quinn   Psychiatry PGY-1

## 2018-07-22 NOTE — PLAN OF CARE
Problem: Patient Care Overview  Goal: Plan of Care Review  Outcome: Ongoing (interventions implemented as appropriate)    Neuro:  Awake and agitated. Patient has visual and auditory hallucinations. Speech is very repetitive and negative. Patient is disoriented to time and situation, believes its s and the current president is Timothy Miller. Unable to follow commands. Sustaining fever of over 101 F. Dr. Rick Mayorga aware. Administered tylenol IV once.      Pulmonary: 15 L and 40% Venti mask. Breath sounds are coarse and diminished with no signs of distress.     Cardiovascular: Sinus tach to sinus rhythm. BP has been normotensive throughout shift. Art line to right brachial.      Gastrointestinal: NPO. Abdomen has several bruise marks but patient has no complaints of pain or tenderness.      Genitourinary: Evans in place. UOP aleksey 30-50 cc/hr.     Endocrine: CBG in AM was 42, rechecked of 24. Administered 1 amp of D50 per Dr. Rick Mayorga. Will recheck.      Electrolytes/Blood: None needed.     Integumentary/Other: Left BKA - cleaned wound with iodine then use santyl. Wrapped leg with roll gauze then wrapped with co band.      Infusions: Dobutamine and Precedex     Restraints: Bilateral Mitten and bilateral soft wrist restraints continue to be applied d/t removal of medical devies. Restraint orders will  , 2018 @ 1925.     Bed in low, locked position, call light within reach, side rails up x3. Unable to bathe patient as he is aggressive and refused bath. Will continue to monitor.

## 2018-07-22 NOTE — CARE UPDATE
Patient intubated for respiratory failure with AMS and hypotension. Patient was given Etomidate and Succinylcholine prior to intubation by Dr. Calderón. 8.0 ET tube inserted without complication. Placement confirmed by positive color change to ETCO2 detector and bilateral equal breath sounds on auscultation, pending chest x-ray. Vital signs remained stable throughout procedure.

## 2018-07-22 NOTE — ANESTHESIA PROCEDURE NOTES
Intubation    Diagnosis: Acute Respiratory Failure  Patient location during procedure: ICU  Procedure start time: 7/22/2018 1:10 PM  Timeout: 7/22/2018 1:09 PM  Procedure end time: 7/22/2018 1:14 PM  Staffing  Anesthesiologist: SUSANNAH IBRAHIM  Resident/CRNA: JAME ANDERSON  Performed: resident/CRNA   Anesthesiologist was present at the time of the procedure.  Preanesthetic Checklist  Completed: patient identified, site marked, surgical consent, pre-op evaluation, timeout performed, IV checked, risks and benefits discussed, monitors and equipment checked and anesthesia consent given  Intubation  Indication: respiratory distress, respiratory failure  Pre-oxygenation. Induction: intravenous, mask ventilation: easy mask.  Intubation: postinduction, laryngoscopy glidescope, Daniela 3.  Endotracheal Tube: oral, 8.0 mm ID, cuffed (inflated to minimal occlusive pressure)  Attempts: 1, Grade I - full view of cords  Complicating Factors: none  Tube secured at 23 cm at the lips.  Findings post-intubation: bilateral breath sounds, positive ETCO2, atraumatic / condition of teeth unchanged  Position Confirmation: auscultation  Eye Care: taped after intubation, padded and taped

## 2018-07-22 NOTE — NURSING
Code team at bedside patient not responding to stimuli and blood pressure dropping, 1 amp of epi given, Anaesthesia called, Dr Pabon and Dr Fernandez at bedside. After Epi blood pressure 110/70 via a line, blood pressure cuff not picking up at this time, will try again later, patient heart rate 70, strong pulse in the groining, will continue to monitor.

## 2018-07-22 NOTE — PROGRESS NOTES
Called to bedside for VBG. Before my arrival decision made to intubate. Arrived at bedside with anesthesia. Patient intubated by anesthesia and placed on vent at charted settings. ABG done with lactic and reported to Dr. Archer at bedside. Will continue to monitor.

## 2018-07-23 LAB — POCT GLUCOSE: <20 MG/DL (ref 70–110)

## 2018-07-23 NOTE — SIGNIFICANT EVENT
Death Note    Patient was evaluated, pupils dilated, non-reactive to light, no cardiac or pulmonary sounds could be auscultated nor palpable pulses appreciated. His ICD was turned off. Attending of record was notified.     Valeria Paul, PGY-4 (Cardiology Fellow)

## 2018-07-23 NOTE — PROGRESS NOTES
Patient went into PEA arrest. Started ACLS at 2022. Administered 5 rounds of epinephrine and shocked x2. ROSC achieved. Patient successfully resuscitated at 2040. Still on max norepinephrine, vasopressin, epinephrine, and 5 mcg/kg/min dobutamine.     Spoke on the phone to Leonardo, son of patient, about code events. Family will meet and discuss about current events. Will continue to monitor.

## 2018-07-23 NOTE — DISCHARGE SUMMARY
[facility]  Cardiology  Discharge Summary      Patient Name: Leonardo Byrd  MRN: [id number]  Admission Date: 7/13/2018  Hospital Length of Stay: 9 days  Discharge Date and Time: 7/26/2018     Attending Physician: No att. providers found    Discharging Provider: Renato Rehman MD  Primary Care Physician: Indio Aquino MD    HPI:   Given encephalopathy, history was obtained from chart and nurses.    48 y/o male with ICM (LVEF 13%) s/p ICD, CAD s/p PCI, HTN, HLD, current smoker, PAD (s/p aortobifemoral bypass, L SFA and pop PTAS in September and recent R SFA and R pop 3/8 by Dr. 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 He had not been compliant with his lasix.     In the ED, patient was found to be hypotensive to 80s-90s/50s-60s. HR was in 90s. EKG showed NSR. RR in teens-20s. SpO2 100% on 3 L NC. Afebrile, no leukocytosis. Initial lactate 3.8-4.9. BNP > 5400. Initial Cr 1.4 (was 1.1 two days prior). CXR showed improvement in cardiomegaly and interstitial attenuation, suggesting improving pulmonary edema, compared to prior. No consolidation. Bedside U/S revealed dilated IVC. Pt was given IVF and IV abx. Pt was given IV lasix followed by symptomatic improvement. 2D echo with CFD showed EF 10-15%, RV enlargement with moderately depressed systolic function, moderate-severe MR, moderate-severe TR, biatrial enlargement, pulm HTN with PA pressure 58, increased CVP, L pleural effusion, and ascites. Pt was admitted to the floor for acute on chronic combined HF. Initially, BP improved to [id number]/60s-70s, lactate improved to 3.4, but Cr up to 1.6. Overnight, BP down to 80s-90s/50s-60s again and had an episode of hypothermia to 95.4 F. The next morning, WBC up to 15, lactate up to 3.8, Cr up to 1.9. Cardiology was consulted for evaluation of cardiogenic shock in the setting of CHF exacerbation.    Patient reported since d/c from hospital he has not taken lasix.   He developed worsening SOB 2 days prior.  He called his son, a critical care practitioner, who advised him to take the grandfather's lasix.  The patient reports he felt uncomfortable taking his dad's medication so he did not. His breathing continued to deteriorate to the point he had trouble speaking in sentences.  Denies orthopnea, PND, lower ext swelling, weight changes. Denies chest pain or pressure even though there is some documentation of this in ED.  Denies any cough, URI symptoms, fevers, chills, congestion. Denies any drainage or erythema from L BKA site.  Reports non compliance with fluid restriction but does watch his salt intake. Quit tobacco use 4.5 months ago, quit EtOH use 6 months ago.    Today AM, the patient became mildly encephalopathic and the nurse called me to bedside. He could not establish a conversation due to lack of interest, ABG was compatible with metabolic acidosis. LA was 10. I spoke with primary team and cardiology consultant and we agree on transferring the patient to the CCU for better cardiovascular monitoring and starting the patient on dobutamine drip which has not been feasible due to lack of venous access. We attempted to place a central line emergently due to imminent deterioration (he also agreed orally to undergo it) but then he became altered during the procedure and it had to be aborted. Right now he does not have peripheral access.      Procedure(s) (LRB):  CATHETERIZATION, HEART, RIGHT, FOR CONGENITAL HEART DEFECT (Bilateral)     Indwelling Lines/Drains at time of discharge:  Lines/Drains/Airways     Peripherally Inserted Central Catheter Line                 PICC Double Lumen 07/18/18 1215 right brachial 5 days          Central Venous Catheter Line                 Percutaneous Central Line Insertion/Assessment - triple lumen  07/17/18 1920 right internal jugular 5 days          Drain                 Urethral Catheter 07/17/18 2010 5 days         NG/OG Tube 07/22/18  1324 orogastric Center mouth less than 1 day          Airway                 Airway - Non-Surgical 07/22/18 1319 Endotracheal Tube less than 1 day          Arterial Line                 Arterial Line 07/17/18 2000 Right Brachial 5 days                Hospital Course:  7/17: Please see several notes from today, In summary; patient decompensated in the AM, refused all labs and needed psychiatry consult to be declared incompetent, family signed consent for CVC and RHC on behalf of patient. Later in PM he decompensated again requiring intubation, emergent CVC and A-Line placement and was placed on pressors.      7/18: Patient Intubated and sedated throughout the day. He does follow commands on low sedation. He continues to diurese well off lasix and is negative 5 liters. MAPs have been good on 5 of . Minimal vent settings extubate in AM.    7/19: Patient with thick secretions, new infiltrate on RLL of CXR, 1 fever overnight, cultures sent, broad antibiotics started. Decided to keep intubated today. 1 dose of IV lasix given. MAPs ok.    7/20: Overnight, had some runs of Aflutter with abberancy, again off fentanyl and became extremely agitated, trying to wean off ,     7/21: Got extubated today and was extremely agitated requiring sedation to keep calm, continued diuresis and antibiotics.    7/22: Pt developed hypoxia and respiratory distress initially amenable to NIPPV. Lactic acid measured at 17; antibiotics broadened with vancomycin re-added. Patient became unresponsive around 1400 and was emergently intubated; norepinephrine was started for hypotension surrounding this episode. Patient did not lose his pulse throughout. Discussed patient's course with patient's son, Leonardo Cook, who advised that the current consensus among family members is that patient should be resuscitated in the event of cardiac arrest, up to 20-25 minutes.    Pt became increasingly hypotensive requiring vasopressor support in addition to  inotropic support with dobutamine. Pt became unresponsive at 1940, and at that time was found to be tachycardic, hypotensive, and acidotic with a pH of 7.1 and bicarb of 8. Pt was given bicarb IV and started on a bicarb gtt. At , pt was found to be pulseless and went into PEA arrest. Code Blue was activated and pt underwent ACLS with 5x epinephrine and 3x shocks resulting in ROSC. Pt was found to be in idoventricular rhythm afterward; pt required max doses of epinephrine, norepinephrine, and vasopressin as well as dobutamine to maintain blood pressure. Discussed case with pt's family throughout; decision was made to make pt DNR. Pt was declared  at .    Consults:   Consults         Status Ordering Provider     Inpatient consult to Cardiology  Once     Provider:  (Not yet assigned)    Completed CHRIS OLIVAS     Inpatient consult to Critical Care Medicine  Once     Provider:  (Not yet assigned)    Completed JEFF ARIZMENDI     Inpatient consult to PICC team ([facility] [place])  Once     Provider:  (Not yet assigned)    Completed GALA VAZQUEZ     Inpatient consult to Psychiatry  Once     Provider:  (Not yet assigned)    Completed CARISA MAGALLON     Inpatient consult to Registered Dietitian/Nutritionist  Once     Provider:  (Not yet assigned)    Completed CHRIS COOPER            Pending Diagnostic Studies:     Procedure Component Value Units Date/Time    Troponin I #2 [[id number]] Collected:  18    Order Status:  Sent Lab Status:  In process Updated:  18    Specimen:  Blood from Blood           Final Active Diagnoses:    Diagnosis Date Noted POA    PRINCIPAL PROBLEM:  Acute respiratory failure with hypoxia [J96.01] 2018 Yes    Acute on chronic combined systolic and diastolic congestive heart failure [I50.43] 2018 Yes    Agitation [R45.1] 2018 Yes    Atrial flutter [I48.92] 2018 No    Aspiration pneumonia [J69.0] 2018 No    Delirium  [R41.0] 2018 Yes    Encephalopathy [G93.40] 2018 Yes    Alteration in skin integrity related to surgical incision [R23.9] 2018 Yes    Lactic acid acidosis [E87.2] 2018 Yes    COPD, severe [J44.9] 2018 Yes    Acute renal failure [N17.9] 2018 Yes    Ischemic cardiomyopathy [I25.5] 2018 Yes    Alcohol abuse [F10.10] 2017 Yes    PAD (peripheral artery disease) [I73.9] 2017 Yes    Peripheral vascular disease of lower extremity [I73.9] 2017 Yes    Tobacco abuse [Z72.0] 2017 Yes    Non compliance w medication regimen [Z91.14] 2017 Not Applicable      Problems Resolved During this Admission:    Diagnosis Date Noted Date Resolved POA         Discharged Condition:     Disposition:     Follow Up:    Patient Instructions:   No discharge procedures on file.  Medications:  None (patient  at medical facility)    Time spent on the discharge of patient: 60 minutes    Renato Rehman MD  Cardiology  [facility]

## 2018-07-23 NOTE — NURSING
While giving bedside report patient was called by his name to check neuro status. Patient did not respond,Precedex stopped,sternal rub applied to patient chest, patient still did not respond. Cardiology called and notified of this and asked to come to bedside. Patient was last found responding to verbal command at 1830, when family left the room for shift change report. New orders for Vasopressin, and 32mg of Levo order to support patient blood pressure as patient at this time requiring more Levo at the 4mg dose that's currently ordered. EICU, respiratory and Kvng WALKER at bedside, Pharmacy called for STAT blood pressure drips, will continue to monitor.

## 2018-07-23 NOTE — PLAN OF CARE
Code Status Change    Spoke to pt's family member, decision maker, Joseluis. Update provided given patient's severity of illness. Joseluis is in agreement with changing pt's code status to DNR. He understood that the pt has a poor qualify of life, subsequent resuscitation efforts would be futile and mindful of the pt's personal wishes as he would not want further aggressive care. DNR paperwork was signed.     Valeria Paul, PGY-4 (Cardiology Fellow)

## 2018-07-23 NOTE — PROGRESS NOTES
Patient monitor alarming showing PEA. Notified Dr. Paul to be at bedside. Family at bedside. No further treatments performed as code status changed to DNR.

## 2018-07-23 NOTE — SIGNIFICANT EVENT
Code blue activated.CPR successfully performed via ambubag connected to 100% oxygen source.Mechanical vent support resumed post code @ 2041.See flowsheet for details.

## 2018-07-23 NOTE — PLAN OF CARE
RN notified me that pt is unresponsive. Patient was assessed, hypotensive and tachycardic. In addition to his dobutamine, levophed gtt he will started on vasopressin. STAT ABG was done revealed acidosis of pH 7.1 with bicarb of 8. He was given three amps of sodium bicarb and started on sodium bicarb infusion. EKG, lactic acid, mixed venous blood gas and troponin's have been ordered. Pt's mental status will be closely monitored.      Valeria Paul, PGY-4 (Cardiology Fellow)     Significant event  Pt went into PEA arrest. ACLS was performed by critical care team. Pt was given 5 rounds of epinephrine, shocked x3. ROSC was achieved. Post code EKG revealed idioventricular rhythm. Pt on max doses of epinephrine, levophed, vasopressin. Dobutamine gtt at 5 mg/hr. Patient's family was updated via telephone call and will be coming in.     Valeria Paul, PGY-4 (Cardiology Fellow)

## 2018-07-24 LAB — BACTERIA BLD CULT: NORMAL

## 2020-10-13 NOTE — PT/OT/SLP PROGRESS
"Speech Language Pathology Treatment    Patient Name:  Leonardo Byrd   MRN:  [id number]  Admitting Diagnosis: Critical lower limb ischemia    Recommendations:                 General Recommendations:  Dysphagia therapy  Diet recommendations:  Puree, Liquid Diet Level: Thin, Other (see comments) (NO STRAWS)   Aspiration Precautions: 1 bite/sip at a time, Alternating bites/sips, Avoid talking while eating, Double swallow with each bite/sip, Eliminate distractions, Feed only when awake/alert, HOB to 90 degrees, Meds crushed in puree, No straws, Remain upright 30 minutes post meal, Small bites/sips and Strict aspiration precautions   General Precautions: Standard, aspiration, fall  Communication strategies:  achieve pt's attention prior to communication efforts    Subjective     Pt stated "Given me potted meat and I'll eat it!"  Patient goals: Pt stated "I want to eat real food."    Pain/Comfort:  Pain Rating 1: 0/10    Objective:     Has the patient been evaluated by SLP for swallowing?   Yes  Keep patient NPO? No   Current Respiratory Status: nasal cannula      Pt denies difficulty with puree diet and thin liquids; however, pt with poor oral intake of puree tray present for session and requiring oxygen support at this time. Pt engaged in oral intake trials of solid cracker x3 with moderate lingual residue, prolonged mastication skills, and decreased oral awareness. Pt completed oral intake trials of thin liquids via straw x3 with wet vocal quality after the swallow, audible back flow during swallow, and decreased laryngeal elevation upon palpation. Thin liquids via cup sip x4 tolerated without overt s/s of aspiration observed; however, decreased laryngeal elevation remained. Pt refused puree trials at this time.     Recommend continued puree diet with NECTAR thick liquids via cup sip.  Strict aspiration precautions  No straws  Slow rate  90' upright for all oral intake    Updated DENISE Farah, via phone of recommendations and " aspiration precautions.    Assessment:     Leonardo Byrd is a 47 y.o. male with an SLP diagnosis of Dysphagia.  He presents with non-compliance and decreased interest in dysphagia therapy.   Goals:    SLP Goals        Problem: SLP Goal    Goal Priority Disciplines Outcome   SLP Goal     SLP Ongoing (interventions implemented as appropriate)   Description:  Speech Therapy Short Term Goals  Goal expected to be met by 4/7  1. Pt will tolerate puree diet and thin liquids with no overt s/s of aspiration noted.   2. Patient will tolerate dental soft and solid trials x10 with adequate oral clearance and no overt s/s of aspiration.                       Plan:     · Patient to be seen:  4 x/week   · Plan of Care expires:  04/28/18  · Plan of Care reviewed with:  patient, family   · SLP Follow-Up:  Yes       Discharge recommendations:  other (see comments) (pending pt's progress)   Barriers to Discharge:  non-compliance    Time Tracking:     SLP Treatment Date:   04/04/18  Speech Start Time:  1450  Speech Stop Time:  1505     Speech Total Time (min):  15 min    Billable Minutes: Treatment Swallowing Dysfunction 15    Haleigh Hernandez CCC-SLP  04/04/2018   negative

## 2020-11-14 NOTE — NURSING
"Pt's BP 78/50, pt continues to be asymptomatic; sitting up in bed, playing iPAD. HR 84 on monitor, SR. Dr. Chinchilla notified, stated to hold Dilaudid and any BP medications scheduled. Stated "okay with giving 5 mg Oxy regardless of blood pressure as long as the pt's HR is less than 90." Will implement. Will continue to monitor.  " [facility]    Progress Note    Prisca Duglasharmanjurgen Patient Status:  Inpatient    1951 MRN [id number]   [facility] 3NW-A Attending Indira Ortiz MD   Hosp Day # 2 PCP Eliana Barnes MD       SUBJECTIVE:  Feeling much better acetaminophen (TYLENOL) tab 650 mg, 650 mg, Oral, Q4H PRN    Or    •  HYDROcodone-acetaminophen (NORCO) 5-325 MG per tab 1 tablet, 1 tablet, Oral, Q4H PRN    Or    •  HYDROcodone-acetaminophen (NORCO) 5-325 MG per tab 2 tablet, 2 tablet, Oral, Q4H PRN    • infection-  tested + on 11/8 at [facility] but rapid test here is neg. · Pancytopenia- ? covid related- REOLVED  · GERD WITH  Increased nausea / vomiting. · ELEVATED PRO-BNP  = ?   Acute pulmonary edema - improving with iv lasix  · HYPERTENSION  · CAD    · C

## 2021-07-26 NOTE — ASSESSMENT & PLAN NOTE
Per primary   79 year old woman with PMx of Right nephrolithiasis s/p Nephrostomy tubes and Lithotripsy, Recurrent Cdiff colitis s/p Fecal transplant, HTN, HLD, Gout presents with fever, chills.   Pt was prescribed antibiotics (bactrim) as an outpatient for UTI with no improvement yesterday.  Symptoms have been going on for a week.  Out patient sono negative for renal stones a week ago.  In ED given IVFs, antibiotics for UTI and admitted for sepsis management.  At bedside pt feeling better, denies any pain.  Currently no fevers, or urinary complaints.  She was admitted to medical floor. Urine cultures and blood cultures were sent in ED. She was seen by MONICA Pa. She was started on IV rocephin.   Blood cultures were negative. Urine cultures growing Proteus mirabilis. Her WBC back to normal. No fever or chills.       Vital Signs Last 24 Hrs  T(C): 36.4 (26 Jul 2021 08:56), Max: 36.7 (25 Jul 2021 22:31)  T(F): 97.5 (26 Jul 2021 08:56), Max: 98.1 (25 Jul 2021 22:31)  HR: 51 (26 Jul 2021 08:56) (51 - 58)  BP: 143/49 (26 Jul 2021 08:56) (127/54 - 143/49)  BP(mean): --  RR: 18 (26 Jul 2021 08:56) (16 - 18)  SpO2: 97% (26 Jul 2021 08:56) (97% - 99%)        PHYSICAL EXAM:    Constitutional: NAD, awake and alert, well-developed  HEENT: PERR, EOMI, Normal Hearing, MMM  Neck: Soft and supple  Respiratory: Breath sounds are clear bilaterally, No wheezing, rales or rhonchi  Cardiovascular: S1 and S2, regular rate and rhythm, no Murmurs, gallops or rubs  Gastrointestinal: Bowel Sounds present, soft, nontender, nondistended, no guarding, no rebound  Extremities: No peripheral edema  Neurological: A/O x 3, no focal deficits in my limited exam      Assessment and Plan:   Assessment:  · Assessment    79 year old woman with PMx of Right nephrolithiasis s/p Nephrostomy tubes and Lithotripsy, Recurrent Cdiff colitis s/p Fecal transplant, HTN, HLD, Gout presents with fever, chills.      Sepsis secondary to UTI/pyelonephritis  -Ultrasound noted for renal calculus - eval appreciated. As per Dr Pa suspect pyelo not stone  -On IV ceftriaxone, change to po ceftin on discharge  -f/u cultures, blood cultures: no growth  -Urine cultures: Proteus mirabilis,  -oral c diff for ppx given extensive history     Chronic Afib / HTN / HLD / gout:  -c/w plavix   -c/w allopurinol   -c/w statin   -c/w verapamil    Home today  Spent more than 30 min to prepare the discharge

## 2021-12-02 NOTE — NURSING
Notified Dr Trujillo's team patient is having more delusions today. Stating people are on the roof eating lunch, that the people in the movie are going on the roof outside his window.  Also that a person named Missy is in chair next to him, but no one is there.  Psych will be consulted to follow up with patient per team. Dominga Powell LPN   Clear

## 2022-06-09 NOTE — ASSESSMENT & PLAN NOTE
Well outside the window for DT; will DC PRN CIWA & Ativan   Double Island Pedicle Flap Text: The defect edges were debeveled with a #15 scalpel blade.  Given the location of the defect, shape of the defect and the proximity to free margins a double island pedicle advancement flap was deemed most appropriate.  Using a sterile surgical marker, an appropriate advancement flap was drawn incorporating the defect, outlining the appropriate donor tissue and placing the expected incisions within the relaxed skin tension lines where possible.    The area thus outlined was incised deep to adipose tissue with a #15 scalpel blade.  The skin margins were undermined to an appropriate distance in all directions around the primary defect and laterally outward around the island pedicle utilizing iris scissors.  There was minimal undermining beneath the pedicle flap.

## 2022-12-10 NOTE — EICU
Video rounds complete. Patient resting in bed with BIPAP in place. No complaints, or distress noted.   diff breathing and cough    c/o diff breathing and dry cough for 2 days. speaking very freely in full sentences. had sick contact- friend had flu

## 2023-01-27 NOTE — PROGRESS NOTES
MD Braden and interventional cardiology MD made aware of patient not having a Dt, or PT in the left foot.     Ordered to continue to monitor.    patient left eye redness and chills that started today. also states hx of migraines, had migraine earlier today and took migraine medication at home with some improvement.

## 2024-03-16 NOTE — PLAN OF CARE
Attempted to call  at this time unable to contact, voicemail left at this time   Problem: Patient Care Overview  Goal: Plan of Care Review  Outcome: Ongoing (interventions implemented as appropriate)   See vital signs and assessments in flowsheets. See below for updates on today's progress.     Pulmonary: pt remains tachypneic throughout the day, he has been on Oxygen @ 2L NC for the majority of the day. Crackles auscultated to bilateral lungs, pt has refused Bi-pap and been argumentative throughout the day.     Cardiovascular: HF last reported EF 10%, ICD to left chest with reported dead battery per family, sinus tachycardia 100-120 throughout the day. BP WNL when pt agreeable to wearing BP cuff. Plan for possible RHC tomorrow - family member signed consent today.    Neurological: Psychiatric consult completed today and pt diagnosed with delirium.  Pt alert and oriented to self. Father and son (Out of town) coordinating pt care.     Gastrointestinal: WNL     Genitourinary: WNL, voids per urinal    Integumentary/Other: L AKA and R BKA status post amputation within the last 3 and 1 months respectively. Left surgical incision well approximated and open to air, no drainag,e redness, or erythremia noted.  R surgical incision with staples in place. Incision clean, dry, and painted with betadine today.     Infusions: No IV access due to pt refusing peripheral sticks, consent for CVC on chart     Patient progressing towards goals as tolerated, plan of care communicated and reviewed with Leonardo Schwartzr and family. All concerns addressed. Will continue to monitor.

## 2024-04-03 NOTE — PLAN OF CARE
Problem: Patient Care Overview  Goal: Individualization & Mutuality  Outcome: Ongoing (interventions implemented as appropriate)  Plan of care discussed with patient. Patient is free of fall/trauma/injury. Denies CP. Complains of pain from recent L BKA, prn medication administered. VSS. AAOx4. Lasix 80IVP BID. K and Mg replaced. EKG completed. Echo pending. All questions addressed. Will continue to monitor.       Rx Refill Note  Requested Prescriptions     Pending Prescriptions Disp Refills    metFORMIN ER (GLUCOPHAGE-XR) 750 MG 24 hr tablet [Pharmacy Med Name: metFORMIN HCl  MG Oral Tablet Extended Release 24 Hour] 60 tablet 0     Sig: TAKE 2 TABLETS BY MOUTH ONCE DAILY WITH BREAKFAST    omeprazole (priLOSEC) 20 MG capsule [Pharmacy Med Name: Omeprazole 20 MG Oral Capsule Delayed Release] 30 capsule 0     Sig: Take 1 capsule by mouth once daily    glipizide (GLUCOTROL) 5 MG tablet [Pharmacy Med Name: glipiZIDE 5 MG Oral Tablet] 30 tablet 0     Sig: TAKE 1 TABLET BY MOUTH ONCE DAILY IN THE MORNING    lisinopril (PRINIVIL,ZESTRIL) 40 MG tablet [Pharmacy Med Name: Lisinopril 40 MG Oral Tablet] 30 tablet 0     Sig: Take 1 tablet by mouth once daily    busPIRone (BUSPAR) 10 MG tablet [Pharmacy Med Name: busPIRone HCl 10 MG Oral Tablet] 30 tablet 0     Sig: Take 1 tablet by mouth twice daily      Last office visit with prescribing clinician: 3/19/2024   Last telemedicine visit with prescribing clinician: Visit date not found   Next office visit with prescribing clinician: 4/16/2024       Lia Heath MA  04/03/24, 16:59 EDT

## 2024-04-11 NOTE — PROGRESS NOTES
[facility]  Psychiatry  Progress Note    Patient Name: Leonardo Byrd  MRN: [id number]   Code Status: Full Code  Admission Date: 6/16/2018  Hospital Length of Stay: 9 days  Expected Discharge Date: 6/27/2018  Attending Physician: Ana Gomez MD  Primary Care Provider: Indio Aquino MD    Current Legal Status: N/A    Patient information was obtained from patient and ER records.     Subjective:     Principal Problem:Cardiogenic shock    Chief Complaint: Delirium    HPI: Per Cardiology notes:  47 year old with PMH of ICM (LVEF 30-35%) s/p ICD, CAD s/p PCI, HTN, HLD, current smoker, PAD (s/p aortobifemoral bypass, L SFA and pop PTAS in September and recent R SFA and R pop 3/8 by Dr. Carl Bell and finally R AKA 3/2018.     He presented to ED with about 2 week h/o SOB, cough, weight gain. He presented to [facility] ED on Saturday. Sent home after presumed negative work up. Presented once again overnight 6/13. BNP 2400, congested CXR, cough, no fever similar to prior CHF exacerbations. LActic acid elevated. He was admitted with COPD and sepsis. He was started on zmax and vanc and given a fluid bolus in ED.  He was found to not be septic the next day and all abx were stopped.  Unfortunately, though his UO was quite good and he was net - 2000cc on 6/16am, he progressively worsened from a respiratory status and became progressively hypotensive.  He was subsequently intubated and started on levo/dobutrex.  Lactate 10, k 7.1, creatinine now 1.8 from baseline 0.8, poor/no UO.  Transferred to [facility] for higher level of care.     On interview this morning:  The patient is lying in bed calmly at the beginning of the interview. Two of his daughters are present and help provide some of the history. The patient is not cooperative with interview and is profoundly confused. His answers to questions are nonsensical and he is unable to follow any commands. He is CAM-ICU positive for delirium and fails SAVEAHAART. The  "patient requests some water and his daughters attempt to feed him ice chips. He then becomes agitated and violent. He attempts to get out of bed and asks for his clothes. He starts yelling. His daughters are asked to leave the room and nurses place the patient in soft restraints to his wrists and his ankle. He continues to yell for his daughters and is no longer able to participate in the interview.    Per his daughters, he is treated for depression with a medication but they do not remember the name of the medication. He has not seen a psychiatrist in the past. They state that the patient has not had a drink of alcohol in at least a month and state that he does not use any drugs of abuse. They state that the patient was "given too much of a medication" by his doctors, but they are unable to say which medication they are referring too. They deny that the patient has had any prior suicide attempts.    Hospital Course: 6/21/2018: The chart was reviewed and the patient was interviewed this morning. His father was present in the room during the interview. The patient was in restraints to his wrists bilaterally and to his left ankle. He also had a mask on his face to prevent him from spitting on staff. Per his nurse, he has continued to be very agitated and interfering with treatment. The patient was agitated during the interview this morning. His speech was profane and he was shouting at his father to "call 911". He was unable to participate in CAM-ICU assessment for delirium and was unable to follow commands.    6/22/2018: The chart was reviewed and the patient was interviewed this morning. He had a mask on his face and he was in 3-point soft restraints. He was vulgar and profoundly confused. He was unable to offer coherent answers to any questions. He failed SAVEAHAART. He was oriented to person only.     06/23/2018 - received Thorazine 50mg IM yesterday for agitation. remarkable improvement in mental status today. Pt " "alert and oriented to person, place, city, state, month, year, situation - CAM-ICU negative with 0 errors on SAVEAHAART screen. Mood is good, thoughts are linear, pt laughing and joking around appropriately. Son at bedside, relieved by the marked improvement. Step down to floor today.     06/24/2018: per chart has been calm, no PRN medication needed. Says he is doing "a lot better" today, though he complains about not being able to walk and asks when his L foot dressing will be changed. C/o pain. Oriented to place, month, year and that is was recently father's day. No family at bedside on interview.    6/25./2018: No PRN medications have been given over the past several days for agitation. The patient is calm and oriented to person, place and time. Per his father at the bedside, he is still somewhat confused and not back to his baseline yet. However, he is out of restraints and passed SAVEAHAART this morning. His mood was "good" this morning. There was some instances of incoherent mumbling, but the patient was otherwise appropriate and cooperative with the interview.    Interval History: See hospital course    Family History     None        Social History Main Topics    Smoking status: Former Smoker     Packs/day: 0.50     Quit date: 5/1/2018    Smokeless tobacco: Former User     Types: Chew     Quit date: 8/2/1980      Comment: Uses nicotine gum and nicotine patches    Alcohol use Yes      Comment: rarely    Drug use: No    Sexual activity: Yes     Partners: Female     Psychotherapeutics     Start     Stop Route Frequency Ordered    06/23/18 2100  chlorproMAZINE tablet 50 mg      -- Oral Nightly 06/23/18 1600    06/23/18 1700  chlorproMAZINE injection 25 mg      -- IM Every 12 hours PRN 06/23/18 1600    06/19/18 1750  haloperidol lactate (HALDOL) 5 mg/mL injection     Comments:  Created by cabinet override    06/20 0559   06/19/18 1750           Review of Systems   Respiratory: Negative for shortness of " "breath.    Cardiovascular: Negative for chest pain.   Gastrointestinal: Negative for abdominal pain and nausea.   Neurological: Negative for headaches.     Objective:     Vital Signs (Most Recent):  Temp: 97.2 °F (36.2 °C) (06/25/18 1000)  Pulse: (!) 116 (06/25/18 1000)  Resp: 16 (06/25/18 1000)  BP: 104/69 (06/25/18 1000)  SpO2: 95 % (06/25/18 1000) Vital Signs (24h Range):  Temp:  [96.3 °F (35.7 °C)-97.7 °F (36.5 °C)] 97.2 °F (36.2 °C)  Pulse:  [] 116  Resp:  [16-19] 16  SpO2:  [87 %-97 %] 95 %  BP: (104-123)/(62-74) 104/69     Height: 5' 6" (167.6 cm)  Weight: 56.7 kg (125 lb)  Body mass index is 20.18 kg/m².      Intake/Output Summary (Last 24 hours) at 06/25/18 1032  Last data filed at 06/25/18 0615   Gross per 24 hour   Intake          1029.14 ml   Output              900 ml   Net           129.14 ml       Physical Exam      Mental Status Exam:  Appearance: unremarkable, age appropriate, normal weight, well nourished, lying in bed, dressed in hospital gown  Behavior/Cooperation: normal, cooperative  Speech: normal tone, normal rate, normal pitch, normal volume  Language: uses words appropriately; NO aphasia or dysarthria  Mood: euthymic  Affect:  congruent with mood and appropriate to situation/content   Thought Process: normal and logical  Thought Content: normal, no suicidality, no homicidality, delusions, or paranoia  Level of Consciousness: Alert and Oriented x3  Memory:  Intact   3/3 immediate, 3/3 at 5 minutes    Recent:  Intact   Remote:  intact  Attention/concentration: appropriate for age/education.   Fund of Knowledge: appears adequate  Insight:  Intact  Judgment: Appropriate for setting      Significant Labs:   Last 24 Hours:   Recent Lab Results       06/25/18  0536      Immature Granulocytes 0.9(H)     Immature Grans (Abs) 0.07  Comment:  Mild elevation in immature granulocytes is non specific and   can be seen in a variety of conditions including stress response,   acute inflammation, " trauma and pregnancy. Correlation with other   laboratory and clinical findings is essential.  (H)     Albumin 2.8(L)     Alkaline Phosphatase 216(H)     (H)     Anion Gap 10     (H)     Baso # 0.05     Basophil% 0.6     Total Bilirubin 2.3  Comment:  For infants and newborns, interpretation of results should be based  on gestational age, weight and in agreement with clinical  observations.  Premature Infant recommended reference ranges:  Up to 24 hours.............<8.0 mg/dL  Up to 48 hours............<12.0 mg/dL  3-5 days..................<15.0 mg/dL  6-29 days.................<15.0 mg/dL  (H)     BUN, Bld 15     Calcium 9.1     Chloride 102     CO2 23     Creatinine 1.4     Differential Method Automated     eGFR if African American >60.0     eGFR if non  58.6  Comment:  Calculation used to obtain the estimated glomerular filtration  rate (eGFR) is the CKD-EPI equation.   (A)     Eos # 0.4     Eosinophil% 5.1     Glucose 100     Gran # (ANC) 4.4     Gran% 56.4     Hematocrit 31.8(L)     Hemoglobin 10.8(L)     Lymph # 1.7     Lymph% 21.3     Magnesium 1.9     MCH 30.6     MCHC 34.0     MCV 90     Mono # 1.2(H)     Mono% 15.7(H)     MPV 12.8     nRBC 0     Phosphorus 3.0     Platelets 168     Potassium 4.0     Total Protein 6.8     RBC 3.53(L)     RDW 20.1(H)     Sodium 135(L)     WBC 7.79           Significant Imaging: EKG: I have reviewed all pertinent results/findings within the past 24 hours. Sinus tachycardia, Septal infarct age undetermined, T wave abnormality consider lateral ischemia, Abnormal ECG; QTc is 456    Assessment/Plan:     Delirium    - Pt had remarkable improvement in mental status s/p administration of thorazine 6/22 PM, now alert and oriented to all measures assessed and CAM-ICU negative - possibly due to thorazine, improvement of medical condition, or combination of both - pt passed swallow, can now take PO meds  - Discontinue scheduled zyprexa due to lack of  efficacy  - Continue Depacon 250mg IV q6h   - Continue scheduled Thorazine 50mg PO qhs.  - Continue to use Thorazine 25mg IM q12h PRN for non-redirectable agitation  - Recommend daily EKG to monitor QTc while on thorazine - QTc on 6/22 477, 6/23 505, 6/24 543, 6/25 456    -Implement the below DELIRIUM BEHAVIOR MANAGEMENT:  - Minimize use of restraints; if physical restraints necessary, please utilize medical/chemical prns for agitation first  - Keep shades open and room lit during day and room dim at night in order to promote healthy circadian rhythms.  - Encourage family at bedside.  - Keep whiteboard in patient's room current with the date and name of the members of patient's team for easy patient self re-orientation.  - Avoid benzodiazepines, antihistamines, anticholinergics, hypnotics, and minimize opiates while controlling for pain as these medications may worsen delirium.      Psychiatry will continue to follow             Need for Continued Hospitalization:   No need for inpatient psychiatric hospitalization. Continue medical care as per the primary team.    Anticipated Disposition: Home or Self Care     Total time:  25 with greater than 50% of this time spent in counseling and/or coordination of care.       Bruno Li MD   Psychiatry  [facility]   Yes

## 2025-02-21 NOTE — HPI
"Patient Outreach    Glucose log obtained: stopped Metformin 1/9/25    date fbs 2 hr after meal   2/14 130 112   2/15 123 123   2/16 120 130   2/17 114 134   2/18 126 No longer checking twice daily   2/19 109    2/20 110      Glucose levels controlled since stopping Metformin. Mr Macias aware to continue to monitor fbs at least every other day. Instructed want to see fbs around . Instructed to obtain glucose level if feeling weak, lightheaded,shakiness, blurred vision,headache. We discussed Diabetic Action Plan and healthy food choices. He is aware to call with any questions or concerns.     Will follow for 30 days. If no new ACM needs plan to discharge.      Names and Relationships of Patient/Support Persons: Contact: Dioni Macias \"Bill\"; Relationship: Self -     SDOH updated and reviewed with the patient during this program:  --     Alcohol Use: Not At Risk (2/21/2025)    AUDIT-C     Frequency of Alcohol Consumption: Never     Average Number of Drinks: Patient does not drink     Frequency of Binge Drinking: Never      --     Depression: Not at risk (4/28/2023)    PHQ-2     PHQ-2 Score: 1      --     Disabilities: Not At Risk (2/21/2025)    Disabilities     Concentrating, Remembering, or Making Decisions Difficulty: no     Doing Errands Independently Difficulty: no      --     Employment: Not At Risk (2/21/2025)    Employment     Do you want help finding or keeping work or a job?: I do not need or want help      Financial Resource Strain: Low Risk  (2/21/2025)    Overall Financial Resource Strain (CARDIA)     Difficulty of Paying Living Expenses: Not very hard      --     Food Insecurity: No Food Insecurity (2/21/2025)    Hunger Vital Sign     Worried About Running Out of Food in the Last Year: Never true     Ran Out of Food in the Last Year: Never true      --     Health Literacy: Inadequate Health Literacy (2/21/2025)    Amb Case Mgmt     How often do you have someone help you read facts about your " Mr. Byrd is a 47 y/o male with past medical history of PAD, s/p aortobifemoral bypass and multiple interventions, ICM EF 30-35%, s/p ICD, CAD s/p PCI, HTN, HLD, every day tobacco smoker (stopped 3 weeks ago). He was recently admitted to [facility] for ischemic rest pain of his left lower limb. Arterial ultrasound showed occlusion of left mid/distal SFA, popliteal, posterior tibial, and dorsalis pedis arteries. He subsequently underwent angiogram by Dr. Rosas, with thrombolytics and angiojet to L SFA, popliteal, and PT arteries.  Post-procedure he developed a left thigh hematoma, for which a repeat angiogram was performed and no perforation was noted. His left leg pain is ongoing now for several weeks, even after intervention he says that he immediately had recurrence as soon as he woke up.  He spoke to his interventionalist, and was told they could repeat an angiogram but he may need amputation. He decided to present to [facility] to get a 2nd opinion. He reports this morning that his left foot is in significant pain localized to the top and bottom of his foot. He denies SOB, chest pain, or cough. He endorses ulcers present on his lower leg that began around the time he had his previous vascular surgery. He also endorses a decrease in ability to walk. He states that the pain in his foot has increased to such a point that ambulation is no longer possible. He also reports that his left foot has been significant colder than his right.    The patient was admitted to the Hospital Medicine Service for further evaluation and management.    health?: sometimes     How often do you have trouble learning about your health because you don't know what the written words mean?: occasionally     How confident are you filling out forms by yourself?: occasionally      --     Housing Stability: Low Risk  (2/21/2025)    Housing Stability Vital Sign     Unable to Pay for Housing in the Last Year: No     Number of Times Moved in the Last Year: 0     Homeless in the Last Year: No      --     Interpersonal Safety: Not At Risk (2/21/2025)    Humiliation, Afraid, Rape, and Kick questionnaire     Fear of Current or Ex-Partner: No     Emotionally Abused: No     Physically Abused: No     Sexually Abused: No      --     Physical Activity: Inactive (2/21/2025)    Exercise Vital Sign     Days of Exercise per Week: 0 days     Minutes of Exercise per Session: 0 min      --     Social Connections: Moderately Integrated (2/21/2025)    Social Connection and Isolation Panel [NHANES]     Frequency of Communication with Friends and Family: Twice a week     Frequency of Social Gatherings with Friends and Family: Twice a week     Attends [employer] Services: More than 4 times per year     Active Member of Clubs or Organizations: No     Attends Club or Organization Meetings: Never     Marital Status:       --     Stress: No Stress Concern Present (2/21/2025)    [employer] [place] [employer] - Occupational Stress Questionnaire     Feeling of Stress : Only a little      --     Tobacco Use: Medium Risk (9/13/2024)    Patient History     Smoking Tobacco Use: Former     Smokeless Tobacco Use: Former      --     Transportation Needs: No Transportation Needs (2/21/2025)    PRAPARE - Transportation     Lack of Transportation (Medical): No     Lack of Transportation (Non-Medical): No      --     Utilities: Not At Risk (2/21/2025)    [facility] Utilities     Threatened with loss of utilities: No         Education Documentation  No documentation found.        Richa PARSI  Ambulatory Case  Management    2/21/2025, 08:56 EST

## (undated) DEVICE — SUT 3-0 12-18IN SILK

## (undated) DEVICE — BLADE SURG #15 CARBON STEEL

## (undated) DEVICE — SEE MEDLINE ITEM 146345

## (undated) DEVICE — SUT 2-0 12-18IN SILK

## (undated) DEVICE — SEE MEDLINE ITEM 146271

## (undated) DEVICE — BANDAGE ACE ELASTIC 6"

## (undated) DEVICE — SEE L#120831

## (undated) DEVICE — SUT PROLENE 2-0 SH 36IN BLU

## (undated) DEVICE — SUT PROLENE 0 CT1 30IN BLUE

## (undated) DEVICE — SPONGE LAP 18X18 PREWASHED

## (undated) DEVICE — DRAPE PLASTIC U 60X72

## (undated) DEVICE — IMMOB KNEE UNIV TRI PANEL 19IN

## (undated) DEVICE — SEE ITEM #93478

## (undated) DEVICE — STOCKINET 4INX48

## (undated) DEVICE — STOCKINET TUBULAR 1 PLY 6X60IN

## (undated) DEVICE — GAUZE SPONGE 4X4 12PLY

## (undated) DEVICE — DRESSING TRANS 4X4 TEGADERM

## (undated) DEVICE — GAUZE FLUFF XXLG 36X36 2 PLY

## (undated) DEVICE — DRESSING XEROFORM STRL 2X2

## (undated) DEVICE — SEE MEDLINE ITEM 154981

## (undated) DEVICE — SUT VICRYL PLUS 3-0 SH 18IN

## (undated) DEVICE — SUT VICRYL CT-1 2-0 27IN

## (undated) DEVICE — DRESSING DERMACEA SPNG 10S

## (undated) DEVICE — SUT 0 18IN SILK BLK BRAIDE

## (undated) DEVICE — TRAY MINOR GEN SURG

## (undated) DEVICE — STAPLER SKIN PROXIMATE WIDE

## (undated) DEVICE — SEE MEDLINE ITEM 152622

## (undated) DEVICE — BLADE SURG CARBON STEEL #10

## (undated) DEVICE — SUT SILK BLK BR. 0 8-18

## (undated) DEVICE — PAD CAST SPECIALIST STRL 4

## (undated) DEVICE — SUT ETHILON 2-0 BLK PS-2

## (undated) DEVICE — KIT EVACUATOR FULL PERF 100CC

## (undated) DEVICE — SPONGE GAUZE 16PLY 4X4

## (undated) DEVICE — SUT PROLENE 3-0 SH DA 36 BL

## (undated) DEVICE — SPONGE DERMACEA GAUZE 4X4

## (undated) DEVICE — SEE MEDLINE ITEM 157150

## (undated) DEVICE — COVER LIGHT HANDLE 80/CA

## (undated) DEVICE — DRAPE INCISE IOBAN 2 23X17IN

## (undated) DEVICE — DRAPE STERI-DRAPE 1000 17X11IN

## (undated) DEVICE — ELECTRODE REM PLYHSV RETURN 9

## (undated) DEVICE — BANDAGE CONFORM 3IN STRL

## (undated) DEVICE — PADDING CAST 4IN

## (undated) DEVICE — SUT SILK 2-0 SH 18IN BLACK

## (undated) DEVICE — DRESSING XEROFORM GAUZE 5X9

## (undated) DEVICE — PAD ABDOMINAL 8X7.5 STERILE

## (undated) DEVICE — SUT BONE WAX 2.5 GRMS 12/BX

## (undated) DEVICE — SEE MEDLINE ITEM 146298

## (undated) DEVICE — APPLICATOR CHLORAPREP ORN 26ML

## (undated) DEVICE — Device

## (undated) DEVICE — SUT VICRYL 3-0 27 SH

## (undated) DEVICE — SEE MEDLINE ITEM 152515

## (undated) DEVICE — BANDAGE KLING 3

## (undated) DEVICE — KIT EVACUATOR 3-SPRING 1/8 DRN